# Patient Record
Sex: MALE | Race: WHITE | Employment: OTHER | URBAN - METROPOLITAN AREA
[De-identification: names, ages, dates, MRNs, and addresses within clinical notes are randomized per-mention and may not be internally consistent; named-entity substitution may affect disease eponyms.]

---

## 2017-01-03 ENCOUNTER — OFFICE VISIT (OUTPATIENT)
Dept: NEUROLOGY | Age: 77
End: 2017-01-03

## 2017-01-03 VITALS
BODY MASS INDEX: 32.6 KG/M2 | WEIGHT: 254 LBS | RESPIRATION RATE: 18 BRPM | SYSTOLIC BLOOD PRESSURE: 130 MMHG | DIASTOLIC BLOOD PRESSURE: 86 MMHG | HEIGHT: 74 IN | OXYGEN SATURATION: 96 % | HEART RATE: 76 BPM

## 2017-01-03 DIAGNOSIS — G40.209 LOCALIZED EPILEPSY WITH IMPAIRMENT OF CONSCIOUSNESS (HCC): Primary | ICD-10-CM

## 2017-01-03 RX ORDER — CARBAMAZEPINE 400 MG/1
400 TABLET, EXTENDED RELEASE ORAL 2 TIMES DAILY
Qty: 60 TAB | Refills: 11 | Status: SHIPPED | OUTPATIENT
Start: 2017-01-03 | End: 2017-12-26 | Stop reason: SDUPTHER

## 2017-01-03 NOTE — PROGRESS NOTES
Patient here for follow up on seizures and brain injury. No seizures since last office visit in May in 2016.

## 2017-01-03 NOTE — PROGRESS NOTES
Neurology Progress Note    HISTORY PROVIDED BY: patient and spouse    Chief Complaint:   Chief Complaint   Patient presents with    Seizure    Brain Injury      Subjective:    Ilir Hwang is a 77 y.o. 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 Found to have severe RONAL, on CPAP with improvement in fatigue and seizure control. Stable on monotherapy with Tegretol  mg twice daily. Exam was unchanged and consistent with known history. MRI brain 8/23/12 revealed extensive encephalomalacia on the right, EEG was normal.  Continued Tegretol  mg twice daily, brand-name only. He returns for planned follow-up accompanied by his wife. Since last visit he has undergone cardioversion for afib. He has been started on amiodarone and Pradaxa. No seizures since last visit. No new complaints. Labs  5/16/16-CBC, CMP, carbamazepine all unremarkable.   Past Medical History   Diagnosis Date    A-fib [facility])     Acute bronchitis 8/25/2015    Anxiety     Arrhythmia      atrial fibrillation    Arthritis     BCC (basal cell carcinoma of skin)     BPH (benign prostatic hyperplasia)     Chronic back pain greater than 3 months duration 5/4/2015    Chronic right shoulder pain 1/11/2016    Common wart 5/16/2016    Constipation 12/14/2012    CVA (cerebral infarction) 5/4/2015    Depression     GERD (gastroesophageal reflux disease)     Hearing loss      bilateral hearing aids    Hemiplegia following CVA (cerebrovascular accident) ([address]      left side hemiparesis    History of colostomy     HTN (hypertension)     Hyperlipidemia     Localized epilepsy with impairment of consciousness ([address]     Prostate cancer ([address]      Status post XRT, Lupron    Screening for colon cancer 11/4/2015    Stroke [facility])      traumatic from neck crushing    TBI (traumatic brain injury) ([address] 1994    Unspecified sleep apnea      on CPAP      Past Surgical History   Procedure Laterality Date    Hx colectomy       colostomy    Hx hernia repair      Hx heent       ear surgery eddie.  Hx ankle fracture tx      Hx orthopaedic       left hip pinning    Hx pacemaker  2014      Social History     Social History    Marital status:      Spouse name: N/A    Number of children: N/A    Years of education: N/A     Occupational History    Not on file. Social History Main Topics    Smoking status: Former Smoker     Years: 10.00     Types: Pipe     Quit date: 1/29/1990    Smokeless tobacco: Never Used      Comment: QUIT 1970'S    Alcohol use No    Drug use: No    Sexual activity: Not Currently     Other Topics Concern    Not on file     Social History Narrative    , lives with his wife in [place]     Family History   Problem Relation Age of Onset    Alzheimer Mother      dec [de-identified]    Cancer Father      dec 76 kidney    Heart Disease Brother     No Known Problems Son           Objective:   ROS:  Per HPI-  Otherwise 10 point ROS was negative    Allergies   Allergen Reactions    Ciprofibrate Hives       Meds:  Outpatient Medications Prior to Visit   Medication Sig Dispense Refill    atorvastatin (LIPITOR) 20 mg tablet TAKE ONE TABLET BY MOUTH DAILY 30 Tab 0    PRADAXA 150 mg capsule TAKE ONE CAPSULE BY MOUTH EVERY 12 HOURS 60 Cap 6    lisinopril (PRINIVIL, ZESTRIL) 40 mg tablet TAKE ONE TABLET BY MOUTH DAILY 90 Tab 0    amiodarone (CORDARONE) 200 mg tablet Take 1 Tab by mouth daily. 30 Tab 6    zolpidem (AMBIEN) 10 mg tablet Take 1 Tab by mouth nightly as needed for Sleep (please take in the bed). 30 Tab 3    venlafaxine-SR (EFFEXOR-XR) 75 mg capsule TAKE ONE CAPSULE BY MOUTH DAILY 30 Cap 7    amLODIPine (NORVASC) 10 mg tablet TAKE ONE TABLET BY MOUTH DAILY 90 Tab 6    Incontinence Pad, Liner, Disp pads by Does Not Apply route.       betamethasone dipropionate (DIPROSONE) 0.05 % topical cream Apply  to affected area as needed. 45 g 6    lactulose (CHRONULAC) 10 gram/15 mL solution Take 5 mL by mouth three (3) times daily as needed. 480 mL 3    DOC-Q-LACE 100 mg capsule TAKE ONE CAPSULE BY MOUTH TWICE A DAY 60 Cap 6    meclizine (ANTIVERT) 25 mg tablet TAKE ONE TABLET BY MOUTH EVERY 8 HOURS AS NEEDED 30 Tab 2    FIRST AID CLOTH TAPE 1 X 10 \"-yard tape       diphenhydrAMINE (BENADRYL ALLERGY) 25 mg tablet Take 1 Tab by mouth every six (6) hours as needed for Sleep. 24 Tab 0    metoprolol (LOPRESSOR) 25 mg tablet Take 0.5 tablets by mouth two (2) times a day. 60 tablet 11    lutein 20 mg tab Take 1 Tab by mouth daily. 25 mg QD      senna (SENNA) 8.6 mg tablet Take 1 tablet by mouth daily.  carBAMazepine XR (TEGRETOL XR) 400 mg SR tablet Take 400 mg by mouth two (2) times a day.  finasteride (PROSCAR) 5 mg tablet Take 5 mg by mouth daily.  HYDROcodone-acetaminophen (NORCO) 5-325 mg per tablet Take 1 Tab by mouth two (2) times daily as needed for Pain. Max Daily Amount: 2 Tabs. 60 Tab 0    omeprazole (PRILOSEC) 20 mg capsule TAKE ONE CAPSULE BY MOUTH DAILY 30 Cap 6    polyethylene glycol (MIRALAX) 17 gram/dose powder Take 17 g by mouth two (2) times daily as needed. Takes as needed       No facility-administered medications prior to visit. Imaging:  MRI Results (most recent):    Results from Hospital Encounter encounter on 08/23/12   MRI BRAIN W WO CONT   Narrative **Final Report**      ICD Codes / Adm. Diagnosis: V15.52  345.80 / Personal history of traumatic    Other forms of epilepsy and r  Examination:  MR BRAIN Doyle Duggan  - [id number] - Aug 23 2012 10:47AM  Accession No:  [id number]  Reason:  Traumatic brain injury; Epilepsy; Long term use of medications.       REPORT:  HISTORY:  Epilepsy, traumatic brain injury    COMPARISON:  07/20/2012    TECHNIQUE:  MR imaging of the brain was performed with sagittal T1, axial   T1, T2, FLAIR, GRE, DWI/ADC; coronal T2 and flair; pre and post contrast   multiplanar T1 utilizing 20  mL Magnevist.    FINDINGS:      Again demonstrated is the large area of cystic encephalomalacia within the   right MCA distribution. There is ex vacuo dilatation of the right lateral   ventricle and there is overall enlargement of the ventricles compatible with   volume loss. There is high signal within the periventricular white matter. There is atrophy of the right middle cerebral peduncle, compatible with   wallerian degeneration. There is no evidence of acute infarct. There is   susceptibility artifact in this large area of encephalomalacia likely due to   previous hemorrhage. An acute hemorrhage is not identified. There is no   midline shift or mass lesion. Given motion there are no areas of abnormal   enhancement. The flow-voids at the skull base are maintained. No evidence of   marrow replacement. IMPRESSION:  1. Large area of cystic encephalomalacia within the right MCA distribution   with evidence of prior intracranial hemorrhage. No acute infarct or   hemorrhage is demonstrated  2. Severe periventricular white matter change with enlargement of the   ventricles likely due to volume loss. Shailesh Grijalva           Signing/Reading Doctor: Dyllan Lutz ([id number])    Approved: Dyllan Lutz ([id number])  08/23/2012                                     CT Results (most recent):    Results from [place] encounter on 10/25/16   CT ABD W WO CONT   Narrative EXAM:  CT ABD W WO CONT    INDICATION:   iv only  renal cyst    COMPARISON: 2015. CONTRAST:  100 mL of Isovue-370. TECHNIQUE:    Multislice helical CT was performed from the diaphragm to the iliac crest prior  to and during rapid bolus intravenous contrast administration. Oral contrast     administered. Contiguous 5 mm axial images were reconstructed and lung and soft  tissue windows were generated. Coronal and sagittal reformations were generated.   CT dose reduction was achieved through use of a standardized protocol tailored  for this examination and automatic exposure control for dose modulation. FINDINGS:  There is atelectasis/scarring and a few tiny ossified nodules lung bases. There  is slight pleural thickening which is calcified    The unenhanced images demonstrate 2.7 cm low-density projected off the mid left  kidney posteriorly measures 23 Hounsfield units and does not demonstrate  significant enhancement in this is similar to the prior examination is  consistent with a cyst.    Following intravenous contrast administration, there are liver, spleen, adrenal  glands, pancreas or right kidney. There is a small hiatal hernia. .    The aorta demonstrates atherosclerotic changes . There is no retroperitoneal  adenopathy or mass. There is an ostomy in the left mid abdomen There is no ascites or free  intraperitoneal air. Impression IMPRESSION:   1. No change in the 2.7 cm lesion projected off the left mid kidney posteriorly  most consistent with a cyst.  2. Small hiatal hernia. Reviewed records in [employer] and [employer] tab today    Lab Review   Results for orders placed or performed during the hospital encounter of 10/07/16   EKG, 12 LEAD, INITIAL   Result Value Ref Range    Ventricular Rate 75 BPM    Atrial Rate 77 BPM    QRS Duration 180 ms    Q-T Interval 476 ms    QTC Calculation (Bezet) 531 ms    Calculated R Axis -64 degrees    Calculated T Axis 98 degrees    Diagnosis       Electronic ventricular pacemaker  When compared with ECG of 15-OCT-2014 13:10,  Electronic ventricular pacemaker has replaced Electronic atrial pacemaker  Confirmed by [place] ([id number]) on 10/7/2016 11:44:15 AM          Exam:  Visit Vitals    /86    Pulse 76    Resp 18    Ht 6' 2\" (1.88 m)    Wt 115.2 kg (254 lb)    SpO2 96%    BMI 32.61 kg/m2     General:  Alert, cooperative, no distress. Head:  Normocephalic, without obvious abnormality, atraumatic. Respiratory:  Heart:   Non labored breathing  Regular rate and rhythm, no murmurs   Neck:   2+ carotids, no bruits   Extremities: Warm, no cyanosis or edema. Pulses: 2+ radial pulses. Neurologic:  MS: Alert and oriented x 4, speech intact. Language intact, able to name, repeat, and follow all commands. Attention and fund of knowledge appropriate. Recent and remote memory intact. Cranial Nerves:  II: visual fields Left HH   II: pupils    II: optic disc    III,VII: ptosis none   III,IV,VI: extraocular muscles  EOMI, no nystagmus or diplopia   V: facial light touch sensation     VII: facial muscle function   symmetric   VIII: hearing intact   IX: soft palate elevation     XI: trapezius strength     XI: sternocleidomastoid strength    XII: tongue       Motor: normal bulk and tone, no tremor, strength: 5/5 throughout on right, left hemiparesis with minimal proximal UE movement. Coordination: Not assessed. Gait: WC bound  Reflexes: 2+ on left, 3+ on right           Assessment/Plan   Pt is a 77 y.o. 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 Well controlled on Tegretol XR 400mg bid and with treatment of severe RONAL on CPAP. Exam is unchanged and consistent with known history. MRI brain 8/23/12 revealed extensive encephalomalacia on the right, EEG was normal.  Continue Tegretol  mg twice daily, brand-name only. I asked pt to review meds with cardiologist at upcoming appt, Tegretol may lower the efficacy of Pradaxa. F/u in clinic in one year, instructed to call  in the interim if needed. ICD-10-CM ICD-9-CM    1. Localized epilepsy with impairment of consciousness ([facility] G40.209 345.40        Signed:   Misa Dyer MD  1/3/2017

## 2017-01-03 NOTE — PATIENT INSTRUCTIONS
[facility]   Statement to Patients  April 1, 2014      In an effort to ensure the large volume of patient prescription refills is processed in the most efficient and expeditious manner, we are asking our patients to assist us by calling your Pharmacy for all prescription refills, this will include also your  Mail Order Pharmacy. The pharmacy will contact our office electronically to continue the refill process. Please do not wait until the last minute to call your pharmacy. We need at least 48 hours (2days) to fill prescriptions. We also encourage you to call your pharmacy before going to  your prescription to make sure it is ready. With regard to controlled substance prescription refill requests (narcotic refills) that need to be picked up at our office, we ask your cooperation by providing us with at least 72 hours (3days) notice that you will need a refill. We will not refill narcotic prescription refill requests after 4:00pm on any weekday, Monday through Thursday, or after 2:00pm on Fridays, or on the weekends. We encourage everyone to explore another way of getting your prescription refill request processed using [employer], our patient web portal through our electronic medical record system. [employer] is an efficient and effective way to communicate your medication request directly to the office and  downloadable as an jorge on your smart phone . [employer] also features a review functionality that allows you to view your medication list as well as leave messages for your physician. Are you ready to get connected? If so please review the attatched instructions or speak to any of our staff to get you set up right away! Thank you so much for your cooperation. Should you have any questions please contact our Practice Administrator.     The Physicians and Staff,  [facility]       Thank you for choosing [facility] for your     care. You may receive a survey about your visit. We appreciate you taking time     to complete this survey as we use your feedback to improve our services. We     realize we are not perfect, but we strive to provide excellent care.

## 2017-01-03 NOTE — MR AVS SNAPSHOT
Visit Information Date & Time Provider Department Dept. Phone Encounter #  
 1/3/2017  2:40 PM Alec Garsia MD [facility] at [address] [place] [address] Road [id number] Follow-up Instructions Return in about 1 year (around 1/3/2018). Routing History Your Appointments 2/23/2017 10:00 AM  
6 MONTH with Kuldeep Kirkpatrick MD  
[facility] [address] [place] [address]) Appt Note: . [address]  
[phone] [address] [address]  
  
    
 3/16/2017  8:45 AM  
PACEMAKER with PACEMAKER, [facility] [address]) Appt Note: annual mdt dcpm, prefers AM appts [address]  
[phone] [address]  
  
    
 3/16/2017  9:00 AM  
ANNUAL with Santo Trinidad MD  
[facility] [address] [place] [address]) Appt Note: annual mdt dcpm, prefers AM appts [address]  
[phone] [address] Upcoming Health Maintenance Date Due  
 GLAUCOMA SCREENING Q2Y 5/4/2017 MEDICARE YEARLY EXAM 11/18/2017 DTaP/Tdap/Td series (2 - Td) 8/25/2024 Allergies as of 1/3/2017  Review Complete On: 1/3/2017 By: Alec Garsia MD  
  
 Severity Noted Reaction Type Reactions Ciprofibrate  03/26/2015    Hives Current Immunizations  Reviewed on 11/17/2016 Name Date Influenza High Dose Vaccine PF 10/10/2016 Influenza Vaccine 10/15/2015, 10/10/2014 Influenza Vaccine Split 10/1/2012, 9/14/2011 Pneumococcal Conjugate (PCV-13) 11/4/2015  9:02 AM  
 Pneumococcal Vaccine (Unspecified Type) 2/22/2012  4:39 PM  
 Tdap 8/25/2014 Varicella Virus Vaccine 5/7/2012 Not reviewed this visit Vitals BP Pulse Resp Height(growth percentile) Weight(growth percentile) SpO2  
 130/86 76 18 6' 2\" (1.88 m) 254 lb (115.2 kg) 96% BMI Smoking Status 32.61 kg/m2 Former Smoker Vitals History BMI and BSA Data Body Mass Index Body Surface Area  
 32.61 kg/m 2 2.45 m 2 Preferred Pharmacy Pharmacy Name Phone [facility] [address] , [address] [facility] [phone] Your Updated Medication List  
  
   
This list is accurate as of: 1/3/17  3:19 PM.  Always use your most recent med list.  
  
  
  
  
 amiodarone 200 mg tablet Commonly known as:  CORDARONE Take 1 Tab by mouth daily. amLODIPine 10 mg tablet Commonly known as:  Voncile [place] TAKE ONE TABLET BY MOUTH DAILY  
  
 atorvastatin 20 mg tablet Commonly known as:  LIPITOR  
TAKE ONE TABLET BY MOUTH DAILY  
  
 betamethasone dipropionate 0.05 % topical cream  
Commonly known as:  Cora Redig Apply  to affected area as needed. diphenhydrAMINE 25 mg tablet Commonly known as:  BENADRYL ALLERGY Take 1 Tab by mouth every six (6) hours as needed for Sleep. DOC-Q-LACE 100 mg capsule Generic drug:  docusate sodium TAKE ONE CAPSULE BY MOUTH TWICE A DAY  
  
 finasteride 5 mg tablet Commonly known as:  PROSCAR Take 5 mg by mouth daily. FIRST AID CLOTH TAPE 1 X 10 \"-yard Tape Generic drug:  Adhesive Tape HYDROcodone-acetaminophen 5-325 mg per tablet Commonly known as:  Eric Ill Take 1 Tab by mouth two (2) times daily as needed for Pain. Max Daily Amount: 2 Tabs. Incontinence Pad, Liner, Disp Pads  
by Does Not Apply route. lactulose 10 gram/15 mL solution Commonly known as:  Seleta [place] Take 5 mL by mouth three (3) times daily as needed. lisinopril 40 mg tablet Commonly known as:  PRINIVIL, ZESTRIL  
TAKE ONE TABLET BY MOUTH DAILY  
  
 lutein 20 mg Tab Take 1 Tab by mouth daily. 25 mg QD  
  
 meclizine 25 mg tablet Commonly known as:  ANTIVERT  
TAKE ONE TABLET BY MOUTH EVERY 8 HOURS AS NEEDED  
  
 metoprolol tartrate 25 mg tablet Commonly known as:  LOPRESSOR Take 0.5 tablets by mouth two (2) times a day. MIRALAX 17 gram/dose powder Generic drug:  polyethylene glycol Take 17 g by mouth two (2) times daily as needed. Takes as needed  
  
 omeprazole 20 mg capsule Commonly known as:  PRILOSEC  
TAKE ONE CAPSULE BY MOUTH DAILY PRADAXA 150 mg capsule Generic drug:  dabigatran etexilate TAKE ONE CAPSULE BY MOUTH EVERY 12 HOURS Senna 8.6 mg tablet Generic drug:  senna Take 1 tablet by mouth daily. TEGretol  mg SR tablet Generic drug:  carBAMazepine XR Take 1 Tab by mouth two (2) times a day. venlafaxine-SR 75 mg capsule Commonly known as:  EFFEXOR-XR  
TAKE ONE CAPSULE BY MOUTH DAILY  
  
 zolpidem 10 mg tablet Commonly known as:  AMBIEN Take 1 Tab by mouth nightly as needed for Sleep (please take in the bed). Prescriptions Sent to Pharmacy Refills TEGRETOL  mg SR tablet 11 Sig: Take 1 Tab by mouth two (2) times a day. Class: Normal  
 Pharmacy: [facility] [id number] [address] #: [phone] Route: Oral  
  
Follow-up Instructions Return in about 1 year (around 1/3/2018). Patient Instructions PRESCRIPTION REFILL POLICY [facility] Statement to Patients April 1, 2014 In an effort to ensure the large volume of patient prescription refills is processed in the most efficient and expeditious manner, we are asking our patients to assist us by calling your Pharmacy for all prescription refills, this will include also your  Mail Order Pharmacy. The pharmacy will contact our office electronically to continue the refill process. Please do not wait until the last minute to call your pharmacy.  We need at least 48 hours (2days) to fill prescriptions. We also encourage you to call your pharmacy before going to  your prescription to make sure it is ready. With regard to controlled substance prescription refill requests (narcotic refills) that need to be picked up at our office, we ask your cooperation by providing us with at least 72 hours (3days) notice that you will need a refill. We will not refill narcotic prescription refill requests after 4:00pm on any weekday, Monday through Thursday, or after 2:00pm on Fridays, or on the weekends. We encourage everyone to explore another way of getting your prescription refill request processed using [employer], our patient web portal through our electronic medical record system. [employer] is an efficient and effective way to communicate your medication request directly to the office and  downloadable as an jorge on your smart phone . [employer] also features a review functionality that allows you to view your medication list as well as leave messages for your physician. Are you ready to get connected? If so please review the attatched instructions or speak to any of our staff to get you set up right away! Thank you so much for your cooperation. Should you have any questions please contact our Practice Administrator. The Physicians and Staff,  [facility] Thank you for choosing [facility] and [facility] for your  
 
care. You may receive a survey about your visit. We appreciate you taking time  
 
to complete this survey as we use your feedback to improve our services. We  
 
realize we are not perfect, but we strive to provide excellent care. Introducing [facility] & [facility] SERVICES! Dear Yue Yan: 
Thank you for requesting a [employer] account. Our records indicate that you already have an active [employer] account. You can access your account anytime at [email or web address] Did you know that you can access your hospital and ER discharge instructions at any time in [employer]? You can also review all of your test results from your hospital stay or ER visit. Additional Information If you have questions, please visit the Frequently Asked Questions section of the [employer] website at [email or web address] Remember, [employer] is NOT to be used for urgent needs. For medical emergencies, dial 911. Now available from your iPhone and Android! Please provide this summary of care documentation to your next provider. Your primary care clinician is listed as Anastacio Chavis. If you have any questions after today's visit, please call [phone].

## 2017-01-19 RX ORDER — ATORVASTATIN CALCIUM 20 MG/1
TABLET, FILM COATED ORAL
Qty: 30 TAB | Refills: 0 | Status: SHIPPED | OUTPATIENT
Start: 2017-01-19 | End: 2017-02-15 | Stop reason: SDUPTHER

## 2017-01-26 RX ORDER — LISINOPRIL 40 MG/1
TABLET ORAL
Qty: 90 TAB | Refills: 0 | Status: SHIPPED | OUTPATIENT
Start: 2017-01-26 | End: 2017-04-26 | Stop reason: SDUPTHER

## 2017-01-31 RX ORDER — VENLAFAXINE HYDROCHLORIDE 75 MG/1
CAPSULE, EXTENDED RELEASE ORAL
Qty: 30 CAP | Refills: 6 | Status: SHIPPED | OUTPATIENT
Start: 2017-01-31 | End: 2017-08-26 | Stop reason: SDUPTHER

## 2017-02-20 RX ORDER — ATORVASTATIN CALCIUM 20 MG/1
TABLET, FILM COATED ORAL
Qty: 30 TAB | Refills: 0 | Status: SHIPPED | OUTPATIENT
Start: 2017-02-20 | End: 2017-03-19 | Stop reason: SDUPTHER

## 2017-02-23 ENCOUNTER — OFFICE VISIT (OUTPATIENT)
Dept: CARDIOLOGY CLINIC | Age: 77
End: 2017-02-23

## 2017-02-23 VITALS
RESPIRATION RATE: 18 BRPM | OXYGEN SATURATION: 96 % | WEIGHT: 264 LBS | HEART RATE: 77 BPM | SYSTOLIC BLOOD PRESSURE: 108 MMHG | BODY MASS INDEX: 33.88 KG/M2 | HEIGHT: 74 IN | DIASTOLIC BLOOD PRESSURE: 68 MMHG

## 2017-02-23 DIAGNOSIS — I49.5 SSS (SICK SINUS SYNDROME) (HCC): ICD-10-CM

## 2017-02-23 DIAGNOSIS — E78.00 HYPERCHOLESTEROLEMIA: ICD-10-CM

## 2017-02-23 DIAGNOSIS — I10 ESSENTIAL HYPERTENSION: Primary | ICD-10-CM

## 2017-02-23 DIAGNOSIS — Z92.29 HX: LONG TERM ANTICOAGULANT USE: ICD-10-CM

## 2017-02-23 DIAGNOSIS — I48.92 ATRIAL FLUTTER, UNSPECIFIED TYPE (HCC): ICD-10-CM

## 2017-02-23 RX ORDER — TOBRAMYCIN 3 MG/ML
1 SOLUTION/ DROPS OPHTHALMIC EVERY 4 HOURS
Qty: 1 BOTTLE | Refills: 0 | Status: SHIPPED | OUTPATIENT
Start: 2017-02-23 | End: 2017-03-02

## 2017-02-23 NOTE — PROGRESS NOTES
NAME:  Ling George   :   1940   MRN:   [id number]   PCP:  Anastacio Chavis MD           Subjective: The patient is a 77y.o. year old male  who returns for a routine follow-up. Since the last visit, patient reports no change in exercise tolerance, chest pain, edema, medication intolerance, palpitations, shortness of breath, PND/orthopnea wheezing, sputum, syncope, dizziness or light headedness. Doing well since cardioversion. Past Medical History:   Diagnosis Date    A-fib [facility])     Acute bronchitis 2015    Anxiety     Arrhythmia     atrial fibrillation    Arthritis     BCC (basal cell carcinoma of skin)     BPH (benign prostatic hyperplasia)     Chronic back pain greater than 3 months duration 2015    Chronic right shoulder pain 2016    Common wart 2016    Constipation 2012    CVA (cerebral infarction) 2015    Depression     GERD (gastroesophageal reflux disease)     Hearing loss     bilateral hearing aids    Hemiplegia following CVA (cerebrovascular accident) ([address]     left side hemiparesis    History of colostomy     HTN (hypertension)     Hyperlipidemia     Localized epilepsy with impairment of consciousness ([address]     Prostate cancer ([address]     Status post XRT, Lupron    Screening for colon cancer 2015    Stroke [facility])     traumatic from neck crushing    TBI (traumatic brain injury) ([address]     Unspecified sleep apnea     on CPAP       Social History   Substance Use Topics    Smoking status: Former Smoker     Years: 10.00     Types: Pipe     Quit date: 1990    Smokeless tobacco: Never Used      Comment: QUIT 'S    Alcohol use No      Family History   Problem Relation Age of Onset    Alzheimer Mother      dec [de-identified]    Cancer Father      dec 76 kidney    Heart Disease Brother     No Known Problems Son         Review of Systems  Constitutional: Negative for fever, chills, and diaphoresis.    Respiratory: Negative for cough, hemoptysis, sputum production, shortness of breath and wheezing. Cardiovascular: Negative for chest pain, palpitations, orthopnea, claudication, leg swelling and PND. Gastrointestinal: Negative for heartburn, nausea, vomiting, blood in stool and melena. Genitourinary: Negative for dysuria and flank pain. Musculoskeletal: Negative for joint pain and back pain. Skin: Negative for rash. Neurological: Negative for focal weakness, seizures, loss of consciousness, weakness and headaches. Endo/Heme/Allergies: Does not bruise/bleed easily. Psychiatric/Behavioral: Negative for memory loss. The patient does not have insomnia. Objective:       Vitals:    02/23/17 0949   BP: 108/68   Pulse: 77   Resp: 18   SpO2: 96%   Weight: 264 lb (119.7 kg)   Height: 6' 2\" (1.88 m)    Body mass index is 33.9 kg/(m^2). General PE    Gen: NAD     Mental Status - Alert. General Appearance - Not in acute distress. Neck - no JVD     Chest and Lung Exam     Inspection: Accessory muscles - No use of accessory muscles in breathing. Auscultation:   Breath sounds: - Normal.     Cardiovascular   Inspection: Jugular vein - Bilateral - Inspection Normal.   Palpation/Percussion:   Apical Impulse: - Normal.   Auscultation: Rhythm - Regular. Heart Sounds - S1 WNL and S2 WNL. No S3 or S4. Murmurs & Other Heart Sounds: Auscultation of the heart reveals - No Murmurs. Peripheral Vascular   Upper Extremity: Inspection - Bilateral - No Cyanotic nailbeds or Digital clubbing. Lower Extremity:   Palpation: Edema - Bilateral - No edema. Abdomen: Soft, non-tender, bowel sounds are active. Neuro: A&O times 3, CN and motor grossly WNL      Data Review:     EKG -  sinus  Rhythm   Low voltage in precordial leads.    -Incomplete right bundle branch block. -  Nonspecific T-abnormality.      Allergies reviewed  Allergies   Allergen Reactions    Ciprofibrate Hives       Medications reviewed  Current Outpatient Prescriptions Medication Sig    atorvastatin (LIPITOR) 20 mg tablet TAKE ONE TABLET BY MOUTH DAILY    venlafaxine-SR (EFFEXOR-XR) 75 mg capsule TAKE ONE CAPSULE BY MOUTH DAILY    lisinopril (PRINIVIL, ZESTRIL) 40 mg tablet TAKE ONE TABLET BY MOUTH DAILY    TEGRETOL  mg SR tablet Take 1 Tab by mouth two (2) times a day.  PRADAXA 150 mg capsule TAKE ONE CAPSULE BY MOUTH EVERY 12 HOURS    amiodarone (CORDARONE) 200 mg tablet Take 1 Tab by mouth daily.  zolpidem (AMBIEN) 10 mg tablet Take 1 Tab by mouth nightly as needed for Sleep (please take in the bed).  amLODIPine (NORVASC) 10 mg tablet TAKE ONE TABLET BY MOUTH DAILY    Incontinence Pad, Liner, Disp pads by Does Not Apply route.  betamethasone dipropionate (DIPROSONE) 0.05 % topical cream Apply  to affected area as needed.  lactulose (CHRONULAC) 10 gram/15 mL solution Take 5 mL by mouth three (3) times daily as needed.  DOC-Q-LACE 100 mg capsule TAKE ONE CAPSULE BY MOUTH TWICE A DAY    HYDROcodone-acetaminophen (NORCO) 5-325 mg per tablet Take 1 Tab by mouth two (2) times daily as needed for Pain. Max Daily Amount: 2 Tabs.  meclizine (ANTIVERT) 25 mg tablet TAKE ONE TABLET BY MOUTH EVERY 8 HOURS AS NEEDED    FIRST AID CLOTH TAPE 1 X 10 \"-yard tape     diphenhydrAMINE (BENADRYL ALLERGY) 25 mg tablet Take 1 Tab by mouth every six (6) hours as needed for Sleep.  metoprolol (LOPRESSOR) 25 mg tablet Take 0.5 tablets by mouth two (2) times a day.  lutein 20 mg tab Take 1 Tab by mouth daily. 25 mg QD    senna (SENNA) 8.6 mg tablet Take 1 tablet by mouth daily.  polyethylene glycol (MIRALAX) 17 gram/dose powder Take 17 g by mouth two (2) times daily as needed. Takes as needed    finasteride (PROSCAR) 5 mg tablet Take 5 mg by mouth daily.  omeprazole (PRILOSEC) 20 mg capsule TAKE ONE CAPSULE BY MOUTH DAILY     No current facility-administered medications for this visit. Assessment:       ICD-10-CM ICD-9-CM    1.  Essential hypertension I10 401.9 AMB POC EKG ROUTINE W/ 12 LEADS, INTER & REP        Orders Placed This Encounter    AMB POC EKG ROUTINE W/ 12 LEADS, INTER & REP     Order Specific Question:   Reason for Exam:     Answer:   routine       Patient Active Problem List   Diagnosis Code    HTN (hypertension) I10    Depression F32.9    Hypercholesterolemia E78.00    Acid reflux K21.9    Seizure ([facility] R56.9    Hypothyroidism E03.9    Hyponatremia E87.1    BPH (benign prostatic hyperplasia) N40.0    Rash R21    Cellulitis L03.90    Wax in ear H61.20    Ulcer ([facility] L98.499    Small bowel obstruction (HCC) K56.69    Atrial flutter (HCC) I48.92    Atrial fibrillation or flutter     CHI (closed head injury) S09. 90XA    Basal cell cancer C44.91    TBI (traumatic brain injury) ([facility] S06. 9X9A    Atrial fibrillation (HCC) I48.91    Cataract H26.9    Constipation K59.00    Ankle fracture, left S82.892A    Insomnia G47.00    Tinea corporis B35.4    SSS (sick sinus syndrome) (HCC) I49.5    Syncope R55    Seizure disorder (HCC) G40.909    RONAL on CPAP G47.33    Pacemaker Z95.0    Pre-op evaluation Z01.818    Chronic back pain greater than 3 months duration M54.9, G89.29    CVA (cerebral infarction) I63.9    Acute bronchitis J20.9    Screening for colon cancer Z12.11    Chronic right shoulder pain M25.511, G89.29    Common wart B07.8    Localized epilepsy with impairment of consciousness ([facility] G40.209       Plan:     Patient presents for follow up.    1. A. Fib: On BB and pradaxa. Paced. S/p cardioversion on amiodarone. Follow up with Dr Ashley Dorsey in 1 mo. Check CBC with long term anti-coag  2. Hyperlipidemia: On statin. Last FLP noted. 3. BP well controlled.    4. Bacterial conjunctivitis- given rx for opthalmic drops (wife is fearful of taking him to [facility] where others are ill). Mirela Chery, RAJWINDRE      Pt seen and examined in details. Agree with NP A&P. D/w pt and wife.     Missy Ferraro MD

## 2017-02-23 NOTE — PROGRESS NOTES
Chief Complaint   Patient presents with    Other     6 month-very fatigued-pt denies any cardiac symptoms

## 2017-02-23 NOTE — MR AVS SNAPSHOT
Visit Information Date & Time Provider Department Dept. Phone Encounter #  
 2/23/2017 10:00 AM Cj Mcdonnell, [address] [facility] [phone] [id number] Follow-up Instructions Return in about 6 months (around 8/23/2017). Your Appointments 3/16/2017  8:45 AM  
PACEMAKER with PACEMAKER, [facility]) Appt Note: annual mdt dcpm, prefers AM appts [id number] [facility]  
[phone] [id number] [facility]  
  
    
 3/16/2017  9:00 AM  
ANNUAL with Lauren Andrade MD  
[facility] Appt Note: annual mdt dcpm, prefers AM appts [address] [facility]  
[phone] [id number] [facility] Upcoming Health Maintenance Date Due  
 GLAUCOMA SCREENING Q2Y 5/4/2017 MEDICARE YEARLY EXAM 11/18/2017 DTaP/Tdap/Td series (2 - Td) 8/25/2024 Allergies as of 2/23/2017  Review Complete On: 2/23/2017 By: Cj Mcdonnell MD  
  
 Severity Noted Reaction Type Reactions Ciprofibrate  03/26/2015    Hives Current Immunizations  Reviewed on 11/17/2016 Name Date Influenza High Dose Vaccine PF 10/10/2016 Influenza Vaccine 10/15/2015, 10/10/2014 Influenza Vaccine Split 10/1/2012, 9/14/2011 Pneumococcal Conjugate (PCV-13) 11/4/2015  9:02 AM  
 Pneumococcal Vaccine (Unspecified Type) 2/22/2012  4:39 PM  
 Tdap 8/25/2014 Varicella Virus Vaccine 5/7/2012 Not reviewed this visit You Were Diagnosed With   
  
 Codes Comments Essential hypertension    -  Primary ICD-10-CM: I10 
ICD-9-CM: 401.9 Hypercholesterolemia     ICD-10-CM: E78.00 ICD-9-CM: 272.0 Atrial flutter, unspecified type ([facility]     ICD-10-CM: I48.92 
ICD-9-CM: 427.32   
 SSS (sick sinus syndrome) (HCC)     ICD-10-CM: I49.5 ICD-9-CM: 427.81   
 HX: long term anticoagulant use     ICD-10-CM: Z79.01 
ICD-9-CM: V58.61 Vitals BP  
  
  
  
  
  
 108/68 (BP 1 Location: Right arm, BP Patient Position: Sitting) Vitals History BMI and BSA Data Body Mass Index Body Surface Area 33.9 kg/m 2 2.5 m 2 Preferred Pharmacy Pharmacy Name Phone [facility] [address] [place] [address] [phone] Your Updated Medication List  
  
   
This list is accurate as of: 2/23/17 11:04 AM.  Always use your most recent med list.  
  
  
  
  
 amiodarone 200 mg tablet Commonly known as:  CORDARONE Take 1 Tab by mouth daily. amLODIPine 10 mg tablet Commonly known as:  Horris Bills TAKE ONE TABLET BY MOUTH DAILY  
  
 atorvastatin 20 mg tablet Commonly known as:  LIPITOR  
TAKE ONE TABLET BY MOUTH DAILY  
  
 betamethasone dipropionate 0.05 % topical cream  
Commonly known as:  Soraida [place] Apply  to affected area as needed. diphenhydrAMINE 25 mg tablet Commonly known as:  BENADRYL ALLERGY Take 1 Tab by mouth every six (6) hours as needed for Sleep. DOC-Q-LACE 100 mg capsule Generic drug:  docusate sodium TAKE ONE CAPSULE BY MOUTH TWICE A DAY  
  
 finasteride 5 mg tablet Commonly known as:  PROSCAR Take 5 mg by mouth daily. FIRST AID CLOTH TAPE 1 X 10 \"-yard Tape Generic drug:  Adhesive Tape HYDROcodone-acetaminophen 5-325 mg per tablet Commonly known as:  Lenon Gauze Take 1 Tab by mouth two (2) times daily as needed for Pain. Max Daily Amount: 2 Tabs. Incontinence Pad, Liner, Disp Pads  
by Does Not Apply route. lactulose 10 gram/15 mL solution Commonly known as:  Donnis Merlyn Take 5 mL by mouth three (3) times daily as needed. lisinopril 40 mg tablet Commonly known as:  PRINIVIL, ZESTRIL  
TAKE ONE TABLET BY MOUTH DAILY  
  
 lutein 20 mg Tab Take 1 Tab by mouth daily. 25 mg QD  
  
 meclizine 25 mg tablet Commonly known as:  ANTIVERT  
 TAKE ONE TABLET BY MOUTH EVERY 8 HOURS AS NEEDED  
  
 metoprolol tartrate 25 mg tablet Commonly known as:  LOPRESSOR Take 0.5 tablets by mouth two (2) times a day. MIRALAX 17 gram/dose powder Generic drug:  polyethylene glycol Take 17 g by mouth two (2) times daily as needed. Takes as needed  
  
 omeprazole 20 mg capsule Commonly known as:  PRILOSEC  
TAKE ONE CAPSULE BY MOUTH DAILY PRADAXA 150 mg capsule Generic drug:  dabigatran etexilate TAKE ONE CAPSULE BY MOUTH EVERY 12 HOURS Senna 8.6 mg tablet Generic drug:  senna Take 1 tablet by mouth daily. TEGretol  mg SR tablet Generic drug:  carBAMazepine XR Take 1 Tab by mouth two (2) times a day. tobramycin 0.3 % ophthalmic solution Commonly known as:  Frances Bear Administer 1 Drop to left eye every four (4) hours for 7 days. venlafaxine-SR 75 mg capsule Commonly known as:  EFFEXOR-XR  
TAKE ONE CAPSULE BY MOUTH DAILY  
  
 zolpidem 10 mg tablet Commonly known as:  AMBIEN Take 1 Tab by mouth nightly as needed for Sleep (please take in the bed). Prescriptions Sent to Pharmacy Refills  
 tobramycin (TOBREX) 0.3 % ophthalmic solution 0 Sig: Administer 1 Drop to left eye every four (4) hours for 7 days. Class: Normal  
 Pharmacy: [address] #: [phone] Route: Left Eye We Performed the Following AMB POC EKG ROUTINE W/ 12 LEADS, INTER & REP [38487 CPT(R)] CBC W/O DIFF [38066 CPT(R)] Follow-up Instructions Return in about 6 months (around 8/23/2017). Introducing [facility]! Dear Jeremiah Mazariegos: 
Thank you for requesting a [facility] account. Our records indicate that you already have an active [facility] account. You can access your account anytime at [email or web address] Did you know that you can access your hospital and ER discharge instructions at any time in [employer]? You can also review all of your test results from your hospital stay or ER visit. Additional Information If you have questions, please visit the Frequently Asked Questions section of the [employer] website at [email or web address] Remember, [employer] is NOT to be used for urgent needs. For medical emergencies, dial 911. Now available from your iPhone and Android! Please provide this summary of care documentation to your next provider. Your primary care clinician is listed as Rebeka Doll. If you have any questions after today's visit, please call [phone].

## 2017-02-24 ENCOUNTER — TELEPHONE (OUTPATIENT)
Dept: CARDIOLOGY CLINIC | Age: 77
End: 2017-02-24

## 2017-02-24 LAB
ERYTHROCYTE [DISTWIDTH] IN BLOOD BY AUTOMATED COUNT: 13.1 % (ref 12.3–15.4)
HCT VFR BLD AUTO: 38.7 % (ref 37.5–51)
HGB BLD-MCNC: 13.3 G/DL (ref 12.6–17.7)
MCH RBC QN AUTO: 32 PG (ref 26.6–33)
MCHC RBC AUTO-ENTMCNC: 34.4 G/DL (ref 31.5–35.7)
MCV RBC AUTO: 93 FL (ref 79–97)
PLATELET # BLD AUTO: 330 X10E3/UL (ref 150–379)
RBC # BLD AUTO: 4.16 X10E6/UL (ref 4.14–5.8)
WBC # BLD AUTO: 7.6 X10E3/UL (ref 3.4–10.8)

## 2017-02-24 NOTE — TELEPHONE ENCOUNTER
----- Message from Jearline Romberg, ANP sent at 2/24/2017  9:12 AM EST -----  Please let him know that his labs were perfect! Left message to call me back. Pt's wife returned my call,on hippa form, told her his labs were perfect. She verbalized that she understood everything.

## 2017-02-28 ENCOUNTER — TELEPHONE (OUTPATIENT)
Dept: CARDIOLOGY CLINIC | Age: 77
End: 2017-02-28

## 2017-02-28 NOTE — TELEPHONE ENCOUNTER
Spoke with patients wife,Mandy, on HIPAA. Verified patient with two patient identifiers. Advised labs okay. She verbalized understanding.

## 2017-03-09 RX ORDER — MECLIZINE HYDROCHLORIDE 25 MG/1
TABLET ORAL
Qty: 30 TAB | Refills: 1 | Status: SHIPPED | OUTPATIENT
Start: 2017-03-09 | End: 2018-07-05

## 2017-03-16 ENCOUNTER — CLINICAL SUPPORT (OUTPATIENT)
Dept: CARDIOLOGY CLINIC | Age: 77
End: 2017-03-16

## 2017-03-16 ENCOUNTER — OFFICE VISIT (OUTPATIENT)
Dept: CARDIOLOGY CLINIC | Age: 77
End: 2017-03-16

## 2017-03-16 VITALS
DIASTOLIC BLOOD PRESSURE: 76 MMHG | SYSTOLIC BLOOD PRESSURE: 140 MMHG | BODY MASS INDEX: 33.24 KG/M2 | HEIGHT: 74 IN | HEART RATE: 87 BPM | OXYGEN SATURATION: 96 % | WEIGHT: 259 LBS | RESPIRATION RATE: 16 BRPM

## 2017-03-16 DIAGNOSIS — I48.91 ATRIAL FIBRILLATION, UNSPECIFIED TYPE (HCC): ICD-10-CM

## 2017-03-16 DIAGNOSIS — I48.0 PAROXYSMAL ATRIAL FIBRILLATION (HCC): ICD-10-CM

## 2017-03-16 DIAGNOSIS — I10 ESSENTIAL HYPERTENSION: ICD-10-CM

## 2017-03-16 DIAGNOSIS — I48.3 TYPICAL ATRIAL FLUTTER (HCC): Primary | ICD-10-CM

## 2017-03-16 DIAGNOSIS — R55 SYNCOPE, UNSPECIFIED SYNCOPE TYPE: ICD-10-CM

## 2017-03-16 DIAGNOSIS — Z95.0 PACEMAKER: Primary | ICD-10-CM

## 2017-03-16 NOTE — PROGRESS NOTES
Subjective:      Fina Hill is a 77 y.o. male is here for follow up of AF and device check. He is feeling well, shortness of breath and fatigue improved after cardioversion. He will feel \"heart pounding\" and palpitations if he exerts himself. The patient denies chest pain/ shortness of breath, orthopnea, PND, LE edema, syncope, presyncope or fatigue.        Patient Active Problem List    Diagnosis Date Noted    Localized epilepsy with impairment of consciousness ([address]     Common wart 05/16/2016    Chronic right shoulder pain 01/11/2016    Screening for colon cancer 11/04/2015    Acute bronchitis 08/25/2015    Chronic back pain greater than 3 months duration 05/04/2015    CVA (cerebral infarction) 05/04/2015    Pre-op evaluation 01/29/2015    Pacemaker 10/15/2014    SSS (sick sinus syndrome) ([address] 10/13/2014    Syncope 10/13/2014    Seizure disorder ([address] 10/13/2014    RONAL on CPAP 10/13/2014    Tinea corporis 05/07/2014    Insomnia 01/03/2014    Ankle fracture, left 08/28/2013    Constipation 12/14/2012    Cataract 07/30/2012    Atrial fibrillation ([address] 06/19/2012    TBI (traumatic brain injury) ([address] 06/08/2012    CHI (closed head injury) 05/24/2012    Basal cell cancer 05/24/2012    Atrial flutter ([address] 03/02/2012    Atrial fibrillation or flutter 03/02/2012    Small bowel obstruction ([address] 02/21/2012    Ulcer ([address] 09/14/2011    Wax in ear 06/17/2011    Cellulitis 04/04/2011    Rash 03/28/2011    Hypothyroidism 12/22/2010    Hyponatremia 12/22/2010    BPH (benign prostatic hyperplasia) 12/22/2010    HTN (hypertension) 12/08/2010    Depression 12/08/2010    Hypercholesterolemia 12/08/2010    Acid reflux 12/08/2010    Seizure ([address] 12/08/2010      Fredis Bal MD  Past Medical History:   Diagnosis Date    A-fib [facility])     Acute bronchitis 8/25/2015    Anxiety     Arrhythmia     atrial fibrillation    Arthritis     BCC (basal cell carcinoma of skin)     BPH (benign prostatic hyperplasia)     Chronic back pain greater than 3 months duration 5/4/2015    Chronic right shoulder pain 1/11/2016    Common wart 5/16/2016    Constipation 12/14/2012    CVA (cerebral infarction) 5/4/2015    Depression     GERD (gastroesophageal reflux disease)     Hearing loss     bilateral hearing aids    Hemiplegia following CVA (cerebrovascular accident) ([address]     left side hemiparesis    History of colostomy     HTN (hypertension)     Hyperlipidemia     Localized epilepsy with impairment of consciousness ([address]     Prostate cancer ([address]     Status post XRT, Lupron    Screening for colon cancer 11/4/2015    Stroke [facility])     traumatic from neck crushing    TBI (traumatic brain injury) ([address] 1994    Unspecified sleep apnea     on CPAP      Past Surgical History:   Procedure Laterality Date    HX ANKLE FRACTURE TX      HX COLECTOMY      colostomy    HX HEENT      ear surgery eddie.     HX HERNIA REPAIR      HX ORTHOPAEDIC      left hip pinning    HX PACEMAKER  2014     Allergies   Allergen Reactions    Ciprofibrate Hives      Family History   Problem Relation Age of Onset    Alzheimer Mother      dec [de-identified]    Cancer Father      dec 76 kidney    Heart Disease Brother     No Known Problems Son     negative for cardiac disease  Social History     Social History    Marital status:      Spouse name: N/A    Number of children: N/A    Years of education: N/A     Social History Main Topics    Smoking status: Former Smoker     Years: 10.00     Types: Pipe     Quit date: 1/29/1990    Smokeless tobacco: Never Used      Comment: QUIT 1970'S    Alcohol use No    Drug use: No    Sexual activity: Not Currently     Other Topics Concern    Not on file     Social History Narrative    , lives with his wife in [place]     Current Outpatient Prescriptions   Medication Sig    meclizine (ANTIVERT) 25 mg tablet TAKE ONE TABLET BY MOUTH EVERY 8 HOURS AS NEEDED    atorvastatin (LIPITOR) 20 mg tablet TAKE ONE TABLET BY MOUTH DAILY    venlafaxine-SR (EFFEXOR-XR) 75 mg capsule TAKE ONE CAPSULE BY MOUTH DAILY    lisinopril (PRINIVIL, ZESTRIL) 40 mg tablet TAKE ONE TABLET BY MOUTH DAILY    TEGRETOL  mg SR tablet Take 1 Tab by mouth two (2) times a day.  PRADAXA 150 mg capsule TAKE ONE CAPSULE BY MOUTH EVERY 12 HOURS    amiodarone (CORDARONE) 200 mg tablet Take 1 Tab by mouth daily.  zolpidem (AMBIEN) 10 mg tablet Take 1 Tab by mouth nightly as needed for Sleep (please take in the bed).  amLODIPine (NORVASC) 10 mg tablet TAKE ONE TABLET BY MOUTH DAILY    Incontinence Pad, Liner, Disp pads by Does Not Apply route.  betamethasone dipropionate (DIPROSONE) 0.05 % topical cream Apply  to affected area as needed.  lactulose (CHRONULAC) 10 gram/15 mL solution Take 5 mL by mouth three (3) times daily as needed.  DOC-Q-LACE 100 mg capsule TAKE ONE CAPSULE BY MOUTH TWICE A DAY    HYDROcodone-acetaminophen (NORCO) 5-325 mg per tablet Take 1 Tab by mouth two (2) times daily as needed for Pain. Max Daily Amount: 2 Tabs.  FIRST AID CLOTH TAPE 1 X 10 \"-yard tape     omeprazole (PRILOSEC) 20 mg capsule TAKE ONE CAPSULE BY MOUTH DAILY    diphenhydrAMINE (BENADRYL ALLERGY) 25 mg tablet Take 1 Tab by mouth every six (6) hours as needed for Sleep.  metoprolol (LOPRESSOR) 25 mg tablet Take 0.5 tablets by mouth two (2) times a day.  lutein 20 mg tab Take 1 Tab by mouth daily. 25 mg QD    senna (SENNA) 8.6 mg tablet Take 1 tablet by mouth daily.  polyethylene glycol (MIRALAX) 17 gram/dose powder Take 17 g by mouth two (2) times daily as needed. Takes as needed    finasteride (PROSCAR) 5 mg tablet Take 5 mg by mouth daily. No current facility-administered medications for this visit.        Vitals:    03/16/17 0847   BP: 140/76   Pulse: 87   Resp: 16   SpO2: 96%   Weight: 259 lb (117.5 kg)   Height: 6' 2\" (1.88 m)       I have reviewed the nurses notes, vitals, problem list, allergy list, medical history, family, social history and medications. Review of Symptoms:    General: Pt denies excessive weight gain or loss. Pt is able to conduct ADL's  HEENT: Denies blurred vision, headaches, epistaxis and difficulty swallowing. Respiratory: Denies shortness of breath, BECERRIL, wheezing or stridor. Cardiovascular:+palpitations,  Denies precordial pain, edema or PND  Gastrointestinal: Denies poor appetite, indigestion, abdominal pain or blood in stool  Urinary: Denies dysuria, pyuria  Musculoskeletal: Denies pain or swelling from muscles or joints  Neurologic: Denies tremor, paresthesias, or sensory motor disturbance  Skin: Denies rash, itching or texture change. Psych: Denies depression      Physical Exam:      General: Well developed, in no acute distress. HEENT: Eyes - PERRL, no jvd  Heart:  Normal S1/S2 negative S3 or S4. Regular, no murmur, gallop or rub.   Respiratory: +left lower diminished, Clear bilaterally x 3, no wheezing or rales  Abdomen:   Soft, non-tender, bowel sounds are active.   Extremities:  No edema, normal cap refill, no cyanosis. Musculoskeletal: No clubbing  Neuro: A&Ox3, speech clear, gait stable. Skin: Skin color is normal. No rashes or lesions.  Non diaphoretic  Vascular: 2+ pulses symmetric in all extremities    Cardiographics    Ekg: sinus with PVCs  MDT PM: 99.5% AP, paroxysmal AFL     Results for orders placed or performed during the hospital encounter of 10/07/16   EKG, 12 LEAD, INITIAL   Result Value Ref Range    Ventricular Rate 75 BPM    Atrial Rate 77 BPM    QRS Duration 180 ms    Q-T Interval 476 ms    QTC Calculation (Bezet) 531 ms    Calculated R Axis -64 degrees    Calculated T Axis 98 degrees    Diagnosis       Electronic ventricular pacemaker  When compared with ECG of 15-OCT-2014 13:10,  Electronic ventricular pacemaker has replaced Electronic atrial pacemaker  Confirmed by Young Duarte PHARI ([id number]) on 10/7/2016 11:44:15 AM Results for orders placed or performed in visit on 10/09/14   CARDIAC HOLTER MONITOR, 24 HOURS    Narrative    ECG Monitor/24 hours, Complete    Reason for Holter Monitor   SYNCOPE/ BRADYCARDIA    Heartbeat    Slowest 33  Average 57  Fastest  92          Results:   Underlying Rhythm: Normal sinus rhythm. Frequent pauses noted  with 2 episodes of > 3 second pause. Atrial Arrhythmias: premature atrial contractions; occasional             AV Conduction: normal    Ventricular Arrhythmias: premature ventricular contractions; rare    ST Segment Analysis:normal     Symptom Correlation:  None reported. Comment:   Sinus rhythm with significant pauses. Cj Mcdonnell MD                       Lab Results   Component Value Date/Time    WBC 7.6 02/23/2017 11:33 AM    HGB 13.3 02/23/2017 11:33 AM    HCT 38.7 02/23/2017 11:33 AM    PLATELET 132 36/54/6944 11:33 AM    MCV 93 02/23/2017 11:33 AM      Lab Results   Component Value Date/Time    Sodium 131 09/28/2016 02:25 PM    Potassium 4.6 09/28/2016 02:25 PM    Chloride 89 09/28/2016 02:25 PM    CO2 21 09/28/2016 02:25 PM    Anion gap 8 10/15/2014 02:45 AM    Glucose 117 09/28/2016 02:25 PM    BUN 10 09/28/2016 02:25 PM    Creatinine 0.59 09/28/2016 02:25 PM    BUN/Creatinine ratio 17 09/28/2016 02:25 PM    GFR est  09/28/2016 02:25 PM    GFR est non-AA 99 09/28/2016 02:25 PM    Calcium 9.3 09/28/2016 02:25 PM    Bilirubin, total 0.3 05/16/2016 01:13 PM    AST (SGOT) 23 05/16/2016 01:13 PM    Alk. phosphatase 126 05/16/2016 01:13 PM    Protein, total 6.7 05/16/2016 01:13 PM    Albumin 3.9 05/16/2016 01:13 PM    Globulin 4.2 10/13/2014 03:38 PM    A-G Ratio 1.4 05/16/2016 01:13 PM    ALT (SGPT) 41 05/16/2016 01:13 PM         Assessment:     Assessment:        ICD-10-CM ICD-9-CM    1.  Essential hypertension I10 401.9 AMB POC EKG ROUTINE W/ 12 LEADS, INTER & REP     Orders Placed This Encounter    AMB POC EKG ROUTINE W/ 12 LEADS, INTER & REP     Order Specific Question:   Reason for Exam:     Answer:   routine        Plan:   Mr Jose Hung is here today for follow up of AF and device check. He underwent successful Amiodarone loading and DCCV 3 months ago with symptomatic benefit. He also had Aflutter noted on last device check and thus Amiodarone was continued. Today's device interrogation demonstrates normal functioning with 99% Atrial pacing and continued paroxysmal Atrial flutter. Will obtain echocardiogram. He is a candidate for an atrial flutter ablation. I discussed the risks/benefits/alternatives of the procedure with the patient. Risks include (but are not limited to) bleeding, infection, cva/mi/tamponade/esophageal perforation/pv stenosis/death. The patient understands that there is a 6-5% major complication rate and agrees to proceed. Thank you for this interesting consultation. Continue medical management for HTN. Thank you for allowing me to participate in Karen Enriquezann 's care.     Evon Flores MD, Tayla Pro

## 2017-03-16 NOTE — MR AVS SNAPSHOT
Visit Information Date & Time Provider Department Dept. Phone Encounter #  
 3/16/2017  9:00 AM Rylan Robison, [address] [facility] [phone] [id number] Your Appointments 8/24/2017 10:30 AM  
6 MONTH with Merritt Sahu MD  
[facility] [address]) Appt Note: Per Dr GARIBAY $0CP REM  
 [address] [facility]  
[phone] [address] [facility]  
  
    
 9/14/2017  9:00 AM  
PACEMAKER with PACEMAKER, [facility] [address]) Appt Note: 6mo mdt dcpm  
 [id number] [facility]  
[phone] [id number] [facility] Upcoming Health Maintenance Date Due  
 GLAUCOMA SCREENING Q2Y 5/4/2017 MEDICARE YEARLY EXAM 11/18/2017 DTaP/Tdap/Td series (2 - Td) 8/25/2024 Allergies as of 3/16/2017  Review Complete On: 3/16/2017 By: Channing Pierson LPN Severity Noted Reaction Type Reactions Ciprofibrate  03/26/2015    Hives Current Immunizations  Reviewed on 11/17/2016 Name Date Influenza High Dose Vaccine PF 10/10/2016 Influenza Vaccine 10/15/2015, 10/10/2014 Influenza Vaccine Split 10/1/2012, 9/14/2011 Pneumococcal Conjugate (PCV-13) 11/4/2015  9:02 AM  
 Pneumococcal Vaccine (Unspecified Type) 2/22/2012  4:39 PM  
 Tdap 8/25/2014 Varicella Virus Vaccine 5/7/2012 Not reviewed this visit You Were Diagnosed With   
  
 Codes Comments Typical atrial flutter (HCC)    -  Primary ICD-10-CM: I48.3 ICD-9-CM: 427.32 Essential hypertension     ICD-10-CM: I10 
ICD-9-CM: 401.9 Paroxysmal atrial fibrillation (HCC)     ICD-10-CM: I48.0 ICD-9-CM: 427.31 Vitals BP Pulse Resp Height(growth percentile) Weight(growth percentile) SpO2  
 140/76 (BP 1 Location: Right arm, BP Patient Position: Sitting) 87 16 6' 2\" (1.88 m) 259 lb (117.5 kg) 96% BMI Smoking Status 33.25 kg/m2 Former Smoker Vitals History BMI and BSA Data Body Mass Index Body Surface Area  
 33.25 kg/m 2 2.48 m 2 Preferred Pharmacy Pharmacy Name Phone [facility] [address] , [address] [facility] [phone] Your Updated Medication List  
  
   
This list is accurate as of: 3/16/17  9:48 AM.  Always use your most recent med list.  
  
  
  
  
 amiodarone 200 mg tablet Commonly known as:  CORDARONE Take 1 Tab by mouth daily. amLODIPine 10 mg tablet Commonly known as:  Liyah Gables TAKE ONE TABLET BY MOUTH DAILY  
  
 atorvastatin 20 mg tablet Commonly known as:  LIPITOR  
TAKE ONE TABLET BY MOUTH DAILY  
  
 betamethasone dipropionate 0.05 % topical cream  
Commonly known as:  Merleen Melissa Apply  to affected area as needed. diphenhydrAMINE 25 mg tablet Commonly known as:  BENADRYL ALLERGY Take 1 Tab by mouth every six (6) hours as needed for Sleep. DOC-Q-LACE 100 mg capsule Generic drug:  docusate sodium TAKE ONE CAPSULE BY MOUTH TWICE A DAY  
  
 finasteride 5 mg tablet Commonly known as:  PROSCAR Take 5 mg by mouth daily. FIRST AID CLOTH TAPE 1 X 10 \"-yard Tape Generic drug:  Adhesive Tape HYDROcodone-acetaminophen 5-325 mg per tablet Commonly known as:  Young Vinay Take 1 Tab by mouth two (2) times daily as needed for Pain. Max Daily Amount: 2 Tabs. Incontinence Pad, Liner, Disp Pads  
by Does Not Apply route. lactulose 10 gram/15 mL solution Commonly known as:  Aloma Parent Take 5 mL by mouth three (3) times daily as needed. lisinopril 40 mg tablet Commonly known as:  PRINIVIL, ZESTRIL  
TAKE ONE TABLET BY MOUTH DAILY  
  
 lutein 20 mg Tab Take 1 Tab by mouth daily. 25 mg QD  
  
 meclizine 25 mg tablet Commonly known as:  ANTIVERT  
TAKE ONE TABLET BY MOUTH EVERY 8 HOURS AS NEEDED  
  
 metoprolol tartrate 25 mg tablet Commonly known as:  LOPRESSOR Take 0.5 tablets by mouth two (2) times a day. MIRALAX 17 gram/dose powder Generic drug:  polyethylene glycol Take 17 g by mouth two (2) times daily as needed. Takes as needed  
  
 omeprazole 20 mg capsule Commonly known as:  PRILOSEC  
TAKE ONE CAPSULE BY MOUTH DAILY PRADAXA 150 mg capsule Generic drug:  dabigatran etexilate TAKE ONE CAPSULE BY MOUTH EVERY 12 HOURS Senna 8.6 mg tablet Generic drug:  senna Take 1 tablet by mouth daily. TEGretol  mg SR tablet Generic drug:  carBAMazepine XR Take 1 Tab by mouth two (2) times a day. venlafaxine-SR 75 mg capsule Commonly known as:  EFFEXOR-XR  
TAKE ONE CAPSULE BY MOUTH DAILY  
  
 zolpidem 10 mg tablet Commonly known as:  AMBIEN Take 1 Tab by mouth nightly as needed for Sleep (please take in the bed). We Performed the Following AMB POC EKG ROUTINE W/ 12 LEADS, INTER & REP [24509 CPT(R)] CBC WITH AUTOMATED DIFF [68826 CPT(R)] MAGNESIUM [id number] CPT(R)] METABOLIC PANEL, COMPREHENSIVE [98996 CPT(R)] PROTHROMBIN TIME + INR [75558 CPT(R)] To-Do List   
 Around 03/16/2017 ECHO:  2D ECHO COMPLETE ADULT (TTE) W OR WO CONTR   
  
 03/31/2017 8:00 AM  
  Appointment with CATH EP ROOM [facility] at [address] [place] [address] ([phone]) NPO AFTER MIDNIGHT! ROUTINE CASES:  Please arrive 2 hour prior to your scheduled appointment time. If your scheduled appointment is for 0730, 0800, 0815, please arrive by 0645. NON ROUTINE CASES:  PATIENTS WHO REQUIRE LABS, X-RAY, EKG, or MEDS:  PLEASE ARRIVE 3 HOURS PRIOR TO YOUR SCHEDULED APPOINTMENT. If you require hydration prior to your procedure, PLEASE ARRIVE 4 HOURS PRIOR TO YOUR APPOINTMENT  **** IT IS THE OFFICE SCHEDULARS RESPONSBILITY TO NOTIFY THE CATH LAB SCHEDULAR IF THE PATIENT WILL REQUIRE ANY ADDITIONAL TIME FOR PREP FROM ROUTINE CASE ***** Introducing [facility]! Dear Roopa Manjarrez: Thank you for requesting a [employer] account. Our records indicate that you already have an active [employer] account. You can access your account anytime at [email or web address] Did you know that you can access your hospital and ER discharge instructions at any time in [employer]? You can also review all of your test results from your hospital stay or ER visit. Additional Information If you have questions, please visit the Frequently Asked Questions section of the [employer] website at [email or web address] Remember, [employer] is NOT to be used for urgent needs. For medical emergencies, dial 911. Now available from your iPhone and Android! Please provide this summary of care documentation to your next provider. Your primary care clinician is listed as Soco Soriano. If you have any questions after today's visit, please call [phone].

## 2017-03-20 RX ORDER — ATORVASTATIN CALCIUM 20 MG/1
TABLET, FILM COATED ORAL
Qty: 30 TAB | Refills: 0 | Status: SHIPPED | OUTPATIENT
Start: 2017-03-20 | End: 2017-04-18 | Stop reason: SDUPTHER

## 2017-03-23 LAB
ALBUMIN SERPL-MCNC: 4.1 G/DL (ref 3.5–4.8)
ALBUMIN/GLOB SERPL: 1.4 {RATIO} (ref 1.2–2.2)
ALP SERPL-CCNC: 128 IU/L (ref 39–117)
ALT SERPL-CCNC: 30 IU/L (ref 0–44)
AST SERPL-CCNC: 29 IU/L (ref 0–40)
BASOPHILS # BLD AUTO: 0 X10E3/UL (ref 0–0.2)
BASOPHILS NFR BLD AUTO: 0 %
BILIRUB SERPL-MCNC: <0.2 MG/DL (ref 0–1.2)
BUN SERPL-MCNC: 11 MG/DL (ref 8–27)
BUN/CREAT SERPL: 20 (ref 10–22)
CALCIUM SERPL-MCNC: 9.3 MG/DL (ref 8.6–10.2)
CHLORIDE SERPL-SCNC: 92 MMOL/L (ref 96–106)
CO2 SERPL-SCNC: 22 MMOL/L (ref 18–29)
CREAT SERPL-MCNC: 0.56 MG/DL (ref 0.76–1.27)
EOSINOPHIL # BLD AUTO: 0.2 X10E3/UL (ref 0–0.4)
EOSINOPHIL NFR BLD AUTO: 2 %
ERYTHROCYTE [DISTWIDTH] IN BLOOD BY AUTOMATED COUNT: 13 % (ref 12.3–15.4)
GLOBULIN SER CALC-MCNC: 3 G/DL (ref 1.5–4.5)
GLUCOSE SERPL-MCNC: 104 MG/DL (ref 65–99)
HCT VFR BLD AUTO: 39 % (ref 37.5–51)
HGB BLD-MCNC: 13.4 G/DL (ref 12.6–17.7)
IMM GRANULOCYTES # BLD: 0 X10E3/UL (ref 0–0.1)
IMM GRANULOCYTES NFR BLD: 0 %
INR PPP: 1.2 (ref 0.8–1.2)
LYMPHOCYTES # BLD AUTO: 1.8 X10E3/UL (ref 0.7–3.1)
LYMPHOCYTES NFR BLD AUTO: 24 %
MAGNESIUM SERPL-MCNC: 1.8 MG/DL (ref 1.6–2.3)
MCH RBC QN AUTO: 32.2 PG (ref 26.6–33)
MCHC RBC AUTO-ENTMCNC: 34.4 G/DL (ref 31.5–35.7)
MCV RBC AUTO: 94 FL (ref 79–97)
MONOCYTES # BLD AUTO: 0.5 X10E3/UL (ref 0.1–0.9)
MONOCYTES NFR BLD AUTO: 7 %
NEUTROPHILS # BLD AUTO: 5.1 X10E3/UL (ref 1.4–7)
NEUTROPHILS NFR BLD AUTO: 67 %
PLATELET # BLD AUTO: 311 X10E3/UL (ref 150–379)
POTASSIUM SERPL-SCNC: 4.7 MMOL/L (ref 3.5–5.2)
PROT SERPL-MCNC: 7.1 G/DL (ref 6–8.5)
PROTHROMBIN TIME: 12.2 SEC (ref 9.1–12)
RBC # BLD AUTO: 4.16 X10E6/UL (ref 4.14–5.8)
SODIUM SERPL-SCNC: 133 MMOL/L (ref 134–144)
WBC # BLD AUTO: 7.7 X10E3/UL (ref 3.4–10.8)

## 2017-03-31 ENCOUNTER — HOSPITAL ENCOUNTER (OUTPATIENT)
Dept: NON INVASIVE DIAGNOSTICS | Age: 77
Discharge: HOME | End: 2017-03-31
Attending: INTERNAL MEDICINE | Admitting: INTERNAL MEDICINE
Payer: MEDICARE

## 2017-03-31 VITALS
BODY MASS INDEX: 33.24 KG/M2 | TEMPERATURE: 98.5 F | HEIGHT: 74 IN | DIASTOLIC BLOOD PRESSURE: 82 MMHG | OXYGEN SATURATION: 99 % | RESPIRATION RATE: 22 BRPM | SYSTOLIC BLOOD PRESSURE: 157 MMHG | HEART RATE: 75 BPM | WEIGHT: 259 LBS

## 2017-03-31 PROCEDURE — 77030010880 HC CBL EP SUPRME STJU -C

## 2017-03-31 PROCEDURE — C1730 CATH, EP, 19 OR FEW ELECT: HCPCS

## 2017-03-31 PROCEDURE — 77030018729 HC ELECTRD DEFIB PAD CARD -B

## 2017-03-31 PROCEDURE — 77030029065 HC DRSG HEMO QCLOT ZMED -B

## 2017-03-31 PROCEDURE — C1894 INTRO/SHEATH, NON-LASER: HCPCS

## 2017-03-31 PROCEDURE — 77030015398 HC CBL EP EXT STJU -C

## 2017-03-31 PROCEDURE — 74011250636 HC RX REV CODE- 250/636

## 2017-03-31 PROCEDURE — 77030011640 HC PAD GRND REM COVD -A

## 2017-03-31 PROCEDURE — 74011000250 HC RX REV CODE- 250

## 2017-03-31 PROCEDURE — 77030030806 HC PTCH ENSIT NAVX STJU -G

## 2017-03-31 PROCEDURE — C1733 CATH, EP, OTHR THAN COOL-TIP: HCPCS

## 2017-03-31 PROCEDURE — 93653 COMPRE EP EVAL TX SVT: CPT

## 2017-03-31 RX ORDER — HEPARIN SODIUM 200 [USP'U]/100ML
500 INJECTION, SOLUTION INTRAVENOUS ONCE
Status: COMPLETED | OUTPATIENT
Start: 2017-03-31 | End: 2017-03-31

## 2017-03-31 RX ORDER — MIDAZOLAM HYDROCHLORIDE 1 MG/ML
.5-2 INJECTION, SOLUTION INTRAMUSCULAR; INTRAVENOUS
Status: DISCONTINUED | OUTPATIENT
Start: 2017-03-31 | End: 2017-03-31 | Stop reason: HOSPADM

## 2017-03-31 RX ORDER — MIDAZOLAM HYDROCHLORIDE 1 MG/ML
INJECTION, SOLUTION INTRAMUSCULAR; INTRAVENOUS
Status: COMPLETED
Start: 2017-03-31 | End: 2017-03-31

## 2017-03-31 RX ORDER — HEPARIN SODIUM 200 [USP'U]/100ML
INJECTION, SOLUTION INTRAVENOUS
Status: COMPLETED
Start: 2017-03-31 | End: 2017-03-31

## 2017-03-31 RX ORDER — FENTANYL CITRATE 50 UG/ML
25-50 INJECTION, SOLUTION INTRAMUSCULAR; INTRAVENOUS
Status: DISCONTINUED | OUTPATIENT
Start: 2017-03-31 | End: 2017-03-31 | Stop reason: HOSPADM

## 2017-03-31 RX ORDER — DOBUTAMINE HYDROCHLORIDE 200 MG/100ML
2.5-1 INJECTION INTRAVENOUS
Status: DISCONTINUED | OUTPATIENT
Start: 2017-03-31 | End: 2017-03-31 | Stop reason: HOSPADM

## 2017-03-31 RX ORDER — LIDOCAINE HYDROCHLORIDE 10 MG/ML
INJECTION INFILTRATION; PERINEURAL
Status: COMPLETED
Start: 2017-03-31 | End: 2017-03-31

## 2017-03-31 RX ORDER — FENTANYL CITRATE 50 UG/ML
INJECTION, SOLUTION INTRAMUSCULAR; INTRAVENOUS
Status: COMPLETED
Start: 2017-03-31 | End: 2017-03-31

## 2017-03-31 RX ORDER — LIDOCAINE HYDROCHLORIDE 10 MG/ML
1-20 INJECTION INFILTRATION; PERINEURAL
Status: DISCONTINUED | OUTPATIENT
Start: 2017-03-31 | End: 2017-03-31 | Stop reason: HOSPADM

## 2017-03-31 RX ORDER — DOBUTAMINE HYDROCHLORIDE 200 MG/100ML
INJECTION INTRAVENOUS
Status: COMPLETED
Start: 2017-03-31 | End: 2017-03-31

## 2017-03-31 RX ADMIN — MIDAZOLAM HYDROCHLORIDE 2 MG: 1 INJECTION, SOLUTION INTRAMUSCULAR; INTRAVENOUS at 08:10

## 2017-03-31 RX ADMIN — DOBUTAMINE HYDROCHLORIDE 5 MCG/KG/MIN: 200 INJECTION INTRAVENOUS at 08:38

## 2017-03-31 RX ADMIN — LIDOCAINE HYDROCHLORIDE 10 ML: 10 INJECTION INFILTRATION; PERINEURAL at 08:20

## 2017-03-31 RX ADMIN — FENTANYL CITRATE 50 MCG: 50 INJECTION, SOLUTION INTRAMUSCULAR; INTRAVENOUS at 08:37

## 2017-03-31 RX ADMIN — MIDAZOLAM HYDROCHLORIDE 2 MG: 1 INJECTION INTRAMUSCULAR; INTRAVENOUS at 08:10

## 2017-03-31 RX ADMIN — FENTANYL CITRATE 50 MCG: 50 INJECTION, SOLUTION INTRAMUSCULAR; INTRAVENOUS at 08:15

## 2017-03-31 RX ADMIN — LIDOCAINE HYDROCHLORIDE 10 ML: 10 INJECTION, SOLUTION INFILTRATION; PERINEURAL at 08:20

## 2017-03-31 RX ADMIN — MIDAZOLAM HYDROCHLORIDE 2 MG: 1 INJECTION, SOLUTION INTRAMUSCULAR; INTRAVENOUS at 08:25

## 2017-03-31 RX ADMIN — HEPARIN SODIUM 1000 UNITS: 200 INJECTION, SOLUTION INTRAVENOUS at 08:10

## 2017-03-31 RX ADMIN — MIDAZOLAM HYDROCHLORIDE 1 MG: 1 INJECTION, SOLUTION INTRAMUSCULAR; INTRAVENOUS at 08:37

## 2017-03-31 NOTE — PROGRESS NOTES
Pt consumed 100% of lunch; vss; right groin dressing dry and intact. DC instructions reviewed with pt and family. All verbalize understanding. SL dc'd without difficulty. Pt dc'd to home via wheelchair with family.

## 2017-03-31 NOTE — H&P (VIEW-ONLY)
Subjective:      Medina Carrillo is a 77 y.o. male is here for follow up of AF and device check. He is feeling well, shortness of breath and fatigue improved after cardioversion. He will feel \"heart pounding\" and palpitations if he exerts himself. The patient denies chest pain/ shortness of breath, orthopnea, PND, LE edema, syncope, presyncope or fatigue.        Patient Active Problem List    Diagnosis Date Noted    Localized epilepsy with impairment of consciousness ([address]     Common wart 05/16/2016    Chronic right shoulder pain 01/11/2016    Screening for colon cancer 11/04/2015    Acute bronchitis 08/25/2015    Chronic back pain greater than 3 months duration 05/04/2015    CVA (cerebral infarction) 05/04/2015    Pre-op evaluation 01/29/2015    Pacemaker 10/15/2014    SSS (sick sinus syndrome) ([address] 10/13/2014    Syncope 10/13/2014    Seizure disorder ([address] 10/13/2014    RONAL on CPAP 10/13/2014    Tinea corporis 05/07/2014    Insomnia 01/03/2014    Ankle fracture, left 08/28/2013    Constipation 12/14/2012    Cataract 07/30/2012    Atrial fibrillation ([address] 06/19/2012    TBI (traumatic brain injury) ([address] 06/08/2012    CHI (closed head injury) 05/24/2012    Basal cell cancer 05/24/2012    Atrial flutter ([address] 03/02/2012    Atrial fibrillation or flutter 03/02/2012    Small bowel obstruction ([address] 02/21/2012    Ulcer ([address] 09/14/2011    Wax in ear 06/17/2011    Cellulitis 04/04/2011    Rash 03/28/2011    Hypothyroidism 12/22/2010    Hyponatremia 12/22/2010    BPH (benign prostatic hyperplasia) 12/22/2010    HTN (hypertension) 12/08/2010    Depression 12/08/2010    Hypercholesterolemia 12/08/2010    Acid reflux 12/08/2010    Seizure ([address] 12/08/2010      Spring Betancur MD  Past Medical History:   Diagnosis Date    A-fib [facility])     Acute bronchitis 8/25/2015    Anxiety     Arrhythmia     atrial fibrillation    Arthritis     BCC (basal cell carcinoma of skin)     BPH (benign prostatic hyperplasia)     Chronic back pain greater than 3 months duration 5/4/2015    Chronic right shoulder pain 1/11/2016    Common wart 5/16/2016    Constipation 12/14/2012    CVA (cerebral infarction) 5/4/2015    Depression     GERD (gastroesophageal reflux disease)     Hearing loss     bilateral hearing aids    Hemiplegia following CVA (cerebrovascular accident) ([address]     left side hemiparesis    History of colostomy     HTN (hypertension)     Hyperlipidemia     Localized epilepsy with impairment of consciousness ([address]     Prostate cancer ([address]     Status post XRT, Lupron    Screening for colon cancer 11/4/2015    Stroke [facility])     traumatic from neck crushing    TBI (traumatic brain injury) ([address] 1994    Unspecified sleep apnea     on CPAP      Past Surgical History:   Procedure Laterality Date    HX ANKLE FRACTURE TX      HX COLECTOMY      colostomy    HX HEENT      ear surgery eddie.     HX HERNIA REPAIR      HX ORTHOPAEDIC      left hip pinning    HX PACEMAKER  2014     Allergies   Allergen Reactions    Ciprofibrate Hives      Family History   Problem Relation Age of Onset    Alzheimer Mother      dec [de-identified]    Cancer Father      dec 76 kidney    Heart Disease Brother     No Known Problems Son     negative for cardiac disease  Social History     Social History    Marital status:      Spouse name: N/A    Number of children: N/A    Years of education: N/A     Social History Main Topics    Smoking status: Former Smoker     Years: 10.00     Types: Pipe     Quit date: 1/29/1990    Smokeless tobacco: Never Used      Comment: QUIT 1970'S    Alcohol use No    Drug use: No    Sexual activity: Not Currently     Other Topics Concern    Not on file     Social History Narrative    , lives with his wife in [place]     Current Outpatient Prescriptions   Medication Sig    meclizine (ANTIVERT) 25 mg tablet TAKE ONE TABLET BY MOUTH EVERY 8 HOURS AS NEEDED    atorvastatin (LIPITOR) 20 mg tablet TAKE ONE TABLET BY MOUTH DAILY    venlafaxine-SR (EFFEXOR-XR) 75 mg capsule TAKE ONE CAPSULE BY MOUTH DAILY    lisinopril (PRINIVIL, ZESTRIL) 40 mg tablet TAKE ONE TABLET BY MOUTH DAILY    TEGRETOL  mg SR tablet Take 1 Tab by mouth two (2) times a day.  PRADAXA 150 mg capsule TAKE ONE CAPSULE BY MOUTH EVERY 12 HOURS    amiodarone (CORDARONE) 200 mg tablet Take 1 Tab by mouth daily.  zolpidem (AMBIEN) 10 mg tablet Take 1 Tab by mouth nightly as needed for Sleep (please take in the bed).  amLODIPine (NORVASC) 10 mg tablet TAKE ONE TABLET BY MOUTH DAILY    Incontinence Pad, Liner, Disp pads by Does Not Apply route.  betamethasone dipropionate (DIPROSONE) 0.05 % topical cream Apply  to affected area as needed.  lactulose (CHRONULAC) 10 gram/15 mL solution Take 5 mL by mouth three (3) times daily as needed.  DOC-Q-LACE 100 mg capsule TAKE ONE CAPSULE BY MOUTH TWICE A DAY    HYDROcodone-acetaminophen (NORCO) 5-325 mg per tablet Take 1 Tab by mouth two (2) times daily as needed for Pain. Max Daily Amount: 2 Tabs.  FIRST AID CLOTH TAPE 1 X 10 \"-yard tape     omeprazole (PRILOSEC) 20 mg capsule TAKE ONE CAPSULE BY MOUTH DAILY    diphenhydrAMINE (BENADRYL ALLERGY) 25 mg tablet Take 1 Tab by mouth every six (6) hours as needed for Sleep.  metoprolol (LOPRESSOR) 25 mg tablet Take 0.5 tablets by mouth two (2) times a day.  lutein 20 mg tab Take 1 Tab by mouth daily. 25 mg QD    senna (SENNA) 8.6 mg tablet Take 1 tablet by mouth daily.  polyethylene glycol (MIRALAX) 17 gram/dose powder Take 17 g by mouth two (2) times daily as needed. Takes as needed    finasteride (PROSCAR) 5 mg tablet Take 5 mg by mouth daily. No current facility-administered medications for this visit.        Vitals:    03/16/17 0847   BP: 140/76   Pulse: 87   Resp: 16   SpO2: 96%   Weight: 259 lb (117.5 kg)   Height: 6' 2\" (1.88 m)       I have reviewed the nurses notes, vitals, problem list, allergy list, medical history, family, social history and medications. Review of Symptoms:    General: Pt denies excessive weight gain or loss. Pt is able to conduct ADL's  HEENT: Denies blurred vision, headaches, epistaxis and difficulty swallowing. Respiratory: Denies shortness of breath, BECERRIL, wheezing or stridor. Cardiovascular:+palpitations,  Denies precordial pain, edema or PND  Gastrointestinal: Denies poor appetite, indigestion, abdominal pain or blood in stool  Urinary: Denies dysuria, pyuria  Musculoskeletal: Denies pain or swelling from muscles or joints  Neurologic: Denies tremor, paresthesias, or sensory motor disturbance  Skin: Denies rash, itching or texture change. Psych: Denies depression      Physical Exam:      General: Well developed, in no acute distress. HEENT: Eyes - PERRL, no jvd  Heart:  Normal S1/S2 negative S3 or S4. Regular, no murmur, gallop or rub.   Respiratory: +left lower diminished, Clear bilaterally x 3, no wheezing or rales  Abdomen:   Soft, non-tender, bowel sounds are active.   Extremities:  No edema, normal cap refill, no cyanosis. Musculoskeletal: No clubbing  Neuro: A&Ox3, speech clear, gait stable. Skin: Skin color is normal. No rashes or lesions.  Non diaphoretic  Vascular: 2+ pulses symmetric in all extremities    Cardiographics    Ekg: sinus with PVCs  MDT PM: 99.5% AP, paroxysmal AFL     Results for orders placed or performed during the hospital encounter of 10/07/16   EKG, 12 LEAD, INITIAL   Result Value Ref Range    Ventricular Rate 75 BPM    Atrial Rate 77 BPM    QRS Duration 180 ms    Q-T Interval 476 ms    QTC Calculation (Bezet) 531 ms    Calculated R Axis -64 degrees    Calculated T Axis 98 degrees    Diagnosis       Electronic ventricular pacemaker  When compared with ECG of 15-OCT-2014 13:10,  Electronic ventricular pacemaker has replaced Electronic atrial pacemaker  Confirmed by Tami Berrios, P.VDayday ([id number]) on 10/7/2016 11:44:15 AM Results for orders placed or performed in visit on 10/09/14   CARDIAC HOLTER MONITOR, 24 HOURS    Narrative    ECG Monitor/24 hours, Complete    Reason for Holter Monitor   SYNCOPE/ BRADYCARDIA    Heartbeat    Slowest 33  Average 57  Fastest  92          Results:   Underlying Rhythm: Normal sinus rhythm. Frequent pauses noted  with 2 episodes of > 3 second pause. Atrial Arrhythmias: premature atrial contractions; occasional             AV Conduction: normal    Ventricular Arrhythmias: premature ventricular contractions; rare    ST Segment Analysis:normal     Symptom Correlation:  None reported. Comment:   Sinus rhythm with significant pauses. Reddy Montes De Oca MD                       Lab Results   Component Value Date/Time    WBC 7.6 02/23/2017 11:33 AM    HGB 13.3 02/23/2017 11:33 AM    HCT 38.7 02/23/2017 11:33 AM    PLATELET 785 74/17/7684 11:33 AM    MCV 93 02/23/2017 11:33 AM      Lab Results   Component Value Date/Time    Sodium 131 09/28/2016 02:25 PM    Potassium 4.6 09/28/2016 02:25 PM    Chloride 89 09/28/2016 02:25 PM    CO2 21 09/28/2016 02:25 PM    Anion gap 8 10/15/2014 02:45 AM    Glucose 117 09/28/2016 02:25 PM    BUN 10 09/28/2016 02:25 PM    Creatinine 0.59 09/28/2016 02:25 PM    BUN/Creatinine ratio 17 09/28/2016 02:25 PM    GFR est  09/28/2016 02:25 PM    GFR est non-AA 99 09/28/2016 02:25 PM    Calcium 9.3 09/28/2016 02:25 PM    Bilirubin, total 0.3 05/16/2016 01:13 PM    AST (SGOT) 23 05/16/2016 01:13 PM    Alk. phosphatase 126 05/16/2016 01:13 PM    Protein, total 6.7 05/16/2016 01:13 PM    Albumin 3.9 05/16/2016 01:13 PM    Globulin 4.2 10/13/2014 03:38 PM    A-G Ratio 1.4 05/16/2016 01:13 PM    ALT (SGPT) 41 05/16/2016 01:13 PM         Assessment:     Assessment:        ICD-10-CM ICD-9-CM    1.  Essential hypertension I10 401.9 AMB POC EKG ROUTINE W/ 12 LEADS, INTER & REP     Orders Placed This Encounter    AMB POC EKG ROUTINE W/ 12 LEADS, INTER & REP     Order Specific Question:   Reason for Exam:     Answer:   routine        Plan:   Mr Dre Cannon is here today for follow up of AF and device check. He underwent successful Amiodarone loading and DCCV 3 months ago with symptomatic benefit. He also had Aflutter noted on last device check and thus Amiodarone was continued. Today's device interrogation demonstrates normal functioning with 99% Atrial pacing and continued paroxysmal Atrial flutter. Will obtain echocardiogram. He is a candidate for an atrial flutter ablation. I discussed the risks/benefits/alternatives of the procedure with the patient. Risks include (but are not limited to) bleeding, infection, cva/mi/tamponade/esophageal perforation/pv stenosis/death. The patient understands that there is a 9-1% major complication rate and agrees to proceed. Thank you for this interesting consultation. Continue medical management for HTN. Thank you for allowing me to participate in Sparkill Harrison 's care.     Roya Hudson MD, Eula Coates

## 2017-03-31 NOTE — DISCHARGE INSTRUCTIONS
[address] [address]  [phone]        [facility]3] INSTRUCTIONS    Patient ID:  Niko Peña  [id number]  42 y.o.  1940    Admit Date: 3/31/2017    Discharge Date: 3/31/2017     Admitting Physician: Brandon Freitas MD     Discharge Physician: Brandon Freitas MD    Admission Diagnoses:   flutter    Discharge Diagnoses: Active Problems:    * No active hospital problems. *  atrial flutter  Atrial fibrillation    Discharge Condition: Good    Cardiology Procedures this Admission:  atrial flutter ablation    Disposition: home    Reference discharge instructions provided by nursing for diet and activity. Follow-up with Dr Jeremías Chowdhury in one week. Call [phone] to make an appointment. Signed:  Brandon Freitas MD  3/31/2017  8:16 AM    S/P ABLATION DISCHARGE INSTRUCTIONS    It is normal to feel tired the first couple days. Take it easy and follow the physicians instructions. CHECK THE CATHETER INSERTION SITE DAILY:  You may shower 24 hours after the procedure, remove the bandage during showering. Wash with soap and water and pat dry. Gentle cleaning of the site with soap and water is sufficient, cover with a dry clean dressing or bandage. Do not apply creams or powders to the area. Do not sit in a bathtub or pool of water for 7 days or until wound has completely healed. Temporary bruising and discomfort is normal and may last a few weeks. You may have a  formation of a small lump at the site which may last up to 6 weeks. CALL THE PHYSICIAN:  If the site becomes red, swollen or feels warm to the touch  If there is bleeding or drainage or if there is unusual pain at the groin or down the leg. If there is any bleeding, lie down, apply pressure or have someone apply pressure with a clean cloth until the bleeding stops.   If the bleeding continues, call 911 to be transported to the hospital.  DO NOT DRIVE YOURSELF, [address] [facility] - CALL 911. Activity:      For the first 24-48 hours or as instructed by the physician:  No lifting, pushing or pulling over 5 pounds and no straining the insertion site. Do not life grocery bags or the garbage can, do not run the vacuum  or  for 7 days. Start with short walks as in the hospital and gradually increase as tolerated each day. It is recommended to walk 30 minutes 5-7 days per week. Follow your physicians instructions on activity. Avoid walking outside in extremes of heat or cold. Walk inside when it is cold and windy or hot and humid. Things to keep in mind:  No driving for at least 5 days, or as designated by your physician. Limit the number of times you go up and down the stairs  Take rests and pace yourself with activity. Be careful and do not strain with bowel movements. Medications: Take all medications as prescribed  Call your physician if you have any questions  Keep an updated list of your medications with you at all times and give a list to your physician and pharmacist    Signs and Symptoms:  Be cautious of symptoms of angina or recurrent symptoms such as chest discomfort, unusual shortness of breath or fatigue, palpitations. After Care: Follow up with your physician as instructed. Follow a heart healthy diet with proper portion control, daily stress management, daily exercise, blood pressure and cholesterol control , and smoking cessation.

## 2017-03-31 NOTE — PROGRESS NOTES
Cardiac Cath Lab Recovery Arrival Note:      Sabine Knowles arrived to Cardiac Cath Lab, Recovery Area. Staff introduced to patient. Patient identifiers verified with NAME and DATE OF BIRTH. Procedure verified with patient. Consent forms reviewed and signed by patient or authorized representative and verified. Allergies verified. Patient and family oriented to department. Patient and family informed of procedure and plan of care. Questions answered with review. Patient prepped for procedure, per orders from physician, prior to arrival.    Patient on cardiac monitor, non-invasive blood pressure, SPO2 monitor. On room air. Patient is A&Ox 3. Patient reports no c/o. Patient in stretcher, in low position, with side rails up, call bell within reach, patient instructed to call if assistance as needed. Patient prep in: [address], [place] [address]. Patient family has pager # none  Family in: [facility] waiting area.    Prep by: Danna Cannon RN and Estrada Pierce RN

## 2017-03-31 NOTE — PROGRESS NOTES
TRANSFER - IN REPORT:    Verbal report received from Jory Hendrickson RN(name) on Lue Earing  being received from EP(unit) for routine progression of care      Report consisted of patients Situation, Background, Assessment and   Recommendations(SBAR). Information from the following report(s) Procedure Summary and MAR was reviewed with the receiving nurse. Opportunity for questions and clarification was provided. Assessment completed upon patients arrival to unit and care assumed.

## 2017-03-31 NOTE — INTERVAL H&P NOTE
H&P Update:  Alessio Vega was seen and examined. History and physical has been reviewed. The patient has been examined.  There have been no significant clinical changes since the completion of the originally dated History and Physical.    Signed By: Janki Morrell MD     March 31, 2017 8:08 AM

## 2017-03-31 NOTE — IP AVS SNAPSHOT
[address] [facility] 
[phone] Patient: Letty Sofia MRN: [id number] You are allergic to the following Allergen Reactions Ciprofibrate Hives Recent Documentation Height Weight BMI Smoking Status 1.88 m 117.5 kg 33.25 kg/m2 Former Smoker Emergency Contacts Name Discharge Info Relation Home Work Mobile Boaz Cool CAREGIVER [3] Spouse [3] [phone] About your hospitalization You were admitted on:  March 31, 2017 You last received care in the:  [facility] CARDIAC CATH LAB You were discharged on:  March 31, 2017 Unit phone number:  [phone] Why you were hospitalized Your primary diagnosis was:  Not on File Providers Seen During Your Hospitalizations Provider Role Specialty Primary office phone Naveen Alston MD Attending Provider Cardiology [phone] Your Primary Care Physician (PCP) Primary Care Physician Office Phone Office Fax Cecillia March [phone] Follow-up Information Follow up With Details Comments Contact Info Arti Trinh MD   [address] [place] [address] 7 [phone] Your Appointments Thursday April 06, 2017  9:30 AM EDT  
PACEMAKER with PACEMAKER, [facility] [address]  
[phone] Thursday April 06, 2017  9:30 AM EDT HOSPITAL FOLLOW-UP with Naveen Alston MD  
[facility] [address]  
[phone] Current Discharge Medication List  
  
CONTINUE these medications which have NOT CHANGED Dose & Instructions Dispensing Information Comments Morning Noon Evening Bedtime amiodarone 200 mg tablet Commonly known as:  CORDARONE Your last dose was: Your next dose is:    
   
   
 Dose:  200 mg Take 1 Tab by mouth daily. Quantity:  30 Tab Refills:  6  
     
   
   
   
  
 amLODIPine 10 mg tablet Commonly known as:  Austell Cradle Your last dose was: Your next dose is: TAKE ONE TABLET BY MOUTH DAILY Quantity:  90 Tab Refills:  6  
     
   
   
   
  
 atorvastatin 20 mg tablet Commonly known as:  LIPITOR Your last dose was: Your next dose is: TAKE ONE TABLET BY MOUTH DAILY Quantity:  30 Tab Refills:  0  
     
   
   
   
  
 betamethasone dipropionate 0.05 % topical cream  
Commonly known as:  Brandi Anjum Your last dose was: Your next dose is:    
   
   
 Apply  to affected area as needed. Quantity:  45 g Refills:  6 diphenhydrAMINE 25 mg tablet Commonly known as:  BENADRYL ALLERGY Your last dose was: Your next dose is:    
   
   
 Dose:  25 mg Take 1 Tab by mouth every six (6) hours as needed for Sleep. Quantity:  24 Tab Refills:  0 DOC-Q-LACE 100 mg capsule Generic drug:  docusate sodium Your last dose was: Your next dose is: TAKE ONE CAPSULE BY MOUTH TWICE A DAY Quantity:  60 Cap Refills:  6  
     
   
   
   
  
 finasteride 5 mg tablet Commonly known as:  PROSCAR Your last dose was: Your next dose is:    
   
   
 Dose:  5 mg Take 5 mg by mouth daily. Refills:  0  
     
   
   
   
  
 FIRST AID CLOTH TAPE 1 X 10 \"-yard Tape Generic drug:  Adhesive Tape Your last dose was: Your next dose is:    
   
   
  Refills:  0 Incontinence Pad, Liner, Disp Pads Your last dose was: Your next dose is:    
   
   
 by Does Not Apply route. Refills:  0  
     
   
   
   
  
 lactulose 10 gram/15 mL solution Commonly known as:  Radha Lentz Your last dose was: Your next dose is:    
   
   
 Dose:  1 tsp Take 5 mL by mouth three (3) times daily as needed. Quantity:  480 mL Refills:  3  
     
   
   
   
  
 lisinopril 40 mg tablet Commonly known as:  Bertrand Garcia Your last dose was: Your next dose is: TAKE ONE TABLET BY MOUTH DAILY Quantity:  90 Tab Refills:  0  
     
   
   
   
  
 lutein 20 mg Tab Your last dose was: Your next dose is:    
   
   
 Dose:  1 Tab Take 1 Tab by mouth daily. 25 mg QD Refills:  0  
     
   
   
   
  
 meclizine 25 mg tablet Commonly known as:  ANTIVERT Your last dose was: Your next dose is: TAKE ONE TABLET BY MOUTH EVERY 8 HOURS AS NEEDED Quantity:  30 Tab Refills:  1  
     
   
   
   
  
 metoprolol tartrate 25 mg tablet Commonly known as:  LOPRESSOR Your last dose was: Your next dose is:    
   
   
 Dose:  12.5 mg Take 0.5 tablets by mouth two (2) times a day. Quantity:  60 tablet Refills:  11 MIRALAX 17 gram/dose powder Generic drug:  polyethylene glycol Your last dose was: Your next dose is:    
   
   
 Dose:  17 g Take 17 g by mouth two (2) times daily as needed. Takes as needed Refills:  0  
     
   
   
   
  
 omeprazole 20 mg capsule Commonly known as:  PRILOSEC Your last dose was: Your next dose is: TAKE ONE CAPSULE BY MOUTH DAILY Quantity:  30 Cap Refills:  6 PRADAXA 150 mg capsule Generic drug:  dabigatran etexilate Your last dose was: Your next dose is: TAKE ONE CAPSULE BY MOUTH EVERY 12 HOURS Quantity:  60 Cap Refills:  6 Senna 8.6 mg tablet Generic drug:  senna Your last dose was: Your next dose is:    
   
   
 Dose:  1 Tab Take 1 tablet by mouth daily. Refills:  0  
     
   
   
   
  
 TEGretol  mg SR tablet Generic drug:  carBAMazepine XR Your last dose was: Your next dose is:    
   
   
 Dose:  400 mg Take 1 Tab by mouth two (2) times a day. Quantity:  60 Tab Refills:  11  
     
   
   
   
  
 venlafaxine-SR 75 mg capsule Commonly known as:  EFFEXOR-XR Your last dose was: Your next dose is: TAKE ONE CAPSULE BY MOUTH DAILY Quantity:  30 Cap Refills:  6  
     
   
   
   
  
 zolpidem 10 mg tablet Commonly known as:  AMBIEN Your last dose was: Your next dose is:    
   
   
 Dose:  10 mg Take 1 Tab by mouth nightly as needed for Sleep (please take in the bed). Quantity:  30 Tab Refills:  3 Discharge Instructions [address]  [phone] ABLATION DISCHARGE INSTRUCTIONS Patient ID: 
Cuca Belcher [id number] 
19 y.o. 
1940 Admit Date: 3/31/2017 Discharge Date: 3/31/2017 Admitting Physician: Geraldine Mcconnell MD  
 
Discharge Physician: Geraldine Mcconnell MD 
 
Admission Diagnoses:  
flutter Discharge Diagnoses: Active Problems: * No active hospital problems. * 
atrial flutter Atrial fibrillation Discharge Condition: Good Cardiology Procedures this Admission:  atrial flutter ablation Disposition: home Reference discharge instructions provided by nursing for diet and activity. Follow-up with Dr Mercedes Major in one week. Call [phone] to make an appointment. Signed: 
Geraldine Mcconnell MD 
3/31/2017 
8:16 AM 
 
S/P ABLATION DISCHARGE INSTRUCTIONS It is normal to feel tired the first couple days. Take it easy and follow the physicians instructions. CHECK THE CATHETER INSERTION SITE DAILY: 
You may shower 24 hours after the procedure, remove the bandage during showering. Wash with soap and water and pat dry. Gentle cleaning of the site with soap and water is sufficient, cover with a dry clean dressing or bandage. Do not apply creams or powders to the area. Do not sit in a bathtub or pool of water for 7 days or until wound has completely healed. Temporary bruising and discomfort is normal and may last a few weeks. You may have a  formation of a small lump at the site which may last up to 6 weeks. CALL THE PHYSICIAN: If the site becomes red, swollen or feels warm to the touch If there is bleeding or drainage or if there is unusual pain at the groin or down the leg. If there is any bleeding, lie down, apply pressure or have someone apply pressure with a clean cloth until the bleeding stops. If the bleeding continues, call 911 to be transported to the hospital. 
DO  [facility] 927. Activity: For the first 24-48 hours or as instructed by the physician: No lifting, pushing or pulling over 5 pounds and no straining the insertion site. Do not life grocery bags or the garbage can, do not run the vacuum  or  for 7 days. Start with short walks as in the hospital and gradually increase as tolerated each day. It is recommended to walk 30 minutes 5-7 days per week. Follow your physicians instructions on activity. Avoid walking outside in extremes of heat or cold. Walk inside when it is cold and windy or hot and humid. Things to keep in mind: 
No driving for at least 5 days, or as designated by your physician. Limit the number of times you go up and down the stairs Take rests and pace yourself with activity. Be careful and do not strain with bowel movements. Medications: Take all medications as prescribed Call your physician if you have any questions Keep an updated list of your medications with you at all times and give a list to your physician and pharmacist 
 
Signs and Symptoms: Be cautious of symptoms of angina or recurrent symptoms such as chest discomfort, unusual shortness of breath or fatigue, palpitations. After Care: Follow up with your physician as instructed. Follow a heart healthy diet with proper portion control, daily stress management, daily exercise, blood pressure and cholesterol control , and smoking cessation. Discharge Orders None [facility] [place] [facility]! Dear Zuleika Haley: 
Thank you for requesting a [employer] account. Our records indicate that you already have an active [employer] account. You can access your account anytime at [email or web address] Did you know that you can access your hospital and ER discharge instructions at any time in Migoa? You can also review all of your test results from your hospital stay or ER visit. Additional Information If you have questions, please visit the Frequently Asked Questions section of the Migoa website at [email or web address] Remember, Migoa is NOT to be used for urgent needs. For medical emergencies, dial 911. Now available from your iPhone and Android! General Information Please provide this summary of care documentation to your next provider. Patient Signature:  ____________________________________________________________ Date:  ____________________________________________________________  
  
Parul Burden Provider Signature:  ____________________________________________________________ Date:  ____________________________________________________________

## 2017-03-31 NOTE — PROCEDURES
[address]  [phone]    Indications and Pre-Procedure Diagnosis:  Brock Aiken is a 77 y.o. male with atrial flutter is referred for electr-physiologic evaluation and intervention. Post Procedure Diagnosis    Typical atrial flutter  Atypical atrial flutter    Electrophysiology Study Procedure  Informed consent was obtained. All vascular access sites were prepped and draped in the usual sterile fashion and the Seldinger technique was used to catherize the RFV with multi-polar electrode catheters, which were placed in the appropriate intra-cardiac sites under fluoroscopic guidance (see catheter list). Right and left atrial pacing and recording, His bundle recording, and right ventricular pacing and recording were performed. Continuous pulse oximetry and cuff BP monitoring were performed. During the procedure, the patient received Versed and Fentanyl for sedation per nursing personnel. Atrial pacing induced counter clockwise atrial flutter at a cycle length of 385 msec. Pacing from the posterior septum showed entrainment with concealed fusion and post pacing intervals within 30 msec of the flutter cycle length. Ablation Procedure  Mapping was performed using standard catheter-based techniques and 3-D electro-anatomic mapping. A large curve 10 mm tip Blazer catheter was used to deliver RF lesions in the cavo-tricuspid isthmus with termination of the typical atrial flutter. It converted to an atypical atrial flutter with a cycle length of 331 ms. Pacing from the distal CS showed entrainment with concealed fusion and terminated the atypical left atrial flutter. Additional Focus  The RF catheter was advanced in the distal CS and RF line from done in the floor from the distal CS to the proximal CS. Bi-directional isthmus block was confirmed. Following ablation, rapid atrial pacing, on and off dobutamine @ 2 mcg/min, failed to induce any atrial arrhythmias.  There was no evidence of atrial fibrillation during the procedure. The patient left the laboratory in a stable condition. At the end of the procedure all catheters were removed and vascular hemostasis achieved. Fluoroscopic and total procedure times were 7 and 45 minutes respectively. Estimated blood loss: <10 ml. Sharp counts: correct. Specimen (s) collected: none. The procedure related complication occurred: none. The following problems were encountered: none. Conduction intervals (ms)    A-H H-V P-R QRS R-R V-V  155 60 212 paced 1499 1499    AV anahi conduction    VA Block when pacing at 600 ms    Findings and Summary    This study demonstrates:  1. Inducible counter clockwise isthmus dependent atrial flutter with successful RFA of the CTI and confirmed bi-directional isthmus block  2. Atypical left atrial flutter with successful RFA of the CS floor from the distal to the proximal CS  3. No further inducible atrial arrhythmias on dobutamine with rapid atrial pacing    Recommendation:  1. DDDR 75 bpm  2. Cont AAD    Thank you for allowing me to participate in this patients care.     Genoveva Yan MD, [facility]

## 2017-04-06 ENCOUNTER — CLINICAL SUPPORT (OUTPATIENT)
Dept: CARDIOLOGY CLINIC | Age: 77
End: 2017-04-06

## 2017-04-06 ENCOUNTER — OFFICE VISIT (OUTPATIENT)
Dept: CARDIOLOGY CLINIC | Age: 77
End: 2017-04-06

## 2017-04-06 VITALS
SYSTOLIC BLOOD PRESSURE: 130 MMHG | DIASTOLIC BLOOD PRESSURE: 78 MMHG | HEART RATE: 78 BPM | HEIGHT: 74 IN | OXYGEN SATURATION: 97 % | WEIGHT: 258.5 LBS | BODY MASS INDEX: 33.17 KG/M2 | RESPIRATION RATE: 16 BRPM

## 2017-04-06 DIAGNOSIS — I48.0 PAROXYSMAL ATRIAL FIBRILLATION (HCC): ICD-10-CM

## 2017-04-06 DIAGNOSIS — Z95.0 PACEMAKER: Primary | ICD-10-CM

## 2017-04-06 DIAGNOSIS — I48.4 ATYPICAL ATRIAL FLUTTER (HCC): ICD-10-CM

## 2017-04-06 DIAGNOSIS — G47.33 OSA ON CPAP: ICD-10-CM

## 2017-04-06 DIAGNOSIS — I49.5 SSS (SICK SINUS SYNDROME) (HCC): ICD-10-CM

## 2017-04-06 DIAGNOSIS — Z86.73 HISTORY OF CVA (CEREBROVASCULAR ACCIDENT): ICD-10-CM

## 2017-04-06 DIAGNOSIS — Z95.0 PACEMAKER: ICD-10-CM

## 2017-04-06 DIAGNOSIS — Z99.89 OSA ON CPAP: ICD-10-CM

## 2017-04-06 DIAGNOSIS — I10 ESSENTIAL HYPERTENSION: ICD-10-CM

## 2017-04-06 DIAGNOSIS — E78.00 HYPERCHOLESTEROLEMIA: ICD-10-CM

## 2017-04-06 DIAGNOSIS — I48.4 ATYPICAL ATRIAL FLUTTER (HCC): Primary | ICD-10-CM

## 2017-04-06 NOTE — PROGRESS NOTES
Patient is s/p ablation 03/31/2017. Patient states has chest pain with exertion or when being transferred from sitting to chair.

## 2017-04-06 NOTE — PROGRESS NOTES
Subjective:      Casandra Puente is a 77 y.o. male is here for f/u after successful atrial flutter ablation. He reports he was having some chest discomfort before the procedure when he moved, doing transfer and things along that line. It has been improving since the procedure. The patient denies shortness of breath, orthopnea, PND, LE edema, palpitations, syncope, presyncope or fatigue.        Patient Active Problem List    Diagnosis Date Noted    Localized epilepsy with impairment of consciousness ([address]     Common wart 05/16/2016    Chronic right shoulder pain 01/11/2016    Screening for colon cancer 11/04/2015    Acute bronchitis 08/25/2015    Chronic back pain greater than 3 months duration 05/04/2015    CVA (cerebral infarction) 05/04/2015    Pre-op evaluation 01/29/2015    Pacemaker 10/15/2014    SSS (sick sinus syndrome) ([address] 10/13/2014    Syncope 10/13/2014    Seizure disorder ([address] 10/13/2014    RONAL on CPAP 10/13/2014    Tinea corporis 05/07/2014    Insomnia 01/03/2014    Ankle fracture, left 08/28/2013    Constipation 12/14/2012    Cataract 07/30/2012    Atrial fibrillation ([address] 06/19/2012    TBI (traumatic brain injury) ([address] 06/08/2012    CHI (closed head injury) 05/24/2012    Basal cell cancer 05/24/2012    Atrial flutter ([address] 03/02/2012    Atrial fibrillation or flutter 03/02/2012    Small bowel obstruction ([address] 02/21/2012    Ulcer ([address] 09/14/2011    Wax in ear 06/17/2011    Cellulitis 04/04/2011    Rash 03/28/2011    Hypothyroidism 12/22/2010    Hyponatremia 12/22/2010    BPH (benign prostatic hyperplasia) 12/22/2010    HTN (hypertension) 12/08/2010    Depression 12/08/2010    Hypercholesterolemia 12/08/2010    Acid reflux 12/08/2010    Seizure ([address] 12/08/2010      Albina Peabody, MD  Past Medical History:   Diagnosis Date    A-fib [facility])     Acute bronchitis 8/25/2015    Anxiety     Arrhythmia     atrial fibrillation    Arthritis     BCC (basal cell carcinoma of skin)     BPH (benign prostatic hyperplasia)     Chronic back pain greater than 3 months duration 5/4/2015    Chronic right shoulder pain 1/11/2016    Common wart 5/16/2016    Constipation 12/14/2012    CVA (cerebral infarction) 5/4/2015    Depression     GERD (gastroesophageal reflux disease)     Hearing loss     bilateral hearing aids    Hemiplegia following CVA (cerebrovascular accident) ([address]     left side hemiparesis    History of colostomy     HTN (hypertension)     Hyperlipidemia     Localized epilepsy with impairment of consciousness ([address]     Prostate cancer ([address]     Status post XRT, Lupron    Screening for colon cancer 11/4/2015    Stroke [facility])     traumatic from neck crushing    TBI (traumatic brain injury) ([address] 1994    Unspecified sleep apnea     on CPAP      Past Surgical History:   Procedure Laterality Date    HX ANKLE FRACTURE TX      HX COLECTOMY      colostomy    HX HEENT      ear surgery eddie.     HX HERNIA REPAIR      HX ORTHOPAEDIC      left hip pinning    HX PACEMAKER  2014     Allergies   Allergen Reactions    Ciprofibrate Hives      Family History   Problem Relation Age of Onset    Alzheimer Mother      dec [de-identified]    Cancer Father      dec 76 kidney    Heart Disease Brother     No Known Problems Son     negative for cardiac disease  Social History     Social History    Marital status:      Spouse name: N/A    Number of children: N/A    Years of education: N/A     Social History Main Topics    Smoking status: Former Smoker     Years: 10.00     Types: Pipe     Quit date: 1/29/1990    Smokeless tobacco: Never Used      Comment: QUIT 1970'S    Alcohol use No    Drug use: No    Sexual activity: Not Currently     Other Topics Concern    Not on file     Social History Narrative    , lives with his wife in [place]     Current Outpatient Prescriptions   Medication Sig    atorvastatin (LIPITOR) 20 mg tablet TAKE ONE TABLET BY MOUTH DAILY  meclizine (ANTIVERT) 25 mg tablet TAKE ONE TABLET BY MOUTH EVERY 8 HOURS AS NEEDED    venlafaxine-SR (EFFEXOR-XR) 75 mg capsule TAKE ONE CAPSULE BY MOUTH DAILY    lisinopril (PRINIVIL, ZESTRIL) 40 mg tablet TAKE ONE TABLET BY MOUTH DAILY    TEGRETOL  mg SR tablet Take 1 Tab by mouth two (2) times a day.  PRADAXA 150 mg capsule TAKE ONE CAPSULE BY MOUTH EVERY 12 HOURS    amiodarone (CORDARONE) 200 mg tablet Take 1 Tab by mouth daily.  zolpidem (AMBIEN) 10 mg tablet Take 1 Tab by mouth nightly as needed for Sleep (please take in the bed).  amLODIPine (NORVASC) 10 mg tablet TAKE ONE TABLET BY MOUTH DAILY    Incontinence Pad, Liner, Disp pads by Does Not Apply route.  betamethasone dipropionate (DIPROSONE) 0.05 % topical cream Apply  to affected area as needed.  lactulose (CHRONULAC) 10 gram/15 mL solution Take 5 mL by mouth three (3) times daily as needed.  DOC-Q-LACE 100 mg capsule TAKE ONE CAPSULE BY MOUTH TWICE A DAY    FIRST AID CLOTH TAPE 1 X 10 \"-yard tape     diphenhydrAMINE (BENADRYL ALLERGY) 25 mg tablet Take 1 Tab by mouth every six (6) hours as needed for Sleep.  metoprolol (LOPRESSOR) 25 mg tablet Take 0.5 tablets by mouth two (2) times a day.  lutein 20 mg tab Take 1 Tab by mouth daily. 25 mg QD    senna (SENNA) 8.6 mg tablet Take 1 tablet by mouth daily.  polyethylene glycol (MIRALAX) 17 gram/dose powder Take 17 g by mouth two (2) times daily as needed. Takes as needed    finasteride (PROSCAR) 5 mg tablet Take 5 mg by mouth daily.  omeprazole (PRILOSEC) 20 mg capsule TAKE ONE CAPSULE BY MOUTH DAILY     No current facility-administered medications for this visit. Vitals:    04/06/17 0857   BP: 130/78   Pulse: 78   Resp: 16   SpO2: 97%   Weight: 258 lb 8 oz (117.3 kg)   Height: 6' 2\" (1.88 m)       I have reviewed the nurses notes, vitals, problem list, allergy list, medical history, family, social history and medications.     Review of Symptoms:    General: Pt denies excessive weight gain or loss. Pt is able to conduct ADL's  HEENT: Denies blurred vision, headaches, epistaxis and difficulty swallowing. Respiratory: Denies shortness of breath, BECERRIL, wheezing or stridor. Cardiovascular: + precordial pain, denies palpitations, edema or PND  Gastrointestinal: Denies poor appetite, indigestion, abdominal pain or blood in stool  Urinary: Denies dysuria, pyuria  Musculoskeletal: Denies pain or swelling from muscles or joints  Neurologic: Denies tremor, paresthesias, or sensory motor disturbance  Skin: Denies rash, itching or texture change. Psych: Denies depression      Physical Exam:      General: Well developed, in no acute distress. HEENT: Eyes - PERRL, no jvd  Heart:  Normal S1/S2 negative S3 or S4. Regular, no murmur, gallop or rub.   Respiratory: Clear bilaterally x 4, no wheezing or rales  Abdomen:   Soft, non-tender, bowel sounds are active.   Extremities:  No edema, normal cap refill, no cyanosis. Musculoskeletal: No clubbing  Neuro: A&Ox3, speech clear, w/c.   Skin: Skin color is normal. No rashes or lesions. Non diaphoretic  Vascular: 2+ pulses symmetric in all extremities    Cardiographics    Ekg: SR   Incomplete right bundle branch block. Nonspecific T-abnormality.        Medtronic pacemaker- 99.8% A paced, 1.9% V paced  No events since ablation  6.5 year battery life    Results for orders placed or performed during the hospital encounter of 10/07/16   EKG, 12 LEAD, INITIAL   Result Value Ref Range    Ventricular Rate 75 BPM    Atrial Rate 77 BPM    QRS Duration 180 ms    Q-T Interval 476 ms    QTC Calculation (Bezet) 531 ms    Calculated R Axis -64 degrees    Calculated T Axis 98 degrees    Diagnosis       Electronic ventricular pacemaker  When compared with ECG of 15-OCT-2014 13:10,  Electronic ventricular pacemaker has replaced Electronic atrial pacemaker  Confirmed by Kayley Melchor, P.V. ([id number]) on 10/7/2016 11:44:15 AM     Results for orders placed or performed in visit on 10/09/14   CARDIAC HOLTER MONITOR, 24 HOURS    Narrative    ECG Monitor/24 hours, Complete    Reason for Holter Monitor   SYNCOPE/ BRADYCARDIA    Heartbeat    Slowest 33  Average 57  Fastest  92          Results:   Underlying Rhythm: Normal sinus rhythm. Frequent pauses noted  with 2 episodes of > 3 second pause. Atrial Arrhythmias: premature atrial contractions; occasional             AV Conduction: normal    Ventricular Arrhythmias: premature ventricular contractions; rare    ST Segment Analysis:normal     Symptom Correlation:  None reported. Comment:   Sinus rhythm with significant pauses. Kristi Walters MD                       Lab Results   Component Value Date/Time    WBC 7.7 03/22/2017 10:09 AM    HGB 13.4 03/22/2017 10:09 AM    HCT 39.0 03/22/2017 10:09 AM    PLATELET 315 15/88/8984 10:09 AM    MCV 94 03/22/2017 10:09 AM      Lab Results   Component Value Date/Time    Sodium 133 03/22/2017 10:09 AM    Potassium 4.7 03/22/2017 10:09 AM    Chloride 92 03/22/2017 10:09 AM    CO2 22 03/22/2017 10:09 AM    Anion gap 8 10/15/2014 02:45 AM    Glucose 104 03/22/2017 10:09 AM    BUN 11 03/22/2017 10:09 AM    Creatinine 0.56 03/22/2017 10:09 AM    BUN/Creatinine ratio 20 03/22/2017 10:09 AM    GFR est  03/22/2017 10:09 AM    GFR est non- 03/22/2017 10:09 AM    Calcium 9.3 03/22/2017 10:09 AM    Bilirubin, total <0.2 03/22/2017 10:09 AM    AST (SGOT) 29 03/22/2017 10:09 AM    Alk. phosphatase 128 03/22/2017 10:09 AM    Protein, total 7.1 03/22/2017 10:09 AM    Albumin 4.1 03/22/2017 10:09 AM    Globulin 4.2 10/13/2014 03:38 PM    A-G Ratio 1.4 03/22/2017 10:09 AM    ALT (SGPT) 30 03/22/2017 10:09 AM         Assessment:     Assessment:        ICD-10-CM ICD-9-CM    1. Atypical atrial flutter (HCC) I48.4 427.32    2. Paroxysmal atrial fibrillation (HCC) I48.0 427.31    3. SSS (sick sinus syndrome) ([facility]) I49.5 427.81    4.  Essential hypertension I10 401.9 AMB POC EKG ROUTINE W/ 12 LEADS, INTER & REP   5. Hypercholesterolemia E78.00 272.0    6. Pacemaker Z95.0 V45.01    7. RONAL on CPAP G47.33 327.23     Z99.89 V46.8    8. History of CVA (cerebrovascular accident) Z86.73 V12.54      Orders Placed This Encounter    AMB POC EKG ROUTINE W/ 12 LEADS, INTER & REP     Order Specific Question:   Reason for Exam:     Answer:   routine        Plan:   Mr Deyanira Hoskins is here today for f/u after successful atrial flutter ablation with reports of improving chest pain with movement since his procedure. He has had no events since the ablation. His BP is normotensive. Echo still needs to be completed. Continue medical management for afib/flutter, HTN, hypercholesterolemia, RONAL and f/u in 3 months. Thank you for allowing me to participate in Cuca Belcher 's care.     Geraldine Mcconnell MD, [facility]

## 2017-04-13 DIAGNOSIS — Z86.73 HISTORY OF CVA (CEREBROVASCULAR ACCIDENT): ICD-10-CM

## 2017-04-13 DIAGNOSIS — S06.9X0A: Primary | ICD-10-CM

## 2017-04-18 RX ORDER — ATORVASTATIN CALCIUM 20 MG/1
TABLET, FILM COATED ORAL
Qty: 30 TAB | Refills: 0 | Status: SHIPPED | OUTPATIENT
Start: 2017-04-18 | End: 2017-05-17 | Stop reason: SDUPTHER

## 2017-04-26 RX ORDER — LISINOPRIL 40 MG/1
TABLET ORAL
Qty: 90 TAB | Refills: 0 | Status: SHIPPED | OUTPATIENT
Start: 2017-04-26 | End: 2017-07-22 | Stop reason: SDUPTHER

## 2017-05-09 RX ORDER — METOPROLOL TARTRATE 25 MG/1
TABLET, FILM COATED ORAL
Qty: 60 TAB | Refills: 4 | Status: SHIPPED | OUTPATIENT
Start: 2017-05-09 | End: 2017-08-03 | Stop reason: SDUPTHER

## 2017-05-16 ENCOUNTER — TELEPHONE (OUTPATIENT)
Dept: FAMILY MEDICINE CLINIC | Age: 77
End: 2017-05-16

## 2017-05-16 NOTE — TELEPHONE ENCOUNTER
Returned call to jerry with [employer],  Requesting pts weight and height for his wheelchair battery,  Information given as requested

## 2017-05-16 NOTE — TELEPHONE ENCOUNTER
----- Message from Arthur Ma sent at 5/16/2017  9:42 AM EDT -----  Regarding: Dr. Wade Mems with [employer] is requesting some information on pt regarding an order for Batteries for  his power wheel chair. Best contact is [phone].

## 2017-05-20 RX ORDER — ATORVASTATIN CALCIUM 20 MG/1
TABLET, FILM COATED ORAL
Qty: 30 TAB | Refills: 0 | Status: SHIPPED | OUTPATIENT
Start: 2017-05-20 | End: 2017-06-17 | Stop reason: SDUPTHER

## 2017-06-17 DIAGNOSIS — K59.00 CONSTIPATION, UNSPECIFIED CONSTIPATION TYPE: ICD-10-CM

## 2017-06-17 DIAGNOSIS — I10 ESSENTIAL HYPERTENSION WITH GOAL BLOOD PRESSURE LESS THAN 130/85: ICD-10-CM

## 2017-06-17 DIAGNOSIS — E78.00 HYPERCHOLESTEROLEMIA: ICD-10-CM

## 2017-06-19 RX ORDER — DABIGATRAN ETEXILATE MESYLATE 150 MG/1
CAPSULE ORAL
Qty: 60 CAP | Refills: 5 | Status: SHIPPED | OUTPATIENT
Start: 2017-06-19 | End: 2017-12-14 | Stop reason: SDUPTHER

## 2017-06-20 RX ORDER — ATORVASTATIN CALCIUM 20 MG/1
TABLET, FILM COATED ORAL
Qty: 30 TAB | Refills: 0 | Status: SHIPPED | OUTPATIENT
Start: 2017-06-20 | End: 2017-07-17 | Stop reason: SDUPTHER

## 2017-06-20 RX ORDER — LACTULOSE 10 G/15ML
SOLUTION ORAL; RECTAL
Qty: 473 ML | Refills: 2 | Status: SHIPPED | OUTPATIENT
Start: 2017-06-20 | End: 2018-03-16 | Stop reason: SDUPTHER

## 2017-07-17 RX ORDER — ATORVASTATIN CALCIUM 20 MG/1
TABLET, FILM COATED ORAL
Qty: 30 TAB | Refills: 0 | Status: SHIPPED | OUTPATIENT
Start: 2017-07-17 | End: 2017-08-13 | Stop reason: SDUPTHER

## 2017-07-24 RX ORDER — LISINOPRIL 40 MG/1
TABLET ORAL
Qty: 90 TAB | Refills: 0 | Status: SHIPPED | OUTPATIENT
Start: 2017-07-24 | End: 2017-10-18 | Stop reason: SDUPTHER

## 2017-08-03 ENCOUNTER — CLINICAL SUPPORT (OUTPATIENT)
Dept: CARDIOLOGY CLINIC | Age: 77
End: 2017-08-03

## 2017-08-03 ENCOUNTER — OFFICE VISIT (OUTPATIENT)
Dept: CARDIOLOGY CLINIC | Age: 77
End: 2017-08-03

## 2017-08-03 VITALS
SYSTOLIC BLOOD PRESSURE: 120 MMHG | HEIGHT: 74 IN | WEIGHT: 259 LBS | OXYGEN SATURATION: 98 % | BODY MASS INDEX: 33.24 KG/M2 | RESPIRATION RATE: 16 BRPM | DIASTOLIC BLOOD PRESSURE: 76 MMHG | HEART RATE: 76 BPM

## 2017-08-03 DIAGNOSIS — I49.5 SSS (SICK SINUS SYNDROME) (HCC): ICD-10-CM

## 2017-08-03 DIAGNOSIS — Z95.0 PACEMAKER: ICD-10-CM

## 2017-08-03 DIAGNOSIS — I48.4 ATYPICAL ATRIAL FLUTTER (HCC): ICD-10-CM

## 2017-08-03 DIAGNOSIS — I48.0 PAROXYSMAL ATRIAL FIBRILLATION (HCC): ICD-10-CM

## 2017-08-03 DIAGNOSIS — Z99.89 OSA ON CPAP: ICD-10-CM

## 2017-08-03 DIAGNOSIS — I49.5 SSS (SICK SINUS SYNDROME) (HCC): Primary | ICD-10-CM

## 2017-08-03 DIAGNOSIS — Z95.0 PACEMAKER: Primary | ICD-10-CM

## 2017-08-03 DIAGNOSIS — G47.33 OSA ON CPAP: ICD-10-CM

## 2017-08-03 DIAGNOSIS — E78.00 HYPERCHOLESTEROLEMIA: ICD-10-CM

## 2017-08-03 DIAGNOSIS — I10 ESSENTIAL HYPERTENSION: ICD-10-CM

## 2017-08-03 RX ORDER — ADHESIVE BANDAGE
30 BANDAGE TOPICAL DAILY PRN
COMMUNITY
End: 2018-10-02

## 2017-08-03 NOTE — PROGRESS NOTES
Occasional early heartbeats from the upper and bottom chambers of the heart that are not dangerous correspond to her awareness of her heart. Nothing of concern. If doing well, continue with regular activity and follow-up in 6-12 months. Subjective:      Mark Mack is a 69 y.o. male is here for his pacemaker f/u with hx of SSS and afib/flutter with flutter ablation in March. The patient denies chest pain/ shortness of breath, orthopnea, PND, LE edema, palpitations, syncope, presyncope or fatigue.        Patient Active Problem List    Diagnosis Date Noted    Localized epilepsy with impairment of consciousness ([address]     Common wart 05/16/2016    Chronic right shoulder pain 01/11/2016    Screening for colon cancer 11/04/2015    Acute bronchitis 08/25/2015    Chronic back pain greater than 3 months duration 05/04/2015    CVA (cerebral infarction) 05/04/2015    Pre-op evaluation 01/29/2015    Pacemaker 10/15/2014    SSS (sick sinus syndrome) ([address] 10/13/2014    Syncope 10/13/2014    Seizure disorder ([address] 10/13/2014    RONAL on CPAP 10/13/2014    Tinea corporis 05/07/2014    Insomnia 01/03/2014    Ankle fracture, left 08/28/2013    Constipation 12/14/2012    Cataract 07/30/2012    Atrial fibrillation ([address] 06/19/2012    TBI (traumatic brain injury) ([address] 06/08/2012    CHI (closed head injury) 05/24/2012    Basal cell cancer 05/24/2012    Atrial flutter ([address] 03/02/2012    Atrial fibrillation or flutter 03/02/2012    Small bowel obstruction ([address] 02/21/2012    Ulcer ([address] 09/14/2011    Wax in ear 06/17/2011    Cellulitis 04/04/2011    Rash 03/28/2011    Hypothyroidism 12/22/2010    Hyponatremia 12/22/2010    BPH (benign prostatic hyperplasia) 12/22/2010    HTN (hypertension) 12/08/2010    Depression 12/08/2010    Hypercholesterolemia 12/08/2010    Acid reflux 12/08/2010    Seizure ([address] 12/08/2010      Sunshine Vallejo MD  Past Medical History:   Diagnosis Date    [facility]-fib [facility])     Acute bronchitis 8/25/2015    Anxiety     Arrhythmia     atrial fibrillation    Arthritis     BCC (basal cell carcinoma of skin)     BPH (benign prostatic hyperplasia)     Chronic back pain greater than 3 months duration 5/4/2015    Chronic right shoulder pain 1/11/2016    Common wart 5/16/2016    Constipation 12/14/2012    CVA (cerebral infarction) 5/4/2015    Depression     GERD (gastroesophageal reflux disease)     Hearing loss     bilateral hearing aids    Hemiplegia [facility]cerebrovascular [facility]) [facility]     left side hemiparesis    History of colostomy     HTN (hypertension)     Hyperlipidemia     Localized epilepsy with impairment of consciousness ([facility]     Prostate cancer ([facility]     Status post XRT, Lupron    Screening for colon cancer 11/4/2015    Stroke [facility])     traumatic from neck crushing    TBI (traumatic brain injury) ([facility] 1994    Unspecified sleep apnea     on CPAP      Past Surgical History:   Procedure Laterality Date    HX ANKLE FRACTURE TX      HX COLECTOMY      colostomy    HX HEENT      ear surgery eddie.  HX HERNIA REPAIR      HX ORTHOPAEDIC      left hip pinning    HX PACEMAKER  2014     Allergies   Allergen Reactions    Ciprofibrate Hives      Family History   Problem Relation Age of Onset    Alzheimer Mother      dec [de-identified]    Cancer Father      dec 76 kidney    Heart Disease Brother     No Known Problems Son     negative for cardiac disease  Social History     Social History    Marital status:      Spouse name: N/A    Number of children: N/A    Years of education: N/A     Social History Main Topics    Smoking status: Former Smoker     Years: 10.00     Types: Pipe     Quit date: 1/29/1990    Smokeless tobacco: Never Used      Comment: QUIT 1970'S    Alcohol use No    Drug use: No    Sexual activity: Not Currently     Other Topics Concern    None     Social History Narrative    , lives with his wife in [place]     Current Outpatient Prescriptions   Medication Sig    magnesium hydroxide (SOTO MILK OF MAGNESIA) 400 mg/5 mL suspension Take 30 mL by mouth daily as needed for Constipation.     lisinopril (PRINIVIL, ZESTRIL) 40 mg tablet TAKE ONE TABLET BY MOUTH DAILY    atorvastatin (LIPITOR) 20 mg tablet TAKE ONE TABLET BY MOUTH DAILY    lactulose (CHRONULAC) 10 gram/15 mL solution TAKE 1 TEASPOONFUL (5 ML) BY MOUTH THREE TIMES AS DAY AS NEEDED    PRADAXA 150 mg capsule TAKE ONE CAPSULE BY MOUTH EVERY 12 HOURS    meclizine (ANTIVERT) 25 mg tablet TAKE ONE TABLET BY MOUTH EVERY 8 HOURS AS NEEDED    venlafaxine-SR (EFFEXOR-XR) 75 mg capsule TAKE ONE CAPSULE BY MOUTH DAILY    TEGRETOL  mg SR tablet Take 1 Tab by mouth two (2) times a day.  amiodarone (CORDARONE) 200 mg tablet Take 1 Tab by mouth daily.  zolpidem (AMBIEN) 10 mg tablet Take 1 Tab by mouth nightly as needed for Sleep (please take in the bed).  amLODIPine (NORVASC) 10 mg tablet TAKE ONE TABLET BY MOUTH DAILY    Incontinence Pad, Liner, Disp pads by Does Not Apply route.  betamethasone dipropionate (DIPROSONE) 0.05 % topical cream Apply  to affected area as needed.  DOC-Q-LACE 100 mg capsule TAKE ONE CAPSULE BY MOUTH TWICE A DAY    FIRST AID CLOTH TAPE 1 X 10 \"-yard tape     diphenhydrAMINE (BENADRYL ALLERGY) 25 mg tablet Take 1 Tab by mouth every six (6) hours as needed for Sleep.  metoprolol (LOPRESSOR) 25 mg tablet Take 0.5 tablets by mouth two (2) times a day.  lutein 20 mg tab Take 1 Tab by mouth daily. 25 mg QD    senna (SENNA) 8.6 mg tablet Take 1 tablet by mouth daily.  finasteride (PROSCAR) 5 mg tablet Take 5 mg by mouth daily.  omeprazole (PRILOSEC) 20 mg capsule TAKE ONE CAPSULE BY MOUTH DAILY    polyethylene glycol (MIRALAX) 17 gram/dose powder Take 17 g by mouth two (2) times daily as needed. Takes as needed     No current facility-administered medications for this visit. Vitals:    08/03/17 0943   BP: 120/76   Pulse: 76   Resp: 16   SpO2: 98%   Weight: 259 lb (117.5 kg)   Height: 6' 2\" (1.88 m)       I have reviewed the nurses notes, vitals, problem list, allergy list, medical history, family, social history and medications.     Review of Symptoms:    General: Pt denies excessive weight gain or loss. Pt is able to conduct ADL's  HEENT: Denies blurred vision, headaches, epistaxis and difficulty swallowing. Respiratory: Denies shortness of breath, BECERRIL, wheezing or stridor. Cardiovascular: Denies precordial pain, palpitations, edema or PND  Gastrointestinal: Denies poor appetite, indigestion, abdominal pain or blood in stool  Urinary: Denies dysuria, pyuria  Musculoskeletal: Denies pain or swelling from muscles or joints  Neurologic: Denies new onset of tremor, paresthesias, or sensory motor disturbance  Skin: Denies rash, itching or texture change. Psych: Denies depression      Physical Exam:      General: Well developed, in no acute distress. HEENT: Eyes - PERRL, no jvd  Heart:  Normal S1/S2 negative S3 or S4. Regular, no murmur, gallop or rub.   Respiratory: Clear bilaterally x 4, no wheezing or rales  Abdomen:   Soft, non-tender, bowel sounds are active.   Extremities:  No edema, normal cap refill, no cyanosis. Musculoskeletal: No clubbing  Neuro: A&Ox3, speech clear, in w/c. Skin: Skin color is normal. No rashes or lesions. Non diaphoretic  Vascular: 2+ pulses symmetric in all extremities    Cardiographics    Ekg: SR   Nonspecific T-abnormality.     [employer] pacemaker- 99.9% A paced, 2.9% V paced  No events  7 year battery life    Results for orders placed or performed during the hospital encounter of 10/07/16   EKG, 12 LEAD, INITIAL   Result Value Ref Range    Ventricular Rate 75 BPM    Atrial Rate 77 BPM    QRS Duration 180 ms    Q-T Interval 476 ms    QTC Calculation (Bezet) 531 ms    Calculated R Axis -64 degrees    Calculated T Axis 98 degrees    Diagnosis       Electronic ventricular pacemaker  When compared with ECG of 15-OCT-2014 13:10,  Electronic ventricular pacemaker has replaced Electronic atrial pacemaker  Confirmed by Hi Lazo, P.SATHYA ([id number]) on 10/7/2016 11:44:15 AM     Results for orders placed or performed in visit on 10/09/14 CARDIAC HOLTER MONITOR, 24 HOURS    Narrative    ECG Monitor/24 hours, Complete    Reason for Holter Monitor   SYNCOPE/ BRADYCARDIA    Heartbeat    Slowest 33  Average 57  Fastest  92          Results:   Underlying Rhythm: Normal sinus rhythm. Frequent pauses noted  with 2 episodes of > 3 second pause. Atrial Arrhythmias: premature atrial contractions; occasional             AV Conduction: normal    Ventricular Arrhythmias: premature ventricular contractions; rare    ST Segment Analysis:normal     Symptom Correlation:  None reported. Comment:   Sinus rhythm with significant pauses. Jennifer Ocampo MD                       Lab Results   Component Value Date/Time    WBC 7.7 03/22/2017 10:09 AM    HGB 13.4 03/22/2017 10:09 AM    HCT 39.0 03/22/2017 10:09 AM    PLATELET 984 07/41/7175 10:09 AM    MCV 94 03/22/2017 10:09 AM      Lab Results   Component Value Date/Time    Sodium 133 03/22/2017 10:09 AM    Potassium 4.7 03/22/2017 10:09 AM    Chloride 92 03/22/2017 10:09 AM    CO2 22 03/22/2017 10:09 AM    Anion gap 8 10/15/2014 02:45 AM    Glucose 104 03/22/2017 10:09 AM    BUN 11 03/22/2017 10:09 AM    Creatinine 0.56 03/22/2017 10:09 AM    BUN/Creatinine ratio 20 03/22/2017 10:09 AM    GFR est  03/22/2017 10:09 AM    GFR est non- 03/22/2017 10:09 AM    Calcium 9.3 03/22/2017 10:09 AM    Bilirubin, total <0.2 03/22/2017 10:09 AM    AST (SGOT) 29 03/22/2017 10:09 AM    Alk. phosphatase 128 03/22/2017 10:09 AM    Protein, total 7.1 03/22/2017 10:09 AM    Albumin 4.1 03/22/2017 10:09 AM    Globulin 4.2 10/13/2014 03:38 PM    A-G Ratio 1.4 03/22/2017 10:09 AM    ALT (SGPT) 30 03/22/2017 10:09 AM         Assessment:     Assessment:        ICD-10-CM ICD-9-CM    1. SSS (sick sinus syndrome) ([facility]) I49.5 427.81    2. Paroxysmal atrial fibrillation ([facility]) I48.0 427.31 magnesium hydroxide (SOTO MILK OF MAGNESIA) 400 mg/5 mL suspension      AMB POC EKG ROUTINE W/ 12 LEADS, INTER & REP   3. Atypical atrial flutter (HCC) I48.4 427.32    4. Essential hypertension I10 401.9    5. Hypercholesterolemia E78.00 272.0    6. Pacemaker Z95.0 V45.01    7. RONAL on CPAP G47.33 327.23     Z99.89 V46.8      Orders Placed This Encounter    AMB POC EKG ROUTINE W/ 12 LEADS, INTER & REP     Order Specific Question:   Reason for Exam:     Answer:   routine    magnesium hydroxide (SOTO MILK OF MAGNESIA) 400 mg/5 mL suspension     Sig: Take 30 mL by mouth daily as needed for Constipation. Plan:   Mr Keturah Bush is here today for f/u regarding hx of SSS with a pacemaker and afib/flutter with flutter ablation in March. He denies cardiac complaints and is A paced 99.9%. He is normotensive without events on his device interrogation today. Continue medical management for afib/flutter, HTN, hypercholesterolemia, and RONAL. F/U in 1 year. Thank you for allowing me to participate in [facility] 's care.     Linda Qiu MD, [facility]

## 2017-08-07 RX ORDER — AMLODIPINE BESYLATE 10 MG/1
TABLET ORAL
Qty: 90 TAB | Refills: 5 | Status: SHIPPED | OUTPATIENT
Start: 2017-08-07 | End: 2018-07-10 | Stop reason: DRUGHIGH

## 2017-08-14 RX ORDER — ATORVASTATIN CALCIUM 20 MG/1
TABLET, FILM COATED ORAL
Qty: 30 TAB | Refills: 0 | Status: SHIPPED | OUTPATIENT
Start: 2017-08-14 | End: 2017-09-11 | Stop reason: SDUPTHER

## 2017-08-28 RX ORDER — VENLAFAXINE HYDROCHLORIDE 75 MG/1
CAPSULE, EXTENDED RELEASE ORAL
Qty: 30 CAP | Refills: 5 | Status: SHIPPED | OUTPATIENT
Start: 2017-08-28 | End: 2018-02-22 | Stop reason: SDUPTHER

## 2017-09-11 RX ORDER — ATORVASTATIN CALCIUM 20 MG/1
TABLET, FILM COATED ORAL
Qty: 30 TAB | Refills: 0 | Status: SHIPPED | OUTPATIENT
Start: 2017-09-11 | End: 2017-10-09 | Stop reason: SDUPTHER

## 2017-09-21 ENCOUNTER — OFFICE VISIT (OUTPATIENT)
Dept: CARDIOLOGY CLINIC | Age: 77
End: 2017-09-21

## 2017-09-21 VITALS
WEIGHT: 261 LBS | DIASTOLIC BLOOD PRESSURE: 62 MMHG | SYSTOLIC BLOOD PRESSURE: 110 MMHG | BODY MASS INDEX: 33.5 KG/M2 | OXYGEN SATURATION: 97 % | HEART RATE: 77 BPM | HEIGHT: 74 IN | RESPIRATION RATE: 18 BRPM

## 2017-09-21 DIAGNOSIS — I48.0 PAROXYSMAL ATRIAL FIBRILLATION (HCC): ICD-10-CM

## 2017-09-21 DIAGNOSIS — Z95.0 PACEMAKER: ICD-10-CM

## 2017-09-21 DIAGNOSIS — E78.00 HYPERCHOLESTEROLEMIA: ICD-10-CM

## 2017-09-21 DIAGNOSIS — I10 ESSENTIAL HYPERTENSION: Primary | ICD-10-CM

## 2017-09-21 NOTE — PROGRESS NOTES
9/21/2017 12:59 PM      Subjective:     Nory Pozo is here for f/u visit. He denies chest pain, chest pressure/discomfort, dyspnea, palpitations, irregular heart beats, near-syncope, syncope, fatigue, orthopnea, paroxysmal nocturnal dyspnea, exertional chest pressure/discomfort. Visit Vitals    /62 (BP 1 Location: Right arm, BP Patient Position: Sitting)    Pulse 77    Resp 18    Ht 6' 2\" (1.88 m)    Wt 261 lb (118.4 kg)  Comment: pt reported the wt from home    SpO2 97%    BMI 33.51 kg/m2     Current Outpatient Prescriptions   Medication Sig    atorvastatin (LIPITOR) 20 mg tablet TAKE ONE TABLET BY MOUTH DAILY    venlafaxine-SR (EFFEXOR-XR) 75 mg capsule TAKE ONE CAPSULE BY MOUTH DAILY    amLODIPine (NORVASC) 10 mg tablet TAKE ONE TABLET BY MOUTH DAILY    magnesium hydroxide (SOTO MILK OF MAGNESIA) 400 mg/5 mL suspension Take 30 mL by mouth daily as needed for Constipation.  lisinopril (PRINIVIL, ZESTRIL) 40 mg tablet TAKE ONE TABLET BY MOUTH DAILY    lactulose (CHRONULAC) 10 gram/15 mL solution TAKE 1 TEASPOONFUL (5 ML) BY MOUTH THREE TIMES AS DAY AS NEEDED    PRADAXA 150 mg capsule TAKE ONE CAPSULE BY MOUTH EVERY 12 HOURS    meclizine (ANTIVERT) 25 mg tablet TAKE ONE TABLET BY MOUTH EVERY 8 HOURS AS NEEDED    TEGRETOL  mg SR tablet Take 1 Tab by mouth two (2) times a day.  amiodarone (CORDARONE) 200 mg tablet Take 1 Tab by mouth daily.  zolpidem (AMBIEN) 10 mg tablet Take 1 Tab by mouth nightly as needed for Sleep (please take in the bed).  Incontinence Pad, Liner, Disp pads by Does Not Apply route.  betamethasone dipropionate (DIPROSONE) 0.05 % topical cream Apply  to affected area as needed.  DOC-Q-LACE 100 mg capsule TAKE ONE CAPSULE BY MOUTH TWICE A DAY    FIRST AID CLOTH TAPE 1 X 10 \"-yard tape     diphenhydrAMINE (BENADRYL ALLERGY) 25 mg tablet Take 1 Tab by mouth every six (6) hours as needed for Sleep.     metoprolol (LOPRESSOR) 25 mg tablet Take 0.5 tablets by mouth two (2) times a day.  lutein 20 mg tab Take 1 Tab by mouth daily. 25 mg QD    senna (SENNA) 8.6 mg tablet Take 1 tablet by mouth daily.  finasteride (PROSCAR) 5 mg tablet Take 5 mg by mouth daily.  omeprazole (PRILOSEC) 20 mg capsule TAKE ONE CAPSULE BY MOUTH DAILY    polyethylene glycol (MIRALAX) 17 gram/dose powder Take 17 g by mouth two (2) times daily as needed. Takes as needed     No current facility-administered medications for this visit. Objective:      Visit Vitals    /62 (BP 1 Location: Right arm, BP Patient Position: Sitting)    Pulse 77    Resp 18    Ht 6' 2\" (1.88 m)    Wt 261 lb (118.4 kg)    SpO2 97%    BMI 33.51 kg/m2       Data Review:    EKG: Poor data quality. Atrial paced vs slow a. Fib.      Reviewed and/or ordered active problem list, medication list tests    Past Medical History:   Diagnosis Date    [facility]     Acute bronchitis 8/25/2015    Anxiety     Arrhythmia     atrial fibrillation    Arthritis     BCC (basal cell carcinoma of skin)     BPH (benign prostatic hyperplasia)     Chronic back pain greater than 3 months duration 5/4/2015    Chronic right shoulder pain 1/11/2016    Common wart 5/16/2016    Constipation 12/14/2012    CVA (cerebral infarction) 5/4/2015    Depression     GERD (gastroesophageal reflux disease)     Hearing loss     bilateral hearing aids    Hemiplegia following CVA (cerebrovascular accident) ([address]     left side hemiparesis    History of colostomy     HTN (hypertension)     Hyperlipidemia     Localized epilepsy with impairment of consciousness ([address]     Prostate cancer ([address]     Status post XRT, Lupron    Screening for colon cancer 11/4/2015    Stroke [facility])     traumatic from neck crushing    TBI (traumatic brain injury) ([address] 1994    Unspecified sleep apnea     on CPAP      Past Surgical History:   Procedure Laterality Date    HX ANKLE FRACTURE TX      HX COLECTOMY      colostomy    HX HEENT      ear surgery eddie.  HX HERNIA REPAIR      HX ORTHOPAEDIC      left hip pinning    HX PACEMAKER  2014     Allergies   Allergen Reactions    Ciprofibrate Hives      Family History   Problem Relation Age of Onset    Alzheimer Mother      dec [de-identified]    Cancer Father      dec 76 kidney    Heart Disease Brother     No Known Problems Son       Social History     Social History    Marital status:      Spouse name: N/A    Number of children: N/A    Years of education: N/A     Occupational History    Not on file. Social History Main Topics    Smoking status: Former Smoker     Years: 10.00     Types: Pipe     Quit date: 1/29/1990    Smokeless tobacco: Never Used      Comment: QUIT 1970'S    Alcohol use No    Drug use: No    Sexual activity: Not Currently     Other Topics Concern    Not on file     Social History Narrative    , lives with his wife in [place]         Review of Systems     General: Not Present- Anorexia, Chills, Dietary Changes, Fatigue, Fever, Medication Changes, Night Sweats, Weight Gain > 10lbs. and Weight Loss > 10lbs. .  Skin: Not Present- Bruising and Excessive Sweating. HEENT: Not Present- Headache, Visual Loss and Vertigo. Respiratory: Not Present- Cough, Decreased Exercise Tolerance, Difficulty Breathing, Snoring and Wheezing. Cardiovascular: Not Present- Abnormal Blood Pressure, Chest Pain, Claudications, Difficulty Breathing On Exertion, Edema, Fainting / Blacking Out, Irregular Heart Beat, Night Cramps, Orthopnea, Palpitations, Paroxysmal Nocturnal Dyspnea, Rapid Heart Rate, Shortness of Breath and Swelling of Extremities. Gastrointestinal: Not Present- Black, Tarry Stool, Bloody Stool, Diarrhea, Hematemesis, Rectal Bleeding and Vomiting. Musculoskeletal: Not Present- Muscle Pain and Muscle Weakness. Neurological: Not Present- Dizziness. Psychiatric: Not Present- Depression.   Endocrine: Not Present- Cold Intolerance, Heat Intolerance and Thyroid Problems. Hematology: Not Present- Abnormal Bleeding, Anemia, Blood Clots and Easy Bruising.       Physical Exam   The physical exam findings are as follows:       General   Mental Status - Alert. General Appearance - Not in acute distress. Chest and Lung Exam   Inspection: Accessory muscles - No use of accessory muscles in breathing. Auscultation:   Breath sounds: - Normal.      Cardiovascular   Inspection: Jugular vein - Bilateral - Inspection Normal.  Palpation/Percussion:   Apical Impulse: - Normal.  Auscultation: Rhythm - Regular. Heart Sounds - S1 WNL and S2 WNL. No S3 or S4. Murmurs & Other Heart Sounds: Auscultation of the heart reveals - No Murmurs. Carotid arteries - No Carotid bruit. Peripheral Vascular   Upper Extremity: Inspection - Bilateral - No Cyanotic nailbeds or Digital clubbing. Lower Extremity:   Palpation: Edema - Bilateral - No edema. Assessment:       ICD-10-CM ICD-9-CM    1. Essential hypertension I10 401.9 AMB POC EKG ROUTINE W/ 12 LEADS, INTER & REP   2. Paroxysmal atrial fibrillation (HCC) I48.0 427.31    3. Hypercholesterolemia E78.00 272.0    4. Pacemaker Z95.0 V45.01        Plan:     1. A. Fib: s/p ablation. S/p PPM. On amion, BB and pradaxa. Follow up with EP. Last pacer interrogation noted. 2. Hyperlipidemia: On statin. Last FLP noted.    3. BP well controlled.

## 2017-10-11 RX ORDER — ATORVASTATIN CALCIUM 20 MG/1
TABLET, FILM COATED ORAL
Qty: 30 TAB | Refills: 0 | Status: SHIPPED | OUTPATIENT
Start: 2017-10-11 | End: 2017-11-08 | Stop reason: SDUPTHER

## 2017-10-18 RX ORDER — LISINOPRIL 40 MG/1
TABLET ORAL
Qty: 90 TAB | Refills: 0 | Status: SHIPPED | OUTPATIENT
Start: 2017-10-18 | End: 2018-01-15 | Stop reason: SDUPTHER

## 2017-11-09 DIAGNOSIS — I48.3 TYPICAL ATRIAL FLUTTER (HCC): ICD-10-CM

## 2017-11-09 DIAGNOSIS — I10 ESSENTIAL HYPERTENSION: ICD-10-CM

## 2017-11-09 DIAGNOSIS — I49.5 SSS (SICK SINUS SYNDROME) (HCC): ICD-10-CM

## 2017-11-09 DIAGNOSIS — I48.0 PAROXYSMAL ATRIAL FIBRILLATION (HCC): ICD-10-CM

## 2017-11-10 RX ORDER — AMIODARONE HYDROCHLORIDE 200 MG/1
TABLET ORAL
Qty: 30 TAB | Refills: 5 | Status: SHIPPED | OUTPATIENT
Start: 2017-11-10 | End: 2018-05-08 | Stop reason: SDUPTHER

## 2017-11-15 RX ORDER — ATORVASTATIN CALCIUM 20 MG/1
TABLET, FILM COATED ORAL
Qty: 30 TAB | Refills: 0 | Status: SHIPPED | OUTPATIENT
Start: 2017-11-15 | End: 2017-12-16 | Stop reason: SDUPTHER

## 2017-12-15 RX ORDER — DABIGATRAN ETEXILATE MESYLATE 150 MG/1
CAPSULE ORAL
Qty: 60 CAP | Refills: 4 | Status: SHIPPED | OUTPATIENT
Start: 2017-12-15 | End: 2018-05-16 | Stop reason: SDUPTHER

## 2017-12-18 RX ORDER — ATORVASTATIN CALCIUM 20 MG/1
TABLET, FILM COATED ORAL
Qty: 30 TAB | Refills: 0 | Status: SHIPPED | OUTPATIENT
Start: 2017-12-18 | End: 2018-01-21 | Stop reason: SDUPTHER

## 2017-12-26 RX ORDER — CARBAMAZEPINE 400 MG/1
TABLET, EXTENDED RELEASE ORAL
Qty: 60 TAB | Refills: 10 | Status: SHIPPED | OUTPATIENT
Start: 2017-12-26 | End: 2018-03-26 | Stop reason: DRUGHIGH

## 2018-01-15 RX ORDER — LISINOPRIL 40 MG/1
TABLET ORAL
Qty: 90 TAB | Refills: 0 | Status: SHIPPED | OUTPATIENT
Start: 2018-01-15 | End: 2018-04-12 | Stop reason: SDUPTHER

## 2018-01-23 RX ORDER — ATORVASTATIN CALCIUM 20 MG/1
TABLET, FILM COATED ORAL
Qty: 30 TAB | Refills: 0 | Status: SHIPPED | OUTPATIENT
Start: 2018-01-23 | End: 2018-02-21 | Stop reason: SDUPTHER

## 2018-02-05 ENCOUNTER — TELEPHONE (OUTPATIENT)
Dept: NEUROLOGY | Age: 78
End: 2018-02-05

## 2018-02-05 NOTE — TELEPHONE ENCOUNTER
Spoke with patient's wife. She stated that brand Tegretol  mg is not being covered with their new insurance. She stated patient has to have brand only due to him having seizures on the generic. Advised will forward message to our PA coordinator for assistance with this and will call back with updates as they come. Mrs. Chandler Matthew was given an opportunity to ask questions, repeated information, and verbalized understanding.

## 2018-02-05 NOTE — TELEPHONE ENCOUNTER
----- Message from Tanvir Sanchez sent at 2/5/2018 11:51 AM EST -----  Regarding: /Angélica Richards pt's wife called requesting a call back in regards to pt medication tegretol 400 mg XR the brand name is not cover under pt's new insurance the [id number]. Pt best contact number is [phone].

## 2018-02-06 ENCOUNTER — CLINICAL SUPPORT (OUTPATIENT)
Dept: CARDIOLOGY CLINIC | Age: 78
End: 2018-02-06

## 2018-02-06 DIAGNOSIS — Z95.0 PACEMAKER: Primary | ICD-10-CM

## 2018-02-06 DIAGNOSIS — I49.5 SSS (SICK SINUS SYNDROME) (HCC): ICD-10-CM

## 2018-02-06 DIAGNOSIS — I48.0 PAROXYSMAL ATRIAL FIBRILLATION (HCC): ICD-10-CM

## 2018-02-09 ENCOUNTER — TELEPHONE (OUTPATIENT)
Dept: NEUROLOGY | Age: 78
End: 2018-02-09

## 2018-02-09 NOTE — TELEPHONE ENCOUNTER
Sent PA request for Tegretol brand only to [address] Approved   Today  Case           Id:[id number];Status:Approved; Review Type:Prior Auth; Coverage Start Date:02/09/2018  - 02/09/2019. Patient also has Medicaid w/ Rx benefits. Sent 2nd P.A. To Medicaid replacement ([employer]) via CM w/ copy of office notes (13 pgs). Status : Pending. Sent fax to Suzette Kang to notify of primary has approved it, secondary is pending.

## 2018-02-20 NOTE — TELEPHONE ENCOUNTER
[employer] (medicaid) approved the tegretol on 2/15/18 . No date range or Case/Auth# given on [facility]. Kitty Stover (Key: [id number]) Need help? Call us at [phone]  Archived   on February 15  Outcome     Approved   on February 15  Drug  TEGretol-XR 400MG er tablets      Form  [employer] and [facility] Prior Authorization Form  Prior Authorization Form for [employer] members  [phone]  [phone]    The [address] [place] [address] approved tegretol on 2/15 Case [id number] w/ expiration to 2/9/19. Faxed to Yolanda Elaina.

## 2018-02-21 NOTE — TELEPHONE ENCOUNTER
Spoke with patient's wife and informed her that, per Alix Pompa, we now have 2 authorizations from 2 Rx plans and this information has been faxed to [facility]. Mrs. Bar Garcia was given an opportunity to ask questions, repeated information, and verbalized understanding.

## 2018-02-27 RX ORDER — ATORVASTATIN CALCIUM 20 MG/1
TABLET, FILM COATED ORAL
Qty: 30 TAB | Refills: 0 | Status: SHIPPED | OUTPATIENT
Start: 2018-02-27 | End: 2018-03-27 | Stop reason: SDUPTHER

## 2018-02-27 RX ORDER — VENLAFAXINE HYDROCHLORIDE 75 MG/1
CAPSULE, EXTENDED RELEASE ORAL
Qty: 30 CAP | Refills: 4 | Status: ON HOLD | OUTPATIENT
Start: 2018-02-27 | End: 2018-07-24 | Stop reason: SDUPTHER

## 2018-03-16 ENCOUNTER — OFFICE VISIT (OUTPATIENT)
Dept: FAMILY MEDICINE CLINIC | Age: 78
End: 2018-03-16

## 2018-03-16 VITALS
OXYGEN SATURATION: 98 % | HEART RATE: 78 BPM | RESPIRATION RATE: 18 BRPM | HEIGHT: 74 IN | DIASTOLIC BLOOD PRESSURE: 79 MMHG | TEMPERATURE: 97.1 F | BODY MASS INDEX: 34.27 KG/M2 | WEIGHT: 267 LBS | SYSTOLIC BLOOD PRESSURE: 114 MMHG

## 2018-03-16 DIAGNOSIS — Z95.0 PACEMAKER: ICD-10-CM

## 2018-03-16 DIAGNOSIS — G47.33 OSA ON CPAP: ICD-10-CM

## 2018-03-16 DIAGNOSIS — I10 ESSENTIAL HYPERTENSION WITH GOAL BLOOD PRESSURE LESS THAN 130/85: ICD-10-CM

## 2018-03-16 DIAGNOSIS — G47.00 INSOMNIA, UNSPECIFIED TYPE: ICD-10-CM

## 2018-03-16 DIAGNOSIS — S06.9X0S TRAUMATIC BRAIN INJURY, WITHOUT LOSS OF CONSCIOUSNESS, SEQUELA (HCC): ICD-10-CM

## 2018-03-16 DIAGNOSIS — N40.0 BENIGN PROSTATIC HYPERPLASIA, UNSPECIFIED WHETHER LOWER URINARY TRACT SYMPTOMS PRESENT: ICD-10-CM

## 2018-03-16 DIAGNOSIS — I48.0 PAROXYSMAL ATRIAL FIBRILLATION (HCC): ICD-10-CM

## 2018-03-16 DIAGNOSIS — Z76.89 ENCOUNTER TO ESTABLISH CARE: Primary | ICD-10-CM

## 2018-03-16 DIAGNOSIS — E78.00 HYPERCHOLESTEROLEMIA: ICD-10-CM

## 2018-03-16 DIAGNOSIS — K59.00 CONSTIPATION, UNSPECIFIED CONSTIPATION TYPE: ICD-10-CM

## 2018-03-16 DIAGNOSIS — Z85.46 HISTORY OF PROSTATE CANCER: ICD-10-CM

## 2018-03-16 DIAGNOSIS — Z99.89 OSA ON CPAP: ICD-10-CM

## 2018-03-16 DIAGNOSIS — G40.909 SEIZURE DISORDER (HCC): ICD-10-CM

## 2018-03-16 RX ORDER — LACTULOSE 10 G/15ML
SOLUTION ORAL; RECTAL
Qty: 473 ML | Refills: 2 | Status: SHIPPED | OUTPATIENT
Start: 2018-03-16
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
## 2018-03-16 NOTE — PATIENT INSTRUCTIONS
Constipation: Care Instructions  Your Care Instructions    Constipation means that you have a hard time passing stools (bowel movements). People pass stools from 3 times a day to once every 3 days. What is normal for you may be different. Constipation may occur with pain in the rectum and cramping. The pain may get worse when you try to pass stools. Sometimes there are small amounts of bright red blood on toilet paper or the surface of stools. This is because of enlarged veins near the rectum (hemorrhoids). A few changes in your diet and lifestyle may help you avoid ongoing constipation. Your doctor may also prescribe medicine to help loosen your stool. Some medicines can cause constipation. These include pain medicines and antidepressants. Tell your doctor about all the medicines you take. Your doctor may want to make a medicine change to ease your symptoms. Follow-up care is a key part of your treatment and safety. Be sure to make and go to all appointments, and call your doctor if you are having problems. It's also a good idea to know your test results and keep a list of the medicines you take. How can you care for yourself at home? · Drink plenty of fluids, enough so that your urine is light yellow or clear like water. If you have kidney, heart, or liver disease and have to limit fluids, talk with your doctor before you increase the amount of fluids you drink. · Include high-fiber foods in your diet each day. These include fruits, vegetables, beans, and whole grains. · Get at least 30 minutes of exercise on most days of the week. Walking is a good choice. You also may want to do other activities, such as running, swimming, cycling, or playing tennis or team sports. · Take a fiber supplement, such as Citrucel or Metamucil, every day. Read and follow all instructions on the label. · Schedule time each day for a bowel movement. A daily routine may help.  Take your time having your bowel movement. · Support your feet with a small step stool when you sit on the toilet. This helps flex your hips and places your pelvis in a squatting position. · Your doctor may recommend an over-the-counter laxative to relieve your constipation. Examples are Milk of Magnesia and MiraLax. Read and follow all instructions on the label. Do not use laxatives on a long-term basis. When should you call for help? Call your doctor now or seek immediate medical care if:  ? · You have new or worse belly pain. ? · You have new or worse nausea or vomiting. ? · You have blood in your stools. ? Watch closely for changes in your health, and be sure to contact your doctor if:  ? · Your constipation is getting worse. ? · You do not get better as expected. Where can you learn more? Go to http://michael-aisha.info/. Enter [id number] in the search box to learn more about \"Constipation: Care Instructions. \"  Current as of: March 20, 2017  Content Version: 11.4  © 5283-1898 [employer]. Care instructions adapted under license by [employer] (which disclaims liability or warranty for this information). If you have questions about a medical condition or this instruction, always ask your healthcare professional. [address] any warranty or liability for your use of this information. High Blood Pressure: Care Instructions  Your Care Instructions    If your blood pressure is usually above 140/90, you have high blood pressure, or hypertension. That means the top number is 140 or higher or the bottom number is 90 or higher, or both. Despite what a lot of people think, high blood pressure usually doesn't cause headaches or make you feel dizzy or lightheaded. It usually has no symptoms. But it does increase your risk for heart attack, stroke, and kidney or eye damage. The higher your blood pressure, the more your risk increases.   Your doctor will give you a goal for your blood pressure. Your goal will be based on your health and your age. An example of a goal is to keep your blood pressure below 140/90. Lifestyle changes, such as eating healthy and being active, are always important to help lower blood pressure. You might also take medicine to reach your blood pressure goal.  Follow-up care is a key part of your treatment and safety. Be sure to make and go to all appointments, and call your doctor if you are having problems. It's also a good idea to know your test results and keep a list of the medicines you take. How can you care for yourself at home? Medical treatment  · If you stop taking your medicine, your blood pressure will go back up. You may take one or more types of medicine to lower your blood pressure. Be safe with medicines. Take your medicine exactly as prescribed. Call your doctor if you think you are having a problem with your medicine. · Talk to your doctor before you start taking aspirin every day. Aspirin can help certain people lower their risk of a heart attack or stroke. But taking aspirin isn't right for everyone, because it can cause serious bleeding. · See your doctor regularly. You may need to see the doctor more often at first or until your blood pressure comes down. · If you are taking blood pressure medicine, talk to your doctor before you take decongestants or anti-inflammatory medicine, such as ibuprofen. Some of these medicines can raise blood pressure. · Learn how to check your blood pressure at home. Lifestyle changes  · Stay at a healthy weight. This is especially important if you put on weight around the waist. Losing even 10 pounds can help you lower your blood pressure. · If your doctor recommends it, get more exercise. Walking is a good choice. Bit by bit, increase the amount you walk every day. Try for at least 30 minutes on most days of the week. You also may want to swim, bike, or do other activities. · Avoid or limit alcohol.  Talk to your doctor about whether you can drink any alcohol. · Try to limit how much sodium you eat to less than 2,300 milligrams (mg) a day. Your doctor may ask you to try to eat less than 1,500 mg a day. · Eat plenty of fruits (such as bananas and oranges), vegetables, legumes, whole grains, and low-fat dairy products. · Lower the amount of saturated fat in your diet. Saturated fat is found in animal products such as milk, cheese, and meat. Limiting these foods may help you lose weight and also lower your risk for heart disease. · Do not smoke. Smoking increases your risk for heart attack and stroke. If you need help quitting, talk to your doctor about stop-smoking programs and medicines. These can increase your chances of quitting for good. When should you call for help? Call 911 anytime you think you may need emergency care. This may mean having symptoms that suggest that your blood pressure is causing a serious heart or blood vessel problem. Your blood pressure may be over 180/110. ? For example, call 911 if:  ? · You have symptoms of a heart attack. These may include:  ¨ Chest pain or pressure, or a strange feeling in the chest.  ¨ Sweating. ¨ Shortness of breath. ¨ Nausea or vomiting. ¨ Pain, pressure, or a strange feeling in the back, neck, jaw, or upper belly or in one or both shoulders or arms. ¨ Lightheadedness or sudden weakness. ¨ A fast or irregular heartbeat. ? · You have symptoms of a stroke. These may include:  ¨ Sudden numbness, tingling, weakness, or loss of movement in your face, arm, or leg, especially on only one side of your body. ¨ Sudden vision changes. ¨ Sudden trouble speaking. ¨ Sudden confusion or trouble understanding simple statements. ¨ Sudden problems with walking or balance. ¨ A sudden, severe headache that is different from past headaches. ? · You have severe back or belly pain. ?Do not wait until your blood pressure comes down on its own.  Get help right away.  ?Call your doctor now or seek immediate care if:  ? · Your blood pressure is much higher than normal (such as 180/110 or higher), but you don't have symptoms. ? · You think high blood pressure is causing symptoms, such as:  ¨ Severe headache. ¨ Blurry vision. ? Watch closely for changes in your health, and be sure to contact your doctor if:  ? · Your blood pressure measures 140/90 or higher at least 2 times. That means the top number is 140 or higher or the bottom number is 90 or higher, or both. ? · You think you may be having side effects from your blood pressure medicine. ? · Your blood pressure is usually normal, but it goes above normal at least 2 times. Where can you learn more? Go to http://michael-aisha.info/. Enter N747 in the search box to learn more about \"High Blood Pressure: Care Instructions. \"  Current as of: September 21, 2016  Content Version: 11.4  © 3891-9502 [employer]. Care instructions adapted under license by [facility] (which disclaims liability or warranty for this information). If you have questions about a medical condition or this instruction, always ask your healthcare professional. [place] [address] any warranty or liability for your use of this information.

## 2018-03-16 NOTE — MR AVS SNAPSHOT
[address] [facility], [place] 
 
 
 [address] [facility] [place] [address] [address] 
[phone] Patient: Omar Goodman MRN: [id number] Visit Information Date & Time Provider Department Dept. Phone Encounter #  
 3/16/2018 12:00 PM Augustus Bledsoe [phone] [id number] Follow-up Instructions Return in about 6 months (around 9/16/2018). Your Appointments 3/26/2018 10:20 AM  
Follow Up with Monse Moreira MD  
[facility] at [address]) Appt Note: f/u yearly check up 7/31/2017 CW; f/u yearly check up 1/5/18 Earla Runner; f/u yearly check up 2/27/2018 [address] [facility] [address]  
[address]  [address]  
  
    
 4/10/2018  9:45 AM  
ESTABLISHED PATIENT with Jayda Irene MD  
[facility] [address] [place] [address]) Appt Note: $0cp  ekr; Transferred to [address]  
[phone] [address] [place] [address]  
  
    
 8/7/2018  9:15 AM  
PROCEDURE with PACEMAKER, [facility] [address] [place] [address]) Appt Note: MDT DCPM 6mo check, annual with Dr. Jim You prefers AM appt [address]  
[phone] [address] [place] [address] [address] [id number]  
  
    
 8/7/2018  9:15 AM  
ESTABLISHED PATIENT with Tommy Abdi MD  
[facility] [address] [place] [address]) Appt Note: MDT DCPM 6mo check, annual with Dr. Jim You prefers AM appt [address]  
[phone] [address] Upcoming Health Maintenance Date Due  
 GLAUCOMA SCREENING Q2Y 5/4/2017 Influenza Age 5 to Adult 8/1/2017 MEDICARE YEARLY EXAM 11/18/2017 DTaP/Tdap/Td series (2 - Td) 8/25/2024 Allergies as of 3/16/2018  Review Complete On: 9/21/2017 By: Claudine Cano MD  
  
 Severity Noted Reaction Type Reactions Ciprofibrate  03/26/2015    Hives Current Immunizations  Reviewed on 11/17/2016 Name Date Influenza High Dose Vaccine PF 10/10/2016 Influenza Vaccine 10/15/2015, 10/10/2014 Influenza Vaccine Split 10/1/2012, 9/14/2011 Pneumococcal Conjugate (PCV-13) 11/4/2015  9:02 AM  
 Tdap 8/25/2014 Varicella Virus Vaccine 5/7/2012 ZZZ-RETIRED (DO NOT USE) Pneumococcal Vaccine (Unspecified Type) 2/22/2012  4:39 PM  
  
 Not reviewed this visit You Were Diagnosed With   
  
 Codes Comments Encounter to establish care    -  Primary ICD-10-CM: Z76.89 
ICD-9-CM: V65.8 Constipation, unspecified constipation type     ICD-10-CM: K59.00 ICD-9-CM: 564.00 Essential hypertension with goal blood pressure less than 130/85     ICD-10-CM: I10 
ICD-9-CM: 401.9 Hypercholesterolemia     ICD-10-CM: E78.00 ICD-9-CM: 272.0 History of prostate cancer     ICD-10-CM: Z85.46 
ICD-9-CM: V10.46 Traumatic brain injury, without loss of consciousness, sequela ([facility]     ICD-10-CM: S06.9X0S 
ICD-9-CM: 907.0 Seizure disorder ([facility] 75.)     ICD-10-CM: G40.909 ICD-9-CM: 345.90 Benign prostatic hyperplasia, unspecified whether lower urinary tract symptoms present     ICD-10-CM: N40.0 ICD-9-CM: 600.00 Insomnia, unspecified type     ICD-10-CM: G47.00 ICD-9-CM: 780.52 RONAL on CPAP     ICD-10-CM: G47.33, Z99.89 ICD-9-CM: 327.23, V46.8 Vitals BP Pulse Temp Resp Height(growth percentile) Weight(growth percentile) 114/79 (BP 1 Location: Right arm, BP Patient Position: Sitting) 78 97.1 °F (36.2 °C) (Oral) 18 6' 2\" (1.88 m) 267 lb (121.1 kg) SpO2 BMI Smoking Status 98% 34.28 kg/m2 Former Smoker BMI and BSA Data  Body Mass Index Body Surface Area  
 34.28 kg/m 2 2.51 m 2  
  
  
 Preferred Pharmacy Pharmacy Name Phone [facility] [address] , [address] [facility] [phone] Your Updated Medication List  
  
   
This list is accurate as of 3/16/18  1:14 PM.  Always use your most recent med list.  
  
  
  
  
 amiodarone 200 mg tablet Commonly known as:  CORDARONE  
TAKE ONE TABLET BY MOUTH DAILY  
  
 amLODIPine 10 mg tablet Commonly known as:  Rulon Cindy TAKE ONE TABLET BY MOUTH DAILY  
  
 atorvastatin 20 mg tablet Commonly known as:  LIPITOR  
TAKE ONE TABLET BY MOUTH DAILY  
  
 betamethasone dipropionate 0.05 % topical cream  
Commonly known as:  Midge Cull Apply  to affected area as needed. diphenhydrAMINE 25 mg tablet Commonly known as:  BENADRYL ALLERGY Take 1 Tab by mouth every six (6) hours as needed for Sleep. DOC-Q-LACE 100 mg capsule Generic drug:  docusate sodium TAKE ONE CAPSULE BY MOUTH TWICE A DAY  
  
 finasteride 5 mg tablet Commonly known as:  PROSCAR Take 5 mg by mouth daily. FIRST AID CLOTH TAPE 1 X 10 \"-yard Tape Generic drug:  Adhesive Tape Incontinence Pad, Liner, Disp Pads  
by Does Not Apply route. lactulose 10 gram/15 mL solution Commonly known as:  Jaime Draft TAKE 1 TEASPOONFUL (5 ML) BY MOUTH THREE TIMES AS DAY AS NEEDED  Indications: constipation  
  
 lisinopril 40 mg tablet Commonly known as:  PRINIVIL, ZESTRIL  
TAKE ONE TABLET BY MOUTH DAILY  
  
 lutein 20 mg Tab Take 1 Tab by mouth daily. 25 mg QD  
  
 meclizine 25 mg tablet Commonly known as:  ANTIVERT  
TAKE ONE TABLET BY MOUTH EVERY 8 HOURS AS NEEDED  
  
 metoprolol tartrate 25 mg tablet Commonly known as:  LOPRESSOR Take 0.5 tablets by mouth two (2) times a day. MIRALAX 17 gram/dose powder Generic drug:  polyethylene glycol Take 17 g by mouth two (2) times daily as needed. Takes as needed SOTO MILK OF MAGNESIA 400 mg/5 mL suspension Generic drug:  magnesium hydroxide Take 30 mL by mouth daily as needed for Constipation. PRADAXA 150 mg capsule Generic drug:  dabigatran etexilate TAKE ONE CAPSULE BY MOUTH EVERY 12 HOURS Senna 8.6 mg tablet Generic drug:  senna Take 1 tablet by mouth daily. TEGretol  mg SR tablet Generic drug:  carBAMazepine XR  
TAKE ONE TABLET BY MOUTH TWICE A DAY  
  
 venlafaxine-SR 75 mg capsule Commonly known as:  EFFEXOR-XR  
TAKE ONE CAPSULE BY MOUTH DAILY  
  
 zolpidem 10 mg tablet Commonly known as:  AMBIEN Take 1 Tab by mouth nightly as needed for Sleep (please take in the bed). Prescriptions Sent to Pharmacy Refills  
 lactulose (CHRONULAC) 10 gram/15 mL solution 2 Sig: TAKE 1 TEASPOONFUL (5 ML) BY MOUTH THREE TIMES AS DAY AS NEEDED  Indications: constipation Class: Normal  
 Pharmacy: [facility] [address] [place] [address] #: [phone] We Performed the Following CBC WITH AUTOMATED DIFF [89425 CPT(R)] LIPID PANEL [03574 CPT(R)] METABOLIC PANEL, COMPREHENSIVE [49339 CPT(R)] PSA W/ REFLX FREE PSA [39388 CPT(R)] VITAMIN D, 25 HYDROXY [id number] CPT(R)] Follow-up Instructions Return in about 6 months (around 9/16/2018). Patient Instructions Constipation: Care Instructions Your Care Instructions Constipation means that you have a hard time passing stools (bowel movements). People pass stools from 3 times a day to once every 3 days. What is normal for you may be different. Constipation may occur with pain in the rectum and cramping. The pain may get worse when you try to pass stools. Sometimes there are small amounts of bright red blood on toilet paper or the surface of stools. This is because of enlarged veins near the rectum (hemorrhoids). A few changes in your diet and lifestyle may help you avoid ongoing constipation. Your doctor may also prescribe medicine to help loosen your stool. Some medicines can cause constipation. These include pain medicines and antidepressants. Tell your doctor about all the medicines you take. Your doctor may want to make a medicine change to ease your symptoms. Follow-up care is a key part of your treatment and safety. Be sure to make and go to all appointments, and call your doctor if you are having problems. It's also a good idea to know your test results and keep a list of the medicines you take. How can you care for yourself at home? · Drink plenty of fluids, enough so that your urine is light yellow or clear like water. If you have kidney, heart, or liver disease and have to limit fluids, talk with your doctor before you increase the amount of fluids you drink. · Include high-fiber foods in your diet each day. These include fruits, vegetables, beans, and whole grains. · Get at least 30 minutes of exercise on most days of the week. Walking is a good choice. You also may want to do other activities, such as running, swimming, cycling, or playing tennis or team sports. · Take a fiber supplement, such as Citrucel or Metamucil, every day. Read and follow all instructions on the label. · Schedule time each day for a bowel movement. A daily routine may help. Take your time having your bowel movement. · Support your feet with a small step stool when you sit on the toilet. This helps flex your hips and places your pelvis in a squatting position. · Your doctor may recommend an over-the-counter laxative to relieve your constipation. Examples are Milk of Magnesia and MiraLax. Read and follow all instructions on the label. Do not use laxatives on a long-term basis. When should you call for help? Call your doctor now or seek immediate medical care if: 
? · You have new or worse belly pain. ? · You have new or worse nausea or vomiting. ? · You have blood in your stools. ? Watch closely for changes in your health, and be sure to contact your doctor if: ? · Your constipation is getting worse. ? · You do not get better as expected. Where can you learn more? Go to http://michael-aisha.info/. Enter 21  in the search box to learn more about \"Constipation: Care Instructions. \" Current as of: March 20, 2017 Content Version: 11.4 © 3429-4994 [employer]. Care instructions adapted under license by [employer] (which disclaims liability or warranty for this information). If you have questions about a medical condition or this instruction, always ask your healthcare professional. [place] [address] any warranty or liability for your use of this information. High Blood Pressure: Care Instructions Your Care Instructions If your blood pressure is usually above 140/90, you have high blood pressure, or hypertension. That means the top number is 140 or higher or the bottom number is 90 or higher, or both. Despite what a lot of people think, high blood pressure usually doesn't cause headaches or make you feel dizzy or lightheaded. It usually has no symptoms. But it does increase your risk for heart attack, stroke, and kidney or eye damage. The higher your blood pressure, the more your risk increases. Your doctor will give you a goal for your blood pressure. Your goal will be based on your health and your age. An example of a goal is to keep your blood pressure below 140/90. Lifestyle changes, such as eating healthy and being active, are always important to help lower blood pressure. You might also take medicine to reach your blood pressure goal. 
Follow-up care is a key part of your treatment and safety. Be sure to make and go to all appointments, and call your doctor if you are having problems. It's also a good idea to know your test results and keep a list of the medicines you take. How can you care for yourself at home? Medical treatment · If you stop taking your medicine, your blood pressure will go back up. You may take one or more types of medicine to lower your blood pressure. Be safe with medicines. Take your medicine exactly as prescribed. Call your doctor if you think you are having a problem with your medicine. · Talk to your doctor before you start taking aspirin every day. Aspirin can help certain people lower their risk of a heart attack or stroke. But taking aspirin isn't right for everyone, because it can cause serious bleeding. · See your doctor regularly. You may need to see the doctor more often at first or until your blood pressure comes down. · If you are taking blood pressure medicine, talk to your doctor before you take decongestants or anti-inflammatory medicine, such as ibuprofen. Some of these medicines can raise blood pressure. · Learn how to check your blood pressure at home. Lifestyle changes · Stay at a healthy weight. This is especially important if you put on weight around the waist. Losing even 10 pounds can help you lower your blood pressure. · If your doctor recommends it, get more exercise. Walking is a good choice. Bit by bit, increase the amount you walk every day. Try for at least 30 minutes on most days of the week. You also may want to swim, bike, or do other activities. · Avoid or limit alcohol. Talk to your doctor about whether you can drink any alcohol. · Try to limit how much sodium you eat to less than 2,300 milligrams (mg) a day. Your doctor may ask you to try to eat less than 1,500 mg a day. · Eat plenty of fruits (such as bananas and oranges), vegetables, legumes, whole grains, and low-fat dairy products. · Lower the amount of saturated fat in your diet. Saturated fat is found in animal products such as milk, cheese, and meat. Limiting these foods may help you lose weight and also lower your risk for heart disease. · Do not smoke. Smoking increases your risk for heart attack and stroke. If you need help quitting, talk to your doctor about stop-smoking programs and medicines. These can increase your chances of quitting for good. When should you call for help? Call 911 anytime you think you may need emergency care. This may mean having symptoms that suggest that your blood pressure is causing a serious heart or blood vessel problem. Your blood pressure may be over 180/110. ? For example, call 911 if: 
? · You have symptoms of a heart attack. These may include: ¨ Chest pain or pressure, or a strange feeling in the chest. 
¨ Sweating. ¨ Shortness of breath. ¨ Nausea or vomiting. ¨ Pain, pressure, or a strange feeling in the back, neck, jaw, or upper belly or in one or both shoulders or arms. ¨ Lightheadedness or sudden weakness. ¨ A fast or irregular heartbeat. ? · You have symptoms of a stroke. These may include: 
¨ Sudden numbness, tingling, weakness, or loss of movement in your face, arm, or leg, especially on only one side of your body. ¨ Sudden vision changes. ¨ Sudden trouble speaking. ¨ Sudden confusion or trouble understanding simple statements. ¨ Sudden problems with walking or balance. ¨ A sudden, severe headache that is different from past headaches. ? · You have severe back or belly pain. ?Do not wait until your blood pressure comes down on its own. Get help right away. ?Call your doctor now or seek immediate care if: 
? · Your blood pressure is much higher than normal (such as 180/110 or higher), but you don't have symptoms. ? · You think high blood pressure is causing symptoms, such as: ¨ Severe headache. ¨ Blurry vision. ? Watch closely for changes in your health, and be sure to contact your doctor if: 
? · Your blood pressure measures 140/90 or higher at least 2 times. That means the top number is 140 or higher or the bottom number is 90 or higher, or both. ? · You think you may be having side effects from your blood pressure medicine. ? · Your blood pressure is usually normal, but it goes above normal at least 2 times. Where can you learn more? Go to http://michael-aisha.info/. Enter A747 in the search box to learn more about \"High Blood Pressure: Care Instructions. \" Current as of: September 21, 2016 Content Version: 11.4 © 2118-5688 [employer]. Care instructions adapted under license by [employer] (which disclaims liability or warranty for this information). If you have questions about a medical condition or this instruction, always ask your healthcare professional. [address] any warranty or liability for your use of this information. Introducing [facility]! Dear Shawnee Julien: 
Thank you for requesting a Nosto account. Our records indicate that you already have an active Nosto account. You can access your account anytime at [email or web address] Did you know that you can access your hospital and ER discharge instructions at any time in Nosto? You can also review all of your test results from your hospital stay or ER visit. Additional Information If you have questions, please visit the Frequently Asked Questions section of the Nosto website at [email or web address] Remember, Nosto is NOT to be used for urgent needs. For medical emergencies, dial 911. Now available from your iPhone and Android! Please provide this summary of care documentation to your next provider. Your primary care clinician is listed as Norma Armenta. If you have any questions after today's visit, please call [phone].

## 2018-03-17 LAB
25(OH)D3+25(OH)D2 SERPL-MCNC: 11.5 NG/ML (ref 30–100)
ALBUMIN SERPL-MCNC: 4.1 G/DL (ref 3.5–4.8)
ALBUMIN/GLOB SERPL: 1.2 {RATIO} (ref 1.2–2.2)
ALP SERPL-CCNC: 131 IU/L (ref 39–117)
ALT SERPL-CCNC: 24 IU/L (ref 0–44)
AST SERPL-CCNC: 26 IU/L (ref 0–40)
BASOPHILS # BLD AUTO: 0 X10E3/UL (ref 0–0.2)
BASOPHILS NFR BLD AUTO: 0 %
BILIRUB SERPL-MCNC: <0.2 MG/DL (ref 0–1.2)
BUN SERPL-MCNC: 9 MG/DL (ref 8–27)
BUN/CREAT SERPL: 15 (ref 10–24)
CALCIUM SERPL-MCNC: 9 MG/DL (ref 8.6–10.2)
CHLORIDE SERPL-SCNC: 90 MMOL/L (ref 96–106)
CHOLEST SERPL-MCNC: 135 MG/DL (ref 100–199)
CO2 SERPL-SCNC: 22 MMOL/L (ref 18–29)
CREAT SERPL-MCNC: 0.62 MG/DL (ref 0.76–1.27)
EOSINOPHIL # BLD AUTO: 0.1 X10E3/UL (ref 0–0.4)
EOSINOPHIL NFR BLD AUTO: 1 %
ERYTHROCYTE [DISTWIDTH] IN BLOOD BY AUTOMATED COUNT: 14.5 % (ref 12.3–15.4)
GFR SERPLBLD CREATININE-BSD FMLA CKD-EPI: 111 ML/MIN/1.73
GFR SERPLBLD CREATININE-BSD FMLA CKD-EPI: 96 ML/MIN/1.73
GLOBULIN SER CALC-MCNC: 3.3 G/DL (ref 1.5–4.5)
GLUCOSE SERPL-MCNC: 86 MG/DL (ref 65–99)
HCT VFR BLD AUTO: 34.4 % (ref 37.5–51)
HDLC SERPL-MCNC: 55 MG/DL
HGB BLD-MCNC: 11.3 G/DL (ref 13–17.7)
IMM GRANULOCYTES # BLD: 0 X10E3/UL (ref 0–0.1)
IMM GRANULOCYTES NFR BLD: 0 %
INTERPRETATION, 910389: NORMAL
LDLC SERPL CALC-MCNC: 66 MG/DL (ref 0–99)
LYMPHOCYTES # BLD AUTO: 1.8 X10E3/UL (ref 0.7–3.1)
LYMPHOCYTES NFR BLD AUTO: 25 %
MCH RBC QN AUTO: 29.8 PG (ref 26.6–33)
MCHC RBC AUTO-ENTMCNC: 32.8 G/DL (ref 31.5–35.7)
MCV RBC AUTO: 91 FL (ref 79–97)
MONOCYTES # BLD AUTO: 0.7 X10E3/UL (ref 0.1–0.9)
MONOCYTES NFR BLD AUTO: 10 %
NEUTROPHILS # BLD AUTO: 4.5 X10E3/UL (ref 1.4–7)
NEUTROPHILS NFR BLD AUTO: 64 %
PLATELET # BLD AUTO: 323 X10E3/UL (ref 150–379)
POTASSIUM SERPL-SCNC: 5.1 MMOL/L (ref 3.5–5.2)
PROT SERPL-MCNC: 7.4 G/DL (ref 6–8.5)
PSA SERPL-MCNC: 0.3 NG/ML (ref 0–4)
RBC # BLD AUTO: 3.79 X10E6/UL (ref 4.14–5.8)
REFLEX CRITERIA: NORMAL
SODIUM SERPL-SCNC: 130 MMOL/L (ref 134–144)
TRIGL SERPL-MCNC: 70 MG/DL (ref 0–149)
VLDLC SERPL CALC-MCNC: 14 MG/DL (ref 5–40)
WBC # BLD AUTO: 7.1 X10E3/UL (ref 3.4–10.8)

## 2018-03-20 RX ORDER — METOPROLOL TARTRATE 25 MG/1
TABLET, FILM COATED ORAL
Qty: 30 TAB | Refills: 3 | Status: ON HOLD | OUTPATIENT
Start: 2018-03-20 | End: 2018-07-17 | Stop reason: SDUPTHER

## 2018-03-21 DIAGNOSIS — E55.9 VITAMIN D DEFICIENCY: Primary | ICD-10-CM

## 2018-03-21 RX ORDER — ERGOCALCIFEROL 1.25 MG/1
50000 CAPSULE ORAL
Qty: 4 CAP | Refills: 2 | Status: SHIPPED | OUTPATIENT
Start: 2018-03-21 | End: 2018-07-05

## 2018-03-26 ENCOUNTER — OFFICE VISIT (OUTPATIENT)
Dept: NEUROLOGY | Age: 78
End: 2018-03-26

## 2018-03-26 VITALS
DIASTOLIC BLOOD PRESSURE: 76 MMHG | WEIGHT: 267 LBS | OXYGEN SATURATION: 98 % | HEART RATE: 83 BPM | SYSTOLIC BLOOD PRESSURE: 130 MMHG | RESPIRATION RATE: 18 BRPM | BODY MASS INDEX: 34.27 KG/M2 | HEIGHT: 74 IN

## 2018-03-26 DIAGNOSIS — G40.209 LOCALIZED EPILEPSY WITH IMPAIRMENT OF CONSCIOUSNESS (HCC): Primary | ICD-10-CM

## 2018-03-26 RX ORDER — CARBAMAZEPINE 200 MG/1
TABLET, EXTENDED RELEASE ORAL
Qty: 60 TAB | Refills: 5 | Status: SHIPPED | OUTPATIENT
Start: 2018-03-26 | End: 2018-07-10 | Stop reason: DRUGHIGH

## 2018-03-26 NOTE — MR AVS SNAPSHOT
[address] [place] [address] 
[phone] Patient: Charles Shelton MRN: [id number] OOI:[id number] Visit Information Date & Time Provider Department Dept. Phone Encounter #  
 3/26/2018 10:20 AM Cortes Saleh MD [facility] at [address] [place] [address] Road [id number] Follow-up Instructions Return in about 1 year (around 3/26/2019). Your Appointments 4/10/2018  9:45 AM  
ESTABLISHED PATIENT with Bob Mir MD  
[facility] Appt Note: $0cp  ekr; Transferred to [address]  
[phone] [address] [facility]  
  
    
 8/7/2018  9:15 AM  
PROCEDURE with PACEMAKER, [facility] Appt Note: MDT DCPM 6mo check, annual with Dr. Saira Escalante prefers AM appt [id number] [facility]  
[phone] [address] [facility] [address] [id number]  
  
    
 8/7/2018  9:15 AM  
ESTABLISHED PATIENT with Rosaura Meza MD  
[facility] Appt Note: MDT DCPM 6mo check, annual with Dr. Saira Escalante prefers AM appt [id number] [facility]  
[phone] [address] [facility] Upcoming Health Maintenance Date Due  
 GLAUCOMA SCREENING Q2Y 5/4/2017 Influenza Age 5 to Adult 8/1/2017 DTaP/Tdap/Td series (2 - Td) 8/25/2024 Allergies as of 3/26/2018  Review Complete On: 3/26/2018 By: Cortes Saleh MD  
  
 Severity Noted Reaction Type Reactions Ciprofibrate  03/26/2015    Hives Current Immunizations  Reviewed on 11/17/2016 Name Date Influenza High Dose Vaccine PF 10/10/2016 Influenza Vaccine 10/15/2015, 10/10/2014 Influenza Vaccine Split 10/1/2012, 9/14/2011 Pneumococcal Conjugate (PCV-13) 11/4/2015  9:02 AM  
 Tdap 8/25/2014 Varicella Virus Vaccine 5/7/2012 ZZZ-RETIRED (DO NOT USE) Pneumococcal Vaccine (Unspecified Type) 2/22/2012  4:39 PM  
  
 Not reviewed this visit You Were Diagnosed With   
  
 Codes Comments Localized epilepsy with impairment of consciousness ([facility]    -  Primary ICD-10-CM: Z28.074 ICD-9-CM: 345.40 Vitals BP Pulse Resp Height(growth percentile) Weight(growth percentile) SpO2  
 130/76 83 18 6' 2\" (1.88 m) 267 lb (121.1 kg) 98% BMI Smoking Status 34.28 kg/m2 Former Smoker Vitals History BMI and BSA Data Body Mass Index Body Surface Area  
 34.28 kg/m 2 2.51 m 2 Preferred Pharmacy Pharmacy Name Phone [facility] [address] [place] [address], [address] [place] [address] [phone] Your Updated Medication List  
  
   
This list is accurate as of 3/26/18 10:46 AM.  Always use your most recent med list.  
  
  
  
  
 amiodarone 200 mg tablet Commonly known as:  CORDARONE  
TAKE ONE TABLET BY MOUTH DAILY  
  
 amLODIPine 10 mg tablet Commonly known as:  Vernon Otter TAKE ONE TABLET BY MOUTH DAILY  
  
 atorvastatin 20 mg tablet Commonly known as:  LIPITOR  
TAKE ONE TABLET BY MOUTH DAILY  
  
 betamethasone dipropionate 0.05 % topical cream  
Commonly known as:  Lisa [place] Apply  to affected area as needed. carBAMazepine  mg SR tablet Commonly known as:  TEGretol XR Take 1 tab by mouth in AM and 2 tabs in PM x 2 weeks, then 1 tab twice a day  
  
 diphenhydrAMINE 25 mg tablet Commonly known as:  BENADRYL ALLERGY Take 1 Tab by mouth every six (6) hours as needed for Sleep. DOC-Q-LACE 100 mg capsule Generic drug:  docusate sodium TAKE ONE CAPSULE BY MOUTH TWICE A DAY  
  
 ergocalciferol 50,000 unit capsule Commonly known as:  ERGOCALCIFEROL Take 1 Cap by mouth every seven (7) days. Indications: Vitamin D Deficiency  
  
 finasteride 5 mg tablet Commonly known as:  PROSCAR Take 5 mg by mouth daily. FIRST AID CLOTH TAPE 1 X 10 \"-yard Tape Generic drug:  Adhesive Tape Incontinence Pad, Liner, Disp Pads  
by Does Not Apply route. lactulose 10 gram/15 mL solution Commonly known as:  Ana M Malling TAKE 1 TEASPOONFUL (5 ML) BY MOUTH THREE TIMES AS DAY AS NEEDED  Indications: constipation  
  
 lisinopril 40 mg tablet Commonly known as:  PRINIVIL, ZESTRIL  
TAKE ONE TABLET BY MOUTH DAILY  
  
 lutein 20 mg Tab Take 1 Tab by mouth daily. 25 mg QD  
  
 meclizine 25 mg tablet Commonly known as:  ANTIVERT  
TAKE ONE TABLET BY MOUTH EVERY 8 HOURS AS NEEDED  
  
 metoprolol tartrate 25 mg tablet Commonly known as:  LOPRESSOR  
TAKE ONE-HALF TABLET BY MOUTH TWICE A DAY MIRALAX 17 gram/dose powder Generic drug:  polyethylene glycol Take 17 g by mouth two (2) times daily as needed. Takes as needed SOTO MILK OF MAGNESIA 400 mg/5 mL suspension Generic drug:  magnesium hydroxide Take 30 mL by mouth daily as needed for Constipation. PRADAXA 150 mg capsule Generic drug:  dabigatran etexilate TAKE ONE CAPSULE BY MOUTH EVERY 12 HOURS Senna 8.6 mg tablet Generic drug:  senna Take 1 tablet by mouth daily. venlafaxine-SR 75 mg capsule Commonly known as:  EFFEXOR-XR  
TAKE ONE CAPSULE BY MOUTH DAILY  
  
 zolpidem 10 mg tablet Commonly known as:  AMBIEN Take 1 Tab by mouth nightly as needed for Sleep (please take in the bed). Prescriptions Sent to Pharmacy Refills  
 carBAMazepine XR (TEGRETOL XR) 200 mg SR tablet 5 Sig: Take 1 tab by mouth in AM and 2 tabs in PM x 2 weeks, then 1 tab twice a day Class: Normal  
 Pharmacy: Nahid French [address] [place] [address], [address] [place] [address] Ph #: [phone] Follow-up Instructions Return in about 1 year (around 3/26/2019). Patient Instructions PRESCRIPTION REFILL POLICY [facility] Statement to Patients April 1, 2014 In an effort to ensure the large volume of patient prescription refills is processed in the most efficient and expeditious manner, we are asking our patients to assist us by calling your Pharmacy for all prescription refills, this will include also your  Mail Order Pharmacy. The pharmacy will contact our office electronically to continue the refill process. Please do not wait until the last minute to call your pharmacy. We need at least 48 hours (2days) to fill prescriptions. We also encourage you to call your pharmacy before going to  your prescription to make sure it is ready. With regard to controlled substance prescription refill requests (narcotic refills) that need to be picked up at our office, we ask your cooperation by providing us with at least 72 hours (3days) notice that you will need a refill. We will not refill narcotic prescription refill requests after 4:00pm on any weekday, Monday through Thursday, or after 2:00pm on Fridays, or on the weekends. We encourage everyone to explore another way of getting your prescription refill request processed using [employer], our patient web portal through our electronic medical record system. [employer] is an efficient and effective way to communicate your medication request directly to the office and  downloadable as an jorge on your smart phone . [employer] also features a review functionality that allows you to view your medication list as well as leave messages for your physician. Are you ready to get connected? If so please review the attatched instructions or speak to any of our staff to get you set up right away! Thank you so much for your cooperation. Should you have any questions please contact our Practice Administrator. The Physicians and Staff,  [facility] Please bring all medication bottles, including vitamins, supplements and any over-the-counter medications, to your next office visit. Introducing [facility]! Dear Miguel Hoyt: 
Thank you for requesting a [employer] account. Our records indicate that you already have an active [employer] account. You can access your account anytime at [email or web address] Did you know that you can access your hospital and ER discharge instructions at any time in [employer]? You can also review all of your test results from your hospital stay or ER visit. Additional Information If you have questions, please visit the Frequently Asked Questions section of the [employer] website at [email or web address] Remember, [employer] is NOT to be used for urgent needs. For medical emergencies, dial 911. Now available from your iPhone and Android! Please provide this summary of care documentation to your next provider. Your primary care clinician is listed as Via Blake Nichole. If you have any questions after today's visit, please call [phone].

## 2018-03-26 NOTE — PROGRESS NOTES
Neurology Progress Note    HISTORY PROVIDED BY: patient and spouse    Chief Complaint:   Chief Complaint   Patient presents with    Epilepsy      Subjective:   Pt is a 69 y.o. 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 Well controlled on Tegretol XR 400mg bid and with treatment of severe RONAL on CPAP. Exam was unchanged and consistent with known history. MRI brain 8/23/12 revealed extensive encephalomalacia on the right, EEG was normal.  Continued Tegretol  mg twice daily, brand-name only. I asked pt to review meds with cardiologist at upcoming appt, Tegretol may lower the efficacy of Pradaxa. F/u in clinic in one year. He returns for delayed f/u. He has not had any seizures since last visit. No new medical issues. His wife asks about decreasing Tegretol XR if possible since previous spells that were thought to be sz were actually due to heart arrhythmia.      Past Medical History:   Diagnosis Date    A-fib [facility])     Acute bronchitis 8/25/2015    Anxiety     Arrhythmia     atrial fibrillation    Arthritis     BCC (basal cell carcinoma of skin)     BPH (benign prostatic hyperplasia)     Chronic back pain greater than 3 months duration 5/4/2015    Chronic right shoulder pain 1/11/2016    Common wart 5/16/2016    Constipation 12/14/2012    CVA (cerebral infarction) 5/4/2015    Depression     GERD (gastroesophageal reflux disease)     Hearing loss     bilateral hearing aids    Hemiplegia following CVA (cerebrovascular accident) ([address]     left side hemiparesis    History of colostomy     HTN (hypertension)     Hyperlipidemia     Localized epilepsy with impairment of consciousness ([address]     Prostate cancer ([address]     Status post XRT, Lupron    Screening for colon cancer 11/4/2015    Stroke [facility])     traumatic from neck crushing    TBI (traumatic brain injury) ([address] 1994    Unspecified sleep apnea     on CPAP      Past Surgical History:   Procedure Laterality Date    HX ANKLE FRACTURE TX      HX COLECTOMY      colostomy    HX HEENT      ear surgery eddie.  HX HERNIA REPAIR      HX ORTHOPAEDIC      left hip pinning    HX PACEMAKER  2014      Social History     Social History    Marital status:      Spouse name: N/A    Number of children: N/A    Years of education: N/A     Occupational History    Not on file. Social History Main Topics    Smoking status: Former Smoker     Years: 10.00     Types: Pipe     Quit date: 1/29/1990    Smokeless tobacco: Never Used      Comment: QUIT 1970'S    Alcohol use No    Drug use: No    Sexual activity: Not Currently     Other Topics Concern    Not on file     Social History Narrative    , lives with his wife in [place]     Family History   Problem Relation Age of Onset    Alzheimer Mother      dec [de-identified]    Cancer Father      dec 76 kidney    Heart Disease Brother     No Known Problems Son           Objective:   ROS:  Per HPI-  Otherwise 10 point ROS was negative    Allergies   Allergen Reactions    Ciprofibrate Hives       Meds:  Outpatient Medications Prior to Visit   Medication Sig Dispense Refill    ergocalciferol (ERGOCALCIFEROL) 50,000 unit capsule Take 1 Cap by mouth every seven (7) days.  Indications: Vitamin D Deficiency 4 Cap 2    metoprolol tartrate (LOPRESSOR) 25 mg tablet TAKE ONE-HALF TABLET BY MOUTH TWICE A DAY 30 Tab 3    lactulose (CHRONULAC) 10 gram/15 mL solution TAKE 1 TEASPOONFUL (5 ML) BY MOUTH THREE TIMES AS DAY AS NEEDED  Indications: constipation 473 mL 2    atorvastatin (LIPITOR) 20 mg tablet TAKE ONE TABLET BY MOUTH DAILY 30 Tab 0    venlafaxine-SR (EFFEXOR-XR) 75 mg capsule TAKE ONE CAPSULE BY MOUTH DAILY 30 Cap 4    lisinopril (PRINIVIL, ZESTRIL) 40 mg tablet TAKE ONE TABLET BY MOUTH DAILY 90 Tab 0    TEGRETOL  mg SR tablet TAKE ONE TABLET BY MOUTH TWICE A DAY 60 Tab 10    PRADAXA 150 mg capsule TAKE ONE CAPSULE BY MOUTH EVERY 12 HOURS 60 Cap 4    amiodarone (CORDARONE) 200 mg tablet TAKE ONE TABLET BY MOUTH DAILY 30 Tab 5    amLODIPine (NORVASC) 10 mg tablet TAKE ONE TABLET BY MOUTH DAILY 90 Tab 5    magnesium hydroxide (SOTO MILK OF MAGNESIA) 400 mg/5 mL suspension Take 30 mL by mouth daily as needed for Constipation.  meclizine (ANTIVERT) 25 mg tablet TAKE ONE TABLET BY MOUTH EVERY 8 HOURS AS NEEDED 30 Tab 1    zolpidem (AMBIEN) 10 mg tablet Take 1 Tab by mouth nightly as needed for Sleep (please take in the bed). 30 Tab 3    Incontinence Pad, Liner, Disp pads by Does Not Apply route.  betamethasone dipropionate (DIPROSONE) 0.05 % topical cream Apply  to affected area as needed. 45 g 6    DOC-Q-LACE 100 mg capsule TAKE ONE CAPSULE BY MOUTH TWICE A DAY 60 Cap 6    FIRST AID CLOTH TAPE 1 X 10 \"-yard tape       diphenhydrAMINE (BENADRYL ALLERGY) 25 mg tablet Take 1 Tab by mouth every six (6) hours as needed for Sleep. 24 Tab 0    lutein 20 mg tab Take 1 Tab by mouth daily. 25 mg QD      senna (SENNA) 8.6 mg tablet Take 1 tablet by mouth daily.  finasteride (PROSCAR) 5 mg tablet Take 5 mg by mouth daily.  polyethylene glycol (MIRALAX) 17 gram/dose powder Take 17 g by mouth two (2) times daily as needed. Takes as needed       No facility-administered medications prior to visit. Imaging:  MRI Results (most recent):    Results from Hospital Encounter encounter on 08/23/12   MRI BRAIN W WO CONT   Narrative **Final Report**      ICD Codes / Adm. Diagnosis: V15.52  345.80 / Personal history of traumatic    Other forms of epilepsy and r  Examination:  MR BRAIN Pancho Kiana  - [id number] - Aug 23 2012 10:47AM  Accession No:  [id number]  Reason:  Traumatic brain injury; Epilepsy; Long term use of medications.       REPORT:  HISTORY:  Epilepsy, traumatic brain injury    COMPARISON:  07/20/2012    TECHNIQUE:  MR imaging of the brain was performed with sagittal T1, axial   T1, T2, FLAIR, GRE, DWI/ADC; coronal T2 and flair; pre and post contrast   multiplanar T1 utilizing 20  mL Magnevist.    FINDINGS:      Again demonstrated is the large area of cystic encephalomalacia within the   right MCA distribution. There is ex vacuo dilatation of the right lateral   ventricle and there is overall enlargement of the ventricles compatible with   volume loss. There is high signal within the periventricular white matter. There is atrophy of the right middle cerebral peduncle, compatible with   wallerian degeneration. There is no evidence of acute infarct. There is   susceptibility artifact in this large area of encephalomalacia likely due to   previous hemorrhage. An acute hemorrhage is not identified. There is no   midline shift or mass lesion. Given motion there are no areas of abnormal   enhancement. The flow-voids at the skull base are maintained. No evidence of   marrow replacement. IMPRESSION:  1. Large area of cystic encephalomalacia within the right MCA distribution   with evidence of prior intracranial hemorrhage. No acute infarct or   hemorrhage is demonstrated  2. Severe periventricular white matter change with enlargement of the   ventricles likely due to volume loss. Arvid Valeriano           Signing/Reading Doctor: Alvin Lawler ([id number])    Approved: Alvin Lawler ([id number])  08/23/2012                                     CT Results (most recent):    Results from [place] encounter on 10/25/16   CT ABD W WO CONT   Narrative EXAM:  CT ABD W WO CONT    INDICATION:   iv only  renal cyst    COMPARISON: 2015. CONTRAST:  100 mL of Isovue-370. TECHNIQUE:    Multislice helical CT was performed from the diaphragm to the iliac crest prior  to and during rapid bolus intravenous contrast administration. Oral contrast     administered. Contiguous 5 mm axial images were reconstructed and lung and soft  tissue windows were generated.  Coronal and sagittal reformations were generated. CT dose reduction was achieved through use of a standardized protocol tailored  for this examination and automatic exposure control for dose modulation. FINDINGS:  There is atelectasis/scarring and a few tiny ossified nodules lung bases. There  is slight pleural thickening which is calcified    The unenhanced images demonstrate 2.7 cm low-density projected off the mid left  kidney posteriorly measures 23 Hounsfield units and does not demonstrate  significant enhancement in this is similar to the prior examination is  consistent with a cyst.    Following intravenous contrast administration, there are liver, spleen, adrenal  glands, pancreas or right kidney. There is a small hiatal hernia. .    The aorta demonstrates atherosclerotic changes . There is no retroperitoneal  adenopathy or mass. There is an ostomy in the left mid abdomen There is no ascites or free  intraperitoneal air. Impression IMPRESSION:   1. No change in the 2.7 cm lesion projected off the left mid kidney posteriorly  most consistent with a cyst.  2. Small hiatal hernia. Reviewed records in [facility] and [facility] tab today    Lab Review   Results for orders placed or performed in visit on 03/16/18   CBC WITH AUTOMATED DIFF   Result Value Ref Range    WBC 7.1 3.4 - 10.8 x10E3/uL    RBC 3.79 (L) 4.14 - 5.80 x10E6/uL    HGB 11.3 (L) 13.0 - 17.7 g/dL    HCT 34.4 (L) 37.5 - 51.0 %    MCV 91 79 - 97 fL    MCH 29.8 26.6 - 33.0 pg    MCHC 32.8 31.5 - 35.7 g/dL    RDW 14.5 12.3 - 15.4 %    PLATELET 107 473 - 367 x10E3/uL    NEUTROPHILS 64 Not Estab. %    Lymphocytes 25 Not Estab. %    MONOCYTES 10 Not Estab. %    EOSINOPHILS 1 Not Estab. %    BASOPHILS 0 Not Estab. %    ABS. NEUTROPHILS 4.5 1.4 - 7.0 x10E3/uL    Abs Lymphocytes 1.8 0.7 - 3.1 x10E3/uL    ABS. MONOCYTES 0.7 0.1 - 0.9 x10E3/uL    ABS. EOSINOPHILS 0.1 0.0 - 0.4 x10E3/uL    ABS.  BASOPHILS 0.0 0.0 - 0.2 x10E3/uL    IMMATURE GRANULOCYTES 0 Not Estab. %    ABS. IMM. GRANS. 0.0 0.0 - 0.1 [id number]   METABOLIC PANEL, COMPREHENSIVE   Result Value Ref Range    Glucose 86 65 - 99 mg/dL    BUN 9 8 - 27 mg/dL    Creatinine 0.62 (L) 0.76 - 1.27 mg/dL    GFR est non-AA 96 >59 mL/min/1.73    GFR est  >59 mL/min/1.73    BUN/Creatinine ratio 15 10 - 24    Sodium 130 (L) 134 - 144 mmol/L    Potassium 5.1 3.5 - 5.2 mmol/L    Chloride 90 (L) 96 - 106 mmol/L    CO2 22 18 - 29 mmol/L    Calcium 9.0 8.6 - 10.2 mg/dL    Protein, total 7.4 6.0 - 8.5 g/dL    Albumin 4.1 3.5 - 4.8 g/dL    GLOBULIN, TOTAL 3.3 1.5 - 4.5 g/dL    A-G Ratio 1.2 1.2 - 2.2    Bilirubin, total <0.2 0.0 - 1.2 mg/dL    Alk. phosphatase 131 (H) 39 - 117 IU/L    AST (SGOT) 26 0 - 40 IU/L    ALT (SGPT) 24 0 - 44 IU/L   LIPID PANEL   Result Value Ref Range    Cholesterol, total 135 100 - 199 mg/dL    Triglyceride 70 0 - 149 mg/dL    HDL Cholesterol 55 >39 mg/dL    VLDL, calculated 14 5 - 40 mg/dL    LDL, calculated 66 0 - 99 mg/dL   VITAMIN D, 25 HYDROXY   Result Value Ref Range    VITAMIN D, 25-HYDROXY 11.5 (L) 30.0 - 100.0 ng/mL   PSA W/ REFLX FREE PSA   Result Value Ref Range    Prostate Specific Ag 0.3 0.0 - 4.0 ng/mL    Reflex Criteria Comment    CVD REPORT   Result Value Ref Range    INTERPRETATION Note         Exam:  Visit Vitals    /76    Pulse 83    Resp 18    Ht 6' 2\" (1.88 m)    Wt 121.1 kg (267 lb)    SpO2 98%    BMI 34.28 kg/m2     General:  Alert, cooperative, no distress. Head:  Normocephalic, without obvious abnormality, atraumatic. Respiratory:  Heart:   Non labored breathing  Regular rate and rhythm, no murmurs   Neck:   2+ carotids, no bruits   Extremities: Warm, no cyanosis or edema. Pulses: 2+ radial pulses. Neurologic:  MS: Alert and oriented x 4, speech intact. Language intact. Attention and fund of knowledge appropriate. Recent and remote memory intact.   Cranial Nerves:  II: visual fields Left HH   II: pupils PERRL   II: optic disc    III,VII: ptosis none   III,IV,VI: extraocular muscles  EOMI, no nystagmus or diplopia   V: facial light touch sensation     VII: facial muscle function   symmetric   VIII: hearing intact   IX: soft palate elevation     XI: trapezius strength     XI: sternocleidomastoid strength    XII: tongue       Motor: normal bulk and tone, no tremor, strength: 5/5 throughout on right, left hemiparesis with minimal proximal UE movement. Coordination: Not assessed. Gait: WC bound  Reflexes: 2+ on left, 3+ on right           Assessment/Plan   Pt is a 69 y.o. 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 Well controlled on Tegretol XR 400mg bid and with treatment of severe RONAL on CPAP. Exam is unchanged and consistent with known history. MRI brain 8/23/12 revealed extensive encephalomalacia on the right, EEG was normal.  I agree with a trial of decreased dose of Tegretol XR, pt and his wife understand that any time AEDs medications are adjusted, there is an increased risk for breakthrough seizure. Recommend decreasing to Tegretol XR 200mg in AM and 400mg in PM x 2 weeks, then 200mg bid, brand-name only. F/u in clinic in one month, instructed to call the clinic in the interim if needed. ICD-10-CM ICD-9-CM    1. Localized epilepsy with impairment of consciousness ([facility] G40.209 345.40        Signed:   Laquita Choi MD  3/26/2018

## 2018-03-26 NOTE — PATIENT INSTRUCTIONS
[facility]   Statement to Patients  April 1, 2014      In an effort to ensure the large volume of patient prescription refills is processed in the most efficient and expeditious manner, we are asking our patients to assist us by calling your Pharmacy for all prescription refills, this will include also your  Mail Order Pharmacy. The pharmacy will contact our office electronically to continue the refill process. Please do not wait until the last minute to call your pharmacy. We need at least 48 hours (2days) to fill prescriptions. We also encourage you to call your pharmacy before going to  your prescription to make sure it is ready. With regard to controlled substance prescription refill requests (narcotic refills) that need to be picked up at our office, we ask your cooperation by providing us with at least 72 hours (3days) notice that you will need a refill. We will not refill narcotic prescription refill requests after 4:00pm on any weekday, Monday through Thursday, or after 2:00pm on Fridays, or on the weekends. We encourage everyone to explore another way of getting your prescription refill request processed using [employer], our patient web portal through our electronic medical record system. [employer] is an efficient and effective way to communicate your medication request directly to the office and  downloadable as an jorge on your smart phone . [employer] also features a review functionality that allows you to view your medication list as well as leave messages for your physician. Are you ready to get connected? If so please review the attatched instructions or speak to any of our staff to get you set up right away! Thank you so much for your cooperation. Should you have any questions please contact our Practice Administrator.     The Physicians and Staff,  [facility]           Please bring all medication bottles, including vitamins, supplements and any over-the-counter medications, to your next office visit.

## 2018-03-27 ENCOUNTER — TELEPHONE (OUTPATIENT)
Dept: NEUROLOGY | Age: 78
End: 2018-03-27

## 2018-03-27 RX ORDER — ATORVASTATIN CALCIUM 20 MG/1
TABLET, FILM COATED ORAL
Qty: 30 TAB | Refills: 11 | Status: SHIPPED | OUTPATIENT
Start: 2018-03-27 | End: 2018-05-01 | Stop reason: SDUPTHER

## 2018-03-28 NOTE — TELEPHONE ENCOUNTER
Spoke with patient's wife and informed her of my conversation with Jami Knutson at [facility]. Mrs. Tony Chiu was given an opportunity to ask questions, repeated information, and verbalized understanding.

## 2018-03-28 NOTE — TELEPHONE ENCOUNTER
Spoke with Iain Hebert at [facility] and she stated prescription for Tegretol, brand only, is ready to be picked up. Advised will call patient's wife and let her know.

## 2018-03-28 NOTE — TELEPHONE ENCOUNTER
Maik - Please call pharmacy, I am not certain what I can do other than what I did to see that he gets brand name. I put in Tegretol XR and checked YOVANI (see the Rx).

## 2018-04-10 ENCOUNTER — OFFICE VISIT (OUTPATIENT)
Dept: CARDIOLOGY CLINIC | Age: 78
End: 2018-04-10

## 2018-04-10 VITALS
BODY MASS INDEX: 34.27 KG/M2 | DIASTOLIC BLOOD PRESSURE: 66 MMHG | WEIGHT: 267 LBS | HEART RATE: 71 BPM | HEIGHT: 74 IN | SYSTOLIC BLOOD PRESSURE: 128 MMHG | OXYGEN SATURATION: 97 %

## 2018-04-10 DIAGNOSIS — I10 ESSENTIAL HYPERTENSION: ICD-10-CM

## 2018-04-10 DIAGNOSIS — E78.00 HYPERCHOLESTEROLEMIA: ICD-10-CM

## 2018-04-10 DIAGNOSIS — I48.4 ATYPICAL ATRIAL FLUTTER (HCC): Primary | ICD-10-CM

## 2018-04-10 DIAGNOSIS — I49.5 SSS (SICK SINUS SYNDROME) (HCC): ICD-10-CM

## 2018-04-10 NOTE — PROGRESS NOTES
Chief Complaint   Patient presents with    Irregular Heart Beat     1. Have you been to the ER, urgent care clinic since your last visit? Hospitalized since your last visit? NO    2. Have you seen or consulted any other health care providers outside of the [address] [place] [address] since your last visit? Include any pap smears or colon screening.  YES,  DR. Garrett Wynne  ( ENT )

## 2018-04-10 NOTE — PROGRESS NOTES
4/10/2018 10:07 AM      Subjective:     Tin Correa is here for f/u visit. He denies chest pain, chest pressure/discomfort, dyspnea, palpitations, irregular heart beats, near-syncope, syncope, fatigue, orthopnea, paroxysmal nocturnal dyspnea, exertional chest pressure/discomfort, claudication, lower extremity edema. Visit Vitals    /66 (BP 1 Location: Right arm, BP Patient Position: Sitting)    Pulse 71    Ht 6' 2\" (1.88 m)    Wt 267 lb (121.1 kg)    SpO2 97%    BMI 34.28 kg/m2     Current Outpatient Prescriptions   Medication Sig    atorvastatin (LIPITOR) 20 mg tablet TAKE ONE TABLET BY MOUTH DAILY    carBAMazepine XR (TEGRETOL XR) 200 mg SR tablet Take 1 tab by mouth in AM and 2 tabs in PM x 2 weeks, then 1 tab twice a day    ergocalciferol (ERGOCALCIFEROL) 50,000 unit capsule Take 1 Cap by mouth every seven (7) days. Indications: Vitamin D Deficiency    metoprolol tartrate (LOPRESSOR) 25 mg tablet TAKE ONE-HALF TABLET BY MOUTH TWICE A DAY    lactulose (CHRONULAC) 10 gram/15 mL solution TAKE 1 TEASPOONFUL (5 ML) BY MOUTH THREE TIMES AS DAY AS NEEDED  Indications: constipation    venlafaxine-SR (EFFEXOR-XR) 75 mg capsule TAKE ONE CAPSULE BY MOUTH DAILY    lisinopril (PRINIVIL, ZESTRIL) 40 mg tablet TAKE ONE TABLET BY MOUTH DAILY    PRADAXA 150 mg capsule TAKE ONE CAPSULE BY MOUTH EVERY 12 HOURS    amiodarone (CORDARONE) 200 mg tablet TAKE ONE TABLET BY MOUTH DAILY    amLODIPine (NORVASC) 10 mg tablet TAKE ONE TABLET BY MOUTH DAILY    magnesium hydroxide (SOTO MILK OF MAGNESIA) 400 mg/5 mL suspension Take 30 mL by mouth daily as needed for Constipation.  meclizine (ANTIVERT) 25 mg tablet TAKE ONE TABLET BY MOUTH EVERY 8 HOURS AS NEEDED    zolpidem (AMBIEN) 10 mg tablet Take 1 Tab by mouth nightly as needed for Sleep (please take in the bed).  Incontinence Pad, Liner, Disp pads by Does Not Apply route.     betamethasone dipropionate (DIPROSONE) 0.05 % topical cream Apply  to affected area as needed.  DOC-Q-LACE 100 mg capsule TAKE ONE CAPSULE BY MOUTH TWICE A DAY    diphenhydrAMINE (BENADRYL ALLERGY) 25 mg tablet Take 1 Tab by mouth every six (6) hours as needed for Sleep.  lutein 20 mg tab Take 1 Tab by mouth daily. 25 mg QD    senna (SENNA) 8.6 mg tablet Take 1 tablet by mouth daily.  finasteride (PROSCAR) 5 mg tablet Take 5 mg by mouth daily.  FIRST AID CLOTH TAPE 1 X 10 \"-yard tape     polyethylene glycol (MIRALAX) 17 gram/dose powder Take 17 g by mouth two (2) times daily as needed. Takes as needed     No current facility-administered medications for this visit.           Objective:      Visit Vitals    /66 (BP 1 Location: Right arm, BP Patient Position: Sitting)    Pulse 71    Ht 6' 2\" (1.88 m)    Wt 267 lb (121.1 kg)    SpO2 97%    BMI 34.28 kg/m2       Data Review:    EKG: Normal sinus rhythm, no acute st/t changes    Reviewed and/or ordered active problem list, medication list tests    Past Medical History:   Diagnosis Date    A-fib ([address]     Acute bronchitis 8/25/2015    Anxiety     Arrhythmia     atrial fibrillation    Arthritis     BCC (basal cell carcinoma of skin)     BPH (benign prostatic hyperplasia)     Chronic back pain greater than 3 months duration 5/4/2015    Chronic right shoulder pain 1/11/2016    Common wart 5/16/2016    Constipation 12/14/2012    CVA (cerebral infarction) 5/4/2015    Depression     GERD (gastroesophageal reflux disease)     Hearing loss     bilateral hearing aids    Hemiplegia following CVA (cerebrovascular accident) ([address]     left side hemiparesis    History of colostomy     HTN (hypertension)     Hyperlipidemia     Localized epilepsy with impairment of consciousness ([address]     Prostate cancer ([address]     Status post XRT, Lupron    Screening for colon cancer 11/4/2015    Stroke [facility])     traumatic from neck crushing    TBI (traumatic brain injury) ([address] 1994    Unspecified sleep apnea     on CPAP      Past Surgical History:   Procedure Laterality Date    HX ANKLE FRACTURE TX      HX COLECTOMY      colostomy    HX HEENT      ear surgery eddie.  HX HERNIA REPAIR      HX ORTHOPAEDIC      left hip pinning    HX PACEMAKER  2014     Allergies   Allergen Reactions    Ciprofibrate Hives      Family History   Problem Relation Age of Onset    Alzheimer Mother      dec [de-identified]    Cancer Father      dec 76 kidney    Heart Disease Brother     No Known Problems Son       Social History     Social History    Marital status:      Spouse name: N/A    Number of children: N/A    Years of education: N/A     Occupational History    Not on file. Social History Main Topics    Smoking status: Former Smoker     Years: 10.00     Types: Pipe     Quit date: 1/29/1990    Smokeless tobacco: Never Used      Comment: QUIT 1970'S    Alcohol use No    Drug use: No    Sexual activity: Not Currently     Other Topics Concern    Not on file     Social History Narrative    , lives with his wife in [place]         Review of Systems     General: Not Present- Anorexia, Chills, Dietary Changes, Fatigue, Fever, Medication Changes, Night Sweats, Weight Gain > 10lbs. and Weight Loss > 10lbs. .  Skin: Not Present- Bruising and Excessive Sweating. HEENT: Not Present- Headache, Visual Loss and Vertigo. Respiratory: Not Present- Cough, Decreased Exercise Tolerance, Difficulty Breathing, Snoring and Wheezing. Cardiovascular: Not Present- Abnormal Blood Pressure, Chest Pain, Claudications, Difficulty Breathing On Exertion, Edema, Fainting / Blacking Out, Irregular Heart Beat, Night Cramps, Orthopnea, Palpitations, Paroxysmal Nocturnal Dyspnea, Rapid Heart Rate, Shortness of Breath and Swelling of Extremities. Gastrointestinal: Not Present- Black, Tarry Stool, Bloody Stool, Diarrhea, Hematemesis, Rectal Bleeding and Vomiting.   Musculoskeletal: Not Present- Muscle Pain and Muscle Weakness. Neurological: Not Present- Dizziness. Psychiatric: Not Present- Depression. Endocrine: Not Present- Cold Intolerance, Heat Intolerance and Thyroid Problems. Hematology: Not Present- Abnormal Bleeding, Anemia, Blood Clots and Easy Bruising.       Physical Exam   The physical exam findings are as follows:       General   Mental Status - Alert. General Appearance - Not in acute distress. Chest and Lung Exam   Inspection: Accessory muscles - No use of accessory muscles in breathing. Auscultation:   Breath sounds: - Normal.      Cardiovascular   Inspection: Jugular vein - Bilateral - Inspection Normal.  Palpation/Percussion:   Apical Impulse: - Normal.  Auscultation: Rhythm - Regular. Heart Sounds - S1 WNL and S2 WNL. No S3 or S4. Murmurs & Other Heart Sounds: Auscultation of the heart reveals - No Murmurs. Carotid arteries - No Carotid bruit. Peripheral Vascular   Upper Extremity: Inspection - Bilateral - No Cyanotic nailbeds or Digital clubbing. Lower Extremity:   Palpation: Edema - Bilateral - No edema. Assessment:       ICD-10-CM ICD-9-CM    1. Atypical atrial flutter ([facility]) I48.4 427.32 AMB POC EKG ROUTINE W/ 12 LEADS, INTER & REP   2. Essential hypertension I10 401.9 AMB POC EKG ROUTINE W/ 12 LEADS, INTER & REP   3. SSS (sick sinus syndrome) ([facility]) I49.5 427.81    4. Hypercholesterolemia E78.00 272.0        Plan:     1. A. Fib: s/p ablation. S/p PPM. On amion, BB and pradaxa. Follow up with EP. 2. Hyperlipidemia: On statin. Last FLP noted.    3. BP well controlled.

## 2018-04-11 ENCOUNTER — TELEPHONE (OUTPATIENT)
Dept: NEUROLOGY | Age: 78
End: 2018-04-11

## 2018-04-11 NOTE — TELEPHONE ENCOUNTER
----- Message from Valarie Binghma sent at 4/11/2018  3:14 PM EDT -----  Regarding: Dr. Anna Hebert  Pt's wife returned nurse call. Best contact number [phone].

## 2018-04-11 NOTE — TELEPHONE ENCOUNTER
Spoke with patient's wife. Informed her of my conversation with Jennifer Ferraro from [facility]. Advised to call [facility] for further clarification. Mrs. Marley Pierre was given an opportunity to ask questions, repeated information, and verbalized understanding.

## 2018-04-11 NOTE — TELEPHONE ENCOUNTER
----- Message from Fidelia Edmond sent at 4/11/2018  3:14 PM EDT -----  Regarding: Dr. Jodi Gandhi  Pt's wife returned nurse call. Best contact number [phone].

## 2018-04-11 NOTE — TELEPHONE ENCOUNTER
----- Message from Spencer Hogan sent at 4/11/2018 12:42 PM EDT -----  Regarding: Dr Duran/rx refill  Pt's wife (p) [phone], said she will need a new rx   For  \"Tegretol XR  200 mg\"  submitted  To [address] [place] [address], the one listed in his chart.   Elaine ramos said they can not find it in the system the one that was ordinally submitted  During the 1st week in April

## 2018-04-11 NOTE — TELEPHONE ENCOUNTER
Spoke with Pipe at [facility]. She stated that patient's wife picked up Tegretol  mg, brand only on 3/28/18. Informed her that I have left a message for patient's wife to call back to clarify this with her.

## 2018-05-01 RX ORDER — ATORVASTATIN CALCIUM 20 MG/1
TABLET, FILM COATED ORAL
Qty: 90 TAB | Refills: 5 | Status: SHIPPED | OUTPATIENT
Start: 2018-05-01 | End: 2018-07-10 | Stop reason: DRUGHIGH

## 2018-05-07 ENCOUNTER — OFFICE VISIT (OUTPATIENT)
Dept: FAMILY MEDICINE CLINIC | Age: 78
End: 2018-05-07

## 2018-05-07 ENCOUNTER — HOME HEALTH ADMISSION (OUTPATIENT)
Dept: HOME HEALTH SERVICES | Facility: HOME HEALTH | Age: 78
End: 2018-05-07
Payer: MEDICARE

## 2018-05-07 VITALS
HEART RATE: 77 BPM | DIASTOLIC BLOOD PRESSURE: 81 MMHG | TEMPERATURE: 96.1 F | OXYGEN SATURATION: 98 % | RESPIRATION RATE: 18 BRPM | HEIGHT: 74 IN | BODY MASS INDEX: 33.62 KG/M2 | SYSTOLIC BLOOD PRESSURE: 142 MMHG | WEIGHT: 262 LBS

## 2018-05-07 DIAGNOSIS — S71.101A: Primary | ICD-10-CM

## 2018-05-07 DIAGNOSIS — S31.809A OPEN WOUND OF BUTTOCK WITH COMPLICATION, UNSPECIFIED LATERALITY, INITIAL ENCOUNTER: Primary | ICD-10-CM

## 2018-05-07 NOTE — PROGRESS NOTES
HISTORY OF PRESENT ILLNESS  Anel Lorenz is a 69 y.o. male. HPI  Patient comes in today for wound right posterior thigh. Patient's wife states patient developed a rash 2-3 weeks ago, had small red \"splotches\" all over body. States rash eventually cleared and wife noticed patient has small ulcerated area right upper posterior thigh. States she tried steroid cram which made wound worse. States she did neosporin for few days without any help. Patient states she has been using protective cream with desitin. She was concerned about a pressure sore, but patient is able to reposition self in chair, and for the timeframe he has been WC bound, he has not had any pressure ulcers. Denies fever, chills. Allergies   Allergen Reactions    Ciprofibrate Hives       Past Medical History:   Diagnosis Date    A-fib ([address]     Acute bronchitis 8/25/2015    Anxiety     Arrhythmia     atrial fibrillation    Arthritis     BCC (basal cell carcinoma of skin)     BPH (benign prostatic hyperplasia)     Chronic back pain greater than 3 months duration 5/4/2015    Chronic right shoulder pain 1/11/2016    Common wart 5/16/2016    Constipation 12/14/2012    CVA (cerebral infarction) 5/4/2015    Depression     GERD (gastroesophageal reflux disease)     Hearing loss     bilateral hearing aids    Hemiplegia following CVA (cerebrovascular accident) ([address]     left side hemiparesis    History of colostomy     HTN (hypertension)     Hyperlipidemia     Localized epilepsy with impairment of consciousness ([address]     Prostate cancer ([address]     Status post XRT, Lupron    Screening for colon cancer 11/4/2015    Stroke [facility])     traumatic from neck crushing    TBI (traumatic brain injury) ([address] 1994    Unspecified sleep apnea     on CPAP       Past Surgical History:   Procedure Laterality Date    HX ANKLE FRACTURE TX      HX COLECTOMY      colostomy    HX HEENT      ear surgery eddie.     HX HERNIA REPAIR      HX ORTHOPAEDIC left hip pinning    HX PACEMAKER  2014       Social History     Social History    Marital status:      Spouse name: N/A    Number of children: N/A    Years of education: N/A     Occupational History    Not on file. Social History Main Topics    Smoking status: Former Smoker     Years: 10.00     Types: Pipe     Quit date: 1/29/1990    Smokeless tobacco: Never Used      Comment: QUIT 1970'S    Alcohol use No    Drug use: No    Sexual activity: Not Currently     Other Topics Concern    Not on file     Social History Narrative    , lives with his wife in [place]       Family History   Problem Relation Age of Onset    Alzheimer Mother      dec [de-identified]    Cancer Father      dec 76 kidney    Heart Disease Brother     No Known Problems Son        Current Outpatient Prescriptions   Medication Sig    atorvastatin (LIPITOR) 20 mg tablet TAKE ONE TABLET BY MOUTH DAILY    lisinopril (PRINIVIL, ZESTRIL) 40 mg tablet TAKE ONE TABLET BY MOUTH DAILY    carBAMazepine XR (TEGRETOL XR) 200 mg SR tablet Take 1 tab by mouth in AM and 2 tabs in PM x 2 weeks, then 1 tab twice a day    ergocalciferol (ERGOCALCIFEROL) 50,000 unit capsule Take 1 Cap by mouth every seven (7) days. Indications: Vitamin D Deficiency    metoprolol tartrate (LOPRESSOR) 25 mg tablet TAKE ONE-HALF TABLET BY MOUTH TWICE A DAY    lactulose (CHRONULAC) 10 gram/15 mL solution TAKE 1 TEASPOONFUL (5 ML) BY MOUTH THREE TIMES AS DAY AS NEEDED  Indications: constipation    venlafaxine-SR (EFFEXOR-XR) 75 mg capsule TAKE ONE CAPSULE BY MOUTH DAILY    PRADAXA 150 mg capsule TAKE ONE CAPSULE BY MOUTH EVERY 12 HOURS    amiodarone (CORDARONE) 200 mg tablet TAKE ONE TABLET BY MOUTH DAILY    amLODIPine (NORVASC) 10 mg tablet TAKE ONE TABLET BY MOUTH DAILY    magnesium hydroxide (SOTO MILK OF MAGNESIA) 400 mg/5 mL suspension Take 30 mL by mouth daily as needed for Constipation.     meclizine (ANTIVERT) 25 mg tablet TAKE ONE TABLET BY MOUTH EVERY 8 HOURS AS NEEDED    zolpidem (AMBIEN) 10 mg tablet Take 1 Tab by mouth nightly as needed for Sleep (please take in the bed).  Incontinence Pad, Liner, Disp pads by Does Not Apply route.  betamethasone dipropionate (DIPROSONE) 0.05 % topical cream Apply  to affected area as needed.  DOC-Q-LACE 100 mg capsule TAKE ONE CAPSULE BY MOUTH TWICE A DAY    FIRST AID CLOTH TAPE 1 X 10 \"-yard tape     diphenhydrAMINE (BENADRYL ALLERGY) 25 mg tablet Take 1 Tab by mouth every six (6) hours as needed for Sleep.  lutein 20 mg tab Take 1 Tab by mouth daily. 25 mg QD    senna (SENNA) 8.6 mg tablet Take 1 tablet by mouth daily.  polyethylene glycol (MIRALAX) 17 gram/dose powder Take 17 g by mouth two (2) times daily as needed. Takes as needed    finasteride (PROSCAR) 5 mg tablet Take 5 mg by mouth daily. No current facility-administered medications for this visit. Review of Systems   Constitutional: Negative for chills and fever. Skin:        Sore on right posterior upper thigh, mild erythema, no drainage     Vitals:    05/07/18 1030   BP: 142/81   Pulse: 77   Resp: 18   Temp: 96.1 °F (35.6 °C)   TempSrc: Oral   SpO2: 98%   Weight: 262 lb (118.8 kg)   Height: 6' 2\" (1.88 m)     Physical Exam   Constitutional: He is oriented to person, place, and time. He appears well-developed and well-nourished. Cardiovascular: Normal rate. Pulmonary/Chest: Effort normal.   Neurological: He is alert and oriented to person, place, and time. Skin: Skin is warm and dry.   ~2.5cm circular ulcerated area noted right upper posterior thigh, partial thickness wound. Mild erythema around edges, center of wound appears neutral (no granulation tissue), but patient's wife has been placing desitin on wound. No drainage noted. Superficial wound above that, ~0.5cm erythematous area     ASSESSMENT and PLAN    ICD-10-CM ICD-9-CM    1.  Open wound of thigh, right, initial encounter S71.101A 890.0      Encounter Diagnoses   Name Primary?  Open wound of thigh, right, initial encounter Yes     No orders of the defined types were placed in this encounter. Diagnoses and all orders for this visit:    1. Open wound of thigh, right, initial encounter - does not appear to be pressure sore. Initially started as a papular rash. Area ulcerated. Patient does have left sided hemiparesis and is wheelchair bound, but is able to reposition self in chair. Wife to continue to keep wound clean, used barrier cream, and will ask home health wound nurse to evaluate and treat. Follow-up Disposition: Not on File    I have reviewed the patient's allergies and made any necessary changes. Medical, procedural, social and family histories have been reviewed and updated as medically indicated. I have reconciled and/or revised patient medications in the EMR. I have discussed each diagnosis listed in this note with Anel Lorenz and/or their family. I have discussed treatment options and the risk/benefit analysis of those options, including safe use of medications and possible medication side effects. Through the use of shared decision making we have agreed to the above plan. The patient has received an after-visit summary and questions were answered concerning future plans. Claudia Davis, HAYLIE-C    This note will not be viewable in [employer].

## 2018-05-08 DIAGNOSIS — I48.3 TYPICAL ATRIAL FLUTTER (HCC): ICD-10-CM

## 2018-05-08 DIAGNOSIS — I48.0 PAROXYSMAL ATRIAL FIBRILLATION (HCC): ICD-10-CM

## 2018-05-08 DIAGNOSIS — I10 ESSENTIAL HYPERTENSION: ICD-10-CM

## 2018-05-08 DIAGNOSIS — I49.5 SSS (SICK SINUS SYNDROME) (HCC): ICD-10-CM

## 2018-05-09 RX ORDER — AMIODARONE HYDROCHLORIDE 200 MG/1
TABLET ORAL
Qty: 30 TAB | Refills: 4 | Status: ON HOLD | OUTPATIENT
Start: 2018-05-09 | End: 2018-10-01

## 2018-05-10 ENCOUNTER — HOME CARE VISIT (OUTPATIENT)
Dept: SCHEDULING | Facility: HOME HEALTH | Age: 78
End: 2018-05-10
Payer: MEDICARE

## 2018-05-10 VITALS
DIASTOLIC BLOOD PRESSURE: 70 MMHG | SYSTOLIC BLOOD PRESSURE: 128 MMHG | OXYGEN SATURATION: 98 % | TEMPERATURE: 97.8 F | HEART RATE: 75 BPM | RESPIRATION RATE: 18 BRPM

## 2018-05-10 PROCEDURE — G0299 HHS/HOSPICE OF RN EA 15 MIN: HCPCS

## 2018-05-10 PROCEDURE — 400013 HH SOC

## 2018-05-11 PROCEDURE — A6250 SKIN SEAL PROTECT MOISTURIZR: HCPCS

## 2018-05-11 PROCEDURE — A6260 WOUND CLEANSER ANY TYPE/SIZE: HCPCS

## 2018-05-11 PROCEDURE — A6216 NON-STERILE GAUZE<=16 SQ IN: HCPCS

## 2018-05-11 PROCEDURE — A4216 STERILE WATER/SALINE, 10 ML: HCPCS

## 2018-05-14 ENCOUNTER — HOME CARE VISIT (OUTPATIENT)
Dept: SCHEDULING | Facility: HOME HEALTH | Age: 78
End: 2018-05-14
Payer: MEDICARE

## 2018-05-14 VITALS
SYSTOLIC BLOOD PRESSURE: 123 MMHG | TEMPERATURE: 97.5 F | OXYGEN SATURATION: 98 % | RESPIRATION RATE: 16 BRPM | DIASTOLIC BLOOD PRESSURE: 86 MMHG | HEART RATE: 75 BPM

## 2018-05-14 PROCEDURE — G0152 HHCP-SERV OF OT,EA 15 MIN: HCPCS

## 2018-05-15 ENCOUNTER — HOME CARE VISIT (OUTPATIENT)
Dept: SCHEDULING | Facility: HOME HEALTH | Age: 78
End: 2018-05-15
Payer: MEDICARE

## 2018-05-15 VITALS
WEIGHT: 266 LBS | OXYGEN SATURATION: 98 % | TEMPERATURE: 97.6 F | SYSTOLIC BLOOD PRESSURE: 110 MMHG | BODY MASS INDEX: 34.14 KG/M2 | HEART RATE: 74 BPM | HEIGHT: 74 IN | DIASTOLIC BLOOD PRESSURE: 65 MMHG

## 2018-05-15 PROCEDURE — G0299 HHS/HOSPICE OF RN EA 15 MIN: HCPCS

## 2018-05-17 ENCOUNTER — HOME CARE VISIT (OUTPATIENT)
Dept: SCHEDULING | Facility: HOME HEALTH | Age: 78
End: 2018-05-17
Payer: MEDICARE

## 2018-05-17 VITALS
RESPIRATION RATE: 16 BRPM | HEART RATE: 58 BPM | SYSTOLIC BLOOD PRESSURE: 132 MMHG | DIASTOLIC BLOOD PRESSURE: 63 MMHG | TEMPERATURE: 97.1 F

## 2018-05-17 PROCEDURE — G0299 HHS/HOSPICE OF RN EA 15 MIN: HCPCS

## 2018-05-22 ENCOUNTER — HOME CARE VISIT (OUTPATIENT)
Dept: SCHEDULING | Facility: HOME HEALTH | Age: 78
End: 2018-05-22
Payer: MEDICARE

## 2018-05-22 VITALS
DIASTOLIC BLOOD PRESSURE: 70 MMHG | TEMPERATURE: 98.5 F | OXYGEN SATURATION: 99 % | SYSTOLIC BLOOD PRESSURE: 118 MMHG | HEART RATE: 70 BPM | RESPIRATION RATE: 18 BRPM

## 2018-05-22 DIAGNOSIS — L03.90 WOUND CELLULITIS: Primary | ICD-10-CM

## 2018-05-22 PROCEDURE — G0300 HHS/HOSPICE OF LPN EA 15 MIN: HCPCS

## 2018-05-25 ENCOUNTER — HOME CARE VISIT (OUTPATIENT)
Dept: SCHEDULING | Facility: HOME HEALTH | Age: 78
End: 2018-05-25
Payer: MEDICARE

## 2018-05-25 PROCEDURE — G0300 HHS/HOSPICE OF LPN EA 15 MIN: HCPCS

## 2018-05-29 ENCOUNTER — HOME CARE VISIT (OUTPATIENT)
Dept: SCHEDULING | Facility: HOME HEALTH | Age: 78
End: 2018-05-29
Payer: MEDICARE

## 2018-05-29 VITALS
RESPIRATION RATE: 18 BRPM | HEART RATE: 70 BPM | SYSTOLIC BLOOD PRESSURE: 130 MMHG | OXYGEN SATURATION: 96 % | TEMPERATURE: 98.1 F | DIASTOLIC BLOOD PRESSURE: 60 MMHG

## 2018-05-29 PROCEDURE — G0300 HHS/HOSPICE OF LPN EA 15 MIN: HCPCS

## 2018-05-31 ENCOUNTER — HOME CARE VISIT (OUTPATIENT)
Dept: SCHEDULING | Facility: HOME HEALTH | Age: 78
End: 2018-05-31
Payer: MEDICARE

## 2018-05-31 VITALS
DIASTOLIC BLOOD PRESSURE: 60 MMHG | HEART RATE: 70 BPM | RESPIRATION RATE: 18 BRPM | OXYGEN SATURATION: 97 % | SYSTOLIC BLOOD PRESSURE: 110 MMHG | TEMPERATURE: 98.1 F

## 2018-05-31 PROCEDURE — G0300 HHS/HOSPICE OF LPN EA 15 MIN: HCPCS

## 2018-06-05 ENCOUNTER — HOME CARE VISIT (OUTPATIENT)
Dept: SCHEDULING | Facility: HOME HEALTH | Age: 78
End: 2018-06-05
Payer: MEDICARE

## 2018-06-07 ENCOUNTER — HOME CARE VISIT (OUTPATIENT)
Dept: SCHEDULING | Facility: HOME HEALTH | Age: 78
End: 2018-06-07
Payer: MEDICARE

## 2018-06-07 PROCEDURE — A6250 SKIN SEAL PROTECT MOISTURIZR: HCPCS

## 2018-06-07 PROCEDURE — G0299 HHS/HOSPICE OF RN EA 15 MIN: HCPCS

## 2018-06-10 VITALS
SYSTOLIC BLOOD PRESSURE: 132 MMHG | TEMPERATURE: 98.2 F | OXYGEN SATURATION: 98 % | RESPIRATION RATE: 20 BRPM | HEART RATE: 76 BPM | DIASTOLIC BLOOD PRESSURE: 74 MMHG

## 2018-06-11 VITALS
RESPIRATION RATE: 18 BRPM | SYSTOLIC BLOOD PRESSURE: 122 MMHG | TEMPERATURE: 98.5 F | HEART RATE: 78 BPM | OXYGEN SATURATION: 97 % | DIASTOLIC BLOOD PRESSURE: 70 MMHG

## 2018-07-05 ENCOUNTER — OFFICE VISIT (OUTPATIENT)
Dept: CARDIOLOGY CLINIC | Age: 78
End: 2018-07-05

## 2018-07-05 VITALS
BODY MASS INDEX: 36.36 KG/M2 | HEIGHT: 74 IN | SYSTOLIC BLOOD PRESSURE: 122 MMHG | HEART RATE: 74 BPM | WEIGHT: 283.3 LBS | DIASTOLIC BLOOD PRESSURE: 64 MMHG | RESPIRATION RATE: 16 BRPM | OXYGEN SATURATION: 97 %

## 2018-07-05 DIAGNOSIS — E78.2 MIXED HYPERLIPIDEMIA: ICD-10-CM

## 2018-07-05 DIAGNOSIS — Z99.89 OSA ON CPAP: ICD-10-CM

## 2018-07-05 DIAGNOSIS — Z95.0 PACEMAKER: ICD-10-CM

## 2018-07-05 DIAGNOSIS — I48.4 ATYPICAL ATRIAL FLUTTER (HCC): Primary | ICD-10-CM

## 2018-07-05 DIAGNOSIS — I48.91 ATRIAL FIBRILLATION, UNSPECIFIED TYPE (HCC): ICD-10-CM

## 2018-07-05 DIAGNOSIS — I10 ESSENTIAL HYPERTENSION: ICD-10-CM

## 2018-07-05 DIAGNOSIS — R53.83 FATIGUE, UNSPECIFIED TYPE: ICD-10-CM

## 2018-07-05 DIAGNOSIS — R06.09 DOE (DYSPNEA ON EXERTION): ICD-10-CM

## 2018-07-05 DIAGNOSIS — I49.5 SSS (SICK SINUS SYNDROME) (HCC): ICD-10-CM

## 2018-07-05 DIAGNOSIS — G47.33 OSA ON CPAP: ICD-10-CM

## 2018-07-05 PROBLEM — E66.01 SEVERE OBESITY (BMI 35.0-39.9): Status: ACTIVE | Noted: 2018-07-05

## 2018-07-05 RX ORDER — GLUCOSAMINE SULFATE 1500 MG
1000 POWDER IN PACKET (EA) ORAL
COMMUNITY
End: 2018-10-02

## 2018-07-05 NOTE — PROGRESS NOTES
[address] [address]  [phone]     Subjective:      Sabrina Hathaway is a 69 y.o. male is here for symptom based visit. Reports BECERRIL, fatigue and mild chest discomfort on PM insertion site. The patient denies chest pain, orthopnea, PND,  palpitations, syncope, or presyncope.        Patient Active Problem List    Diagnosis Date Noted    Severe obesity (BMI 35.0-39.9) ([address] 07/05/2018    Localized epilepsy with impairment of consciousness ([address]     Common wart 05/16/2016    Chronic right shoulder pain 01/11/2016    Screening for colon cancer 11/04/2015    Acute bronchitis 08/25/2015    Chronic back pain greater than 3 months duration 05/04/2015    Cerebral infarction ([address] 05/04/2015    Pre-op evaluation 01/29/2015    Pacemaker 10/15/2014    SSS (sick sinus syndrome) ([address] 10/13/2014    Syncope 10/13/2014    Seizure disorder ([address] 10/13/2014    RONAL on CPAP 10/13/2014    Tinea corporis 05/07/2014    Insomnia 01/03/2014    Ankle fracture, left 08/28/2013    Constipation 12/14/2012    Cataract 07/30/2012    Atrial fibrillation ([address] 06/19/2012    TBI (traumatic brain injury) ([address] 06/08/2012    CHI (closed head injury) 05/24/2012    Basal cell cancer 05/24/2012    Atrial flutter ([address] 03/02/2012    Atrial fibrillation or flutter 03/02/2012    Small bowel obstruction ([address] 02/21/2012    Ulcer 09/14/2011    Wax in ear 06/17/2011    Cellulitis 04/04/2011    Rash 03/28/2011    Hypothyroidism 12/22/2010    Hyponatremia 12/22/2010    BPH (benign prostatic hyperplasia) 12/22/2010    HTN (hypertension) 12/08/2010    Depression 12/08/2010    Hypercholesterolemia 12/08/2010    Acid reflux 12/08/2010    Seizure ([address] 12/08/2010      Casie Tse MD  Past Medical History:   Diagnosis Date    A-fib [facility])     Acute bronchitis 8/25/2015    Anxiety     Arrhythmia     atrial fibrillation    Arthritis     BCC (basal cell carcinoma of skin)     BPH (benign prostatic hyperplasia)     Chronic back pain greater than 3 months duration 5/4/2015    Chronic right shoulder pain 1/11/2016    Common wart 5/16/2016    Constipation 12/14/2012    CVA (cerebral infarction) 5/4/2015    Depression     GERD (gastroesophageal reflux disease)     Hearing loss     bilateral hearing aids    Hemiplegia following CVA (cerebrovascular accident) ([address]     left side hemiparesis    History of colostomy     HTN (hypertension)     Hyperlipidemia     Localized epilepsy with impairment of consciousness ([address]     Prostate cancer ([address]     Status post XRT, Lupron    Screening for colon cancer 11/4/2015    Stroke [facility])     traumatic from neck crushing    TBI (traumatic brain injury) ([address] 1994    Unspecified sleep apnea     on CPAP      Past Surgical History:   Procedure Laterality Date    HX ANKLE FRACTURE TX      HX COLECTOMY      colostomy    HX HEENT      ear surgery eddie.  HX HERNIA REPAIR      HX ORTHOPAEDIC      left hip pinning    HX PACEMAKER  2014     Allergies   Allergen Reactions    Ciprofibrate Hives      Family History   Problem Relation Age of Onset    Alzheimer Mother      dec [de-identified]    Cancer Father      dec 76 kidney    Heart Disease Brother     No Known Problems Son       Social History     Social History    Marital status:      Spouse name: N/A    Number of children: N/A    Years of education: N/A     Occupational History    Not on file. Social History Main Topics    Smoking status: Former Smoker     Years: 10.00     Types: Pipe     Quit date: 1/29/1990    Smokeless tobacco: Never Used      Comment: QUIT 1970'S    Alcohol use No    Drug use: No    Sexual activity: Not Currently     Other Topics Concern    Not on file     Social History Narrative    , lives with his wife in [facility]      Current Outpatient Prescriptions   Medication Sig    cholecalciferol (VITAMIN D3) 1,000 unit cap Take  by mouth daily.     meclizine (ANTIVERT) 25 mg tablet Take 25 mg by mouth every eight (8) hours as needed for Nausea. take every 8 hours as needed for nausea    PRADAXA 150 mg capsule TAKE ONE CAPSULE BY MOUTH EVERY 12 HOURS    amiodarone (CORDARONE) 200 mg tablet TAKE ONE TABLET BY MOUTH DAILY    atorvastatin (LIPITOR) 20 mg tablet TAKE ONE TABLET BY MOUTH DAILY    lisinopril (PRINIVIL, ZESTRIL) 40 mg tablet TAKE ONE TABLET BY MOUTH DAILY    carBAMazepine XR (TEGRETOL XR) 200 mg SR tablet Take 1 tab by mouth in AM and 2 tabs in PM x 2 weeks, then 1 tab twice a day (Patient taking differently: Take 1 tab by mouth in AM and 1 tabs in PM)    metoprolol tartrate (LOPRESSOR) 25 mg tablet TAKE ONE-HALF TABLET BY MOUTH TWICE A DAY    lactulose (CHRONULAC) 10 gram/15 mL solution TAKE 1 TEASPOONFUL (5 ML) BY MOUTH THREE TIMES AS DAY AS NEEDED  Indications: constipation    venlafaxine-SR (EFFEXOR-XR) 75 mg capsule TAKE ONE CAPSULE BY MOUTH DAILY    amLODIPine (NORVASC) 10 mg tablet TAKE ONE TABLET BY MOUTH DAILY    magnesium hydroxide ([place] MILK OF MAGNESIA) 400 mg/5 mL suspension Take 30 mL by mouth daily as needed for Constipation.  zolpidem (AMBIEN) 10 mg tablet Take 1 Tab by mouth nightly as needed for Sleep (please take in the bed).  Incontinence Pad, Liner, Disp pads by Does Not Apply route.  betamethasone dipropionate (DIPROSONE) 0.05 % topical cream Apply  to affected area as needed.  DOC-Q-LACE 100 mg capsule TAKE ONE CAPSULE BY MOUTH TWICE A DAY    FIRST AID CLOTH TAPE 1 X 10 \"-yard tape     lutein 20 mg tab Take 1 Tab by mouth daily. 25 mg QD    senna (SENNA) 8.6 mg tablet Take 1 tablet by mouth daily.  finasteride (PROSCAR) 5 mg tablet Take 5 mg by mouth daily.  betamethasone dipropionate (DIPROSONE) 0.05 % topical cream Apply  to affected area. apply to affected area prn as needed for rash    ergocalciferol (ERGOCALCIFEROL) 50,000 unit capsule Take 1 Cap by mouth every seven (7) days.  Indications: Vitamin D Deficiency    meclizine (ANTIVERT) 25 mg tablet TAKE ONE TABLET BY MOUTH EVERY 8 HOURS AS NEEDED    diphenhydrAMINE (BENADRYL ALLERGY) 25 mg tablet Take 1 Tab by mouth every six (6) hours as needed for Sleep.  polyethylene glycol (MIRALAX) 17 gram/dose powder Take 17 g by mouth two (2) times daily as needed. Takes as needed     No current facility-administered medications for this visit. Review of Symptoms:  11 systems reviewed, negative other than as stated in the HPI    Physical ExamPhysical Exam:    Vitals:    07/05/18 1557   BP: 122/64   Pulse: 74   Resp: 16   SpO2: 97%   Weight: 283 lb 4.8 oz (128.5 kg)   Height: 6' 2\" (1.88 m)     Body mass index is 36.37 kg/(m^2). General PE   Gen:  NAD  Mental Status - Alert. General Appearance - Not in acute distress. Chest and Lung Exam   Inspection: Accessory muscles - No use of accessory muscles in breathing. Auscultation:   Breath sounds: - Normal.   Cardiovascular   Inspection: Jugular vein - Bilateral - Inspection Normal.   Palpation/Percussion:   Apical Impulse: - Normal.   Auscultation: Rhythm - Regular. Heart Sounds - S1 WNL and S2 WNL. No S3 or S4. Murmurs & Other Heart Sounds: Auscultation of the heart reveals - No Murmurs. Peripheral Vascular   Upper Extremity: Inspection - Bilateral - No Cyanotic nailbeds or Digital clubbing. + 2 LUCIANA  Lower Extremity:   Palpation: Edema - Bilateral - mild LE edema  Abdomen:   Soft, non-tender, bowel sounds are active.   Neuro: A&O times 3. R side > L side     Labs:   Lab Results   Component Value Date/Time    Cholesterol, total 135 03/16/2018 01:31 PM    Cholesterol, total 145 05/16/2016 01:13 PM    Cholesterol, total 159 11/04/2015 09:09 AM    Cholesterol, total 171 01/26/2015 08:40 AM    Cholesterol, total 170 08/25/2014 08:25 AM    HDL Cholesterol 55 03/16/2018 01:31 PM    HDL Cholesterol 56 05/16/2016 01:13 PM    HDL Cholesterol 61 11/04/2015 09:09 AM    HDL Cholesterol 52 01/26/2015 08:40 AM    HDL Cholesterol 54 08/25/2014 08:25 AM    LDL, calculated 66 03/16/2018 01:31 PM    LDL, calculated 71 05/16/2016 01:13 PM    LDL, calculated 79 11/04/2015 09:09 AM    LDL, calculated 99 01/26/2015 08:40 AM    LDL, calculated 93 08/25/2014 08:25 AM    Triglyceride 70 03/16/2018 01:31 PM    Triglyceride 88 05/16/2016 01:13 PM    Triglyceride 95 11/04/2015 09:09 AM    Triglyceride 99 01/26/2015 08:40 AM    Triglyceride 113 08/25/2014 08:25 AM     Lab Results   Component Value Date/Time    CK 50 10/14/2014 03:35 AM     Lab Results   Component Value Date/Time    Sodium 130 (L) 03/16/2018 01:31 PM    Potassium 5.1 03/16/2018 01:31 PM    Chloride 90 (L) 03/16/2018 01:31 PM    CO2 22 03/16/2018 01:31 PM    Anion gap 8 10/15/2014 02:45 AM    Glucose 86 03/16/2018 01:31 PM    BUN 9 03/16/2018 01:31 PM    Creatinine 0.62 (L) 03/16/2018 01:31 PM    BUN/Creatinine ratio 15 03/16/2018 01:31 PM    GFR est  03/16/2018 01:31 PM    GFR est non-AA 96 03/16/2018 01:31 PM    Calcium 9.0 03/16/2018 01:31 PM    Bilirubin, total <0.2 03/16/2018 01:31 PM    AST (SGOT) 26 03/16/2018 01:31 PM    Alk. phosphatase 131 (H) 03/16/2018 01:31 PM    Protein, total 7.4 03/16/2018 01:31 PM    Albumin 4.1 03/16/2018 01:31 PM    Globulin 4.2 (H) 10/13/2014 03:38 PM    A-G Ratio 1.2 03/16/2018 01:31 PM    ALT (SGPT) 24 03/16/2018 01:31 PM       EKG:  NSR      Assessment:     Assessment:      1. Atypical atrial flutter ([address]    2. Atrial fibrillation, unspecified type ([address]    3. Essential hypertension    4. SSS (sick sinus syndrome) ([address]    5. Pacemaker    6. RONAL on CPAP    7. Mixed hyperlipidemia    8. BECERRIL (dyspnea on exertion)    9.  Fatigue, unspecified type        Orders Placed This Encounter    CBC W/O DIFF    THYROID PANEL W/TSH    METABOLIC PANEL, COMPREHENSIVE    AMB POC EKG ROUTINE W/ 12 LEADS, INTER & REP     Order Specific Question:   Reason for Exam:     Answer:   routine    2D ECHO COMPLETE ADULT (TTE) W OR WO CONTR     Order Specific Question:   Reason for Exam:     Answer:   fatigue     Order Specific Question:   Contrast Enhancement (Bubble Study, Definity, Optison) may be used if criteria listed in established evidence-based protocol has been identified. Answer: Yes    cholecalciferol (VITAMIN D3) 1,000 unit cap     Sig: Take  by mouth daily. Plan:     1. A. Fib, s/p ablation. S/p PPM. Maintaining SR. On Amiodarone, BB and pradaxa. Follow up with EP -- c/o mild pain pacemaker site. 2. Hyperlipidemia: 3/18 LDL at 66. On statin. Lipids and labs followed by PCP. 3. HTN: Controlled with current therapy  4. C/o BECERRIL, fatigue: Denies any bleeding episode. Will check labs (thyroid, cbc, cmp). Will also check echo. PM interrogation same day as scheduled Echo  5. RONAL: On CPAP therapy    6. HX CVA with L sided weakness. Continue current care and f/u in 6 mos unless testing abnormal    Christine Free, NP     Pt seen and examined in details. Agree with NP A&P.       Kuldeep Kirkpatrick MD

## 2018-07-05 NOTE — PROGRESS NOTES
Chief Complaint   Patient presents with    Shortness of Breath     sob, fatigue, pain near pm, swelling lt arm      1. Have you been to the ER, urgent care clinic since your last visit? Hospitalized since your last visit? No    2. Have you seen or consulted any other health care providers outside of the [address] [place] [address] since your last visit? Include any pap smears or colon screening.  Sleep/CPAP Angelina Wyman [facility] 298

## 2018-07-05 NOTE — MR AVS SNAPSHOT
Umberto Vann [address] [facility] 
[phone] Patient: Mariola Kyle MRN: [id number] IER:[birth year] Visit Information Date & Time Provider Department Dept. Phone Encounter #  
 7/5/2018  3:45 PM Fred Garcia, [address] [facility] [phone] Your Appointments 7/12/2018 10:30 AM  
PROCEDURE with PACEMAKER, [facility] [address] [place] [address]) Appt Note: device check 7/5/18 kb  
 [address] [facility]  
[phone] [address] [facility]  
  
    
 7/12/2018 11:00 AM  
ECHO CARDIOGRAMS 2D with [address] [facility] [address] [place] [address]) Appt Note: 2D ECHO COMPLETE ADULT (TTE) W OR WO CONTR [[id number]] (Order [id number]) 7/5/18 kb  
 [address] [facility]  
[phone] [address] [facility]  
  
    
 8/7/2018  9:15 AM  
PROCEDURE with PACEMAKER, [facility] [address] [place] [address]) Appt Note: MDT DCPM 6mo check, annual with Dr. April Brown prefers AM appt [address] [facility]  
[phone]  
  
    
 8/7/2018  9:15 AM  
ESTABLISHED PATIENT with Kamini Nguyen MD  
[facility] [address]) Appt Note: MDT DCPM 6mo check, annual with Dr. April Brown prefers AM appt [address] [place] [facility]  
[phone] [address] [facility] [place]  
  
    
 10/9/2018  9:00 AM  
ESTABLISHED PATIENT with Fred Garcia MD  
[facility] [address] [place] [address]) Appt Note: 6 month per Dr. Carola Mosqueda 4/10/18 Austine Richland [address] [facility]  
[phone] [address] [facility] Upcoming Health Maintenance Date Due  
 GLAUCOMA SCREENING Q2Y 5/4/2017 MEDICARE YEARLY EXAM 3/26/2018 Influenza Age 5 to Adult 8/1/2018 DTaP/Tdap/Td series (2 - Td) 8/25/2024 Allergies as of 7/5/2018  Review Complete On: 7/5/2018 By: Uziel Luong MD  
  
 Severity Noted Reaction Type Reactions Ciprofibrate  03/26/2015    Hives Current Immunizations  Reviewed on 11/17/2016 Name Date Influenza High Dose Vaccine PF 10/10/2016 Influenza Vaccine 10/15/2015, 10/10/2014 Influenza Vaccine Split 10/1/2012, 9/14/2011 Pneumococcal Conjugate (PCV-13) 11/4/2015  9:02 AM  
 Tdap 8/25/2014 Varicella Virus Vaccine 5/7/2012 ZZZ-RETIRED (DO NOT USE) Pneumococcal Vaccine (Unspecified Type) 2/22/2012  4:39 PM  
  
 Not reviewed this visit You Were Diagnosed With   
  
 Codes Comments Atypical atrial flutter (HCC)    -  Primary ICD-10-CM: I48.4 ICD-9-CM: 427.32 Atrial fibrillation, unspecified type ([facility]     ICD-10-CM: I48.91 
ICD-9-CM: 427.31 Essential hypertension     ICD-10-CM: I10 
ICD-9-CM: 401.9 SSS (sick sinus syndrome) (HCC)     ICD-10-CM: I49.5 ICD-9-CM: 427.81 Pacemaker     ICD-10-CM: Z95.0 ICD-9-CM: V45.01   
 RONAL on CPAP     ICD-10-CM: G47.33, Z99.89 ICD-9-CM: 327.23, V46.8 Mixed hyperlipidemia     ICD-10-CM: E78.2 ICD-9-CM: 272.2 BECERRIL (dyspnea on exertion)     ICD-10-CM: R06.09 
ICD-9-CM: 786.09 Fatigue, unspecified type     ICD-10-CM: R53.83 ICD-9-CM: 780.79 Vitals BP Pulse Resp Height(growth percentile) Weight(growth percentile) SpO2  
 122/64 (BP 1 Location: Right arm, BP Patient Position: Sitting) 74 16 6' 2\" (1.88 m) 283 lb 4.8 oz (128.5 kg) 97% BMI Smoking Status 36.37 kg/m2 Former Smoker Vitals History BMI and BSA Data Body Mass Index Body Surface Area  
 36.37 kg/m 2 2.59 m 2 Preferred Pharmacy Pharmacy Name Phone Hal[facility] B[facility] [address], [address] [facility] [phone] Your Updated Medication List  
  
   
 This list is accurate as of 7/5/18  4:56 PM.  Always use your most recent med list.  
  
  
  
  
 amiodarone 200 mg tablet Commonly known as:  CORDARONE  
TAKE ONE TABLET BY MOUTH DAILY  
  
 amLODIPine 10 mg tablet Commonly known as:  Deborah Darting TAKE ONE TABLET BY MOUTH DAILY  
  
 atorvastatin 20 mg tablet Commonly known as:  LIPITOR  
TAKE ONE TABLET BY MOUTH DAILY * betamethasone dipropionate 0.05 % topical cream  
Commonly known as:  Luis Ores Apply  to affected area as needed. * betamethasone dipropionate 0.05 % topical cream  
Commonly known as:  Luis Ores Apply  to affected area. apply to affected area prn as needed for rash  
  
 carBAMazepine  mg SR tablet Commonly known as:  TEGretol XR Take 1 tab by mouth in AM and 2 tabs in PM x 2 weeks, then 1 tab twice a day  
  
 diphenhydrAMINE 25 mg tablet Commonly known as:  BENADRYL ALLERGY Take 1 Tab by mouth every six (6) hours as needed for Sleep. DOC-Q-LACE 100 mg capsule Generic drug:  docusate sodium TAKE ONE CAPSULE BY MOUTH TWICE A DAY  
  
 ergocalciferol 50,000 unit capsule Commonly known as:  ERGOCALCIFEROL Take 1 Cap by mouth every seven (7) days. Indications: Vitamin D Deficiency  
  
 finasteride 5 mg tablet Commonly known as:  PROSCAR Take 5 mg by mouth daily. FIRST AID CLOTH TAPE 1 X 10 \"-yard Tape Generic drug:  Adhesive Tape Incontinence Pad, Liner, Disp Pads  
by Does Not Apply route. lactulose 10 gram/15 mL solution Commonly known as:  Leverne Manners TAKE 1 TEASPOONFUL (5 ML) BY MOUTH THREE TIMES AS DAY AS NEEDED  Indications: constipation  
  
 lisinopril 40 mg tablet Commonly known as:  PRINIVIL, ZESTRIL  
TAKE ONE TABLET BY MOUTH DAILY  
  
 lutein 20 mg Tab Take 1 Tab by mouth daily. 25 mg QD  
  
 * meclizine 25 mg tablet Commonly known as:  ANTIVERT  
TAKE ONE TABLET BY MOUTH EVERY 8 HOURS AS NEEDED  
  
 * meclizine 25 mg tablet Commonly known as:  ANTIVERT Take 25 mg by mouth every eight (8) hours as needed for Nausea. take every 8 hours as needed for nausea  
  
 metoprolol tartrate 25 mg tablet Commonly known as:  LOPRESSOR  
TAKE ONE-HALF TABLET BY MOUTH TWICE A DAY MIRALAX 17 gram/dose powder Generic drug:  polyethylene glycol Take 17 g by mouth two (2) times daily as needed. Takes as needed SOTO MILK OF MAGNESIA 400 mg/5 mL suspension Generic drug:  magnesium hydroxide Take 30 mL by mouth daily as needed for Constipation. PRADAXA 150 mg capsule Generic drug:  dabigatran etexilate TAKE ONE CAPSULE BY MOUTH EVERY 12 HOURS Senna 8.6 mg tablet Generic drug:  senna Take 1 tablet by mouth daily. venlafaxine-SR 75 mg capsule Commonly known as:  EFFEXOR-XR  
TAKE ONE CAPSULE BY MOUTH DAILY  
  
 VITAMIN D3 1,000 unit Cap Generic drug:  cholecalciferol Take  by mouth daily. zolpidem 10 mg tablet Commonly known as:  AMBIEN Take 1 Tab by mouth nightly as needed for Sleep (please take in the bed). * Notice: This list has 4 medication(s) that are the same as other medications prescribed for you. Read the directions carefully, and ask your doctor or other care provider to review them with you. We Performed the Following 2D ECHO COMPLETE ADULT (TTE) W OR WO CONTR [41444 CPT(R)] AMB POC EKG ROUTINE W/ 12 LEADS, INTER & REP [73184 CPT(R)] CBC W/O DIFF [06925 CPT(R)] METABOLIC PANEL, COMPREHENSIVE [63515 CPT(R)] THYROID PANEL W/TSH [46861 CPT(R)] Introducing [facility]! Dear Pako Babin: 
Thank you for requesting a Qwilt account. Our records indicate that you already have an active Qwilt account. You can access your account anytime at [email or web address] Did you know that you can access your hospital and ER discharge instructions at any time in Qwilt?   You can also review all of your test results from your hospital stay or ER visit. Additional Information If you have questions, please visit the Frequently Asked Questions section of the [email or web address] website at [email or web address] Remember, [email or web address] is NOT to be used for urgent needs. For medical emergencies, dial 911. Now available from your iPhone and Android! Please provide this summary of care documentation to your next provider. Your primary care clinician is listed as Kelsey Calix. If you have any questions after today's visit, please call [phone].

## 2018-07-10 ENCOUNTER — HOSPITAL ENCOUNTER (INPATIENT)
Age: 78
LOS: 84 days | Discharge: HOSPICE | DRG: 329 | End: 2018-10-02
Attending: EMERGENCY MEDICINE | Admitting: HOSPITALIST
Payer: MEDICARE

## 2018-07-10 ENCOUNTER — TELEPHONE (OUTPATIENT)
Dept: CARDIOLOGY CLINIC | Age: 78
End: 2018-07-10

## 2018-07-10 ENCOUNTER — APPOINTMENT (OUTPATIENT)
Dept: CT IMAGING | Age: 78
DRG: 329 | End: 2018-07-10
Attending: EMERGENCY MEDICINE
Payer: MEDICARE

## 2018-07-10 DIAGNOSIS — D64.9 ANEMIA, UNSPECIFIED TYPE: Primary | ICD-10-CM

## 2018-07-10 DIAGNOSIS — I49.5 SSS (SICK SINUS SYNDROME) (HCC): ICD-10-CM

## 2018-07-10 DIAGNOSIS — G93.40 ACUTE ENCEPHALOPATHY: ICD-10-CM

## 2018-07-10 DIAGNOSIS — I48.3 TYPICAL ATRIAL FLUTTER (HCC): ICD-10-CM

## 2018-07-10 DIAGNOSIS — R60.1 ANASARCA: ICD-10-CM

## 2018-07-10 DIAGNOSIS — I48.20 CHRONIC ATRIAL FIBRILLATION (HCC): ICD-10-CM

## 2018-07-10 DIAGNOSIS — R53.1 GENERALIZED WEAKNESS: ICD-10-CM

## 2018-07-10 DIAGNOSIS — I48.0 PAROXYSMAL ATRIAL FIBRILLATION (HCC): ICD-10-CM

## 2018-07-10 DIAGNOSIS — I10 ESSENTIAL HYPERTENSION: ICD-10-CM

## 2018-07-10 DIAGNOSIS — G40.909 POST TRAUMATIC EPILEPSY (HCC): ICD-10-CM

## 2018-07-10 DIAGNOSIS — Z71.89 COUNSELING REGARDING GOALS OF CARE: ICD-10-CM

## 2018-07-10 DIAGNOSIS — S06.9XAS POST TRAUMATIC EPILEPSY (HCC): ICD-10-CM

## 2018-07-10 DIAGNOSIS — C18.2 MALIGNANT NEOPLASM OF ASCENDING COLON (HCC): ICD-10-CM

## 2018-07-10 DIAGNOSIS — I95.0 IDIOPATHIC HYPOTENSION: ICD-10-CM

## 2018-07-10 PROBLEM — K92.2 GI BLEED: Status: ACTIVE | Noted: 2018-07-10

## 2018-07-10 PROBLEM — D62 ACUTE BLOOD LOSS ANEMIA: Status: ACTIVE | Noted: 2018-07-10

## 2018-07-10 LAB
ALBUMIN SERPL-MCNC: 3.1 G/DL (ref 3.5–5)
ALBUMIN SERPL-MCNC: 3.7 G/DL (ref 3.5–4.8)
ALBUMIN/GLOB SERPL: 0.8 {RATIO} (ref 1.1–2.2)
ALBUMIN/GLOB SERPL: 1.3 {RATIO} (ref 1.2–2.2)
ALP SERPL-CCNC: 125 IU/L (ref 39–117)
ALP SERPL-CCNC: 130 U/L (ref 45–117)
ALT SERPL-CCNC: 18 IU/L (ref 0–44)
ALT SERPL-CCNC: 26 U/L (ref 12–78)
ANION GAP SERPL CALC-SCNC: 8 MMOL/L (ref 5–15)
APTT PPP: 33.9 SEC (ref 22.1–32)
AST SERPL-CCNC: 21 U/L (ref 15–37)
AST SERPL-CCNC: 22 IU/L (ref 0–40)
BASOPHILS # BLD: 0 K/UL (ref 0–0.1)
BASOPHILS NFR BLD: 0 % (ref 0–1)
BILIRUB SERPL-MCNC: 0.3 MG/DL (ref 0.2–1)
BILIRUB SERPL-MCNC: <0.2 MG/DL (ref 0–1.2)
BNP SERPL-MCNC: 754 PG/ML (ref 0–450)
BUN SERPL-MCNC: 10 MG/DL (ref 6–20)
BUN SERPL-MCNC: 11 MG/DL (ref 8–27)
BUN/CREAT SERPL: 16 (ref 12–20)
BUN/CREAT SERPL: 18 (ref 10–24)
CALCIUM SERPL-MCNC: 8.4 MG/DL (ref 8.5–10.1)
CALCIUM SERPL-MCNC: 8.4 MG/DL (ref 8.6–10.2)
CHLORIDE SERPL-SCNC: 100 MMOL/L (ref 97–108)
CHLORIDE SERPL-SCNC: 98 MMOL/L (ref 96–106)
CO2 SERPL-SCNC: 20 MMOL/L (ref 20–29)
CO2 SERPL-SCNC: 25 MMOL/L (ref 21–32)
CREAT SERPL-MCNC: 0.62 MG/DL (ref 0.76–1.27)
CREAT SERPL-MCNC: 0.64 MG/DL (ref 0.7–1.3)
DIFFERENTIAL METHOD BLD: ABNORMAL
EOSINOPHIL # BLD: 0.1 K/UL (ref 0–0.4)
EOSINOPHIL NFR BLD: 1 % (ref 0–7)
ERYTHROCYTE [DISTWIDTH] IN BLOOD BY AUTOMATED COUNT: 16.5 % (ref 12.3–15.4)
ERYTHROCYTE [DISTWIDTH] IN BLOOD BY AUTOMATED COUNT: 17 % (ref 11.5–14.5)
FERRITIN SERPL-MCNC: 5 NG/ML (ref 26–388)
FT4I SERPL CALC-MCNC: 1.9 (ref 1.2–4.9)
GLOBULIN SER CALC-MCNC: 2.9 G/DL (ref 1.5–4.5)
GLOBULIN SER CALC-MCNC: 3.9 G/DL (ref 2–4)
GLUCOSE SERPL-MCNC: 100 MG/DL (ref 65–100)
GLUCOSE SERPL-MCNC: 107 MG/DL (ref 65–99)
HCT VFR BLD AUTO: 18.9 % (ref 36.6–50.3)
HCT VFR BLD AUTO: 19.6 % (ref 37.5–51)
HEMOCCULT STL QL: POSITIVE
HGB BLD-MCNC: 5.5 G/DL (ref 12.1–17)
HGB BLD-MCNC: 5.5 G/DL (ref 13–17.7)
IMM GRANULOCYTES # BLD: 0.1 K/UL (ref 0–0.04)
IMM GRANULOCYTES NFR BLD AUTO: 1 % (ref 0–0.5)
INR PPP: 1.5 (ref 0.9–1.1)
IRON SATN MFR SERPL: 2 % (ref 20–50)
IRON SERPL-MCNC: 9 UG/DL (ref 35–150)
LYMPHOCYTES # BLD: 1.3 K/UL (ref 0.8–3.5)
LYMPHOCYTES NFR BLD: 16 % (ref 12–49)
MCH RBC QN AUTO: 22.4 PG (ref 26.6–33)
MCH RBC QN AUTO: 22.9 PG (ref 26–34)
MCHC RBC AUTO-ENTMCNC: 28.1 G/DL (ref 31.5–35.7)
MCHC RBC AUTO-ENTMCNC: 29.1 G/DL (ref 30–36.5)
MCV RBC AUTO: 78.8 FL (ref 80–99)
MCV RBC AUTO: 80 FL (ref 79–97)
MONOCYTES # BLD: 1.1 K/UL (ref 0–1)
MONOCYTES NFR BLD: 13 % (ref 5–13)
NEUTS SEG # BLD: 5.7 K/UL (ref 1.8–8)
NEUTS SEG NFR BLD: 69 % (ref 32–75)
NRBC # BLD: 0.04 K/UL (ref 0–0.01)
NRBC BLD-RTO: 0.5 PER 100 WBC
PATH REV BLD -IMP: NORMAL
PLATELET # BLD AUTO: 301 K/UL (ref 150–400)
PLATELET # BLD AUTO: 333 X10E3/UL (ref 150–379)
PMV BLD AUTO: 9.2 FL (ref 8.9–12.9)
POTASSIUM SERPL-SCNC: 4.8 MMOL/L (ref 3.5–5.1)
POTASSIUM SERPL-SCNC: 4.8 MMOL/L (ref 3.5–5.2)
PROT SERPL-MCNC: 6.6 G/DL (ref 6–8.5)
PROT SERPL-MCNC: 7 G/DL (ref 6.4–8.2)
PROTHROMBIN TIME: 14.9 SEC (ref 9–11.1)
RBC # BLD AUTO: 2.4 M/UL (ref 4.1–5.7)
RBC # BLD AUTO: 2.45 X10E6/UL (ref 4.14–5.8)
RBC MORPH BLD: ABNORMAL
SODIUM SERPL-SCNC: 132 MMOL/L (ref 134–144)
SODIUM SERPL-SCNC: 133 MMOL/L (ref 136–145)
T3RU NFR SERPL: 27 % (ref 24–39)
T4 SERPL-MCNC: 7.1 UG/DL (ref 4.5–12)
THERAPEUTIC RANGE,PTTT: ABNORMAL SECS (ref 58–77)
TIBC SERPL-MCNC: 413 UG/DL (ref 250–450)
TROPONIN I SERPL-MCNC: <0.05 NG/ML
TSH SERPL DL<=0.005 MIU/L-ACNC: 5.28 UIU/ML (ref 0.45–4.5)
WBC # BLD AUTO: 8.3 K/UL (ref 4.1–11.1)
WBC # BLD AUTO: 8.3 X10E3/UL (ref 3.4–10.8)

## 2018-07-10 PROCEDURE — 36430 TRANSFUSION BLD/BLD COMPNT: CPT

## 2018-07-10 PROCEDURE — 74011250637 HC RX REV CODE- 250/637: Performed by: HOSPITALIST

## 2018-07-10 PROCEDURE — 82272 OCCULT BLD FECES 1-3 TESTS: CPT | Performed by: EMERGENCY MEDICINE

## 2018-07-10 PROCEDURE — 86923 COMPATIBILITY TEST ELECTRIC: CPT | Performed by: EMERGENCY MEDICINE

## 2018-07-10 PROCEDURE — 84484 ASSAY OF TROPONIN QUANT: CPT | Performed by: EMERGENCY MEDICINE

## 2018-07-10 PROCEDURE — 80053 COMPREHEN METABOLIC PANEL: CPT | Performed by: EMERGENCY MEDICINE

## 2018-07-10 PROCEDURE — 99285 EMERGENCY DEPT VISIT HI MDM: CPT

## 2018-07-10 PROCEDURE — 86900 BLOOD TYPING SEROLOGIC ABO: CPT | Performed by: EMERGENCY MEDICINE

## 2018-07-10 PROCEDURE — 74011000250 HC RX REV CODE- 250: Performed by: HOSPITALIST

## 2018-07-10 PROCEDURE — 36415 COLL VENOUS BLD VENIPUNCTURE: CPT | Performed by: EMERGENCY MEDICINE

## 2018-07-10 PROCEDURE — P9016 RBC LEUKOCYTES REDUCED: HCPCS | Performed by: EMERGENCY MEDICINE

## 2018-07-10 PROCEDURE — C9113 INJ PANTOPRAZOLE SODIUM, VIA: HCPCS | Performed by: HOSPITALIST

## 2018-07-10 PROCEDURE — 82728 ASSAY OF FERRITIN: CPT | Performed by: HOSPITALIST

## 2018-07-10 PROCEDURE — 74011636320 HC RX REV CODE- 636/320: Performed by: EMERGENCY MEDICINE

## 2018-07-10 PROCEDURE — 74011250636 HC RX REV CODE- 250/636: Performed by: HOSPITALIST

## 2018-07-10 PROCEDURE — 74011250636 HC RX REV CODE- 250/636: Performed by: EMERGENCY MEDICINE

## 2018-07-10 PROCEDURE — 83540 ASSAY OF IRON: CPT | Performed by: HOSPITALIST

## 2018-07-10 PROCEDURE — 85025 COMPLETE CBC W/AUTO DIFF WBC: CPT | Performed by: EMERGENCY MEDICINE

## 2018-07-10 PROCEDURE — 83880 ASSAY OF NATRIURETIC PEPTIDE: CPT | Performed by: HOSPITALIST

## 2018-07-10 PROCEDURE — 85610 PROTHROMBIN TIME: CPT | Performed by: EMERGENCY MEDICINE

## 2018-07-10 PROCEDURE — 65270000015 HC RM PRIVATE ONCOLOGY

## 2018-07-10 PROCEDURE — 74177 CT ABD & PELVIS W/CONTRAST: CPT

## 2018-07-10 PROCEDURE — 85730 THROMBOPLASTIN TIME PARTIAL: CPT | Performed by: EMERGENCY MEDICINE

## 2018-07-10 PROCEDURE — 30233N1 TRANSFUSION OF NONAUTOLOGOUS RED BLOOD CELLS INTO PERIPHERAL VEIN, PERCUTANEOUS APPROACH: ICD-10-PCS | Performed by: HOSPITALIST

## 2018-07-10 RX ORDER — VENLAFAXINE HYDROCHLORIDE 37.5 MG/1
75 CAPSULE, EXTENDED RELEASE ORAL
Status: DISCONTINUED | OUTPATIENT
Start: 2018-07-11 | End: 2018-07-21

## 2018-07-10 RX ORDER — CARBAMAZEPINE 200 MG/1
200 TABLET, EXTENDED RELEASE ORAL 2 TIMES DAILY
COMMUNITY
End: 2018-10-02

## 2018-07-10 RX ORDER — SODIUM CHLORIDE 0.9 % (FLUSH) 0.9 %
5-10 SYRINGE (ML) INJECTION AS NEEDED
Status: DISCONTINUED | OUTPATIENT
Start: 2018-07-10 | End: 2018-10-02 | Stop reason: HOSPADM

## 2018-07-10 RX ORDER — AMIODARONE HYDROCHLORIDE 200 MG/1
200 TABLET ORAL DAILY
Status: DISCONTINUED | OUTPATIENT
Start: 2018-07-11 | End: 2018-07-21

## 2018-07-10 RX ORDER — ACETAMINOPHEN 500 MG
1000 TABLET ORAL
COMMUNITY
End: 2018-10-02 | Stop reason: SDUPTHER

## 2018-07-10 RX ORDER — LISINOPRIL 20 MG/1
40 TABLET ORAL DAILY
Status: DISCONTINUED | OUTPATIENT
Start: 2018-07-11 | End: 2018-07-21

## 2018-07-10 RX ORDER — ATORVASTATIN CALCIUM 20 MG/1
20 TABLET, FILM COATED ORAL
Status: DISCONTINUED | OUTPATIENT
Start: 2018-07-10 | End: 2018-07-21

## 2018-07-10 RX ORDER — DOCUSATE SODIUM 100 MG/1
100 CAPSULE, LIQUID FILLED ORAL 2 TIMES DAILY
Status: DISCONTINUED | OUTPATIENT
Start: 2018-07-10 | End: 2018-07-15

## 2018-07-10 RX ORDER — SODIUM CHLORIDE 9 MG/ML
250 INJECTION, SOLUTION INTRAVENOUS AS NEEDED
Status: DISCONTINUED | OUTPATIENT
Start: 2018-07-10 | End: 2018-07-10

## 2018-07-10 RX ORDER — AMLODIPINE BESYLATE 10 MG/1
10 TABLET ORAL
COMMUNITY
End: 2018-10-02

## 2018-07-10 RX ORDER — METOPROLOL TARTRATE 25 MG/1
12.5 TABLET, FILM COATED ORAL 2 TIMES DAILY
Status: DISCONTINUED | OUTPATIENT
Start: 2018-07-10 | End: 2018-07-21

## 2018-07-10 RX ORDER — SENNOSIDES 8.6 MG/1
1 TABLET ORAL DAILY
Status: DISCONTINUED | OUTPATIENT
Start: 2018-07-11 | End: 2018-07-15

## 2018-07-10 RX ORDER — SODIUM CHLORIDE 9 MG/ML
250 INJECTION, SOLUTION INTRAVENOUS AS NEEDED
Status: DISCONTINUED | OUTPATIENT
Start: 2018-07-10 | End: 2018-07-16 | Stop reason: ALTCHOICE

## 2018-07-10 RX ORDER — FINASTERIDE 5 MG/1
5 TABLET, FILM COATED ORAL DAILY
Status: DISCONTINUED | OUTPATIENT
Start: 2018-07-11 | End: 2018-07-21

## 2018-07-10 RX ORDER — SODIUM CHLORIDE 0.9 % (FLUSH) 0.9 %
10 SYRINGE (ML) INJECTION
Status: COMPLETED | OUTPATIENT
Start: 2018-07-10 | End: 2018-07-10

## 2018-07-10 RX ORDER — SODIUM CHLORIDE 0.9 % (FLUSH) 0.9 %
5-10 SYRINGE (ML) INJECTION EVERY 8 HOURS
Status: DISCONTINUED | OUTPATIENT
Start: 2018-07-10 | End: 2018-07-30

## 2018-07-10 RX ORDER — FUROSEMIDE 10 MG/ML
40 INJECTION INTRAMUSCULAR; INTRAVENOUS ONCE
Status: COMPLETED | OUTPATIENT
Start: 2018-07-10 | End: 2018-07-10

## 2018-07-10 RX ORDER — ATORVASTATIN CALCIUM 20 MG/1
20 TABLET, FILM COATED ORAL
COMMUNITY
End: 2018-10-02

## 2018-07-10 RX ORDER — ACETAMINOPHEN 325 MG/1
650 TABLET ORAL
Status: DISCONTINUED | OUTPATIENT
Start: 2018-07-10 | End: 2018-09-07

## 2018-07-10 RX ORDER — SODIUM CHLORIDE 9 MG/ML
50 INJECTION, SOLUTION INTRAVENOUS
Status: COMPLETED | OUTPATIENT
Start: 2018-07-10 | End: 2018-07-16

## 2018-07-10 RX ORDER — ONDANSETRON 2 MG/ML
4 INJECTION INTRAMUSCULAR; INTRAVENOUS
Status: DISCONTINUED | OUTPATIENT
Start: 2018-07-10 | End: 2018-10-02 | Stop reason: HOSPADM

## 2018-07-10 RX ORDER — CARBAMAZEPINE 200 MG/1
200 TABLET, EXTENDED RELEASE ORAL 2 TIMES DAILY
Status: DISCONTINUED | OUTPATIENT
Start: 2018-07-10 | End: 2018-07-21

## 2018-07-10 RX ORDER — MELATONIN
1000
Status: DISCONTINUED | OUTPATIENT
Start: 2018-07-11 | End: 2018-07-21

## 2018-07-10 RX ADMIN — METOPROLOL TARTRATE 12.5 MG: 25 TABLET ORAL at 19:20

## 2018-07-10 RX ADMIN — DOCUSATE SODIUM 100 MG: 100 CAPSULE, LIQUID FILLED ORAL at 19:20

## 2018-07-10 RX ADMIN — SODIUM CHLORIDE 50 ML/HR: 900 INJECTION, SOLUTION INTRAVENOUS at 13:07

## 2018-07-10 RX ADMIN — SODIUM CHLORIDE 10 ML: 9 INJECTION, SOLUTION INTRAMUSCULAR; INTRAVENOUS; SUBCUTANEOUS at 19:21

## 2018-07-10 RX ADMIN — FUROSEMIDE 40 MG: 10 INJECTION, SOLUTION INTRAMUSCULAR; INTRAVENOUS at 19:19

## 2018-07-10 RX ADMIN — ATORVASTATIN CALCIUM 20 MG: 20 TABLET, FILM COATED ORAL at 19:20

## 2018-07-10 RX ADMIN — SODIUM CHLORIDE 40 MG: 9 INJECTION INTRAMUSCULAR; INTRAVENOUS; SUBCUTANEOUS at 21:22

## 2018-07-10 RX ADMIN — Medication 10 ML: at 13:07

## 2018-07-10 RX ADMIN — SODIUM CHLORIDE 10 ML: 9 INJECTION, SOLUTION INTRAMUSCULAR; INTRAVENOUS; SUBCUTANEOUS at 21:23

## 2018-07-10 RX ADMIN — CARBAMAZEPINE 200 MG: 200 TABLET, EXTENDED RELEASE ORAL at 19:20

## 2018-07-10 RX ADMIN — IOPAMIDOL 100 ML: 755 INJECTION, SOLUTION INTRAVENOUS at 13:06
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
## 2018-07-10 NOTE — TELEPHONE ENCOUNTER
Left message on home phone and cell phone to call back this AM regarding abnormal labs that need attention this AM, specifically Hgb of 5.5. Copy to Madelin Kwok and Dr. Mariaelena Marlow.

## 2018-07-10 NOTE — IP AVS SNAPSHOT
[address] [facility] 
[phone] Patient: Arabella Gaines MRN: [id number] AZD:[id number] About your hospitalization You were admitted on:  July 10, 2018 You last received care in the:  [facility] 3 [facility] You were discharged on:  October 2, 2018 Why you were hospitalized Your primary diagnosis was:  Gi Bleed Your diagnoses also included:  Acute Blood Loss Anemia, Anasarca, Acute Encephalopathy, Idiopathic Hypotension, Counseling Regarding Goals Of Care Follow-up Information Follow up With Details Comments Contact Info Dayana Camarillo NP  call as needed with questions 104 [address] [address] 
[phone] [facility] Call any time day or night for help with hospice Your Scheduled Appointments Monday October 22, 2018  9:15 AM EDT  
ESTABLISHED PATIENT with Antonia Anderson MD  
[facility] [address] [facility]) [address] [place] [address]  
[phone] Discharge Orders None A check rema indicates which time of day the medication should be taken. My Medications START taking these medications Instructions Each Dose to Equal  
 Morning Noon Evening Bedtime  
 furosemide 20 mg tablet Commonly known as:  LASIX Your last dose was: Your next dose is: Take 60 mg via PEG as needed for leg edema  
     
   
   
   
  
 levETIRAcetam 100 mg/ml Soln oral solution Commonly known as:  KEPPRA Your last dose was: Your next dose is:    
   
   
 2.5 mL by Per G Tube route two (2) times a day for 30 days. 250 mg  
    
   
   
   
  
 venlafaxine 37.5 mg tablet Commonly known as:  [facility] Replaces:  venlafaxine-SR 75 mg capsule Your last dose was: Your next dose is: 1 Tab by Per G Tube route two (2) times daily (with meals) for 30 days. 37.5 mg  
    
   
   
   
  
  
CHANGE how you take these medications Instructions Each Dose to Equal  
 Morning Noon Evening Bedtime  
 amiodarone 200 mg tablet Commonly known as:  CORDARONE What changed:  See the new instructions. Your last dose was: Your next dose is:    
   
   
 1 Tab by Per G Tube route daily. 200 mg CONTINUE taking these medications Instructions Each Dose to Equal  
 Morning Noon Evening Bedtime  
 acetaminophen 500 mg tablet Commonly known as:  TYLENOL Your last dose was: Your next dose is: Take 1,000 mg by mouth every six (6) hours as needed for Pain. 1000 mg  
    
   
   
   
  
 lactulose 10 gram/15 mL solution Commonly known as:  Kelsey Clayton Your last dose was: Your next dose is: TAKE 1 TEASPOONFUL (5 ML) BY MOUTH THREE TIMES AS DAY AS NEEDED  Indications: constipation  
     
   
   
   
  
 meclizine 25 mg tablet Commonly known as:  ANTIVERT Your last dose was: Your next dose is: Take 25 mg by mouth every eight (8) hours as needed for Nausea. take every 8 hours as needed for nausea 25 mg  
    
   
   
   
  
  
STOP taking these medications   
 amLODIPine 10 mg tablet Commonly known as:  NORVASC  
   
  
 atorvastatin 20 mg tablet Commonly known as:  LIPITOR  
   
  
 betamethasone dipropionate 0.05 % topical cream  
Commonly known as:  DIPROSONE  
   
  
 carBAMazepine  mg SR tablet Commonly known as:  TEGretol XR  
   
  
 DOC-Q-LACE 100 mg capsule Generic drug:  docusate sodium  
   
  
 finasteride 5 mg tablet Commonly known as:  PROSCAR  
   
  
 lisinopril 40 mg tablet Commonly known as:  PRINIVIL, ZESTRIL  
   
  
 lutein 20 mg Tab  
   
  
 metoprolol tartrate 25 mg tablet Commonly known as:  LOPRESSOR  
   
  
 BRITTANY MILK OF MAGNDEAN 400 mg/5 mL suspension Generic drug:  magnesium hydroxide PRADAXA 150 mg capsule Generic drug:  dabigatran etexilate Senna 8.6 mg tablet Generic drug:  senna TEGretol  mg SR tablet Generic drug:  carBAMazepine XR  
   
  
 venlafaxine-SR 75 mg capsule Commonly known as:  EFFEXOR-XR Replaced by:  venlafaxine 37.5 mg tablet VITAMIN D3 1,000 unit Cap Generic drug:  cholecalciferol  
   
  
 zolpidem 10 mg tablet Commonly known as:  AMBIEN Where to Get Your Medications Information on where to get these meds will be given to you by the nurse or doctor. ! Ask your nurse or doctor about these medications  
  amiodarone 200 mg tablet  
 furosemide 20 mg tablet  
 levETIRAcetam 100 mg/ml Soln oral solution  
 venlafaxine 37.5 mg tablet Discharge Instructions Patient Discharge Instructions Mathew So / [id number] : 1940 Admitted 7/10/2018 Discharged: 10/2/2018 DISCHARGE DIAGNOSIS:  
 
Colon cancer Persistent Bacteremia ( infection in blood) Infected pacemaker Take Home Medications General drug facts If you have a very bad allergy, wear an allergy ID at all times. It is important that you take the medication exactly as they are prescribed. Keep your medication in the bottles provided by the pharmacist. 
Keep a list of all your drugs (prescription, natural products, vitamins, OTC) with you. Give this list to your doctor. Do not take other medications without consulting your doctor. Do not share your drugs with others and do not take anyone else's drugs. Keep all drugs out of the reach of children and pets. Most drugs may be thrown away in household trash after mixing with coffee grounds or grant litter and sealing in a plastic bag. Keep a list Call your doctor for help with any side effects.  If in the [place] you may also call the FDA at 3-972-LCJ-9635 Talk with the doctor before starting any new drug, including OTC, natural products, or vitamins. What to do at [facility] 1. Recommended diet:  
--Full liquid diet 
--PO intake only when patient wants, no force feeding 
--Straws ok 2. Recommended activity: Activity as tolerated 3. If you experience any of the following symptoms then please call your primary care physician or return to the emergency room if you cannot get hold of your doctor:call hospice 4. Wound Care: keep dry and clean 5. Continue to wear cpap as previously 6. Bring these papers with you to your follow up appointments. The papers will help your doctors be sure to continue the care plan from the hospital. 
 
 
Follow-up with:  
PCP: Liz Ann NP Follow-up Information Follow up With Details Comments Contact Info Liz Ann NP  call as needed with questions [address] [address] 
[phone] [address] [place] [address] Call any time day or night for help with hospice Please call for your own appointment Information obtained by : 
I understand that if any problems occur once I am at home I am to contact my physician. I understand and acknowledge receipt of the instructions indicated above. Physician's or R.N.'s Signature                                                                  Date/Time Patient or Representative Signature                                                          Date/Time MyChart Announcement  We are excited to announce that we are making your provider's discharge notes available to you in [email or web address]. You will see these notes when they are completed and signed by the physician that discharged you from your recent hospital stay. If you have any questions or concerns about any information you see in [email or web address], please call the Health Information Department where you were seen or reach out to your Primary Care Provider for more information about your plan of care. Introducing [facility] & HEALTH SERVICES! Dear Mikki Turner: 
Thank you for requesting a [email or web address] account. Our records indicate that you already have an active [email or web address] account. You can access your account anytime at [email or web address] Did you know that you can access your hospital and ER discharge instructions at any time in [email or web address]? You can also review all of your test results from your hospital stay or ER visit. Additional Information If you have questions, please visit the Frequently Asked Questions section of the [email or web address] website at [email or web address] Remember, [email or web address] is NOT to be used for urgent needs. For medical emergencies, dial 911. Now available from your iPhone and Android! Introducing Misael Henderson As a [facility] patient, I wanted to make you aware of our electronic visit tool called Misael Henderson. [facility] 24/7 allows you to connect within minutes with a medical provider 24 hours a day, seven days a week via a mobile device or tablet or logging into a secure website from your computer. You can access Misael Henderson from anywhere in the [place]. A virtual visit might be right for you when you have a simple condition and feel like you just dont want to get out of bed, or cant get away from work for an appointment, when your regular [facility] provider is not available (evenings, weekends or holidays), or when youre out of town and need minor care.   Electronic visits cost only $49 and if the [facility] 24/7 provider determines a prescription is needed to treat your condition, one can be electronically transmitted to a nearby pharmacy*. Please take a moment to enroll today if you have not already done so. The enrollment process is free and takes just a few minutes. To enroll, please download the [employer] 24/7 jorge to your tablet or phone, or visit [email or web address] to enroll on your computer. And, as an [address] [place] [address] patient with a Hashable account, the results of your visits will be scanned into your electronic medical record and your primary care provider will be able to view the scanned results. We urge you to continue to see your regular [employer] provider for your ongoing medical care. And while your primary care provider may not be the one available when you seek a [facility] virtual visit, the peace of mind you get from getting a real diagnosis real time can be priceless. For more information on [facility], view our Frequently Asked Questions (FAQs) at [email or web address]. Sincerely, 
 
Laurel Maya MD 
Chief Medical Officer [facility] Izabela Carmichael *:  certain medications cannot be prescribed via [facility] Unresulted Labs-Please follow up with your PCP about these lab tests Order Current Status XR CHEST PORT In process GENTAMICIN, TROUGH Preliminary result Providers Seen During Your Hospitalization Provider Specialty Primary office phone Kelly Trinh MD Emergency Medicine [phone] Avery Knowles MD Internal Medicine [phone] Ovidio Alanis MD Internal Medicine [phone] Victoria Funez MD Hospitalist [phone] Chaparro Forde MD [facility] [phone] Chelle Carr MD Internal Medicine [phone] Amor Mora MD Palliative Medicine [phone] Kelly Zuniga MD Internal Medicine [phone] Renee Mallory MD Colon and Rectal Surgery [phone] Marietta Goode MD Internal Medicine [phone] Will Avila MD Internal Medicine [phone] Theodora Khan MD Internal Medicine [phone] Morales Pagan MD Internal Medicine [phone] Babs Alfaro MD Internal Medicine [phone] Immunizations Administered for This Admission Name Date Influenza Vaccine (Quad) PF 9/18/2018 Your Primary Care Physician (PCP) Primary Care Physician Office Phone Office Fax Jackie Park [phone] You are allergic to the following Allergen Reactions Ciprofibrate Hives Recent Documentation Height Weight BMI Smoking Status 1.88 m 112.9 kg 31.97 kg/m2 Former Smoker Emergency Contacts Name Discharge Info Relation Home Work Mobile Buddy Short CAREGIVER [3] Spouse [3] [phone] Patient Belongings The following personal items are in your possession at time of discharge: 
  Dental Appliances: None  Visual Aid: None   Hearing Aids/Status: Bilateral  Home Medications: None   Jewelry: None  Clothing: None    Other Valuables: Other (comment) (bilateral hearing aids)  Personal Items Sent to Safe: No valuables to send to security Please provide this summary of care documentation to your next provider. Signatures-by signing, you are acknowledging that this After Visit Summary has been reviewed with you and you have received a copy. Patient Signature:  ____________________________________________________________ Date:  ____________________________________________________________  
  
Celester Rota Provider Signature:  ____________________________________________________________ Date:  ____________________________________________________________

## 2018-07-10 NOTE — TELEPHONE ENCOUNTER
I spoke with Ms. Jeremias Edwards, on HIPAA, regarding Mr. Brice's critically low Hgb of 5.5. She will hold Pradaxa, amlodipine and lisinopril for now and bring him to the ER now. I advised caution with position change, slow transitions. Denies any sign of bleeding from colostomy but states he has been pale and weak. I spoke with Dr. Good Obrien in the ER who is expecting the patient.

## 2018-07-10 NOTE — ED NOTES
TRANSFER - OUT REPORT:    Verbal report given to Lidia Curiel RN (name) on Guillaume Aletha  being transferred to Room 1116 Oncology (unit) for routine progression of care       Report consisted of patients Situation, Background, Assessment and   Recommendations(SBAR). Information from the following report(s) SBAR, ED Summary, [facility] and Recent Results was reviewed with the receiving nurse. Lines:   Peripheral IV 07/10/18 Right Antecubital (Active)   Site Assessment Clean, dry, & intact 7/10/2018 11:58 AM   Phlebitis Assessment 0 7/10/2018 11:58 AM   Infiltration Assessment 0 7/10/2018 11:58 AM   Dressing Status Clean, dry, & intact 7/10/2018 11:58 AM   Dressing Type Transparent;Tape 7/10/2018 11:58 AM        Opportunity for questions and clarification was provided.

## 2018-07-10 NOTE — PROGRESS NOTES
Pharmacy Clarification of Prior to Admission Medication Regimen     The patient was interviewed regarding clarification of the prior to admission medication regimen. Wife, Blue Rodriguez, present in room and obtained permission from patient to discuss drug regimen with visitor(s) present. Patient was questioned regarding use of any other inhalers, topical products, over the counter medications, herbal medications, vitamin products or ophthalmic/nasal/otic medication use. Information Obtained From: Patient's wife, RX Query    Pertinent Pharmacy Findings:   betamethasone dipropionate (DIPROSONE) 0.05 % topical cream, lactulose (CHRONULAC) 10 gram/15 mL solution and zolpidem (AMBIEN) 10 mg tablet: Per patient's wife, patint has these PRN agents at home, but has not taken them 'in a while'. PTA medication list was corrected to the following:     Prior to Admission Medications   Prescriptions Last Dose Informant Patient Reported? Taking? DOC-Q-LACE 100 mg capsule 7/9/2018 at Unknown time Significant Other No Yes   Sig: TAKE ONE CAPSULE BY MOUTH TWICE A DAY   PRADAXA 150 mg capsule 7/10/2018 at Unknown time Significant Other No Yes   Sig: TAKE ONE CAPSULE BY MOUTH EVERY 12 HOURS   acetaminophen (TYLENOL) 500 mg tablet 7/8/2018 at Unknown time Significant Other Yes Yes   Sig: Take 1,000 mg by mouth every six (6) hours as needed for Pain. amLODIPine (NORVASC) 10 mg tablet 7/9/2018 at Unknown time Significant Other Yes Yes   Sig: Take 10 mg by mouth daily (with lunch). amiodarone (CORDARONE) 200 mg tablet 7/10/2018 at Unknown time Significant Other No Yes   Sig: TAKE ONE TABLET BY MOUTH DAILY   atorvastatin (LIPITOR) 20 mg tablet 7/9/2018 at Unknown time Significant Other Yes Yes   Sig: Take 20 mg by mouth daily (with dinner). betamethasone dipropionate (DIPROSONE) 0.05 % topical cream Not Taking at Unknown time Significant Other No No   Sig: Apply  to affected area as needed.    carBAMazepine XR (TEGRETOL XR) 200 mg SR tablet 7/10/2018 at Unknown time Significant Other Yes Yes   Sig: Take 200 mg by mouth two (2) times a day. YOVANI, Brand only   cholecalciferol (VITAMIN D3) 1,000 unit cap 7/9/2018 at Unknown time Significant Other Yes Yes   Sig: Take 1,000 Units by mouth daily (with lunch). finasteride (PROSCAR) 5 mg tablet 7/9/2018 at Unknown time Significant Other Yes Yes   Sig: Take 5 mg by mouth daily. lactulose (CHRONULAC) 10 gram/15 mL solution Not Taking at Unknown time Significant Other No No   Sig: TAKE 1 TEASPOONFUL (5 ML) BY MOUTH THREE TIMES AS DAY AS NEEDED  Indications: constipation   lisinopril (PRINIVIL, ZESTRIL) 40 mg tablet 7/10/2018 at Unknown time Significant Other No Yes   Sig: TAKE ONE TABLET BY MOUTH DAILY   lutein 20 mg tab 7/9/2018 at Unknown time Significant Other Yes Yes   Sig: Take 1 Tab by mouth daily. magnesium hydroxide (SOTO MILK OF MAGNESIA) 400 mg/5 mL suspension 7/7/2018 at Unknown time Significant Other Yes Yes   Sig: Take 30 mL by mouth daily as needed for Constipation. meclizine (ANTIVERT) 25 mg tablet 7/10/2018 at Unknown time Significant Other Yes Yes   Sig: Take 25 mg by mouth every eight (8) hours as needed for Nausea. take every 8 hours as needed for nausea   metoprolol tartrate (LOPRESSOR) 25 mg tablet 7/10/2018 at Unknown time Significant Other No Yes   Sig: TAKE ONE-HALF TABLET BY MOUTH TWICE A DAY   senna (SENNA) 8.6 mg tablet 7/9/2018 at Unknown time Significant Other Yes Yes   Sig: Take 1 tablet by mouth daily. venlafaxine-SR (EFFEXOR-XR) 75 mg capsule 7/9/2018 at Unknown time Significant Other No Yes   Sig: TAKE ONE CAPSULE BY MOUTH DAILY   zolpidem (AMBIEN) 10 mg tablet Not Taking at Unknown time Significant Other No No   Sig: Take 1 Tab by mouth nightly as needed for Sleep (please take in the bed).       Facility-Administered Medications: None          Thank you,  Jagdeep Saul, [facility]  Medication History Pharmacy Technician
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
## 2018-07-11 ENCOUNTER — TELEPHONE (OUTPATIENT)
Dept: CARDIOLOGY CLINIC | Age: 78
End: 2018-07-11

## 2018-07-11 ENCOUNTER — ANESTHESIA EVENT (OUTPATIENT)
Dept: ENDOSCOPY | Age: 78
DRG: 329 | End: 2018-07-11
Payer: MEDICARE

## 2018-07-11 ENCOUNTER — ANESTHESIA (OUTPATIENT)
Dept: ENDOSCOPY | Age: 78
DRG: 329 | End: 2018-07-11
Payer: MEDICARE

## 2018-07-11 LAB
ANION GAP SERPL CALC-SCNC: 8 MMOL/L (ref 5–15)
BASOPHILS # BLD: 0.1 K/UL (ref 0–0.1)
BASOPHILS NFR BLD: 1 % (ref 0–1)
BUN SERPL-MCNC: 10 MG/DL (ref 6–20)
BUN/CREAT SERPL: 13 (ref 12–20)
CALCIUM SERPL-MCNC: 8.4 MG/DL (ref 8.5–10.1)
CHLORIDE SERPL-SCNC: 98 MMOL/L (ref 97–108)
CO2 SERPL-SCNC: 27 MMOL/L (ref 21–32)
CREAT SERPL-MCNC: 0.75 MG/DL (ref 0.7–1.3)
DIFFERENTIAL METHOD BLD: ABNORMAL
EOSINOPHIL # BLD: 0.1 K/UL (ref 0–0.4)
EOSINOPHIL NFR BLD: 1 % (ref 0–7)
ERYTHROCYTE [DISTWIDTH] IN BLOOD BY AUTOMATED COUNT: 16.7 % (ref 11.5–14.5)
GLUCOSE SERPL-MCNC: 103 MG/DL (ref 65–100)
HCT VFR BLD AUTO: 23.7 % (ref 36.6–50.3)
HCT VFR BLD AUTO: 23.9 % (ref 36.6–50.3)
HGB BLD-MCNC: 7.3 G/DL (ref 12.1–17)
HGB BLD-MCNC: 7.4 G/DL (ref 12.1–17)
IMM GRANULOCYTES # BLD: 0 K/UL (ref 0–0.04)
IMM GRANULOCYTES NFR BLD AUTO: 0 % (ref 0–0.5)
LYMPHOCYTES # BLD: 1.3 K/UL (ref 0.8–3.5)
LYMPHOCYTES NFR BLD: 14 % (ref 12–49)
MCH RBC QN AUTO: 24.7 PG (ref 26–34)
MCHC RBC AUTO-ENTMCNC: 31 G/DL (ref 30–36.5)
MCV RBC AUTO: 79.7 FL (ref 80–99)
MONOCYTES # BLD: 1.5 K/UL (ref 0–1)
MONOCYTES NFR BLD: 16 % (ref 5–13)
NEUTS SEG # BLD: 6.4 K/UL (ref 1.8–8)
NEUTS SEG NFR BLD: 68 % (ref 32–75)
NRBC # BLD: 0.08 K/UL (ref 0–0.01)
NRBC BLD-RTO: 0.9 PER 100 WBC
PLATELET # BLD AUTO: 310 K/UL (ref 150–400)
PMV BLD AUTO: 9.8 FL (ref 8.9–12.9)
POTASSIUM SERPL-SCNC: 4.1 MMOL/L (ref 3.5–5.1)
RBC # BLD AUTO: 3 M/UL (ref 4.1–5.7)
RBC MORPH BLD: ABNORMAL
SODIUM SERPL-SCNC: 133 MMOL/L (ref 136–145)
WBC # BLD AUTO: 9.4 K/UL (ref 4.1–11.1)

## 2018-07-11 PROCEDURE — 74011000250 HC RX REV CODE- 250: Performed by: HOSPITALIST

## 2018-07-11 PROCEDURE — 80048 BASIC METABOLIC PNL TOTAL CA: CPT | Performed by: HOSPITALIST

## 2018-07-11 PROCEDURE — C9113 INJ PANTOPRAZOLE SODIUM, VIA: HCPCS | Performed by: HOSPITALIST

## 2018-07-11 PROCEDURE — 36415 COLL VENOUS BLD VENIPUNCTURE: CPT | Performed by: HOSPITALIST

## 2018-07-11 PROCEDURE — 5A09357 ASSISTANCE WITH RESPIRATORY VENTILATION, LESS THAN 24 CONSECUTIVE HOURS, CONTINUOUS POSITIVE AIRWAY PRESSURE: ICD-10-PCS | Performed by: HOSPITALIST

## 2018-07-11 PROCEDURE — 85018 HEMOGLOBIN: CPT | Performed by: HOSPITALIST

## 2018-07-11 PROCEDURE — 93971 EXTREMITY STUDY: CPT

## 2018-07-11 PROCEDURE — 76040000019: Performed by: SPECIALIST

## 2018-07-11 PROCEDURE — 74011250636 HC RX REV CODE- 250/636: Performed by: HOSPITALIST

## 2018-07-11 PROCEDURE — 74011250636 HC RX REV CODE- 250/636: Performed by: INTERNAL MEDICINE

## 2018-07-11 PROCEDURE — 65270000015 HC RM PRIVATE ONCOLOGY

## 2018-07-11 PROCEDURE — 0DJ08ZZ INSPECTION OF UPPER INTESTINAL TRACT, VIA NATURAL OR ARTIFICIAL OPENING ENDOSCOPIC: ICD-10-PCS | Performed by: SPECIALIST

## 2018-07-11 PROCEDURE — 74011250636 HC RX REV CODE- 250/636

## 2018-07-11 PROCEDURE — 93306 TTE W/DOPPLER COMPLETE: CPT

## 2018-07-11 PROCEDURE — 74011250637 HC RX REV CODE- 250/637: Performed by: HOSPITALIST

## 2018-07-11 PROCEDURE — 85025 COMPLETE CBC W/AUTO DIFF WBC: CPT | Performed by: HOSPITALIST

## 2018-07-11 PROCEDURE — 76060000031 HC ANESTHESIA FIRST 0.5 HR: Performed by: SPECIALIST

## 2018-07-11 PROCEDURE — 74011000250 HC RX REV CODE- 250: Performed by: SPECIALIST

## 2018-07-11 PROCEDURE — 74011000250 HC RX REV CODE- 250

## 2018-07-11 RX ORDER — SODIUM CHLORIDE 9 MG/ML
75 INJECTION, SOLUTION INTRAVENOUS CONTINUOUS
Status: DISPENSED | OUTPATIENT
Start: 2018-07-11 | End: 2018-07-11

## 2018-07-11 RX ORDER — ATROPINE SULFATE 0.1 MG/ML
0.5 INJECTION INTRAVENOUS
Status: DISCONTINUED | OUTPATIENT
Start: 2018-07-11 | End: 2018-07-11 | Stop reason: HOSPADM

## 2018-07-11 RX ORDER — LIDOCAINE HYDROCHLORIDE 20 MG/ML
INJECTION, SOLUTION EPIDURAL; INFILTRATION; INTRACAUDAL; PERINEURAL AS NEEDED
Status: DISCONTINUED | OUTPATIENT
Start: 2018-07-11 | End: 2018-07-11 | Stop reason: HOSPADM

## 2018-07-11 RX ORDER — FLUMAZENIL 0.1 MG/ML
0.2 INJECTION INTRAVENOUS
Status: DISCONTINUED | OUTPATIENT
Start: 2018-07-11 | End: 2018-07-11 | Stop reason: HOSPADM

## 2018-07-11 RX ORDER — NALOXONE HYDROCHLORIDE 0.4 MG/ML
0.4 INJECTION, SOLUTION INTRAMUSCULAR; INTRAVENOUS; SUBCUTANEOUS
Status: DISCONTINUED | OUTPATIENT
Start: 2018-07-11 | End: 2018-07-11 | Stop reason: HOSPADM

## 2018-07-11 RX ORDER — PROPOFOL 10 MG/ML
INJECTION, EMULSION INTRAVENOUS AS NEEDED
Status: DISCONTINUED | OUTPATIENT
Start: 2018-07-11 | End: 2018-07-11 | Stop reason: HOSPADM

## 2018-07-11 RX ORDER — SODIUM CHLORIDE 0.9 % (FLUSH) 0.9 %
5-10 SYRINGE (ML) INJECTION EVERY 8 HOURS
Status: COMPLETED | OUTPATIENT
Start: 2018-07-11 | End: 2018-07-11

## 2018-07-11 RX ORDER — DEXTROMETHORPHAN/PSEUDOEPHED 2.5-7.5/.8
1.2 DROPS ORAL
Status: DISCONTINUED | OUTPATIENT
Start: 2018-07-11 | End: 2018-07-11 | Stop reason: HOSPADM

## 2018-07-11 RX ORDER — SODIUM CHLORIDE 0.9 % (FLUSH) 0.9 %
5-10 SYRINGE (ML) INJECTION AS NEEDED
Status: ACTIVE | OUTPATIENT
Start: 2018-07-11 | End: 2018-07-11

## 2018-07-11 RX ORDER — MIDAZOLAM HYDROCHLORIDE 1 MG/ML
.25-5 INJECTION, SOLUTION INTRAMUSCULAR; INTRAVENOUS
Status: DISCONTINUED | OUTPATIENT
Start: 2018-07-11 | End: 2018-07-11 | Stop reason: HOSPADM

## 2018-07-11 RX ORDER — EPINEPHRINE 0.1 MG/ML
1 INJECTION INTRACARDIAC; INTRAVENOUS
Status: DISCONTINUED | OUTPATIENT
Start: 2018-07-11 | End: 2018-07-11 | Stop reason: HOSPADM

## 2018-07-11 RX ADMIN — IRON SUCROSE 100 MG: 20 INJECTION, SOLUTION INTRAVENOUS at 15:54

## 2018-07-11 RX ADMIN — VENLAFAXINE HYDROCHLORIDE 75 MG: 37.5 CAPSULE, EXTENDED RELEASE ORAL at 15:54

## 2018-07-11 RX ADMIN — LIDOCAINE HYDROCHLORIDE 60 MG: 20 INJECTION, SOLUTION EPIDURAL; INFILTRATION; INTRACAUDAL; PERINEURAL at 13:06

## 2018-07-11 RX ADMIN — SENNOSIDES 8.6 MG: 8.6 TABLET, FILM COATED ORAL at 10:24

## 2018-07-11 RX ADMIN — LISINOPRIL 40 MG: 20 TABLET ORAL at 10:25

## 2018-07-11 RX ADMIN — VITAMIN D, TAB 1000IU (100/BT) 1000 UNITS: 25 TAB at 15:54

## 2018-07-11 RX ADMIN — SODIUM CHLORIDE 40 MG: 9 INJECTION INTRAMUSCULAR; INTRAVENOUS; SUBCUTANEOUS at 21:23

## 2018-07-11 RX ADMIN — CARBAMAZEPINE 200 MG: 200 TABLET, EXTENDED RELEASE ORAL at 17:40

## 2018-07-11 RX ADMIN — POLYETHYLENE GLYCOL 3350, SODIUM SULFATE ANHYDROUS, SODIUM BICARBONATE, SODIUM CHLORIDE, POTASSIUM CHLORIDE 4 L: 236; 22.74; 6.74; 5.86; 2.97 POWDER, FOR SOLUTION ORAL at 17:40

## 2018-07-11 RX ADMIN — METOPROLOL TARTRATE 12.5 MG: 25 TABLET ORAL at 10:25

## 2018-07-11 RX ADMIN — SODIUM CHLORIDE 10 ML: 9 INJECTION, SOLUTION INTRAMUSCULAR; INTRAVENOUS; SUBCUTANEOUS at 15:55

## 2018-07-11 RX ADMIN — Medication 10 ML: at 15:55

## 2018-07-11 RX ADMIN — SODIUM CHLORIDE 10 ML: 9 INJECTION, SOLUTION INTRAMUSCULAR; INTRAVENOUS; SUBCUTANEOUS at 10:26

## 2018-07-11 RX ADMIN — SODIUM CHLORIDE: 900 INJECTION, SOLUTION INTRAVENOUS at 13:01

## 2018-07-11 RX ADMIN — SODIUM CHLORIDE 10 ML: 9 INJECTION, SOLUTION INTRAMUSCULAR; INTRAVENOUS; SUBCUTANEOUS at 21:25

## 2018-07-11 RX ADMIN — METOPROLOL TARTRATE 12.5 MG: 25 TABLET ORAL at 17:40

## 2018-07-11 RX ADMIN — AMIODARONE HYDROCHLORIDE 200 MG: 200 TABLET ORAL at 10:25

## 2018-07-11 RX ADMIN — DOCUSATE SODIUM 100 MG: 100 CAPSULE, LIQUID FILLED ORAL at 17:40

## 2018-07-11 RX ADMIN — DOCUSATE SODIUM 100 MG: 100 CAPSULE, LIQUID FILLED ORAL at 10:24

## 2018-07-11 RX ADMIN — SODIUM CHLORIDE 40 MG: 9 INJECTION INTRAMUSCULAR; INTRAVENOUS; SUBCUTANEOUS at 10:26

## 2018-07-11 RX ADMIN — PROPOFOL 80 MG: 10 INJECTION, EMULSION INTRAVENOUS at 13:06

## 2018-07-11 RX ADMIN — ATORVASTATIN CALCIUM 20 MG: 20 TABLET, FILM COATED ORAL at 17:40

## 2018-07-11 RX ADMIN — CARBAMAZEPINE 200 MG: 200 TABLET, EXTENDED RELEASE ORAL at 10:25

## 2018-07-11 RX ADMIN — FINASTERIDE 5 MG: 5 TABLET, FILM COATED ORAL at 10:25

## 2018-07-11 NOTE — PROGRESS NOTES
[facility]  Preliminary Report:  Venous Duplex Arm    Left arm venous duplex was performed. All deep and superficial veins appear compressible with normal Doppler characteristics. Final report to follow.

## 2018-07-11 NOTE — PROGRESS NOTES
TRANSFER - IN REPORT:    Verbal report received from [facility], [address] [place] [address] (name) on Betty Rom  being received from Oncology room [phone] ([phone]) for ordered procedure      Report consisted of patients Situation, Background, Assessment and   Recommendations(SBAR). Information from the following report(s) SBAR, Procedure Summary, Intake/Output, MAR and Recent Results Paced on monitor was reviewed with the receiving nurse. Opportunity for questions and clarification was provided. Will give report to primary ENDO nurse upon arrival to unit.

## 2018-07-11 NOTE — PROGRESS NOTES
Bedside shift change report given to Taylor Grimm (oncoming nurse) by Savage Gonzalez (offgoing nurse). Report included the following information SBAR.

## 2018-07-11 NOTE — PROGRESS NOTES
Gastroenterology Daily Progress Note (Dr. Mathieu Kumar) [facility] Admit Date: 7/10/2018 Subjective: In good spirits. Received transfusions. Current Facility-Administered Medications Medication Dose Route Frequency  0.9% sodium chloride infusion 250 mL  250 mL IntraVENous PRN  pantoprazole (PROTONIX) 40 mg in sodium chloride 0.9% 10 mL injection  40 mg IntraVENous Q12H  
 atorvastatin (LIPITOR) tablet 20 mg  20 mg Oral DAILY WITH DINNER  carBAMazepine XR (TEGretol XR) tablet 200 mg  200 mg Oral BID  cholecalciferol (VITAMIN D3) tablet 1,000 Units  1,000 Units Oral DAILY WITH LUNCH  
 amiodarone (CORDARONE) tablet 200 mg  200 mg Oral DAILY  metoprolol tartrate (LOPRESSOR) tablet 12.5 mg  12.5 mg Oral BID  venlafaxine-SR (EFFEXOR-XR) capsule 75 mg  75 mg Oral DAILY WITH BREAKFAST  docusate sodium (COLACE) capsule 100 mg  100 mg Oral BID  senna (SENOKOT) tablet 8.6 mg  1 Tab Oral DAILY  finasteride (PROSCAR) tablet 5 mg  5 mg Oral DAILY  lisinopril (PRINIVIL, ZESTRIL) tablet 40 mg  40 mg Oral DAILY  sodium chloride (NS) flush 5-10 mL  5-10 mL IntraVENous Q8H  
 sodium chloride (NS) flush 5-10 mL  5-10 mL IntraVENous PRN  
 acetaminophen (TYLENOL) tablet 650 mg  650 mg Oral Q6H PRN  
 ondansetron (ZOFRAN) injection 4 mg  4 mg IntraVENous Q6H PRN Objective:  
 
Visit Vitals  /74  Pulse 74  Temp 97.8 °F (36.6 °C)  Resp 18  Ht 6' 2\" (1.88 m)  Wt 128.4 kg (283 lb)  SpO2 98%  BMI 36.34 kg/m2 Blood pressure 144/74, pulse 74, temperature 97.8 °F (36.6 °C), resp. rate 18, height 6' 2\" (1.88 m), weight 128.4 kg (283 lb), SpO2 98 %. 07/09 1901 - 07/11 0700 In: 212.5 Out: 1250 [[id number]] Intake/Output Summary (Last 24 hours) at 07/11/18 0710 Last data filed at 07/11/18 6071 Gross per 24 hour Intake            212.5 ml Output             1250 ml Net          -1037.5 ml Physical Exam:  
 
 
General: awake WM in NAD Chest:  CTA, No rhonchi, rales or rubs. Heart: S1, S2, RRR 
GI: Soft, NT, ND + bowel sounds  LLQ ostomy Labs:  
 
Recent Results (from the past 24 hour(s)) PROTHROMBIN TIME + INR Collection Time: 07/10/18 11:56 AM  
Result Value Ref Range INR 1.5 (H) 0.9 - 1.1 Prothrombin time 14.9 (H) 9.0 - 11.1 sec PTT Collection Time: 07/10/18 11:56 AM  
Result Value Ref Range aPTT 33.9 (H) 22.1 - 32.0 sec  
 aPTT, therapeutic range     58.0 - 77.0 SECS  
CBC WITH AUTOMATED DIFF Collection Time: 07/10/18 11:56 AM  
Result Value Ref Range WBC 8.3 4.1 - 11.1 K/uL  
 RBC 2.40 (L) 4.10 - 5.70 M/uL HGB 5.5 (LL) 12.1 - 17.0 g/dL HCT 18.9 (L) 36.6 - 50.3 % MCV 78.8 (L) 80.0 - 99.0 FL  
 MCH 22.9 (L) 26.0 - 34.0 PG  
 MCHC 29.1 (L) 30.0 - 36.5 g/dL  
 RDW 17.0 (H) 11.5 - 14.5 % PLATELET 887 458 - 915 K/uL MPV 9.2 8.9 - 12.9 FL  
 NRBC 0.5 (H) 0  WBC ABSOLUTE NRBC 0.04 (H) 0.00 - 0.01 K/uL NEUTROPHILS 69 32 - 75 % LYMPHOCYTES 16 12 - 49 % MONOCYTES 13 5 - 13 % EOSINOPHILS 1 0 - 7 % BASOPHILS 0 0 - 1 % IMMATURE GRANULOCYTES 1 (H) 0.0 - 0.5 % ABS. NEUTROPHILS 5.7 1.8 - 8.0 K/UL  
 ABS. LYMPHOCYTES 1.3 0.8 - 3.5 K/UL  
 ABS. MONOCYTES 1.1 (H) 0.0 - 1.0 K/UL  
 ABS. EOSINOPHILS 0.1 0.0 - 0.4 K/UL  
 ABS. BASOPHILS 0.0 0.0 - 0.1 K/UL  
 ABS. IMM. GRANS. 0.1 (H) 0.00 - 0.04 K/UL  
 DF AUTOMATED    
 RBC COMMENTS HYPOCHROMIA 1+ 
    
 RBC COMMENTS ANISOCYTOSIS 1+ 
    
 RBC COMMENTS POLYCHROMASIA 1+ METABOLIC PANEL, COMPREHENSIVE Collection Time: 07/10/18 11:56 AM  
Result Value Ref Range Sodium 133 (L) 136 - 145 mmol/L Potassium 4.8 3.5 - 5.1 mmol/L Chloride 100 97 - 108 mmol/L  
 CO2 25 21 - 32 mmol/L Anion gap 8 5 - 15 mmol/L Glucose 100 65 - 100 mg/dL BUN 10 6 - 20 MG/DL Creatinine 0.64 (L) 0.70 - 1.30 MG/DL  
 BUN/Creatinine ratio 16 12 - 20 GFR est AA >60 >60 ml/min/1.73m2  GFR est non-AA >60 >60 ml/min/1.73m2 Calcium 8.4 (L) 8.5 - 10.1 MG/DL Bilirubin, total 0.3 0.2 - 1.0 MG/DL  
 ALT (SGPT) 26 12 - 78 U/L  
 AST (SGOT) 21 15 - 37 U/L Alk. phosphatase 130 (H) 45 - 117 U/L Protein, total 7.0 6.4 - 8.2 g/dL Albumin 3.1 (L) 3.5 - 5.0 g/dL Globulin 3.9 2.0 - 4.0 g/dL A-G Ratio 0.8 (L) 1.1 - 2.2    
TROPONIN I Collection Time: 07/10/18 11:56 AM  
Result Value Ref Range Troponin-I, Qt. <0.05 <0.05 ng/mL TYPE + CROSSMATCH Collection Time: 07/10/18 11:56 AM  
Result Value Ref Range Crossmatch Expiration 07/13/2018 ABO/Rh(D) A POSITIVE Antibody screen NEG Unit number [id number] Blood component type [facility] Unit division 00 Status of unit ISSUED Crossmatch result Compatible Unit number [id number] Blood component type [facility] Unit division 00 Status of unit ISSUED Crossmatch result Compatible PATHOLOGIST REVIEW Collection Time: 07/10/18 11:56 AM  
Result Value Ref Range Pathologist review (NOTE) NT-PRO BNP Collection Time: 07/10/18 11:56 AM  
Result Value Ref Range NT pro- (H) 0 - 450 PG/ML  
OCCULT BLOOD, STOOL Collection Time: 07/10/18  2:16 PM  
Result Value Ref Range Occult blood, stool POSITIVE (A) NEG    
IRON PROFILE Collection Time: 07/10/18  2:16 PM  
Result Value Ref Range Iron 9 (L) 35 - 150 ug/dL TIBC 413 250 - 450 ug/dL Iron % saturation 2 (L) 20 - 50 % FERRITIN Collection Time: 07/10/18  2:16 PM  
Result Value Ref Range Ferritin 5 (L) 26 - 388 NG/ML  
HGB & HCT Collection Time: 07/11/18  2:58 AM  
Result Value Ref Range HGB 7.3 (L) 12.1 - 17.0 g/dL HCT 23.7 (L) 36.6 - 50.3 % LABRCNT(wbc:2,hgb:2,hct:2,plt:2,) Recent Labs  
   07/10/18 
 1156  07/09/18 
 1153 NA  133*  132* K  4.8  4.8  
CL  100  98 CO2  25  20 BUN  10  11 CREA  0.64*  0.62* GLU  100  107* CA  8.4*  8.4* Recent Labs  
   07/10/18 
 1156 INR  1.5* PTP  14.9* APTT  33.9* Recent Labs  
   07/10/18 
 1156  07/09/18 
 6289 SGOT  21  22 AP  130*  125* TP  7.0  6.6 ALB  3.1*  3.7 GLOB  3.9   --   
Results for Mateo Abreu (MRN [id number]) as of 7/11/2018 07:10 Ref. Range 3/16/2018 13:31 7/9/2018 09:59 7/10/2018 11:56 7/11/2018 02:58 HGB Latest Ref Range: 12.1 - 17.0 g/dL 11.3 (L) 5.5 (<) 5.5 (LL) 7.3 (L) HCT Latest Ref Range: 36.6 - 50.3 % 34.4 (L) 19.6 (L) 18.9 (L) 23.7 (L) Impression: 
  Acute blood loss anemia (7/10/2018) Anasarca (7/10/2018) GI bleed (7/10/2018) H/O Colostomy Plan: 
Proceed with EGD today to r/o UGI bleeding and depending on this result will also need colonoscopy via colostomy thereafter. Vanita Valdez MD 
 
7/11/2018 [address] [place] [address], [address] [address] Loc: [phone]
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
## 2018-07-11 NOTE — ADT AUTH CERT NOTES
Facility Name: [facility]                NPI: [id number]          GI bleed ICD-10-CM: K92.2        [place] [facility] [id number]         Patient Demographics      Patient Name Sex  Address Phone     Chris Rosenbaum Male 1940 [address] [phone] (Home) *Preferred*  [phone] [facility] Account      Name Acct ID Class Status Primary Coverage     Chris Rosenbaum [id number] INPATIENT Open [place] CROSS MEDICARE - [address] [facility] PPO              Guarantor Account (for Hospital Account [de-identified])      Name Relation to Pt Service Area Active? Acct Type     Chris Rosenbaum [facility] Yes Personal/Family     Address Phone           [address] [phone](T)                Coverage Information (for Hospital Account [de-identified])      1. BLUE CROSS MEDICARE/[place] BLUE [place]OSS MEDICARE PPO      F/O Payor/Plan Subscriber  Subscriber Sex Precert #     BLUE [place] MEDICARE/[place] BLUE CROSS MEDICARE PPO 42 M      Subscriber Subscriber #     Chris Rosenbaum [id number]     Grp # Group Name     [facility]      Address Phone     [address] [address]  [facility], [address]      Policy Number Status Effective Date Benefits Phone     [id number] -  -     Auth/Cert     ref# [id number]            2. BLUE CROSS MEDICAID/[place] BLUE CROSS HEALTHKEEPERS PLUS      F/O Payor/Plan Subscriber  Subscriber Sex Precert #     BLUE CROSS MEDICAID/[place] BLUE CROSS HEALTHKEEPERS PLUS 42 M      Subscriber Subscriber #     Chris Rosenbaum [id number]     Grp # Group Name     [id number]     Address Phone     [address] [facility], [address]      Policy Number Status Effective Date Benefits Phone     [id number] -  -     Auth/Cert     ref# [id number]                     Diagnosis         Codes Comments     Anemia, unspecified type  ICD-10-CM: D64.9  ICD-9-CM: 946. 9        Admission Information - Patient Record Only      Arrival Date/Time: 07/10/2018 1039 Admit Date/Time: 07/10/2018 1044 IP Adm. Date/Time: 07/10/2018 1645     Admission Type: Emergency Point of Origin: Non-health Care Facility/self Admit Category:      Means of Arrival: Car Primary Service: Medicine Secondary Service: N/A     Transfer Source:  Service Area: [address] [place] [address] Unit: \[facility]\"" [address] [facility] Provider: Maia Penny MD Attending Provider: Paradise Levine MD Referring Provider:        Admission Information      Attending Provider Admission Dx Admitted On     Braulio Catalan MD  07/10/18     Service Isolation Code Status     MEDICINE  Full Code     Allergies           Ciprofibrate         Admission Information      Unit/Bed: \[facility]\"" 1 MEDICAL ONCOLOGY/01 Service: MEDICINE     Admitting provider: Maia Penny MD Phone: [phone]     Attending provider: Braulio Catalan MD Phone: [phone]     PCP: Saad Nava NP Phone: [phone]     Admission dx: Acute blood loss anemia Patient class:  I     Admission type: ER

## 2018-07-11 NOTE — PROGRESS NOTES
Bedside shift change report given to Taylor Grimm (oncoming nurse) by Laurie Rubi (offgoing nurse). Report included the following information SBAR.

## 2018-07-11 NOTE — ANESTHESIA POSTPROCEDURE EVALUATION
Post-Anesthesia Evaluation and Assessment    Patient: Pari Schmitt MRN: [id number]  SSN: [id number]    YOB: 1940  Age: 69 y.o. Sex: male       Cardiovascular Function/Vital Signs  Visit Vitals    /59    Pulse 75    Temp 36.4 °C (97.6 °F)    Resp 23    Ht 6' 2\" (1.88 m)    Wt 128.4 kg (283 lb)    SpO2 98%    BMI 36.34 kg/m2       Patient is status post total IV anesthesia anesthesia for Procedure(s):  ESOPHAGOGASTRODUODENOSCOPY (EGD). Nausea/Vomiting: None    Postoperative hydration reviewed and adequate. Pain:  Pain Scale 1: Numeric (0 - 10) (07/11/18 1320)  Pain Intensity 1: 0 (07/11/18 1320)   Managed    Neurological Status:   Neuro  Neurologic State: Alert (07/10/18 2001)  Orientation Level: Oriented to person;Oriented to place;Oriented to situation (07/10/18 2001)  Cognition: Follows commands (07/10/18 2001)  Speech: Clear;Delayed responses (07/10/18 2001)  LUE Motor Response: Flaccid (07/10/18 2001)  LLE Motor Response: Flaccid (07/10/18 2001)  RUE Motor Response: Purposeful;Weak (07/10/18 2001)  RLE Motor Response: Purposeful;Weak (07/10/18 2001)   At baseline    Mental Status and Level of Consciousness: Arousable    Pulmonary Status:   O2 Device: Room air (07/11/18 1320)   Adequate oxygenation and airway patent    Complications related to anesthesia: None    Post-anesthesia assessment completed.  No concerns    Signed By: Madi Vargas MD     July 11, 2018

## 2018-07-11 NOTE — PROGRESS NOTES
Attempted to see pt for PT eval. Pt currently off the floor.  Will continue to follow up for PT eval.

## 2018-07-11 NOTE — TELEPHONE ENCOUNTER
----- Message from Luna Harris MD sent at 7/10/2018  9:58 AM EDT -----  Can you check if he went to ER for low Hgb. Pt is at [facility] for Hemoglobin. It is in cc. Thanks.

## 2018-07-11 NOTE — ROUTINE PROCESS
TRANSFER - OUT REPORT:    Verbal report given to Jess Putnam Both  being transferred to [phone] for routine post - op       Report consisted of patients Situation, Background, Assessment and   Recommendations. Information from the following reports Procedure Summary, Intake/Output and MAR was reviewed with the receiving nurse. Lines:   Peripheral IV 07/10/18 Right Antecubital (Active)   Site Assessment Clean, dry, & intact 7/11/2018 12:54 PM   Phlebitis Assessment 0 7/11/2018 12:54 PM   Infiltration Assessment 0 7/11/2018 12:54 PM   Dressing Status Clean, dry, & intact 7/11/2018 12:54 PM   Dressing Type Tape;Transparent 7/11/2018 12:54 PM   Hub Color/Line Status Green;Flushed;Patent 7/11/2018  2:28 AM        Opportunity for questions and clarification was provided.

## 2018-07-11 NOTE — PROGRESS NOTES
Julita Jerry  1940  [id number]    Situation:  Verbal report received from: Emeka Ventura RN  Procedure: Procedure(s):  ESOPHAGOGASTRODUODENOSCOPY (EGD)    Background:    Preoperative diagnosis: GI Bleed  Postoperative diagnosis: hiatal hernia; gold's erosions    :  Dr. Blair Nance  Assistant(s): Endoscopy Technician-1: Morris Serra  Endoscopy RN-1: Artemio Rangel    Specimens: * No specimens in log *  H. Pylori  no    Assessment:  Intra-procedure medications   Anesthesia gave intra-procedure sedation and medications, see anesthesia flow sheet yes    Intravenous fluids: NS@ KVO     Vital signs stable     Abdominal assessment: round and soft     Recommendation:  Discharge patient per MD order.   Return to floor  Family Wife Kate Chou to share finding with family or friend yes

## 2018-07-11 NOTE — PROCEDURES
Esophagogastroduodenoscopy Procedure Note Real Mojica 1940 
[id number] Indication:  Iron deficiency anemia Endoscopist: Augusto Torres MD 
 
Referring Provider:  Maxi Rico NP Sedation:  MAC anesthesia Propofol Procedure Details: After infomed consent was obtained for the procedure, with all risks and benefits of procedure explained the patient was taken to the endoscopy suite and placed in the left lateral decubitus position. Following sequential administration of sedation as per above, the endoscope was inserted into the mouth and advanced under direct vision to second portion of the duodenum. A careful inspection was made as the gastroscope was withdrawn, including a retroflexed view of the proximal stomach; findings and interventions are described below. Findings:  
 
Esophagus: - There was normal mucosa throughout the entire esophagus with Z line normal at 39 cm from incisors. + Medium sized hiatal hernia 6 cm in size with diaphragmatic hiatus at 45 cm from incisors. Stomach: + There was seen a focal area of erythema with an erosion located on this area in proximal stomach seen on retroflexion. Duodenum: - The bulb and post bulbar mucosa is normal in appearance to the second portion. The duodenal folds appeared normal.   
 
Therapies:  As above Specimen: Specimens were collected as described and send to the laboratory. Complications:   None were encountered during the procedure. EBL:  < 10 ml. Recommendations:  
-EGD findings c/w probable UGI slow blood loss from Cedric's erosion 
-PPI 
-will need colonoscopy via colostomy Augusto Torres MD 
7/11/2018  1:11 PM

## 2018-07-11 NOTE — PROCEDURES
[facility]  *** FINAL REPORT ***    Name: Fatoumata Nguyen  MRN: [id number]    Inpatient  : 1940  HIS Order #: [id number]  [address] [facility] Visit #: [id number]  Date: 2018    TYPE OF TEST: Peripheral Venous Testing    REASON FOR TEST  Limb swelling    Left Arm:-  Deep venous thrombosis:           No  Superficial venous thrombosis:    No      INTERPRETATION/FINDINGS  PROCEDURE:  Venous duplex examination using B-mode, color flow and  spectral Doppler of the upper extremity veins. Left arm :  1. Deep vein(s) visualized include the internal jugular, subclavian,  axillary, brachial, radial and ulnar veins. 2. No evidence of deep venous thrombosis detected in the veins  visualized. 3. No evidence of deep vein thrombosis in the contralateral subclavian   vein. 4. Superficial vein(s) visualized include the basilic (upper arm),  cephalic (upper arm) and cephalic (forearm) veins. 5. No evidence of superficial thrombosis detected. ADDITIONAL COMMENTS    I have personally reviewed the data relevant to the interpretation of  this  study. TECHNOLOGIST: Mary Herzog.  RICHARD Ramesh, RDMS  Signed: 2018 10:20 AM    PHYSICIAN: Juan F Sahu MD  Signed: 2018 08:00 AM

## 2018-07-11 NOTE — ANESTHESIA PREPROCEDURE EVALUATION
Anesthetic History   No history of anesthetic complications            Review of Systems / Medical History  Patient summary reviewed, nursing notes reviewed and pertinent labs reviewed    Pulmonary        Sleep apnea: CPAP  Smoker      Comments:  Hx bronchitis   Former Smoker   Neuro/Psych     seizures  CVA  TIA and psychiatric history    Comments: left side hemiparesis  Depression   Hx TBI 1994 Cardiovascular    Hypertension        Dysrhythmias (afib) : atrial fibrillation  Pacemaker and hyperlipidemia    Exercise tolerance: <4 METS  Comments: SSS   GI/Hepatic/Renal     GERD          Comments: GI Bleed Endo/Other      Hypothyroidism  Obesity and arthritis     Other Findings   Comments: BPH   Hearing loss   Chronic back pain   Chronic right shoulder pain   Hx Prostate cancer         Physical Exam    Airway  Mallampati: III  TM Distance: 4 - 6 cm  Neck ROM: normal range of motion   Mouth opening: Normal     Cardiovascular  Regular rate and rhythm,  S1 and S2 normal,  no murmur, click, rub, or gallop             Dental    Dentition: Edentulous, Full upper dentures and Full lower dentures     Pulmonary  Breath sounds clear to auscultation               Abdominal  GI exam deferred       Other Findings            Anesthetic Plan    ASA: 4  Anesthesia type: total IV anesthesia          Induction: Intravenous  Anesthetic plan and risks discussed with: Patient

## 2018-07-12 ENCOUNTER — ANESTHESIA (OUTPATIENT)
Dept: ENDOSCOPY | Age: 78
DRG: 329 | End: 2018-07-12
Payer: MEDICARE

## 2018-07-12 ENCOUNTER — ANESTHESIA EVENT (OUTPATIENT)
Dept: ENDOSCOPY | Age: 78
DRG: 329 | End: 2018-07-12
Payer: MEDICARE

## 2018-07-12 PROCEDURE — 77030003657 HC NDL SCLER BSC -B: Performed by: SPECIALIST

## 2018-07-12 PROCEDURE — 74011250636 HC RX REV CODE- 250/636

## 2018-07-12 PROCEDURE — C9113 INJ PANTOPRAZOLE SODIUM, VIA: HCPCS | Performed by: HOSPITALIST

## 2018-07-12 PROCEDURE — 0DBK8ZX EXCISION OF ASCENDING COLON, VIA NATURAL OR ARTIFICIAL OPENING ENDOSCOPIC, DIAGNOSTIC: ICD-10-PCS | Performed by: SPECIALIST

## 2018-07-12 PROCEDURE — 74011250636 HC RX REV CODE- 250/636: Performed by: INTERNAL MEDICINE

## 2018-07-12 PROCEDURE — 97161 PT EVAL LOW COMPLEX 20 MIN: CPT

## 2018-07-12 PROCEDURE — 97530 THERAPEUTIC ACTIVITIES: CPT

## 2018-07-12 PROCEDURE — 76060000032 HC ANESTHESIA 0.5 TO 1 HR: Performed by: SPECIALIST

## 2018-07-12 PROCEDURE — 74011250637 HC RX REV CODE- 250/637: Performed by: HOSPITALIST

## 2018-07-12 PROCEDURE — 74011250636 HC RX REV CODE- 250/636: Performed by: HOSPITALIST

## 2018-07-12 PROCEDURE — G8978 MOBILITY CURRENT STATUS: HCPCS

## 2018-07-12 PROCEDURE — 74011250637 HC RX REV CODE- 250/637: Performed by: SPECIALIST

## 2018-07-12 PROCEDURE — 74011000250 HC RX REV CODE- 250: Performed by: HOSPITALIST

## 2018-07-12 PROCEDURE — 76040000007: Performed by: SPECIALIST

## 2018-07-12 PROCEDURE — 74011000250 HC RX REV CODE- 250

## 2018-07-12 PROCEDURE — 77030009426 HC FCPS BIOP ENDOSC BSC -B: Performed by: SPECIALIST

## 2018-07-12 PROCEDURE — G8979 MOBILITY GOAL STATUS: HCPCS

## 2018-07-12 PROCEDURE — 65270000015 HC RM PRIVATE ONCOLOGY

## 2018-07-12 RX ORDER — NALOXONE HYDROCHLORIDE 0.4 MG/ML
0.4 INJECTION, SOLUTION INTRAMUSCULAR; INTRAVENOUS; SUBCUTANEOUS
Status: DISCONTINUED | OUTPATIENT
Start: 2018-07-12 | End: 2018-07-12 | Stop reason: HOSPADM

## 2018-07-12 RX ORDER — DEXTROMETHORPHAN/PSEUDOEPHED 2.5-7.5/.8
1.2 DROPS ORAL
Status: DISCONTINUED | OUTPATIENT
Start: 2018-07-12 | End: 2018-07-12 | Stop reason: HOSPADM

## 2018-07-12 RX ORDER — SODIUM CHLORIDE 0.9 % (FLUSH) 0.9 %
5-10 SYRINGE (ML) INJECTION AS NEEDED
Status: ACTIVE | OUTPATIENT
Start: 2018-07-12 | End: 2018-07-12

## 2018-07-12 RX ORDER — ATROPINE SULFATE 0.1 MG/ML
0.5 INJECTION INTRAVENOUS
Status: DISCONTINUED | OUTPATIENT
Start: 2018-07-12 | End: 2018-07-12 | Stop reason: HOSPADM

## 2018-07-12 RX ORDER — EPINEPHRINE 0.1 MG/ML
1 INJECTION INTRACARDIAC; INTRAVENOUS
Status: DISCONTINUED | OUTPATIENT
Start: 2018-07-12 | End: 2018-07-12 | Stop reason: HOSPADM

## 2018-07-12 RX ORDER — LIDOCAINE HYDROCHLORIDE 20 MG/ML
INJECTION, SOLUTION EPIDURAL; INFILTRATION; INTRACAUDAL; PERINEURAL AS NEEDED
Status: DISCONTINUED | OUTPATIENT
Start: 2018-07-12 | End: 2018-07-12 | Stop reason: HOSPADM

## 2018-07-12 RX ORDER — SORBITOL SOLUTION 70 %
60 SOLUTION, ORAL MISCELLANEOUS
Status: COMPLETED | OUTPATIENT
Start: 2018-07-12 | End: 2018-07-12

## 2018-07-12 RX ORDER — FLUMAZENIL 0.1 MG/ML
0.2 INJECTION INTRAVENOUS
Status: DISCONTINUED | OUTPATIENT
Start: 2018-07-12 | End: 2018-07-12 | Stop reason: HOSPADM

## 2018-07-12 RX ORDER — PROPOFOL 10 MG/ML
INJECTION, EMULSION INTRAVENOUS AS NEEDED
Status: DISCONTINUED | OUTPATIENT
Start: 2018-07-12 | End: 2018-07-12 | Stop reason: HOSPADM

## 2018-07-12 RX ORDER — SODIUM CHLORIDE 0.9 % (FLUSH) 0.9 %
5-10 SYRINGE (ML) INJECTION EVERY 8 HOURS
Status: COMPLETED | OUTPATIENT
Start: 2018-07-12 | End: 2018-07-12

## 2018-07-12 RX ADMIN — SODIUM CHLORIDE 40 MG: 9 INJECTION INTRAMUSCULAR; INTRAVENOUS; SUBCUTANEOUS at 08:42

## 2018-07-12 RX ADMIN — DOCUSATE SODIUM 100 MG: 100 CAPSULE, LIQUID FILLED ORAL at 08:42

## 2018-07-12 RX ADMIN — METOPROLOL TARTRATE 12.5 MG: 25 TABLET ORAL at 08:41

## 2018-07-12 RX ADMIN — LISINOPRIL 40 MG: 20 TABLET ORAL at 08:41

## 2018-07-12 RX ADMIN — Medication 10 ML: at 17:44

## 2018-07-12 RX ADMIN — SODIUM CHLORIDE 10 ML: 9 INJECTION, SOLUTION INTRAMUSCULAR; INTRAVENOUS; SUBCUTANEOUS at 08:43

## 2018-07-12 RX ADMIN — SORBITOL 60 ML: 258.2 SOLUTION ORAL at 09:25

## 2018-07-12 RX ADMIN — FINASTERIDE 5 MG: 5 TABLET, FILM COATED ORAL at 08:41

## 2018-07-12 RX ADMIN — IRON SUCROSE 100 MG: 20 INJECTION, SOLUTION INTRAVENOUS at 08:42

## 2018-07-12 RX ADMIN — SENNOSIDES 8.6 MG: 8.6 TABLET, FILM COATED ORAL at 08:41

## 2018-07-12 RX ADMIN — VENLAFAXINE HYDROCHLORIDE 75 MG: 37.5 CAPSULE, EXTENDED RELEASE ORAL at 08:42

## 2018-07-12 RX ADMIN — SODIUM CHLORIDE 10 ML: 9 INJECTION, SOLUTION INTRAMUSCULAR; INTRAVENOUS; SUBCUTANEOUS at 14:51

## 2018-07-12 RX ADMIN — CARBAMAZEPINE 200 MG: 200 TABLET, EXTENDED RELEASE ORAL at 08:42

## 2018-07-12 RX ADMIN — PROPOFOL 400 MG: 10 INJECTION, EMULSION INTRAVENOUS at 16:41

## 2018-07-12 RX ADMIN — VITAMIN D, TAB 1000IU (100/BT) 1000 UNITS: 25 TAB at 17:47

## 2018-07-12 RX ADMIN — LIDOCAINE HYDROCHLORIDE 50 MG: 20 INJECTION, SOLUTION EPIDURAL; INFILTRATION; INTRACAUDAL; PERINEURAL at 16:05

## 2018-07-12 RX ADMIN — AMIODARONE HYDROCHLORIDE 200 MG: 200 TABLET ORAL at 08:42

## 2018-07-12 RX ADMIN — ATORVASTATIN CALCIUM 20 MG: 20 TABLET, FILM COATED ORAL at 17:43

## 2018-07-12 RX ADMIN — DOCUSATE SODIUM 100 MG: 100 CAPSULE, LIQUID FILLED ORAL at 17:43

## 2018-07-12 RX ADMIN — SODIUM CHLORIDE: 900 INJECTION, SOLUTION INTRAVENOUS at 15:59

## 2018-07-12 RX ADMIN — CARBAMAZEPINE 200 MG: 200 TABLET, EXTENDED RELEASE ORAL at 17:43

## 2018-07-12 RX ADMIN — METOPROLOL TARTRATE 12.5 MG: 25 TABLET ORAL at 17:43
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
## 2018-07-12 NOTE — PROGRESS NOTES
Spiritual Care Assessment/Progress Note  [facility]      NAME: Guillaume Pompa      MRN: [id number]  AGE: 69 y.o. SEX: male  Confucianist Affiliation: Tenriism   Language: English     7/12/2018     Total Time (in minutes): 25     Spiritual Assessment begun in [facility] through conversation with:         [x]Patient        [x] Family    [] Friend(s)        Reason for Consult: Crisis, Request by staff     Spiritual beliefs: (Please include comment if needed)     [x] Identifies with a edith tradition:         [] Supported by a edith community:            [] Claims no spiritual orientation:           [] Seeking spiritual identity:                [] Adheres to an individual form of spirituality:           [] Not able to assess:                           Identified resources for coping:      [] Prayer                               [] Music                  [] Guided Imagery     [x] Family/friends                 [] Pet visits     [] Devotional reading                         [] Unknown     [] Other:                                               Interventions offered during this visit: (See comments for more details)    Patient Interventions: Affirmation of emotions/emotional suffering, Affirmation of edith, Initial/Spiritual assessment, patient floor, Prayer (assurance of)     Family/Friend(s):  Affirmation of edith, Affirmation of emotions/emotional suffering, Prayer (assurance of), Normalization of emotional/spiritual concerns     Plan of Care:     [] Support spiritual and/or cultural needs    [] Support AMD and/or advance care planning process      [] Support grieving process   [] Coordinate Rites and/or Rituals    [] Coordination with community clergy   [] No spiritual needs identified at this time   [] Detailed Plan of Care below (See Comments)  [] Make referral to Music Therapy  [] Make referral to Pet Therapy     [] Make referral to Addiction services  [] Make referral to [facility] Passages  [] Make referral to Spiritual Care Partner  [] No future visits requested        [x] Follow up visits as needed     Comments: Responded to page from nursing staff to support patient and his wife who received some bad news. Met with them and offered pastoral support as patient's wife briefly shared about her 's medical tests and the results. She shared about her strong [place] edith and how it has helped them in their lives. They have a son who lives in town and is supportive to them since they moved to [place] from [place] almost eight years ago. Patient appeared to be very humorous and shared several jokes and trivia questions. Offered words of encouragement, scriptural assurances, and assurance of prayer. Both expressed their appreciation for the visit and for all services provided by the hospital staff. Advised them of  availability. Rev.  Summer Paulson,   Paging Service: [phone])

## 2018-07-12 NOTE — PROCEDURES
Colonoscopy Procedure Note Indications: GI blood loss anemia Referring Physician: Phoebe Dumont NP Anesthesia/Sedation: MAC anesthesia Propofol Endoscopist:  Dr. Jean Paul Garcia Procedure in Detail: 
Informed consent was obtained for the procedure, including sedation. Risks of perforation, hemorrhage, adverse drug reaction, and aspiration were discussed. The patient was placed in the left lateral decubitus position. Based on the pre-procedure assessment, including review of the patient's medical history, medications, allergies, and review of systems, he had been deemed to be an appropriate candidate for moderate sedation; he was therefore sedated with the medications listed above. The patient was monitored continuously with ECG tracing, pulse oximetry, blood pressure monitoring, and direct observations. We performed colonoscopy as below: 
 
Findings:  
 
Patient's colonoscopy performed via ostomy stoma in [facility]. Scope inserted and advanced all the way to the cecum. There was a MALIGNANT APPEARING MASS in the ascending colon that was contiguous with a large sessile polyp (likely malignant transformation of this polyp) with ulceration on surface and bleeding with heaped up edges. This was nonobstructing. We performed multiple biopsies then injected 10 ml of carlos manuel ink around mass for eventual excision. No other lesions seen, mucosa was normal otherwise. Thereafter we inserted scope via anus to 20 cm with normal mucosa seen. Therapies:  As above Specimen: Specimens were collected as described above and sent to pathology. Complications: None were encountered during the procedure. EBL: < 10 ml. Recommendations:  
-f/u path 
-refer to colorectal surgery Signed By: Abbey Weber MD 
                      July 12, 2018

## 2018-07-12 NOTE — PROGRESS NOTES
TRANSFER - OUT REPORT:    Verbal report given to Mell (name) on Param Or  being transferred to room 1116(unit) for routine post - op       Report consisted of patients Situation, Background, Assessment and   Recommendations(SBAR). Information from the following report(s) Procedure Summary was reviewed with the receiving nurse. Lines:   Peripheral IV 07/10/18 Right Antecubital (Active)   Site Assessment Clean, dry, & intact 7/12/2018  7:51 AM   Phlebitis Assessment 0 7/12/2018  7:51 AM   Infiltration Assessment 0 7/12/2018  7:51 AM   Dressing Status Clean, dry, & intact 7/12/2018  7:51 AM   Dressing Type Transparent 7/12/2018  7:51 AM   Hub Color/Line Status Pink 7/12/2018  7:51 AM        Opportunity for questions and clarification was provided.

## 2018-07-12 NOTE — PROGRESS NOTES
Bedside and Verbal shift change report given to Philip Gleason (oncoming nurse) by Valorie Dick (offgoing nurse). Report included the following information SBAR, Kardex, Intake/Output, MAR and Recent Results.

## 2018-07-12 NOTE — PROGRESS NOTES
TRANSFER - IN REPORT:    Verbal report received from [address] [facility] RN(name) on Cassius Trevizo  being received from room 1116(unit) for ordered procedure      Report consisted of patients Situation, Background, Assessment and   Recommendations(SBAR). Information from the following report(s) SBAR, Kardex, Intake/Output, MAR, Accordion and Med Rec Status was reviewed with the receiving nurse. Opportunity for questions and clarification was provided. Assessment completed upon patients arrival to unit and care assumed.

## 2018-07-12 NOTE — PROGRESS NOTES
Anesthesia reports 400mg Propofol, 50mg Lidocaine and 200mL NS given during procedure. Received report from anesthesia staff on vital signs and status of patient. Endoscope was pre-cleaned at the bedside immediately following procedure by James Bernardo.

## 2018-07-12 NOTE — PROGRESS NOTES
Gastroenterology Daily Progress Note (Dr. Parth Akhtar) [facility] Admit Date: 7/10/2018 Subjective:   
  
Patient seen with wife at bedside. He drank 3/4 of golytely but stool in ostomy bag liquid brown. Some blood was seen per wife. Current Facility-Administered Medications Medication Dose Route Frequency  sorbitol 70 % solution 60 mL  60 mL Oral NOW  iron sucrose (VENOFER) 100 mg iron/5 mL injection 100 mg  100 mg IntraVENous DAILY  0.9% sodium chloride infusion 250 mL  250 mL IntraVENous PRN  pantoprazole (PROTONIX) 40 mg in sodium chloride 0.9% 10 mL injection  40 mg IntraVENous Q12H  
 atorvastatin (LIPITOR) tablet 20 mg  20 mg Oral DAILY WITH DINNER  carBAMazepine XR (TEGretol XR) tablet 200 mg  200 mg Oral BID  cholecalciferol (VITAMIN D3) tablet 1,000 Units  1,000 Units Oral DAILY WITH LUNCH  
 amiodarone (CORDARONE) tablet 200 mg  200 mg Oral DAILY  metoprolol tartrate (LOPRESSOR) tablet 12.5 mg  12.5 mg Oral BID  venlafaxine-SR (EFFEXOR-XR) capsule 75 mg  75 mg Oral DAILY WITH BREAKFAST  docusate sodium (COLACE) capsule 100 mg  100 mg Oral BID  senna (SENOKOT) tablet 8.6 mg  1 Tab Oral DAILY  finasteride (PROSCAR) tablet 5 mg  5 mg Oral DAILY  lisinopril (PRINIVIL, ZESTRIL) tablet 40 mg  40 mg Oral DAILY  sodium chloride (NS) flush 5-10 mL  5-10 mL IntraVENous Q8H  
 sodium chloride (NS) flush 5-10 mL  5-10 mL IntraVENous PRN  
 acetaminophen (TYLENOL) tablet 650 mg  650 mg Oral Q6H PRN  
 ondansetron (ZOFRAN) injection 4 mg  4 mg IntraVENous Q6H PRN Objective:  
 
Visit Vitals  /76 (BP 1 Location: Right arm, BP Patient Position: At rest)  Pulse 74  Temp 97.7 °F (36.5 °C)  Resp 18  Ht 6' 2\" (1.88 m)  Wt 128.4 kg (283 lb)  SpO2 99%  BMI 36.34 kg/m2 Blood pressure 124/76, pulse 74, temperature 97.7 °F (36.5 °C), resp.  rate 18, height 6' 2\" (1.88 m), weight 128.4 kg (283 lb), SpO2 99 %. 
 
07/12 0701 - 07/12 1900 In: -  
Out: 75  
 
07/10 1901 - 07/12 0700 In: 312.5 [I.V.:100] Out: 3400 [MUYZV:0019] Intake/Output Summary (Last 24 hours) at 07/12/18 3881 Last data filed at 07/12/18 2267 Gross per 24 hour Intake              100 ml Output             2225 ml Net            -2125 ml Physical Exam:  
 
General: awake WM in NAD 
GI: Soft, NT, ND + bowel sounds + LLQ ostomy Labs:  
 
Recent Labs  
   07/11/18 
 1698  07/10/18 
 1156  07/09/18 
 6682 NA  133*  133*  132* K  4.1  4.8  4.8  
CL  98  100  98 CO2  27  25  20 BUN  10  10  11 CREA  0.75  0.64*  0.62* GLU  103*  100  107* CA  8.4*  8.4*  8.4* Recent Labs  
   07/10/18 
 1156 INR  1.5* PTP  14.9* APTT  33.9* Recent Labs  
   07/10/18 
 1156  07/09/18 
 1903 SGOT  21  22 AP  130*  125* TP  7.0  6.6 ALB  3.1*  3.7 GLOB  3.9   -- Impression: 
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 Alexandrea Salvador MD 
 
7/12/2018 [address] [facility], [address] [address] Loc: [phone]
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
## 2018-07-12 NOTE — ANESTHESIA POSTPROCEDURE EVALUATION
Post-Anesthesia Evaluation and Assessment    Patient: Lorena Yan MRN: [id number]  SSN: [id number]    YOB: 1940  Age: 69 y.o. Sex: male       Cardiovascular Function/Vital Signs  Visit Vitals    /60    Pulse 74    Temp 36.5 °C (97.7 °F)    Resp 17    Ht 6' 2\" (1.88 m)    Wt 128.4 kg (283 lb)    SpO2 97%    BMI 36.34 kg/m2       Patient is status post total IV anesthesia anesthesia for Procedure(s):  COLONOSCOPY through stoma  COLON BIOPSY  INJECTION. Nausea/Vomiting: None    Postoperative hydration reviewed and adequate. Pain:  Pain Scale 1: Numeric (0 - 10) (07/12/18 1523)  Pain Intensity 1: 0 (07/12/18 1523)   Managed    Neurological Status:   Neuro  Neurologic State: Alert (07/12/18 0752)  Orientation Level: Oriented X4 (07/12/18 0752)  Cognition: Follows commands (07/12/18 0752)  Speech: Clear (07/12/18 0752)  Assessment L Pupil: Brisk (07/12/18 0752)  Size L Pupil (mm): 3 (07/12/18 0752)  Assessment R Pupil: Brisk (07/12/18 0752)  Size R Pupil (mm): 3 (07/12/18 0752)  LUE Motor Response: Flaccid (07/12/18 0752)  LLE Motor Response: Weak (07/12/18 0752)  RUE Motor Response: Purposeful (07/12/18 0752)  RLE Motor Response: Purposeful;Weak (07/12/18 0752)   At baseline    Mental Status and Level of Consciousness: Arousable    Pulmonary Status:   O2 Device: CO2 nasal cannula (07/12/18 1643)   Adequate oxygenation and airway patent    Complications related to anesthesia: None    Post-anesthesia assessment completed.  No concerns    Signed By: Gabino Acuna MD     July 12, 2018

## 2018-07-12 NOTE — PROGRESS NOTES
Oncology Nursing Communication Tool 6:50 AM 
7/12/2018 Bedside shift change report given to Jatin Goldberg RN (incoming nurse) by Samuel Del Valle (outgoing nurse) on Therisa Lapping. Report included the following information SBAR, Kardex, Intake/Output, MAR and Recent Results. Shift Summary: golytely overnight. Still not running clear. Drank half of bottle. Issues for physician to address: colonscopy Oncology Shift Note Admission Date 7/10/2018 Admission Diagnosis Acute blood loss anemia GI bleed Anasarca GI Bleed Code Status Full Code Consults IP CONSULT TO PRIMARY CARE PROVIDER 
IP CONSULT TO GASTROENTEROLOGY 
IP CONSULT TO CARDIOLOGY Cardiac Monitoring [x] Yes [] No  
  
Purposeful Hourly Rounding [x] Yes   
Landen Score Total Score: 3 Landen score 3 or > [] Bed Alarm [] Avasys [] 1:1 sitter [] Patient refused (Place signed refusal form in chart) Pain Managed [] Yes [] No  
 Key Pain Meds   
    
  
 acetaminophen (TYLENOL) 500 mg tablet  (Taking) Take 1,000 mg by mouth every six (6) hours as needed for Pain. Influenza Vaccine Received Flu Vaccine for Current Season (usually Sept-March): Not Flu Season Oxygen needs? [x] Room air Oxygen @  []1L    []2L    []3L   []4L    []5L   []6L Use home O2? [] Yes [] No 
Perform O2 challenge test using  smartphrase (.oxygenchallenge) Last bowel movement Last Bowel Movement Date: 07/11/18 
bowel movement Urinary Catheter LDAs Peripheral IV 07/10/18 Right Antecubital (Active) Site Assessment Clean, dry, & intact 7/12/2018  3:11 AM  
Phlebitis Assessment 0 7/12/2018  3:11 AM  
Infiltration Assessment 0 7/12/2018  3:11 AM  
Dressing Status Clean, dry, & intact 7/12/2018  3:11 AM  
Dressing Type Tape;Transparent 7/12/2018  3:11 AM  
Hub Color/Line Status Pink;Capped 7/12/2018  3:11 AM  
      
Colostomy 07/10/18 Left Abdomen (Active) Drainage Color Viki Jones 7/12/2018  3:11 AM  
Site Assessment Clean, dry, intact 7/12/2018  3:11 AM  
Treatment Other (Comment) 7/12/2018  3:11 AM  
Output (ml) 300 mL 7/12/2018  3:11 AM  
            
  
Readmission Risk Assessment Tool Score Medium Risk 12 Total Score 3 Has Seen PCP in Last 6 Months (Yes=3, No=0)  
 9 Pt. Coverage (Medicare=5 , Medicaid, or Self-Pay=4) 4 Charlson Comorbidity Score (Age + Comorbid Conditions) Criteria that do not apply:  
 . Living with Significant Other. Assisted Living. LTAC. SNF. or  
Rehab Patient Length of Stay (>5 days = 3) IP Visits Last 12 Months (1-3=4, 4=9, >4=11) Expected Length of Stay 2d 19h Actual Length of Stay 2 [place]

## 2018-07-13 ENCOUNTER — APPOINTMENT (OUTPATIENT)
Dept: CT IMAGING | Age: 78
DRG: 329 | End: 2018-07-13
Attending: SURGERY
Payer: MEDICARE

## 2018-07-13 LAB
ANION GAP SERPL CALC-SCNC: 8 MMOL/L (ref 5–15)
BUN SERPL-MCNC: 8 MG/DL (ref 6–20)
BUN/CREAT SERPL: 13 (ref 12–20)
CALCIUM SERPL-MCNC: 8 MG/DL (ref 8.5–10.1)
CEA SERPL-MCNC: 1.8 NG/ML
CHLORIDE SERPL-SCNC: 99 MMOL/L (ref 97–108)
CO2 SERPL-SCNC: 25 MMOL/L (ref 21–32)
CREAT SERPL-MCNC: 0.61 MG/DL (ref 0.7–1.3)
ERYTHROCYTE [DISTWIDTH] IN BLOOD BY AUTOMATED COUNT: 17.8 % (ref 11.5–14.5)
GLUCOSE SERPL-MCNC: 106 MG/DL (ref 65–100)
HCT VFR BLD AUTO: 22.5 % (ref 36.6–50.3)
HGB BLD-MCNC: 6.8 G/DL (ref 12.1–17)
MCH RBC QN AUTO: 24.4 PG (ref 26–34)
MCHC RBC AUTO-ENTMCNC: 30.2 G/DL (ref 30–36.5)
MCV RBC AUTO: 80.6 FL (ref 80–99)
NRBC # BLD: 0.07 K/UL (ref 0–0.01)
NRBC BLD-RTO: 0.5 PER 100 WBC
PLATELET # BLD AUTO: 264 K/UL (ref 150–400)
PMV BLD AUTO: 9.8 FL (ref 8.9–12.9)
POTASSIUM SERPL-SCNC: 3.7 MMOL/L (ref 3.5–5.1)
RBC # BLD AUTO: 2.79 M/UL (ref 4.1–5.7)
SODIUM SERPL-SCNC: 132 MMOL/L (ref 136–145)
WBC # BLD AUTO: 13.3 K/UL (ref 4.1–11.1)

## 2018-07-13 PROCEDURE — 74011636320 HC RX REV CODE- 636/320: Performed by: INTERNAL MEDICINE

## 2018-07-13 PROCEDURE — 80048 BASIC METABOLIC PNL TOTAL CA: CPT | Performed by: INTERNAL MEDICINE

## 2018-07-13 PROCEDURE — 74011250636 HC RX REV CODE- 250/636: Performed by: HOSPITALIST

## 2018-07-13 PROCEDURE — 88305 TISSUE EXAM BY PATHOLOGIST: CPT | Performed by: SPECIALIST

## 2018-07-13 PROCEDURE — C9113 INJ PANTOPRAZOLE SODIUM, VIA: HCPCS | Performed by: HOSPITALIST

## 2018-07-13 PROCEDURE — 74011250636 HC RX REV CODE- 250/636: Performed by: INTERNAL MEDICINE

## 2018-07-13 PROCEDURE — 82378 CARCINOEMBRYONIC ANTIGEN: CPT | Performed by: SURGERY

## 2018-07-13 PROCEDURE — 36430 TRANSFUSION BLD/BLD COMPNT: CPT

## 2018-07-13 PROCEDURE — 65270000015 HC RM PRIVATE ONCOLOGY

## 2018-07-13 PROCEDURE — 36415 COLL VENOUS BLD VENIPUNCTURE: CPT | Performed by: INTERNAL MEDICINE

## 2018-07-13 PROCEDURE — 74011000250 HC RX REV CODE- 250: Performed by: HOSPITALIST

## 2018-07-13 PROCEDURE — 74011250637 HC RX REV CODE- 250/637: Performed by: HOSPITALIST

## 2018-07-13 PROCEDURE — 85027 COMPLETE CBC AUTOMATED: CPT | Performed by: INTERNAL MEDICINE

## 2018-07-13 PROCEDURE — 71260 CT THORAX DX C+: CPT

## 2018-07-13 PROCEDURE — P9016 RBC LEUKOCYTES REDUCED: HCPCS | Performed by: EMERGENCY MEDICINE

## 2018-07-13 RX ORDER — SODIUM CHLORIDE 9 MG/ML
250 INJECTION, SOLUTION INTRAVENOUS AS NEEDED
Status: DISCONTINUED | OUTPATIENT
Start: 2018-07-13 | End: 2018-07-16 | Stop reason: ALTCHOICE

## 2018-07-13 RX ORDER — SODIUM CHLORIDE 0.9 % (FLUSH) 0.9 %
10 SYRINGE (ML) INJECTION
Status: COMPLETED | OUTPATIENT
Start: 2018-07-13 | End: 2018-07-13

## 2018-07-13 RX ORDER — SODIUM CHLORIDE 9 MG/ML
50 INJECTION, SOLUTION INTRAVENOUS
Status: DISPENSED | OUTPATIENT
Start: 2018-07-13 | End: 2018-07-13

## 2018-07-13 RX ADMIN — IOPAMIDOL 100 ML: 755 INJECTION, SOLUTION INTRAVENOUS at 08:28

## 2018-07-13 RX ADMIN — SENNOSIDES 8.6 MG: 8.6 TABLET, FILM COATED ORAL at 09:22

## 2018-07-13 RX ADMIN — METOPROLOL TARTRATE 12.5 MG: 25 TABLET ORAL at 09:22

## 2018-07-13 RX ADMIN — CARBAMAZEPINE 200 MG: 200 TABLET, EXTENDED RELEASE ORAL at 18:15

## 2018-07-13 RX ADMIN — SODIUM CHLORIDE 10 ML: 9 INJECTION, SOLUTION INTRAMUSCULAR; INTRAVENOUS; SUBCUTANEOUS at 22:31

## 2018-07-13 RX ADMIN — IRON SUCROSE 100 MG: 20 INJECTION, SOLUTION INTRAVENOUS at 09:22

## 2018-07-13 RX ADMIN — SODIUM CHLORIDE 40 MG: 9 INJECTION INTRAMUSCULAR; INTRAVENOUS; SUBCUTANEOUS at 01:13

## 2018-07-13 RX ADMIN — ATORVASTATIN CALCIUM 20 MG: 20 TABLET, FILM COATED ORAL at 16:13

## 2018-07-13 RX ADMIN — VITAMIN D, TAB 1000IU (100/BT) 1000 UNITS: 25 TAB at 13:58

## 2018-07-13 RX ADMIN — SODIUM CHLORIDE 40 MG: 9 INJECTION INTRAMUSCULAR; INTRAVENOUS; SUBCUTANEOUS at 09:22

## 2018-07-13 RX ADMIN — SODIUM CHLORIDE 40 MG: 9 INJECTION INTRAMUSCULAR; INTRAVENOUS; SUBCUTANEOUS at 20:09

## 2018-07-13 RX ADMIN — SODIUM CHLORIDE 10 ML: 9 INJECTION, SOLUTION INTRAMUSCULAR; INTRAVENOUS; SUBCUTANEOUS at 13:58

## 2018-07-13 RX ADMIN — VENLAFAXINE HYDROCHLORIDE 75 MG: 37.5 CAPSULE, EXTENDED RELEASE ORAL at 09:22

## 2018-07-13 RX ADMIN — SODIUM CHLORIDE 10 ML: 9 INJECTION, SOLUTION INTRAMUSCULAR; INTRAVENOUS; SUBCUTANEOUS at 01:14

## 2018-07-13 RX ADMIN — LISINOPRIL 40 MG: 20 TABLET ORAL at 09:22

## 2018-07-13 RX ADMIN — AMIODARONE HYDROCHLORIDE 200 MG: 200 TABLET ORAL at 09:22

## 2018-07-13 RX ADMIN — DOCUSATE SODIUM 100 MG: 100 CAPSULE, LIQUID FILLED ORAL at 18:15

## 2018-07-13 RX ADMIN — FINASTERIDE 5 MG: 5 TABLET, FILM COATED ORAL at 09:22

## 2018-07-13 RX ADMIN — Medication 10 ML: at 08:28

## 2018-07-13 RX ADMIN — METOPROLOL TARTRATE 12.5 MG: 25 TABLET ORAL at 18:15

## 2018-07-13 RX ADMIN — CARBAMAZEPINE 200 MG: 200 TABLET, EXTENDED RELEASE ORAL at 09:22

## 2018-07-13 RX ADMIN — DOCUSATE SODIUM 100 MG: 100 CAPSULE, LIQUID FILLED ORAL at 09:22

## 2018-07-13 NOTE — PROGRESS NOTES
CRS Progress note    CT chest - no evidence of metastatic disease. CEA 1.8. Got a blood transfusion for Hgb 6.8. No complaints otherwise. Discussed with cardiology and he is at a high risk for stopping AC. So we will plan on surgery for Tuesday at 14:30. In the meantime, keep on liquid diet and transfuse for Hgb < 7.0.   Will give nutritional supplements

## 2018-07-13 NOTE — PROGRESS NOTES
Visit attempted per nurse pt getting blood transfusion . Will defer at this time and continue to follow.

## 2018-07-13 NOTE — PROGRESS NOTES
Bedside shift change report given to Fawn Kong RN (oncoming nurse) by Anita Rosenbaum RN (offgoing nurse). Report included the following information SBAR.

## 2018-07-13 NOTE — PROGRESS NOTES
Spoke with Dr. Felipe Velazquez, Colon-Rectal surgeon, reference risk associated to remaining off Pradaxa for pending surgery to remove malignant   appearing ascending colon mass. Patient was admitted 7/10/18 with anemia, HGB 5.5, received 3 units of PRBC  Pradaxa was discontinued on 7/10/18 and has not been restarted due to ongoing bleed/anemia. Colonoscopy showed above mass. Dr. Felipe Velazquez called in reference to \"risk\" of CVA off of pradaxa. He plans to perform surgery next week. PMH: Cardiac hx of paroxysmal afib/flutter, HTN, CVA, dyslipidemia, SSS with pacemaker 2014, EF 60%     CHADS2 vasc score 6, 9.8 % stroke event rate at 1 year follow-up (%)   HAS-BLED score 5, risk of 9.1%.   Patient is at high risk for major bleeding    To discuss with Dr. Keily Martinez

## 2018-07-13 NOTE — PROGRESS NOTES
Gastroenterology Daily Progress Note (Dr. Cristian Keith) [facility] Admit Date: 7/10/2018 Subjective:   
  
Patient seen by himself. Hgb 6.8. No abd pain. Current Facility-Administered Medications Medication Dose Route Frequency  0.9% sodium chloride infusion  50 mL/hr IntraVENous RAD ONCE  
 iopamidol (ISOVUE-370) 76 % injection 100 mL  100 mL IntraVENous RAD ONCE  
 sodium chloride (NS) flush 10 mL  10 mL IntraVENous RAD ONCE  
 iron sucrose (VENOFER) 100 mg iron/5 mL injection 100 mg  100 mg IntraVENous DAILY  0.9% sodium chloride infusion 250 mL  250 mL IntraVENous PRN  pantoprazole (PROTONIX) 40 mg in sodium chloride 0.9% 10 mL injection  40 mg IntraVENous Q12H  
 atorvastatin (LIPITOR) tablet 20 mg  20 mg Oral DAILY WITH DINNER  carBAMazepine XR (TEGretol XR) tablet 200 mg  200 mg Oral BID  cholecalciferol (VITAMIN D3) tablet 1,000 Units  1,000 Units Oral DAILY WITH LUNCH  
 amiodarone (CORDARONE) tablet 200 mg  200 mg Oral DAILY  metoprolol tartrate (LOPRESSOR) tablet 12.5 mg  12.5 mg Oral BID  venlafaxine-SR (EFFEXOR-XR) capsule 75 mg  75 mg Oral DAILY WITH BREAKFAST  docusate sodium (COLACE) capsule 100 mg  100 mg Oral BID  senna (SENOKOT) tablet 8.6 mg  1 Tab Oral DAILY  finasteride (PROSCAR) tablet 5 mg  5 mg Oral DAILY  lisinopril (PRINIVIL, ZESTRIL) tablet 40 mg  40 mg Oral DAILY  sodium chloride (NS) flush 5-10 mL  5-10 mL IntraVENous Q8H  
 sodium chloride (NS) flush 5-10 mL  5-10 mL IntraVENous PRN  
 acetaminophen (TYLENOL) tablet 650 mg  650 mg Oral Q6H PRN  
 ondansetron (ZOFRAN) injection 4 mg  4 mg IntraVENous Q6H PRN Objective:  
 
Visit Vitals  /71 (BP 1 Location: Right arm, BP Patient Position: At rest)  Pulse 79  Temp 98.8 °F (37.1 °C)  Resp 16  
 Ht 6' 2\" (1.88 m)  Wt 128.4 kg (283 lb)  SpO2 100%  BMI 36.34 kg/m2 Blood pressure 124/71, pulse 79, temperature 98.8 °F (37.1 °C), resp. rate 16, height 6' 2\" (1.88 m), weight 128.4 kg (283 lb), SpO2 100 %. 
 
07/11 0701 - 07/12 1900 In: 300 [I.V.:300] Out: 0862 Intake/Output Summary (Last 24 hours) at 07/13/18 0700 Last data filed at 07/12/18 1642 Gross per 24 hour Intake              200 ml Output              505 ml Net             -305 ml Physical Exam:  
 
General: awake WM in NAD 
GI: Soft, NT, ND + bowel sounds + LLQ ostomy Labs:  
 
Recent Labs  
   07/13/18 
 0252  07/11/18 
 0808  07/10/18 
 1156 NA  132*  133*  133* K  3.7  4.1  4.8  
CL  99  98  100 CO2  25  27  25 BUN  8  10  10 CREA  0.61*  0.75  0.64* GLU  106*  103*  100 CA  8.0*  8.4*  8.4* Recent Labs  
   07/10/18 
 1156 INR  1.5* PTP  14.9* APTT  33.9* Recent Labs  
   07/10/18 
 1156 SGOT  21  
AP  130* TP  7.0 ALB  3.1*  
GLOB  3.9 Impression: 
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 
Appreciate Dr. Shweta Troncoso input and await path, CEA and eventual timing of surgical resection. Likely needs more blood txfusion today per hospitalist.  On call GI to see once over weekend. Yeison Jacobsen MD 
 
7/13/2018 [address] [place] [address], [address] [address] Loc: [phone]

## 2018-07-13 NOTE — PROGRESS NOTES
CM received phone call from [facility] transitional care coordinator with [employer] - [facility] - [phone] - requesting update on plan of care. CM left HIPPA compliant message for return phone call. Patient currently receiving blood transfusions and awaiting surgical scheduling.     Aury Felder RN, BSN, ACM   - Medical Oncology  [phone]
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
## 2018-07-14 LAB
ABO + RH BLD: NORMAL
BLD PROD TYP BPU: NORMAL
BLOOD GROUP ANTIBODIES SERPL: NORMAL
BPU ID: NORMAL
CROSSMATCH RESULT,%XM: NORMAL
ERYTHROCYTE [DISTWIDTH] IN BLOOD BY AUTOMATED COUNT: 18.3 % (ref 11.5–14.5)
HCT VFR BLD AUTO: 25.6 % (ref 36.6–50.3)
HGB BLD-MCNC: 7.7 G/DL (ref 12.1–17)
MCH RBC QN AUTO: 24.6 PG (ref 26–34)
MCHC RBC AUTO-ENTMCNC: 30.1 G/DL (ref 30–36.5)
MCV RBC AUTO: 81.8 FL (ref 80–99)
NRBC # BLD: 0.04 K/UL (ref 0–0.01)
NRBC BLD-RTO: 0.5 PER 100 WBC
PLATELET # BLD AUTO: 255 K/UL (ref 150–400)
PMV BLD AUTO: 9.6 FL (ref 8.9–12.9)
RBC # BLD AUTO: 3.13 M/UL (ref 4.1–5.7)
SPECIMEN EXP DATE BLD: NORMAL
STATUS OF UNIT,%ST: NORMAL
UNIT DIVISION, %UDIV: 0
WBC # BLD AUTO: 7.8 K/UL (ref 4.1–11.1)

## 2018-07-14 PROCEDURE — 77030029684 HC NEB SM VOL KT MONA -A

## 2018-07-14 PROCEDURE — 74011250636 HC RX REV CODE- 250/636: Performed by: HOSPITALIST

## 2018-07-14 PROCEDURE — 74011250636 HC RX REV CODE- 250/636: Performed by: INTERNAL MEDICINE

## 2018-07-14 PROCEDURE — 77010033678 HC OXYGEN DAILY

## 2018-07-14 PROCEDURE — 77030009834 HC MSK O2 TRACH VYRM -A

## 2018-07-14 PROCEDURE — 94640 AIRWAY INHALATION TREATMENT: CPT

## 2018-07-14 PROCEDURE — C9113 INJ PANTOPRAZOLE SODIUM, VIA: HCPCS | Performed by: HOSPITALIST

## 2018-07-14 PROCEDURE — 74011250637 HC RX REV CODE- 250/637: Performed by: HOSPITALIST

## 2018-07-14 PROCEDURE — 94664 DEMO&/EVAL PT USE INHALER: CPT

## 2018-07-14 PROCEDURE — 94760 N-INVAS EAR/PLS OXIMETRY 1: CPT

## 2018-07-14 PROCEDURE — 85027 COMPLETE CBC AUTOMATED: CPT | Performed by: INTERNAL MEDICINE

## 2018-07-14 PROCEDURE — 36415 COLL VENOUS BLD VENIPUNCTURE: CPT | Performed by: INTERNAL MEDICINE

## 2018-07-14 PROCEDURE — 74011000250 HC RX REV CODE- 250: Performed by: INTERNAL MEDICINE

## 2018-07-14 PROCEDURE — 65270000015 HC RM PRIVATE ONCOLOGY

## 2018-07-14 PROCEDURE — 74011000250 HC RX REV CODE- 250: Performed by: HOSPITALIST

## 2018-07-14 RX ORDER — IPRATROPIUM BROMIDE AND ALBUTEROL SULFATE 2.5; .5 MG/3ML; MG/3ML
3 SOLUTION RESPIRATORY (INHALATION)
Status: DISCONTINUED | OUTPATIENT
Start: 2018-07-14 | End: 2018-07-28

## 2018-07-14 RX ADMIN — ATORVASTATIN CALCIUM 20 MG: 20 TABLET, FILM COATED ORAL at 18:02

## 2018-07-14 RX ADMIN — FINASTERIDE 5 MG: 5 TABLET, FILM COATED ORAL at 09:28

## 2018-07-14 RX ADMIN — CARBAMAZEPINE 200 MG: 200 TABLET, EXTENDED RELEASE ORAL at 09:28

## 2018-07-14 RX ADMIN — VENLAFAXINE HYDROCHLORIDE 75 MG: 37.5 CAPSULE, EXTENDED RELEASE ORAL at 09:27

## 2018-07-14 RX ADMIN — IPRATROPIUM BROMIDE AND ALBUTEROL SULFATE 3 ML: .5; 3 SOLUTION RESPIRATORY (INHALATION) at 21:46

## 2018-07-14 RX ADMIN — METOPROLOL TARTRATE 12.5 MG: 25 TABLET ORAL at 09:00

## 2018-07-14 RX ADMIN — LISINOPRIL 40 MG: 20 TABLET ORAL at 09:28

## 2018-07-14 RX ADMIN — METOPROLOL TARTRATE 12.5 MG: 25 TABLET ORAL at 18:00

## 2018-07-14 RX ADMIN — AMIODARONE HYDROCHLORIDE 200 MG: 200 TABLET ORAL at 09:27

## 2018-07-14 RX ADMIN — IRON SUCROSE 100 MG: 20 INJECTION, SOLUTION INTRAVENOUS at 09:27

## 2018-07-14 RX ADMIN — SENNOSIDES 8.6 MG: 8.6 TABLET, FILM COATED ORAL at 09:28

## 2018-07-14 RX ADMIN — DOCUSATE SODIUM 100 MG: 100 CAPSULE, LIQUID FILLED ORAL at 18:00

## 2018-07-14 RX ADMIN — SODIUM CHLORIDE 10 ML: 9 INJECTION, SOLUTION INTRAMUSCULAR; INTRAVENOUS; SUBCUTANEOUS at 06:27

## 2018-07-14 RX ADMIN — SODIUM CHLORIDE 10 ML: 9 INJECTION, SOLUTION INTRAMUSCULAR; INTRAVENOUS; SUBCUTANEOUS at 21:24

## 2018-07-14 RX ADMIN — CARBAMAZEPINE 200 MG: 200 TABLET, EXTENDED RELEASE ORAL at 18:00

## 2018-07-14 RX ADMIN — VITAMIN D, TAB 1000IU (100/BT) 1000 UNITS: 25 TAB at 12:43

## 2018-07-14 RX ADMIN — DOCUSATE SODIUM 100 MG: 100 CAPSULE, LIQUID FILLED ORAL at 09:28

## 2018-07-14 RX ADMIN — SODIUM CHLORIDE 10 ML: 9 INJECTION, SOLUTION INTRAMUSCULAR; INTRAVENOUS; SUBCUTANEOUS at 18:06

## 2018-07-14 RX ADMIN — SODIUM CHLORIDE 40 MG: 9 INJECTION INTRAMUSCULAR; INTRAVENOUS; SUBCUTANEOUS at 09:27

## 2018-07-14 RX ADMIN — SODIUM CHLORIDE 40 MG: 9 INJECTION INTRAMUSCULAR; INTRAVENOUS; SUBCUTANEOUS at 21:24

## 2018-07-14 NOTE — PROGRESS NOTES
Oncology Nursing Communication Tool 6:28 AM 
7/14/2018 Bedside shift change report given to Alonzo Rios RN (incoming nurse) by April Carpenter (outgoing nurse) on [facility]. Report included the following information SBAR, Kardex, Intake/Output, MAR and Recent Results. Shift Summary: no change overnight, slept. Labs drawn. Still on clear liquid diet. Using urinal, no BM. Issues for physician to address: none Oncology Shift Note Admission Date 7/10/2018 Admission Diagnosis Acute blood loss anemia GI bleed Anasarca GI Bleed 
gi bleed Code Status Full Code Consults IP CONSULT TO PRIMARY CARE PROVIDER 
IP CONSULT TO GASTROENTEROLOGY 
IP CONSULT TO CARDIOLOGY 
IP CONSULT TO COLORECTAL SURGERY Cardiac Monitoring [x] Yes [] No  
  
Purposeful Hourly Rounding [x] Yes   
Landen Score Total Score: 3 Landen score 3 or > [] Bed Alarm [] Avasys [] 1:1 sitter [] Patient refused (Place signed refusal form in chart) Pain Managed [x] Yes [] No  
 Key Pain Meds   
    
  
 acetaminophen (TYLENOL) 500 mg tablet  (Taking) Take 1,000 mg by mouth every six (6) hours as needed for Pain. Influenza Vaccine Received Flu Vaccine for Current Season (usually Sept-March): Not Flu Season Oxygen needs? [x] Room air Oxygen @  []1L    []2L    []3L   []4L    []5L   []6L Use home O2? [] Yes [] No 
Perform O2 challenge test using  smartphrase (.oxygenchallenge) Last bowel movement Last Bowel Movement Date: 07/13/18 
bowel movement Urinary Catheter LDAs Peripheral IV 07/13/18 Right Wrist (Active) Site Assessment Clean, dry, & intact 7/14/2018  3:47 AM  
Phlebitis Assessment 0 7/14/2018  3:47 AM  
Infiltration Assessment 0 7/14/2018  3:47 AM  
Dressing Status Clean, dry, & intact 7/14/2018  3:47 AM  
Dressing Type Tape;Transparent 7/14/2018  3:47 AM  
Hub Color/Line Status Blue;Capped 7/14/2018  3:47 AM  
      
Colostomy 07/10/18 Left Abdomen (Active) Drainage Color Brown 7/14/2018  3:47 AM  
Site Assessment Clean, dry, intact 7/14/2018  3:47 AM  
Treatment Other (Comment) 7/14/2018  3:47 AM  
Output (ml) 300 mL 7/12/2018  3:11 AM  
            
  
Readmission Risk Assessment Tool Score Medium Risk 25 Total Score 3 Has Seen PCP in Last 6 Months (Yes=3, No=0) 2 . Living with Significant Other. Assisted Living. LTAC. SNF. or  
Rehab  
 9 Pt. Coverage (Medicare=5 , Medicaid, or Self-Pay=4) 4 Charlson Comorbidity Score (Age + Comorbid Conditions) Criteria that do not apply:  
 Patient Length of Stay (>5 days = 3) IP Visits Last 12 Months (1-3=4, 4=9, >4=11) Expected Length of Stay 3d 2h Actual Length of Stay 4 Princess Hernandez

## 2018-07-14 NOTE — PROGRESS NOTES
Gastroenterology Daily Progress Note Juan José Valdez for Dr. Emperatriz Cruz) [facility] Admit Date: 7/10/2018 Subjective: No overnight issues. Hgb is better. No abd pain reported by pt or staff. Ajay Schwarz Current Facility-Administered Medications Medication Dose Route Frequency  0.9% sodium chloride infusion 250 mL  250 mL IntraVENous PRN  
 iron sucrose (VENOFER) 100 mg iron/5 mL injection 100 mg  100 mg IntraVENous DAILY  0.9% sodium chloride infusion 250 mL  250 mL IntraVENous PRN  pantoprazole (PROTONIX) 40 mg in sodium chloride 0.9% 10 mL injection  40 mg IntraVENous Q12H  
 atorvastatin (LIPITOR) tablet 20 mg  20 mg Oral DAILY WITH DINNER  carBAMazepine XR (TEGretol XR) tablet 200 mg  200 mg Oral BID  cholecalciferol (VITAMIN D3) tablet 1,000 Units  1,000 Units Oral DAILY WITH LUNCH  
 amiodarone (CORDARONE) tablet 200 mg  200 mg Oral DAILY  metoprolol tartrate (LOPRESSOR) tablet 12.5 mg  12.5 mg Oral BID  venlafaxine-SR (EFFEXOR-XR) capsule 75 mg  75 mg Oral DAILY WITH BREAKFAST  docusate sodium (COLACE) capsule 100 mg  100 mg Oral BID  senna (SENOKOT) tablet 8.6 mg  1 Tab Oral DAILY  finasteride (PROSCAR) tablet 5 mg  5 mg Oral DAILY  lisinopril (PRINIVIL, ZESTRIL) tablet 40 mg  40 mg Oral DAILY  sodium chloride (NS) flush 5-10 mL  5-10 mL IntraVENous Q8H  
 sodium chloride (NS) flush 5-10 mL  5-10 mL IntraVENous PRN  
 acetaminophen (TYLENOL) tablet 650 mg  650 mg Oral Q6H PRN  
 ondansetron (ZOFRAN) injection 4 mg  4 mg IntraVENous Q6H PRN Objective:  
 
Visit Vitals  /70  Pulse 76  Temp 97.9 °F (36.6 °C)  Resp 18  Ht 6' 2\" (1.88 m)  Wt 128.4 kg (283 lb)  SpO2 100%  BMI 36.34 kg/m2 Blood pressure 122/70, pulse 76, temperature 97.9 °F (36.6 °C), resp. rate 18, height 6' 2\" (1.88 m), weight 128.4 kg (283 lb), SpO2 100 %. 
 
07/12 1901 - 07/14 0700 In: 690 [P.O.:690] Out: 300 [QRGNN:632] Intake/Output Summary (Last 24 hours) at 07/14/18 7129 Last data filed at 07/13/18 1816 Gross per 24 hour Intake              690 ml Output              300 ml Net              390 ml Physical Exam:  
 
General: awake WM in NAD 
GI: Soft, NT, ND + bowel sounds + LLQ ostomy Labs:  
 
Recent Labs  
   07/13/18 
 0252 NA  132* K  3.7 CL  99  
CO2  25 BUN  8  
CREA  0.61* GLU  106* CA  8.0* No results for input(s): INR, PTP, APTT in the last 72 hours. No lab exists for component: INREXT, INREXT No results for input(s): SGOT, GPT, AP, TBIL, TP, ALB, GLOB, GGT, AML, LPSE in the last 72 hours. No lab exists for component: AMYP, HLPSE Impression: 
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 · Surgery is planned tentatively for Tuesday. · Liquids per Dr Sary Garcia for now. · D/w covering staff. Talisha Murguia MD 
 
7/14/2018 [address] [place] [address], [address] [id number] Loc: [phone]

## 2018-07-15 LAB
ANION GAP SERPL CALC-SCNC: 7 MMOL/L (ref 5–15)
BUN SERPL-MCNC: 7 MG/DL (ref 6–20)
BUN/CREAT SERPL: 11 (ref 12–20)
CALCIUM SERPL-MCNC: 8.5 MG/DL (ref 8.5–10.1)
CHLORIDE SERPL-SCNC: 100 MMOL/L (ref 97–108)
CO2 SERPL-SCNC: 27 MMOL/L (ref 21–32)
CREAT SERPL-MCNC: 0.66 MG/DL (ref 0.7–1.3)
ERYTHROCYTE [DISTWIDTH] IN BLOOD BY AUTOMATED COUNT: 19.8 % (ref 11.5–14.5)
GLUCOSE SERPL-MCNC: 107 MG/DL (ref 65–100)
HCT VFR BLD AUTO: 26.9 % (ref 36.6–50.3)
HGB BLD-MCNC: 8.1 G/DL (ref 12.1–17)
MCH RBC QN AUTO: 25.2 PG (ref 26–34)
MCHC RBC AUTO-ENTMCNC: 30.1 G/DL (ref 30–36.5)
MCV RBC AUTO: 83.5 FL (ref 80–99)
NRBC # BLD: 0.03 K/UL (ref 0–0.01)
NRBC BLD-RTO: 0.4 PER 100 WBC
PLATELET # BLD AUTO: 272 K/UL (ref 150–400)
PMV BLD AUTO: 9.6 FL (ref 8.9–12.9)
POTASSIUM SERPL-SCNC: 4.4 MMOL/L (ref 3.5–5.1)
RBC # BLD AUTO: 3.22 M/UL (ref 4.1–5.7)
SODIUM SERPL-SCNC: 134 MMOL/L (ref 136–145)
WBC # BLD AUTO: 7.9 K/UL (ref 4.1–11.1)

## 2018-07-15 PROCEDURE — 74011000250 HC RX REV CODE- 250: Performed by: HOSPITALIST

## 2018-07-15 PROCEDURE — 77010033678 HC OXYGEN DAILY

## 2018-07-15 PROCEDURE — 94760 N-INVAS EAR/PLS OXIMETRY 1: CPT

## 2018-07-15 PROCEDURE — 74011250637 HC RX REV CODE- 250/637: Performed by: HOSPITALIST

## 2018-07-15 PROCEDURE — 74011000250 HC RX REV CODE- 250: Performed by: INTERNAL MEDICINE

## 2018-07-15 PROCEDURE — 36415 COLL VENOUS BLD VENIPUNCTURE: CPT | Performed by: INTERNAL MEDICINE

## 2018-07-15 PROCEDURE — 74011250636 HC RX REV CODE- 250/636: Performed by: INTERNAL MEDICINE

## 2018-07-15 PROCEDURE — 85027 COMPLETE CBC AUTOMATED: CPT | Performed by: INTERNAL MEDICINE

## 2018-07-15 PROCEDURE — 80048 BASIC METABOLIC PNL TOTAL CA: CPT | Performed by: INTERNAL MEDICINE

## 2018-07-15 PROCEDURE — 94640 AIRWAY INHALATION TREATMENT: CPT

## 2018-07-15 PROCEDURE — C9113 INJ PANTOPRAZOLE SODIUM, VIA: HCPCS | Performed by: HOSPITALIST

## 2018-07-15 PROCEDURE — 74011250636 HC RX REV CODE- 250/636: Performed by: HOSPITALIST

## 2018-07-15 PROCEDURE — 65270000015 HC RM PRIVATE ONCOLOGY

## 2018-07-15 RX ADMIN — VITAMIN D, TAB 1000IU (100/BT) 1000 UNITS: 25 TAB at 12:18

## 2018-07-15 RX ADMIN — AMIODARONE HYDROCHLORIDE 200 MG: 200 TABLET ORAL at 09:40

## 2018-07-15 RX ADMIN — CARBAMAZEPINE 200 MG: 200 TABLET, EXTENDED RELEASE ORAL at 09:40

## 2018-07-15 RX ADMIN — IPRATROPIUM BROMIDE AND ALBUTEROL SULFATE 3 ML: .5; 3 SOLUTION RESPIRATORY (INHALATION) at 15:54

## 2018-07-15 RX ADMIN — IPRATROPIUM BROMIDE AND ALBUTEROL SULFATE 3 ML: .5; 3 SOLUTION RESPIRATORY (INHALATION) at 08:20

## 2018-07-15 RX ADMIN — SODIUM CHLORIDE 10 ML: 9 INJECTION, SOLUTION INTRAMUSCULAR; INTRAVENOUS; SUBCUTANEOUS at 21:59

## 2018-07-15 RX ADMIN — SODIUM CHLORIDE 10 ML: 9 INJECTION, SOLUTION INTRAMUSCULAR; INTRAVENOUS; SUBCUTANEOUS at 06:39

## 2018-07-15 RX ADMIN — SODIUM CHLORIDE 10 ML: 9 INJECTION, SOLUTION INTRAMUSCULAR; INTRAVENOUS; SUBCUTANEOUS at 17:32

## 2018-07-15 RX ADMIN — IPRATROPIUM BROMIDE AND ALBUTEROL SULFATE 3 ML: .5; 3 SOLUTION RESPIRATORY (INHALATION) at 11:44

## 2018-07-15 RX ADMIN — IRON SUCROSE 100 MG: 20 INJECTION, SOLUTION INTRAVENOUS at 09:39

## 2018-07-15 RX ADMIN — FINASTERIDE 5 MG: 5 TABLET, FILM COATED ORAL at 09:40

## 2018-07-15 RX ADMIN — SENNOSIDES 8.6 MG: 8.6 TABLET, FILM COATED ORAL at 09:41

## 2018-07-15 RX ADMIN — SODIUM CHLORIDE 40 MG: 9 INJECTION INTRAMUSCULAR; INTRAVENOUS; SUBCUTANEOUS at 09:39

## 2018-07-15 RX ADMIN — METOPROLOL TARTRATE 12.5 MG: 25 TABLET ORAL at 17:31

## 2018-07-15 RX ADMIN — CARBAMAZEPINE 200 MG: 200 TABLET, EXTENDED RELEASE ORAL at 17:32

## 2018-07-15 RX ADMIN — LISINOPRIL 40 MG: 20 TABLET ORAL at 09:40

## 2018-07-15 RX ADMIN — ATORVASTATIN CALCIUM 20 MG: 20 TABLET, FILM COATED ORAL at 17:31

## 2018-07-15 RX ADMIN — VENLAFAXINE HYDROCHLORIDE 75 MG: 37.5 CAPSULE, EXTENDED RELEASE ORAL at 09:40

## 2018-07-15 RX ADMIN — SODIUM CHLORIDE 40 MG: 9 INJECTION INTRAMUSCULAR; INTRAVENOUS; SUBCUTANEOUS at 21:59

## 2018-07-15 RX ADMIN — DOCUSATE SODIUM 100 MG: 100 CAPSULE, LIQUID FILLED ORAL at 09:40

## 2018-07-15 RX ADMIN — METOPROLOL TARTRATE 12.5 MG: 25 TABLET ORAL at 09:41

## 2018-07-15 RX ADMIN — IPRATROPIUM BROMIDE AND ALBUTEROL SULFATE 3 ML: .5; 3 SOLUTION RESPIRATORY (INHALATION) at 21:02
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
## 2018-07-15 NOTE — ROUTINE PROCESS
Oncology Nursing Communication Tool 7:31 AM 
7/15/2018 Bedside shift change report given to Fina Gonzalez RN (incoming nurse) by Kanwal Go RN (outgoing nurse) on [facility]. Report included the following information SBAR, Kardex, Intake/Output, MAR and Recent Results. Shift Summary: new iv overnight as old iv leaking & tender. Pt paced on tele. CPAP approved for use & set up by RT. Pt reports slept well with CPAP. Issues for physician to address: none Oncology Shift Note Admission Date 7/10/2018 Admission Diagnosis Acute blood loss anemia GI bleed Anasarca GI Bleed 
gi bleed Code Status Full Code Consults IP CONSULT TO PRIMARY CARE PROVIDER 
IP CONSULT TO GASTROENTEROLOGY 
IP CONSULT TO CARDIOLOGY 
IP CONSULT TO COLORECTAL SURGERY Cardiac Monitoring [x] Yes [] No  
  
Purposeful Hourly Rounding [x] Yes   
Landen Score Total Score: 4 Landen score 3 or > [x] Bed Alarm [] Avasys [] 1:1 sitter [] Patient refused (Place signed refusal form in chart) Pain Managed [x] Yes [] No  
 Key Pain Meds   
    
  
 acetaminophen (TYLENOL) 500 mg tablet  (Taking) Take 1,000 mg by mouth every six (6) hours as needed for Pain. Influenza Vaccine Received Flu Vaccine for Current Season (usually Sept-March): Not Flu Season Oxygen needs? [] Room air Oxygen @  [x]1L    []2L    []3L   []4L    []5L   []6L Use home O2? [] Yes [x] No 
Perform O2 challenge test using  smartphrase (.oxygenchallenge) Last bowel movement Last Bowel Movement Date: 07/13/18 
bowel movement Urinary Catheter LDAs Peripheral IV 07/14/18 Right; Inner Forearm (Active) Site Assessment Clean, dry, & intact 7/15/2018  2:49 AM  
Phlebitis Assessment 0 7/15/2018  2:49 AM  
Infiltration Assessment 0 7/15/2018  2:49 AM  
Dressing Status Clean, dry, & intact 7/15/2018  2:49 AM  
Dressing Type Tape;Transparent 7/15/2018  2:49 AM  
Hub Color/Line Status Blue;Capped 7/15/2018  2:49 AM  
      
Colostomy 07/10/18 Left Abdomen (Active) Drainage Color Brown 7/15/2018  2:49 AM  
Site Assessment Clean, dry, intact 7/15/2018  2:49 AM  
Treatment Other (Comment) 7/15/2018  2:49 AM  
Output (ml) 125 mL 7/14/2018  9:40 PM  
            
  
Readmission Risk Assessment Tool Score Medium Risk 25 Total Score 3 Has Seen PCP in Last 6 Months (Yes=3, No=0) 2 . Living with Significant Other. Assisted Living. LTAC. SNF. or  
Rehab  
 9 Pt. Coverage (Medicare=5 , Medicaid, or Self-Pay=4) 4 Charlson Comorbidity Score (Age + Comorbid Conditions) Criteria that do not apply:  
 Patient Length of Stay (>5 days = 3) IP Visits Last 12 Months (1-3=4, 4=9, >4=11) Expected Length of Stay 3d 2h Actual Length of Stay 5 Sylwia Lucas, RN
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
## 2018-07-15 NOTE — PROGRESS NOTES
ADULT PROTOCOL: JET AEROSOL ASSESSMENT    Patient  Sherly Marc     69 y.o.   male     7/14/2018  10:14 PM    Breath Sounds Pre Procedure: Right Breath Sounds: Diminished                               Left Breath Sounds: Diminished    Breath Sounds Post Procedure: Right Breath Sounds: Diminished                                 Left Breath Sounds: Diminished    Breathing pattern: Pre procedure Breathing Pattern: Regular          Post procedure Breathing Pattern: Regular    Heart Rate: Pre procedure Pulse: 75           Post procedure Pulse: 75    Resp Rate: Pre procedure Respirations: 16           Post procedure Respirations: 16      Cough: Pre procedure Cough: Non-productive               Post procedure Cough: Non-productive    Suctioned: NO    Sputum: Pre procedure  NA                 Post procedure   NA  Oxygen: O2 Device: CPAP nasal        Changed: NO    SpO2: Pre procedure SpO2: 98 %   without oxygen              Post procedure SpO2: 98 %  without oxygen    Nebulizer Therapy: Current medications Aerosolized Medications: DuoNeb      Changed: NO    Smoking History: former smoker pipe. Problem List:   Patient Active Problem List   Diagnosis Code    HTN (hypertension) I10    Depression F32.9    Hypercholesterolemia E78.00    Acid reflux K21.9    Seizure ([facility] [address] R56.9    Hypothyroidism E03.9    Hyponatremia E87.1    BPH (benign prostatic hyperplasia) N40.0    Rash R21    Cellulitis L03.90    Wax in ear H61.20    Ulcer [id number]    Small bowel obstruction (HCC) K56.609    Atrial flutter (HCC) I48.92    Atrial fibrillation or flutter     CHI (closed head injury) S09. 90XA    Basal cell cancer C44.91    TBI (traumatic brain injury) ([facility] [address] S06. 9X9A    Atrial fibrillation (HCC) I48.91    Cataract H26.9    Constipation K59.00    Ankle fracture, left S82.892A    Insomnia G47.00    Tinea corporis B35.4    SSS (sick sinus syndrome) ([facility]) I49.5    Syncope R55    Seizure disorder (HCC) G40.909    RONAL on CPAP G47.33, Z99.89    Pacemaker Z95.0    Pre-op evaluation Z01.818    Chronic back pain greater than 3 months duration M54.9, G89.29    Cerebral infarction (HCC) I63.9    Acute bronchitis J20.9    Screening for colon cancer Z12.11    Chronic right shoulder pain M25.511, G89.29    Common wart B07.8    Localized epilepsy with impairment of consciousness (HCC) G40.209    Severe obesity (BMI 35.0-39.9) (HCC) E66.01    Acute blood loss anemia D62    Anasarca R60.1    GI bleed K92.2       Respiratory Therapist: Jacquelyn Rosales, RT

## 2018-07-15 NOTE — PROGRESS NOTES
Gastroenterology Daily Progress Note Xin Has for Dr. Tory Miranda) [facility] Admit Date: 7/10/2018 Subjective:   
  
Eating a FLD. Hgb is better. family is at bedside Current Facility-Administered Medications Medication Dose Route Frequency  albuterol-ipratropium (DUO-NEB) 2.5 MG-0.5 MG/3 ML  3 mL Nebulization Q4HWA RT  
 0.9% sodium chloride infusion 250 mL  250 mL IntraVENous PRN  
 0.9% sodium chloride infusion 250 mL  250 mL IntraVENous PRN  pantoprazole (PROTONIX) 40 mg in sodium chloride 0.9% 10 mL injection  40 mg IntraVENous Q12H  
 atorvastatin (LIPITOR) tablet 20 mg  20 mg Oral DAILY WITH DINNER  carBAMazepine XR (TEGretol XR) tablet 200 mg  200 mg Oral BID  cholecalciferol (VITAMIN D3) tablet 1,000 Units  1,000 Units Oral DAILY WITH LUNCH  
 amiodarone (CORDARONE) tablet 200 mg  200 mg Oral DAILY  metoprolol tartrate (LOPRESSOR) tablet 12.5 mg  12.5 mg Oral BID  venlafaxine-SR (EFFEXOR-XR) capsule 75 mg  75 mg Oral DAILY WITH BREAKFAST  docusate sodium (COLACE) capsule 100 mg  100 mg Oral BID  senna (SENOKOT) tablet 8.6 mg  1 Tab Oral DAILY  finasteride (PROSCAR) tablet 5 mg  5 mg Oral DAILY  lisinopril (PRINIVIL, ZESTRIL) tablet 40 mg  40 mg Oral DAILY  sodium chloride (NS) flush 5-10 mL  5-10 mL IntraVENous Q8H  
 sodium chloride (NS) flush 5-10 mL  5-10 mL IntraVENous PRN  
 acetaminophen (TYLENOL) tablet 650 mg  650 mg Oral Q6H PRN  
 ondansetron (ZOFRAN) injection 4 mg  4 mg IntraVENous Q6H PRN Objective:  
 
Visit Vitals  /69 (BP 1 Location: Right arm, BP Patient Position: At rest)  Pulse 75  Temp 98.3 °F (36.8 °C)  Resp 18  Ht 6' 2\" (1.88 m)  Wt 128.4 kg (283 lb)  SpO2 96%  BMI 36.34 kg/m2 Blood pressure 113/69, pulse 75, temperature 98.3 °F (36.8 °C), resp. rate 18, height 6' 2\" (1.88 m), weight 128.4 kg (283 lb), SpO2 96 %. 
 
07/13 1901 - 07/15 0700 In: -  
Out: 475 [AXLJB:537] Intake/Output Summary (Last 24 hours) at 07/15/18 1312 Last data filed at 07/15/18 0254 Gross per 24 hour Intake                0 ml Output              475 ml Net             -475 ml Physical Exam:  
 
General: awake WM in NAD 
GI: Soft, NT, ND + bowel sounds + LLQ ostomy Labs:  
 
Recent Labs  
   07/15/18 
 0325  07/13/18 
 0252 NA  134*  132* K  4.4  3.7 CL  100  99 CO2  27  25 BUN  7  8 CREA  0.66*  0.61* GLU  107*  106* CA  8.5  8.0* No results for input(s): INR, PTP, APTT in the last 72 hours. No lab exists for component: INREXT, INREXT No results for input(s): SGOT, GPT, AP, TBIL, TP, ALB, GLOB, GGT, AML, LPSE in the last 72 hours. No lab exists for component: AMYP, HLPSE Impression: 
GI blood loss anemia Ascending colon mass c/w probable colon carcinoma Cedric's erosion with HH on EGD Plan: · We await path,CEA and eventual surgical resection. · Surgery is planned tentatively for Tuesday. · Diet as per CRS. D/w RN. · We will follow peripherally. Terrell Pemberton MD 
 
7/15/2018 [address] [facility], [address] [address] Loc: [phone]

## 2018-07-16 ENCOUNTER — ANESTHESIA EVENT (OUTPATIENT)
Dept: SURGERY | Age: 78
DRG: 329 | End: 2018-07-16
Payer: MEDICARE

## 2018-07-16 LAB
ANION GAP SERPL CALC-SCNC: 6 MMOL/L (ref 5–15)
BUN SERPL-MCNC: 8 MG/DL (ref 6–20)
BUN/CREAT SERPL: 13 (ref 12–20)
CALCIUM SERPL-MCNC: 8.6 MG/DL (ref 8.5–10.1)
CHLORIDE SERPL-SCNC: 99 MMOL/L (ref 97–108)
CO2 SERPL-SCNC: 27 MMOL/L (ref 21–32)
CREAT SERPL-MCNC: 0.61 MG/DL (ref 0.7–1.3)
ERYTHROCYTE [DISTWIDTH] IN BLOOD BY AUTOMATED COUNT: 21 % (ref 11.5–14.5)
GLUCOSE SERPL-MCNC: 102 MG/DL (ref 65–100)
HCT VFR BLD AUTO: 26.4 % (ref 36.6–50.3)
HGB BLD-MCNC: 8 G/DL (ref 12.1–17)
MCH RBC QN AUTO: 25.5 PG (ref 26–34)
MCHC RBC AUTO-ENTMCNC: 30.3 G/DL (ref 30–36.5)
MCV RBC AUTO: 84.1 FL (ref 80–99)
NRBC # BLD: 0 K/UL (ref 0–0.01)
NRBC BLD-RTO: 0 PER 100 WBC
PLATELET # BLD AUTO: 254 K/UL (ref 150–400)
PMV BLD AUTO: 9.6 FL (ref 8.9–12.9)
POTASSIUM SERPL-SCNC: 4.6 MMOL/L (ref 3.5–5.1)
RBC # BLD AUTO: 3.14 M/UL (ref 4.1–5.7)
SODIUM SERPL-SCNC: 132 MMOL/L (ref 136–145)
WBC # BLD AUTO: 8.5 K/UL (ref 4.1–11.1)

## 2018-07-16 PROCEDURE — 36415 COLL VENOUS BLD VENIPUNCTURE: CPT | Performed by: INTERNAL MEDICINE

## 2018-07-16 PROCEDURE — 74011000250 HC RX REV CODE- 250: Performed by: HOSPITALIST

## 2018-07-16 PROCEDURE — C9113 INJ PANTOPRAZOLE SODIUM, VIA: HCPCS | Performed by: HOSPITALIST

## 2018-07-16 PROCEDURE — 94760 N-INVAS EAR/PLS OXIMETRY 1: CPT

## 2018-07-16 PROCEDURE — 94640 AIRWAY INHALATION TREATMENT: CPT

## 2018-07-16 PROCEDURE — 85027 COMPLETE CBC AUTOMATED: CPT | Performed by: INTERNAL MEDICINE

## 2018-07-16 PROCEDURE — 74011000250 HC RX REV CODE- 250: Performed by: INTERNAL MEDICINE

## 2018-07-16 PROCEDURE — 74011250636 HC RX REV CODE- 250/636: Performed by: HOSPITALIST

## 2018-07-16 PROCEDURE — 74011250637 HC RX REV CODE- 250/637: Performed by: HOSPITALIST

## 2018-07-16 PROCEDURE — 65270000015 HC RM PRIVATE ONCOLOGY

## 2018-07-16 PROCEDURE — 80048 BASIC METABOLIC PNL TOTAL CA: CPT | Performed by: INTERNAL MEDICINE

## 2018-07-16 RX ADMIN — IPRATROPIUM BROMIDE AND ALBUTEROL SULFATE 3 ML: .5; 3 SOLUTION RESPIRATORY (INHALATION) at 20:36

## 2018-07-16 RX ADMIN — AMIODARONE HYDROCHLORIDE 200 MG: 200 TABLET ORAL at 08:28

## 2018-07-16 RX ADMIN — SODIUM CHLORIDE 40 MG: 9 INJECTION INTRAMUSCULAR; INTRAVENOUS; SUBCUTANEOUS at 21:24

## 2018-07-16 RX ADMIN — VENLAFAXINE HYDROCHLORIDE 75 MG: 37.5 CAPSULE, EXTENDED RELEASE ORAL at 08:28

## 2018-07-16 RX ADMIN — METOPROLOL TARTRATE 12.5 MG: 25 TABLET ORAL at 08:28

## 2018-07-16 RX ADMIN — VITAMIN D, TAB 1000IU (100/BT) 1000 UNITS: 25 TAB at 12:31

## 2018-07-16 RX ADMIN — IPRATROPIUM BROMIDE AND ALBUTEROL SULFATE 3 ML: .5; 3 SOLUTION RESPIRATORY (INHALATION) at 12:06

## 2018-07-16 RX ADMIN — SODIUM CHLORIDE 10 ML: 9 INJECTION, SOLUTION INTRAMUSCULAR; INTRAVENOUS; SUBCUTANEOUS at 12:31

## 2018-07-16 RX ADMIN — SODIUM CHLORIDE 40 MG: 9 INJECTION INTRAMUSCULAR; INTRAVENOUS; SUBCUTANEOUS at 08:29

## 2018-07-16 RX ADMIN — SODIUM CHLORIDE 10 ML: 9 INJECTION, SOLUTION INTRAMUSCULAR; INTRAVENOUS; SUBCUTANEOUS at 05:13

## 2018-07-16 RX ADMIN — FINASTERIDE 5 MG: 5 TABLET, FILM COATED ORAL at 08:28

## 2018-07-16 RX ADMIN — CARBAMAZEPINE 200 MG: 200 TABLET, EXTENDED RELEASE ORAL at 17:13

## 2018-07-16 RX ADMIN — METOPROLOL TARTRATE 12.5 MG: 25 TABLET ORAL at 17:12

## 2018-07-16 RX ADMIN — IPRATROPIUM BROMIDE AND ALBUTEROL SULFATE 3 ML: .5; 3 SOLUTION RESPIRATORY (INHALATION) at 16:51

## 2018-07-16 RX ADMIN — LISINOPRIL 40 MG: 20 TABLET ORAL at 08:28

## 2018-07-16 RX ADMIN — SODIUM CHLORIDE 10 ML: 9 INJECTION, SOLUTION INTRAMUSCULAR; INTRAVENOUS; SUBCUTANEOUS at 21:24

## 2018-07-16 RX ADMIN — ATORVASTATIN CALCIUM 20 MG: 20 TABLET, FILM COATED ORAL at 17:12

## 2018-07-16 RX ADMIN — IPRATROPIUM BROMIDE AND ALBUTEROL SULFATE 3 ML: .5; 3 SOLUTION RESPIRATORY (INHALATION) at 08:53

## 2018-07-16 RX ADMIN — CARBAMAZEPINE 200 MG: 200 TABLET, EXTENDED RELEASE ORAL at 08:28

## 2018-07-16 NOTE — ROUTINE PROCESS
Oncology Nursing Communication Tool 4:59 AM 
7/16/2018 Bedside shift change report given to Valentina Galarza RN (incoming nurse) by Alejandra Bates RN (outgoing nurse) on [facility]. Report included the following information SBAR, Kardex, Intake/Output, MAR and Recent Results. Shift Summary: pt w/o c/o overnight. CPAP on but pt did not sleep as well as Saturday night. Pt paced on tele with pvcs. Pt for surgery to tomorrow. Issues for physician to address: none Oncology Shift Note Admission Date 7/10/2018 Admission Diagnosis Acute blood loss anemia GI bleed Anasarca GI Bleed 
gi bleed Code Status Full Code Consults IP CONSULT TO PRIMARY CARE PROVIDER 
IP CONSULT TO GASTROENTEROLOGY 
IP CONSULT TO CARDIOLOGY 
IP CONSULT TO COLORECTAL SURGERY Cardiac Monitoring [x] Yes [] No  
  
Purposeful Hourly Rounding [x] Yes   
Landen Score Total Score: 4 Landen score 3 or > [x] Bed Alarm [] Avasys [] 1:1 sitter [] Patient refused (Place signed refusal form in chart) Pain Managed [x] Yes [] No  
 Key Pain Meds   
    
  
 acetaminophen (TYLENOL) 500 mg tablet  (Taking) Take 1,000 mg by mouth every six (6) hours as needed for Pain. Influenza Vaccine Received Flu Vaccine for Current Season (usually Sept-March): Not Flu Season Oxygen needs? [] Room air Oxygen @  [x]1L    []2L    []3L   []4L    []5L   []6L Use home O2? [] Yes [x] No 
Perform O2 challenge test using  smartphrase (.oxygenchallenge) Last bowel movement Last Bowel Movement Date: 07/15/18 
bowel movement Urinary Catheter LDAs Peripheral IV 07/14/18 Right; Inner Forearm (Active) Site Assessment Clean, dry, & intact 7/16/2018  1:50 AM  
Phlebitis Assessment 0 7/16/2018  1:50 AM  
Infiltration Assessment 0 7/16/2018  1:50 AM  
Dressing Status Clean, dry, & intact 7/16/2018  1:50 AM  
Dressing Type Tape;Transparent 7/16/2018  1:50 AM  
Hub Color/Line Status Blue;Capped 7/16/2018  1:50 AM  
      
Colostomy 07/10/18 Left Abdomen (Active) Drainage Color Brown 7/15/2018  2:49 AM  
Site Assessment Clean, dry, intact 7/15/2018  9:58 PM  
Treatment Bag change 7/15/2018  9:58 PM  
Output (ml) 125 mL 7/14/2018  9:40 PM  
            
  
Readmission Risk Assessment Tool Score High Risk   
      
 21 Total Score 3 Has Seen PCP in Last 6 Months (Yes=3, No=0) 2 . Living with Significant Other. Assisted Living. LTAC. SNF. or  
Rehab  
 3 Patient Length of Stay (>5 days = 3)  
 9 Pt. Coverage (Medicare=5 , Medicaid, or Self-Pay=4) 4 Charlson Comorbidity Score (Age + Comorbid Conditions) Criteria that do not apply:  
 IP Visits Last 12 Months (1-3=4, 4=9, >4=11) Expected Length of Stay 3d 2h Actual Length of Stay 6 Tao Cano RN

## 2018-07-16 NOTE — PROGRESS NOTES
Oncology Nursing Communication Tool 12:57 PM 
7/16/2018 Bedside and Verbal shift change report given to Nikita Sanford RN (incoming nurse) by Yvonne Mariscal RN (outgoing nurse) on [facility]. Report included the following information SBAR, Kardex, Intake/Output, Accordion and Recent Results. Shift Summary: Patient with no complaints of pain or nausea. Good appetite. Voiding without difficulty. Issues for physician to address: Surgery prep Oncology Shift Note Admission Date 7/10/2018 Admission Diagnosis Acute blood loss anemia GI bleed Anasarca GI Bleed 
gi bleed Code Status Full Code Consults IP CONSULT TO PRIMARY CARE PROVIDER 
IP CONSULT TO GASTROENTEROLOGY 
IP CONSULT TO CARDIOLOGY 
IP CONSULT TO COLORECTAL SURGERY Cardiac Monitoring [] Yes [] No  
  
Purposeful Hourly Rounding [] Yes   
Landen Score Total Score: 4 Landen score 3 or > [] Bed Alarm [] Avasys [] 1:1 sitter [] Patient refused (Place signed refusal form in chart) Pain Managed [] Yes [] No  
 Key Pain Meds   
    
  
 acetaminophen (TYLENOL) 500 mg tablet  (Taking) Take 1,000 mg by mouth every six (6) hours as needed for Pain. Influenza Vaccine Received Flu Vaccine for Current Season (usually Sept-March): Not Flu Season Oxygen needs? [] Room air Oxygen @  []1L    []2L    []3L   []4L    []5L   []6L Use home O2? [] Yes [] No 
Perform O2 challenge test using  smartphrase (.oxygenchallenge) Last bowel movement Last Bowel Movement Date: 07/15/18 
bowel movement Urinary Catheter LDAs Peripheral IV 07/14/18 Right; Inner Forearm (Active) Site Assessment Clean, dry, & intact 7/16/2018 11:00 AM  
Phlebitis Assessment 0 7/16/2018 11:00 AM  
Infiltration Assessment 0 7/16/2018 11:00 AM  
Dressing Status Clean, dry, & intact 7/16/2018 11:00 AM  
Dressing Type Tape;Transparent 7/16/2018 11:00 AM  
Hub Color/Line Status Blue;Flushed 7/16/2018 11:00 AM  
      
Colostomy 07/10/18 Left Abdomen (Active) Drainage Color Oralee Puna 7/15/2018  2:49 AM  
Site Assessment Clean, dry, intact; Moist;Red 7/16/2018 11:00 AM  
Treatment Bag change 7/15/2018  9:58 PM  
Output (ml) 0 mL 7/16/2018 11:00 AM  
            
  
Readmission Risk Assessment Tool Score High Risk   
      
 21 Total Score 3 Has Seen PCP in Last 6 Months (Yes=3, No=0) 2 . Living with Significant Other. Assisted Living. LTAC. SNF. or  
Rehab  
 3 Patient Length of Stay (>5 days = 3)  
 9 Pt. Coverage (Medicare=5 , Medicaid, or Self-Pay=4) 4 Charlson Comorbidity Score (Age + Comorbid Conditions) Criteria that do not apply:  
 IP Visits Last 12 Months (1-3=4, 4=9, >4=11) Expected Length of Stay 3d 2h Actual Length of Stay 6 Imer Cobian RN

## 2018-07-16 NOTE — PROGRESS NOTES
Oncology Interdisciplinary rounds were held today to discuss patient plan of care and outcomes. The following members were present: Nursing, Case Management, Pharmacy and Dietary.     Actual Length of Stay: 6    DRG GLOS: 3.1    Expected Length of Stay: 3d 2h                Plan for Day        Mobility        Plan for Stay      Plan for Way   Surgery tomorrow  Up ad ronnie Continue current plan Home 7/18
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
## 2018-07-16 NOTE — ROUTINE PROCESS
Bedside and Verbal shift change report given to Shanel Gillette RN (oncoming nurse) by Madhu James RN (offgoing nurse). Report included the following information SBAR, Kardex, Intake/Output and MAR.
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
## 2018-07-17 ENCOUNTER — APPOINTMENT (OUTPATIENT)
Dept: GENERAL RADIOLOGY | Age: 78
DRG: 329 | End: 2018-07-17
Attending: ANESTHESIOLOGY
Payer: MEDICARE

## 2018-07-17 ENCOUNTER — ANESTHESIA (OUTPATIENT)
Dept: SURGERY | Age: 78
DRG: 329 | End: 2018-07-17
Payer: MEDICARE

## 2018-07-17 LAB
ANION GAP SERPL CALC-SCNC: 9 MMOL/L (ref 5–15)
BASOPHILS # BLD: 0.1 K/UL (ref 0–0.1)
BASOPHILS NFR BLD: 1 % (ref 0–1)
BUN SERPL-MCNC: 7 MG/DL (ref 6–20)
BUN/CREAT SERPL: 11 (ref 12–20)
CALCIUM SERPL-MCNC: 8.6 MG/DL (ref 8.5–10.1)
CHLORIDE SERPL-SCNC: 96 MMOL/L (ref 97–108)
CO2 SERPL-SCNC: 25 MMOL/L (ref 21–32)
CREAT SERPL-MCNC: 0.64 MG/DL (ref 0.7–1.3)
DIFFERENTIAL METHOD BLD: ABNORMAL
EOSINOPHIL # BLD: 0.4 K/UL (ref 0–0.4)
EOSINOPHIL NFR BLD: 4 % (ref 0–7)
ERYTHROCYTE [DISTWIDTH] IN BLOOD BY AUTOMATED COUNT: 22.5 % (ref 11.5–14.5)
ERYTHROCYTE [DISTWIDTH] IN BLOOD BY AUTOMATED COUNT: 22.8 % (ref 11.5–14.5)
GLUCOSE BLD STRIP.AUTO-MCNC: 156 MG/DL (ref 65–100)
GLUCOSE SERPL-MCNC: 103 MG/DL (ref 65–100)
HCT VFR BLD AUTO: 28 % (ref 36.6–50.3)
HCT VFR BLD AUTO: 29.5 % (ref 36.6–50.3)
HGB BLD-MCNC: 8.4 G/DL (ref 12.1–17)
HGB BLD-MCNC: 9.1 G/DL (ref 12.1–17)
IMM GRANULOCYTES # BLD: 0.1 K/UL (ref 0–0.04)
IMM GRANULOCYTES NFR BLD AUTO: 1 % (ref 0–0.5)
LYMPHOCYTES # BLD: 1.3 K/UL (ref 0.8–3.5)
LYMPHOCYTES NFR BLD: 15 % (ref 12–49)
MAGNESIUM SERPL-MCNC: 2.3 MG/DL (ref 1.6–2.4)
MCH RBC QN AUTO: 25.4 PG (ref 26–34)
MCH RBC QN AUTO: 26.2 PG (ref 26–34)
MCHC RBC AUTO-ENTMCNC: 30 G/DL (ref 30–36.5)
MCHC RBC AUTO-ENTMCNC: 30.8 G/DL (ref 30–36.5)
MCV RBC AUTO: 84.6 FL (ref 80–99)
MCV RBC AUTO: 85 FL (ref 80–99)
MONOCYTES # BLD: 1.2 K/UL (ref 0–1)
MONOCYTES NFR BLD: 13 % (ref 5–13)
NEUTS SEG # BLD: 5.8 K/UL (ref 1.8–8)
NEUTS SEG NFR BLD: 66 % (ref 32–75)
NRBC # BLD: 0 K/UL (ref 0–0.01)
NRBC # BLD: 0 K/UL (ref 0–0.01)
NRBC BLD-RTO: 0 PER 100 WBC
NRBC BLD-RTO: 0 PER 100 WBC
PLATELET # BLD AUTO: 280 K/UL (ref 150–400)
PLATELET # BLD AUTO: 299 K/UL (ref 150–400)
PMV BLD AUTO: 8.7 FL (ref 8.9–12.9)
PMV BLD AUTO: 9.4 FL (ref 8.9–12.9)
POTASSIUM SERPL-SCNC: 4.8 MMOL/L (ref 3.5–5.1)
RBC # BLD AUTO: 3.31 M/UL (ref 4.1–5.7)
RBC # BLD AUTO: 3.47 M/UL (ref 4.1–5.7)
RBC MORPH BLD: ABNORMAL
SERVICE CMNT-IMP: ABNORMAL
SODIUM SERPL-SCNC: 130 MMOL/L (ref 136–145)
T4 FREE SERPL-MCNC: 1.1 NG/DL (ref 0.8–1.5)
TSH SERPL DL<=0.05 MIU/L-ACNC: 4.35 UIU/ML (ref 0.36–3.74)
WBC # BLD AUTO: 14.4 K/UL (ref 4.1–11.1)
WBC # BLD AUTO: 8.9 K/UL (ref 4.1–11.1)

## 2018-07-17 PROCEDURE — 88342 IMHCHEM/IMCYTCHM 1ST ANTB: CPT | Performed by: SURGERY

## 2018-07-17 PROCEDURE — 77030020703 HC SEAL CANN DISP INTU -B: Performed by: SURGERY

## 2018-07-17 PROCEDURE — 94760 N-INVAS EAR/PLS OXIMETRY 1: CPT

## 2018-07-17 PROCEDURE — 84443 ASSAY THYROID STIM HORMONE: CPT

## 2018-07-17 PROCEDURE — 74011250636 HC RX REV CODE- 250/636: Performed by: ANESTHESIOLOGY

## 2018-07-17 PROCEDURE — 77030010293 HC STPLR INT TI J&J -B: Performed by: SURGERY

## 2018-07-17 PROCEDURE — 77030031139 HC SUT VCRL2 J&J -A: Performed by: SURGERY

## 2018-07-17 PROCEDURE — 77030008467 HC STPLR SKN COVD -B: Performed by: SURGERY

## 2018-07-17 PROCEDURE — 77030018521 HC STPLR ENDOSCOPIC J&J -C: Performed by: SURGERY

## 2018-07-17 PROCEDURE — 0DBK0ZZ EXCISION OF ASCENDING COLON, OPEN APPROACH: ICD-10-PCS | Performed by: SURGERY

## 2018-07-17 PROCEDURE — 94640 AIRWAY INHALATION TREATMENT: CPT

## 2018-07-17 PROCEDURE — 77030018719 HC DRSG PTCH ANTIMIC J&J -A: Performed by: SURGERY

## 2018-07-17 PROCEDURE — 77030032490 HC SLV COMPR SCD KNE COVD -B: Performed by: SURGERY

## 2018-07-17 PROCEDURE — 77030002966 HC SUT PDS J&J -A: Performed by: SURGERY

## 2018-07-17 PROCEDURE — 77030011278 HC ELECTRD LIG IMPT COVD -F: Performed by: SURGERY

## 2018-07-17 PROCEDURE — 77030012407 HC DRN WND BARD -B: Performed by: SURGERY

## 2018-07-17 PROCEDURE — 76010000177 HC OR TIME 5 TO 5.5 HR INTENSV-TIER 1: Performed by: SURGERY

## 2018-07-17 PROCEDURE — 77030018673: Performed by: SURGERY

## 2018-07-17 PROCEDURE — 80048 BASIC METABOLIC PNL TOTAL CA: CPT

## 2018-07-17 PROCEDURE — 77030003029 HC SUT VCRL J&J -B: Performed by: SURGERY

## 2018-07-17 PROCEDURE — 74011000250 HC RX REV CODE- 250: Performed by: HOSPITALIST

## 2018-07-17 PROCEDURE — 77030020061 HC IV BLD WRMR ADMIN SET 3M -B: Performed by: ANESTHESIOLOGY

## 2018-07-17 PROCEDURE — 77030035045 HC TRCR ENDOSC VRSPRT BLDLSS COVD -B: Performed by: SURGERY

## 2018-07-17 PROCEDURE — 77030037241 HC PRT ACC BLDLSS AIRSEAL CNMD -B: Performed by: SURGERY

## 2018-07-17 PROCEDURE — P9045 ALBUMIN (HUMAN), 5%, 250 ML: HCPCS

## 2018-07-17 PROCEDURE — 77030016151 HC PROTCTR LNS DFOG COVD -B: Performed by: SURGERY

## 2018-07-17 PROCEDURE — 65270000029 HC RM PRIVATE

## 2018-07-17 PROCEDURE — 71045 X-RAY EXAM CHEST 1 VIEW: CPT

## 2018-07-17 PROCEDURE — 76010000882 HC OR TIME 5 TO 5.5HR INTENSV - TIER 2: Performed by: SURGERY

## 2018-07-17 PROCEDURE — 77030008606 HC TRCR ENDOSC KII AMR -B: Performed by: SURGERY

## 2018-07-17 PROCEDURE — 0DQ80ZZ REPAIR SMALL INTESTINE, OPEN APPROACH: ICD-10-PCS | Performed by: SURGERY

## 2018-07-17 PROCEDURE — 77030005515 HC CATH URETH FOL14 BARD -B: Performed by: SURGERY

## 2018-07-17 PROCEDURE — 77030039266 HC ADH SKN EXOFIN S2SG -A: Performed by: SURGERY

## 2018-07-17 PROCEDURE — 82962 GLUCOSE BLOOD TEST: CPT

## 2018-07-17 PROCEDURE — 74011250636 HC RX REV CODE- 250/636: Performed by: SURGERY

## 2018-07-17 PROCEDURE — 76060000041 HC ANESTHESIA 5 TO 5.5 HR: Performed by: SURGERY

## 2018-07-17 PROCEDURE — 0DN80ZZ RELEASE SMALL INTESTINE, OPEN APPROACH: ICD-10-PCS | Performed by: SURGERY

## 2018-07-17 PROCEDURE — 86900 BLOOD TYPING SEROLOGIC ABO: CPT | Performed by: ANESTHESIOLOGY

## 2018-07-17 PROCEDURE — 85027 COMPLETE CBC AUTOMATED: CPT | Performed by: SURGERY

## 2018-07-17 PROCEDURE — 74011000250 HC RX REV CODE- 250: Performed by: SURGERY

## 2018-07-17 PROCEDURE — 77030026438 HC STYL ET INTUB CARD -A: Performed by: ANESTHESIOLOGY

## 2018-07-17 PROCEDURE — 77030013567 HC DRN WND RESERV BARD -A: Performed by: SURGERY

## 2018-07-17 PROCEDURE — 77030009978 HC RELD STPLR TCR J&J -B: Performed by: SURGERY

## 2018-07-17 PROCEDURE — 74011250637 HC RX REV CODE- 250/637: Performed by: HOSPITALIST

## 2018-07-17 PROCEDURE — 86923 COMPATIBILITY TEST ELECTRIC: CPT | Performed by: ANESTHESIOLOGY

## 2018-07-17 PROCEDURE — 74011250637 HC RX REV CODE- 250/637: Performed by: SURGERY

## 2018-07-17 PROCEDURE — 77030008771 HC TU NG SALEM SUMP -A: Performed by: ANESTHESIOLOGY

## 2018-07-17 PROCEDURE — 77030019908 HC STETH ESOPH SIMS -A: Performed by: ANESTHESIOLOGY

## 2018-07-17 PROCEDURE — 77030034699 HC LIGASURE MRYLND LAP SEAL DIV COVD -F: Performed by: SURGERY

## 2018-07-17 PROCEDURE — 77030009527 HC GEL PRT SYS AMR -E: Performed by: SURGERY

## 2018-07-17 PROCEDURE — 77030002933 HC SUT MCRYL J&J -A: Performed by: SURGERY

## 2018-07-17 PROCEDURE — C9113 INJ PANTOPRAZOLE SODIUM, VIA: HCPCS | Performed by: HOSPITALIST

## 2018-07-17 PROCEDURE — 0DNU4ZZ RELEASE OMENTUM, PERCUTANEOUS ENDOSCOPIC APPROACH: ICD-10-PCS | Performed by: SURGERY

## 2018-07-17 PROCEDURE — 74011000250 HC RX REV CODE- 250: Performed by: INTERNAL MEDICINE

## 2018-07-17 PROCEDURE — 77030011640 HC PAD GRND REM COVD -A: Performed by: SURGERY

## 2018-07-17 PROCEDURE — 77030013079 HC BLNKT BAIR HGGR 3M -A: Performed by: ANESTHESIOLOGY

## 2018-07-17 PROCEDURE — 77030008684 HC TU ET CUF COVD -B: Performed by: ANESTHESIOLOGY

## 2018-07-17 PROCEDURE — 77030034667 HC ACC PLATFRM ENDO GELPNT AMR -E: Performed by: SURGERY

## 2018-07-17 PROCEDURE — 84439 ASSAY OF FREE THYROXINE: CPT

## 2018-07-17 PROCEDURE — 02HV33Z INSERTION OF INFUSION DEVICE INTO SUPERIOR VENA CAVA, PERCUTANEOUS APPROACH: ICD-10-PCS | Performed by: ANESTHESIOLOGY

## 2018-07-17 PROCEDURE — 74011250636 HC RX REV CODE- 250/636

## 2018-07-17 PROCEDURE — 77030035279 HC SEAL VSL ENDOWR XI INTU -I2: Performed by: SURGERY

## 2018-07-17 PROCEDURE — 36415 COLL VENOUS BLD VENIPUNCTURE: CPT

## 2018-07-17 PROCEDURE — 77030018846 HC SOL IRR STRL H20 ICUM -A: Performed by: SURGERY

## 2018-07-17 PROCEDURE — 74011250636 HC RX REV CODE- 250/636: Performed by: HOSPITALIST

## 2018-07-17 PROCEDURE — 74011000258 HC RX REV CODE- 258: Performed by: SURGERY

## 2018-07-17 PROCEDURE — 76210000017 HC OR PH I REC 1.5 TO 2 HR: Performed by: SURGERY

## 2018-07-17 PROCEDURE — 77030002996 HC SUT SLK J&J -A: Performed by: SURGERY

## 2018-07-17 PROCEDURE — 83735 ASSAY OF MAGNESIUM: CPT

## 2018-07-17 PROCEDURE — 77030035277 HC OBTRTR BLDELSS DISP INTU -B: Performed by: SURGERY

## 2018-07-17 PROCEDURE — 77030018684: Performed by: SURGERY

## 2018-07-17 PROCEDURE — 88309 TISSUE EXAM BY PATHOLOGIST: CPT | Performed by: SURGERY

## 2018-07-17 PROCEDURE — 0DB80ZZ EXCISION OF SMALL INTESTINE, OPEN APPROACH: ICD-10-PCS | Performed by: SURGERY

## 2018-07-17 PROCEDURE — 74011000250 HC RX REV CODE- 250

## 2018-07-17 PROCEDURE — 74011000250 HC RX REV CODE- 250: Performed by: ANESTHESIOLOGY

## 2018-07-17 PROCEDURE — 88307 TISSUE EXAM BY PATHOLOGIST: CPT | Performed by: SURGERY

## 2018-07-17 PROCEDURE — 85025 COMPLETE CBC W/AUTO DIFF WBC: CPT

## 2018-07-17 PROCEDURE — 77030035029 HC NDL INSUF VERES DISP COVD -B: Performed by: SURGERY

## 2018-07-17 PROCEDURE — 77030008756 HC TU IRR SUC STRY -B: Performed by: SURGERY

## 2018-07-17 RX ORDER — MIDAZOLAM HYDROCHLORIDE 1 MG/ML
1 INJECTION, SOLUTION INTRAMUSCULAR; INTRAVENOUS AS NEEDED
Status: DISCONTINUED | OUTPATIENT
Start: 2018-07-17 | End: 2018-07-17 | Stop reason: HOSPADM

## 2018-07-17 RX ORDER — SODIUM CHLORIDE, SODIUM LACTATE, POTASSIUM CHLORIDE, CALCIUM CHLORIDE 600; 310; 30; 20 MG/100ML; MG/100ML; MG/100ML; MG/100ML
75 INJECTION, SOLUTION INTRAVENOUS CONTINUOUS
Status: DISCONTINUED | OUTPATIENT
Start: 2018-07-17 | End: 2018-07-19

## 2018-07-17 RX ORDER — METOPROLOL TARTRATE 25 MG/1
TABLET, FILM COATED ORAL
Qty: 30 TAB | Refills: 2 | OUTPATIENT
Start: 2018-07-17 | End: 2018-10-02

## 2018-07-17 RX ORDER — GLYCOPYRROLATE 0.2 MG/ML
INJECTION INTRAMUSCULAR; INTRAVENOUS AS NEEDED
Status: DISCONTINUED | OUTPATIENT
Start: 2018-07-17 | End: 2018-07-17 | Stop reason: HOSPADM

## 2018-07-17 RX ORDER — ACETAMINOPHEN 10 MG/ML
INJECTION, SOLUTION INTRAVENOUS AS NEEDED
Status: DISCONTINUED | OUTPATIENT
Start: 2018-07-17 | End: 2018-07-17 | Stop reason: HOSPADM

## 2018-07-17 RX ORDER — OXYCODONE HYDROCHLORIDE 5 MG/1
5 TABLET ORAL
Status: DISCONTINUED | OUTPATIENT
Start: 2018-07-17 | End: 2018-09-03

## 2018-07-17 RX ORDER — ALBUMIN HUMAN 50 G/1000ML
SOLUTION INTRAVENOUS AS NEEDED
Status: DISCONTINUED | OUTPATIENT
Start: 2018-07-17 | End: 2018-07-17 | Stop reason: HOSPADM

## 2018-07-17 RX ORDER — OXYCODONE HYDROCHLORIDE 5 MG/1
10 TABLET ORAL
Status: DISCONTINUED | OUTPATIENT
Start: 2018-07-17 | End: 2018-09-03

## 2018-07-17 RX ORDER — FENTANYL CITRATE 50 UG/ML
25 INJECTION, SOLUTION INTRAMUSCULAR; INTRAVENOUS
Status: DISCONTINUED | OUTPATIENT
Start: 2018-07-17 | End: 2018-07-17 | Stop reason: HOSPADM

## 2018-07-17 RX ORDER — PROCHLORPERAZINE EDISYLATE 5 MG/ML
INJECTION INTRAMUSCULAR; INTRAVENOUS
Status: DISPENSED
Start: 2018-07-17 | End: 2018-07-18

## 2018-07-17 RX ORDER — LIDOCAINE HYDROCHLORIDE 10 MG/ML
0.1 INJECTION, SOLUTION EPIDURAL; INFILTRATION; INTRACAUDAL; PERINEURAL AS NEEDED
Status: DISCONTINUED | OUTPATIENT
Start: 2018-07-17 | End: 2018-07-17 | Stop reason: HOSPADM

## 2018-07-17 RX ORDER — VECURONIUM BROMIDE FOR INJECTION 1 MG/ML
INJECTION, POWDER, LYOPHILIZED, FOR SOLUTION INTRAVENOUS AS NEEDED
Status: DISCONTINUED | OUTPATIENT
Start: 2018-07-17 | End: 2018-07-17 | Stop reason: HOSPADM

## 2018-07-17 RX ORDER — ONDANSETRON 2 MG/ML
INJECTION INTRAMUSCULAR; INTRAVENOUS AS NEEDED
Status: DISCONTINUED | OUTPATIENT
Start: 2018-07-17 | End: 2018-07-17 | Stop reason: HOSPADM

## 2018-07-17 RX ORDER — SODIUM CHLORIDE, SODIUM LACTATE, POTASSIUM CHLORIDE, CALCIUM CHLORIDE 600; 310; 30; 20 MG/100ML; MG/100ML; MG/100ML; MG/100ML
25 INJECTION, SOLUTION INTRAVENOUS CONTINUOUS
Status: DISCONTINUED | OUTPATIENT
Start: 2018-07-17 | End: 2018-07-17 | Stop reason: HOSPADM

## 2018-07-17 RX ORDER — LIDOCAINE HYDROCHLORIDE 20 MG/ML
INJECTION, SOLUTION EPIDURAL; INFILTRATION; INTRACAUDAL; PERINEURAL AS NEEDED
Status: DISCONTINUED | OUTPATIENT
Start: 2018-07-17 | End: 2018-07-17 | Stop reason: HOSPADM

## 2018-07-17 RX ORDER — PROPOFOL 10 MG/ML
INJECTION, EMULSION INTRAVENOUS AS NEEDED
Status: DISCONTINUED | OUTPATIENT
Start: 2018-07-17 | End: 2018-07-17 | Stop reason: HOSPADM

## 2018-07-17 RX ORDER — FENTANYL CITRATE 50 UG/ML
50 INJECTION, SOLUTION INTRAMUSCULAR; INTRAVENOUS AS NEEDED
Status: DISCONTINUED | OUTPATIENT
Start: 2018-07-17 | End: 2018-07-17 | Stop reason: HOSPADM

## 2018-07-17 RX ORDER — MORPHINE SULFATE 10 MG/ML
2 INJECTION, SOLUTION INTRAMUSCULAR; INTRAVENOUS
Status: COMPLETED | OUTPATIENT
Start: 2018-07-17 | End: 2018-07-17

## 2018-07-17 RX ORDER — BUPIVACAINE HYDROCHLORIDE AND EPINEPHRINE 2.5; 5 MG/ML; UG/ML
INJECTION, SOLUTION EPIDURAL; INFILTRATION; INTRACAUDAL; PERINEURAL AS NEEDED
Status: DISCONTINUED | OUTPATIENT
Start: 2018-07-17 | End: 2018-07-17 | Stop reason: HOSPADM

## 2018-07-17 RX ORDER — FENTANYL CITRATE 50 UG/ML
INJECTION, SOLUTION INTRAMUSCULAR; INTRAVENOUS AS NEEDED
Status: DISCONTINUED | OUTPATIENT
Start: 2018-07-17 | End: 2018-07-17 | Stop reason: HOSPADM

## 2018-07-17 RX ORDER — DIPHENHYDRAMINE HYDROCHLORIDE 50 MG/ML
12.5 INJECTION, SOLUTION INTRAMUSCULAR; INTRAVENOUS AS NEEDED
Status: DISCONTINUED | OUTPATIENT
Start: 2018-07-17 | End: 2018-07-17 | Stop reason: HOSPADM

## 2018-07-17 RX ORDER — ROCURONIUM BROMIDE 10 MG/ML
INJECTION, SOLUTION INTRAVENOUS AS NEEDED
Status: DISCONTINUED | OUTPATIENT
Start: 2018-07-17 | End: 2018-07-17 | Stop reason: HOSPADM

## 2018-07-17 RX ORDER — SUCCINYLCHOLINE CHLORIDE 20 MG/ML
INJECTION INTRAMUSCULAR; INTRAVENOUS AS NEEDED
Status: DISCONTINUED | OUTPATIENT
Start: 2018-07-17 | End: 2018-07-17 | Stop reason: HOSPADM

## 2018-07-17 RX ORDER — NEOSTIGMINE METHYLSULFATE 1 MG/ML
INJECTION INTRAVENOUS AS NEEDED
Status: DISCONTINUED | OUTPATIENT
Start: 2018-07-17 | End: 2018-07-17 | Stop reason: HOSPADM

## 2018-07-17 RX ORDER — ONDANSETRON 2 MG/ML
4 INJECTION INTRAMUSCULAR; INTRAVENOUS AS NEEDED
Status: DISCONTINUED | OUTPATIENT
Start: 2018-07-17 | End: 2018-07-17 | Stop reason: HOSPADM

## 2018-07-17 RX ORDER — MORPHINE SULFATE 10 MG/ML
INJECTION, SOLUTION INTRAMUSCULAR; INTRAVENOUS
Status: COMPLETED
Start: 2018-07-17 | End: 2018-07-17

## 2018-07-17 RX ORDER — HYDROMORPHONE HYDROCHLORIDE 2 MG/ML
INJECTION, SOLUTION INTRAMUSCULAR; INTRAVENOUS; SUBCUTANEOUS AS NEEDED
Status: DISCONTINUED | OUTPATIENT
Start: 2018-07-17 | End: 2018-07-17 | Stop reason: HOSPADM

## 2018-07-17 RX ORDER — SODIUM CHLORIDE 9 MG/ML
250 INJECTION, SOLUTION INTRAVENOUS AS NEEDED
Status: DISCONTINUED | OUTPATIENT
Start: 2018-07-17 | End: 2018-07-21 | Stop reason: ALTCHOICE

## 2018-07-17 RX ORDER — HYDROMORPHONE HYDROCHLORIDE 1 MG/ML
1 INJECTION, SOLUTION INTRAMUSCULAR; INTRAVENOUS; SUBCUTANEOUS
Status: DISCONTINUED | OUTPATIENT
Start: 2018-07-17 | End: 2018-07-20

## 2018-07-17 RX ORDER — DEXAMETHASONE SODIUM PHOSPHATE 4 MG/ML
INJECTION, SOLUTION INTRA-ARTICULAR; INTRALESIONAL; INTRAMUSCULAR; INTRAVENOUS; SOFT TISSUE AS NEEDED
Status: DISCONTINUED | OUTPATIENT
Start: 2018-07-17 | End: 2018-07-17 | Stop reason: HOSPADM

## 2018-07-17 RX ADMIN — NEOSTIGMINE METHYLSULFATE 4 MG: 1 INJECTION INTRAVENOUS at 18:30

## 2018-07-17 RX ADMIN — FENTANYL CITRATE 50 MCG: 50 INJECTION, SOLUTION INTRAMUSCULAR; INTRAVENOUS at 18:33

## 2018-07-17 RX ADMIN — FENTANYL CITRATE 25 MCG: 50 INJECTION, SOLUTION INTRAMUSCULAR; INTRAVENOUS at 20:00

## 2018-07-17 RX ADMIN — MORPHINE SULFATE 2 MG: 10 INJECTION, SOLUTION INTRAMUSCULAR; INTRAVENOUS at 19:40

## 2018-07-17 RX ADMIN — ACETAMINOPHEN 1000 MG: 10 INJECTION, SOLUTION INTRAVENOUS at 14:31

## 2018-07-17 RX ADMIN — MORPHINE SULFATE 2 MG: 10 INJECTION, SOLUTION INTRAMUSCULAR; INTRAVENOUS at 19:30

## 2018-07-17 RX ADMIN — SODIUM CHLORIDE, SODIUM LACTATE, POTASSIUM CHLORIDE, AND CALCIUM CHLORIDE 75 ML/HR: 600; 310; 30; 20 INJECTION, SOLUTION INTRAVENOUS at 19:20

## 2018-07-17 RX ADMIN — FENTANYL CITRATE 100 MCG: 50 INJECTION, SOLUTION INTRAMUSCULAR; INTRAVENOUS at 13:53

## 2018-07-17 RX ADMIN — MORPHINE SULFATE 2 MG: 10 INJECTION, SOLUTION INTRAMUSCULAR; INTRAVENOUS at 19:45

## 2018-07-17 RX ADMIN — VECURONIUM BROMIDE FOR INJECTION 2 MG: 1 INJECTION, POWDER, LYOPHILIZED, FOR SOLUTION INTRAVENOUS at 16:41

## 2018-07-17 RX ADMIN — IPRATROPIUM BROMIDE AND ALBUTEROL SULFATE 3 ML: .5; 3 SOLUTION RESPIRATORY (INHALATION) at 23:26

## 2018-07-17 RX ADMIN — ROCURONIUM BROMIDE 10 MG: 10 INJECTION, SOLUTION INTRAVENOUS at 16:14

## 2018-07-17 RX ADMIN — SODIUM CHLORIDE 10 ML: 9 INJECTION, SOLUTION INTRAMUSCULAR; INTRAVENOUS; SUBCUTANEOUS at 21:58

## 2018-07-17 RX ADMIN — MORPHINE SULFATE 2 MG: 10 INJECTION, SOLUTION INTRAMUSCULAR; INTRAVENOUS at 19:35

## 2018-07-17 RX ADMIN — SODIUM CHLORIDE, SODIUM LACTATE, POTASSIUM CHLORIDE, AND CALCIUM CHLORIDE 75 ML/HR: 600; 310; 30; 20 INJECTION, SOLUTION INTRAVENOUS at 13:23

## 2018-07-17 RX ADMIN — ROCURONIUM BROMIDE 20 MG: 10 INJECTION, SOLUTION INTRAVENOUS at 16:00

## 2018-07-17 RX ADMIN — SUCCINYLCHOLINE CHLORIDE 140 MG: 20 INJECTION INTRAMUSCULAR; INTRAVENOUS at 13:53

## 2018-07-17 RX ADMIN — ROCURONIUM BROMIDE 10 MG: 10 INJECTION, SOLUTION INTRAVENOUS at 15:21

## 2018-07-17 RX ADMIN — IPRATROPIUM BROMIDE AND ALBUTEROL SULFATE 3 ML: .5; 3 SOLUTION RESPIRATORY (INHALATION) at 08:51

## 2018-07-17 RX ADMIN — LIDOCAINE HYDROCHLORIDE 80 MG: 20 INJECTION, SOLUTION EPIDURAL; INFILTRATION; INTRACAUDAL; PERINEURAL at 13:53

## 2018-07-17 RX ADMIN — HYDROMORPHONE HYDROCHLORIDE 0.4 MG: 2 INJECTION, SOLUTION INTRAMUSCULAR; INTRAVENOUS; SUBCUTANEOUS at 15:21

## 2018-07-17 RX ADMIN — MORPHINE SULFATE 2 MG: 10 INJECTION INTRAMUSCULAR; INTRAVENOUS; SUBCUTANEOUS at 19:25

## 2018-07-17 RX ADMIN — ROCURONIUM BROMIDE 45 MG: 10 INJECTION, SOLUTION INTRAVENOUS at 14:10

## 2018-07-17 RX ADMIN — ROCURONIUM BROMIDE 5 MG: 10 INJECTION, SOLUTION INTRAVENOUS at 13:53

## 2018-07-17 RX ADMIN — AMIODARONE HYDROCHLORIDE 200 MG: 200 TABLET ORAL at 08:36

## 2018-07-17 RX ADMIN — DEXAMETHASONE SODIUM PHOSPHATE 8 MG: 4 INJECTION, SOLUTION INTRA-ARTICULAR; INTRALESIONAL; INTRAMUSCULAR; INTRAVENOUS; SOFT TISSUE at 14:09

## 2018-07-17 RX ADMIN — VECURONIUM BROMIDE FOR INJECTION 2 MG: 1 INJECTION, POWDER, LYOPHILIZED, FOR SOLUTION INTRAVENOUS at 17:41

## 2018-07-17 RX ADMIN — HYDROMORPHONE HYDROCHLORIDE 0.4 MG: 2 INJECTION, SOLUTION INTRAMUSCULAR; INTRAVENOUS; SUBCUTANEOUS at 14:39

## 2018-07-17 RX ADMIN — ONDANSETRON 4 MG: 2 INJECTION INTRAMUSCULAR; INTRAVENOUS at 18:13

## 2018-07-17 RX ADMIN — SODIUM CHLORIDE, SODIUM LACTATE, POTASSIUM CHLORIDE, AND CALCIUM CHLORIDE: 600; 310; 30; 20 INJECTION, SOLUTION INTRAVENOUS at 16:29

## 2018-07-17 RX ADMIN — METOPROLOL TARTRATE 12.5 MG: 25 TABLET ORAL at 08:37

## 2018-07-17 RX ADMIN — MORPHINE SULFATE 2 MG: 10 INJECTION, SOLUTION INTRAMUSCULAR; INTRAVENOUS at 19:25

## 2018-07-17 RX ADMIN — PROPOFOL 100 MG: 10 INJECTION, EMULSION INTRAVENOUS at 13:53

## 2018-07-17 RX ADMIN — ROCURONIUM BROMIDE 10 MG: 10 INJECTION, SOLUTION INTRAVENOUS at 15:30

## 2018-07-17 RX ADMIN — GLYCOPYRROLATE 0.6 MG: 0.2 INJECTION INTRAMUSCULAR; INTRAVENOUS at 18:30

## 2018-07-17 RX ADMIN — FENTANYL CITRATE 50 MCG: 50 INJECTION, SOLUTION INTRAMUSCULAR; INTRAVENOUS at 13:43

## 2018-07-17 RX ADMIN — FENTANYL CITRATE 25 MCG: 50 INJECTION, SOLUTION INTRAMUSCULAR; INTRAVENOUS at 20:05

## 2018-07-17 RX ADMIN — PROCHLORPERAZINE EDISYLATE 5 MG: 5 INJECTION INTRAMUSCULAR; INTRAVENOUS at 19:55

## 2018-07-17 RX ADMIN — SODIUM CHLORIDE, SODIUM LACTATE, POTASSIUM CHLORIDE, AND CALCIUM CHLORIDE: 600; 310; 30; 20 INJECTION, SOLUTION INTRAVENOUS at 18:22

## 2018-07-17 RX ADMIN — FENTANYL CITRATE 50 MCG: 50 INJECTION, SOLUTION INTRAMUSCULAR; INTRAVENOUS at 18:29

## 2018-07-17 RX ADMIN — ALBUMIN HUMAN 250 ML: 50 SOLUTION INTRAVENOUS at 14:06

## 2018-07-17 RX ADMIN — SODIUM CHLORIDE 10 ML: 9 INJECTION, SOLUTION INTRAMUSCULAR; INTRAVENOUS; SUBCUTANEOUS at 06:20

## 2018-07-17 RX ADMIN — VECURONIUM BROMIDE FOR INJECTION 2 MG: 1 INJECTION, POWDER, LYOPHILIZED, FOR SOLUTION INTRAVENOUS at 17:17

## 2018-07-17 RX ADMIN — OXYCODONE HYDROCHLORIDE 10 MG: 5 TABLET ORAL at 21:50

## 2018-07-17 RX ADMIN — VECURONIUM BROMIDE FOR INJECTION 2 MG: 1 INJECTION, POWDER, LYOPHILIZED, FOR SOLUTION INTRAVENOUS at 16:17

## 2018-07-17 RX ADMIN — CEFOTETAN DISODIUM 2 G: 2 INJECTION, POWDER, FOR SOLUTION INTRAMUSCULAR; INTRAVENOUS at 14:07

## 2018-07-17 RX ADMIN — SODIUM CHLORIDE 40 MG: 9 INJECTION INTRAMUSCULAR; INTRAVENOUS; SUBCUTANEOUS at 21:52
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
## 2018-07-17 NOTE — ROUTINE PROCESS
TRANSFER - IN REPORT:    Verbal report received from BASIL Thacker RN(name) on Zeeshan Goods  being received from OR(unit) for routine post - op      Report consisted of patients Situation, Background, Assessment and   Recommendations(SBAR). Information from the following report(s) OR Summary was reviewed with the receiving nurse. Opportunity for questions and clarification was provided. Assessment completed upon patients arrival to unit and care assumed.

## 2018-07-17 NOTE — ANESTHESIA PROCEDURE NOTES
Central Line Placement    Start time: 7/17/2018 12:48 PM  End time: 7/17/2018 12:58 PM  Performed by: Reed Solis  Authorized by: Reed Solis     Indications: vascular access, central pressure monitoring and need for vasopressors  Preanesthetic Checklist: patient identified, risks and benefits discussed, anesthesia consent, site marked, patient being monitored and timeout performed    Timeout Time: 12:48       Pre-procedure: All elements of maximal sterile barrier technique followed?  Yes    2% Chlorhexidine for cutaneous antisepsis, Hand hygiene performed prior to catheter insertion and Ultrasound guidance    Sterile Ultrasound Technique followed?: Yes            Procedure:   Prep:  Chlorhexidine  Location:  Internal jugular  Orientation:  Right  Patient position:  Trendelenburg  Catheter type:  Quad lumen  Catheter size:  8.5 Fr  Catheter length:  16 cm  Number of attempts:  1  Successful placement: Yes      Assessment:   Post-procedure:  Catheter secured, sterile dressing applied and sterile dressing with CHG applied  Assessment:  Blood return through all ports, free fluid flow and guidewire removal verified  Insertion:  Uncomplicated  Patient tolerance:  Patient tolerated the procedure well with no immediate complications

## 2018-07-17 NOTE — PROGRESS NOTES
Problem: Falls - Risk of  Goal: *Absence of Falls  Document Landen Fall Risk and appropriate interventions in the flowsheet.    Outcome: Progressing Towards Goal  Fall Risk Interventions:  Mobility Interventions: Communicate number of staff needed for ambulation/transfer, Utilize walker, cane, or other assistive device, Patient to call before getting OOB    Mentation Interventions: Adequate sleep, hydration, pain control, Door open when patient unattended, Evaluate medications/consider consulting pharmacy, Reorient patient, Update white board    Medication Interventions: Assess postural VS orthostatic hypotension, Patient to call before getting OOB, Teach patient to arise slowly    Elimination Interventions: Call light in reach, Toileting schedule/hourly rounds, Urinal in reach, Patient to call for help with toileting needs

## 2018-07-17 NOTE — PROGRESS NOTES
Physical Therapy  Patient is not in room - pt in OR at this time . Will DC PT due to change in status (surgery) and request re-order of PT after surgery when appropriate.   Thanks

## 2018-07-17 NOTE — PERIOP NOTES
TRANSFER - IN REPORT:    Verbal report received from [facility] RN(name) on Channing Winkler  being received from Oncology Room 1116(unit) for ordered procedure      Report consisted of patients Situation, Background, Assessment and   Recommendations(SBAR). Information from the following report(s) SBAR, Kardex, Intake/Output, MAR, Recent Results and Med Rec Status was reviewed with the receiving nurse. Opportunity for questions and clarification was provided. Assessment completed upon patients arrival to unit and care assumed.
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
## 2018-07-17 NOTE — PROGRESS NOTES
Problem: Pressure Injury - Risk of  Goal: *Prevention of pressure injury  Document Dagoberto Scale and appropriate interventions in the flowsheet. Outcome: Progressing Towards Goal  Pressure Injury Interventions:  Sensory Interventions: Assess changes in LOC, Float heels, Maintain/enhance activity level, Turn and reposition approx. every two hours (pillows and wedges if needed)    Moisture Interventions: Absorbent underpads, Check for incontinence Q2 hours and as needed, Offer toileting Q_hr    Activity Interventions: Assess need for specialty bed, Increase time out of bed, PT/OT evaluation, Pressure redistribution bed/mattress(bed type)    Mobility Interventions: Assess need for specialty bed, HOB 30 degrees or less, Pressure redistribution bed/mattress (bed type), PT/OT evaluation, Turn and reposition approx.  every two hours(pillow and wedges)    Nutrition Interventions: Document food/fluid/supplement intake, Discuss nutritional consult with provider, Offer support with meals,snacks and hydration (npo for surgery)    Friction and Shear Interventions: Apply protective barrier, creams and emollients, Transferring/repositioning devices
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
## 2018-07-17 NOTE — PROGRESS NOTES
Oncology Nursing Communication Tool 6:24 AM 
7/17/2018 Bedside shift change report given to CRISTIN Dietz (incoming nurse) by Mary Bailon (outgoing nurse) on Sherly Marc. Report included the following information SBAR, Kardex, Intake/Output, MAR and Recent Results. Shift Summary: pt NPO since midnight. Labs drawn. IV patent. CHG bath and linen change at 0400. Surgery today, to go to gen surg post procedure Issues for physician to address: none, surgery today. D/c? Oncology Shift Note Admission Date 7/10/2018 Admission Diagnosis Acute blood loss anemia GI bleed Anasarca GI Bleed 
gi bleed ASCENDING COLON CANCER Code Status Full Code Consults IP CONSULT TO PRIMARY CARE PROVIDER 
IP CONSULT TO GASTROENTEROLOGY 
IP CONSULT TO CARDIOLOGY 
IP CONSULT TO COLORECTAL SURGERY Cardiac Monitoring [x] Yes [] No  
  
Purposeful Hourly Rounding [x] Yes   
Landen Score Total Score: 3 Landen score 3 or > [] Bed Alarm [] Avasys [] 1:1 sitter [] Patient refused (Place signed refusal form in chart) Pain Managed [] Yes [] No  
 Key Pain Meds   
    
  
 acetaminophen (TYLENOL) 500 mg tablet  (Taking) Take 1,000 mg by mouth every six (6) hours as needed for Pain. Influenza Vaccine Received Flu Vaccine for Current Season (usually Sept-March): Not Flu Season Oxygen needs? [] Room air Oxygen @  []1L    [x]2L    []3L   []4L    []5L   []6L Use home O2? [] Yes [] No 
Perform O2 challenge test using  smartphrase (.oxygenchallenge) Last bowel movement Last Bowel Movement Date: 07/15/18 
bowel movement Urinary Catheter LDAs Peripheral IV 07/14/18 Right; Inner Forearm (Active) Site Assessment Clean, dry, & intact 7/16/2018 11:41 PM  
Phlebitis Assessment 0 7/16/2018 11:41 PM  
Infiltration Assessment 0 7/16/2018 11:41 PM  
Dressing Status Clean, dry, & intact 7/16/2018 11:41 PM  
Dressing Type Tape;Transparent 7/16/2018 11:41 PM Hub Color/Line Status Blue;Capped 7/16/2018 11:41 PM  
      
Colostomy 07/10/18 Left Abdomen (Active) Drainage Color Other (Comment) 7/16/2018 11:41 PM  
Site Assessment Clean, dry, intact 7/16/2018 11:41 PM  
Treatment Other (Comment) 7/16/2018 11:41 PM  
Output (ml) 0 mL 7/16/2018 11:00 AM  
            
  
Readmission Risk Assessment Tool Score High Risk   
      
 21 Total Score 3 Has Seen PCP in Last 6 Months (Yes=3, No=0) 2 . Living with Significant Other. Assisted Living. LTAC. SNF. or  
Rehab  
 3 Patient Length of Stay (>5 days = 3)  
 9 Pt. Coverage (Medicare=5 , Medicaid, or Self-Pay=4) 4 Charlson Comorbidity Score (Age + Comorbid Conditions) Criteria that do not apply:  
 IP Visits Last 12 Months (1-3=4, 4=9, >4=11) Expected Length of Stay 3d 19h Actual Length of Stay 7 Cece Giles
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
## 2018-07-17 NOTE — BRIEF OP NOTE
BRIEF OPERATIVE NOTE    Date of Procedure: 7/17/2018   Preoperative Diagnosis: ASCENDING COLON CANCER   Postoperative Diagnosis: ASCENDING COLON CANCER     Procedure(s):  LAPAROSCOPIC CONVERTED TO OPEN RIGHT COLECTOMY, EXTENSIVE LYSIS OF ADHESIONS, REPAIR OF SEROSAL TEARS X2 AND SMALL BOWEL RESECTION X 2  Surgeon(s) and Role:     * Fredis Sanders MD - Primary         Surgical Assistant: Satya Gonzalez    Surgical Staff:  Circ-1: Lucinda Pitt RN  Circ-Relief: Aly Douglas RN  Circ-Intern: David Talbot RN  Scrub Tech-1: Rip Cee  Scrub Tech-Relief: Ewa \[facility] [place] [facility]\""  Surg Asst-1: Melissa Prim; Yolanda McPhipps  Event Time In   Incision Start 1417   Incision Close 1833     Anesthesia: General   Estimated Blood Loss: 250ml  Specimens:   ID Type Source Tests Collected by Time Destination   1 : #1. Small Bowel Preservative Small Bowel  Fredis Sanders MD 7/17/2018 1607 Pathology   2 : #2. Small bowel Preservative Small Bowel  Fredis Sanders MD 7/17/2018 1621 Pathology   3 : Right colon (stitch at distal margin) Preservative Colon, Right  Fredis Sanders MD 7/17/2018 1735 Pathology      Findings: Dense fibrous adhesions to the abdominal wall and deep pelvis. Large carpeting mass in the ascending colon.   Complications: None  Implants: * No implants in log *

## 2018-07-17 NOTE — PROGRESS NOTES
Oncology Interdisciplinary rounds were held today to discuss patient plan of care and outcomes. The following members were present: Nursing, Case Management, Pharmacy and Dietary.     Actual Length of Stay: 7    DRG GLOS: 3.8    Expected Length of Stay: 3d 19h                Plan for Day        Mobility        Plan for Stay      Plan for Way   Surgery today Max assist continue current plan Transfer to surgical

## 2018-07-18 LAB
ANION GAP SERPL CALC-SCNC: 7 MMOL/L (ref 5–15)
BASOPHILS # BLD: 0 K/UL (ref 0–0.1)
BASOPHILS NFR BLD: 0 % (ref 0–1)
BUN SERPL-MCNC: 10 MG/DL (ref 6–20)
BUN/CREAT SERPL: 11 (ref 12–20)
CALCIUM SERPL-MCNC: 8.1 MG/DL (ref 8.5–10.1)
CHLORIDE SERPL-SCNC: 98 MMOL/L (ref 97–108)
CO2 SERPL-SCNC: 26 MMOL/L (ref 21–32)
CREAT SERPL-MCNC: 0.93 MG/DL (ref 0.7–1.3)
DIFFERENTIAL METHOD BLD: ABNORMAL
EOSINOPHIL # BLD: 0 K/UL (ref 0–0.4)
EOSINOPHIL NFR BLD: 0 % (ref 0–7)
ERYTHROCYTE [DISTWIDTH] IN BLOOD BY AUTOMATED COUNT: 23 % (ref 11.5–14.5)
GLUCOSE SERPL-MCNC: 137 MG/DL (ref 65–100)
HCT VFR BLD AUTO: 29.8 % (ref 36.6–50.3)
HGB BLD-MCNC: 8.9 G/DL (ref 12.1–17)
IMM GRANULOCYTES # BLD: 0 K/UL (ref 0–0.04)
IMM GRANULOCYTES NFR BLD AUTO: 0 % (ref 0–0.5)
LYMPHOCYTES # BLD: 0 K/UL (ref 0.8–3.5)
LYMPHOCYTES NFR BLD: 0 % (ref 12–49)
MAGNESIUM SERPL-MCNC: 1.8 MG/DL (ref 1.6–2.4)
MCH RBC QN AUTO: 25.9 PG (ref 26–34)
MCHC RBC AUTO-ENTMCNC: 29.9 G/DL (ref 30–36.5)
MCV RBC AUTO: 86.6 FL (ref 80–99)
MONOCYTES # BLD: 0.9 K/UL (ref 0–1)
MONOCYTES NFR BLD: 4 % (ref 5–13)
NEUTS BAND NFR BLD MANUAL: 16 %
NEUTS SEG # BLD: 22.8 K/UL (ref 1.8–8)
NEUTS SEG NFR BLD: 80 % (ref 32–75)
NRBC # BLD: 0 K/UL (ref 0–0.01)
NRBC BLD-RTO: 0 PER 100 WBC
PLATELET # BLD AUTO: 292 K/UL (ref 150–400)
PMV BLD AUTO: 9.5 FL (ref 8.9–12.9)
POTASSIUM SERPL-SCNC: 5.2 MMOL/L (ref 3.5–5.1)
RBC # BLD AUTO: 3.44 M/UL (ref 4.1–5.7)
RBC MORPH BLD: ABNORMAL
RBC MORPH BLD: ABNORMAL
SODIUM SERPL-SCNC: 131 MMOL/L (ref 136–145)
WBC # BLD AUTO: 23.7 K/UL (ref 4.1–11.1)

## 2018-07-18 PROCEDURE — 74011250636 HC RX REV CODE- 250/636: Performed by: SURGERY

## 2018-07-18 PROCEDURE — C9113 INJ PANTOPRAZOLE SODIUM, VIA: HCPCS | Performed by: HOSPITALIST

## 2018-07-18 PROCEDURE — 74011250637 HC RX REV CODE- 250/637: Performed by: HOSPITALIST

## 2018-07-18 PROCEDURE — 77010033678 HC OXYGEN DAILY

## 2018-07-18 PROCEDURE — 94640 AIRWAY INHALATION TREATMENT: CPT

## 2018-07-18 PROCEDURE — 80048 BASIC METABOLIC PNL TOTAL CA: CPT | Performed by: SURGERY

## 2018-07-18 PROCEDURE — 83735 ASSAY OF MAGNESIUM: CPT | Performed by: SURGERY

## 2018-07-18 PROCEDURE — 85025 COMPLETE CBC W/AUTO DIFF WBC: CPT | Performed by: SURGERY

## 2018-07-18 PROCEDURE — 94760 N-INVAS EAR/PLS OXIMETRY 1: CPT

## 2018-07-18 PROCEDURE — 74011250637 HC RX REV CODE- 250/637: Performed by: SURGERY

## 2018-07-18 PROCEDURE — 65270000029 HC RM PRIVATE

## 2018-07-18 PROCEDURE — 74011250636 HC RX REV CODE- 250/636: Performed by: HOSPITALIST

## 2018-07-18 PROCEDURE — 74011000250 HC RX REV CODE- 250: Performed by: INTERNAL MEDICINE

## 2018-07-18 PROCEDURE — 74011250636 HC RX REV CODE- 250/636: Performed by: EMERGENCY MEDICINE

## 2018-07-18 PROCEDURE — 36415 COLL VENOUS BLD VENIPUNCTURE: CPT | Performed by: SURGERY

## 2018-07-18 PROCEDURE — 74011000250 HC RX REV CODE- 250: Performed by: HOSPITALIST

## 2018-07-18 RX ORDER — GUAIFENESIN 100 MG/5ML
81 LIQUID (ML) ORAL DAILY
Status: DISCONTINUED | OUTPATIENT
Start: 2018-07-18 | End: 2018-07-21

## 2018-07-18 RX ADMIN — AMIODARONE HYDROCHLORIDE 200 MG: 200 TABLET ORAL at 08:39

## 2018-07-18 RX ADMIN — SODIUM CHLORIDE 10 ML: 9 INJECTION, SOLUTION INTRAMUSCULAR; INTRAVENOUS; SUBCUTANEOUS at 06:00

## 2018-07-18 RX ADMIN — OXYCODONE HYDROCHLORIDE 10 MG: 5 TABLET ORAL at 23:50

## 2018-07-18 RX ADMIN — FINASTERIDE 5 MG: 5 TABLET, FILM COATED ORAL at 08:39

## 2018-07-18 RX ADMIN — SODIUM CHLORIDE 10 ML: 9 INJECTION, SOLUTION INTRAMUSCULAR; INTRAVENOUS; SUBCUTANEOUS at 13:49

## 2018-07-18 RX ADMIN — ATORVASTATIN CALCIUM 20 MG: 20 TABLET, FILM COATED ORAL at 17:09

## 2018-07-18 RX ADMIN — HYDROMORPHONE HYDROCHLORIDE 1 MG: 1 INJECTION, SOLUTION INTRAMUSCULAR; INTRAVENOUS; SUBCUTANEOUS at 07:03

## 2018-07-18 RX ADMIN — SODIUM CHLORIDE, SODIUM LACTATE, POTASSIUM CHLORIDE, AND CALCIUM CHLORIDE 75 ML/HR: 600; 310; 30; 20 INJECTION, SOLUTION INTRAVENOUS at 21:42

## 2018-07-18 RX ADMIN — IPRATROPIUM BROMIDE AND ALBUTEROL SULFATE 3 ML: .5; 3 SOLUTION RESPIRATORY (INHALATION) at 08:47

## 2018-07-18 RX ADMIN — METOPROLOL TARTRATE 12.5 MG: 25 TABLET ORAL at 08:40

## 2018-07-18 RX ADMIN — IPRATROPIUM BROMIDE AND ALBUTEROL SULFATE 3 ML: .5; 3 SOLUTION RESPIRATORY (INHALATION) at 11:41

## 2018-07-18 RX ADMIN — CARBAMAZEPINE 200 MG: 200 TABLET, EXTENDED RELEASE ORAL at 08:43

## 2018-07-18 RX ADMIN — OXYCODONE HYDROCHLORIDE 5 MG: 5 TABLET ORAL at 18:07

## 2018-07-18 RX ADMIN — METOPROLOL TARTRATE 12.5 MG: 25 TABLET ORAL at 17:08

## 2018-07-18 RX ADMIN — IPRATROPIUM BROMIDE AND ALBUTEROL SULFATE 3 ML: .5; 3 SOLUTION RESPIRATORY (INHALATION) at 19:00

## 2018-07-18 RX ADMIN — OXYCODONE HYDROCHLORIDE 10 MG: 5 TABLET ORAL at 08:46

## 2018-07-18 RX ADMIN — SODIUM CHLORIDE 10 ML: 9 INJECTION, SOLUTION INTRAMUSCULAR; INTRAVENOUS; SUBCUTANEOUS at 21:42

## 2018-07-18 RX ADMIN — VENLAFAXINE HYDROCHLORIDE 75 MG: 37.5 CAPSULE, EXTENDED RELEASE ORAL at 08:39

## 2018-07-18 RX ADMIN — ASPIRIN 81 MG 81 MG: 81 TABLET ORAL at 08:39

## 2018-07-18 RX ADMIN — VITAMIN D, TAB 1000IU (100/BT) 1000 UNITS: 25 TAB at 11:52

## 2018-07-18 RX ADMIN — SODIUM CHLORIDE 40 MG: 9 INJECTION INTRAMUSCULAR; INTRAVENOUS; SUBCUTANEOUS at 21:41

## 2018-07-18 RX ADMIN — IPRATROPIUM BROMIDE AND ALBUTEROL SULFATE 3 ML: .5; 3 SOLUTION RESPIRATORY (INHALATION) at 15:47

## 2018-07-18 RX ADMIN — CARBAMAZEPINE 200 MG: 200 TABLET, EXTENDED RELEASE ORAL at 17:08

## 2018-07-18 RX ADMIN — OXYCODONE HYDROCHLORIDE 5 MG: 5 TABLET ORAL at 13:19

## 2018-07-18 RX ADMIN — SODIUM CHLORIDE 40 MG: 9 INJECTION INTRAMUSCULAR; INTRAVENOUS; SUBCUTANEOUS at 08:43

## 2018-07-18 RX ADMIN — SODIUM CHLORIDE 250 ML: 900 INJECTION, SOLUTION INTRAVENOUS at 16:09

## 2018-07-18 RX ADMIN — SODIUM CHLORIDE, SODIUM LACTATE, POTASSIUM CHLORIDE, AND CALCIUM CHLORIDE 75 ML/HR: 600; 310; 30; 20 INJECTION, SOLUTION INTRAVENOUS at 08:23

## 2018-07-18 RX ADMIN — LISINOPRIL 40 MG: 20 TABLET ORAL at 08:39

## 2018-07-18 NOTE — PROGRESS NOTES
CM received patient after transfer to general surgery floor. CM reviewed medical record and met pt during IDR's. CM will continue to follow for discharge needs and recommendations for discharge.     MANDO SchreiberN, RN  Care Manager [facility]  [phone]

## 2018-07-18 NOTE — INTERDISCIPLINARY ROUNDS
Interdisciplinary Rounds were completed on this patient. Rounds included nursing, clinical care leader, pharmacy, and case management.      Plan for the day full liquid diet, pain control, IV fluids, monitor I &O, incentive, keep flannery today, await bowel function   Plan for the stay: Continue current TX  Activity turn   Anticipated discharge date: Home 724

## 2018-07-18 NOTE — PROGRESS NOTES
CRS POD#1 s/p right colectomy, small bowel resection x2, extensive YAMEL    Doing well. Nurse states he slept most of the night. Denies any nausea or vomiting. Complains of incisional pain.   Due for dilaudid now    /50 (BP Patient Position: At rest)  Pulse 74  Temp 97.3 °F (36.3 °C)  Resp 18  Ht 6' 2\" (1.88 m)  Wt 128.4 kg (283 lb)  SpO2 98%  BMI 36.34 kg/m2    NAD  Abd soft, nondistended  Dressing mildly saturated with serosang  JPs serosang  Ostomy pink, no stool or gas    Plan  -Continue full liquid diet  -Await return of bowel function  -Keep flannery today

## 2018-07-18 NOTE — OP NOTES
[facility]  OPERATIVE REPORT    Lulu Calvo  MR#: [id number]  : 1940  ACCOUNT #: [de-identified]   DATE OF SERVICE: 2018    PREOPERATIVE DIAGNOSIS:  Ascending colon cancer. POSTOPERATIVE DIAGNOSIS:  Ascending colon cancer. PROCEDURES PERFORMED:  Laparoscopic converted to open right colectomy, extensive lysis of adhesions, repair of serosal tears x2, and small bowel resection x2. SURGEON:  Jl Mendoza MD    ASSISTANT:  Ky Saez    ANESTHESIA:  General    ESTIMATED BLOOD LOSS:  250 mL    SPECIMENS REMOVED:  Small bowel x2, and right colon. COMPLICATIONS:  None    IMPLANTS:  None    FINDINGS:  Dense fibrous adhesions to the abdominal wall and deep pelvis. There were noted to be 2 unavoidable enterotomies during the dissection. This was an expected portion of the case due to the complicated nature of the dense adhesions, as well as 2 serosal tears that were repaired. Once again, these were both expected portions of the procedure, and anticipated due to the dense fibrous adhesions. DESCRIPTION OF PROCEDURE:  Patient was brought to the operating suite. He was placed in the supine position. General endotracheal anesthesia was induced. Venodyne stockings were placed, as well as a Erazo catheter. He was then placed in the lithotomy position. His abdomen was prepped and draped in the usual sterile fashion. A timeout was performed indicating the correct patient and procedure to be performed, as per hospital protocol. The case was started with a 5 cm vertical midline incision from his prior midline laparotomy site. The incision was taken down to the level of the fascia, the fascia was opened, and the abdomen was entered under direct visualization. Immediately upon entering the abdomen, there was noted to be a significant amount of adhesions from the omentum to the anterior abdominal wall.   These were taken down, and I was able to place a GelPort into this incision. Upon insufflating the abdomen I was unable to get adequate visualization. Even with a hand port, there would be no way to complete the case laparoscopically due to the extensive nature of the adhesions throughout the abdomen. After approximately 20 minutes of attempting laparoscopically, I decided to convert to open. The incision was extended from the pubic symphysis all the way to the midpoint between his umbilicus and xiphoid process  The incision was taken down to level of fascia, the fascia was divided. The underlying adhesions to the abdominal wall were taken down. Upon entering the abdomen, there was noted to be a loop of bowel that was plastered to the anterior abdominal wall. This was gently dissected free, but the fibers of the tissue were so strong and fibrous that I was unable to safely dissect this free from the anterior abdominal wall. As such, I elected to perform a small bowel resection of this portion. A 2-0 silk suture was used as a whip stitch to seal off the enterotomy while continuing my dissection. I then continued the dissection down to the pelvis. Once again, there was noted to be significant dense fibrous adhesions precluding me from even getting into the right colon. The cecum was noted to be stuck into the right pelvic brim, and the distal ileum leading into the cecum along the line of Grace Daring was diving into the pelvis and was plastered to the sidewalls. Once this was dissected free, a 2nd enterotomy was noted. Once again, this was an unavoidable and expected part of the procedure due to the extensive adhesions. It was at this point that I decided to perform my small bowel resections. A mesenteric window was created proximally and distally. The small bowel was divided proximally and distally using a BRAYDEN 75 mm blue load stapler. The mesentery was divided with LigaSure. Side-to-side functional end-to-end anastomosis was created using a 75 mm blue load BRAYDEN stapler. The common enterotomy was closed with a TL60 stapler. The transverse staple line was imbricated using 3-0 Vicryl sutures. This process was once again repeated for the 2nd piece of small bowel that had to be removed. This area was significantly smaller. In total approximately 12 inches of small bowel was removed. Once again, proximal and distal margins were identified, mesenteric windows were created. Small bowel was divided using the BRAYDEN 75 mm stapler. The mesentery was divided with a LigaSure. Both specimens were removed and sent to Pathology for further examination. A 75 mm blue load BRAYDEN stapler was used to create a side-to-side functional end-to-end anastomosis. The common enterotomy was once again closed with a TL60 stapler. I then continued my dissection down the pelvis and was able to free up the distal ileum. A small serosal tear was identified. This was easily repaired with 3-0 Vicryl suture. I then continued my dissection along the white line of Toldt along the lateral peritoneal reflection. This was taken down all the way up to the hepatic flexure, medializing the ascending colon. It was noted that the appendix was bulky and tortuous. This was also significantly scarred down into the right abdominal sidewall. Once I was able to free up the ascending colon, it was medialized and I was able to identify the tattoo rema. The mass was felt just distal to the ileocecal valve. A proximal and distal resection margin were identified at least 5 cm distal to the mass itself and the tattoo rema. Proximal resection margin was at the distal ileum. These 2 ends of the bowel were divided with a BRAYDEN 75 mm blue load stapler. The ileocolic pedicle was identified and encircled at the base. This was divided using the LigaSure Impact firing twice proximally and distally, and dividing in between.   The small bowel mesentery and colon mesentery was divided also with the LigaSure Impact, and a specimen was removed and sent to Pathology for further examination. Ileocolic anastomosis was created with a 75 mm blue load BRAYDEN stapler, and the common enterotomy was closed with a TL60 stapler. Once again, the transverse staple line was imbricated with a 3-0 Vicryl suture in a Lembert fashion. I then ran the small bowel entirely, and the colon as well. It was noted that there was another small serosal tear just before his colostomy. This was repaired easily with 3-0 Vicryl suture. The abdomen was thoroughly irrigated with several liters of normal saline. Hemostasis was achieved. I placed 2 MADELEINE drains. The right-sided MADELEINE drain goes around his ileocolic anastomosis and dives into the pelvis. The left MADELEINE drain is along the left abdominal wall near the end-colostomy. It was at this time that we were ready for closure. The MADELEINE drains were secured with 2-0 nylon, the midline incision was closed with #1 looped PDS suture x2. The subcutaneous fat was reapproximated with 2-0 Vicryl suture. The incision was closed with skin staples. A dry dressing was applied. The patient was awakened and taken to recovery room in stable condition.       Deion Neely M.D.       Trevor Meyers / Susan  D: 07/17/2018 18:48     T: 07/17/2018 21:16  JOB #: [id number]

## 2018-07-18 NOTE — PROGRESS NOTES
General Surgery End of Shift Nursing Note    Bedside shift change report given to Romeo Navas (oncoming nurse) by Rena Morgan (offgoing nurse). Report included the following information SBAR, Kardex, OR Summary, Intake/Output, MAR, Recent Results and Cardiac Rhythm paced. Shift worked:   7 am to 7 pm   Summary of shift:   Pt had very little appetite; however, he would drank his ensures. Pt started to become congested and had decreased urine output during shift. MD notified. MD ordered flutter valve four times a day and 250 cc bolus. Total urine output for shift: 250. Output-Right MADELEINE drain: 30 Left MADELEINE drain: 50. No output or gas from ostomy. Pt has generalized edema. Pt also used incentive spirometer multiple times during shift as well. Issues for physician to address:  none     Number times ambulated in hallway past shift: 0    Number of times OOB to chair past shift: 0    Pain Management:  Current medication: roxicodone  Patient states pain is manageable on current pain medication: YES    GI:    Current diet:  DIET ONE TIME MESSAGE  DIET FULL LIQUID  DIET ONE TIME MESSAGE  DIET NUTRITIONAL SUPPLEMENTS No; All Meals; Ensure [place] current diet: YES  Passing flatus: NO  Last Bowel Movement: several days ago    Respiratory:    Incentive Spirometer at bedside: YES  Patient instructed on use: YES    Patient Safety:    Falls Score: 3  Bed Alarm On? No  Sitter?  No    Hess Israel
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
## 2018-07-18 NOTE — ANESTHESIA POSTPROCEDURE EVALUATION
Post-Anesthesia Evaluation and Assessment    Patient: Mariola Kyle MRN: [id number]  SSN: [id number]    YOB: 1940  Age: 69 y.o. Sex: male       Cardiovascular Function/Vital Signs  Visit Vitals    /55    Pulse 75    Temp 36.7 °C (98 °F)    Resp 15    Ht 6' 2\" (1.88 m)    Wt 128.4 kg (283 lb)    SpO2 96%    BMI 36.34 kg/m2       Patient is status post general anesthesia for Procedure(s):  LAPAROSCOPIC  ASSISTED TO OPEN RIGHT COLECTOMY AND SMALL BOWEL RESECTION. Nausea/Vomiting: None    Postoperative hydration reviewed and adequate. Pain:  Pain Scale 1: FLACC (07/17/18 2015)  Pain Intensity 1: 0 (07/17/18 2015)   Managed    Neurological Status:   Neuro (WDL): Exceptions to [facility] (07/17/18 1914)  Neuro  Neurologic State: Eyes open spontaneously;Sleeping (07/17/18 1914)  Orientation Level: Disoriented to place; Disoriented to situation;Oriented to person (07/17/18 1935)  Cognition: Follows commands (07/17/18 1914)  Speech: Incomprehensible words (07/17/18 1914)  Assessment L Pupil: Brisk (07/15/18 0815)  Size L Pupil (mm): 3 (07/15/18 0815)  Assessment R Pupil: Brisk (07/15/18 0815)  Size R Pupil (mm): 3 (07/15/18 0815)  LUE Motor Response: Flaccid (07/17/18 1914)  LLE Motor Response: Flaccid (07/17/18 1914)  RUE Motor Response: Purposeful;Weak (07/17/18 1914)  RLE Motor Response: Purposeful;Weak (07/17/18 1914)   At baseline    Mental Status and Level of Consciousness: Arousable    Pulmonary Status:   O2 Device: Nasal cannula (07/17/18 1945)   Adequate oxygenation and airway patent    Complications related to anesthesia: None    Post-anesthesia assessment completed.  No concerns    Signed By: Fam Block MD     July 17, 2018

## 2018-07-18 NOTE — PROGRESS NOTES
1343 Paged MD to inform of urinary output is 130 from 700 to 1350 pm and pt has a congested cough. Awaiting call back    1530 Paged MD to inform of low urine output. MD stated to give pt a 250 cc Saline bolus. If that doesn't not improve urinary output, then we can consider increasing fluid rate. MD also stated to see if Respiratory would give pt a flutter valve for respiratory status.

## 2018-07-19 LAB
ANION GAP SERPL CALC-SCNC: 10 MMOL/L (ref 5–15)
BASOPHILS # BLD: 0 K/UL (ref 0–0.1)
BASOPHILS NFR BLD: 0 % (ref 0–1)
BUN SERPL-MCNC: 16 MG/DL (ref 6–20)
BUN/CREAT SERPL: 23 (ref 12–20)
CALCIUM SERPL-MCNC: 8.1 MG/DL (ref 8.5–10.1)
CHLORIDE SERPL-SCNC: 100 MMOL/L (ref 97–108)
CO2 SERPL-SCNC: 24 MMOL/L (ref 21–32)
CREAT SERPL-MCNC: 0.7 MG/DL (ref 0.7–1.3)
DIFFERENTIAL METHOD BLD: ABNORMAL
EOSINOPHIL # BLD: 0 K/UL (ref 0–0.4)
EOSINOPHIL NFR BLD: 0 % (ref 0–7)
ERYTHROCYTE [DISTWIDTH] IN BLOOD BY AUTOMATED COUNT: 23.7 % (ref 11.5–14.5)
GLUCOSE SERPL-MCNC: 138 MG/DL (ref 65–100)
HCT VFR BLD AUTO: 27.8 % (ref 36.6–50.3)
HGB BLD-MCNC: 8.1 G/DL (ref 12.1–17)
IMM GRANULOCYTES # BLD: 0 K/UL (ref 0–0.04)
IMM GRANULOCYTES NFR BLD AUTO: 0 % (ref 0–0.5)
LYMPHOCYTES # BLD: 0.2 K/UL (ref 0.8–3.5)
LYMPHOCYTES NFR BLD: 1 % (ref 12–49)
MAGNESIUM SERPL-MCNC: 2 MG/DL (ref 1.6–2.4)
MCH RBC QN AUTO: 25.4 PG (ref 26–34)
MCHC RBC AUTO-ENTMCNC: 29.1 G/DL (ref 30–36.5)
MCV RBC AUTO: 87.1 FL (ref 80–99)
MONOCYTES # BLD: 1.1 K/UL (ref 0–1)
MONOCYTES NFR BLD: 6 % (ref 5–13)
NEUTS BAND NFR BLD MANUAL: 5 %
NEUTS SEG # BLD: 17.2 K/UL (ref 1.8–8)
NEUTS SEG NFR BLD: 88 % (ref 32–75)
NRBC # BLD: 0 K/UL (ref 0–0.01)
NRBC BLD-RTO: 0 PER 100 WBC
PHOSPHATE SERPL-MCNC: 2.4 MG/DL (ref 2.6–4.7)
PLATELET # BLD AUTO: 259 K/UL (ref 150–400)
PMV BLD AUTO: 9.3 FL (ref 8.9–12.9)
POTASSIUM SERPL-SCNC: 4.7 MMOL/L (ref 3.5–5.1)
RBC # BLD AUTO: 3.19 M/UL (ref 4.1–5.7)
RBC MORPH BLD: ABNORMAL
SODIUM SERPL-SCNC: 134 MMOL/L (ref 136–145)
WBC # BLD AUTO: 18.5 K/UL (ref 4.1–11.1)

## 2018-07-19 PROCEDURE — 74011250636 HC RX REV CODE- 250/636: Performed by: EMERGENCY MEDICINE

## 2018-07-19 PROCEDURE — C9113 INJ PANTOPRAZOLE SODIUM, VIA: HCPCS | Performed by: HOSPITALIST

## 2018-07-19 PROCEDURE — 80048 BASIC METABOLIC PNL TOTAL CA: CPT

## 2018-07-19 PROCEDURE — 74011000250 HC RX REV CODE- 250: Performed by: HOSPITALIST

## 2018-07-19 PROCEDURE — 74011250636 HC RX REV CODE- 250/636: Performed by: HOSPITALIST

## 2018-07-19 PROCEDURE — 65270000029 HC RM PRIVATE

## 2018-07-19 PROCEDURE — 74011250637 HC RX REV CODE- 250/637: Performed by: HOSPITALIST

## 2018-07-19 PROCEDURE — 85025 COMPLETE CBC W/AUTO DIFF WBC: CPT

## 2018-07-19 PROCEDURE — 74011000250 HC RX REV CODE- 250: Performed by: INTERNAL MEDICINE

## 2018-07-19 PROCEDURE — 97530 THERAPEUTIC ACTIVITIES: CPT

## 2018-07-19 PROCEDURE — 74011250637 HC RX REV CODE- 250/637: Performed by: SURGERY

## 2018-07-19 PROCEDURE — 94640 AIRWAY INHALATION TREATMENT: CPT

## 2018-07-19 PROCEDURE — 97164 PT RE-EVAL EST PLAN CARE: CPT

## 2018-07-19 PROCEDURE — 77010033678 HC OXYGEN DAILY

## 2018-07-19 PROCEDURE — 36415 COLL VENOUS BLD VENIPUNCTURE: CPT

## 2018-07-19 PROCEDURE — 84100 ASSAY OF PHOSPHORUS: CPT

## 2018-07-19 PROCEDURE — 74011250636 HC RX REV CODE- 250/636: Performed by: SURGERY

## 2018-07-19 PROCEDURE — 83735 ASSAY OF MAGNESIUM: CPT

## 2018-07-19 RX ORDER — SODIUM CHLORIDE 9 MG/ML
75 INJECTION, SOLUTION INTRAVENOUS CONTINUOUS
Status: DISCONTINUED | OUTPATIENT
Start: 2018-07-19 | End: 2018-07-21

## 2018-07-19 RX ADMIN — FINASTERIDE 5 MG: 5 TABLET, FILM COATED ORAL at 08:07

## 2018-07-19 RX ADMIN — LISINOPRIL 40 MG: 20 TABLET ORAL at 08:07

## 2018-07-19 RX ADMIN — CARBAMAZEPINE 200 MG: 200 TABLET, EXTENDED RELEASE ORAL at 08:08

## 2018-07-19 RX ADMIN — OXYCODONE HYDROCHLORIDE 5 MG: 5 TABLET ORAL at 09:12

## 2018-07-19 RX ADMIN — HYDROMORPHONE HYDROCHLORIDE 1 MG: 1 INJECTION, SOLUTION INTRAMUSCULAR; INTRAVENOUS; SUBCUTANEOUS at 06:27

## 2018-07-19 RX ADMIN — AMIODARONE HYDROCHLORIDE 200 MG: 200 TABLET ORAL at 08:07

## 2018-07-19 RX ADMIN — SODIUM CHLORIDE 500 ML: 900 INJECTION, SOLUTION INTRAVENOUS at 08:09

## 2018-07-19 RX ADMIN — METOPROLOL TARTRATE 12.5 MG: 25 TABLET ORAL at 08:08

## 2018-07-19 RX ADMIN — SODIUM CHLORIDE, SODIUM LACTATE, POTASSIUM CHLORIDE, AND CALCIUM CHLORIDE 75 ML/HR: 600; 310; 30; 20 INJECTION, SOLUTION INTRAVENOUS at 09:12

## 2018-07-19 RX ADMIN — CARBAMAZEPINE 200 MG: 200 TABLET, EXTENDED RELEASE ORAL at 17:22

## 2018-07-19 RX ADMIN — VENLAFAXINE HYDROCHLORIDE 75 MG: 37.5 CAPSULE, EXTENDED RELEASE ORAL at 08:07

## 2018-07-19 RX ADMIN — IPRATROPIUM BROMIDE AND ALBUTEROL SULFATE 3 ML: .5; 3 SOLUTION RESPIRATORY (INHALATION) at 20:24

## 2018-07-19 RX ADMIN — SODIUM CHLORIDE 75 ML/HR: 900 INJECTION, SOLUTION INTRAVENOUS at 13:31

## 2018-07-19 RX ADMIN — SODIUM CHLORIDE 10 ML: 9 INJECTION, SOLUTION INTRAMUSCULAR; INTRAVENOUS; SUBCUTANEOUS at 12:53

## 2018-07-19 RX ADMIN — IPRATROPIUM BROMIDE AND ALBUTEROL SULFATE 3 ML: .5; 3 SOLUTION RESPIRATORY (INHALATION) at 15:34

## 2018-07-19 RX ADMIN — VITAMIN D, TAB 1000IU (100/BT) 1000 UNITS: 25 TAB at 12:52

## 2018-07-19 RX ADMIN — IPRATROPIUM BROMIDE AND ALBUTEROL SULFATE 3 ML: .5; 3 SOLUTION RESPIRATORY (INHALATION) at 11:26

## 2018-07-19 RX ADMIN — SODIUM CHLORIDE 40 MG: 9 INJECTION INTRAMUSCULAR; INTRAVENOUS; SUBCUTANEOUS at 20:15

## 2018-07-19 RX ADMIN — METOPROLOL TARTRATE 12.5 MG: 25 TABLET ORAL at 17:22

## 2018-07-19 RX ADMIN — ATORVASTATIN CALCIUM 20 MG: 20 TABLET, FILM COATED ORAL at 17:22

## 2018-07-19 RX ADMIN — SODIUM CHLORIDE 10 ML: 9 INJECTION, SOLUTION INTRAMUSCULAR; INTRAVENOUS; SUBCUTANEOUS at 05:11

## 2018-07-19 RX ADMIN — SODIUM CHLORIDE 10 ML: 9 INJECTION, SOLUTION INTRAMUSCULAR; INTRAVENOUS; SUBCUTANEOUS at 20:15

## 2018-07-19 RX ADMIN — OXYCODONE HYDROCHLORIDE 10 MG: 5 TABLET ORAL at 22:53

## 2018-07-19 RX ADMIN — ASPIRIN 81 MG 81 MG: 81 TABLET ORAL at 08:08

## 2018-07-19 RX ADMIN — SODIUM CHLORIDE 40 MG: 9 INJECTION INTRAMUSCULAR; INTRAVENOUS; SUBCUTANEOUS at 08:07

## 2018-07-19 RX ADMIN — OXYCODONE HYDROCHLORIDE 5 MG: 5 TABLET ORAL at 16:01

## 2018-07-19 RX ADMIN — HYDROMORPHONE HYDROCHLORIDE 1 MG: 1 INJECTION, SOLUTION INTRAMUSCULAR; INTRAVENOUS; SUBCUTANEOUS at 01:29

## 2018-07-19 RX ADMIN — ACETAMINOPHEN 650 MG: 325 TABLET ORAL at 04:00

## 2018-07-19 NOTE — PROGRESS NOTES
CRS POD#2 s/p right colectomy, small bowel resection x2, extensive YAMEL    Getting pain meds every 4-5 hours. Seems to be well controlled. Denies any nausea or vomiting. No ostomy output    /64 (BP 1 Location: Right arm, BP Patient Position: At rest)  Pulse 74  Temp 97.5 °F (36.4 °C)  Resp 18  Ht 6' 2\" (1.88 m)  Wt 128.4 kg (283 lb)  SpO2 92%  BMI 36.34 kg/m2    NAD  Abd soft, nondistended  Dressing mildly saturated with serosang  JPs serosang  Ostomy pink, no stool or gas    Plan  -Continue full liquid diet  -Await return of bowel function  -Keep flannery for slow urine output.   Will give bolus of NS

## 2018-07-19 NOTE — PROGRESS NOTES
Please disregard previous PT note and refer to new re-evaluation note to come.  Will continue to follow pt for PT.
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
## 2018-07-19 NOTE — PROGRESS NOTES
General Surgery End of Shift Nursing Note    Bedside shift change report given to Timi carreon (oncoming nurse) by Guilherme Negrete (offgoing nurse). Report included the following information SBAR, Kardex, OR Summary, Intake/Output, MAR, Recent Results and Cardiac Rhythm paced. Shift worked:   7 am to 7 pm   Summary of shift:   Pt's appetite has improved. Pt ate all of dinner tray. MD ordered suction as needed. RT came to perform suction although pt resisted at times, therapist was able to remove a small amount of mucus. 500 cc bolus given today. Total urine output for shift: 340. Output-Right MADELEINE drain: 25 Left MADELEINE drain: 40. No output or gas from ostomy. Pt worked with PT, sat on the side of the bed, and sat in the recliner for an hour today. Pt has generalized edema. Pt also used incentive spirometer multiple times during shift as well. Issues for physician to address:  none     Number times ambulated in hallway past shift: 0    Number of times OOB to chair past shift: 0    Pain Management:  Current medication: roxicodone  Patient states pain is manageable on current pain medication: YES    GI:    Current diet:  DIET ONE TIME MESSAGE  DIET FULL LIQUID  DIET ONE TIME MESSAGE  DIET NUTRITIONAL SUPPLEMENTS No; All Meals; Ensure Enlive  DIET ONE TIME MESSAGE    Tolerating current diet: YES  Passing flatus: NO  Last Bowel Movement: several days ago    Respiratory:    Incentive Spirometer at bedside: YES  Patient instructed on use: YES    Patient Safety:    Falls Score: 3  Bed Alarm On? No  Sitter?  No    Cassi Gonzalez

## 2018-07-19 NOTE — INTERDISCIPLINARY ROUNDS
Interdisciplinary Rounds were completed on this patient. Rounds included nursing, clinical care leader, pharmacy, and case management.      Plan for the day: awaiting bowel function, ambulation, monitor I &0, incentive spir, pain control   Plan for the stay:continue current TX  Activity: up in chair   Anticipated discharge date: Home TBD

## 2018-07-19 NOTE — PROGRESS NOTES
Nutrition Assessment:    RECOMMENDATIONS:   Continue diet advancement per CRS  Agree with Ensure TID, also obtained food preferences    ASSESSMENT:   Chart reviewed, pt lying in bed awake and alert. He is POD# 2 R colectomy + small bowel resection + extensive YAMEL. Pt's wife reports he has a poor appetite, but is drinking 3 Ensure Enlive daily. He does not like some of the items he has been getting, I obtained food preferences from pt and his wife and encouraged them to call the kitchen if they ever receive something he does not want and they kitchen will send him something else. No c/o nausea at this time, awaiting return of colostomy function. Noted pt sounded very gurgly when he speaks, he denies any difficulty swallowing or coughing/choking on PO intake though. Would closely monitor. Dietitians Intervention(s)/Plan(s): Advance diet per CRS, continue PO supplements, honor food preferences  SUBJECTIVE/OBJECTIVE:   \"I don't like green jello or tomato soup\"   Diet Order: Full liquids, Other (comment) (Ensure TID )  % Eaten:  Patient Vitals for the past 72 hrs:   % Diet Eaten   07/19/18 1400 5 %   07/19/18 0914 0 %   07/18/18 0952 100 %     Pertinent Medications:Vitamin D, proscar, protonix; [email or web address]).     Chemistries:  Lab Results   Component Value Date/Time    Sodium 134 (L) 07/19/2018 05:09 AM    Potassium 4.7 07/19/2018 05:09 AM    Chloride 100 07/19/2018 05:09 AM    CO2 24 07/19/2018 05:09 AM    Anion gap 10 07/19/2018 05:09 AM    Glucose 138 (H) 07/19/2018 05:09 AM    BUN 16 07/19/2018 05:09 AM    Creatinine 0.70 07/19/2018 05:09 AM    BUN/Creatinine ratio 23 (H) 07/19/2018 05:09 AM    GFR est AA >60 07/19/2018 05:09 AM    GFR est non-AA >60 07/19/2018 05:09 AM    Calcium 8.1 (L) 07/19/2018 05:09 AM    Albumin 3.1 (L) 07/10/2018 11:56 AM      Anthropometrics: Height: 6' 2\" (188 cm) Weight: 128.4 kg (283 lb)   []bed scale    []stated   []unknown     IBW (%IBW):   ( ) UBW (%UBW):   (  %)    BMI: Body mass index is 36.34 kg/(m^2). This BMI is indicative of:  []Underweight   []Normal   []Overweight   [x] Obesity   [] Extreme Obesity (BMI>40)  Estimated Nutrition Needs (Based on): 2275 Kcals/day (BMR: 2075 x 1.1) , 130 g (1 g/kg) Protein  Carbohydrate: At Least 130 g/day  Fluids: 2200 mL/day    Last BM: colostomy-10mL OP yesterday   []Active     []Hyperactive  [x]Hypoactive       [] Absent   BS  Skin:    [] Intact   [x] Incision  [] Breakdown   [] DTI   [] Tears/Excoriation/Abrasion  [x]Edema (+2-generalized; +3-LUE; +1-RUE; +2-RLE; +2-3-LLE) [] Other: Wt Readings from Last 30 Encounters:   07/10/18 128.4 kg (283 lb)   07/05/18 128.5 kg (283 lb 4.8 oz)   05/15/18 120.7 kg (266 lb)   05/07/18 118.8 kg (262 lb)   04/10/18 121.1 kg (267 lb)   03/26/18 121.1 kg (267 lb)   03/16/18 121.1 kg (267 lb)   09/21/17 118.4 kg (261 lb)   08/03/17 117.5 kg (259 lb)   04/06/17 117.3 kg (258 lb 8 oz)   03/31/17 117.5 kg (259 lb)   03/16/17 117.5 kg (259 lb)   02/23/17 119.7 kg (264 lb)   01/03/17 115.2 kg (254 lb)   11/17/16 119.7 kg (264 lb)   10/18/16 118.8 kg (262 lb)   10/07/16 118.8 kg (262 lb)   09/22/16 113.9 kg (251 lb 1 oz)   08/18/16 116.4 kg (256 lb 11.2 oz)   05/16/16 114.8 kg (253 lb)   03/10/16 114.8 kg (253 lb 1.6 oz)   02/11/16 122 kg (269 lb)   01/11/16 119.7 kg (264 lb)   11/04/15 122.5 kg (270 lb)   08/25/15 119.7 kg (264 lb)   08/13/15 116.1 kg (256 lb)   05/04/15 119.7 kg (264 lb)   03/27/15 119.7 kg (264 lb)   02/21/15 121.2 kg (267 lb 3.2 oz)   01/29/15 119.9 kg (264 lb 6.4 oz)      NUTRITION DIAGNOSES:   Problem:  Altered GI function      Etiology: related to Ascending colon mass c/w probable colon carcinoma and Cedric's erosion with HH      Signs/Symptoms: as evidenced by Ascending colon mass c/w probable colon carcinoma and Cedric's erosion with HH     Previous dx re: altered GI function resolving, diet advancing and awaiting colostomy function.      NUTRITION INTERVENTIONS:  Meals/Snacks: Other (advance diet as able per CRS )   Supplements: Commercial supplement              GOAL:   Pt will consume >50% of meals/supplements in 2-4 days.      NUTRITION MONITORING AND EVALUATION   Previous Goal: consume >50% of meals and ONS in 2-4 days   Previous Goal Met: No   Previous Recommendations Implemented: Yes   Cultural, Worship, or Ethnic Dietary Needs: None   LEARNING NEEDS (Diet, Food/Nutrient-Drug Interaction):    [x] None Identified   [] Identified and Education Provided/Documented   [] Identified and Pt declined/was not appropriate      [x] Interdisciplinary Care Plan Reviewed/Documented    [x] Participated in Discharge Planning: Per CRS    [] Interdisciplinary Rounds     NUTRITION RISK:    [x] High              [] Moderate           []  Low  []  Minimal/Uncompromised      Una Favre, [address], [address] [place] [address] Dr  Pager [phone]  Weekend Pager [phone]

## 2018-07-19 NOTE — PROGRESS NOTES
07/19/18 1621   Airway Clearance   Suction Nasotracheal   Sputum Method Obtained Nasal tracheal   Sputum Amount Small   Sputum Color/Odor White;Yellow   Sputum Consistency Thick       PT SpO2's 96% HR 85 on 3L NC.

## 2018-07-19 NOTE — PROGRESS NOTES
Problem: Mobility Impaired (Adult and Pediatric)  Goal: *Acute Goals and Plan of Care (Insert Text)  Physical Therapy Goals  Reviewed and revised 7/19/2018  1. Patient will move from supine to sit and sit to supine , scoot up and down and roll side to side in bed with minimal assistance within 7 day(s). 2.  Patient will transfer from bed to chair and chair to bed with moderate assistance using the least restrictive device within 7 day(s). 3.  Patient will perform sit to stand with minimal assistance within 7 day(s). 4.  Patient will perform stand pivot transfer to wheelchair with minimal assistance in 7 days. Initiated 7/12/2018  1. Patient will move from supine to sit and sit to supine , scoot up and down and roll side to side in bed with modified independence within 7 day(s). 2.  Patient will transfer from bed to chair and chair to bed with supervision/set-up using the least restrictive device within 7 day(s). 3.  Patient will perform sit to stand with supervision/set-up within 7 day(s). 4.  Patient will perform stand pivot transfer to wheelchair with modified independence in 7 days. physical Therapy REEVALUATION  Patient: Yeimi Chavis (44 y.o. male)  Date: 7/19/2018  Primary Diagnosis: Acute blood loss anemia  GI bleed  Anasarca  GI Bleed  gi bleed  ASCENDING COLON CANCER   Procedure(s) (LRB):  LAPAROSCOPIC  ASSISTED TO OPEN RIGHT COLECTOMY AND SMALL BOWEL RESECTION (Right) 2 Days Post-Op   Precautions:      Chart, physical therapy assessment, plan of care and goals were reviewed. ASSESSMENT :  Based on the objective data described below, the patient presents with decreased strength and mobility. Discussed with nursing and cleared to mobilize. Pt received supine in bed with wife at bedside. Pt complained of incision site pain. Noted chest congestion and encouraged pt for a deeper cough, but he continued to complain of pain. Donned LLE AFO and shoes, noted extension tone in LLE.  Pt transferred from supine to sitting EOB with max Ax2 and additional time. Noted impaired sitting balance. He needed to use his RUE for support. Pt transferred sit <> stand 2x with mod/maxAx2, using his RUE to push up from the bed. He required increased assistance with fatigue. Pt was able to stand for ~20 seconds before needing to return to sitting, standing time limited by pain. Pt sat EOB for 5 minutes, with occasional LOB but he was able to support himself with his RUE. Pt demonstrated use of incentive spirometer and encouraged pt to continue to use. Pt performed a stand pivot transfer with max Ax2 to the chair, with stephy pad on the chair under the pt. Recommended pt sit up for an hour and and encouraged him to sit up in the chair for all meals. Balance deficits may be due to incision site pain rather than a true decline in function. Discussed the potential of going to SNF after discharge if transfers continue to be challenging, pt and wife seem agreeable. Ended session with pt sitting up in chair with wife present. Patient will benefit from skilled intervention to address the above impairments.   Patients rehabilitation potential is considered to be Fair  Factors which may influence rehabilitation potential include:   []           None noted  [x]           Mental ability/status  []           Medical condition  []           Home/family situation and support systems  []           Safety awareness  [x]           Pain tolerance/management  []           Other:      PLAN :  Recommendations and Planned Interventions:  [x]             Bed Mobility Training             []      Neuromuscular Re-Education  [x]             Transfer Training                   []      Orthotic/Prosthetic Training  []             Gait Training                         []      Modalities  [x]             Therapeutic Exercises           []      Edema Management/Control  [x]             Therapeutic Activities            [x]      Patient and Family Training/Education  []             Other (comment):  Frequency/Duration: Patient will be followed by physical therapy 4 times a week to address goals. Discharge Recommendations: HHPT or Loki Davies if transfers do not improve  Further Equipment Recommendations for Discharge: none, pt has all needed DME     SUBJECTIVE:   Patient stated It hurts.     OBJECTIVE DATA SUMMARY:     Past Medical History:   Diagnosis Date    A-fib ([address]     Acute bronchitis 8/25/2015    Anxiety     Arrhythmia     atrial fibrillation    Arthritis     BCC (basal cell carcinoma of skin)     BPH (benign prostatic hyperplasia)     Chronic back pain greater than 3 months duration 5/4/2015    Chronic right shoulder pain 1/11/2016    Common wart 5/16/2016    Constipation 12/14/2012    CVA (cerebral infarction) 5/4/2015    Depression     GERD (gastroesophageal reflux disease)     Hearing loss     bilateral hearing aids    Hemiplegia following CVA (cerebrovascular accident) ([address]     left side hemiparesis    History of colostomy     HTN (hypertension)     Hyperlipidemia     Localized epilepsy with impairment of consciousness ([address]     Prostate cancer ([address]     Status post XRT, Lupron    Screening for colon cancer 11/4/2015    Stroke [facility])     traumatic from neck crushing    TBI (traumatic brain injury) ([address] 1994    Unspecified sleep apnea     on CPAP     Past Surgical History:   Procedure Laterality Date    COLONOSCOPY N/A 7/12/2018    COLONOSCOPY through stoma [facility] at [facility] ENDOSCOPY    HX ANKLE FRACTURE TX      HX CATARACT REMOVAL      bilat    HX COLECTOMY      colostomy placed and revised    HX HEENT      ear surgery eddie.     HX HERNIA REPAIR      HX ORTHOPAEDIC      left hip pinning    HX PACEMAKER  [facility] course since last seen and reason for reevaluation: surgery 7/17 (small bowel resection and R colectomy)  Critical Behavior:  Neurologic State: Alert  Orientation Level: Appropriate for age  Cognition: Appropriate decision making     Skin:  intact  Strength:    Strength: Grossly decreased, non-functional                      Tone & Sensation:   Tone: Normal (LLE extension tone)              Sensation: Intact               Range Of Motion:  AROM: Generally decreased, functional           PROM: Generally decreased, functional           Coordination:  Coordination: Generally decreased, functional    Functional Mobility:  Bed Mobility:     Supine to Sit: Maximum assistance;Assist x2        Transfers:  Sit to Stand: Maximum assistance;Assist x1; Moderate assistance  Stand to Sit: Maximum assistance;Assist x2  Stand Pivot Transfers: Maximum assistance     Bed to Chair: Maximum assistance;Assist x2           Balance:   Sitting: Without support; Impaired  Sitting - Static: Poor (constant support)  Sitting - Dynamic: Poor (constant support)  Standing: Impaired  Standing - Static: Occassional  Standing - Dynamic : Poor  Ambulation/Gait Training:                                            Functional Measure:  Barthel Index:    Bathin  Bladder: 0  Bowels: 10  Groomin  Dressin  Feedin  Mobility: 0  Stairs: 0  Toilet Use: 0  Transfer (Bed to Chair and Back): 5  Total: 20       Barthel and G-code impairment scale:  Percentage of impairment CH  0% CI  1-19% CJ  20-39% CK  40-59% CL  60-79% CM  80-99% CN  100%   Barthel Score 0-100 100 99-80 79-60 59-40 20-39 1-19   0   Barthel Score 0-20 20 17-19 13-16 9-12 5-8 1-4 0      The Barthel ADL Index: Guidelines  1. The index should be used as a record of what a patient does, not as a record of what a patient could do. 2. The main aim is to establish degree of independence from any help, physical or verbal, however minor and for whatever reason. 3. The need for supervision renders the patient not independent. 4. A patient's performance should be established using the best available evidence.  Asking the patient, friends/relatives and nurses are the usual sources, but direct observation and common sense are also important. However direct testing is not needed. 5. Usually the patient's performance over the preceding 24-48 hours is important, but occasionally longer periods will be relevant. 6. Middle categories imply that the patient supplies over 50 per cent of the effort. 7. Use of aids to be independent is allowed. Helen Cason., Barthel, D.W. (7658). Functional evaluation: the Barthel Index. [facility] (14)2. Kathleen Winston nicanor ELIAS Hurley, Larissa Oropeza., Emeli Hamilton., [place], [facility] (1999). Measuring the change indisability after inpatient rehabilitation; comparison of the responsiveness of the Barthel Index and Functional [place] Measure. Journal of Neurology, Neurosurgery, and Psychiatry, 66(4), 613-171. LIANNA Martinez, GABY Bains, & Gerardo Gamboa MALEX. (2004.) Assessment of post-stroke quality of life in cost-effectiveness studies: The usefulness of the Barthel Index and the EuroQoL-5D. Quality of Life Research, 13, 104-82           G codes: In compliance with CMSs Claims Based Outcome Reporting, the following G-code set was chosen for this patient based on their primary functional limitation being treated: The outcome measure chosen to determine the severity of the functional limitation was the Barthel with a score of 20/100 which was correlated with the impairment scale. ? Mobility - Walking and Moving Around:     - CURRENT STATUS: CL - 60%-79% impaired, limited or restricted    - GOAL STATUS: CK - 40%-59% impaired, limited or restricted    - D/C STATUS:  ---------------To be determined---------------     Pain:  Pain Scale 1: Numeric (0 - 10)  Pain Intensity 1: 4  Pain Location 1: Abdomen        Pain Intervention(s) 1: Medication (see MAR)  Activity Tolerance:   Poor due to pain  Please refer to the flowsheet for vital signs taken during this treatment.   After treatment:   [x]  Patient left in no apparent distress sitting up in chair  []  Patient left in no apparent distress in bed  [x]  Call bell left within reach  [x]  Nursing notified  []  Caregiver present  []  Bed alarm activated    COMMUNICATION/EDUCATION:   The patients plan of care was discussed with: Registered Nurse. [x]  Fall prevention education was provided and the patient/caregiver indicated understanding. [x]  Patient/family have participated as able in goal setting and plan of care. [x]  Patient/family agree to work toward stated goals and plan of care. []  Patient understands intent and goals of therapy, but is neutral about his/her participation. []  Patient is unable to participate in goal setting and plan of care. Thank you for this referral.  Kassie Carter, SPT   Time Calculation: 47 mins    Regarding student involvement in patient care:  A student participated in this treatment session. Per CMS Medicare statements and APTA guidelines I certify that the following was true:  1. I was present and directly observed the entire session. 2. I made all skilled judgments and clinical decisions regarding care. 3. I am the practitioner responsible for assessment, treatment, and documentation.

## 2018-07-19 NOTE — PROGRESS NOTES
Bedside shift change report given to Lawrence Krishnan (oncoming nurse) by Jaylen AMARAL (offgoing nurse). Report included the following information SBAR, Kardex, Procedure Summary, Intake/Output, MAR, Accordion, Recent Results and Med Rec Status.

## 2018-07-19 NOTE — PROGRESS NOTES
Bedside and Verbal shift change report given to Yessica San rn (oncoming nurse) by Gallito Stanley (offgoing nurse). Report included the following information SBAR, Procedure Summary, Intake/Output, MAR, Recent Results and Cardiac Rhythm Paced.

## 2018-07-20 ENCOUNTER — APPOINTMENT (OUTPATIENT)
Dept: GENERAL RADIOLOGY | Age: 78
DRG: 329 | End: 2018-07-20
Attending: SURGERY
Payer: MEDICARE

## 2018-07-20 ENCOUNTER — APPOINTMENT (OUTPATIENT)
Dept: GENERAL RADIOLOGY | Age: 78
DRG: 329 | End: 2018-07-20
Attending: EMERGENCY MEDICINE
Payer: MEDICARE

## 2018-07-20 LAB
ALBUMIN SERPL-MCNC: 2.3 G/DL (ref 3.5–5)
ALBUMIN/GLOB SERPL: 0.6 {RATIO} (ref 1.1–2.2)
ALP SERPL-CCNC: 73 U/L (ref 45–117)
ALT SERPL-CCNC: 23 U/L (ref 12–78)
ANION GAP SERPL CALC-SCNC: 8 MMOL/L (ref 5–15)
AST SERPL-CCNC: 21 U/L (ref 15–37)
BASOPHILS # BLD: 0 K/UL (ref 0–0.1)
BASOPHILS NFR BLD: 0 % (ref 0–1)
BILIRUB DIRECT SERPL-MCNC: 0.2 MG/DL (ref 0–0.2)
BILIRUB SERPL-MCNC: 0.5 MG/DL (ref 0.2–1)
BNP SERPL-MCNC: 937 PG/ML (ref 0–450)
BUN SERPL-MCNC: 19 MG/DL (ref 6–20)
BUN/CREAT SERPL: 31 (ref 12–20)
CALCIUM SERPL-MCNC: 8.4 MG/DL (ref 8.5–10.1)
CHLORIDE SERPL-SCNC: 99 MMOL/L (ref 97–108)
CO2 SERPL-SCNC: 26 MMOL/L (ref 21–32)
CREAT SERPL-MCNC: 0.61 MG/DL (ref 0.7–1.3)
DIFFERENTIAL METHOD BLD: ABNORMAL
EOSINOPHIL # BLD: 0.2 K/UL (ref 0–0.4)
EOSINOPHIL NFR BLD: 1 % (ref 0–7)
ERYTHROCYTE [DISTWIDTH] IN BLOOD BY AUTOMATED COUNT: 23.5 % (ref 11.5–14.5)
GLOBULIN SER CALC-MCNC: 3.8 G/DL (ref 2–4)
GLUCOSE SERPL-MCNC: 139 MG/DL (ref 65–100)
HCT VFR BLD AUTO: 28 % (ref 36.6–50.3)
HGB BLD-MCNC: 8.3 G/DL (ref 12.1–17)
IMM GRANULOCYTES # BLD: 0.2 K/UL (ref 0–0.04)
IMM GRANULOCYTES NFR BLD AUTO: 1 % (ref 0–0.5)
LYMPHOCYTES # BLD: 0.8 K/UL (ref 0.8–3.5)
LYMPHOCYTES NFR BLD: 4 % (ref 12–49)
MAGNESIUM SERPL-MCNC: 2.1 MG/DL (ref 1.6–2.4)
MCH RBC QN AUTO: 25.8 PG (ref 26–34)
MCHC RBC AUTO-ENTMCNC: 29.6 G/DL (ref 30–36.5)
MCV RBC AUTO: 87 FL (ref 80–99)
MONOCYTES # BLD: 1.7 K/UL (ref 0–1)
MONOCYTES NFR BLD: 9 % (ref 5–13)
NEUTS SEG # BLD: 16 K/UL (ref 1.8–8)
NEUTS SEG NFR BLD: 85 % (ref 32–75)
NRBC # BLD: 0 K/UL (ref 0–0.01)
NRBC BLD-RTO: 0 PER 100 WBC
PHOSPHATE SERPL-MCNC: 2.7 MG/DL (ref 2.6–4.7)
PLATELET # BLD AUTO: 291 K/UL (ref 150–400)
PMV BLD AUTO: 9.4 FL (ref 8.9–12.9)
POTASSIUM SERPL-SCNC: 4.7 MMOL/L (ref 3.5–5.1)
PROT SERPL-MCNC: 6.1 G/DL (ref 6.4–8.2)
RBC # BLD AUTO: 3.22 M/UL (ref 4.1–5.7)
RBC MORPH BLD: ABNORMAL
RBC MORPH BLD: ABNORMAL
SODIUM SERPL-SCNC: 133 MMOL/L (ref 136–145)
WBC # BLD AUTO: 18.9 K/UL (ref 4.1–11.1)

## 2018-07-20 PROCEDURE — 80048 BASIC METABOLIC PNL TOTAL CA: CPT

## 2018-07-20 PROCEDURE — 65270000029 HC RM PRIVATE

## 2018-07-20 PROCEDURE — 80076 HEPATIC FUNCTION PANEL: CPT

## 2018-07-20 PROCEDURE — 97530 THERAPEUTIC ACTIVITIES: CPT

## 2018-07-20 PROCEDURE — 74011250637 HC RX REV CODE- 250/637: Performed by: HOSPITALIST

## 2018-07-20 PROCEDURE — 87070 CULTURE OTHR SPECIMN AEROBIC: CPT

## 2018-07-20 PROCEDURE — 74011000250 HC RX REV CODE- 250: Performed by: HOSPITALIST

## 2018-07-20 PROCEDURE — 36415 COLL VENOUS BLD VENIPUNCTURE: CPT

## 2018-07-20 PROCEDURE — 74011250636 HC RX REV CODE- 250/636: Performed by: SURGERY

## 2018-07-20 PROCEDURE — 85025 COMPLETE CBC W/AUTO DIFF WBC: CPT

## 2018-07-20 PROCEDURE — 74011250636 HC RX REV CODE- 250/636: Performed by: HOSPITALIST

## 2018-07-20 PROCEDURE — 83880 ASSAY OF NATRIURETIC PEPTIDE: CPT

## 2018-07-20 PROCEDURE — 87040 BLOOD CULTURE FOR BACTERIA: CPT

## 2018-07-20 PROCEDURE — 94640 AIRWAY INHALATION TREATMENT: CPT

## 2018-07-20 PROCEDURE — 77010033678 HC OXYGEN DAILY

## 2018-07-20 PROCEDURE — 74011000258 HC RX REV CODE- 258: Performed by: SURGERY

## 2018-07-20 PROCEDURE — 36592 COLLECT BLOOD FROM PICC: CPT

## 2018-07-20 PROCEDURE — 74011250637 HC RX REV CODE- 250/637: Performed by: SURGERY

## 2018-07-20 PROCEDURE — 3E0436Z INTRODUCTION OF NUTRITIONAL SUBSTANCE INTO CENTRAL VEIN, PERCUTANEOUS APPROACH: ICD-10-PCS | Performed by: SURGERY

## 2018-07-20 PROCEDURE — 74011000250 HC RX REV CODE- 250: Performed by: INTERNAL MEDICINE

## 2018-07-20 PROCEDURE — 74011250636 HC RX REV CODE- 250/636: Performed by: EMERGENCY MEDICINE

## 2018-07-20 PROCEDURE — 74011000250 HC RX REV CODE- 250: Performed by: SURGERY

## 2018-07-20 PROCEDURE — 83735 ASSAY OF MAGNESIUM: CPT

## 2018-07-20 PROCEDURE — 71045 X-RAY EXAM CHEST 1 VIEW: CPT

## 2018-07-20 PROCEDURE — 84100 ASSAY OF PHOSPHORUS: CPT

## 2018-07-20 PROCEDURE — C9113 INJ PANTOPRAZOLE SODIUM, VIA: HCPCS | Performed by: HOSPITALIST

## 2018-07-20 RX ORDER — HYDROMORPHONE HYDROCHLORIDE 1 MG/ML
0.5 INJECTION, SOLUTION INTRAMUSCULAR; INTRAVENOUS; SUBCUTANEOUS
Status: DISCONTINUED | OUTPATIENT
Start: 2018-07-20 | End: 2018-07-30 | Stop reason: SDUPTHER

## 2018-07-20 RX ORDER — VANCOMYCIN 2 GRAM/500 ML IN 0.9 % SODIUM CHLORIDE INTRAVENOUS
2000 EVERY 12 HOURS
Status: DISCONTINUED | OUTPATIENT
Start: 2018-07-21 | End: 2018-07-22

## 2018-07-20 RX ADMIN — OXYCODONE HYDROCHLORIDE 10 MG: 5 TABLET ORAL at 15:45

## 2018-07-20 RX ADMIN — OXYCODONE HYDROCHLORIDE 10 MG: 5 TABLET ORAL at 06:11

## 2018-07-20 RX ADMIN — OXYCODONE HYDROCHLORIDE 10 MG: 5 TABLET ORAL at 02:32

## 2018-07-20 RX ADMIN — SODIUM CHLORIDE 10 ML: 9 INJECTION, SOLUTION INTRAMUSCULAR; INTRAVENOUS; SUBCUTANEOUS at 21:54

## 2018-07-20 RX ADMIN — ASPIRIN 81 MG 81 MG: 81 TABLET ORAL at 09:46

## 2018-07-20 RX ADMIN — METOPROLOL TARTRATE 12.5 MG: 25 TABLET ORAL at 18:36

## 2018-07-20 RX ADMIN — ATORVASTATIN CALCIUM 20 MG: 20 TABLET, FILM COATED ORAL at 18:36

## 2018-07-20 RX ADMIN — VITAMIN D, TAB 1000IU (100/BT) 1000 UNITS: 25 TAB at 13:28

## 2018-07-20 RX ADMIN — LISINOPRIL 40 MG: 20 TABLET ORAL at 09:46

## 2018-07-20 RX ADMIN — CARBAMAZEPINE 200 MG: 200 TABLET, EXTENDED RELEASE ORAL at 09:46

## 2018-07-20 RX ADMIN — CARBAMAZEPINE 200 MG: 200 TABLET, EXTENDED RELEASE ORAL at 18:36

## 2018-07-20 RX ADMIN — SODIUM CHLORIDE 40 MG: 9 INJECTION INTRAMUSCULAR; INTRAVENOUS; SUBCUTANEOUS at 21:53

## 2018-07-20 RX ADMIN — IPRATROPIUM BROMIDE AND ALBUTEROL SULFATE 3 ML: .5; 3 SOLUTION RESPIRATORY (INHALATION) at 15:47

## 2018-07-20 RX ADMIN — HYDROMORPHONE HYDROCHLORIDE 0.5 MG: 1 INJECTION, SOLUTION INTRAMUSCULAR; INTRAVENOUS; SUBCUTANEOUS at 21:57

## 2018-07-20 RX ADMIN — AMIODARONE HYDROCHLORIDE 200 MG: 200 TABLET ORAL at 09:46

## 2018-07-20 RX ADMIN — SODIUM CHLORIDE 10 ML: 9 INJECTION, SOLUTION INTRAMUSCULAR; INTRAVENOUS; SUBCUTANEOUS at 13:29

## 2018-07-20 RX ADMIN — IPRATROPIUM BROMIDE AND ALBUTEROL SULFATE 3 ML: .5; 3 SOLUTION RESPIRATORY (INHALATION) at 08:15

## 2018-07-20 RX ADMIN — IPRATROPIUM BROMIDE AND ALBUTEROL SULFATE 3 ML: .5; 3 SOLUTION RESPIRATORY (INHALATION) at 11:44

## 2018-07-20 RX ADMIN — ACETAMINOPHEN 650 MG: 325 TABLET ORAL at 02:31

## 2018-07-20 RX ADMIN — HYDROMORPHONE HYDROCHLORIDE 1 MG: 1 INJECTION, SOLUTION INTRAMUSCULAR; INTRAVENOUS; SUBCUTANEOUS at 07:44

## 2018-07-20 RX ADMIN — ASCORBIC ACID, VITAMIN A PALMITATE, CHOLECALCIFEROL, THIAMINE HYDROCHLORIDE, RIBOFLAVIN-5 PHOSPHATE SODIUM, PYRIDOXINE HYDROCHLORIDE, NIACINAMIDE, DEXPANTHENOL, ALPHA-TOCOPHEROL ACETATE, VITAMIN K1, FOLIC ACID, BIOTIN, CYANOCOBALAMIN: 200; 3300; 200; 6; 3.6; 6; 40; 15; 10; 150; 600; 60; 5 INJECTION, SOLUTION INTRAVENOUS at 18:37

## 2018-07-20 RX ADMIN — SODIUM CHLORIDE 40 MG: 9 INJECTION INTRAMUSCULAR; INTRAVENOUS; SUBCUTANEOUS at 09:45

## 2018-07-20 RX ADMIN — FINASTERIDE 5 MG: 5 TABLET, FILM COATED ORAL at 09:46

## 2018-07-20 RX ADMIN — VANCOMYCIN HYDROCHLORIDE 2500 MG: 10 INJECTION, POWDER, LYOPHILIZED, FOR SOLUTION INTRAVENOUS at 16:50

## 2018-07-20 RX ADMIN — ONDANSETRON 4 MG: 2 INJECTION INTRAMUSCULAR; INTRAVENOUS at 00:12

## 2018-07-20 RX ADMIN — SODIUM CHLORIDE 75 ML/HR: 900 INJECTION, SOLUTION INTRAVENOUS at 18:37

## 2018-07-20 RX ADMIN — METOPROLOL TARTRATE 12.5 MG: 25 TABLET ORAL at 09:46

## 2018-07-20 RX ADMIN — SODIUM CHLORIDE 75 ML/HR: 900 INJECTION, SOLUTION INTRAVENOUS at 01:30

## 2018-07-20 RX ADMIN — IPRATROPIUM BROMIDE AND ALBUTEROL SULFATE 3 ML: .5; 3 SOLUTION RESPIRATORY (INHALATION) at 20:44

## 2018-07-20 RX ADMIN — PIPERACILLIN SODIUM,TAZOBACTAM SODIUM 3.38 G: 3; .375 INJECTION, POWDER, FOR SOLUTION INTRAVENOUS at 15:59

## 2018-07-20 RX ADMIN — SODIUM CHLORIDE 10 ML: 9 INJECTION, SOLUTION INTRAMUSCULAR; INTRAVENOUS; SUBCUTANEOUS at 06:11

## 2018-07-20 RX ADMIN — VENLAFAXINE HYDROCHLORIDE 75 MG: 37.5 CAPSULE, EXTENDED RELEASE ORAL at 09:46

## 2018-07-20 NOTE — PROGRESS NOTES
Nutrition:  Chart reviewed. Noted pt to start on TPN this evening (D20, 5% AA @ 75mL/h; provides 1584kcals/90gPro). Awaiting CRS progress note for indication. Pt did eat well for 1 meal yesterday and his wife reports he is drinking Ensure Enlive TID (provides 1050kcals and 60g Protein daily). Although ostomy OP is still minimal.  TPN will meet 70% kcal and protein needs. Agree at this time, between supplement intake and % kcal and protein needs will be met.       Dea, [address]   Pager [phone]

## 2018-07-20 NOTE — PROGRESS NOTES
Pharmacy Automatic Renal Dosing Protocol - Antimicrobials    Indication for Antimicrobials: Pneumonia Hap    Current Regimen of Each Antimicrobial:  Zosyn 3.375 gm iv q8h (Start Date 18; Day # 1)  Vancomycin 2500 mg x 1 then 2000 mg Q12H (Start Date , Day 1)     Goal Level: 15-20 mcg/ml    Date Dose & Interval Measured (mcg/mL) Extrapolated (mcg/mL)                       Significant Cultures:       Radiology / Imaging results: (X-ray, CT scan or MRI):     Paralysis, amputations, malnutrition:     Labs:  Recent Labs      18   0333  18   0509  18   0358   CREA  0.61*  0.70  0.93   BUN  19  16  10   WBC  18.9*  18.5*  23.7*     Temp (24hrs), Av.8 °F (36.6 °C), Min:97.3 °F (36.3 °C), Max:98.2 °F (36.8 °C)    Creatinine Clearance (mL/min) or Dialysis: 102.7    Impression/Plan:   · Vancomycin 2500 mg (20 mg/kg), then 2000 mg Q12H with anticipated trough of 15 mcg/ml. · Piperacillin tazobactam 3.375 gm iv once, then 3.375 gm iv q8h  · Antimicrobial stop date 7 days     Pharmacy will follow daily and adjust medications as appropriate for renal function and/or serum levels. Thank you,  Oris , [place] [facility]    Recommended duration of therapy  [email or web address]/[place]/[facility]/Pharmacy/Clinical%20Companion/[email or web address]. docx    Renal Dosing  [email or web address]/[place]/[facility]/Pharmacy/Clinical%20Companion/Renal%[email or web address]. pdf

## 2018-07-20 NOTE — PROGRESS NOTES
Problem: Mobility Impaired (Adult and Pediatric)  Goal: *Acute Goals and Plan of Care (Insert Text)  Physical Therapy Goals  Reviewed and revised 7/19/2018  1. Patient will move from supine to sit and sit to supine , scoot up and down and roll side to side in bed with minimal assistance within 7 day(s). 2.  Patient will transfer from bed to chair and chair to bed with moderate assistance using the least restrictive device within 7 day(s). 3.  Patient will perform sit to stand with minimal assistance within 7 day(s). 4.  Patient will perform stand pivot transfer to wheelchair with minimal assistance in 7 days. Initiated 7/12/2018  1. Patient will move from supine to sit and sit to supine , scoot up and down and roll side to side in bed with modified independence within 7 day(s). 2.  Patient will transfer from bed to chair and chair to bed with supervision/set-up using the least restrictive device within 7 day(s). 3.  Patient will perform sit to stand with supervision/set-up within 7 day(s). 4.  Patient will perform stand pivot transfer to wheelchair with modified independence in 7 days. physical Therapy TREATMENT  Patient: Maliha Garland (22 y.o. male)  Date: 7/20/2018  Diagnosis: Acute blood loss anemia  GI bleed  Anasarca  GI Bleed  gi bleed  ASCENDING COLON CANCER  GI bleed  Procedure(s) (LRB):  LAPAROSCOPIC  ASSISTED TO OPEN RIGHT COLECTOMY AND SMALL BOWEL RESECTION (Right) 3 Days Post-Op  Precautions:    Chart, physical therapy assessment, plan of care and goals were reviewed. ASSESSMENT:  Discussed with nursing and cleared to mobilize. Pt received supine in bed with wife present in room. Noted increased chest congestion from previous day, and continued to encourage pt to cough, and use a pillow to brace incision site. Discussed with wife who reported that sitting in the chair yesterday went well. LLE AFO and shoes donned, noted extension tone in LLE.  Pt transferred supine to sit with maxAx2 and requiring additional time. Noted impaired sitting balance, posterior lean when sitting EOB due to pain and pt used his RUE as support. Pt transferred sit to stand maxA x2 and performed stand pivot transfer to chair, with max A x2. Rod pad placed on chair under pt. Attempted to stand again but pt could not come to full stand. Pt continued to complain of incision site pain during transfers and when sitting EOB. Encouraged pt to sit up in chair for longer today and to try and sit up in chair 2x/day over the weekend, using the rod lift and discussed with nursing. Ended session with pt sitting up in chair with wife at bedside. Discussed with nursing and pt would potentially benefit from OT consult. Wife continues to want to take pt home, and is not willing for pt to go to SNF due to previous negative experiences. Pt may be appropriate for IP rehab pending progress. Progression toward goals:  []    Improving appropriately and progressing toward goals  [x]    Improving slowly and progressing toward goals  []    Not making progress toward goals and plan of care will be adjusted     PLAN:  Patient continues to benefit from skilled intervention to address the above impairments. Continue treatment per established plan of care. Discharge Recommendations:  HHPT or Rehab if transfers do not improve  Further Equipment Recommendations for Discharge:  none     SUBJECTIVE:   Patient stated Miss. Torture over there.     OBJECTIVE DATA SUMMARY:   Critical Behavior:  Neurologic State: Alert  Orientation Level: Oriented to person, Oriented to place, Oriented to situation  Cognition: Follows commands     Functional Mobility Training:  Bed Mobility:     Supine to Sit: Maximum assistance;Assist x2; Additional time              Transfers:  Sit to Stand: Assist x2;Maximum assistance  Stand to Sit: Assist x2;Maximum assistance  Stand Pivot Transfers: Maximum assistance                          Balance:  Sitting: Impaired  Sitting - Static: Poor (constant support)  Sitting - Dynamic: Poor (constant support)  Standing: Impaired  Standing - Static: Poor  Standing - Dynamic : Poor  Ambulation/Gait Training:     Pain:  Pain Scale 1: Numeric (0 - 10)  Pain Intensity 1: 2  Pain Location 1: Abdomen  Pain Orientation 1: Anterior;Mid  Pain Description 1: Sore  Pain Intervention(s) 1: Medication (see MAR)  Activity Tolerance:   Poor, due to pain  Please refer to the flowsheet for vital signs taken during this treatment. After treatment:   [x]    Patient left in no apparent distress sitting up in chair  []    Patient left in no apparent distress in bed  [x]    Call bell left within reach  [x]    Nursing notified  []    Caregiver present  []    Bed alarm activated    COMMUNICATION/COLLABORATION:   The patients plan of care was discussed with: Registered Nurse    Kaylin Jerry, [facility]   Time Calculation: 36 mins    Regarding student involvement in patient care:  A student participated in this treatment session. Per CMS Medicare statements and APTA guidelines I certify that the following was true:  1. I was present and directly observed the entire session. 2. I made all skilled judgments and clinical decisions regarding care. 3. I am the practitioner responsible for assessment, treatment, and documentation.

## 2018-07-20 NOTE — PROGRESS NOTES
Patient had a generally uneventful night. Remains coarse in lungs and has congestion but unable to clear through coughing. Pain was controlled with PRN oxycodone 10mg. Patient continues to have low urine output. For this shift, patient put out ~300cc of jesus alberto urine. Radha Tovar is putting out more than left. Radha Cockgaby had a total of 170cc out, while left MADELEINE only put out 10cc. Colostomy has yet to have any output. RN placed prevalon boots to offload heels. Taught patient to hold pillow against abdomen when coughing to help reduce pain. Intermittent confusion about time of day but easily reoriented. VSS. Will continue to monitor.
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
## 2018-07-20 NOTE — PROGRESS NOTES
Spoke with Gorge Horne (nurse for Dr. Kim Aaron) who clarified that since he is a pt of NP Susan, he will need to remain on internal medicine service. Dr. Kim Aaron does not round on NP patients.

## 2018-07-21 ENCOUNTER — APPOINTMENT (OUTPATIENT)
Dept: GENERAL RADIOLOGY | Age: 78
DRG: 329 | End: 2018-07-21
Attending: COLON & RECTAL SURGERY
Payer: MEDICARE

## 2018-07-21 LAB
ABO + RH BLD: NORMAL
ALBUMIN SERPL-MCNC: 2.1 G/DL (ref 3.5–5)
ALBUMIN/GLOB SERPL: 0.6 {RATIO} (ref 1.1–2.2)
ALP SERPL-CCNC: 71 U/L (ref 45–117)
ALT SERPL-CCNC: 28 U/L (ref 12–78)
ANION GAP SERPL CALC-SCNC: 7 MMOL/L (ref 5–15)
AST SERPL-CCNC: 24 U/L (ref 15–37)
BASOPHILS # BLD: 0 K/UL (ref 0–0.1)
BASOPHILS NFR BLD: 0 % (ref 0–1)
BILIRUB DIRECT SERPL-MCNC: 0.2 MG/DL (ref 0–0.2)
BILIRUB SERPL-MCNC: 0.4 MG/DL (ref 0.2–1)
BLD PROD TYP BPU: NORMAL
BLD PROD TYP BPU: NORMAL
BLOOD GROUP ANTIBODIES SERPL: NORMAL
BPU ID: NORMAL
BPU ID: NORMAL
BUN SERPL-MCNC: 21 MG/DL (ref 6–20)
BUN/CREAT SERPL: 44 (ref 12–20)
CALCIUM SERPL-MCNC: 8.4 MG/DL (ref 8.5–10.1)
CHLORIDE SERPL-SCNC: 100 MMOL/L (ref 97–108)
CO2 SERPL-SCNC: 25 MMOL/L (ref 21–32)
CREAT SERPL-MCNC: 0.48 MG/DL (ref 0.7–1.3)
CROSSMATCH RESULT,%XM: NORMAL
CROSSMATCH RESULT,%XM: NORMAL
DIFFERENTIAL METHOD BLD: ABNORMAL
EOSINOPHIL # BLD: 0.2 K/UL (ref 0–0.4)
EOSINOPHIL NFR BLD: 1 % (ref 0–7)
ERYTHROCYTE [DISTWIDTH] IN BLOOD BY AUTOMATED COUNT: 22.9 % (ref 11.5–14.5)
GLOBULIN SER CALC-MCNC: 3.8 G/DL (ref 2–4)
GLUCOSE SERPL-MCNC: 153 MG/DL (ref 65–100)
HCT VFR BLD AUTO: 25.6 % (ref 36.6–50.3)
HGB BLD-MCNC: 7.5 G/DL (ref 12.1–17)
IMM GRANULOCYTES # BLD: 0 K/UL (ref 0–0.04)
IMM GRANULOCYTES NFR BLD AUTO: 0 % (ref 0–0.5)
LYMPHOCYTES # BLD: 0.4 K/UL (ref 0.8–3.5)
LYMPHOCYTES NFR BLD: 2 % (ref 12–49)
MAGNESIUM SERPL-MCNC: 2.1 MG/DL (ref 1.6–2.4)
MCH RBC QN AUTO: 25.6 PG (ref 26–34)
MCHC RBC AUTO-ENTMCNC: 29.3 G/DL (ref 30–36.5)
MCV RBC AUTO: 87.4 FL (ref 80–99)
MONOCYTES # BLD: 1.4 K/UL (ref 0–1)
MONOCYTES NFR BLD: 8 % (ref 5–13)
NEUTS BAND NFR BLD MANUAL: 1 %
NEUTS SEG # BLD: 15.7 K/UL (ref 1.8–8)
NEUTS SEG NFR BLD: 88 % (ref 32–75)
NRBC # BLD: 0 K/UL (ref 0–0.01)
NRBC BLD-RTO: 0 PER 100 WBC
PHOSPHATE SERPL-MCNC: 2.4 MG/DL (ref 2.6–4.7)
PLATELET # BLD AUTO: 271 K/UL (ref 150–400)
PMV BLD AUTO: 9.6 FL (ref 8.9–12.9)
POTASSIUM SERPL-SCNC: 4.4 MMOL/L (ref 3.5–5.1)
PROT SERPL-MCNC: 5.9 G/DL (ref 6.4–8.2)
RBC # BLD AUTO: 2.93 M/UL (ref 4.1–5.7)
RBC MORPH BLD: ABNORMAL
SODIUM SERPL-SCNC: 132 MMOL/L (ref 136–145)
SPECIMEN EXP DATE BLD: NORMAL
STATUS OF UNIT,%ST: NORMAL
STATUS OF UNIT,%ST: NORMAL
UNIT DIVISION, %UDIV: 0
UNIT DIVISION, %UDIV: 0
WBC # BLD AUTO: 17.7 K/UL (ref 4.1–11.1)

## 2018-07-21 PROCEDURE — 0DH67UZ INSERTION OF FEEDING DEVICE INTO STOMACH, VIA NATURAL OR ARTIFICIAL OPENING: ICD-10-PCS | Performed by: RADIOLOGY

## 2018-07-21 PROCEDURE — 74011250636 HC RX REV CODE- 250/636: Performed by: SURGERY

## 2018-07-21 PROCEDURE — 65270000029 HC RM PRIVATE

## 2018-07-21 PROCEDURE — 74011000258 HC RX REV CODE- 258: Performed by: SURGERY

## 2018-07-21 PROCEDURE — 77030009378 HC DEV TORQ OLCT F/GWIRE MANIP COOK -A

## 2018-07-21 PROCEDURE — 74011000258 HC RX REV CODE- 258: Performed by: COLON & RECTAL SURGERY

## 2018-07-21 PROCEDURE — 0D9670Z DRAINAGE OF STOMACH WITH DRAINAGE DEVICE, VIA NATURAL OR ARTIFICIAL OPENING: ICD-10-PCS | Performed by: COLON & RECTAL SURGERY

## 2018-07-21 PROCEDURE — 74011000250 HC RX REV CODE- 250: Performed by: INTERNAL MEDICINE

## 2018-07-21 PROCEDURE — C1769 GUIDE WIRE: HCPCS

## 2018-07-21 PROCEDURE — 74011000250 HC RX REV CODE- 250: Performed by: COLON & RECTAL SURGERY

## 2018-07-21 PROCEDURE — 83735 ASSAY OF MAGNESIUM: CPT

## 2018-07-21 PROCEDURE — 84100 ASSAY OF PHOSPHORUS: CPT

## 2018-07-21 PROCEDURE — 74011250636 HC RX REV CODE- 250/636: Performed by: HOSPITALIST

## 2018-07-21 PROCEDURE — 74018 RADEX ABDOMEN 1 VIEW: CPT

## 2018-07-21 PROCEDURE — 80076 HEPATIC FUNCTION PANEL: CPT

## 2018-07-21 PROCEDURE — 71045 X-RAY EXAM CHEST 1 VIEW: CPT

## 2018-07-21 PROCEDURE — C9113 INJ PANTOPRAZOLE SODIUM, VIA: HCPCS | Performed by: HOSPITALIST

## 2018-07-21 PROCEDURE — 74011250636 HC RX REV CODE- 250/636: Performed by: EMERGENCY MEDICINE

## 2018-07-21 PROCEDURE — 80048 BASIC METABOLIC PNL TOTAL CA: CPT

## 2018-07-21 PROCEDURE — 94640 AIRWAY INHALATION TREATMENT: CPT

## 2018-07-21 PROCEDURE — 36415 COLL VENOUS BLD VENIPUNCTURE: CPT

## 2018-07-21 PROCEDURE — 74011000250 HC RX REV CODE- 250: Performed by: EMERGENCY MEDICINE

## 2018-07-21 PROCEDURE — 85025 COMPLETE CBC W/AUTO DIFF WBC: CPT

## 2018-07-21 PROCEDURE — 74011000258 HC RX REV CODE- 258: Performed by: EMERGENCY MEDICINE

## 2018-07-21 PROCEDURE — 43752 NASAL/OROGASTRIC W/TUBE PLMT: CPT

## 2018-07-21 PROCEDURE — 74011000250 HC RX REV CODE- 250: Performed by: HOSPITALIST

## 2018-07-21 RX ORDER — FUROSEMIDE 10 MG/ML
20 INJECTION INTRAMUSCULAR; INTRAVENOUS EVERY 12 HOURS
Status: COMPLETED | OUTPATIENT
Start: 2018-07-21 | End: 2018-07-21

## 2018-07-21 RX ORDER — METOPROLOL TARTRATE 5 MG/5ML
2.5 INJECTION INTRAVENOUS EVERY 6 HOURS
Status: DISCONTINUED | OUTPATIENT
Start: 2018-07-21 | End: 2018-08-03

## 2018-07-21 RX ORDER — LORAZEPAM 2 MG/ML
0.5 INJECTION INTRAMUSCULAR ONCE
Status: COMPLETED | OUTPATIENT
Start: 2018-07-21 | End: 2018-07-21

## 2018-07-21 RX ORDER — FUROSEMIDE 10 MG/ML
20 INJECTION INTRAMUSCULAR; INTRAVENOUS ONCE
Status: COMPLETED | OUTPATIENT
Start: 2018-07-21 | End: 2018-07-21

## 2018-07-21 RX ADMIN — FUROSEMIDE 20 MG: 10 INJECTION, SOLUTION INTRAMUSCULAR; INTRAVENOUS at 15:47

## 2018-07-21 RX ADMIN — SODIUM CHLORIDE 40 ML: 9 INJECTION, SOLUTION INTRAMUSCULAR; INTRAVENOUS; SUBCUTANEOUS at 04:26

## 2018-07-21 RX ADMIN — FUROSEMIDE 20 MG: 10 INJECTION, SOLUTION INTRAMUSCULAR; INTRAVENOUS at 06:11

## 2018-07-21 RX ADMIN — ASCORBIC ACID, VITAMIN A PALMITATE, CHOLECALCIFEROL, THIAMINE HYDROCHLORIDE, RIBOFLAVIN-5 PHOSPHATE SODIUM, PYRIDOXINE HYDROCHLORIDE, NIACINAMIDE, DEXPANTHENOL, ALPHA-TOCOPHEROL ACETATE, VITAMIN K1, FOLIC ACID, BIOTIN, CYANOCOBALAMIN: 200; 3300; 200; 6; 3.6; 6; 40; 15; 10; 150; 600; 60; 5 INJECTION, SOLUTION INTRAVENOUS at 17:20

## 2018-07-21 RX ADMIN — LORAZEPAM 0.5 MG: 2 INJECTION INTRAMUSCULAR; INTRAVENOUS at 09:57

## 2018-07-21 RX ADMIN — VANCOMYCIN HYDROCHLORIDE 2000 MG: 10 INJECTION, POWDER, LYOPHILIZED, FOR SOLUTION INTRAVENOUS at 15:02

## 2018-07-21 RX ADMIN — SODIUM CHLORIDE 40 MG: 9 INJECTION INTRAMUSCULAR; INTRAVENOUS; SUBCUTANEOUS at 15:47

## 2018-07-21 RX ADMIN — METOPROLOL TARTRATE 2.5 MG: 1 INJECTION, SOLUTION INTRAVENOUS at 15:00

## 2018-07-21 RX ADMIN — IPRATROPIUM BROMIDE AND ALBUTEROL SULFATE 3 ML: .5; 3 SOLUTION RESPIRATORY (INHALATION) at 21:21

## 2018-07-21 RX ADMIN — IPRATROPIUM BROMIDE AND ALBUTEROL SULFATE 3 ML: .5; 3 SOLUTION RESPIRATORY (INHALATION) at 08:29

## 2018-07-21 RX ADMIN — SODIUM CHLORIDE 10 ML: 9 INJECTION, SOLUTION INTRAMUSCULAR; INTRAVENOUS; SUBCUTANEOUS at 22:10

## 2018-07-21 RX ADMIN — SODIUM CHLORIDE 40 MG: 9 INJECTION INTRAMUSCULAR; INTRAVENOUS; SUBCUTANEOUS at 22:10

## 2018-07-21 RX ADMIN — PIPERACILLIN SODIUM,TAZOBACTAM SODIUM 4.5 G: 4; .5 INJECTION, POWDER, FOR SOLUTION INTRAVENOUS at 17:09

## 2018-07-21 RX ADMIN — Medication 10 ML: at 22:11

## 2018-07-21 RX ADMIN — PIPERACILLIN SODIUM,TAZOBACTAM SODIUM 3.38 G: 3; .375 INJECTION, POWDER, FOR SOLUTION INTRAVENOUS at 00:38

## 2018-07-21 RX ADMIN — FUROSEMIDE 20 MG: 10 INJECTION, SOLUTION INTRAMUSCULAR; INTRAVENOUS at 22:10

## 2018-07-21 RX ADMIN — HYDROMORPHONE HYDROCHLORIDE 0.5 MG: 1 INJECTION, SOLUTION INTRAMUSCULAR; INTRAVENOUS; SUBCUTANEOUS at 18:40

## 2018-07-21 RX ADMIN — VANCOMYCIN HYDROCHLORIDE 2000 MG: 10 INJECTION, POWDER, LYOPHILIZED, FOR SOLUTION INTRAVENOUS at 02:41

## 2018-07-21 RX ADMIN — HYDROMORPHONE HYDROCHLORIDE 0.5 MG: 1 INJECTION, SOLUTION INTRAMUSCULAR; INTRAVENOUS; SUBCUTANEOUS at 02:41

## 2018-07-21 NOTE — PROGRESS NOTES
Patient distended no gas in ostomy. Coarse lungs. Audible wet lungs. Dr. Brent Zee made aware. Action per dr. Brent Zee is to place ngt. Attempted x3 with no success. Dr. Brent Zee attempted no success. Patient vomited brown liquid when attempted ngt. Patient to have ngt placed in IR.

## 2018-07-21 NOTE — PROGRESS NOTES
Addendum:  Surgery tried to place an NG tube but was unsuccessful with concern for significant ileus and risk of aspiration. Patient chest x-ray reveals persistent right infiltrate and was started on antibiotics yesterday. KUB does not show any definitive obstruction. Interventional radiology placed a Dobbhoff tube and since that time already has had approximately 2 L out of bilious fluid. Patient is now completely n.p.o. So we will need to stop all of his oral medications. I will place him on IV metoprolol with parameters scheduled every 6 for blood pressure for now some additional agents. He is on amiodarone orally 200 mg daily this will be in his system for a while, may need to consider amiodarone drip if his n.p.o. status is prolonged. He is also on carbamazepine which cannot be given IV and will be on hold will need to have close monitoring for any seizure activity. Given his respiratory status, I appreciate pulmonary evaluation. Since she will be n.p.o. we will give him 1 additional dose of Lasix and then stop it to avoid significant dehydration, although I am not convinced that we are dealing with pulmonary edema. He does have a hx of diastolic CHF, but I think this is more related to poor pulmonary toilet with his distended abdomen and his pneumonia. Patient is on TPN currently at 76, I have [address] his additional IV fluids this morning given his respiratory status. Will hold off on further IV fluids for now but if he remains n.p.o. may need additional volume intravenously.

## 2018-07-21 NOTE — PROGRESS NOTES
CRS  Pt with emesis, coarse upper airway sounds. Unable to pass ngt to prevent aspiration (attempt by RN and myself)  Flowsheet, labs reviewed  Heent: bilious/feculent emesis. RN at bedside. Trying to suction  Lungs: audible upper aw sounds  Abd: dt  Colostomy: no gas, no stool  Inc: minimal dried drainage    Plan:  Hi aspiration risk. Suspect likely already aspirated. On zosyn, vanc. May need pulmonary on board. I was unsuccessful in attempting to place an ngt. Continues to have emesis but given his disability, worried that he is unable to protect his aw and prevent ongoing aspiration. D/w IR (thank you). They are willing to try but obviously will still be difficult and may not be possible. If unsuccesful may need GI to see about placing an ngt endoscopically. NPO.  May need transfer to higher acuity

## 2018-07-21 NOTE — PROGRESS NOTES
General Surgery End of Shift Nursing Note    Bedside shift change report given to Quirino Oliveros and Agustina (oncoming nurse) by Francisco Mariscal (offgoing nurse). Report included the following information SBAR, Intake/Output, MAR, Recent Results and Cardiac Rhythm paced. Shift worked:   7a-7p   Summary of shift:    Up to chair two times - required PT and/or stephy lift assistance. Pt c/o feeling full - abdomen distended and no ostomy activity. No vomiting but he is c/o reflux and discomfort. Sputum culture obtained by deep suction as he was unable to produce it spontaneously. This was obtained prior to starting vanc and zosyn.    Issues for physician to address:   As above       Respiratory:    Incentive Spirometer at bedside: NO  Patient instructed on use: [address]
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
## 2018-07-21 NOTE — PROGRESS NOTES
Occupational Therapy  Orders received, chart reviewed, patient off the floor for procedure. Will follow up tomorrow for OT evaluation. Noam Mendez.  MS Alek OTR/L

## 2018-07-21 NOTE — PROGRESS NOTES
Dr Dino Wallace notified of events of today, initial output of NGT and that nursing has had to troubleshoot NGT a few times this afternoon. Per Dr. Dino Wallace he is satisfied with initial output and ok with having to periodically flush or leave off suction intermittently to give time to function.  Nursing will continue to monitor

## 2018-07-21 NOTE — PROGRESS NOTES
Spoke with Ms Heath Madden in to call in IR for NGT placement after multiple attempts made by nursing staff and by Dr. Kasey Elder.

## 2018-07-21 NOTE — PROGRESS NOTES
Pt sounded very junky. Suction twice overnight. Pt had dark sputum. He was encouraged to cough up phlegm. Called and spoke to Doctor Brenda. Ordered 20 of lasix now once.

## 2018-07-21 NOTE — CONSULTS
[facility]  Pulmonary, Critical Care, and Sleep Medicine    Initial Patient Consult    Name: Maliha Garland MRN: [id number]   : 1940 Hospital: [facility]   Date: 2018        IMPRESSION:   · Aspiration Pneumonia  · Colon CA  · S/P Colectomy, YAMEL, Ostomy  · Ileus   · CVA  · Afib  · Aspiration risk      RECOMMENDATIONS:   · Broad spectrum antibiotics  · Aspiration precautions  · NG per IR fluoroscopy guidance   · DVT and GI prophylaxis   · At risk for deterioration      Subjective: This patient has been seen and evaluated at the request of Dr. Alexander Brooks for aspiration pneumonia. Patient is a 69 y.o. male with recent diagnosis of colon CA and coon resection, ostomy, YAMEL. Admitted initially with anemia. H/O CVA with left paresis. Has ileus and abdominal distension. Vomited this AM with witnessed aspiration. CXR shows RLL infiltrate. On broad spectrum antibiotics.            Past Medical History:   Diagnosis Date    A-fib [facility])     Acute bronchitis 2015    Anxiety     Arrhythmia     atrial fibrillation    Arthritis     BCC (basal cell carcinoma of skin)     BPH (benign prostatic hyperplasia)     Chronic back pain greater than 3 months duration 2015    Chronic right shoulder pain 2016    Common wart 2016    Constipation 2012    CVA (cerebral infarction) 2015    Depression     GERD (gastroesophageal reflux disease)     Hearing loss     bilateral hearing aids    Hemiplegia following CVA (cerebrovascular accident) ([address]     left side hemiparesis    History of colostomy     HTN (hypertension)     Hyperlipidemia     Localized epilepsy with impairment of consciousness ([address]     Prostate cancer ([address]     Status post XRT, Lupron    Screening for colon cancer 2015    Stroke [facility])     traumatic from neck crushing    TBI (traumatic brain injury) ([address]     Unspecified sleep apnea     on CPAP      Past Surgical History:   Procedure Laterality Date    COLONOSCOPY N/A 7/12/2018    COLONOSCOPY through stoma performed by Jase Polk MD at [facility] ENDOSCOPY    HX ANKLE FRACTURE TX      HX CATARACT REMOVAL      bilat    HX COLECTOMY      colostomy placed and revised    HX HEENT      ear surgery eddie.  HX HERNIA REPAIR      HX ORTHOPAEDIC      left hip pinning    HX PACEMAKER  2014      Prior to Admission medications    Medication Sig Start Date End Date Taking? Authorizing Provider   amLODIPine (NORVASC) 10 mg tablet Take 10 mg by mouth daily (with lunch). Yes Lester Lu MD   atorvastatin (LIPITOR) 20 mg tablet Take 20 mg by mouth daily (with dinner). Yes Lester Lu MD   carBAMazepine XR (TEGRETOL XR) 200 mg SR tablet Take 200 mg by mouth two (2) times a day. YOVANI, Brand only   Yes Lester Lu MD   acetaminophen (TYLENOL) 500 mg tablet Take 1,000 mg by mouth every six (6) hours as needed for Pain. Yes Lester Lu MD   cholecalciferol (VITAMIN D3) 1,000 unit cap Take 1,000 Units by mouth daily (with lunch). Yes Historical Provider   meclizine (ANTIVERT) 25 mg tablet Take 25 mg by mouth every eight (8) hours as needed for Nausea. take every 8 hours as needed for nausea 5/10/18  Yes Luna Mancini MD   PRADAXA 150 mg capsule TAKE ONE CAPSULE BY MOUTH EVERY 12 HOURS 5/16/18  Yes Monse Pollock NP   amiodarone (CORDARONE) 200 mg tablet TAKE ONE TABLET BY MOUTH DAILY 5/9/18  Yes Gwen Cornell MD   lisinopril (PRINIVIL, ZESTRIL) 40 mg tablet TAKE ONE TABLET BY MOUTH DAILY 4/12/18  Yes Monse Pollock NP   venlafaxine-SR [facility] 75 mg capsule TAKE ONE CAPSULE BY MOUTH DAILY 2/27/18  Yes Hermelinda Wells MD   magnesium hydroxide (SOTO MILK OF MAGNESIA) 400 mg/5 mL suspension Take 30 mL by mouth daily as needed for Constipation. Yes Historical Provider   DOC-Q-LACE 100 mg capsule TAKE ONE CAPSULE BY MOUTH TWICE A DAY 4/16/16  Yes Hermelinda Wells MD   lutein 20 mg tab Take 1 Tab by mouth daily. Yes Historical Provider   senna (SENNA) 8.6 mg tablet Take 1 tablet by mouth daily. Yes Historical Provider   finasteride (PROSCAR) 5 mg tablet Take 5 mg by mouth daily. Yes Historical Provider   metoprolol tartrate (LOPRESSOR) 25 mg tablet TAKE ONE-HALF TABLET BY MOUTH TWICE A DAY 7/17/18   Delilah Belle MD   lactulose (CHRONULAC) 10 gram/15 mL solution TAKE 1 TEASPOONFUL (5 ML) BY MOUTH THREE TIMES AS DAY AS NEEDED  Indications: constipation 3/16/18   Xochilt Rodríguez NP   zolpidem (AMBIEN) 10 mg tablet Take 1 Tab by mouth nightly as needed for Sleep (please take in the bed). 9/26/16   Delilah Belle MD   betamethasone dipropionate (DIPROSONE) 0.05 % topical cream Apply  to affected area as needed.  5/16/16   Delilah Belle MD     Allergies   Allergen Reactions    Ciprofibrate Hives      Social History   Substance Use Topics    Smoking status: Former Smoker     Years: 10.00     Types: Pipe     Quit date: 1/29/1990    Smokeless tobacco: Never Used      Comment: QUIT 1970'S    Alcohol use No      Family History   Problem Relation Age of Onset    Alzheimer Mother      dec [de-identified]    Cancer Father      dec 76 kidney    Heart Disease Brother     No Known Problems Son         Current Facility-Administered Medications   Medication Dose Route Frequency    furosemide (LASIX) injection 20 mg  20 mg IntraVENous Q12H    TPN ADULT - CENTRAL AA 5% D20% W/ CA + ELECTROLYTES   IntraVENous CONTINUOUS    TPN ADULT - CENTRAL AA 5% D20% W/ CA + ELECTROLYTES   IntraVENous CONTINUOUS    piperacillin-tazobactam (ZOSYN) 3.375 g in 0.9% sodium chloride (MBP/ADV) 100 mL  3.375 g IntraVENous Q8H    vancomycin (VANCOCIN) 2000 mg in  ml infusion  2,000 mg IntraVENous Q12H    aspirin chewable tablet 81 mg  81 mg Oral DAILY    albuterol-ipratropium (DUO-NEB) 2.5 MG-0.5 MG/3 ML  3 mL Nebulization Q4HWA RT    pantoprazole (PROTONIX) 40 mg in sodium chloride 0.9% 10 mL injection  40 mg IntraVENous Q12H    atorvastatin (LIPITOR) tablet 20 mg  20 mg Oral DAILY WITH DINNER    carBAMazepine XR (TEGretol XR) tablet 200 mg  200 mg Oral BID    cholecalciferol (VITAMIN D3) tablet 1,000 Units  1,000 Units Oral DAILY WITH LUNCH    amiodarone (CORDARONE) tablet 200 mg  200 mg Oral DAILY    metoprolol tartrate (LOPRESSOR) tablet 12.5 mg  12.5 mg Oral BID    venlafaxine-SR (EFFEXOR-XR) capsule 75 mg  75 mg Oral DAILY WITH BREAKFAST    finasteride (PROSCAR) tablet 5 mg  5 mg Oral DAILY    lisinopril (PRINIVIL, ZESTRIL) tablet 40 mg  40 mg Oral DAILY    sodium chloride (NS) flush 5-10 mL  5-10 mL IntraVENous Q8H       Review of Systems:      Objective:   Vital Signs:    Visit Vitals    /84 (BP 1 Location: Right arm)    Pulse 83    Temp 98.3 °F (36.8 °C)    Resp 20    Ht 6' 2\" (1.88 m)    Wt 132.5 kg (292 lb)    SpO2 95%    BMI 37.49 kg/m2       O2 Device: Room air   O2 Flow Rate (L/min): 3 l/min   Temp (24hrs), Av °F (36.7 °C), Min:97.4 °F (36.3 °C), Max:98.6 °F (37 °C)       Intake/Output:   Last shift:       07 -  190  In: 600 [I.V.:600]  Out: 2635 [Urine:2600; Drains:35]  Last 3 shifts:  190 -  0700  In: 3521.3 [P.O.:300; I.V.:3221.3]  Out: 1425 [Urine:1110; Drains:295]    Intake/Output Summary (Last 24 hours) at 18 1239  Last data filed at 18 1208   Gross per 24 hour   Intake          3101.25 ml   Output             3425 ml   Net          -323.75 ml      Physical Exam:   General:  Alert, cooperative, no distress, appears stated age. Head:  Normocephalic, without obvious abnormality, atraumatic. Eyes:  Conjunctivae/corneas clear. PERRL, EOMs intact. Nose: Nares normal. Septum midline. Mucosa normal. No drainage or sinus tenderness. Throat: Lips, mucosa, and tongue normal. Teeth and gums normal.   Neck: Supple, symmetrical, trachea midline, no adenopathy, thyroid: no enlargment/tenderness/nodules, no carotid bruit and no JVD. Back:   Symmetric, no curvature.  ROM normal. Lungs:   Clear to auscultation bilaterally. Chest wall:  No tenderness or deformity. Heart:  Regular rate and rhythm, S1, S2 normal, no murmur, click, rub or gallop. Abdomen:   Soft, non-tender. Bowel sounds normal. No masses,  No organomegaly. Extremities: Extremities normal, atraumatic, no cyanosis or edema. Pulses: 2+ and symmetric all extremities. Skin: Skin color, texture, turgor normal. No rashes or lesions   Lymph nodes: Cervical, supraclavicular, and axillary nodes normal.   Neurologic: Grossly nonfocal     Data review:     Recent Results (from the past 24 hour(s))   CULTURE, BLOOD, PAIRED    Collection Time: 07/20/18  2:20 PM   Result Value Ref Range    Special Requests: NO SPECIAL REQUESTS      Culture result: NO GROWTH AFTER 17 HOURS     CULTURE, RESPIRATORY/SPUTUM/BRONCH W GRAM STAIN    Collection Time: 07/20/18  3:58 PM   Result Value Ref Range    Special Requests: NO SPECIAL REQUESTS      GRAM STAIN 2+ WBCS SEEN      GRAM STAIN FEW EPITHELIAL CELLS SEEN      GRAM STAIN 1+ GRAM POSITIVE RODS      GRAM STAIN 1+ GRAM POSITIVE COCCI IN CLUSTERS      GRAM STAIN FEW BUDDING YEAST      Culture result: PENDING    CBC WITH AUTOMATED DIFF    Collection Time: 07/21/18  3:00 AM   Result Value Ref Range    WBC 17.7 (H) 4.1 - 11.1 K/uL    RBC 2.93 (L) 4.10 - 5.70 M/uL    HGB 7.5 (L) 12.1 - 17.0 g/dL    HCT 25.6 (L) 36.6 - 50.3 %    MCV 87.4 80.0 - 99.0 FL    MCH 25.6 (L) 26.0 - 34.0 PG    MCHC 29.3 (L) 30.0 - 36.5 g/dL    RDW 22.9 (H) 11.5 - 14.5 %    PLATELET 548 699 - 153 K/uL    MPV 9.6 8.9 - 12.9 FL    NRBC 0.0 0  WBC    ABSOLUTE NRBC 0.00 0.00 - 0.01 K/uL    NEUTROPHILS 88 (H) 32 - 75 %    BAND NEUTROPHILS 1 %    LYMPHOCYTES 2 (L) 12 - 49 %    MONOCYTES 8 5 - 13 %    EOSINOPHILS 1 0 - 7 %    BASOPHILS 0 0 - 1 %    IMMATURE GRANULOCYTES 0 0.0 - 0.5 %    ABS. NEUTROPHILS 15.7 (H) 1.8 - 8.0 K/UL    ABS. LYMPHOCYTES 0.4 (L) 0.8 - 3.5 K/UL    ABS. MONOCYTES 1.4 (H) 0.0 - 1.0 K/UL    ABS. EOSINOPHILS 0.2 0.0 - 0.4 K/UL    ABS. BASOPHILS 0.0 0.0 - 0.1 K/UL    ABS. IMM. GRANS. 0.0 0.00 - 0.04 K/UL    DF AUTOMATED      RBC COMMENTS ANISOCYTOSIS  2+        RBC COMMENTS MICROCYTOSIS  1+        RBC COMMENTS HYPOCHROMIA  1+       METABOLIC PANEL, BASIC    Collection Time: 07/21/18  3:00 AM   Result Value Ref Range    Sodium 132 (L) 136 - 145 mmol/L    Potassium 4.4 3.5 - 5.1 mmol/L    Chloride 100 97 - 108 mmol/L    CO2 25 21 - 32 mmol/L    Anion gap 7 5 - 15 mmol/L    Glucose 153 (H) 65 - 100 mg/dL    BUN 21 (H) 6 - 20 MG/DL    Creatinine 0.48 (L) 0.70 - 1.30 MG/DL    BUN/Creatinine ratio 44 (H) 12 - 20      GFR est AA >60 >60 ml/min/1.73m2    GFR est non-AA >60 >60 ml/min/1.73m2    Calcium 8.4 (L) 8.5 - 10.1 MG/DL   MAGNESIUM    Collection Time: 07/21/18  3:00 AM   Result Value Ref Range    Magnesium 2.1 1.6 - 2.4 mg/dL   PHOSPHORUS    Collection Time: 07/21/18  3:00 AM   Result Value Ref Range    Phosphorus 2.4 (L) 2.6 - 4.7 MG/DL   HEPATIC FUNCTION PANEL    Collection Time: 07/21/18  3:00 AM   Result Value Ref Range    Protein, total 5.9 (L) 6.4 - 8.2 g/dL    Albumin 2.1 (L) 3.5 - 5.0 g/dL    Globulin 3.8 2.0 - 4.0 g/dL    A-G Ratio 0.6 (L) 1.1 - 2.2      Bilirubin, total 0.4 0.2 - 1.0 MG/DL    Bilirubin, direct 0.2 0.0 - 0.2 MG/DL    Alk.  phosphatase 71 45 - 117 U/L    AST (SGOT) 24 15 - 37 U/L    ALT (SGPT) 28 12 - 78 U/L       Imaging:  I have personally reviewed the patients radiographs and have reviewed the reports:  RLL infiltrate      D/W Nursing    Sheryl Vigil MD

## 2018-07-21 NOTE — PROGRESS NOTES
General Surgery End of Shift Nursing Note    Bedside shift change report given to Danette Rucker (oncoming nurse) by Candice Drake (offgoing nurse). Report included the following information SBAR, Kardex, Intake/Output, MAR, Recent Results and Med Rec Status. Shift worked:   7p -7p    Summary of shift:    Pt needed suction twice yesterday. Received orders for lasix. Pt crackly. Issues for physician to address: Pt not tolerating suction. Need to NPO is he aspirating? He is not clearing phelgm on his own. SBO? Number times ambulated in hallway past shift: 0    Number of times OOB to chair past shift: 1    Pain Management:  Current medication: Dilaudid  Patient states pain is manageable on current pain medication: YES    GI:    Current diet:  DIET ONE TIME MESSAGE  DIET FULL LIQUID  DIET ONE TIME MESSAGE  DIET NUTRITIONAL SUPPLEMENTS No; All Meals; Ensure Enlive  DIET ONE TIME MESSAGE  TPN ADULT - CENTRAL AA 5% D20% W/ CA + ELECTROLYTES    Tolerating current diet: NO  Passing flatus: YES  Last Bowel Movement:   Appearance:    Respiratory:    Incentive Spirometer at bedside: YES  Patient instructed on use: YES    Patient Safety:    Falls Score: 3  Bed Alarm On? No  Sitter?  No    Kimberlee Velasco, RN

## 2018-07-21 NOTE — PROGRESS NOTES
Pharmacy Automatic Renal Dosing Protocol - Antimicrobials    Indication for Antimicrobials: HAP    Current Regimen of Each Antimicrobial:  Piperacillin-tazobactam 3.375 gm IV every 8 hours (Start Date 18; Day # 2 of 7)  Vancomycin 200 mg IV every 12 hours (Start Date 18; Day #2 of 7)     Goal Vancomycin Trough: 15-20 mcg/mL    Vancomycin Level Assessment:  Date Dose & Interval Measured (mcg/mL) Extrapolated (mcg/mL)           Significant Cultures:   18 Respiratory culture = Results pending  18 Blood culture = No growth x 17 hours (Results pending)    Labs:  Recent Labs      18   0300  18   0333  18   0509   CREA  0.48*  0.61*  0.70   BUN  21*  19  16   WBC  17.7*  18.9*  18.5*   Temp (24hrs), Av ° F (36.7 ° C), Min:97.4 ° F (36.3 ° C), Max:98.6 ° F (37 ° C)    Creatinine Clearance (mL/min) or Dialysis: Greater than 60 mL/min    Impression/Plan:   · Piperacillin-tazobactam dose adjusted to 4.5 gm IV every 8 hours for HAP. · Vancomycin dosed appropriately based on indication and renal function. Continue current regimen. Will order a vancomycin trough for prior to the dose due at 0200 on 18. · Antimicrobial stop date: 7 days for both piperacillin-tazobactam and vancomycin     Pharmacy will follow daily and adjust medications as appropriate for renal function and/or serum levels.     Thank you,  Jessica Brown, PHARMD

## 2018-07-22 LAB
ANION GAP SERPL CALC-SCNC: 8 MMOL/L (ref 5–15)
BACTERIA SPEC CULT: NORMAL
BASOPHILS # BLD: 0.1 K/UL (ref 0–0.1)
BASOPHILS NFR BLD: 1 % (ref 0–1)
BUN SERPL-MCNC: 17 MG/DL (ref 6–20)
BUN/CREAT SERPL: 30 (ref 12–20)
CALCIUM SERPL-MCNC: 8 MG/DL (ref 8.5–10.1)
CHLORIDE SERPL-SCNC: 98 MMOL/L (ref 97–108)
CO2 SERPL-SCNC: 28 MMOL/L (ref 21–32)
CREAT SERPL-MCNC: 0.57 MG/DL (ref 0.7–1.3)
DATE LAST DOSE: ABNORMAL
DIFFERENTIAL METHOD BLD: ABNORMAL
EOSINOPHIL # BLD: 0.2 K/UL (ref 0–0.4)
EOSINOPHIL NFR BLD: 2 % (ref 0–7)
ERYTHROCYTE [DISTWIDTH] IN BLOOD BY AUTOMATED COUNT: 23.3 % (ref 11.5–14.5)
GLUCOSE SERPL-MCNC: 145 MG/DL (ref 65–100)
GRAM STN SPEC: NORMAL
HCT VFR BLD AUTO: 23.5 % (ref 36.6–50.3)
HGB BLD-MCNC: 7 G/DL (ref 12.1–17)
IMM GRANULOCYTES # BLD: 0 K/UL (ref 0–0.04)
IMM GRANULOCYTES NFR BLD AUTO: 0 % (ref 0–0.5)
LYMPHOCYTES # BLD: 1.3 K/UL (ref 0.8–3.5)
LYMPHOCYTES NFR BLD: 11 % (ref 12–49)
MAGNESIUM SERPL-MCNC: 2.1 MG/DL (ref 1.6–2.4)
MCH RBC QN AUTO: 25.3 PG (ref 26–34)
MCHC RBC AUTO-ENTMCNC: 29.8 G/DL (ref 30–36.5)
MCV RBC AUTO: 84.8 FL (ref 80–99)
MONOCYTES # BLD: 1.7 K/UL (ref 0–1)
MONOCYTES NFR BLD: 15 % (ref 5–13)
NEUTS SEG # BLD: 8.3 K/UL (ref 1.8–8)
NEUTS SEG NFR BLD: 71 % (ref 32–75)
NRBC # BLD: 0.03 K/UL (ref 0–0.01)
NRBC BLD-RTO: 0.3 PER 100 WBC
PHOSPHATE SERPL-MCNC: 3.1 MG/DL (ref 2.6–4.7)
PLATELET # BLD AUTO: 272 K/UL (ref 150–400)
PMV BLD AUTO: 9.8 FL (ref 8.9–12.9)
POTASSIUM SERPL-SCNC: 3.8 MMOL/L (ref 3.5–5.1)
RBC # BLD AUTO: 2.77 M/UL (ref 4.1–5.7)
RBC MORPH BLD: ABNORMAL
RBC MORPH BLD: ABNORMAL
REPORTED DOSE,DOSE: ABNORMAL UNITS
REPORTED DOSE/TIME,TMG: ABNORMAL
SERVICE CMNT-IMP: NORMAL
SODIUM SERPL-SCNC: 134 MMOL/L (ref 136–145)
VANCOMYCIN TROUGH SERPL-MCNC: 11.2 UG/ML (ref 5–10)
WBC # BLD AUTO: 11.6 K/UL (ref 4.1–11.1)

## 2018-07-22 PROCEDURE — P9016 RBC LEUKOCYTES REDUCED: HCPCS | Performed by: COLON & RECTAL SURGERY

## 2018-07-22 PROCEDURE — 97530 THERAPEUTIC ACTIVITIES: CPT

## 2018-07-22 PROCEDURE — 86923 COMPATIBILITY TEST ELECTRIC: CPT | Performed by: COLON & RECTAL SURGERY

## 2018-07-22 PROCEDURE — C9113 INJ PANTOPRAZOLE SODIUM, VIA: HCPCS | Performed by: HOSPITALIST

## 2018-07-22 PROCEDURE — 74011000250 HC RX REV CODE- 250: Performed by: HOSPITALIST

## 2018-07-22 PROCEDURE — 74011250636 HC RX REV CODE- 250/636: Performed by: EMERGENCY MEDICINE

## 2018-07-22 PROCEDURE — 86900 BLOOD TYPING SEROLOGIC ABO: CPT | Performed by: COLON & RECTAL SURGERY

## 2018-07-22 PROCEDURE — 65270000029 HC RM PRIVATE

## 2018-07-22 PROCEDURE — 80048 BASIC METABOLIC PNL TOTAL CA: CPT | Performed by: COLON & RECTAL SURGERY

## 2018-07-22 PROCEDURE — 83735 ASSAY OF MAGNESIUM: CPT | Performed by: COLON & RECTAL SURGERY

## 2018-07-22 PROCEDURE — 74011000250 HC RX REV CODE- 250: Performed by: INTERNAL MEDICINE

## 2018-07-22 PROCEDURE — 74011000250 HC RX REV CODE- 250: Performed by: EMERGENCY MEDICINE

## 2018-07-22 PROCEDURE — 36415 COLL VENOUS BLD VENIPUNCTURE: CPT | Performed by: COLON & RECTAL SURGERY

## 2018-07-22 PROCEDURE — 97166 OT EVAL MOD COMPLEX 45 MIN: CPT

## 2018-07-22 PROCEDURE — 80202 ASSAY OF VANCOMYCIN: CPT

## 2018-07-22 PROCEDURE — 94640 AIRWAY INHALATION TREATMENT: CPT

## 2018-07-22 PROCEDURE — 85025 COMPLETE CBC W/AUTO DIFF WBC: CPT | Performed by: COLON & RECTAL SURGERY

## 2018-07-22 PROCEDURE — 74011250636 HC RX REV CODE- 250/636: Performed by: HOSPITALIST

## 2018-07-22 PROCEDURE — 84100 ASSAY OF PHOSPHORUS: CPT | Performed by: COLON & RECTAL SURGERY

## 2018-07-22 PROCEDURE — 74011000258 HC RX REV CODE- 258: Performed by: COLON & RECTAL SURGERY

## 2018-07-22 PROCEDURE — 74011000250 HC RX REV CODE- 250: Performed by: COLON & RECTAL SURGERY

## 2018-07-22 PROCEDURE — 74011000258 HC RX REV CODE- 258: Performed by: EMERGENCY MEDICINE

## 2018-07-22 PROCEDURE — 74011250636 HC RX REV CODE- 250/636: Performed by: COLON & RECTAL SURGERY

## 2018-07-22 PROCEDURE — 97535 SELF CARE MNGMENT TRAINING: CPT

## 2018-07-22 PROCEDURE — 36430 TRANSFUSION BLD/BLD COMPNT: CPT

## 2018-07-22 RX ORDER — SODIUM CHLORIDE 9 MG/ML
250 INJECTION, SOLUTION INTRAVENOUS AS NEEDED
Status: DISCONTINUED | OUTPATIENT
Start: 2018-07-22 | End: 2018-08-04 | Stop reason: ALTCHOICE

## 2018-07-22 RX ORDER — FLUCONAZOLE 2 MG/ML
400 INJECTION, SOLUTION INTRAVENOUS DAILY
Status: DISCONTINUED | OUTPATIENT
Start: 2018-07-22 | End: 2018-07-27

## 2018-07-22 RX ORDER — VANCOMYCIN HYDROCHLORIDE
1250 EVERY 8 HOURS
Status: DISCONTINUED | OUTPATIENT
Start: 2018-07-22 | End: 2018-07-24

## 2018-07-22 RX ADMIN — VANCOMYCIN HYDROCHLORIDE 1250 MG: 10 INJECTION, POWDER, LYOPHILIZED, FOR SOLUTION INTRAVENOUS at 13:38

## 2018-07-22 RX ADMIN — SODIUM CHLORIDE 10 ML: 9 INJECTION, SOLUTION INTRAMUSCULAR; INTRAVENOUS; SUBCUTANEOUS at 07:47

## 2018-07-22 RX ADMIN — FLUCONAZOLE 400 MG: 400 INJECTION INTRAVENOUS at 11:17

## 2018-07-22 RX ADMIN — SODIUM CHLORIDE 40 MG: 9 INJECTION INTRAMUSCULAR; INTRAVENOUS; SUBCUTANEOUS at 07:46

## 2018-07-22 RX ADMIN — Medication 10 ML: at 21:59

## 2018-07-22 RX ADMIN — PIPERACILLIN SODIUM,TAZOBACTAM SODIUM 4.5 G: 4; .5 INJECTION, POWDER, FOR SOLUTION INTRAVENOUS at 00:35

## 2018-07-22 RX ADMIN — METOPROLOL TARTRATE 2.5 MG: 1 INJECTION, SOLUTION INTRAVENOUS at 06:16

## 2018-07-22 RX ADMIN — VANCOMYCIN HYDROCHLORIDE 1250 MG: 10 INJECTION, POWDER, LYOPHILIZED, FOR SOLUTION INTRAVENOUS at 21:59

## 2018-07-22 RX ADMIN — IPRATROPIUM BROMIDE AND ALBUTEROL SULFATE 3 ML: .5; 3 SOLUTION RESPIRATORY (INHALATION) at 15:25

## 2018-07-22 RX ADMIN — SODIUM CHLORIDE 40 MG: 9 INJECTION INTRAMUSCULAR; INTRAVENOUS; SUBCUTANEOUS at 21:59

## 2018-07-22 RX ADMIN — HYDROMORPHONE HYDROCHLORIDE 0.5 MG: 1 INJECTION, SOLUTION INTRAMUSCULAR; INTRAVENOUS; SUBCUTANEOUS at 07:45

## 2018-07-22 RX ADMIN — IPRATROPIUM BROMIDE AND ALBUTEROL SULFATE 3 ML: .5; 3 SOLUTION RESPIRATORY (INHALATION) at 11:24

## 2018-07-22 RX ADMIN — HYDROMORPHONE HYDROCHLORIDE 0.5 MG: 1 INJECTION, SOLUTION INTRAMUSCULAR; INTRAVENOUS; SUBCUTANEOUS at 16:09

## 2018-07-22 RX ADMIN — METOPROLOL TARTRATE 2.5 MG: 1 INJECTION, SOLUTION INTRAVENOUS at 18:24

## 2018-07-22 RX ADMIN — Medication 10 ML: at 20:37

## 2018-07-22 RX ADMIN — HYDROMORPHONE HYDROCHLORIDE 0.5 MG: 1 INJECTION, SOLUTION INTRAMUSCULAR; INTRAVENOUS; SUBCUTANEOUS at 20:37

## 2018-07-22 RX ADMIN — PIPERACILLIN SODIUM,TAZOBACTAM SODIUM 4.5 G: 4; .5 INJECTION, POWDER, FOR SOLUTION INTRAVENOUS at 16:02

## 2018-07-22 RX ADMIN — ASCORBIC ACID, VITAMIN A PALMITATE, CHOLECALCIFEROL, THIAMINE HYDROCHLORIDE, RIBOFLAVIN-5 PHOSPHATE SODIUM, PYRIDOXINE HYDROCHLORIDE, NIACINAMIDE, DEXPANTHENOL, ALPHA-TOCOPHEROL ACETATE, VITAMIN K1, FOLIC ACID, BIOTIN, CYANOCOBALAMIN: 200; 3300; 200; 6; 3.6; 6; 40; 15; 10; 150; 600; 60; 5 INJECTION, SOLUTION INTRAVENOUS at 18:19

## 2018-07-22 RX ADMIN — METOPROLOL TARTRATE 2.5 MG: 1 INJECTION, SOLUTION INTRAVENOUS at 00:35

## 2018-07-22 RX ADMIN — HYDROMORPHONE HYDROCHLORIDE 0.5 MG: 1 INJECTION, SOLUTION INTRAMUSCULAR; INTRAVENOUS; SUBCUTANEOUS at 11:58

## 2018-07-22 RX ADMIN — METOPROLOL TARTRATE 2.5 MG: 1 INJECTION, SOLUTION INTRAVENOUS at 10:59

## 2018-07-22 RX ADMIN — SODIUM CHLORIDE 10 ML: 9 INJECTION, SOLUTION INTRAMUSCULAR; INTRAVENOUS; SUBCUTANEOUS at 06:17

## 2018-07-22 RX ADMIN — IPRATROPIUM BROMIDE AND ALBUTEROL SULFATE 3 ML: .5; 3 SOLUTION RESPIRATORY (INHALATION) at 20:31

## 2018-07-22 RX ADMIN — VANCOMYCIN HYDROCHLORIDE 2000 MG: 10 INJECTION, POWDER, LYOPHILIZED, FOR SOLUTION INTRAVENOUS at 03:37

## 2018-07-22 RX ADMIN — IPRATROPIUM BROMIDE AND ALBUTEROL SULFATE 3 ML: .5; 3 SOLUTION RESPIRATORY (INHALATION) at 07:28

## 2018-07-22 RX ADMIN — SODIUM CHLORIDE 10 ML: 9 INJECTION, SOLUTION INTRAMUSCULAR; INTRAVENOUS; SUBCUTANEOUS at 21:59

## 2018-07-22 RX ADMIN — PIPERACILLIN SODIUM,TAZOBACTAM SODIUM 4.5 G: 4; .5 INJECTION, POWDER, FOR SOLUTION INTRAVENOUS at 08:04

## 2018-07-22 NOTE — PROGRESS NOTES
1 unit PRBC's started. Vital signs stable (Hypertensive at times). Patient is A&Ox3, resting in bed.

## 2018-07-22 NOTE — PROGRESS NOTES
Blood bag leaking. Called blood bank, will start another transfusion. Patient is A&Ox3, vital signs stable.

## 2018-07-22 NOTE — PROGRESS NOTES
General Surgery End of Shift Nursing Note    Bedside shift change report given to Willian Hernandez (oncoming nurse) by Coleen Castellano (offgoing nurse). Report included the following information SBAR, Kardex, Intake/Output, MAR and Recent Results. Shift worked:   C   Summary of shift:    0   Issues for physician to address:   0     Number times ambulated in hallway past shift: 0    Number of times OOB to chair past shift: 0    Pain Management:  Current medication: Dilaudid  Patient states pain is manageable on current pain medication: YES    GI:    Current diet:  DIET ONE TIME MESSAGE  DIET ONE TIME MESSAGE  DIET ONE TIME MESSAGE  DIET NPO  TPN ADULT - CENTRAL AA 5% D20% W/ CA + ELECTROLYTES    Tolerating current diet: YES  Passing flatus: YES  Last Bowel Movement: several days ago   Appearance: 0    Respiratory:    Incentive Spirometer at bedside: YES  Patient instructed on use: YES    Patient Safety:    Falls Score: 1  Bed Alarm On? No  Sitter?  Yes    Antonio Ribeiro RN

## 2018-07-22 NOTE — PROGRESS NOTES
Transfusion started. Patient only getting 1 unit of blood. 1st bag (leaking). Obtained a new unit of blood (transfusion infusing). Hemoglobin 7.0. Vital signs stable.

## 2018-07-22 NOTE — PROGRESS NOTES
Pharmacy Automatic Renal Dosing Protocol - Antimicrobials    Indication for Antimicrobials: HAP (Aspiration)    Current Regimen of Each Antimicrobial:  Fluconazole 400 mg IV every 24 hours (Started Date 18; Day #1)  Piperacillin-tazobactam 4.5 gm IV every 8 hours (Start Date 18; Day #3 of 7)  Vancomycin 2000 mg IV every 12 hours (Start Date 18; Day #3 of 7)     Goal Vancomycin Trough: 15-20 mcg/mL    Vancomycin Level Assessment:  Date Dose & Interval Measured (mcg/mL) Extrapolated (mcg/mL)   18 at 0316 2000 mg IV every 12 hours 11.2 mcg/mL 11.5 mcg/mL     Significant Cultures:   18 Respiratory culture = Normal luiza (FINAL)  18 Blood culture = No growth x 2 days (Results pending)    Labs:  Recent Labs      18   0316  18   0300  18   0333   CREA  0.57*  0.48*  0.61*   BUN  17  21*  19   WBC  11.6*  17.7*  18.9*   Temp (24hrs), Av.1 ° F (36.7 ° C), Min:97.8 ° F (36.6 ° C), Max:98.3 ° F (36.8 ° C)    Creatinine Clearance (mL/min) or Dialysis: Greater than 60 mL/min    Impression/Plan:   · Fluconazole dosed appropriately based on indication and renal function. Continue current regimen. · Piperacillin-tazobactam dosed appropriately based on indication and renal function. Continue current regimen. · Vancomycin trough is below goal for indication. Vancomycin dose adjusted to 1250 mg IV every 8 hours. Will monitor troughs closely. · Antimicrobial stop date: 7 day stop date entered for both piperacillin-tazobactam and vancomycin. No stop date placed for fluconazole. · Since no Gram-positive or MRSA growth, consider discontinuation of vancomycin if no other source of infection besides lungs. · Since yeast was only on Gram stain, consider discontinuation of fluconazole if no other source of infection identified. Pharmacy will follow daily and adjust medications as appropriate for renal function and/or serum levels.     Thank you,  Deep Solis, MIRLANDED
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
## 2018-07-22 NOTE — PROGRESS NOTES
CRS  Pt without complaints. dobhoff placed by IR as a surrogate NGT  Flowsheet, labs reviewed  Abd: soft, nt  R harvey: serous  L drain: serous  Colostomy: pink, flat    Plan:  dobhoff drained 3500mL yesterday. Appreciate IR's help! Irrigate dobhoff frequently to make sure stays patent. Should function decently as an ngt. Renew tpn. High aspiration risk. Check CXR tomorrow. Sputum gram stain with yeast. Immunocompromised pt, will add diflucan to coverage.  Intense pulm toilet and suctioning

## 2018-07-22 NOTE — PROGRESS NOTES
Problem: Pain - Acute  Goal: *Pain is controlled to three or less  Outcome: Progressing Towards Goal  Assess pain Q4h & prn, medicating with 0.5 mg IV Dilaudid Q4h. Rating pain \"5\" out of \"10\" on scale of \"0-10\".

## 2018-07-22 NOTE — PROGRESS NOTES
Pharmacy Recommendation - Antimicrobial Deescalation    Indication for Antimicrobials: HAP (Aspiration)    Current Regimen of Each Antimicrobial:  Fluconazole 400 mg IV every 24 hours (Started Date 7/22/18; Day #1)  Piperacillin-tazobactam 4.5 gm IV every 8 hours (Start Date 7/20/18; Day #3 of 7)  Vancomycin 2000 mg IV every 12 hours (Start Date 7/20/18; Day #3 of 7)     Significant Cultures:   7/20/18 Respiratory culture = Normal luiza (FINAL)  7/20/18 Blood culture = No growth x 2 days (Results pending)    Impression/Plan:   · Since no Gram-positive or MRSA growth, consider discontinuation of vancomycin if no other source of infection besides lungs. · Since yeast was only on Gram stain, consider discontinuation of fluconazole if no other source of infection identified.      Thanks,  Marie Su, PHARMD

## 2018-07-22 NOTE — PROGRESS NOTES
\"Dr. Allie Edmonds called back--she will update primary team regarding low 7.0 and 23.5 H&H for consideration of blood transfusion as pt presents as asymptomatic.

## 2018-07-22 NOTE — PROGRESS NOTES
Problem: Falls - Risk of  Goal: *Absence of Falls  Document Landen Fall Risk and appropriate interventions in the flowsheet. Outcome: Progressing Towards Goal  Fall Risk Interventions:  Mobility Interventions: Communicate number of staff needed for ambulation/transfer, Mechanical lift    Mentation Interventions: Adequate sleep, hydration, pain control, Evaluate medications/consider consulting pharmacy, More frequent rounding, Reorient patient, Toileting rounds    Medication Interventions: Evaluate medications/consider consulting pharmacy    Elimination Interventions: Toileting schedule/hourly rounds             Problem: Pressure Injury - Risk of  Goal: *Prevention of pressure injury  Document Dagoberto Scale and appropriate interventions in the flowsheet. Outcome: Progressing Towards Goal  Pressure Injury Interventions:  Sensory Interventions: Assess changes in LOC, Minimize linen layers, Monitor skin under medical devices, Turn and reposition approx. every two hours (pillows and wedges if needed)    Moisture Interventions: Absorbent underpads, Internal/External urinary devices, Limit adult briefs, Minimize layers, Offer toileting Q_hr    Activity Interventions: Assess need for specialty bed    Mobility Interventions: Assess need for specialty bed, HOB 30 degrees or less, Turn and reposition approx. every two hours(pillow and wedges)    Nutrition Interventions: Document food/fluid/supplement intake    Friction and Shear Interventions: HOB 30 degrees or less, Lift sheet, Minimize layers               Problem: Anemia Care Plan (Adult and Pediatric)  Goal: *Labs within defined limits  Outcome: Not Progressing Towards Goal  H&H for 07/22/18 @ 0317 was 7.0 and 23.5--gradual decline noted over past several days. Pt has received a total of 3 units PRBC (per my review of chart) during this admission.   Goal: *Tolerates increased activity  Outcome: Progressing Towards Goal  Pt turned approximately every 2 hours overnight; seems to tolerate well. Pt tolerating gross manipulation/passive range of motion of L side. Comments: Per cardiac telemetry, patient has been atrial paced with an underlying atrial fib.      Vancomycin trough sent

## 2018-07-22 NOTE — PROGRESS NOTES
Type & cross sent to blood bank. Will transfuse 1 unit PRBC's when ready. Earlier today patient had PT, OT. Family in visiting patient. Patient was in [place] lot of pain\" earlier. Family asked nurse to [facility] back\" for blood. Family also refused bath for patient earlier. May offer later today to clean patient.
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
## 2018-07-22 NOTE — PROGRESS NOTES
Pharmacy Automatic Renal Dosing Protocol - Antimicrobials    Indication for Antimicrobials: HAP    Current Regimen of Each Antimicrobial:  Piperacillin-tazobactam 3.375 gm IV every 8 hours - started  day 3 of 7ancomycin 200 mg IV every 12 hours - started  day 3 of 7  Fluconazole 400mg IV q24h - started  day 1    Goal Vancomycin Trough: 15-20 mcg/mL    Significant Cultures:   18 Respiratory culture = Results pending  18 Blood culture = ng - pending    Labs:  Recent Labs      18   0316  18   0300  18   0333   CREA  0.57*  0.48*  0.61*   BUN  17  21*  19   WBC  11.6*  17.7*  18.9*   Temp (24hrs), Av.1 °F (36.7 °C), Min:97.8 °F (36.6 °C), Max:98.3 °F (36.8 °C)    Creatinine Clearance (mL/min) or Dialysis:   > 100mL/min    Impression/Plan:   · Afebrile, Leukocytosis improved, SCr stable, Cx pending as noted  ·  Fluconazole 400mg IV q24h added by Colon/Rectal Surgery service for suspected Upper Respiratory Infection with stop date pending  · Antimicrobial stop date: 7 days for both piperacillin-tazobactam and vancomycin  · NOTE:  Vancomycin level result for  pending  ·      Pharmacy will follow daily and adjust medications as appropriate for renal function and/or serum levels.     Thank you,  Irene Kumar, [facility]

## 2018-07-22 NOTE — ROUTINE PROCESS
Dubhoff NGT flushed with air and approximately 30 cc H2O x 2 events overnight. Marginal output noted in canister from dayshift.

## 2018-07-23 ENCOUNTER — APPOINTMENT (OUTPATIENT)
Dept: GENERAL RADIOLOGY | Age: 78
DRG: 329 | End: 2018-07-23
Attending: COLON & RECTAL SURGERY
Payer: MEDICARE

## 2018-07-23 ENCOUNTER — TELEPHONE (OUTPATIENT)
Dept: CARDIOLOGY CLINIC | Age: 78
End: 2018-07-23

## 2018-07-23 ENCOUNTER — DOCUMENTATION ONLY (OUTPATIENT)
Dept: FAMILY MEDICINE CLINIC | Age: 78
End: 2018-07-23

## 2018-07-23 LAB
ABO + RH BLD: NORMAL
ANION GAP SERPL CALC-SCNC: 7 MMOL/L (ref 5–15)
BASOPHILS # BLD: 0 K/UL (ref 0–0.1)
BASOPHILS NFR BLD: 0 % (ref 0–1)
BLD PROD TYP BPU: NORMAL
BLD PROD TYP BPU: NORMAL
BLOOD GROUP ANTIBODIES SERPL: NORMAL
BPU ID: NORMAL
BPU ID: NORMAL
BUN SERPL-MCNC: 15 MG/DL (ref 6–20)
BUN/CREAT SERPL: 28 (ref 12–20)
CALCIUM SERPL-MCNC: 8.3 MG/DL (ref 8.5–10.1)
CHLORIDE SERPL-SCNC: 101 MMOL/L (ref 97–108)
CO2 SERPL-SCNC: 26 MMOL/L (ref 21–32)
CREAT SERPL-MCNC: 0.54 MG/DL (ref 0.7–1.3)
CROSSMATCH RESULT,%XM: NORMAL
CROSSMATCH RESULT,%XM: NORMAL
DIFFERENTIAL METHOD BLD: ABNORMAL
EOSINOPHIL # BLD: 0.4 K/UL (ref 0–0.4)
EOSINOPHIL NFR BLD: 3 % (ref 0–7)
ERYTHROCYTE [DISTWIDTH] IN BLOOD BY AUTOMATED COUNT: 22.1 % (ref 11.5–14.5)
GLUCOSE SERPL-MCNC: 129 MG/DL (ref 65–100)
HCT VFR BLD AUTO: 26.6 % (ref 36.6–50.3)
HGB BLD-MCNC: 8.3 G/DL (ref 12.1–17)
IMM GRANULOCYTES # BLD: 0 K/UL (ref 0–0.04)
IMM GRANULOCYTES NFR BLD AUTO: 0 % (ref 0–0.5)
LYMPHOCYTES # BLD: 1.6 K/UL (ref 0.8–3.5)
LYMPHOCYTES NFR BLD: 12 % (ref 12–49)
MCH RBC QN AUTO: 26.3 PG (ref 26–34)
MCHC RBC AUTO-ENTMCNC: 31.2 G/DL (ref 30–36.5)
MCV RBC AUTO: 84.4 FL (ref 80–99)
METAMYELOCYTES NFR BLD MANUAL: 2 %
MONOCYTES # BLD: 0.8 K/UL (ref 0–1)
MONOCYTES NFR BLD: 6 % (ref 5–13)
MYELOCYTES NFR BLD MANUAL: 2 %
NEUTS BAND NFR BLD MANUAL: 2 %
NEUTS SEG # BLD: 10 K/UL (ref 1.8–8)
NEUTS SEG NFR BLD: 73 % (ref 32–75)
NRBC # BLD: 0.06 K/UL (ref 0–0.01)
NRBC BLD-RTO: 0.4 PER 100 WBC
PLATELET # BLD AUTO: 297 K/UL (ref 150–400)
PMV BLD AUTO: 9.9 FL (ref 8.9–12.9)
POTASSIUM SERPL-SCNC: 4.1 MMOL/L (ref 3.5–5.1)
RBC # BLD AUTO: 3.15 M/UL (ref 4.1–5.7)
RBC MORPH BLD: ABNORMAL
RBC MORPH BLD: ABNORMAL
SODIUM SERPL-SCNC: 134 MMOL/L (ref 136–145)
SPECIMEN EXP DATE BLD: NORMAL
STATUS OF UNIT,%ST: NORMAL
STATUS OF UNIT,%ST: NORMAL
UNIT DIVISION, %UDIV: 0
UNIT DIVISION, %UDIV: 0
WBC # BLD AUTO: 13.3 K/UL (ref 4.1–11.1)

## 2018-07-23 PROCEDURE — 94760 N-INVAS EAR/PLS OXIMETRY 1: CPT

## 2018-07-23 PROCEDURE — 74011000250 HC RX REV CODE- 250: Performed by: EMERGENCY MEDICINE

## 2018-07-23 PROCEDURE — 80048 BASIC METABOLIC PNL TOTAL CA: CPT | Performed by: COLON & RECTAL SURGERY

## 2018-07-23 PROCEDURE — 36415 COLL VENOUS BLD VENIPUNCTURE: CPT | Performed by: COLON & RECTAL SURGERY

## 2018-07-23 PROCEDURE — 74011250636 HC RX REV CODE- 250/636: Performed by: HOSPITALIST

## 2018-07-23 PROCEDURE — 74011000250 HC RX REV CODE- 250: Performed by: INTERNAL MEDICINE

## 2018-07-23 PROCEDURE — 74011000250 HC RX REV CODE- 250: Performed by: HOSPITALIST

## 2018-07-23 PROCEDURE — 97530 THERAPEUTIC ACTIVITIES: CPT

## 2018-07-23 PROCEDURE — 71045 X-RAY EXAM CHEST 1 VIEW: CPT

## 2018-07-23 PROCEDURE — 74011250636 HC RX REV CODE- 250/636

## 2018-07-23 PROCEDURE — 94640 AIRWAY INHALATION TREATMENT: CPT

## 2018-07-23 PROCEDURE — C9113 INJ PANTOPRAZOLE SODIUM, VIA: HCPCS | Performed by: HOSPITALIST

## 2018-07-23 PROCEDURE — 74011000258 HC RX REV CODE- 258: Performed by: EMERGENCY MEDICINE

## 2018-07-23 PROCEDURE — 65270000029 HC RM PRIVATE

## 2018-07-23 PROCEDURE — 74011250636 HC RX REV CODE- 250/636: Performed by: COLON & RECTAL SURGERY

## 2018-07-23 PROCEDURE — 74011000250 HC RX REV CODE- 250: Performed by: SURGERY

## 2018-07-23 PROCEDURE — 74011250636 HC RX REV CODE- 250/636: Performed by: INTERNAL MEDICINE

## 2018-07-23 PROCEDURE — 74011250636 HC RX REV CODE- 250/636: Performed by: EMERGENCY MEDICINE

## 2018-07-23 PROCEDURE — 74011000258 HC RX REV CODE- 258: Performed by: SURGERY

## 2018-07-23 PROCEDURE — 74011250637 HC RX REV CODE- 250/637: Performed by: SURGERY

## 2018-07-23 PROCEDURE — 85025 COMPLETE CBC W/AUTO DIFF WBC: CPT | Performed by: COLON & RECTAL SURGERY

## 2018-07-23 RX ORDER — HEPARIN 100 UNIT/ML
SYRINGE INTRAVENOUS
Status: COMPLETED
Start: 2018-07-23 | End: 2018-07-23

## 2018-07-23 RX ORDER — LEVETIRACETAM 5 MG/ML
500 INJECTION INTRAVASCULAR EVERY 12 HOURS
Status: DISCONTINUED | OUTPATIENT
Start: 2018-07-23 | End: 2018-08-03

## 2018-07-23 RX ADMIN — METOPROLOL TARTRATE 2.5 MG: 1 INJECTION, SOLUTION INTRAVENOUS at 13:18

## 2018-07-23 RX ADMIN — PIPERACILLIN SODIUM,TAZOBACTAM SODIUM 4.5 G: 4; .5 INJECTION, POWDER, FOR SOLUTION INTRAVENOUS at 00:19

## 2018-07-23 RX ADMIN — Medication 10 ML: at 20:01

## 2018-07-23 RX ADMIN — IPRATROPIUM BROMIDE AND ALBUTEROL SULFATE 3 ML: .5; 3 SOLUTION RESPIRATORY (INHALATION) at 15:37

## 2018-07-23 RX ADMIN — HYDROMORPHONE HYDROCHLORIDE 0.5 MG: 1 INJECTION, SOLUTION INTRAMUSCULAR; INTRAVENOUS; SUBCUTANEOUS at 08:15

## 2018-07-23 RX ADMIN — VANCOMYCIN HYDROCHLORIDE 1250 MG: 10 INJECTION, POWDER, LYOPHILIZED, FOR SOLUTION INTRAVENOUS at 14:52

## 2018-07-23 RX ADMIN — LEVETIRACETAM 500 MG: 5 INJECTION INTRAVENOUS at 16:29

## 2018-07-23 RX ADMIN — HYDROMORPHONE HYDROCHLORIDE 0.5 MG: 1 INJECTION, SOLUTION INTRAMUSCULAR; INTRAVENOUS; SUBCUTANEOUS at 00:19

## 2018-07-23 RX ADMIN — SODIUM CHLORIDE 10 ML: 9 INJECTION, SOLUTION INTRAMUSCULAR; INTRAVENOUS; SUBCUTANEOUS at 06:46

## 2018-07-23 RX ADMIN — OXYCODONE HYDROCHLORIDE 10 MG: 5 TABLET ORAL at 01:19

## 2018-07-23 RX ADMIN — IPRATROPIUM BROMIDE AND ALBUTEROL SULFATE 3 ML: .5; 3 SOLUTION RESPIRATORY (INHALATION) at 08:50

## 2018-07-23 RX ADMIN — VANCOMYCIN HYDROCHLORIDE 1250 MG: 10 INJECTION, POWDER, LYOPHILIZED, FOR SOLUTION INTRAVENOUS at 06:31

## 2018-07-23 RX ADMIN — IPRATROPIUM BROMIDE AND ALBUTEROL SULFATE 3 ML: .5; 3 SOLUTION RESPIRATORY (INHALATION) at 21:00

## 2018-07-23 RX ADMIN — VANCOMYCIN HYDROCHLORIDE 1250 MG: 10 INJECTION, POWDER, LYOPHILIZED, FOR SOLUTION INTRAVENOUS at 22:00

## 2018-07-23 RX ADMIN — PIPERACILLIN SODIUM,TAZOBACTAM SODIUM 4.5 G: 4; .5 INJECTION, POWDER, FOR SOLUTION INTRAVENOUS at 10:50

## 2018-07-23 RX ADMIN — SODIUM CHLORIDE 10 ML: 9 INJECTION, SOLUTION INTRAMUSCULAR; INTRAVENOUS; SUBCUTANEOUS at 13:18

## 2018-07-23 RX ADMIN — FLUCONAZOLE 400 MG: 400 INJECTION INTRAVENOUS at 08:15

## 2018-07-23 RX ADMIN — Medication 10 ML: at 16:33

## 2018-07-23 RX ADMIN — PIPERACILLIN SODIUM,TAZOBACTAM SODIUM 4.5 G: 4; .5 INJECTION, POWDER, FOR SOLUTION INTRAVENOUS at 23:18

## 2018-07-23 RX ADMIN — HYDROMORPHONE HYDROCHLORIDE 0.5 MG: 1 INJECTION, SOLUTION INTRAMUSCULAR; INTRAVENOUS; SUBCUTANEOUS at 13:30

## 2018-07-23 RX ADMIN — METOPROLOL TARTRATE 2.5 MG: 1 INJECTION, SOLUTION INTRAVENOUS at 02:05

## 2018-07-23 RX ADMIN — SODIUM CHLORIDE 40 MG: 9 INJECTION INTRAMUSCULAR; INTRAVENOUS; SUBCUTANEOUS at 20:01

## 2018-07-23 RX ADMIN — PIPERACILLIN SODIUM,TAZOBACTAM SODIUM 4.5 G: 4; .5 INJECTION, POWDER, FOR SOLUTION INTRAVENOUS at 17:58

## 2018-07-23 RX ADMIN — METOPROLOL TARTRATE 2.5 MG: 1 INJECTION, SOLUTION INTRAVENOUS at 00:19

## 2018-07-23 RX ADMIN — HYDROMORPHONE HYDROCHLORIDE 0.5 MG: 1 INJECTION, SOLUTION INTRAMUSCULAR; INTRAVENOUS; SUBCUTANEOUS at 19:33

## 2018-07-23 RX ADMIN — ASCORBIC ACID, VITAMIN A PALMITATE, CHOLECALCIFEROL, THIAMINE HYDROCHLORIDE, RIBOFLAVIN-5 PHOSPHATE SODIUM, PYRIDOXINE HYDROCHLORIDE, NIACINAMIDE, DEXPANTHENOL, ALPHA-TOCOPHEROL ACETATE, VITAMIN K1, FOLIC ACID, BIOTIN, CYANOCOBALAMIN: 200; 3300; 200; 6; 3.6; 6; 40; 15; 10; 150; 600; 60; 5 INJECTION, SOLUTION INTRAVENOUS at 19:40

## 2018-07-23 RX ADMIN — Medication 500 UNITS: at 13:18

## 2018-07-23 RX ADMIN — SODIUM CHLORIDE 40 MG: 9 INJECTION INTRAMUSCULAR; INTRAVENOUS; SUBCUTANEOUS at 08:15

## 2018-07-23 RX ADMIN — METOPROLOL TARTRATE 2.5 MG: 1 INJECTION, SOLUTION INTRAVENOUS at 19:33

## 2018-07-23 RX ADMIN — IPRATROPIUM BROMIDE AND ALBUTEROL SULFATE 3 ML: .5; 3 SOLUTION RESPIRATORY (INHALATION) at 11:37

## 2018-07-23 RX ADMIN — METOPROLOL TARTRATE 2.5 MG: 1 INJECTION, SOLUTION INTRAVENOUS at 06:46

## 2018-07-23 RX ADMIN — HYDROMORPHONE HYDROCHLORIDE 0.5 MG: 1 INJECTION, SOLUTION INTRAMUSCULAR; INTRAVENOUS; SUBCUTANEOUS at 04:16

## 2018-07-23 NOTE — PROGRESS NOTES
Occupational Therapy Goals  Initiated 7/22/2018  1. Patient will perform one grooming task seated EOB with minimal assistance within 7 day(s). 2. Patient will perform upper body dressing with moderate assistance within 7 day(s). 3. Patient will perform supine <> sit to participate in ADL tasks decrease caregiver burdenwith maximal assistance within 7 day(s). 4. Patient will perform sit <> stand to prepare for self care transfers with maximal assistance within 7 day(s). 5. Patient will participate in R AROM and L self PROM upper extremity therapeutic exercise/activities with contact guard assist for 8 minutes within 7 day(s). 6. Patient will utilize energy conservation techniques during functional activities with verbal cues within 7 day(s). Occupational Therapy TREATMENT  Patient: Guillaume Pompa (14 y.o. male)  Date: 7/23/2018  Diagnosis: Acute blood loss anemia  GI bleed  Anasarca  GI Bleed  gi bleed  ASCENDING COLON CANCER  GI bleed  Procedure(s) (LRB):  LAPAROSCOPIC  ASSISTED TO OPEN RIGHT COLECTOMY AND SMALL BOWEL RESECTION (Right) 6 Days Post-Op  Precautions:    Chart, occupational therapy assessment, plan of care, and goals were reviewed. ASSESSMENT:  Patient continues to demonstrate decreased activity tolerance, SOB, anxiety and increased tone due to anxiety of L UE and LE. Pt with slightly improved sitting balance today, however requires A of R UE for sitting support. Completed 2 sit to stand with mod-max A x 2 with increased tone L LE into extension with second stand transitioning into SPT to chair with max A x 2. Pt required verbal cues to slow breathing down as pt BECERRIL although SpO2 >90% on RA. Completed sit to stand from chair with mod-max A x 2 for 30 second total A standing bowel hygiene. Given pt's decline in functional independence, would benefit from IP rehab to increase strength, balance and decrease caregiver burden.      Progression toward goals:  []       Improving appropriately and progressing toward goals  [x]       Improving slowly and progressing toward goals  []       Not making progress toward goals and plan of care will be adjusted     PLAN:  Patient continues to benefit from skilled intervention to address the above impairments. Continue treatment per established plan of care. Discharge Recommendations:  Inpatient Rehab  Further Equipment Recommendations for Discharge:  TBD     SUBJECTIVE:   Patient stated I get nervous.     OBJECTIVE DATA SUMMARY:   Cognitive/Behavioral Status:  Neurologic State: Alert (intermittent confusion)  Orientation Level: Oriented X4  Cognition: Follows commands; Appropriate for age attention/concentration             Functional Mobility and Transfers for ADLs:  Bed Mobility:  Supine to Sit: Additional time;Maximum assistance;Assist x2    Transfers:  Sit to Stand: Assist x2; Additional time;Maximum assistance x 2 reps from elevated bed height, 1 from chair for standing hygiene x 30 seconds     Bed to Chair: Assist x2; Additional time;Maximum assistance (SPT to chair on R)    Balance:  Sitting: Impaired; Without support  Sitting - Static: Fair (occasional); Prop sitting  Standing: Impaired; With support  Standing - Static: Constant support;Poor  Standing - Dynamic : Poor    ADL Intervention:                           Lower Body Dressing Assistance: performed supine, wife does this at baseline for patient   Socks: Total assistance (dependent)  Shoes with Cloth Laces: Total assistance (dependent)    Toileting  Bowel Hygiene: Total assistance (dependent) standing from chair x 30 seconds         Neuro Re-Education:    increased tone of L UE and LE during transition and transfer. Attempted passive stretch of L hand. Wife reported increased tone at home with anxiety but does stretch daily.    Pain:  Pain Scale 1: Numeric (0 - 10)  Pain Intensity 1: 7  Pain Location 1: Abdomen        Pain Intervention(s) 1: Medication (see MAR)  Activity Tolerance:   VSS- increased BECERRIL  Please refer to the flowsheet for vital signs taken during this treatment.   After treatment:   [x] Patient left in no apparent distress sitting up in chair  [] Patient left in no apparent distress in bed  [x] Call bell left within reach  [x] Nursing notified  [x] Caregiver present  [] Bed alarm activated    COMMUNICATION/COLLABORATION:   The patients plan of care was discussed with: Physical Therapy Assistant and Registered Nurse    Adelaide Lundberg OT  Time Calculation: 25 mins

## 2018-07-23 NOTE — PROGRESS NOTES
Physician notes state that cardiology is following but they are not consulted and there are no notes from cardiology. Will discuss with attending on rounds.

## 2018-07-23 NOTE — PROGRESS NOTES
Problem: Mobility Impaired (Adult and Pediatric)  Goal: *Acute Goals and Plan of Care (Insert Text)  Physical Therapy Goals  Reviewed and revised 7/19/2018  1. Patient will move from supine to sit and sit to supine , scoot up and down and roll side to side in bed with minimal assistance within 7 day(s). 2.  Patient will transfer from bed to chair and chair to bed with moderate assistance using the least restrictive device within 7 day(s). 3.  Patient will perform sit to stand with minimal assistance within 7 day(s). 4.  Patient will perform stand pivot transfer to wheelchair with minimal assistance in 7 days. Initiated 7/12/2018  1. Patient will move from supine to sit and sit to supine , scoot up and down and roll side to side in bed with modified independence within 7 day(s). 2.  Patient will transfer from bed to chair and chair to bed with supervision/set-up using the least restrictive device within 7 day(s). 3.  Patient will perform sit to stand with supervision/set-up within 7 day(s). 4.  Patient will perform stand pivot transfer to wheelchair with modified independence in 7 days. physical Therapy TREATMENT  Patient: Dario Mijares (76 y.o. male)  Date: 7/23/2018  Diagnosis: Acute blood loss anemia  GI bleed  Anasarca  GI Bleed  gi bleed  ASCENDING COLON CANCER  GI bleed  Procedure(s) (LRB):  LAPAROSCOPIC  ASSISTED TO OPEN RIGHT COLECTOMY AND SMALL BOWEL RESECTION (Right) 6 Days Post-Op  Precautions:    Chart, physical therapy assessment, plan of care and goals were reviewed. ASSESSMENT:  Pt cleared by nurse to mobilize. Pt received in bed supine Pt agreeable to getting to chair. Pt performed supine to sit at max A x2 with additional time. With time pt able to sit EOB supported with RUE. Pt performed x2 sit to stand tranfers at max A x2. Pt able to stand x20 seconds on first stand and x30 seconds on second stand. Pt performed stand pivot transfer for bed to chair at max A x2.  Pt with increased tone in LLE with standing. Pt with increased pain but with improved activity tolerance today. Pts had SOB and anxiety with transfer. Pt left in chair with stephy pad under to get back to bed safely. Pt will benefit from  IP rehab to improve strength and safe transfers. Progression toward goals:  []    Improving appropriately and progressing toward goals  [x]    Improving slowly and progressing toward goals  []    Not making progress toward goals and plan of care will be adjusted     PLAN:  Patient continues to benefit from skilled intervention to address the above impairments. Continue treatment per established plan of care. Discharge Recommendations:  Inpatient Rehab  Further Equipment Recommendations for Discharge:  TBD by rehab      SUBJECTIVE:   Patient stated I get nervous when I start moving here.     OBJECTIVE DATA SUMMARY:   Critical Behavior:  Neurologic State: Alert (intermittent confusion)  Orientation Level: Oriented X4  Cognition: Follows commands, Appropriate for age attention/concentration     Functional Mobility Training:  Bed Mobility:     Supine to Sit: Additional time;Maximum assistance;Assist x2              Transfers:  Sit to Stand: Assist x2; Additional time;Maximum assistance           Bed to Chair: Assist x2; Additional time;Maximum assistance (SPT to chair on R)                    Balance:  Sitting: Impaired; Without support  Sitting - Static: Fair (occasional); Prop sitting  Standing: Impaired; With support  Standing - Static: Constant support;Poor  Standing - Dynamic : Poor  Pain:  Pain Scale 1: Numeric (0 - 10)  Pain Intensity 1: 7  Pain Location 1: Abdomen        Pain Intervention(s) 1: Medication (see MAR)  Activity Tolerance:   Pt with improved stands and transfer. Pt with increased SOB and anxiety  with mobility.    After treatment:   [x]    Patient left in no apparent distress sitting up in chair  []    Patient left in no apparent distress in bed  [x]    Call bell left within reach  [x]    Nursing notified  [x]    Caregiver present  []    Bed alarm activated    COMMUNICATION/COLLABORATION:   The patients plan of care was discussed with: Registered Nurse    Jaz Madrigal   Time Calculation: 26 mins

## 2018-07-23 NOTE — PROGRESS NOTES
General Surgery End of Shift Nursing Note      Shift worked: 7p-7a   Summary of shift:   Pt received 1unit PRBC with no adverse reaction. H/H this am improved. Pt c/o abd pain overnight, not well managed with dilaudid ivp. Lungs sound wet, rhonchorous. Pt has very weak cough, cough and deep breath encouraged, I/S encouraged. NPO, NGT with scant bilious output. See flowsheet for MADELEINE output. Erazo draining adeq amt clear, yellow urine. Issues for physician to address:  Respiratory, pain. Pain Management:  Current medication: Dilaudid 0.5mg ivp Q4hrs PRN. Patient states pain is manageable on current pain medication: NO    GI:  Current diet:  NPO    Tolerating current diet: YES  Passing flatus: NO  Last Bowel Movement: several days ago  No gas or output from colostomy.     Respiratory:  Incentive Spirometer at bedside: YES  Patient instructed on use: YES        Ani Bentley RN

## 2018-07-23 NOTE — PROGRESS NOTES
Nutrition Assessment:    RECOMMENDATIONS:   TPN recommendations:   Increase to D20, 5% AA @ 100mL/h (provides 2112kcals/120gPro)     ASSESSMENT:   Chart reviewed. Pt has a postop ileus. Developed vomiting and an NGT couldn't be placed, thus a Dobbhoff was placed by IR. He is NPO. Dobbhoff OP has slowed down. TPN meets 70% kcal and protein needs. See TPN recommendations above. Labs reviewed, WNL. No colostomy OP noted. TPN recommendations will meet 93% kcal and protein needs. Dietitians Intervention(s)/Plan(s): TPN recommendations  SUBJECTIVE/OBJECTIVE:     Diet Order: NPO, Other (comment) (TPN: D20, 5% AA @ 75mL/h (provides 1584kcals/90gPro) )  % Eaten:  Patient Vitals for the past 72 hrs:   % Diet Eaten   07/23/18 0925 0 %   07/20/18 1859 0 %       at   flush with       via Other (comment) (Dobbhoff to suction)   Residuals: 425 mL    Pertinent Medications:dilfucan, protonix, zosyn, vancomycin. Chemistries:  Lab Results   Component Value Date/Time    Sodium 134 (L) 07/23/2018 12:39 AM    Potassium 4.1 07/23/2018 12:39 AM    Chloride 101 07/23/2018 12:39 AM    CO2 26 07/23/2018 12:39 AM    Anion gap 7 07/23/2018 12:39 AM    Glucose 129 (H) 07/23/2018 12:39 AM    BUN 15 07/23/2018 12:39 AM    Creatinine 0.54 (L) 07/23/2018 12:39 AM    BUN/Creatinine ratio 28 (H) 07/23/2018 12:39 AM    GFR est AA >60 07/23/2018 12:39 AM    GFR est non-AA >60 07/23/2018 12:39 AM    Calcium 8.3 (L) 07/23/2018 12:39 AM    Albumin 2.1 (L) 07/21/2018 03:00 AM      Anthropometrics: Height: 6' 2\" (188 cm) Weight: 129.5 kg (285 lb 7.9 oz)   []bed scale    []stated   []unknown     IBW (%IBW):   ( ) UBW (%UBW):   (  %)    BMI: Body mass index is 36.66 kg/(m^2). This BMI is indicative of:  []Underweight   []Normal   []Overweight   [x] Obesity   [] Extreme Obesity (BMI>40)  Estimated Nutrition Needs (Based on): 2275 Kcals/day (BMR: 2075 x 1.1) , 130 g (1 g/kg) Protein  Carbohydrate:  At Least 130 g/day  Fluids: 2200 mL/day    Last BM: colostomy-no OP   []Active     []Hyperactive  []Hypoactive       [] Absent   BS  Skin:    [] Intact   [x] Incision  [] Breakdown   [] DTI   [] Tears/Excoriation/Abrasion  [x]Edema(+3 anasarca; +1-BLE, nonpitting-BUE) [] Other: Wt Readings from Last 30 Encounters:   07/23/18 129.5 kg (285 lb 7.9 oz)   07/05/18 128.5 kg (283 lb 4.8 oz)   05/15/18 120.7 kg (266 lb)   05/07/18 118.8 kg (262 lb)   04/10/18 121.1 kg (267 lb)   03/26/18 121.1 kg (267 lb)   03/16/18 121.1 kg (267 lb)   09/21/17 118.4 kg (261 lb)   08/03/17 117.5 kg (259 lb)   04/06/17 117.3 kg (258 lb 8 oz)   03/31/17 117.5 kg (259 lb)   03/16/17 117.5 kg (259 lb)   02/23/17 119.7 kg (264 lb)   01/03/17 115.2 kg (254 lb)   11/17/16 119.7 kg (264 lb)   10/18/16 118.8 kg (262 lb)   10/07/16 118.8 kg (262 lb)   09/22/16 113.9 kg (251 lb 1 oz)   08/18/16 116.4 kg (256 lb 11.2 oz)   05/16/16 114.8 kg (253 lb)   03/10/16 114.8 kg (253 lb 1.6 oz)   02/11/16 122 kg (269 lb)   01/11/16 119.7 kg (264 lb)   11/04/15 122.5 kg (270 lb)   08/25/15 119.7 kg (264 lb)   08/13/15 116.1 kg (256 lb)   05/04/15 119.7 kg (264 lb)   03/27/15 119.7 kg (264 lb)   02/21/15 121.2 kg (267 lb 3.2 oz)   01/29/15 119.9 kg (264 lb 6.4 oz)      NUTRITION DIAGNOSES:   Problem:  Altered GI function      Etiology: related to Ascending colon mass c/w probable colon carcinoma and Cedric's erosion with HH      Signs/Symptoms: as evidenced by Ascending colon mass c/w probable colon carcinoma and Cedric's erosion with HH     Previous dx re: altered GI function continues, pt NPO and on TPN. NUTRITION INTERVENTIONS:  Meals/Snacks: Other (advance diet as able per CRS ) Enteral/Parenteral Nutrition: Modify rate, concentration, composition, and schedule               GOAL:   Pt will meet >90% kcal and protein needs via TPN in 2-4 days.      NUTRITION MONITORING AND EVALUATION   Previous Goal: Pt will consume >50% of meals/supplements in 2-4 days   Previous Goal Met: N/A Previous Recommendations Implemented: Yes   Cultural, Yarsanism, or Ethnic Dietary Needs: None   LEARNING NEEDS (Diet, Food/Nutrient-Drug Interaction):    [x] None Identified   [] Identified and Education Provided/Documented   [] Identified and Pt declined/was not appropriate      [x] Interdisciplinary Care Plan Reviewed/Documented    [x] Participated in Discharge Planning: Unable to determine    [] Interdisciplinary Rounds     NUTRITION RISK:    [x] High              [] Moderate           []  Low  []  Minimal/Uncompromised      Shu Arroyo, [address], [address] [place] [address] Dr  Pager [phone]  Weekend Pager [phone]

## 2018-07-23 NOTE — PROGRESS NOTES
ADULT PROTOCOL: JET AEROSOL  REASSESSMENT    Patient  Maliha Garland     69 y.o.   male     7/22/2018  8:50 PM    Breath Sounds Pre Procedure: Right Breath Sounds: Crackles                               Left Breath Sounds: Crackles    Breath Sounds Post Procedure: Right Breath Sounds: Diminished                                 Left Breath Sounds: Diminished    Breathing pattern: Pre procedure Breathing Pattern: Regular          Post procedure Breathing Pattern: Regular    Heart Rate: Pre procedure Pulse: 75           Post procedure Pulse: 78    Resp Rate: Pre procedure Respirations: 18           Post procedure Respirations: 20      Incentive Spirometry:  Actual Volume (ml): 550 ml          Cough: Pre procedure Cough: Non-productive               Post procedure Cough: Non-productive    Suctioned: NO    Sputum: Pre procedure  NA                 Post procedure  NA    Oxygen: O2 Device: Room air       Changed: NO    SpO2: Pre procedure SpO2: 95 %   with oxygen              Post procedure SpO2: 96 %  without oxygen    Nebulizer Therapy: Current medications Aerosolized Medications: DuoNeb      Changed: NO    Smoking History: former smoker      Problem List:   Patient Active Problem List   Diagnosis Code    HTN (hypertension) I10    Depression F32.9    Hypercholesterolemia E78.00    Acid reflux K21.9    Seizure ([facility] [address] R56.9    Hypothyroidism E03.9    Hyponatremia E87.1    BPH (benign prostatic hyperplasia) N40.0    Rash R21    Cellulitis L03.90    Wax in ear H61.20    Ulcer [id number]    Small bowel obstruction (HCC) K56.609    Atrial flutter (HCC) I48.92    Atrial fibrillation or flutter     CHI (closed head injury) S09. 90XA    Basal cell cancer C44.91    TBI (traumatic brain injury) ([facility] [address] S06. 9X9A    Atrial fibrillation (HCC) I48.91    Cataract H26.9    Constipation K59.00    Ankle fracture, left S82.892A    Insomnia G47.00    Tinea corporis B35.4    SSS (sick sinus syndrome) ([facility] [address] I49.5    Syncope R55    Seizure disorder (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

## 2018-07-23 NOTE — PROGRESS NOTES
CRS POD# 6 s/p R colectomy, SBR, extensive YAMEL    Pain well controlled. Dobhoff placed over the weekend for postoperative ileus. Initially had a lot of output but this has slowed down despite flushing of dobhoff. No ostomy output. /75 (BP 1 Location: Right arm, BP Patient Position: Sitting)  Pulse 76  Temp 98.4 °F (36.9 °C)  Resp 20  Ht 6' 2\" (1.88 m)  Wt 129.5 kg (285 lb 7.9 oz)  SpO2 93%  BMI 36.66 kg/m2    NAD, AAO  Abd soft, approp TTP  Incision c/d/i with mild serosang drainage from midline incision  JPs serosang  Ostomy pink, flat. No gas or stool in bag    WBC 13 (from 11 yesterday)  Hgb improved after 1 unit of blood    Plan  -Continue TPN  -May consider CT scan later this week if no return of bowel function. Wife states that he had a prolonged ileus in a prior surgery requiring 2 weeks of TPN.

## 2018-07-23 NOTE — PROGRESS NOTES
Pharmacy Recommendation:      · Since no Gram-positive or MRSA growth, consider discontinuation of vancomycin if no other source of infection besides lungs. · Since yeast was only on Gram stain, consider discontinuation of fluconazole if no other source of infection identified.      Michel Arango, [facility]

## 2018-07-23 NOTE — PROGRESS NOTES
[facility]  Pulmonary, Critical Care, and Sleep Medicine      Name: Zeeshan Calzada MRN: [id number]   : 1940 Hospital: [address] [facility]   Date: 2018        IMPRESSION:   · Aspiration Pneumonia  · Colon CA  · S/P Colectomy, YAMEL, Ostomy  · Ileus   · CVA  · Afib  · Aspiration risk      RECOMMENDATIONS:   · Wean O2  · Pulmonary toilet  · Broad spectrum antibiotics, narrow coverage soon, afebrile, non toxic  · Aspiration precautions  · NGT in place  · DVT and GI prophylaxis   · OOB as tolerated  · Will follow prn      Subjective:     No acute events overnight  No acute complaints  No distress      Current Facility-Administered Medications   Medication Dose Route Frequency    fluconazole (DIFLUCAN) 400mg/200 mL IVPB (premix)  400 mg IntraVENous DAILY    TPN ADULT - CENTRAL AA 5% D20% W/ CA + ELECTROLYTES   IntraVENous CONTINUOUS    vancomycin (VANCOCIN) 1250 mg in  ml infusion  1,250 mg IntraVENous Q8H    metoprolol (LOPRESSOR) injection 2.5 mg  2.5 mg IntraVENous Q6H    piperacillin-tazobactam (ZOSYN) 4.5 g in 0.9% sodium chloride (MBP/ADV) 100 mL  4.5 g IntraVENous Q8H    albuterol-ipratropium (DUO-NEB) 2.5 MG-0.5 MG/3 ML  3 mL Nebulization Q4HWA RT    pantoprazole (PROTONIX) 40 mg in sodium chloride 0.9% 10 mL injection  40 mg IntraVENous Q12H    sodium chloride (NS) flush 5-10 mL  5-10 mL IntraVENous Q8H       Review of Systems:  No sob, no fever, no cp  Generalized weakness    Objective:   Vital Signs:    Visit Vitals    /65 (BP 1 Location: Right arm, BP Patient Position: At rest)    Pulse 75    Temp 98.4 °F (36.9 °C)    Resp 20    Ht 6' 2\" (1.88 m)    Wt 129.5 kg (285 lb 7.9 oz)    SpO2 95%    BMI 36.66 kg/m2       O2 Device: Nasal cannula   O2 Flow Rate (L/min): 3 l/min   Temp (24hrs), Av.4 °F (36.9 °C), Min:97.6 °F (36.4 °C), Max:99.4 °F (37.4 °C)       Intake/Output:   Last shift:      701 - 1900  In: 200 [I.V.:200]  Out: 520 [Urine:500; Drains:20]  Last 3 shifts: 07/21 1901 - 07/23 0700  In: 3973.3 [I.V.:3580]  Out: 4935 [Urine:4725; Drains:130]    Intake/Output Summary (Last 24 hours) at 07/23/18 0941  Last data filed at 07/23/18 0925   Gross per 24 hour   Intake           2560.8 ml   Output             3145 ml   Net           -584.2 ml      Physical Exam:   General:  Alert, cooperative, no distress, appears stated age. Head:  Normocephalic, without obvious abnormality, atraumatic. Eyes:  Conjunctivae/corneas clear. PERRL, EOMs intact. Nose: Nares normal. Septum midline. Mucosa normal. No drainage or sinus tenderness. Throat: Lips, mucosa, and tongue normal. Teeth and gums normal.   Neck: Supple, symmetrical, trachea midline, no adenopathy, thyroid: no enlargment/tenderness/nodules, no carotid bruit and no JVD. Back:   Symmetric, no curvature. ROM normal.   Lungs:   Clear to auscultation bilaterally. Chest wall:  No tenderness or deformity. Heart:  Regular rate and rhythm, S1, S2 normal, no murmur, click, rub or gallop. Abdomen:   Soft, non-tender. Bowel sounds normal. No masses,  No organomegaly. Extremities: Extremities normal, atraumatic, no cyanosis or edema. Pulses: 2+ and symmetric all extremities.    Skin: Skin color, texture, turgor normal. No rashes or lesions   Lymph nodes: Cervical, supraclavicular, and axillary nodes normal.   Neurologic: Grossly nonfocal     Data review:     Recent Results (from the past 24 hour(s))   TYPE + CROSSMATCH    Collection Time: 07/22/18  2:53 PM   Result Value Ref Range    Crossmatch Expiration 07/25/2018     ABO/Rh(D) A POSITIVE     Antibody screen NEG     Unit number [id number]     Blood component type [facility]     Unit division 00     Status of unit DISCARDED,INCINERATED     Crossmatch result Compatible     Unit number [id number]     Blood component type [facility]     Unit division 00     Status of unit TRANSFUSED     Crossmatch result Compatible    METABOLIC PANEL, BASIC    Collection Time: 07/23/18 12:39 AM   Result Value Ref Range    Sodium 134 (L) 136 - 145 mmol/L    Potassium 4.1 3.5 - 5.1 mmol/L    Chloride 101 97 - 108 mmol/L    CO2 26 21 - 32 mmol/L    Anion gap 7 5 - 15 mmol/L    Glucose 129 (H) 65 - 100 mg/dL    BUN 15 6 - 20 MG/DL    Creatinine 0.54 (L) 0.70 - 1.30 MG/DL    BUN/Creatinine ratio 28 (H) 12 - 20      GFR est AA >60 >60 ml/min/1.73m2    GFR est non-AA >60 >60 ml/min/1.73m2    Calcium 8.3 (L) 8.5 - 10.1 MG/DL   CBC WITH AUTOMATED DIFF    Collection Time: 07/23/18 12:39 AM   Result Value Ref Range    WBC 13.3 (H) 4.1 - 11.1 K/uL    RBC 3.15 (L) 4.10 - 5.70 M/uL    HGB 8.3 (L) 12.1 - 17.0 g/dL    HCT 26.6 (L) 36.6 - 50.3 %    MCV 84.4 80.0 - 99.0 FL    MCH 26.3 26.0 - 34.0 PG    MCHC 31.2 30.0 - 36.5 g/dL    RDW 22.1 (H) 11.5 - 14.5 %    PLATELET 483 829 - 377 K/uL    MPV 9.9 8.9 - 12.9 FL    NRBC 0.4 (H) 0  WBC    ABSOLUTE NRBC 0.06 (H) 0.00 - 0.01 K/uL    NEUTROPHILS 73 32 - 75 %    BAND NEUTROPHILS 2 %    LYMPHOCYTES 12 12 - 49 %    MONOCYTES 6 5 - 13 %    EOSINOPHILS 3 0 - 7 %    BASOPHILS 0 0 - 1 %    METAMYELOCYTES 2 %    MYELOCYTES 2 %    IMMATURE GRANULOCYTES 0 0.0 - 0.5 %    ABS. NEUTROPHILS 10.0 (H) 1.8 - 8.0 K/UL    ABS. LYMPHOCYTES 1.6 0.8 - 3.5 K/UL    ABS. MONOCYTES 0.8 0.0 - 1.0 K/UL    ABS. EOSINOPHILS 0.4 0.0 - 0.4 K/UL    ABS. BASOPHILS 0.0 0.0 - 0.1 K/UL    ABS. IMM.  GRANS. 0.0 0.00 - 0.04 K/UL    DF MANUAL      RBC COMMENTS ANISOCYTOSIS  2+        RBC COMMENTS HYPOCHROMIA  1+           Imaging:  I have personally reviewed the patients radiographs and have reviewed the reports:        D/W Chon Villagomez MD

## 2018-07-23 NOTE — INTERDISCIPLINARY ROUNDS
Interdisciplinary Rounds were completed on this patient. Rounds included nursing, clinical care leader, pharmacy, and case management.      Plan for the day:awaiting bowel function, NGT, turn q2hr   Plan for the stay: continue current TX   Activity TURN Q2HR  Anticipated discharge date: TBD
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
## 2018-07-24 ENCOUNTER — APPOINTMENT (OUTPATIENT)
Dept: CT IMAGING | Age: 78
DRG: 329 | End: 2018-07-24
Attending: SURGERY
Payer: MEDICARE

## 2018-07-24 LAB
ANION GAP SERPL CALC-SCNC: 7 MMOL/L (ref 5–15)
BASOPHILS # BLD: 0 K/UL (ref 0–0.1)
BASOPHILS NFR BLD: 0 % (ref 0–1)
BUN SERPL-MCNC: 13 MG/DL (ref 6–20)
BUN/CREAT SERPL: 20 (ref 12–20)
CALCIUM SERPL-MCNC: 8.7 MG/DL (ref 8.5–10.1)
CHLORIDE SERPL-SCNC: 101 MMOL/L (ref 97–108)
CO2 SERPL-SCNC: 25 MMOL/L (ref 21–32)
CREAT SERPL-MCNC: 0.65 MG/DL (ref 0.7–1.3)
DATE LAST DOSE: ABNORMAL
DIFFERENTIAL METHOD BLD: ABNORMAL
EOSINOPHIL # BLD: 0.6 K/UL (ref 0–0.4)
EOSINOPHIL NFR BLD: 4 % (ref 0–7)
ERYTHROCYTE [DISTWIDTH] IN BLOOD BY AUTOMATED COUNT: 23.5 % (ref 11.5–14.5)
GLUCOSE SERPL-MCNC: 144 MG/DL (ref 65–100)
HCT VFR BLD AUTO: 27.5 % (ref 36.6–50.3)
HGB BLD-MCNC: 8.3 G/DL (ref 12.1–17)
IMM GRANULOCYTES # BLD: 0 K/UL (ref 0–0.04)
IMM GRANULOCYTES NFR BLD AUTO: 0 % (ref 0–0.5)
LYMPHOCYTES # BLD: 1 K/UL (ref 0.8–3.5)
LYMPHOCYTES NFR BLD: 7 % (ref 12–49)
MCH RBC QN AUTO: 25.9 PG (ref 26–34)
MCHC RBC AUTO-ENTMCNC: 30.2 G/DL (ref 30–36.5)
MCV RBC AUTO: 85.7 FL (ref 80–99)
METAMYELOCYTES NFR BLD MANUAL: 2 %
MONOCYTES # BLD: 1.1 K/UL (ref 0–1)
MONOCYTES NFR BLD: 8 % (ref 5–13)
NEUTS BAND NFR BLD MANUAL: 2 %
NEUTS SEG # BLD: 11.1 K/UL (ref 1.8–8)
NEUTS SEG NFR BLD: 77 % (ref 32–75)
NRBC # BLD: 0.15 K/UL (ref 0–0.01)
NRBC BLD-RTO: 1.1 PER 100 WBC
PLATELET # BLD AUTO: 374 K/UL (ref 150–400)
PLATELET COMMENTS,PCOM: ABNORMAL
PMV BLD AUTO: 10 FL (ref 8.9–12.9)
POTASSIUM SERPL-SCNC: 4.1 MMOL/L (ref 3.5–5.1)
RBC # BLD AUTO: 3.21 M/UL (ref 4.1–5.7)
RBC MORPH BLD: ABNORMAL
REPORTED DOSE,DOSE: ABNORMAL UNITS
REPORTED DOSE/TIME,TMG: ABNORMAL
SODIUM SERPL-SCNC: 133 MMOL/L (ref 136–145)
VANCOMYCIN TROUGH SERPL-MCNC: 10.8 UG/ML (ref 5–10)
WBC # BLD AUTO: 14 K/UL (ref 4.1–11.1)

## 2018-07-24 PROCEDURE — 74011250636 HC RX REV CODE- 250/636: Performed by: COLON & RECTAL SURGERY

## 2018-07-24 PROCEDURE — 74011250636 HC RX REV CODE- 250/636: Performed by: HOSPITALIST

## 2018-07-24 PROCEDURE — 74011000258 HC RX REV CODE- 258: Performed by: SURGERY

## 2018-07-24 PROCEDURE — 74011250636 HC RX REV CODE- 250/636: Performed by: INTERNAL MEDICINE

## 2018-07-24 PROCEDURE — C9113 INJ PANTOPRAZOLE SODIUM, VIA: HCPCS | Performed by: HOSPITALIST

## 2018-07-24 PROCEDURE — 74011000250 HC RX REV CODE- 250: Performed by: SURGERY

## 2018-07-24 PROCEDURE — 80048 BASIC METABOLIC PNL TOTAL CA: CPT | Performed by: SURGERY

## 2018-07-24 PROCEDURE — 74011000250 HC RX REV CODE- 250: Performed by: EMERGENCY MEDICINE

## 2018-07-24 PROCEDURE — 65270000029 HC RM PRIVATE

## 2018-07-24 PROCEDURE — 85025 COMPLETE CBC W/AUTO DIFF WBC: CPT | Performed by: SURGERY

## 2018-07-24 PROCEDURE — 74011636320 HC RX REV CODE- 636/320: Performed by: INTERNAL MEDICINE

## 2018-07-24 PROCEDURE — 97535 SELF CARE MNGMENT TRAINING: CPT

## 2018-07-24 PROCEDURE — 74011000250 HC RX REV CODE- 250: Performed by: HOSPITALIST

## 2018-07-24 PROCEDURE — 74011000258 HC RX REV CODE- 258: Performed by: EMERGENCY MEDICINE

## 2018-07-24 PROCEDURE — 74011250636 HC RX REV CODE- 250/636: Performed by: EMERGENCY MEDICINE

## 2018-07-24 PROCEDURE — 74011000250 HC RX REV CODE- 250: Performed by: INTERNAL MEDICINE

## 2018-07-24 PROCEDURE — 80202 ASSAY OF VANCOMYCIN: CPT | Performed by: INTERNAL MEDICINE

## 2018-07-24 PROCEDURE — 97530 THERAPEUTIC ACTIVITIES: CPT

## 2018-07-24 PROCEDURE — 74177 CT ABD & PELVIS W/CONTRAST: CPT

## 2018-07-24 PROCEDURE — 94640 AIRWAY INHALATION TREATMENT: CPT

## 2018-07-24 PROCEDURE — 36415 COLL VENOUS BLD VENIPUNCTURE: CPT | Performed by: SURGERY

## 2018-07-24 RX ORDER — HYDRALAZINE HYDROCHLORIDE 20 MG/ML
10 INJECTION INTRAMUSCULAR; INTRAVENOUS
Status: DISCONTINUED | OUTPATIENT
Start: 2018-07-24 | End: 2018-10-02 | Stop reason: HOSPADM

## 2018-07-24 RX ORDER — SODIUM CHLORIDE 0.9 % (FLUSH) 0.9 %
10 SYRINGE (ML) INJECTION
Status: COMPLETED | OUTPATIENT
Start: 2018-07-24 | End: 2018-07-24

## 2018-07-24 RX ORDER — SODIUM CHLORIDE 9 MG/ML
50 INJECTION, SOLUTION INTRAVENOUS
Status: COMPLETED | OUTPATIENT
Start: 2018-07-24 | End: 2018-08-07

## 2018-07-24 RX ADMIN — METOPROLOL TARTRATE 2.5 MG: 1 INJECTION, SOLUTION INTRAVENOUS at 12:21

## 2018-07-24 RX ADMIN — SODIUM CHLORIDE 10 ML: 9 INJECTION, SOLUTION INTRAMUSCULAR; INTRAVENOUS; SUBCUTANEOUS at 21:40

## 2018-07-24 RX ADMIN — SODIUM CHLORIDE 10 ML: 9 INJECTION, SOLUTION INTRAMUSCULAR; INTRAVENOUS; SUBCUTANEOUS at 05:02

## 2018-07-24 RX ADMIN — Medication 10 ML: at 16:19

## 2018-07-24 RX ADMIN — LEVETIRACETAM 500 MG: 5 INJECTION INTRAVENOUS at 02:18

## 2018-07-24 RX ADMIN — HYDROMORPHONE HYDROCHLORIDE 0.5 MG: 1 INJECTION, SOLUTION INTRAMUSCULAR; INTRAVENOUS; SUBCUTANEOUS at 13:22

## 2018-07-24 RX ADMIN — DIATRIZOATE MEGLUMINE AND DIATRIZOATE SODIUM 30 ML: 660; 100 LIQUID ORAL; RECTAL at 08:43

## 2018-07-24 RX ADMIN — VANCOMYCIN HYDROCHLORIDE 1250 MG: 10 INJECTION, POWDER, LYOPHILIZED, FOR SOLUTION INTRAVENOUS at 05:02

## 2018-07-24 RX ADMIN — HYDROMORPHONE HYDROCHLORIDE 0.5 MG: 1 INJECTION, SOLUTION INTRAMUSCULAR; INTRAVENOUS; SUBCUTANEOUS at 08:12

## 2018-07-24 RX ADMIN — METOPROLOL TARTRATE 2.5 MG: 1 INJECTION, SOLUTION INTRAVENOUS at 18:00

## 2018-07-24 RX ADMIN — IOPAMIDOL 100 ML: 755 INJECTION, SOLUTION INTRAVENOUS at 16:18

## 2018-07-24 RX ADMIN — SODIUM CHLORIDE 40 MG: 9 INJECTION INTRAMUSCULAR; INTRAVENOUS; SUBCUTANEOUS at 21:40

## 2018-07-24 RX ADMIN — SODIUM CHLORIDE 10 ML: 9 INJECTION, SOLUTION INTRAMUSCULAR; INTRAVENOUS; SUBCUTANEOUS at 08:24

## 2018-07-24 RX ADMIN — SODIUM CHLORIDE 40 MG: 9 INJECTION INTRAMUSCULAR; INTRAVENOUS; SUBCUTANEOUS at 08:13

## 2018-07-24 RX ADMIN — PIPERACILLIN SODIUM,TAZOBACTAM SODIUM 4.5 G: 4; .5 INJECTION, POWDER, FOR SOLUTION INTRAVENOUS at 14:25

## 2018-07-24 RX ADMIN — METOPROLOL TARTRATE 2.5 MG: 1 INJECTION, SOLUTION INTRAVENOUS at 05:02

## 2018-07-24 RX ADMIN — IPRATROPIUM BROMIDE AND ALBUTEROL SULFATE 3 ML: .5; 3 SOLUTION RESPIRATORY (INHALATION) at 11:29

## 2018-07-24 RX ADMIN — PIPERACILLIN SODIUM,TAZOBACTAM SODIUM 4.5 G: 4; .5 INJECTION, POWDER, FOR SOLUTION INTRAVENOUS at 08:24

## 2018-07-24 RX ADMIN — LEVETIRACETAM 500 MG: 5 INJECTION INTRAVENOUS at 18:39

## 2018-07-24 RX ADMIN — HYDROMORPHONE HYDROCHLORIDE 0.5 MG: 1 INJECTION, SOLUTION INTRAMUSCULAR; INTRAVENOUS; SUBCUTANEOUS at 02:20

## 2018-07-24 RX ADMIN — ASCORBIC ACID, VITAMIN A PALMITATE, CHOLECALCIFEROL, THIAMINE HYDROCHLORIDE, RIBOFLAVIN-5 PHOSPHATE SODIUM, PYRIDOXINE HYDROCHLORIDE, NIACINAMIDE, DEXPANTHENOL, ALPHA-TOCOPHEROL ACETATE, VITAMIN K1, FOLIC ACID, BIOTIN, CYANOCOBALAMIN: 200; 3300; 200; 6; 3.6; 6; 40; 15; 10; 150; 600; 60; 5 INJECTION, SOLUTION INTRAVENOUS at 18:32

## 2018-07-24 RX ADMIN — IPRATROPIUM BROMIDE AND ALBUTEROL SULFATE 3 ML: .5; 3 SOLUTION RESPIRATORY (INHALATION) at 08:25

## 2018-07-24 RX ADMIN — HYDROMORPHONE HYDROCHLORIDE 0.5 MG: 1 INJECTION, SOLUTION INTRAMUSCULAR; INTRAVENOUS; SUBCUTANEOUS at 18:37

## 2018-07-24 RX ADMIN — FLUCONAZOLE 400 MG: 400 INJECTION INTRAVENOUS at 08:20

## 2018-07-24 RX ADMIN — SODIUM CHLORIDE 50 ML/HR: 900 INJECTION, SOLUTION INTRAVENOUS at 16:19

## 2018-07-24 NOTE — PROGRESS NOTES
Received a call from telesitter room that patient appears to be pulling his NGT. Immediately went to see patient and he was indeed attempting to pull NGT. Consulted MD and ordered sitter/staffat bedside. Nursing supervisor Queenie Pham notified.

## 2018-07-24 NOTE — PROGRESS NOTES
Called CT (spoke with Hermila Alonso),  gastrograffin given at 1255. Will come for patient in \"4 hours\". Wife at bedside. Patient is A&Ox3, resting in bed. Maddie Sy clamped (after giving meds).

## 2018-07-24 NOTE — PROGRESS NOTES
Problem: Self Care Deficits Care Plan (Adult)  Goal: *Acute Goals and Plan of Care (Insert Text)  Occupational Therapy Goals  Initiated 7/22/2018  1. Patient will perform one grooming task seated EOB with minimal assistance within 7 day(s). 2.  Patient will perform upper body dressing with moderate assistance  within 7 day(s). 3.  Patient will perform supine <> sit to participate in ADL tasks decrease caregiver burdenwith maximal assistance  within 7 day(s). 4.  Patient will perform sit <> stand to prepare for self care transfers with maximal assistance within 7 day(s). 5.  Patient will participate in R AROM and L self PROM upper extremity therapeutic exercise/activities with contact guard assist for 8 minutes within 7 day(s). 6.  Patient will utilize energy conservation techniques during functional activities with verbal cues within 7 day(s). Occupational Therapy TREATMENT  Patient: Dario Mijares (15 y.o. male)  Date: 7/24/2018  Diagnosis: Acute blood loss anemia  GI bleed  Anasarca  GI Bleed  gi bleed  ASCENDING COLON CANCER  GI bleed  Procedure(s) (LRB):  LAPAROSCOPIC  ASSISTED TO OPEN RIGHT COLECTOMY AND SMALL BOWEL RESECTION (Right) 7 Days Post-Op  Precautions:    Chart, occupational therapy assessment, plan of care, and goals were reviewed. ASSESSMENT:  Patient received supine in bed with wife, sitter, and RN present. Agreeable to OOB activity, patient planning for CAT scan at 1300, spoke with mobility team and agreeable to arrive at 1245 to stephy lift patient back to bed. Patient coming to EOB with Max A x2, impaired sitting balance, slowly improving after few minutes. Patient reporting discomfort at ostomy site, LLE falling onto site multiple times, patient able to assist with holding using RLE. Patient completing SPT to chair, significant difficulty today d/t inability to take step with RLE and significantly increased LLE tone.  Patient completing simple grooming tasks in chair, requiring assist and cuing for dobhoff management as well as avoiding scrubbing central line dressing. Patient will benefit from d/c to rehab when medically stable. Progression toward goals:  []       Improving appropriately and progressing toward goals  [x]       Improving slowly and progressing toward goals  []       Not making progress toward goals and plan of care will be adjusted     PLAN:  Patient continues to benefit from skilled intervention to address the above impairments. Continue treatment per established plan of care. Discharge Recommendations:  Rehab  Further Equipment Recommendations for Discharge:  TBD     SUBJECTIVE:   Patient stated This just keeps hurting me.  Indicating colostomy when sitting EOB, no visible cause. OBJECTIVE DATA SUMMARY:   Cognitive/Behavioral Status:  Neurologic State: Alert  Orientation Level: Oriented to person;Oriented to place  Cognition: Follows commands; Appropriate for age attention/concentration;Poor safety awareness     Perseveration: No perseveration noted  Safety/Judgement: Fall prevention    Functional Mobility and Transfers for ADLs:  Bed Mobility:  Supine to Sit: Maximum assistance;Assist x2    Transfers:  Sit to Stand: Maximum assistance;Assist x2     Bed to Chair: Total assistance;Assist x2 (Unable to take step with RLE today; sig LLE tone)    Balance:  Sitting: Impaired  Sitting - Static: Fair (occasional)  Sitting - Dynamic: Poor (constant support)  Standing: Impaired; With support  Standing - Static: Constant support; Fair  Standing - Dynamic : Poor    ADL Intervention:  Grooming  Washing Face: Minimum assistance  Brushing/Combing Hair: Moderate assistance  Cues: Physical assistance;Verbal cues provided (To avoid pulling on dobhoff)    Cognitive Retraining  Safety/Judgement: Fall prevention    Therapeutic Exercises:   - Patient tolerating sitting EOB for few minutes, requiring Min A x1 to maintain balance and prop support of RLE.  Few instances of falling backwards requiring Mod A to catch self. Pain:  Pain Scale 1: Numeric (0 - 10)  Pain Intensity 1: 5  Pain Location 1: Abdomen  Pain Orientation 1: Anterior; Lower;Mid  Pain Description 1: Aching  Pain Intervention(s) 1: Distraction; Emotional support; Family Support  Activity Tolerance:   Fair. VSS, O2 sats >92% on RA, /74, HR 90s. Please refer to the flowsheet for vital signs taken during this treatment.   After treatment:   [x] Patient left in no apparent distress sitting up in chair  [] Patient left in no apparent distress in bed  [x] Call bell left within reach  [x] Nursing notified  [x] Caregiver present  [] Bed alarm activated    COMMUNICATION/COLLABORATION:   The patients plan of care was discussed with: Physical Therapy Assistant and Registered Nurse    Ping Muller OT  Time Calculation: 26 mins

## 2018-07-24 NOTE — PROGRESS NOTES
CRS POD# 7 s/p R colectomy, SBR, extensive YAMEL    Pain well controlled. No ostomy output. /80 (BP 1 Location: Right arm, BP Patient Position: At rest)  Pulse 76  Temp 99.2 °F (37.3 °C)  Resp (!) 32  Ht 6' 2\" (1.88 m)  Wt 129.5 kg (285 lb 7.9 oz)  SpO2 94%  BMI 36.66 kg/m2    NAD, AAO  Abd soft, approp TTP  Incision c/d/i with mild serosang drainage from midline incision  JPs serosang  Ostomy pink, flat. No gas or stool in bag    WBC 14 (from 13 yesterday)  Hgb stable    Plan  -Continue TPN  -Prolonged postop ileus.   Will get CT today

## 2018-07-24 NOTE — PROGRESS NOTES
Problem: Mobility Impaired (Adult and Pediatric)  Goal: *Acute Goals and Plan of Care (Insert Text)  Physical Therapy Goals  Reviewed and revised 7/19/2018  1. Patient will move from supine to sit and sit to supine , scoot up and down and roll side to side in bed with minimal assistance within 7 day(s). 2.  Patient will transfer from bed to chair and chair to bed with moderate assistance using the least restrictive device within 7 day(s). 3.  Patient will perform sit to stand with minimal assistance within 7 day(s). 4.  Patient will perform stand pivot transfer to wheelchair with minimal assistance in 7 days. Initiated 7/12/2018  1. Patient will move from supine to sit and sit to supine , scoot up and down and roll side to side in bed with modified independence within 7 day(s). 2.  Patient will transfer from bed to chair and chair to bed with supervision/set-up using the least restrictive device within 7 day(s). 3.  Patient will perform sit to stand with supervision/set-up within 7 day(s). 4.  Patient will perform stand pivot transfer to wheelchair with modified independence in 7 days. physical Therapy TREATMENT  Patient: Dario Mijares (89 y.o. male)  Date: 7/24/2018  Diagnosis: Acute blood loss anemia  GI bleed  Anasarca  GI Bleed  gi bleed  ASCENDING COLON CANCER  GI bleed  Procedure(s) (LRB):  LAPAROSCOPIC  ASSISTED TO OPEN RIGHT COLECTOMY AND SMALL BOWEL RESECTION (Right) 7 Days Post-Op  Precautions:    Chart, physical therapy assessment, plan of care and goals were reviewed. ASSESSMENT:  Pt cleared by nurse to mobilize. Pt received in bed supine. Pt agreeably to getting up to chair for short time before CT. Pt with increased anxiety in preparation to mobilize. Pt requiring cueing to slow down breathing and take deep breathes. Pt performed supine to sit at max A x2. Pt again with increased anxiety when sitting EOB requiring to sit and breathe for a few mins.  Pt performed stand pivot transfer from bed to recliner at total A x2. Pt unable to step with RLE today making his descent into recliner uncontrolled. Pt with increased tone of LLE with transfer. Pts anxiety contributing to difficulty with trasnfer today. Pt left up in chair with stephy pad under pt to get back into bed. Pt will benefit from IP rehab to improve strength and transfer to return to prior level of function with wife able to transfer pt alone. Progression toward goals:  []    Improving appropriately and progressing toward goals  [x]    Improving slowly and progressing toward goals  []    Not making progress toward goals and plan of care will be adjusted     PLAN:  Patient continues to benefit from skilled intervention to address the above impairments. Continue treatment per established plan of care. Discharge Recommendations:  Inpatient Rehab  Further Equipment Recommendations for Discharge:  TBD by rehab     SUBJECTIVE:   Patient stated I get nervous being here.     OBJECTIVE DATA SUMMARY:   Critical Behavior:  Neurologic State: Alert  Orientation Level: Oriented to person, Oriented to place  Cognition: Follows commands, Appropriate for age attention/concentration, Poor safety awareness  Safety/Judgement: Fall prevention  Functional Mobility Training:  Bed Mobility:     Supine to Sit: Maximum assistance;Assist x2     Scooting: Maximum assistance        Transfers:  Sit to Stand: Maximum assistance;Assist x2  Stand to Sit: Maximum assistance;Assist x2        Bed to Chair: Total assistance;Assist x2                    Balance:  Sitting: Impaired  Sitting - Static: Fair (occasional)  Sitting - Dynamic: Poor (constant support)  Standing: With support; Impaired  Standing - Static: Fair;Constant support  Standing - Dynamic : Poor  Pain:  Pain Scale 1: Numeric (0 - 10)  Pain Intensity 1: 6  Pain Location 1: Abdomen  Pain Orientation 1: Anterior; Lower;Mid  Pain Description 1: Aching  Pain Intervention(s) 1: Medication (see MAR)  Activity Tolerance:   Pts anxiety limiting transfer today. Pt unable to step with RLE.    After treatment:   [x]    Patient left in no apparent distress sitting up in chair  []    Patient left in no apparent distress in bed  [x]    Call bell left within reach  [x]    Nursing notified  [x]    Caregiver present  []    Bed alarm activated    COMMUNICATION/COLLABORATION:   The patients plan of care was discussed with: Registered Nurse    Trent Craig   Time Calculation: 22 mins

## 2018-07-24 NOTE — PROGRESS NOTES
Occupational Therapy Note [id number]    Chart reviewed. Attempted to meet with patient at bedside for mobility and ADLs. RN present reporting just administering pain medications and requesting dobhoff not be moved until 10am. Will attempt to f/u later as able and appropriate. Thank you.     Sharmin Eng, OT

## 2018-07-24 NOTE — PROGRESS NOTES
General Surgery End of Shift Nursing Note    Bedside shift change report given to Jocelyn Vázquez (oncoming nurse) by Emily (offgoing nurse). Report included the following information SBAR, Kardex, Intake/Output, MAR and Recent Results. Shift worked:   7a-7p   Summary of shift:    Pt up to chair two times for a total of 3-4 hours. Appears to have less pain. 200cc NGT output for shift. Still no ostomy activity.     Issues for physician to address:   none     [facility]

## 2018-07-24 NOTE — PROGRESS NOTES
Problem: Pain - Acute  Goal: *Pain is controlled to three or less  Outcome: Progressing Towards Goal  Assess pain Q4h & prn.  Medicating with 0.5 mg IV Dilaudid Q4h. Rating pain \"6-7\" out of \"10\" on scale of \"0-10\".

## 2018-07-24 NOTE — PROGRESS NOTES
Chart reviewed. Attempted to meet with patient at bedside for mobility. RN present reporting just administering pain medications and requesting dobhoff not be moved until 10am. Will continue to follow.

## 2018-07-24 NOTE — PROGRESS NOTES
Patient MEWS score \"3\" tachypneic & hypertensive. Refusing Incentive spirometer. Elevated HOB. RT giving breathing treatments. Patient is A&Ox3, family in room.

## 2018-07-24 NOTE — PROGRESS NOTES
Bedside and Verbal shift change report given to Ceci Roche RN (oncoming nurse) . Report included the following information SBAR, Kardex, Intake/Output, MAR and Recent Results.

## 2018-07-24 NOTE — PROGRESS NOTES
MEWS score \"4\" patient is in pain (had pain meds). Breathing 24-32. Getting breathing txmts. NP & MD saw patient earlier. Patient is A&Ox3, resting in bed.

## 2018-07-24 NOTE — PROGRESS NOTES
Surgery NP SOAP Note    Subjective:  Asked by nursing to assess incision and drainage. Patient reports pain when the area is palpated. Objective:  Stapled surgical midline incision. Moderate to large amount of serosang drainage. Some redness at the edges of the incision which appear to be related to exposure of the skin to drainage. Patient noted to be guarding due to pain. Assessment:  Expected post-op drainage, especially in the setting of edema. No s/s of infection or dehiscence    Plan:  Dr. Winter Snell aware per his note this AM. Updated again and he is planning to review CT scan and make further care decisions with this info.      [facility], NP
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
## 2018-07-24 NOTE — INTERDISCIPLINARY ROUNDS
Interdisciplinary Rounds were completed on this patient. Rounds included nursing, clinical care leader, pharmacy, and case management.      Plam for the day: sitter at bedside, pain, TPN, IV fluids, breathing [address] Bojorquez for the stay:continue current TX  Activity: PT/OT,   Anticipated discharge date:

## 2018-07-25 LAB
ANION GAP SERPL CALC-SCNC: 7 MMOL/L (ref 5–15)
BACTERIA SPEC CULT: NORMAL
BASOPHILS # BLD: 0 K/UL (ref 0–0.1)
BASOPHILS NFR BLD: 0 % (ref 0–1)
BUN SERPL-MCNC: 11 MG/DL (ref 6–20)
BUN/CREAT SERPL: 16 (ref 12–20)
CALCIUM SERPL-MCNC: 8.6 MG/DL (ref 8.5–10.1)
CHLORIDE SERPL-SCNC: 103 MMOL/L (ref 97–108)
CO2 SERPL-SCNC: 26 MMOL/L (ref 21–32)
CREAT SERPL-MCNC: 0.7 MG/DL (ref 0.7–1.3)
DIFFERENTIAL METHOD BLD: ABNORMAL
EOSINOPHIL # BLD: 0.3 K/UL (ref 0–0.4)
EOSINOPHIL NFR BLD: 2 % (ref 0–7)
ERYTHROCYTE [DISTWIDTH] IN BLOOD BY AUTOMATED COUNT: 23.8 % (ref 11.5–14.5)
GLUCOSE SERPL-MCNC: 118 MG/DL (ref 65–100)
HCT VFR BLD AUTO: 27.6 % (ref 36.6–50.3)
HGB BLD-MCNC: 8.6 G/DL (ref 12.1–17)
IMM GRANULOCYTES # BLD: 0 K/UL (ref 0–0.04)
IMM GRANULOCYTES NFR BLD AUTO: 0 % (ref 0–0.5)
LYMPHOCYTES # BLD: 1.4 K/UL (ref 0.8–3.5)
LYMPHOCYTES NFR BLD: 11 % (ref 12–49)
MCH RBC QN AUTO: 26.5 PG (ref 26–34)
MCHC RBC AUTO-ENTMCNC: 31.2 G/DL (ref 30–36.5)
MCV RBC AUTO: 85.2 FL (ref 80–99)
MONOCYTES # BLD: 1.7 K/UL (ref 0–1)
MONOCYTES NFR BLD: 13 % (ref 5–13)
NEUTS SEG # BLD: 9.4 K/UL (ref 1.8–8)
NEUTS SEG NFR BLD: 74 % (ref 32–75)
NRBC # BLD: 0.12 K/UL (ref 0–0.01)
NRBC BLD-RTO: 0.9 PER 100 WBC
PLATELET # BLD AUTO: 397 K/UL (ref 150–400)
PLATELET COMMENTS,PCOM: ABNORMAL
PMV BLD AUTO: 10 FL (ref 8.9–12.9)
POTASSIUM SERPL-SCNC: 4.3 MMOL/L (ref 3.5–5.1)
RBC # BLD AUTO: 3.24 M/UL (ref 4.1–5.7)
RBC MORPH BLD: ABNORMAL
RBC MORPH BLD: ABNORMAL
SERVICE CMNT-IMP: NORMAL
SODIUM SERPL-SCNC: 136 MMOL/L (ref 136–145)
WBC # BLD AUTO: 12.8 K/UL (ref 4.1–11.1)

## 2018-07-25 PROCEDURE — 85025 COMPLETE CBC W/AUTO DIFF WBC: CPT | Performed by: SURGERY

## 2018-07-25 PROCEDURE — 74011000250 HC RX REV CODE- 250: Performed by: HOSPITALIST

## 2018-07-25 PROCEDURE — 36415 COLL VENOUS BLD VENIPUNCTURE: CPT | Performed by: SURGERY

## 2018-07-25 PROCEDURE — 97530 THERAPEUTIC ACTIVITIES: CPT

## 2018-07-25 PROCEDURE — C9113 INJ PANTOPRAZOLE SODIUM, VIA: HCPCS | Performed by: HOSPITALIST

## 2018-07-25 PROCEDURE — 80048 BASIC METABOLIC PNL TOTAL CA: CPT | Performed by: SURGERY

## 2018-07-25 PROCEDURE — 74011250636 HC RX REV CODE- 250/636: Performed by: EMERGENCY MEDICINE

## 2018-07-25 PROCEDURE — 94640 AIRWAY INHALATION TREATMENT: CPT

## 2018-07-25 PROCEDURE — 94760 N-INVAS EAR/PLS OXIMETRY 1: CPT

## 2018-07-25 PROCEDURE — 77010033678 HC OXYGEN DAILY

## 2018-07-25 PROCEDURE — 74011000250 HC RX REV CODE- 250: Performed by: SURGERY

## 2018-07-25 PROCEDURE — 74011000258 HC RX REV CODE- 258: Performed by: EMERGENCY MEDICINE

## 2018-07-25 PROCEDURE — 74011250636 HC RX REV CODE- 250/636: Performed by: HOSPITALIST

## 2018-07-25 PROCEDURE — 74011000250 HC RX REV CODE- 250: Performed by: INTERNAL MEDICINE

## 2018-07-25 PROCEDURE — 74011250636 HC RX REV CODE- 250/636: Performed by: COLON & RECTAL SURGERY

## 2018-07-25 PROCEDURE — 97535 SELF CARE MNGMENT TRAINING: CPT

## 2018-07-25 PROCEDURE — 74011000258 HC RX REV CODE- 258: Performed by: SURGERY

## 2018-07-25 PROCEDURE — 74011250636 HC RX REV CODE- 250/636: Performed by: INTERNAL MEDICINE

## 2018-07-25 PROCEDURE — 65270000029 HC RM PRIVATE

## 2018-07-25 PROCEDURE — 74011000250 HC RX REV CODE- 250: Performed by: EMERGENCY MEDICINE

## 2018-07-25 PROCEDURE — 74011000258 HC RX REV CODE- 258: Performed by: INTERNAL MEDICINE

## 2018-07-25 RX ORDER — VENLAFAXINE HYDROCHLORIDE 75 MG/1
CAPSULE, EXTENDED RELEASE ORAL
Qty: 30 CAP | Refills: 3 | OUTPATIENT
Start: 2018-07-25 | End: 2018-10-02

## 2018-07-25 RX ADMIN — PIPERACILLIN SODIUM,TAZOBACTAM SODIUM 4.5 G: 4; .5 INJECTION, POWDER, FOR SOLUTION INTRAVENOUS at 09:17

## 2018-07-25 RX ADMIN — SODIUM CHLORIDE 40 MG: 9 INJECTION INTRAMUSCULAR; INTRAVENOUS; SUBCUTANEOUS at 22:00

## 2018-07-25 RX ADMIN — FLUCONAZOLE 400 MG: 400 INJECTION INTRAVENOUS at 09:32

## 2018-07-25 RX ADMIN — PIPERACILLIN SODIUM,TAZOBACTAM SODIUM 4.5 G: 4; .5 INJECTION, POWDER, FOR SOLUTION INTRAVENOUS at 17:25

## 2018-07-25 RX ADMIN — SODIUM CHLORIDE 10 ML: 9 INJECTION, SOLUTION INTRAMUSCULAR; INTRAVENOUS; SUBCUTANEOUS at 06:07

## 2018-07-25 RX ADMIN — IPRATROPIUM BROMIDE AND ALBUTEROL SULFATE 3 ML: .5; 3 SOLUTION RESPIRATORY (INHALATION) at 11:21

## 2018-07-25 RX ADMIN — AMIODARONE HYDROCHLORIDE 0.5 MG/MIN: 50 INJECTION, SOLUTION INTRAVENOUS at 22:00

## 2018-07-25 RX ADMIN — IPRATROPIUM BROMIDE AND ALBUTEROL SULFATE 3 ML: .5; 3 SOLUTION RESPIRATORY (INHALATION) at 14:20

## 2018-07-25 RX ADMIN — AMIODARONE HYDROCHLORIDE 150 MG: 50 INJECTION, SOLUTION INTRAVENOUS at 13:22

## 2018-07-25 RX ADMIN — LEVETIRACETAM 500 MG: 5 INJECTION INTRAVENOUS at 03:48

## 2018-07-25 RX ADMIN — METOPROLOL TARTRATE 2.5 MG: 1 INJECTION, SOLUTION INTRAVENOUS at 06:08

## 2018-07-25 RX ADMIN — PIPERACILLIN SODIUM,TAZOBACTAM SODIUM 4.5 G: 4; .5 INJECTION, POWDER, FOR SOLUTION INTRAVENOUS at 01:08

## 2018-07-25 RX ADMIN — METOPROLOL TARTRATE 2.5 MG: 1 INJECTION, SOLUTION INTRAVENOUS at 12:25

## 2018-07-25 RX ADMIN — IPRATROPIUM BROMIDE AND ALBUTEROL SULFATE 3 ML: .5; 3 SOLUTION RESPIRATORY (INHALATION) at 08:17

## 2018-07-25 RX ADMIN — IPRATROPIUM BROMIDE AND ALBUTEROL SULFATE 3 ML: .5; 3 SOLUTION RESPIRATORY (INHALATION) at 22:00

## 2018-07-25 RX ADMIN — AMIODARONE HYDROCHLORIDE 1 MG/MIN: 50 INJECTION, SOLUTION INTRAVENOUS at 13:39

## 2018-07-25 RX ADMIN — LEVETIRACETAM 500 MG: 5 INJECTION INTRAVENOUS at 17:26

## 2018-07-25 RX ADMIN — HYDROMORPHONE HYDROCHLORIDE 0.5 MG: 1 INJECTION, SOLUTION INTRAMUSCULAR; INTRAVENOUS; SUBCUTANEOUS at 01:26

## 2018-07-25 RX ADMIN — SODIUM CHLORIDE 40 MG: 9 INJECTION INTRAMUSCULAR; INTRAVENOUS; SUBCUTANEOUS at 09:17

## 2018-07-25 RX ADMIN — HYDROMORPHONE HYDROCHLORIDE 0.5 MG: 1 INJECTION, SOLUTION INTRAMUSCULAR; INTRAVENOUS; SUBCUTANEOUS at 22:01

## 2018-07-25 RX ADMIN — METOPROLOL TARTRATE 2.5 MG: 1 INJECTION, SOLUTION INTRAVENOUS at 01:09

## 2018-07-25 RX ADMIN — ASCORBIC ACID, VITAMIN A PALMITATE, CHOLECALCIFEROL, THIAMINE HYDROCHLORIDE, RIBOFLAVIN-5 PHOSPHATE SODIUM, PYRIDOXINE HYDROCHLORIDE, NIACINAMIDE, DEXPANTHENOL, ALPHA-TOCOPHEROL ACETATE, VITAMIN K1, FOLIC ACID, BIOTIN, CYANOCOBALAMIN: 200; 3300; 200; 6; 3.6; 6; 40; 15; 10; 150; 600; 60; 5 INJECTION, SOLUTION INTRAVENOUS at 17:28

## 2018-07-25 RX ADMIN — HYDROMORPHONE HYDROCHLORIDE 0.5 MG: 1 INJECTION, SOLUTION INTRAMUSCULAR; INTRAVENOUS; SUBCUTANEOUS at 16:37

## 2018-07-25 NOTE — PROGRESS NOTES
CRS POD# 8 s/p R colectomy, SBR, extensive YAMEL    Pain well controlled. No ostomy output. CT yesterday showed no abscess/fluid collection. No leak or free air. /63 (BP 1 Location: Right arm, BP Patient Position: Supine)  Pulse 77  Temp 100.1 °F (37.8 °C)  Resp 24  Ht 6' 2\" (1.88 m)  Wt 129.5 kg (285 lb 7.9 oz)  SpO2 95%  BMI 36.66 kg/m2    NAD, AAO  Abd soft, approp TTP  Incision c/d/i with mild serosang drainage from midline incision  JPs serosang  Ostomy pink, flat. No gas or stool in bag    WBC 12.8 (from 14 yesterday)  Hgb stable    Plan  -Continue TPN  -Prolonged postop ileus.   Await return of bowel function

## 2018-07-25 NOTE — PROGRESS NOTES
RN spoke with Dr. Rosibel Tomlinson at this time and discussed patient's fluctuating mental status (bouts of confusion, some agitation), questionable hypoxia with current O2 sat of 86% on RA. Per physician, patient had been requiring O2 around the clock via NC and it is questionable that this was taken off at some point and failed to be replaced. Patient is now back on O2, marked pulmonary congestion continues and patient is not able to participate fully with aggressive pulmonary toileting. I.S. At bedside but patient is unable to pull any volume above 1,000 mL. Scheduled nebulizer treatments given as ordered per RT. Patient up, out of bed x2 max assist with PT/OT and RN and PCT assistance. Patient took two steps to bedside chair with gait belt and multi-person assist, sat up x 2 hours and then stephy lifted back to bed. Order placed for AM CXR to f/u on ongoing pulmonary congestion and intermittent tachypnea with RR upper 30s. Wife and sitter remain present at bedside as patient continues to pull at [facility] which had to be placed under Fluroscopy. RN will continue to monitor.
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
## 2018-07-25 NOTE — PROGRESS NOTES
Problem: Self Care Deficits Care Plan (Adult)  Goal: *Acute Goals and Plan of Care (Insert Text)  Occupational Therapy Goals  Initiated 7/22/2018  1. Patient will perform one grooming task seated EOB with minimal assistance within 7 day(s). 2.  Patient will perform upper body dressing with moderate assistance  within 7 day(s). 3.  Patient will perform supine <> sit to participate in ADL tasks decrease caregiver burdenwith maximal assistance  within 7 day(s). 4.  Patient will perform sit <> stand to prepare for self care transfers with maximal assistance within 7 day(s). 5.  Patient will participate in R AROM and L self PROM upper extremity therapeutic exercise/activities with contact guard assist for 8 minutes within 7 day(s). 6.  Patient will utilize energy conservation techniques during functional activities with verbal cues within 7 day(s). Occupational Therapy TREATMENT  Patient: Mathew So (42 y.o. male)  Date: 7/25/2018  Diagnosis: Acute blood loss anemia  GI bleed  Anasarca  GI Bleed  gi bleed  ASCENDING COLON CANCER  GI bleed  Procedure(s) (LRB):  LAPAROSCOPIC  ASSISTED TO OPEN RIGHT COLECTOMY AND SMALL BOWEL RESECTION (Right) 8 Days Post-Op  Precautions:  Fall  Chart, occupational therapy assessment, plan of care, and goals were reviewed. ASSESSMENT:  Nursing cleared for therapy and presented during session for assist.  Patient reports nervous regarding transfer, provided reassurance, encouragement, and distraction. Wife present and supportive. Nursing temporarily stopping dobhoff suction for safety with transfer. +central line, + B MADELEINE drains, + flannery. Supine > sit with max A x2, increased ability to scoot forward with CGA with additional time and good static sitting balance. Completed sit> stand with mod Ax 2 and transfer with Max Ax 2 with additional time to facilitate weight shifting to encourage stepping.    While in chair, washed his face with supervision to ensure Dobhoff remaining in place. Left up in chair with Jenyn Ledger pad under patient. Left with nurse, PCT/sitter, and wife present. Recommend with nursing patient to complete as able in order to maintain strength, endurance and independence: seated UB bathing/dressing, grooming with supervision/setup, OOB to chair 1-2x/day with [employer]. Progression toward goals:  []       Improving appropriately and progressing toward goals  [x]       Improving slowly and progressing toward goals  []       Not making progress toward goals and plan of care will be adjusted     PLAN:  Patient continues to benefit from skilled intervention to address the above impairments. Continue treatment per established plan of care. Discharge Recommendations:  Inpt Rehab  Further Equipment Recommendations for Discharge:  TBD     SUBJECTIVE:   Patient stated I am nervous.     OBJECTIVE DATA SUMMARY:   Cognitive/Behavioral Status:  Alert- oriented to self, place and person          Functional Mobility and Transfers for ADLs:  Bed Mobility:  Supine to Sit: Maximum assistance;Assist x2  Scooting: Moderate assistance;Assist x2    Transfers:  Sit to Stand: Moderate assistance;Assist x2     Bed to Chair: Maximum assistance;Assist x2    Balance:  Sitting: Intact  Sitting - Static: Fair (occasional)  Sitting - Dynamic: Fair (occasional)  Standing: Impaired; With support  Standing - Static: Fair;Constant support  Standing - Dynamic : Poor    ADL Intervention:    Education on body mechanics, hand placement and sequencing. Ed on PLB to slow breathing and anxiety. Reassurance for ability to complete transfer. Supine > sit with max A x2, scooting forward with CGA. Completed sit> stand with mod Ax 2 with ed on hand placement. Pivot transfer with Max Ax 2 with additional time to facilitate weight shifting to encourage stepping. Able advance R foot forward x 2 prior to sitting in chair.   While in chair, washed his face with supervision to ensure Dobhoff remaining in place. Left up in chair. Pain:  Denies pain with activity        Activity Tolerance:   Fair- RA 91-92% pre activity and post activity.      PAfter treatment:   [x] Patient left in no apparent distress sitting up in chair  [] Patient left in no apparent distress in bed  [x] Call bell left within reach  [x] Nursing notified  [x] Caregiver present  [] Bed alarm activated    COMMUNICATION/COLLABORATION:   The patients plan of care was discussed with: Physical Therapist, Registered Nurse and Patient    Zen Jenkins OT  Time Calculation: 31 mins

## 2018-07-25 NOTE — PROGRESS NOTES
General Surgery End of Shift Nursing Note    Bedside shift change report given to Laura Perez (oncoming nurse) by Nely Diaz (offgoing nurse). Report included the following information SBAR, Kardex, Intake/Output, MAR and Recent Results. Shift worked:   C   Summary of shift:    0   Issues for physician to address:   0     Number times ambulated in hallway past shift: 0    Number of times OOB to chair past shift: 1    Pain Management:  Current medication: Dilaudid  Patient states pain is manageable on current pain medication: YES    GI:    Current diet:  DIET NPO  TPN ADULT - CENTRAL AA 5% D20% W/ CA + ELECTROLYTES    Tolerating current diet: YES  Passing flatus: YES  Last Bowel Movement: several days ago   Appearance: 0    Respiratory:    Incentive Spirometer at bedside: YES  Patient instructed on use: YES    Patient Safety:    Falls Score: 1  Bed Alarm On? Yes  Sitter?  Yes    Halle North RN       p

## 2018-07-25 NOTE — PROGRESS NOTES
Problem: Mobility Impaired (Adult and Pediatric)  Goal: *Acute Goals and Plan of Care (Insert Text)  Physical Therapy Goals  Reviewed and revised 7/19/2018  1. Patient will move from supine to sit and sit to supine , scoot up and down and roll side to side in bed with minimal assistance within 7 day(s). 2.  Patient will transfer from bed to chair and chair to bed with moderate assistance using the least restrictive device within 7 day(s). 3.  Patient will perform sit to stand with minimal assistance within 7 day(s). 4.  Patient will perform stand pivot transfer to wheelchair with minimal assistance in 7 days. Initiated 7/12/2018  1. Patient will move from supine to sit and sit to supine , scoot up and down and roll side to side in bed with modified independence within 7 day(s). 2.  Patient will transfer from bed to chair and chair to bed with supervision/set-up using the least restrictive device within 7 day(s). 3.  Patient will perform sit to stand with supervision/set-up within 7 day(s). 4.  Patient will perform stand pivot transfer to wheelchair with modified independence in 7 days. physical Therapy TREATMENT  Patient: Austin Talley (3 y.o. male)  Date: 7/25/2018  Diagnosis: Acute blood loss anemia  GI bleed  Anasarca  GI Bleed  gi bleed  ASCENDING COLON CANCER  GI bleed  Procedure(s) (LRB):  LAPAROSCOPIC  ASSISTED TO OPEN RIGHT COLECTOMY AND SMALL BOWEL RESECTION (Right) 8 Days Post-Op  Precautions:    Chart, physical therapy assessment, plan of care and goals were reviewed. ASSESSMENT:  Pt cleared by nurse to mobilize. Pt received in bed supine. Pt agreeable to getting up to chair. Reassured pt of ability to transfer and to calm down before any mobility. Pt taking deep breathes to calm down. Nurse in room and disconnected pt from suction and IV to help simplify transfer. Pt performed supine to sit at max A x2. Pt with improved sitting balance today sitting EOB.  Pt performed sit to stand transfer at mod A x2. Pt performed stand pivot transfer from bed to chair at max A x2. Pt able to take two steps with RLE to get to chair. Pt with left up in chair with wife and nurse in room with stephy pad under pt. Pt will benefit from IP rehab to improve strength and safe functional mobility to return home with wife. Progression toward goals:  []    Improving appropriately and progressing toward goals  [x]    Improving slowly and progressing toward goals  []    Not making progress toward goals and plan of care will be adjusted     PLAN:  Patient continues to benefit from skilled intervention to address the above impairments. Continue treatment per established plan of care. Discharge Recommendations:  Inpatient Rehab  Further Equipment Recommendations for Discharge:  TBD by rehab      SUBJECTIVE:   Patient stated Jus Gabriel are we doing.     OBJECTIVE DATA SUMMARY:   Critical Behavior:  Neurologic State: Alert  Orientation Level: Oriented to person, Oriented to place, Disoriented to situation, Disoriented to time  Cognition: Follows commands  Safety/Judgement: Fall prevention  Functional Mobility Training:  Bed Mobility:     Supine to Sit: Maximum assistance;Assist x2     Scooting: Moderate assistance;Assist x2        Transfers:  Sit to Stand: Moderate assistance;Assist x2  Stand to Sit: Moderate assistance;Assist x2        Bed to Chair: Maximum assistance;Assist x2                    Balance:  Sitting: Intact  Sitting - Static: Fair (occasional)  Sitting - Dynamic: Fair (occasional)  Standing: Impaired; With support  Standing - Static: Fair;Constant support  Standing - Dynamic : Poor  Pain:      Pt with no complaints of pain today               Activity Tolerance:   Pt with improved sit to stand tranfer today.    After treatment:   [x]    Patient left in no apparent distress sitting up in chair  []    Patient left in no apparent distress in bed  [x]    Call bell left within reach  [x]    Nursing notified  [x] Caregiver present  []    Bed alarm activated    COMMUNICATION/COLLABORATION:   The patients plan of care was discussed with: Occupational Therapist and Registered Nurse    Paolo Penn   Time Calculation: 31 mins

## 2018-07-25 NOTE — INTERDISCIPLINARY ROUNDS
Interdisciplinary Rounds were completed on this patient. Rounds included nursing, clinical care leader, pharmacy, and case management. Plan for the day:sitter, PT, up in chair, ABX, pull flannery? ?  Plan for the stay: continue current TX  Activity:.  Turn Q2HR, up in chair/PT  Anticipated discharge date: TBD

## 2018-07-26 ENCOUNTER — APPOINTMENT (OUTPATIENT)
Dept: GENERAL RADIOLOGY | Age: 78
DRG: 329 | End: 2018-07-26
Attending: INTERNAL MEDICINE
Payer: MEDICARE

## 2018-07-26 LAB
ANION GAP SERPL CALC-SCNC: 6 MMOL/L (ref 5–15)
BASOPHILS # BLD: 0.1 K/UL (ref 0–0.1)
BASOPHILS NFR BLD: 1 % (ref 0–1)
BUN SERPL-MCNC: 16 MG/DL (ref 6–20)
BUN/CREAT SERPL: 23 (ref 12–20)
CALCIUM SERPL-MCNC: 8.3 MG/DL (ref 8.5–10.1)
CHLORIDE SERPL-SCNC: 102 MMOL/L (ref 97–108)
CO2 SERPL-SCNC: 26 MMOL/L (ref 21–32)
CREAT SERPL-MCNC: 0.69 MG/DL (ref 0.7–1.3)
DIFFERENTIAL METHOD BLD: ABNORMAL
EOSINOPHIL # BLD: 0.2 K/UL (ref 0–0.4)
EOSINOPHIL NFR BLD: 2 % (ref 0–7)
ERYTHROCYTE [DISTWIDTH] IN BLOOD BY AUTOMATED COUNT: 24.2 % (ref 11.5–14.5)
GLUCOSE SERPL-MCNC: 135 MG/DL (ref 65–100)
HCT VFR BLD AUTO: 27.5 % (ref 36.6–50.3)
HGB BLD-MCNC: 8.3 G/DL (ref 12.1–17)
IMM GRANULOCYTES # BLD: 0.2 K/UL (ref 0–0.04)
IMM GRANULOCYTES NFR BLD AUTO: 2 % (ref 0–0.5)
LYMPHOCYTES # BLD: 1.3 K/UL (ref 0.8–3.5)
LYMPHOCYTES NFR BLD: 12 % (ref 12–49)
MCH RBC QN AUTO: 26.3 PG (ref 26–34)
MCHC RBC AUTO-ENTMCNC: 30.2 G/DL (ref 30–36.5)
MCV RBC AUTO: 87.3 FL (ref 80–99)
MONOCYTES # BLD: 1.7 K/UL (ref 0–1)
MONOCYTES NFR BLD: 16 % (ref 5–13)
NEUTS SEG # BLD: 7.4 K/UL (ref 1.8–8)
NEUTS SEG NFR BLD: 67 % (ref 32–75)
NRBC # BLD: 0.04 K/UL (ref 0–0.01)
NRBC BLD-RTO: 0.4 PER 100 WBC
PLATELET # BLD AUTO: 404 K/UL (ref 150–400)
PMV BLD AUTO: 9.9 FL (ref 8.9–12.9)
POTASSIUM SERPL-SCNC: 4.3 MMOL/L (ref 3.5–5.1)
RBC # BLD AUTO: 3.15 M/UL (ref 4.1–5.7)
RBC MORPH BLD: ABNORMAL
SODIUM SERPL-SCNC: 134 MMOL/L (ref 136–145)
WBC # BLD AUTO: 10.9 K/UL (ref 4.1–11.1)

## 2018-07-26 PROCEDURE — 77030018719 HC DRSG PTCH ANTIMIC J&J -A

## 2018-07-26 PROCEDURE — 74011250636 HC RX REV CODE- 250/636: Performed by: EMERGENCY MEDICINE

## 2018-07-26 PROCEDURE — 74011000250 HC RX REV CODE- 250: Performed by: INTERNAL MEDICINE

## 2018-07-26 PROCEDURE — 77030010541

## 2018-07-26 PROCEDURE — 77030011641 HC PASTE OST ADH BMS -A

## 2018-07-26 PROCEDURE — 74011250636 HC RX REV CODE- 250/636: Performed by: HOSPITALIST

## 2018-07-26 PROCEDURE — 94640 AIRWAY INHALATION TREATMENT: CPT

## 2018-07-26 PROCEDURE — 97530 THERAPEUTIC ACTIVITIES: CPT

## 2018-07-26 PROCEDURE — 74011000250 HC RX REV CODE- 250: Performed by: EMERGENCY MEDICINE

## 2018-07-26 PROCEDURE — 71045 X-RAY EXAM CHEST 1 VIEW: CPT

## 2018-07-26 PROCEDURE — 77030010520

## 2018-07-26 PROCEDURE — C9113 INJ PANTOPRAZOLE SODIUM, VIA: HCPCS | Performed by: HOSPITALIST

## 2018-07-26 PROCEDURE — 74011000250 HC RX REV CODE- 250: Performed by: HOSPITALIST

## 2018-07-26 PROCEDURE — 36415 COLL VENOUS BLD VENIPUNCTURE: CPT | Performed by: SURGERY

## 2018-07-26 PROCEDURE — 80048 BASIC METABOLIC PNL TOTAL CA: CPT | Performed by: SURGERY

## 2018-07-26 PROCEDURE — 74011250636 HC RX REV CODE- 250/636: Performed by: COLON & RECTAL SURGERY

## 2018-07-26 PROCEDURE — 74011000250 HC RX REV CODE- 250: Performed by: SURGERY

## 2018-07-26 PROCEDURE — 85025 COMPLETE CBC W/AUTO DIFF WBC: CPT | Performed by: SURGERY

## 2018-07-26 PROCEDURE — 65270000029 HC RM PRIVATE

## 2018-07-26 PROCEDURE — 74011000258 HC RX REV CODE- 258: Performed by: EMERGENCY MEDICINE

## 2018-07-26 PROCEDURE — 74011000258 HC RX REV CODE- 258: Performed by: SURGERY

## 2018-07-26 PROCEDURE — 74011250636 HC RX REV CODE- 250/636: Performed by: INTERNAL MEDICINE

## 2018-07-26 PROCEDURE — 77010033678 HC OXYGEN DAILY

## 2018-07-26 RX ADMIN — SODIUM CHLORIDE 10 ML: 9 INJECTION, SOLUTION INTRAMUSCULAR; INTRAVENOUS; SUBCUTANEOUS at 06:17

## 2018-07-26 RX ADMIN — PIPERACILLIN SODIUM,TAZOBACTAM SODIUM 4.5 G: 4; .5 INJECTION, POWDER, FOR SOLUTION INTRAVENOUS at 18:46

## 2018-07-26 RX ADMIN — SODIUM CHLORIDE 10 ML: 9 INJECTION, SOLUTION INTRAMUSCULAR; INTRAVENOUS; SUBCUTANEOUS at 22:51

## 2018-07-26 RX ADMIN — IPRATROPIUM BROMIDE AND ALBUTEROL SULFATE 3 ML: .5; 3 SOLUTION RESPIRATORY (INHALATION) at 07:19

## 2018-07-26 RX ADMIN — HYDROMORPHONE HYDROCHLORIDE 0.5 MG: 1 INJECTION, SOLUTION INTRAMUSCULAR; INTRAVENOUS; SUBCUTANEOUS at 02:28

## 2018-07-26 RX ADMIN — HYDROMORPHONE HYDROCHLORIDE 0.5 MG: 1 INJECTION, SOLUTION INTRAMUSCULAR; INTRAVENOUS; SUBCUTANEOUS at 13:24

## 2018-07-26 RX ADMIN — ASCORBIC ACID, VITAMIN A PALMITATE, CHOLECALCIFEROL, THIAMINE HYDROCHLORIDE, RIBOFLAVIN-5 PHOSPHATE SODIUM, PYRIDOXINE HYDROCHLORIDE, NIACINAMIDE, DEXPANTHENOL, ALPHA-TOCOPHEROL ACETATE, VITAMIN K1, FOLIC ACID, BIOTIN, CYANOCOBALAMIN: 200; 3300; 200; 6; 3.6; 6; 40; 15; 10; 150; 600; 60; 5 INJECTION, SOLUTION INTRAVENOUS at 18:47

## 2018-07-26 RX ADMIN — HYDROMORPHONE HYDROCHLORIDE 0.5 MG: 1 INJECTION, SOLUTION INTRAMUSCULAR; INTRAVENOUS; SUBCUTANEOUS at 19:04

## 2018-07-26 RX ADMIN — IPRATROPIUM BROMIDE AND ALBUTEROL SULFATE 3 ML: .5; 3 SOLUTION RESPIRATORY (INHALATION) at 15:48

## 2018-07-26 RX ADMIN — IPRATROPIUM BROMIDE AND ALBUTEROL SULFATE 3 ML: .5; 3 SOLUTION RESPIRATORY (INHALATION) at 11:32

## 2018-07-26 RX ADMIN — LEVETIRACETAM 500 MG: 5 INJECTION INTRAVENOUS at 18:46

## 2018-07-26 RX ADMIN — HYDROMORPHONE HYDROCHLORIDE 0.5 MG: 1 INJECTION, SOLUTION INTRAMUSCULAR; INTRAVENOUS; SUBCUTANEOUS at 08:09

## 2018-07-26 RX ADMIN — PIPERACILLIN SODIUM,TAZOBACTAM SODIUM 4.5 G: 4; .5 INJECTION, POWDER, FOR SOLUTION INTRAVENOUS at 03:09

## 2018-07-26 RX ADMIN — LEVETIRACETAM 500 MG: 5 INJECTION INTRAVENOUS at 05:05

## 2018-07-26 RX ADMIN — SODIUM CHLORIDE 40 MG: 9 INJECTION INTRAMUSCULAR; INTRAVENOUS; SUBCUTANEOUS at 08:17

## 2018-07-26 RX ADMIN — METOPROLOL TARTRATE 2.5 MG: 1 INJECTION, SOLUTION INTRAVENOUS at 11:11

## 2018-07-26 RX ADMIN — AMIODARONE HYDROCHLORIDE 0.5 MG/MIN: 50 INJECTION, SOLUTION INTRAVENOUS at 11:24

## 2018-07-26 RX ADMIN — SODIUM CHLORIDE 10 ML: 9 INJECTION, SOLUTION INTRAMUSCULAR; INTRAVENOUS; SUBCUTANEOUS at 02:28

## 2018-07-26 RX ADMIN — SODIUM CHLORIDE 40 MG: 9 INJECTION INTRAMUSCULAR; INTRAVENOUS; SUBCUTANEOUS at 22:48

## 2018-07-26 RX ADMIN — AMIODARONE HYDROCHLORIDE 0.5 MG/MIN: 50 INJECTION, SOLUTION INTRAVENOUS at 22:48

## 2018-07-26 RX ADMIN — FLUCONAZOLE 400 MG: 400 INJECTION INTRAVENOUS at 08:25

## 2018-07-26 RX ADMIN — METOPROLOL TARTRATE 2.5 MG: 1 INJECTION, SOLUTION INTRAVENOUS at 19:01

## 2018-07-26 RX ADMIN — SODIUM CHLORIDE 10 ML: 9 INJECTION, SOLUTION INTRAMUSCULAR; INTRAVENOUS; SUBCUTANEOUS at 08:11

## 2018-07-26 RX ADMIN — METOPROLOL TARTRATE 2.5 MG: 1 INJECTION, SOLUTION INTRAVENOUS at 06:15

## 2018-07-26 RX ADMIN — HYDROMORPHONE HYDROCHLORIDE 0.5 MG: 1 INJECTION, SOLUTION INTRAMUSCULAR; INTRAVENOUS; SUBCUTANEOUS at 23:39

## 2018-07-26 RX ADMIN — PIPERACILLIN SODIUM,TAZOBACTAM SODIUM 4.5 G: 4; .5 INJECTION, POWDER, FOR SOLUTION INTRAVENOUS at 08:24

## 2018-07-26 RX ADMIN — IPRATROPIUM BROMIDE AND ALBUTEROL SULFATE 3 ML: .5; 3 SOLUTION RESPIRATORY (INHALATION) at 22:01

## 2018-07-26 RX ADMIN — METOPROLOL TARTRATE 2.5 MG: 1 INJECTION, SOLUTION INTRAVENOUS at 02:27

## 2018-07-26 NOTE — INTERDISCIPLINARY ROUNDS
Interdisciplinary Rounds were completed on this patient. Rounds included nursing, clinical care leader, pharmacy, and case management.      Plan for the day:monitor I&o, awaiting bowel function, breathing TX, ABX, TPN  Plan for the stay:continue current TX  Activity: up in chair, turn   Anticipated discharge date: Home with  or [facility]

## 2018-07-26 NOTE — PROGRESS NOTES
Notified by Telemetry monitor that patient had 12 beats of V-Tach from 0402::07. This was verified on the monitor to be true. The patient had no signs of symptoms of this rhythm. The patient is currently on an amiodarone drip at 20 mL/hr and has a pacemaker. Charge Nurse notified.

## 2018-07-26 NOTE — PROGRESS NOTES
Midline abdominal incision (staples) red around edges, oozing (sero-sang drainage). Dressing changed. Ostomy bag changed, MADELEINE drsg x2 changed. Patient is A&Ox3, up in chair, wife & sitter at bedside.
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
## 2018-07-26 NOTE — PROGRESS NOTES
CRS POD# 9 s/p R colectomy, SBR, extensive YAMEL    Pain well controlled. No ostomy output. /85  Pulse 75  Temp 97.6 °F (36.4 °C)  Resp 28  Ht 6' 2\" (1.88 m)  Wt 128.2 kg (282 lb 9.6 oz)  SpO2 98%  BMI 36.28 kg/m2    NAD, AAO  Abd soft, approp TTP  Incision c/d/i with mild serosang drainage from midline incision  JPs serosang  Ostomy pink, flat. No gas or stool in bag    WBC normal  Hgb stable    Plan  -Continue TPN. Okay to have a few ice chips every hour  -Prolonged postop ileus.   Await return of bowel function

## 2018-07-26 NOTE — PROGRESS NOTES
CM spoke with pt and wife regarding recommendations for inpt rehab. Wife not comfortable with SNF rehab as they have had 2 bad experiences in past, she and pt would be open to inpt rehab at [address] [place] [address]. Referral sent. CM will await response and keep family updated on status.     Ugo Rojas, MANDON, RN  Care Manager [facility]  [phone]

## 2018-07-26 NOTE — PROGRESS NOTES
Problem: Self Care Deficits Care Plan (Adult)  Goal: *Acute Goals and Plan of Care (Insert Text)  Occupational Therapy Goals  Initiated 7/22/2018  1. Patient will perform one grooming task seated EOB with minimal assistance within 7 day(s). 2.  Patient will perform upper body dressing with moderate assistance  within 7 day(s). 3.  Patient will perform supine <> sit to participate in ADL tasks decrease caregiver burdenwith maximal assistance  within 7 day(s). 4.  Patient will perform sit <> stand to prepare for self care transfers with maximal assistance within 7 day(s). 5.  Patient will participate in R AROM and L self PROM upper extremity therapeutic exercise/activities with contact guard assist for 8 minutes within 7 day(s). 6.  Patient will utilize energy conservation techniques during functional activities with verbal cues within 7 day(s). Occupational Therapy TREATMENT  Patient: Jeevan Major (36 y.o. male)  Date: 7/26/2018  Diagnosis: Acute blood loss anemia  GI bleed  Anasarca  GI Bleed  gi bleed  ASCENDING COLON CANCER  GI bleed  Procedure(s) (LRB):  LAPAROSCOPIC  ASSISTED TO OPEN RIGHT COLECTOMY AND SMALL BOWEL RESECTION (Right) 9 Days Post-Op  Precautions:    Chart, occupational therapy assessment, plan of care, and goals were reviewed. ASSESSMENT:  Patient with slow progression, remains limited by anxiety, chest congestion, weakness and need for increased A for transfers and ADLs compared to baseline. Pt required max A x 2 for supine to sit, fair balance with use of R UE for support. Pt required cues to slow breathing, as pt is predominately a mouth breather. Attempted two trials of sit to stand from elevated bed height with mod-max A x 2. Chair setup to assist pt with transferring to his R (stronger) side. After several minutes resting in chair, assisted with doffing gown which was stained with abdominal incision drainage.  RN informed of draining with washcloth placed due to leaking bandage. At this time, pt is needing assist of two people for bed mobility and safe SPT. His standing endurance is limited which impacts his safety and ability to assist with clothing management during ADL tasks. May benefit from IP rehab to increase his strength, safety and decrease caregiver burden. Progression toward goals:  []       Improving appropriately and progressing toward goals  [x]       Improving slowly and progressing toward goals  []       Not making progress toward goals and plan of care will be adjusted     PLAN:  Patient continues to benefit from skilled intervention to address the above impairments. Continue treatment per established plan of care. Discharge Recommendations:  IP rehab  Further Equipment Recommendations for Discharge:  TBD     SUBJECTIVE:   Patient stated is it leaking?     OBJECTIVE DATA SUMMARY:   Cognitive/Behavioral Status:  Neurologic State: Alert;Confused  Orientation Level: Disoriented to situation;Disoriented to time  Cognition: Decreased attention/concentration;Decreased command following; Impaired decision making             Functional Mobility and Transfers for ADLs:  Bed Mobility:  Supine to Sit: Maximum assistance;Assist x2  Sit to Supine: Maximum assistance;Assist x2  Scooting: Moderate assistance;Assist x2    Transfers:  Sit to Stand: Moderate assistance;Assist x2; Additional time;Maximum assistance for SPT to pt's R (stronger, non jyoti side). Cues for hand placement for sit to stand. Required two attempts          Balance:  Sitting: Intact  Sitting - Static: Fair (occasional)  Sitting - Dynamic: Fair (occasional)  Standing: Impaired; With support  Standing - Static: Fair;Constant support  Standing - Dynamic : Poor    ADL Intervention:                      Upper Body [address]: Maximum assistance (to doff)- assist to doff off L jyoti UE                      Therapeutic Exercises:   Verbal cues to slow breathing down, pt is a mouth breather. Pain:  Pain Scale 1: Numeric (0 - 10)  Pain Intensity 1: 6  Pain Location 1: Abdomen  Pain Orientation 1: Anterior; Lower;Mid  Pain Description 1: Aching  Pain Intervention(s) 1:  (hurts when up & changing drsg)  Activity Tolerance:   VSS 2L 96% throughout session  Please refer to the flowsheet for vital signs taken during this treatment.   After treatment:   [x] Patient left in no apparent distress sitting up in chair  [] Patient left in no apparent distress in bed  [x] Call bell left within reach  [x] Nursing notified  [x] Caregiver present  [] Bed alarm activated    COMMUNICATION/COLLABORATION:   The patients plan of care was discussed with: Physical Therapist, Registered Nurse, Rehabilitation Attendant and Certified Nursing Assistant/Patient Care Technician,     Flaquita Cunningham OT  Time Calculation: 30 mins

## 2018-07-27 LAB
ANION GAP SERPL CALC-SCNC: 5 MMOL/L (ref 5–15)
BASOPHILS # BLD: 0.1 K/UL (ref 0–0.1)
BASOPHILS NFR BLD: 1 % (ref 0–1)
BUN SERPL-MCNC: 16 MG/DL (ref 6–20)
BUN/CREAT SERPL: 25 (ref 12–20)
CALCIUM SERPL-MCNC: 8.2 MG/DL (ref 8.5–10.1)
CHLORIDE SERPL-SCNC: 104 MMOL/L (ref 97–108)
CO2 SERPL-SCNC: 27 MMOL/L (ref 21–32)
CREAT SERPL-MCNC: 0.63 MG/DL (ref 0.7–1.3)
DIFFERENTIAL METHOD BLD: ABNORMAL
EOSINOPHIL # BLD: 0.4 K/UL (ref 0–0.4)
EOSINOPHIL NFR BLD: 3 % (ref 0–7)
ERYTHROCYTE [DISTWIDTH] IN BLOOD BY AUTOMATED COUNT: 24.5 % (ref 11.5–14.5)
GLUCOSE SERPL-MCNC: 137 MG/DL (ref 65–100)
HCT VFR BLD AUTO: 25.7 % (ref 36.6–50.3)
HGB BLD-MCNC: 7.8 G/DL (ref 12.1–17)
IMM GRANULOCYTES # BLD: 0.2 K/UL (ref 0–0.04)
IMM GRANULOCYTES NFR BLD AUTO: 2 % (ref 0–0.5)
LYMPHOCYTES # BLD: 1.2 K/UL (ref 0.8–3.5)
LYMPHOCYTES NFR BLD: 10 % (ref 12–49)
MCH RBC QN AUTO: 26.6 PG (ref 26–34)
MCHC RBC AUTO-ENTMCNC: 30.4 G/DL (ref 30–36.5)
MCV RBC AUTO: 87.7 FL (ref 80–99)
MONOCYTES # BLD: 1.8 K/UL (ref 0–1)
MONOCYTES NFR BLD: 15 % (ref 5–13)
NEUTS SEG # BLD: 8 K/UL (ref 1.8–8)
NEUTS SEG NFR BLD: 69 % (ref 32–75)
NRBC # BLD: 0.03 K/UL (ref 0–0.01)
NRBC BLD-RTO: 0.3 PER 100 WBC
PLATELET # BLD AUTO: 424 K/UL (ref 150–400)
POTASSIUM SERPL-SCNC: 4.2 MMOL/L (ref 3.5–5.1)
RBC # BLD AUTO: 2.93 M/UL (ref 4.1–5.7)
RBC MORPH BLD: ABNORMAL
SODIUM SERPL-SCNC: 136 MMOL/L (ref 136–145)
WBC # BLD AUTO: 11.7 K/UL (ref 4.1–11.1)

## 2018-07-27 PROCEDURE — 74011250636 HC RX REV CODE- 250/636: Performed by: INTERNAL MEDICINE

## 2018-07-27 PROCEDURE — 74011000258 HC RX REV CODE- 258: Performed by: SURGERY

## 2018-07-27 PROCEDURE — 77010033678 HC OXYGEN DAILY

## 2018-07-27 PROCEDURE — 74011000250 HC RX REV CODE- 250: Performed by: SURGERY

## 2018-07-27 PROCEDURE — 65270000029 HC RM PRIVATE

## 2018-07-27 PROCEDURE — 80048 BASIC METABOLIC PNL TOTAL CA: CPT | Performed by: SURGERY

## 2018-07-27 PROCEDURE — 74011250637 HC RX REV CODE- 250/637: Performed by: SURGERY

## 2018-07-27 PROCEDURE — 74011000250 HC RX REV CODE- 250: Performed by: EMERGENCY MEDICINE

## 2018-07-27 PROCEDURE — 74011250636 HC RX REV CODE- 250/636: Performed by: EMERGENCY MEDICINE

## 2018-07-27 PROCEDURE — 97530 THERAPEUTIC ACTIVITIES: CPT

## 2018-07-27 PROCEDURE — 74011250636 HC RX REV CODE- 250/636: Performed by: COLON & RECTAL SURGERY

## 2018-07-27 PROCEDURE — 85025 COMPLETE CBC W/AUTO DIFF WBC: CPT | Performed by: SURGERY

## 2018-07-27 PROCEDURE — 74011000250 HC RX REV CODE- 250: Performed by: INTERNAL MEDICINE

## 2018-07-27 PROCEDURE — 94640 AIRWAY INHALATION TREATMENT: CPT

## 2018-07-27 PROCEDURE — 74011000250 HC RX REV CODE- 250: Performed by: HOSPITALIST

## 2018-07-27 PROCEDURE — 97535 SELF CARE MNGMENT TRAINING: CPT

## 2018-07-27 PROCEDURE — 94761 N-INVAS EAR/PLS OXIMETRY MLT: CPT

## 2018-07-27 PROCEDURE — C9113 INJ PANTOPRAZOLE SODIUM, VIA: HCPCS | Performed by: HOSPITALIST

## 2018-07-27 PROCEDURE — 74011000258 HC RX REV CODE- 258: Performed by: EMERGENCY MEDICINE

## 2018-07-27 PROCEDURE — 97110 THERAPEUTIC EXERCISES: CPT

## 2018-07-27 PROCEDURE — 36415 COLL VENOUS BLD VENIPUNCTURE: CPT | Performed by: SURGERY

## 2018-07-27 PROCEDURE — 74011250636 HC RX REV CODE- 250/636: Performed by: HOSPITALIST

## 2018-07-27 RX ORDER — POLYETHYLENE GLYCOL 3350 17 G/17G
17 POWDER, FOR SOLUTION ORAL DAILY
Status: DISCONTINUED | OUTPATIENT
Start: 2018-07-27 | End: 2018-08-30

## 2018-07-27 RX ADMIN — LEVETIRACETAM 500 MG: 5 INJECTION INTRAVENOUS at 17:35

## 2018-07-27 RX ADMIN — IPRATROPIUM BROMIDE AND ALBUTEROL SULFATE 3 ML: .5; 3 SOLUTION RESPIRATORY (INHALATION) at 16:13

## 2018-07-27 RX ADMIN — IPRATROPIUM BROMIDE AND ALBUTEROL SULFATE 3 ML: .5; 3 SOLUTION RESPIRATORY (INHALATION) at 08:21

## 2018-07-27 RX ADMIN — HYDROMORPHONE HYDROCHLORIDE 0.5 MG: 1 INJECTION, SOLUTION INTRAMUSCULAR; INTRAVENOUS; SUBCUTANEOUS at 14:44

## 2018-07-27 RX ADMIN — FLUCONAZOLE 400 MG: 400 INJECTION INTRAVENOUS at 09:30

## 2018-07-27 RX ADMIN — SODIUM CHLORIDE 40 MG: 9 INJECTION INTRAMUSCULAR; INTRAVENOUS; SUBCUTANEOUS at 21:47

## 2018-07-27 RX ADMIN — SODIUM CHLORIDE 10 ML: 9 INJECTION, SOLUTION INTRAMUSCULAR; INTRAVENOUS; SUBCUTANEOUS at 07:16

## 2018-07-27 RX ADMIN — HYDROMORPHONE HYDROCHLORIDE 0.5 MG: 1 INJECTION, SOLUTION INTRAMUSCULAR; INTRAVENOUS; SUBCUTANEOUS at 04:01

## 2018-07-27 RX ADMIN — PIPERACILLIN SODIUM,TAZOBACTAM SODIUM 4.5 G: 4; .5 INJECTION, POWDER, FOR SOLUTION INTRAVENOUS at 17:35

## 2018-07-27 RX ADMIN — SODIUM CHLORIDE 40 MG: 9 INJECTION INTRAMUSCULAR; INTRAVENOUS; SUBCUTANEOUS at 09:20

## 2018-07-27 RX ADMIN — POLYETHYLENE GLYCOL 3350 17 G: 17 POWDER, FOR SOLUTION ORAL at 09:20

## 2018-07-27 RX ADMIN — METOPROLOL TARTRATE 2.5 MG: 1 INJECTION, SOLUTION INTRAVENOUS at 01:19

## 2018-07-27 RX ADMIN — LEVETIRACETAM 500 MG: 5 INJECTION INTRAVENOUS at 04:07

## 2018-07-27 RX ADMIN — METOPROLOL TARTRATE 2.5 MG: 1 INJECTION, SOLUTION INTRAVENOUS at 07:14

## 2018-07-27 RX ADMIN — IPRATROPIUM BROMIDE AND ALBUTEROL SULFATE 3 ML: .5; 3 SOLUTION RESPIRATORY (INHALATION) at 13:01

## 2018-07-27 RX ADMIN — IPRATROPIUM BROMIDE AND ALBUTEROL SULFATE 3 ML: .5; 3 SOLUTION RESPIRATORY (INHALATION) at 21:37

## 2018-07-27 RX ADMIN — PIPERACILLIN SODIUM,TAZOBACTAM SODIUM 4.5 G: 4; .5 INJECTION, POWDER, FOR SOLUTION INTRAVENOUS at 01:19

## 2018-07-27 RX ADMIN — SODIUM CHLORIDE 10 ML: 9 INJECTION, SOLUTION INTRAMUSCULAR; INTRAVENOUS; SUBCUTANEOUS at 14:45

## 2018-07-27 RX ADMIN — PIPERACILLIN SODIUM,TAZOBACTAM SODIUM 4.5 G: 4; .5 INJECTION, POWDER, FOR SOLUTION INTRAVENOUS at 09:15

## 2018-07-27 RX ADMIN — METOPROLOL TARTRATE 2.5 MG: 1 INJECTION, SOLUTION INTRAVENOUS at 17:36

## 2018-07-27 RX ADMIN — METOPROLOL TARTRATE 2.5 MG: 1 INJECTION, SOLUTION INTRAVENOUS at 11:55

## 2018-07-27 RX ADMIN — HYDROMORPHONE HYDROCHLORIDE 0.5 MG: 1 INJECTION, SOLUTION INTRAMUSCULAR; INTRAVENOUS; SUBCUTANEOUS at 21:46

## 2018-07-27 RX ADMIN — ASCORBIC ACID, VITAMIN A PALMITATE, CHOLECALCIFEROL, THIAMINE HYDROCHLORIDE, RIBOFLAVIN-5 PHOSPHATE SODIUM, PYRIDOXINE HYDROCHLORIDE, NIACINAMIDE, DEXPANTHENOL, ALPHA-TOCOPHEROL ACETATE, VITAMIN K1, FOLIC ACID, BIOTIN, CYANOCOBALAMIN: 200; 3300; 200; 6; 3.6; 6; 40; 15; 10; 150; 600; 60; 5 INJECTION, SOLUTION INTRAVENOUS at 17:54

## 2018-07-27 NOTE — PROGRESS NOTES
CRS POD# 10 s/p R colectomy, SBR, extensive YAMEL Pain well controlled. No ostomy output. /74 (BP 1 Location: Right arm, BP Patient Position: At rest)  Pulse 75  Temp 97.6 °F (36.4 °C)  Resp 20  Ht 6' 2\" (1.88 m)  Wt 128.2 kg (282 lb 9.6 oz)  SpO2 97%  BMI 36.28 kg/m2 NAD, AAO Abd soft, approp TTP Incision c/d/i with mild serosang drainage from midline incision JPs serosang Ostomy pink, flat. No gas or stool in bag Plan 
-Continue TPN. Okay to have a few ice chips every hour 
-Prolonged postop ileus. Will try miralax today. Await return of bowel function

## 2018-07-27 NOTE — PROGRESS NOTES
General Surgery End of Shift Nursing Note    Bedside shift change report given to Pauline Oglesby (oncoming nurse) by Luna Emmanuel (offgoing nurse). Report included the following information SBAR, Kardex, Intake/Output, MAR and Recent Results. Shift worked:   C   Summary of shift:    0   Issues for physician to address:   0     Number times ambulated in hallway past shift: 0    Number of times OOB to chair past shift: 1    Pain Management:  Current medication: Dilaudid  Patient states pain is manageable on current pain medication: YES    GI:    Current diet:  DIET NPO  TPN ADULT - CENTRAL AA 5% D20% W/ CA + ELECTROLYTES    Tolerating current diet: YES  Passing flatus: YES  Last Bowel Movement: several days ago   Appearance: 0    Respiratory:    Incentive Spirometer at bedside: YES  Patient instructed on use: YES    Patient Safety:    Falls Score: 1  Bed Alarm On? Yes  Sitter?  Yes    Shawanda Hernandez RN

## 2018-07-27 NOTE — PROGRESS NOTES
Problem: Mobility Impaired (Adult and Pediatric) Goal: *Acute Goals and Plan of Care (Insert Text) Physical Therapy Goals Reviewed and revised 7/26/2018 1. Patient will move from supine to sit and sit to supine , scoot up and down and roll side to side in bed with minimal assistance within 7 day(s). 2.  Patient will transfer from bed to chair and chair to bed with moderate assistance using the least restrictive device within 7 day(s). 3.  Patient will perform sit to stand with moderate assistance within 7 day(s). 4.  Patient will perform stand pivot transfer to wheelchair with moderate assistance in 7 days. Reviewed and revised 7/19/2018 1. Patient will move from supine to sit and sit to supine , scoot up and down and roll side to side in bed with minimal assistance within 7 day(s). 2.  Patient will transfer from bed to chair and chair to bed with moderate assistance using the least restrictive device within 7 day(s). 3.  Patient will perform sit to stand with minimal assistance within 7 day(s). 4.  Patient will perform stand pivot transfer to wheelchair with minimal assistance in 7 days. Initiated 7/12/2018 1. Patient will move from supine to sit and sit to supine , scoot up and down and roll side to side in bed with modified independence within 7 day(s). 2.  Patient will transfer from bed to chair and chair to bed with supervision/set-up using the least restrictive device within 7 day(s). 3.  Patient will perform sit to stand with supervision/set-up within 7 day(s). 4.  Patient will perform stand pivot transfer to wheelchair with modified independence in 7 days. physical Therapy TREATMENT Patient: Real Mojica (69 y.o. male) Date: 7/27/2018 Diagnosis: Acute blood loss anemia GI bleed Anasarca GI Bleed 
gi bleed ASCENDING COLON CANCER  GI bleed Procedure(s) (LRB): 
LAPAROSCOPIC  ASSISTED TO OPEN RIGHT COLECTOMY AND SMALL BOWEL RESECTION (Right) 10 Days Post-Op Precautions:   
Chart, physical therapy assessment, plan of care and goals were reviewed. ASSESSMENT: 
Discussed with nursing and cleared to mobilize. Pt received sitting in chair with wife and sitter present for session. He transferred sit <> stand 3x, with min/modAx2. Pt required cueing for scooting forward in the chair and using RUE to push up from chair. Initiated transfer with rocking, \"nose over toes\". Pt stood for 15-20 seconds for each stand, requiring CGA HHAx1. In standing pt needed cueing to \"tuck your butt in\", to look up and stand tall. Pt reported pain with standing and fatigue. After each stand encouraged pt to cough and slow breathing. Noted pt had increased work of breathing and labored breathing. VSS, O2 sats remained >97% on 2.5 LO2 via NC. Noted dressing saturated and nursing made aware. Discussed discharge plans with pt and wife, seem agreeable to IP rehab at [address] [place] [address]. Wife agreed that if pt's mobility and strength does not improve that she could not perform the transfers at home alone. Ended session with pt sitting up in chair, with wife and sitter present. Progression toward goals: 
[]    Improving appropriately and progressing toward goals [x]    Improving slowly and progressing toward goals 
[]    Not making progress toward goals and plan of care will be adjusted PLAN: 
Patient continues to benefit from skilled intervention to address the above impairments. Continue treatment per established plan of care. Discharge Recommendations:  Inpatient Rehab Further Equipment Recommendations for Discharge:  none SUBJECTIVE:  
Patient stated I have the tickliest tummy.  OBJECTIVE DATA SUMMARY:  
Critical Behavior: 
Neurologic State: Alert, Confused (confused at times) Orientation Level: Disoriented to time, Disoriented to situation Cognition: Decreased attention/concentration Safety/Judgement: Fall prevention Functional Mobility Training: 
Bed Mobility: session began and ended in chair Scooting: Stand-by assistance Transfers: 
Sit to Stand: Moderate assistance;Minimum assistance;Assist x2 Stand to Sit: Moderate assistance;Minimum assistance;Assist x2 Balance: 
Sitting: Intact Sitting - Static: Fair (occasional) Sitting - Dynamic: Fair (occasional) Standing: Impaired; With support Standing - Static: Fair;Constant support Standing - Dynamic : Poor Ambulation/Gait Training: 
  
  
  
  
 Pain: 
Pain Scale 1: Numeric (0 - 10) Pain Intensity 1: 4 Pain Location 1: Abdomen Pain Orientation 1: Anterior; Lower Pain Description 1: Aching Pain Intervention(s) 1: Medication (see MAR) Activity Tolerance:  
Fair, pt complained of pain Please refer to the flowsheet for vital signs taken during this treatment. After treatment:  
[x]    Patient left in no apparent distress sitting up in chair 
[]    Patient left in no apparent distress in bed 
[x]    Call bell left within reach [x]    Nursing notified 
[]    Caregiver present 
[]    Bed alarm activated COMMUNICATION/COLLABORATION:  
The patients plan of care was disussed with: Occupational Therapist and Registered Nurse Kaylin Coronel, [facility] Time Calculation: 39 mins Regarding student involvement in patient care: A student participated in this treatment session. Per CMS Medicare statements and APTA guidelines I certify that the following was true: 1. I was present and directly observed the entire session. 2. I made all skilled judgments and clinical decisions regarding care. 3. I am the practitioner responsible for assessment, treatment, and documentation.

## 2018-07-27 NOTE — PROGRESS NOTES
Problem: Self Care Deficits Care Plan (Adult) Goal: *Acute Goals and Plan of Care (Insert Text) Occupational Therapy Goals Initiated 7/22/2018 1. Patient will perform one grooming task seated EOB with minimal assistance within 7 day(s). 2.  Patient will perform upper body dressing with moderate assistance  within 7 day(s). 3.  Patient will perform supine <> sit to participate in ADL tasks decrease caregiver burdenwith maximal assistance  within 7 day(s). 4.  Patient will perform sit <> stand to prepare for self care transfers with maximal assistance within 7 day(s). 5.  Patient will participate in R AROM and L self PROM upper extremity therapeutic exercise/activities with contact guard assist for 8 minutes within 7 day(s). 6.  Patient will utilize energy conservation techniques during functional activities with verbal cues within 7 day(s). Occupational Therapy TREATMENT Patient: Rico Jin (39 y.o. male) Date: 7/27/2018 Diagnosis: Acute blood loss anemia GI bleed Anasarca GI Bleed 
gi bleed ASCENDING COLON CANCER  GI bleed Procedure(s) (LRB): 
LAPAROSCOPIC  ASSISTED TO OPEN RIGHT COLECTOMY AND SMALL BOWEL RESECTION (Right) 10 Days Post-Op Precautions:   
Chart, occupational therapy assessment, plan of care, and goals were reviewed. ASSESSMENT: 
Cleared by RN to see pt for therapy session. Pt received seated in chair, agreeable to participate, wife present. Pt with unproductive cough and occasional dyspnea although VSS (O2 sats 95-98% at rest and with activity), encouraged pt to perform PLB and use incentive spirometer. In sitting pt washed hands with mod A ( to wash L). Performed self ROM exercises of L shoulder with min A and verbal/tactile cueing for technique, wife reported that pt performed LUE exercises at home. Pt practiced standing from chair x3 trials, working on anterior weight shift (\"nose over toes\") and building momentum for transfer.  Pt initially required max-mod A x2, but progressed to min A x2 with cueing for technique. Hand held assist for standing balance and tactile cues given for pt to engage glutes for upright posture, pt standing for approx 20 seconds before returning to sitting. Assistance for controlled descent to chair and to bring hands to armrests. With activity pt able to have more productive cough. Pt in chair at end of session, call bell in reach, wife present. He will benefit from continued OT to address the above goals. Recommend rehab at discharge as pt is below his functional baseline and requires assist x2 for functional transfers. Progression toward goals: 
[]       Improving appropriately and progressing toward goals [x]       Improving slowly and progressing toward goals 
[]       Not making progress toward goals and plan of care will be adjusted PLAN: 
Patient continues to benefit from skilled intervention to address the above impairments. Continue treatment per established plan of care. Discharge Recommendations:  Inpatient Rehab Further Equipment Recommendations for Discharge:  TBD at rehab SUBJECTIVE:  
Patient stated I do this at home (arm exercise).  OBJECTIVE DATA SUMMARY:  
Cognitive/Behavioral Status: 
Neurologic State: Alert Orientation Level: Oriented to person;Disoriented to situation;Disoriented to time Cognition: Follows commands;Decreased attention/concentration Perception: Appears intact Perseveration: No perseveration noted Safety/Judgement: Fall prevention Functional Mobility and Transfers for ADLs: 
Bed Mobility: 
Scooting: Stand-by assistance Transfers: 
Sit to Stand: Moderate assistance;Minimum assistance;Assist x2 Balance: 
Sitting: Intact Sitting - Static: Fair (occasional) Sitting - Dynamic: Fair (occasional) Standing: Impaired; With support Standing - Static: Fair;Constant support Standing - Dynamic : Poor ADL Intervention: 
   encouraged pt to perform PLB and use incentive spirometer.  In sitting pt washed hands with mod A ( to wash L). Pt practiced standing from chair x3 trials, working on anterior weight shift (\"nose over toes\") and building momentum for transfer. Pt initially required max-mod A x2, but progressed to min A x2 with cueing for technique. Hand held assist for standing balance and tactile cues given for pt to engage glutes for upright posture, pt standing for approx 20 seconds before returning to sitting. Assistance for controlled descent to chair and to bring hands to armrests. Grooming Washing Hands: Moderate assistance Lower Body Dressing Assistance Socks: Total assistance (dependent) Shoes with Cloth Laces: Total assistance (dependent) Cognitive Retraining Safety/Judgement: Fall prevention Therapeutic Exercises:  
Performed self ROM exercises of L shoulder (10 reps of L shoulder flexion with R hand assisting) with min A and verbal/tactile cueing for technique, wife reported that pt performed LUE exercises at home. Cues for slow, controlled movement as pt tended to perform movements very quickly and jerking arm. Pain: 
Pain Scale 1: Numeric (0 - 10) Pain Intensity 1: 4 Pain Location 1: Abdomen Pain Orientation 1: Anterior; Lower Pain Description 1: Aching Pain Intervention(s) 1: Medication (see MAR) Activity Tolerance:  
Good Please refer to the flowsheet for vital signs taken during this treatment. After treatment:  
[x] Patient left in no apparent distress sitting up in chair 
[] Patient left in no apparent distress in bed 
[x] Call bell left within reach [x] Nursing notified 
[x] Caregiver present [x] Chair alarm activated COMMUNICATION/COLLABORATION:  
The patients plan of care was discussed with: Physical Therapist and Registered Nurse Karolyn Gaspar OT Time Calculation: 38 mins

## 2018-07-27 NOTE — PROGRESS NOTES
Follow up visit in 2112 with KYLE pt. Met with pt and spouse who engaged with  and shared pt's health history adding that she has been able to cope through the strength provided by her edith. Shared instances in which she believed God has reassured her in her edith. Affirmed edith while providing active listening. Pt appeared to have a cheerful disposition as did spouse. Prayed with and for pt and spouse who had identified prayer as a source of strength. Advised of availability. Will follow up as able/needed. KIRILL Babb. Orlando Chambers

## 2018-07-27 NOTE — PROGRESS NOTES
Nutrition Assessment: 
 
RECOMMENDATIONS:  
TPN recommendations:  
 Increase to D20, 5% AA @ 100mL/h (provides 2112kcals/120gPro) Recommend checking Mag and Phos ASSESSMENT:  
Chart reviewed. Pt continues with a postop ileus and remains NPO. Minimal dobbhoff OP. TPN continues at 75mL/h, which meets 70% kcal and protein needs. Noted he is down 10lb over the past week, could be partially due to hypocaloric TPN regimen. Still awaiting return of gut function, no colostomy OP and abdomen remains distended. K WNL, Mag and Phos have not been checked. No accu-checks ordered, however BG on chemistry has been WNL. Dietitians Intervention(s)/Plan(s): Increase TPN and checked electrolytes SUBJECTIVE/OBJECTIVE:  
Pt disoriented Diet Order: NPO, Other (comment) (TPN: D20, 5% AA @ 75mL/h (provides 1584kcals/90gPro) ) 
% Eaten:  No data found. to suction at   flush with       via NG Tube   Residuals: 100 mL Pertinent Medications:diflucan, protonix, zosyn, miralax. Chemistries: 
Lab Results Component Value Date/Time Sodium 136 07/27/2018 04:47 AM  
 Potassium 4.2 07/27/2018 04:47 AM  
 Chloride 104 07/27/2018 04:47 AM  
 CO2 27 07/27/2018 04:47 AM  
 Anion gap 5 07/27/2018 04:47 AM  
 Glucose 137 (H) 07/27/2018 04:47 AM  
 BUN 16 07/27/2018 04:47 AM  
 Creatinine 0.63 (L) 07/27/2018 04:47 AM  
 BUN/Creatinine ratio 25 (H) 07/27/2018 04:47 AM  
 GFR est AA >60 07/27/2018 04:47 AM  
 GFR est non-AA >60 07/27/2018 04:47 AM  
 Calcium 8.2 (L) 07/27/2018 04:47 AM  
 Albumin 2.1 (L) 07/21/2018 03:00 AM  
  
Anthropometrics: Height: 6' 2\" (188 cm) Weight: 128.2 kg (282 lb 9.6 oz)   [x]bed scale (7/26)   []stated   []unknown IBW (%IBW):   ( ) UBW (%UBW):   (  %) BMI: Body mass index is 36.28 kg/(m^2). This BMI is indicative of: 
[]Underweight   []Normal   []Overweight   [x] Obesity   [] Extreme Obesity (BMI>40) Estimated Nutrition Needs (Based on): 0396 Kcals/day (BMR: 2075 x 1.1) , 130 g (1 g/kg) Protein Carbohydrate: At Least 130 g/day  Fluids: 2200 mL/day Last BM: colostomy-no OP   []Active     []Hyperactive  [x]Hypoactive       [] Absent   BS Skin:    [] Intact   [x] Incision  [] Breakdown   [] DTI   [] Tears/Excoriation/Abrasion  [x]Edema(+1-generalized; +1 pitting-BUE; +2 pitting-BLE)  [] Other: Wt Readings from Last 30 Encounters:  
07/26/18 128.2 kg (282 lb 9.6 oz) 07/05/18 128.5 kg (283 lb 4.8 oz) 05/15/18 120.7 kg (266 lb) 05/07/18 118.8 kg (262 lb) 04/10/18 121.1 kg (267 lb)  
03/26/18 121.1 kg (267 lb)  
03/16/18 121.1 kg (267 lb)  
09/21/17 118.4 kg (261 lb) 08/03/17 117.5 kg (259 lb) 04/06/17 117.3 kg (258 lb 8 oz) 03/31/17 117.5 kg (259 lb)  
03/16/17 117.5 kg (259 lb)  
02/23/17 119.7 kg (264 lb) 01/03/17 115.2 kg (254 lb)  
11/17/16 119.7 kg (264 lb) 10/18/16 118.8 kg (262 lb) 10/07/16 118.8 kg (262 lb)  
09/22/16 113.9 kg (251 lb 1 oz) 08/18/16 116.4 kg (256 lb 11.2 oz) 05/16/16 114.8 kg (253 lb)  
03/10/16 114.8 kg (253 lb 1.6 oz) 02/11/16 122 kg (269 lb)  
01/11/16 119.7 kg (264 lb) 11/04/15 122.5 kg (270 lb) 08/25/15 119.7 kg (264 lb) 08/13/15 116.1 kg (256 lb) 05/04/15 119.7 kg (264 lb)  
03/27/15 119.7 kg (264 lb)  
02/21/15 121.2 kg (267 lb 3.2 oz) 01/29/15 119.9 kg (264 lb 6.4 oz) NUTRITION DIAGNOSES:  
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 NUTRITION INTERVENTIONS: 
Meals/Snacks: Other (advance diet as able per CRS ) Enteral/Parenteral Nutrition: Modify rate, concentration, composition, and schedule GOAL:  
Pt will meet >90% kcal and protein needs via TPN in 2-4 days. NUTRITION MONITORING AND EVALUATION Previous Goal: Pt will meet >90% kcal and protein needs via TPN in 2-4 days Previous Goal Met: No  
Previous Recommendations Implemented: No  
Cultural, Church, or Ethnic Dietary Needs: None LEARNING NEEDS (Diet, Food/Nutrient-Drug Interaction):  
 [x] None Identified 
 [] Identified and Education Provided/Documented 
 [] Identified and Pt declined/was not appropriate [x] Interdisciplinary Care Plan Reviewed/Documented  
 [x] Participated in Discharge Planning: Unable to determine 
 [] Interdisciplinary Rounds NUTRITION RISK:  
 [x] High              [] Moderate           []  Low  []  Minimal/Uncompromised Maude Villela RD, [facility] Pager [phone] Weekend Pager [phone]

## 2018-07-28 LAB
ANION GAP SERPL CALC-SCNC: 7 MMOL/L (ref 5–15)
BASOPHILS # BLD: 0 K/UL (ref 0–0.1)
BASOPHILS NFR BLD: 0 % (ref 0–1)
BUN SERPL-MCNC: 15 MG/DL (ref 6–20)
BUN/CREAT SERPL: 21 (ref 12–20)
CALCIUM SERPL-MCNC: 8.2 MG/DL (ref 8.5–10.1)
CHLORIDE SERPL-SCNC: 102 MMOL/L (ref 97–108)
CO2 SERPL-SCNC: 26 MMOL/L (ref 21–32)
CREAT SERPL-MCNC: 0.7 MG/DL (ref 0.7–1.3)
DIFFERENTIAL METHOD BLD: ABNORMAL
EOSINOPHIL # BLD: 0.2 K/UL (ref 0–0.4)
EOSINOPHIL NFR BLD: 2 % (ref 0–7)
ERYTHROCYTE [DISTWIDTH] IN BLOOD BY AUTOMATED COUNT: 24.2 % (ref 11.5–14.5)
GLUCOSE BLD STRIP.AUTO-MCNC: 129 MG/DL (ref 65–100)
GLUCOSE BLD STRIP.AUTO-MCNC: 135 MG/DL (ref 65–100)
GLUCOSE BLD STRIP.AUTO-MCNC: 144 MG/DL (ref 65–100)
GLUCOSE BLD STRIP.AUTO-MCNC: 147 MG/DL (ref 65–100)
GLUCOSE SERPL-MCNC: 128 MG/DL (ref 65–100)
HCT VFR BLD AUTO: 26.3 % (ref 36.6–50.3)
HGB BLD-MCNC: 7.6 G/DL (ref 12.1–17)
IMM GRANULOCYTES # BLD: 0 K/UL (ref 0–0.04)
IMM GRANULOCYTES NFR BLD AUTO: 0 % (ref 0–0.5)
LYMPHOCYTES # BLD: 1.5 K/UL (ref 0.8–3.5)
LYMPHOCYTES NFR BLD: 13 % (ref 12–49)
MAGNESIUM SERPL-MCNC: 2.2 MG/DL (ref 1.6–2.4)
MCH RBC QN AUTO: 25.4 PG (ref 26–34)
MCHC RBC AUTO-ENTMCNC: 28.9 G/DL (ref 30–36.5)
MCV RBC AUTO: 88 FL (ref 80–99)
MONOCYTES # BLD: 1.3 K/UL (ref 0–1)
MONOCYTES NFR BLD: 11 % (ref 5–13)
NEUTS SEG # BLD: 8.9 K/UL (ref 1.8–8)
NEUTS SEG NFR BLD: 74 % (ref 32–75)
NRBC # BLD: 0.02 K/UL (ref 0–0.01)
NRBC BLD-RTO: 0.2 PER 100 WBC
PLATELET # BLD AUTO: 459 K/UL (ref 150–400)
PMV BLD AUTO: 9.9 FL (ref 8.9–12.9)
POTASSIUM SERPL-SCNC: 4.2 MMOL/L (ref 3.5–5.1)
RBC # BLD AUTO: 2.99 M/UL (ref 4.1–5.7)
RBC MORPH BLD: ABNORMAL
RBC MORPH BLD: ABNORMAL
SERVICE CMNT-IMP: ABNORMAL
SODIUM SERPL-SCNC: 135 MMOL/L (ref 136–145)
WBC # BLD AUTO: 11.9 K/UL (ref 4.1–11.1)

## 2018-07-28 PROCEDURE — 74011250637 HC RX REV CODE- 250/637: Performed by: SURGERY

## 2018-07-28 PROCEDURE — 80048 BASIC METABOLIC PNL TOTAL CA: CPT | Performed by: SURGERY

## 2018-07-28 PROCEDURE — 74011250636 HC RX REV CODE- 250/636: Performed by: INTERNAL MEDICINE

## 2018-07-28 PROCEDURE — 65270000029 HC RM PRIVATE

## 2018-07-28 PROCEDURE — 94760 N-INVAS EAR/PLS OXIMETRY 1: CPT

## 2018-07-28 PROCEDURE — 74011000258 HC RX REV CODE- 258: Performed by: SURGERY

## 2018-07-28 PROCEDURE — C9113 INJ PANTOPRAZOLE SODIUM, VIA: HCPCS | Performed by: HOSPITALIST

## 2018-07-28 PROCEDURE — 74011000250 HC RX REV CODE- 250: Performed by: EMERGENCY MEDICINE

## 2018-07-28 PROCEDURE — 74011000250 HC RX REV CODE- 250: Performed by: INTERNAL MEDICINE

## 2018-07-28 PROCEDURE — 85025 COMPLETE CBC W/AUTO DIFF WBC: CPT | Performed by: SURGERY

## 2018-07-28 PROCEDURE — 74011250636 HC RX REV CODE- 250/636: Performed by: EMERGENCY MEDICINE

## 2018-07-28 PROCEDURE — 36591 DRAW BLOOD OFF VENOUS DEVICE: CPT

## 2018-07-28 PROCEDURE — 74011000250 HC RX REV CODE- 250: Performed by: SURGERY

## 2018-07-28 PROCEDURE — 74011250636 HC RX REV CODE- 250/636: Performed by: HOSPITALIST

## 2018-07-28 PROCEDURE — 74011250637 HC RX REV CODE- 250/637: Performed by: INTERNAL MEDICINE

## 2018-07-28 PROCEDURE — 82962 GLUCOSE BLOOD TEST: CPT

## 2018-07-28 PROCEDURE — 74011000250 HC RX REV CODE- 250: Performed by: HOSPITALIST

## 2018-07-28 PROCEDURE — 36415 COLL VENOUS BLD VENIPUNCTURE: CPT | Performed by: SURGERY

## 2018-07-28 PROCEDURE — 83735 ASSAY OF MAGNESIUM: CPT | Performed by: SURGERY

## 2018-07-28 PROCEDURE — 94640 AIRWAY INHALATION TREATMENT: CPT

## 2018-07-28 PROCEDURE — 77010033678 HC OXYGEN DAILY

## 2018-07-28 RX ORDER — LANOLIN ALCOHOL/MO/W.PET/CERES
3 CREAM (GRAM) TOPICAL
Status: DISCONTINUED | OUTPATIENT
Start: 2018-07-28 | End: 2018-08-30

## 2018-07-28 RX ORDER — IPRATROPIUM BROMIDE AND ALBUTEROL SULFATE 2.5; .5 MG/3ML; MG/3ML
3 SOLUTION RESPIRATORY (INHALATION)
Status: DISCONTINUED | OUTPATIENT
Start: 2018-07-28 | End: 2018-08-04

## 2018-07-28 RX ADMIN — IPRATROPIUM BROMIDE AND ALBUTEROL SULFATE 3 ML: .5; 3 SOLUTION RESPIRATORY (INHALATION) at 21:12

## 2018-07-28 RX ADMIN — IPRATROPIUM BROMIDE AND ALBUTEROL SULFATE 3 ML: .5; 3 SOLUTION RESPIRATORY (INHALATION) at 14:23

## 2018-07-28 RX ADMIN — AMIODARONE HYDROCHLORIDE 0.5 MG/MIN: 50 INJECTION, SOLUTION INTRAVENOUS at 05:02

## 2018-07-28 RX ADMIN — POLYETHYLENE GLYCOL 3350 17 G: 17 POWDER, FOR SOLUTION ORAL at 11:29

## 2018-07-28 RX ADMIN — HYDROMORPHONE HYDROCHLORIDE 0.5 MG: 1 INJECTION, SOLUTION INTRAMUSCULAR; INTRAVENOUS; SUBCUTANEOUS at 06:35

## 2018-07-28 RX ADMIN — IPRATROPIUM BROMIDE AND ALBUTEROL SULFATE 3 ML: .5; 3 SOLUTION RESPIRATORY (INHALATION) at 08:00

## 2018-07-28 RX ADMIN — MELATONIN TAB 3 MG 3 MG: 3 TAB at 21:23

## 2018-07-28 RX ADMIN — SODIUM CHLORIDE 10 ML: 9 INJECTION, SOLUTION INTRAMUSCULAR; INTRAVENOUS; SUBCUTANEOUS at 00:10

## 2018-07-28 RX ADMIN — SODIUM CHLORIDE 10 ML: 9 INJECTION, SOLUTION INTRAMUSCULAR; INTRAVENOUS; SUBCUTANEOUS at 21:23

## 2018-07-28 RX ADMIN — LEVETIRACETAM 500 MG: 5 INJECTION INTRAVENOUS at 02:25

## 2018-07-28 RX ADMIN — Medication 30 ML: at 02:26

## 2018-07-28 RX ADMIN — METOPROLOL TARTRATE 2.5 MG: 1 INJECTION, SOLUTION INTRAVENOUS at 13:01

## 2018-07-28 RX ADMIN — METOPROLOL TARTRATE 2.5 MG: 1 INJECTION, SOLUTION INTRAVENOUS at 00:10

## 2018-07-28 RX ADMIN — HYDROMORPHONE HYDROCHLORIDE 0.5 MG: 1 INJECTION, SOLUTION INTRAMUSCULAR; INTRAVENOUS; SUBCUTANEOUS at 02:49

## 2018-07-28 RX ADMIN — METOPROLOL TARTRATE 2.5 MG: 1 INJECTION, SOLUTION INTRAVENOUS at 17:19

## 2018-07-28 RX ADMIN — HYDROMORPHONE HYDROCHLORIDE 0.5 MG: 1 INJECTION, SOLUTION INTRAMUSCULAR; INTRAVENOUS; SUBCUTANEOUS at 17:19

## 2018-07-28 RX ADMIN — SODIUM CHLORIDE 40 MG: 9 INJECTION INTRAMUSCULAR; INTRAVENOUS; SUBCUTANEOUS at 11:29

## 2018-07-28 RX ADMIN — HYDRALAZINE HYDROCHLORIDE 10 MG: 20 INJECTION INTRAMUSCULAR; INTRAVENOUS at 13:20

## 2018-07-28 RX ADMIN — LEVETIRACETAM 500 MG: 5 INJECTION INTRAVENOUS at 15:45

## 2018-07-28 RX ADMIN — HYDROMORPHONE HYDROCHLORIDE 0.5 MG: 1 INJECTION, SOLUTION INTRAMUSCULAR; INTRAVENOUS; SUBCUTANEOUS at 21:23

## 2018-07-28 RX ADMIN — AMIODARONE HYDROCHLORIDE 0.5 MG/MIN: 50 INJECTION, SOLUTION INTRAVENOUS at 17:52

## 2018-07-28 RX ADMIN — SODIUM CHLORIDE 10 ML: 9 INJECTION, SOLUTION INTRAMUSCULAR; INTRAVENOUS; SUBCUTANEOUS at 05:02

## 2018-07-28 RX ADMIN — ASCORBIC ACID, VITAMIN A PALMITATE, CHOLECALCIFEROL, THIAMINE HYDROCHLORIDE, RIBOFLAVIN-5 PHOSPHATE SODIUM, PYRIDOXINE HYDROCHLORIDE, NIACINAMIDE, DEXPANTHENOL, ALPHA-TOCOPHEROL ACETATE, VITAMIN K1, FOLIC ACID, BIOTIN, CYANOCOBALAMIN: 200; 3300; 200; 6; 3.6; 6; 40; 15; 10; 150; 600; 60; 5 INJECTION, SOLUTION INTRAVENOUS at 18:20

## 2018-07-28 RX ADMIN — SODIUM CHLORIDE 40 MG: 9 INJECTION INTRAMUSCULAR; INTRAVENOUS; SUBCUTANEOUS at 21:23

## 2018-07-28 RX ADMIN — SODIUM CHLORIDE 10 ML: 9 INJECTION, SOLUTION INTRAMUSCULAR; INTRAVENOUS; SUBCUTANEOUS at 13:21

## 2018-07-28 RX ADMIN — METOPROLOL TARTRATE 2.5 MG: 1 INJECTION, SOLUTION INTRAVENOUS at 05:06

## 2018-07-28 RX ADMIN — HYDROMORPHONE HYDROCHLORIDE 0.5 MG: 1 INJECTION, SOLUTION INTRAMUSCULAR; INTRAVENOUS; SUBCUTANEOUS at 13:21

## 2018-07-28 NOTE — PROGRESS NOTES
CRS POD# 11 s/p R colectomy, SBR, extensive YAMEL Pain well controlled. No ostomy output. BP (!) 192/95  Pulse 75  Temp 97.5 °F (36.4 °C)  Resp 20  Ht 6' 2\" (1.88 m)  Wt 128.2 kg (282 lb 9.6 oz)  SpO2 98%  BMI 36.28 kg/m2 NAD, AAO Abd soft, approp TTP Incision c/d/i with mild serosang drainage from midline incision JPs serosang Ostomy pink, flat. No gas or stool in bag Plan 
-Continue TPN. Okay to have a few ice chips every hour 
-Prolonged postop ileus. Will try miralax today. Await return of bowel function
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
## 2018-07-28 NOTE — PROGRESS NOTES
Dr. Maxx Montiel notified of 20 beat run of Vta,current medications reviewed,  Magnesium level added to am labs, will continue to monitor.

## 2018-07-29 LAB
ANION GAP SERPL CALC-SCNC: 8 MMOL/L (ref 5–15)
BASOPHILS # BLD: 0.1 K/UL (ref 0–0.1)
BASOPHILS NFR BLD: 1 % (ref 0–1)
BUN SERPL-MCNC: 15 MG/DL (ref 6–20)
BUN/CREAT SERPL: 25 (ref 12–20)
CALCIUM SERPL-MCNC: 8.4 MG/DL (ref 8.5–10.1)
CHLORIDE SERPL-SCNC: 101 MMOL/L (ref 97–108)
CO2 SERPL-SCNC: 23 MMOL/L (ref 21–32)
CREAT SERPL-MCNC: 0.6 MG/DL (ref 0.7–1.3)
DIFFERENTIAL METHOD BLD: ABNORMAL
EOSINOPHIL # BLD: 0.3 K/UL (ref 0–0.4)
EOSINOPHIL NFR BLD: 2 % (ref 0–7)
ERYTHROCYTE [DISTWIDTH] IN BLOOD BY AUTOMATED COUNT: 24.5 % (ref 11.5–14.5)
GLUCOSE BLD STRIP.AUTO-MCNC: 160 MG/DL (ref 65–100)
GLUCOSE SERPL-MCNC: 128 MG/DL (ref 65–100)
HCT VFR BLD AUTO: 27.2 % (ref 36.6–50.3)
HGB BLD-MCNC: 8.4 G/DL (ref 12.1–17)
IMM GRANULOCYTES # BLD: 0.1 K/UL (ref 0–0.04)
IMM GRANULOCYTES NFR BLD AUTO: 1 % (ref 0–0.5)
LYMPHOCYTES # BLD: 1.3 K/UL (ref 0.8–3.5)
LYMPHOCYTES NFR BLD: 10 % (ref 12–49)
MCH RBC QN AUTO: 26.8 PG (ref 26–34)
MCHC RBC AUTO-ENTMCNC: 30.9 G/DL (ref 30–36.5)
MCV RBC AUTO: 86.6 FL (ref 80–99)
MONOCYTES # BLD: 1.6 K/UL (ref 0–1)
MONOCYTES NFR BLD: 13 % (ref 5–13)
NEUTS SEG # BLD: 9.2 K/UL (ref 1.8–8)
NEUTS SEG NFR BLD: 73 % (ref 32–75)
NRBC # BLD: 0 K/UL (ref 0–0.01)
NRBC BLD-RTO: 0 PER 100 WBC
PLATELET # BLD AUTO: 483 K/UL (ref 150–400)
PMV BLD AUTO: 9.9 FL (ref 8.9–12.9)
POTASSIUM SERPL-SCNC: 4.5 MMOL/L (ref 3.5–5.1)
RBC # BLD AUTO: 3.14 M/UL (ref 4.1–5.7)
RBC MORPH BLD: ABNORMAL
SERVICE CMNT-IMP: ABNORMAL
SODIUM SERPL-SCNC: 132 MMOL/L (ref 136–145)
WBC # BLD AUTO: 12.6 K/UL (ref 4.1–11.1)

## 2018-07-29 PROCEDURE — 94760 N-INVAS EAR/PLS OXIMETRY 1: CPT

## 2018-07-29 PROCEDURE — 85025 COMPLETE CBC W/AUTO DIFF WBC: CPT | Performed by: SURGERY

## 2018-07-29 PROCEDURE — 74011000250 HC RX REV CODE- 250: Performed by: HOSPITALIST

## 2018-07-29 PROCEDURE — 74011250636 HC RX REV CODE- 250/636: Performed by: INTERNAL MEDICINE

## 2018-07-29 PROCEDURE — 74011000250 HC RX REV CODE- 250: Performed by: EMERGENCY MEDICINE

## 2018-07-29 PROCEDURE — 77030010541

## 2018-07-29 PROCEDURE — 74011000250 HC RX REV CODE- 250: Performed by: INTERNAL MEDICINE

## 2018-07-29 PROCEDURE — 74011250636 HC RX REV CODE- 250/636: Performed by: HOSPITALIST

## 2018-07-29 PROCEDURE — 74011250637 HC RX REV CODE- 250/637: Performed by: SURGERY

## 2018-07-29 PROCEDURE — 74011250637 HC RX REV CODE- 250/637: Performed by: INTERNAL MEDICINE

## 2018-07-29 PROCEDURE — 94640 AIRWAY INHALATION TREATMENT: CPT

## 2018-07-29 PROCEDURE — C9113 INJ PANTOPRAZOLE SODIUM, VIA: HCPCS | Performed by: HOSPITALIST

## 2018-07-29 PROCEDURE — 82962 GLUCOSE BLOOD TEST: CPT

## 2018-07-29 PROCEDURE — 77030018836 HC SOL IRR NACL ICUM -A

## 2018-07-29 PROCEDURE — 74011000250 HC RX REV CODE- 250: Performed by: SURGERY

## 2018-07-29 PROCEDURE — 74011250636 HC RX REV CODE- 250/636: Performed by: EMERGENCY MEDICINE

## 2018-07-29 PROCEDURE — 74011000258 HC RX REV CODE- 258: Performed by: SURGERY

## 2018-07-29 PROCEDURE — 36415 COLL VENOUS BLD VENIPUNCTURE: CPT | Performed by: SURGERY

## 2018-07-29 PROCEDURE — 80048 BASIC METABOLIC PNL TOTAL CA: CPT | Performed by: SURGERY

## 2018-07-29 PROCEDURE — 65270000029 HC RM PRIVATE

## 2018-07-29 RX ORDER — SODIUM CHLORIDE 0.9 % (FLUSH) 0.9 %
10-40 SYRINGE (ML) INJECTION EVERY 8 HOURS
Status: DISCONTINUED | OUTPATIENT
Start: 2018-07-29 | End: 2018-08-31 | Stop reason: SDUPTHER

## 2018-07-29 RX ORDER — SENNOSIDES 8.6 MG/1
2 TABLET ORAL
Status: COMPLETED | OUTPATIENT
Start: 2018-07-29 | End: 2018-07-29

## 2018-07-29 RX ORDER — MAGNESIUM CITRATE
296 SOLUTION, ORAL ORAL
Status: COMPLETED | OUTPATIENT
Start: 2018-07-29 | End: 2018-07-29

## 2018-07-29 RX ORDER — SODIUM CHLORIDE 0.9 % (FLUSH) 0.9 %
10 SYRINGE (ML) INJECTION AS NEEDED
Status: DISCONTINUED | OUTPATIENT
Start: 2018-07-29 | End: 2018-10-02 | Stop reason: HOSPADM

## 2018-07-29 RX ORDER — SODIUM CHLORIDE 0.9 % (FLUSH) 0.9 %
10 SYRINGE (ML) INJECTION EVERY 24 HOURS
Status: DISCONTINUED | OUTPATIENT
Start: 2018-07-29 | End: 2018-08-31 | Stop reason: SDUPTHER

## 2018-07-29 RX ORDER — SODIUM CHLORIDE 0.9 % (FLUSH) 0.9 %
10-30 SYRINGE (ML) INJECTION AS NEEDED
Status: DISCONTINUED | OUTPATIENT
Start: 2018-07-29 | End: 2018-10-02 | Stop reason: HOSPADM

## 2018-07-29 RX ORDER — HEPARIN 100 UNIT/ML
300-500 SYRINGE INTRAVENOUS AS NEEDED
Status: DISCONTINUED | OUTPATIENT
Start: 2018-07-29 | End: 2018-08-20

## 2018-07-29 RX ADMIN — AMIODARONE HYDROCHLORIDE 0.5 MG/MIN: 50 INJECTION, SOLUTION INTRAVENOUS at 09:58

## 2018-07-29 RX ADMIN — IPRATROPIUM BROMIDE AND ALBUTEROL SULFATE 3 ML: .5; 3 SOLUTION RESPIRATORY (INHALATION) at 20:06

## 2018-07-29 RX ADMIN — METOPROLOL TARTRATE 2.5 MG: 1 INJECTION, SOLUTION INTRAVENOUS at 06:01

## 2018-07-29 RX ADMIN — POLYETHYLENE GLYCOL 3350 17 G: 17 POWDER, FOR SOLUTION ORAL at 09:55

## 2018-07-29 RX ADMIN — ASCORBIC ACID, VITAMIN A PALMITATE, CHOLECALCIFEROL, THIAMINE HYDROCHLORIDE, RIBOFLAVIN-5 PHOSPHATE SODIUM, PYRIDOXINE HYDROCHLORIDE, NIACINAMIDE, DEXPANTHENOL, ALPHA-TOCOPHEROL ACETATE, VITAMIN K1, FOLIC ACID, BIOTIN, CYANOCOBALAMIN: 200; 3300; 200; 6; 3.6; 6; 40; 15; 10; 150; 600; 60; 5 INJECTION, SOLUTION INTRAVENOUS at 17:38

## 2018-07-29 RX ADMIN — SODIUM CHLORIDE 10 ML: 9 INJECTION, SOLUTION INTRAMUSCULAR; INTRAVENOUS; SUBCUTANEOUS at 17:16

## 2018-07-29 RX ADMIN — METOPROLOL TARTRATE 2.5 MG: 1 INJECTION, SOLUTION INTRAVENOUS at 11:54

## 2018-07-29 RX ADMIN — SODIUM CHLORIDE 20 ML: 9 INJECTION, SOLUTION INTRAMUSCULAR; INTRAVENOUS; SUBCUTANEOUS at 17:44

## 2018-07-29 RX ADMIN — HYDROMORPHONE HYDROCHLORIDE 0.5 MG: 1 INJECTION, SOLUTION INTRAMUSCULAR; INTRAVENOUS; SUBCUTANEOUS at 20:58

## 2018-07-29 RX ADMIN — HYDROMORPHONE HYDROCHLORIDE 0.5 MG: 1 INJECTION, SOLUTION INTRAMUSCULAR; INTRAVENOUS; SUBCUTANEOUS at 04:30

## 2018-07-29 RX ADMIN — SODIUM CHLORIDE 40 MG: 9 INJECTION INTRAMUSCULAR; INTRAVENOUS; SUBCUTANEOUS at 21:01

## 2018-07-29 RX ADMIN — AMIODARONE HYDROCHLORIDE 0.5 MG/MIN: 50 INJECTION, SOLUTION INTRAVENOUS at 23:38

## 2018-07-29 RX ADMIN — IPRATROPIUM BROMIDE AND ALBUTEROL SULFATE 3 ML: .5; 3 SOLUTION RESPIRATORY (INHALATION) at 13:53

## 2018-07-29 RX ADMIN — SODIUM CHLORIDE 40 MG: 9 INJECTION INTRAMUSCULAR; INTRAVENOUS; SUBCUTANEOUS at 09:55

## 2018-07-29 RX ADMIN — IPRATROPIUM BROMIDE AND ALBUTEROL SULFATE 3 ML: .5; 3 SOLUTION RESPIRATORY (INHALATION) at 07:35

## 2018-07-29 RX ADMIN — SODIUM CHLORIDE 10 ML: 9 INJECTION, SOLUTION INTRAMUSCULAR; INTRAVENOUS; SUBCUTANEOUS at 04:39

## 2018-07-29 RX ADMIN — METOPROLOL TARTRATE 2.5 MG: 1 INJECTION, SOLUTION INTRAVENOUS at 00:13

## 2018-07-29 RX ADMIN — MAGESIUM CITRATE 296 ML: 1.75 LIQUID ORAL at 09:54

## 2018-07-29 RX ADMIN — METOPROLOL TARTRATE 2.5 MG: 1 INJECTION, SOLUTION INTRAVENOUS at 17:15

## 2018-07-29 RX ADMIN — MELATONIN TAB 3 MG 3 MG: 3 TAB at 21:01

## 2018-07-29 RX ADMIN — ONDANSETRON 4 MG: 2 INJECTION INTRAMUSCULAR; INTRAVENOUS at 12:00

## 2018-07-29 RX ADMIN — SENNOSIDES 8.6 MG: 8.6 TABLET, FILM COATED ORAL at 21:01

## 2018-07-29 RX ADMIN — LEVETIRACETAM 500 MG: 5 INJECTION INTRAVENOUS at 17:45

## 2018-07-29 RX ADMIN — SODIUM CHLORIDE 10 ML: 9 INJECTION, SOLUTION INTRAMUSCULAR; INTRAVENOUS; SUBCUTANEOUS at 21:00

## 2018-07-29 RX ADMIN — SODIUM CHLORIDE 40 ML: 9 INJECTION, SOLUTION INTRAMUSCULAR; INTRAVENOUS; SUBCUTANEOUS at 21:00

## 2018-07-29 RX ADMIN — HYDROMORPHONE HYDROCHLORIDE 0.5 MG: 1 INJECTION, SOLUTION INTRAMUSCULAR; INTRAVENOUS; SUBCUTANEOUS at 17:15

## 2018-07-29 RX ADMIN — SODIUM CHLORIDE 10 ML: 9 INJECTION, SOLUTION INTRAMUSCULAR; INTRAVENOUS; SUBCUTANEOUS at 06:01

## 2018-07-29 RX ADMIN — HYDROMORPHONE HYDROCHLORIDE 0.5 MG: 1 INJECTION, SOLUTION INTRAMUSCULAR; INTRAVENOUS; SUBCUTANEOUS at 11:55

## 2018-07-29 RX ADMIN — LEVETIRACETAM 500 MG: 5 INJECTION INTRAVENOUS at 04:18

## 2018-07-29 NOTE — PROGRESS NOTES
General Surgery End of Shift Nursing Note Bedside shift change report given to Bonnie AMARAL (oncoming nurse) by Omar Humphries RN (offgoing nurse). Report included the following information SBAR, Kardex, OR Summary, Intake/Output, MAR, Recent Results and Cardiac Rhythm Paced . Shift worked:   7p-7a Summary of shift:    Dressing changed, serosanguinous drainage Issues for physician to address:     
 
Number times ambulated in hallway past shift: 0 Number of times OOB to chair past shift: 0 Pain Management: 
Current medication: Dilaudid 0.5mg IVP Patient states pain is manageable on current pain medication: YES 
 
GI: 
 
Current diet:  DIET NPO 
TPN ADULT - CENTRAL AA 5% D20% W/ CA + ELECTROLYTES Tolerating current diet: YES Passing flatus: NO 
Last Bowel Movement: several days ago Appearance:  
 
Respiratory: 
 
Incentive Spirometer at bedside: YES Patient instructed on use: YES Patient Safety: 
 
Falls Score: 3 Bed Alarm On? Yes Sitter?  Yes 
 
Germán Rose RN
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
## 2018-07-29 NOTE — PROGRESS NOTES
CRS POD# 12 s/p R colectomy, SBR, extensive YAMEL Pain well controlled. No ostomy output. /69 (BP 1 Location: Right arm)  Pulse 74  Temp 98.1 °F (36.7 °C)  Resp 20  Ht 6' 2\" (1.88 m)  Wt 128.2 kg (282 lb 9.6 oz)  SpO2 98%  BMI 36.28 kg/m2 NAD, AAO Abd soft, approp TTP Incision c/d/i with mild serosang drainage from midline incision JPs serosang Ostomy pink, flat. No gas or stool in bag Plan 
-Continue TPN. Okay to have a few ice chips every hour 
-Prolonged postop ileus. Will try mg citrate today. Await return of bowel function

## 2018-07-30 ENCOUNTER — APPOINTMENT (OUTPATIENT)
Dept: CT IMAGING | Age: 78
DRG: 329 | End: 2018-07-30
Attending: SURGERY
Payer: MEDICARE

## 2018-07-30 LAB
ANION GAP SERPL CALC-SCNC: 6 MMOL/L (ref 5–15)
BASOPHILS # BLD: 0 K/UL (ref 0–0.1)
BASOPHILS NFR BLD: 0 % (ref 0–1)
BUN SERPL-MCNC: 16 MG/DL (ref 6–20)
BUN/CREAT SERPL: 23 (ref 12–20)
CALCIUM SERPL-MCNC: 8.2 MG/DL (ref 8.5–10.1)
CHLORIDE SERPL-SCNC: 100 MMOL/L (ref 97–108)
CO2 SERPL-SCNC: 26 MMOL/L (ref 21–32)
CREAT SERPL-MCNC: 0.7 MG/DL (ref 0.7–1.3)
DIFFERENTIAL METHOD BLD: ABNORMAL
EOSINOPHIL # BLD: 0.2 K/UL (ref 0–0.4)
EOSINOPHIL NFR BLD: 2 % (ref 0–7)
ERYTHROCYTE [DISTWIDTH] IN BLOOD BY AUTOMATED COUNT: 24.3 % (ref 11.5–14.5)
GLUCOSE BLD STRIP.AUTO-MCNC: 143 MG/DL (ref 65–100)
GLUCOSE BLD STRIP.AUTO-MCNC: 150 MG/DL (ref 65–100)
GLUCOSE BLD STRIP.AUTO-MCNC: 157 MG/DL (ref 65–100)
GLUCOSE SERPL-MCNC: 147 MG/DL (ref 65–100)
HCT VFR BLD AUTO: 27.6 % (ref 36.6–50.3)
HGB BLD-MCNC: 8.2 G/DL (ref 12.1–17)
IMM GRANULOCYTES # BLD: 0.1 K/UL (ref 0–0.04)
IMM GRANULOCYTES NFR BLD AUTO: 1 % (ref 0–0.5)
LYMPHOCYTES # BLD: 1.3 K/UL (ref 0.8–3.5)
LYMPHOCYTES NFR BLD: 11 % (ref 12–49)
MCH RBC QN AUTO: 25.7 PG (ref 26–34)
MCHC RBC AUTO-ENTMCNC: 29.7 G/DL (ref 30–36.5)
MCV RBC AUTO: 86.5 FL (ref 80–99)
MONOCYTES # BLD: 1.5 K/UL (ref 0–1)
MONOCYTES NFR BLD: 13 % (ref 5–13)
NEUTS SEG # BLD: 8.4 K/UL (ref 1.8–8)
NEUTS SEG NFR BLD: 73 % (ref 32–75)
NRBC # BLD: 0 K/UL (ref 0–0.01)
NRBC BLD-RTO: 0 PER 100 WBC
PLATELET # BLD AUTO: 424 K/UL (ref 150–400)
PMV BLD AUTO: 10 FL (ref 8.9–12.9)
POTASSIUM SERPL-SCNC: 4.4 MMOL/L (ref 3.5–5.1)
RBC # BLD AUTO: 3.19 M/UL (ref 4.1–5.7)
RBC MORPH BLD: ABNORMAL
RBC MORPH BLD: ABNORMAL
SERVICE CMNT-IMP: ABNORMAL
SODIUM SERPL-SCNC: 132 MMOL/L (ref 136–145)
WBC # BLD AUTO: 11.5 K/UL (ref 4.1–11.1)

## 2018-07-30 PROCEDURE — 74177 CT ABD & PELVIS W/CONTRAST: CPT

## 2018-07-30 PROCEDURE — 74011250637 HC RX REV CODE- 250/637: Performed by: INTERNAL MEDICINE

## 2018-07-30 PROCEDURE — C9113 INJ PANTOPRAZOLE SODIUM, VIA: HCPCS | Performed by: HOSPITALIST

## 2018-07-30 PROCEDURE — 74011636320 HC RX REV CODE- 636/320: Performed by: INTERNAL MEDICINE

## 2018-07-30 PROCEDURE — 85025 COMPLETE CBC W/AUTO DIFF WBC: CPT | Performed by: SURGERY

## 2018-07-30 PROCEDURE — 36415 COLL VENOUS BLD VENIPUNCTURE: CPT | Performed by: SURGERY

## 2018-07-30 PROCEDURE — 74011250636 HC RX REV CODE- 250/636: Performed by: EMERGENCY MEDICINE

## 2018-07-30 PROCEDURE — 36591 DRAW BLOOD OFF VENOUS DEVICE: CPT

## 2018-07-30 PROCEDURE — 94760 N-INVAS EAR/PLS OXIMETRY 1: CPT

## 2018-07-30 PROCEDURE — 74011250636 HC RX REV CODE- 250/636: Performed by: HOSPITALIST

## 2018-07-30 PROCEDURE — 74011250636 HC RX REV CODE- 250/636: Performed by: INTERNAL MEDICINE

## 2018-07-30 PROCEDURE — 74011000250 HC RX REV CODE- 250: Performed by: EMERGENCY MEDICINE

## 2018-07-30 PROCEDURE — 80048 BASIC METABOLIC PNL TOTAL CA: CPT | Performed by: SURGERY

## 2018-07-30 PROCEDURE — 94640 AIRWAY INHALATION TREATMENT: CPT

## 2018-07-30 PROCEDURE — 74011000250 HC RX REV CODE- 250: Performed by: INTERNAL MEDICINE

## 2018-07-30 PROCEDURE — 82962 GLUCOSE BLOOD TEST: CPT

## 2018-07-30 PROCEDURE — 74011000258 HC RX REV CODE- 258: Performed by: SURGERY

## 2018-07-30 PROCEDURE — 74011000250 HC RX REV CODE- 250: Performed by: SURGERY

## 2018-07-30 PROCEDURE — 65270000029 HC RM PRIVATE

## 2018-07-30 PROCEDURE — 74011250637 HC RX REV CODE- 250/637: Performed by: SURGERY

## 2018-07-30 PROCEDURE — 74011250636 HC RX REV CODE- 250/636: Performed by: SURGERY

## 2018-07-30 PROCEDURE — 74011000255 HC RX REV CODE- 255: Performed by: INTERNAL MEDICINE

## 2018-07-30 PROCEDURE — 74011000250 HC RX REV CODE- 250: Performed by: HOSPITALIST

## 2018-07-30 RX ORDER — SODIUM CHLORIDE 0.9 % (FLUSH) 0.9 %
10 SYRINGE (ML) INJECTION
Status: COMPLETED | OUTPATIENT
Start: 2018-07-30 | End: 2018-07-30

## 2018-07-30 RX ORDER — ADHESIVE BANDAGE
30 BANDAGE TOPICAL DAILY
Status: DISCONTINUED | OUTPATIENT
Start: 2018-07-30 | End: 2018-08-11

## 2018-07-30 RX ORDER — BARIUM SULFATE 20 MG/ML
900 SUSPENSION ORAL
Status: COMPLETED | OUTPATIENT
Start: 2018-07-30 | End: 2018-07-30

## 2018-07-30 RX ORDER — HYDROMORPHONE HYDROCHLORIDE 2 MG/ML
0.5 INJECTION, SOLUTION INTRAMUSCULAR; INTRAVENOUS; SUBCUTANEOUS
Status: DISCONTINUED | OUTPATIENT
Start: 2018-07-30 | End: 2018-08-14 | Stop reason: SDUPTHER

## 2018-07-30 RX ORDER — SODIUM CHLORIDE 9 MG/ML
50 INJECTION, SOLUTION INTRAVENOUS
Status: COMPLETED | OUTPATIENT
Start: 2018-07-30 | End: 2018-08-07

## 2018-07-30 RX ADMIN — SODIUM CHLORIDE 40 MG: 9 INJECTION INTRAMUSCULAR; INTRAVENOUS; SUBCUTANEOUS at 21:27

## 2018-07-30 RX ADMIN — SODIUM CHLORIDE 10 ML: 9 INJECTION, SOLUTION INTRAMUSCULAR; INTRAVENOUS; SUBCUTANEOUS at 14:00

## 2018-07-30 RX ADMIN — LEVETIRACETAM 500 MG: 5 INJECTION INTRAVENOUS at 05:07

## 2018-07-30 RX ADMIN — SODIUM CHLORIDE 30 ML: 9 INJECTION, SOLUTION INTRAMUSCULAR; INTRAVENOUS; SUBCUTANEOUS at 05:10

## 2018-07-30 RX ADMIN — IPRATROPIUM BROMIDE AND ALBUTEROL SULFATE 3 ML: .5; 3 SOLUTION RESPIRATORY (INHALATION) at 08:31

## 2018-07-30 RX ADMIN — LEVETIRACETAM 500 MG: 5 INJECTION INTRAVENOUS at 18:42

## 2018-07-30 RX ADMIN — METOPROLOL TARTRATE 2.5 MG: 1 INJECTION, SOLUTION INTRAVENOUS at 14:06

## 2018-07-30 RX ADMIN — ASCORBIC ACID, VITAMIN A PALMITATE, CHOLECALCIFEROL, THIAMINE HYDROCHLORIDE, RIBOFLAVIN-5 PHOSPHATE SODIUM, PYRIDOXINE HYDROCHLORIDE, NIACINAMIDE, DEXPANTHENOL, ALPHA-TOCOPHEROL ACETATE, VITAMIN K1, FOLIC ACID, BIOTIN, CYANOCOBALAMIN: 200; 3300; 200; 6; 3.6; 6; 40; 15; 10; 150; 600; 60; 5 INJECTION, SOLUTION INTRAVENOUS at 18:52

## 2018-07-30 RX ADMIN — SODIUM CHLORIDE 40 MG: 9 INJECTION INTRAMUSCULAR; INTRAVENOUS; SUBCUTANEOUS at 08:47

## 2018-07-30 RX ADMIN — Medication 10 ML: at 11:54

## 2018-07-30 RX ADMIN — OXYCODONE HYDROCHLORIDE 5 MG: 5 TABLET ORAL at 08:48

## 2018-07-30 RX ADMIN — METOPROLOL TARTRATE 2.5 MG: 1 INJECTION, SOLUTION INTRAVENOUS at 00:06

## 2018-07-30 RX ADMIN — Medication 10 ML: at 01:50

## 2018-07-30 RX ADMIN — IOPAMIDOL 100 ML: 755 INJECTION, SOLUTION INTRAVENOUS at 11:54

## 2018-07-30 RX ADMIN — AMIODARONE HYDROCHLORIDE 0.5 MG/MIN: 50 INJECTION, SOLUTION INTRAVENOUS at 11:23

## 2018-07-30 RX ADMIN — BARIUM SULFATE 900 ML: 20 SUSPENSION ORAL at 08:47

## 2018-07-30 RX ADMIN — METOPROLOL TARTRATE 2.5 MG: 1 INJECTION, SOLUTION INTRAVENOUS at 18:45

## 2018-07-30 RX ADMIN — SODIUM CHLORIDE 10 ML: 9 INJECTION, SOLUTION INTRAMUSCULAR; INTRAVENOUS; SUBCUTANEOUS at 21:28

## 2018-07-30 RX ADMIN — HYDROMORPHONE HYDROCHLORIDE 0.5 MG: 2 INJECTION, SOLUTION INTRAMUSCULAR; INTRAVENOUS; SUBCUTANEOUS at 23:40

## 2018-07-30 RX ADMIN — IPRATROPIUM BROMIDE AND ALBUTEROL SULFATE 3 ML: .5; 3 SOLUTION RESPIRATORY (INHALATION) at 22:32

## 2018-07-30 RX ADMIN — OXYCODONE HYDROCHLORIDE 10 MG: 5 TABLET ORAL at 16:34

## 2018-07-30 RX ADMIN — POLYETHYLENE GLYCOL 3350 17 G: 17 POWDER, FOR SOLUTION ORAL at 08:48

## 2018-07-30 RX ADMIN — METOPROLOL TARTRATE 2.5 MG: 1 INJECTION, SOLUTION INTRAVENOUS at 05:07

## 2018-07-30 RX ADMIN — Medication 30 ML: at 02:50

## 2018-07-30 RX ADMIN — AMIODARONE HYDROCHLORIDE 0.5 MG/MIN: 50 INJECTION, SOLUTION INTRAVENOUS at 21:28

## 2018-07-30 RX ADMIN — SODIUM CHLORIDE 50 ML/HR: 900 INJECTION, SOLUTION INTRAVENOUS at 11:54

## 2018-07-30 RX ADMIN — MAGNESIUM HYDROXIDE 30 ML: 400 SUSPENSION ORAL at 18:44

## 2018-07-30 RX ADMIN — METOPROLOL TARTRATE 2.5 MG: 1 INJECTION, SOLUTION INTRAVENOUS at 23:40

## 2018-07-30 RX ADMIN — IPRATROPIUM BROMIDE AND ALBUTEROL SULFATE 3 ML: .5; 3 SOLUTION RESPIRATORY (INHALATION) at 14:10

## 2018-07-30 RX ADMIN — HYDROMORPHONE HYDROCHLORIDE 0.5 MG: 1 INJECTION, SOLUTION INTRAMUSCULAR; INTRAVENOUS; SUBCUTANEOUS at 01:45

## 2018-07-30 RX ADMIN — MELATONIN TAB 3 MG 3 MG: 3 TAB at 21:27

## 2018-07-30 RX ADMIN — LACTULOSE 10 G: 20 SOLUTION ORAL at 21:27

## 2018-07-30 NOTE — PROGRESS NOTES
CRS NP Progress Note 
 
s/p R colectomy, SBR, extensive YAMEL on 7/10/18 Pain well controlled. No ostomy output presently. Wife at bedside and reports a \"tiny\" amount of output yesterday following Mag Citrate. Patient denies pain at rest, no nausea. Discomfort when abdomen palpated. /59 (BP 1 Location: Right arm, BP Patient Position: At rest)  Pulse 73  Temp 97.4 °F (36.3 °C)  Resp 24  Ht 6' 2\" (1.88 m)  Wt 125 kg (275 lb 9.2 oz)  SpO2 95%  BMI 35.38 kg/m2 NAD, AAO Abd soft. TPN ongoing. Dobb Sy in place. Incision with mod serosang drainage from midline incision JPs serosang Ostomy pink, moist. No gas or stool in bag Plan 
-Continue TPN. - CT Abd/Pelvis today. Contrast is in. Nursing aware. Wife educated that there needs to be at least an hour before they can complete study and that longer is better.  
-Prolonged postop ileus.  
 
Ceci Cooley, NP

## 2018-07-30 NOTE — PROGRESS NOTES
CRS POD# 13 s/p R colectomy, SBR, extensive YAMEL Pain well controlled. No ostomy output. /65 (BP 1 Location: Right arm, BP Patient Position: At rest)  Pulse 74  Temp 97.4 °F (36.3 °C)  Resp 20  Ht 6' 2\" (1.88 m)  Wt 125 kg (275 lb 9.2 oz)  SpO2 100%  BMI 35.38 kg/m2 NAD, AAO Abd soft, approp TTP Incision c/d/i with mild serosang drainage from midline incision JPs serosang Ostomy pink, flat. No gas or stool in bag Plan 
-Continue TPN. Okay to have a few ice chips every hour 
-Prolonged postop ileus. Resume milk of mag and lactulose

## 2018-07-30 NOTE — PROGRESS NOTES
Occupational Therapy Chart reviewed. Attempted to see pt for OT weekly reassessment. Pt is currently about to leave floor for CT scan. Will continue to follow.  Neftali Romero, OT

## 2018-07-30 NOTE — PROGRESS NOTES
General Surgery End of Shift Nursing Note Bedside shift change report given to Lisa Boyer (oncoming nurse) by Martha Trinidad (offgoing nurse). Report included the following information SBAR, Kardex, OR Summary, Intake/Output, MAR, Recent Results and Cardiac Rhythm Paced. Shift worked:   7p-7a Summary of shift:   pt pulled dobhoff out approx 4 inches, it was pushed back to position, gastric output has increased since. Issues for physician to address:   none Number times ambulated in hallway past shift: 0 Number of times OOB to chair past shift: 0 Pain Management: 
Current medication: Dilaudid 0.5mg IVP Patient states pain is manageable on current pain medication: YES 
 
GI: 
 
Current diet:  DIET NPO 
TPN ADULT - CENTRAL AA 5% D20% W/ CA + ELECTROLYTES 
TPN ADULT - CENTRAL AA 5% D20% W/ CA + ELECTROLYTES Tolerating current diet: No diet Passing flatus: NO 
Last Bowel Movement: yesterday Appearance: smear Respiratory: 
 
Incentive Spirometer at bedside: NO 
Patient instructed on use: NO 
 
Patient Safety: 
 
Falls Score: 4 Bed Alarm On? No 
Sitter?  Yes 
 
Dhruv Quintero RN

## 2018-07-30 NOTE — PROGRESS NOTES
Problem: Falls - Risk of 
Goal: *Absence of Falls Document Keesha Quiroz Fall Risk and appropriate interventions in the flowsheet. Outcome: Progressing Towards Goal 
Fall Risk Interventions: 
Mobility Interventions: Patient to call before getting OOB Mentation Interventions: Adequate sleep, hydration, pain control, Door open when patient unattended, Evaluate medications/consider consulting pharmacy, Eyeglasses and hearing aids, Increase mobility, More frequent rounding, Reorient patient, Room close to nurse's station, Self-releasing belt, Toileting rounds, Update white board Medication Interventions: Evaluate medications/consider consulting pharmacy, Patient to call before getting OOB, Teach patient to arise slowly Elimination Interventions: Call light in reach, Patient to call for help with toileting needs
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
## 2018-07-30 NOTE — PROGRESS NOTES
Attempted to see pt for PT tx. Currently going off the floor to CT. Will defer and continue to follow.

## 2018-07-31 LAB
GLUCOSE BLD STRIP.AUTO-MCNC: 136 MG/DL (ref 65–100)
GLUCOSE BLD STRIP.AUTO-MCNC: 150 MG/DL (ref 65–100)
GLUCOSE BLD STRIP.AUTO-MCNC: 172 MG/DL (ref 65–100)
SERVICE CMNT-IMP: ABNORMAL

## 2018-07-31 PROCEDURE — 74011000250 HC RX REV CODE- 250: Performed by: HOSPITALIST

## 2018-07-31 PROCEDURE — 97530 THERAPEUTIC ACTIVITIES: CPT

## 2018-07-31 PROCEDURE — 74011000250 HC RX REV CODE- 250: Performed by: EMERGENCY MEDICINE

## 2018-07-31 PROCEDURE — 74011250636 HC RX REV CODE- 250/636: Performed by: HOSPITALIST

## 2018-07-31 PROCEDURE — 74011000250 HC RX REV CODE- 250: Performed by: INTERNAL MEDICINE

## 2018-07-31 PROCEDURE — 82962 GLUCOSE BLOOD TEST: CPT

## 2018-07-31 PROCEDURE — 74011000258 HC RX REV CODE- 258: Performed by: SURGERY

## 2018-07-31 PROCEDURE — 74011250637 HC RX REV CODE- 250/637: Performed by: SURGERY

## 2018-07-31 PROCEDURE — 74011250636 HC RX REV CODE- 250/636: Performed by: SURGERY

## 2018-07-31 PROCEDURE — C9113 INJ PANTOPRAZOLE SODIUM, VIA: HCPCS | Performed by: HOSPITALIST

## 2018-07-31 PROCEDURE — 65270000029 HC RM PRIVATE

## 2018-07-31 PROCEDURE — 94640 AIRWAY INHALATION TREATMENT: CPT

## 2018-07-31 PROCEDURE — 74011250636 HC RX REV CODE- 250/636: Performed by: INTERNAL MEDICINE

## 2018-07-31 PROCEDURE — 74011000250 HC RX REV CODE- 250: Performed by: SURGERY

## 2018-07-31 PROCEDURE — 74011250637 HC RX REV CODE- 250/637: Performed by: INTERNAL MEDICINE

## 2018-07-31 PROCEDURE — 94760 N-INVAS EAR/PLS OXIMETRY 1: CPT

## 2018-07-31 RX ORDER — VENLAFAXINE HYDROCHLORIDE 37.5 MG/1
75 CAPSULE, EXTENDED RELEASE ORAL DAILY
Status: DISCONTINUED | OUTPATIENT
Start: 2018-07-31 | End: 2018-08-13

## 2018-07-31 RX ORDER — BISACODYL 5 MG
10 TABLET, DELAYED RELEASE (ENTERIC COATED) ORAL
Status: COMPLETED | OUTPATIENT
Start: 2018-07-31 | End: 2018-07-31

## 2018-07-31 RX ADMIN — SODIUM CHLORIDE 10 ML: 9 INJECTION, SOLUTION INTRAMUSCULAR; INTRAVENOUS; SUBCUTANEOUS at 21:38

## 2018-07-31 RX ADMIN — LACTULOSE 10 G: 20 SOLUTION ORAL at 09:33

## 2018-07-31 RX ADMIN — LACTULOSE 10 G: 20 SOLUTION ORAL at 19:00

## 2018-07-31 RX ADMIN — SODIUM CHLORIDE 10 ML: 9 INJECTION, SOLUTION INTRAMUSCULAR; INTRAVENOUS; SUBCUTANEOUS at 06:08

## 2018-07-31 RX ADMIN — AMIODARONE HYDROCHLORIDE 0.5 MG/MIN: 50 INJECTION, SOLUTION INTRAVENOUS at 09:50

## 2018-07-31 RX ADMIN — METOPROLOL TARTRATE 2.5 MG: 1 INJECTION, SOLUTION INTRAVENOUS at 23:46

## 2018-07-31 RX ADMIN — METOPROLOL TARTRATE 2.5 MG: 1 INJECTION, SOLUTION INTRAVENOUS at 19:03

## 2018-07-31 RX ADMIN — POLYETHYLENE GLYCOL 3350 17 G: 17 POWDER, FOR SOLUTION ORAL at 09:34

## 2018-07-31 RX ADMIN — IPRATROPIUM BROMIDE AND ALBUTEROL SULFATE 3 ML: .5; 3 SOLUTION RESPIRATORY (INHALATION) at 08:11

## 2018-07-31 RX ADMIN — SODIUM CHLORIDE 10 ML: 9 INJECTION, SOLUTION INTRAMUSCULAR; INTRAVENOUS; SUBCUTANEOUS at 21:39

## 2018-07-31 RX ADMIN — IPRATROPIUM BROMIDE AND ALBUTEROL SULFATE 3 ML: .5; 3 SOLUTION RESPIRATORY (INHALATION) at 13:13

## 2018-07-31 RX ADMIN — SODIUM CHLORIDE 10 ML: 9 INJECTION, SOLUTION INTRAMUSCULAR; INTRAVENOUS; SUBCUTANEOUS at 14:03

## 2018-07-31 RX ADMIN — LEVETIRACETAM 500 MG: 5 INJECTION INTRAVENOUS at 06:05

## 2018-07-31 RX ADMIN — MELATONIN TAB 3 MG 3 MG: 3 TAB at 21:38

## 2018-07-31 RX ADMIN — OXYCODONE HYDROCHLORIDE 5 MG: 5 TABLET ORAL at 09:34

## 2018-07-31 RX ADMIN — SODIUM CHLORIDE 40 MG: 9 INJECTION INTRAMUSCULAR; INTRAVENOUS; SUBCUTANEOUS at 09:34

## 2018-07-31 RX ADMIN — MAGNESIUM HYDROXIDE 30 ML: 400 SUSPENSION ORAL at 09:33

## 2018-07-31 RX ADMIN — VENLAFAXINE HYDROCHLORIDE 75 MG: 37.5 CAPSULE, EXTENDED RELEASE ORAL at 14:03

## 2018-07-31 RX ADMIN — AMIODARONE HYDROCHLORIDE 0.5 MG/MIN: 50 INJECTION, SOLUTION INTRAVENOUS at 20:54

## 2018-07-31 RX ADMIN — METOPROLOL TARTRATE 2.5 MG: 1 INJECTION, SOLUTION INTRAVENOUS at 06:05

## 2018-07-31 RX ADMIN — HYDROMORPHONE HYDROCHLORIDE 0.5 MG: 2 INJECTION, SOLUTION INTRAMUSCULAR; INTRAVENOUS; SUBCUTANEOUS at 21:39

## 2018-07-31 RX ADMIN — METOPROLOL TARTRATE 2.5 MG: 1 INJECTION, SOLUTION INTRAVENOUS at 14:03

## 2018-07-31 RX ADMIN — HYDROMORPHONE HYDROCHLORIDE 0.5 MG: 2 INJECTION, SOLUTION INTRAMUSCULAR; INTRAVENOUS; SUBCUTANEOUS at 03:49

## 2018-07-31 RX ADMIN — SODIUM PHOSPHATE, DIBASIC AND SODIUM PHOSPHATE, MONOBASIC 1 ENEMA: 7; 19 ENEMA RECTAL at 19:25

## 2018-07-31 RX ADMIN — SODIUM CHLORIDE 40 MG: 9 INJECTION INTRAMUSCULAR; INTRAVENOUS; SUBCUTANEOUS at 21:38

## 2018-07-31 RX ADMIN — LEVETIRACETAM 500 MG: 5 INJECTION INTRAVENOUS at 19:00

## 2018-07-31 RX ADMIN — ASCORBIC ACID, VITAMIN A PALMITATE, CHOLECALCIFEROL, THIAMINE HYDROCHLORIDE, RIBOFLAVIN-5 PHOSPHATE SODIUM, PYRIDOXINE HYDROCHLORIDE, NIACINAMIDE, DEXPANTHENOL, ALPHA-TOCOPHEROL ACETATE, VITAMIN K1, FOLIC ACID, BIOTIN, CYANOCOBALAMIN: 200; 3300; 200; 6; 3.6; 6; 40; 15; 10; 150; 600; 60; 5 INJECTION, SOLUTION INTRAVENOUS at 19:23

## 2018-07-31 RX ADMIN — BISACODYL 10 MG: 5 TABLET, COATED ORAL at 19:00

## 2018-07-31 RX ADMIN — IPRATROPIUM BROMIDE AND ALBUTEROL SULFATE 3 ML: .5; 3 SOLUTION RESPIRATORY (INHALATION) at 21:05

## 2018-07-31 NOTE — PROGRESS NOTES
Nutrition Assessment: 
 
RECOMMENDATIONS:  
TPN recommendations: 
 Increase to D20, 5% AA @ 100mL/h (provides 2112kcals/120gPro) Recommend checking Mag and Phos ASSESSMENT:  
Chart reviewed. Pt continues with postop ileus and remains NPO. Abdominal CT yesterday shows persistent dilated loops of small bowel. NGT to suction with significant OP (>2L yesterday). Still no colostomy OP. TPN remains hypocaloric, only meeting 70% est needs. K+ WNL as of yesterday. Wt loss continues. Dietitians Intervention(s)/Plan(s): Increase TPN rate SUBJECTIVE/OBJECTIVE:  
 
Diet Order: NPO, Other (comment) (TPN: D20, 5% AA @ 75mL/h (provides 1584kcals/90gPro) ) 
% Eaten:  Patient Vitals for the past 72 hrs: 
 % Diet Eaten  
07/29/18 1227 0 %  
07/28/18 1505 0 %  
 
to suction at   flush with       via NG Tube   Residuals: 2150 mL Pertinent Medications:MOM, protonix, miralax. Chemistries: 
Lab Results Component Value Date/Time Sodium 132 (L) 07/30/2018 02:51 AM  
 Potassium 4.4 07/30/2018 02:51 AM  
 Chloride 100 07/30/2018 02:51 AM  
 CO2 26 07/30/2018 02:51 AM  
 Anion gap 6 07/30/2018 02:51 AM  
 Glucose 147 (H) 07/30/2018 02:51 AM  
 BUN 16 07/30/2018 02:51 AM  
 Creatinine 0.70 07/30/2018 02:51 AM  
 BUN/Creatinine ratio 23 (H) 07/30/2018 02:51 AM  
 GFR est AA >60 07/30/2018 02:51 AM  
 GFR est non-AA >60 07/30/2018 02:51 AM  
 Calcium 8.2 (L) 07/30/2018 02:51 AM  
 Albumin 2.1 (L) 07/21/2018 03:00 AM  
  
Anthropometrics: Height: 6' 2\" (188 cm) Weight: 125 kg (275 lb 9.2 oz)   [x]bed scale (7/30)   []stated   []unknown IBW (%IBW):   ( ) UBW (%UBW):   (  %) BMI: Body mass index is 35.38 kg/(m^2). This BMI is indicative of: 
[]Underweight   []Normal   []Overweight   [x] Obesity   [] Extreme Obesity (BMI>40) Estimated Nutrition Needs (Based on): 4233 Kcals/day (BMR: 2075 x 1.1) , 130 g (1 g/kg) Protein Carbohydrate: At Least 130 g/day  Fluids: 2200 mL/day Last BM: colostomy-no OP []Active     []Hyperactive  [x]Hypoactive       [] Absent   BS Skin:    [] Intact   [x] Incision  [] Breakdown   [] DTI   [] Tears/Excoriation/Abrasion  [x]Edema(+1-2-LUE and RLE; +1-LLE and RUE)  [] Other: Wt Readings from Last 30 Encounters:  
07/30/18 125 kg (275 lb 9.2 oz) 07/05/18 128.5 kg (283 lb 4.8 oz) 05/15/18 120.7 kg (266 lb) 05/07/18 118.8 kg (262 lb) 04/10/18 121.1 kg (267 lb)  
03/26/18 121.1 kg (267 lb)  
03/16/18 121.1 kg (267 lb)  
09/21/17 118.4 kg (261 lb) 08/03/17 117.5 kg (259 lb) 04/06/17 117.3 kg (258 lb 8 oz) 03/31/17 117.5 kg (259 lb)  
03/16/17 117.5 kg (259 lb)  
02/23/17 119.7 kg (264 lb) 01/03/17 115.2 kg (254 lb)  
11/17/16 119.7 kg (264 lb) 10/18/16 118.8 kg (262 lb) 10/07/16 118.8 kg (262 lb)  
09/22/16 113.9 kg (251 lb 1 oz) 08/18/16 116.4 kg (256 lb 11.2 oz) 05/16/16 114.8 kg (253 lb)  
03/10/16 114.8 kg (253 lb 1.6 oz) 02/11/16 122 kg (269 lb)  
01/11/16 119.7 kg (264 lb) 11/04/15 122.5 kg (270 lb) 08/25/15 119.7 kg (264 lb) 08/13/15 116.1 kg (256 lb) 05/04/15 119.7 kg (264 lb)  
03/27/15 119.7 kg (264 lb)  
02/21/15 121.2 kg (267 lb 3.2 oz) 01/29/15 119.9 kg (264 lb 6.4 oz) NUTRITION DIAGNOSES:  
Problem:  Altered GI function Etiology: related to Ascending colon mass c/w probable colon carcinoma and Cedric's erosion with [facility] Signs/Symptoms: as evidenced by Ascending colon mass c/w probable colon carcinoma and Cedric's erosion with [facility] Previous dx re: altered GI function continues. NUTRITION INTERVENTIONS: 
Meals/Snacks: Other (advance diet as able per CRS ) Enteral/Parenteral Nutrition: Modify rate, concentration, composition, and schedule Supplements: Commercial supplement GOAL:  
Pt will meet >90% kcal and protein needs via TPN in 2-4 days. NUTRITION MONITORING AND EVALUATION Previous Goal: Pt will meet >90% kcal and protein needs via TPN in 2-4 days Previous Goal Met: No  
Previous Recommendations Implemented: No  
Cultural, Methodist, or Ethnic Dietary Needs: None LEARNING NEEDS (Diet, Food/Nutrient-Drug Interaction):  
 [x] None Identified 
 [] Identified and Education Provided/Documented 
 [] Identified and Pt declined/was not appropriate [x] Interdisciplinary Care Plan Reviewed/Documented  
 [x] Participated in Discharge Planning: Unable to determine  
 [] Interdisciplinary Rounds NUTRITION RISK:  
 [x] High              [] Moderate           []  Low  []  Minimal/Uncompromised Davy Aaron RD, [facility] Pager [phone] Weekend Pager [phone]

## 2018-07-31 NOTE — INTERDISCIPLINARY ROUNDS
Interdisciplinary Rounds were completed on this patient. Rounds included nursing, clinical care leader, pharmacy, and case management. Plan for the day: awaiting bowel function, TPN, monitor labs Plan for the stay: continue current [address] Activity: up in chair Anticipated discharge date: [facility]??

## 2018-07-31 NOTE — PROGRESS NOTES
Problem: Self Care Deficits Care Plan (Adult) Goal: *Acute Goals and Plan of Care (Insert Text) Occupational Therapy Goals Weekly reassessment 7/31/18 1. Patient will perform one grooming task seated EOB with minimal assistance within 7 day(s). 2.  Patient will perform upper body dressing with moderate assistance  within 7 day(s). 3.  Patient will perform supine <> sit to participate in ADL tasks decrease caregiver burden with moderate assistance  within 7 day(s). 4.  Patient will perform sit <> stand to prepare for self care transfers with moderate assistance within 7 day(s). 5.  Patient will participate in R AROM and L self PROM upper extremity therapeutic exercise/activities with contact guard assist for 8 minutes within 7 day(s). 6.  Patient will utilize energy conservation techniques during functional activities with verbal cues within 7 day(s). Initiated 7/22/2018 1. Patient will perform one grooming task seated EOB with minimal assistance within 7 day(s). 2.  Patient will perform upper body dressing with moderate assistance  within 7 day(s). 3.  Patient will perform supine <> sit to participate in ADL tasks decrease caregiver burdenwith maximal assistance  within 7 day(s). 4.  Patient will perform sit <> stand to prepare for self care transfers with maximal assistance within 7 day(s). 5.  Patient will participate in R AROM and L self PROM upper extremity therapeutic exercise/activities with contact guard assist for 8 minutes within 7 day(s). 6.  Patient will utilize energy conservation techniques during functional activities with verbal cues within 7 day(s). Occupational Therapy TREATMENT: WEEKLY REASSESSMENT Patient: Zeeshan Calzada (67 y.o. male) Date: 7/31/2018 Diagnosis: Acute blood loss anemia GI bleed Anasarca GI Bleed 
gi bleed ASCENDING COLON CANCER  GI bleed Procedure(s) (LRB): 
LAPAROSCOPIC  ASSISTED TO OPEN RIGHT COLECTOMY AND SMALL BOWEL RESECTION (Right) 14 Days Post-Op Precautions:   
Chart, occupational therapy assessment, plan of care, and goals were reviewed. ASSESSMENT: 
Patient with slow progress and greatly limited today by severe anxiety and fear of falling. Pt received in chair after being stephy lifted by PMT. Pt able to scoot forward in chair with cues. Attempted 8 trials of sit to stand, with standing tolerance less than 2 seconds. Pt only able to achieve clearance of buttocks 2/8 attempts with great difficulty transitioning R hand to therapist's hand. Wife reported pt takes anti anxiety medication at home and is unsure if pt was receiving this. Wife reports pt is more \"on edge\" and his anxiety has significantly worsened since initial evaluation. Educated pt on importance of providing L UE PROM using R UE, pt demonstrated this with min verbal cues for technique and tempo. Pt is needing increased A for all transfers and ADLs. Hopefully pt will be able to receive anti anxiety medication which will decrease pt's fear of falling. Suspect pt is physically capable of assisting more with sit to stands. He is needing mod to total A for bathing, dressing and toileting. His poor standing tolerance is greatly inhibiting his ability for his wife to assist him with his ADLs. He will need IP rehab to increase his strength, balance, standing tolerance and decrease caregiver burden. Progression toward goals: 
[]            Improving appropriately and progressing toward goals []            Improving slowly and progressing toward goals [x]            Not making progress toward goals and plan of care will be adjusted PLAN: 
Goals have been updated based on progression since last assessment. Patient continues to benefit from skilled intervention to address the above impairments. Continue to follow patient 4 times a week to address goals.  
Planned Interventions: 
[x]                    Self Care Training                  [x]             Therapeutic Activities [x]                    Functional Mobility Training    []             Cognitive Retraining 
[x]                    Therapeutic Exercises           [x]             Endurance Activities [x]                    Balance Training                   []             Neuromuscular Re-Education []                    Visual/Perceptual Training     [x]        Home Safety Training 
[x]                    Patient Education                 [x]             Family Training/Education []                    Other (comment): 
Discharge Recommendations: Inpatient Rehab Further Equipment Recommendations for Discharge: TBD SUBJECTIVE:  
Patient stated I am gonna fall!  OBJECTIVE DATA SUMMARY:  
Cognitive/Behavioral Status: 
  
  
  
  
  
  
 
Functional Mobility and Transfers for ADLs: 
Bed Mobility: 
Scooting: Stand-by assistance (to scoot to edge of chair) Transfers: 
Sit to Stand: Maximum assistance; Total assistance;Assist x2; Additional time (x 8 trials, unable to achieve upright, stood less than 2 sec) Balance: 
Sitting: Intact Standing: Impaired; With support Standing - Static: Constant support;Poor ADL Intervention: 
  
 
  
 
Pt received in chair. Pt able to scoot forward in chair with cues and encouragement. Attempted 8 trials of sit to stand, with standing tolerance less than 2 seconds. Pt only able to achieve clearance of buttocks 2/8 attempts with great difficulty transitioning R hand to therapist's hand. Max verbal cues for \"nose over toes\" and anterior weight shifting. Pt resistive of assistance and pushing into extension. Neuro Re-Education: 
 educated pt and wife on importance of PROM of L UE using R UE. Pt demonstrated Therapeutic Exercises:  
Cues to work on clearing buttocks using R UE on chair arm to increase WB through B LEs and R UE in prep for sit to stands. Pain: 
Pain Scale 1: Numeric (0 - 10) Pain Intensity 1: 5 Pain Location 1: Abdomen Pain Intervention(s) 1: Medication (see MAR) Activity Tolerance: VSS- tachypnea with anxiety, resolved with cues for slow breathing Please refer to the flowsheet for vital signs taken during this treatment. After treatment:  
[x] Patient left in no apparent distress sitting up in chair 
[] Patient left in no apparent distress in bed 
[x] Call bell left within reach [x] Nursing notified 
[] Caregiver present 
[] Bed alarm activated COMMUNICATION/COLLABORATION:  
The patients plan of care was discussed with: Physical Therapist and Registered Nurse Flaquita Cunningham OT Time Calculation: 38 mins

## 2018-07-31 NOTE — PROGRESS NOTES
CRS POD# 14 s/p R colectomy, SBR, extensive YAMEL Pain well controlled. No ostomy output. /61  Pulse 75  Temp 97.9 °F (36.6 °C)  Resp 20  Ht 6' 2\" (1.88 m)  Wt 125 kg (275 lb 9.2 oz)  SpO2 98%  BMI 35.38 kg/m2 NAD, AAO Abd soft, approp TTP Incision c/d/i with mild serosang drainage from midline incision JPs serosang Ostomy pink, flat. No gas or stool in bag Plan 
-Continue TPN. Okay to have a few ice chips every hour 
-Prolonged postop ileus. Await return of bowel function

## 2018-07-31 NOTE — PROGRESS NOTES
General Surgery End of Shift Nursing Note Bedside shift change report given to Niya Toledo (oncoming nurse) by Vikash Herring (offgoing nurse). Report included the following information SBAR, Kardex, Intake/Output, MAR, Recent Results and Cardiac Rhythm paced. Shift worked:   7 am to 7 pm  
Summary of shift:    Pain meds given as requested. CT completed. No new complaints or issues. Still has coarse/rhonci sounds in upper airway. Issues for physician to address:   none Number times ambulated in hallway past shift: 0 Number of times OOB to chair past shift: 0 Pain Management: 
Current medication: roxicodone Patient states pain is manageable on current pain medication: YES 
 
GI: 
 
Current diet:  DIET NPO 
TPN ADULT - CENTRAL AA 5% D20% W/ CA + ELECTROLYTES Tolerating current diet: YES Passing flatus: NO 
Last Bowel Movement: several days ago Respiratory: 
 
Incentive Spirometer at bedside: YES Patient instructed on use: YES Patient Safety: 
 
Falls Score: 3 Bed Alarm On? No 
Sitter? No 
 
Mihaela Schafer

## 2018-07-31 NOTE — PROGRESS NOTES
CM met with pt and wife during IDR's. Pt up in chair. PT/OT notes reviewed. [facility] is following. CM will continue to follow hospitalization and needs for discharge. MANDO CarranzaN, RN Care Manager [address] 
[phone]

## 2018-07-31 NOTE — PROGRESS NOTES
Case discussed with Dr. Christos Lala. Appreciate transfer of service from Hospitalist to [facility].. Hospitalist will sign off.

## 2018-07-31 NOTE — PROGRESS NOTES
ADULT PROTOCOL: JET AEROSOL  REASSESSMENT Patient  Eva Singh     69 y.o.   male     7/31/2018  8:39 AM 
 
Breath Sounds Pre Procedure: Right Breath Sounds: Coarse Left Breath Sounds: Coarse Breath Sounds Post Procedure: Right Breath Sounds: Coarse Left Breath Sounds: Coarse Breathing pattern: Pre procedure Breathing Pattern: Regular Post procedure Breathing Pattern: Regular Heart Rate: Pre procedure Pulse: 75 Post procedure Pulse: 77 Resp Rate: Pre procedure Respirations: 18 Post procedure Respirations: 20 Incentive Spirometry:  Actual Volume (ml): 750 ml 
     
 
Cough: Pre procedure Cough: Congested Post procedure Cough: Congested Oxygen: O2 Device: Room air    Changed:no SpO2: Pre procedure SpO2: 97 % Post procedure SpO2: 96 % Nebulizer Therapy: Current medications Aerosolized Medications: DuoNeb Changed:no Problem List:  
Patient Active Problem List  
Diagnosis Code  
 HTN (hypertension) I10  
 Depression F32.9  Hypercholesterolemia E78.00  Acid reflux K21.9  Seizure ([facility] R56.9  Hypothyroidism E03.9  Hyponatremia E87.1  BPH (benign prostatic hyperplasia) N40.0  Rash R21  
 Cellulitis L03.90  Wax in ear H61.20  Ulcer [id number]  Small bowel obstruction ([facility] N96.331  Atrial flutter ([facility]) I48.92  
 Atrial fibrillation or flutter  CHI (closed head injury) S09. 90XA  Basal cell cancer C44.91  
 TBI (traumatic brain injury) ([facility] S06. 9X9A  Atrial fibrillation (HCC) I48.91  
 Cataract H26.9  Constipation K59.00  Ankle fracture, left L73.391J  Insomnia G47.00  Tinea corporis B35.4  SSS (sick sinus syndrome) ([facility]) I49.5  Syncope R55  [facility] G40.909  RONAL on CPAP G47.33, Z99.89  
 Pacemaker Z95.0  Pre-op evaluation Z01.818  Chronic back pain greater than 3 months duration M54.9, G89.29  
 Cerebral infarction ([facility] I63.9  Acute bronchitis J20.9  Screening for colon cancer Z12.11  
 Chronic right shoulder pain M25.511, G89.29  
 Common wart B07.8  Localized [facility] with [facility] of consciousness [facility] G40.209  Severe obesity (BMI 35.0-39.9) ([facility]) E66.01  
 Acute blood loss anemia D62  Anasarca R60.1  GI bleed K92.2 Respiratory Therapist: Reed Dawson, RT

## 2018-07-31 NOTE — PROGRESS NOTES
Problem: Mobility Impaired (Adult and Pediatric) Goal: *Acute Goals and Plan of Care (Insert Text) Physical Therapy Goals Reviewed and revised 7/26/2018 1. Patient will move from supine to sit and sit to supine , scoot up and down and roll side to side in bed with minimal assistance within 7 day(s). 2.  Patient will transfer from bed to chair and chair to bed with moderate assistance using the least restrictive device within 7 day(s). 3.  Patient will perform sit to stand with moderate assistance within 7 day(s). 4.  Patient will perform stand pivot transfer to wheelchair with moderate assistance in 7 days. Reviewed and revised 7/19/2018 1. Patient will move from supine to sit and sit to supine , scoot up and down and roll side to side in bed with minimal assistance within 7 day(s). 2.  Patient will transfer from bed to chair and chair to bed with moderate assistance using the least restrictive device within 7 day(s). 3.  Patient will perform sit to stand with minimal assistance within 7 day(s). 4.  Patient will perform stand pivot transfer to wheelchair with minimal assistance in 7 days. Initiated 7/12/2018 1. Patient will move from supine to sit and sit to supine , scoot up and down and roll side to side in bed with modified independence within 7 day(s). 2.  Patient will transfer from bed to chair and chair to bed with supervision/set-up using the least restrictive device within 7 day(s). 3.  Patient will perform sit to stand with supervision/set-up within 7 day(s). 4.  Patient will perform stand pivot transfer to wheelchair with modified independence in 7 days. physical Therapy TREATMENT Patient: Bev Duffy (89 y.o. male) Date: 7/31/2018 Diagnosis: Acute blood loss anemia GI bleed Anasarca GI Bleed 
gi bleed ASCENDING COLON CANCER  GI bleed Procedure(s) (LRB): 
LAPAROSCOPIC  ASSISTED TO OPEN RIGHT COLECTOMY AND SMALL BOWEL RESECTION (Right) 14 Days Post-Op Precautions:   
Chart, physical therapy assessment, plan of care and goals were reviewed. ASSESSMENT: 
Pt was received up in the chair after being hoyered. He is getting more fearful and has not been getting his anxiety medication. Attempted to perform sit<>stand 8x with max A x2. Unable to come to a full stand today and resistive to movement due to fear of falling. Cued pt to perform exercises on the RUE and RLE. Discussed with pharmacy about possibility of anxiety meds. Continue to recommend IP due to his significant decline from PLOF. Progression toward goals: 
[]    Improving appropriately and progressing toward goals [x]    Improving slowly and progressing toward goals 
[]    Not making progress toward goals and plan of care will be adjusted PLAN: 
Patient continues to benefit from skilled intervention to address the above impairments. Continue treatment per established plan of care. Discharge Recommendations:  Inpatient Rehab Further Equipment Recommendations for Discharge:  TBD SUBJECTIVE:  
Patient stated Don't let me fall! Tarri Miners OBJECTIVE DATA SUMMARY:  
Critical Behavior: 
Neurologic State: Alert Orientation Level: Oriented X4 Cognition: Appropriate decision making Safety/Judgement: Fall prevention Functional Mobility Training: 
Bed Mobility: 
  
  
  
Scooting: Stand-by assistance (to scoot to edge of chair) Transfers: 
Sit to Stand: Maximum assistance;Assist x2 Stand to Sit: Moderate assistance;Assist x2 Balance: 
Sitting: Impaired Sitting - Static: Fair (occasional) Sitting - Dynamic: Fair (occasional) Standing: Impaired; With support Standing - Static: Constant support;Poor Therapeutic Exercises:  
Marches on the R Pain: 
Pain Scale 1: Numeric (0 - 10) Pain Intensity 1: 5 Pain Location 1: Abdomen Pain Intervention(s) 1: Medication (see MAR) Activity Tolerance:  
fearful Please refer to the flowsheet for vital signs taken during this treatment. After treatment:  
[x]    Patient left in no apparent distress sitting up in chair 
[]    Patient left in no apparent distress in bed 
[x]    Call bell left within reach [x]    Nursing notified 
[]    Caregiver present 
[]    Bed alarm activated COMMUNICATION/COLLABORATION:  
The patients plan of care was discussed with: Occupational Therapist, Registered Nurse and pharmacy Keara Rodríguez, PT, DPT Time Calculation: 36 mins

## 2018-08-01 LAB
ANION GAP SERPL CALC-SCNC: 5 MMOL/L (ref 5–15)
BASOPHILS # BLD: 0.1 K/UL (ref 0–0.1)
BASOPHILS NFR BLD: 1 % (ref 0–1)
BUN SERPL-MCNC: 19 MG/DL (ref 6–20)
BUN/CREAT SERPL: 33 (ref 12–20)
CALCIUM SERPL-MCNC: 8.3 MG/DL (ref 8.5–10.1)
CHLORIDE SERPL-SCNC: 102 MMOL/L (ref 97–108)
CO2 SERPL-SCNC: 23 MMOL/L (ref 21–32)
CREAT SERPL-MCNC: 0.57 MG/DL (ref 0.7–1.3)
DIFFERENTIAL METHOD BLD: ABNORMAL
EOSINOPHIL # BLD: 0.2 K/UL (ref 0–0.4)
EOSINOPHIL NFR BLD: 2 % (ref 0–7)
ERYTHROCYTE [DISTWIDTH] IN BLOOD BY AUTOMATED COUNT: 25 % (ref 11.5–14.5)
GLUCOSE BLD STRIP.AUTO-MCNC: 157 MG/DL (ref 65–100)
GLUCOSE BLD STRIP.AUTO-MCNC: 163 MG/DL (ref 65–100)
GLUCOSE BLD STRIP.AUTO-MCNC: 186 MG/DL (ref 65–100)
GLUCOSE SERPL-MCNC: 127 MG/DL (ref 65–100)
HCT VFR BLD AUTO: 29.7 % (ref 36.6–50.3)
HGB BLD-MCNC: 9 G/DL (ref 12.1–17)
IMM GRANULOCYTES # BLD: 0.2 K/UL (ref 0–0.04)
IMM GRANULOCYTES NFR BLD AUTO: 2 % (ref 0–0.5)
LYMPHOCYTES # BLD: 1.2 K/UL (ref 0.8–3.5)
LYMPHOCYTES NFR BLD: 10 % (ref 12–49)
MCH RBC QN AUTO: 26.5 PG (ref 26–34)
MCHC RBC AUTO-ENTMCNC: 30.3 G/DL (ref 30–36.5)
MCV RBC AUTO: 87.4 FL (ref 80–99)
MONOCYTES # BLD: 1.8 K/UL (ref 0–1)
MONOCYTES NFR BLD: 15 % (ref 5–13)
NEUTS SEG # BLD: 8.2 K/UL (ref 1.8–8)
NEUTS SEG NFR BLD: 70 % (ref 32–75)
NRBC # BLD: 0 K/UL (ref 0–0.01)
NRBC BLD-RTO: 0 PER 100 WBC
PLATELET # BLD AUTO: 473 K/UL (ref 150–400)
PMV BLD AUTO: 10.2 FL (ref 8.9–12.9)
POTASSIUM SERPL-SCNC: 4.9 MMOL/L (ref 3.5–5.1)
RBC # BLD AUTO: 3.4 M/UL (ref 4.1–5.7)
RBC MORPH BLD: ABNORMAL
SERVICE CMNT-IMP: ABNORMAL
SODIUM SERPL-SCNC: 130 MMOL/L (ref 136–145)
WBC # BLD AUTO: 11.7 K/UL (ref 4.1–11.1)

## 2018-08-01 PROCEDURE — 97530 THERAPEUTIC ACTIVITIES: CPT

## 2018-08-01 PROCEDURE — 74011000258 HC RX REV CODE- 258: Performed by: SURGERY

## 2018-08-01 PROCEDURE — 94640 AIRWAY INHALATION TREATMENT: CPT

## 2018-08-01 PROCEDURE — 36415 COLL VENOUS BLD VENIPUNCTURE: CPT | Performed by: SURGERY

## 2018-08-01 PROCEDURE — 51798 US URINE CAPACITY MEASURE: CPT

## 2018-08-01 PROCEDURE — C9113 INJ PANTOPRAZOLE SODIUM, VIA: HCPCS | Performed by: HOSPITALIST

## 2018-08-01 PROCEDURE — 94760 N-INVAS EAR/PLS OXIMETRY 1: CPT

## 2018-08-01 PROCEDURE — 74011250636 HC RX REV CODE- 250/636: Performed by: COLON & RECTAL SURGERY

## 2018-08-01 PROCEDURE — 74011250636 HC RX REV CODE- 250/636: Performed by: INTERNAL MEDICINE

## 2018-08-01 PROCEDURE — 77030018719 HC DRSG PTCH ANTIMIC J&J -A

## 2018-08-01 PROCEDURE — 74011000250 HC RX REV CODE- 250: Performed by: SURGERY

## 2018-08-01 PROCEDURE — 85025 COMPLETE CBC W/AUTO DIFF WBC: CPT | Performed by: SURGERY

## 2018-08-01 PROCEDURE — 74011000250 HC RX REV CODE- 250: Performed by: INTERNAL MEDICINE

## 2018-08-01 PROCEDURE — 74011250636 HC RX REV CODE- 250/636: Performed by: SURGERY

## 2018-08-01 PROCEDURE — 74011250637 HC RX REV CODE- 250/637: Performed by: SURGERY

## 2018-08-01 PROCEDURE — 77030037878 HC DRSG MEPILEX >48IN BORD MOLN -B

## 2018-08-01 PROCEDURE — 74011250636 HC RX REV CODE- 250/636: Performed by: HOSPITALIST

## 2018-08-01 PROCEDURE — 80048 BASIC METABOLIC PNL TOTAL CA: CPT | Performed by: SURGERY

## 2018-08-01 PROCEDURE — 74011000250 HC RX REV CODE- 250: Performed by: EMERGENCY MEDICINE

## 2018-08-01 PROCEDURE — 74011250637 HC RX REV CODE- 250/637: Performed by: INTERNAL MEDICINE

## 2018-08-01 PROCEDURE — 82962 GLUCOSE BLOOD TEST: CPT

## 2018-08-01 PROCEDURE — 65270000029 HC RM PRIVATE

## 2018-08-01 PROCEDURE — 74011000250 HC RX REV CODE- 250: Performed by: HOSPITALIST

## 2018-08-01 PROCEDURE — 77030018846 HC SOL IRR STRL H20 ICUM -A

## 2018-08-01 RX ORDER — DABIGATRAN ETEXILATE 150 MG/1
150 CAPSULE ORAL 2 TIMES DAILY
Status: DISCONTINUED | OUTPATIENT
Start: 2018-08-01 | End: 2018-08-12

## 2018-08-01 RX ORDER — BISACODYL 5 MG
10 TABLET, DELAYED RELEASE (ENTERIC COATED) ORAL
Status: COMPLETED | OUTPATIENT
Start: 2018-08-01 | End: 2018-08-01

## 2018-08-01 RX ORDER — SENNOSIDES 8.6 MG/1
2 TABLET ORAL ONCE
Status: COMPLETED | OUTPATIENT
Start: 2018-08-01 | End: 2018-08-01

## 2018-08-01 RX ADMIN — SODIUM CHLORIDE 10 ML: 9 INJECTION, SOLUTION INTRAMUSCULAR; INTRAVENOUS; SUBCUTANEOUS at 03:57

## 2018-08-01 RX ADMIN — SODIUM CHLORIDE 10 ML: 9 INJECTION, SOLUTION INTRAMUSCULAR; INTRAVENOUS; SUBCUTANEOUS at 06:40

## 2018-08-01 RX ADMIN — LACTULOSE 10 G: 20 SOLUTION ORAL at 11:18

## 2018-08-01 RX ADMIN — MAGNESIUM HYDROXIDE 30 ML: 400 SUSPENSION ORAL at 11:18

## 2018-08-01 RX ADMIN — ASCORBIC ACID, VITAMIN A PALMITATE, CHOLECALCIFEROL, THIAMINE HYDROCHLORIDE, RIBOFLAVIN-5 PHOSPHATE SODIUM, PYRIDOXINE HYDROCHLORIDE, NIACINAMIDE, DEXPANTHENOL, ALPHA-TOCOPHEROL ACETATE, VITAMIN K1, FOLIC ACID, BIOTIN, CYANOCOBALAMIN: 200; 3300; 200; 6; 3.6; 6; 40; 15; 10; 150; 600; 60; 5 INJECTION, SOLUTION INTRAVENOUS at 17:10

## 2018-08-01 RX ADMIN — MELATONIN TAB 3 MG 3 MG: 3 TAB at 21:04

## 2018-08-01 RX ADMIN — IPRATROPIUM BROMIDE AND ALBUTEROL SULFATE 3 ML: .5; 3 SOLUTION RESPIRATORY (INHALATION) at 08:43

## 2018-08-01 RX ADMIN — AMIODARONE HYDROCHLORIDE 0.5 MG/MIN: 50 INJECTION, SOLUTION INTRAVENOUS at 23:57

## 2018-08-01 RX ADMIN — SODIUM CHLORIDE 250 ML: 900 INJECTION, SOLUTION INTRAVENOUS at 14:38

## 2018-08-01 RX ADMIN — HYDROMORPHONE HYDROCHLORIDE 0.5 MG: 2 INJECTION, SOLUTION INTRAMUSCULAR; INTRAVENOUS; SUBCUTANEOUS at 23:25

## 2018-08-01 RX ADMIN — OXYCODONE HYDROCHLORIDE 10 MG: 5 TABLET ORAL at 03:56

## 2018-08-01 RX ADMIN — SENNOSIDES 17.2 MG: 8.6 TABLET, FILM COATED ORAL at 11:34

## 2018-08-01 RX ADMIN — SODIUM CHLORIDE 40 MG: 9 INJECTION INTRAMUSCULAR; INTRAVENOUS; SUBCUTANEOUS at 21:04

## 2018-08-01 RX ADMIN — BISACODYL 10 MG: 5 TABLET, COATED ORAL at 11:17

## 2018-08-01 RX ADMIN — LEVETIRACETAM 500 MG: 5 INJECTION INTRAVENOUS at 17:19

## 2018-08-01 RX ADMIN — SODIUM CHLORIDE 10 ML: 9 INJECTION, SOLUTION INTRAMUSCULAR; INTRAVENOUS; SUBCUTANEOUS at 21:04

## 2018-08-01 RX ADMIN — ONDANSETRON 4 MG: 2 INJECTION INTRAMUSCULAR; INTRAVENOUS at 16:49

## 2018-08-01 RX ADMIN — HYDROMORPHONE HYDROCHLORIDE 0.5 MG: 2 INJECTION, SOLUTION INTRAMUSCULAR; INTRAVENOUS; SUBCUTANEOUS at 15:13

## 2018-08-01 RX ADMIN — METOPROLOL TARTRATE 2.5 MG: 1 INJECTION, SOLUTION INTRAVENOUS at 17:16

## 2018-08-01 RX ADMIN — VENLAFAXINE HYDROCHLORIDE 75 MG: 37.5 CAPSULE, EXTENDED RELEASE ORAL at 11:18

## 2018-08-01 RX ADMIN — IPRATROPIUM BROMIDE AND ALBUTEROL SULFATE 3 ML: .5; 3 SOLUTION RESPIRATORY (INHALATION) at 14:21

## 2018-08-01 RX ADMIN — LEVETIRACETAM 500 MG: 5 INJECTION INTRAVENOUS at 06:40

## 2018-08-01 RX ADMIN — HYDROMORPHONE HYDROCHLORIDE 0.5 MG: 2 INJECTION, SOLUTION INTRAMUSCULAR; INTRAVENOUS; SUBCUTANEOUS at 11:01

## 2018-08-01 RX ADMIN — SODIUM CHLORIDE 10 ML: 9 INJECTION, SOLUTION INTRAMUSCULAR; INTRAVENOUS; SUBCUTANEOUS at 11:05

## 2018-08-01 RX ADMIN — METOPROLOL TARTRATE 2.5 MG: 1 INJECTION, SOLUTION INTRAVENOUS at 23:28

## 2018-08-01 RX ADMIN — METOPROLOL TARTRATE 2.5 MG: 1 INJECTION, SOLUTION INTRAVENOUS at 06:41

## 2018-08-01 RX ADMIN — METOPROLOL TARTRATE 2.5 MG: 1 INJECTION, SOLUTION INTRAVENOUS at 11:34

## 2018-08-01 RX ADMIN — SODIUM CHLORIDE 40 MG: 9 INJECTION INTRAMUSCULAR; INTRAVENOUS; SUBCUTANEOUS at 11:18

## 2018-08-01 RX ADMIN — IPRATROPIUM BROMIDE AND ALBUTEROL SULFATE 3 ML: .5; 3 SOLUTION RESPIRATORY (INHALATION) at 19:16

## 2018-08-01 RX ADMIN — POLYETHYLENE GLYCOL 3350 17 G: 17 POWDER, FOR SOLUTION ORAL at 11:18

## 2018-08-01 RX ADMIN — SODIUM CHLORIDE 10 ML: 9 INJECTION, SOLUTION INTRAMUSCULAR; INTRAVENOUS; SUBCUTANEOUS at 21:05

## 2018-08-01 RX ADMIN — AMIODARONE HYDROCHLORIDE 0.5 MG/MIN: 50 INJECTION, SOLUTION INTRAVENOUS at 11:07

## 2018-08-01 NOTE — PROGRESS NOTES
Paged colorectal specialist (re: abdomen distended, patient has had some N/V). Not much output from Maddie Dan. Spoke with Laly. Dr. Fede Miguel on call. Dr. Fede Miguel called back (ordered Phenergan). Unable to put order in computer? Spoke with Pharmacy (compazine is in place of Phenergan). No other orders received.

## 2018-08-01 NOTE — PROGRESS NOTES
Gave enema via ostomy. Patient's abdomen is more distended (nausea). Dobb peg not draining much from tube. Flushed tube. Wife at bedside.

## 2018-08-01 NOTE — PROGRESS NOTES
General Surgery End of Shift Nursing Note Bedside shift change report given to [facility] (oncoming nurse) by Norbert Apley (offgoing nurse). Report included the following information SBAR, Kardex, Intake/Output, MAR, Recent Results and Cardiac Rhythm paced. Shift worked:   7 am to 7 pm  
Summary of shift:   Pt worked with PT. Sat in chair for about 4 hrs. Pain meds given as requested. No new complaints or issues. Still has coarse/rhonci sounds in upper airway. Still no output in ostomy. MADELEINE drain output left 9 cc; right 5 cc. Changed dressing to midline incision once during shift because the lower part of the dressing was saturated. Issues for physician to address:   none Number times ambulated in hallway past shift: 0 Number of times OOB to chair past shift: 1 Pain Management: 
Current medication: roxicodone Patient states pain is manageable on current pain medication: YES 
 
GI: 
 
Current diet:  DIET NPO 
TPN ADULT - CENTRAL AA 5% D20% W/ CA + ELECTROLYTES Tolerating current diet: YES Passing flatus: NO 
Last Bowel Movement: several days ago Respiratory: 
 
Incentive Spirometer at bedside: YES Patient instructed on use: YES Patient Safety: 
 
Falls Score: 3 Bed Alarm On? No 
Sitter? No 
 
Edwardo Cool

## 2018-08-01 NOTE — PROGRESS NOTES
General Surgery End of Shift Nursing Note Bedside shift change report given to Sis Sotelo (oncoming nurse) by Abiola Warner (offgoing nurse). Report included the following information SBAR, Kardex, Intake/Output, MAR and Recent Results. Shift worked:   C Summary of shift:    0 Issues for physician to address:   0 Number times ambulated in hallway past shift: 0 Number of times OOB to chair past shift: 1 Pain Management: 
Current medication: Dilaudid Patient states pain is manageable on current pain medication: YES 
 
GI: 
 
Current diet:  DIET NPO 
TPN ADULT - CENTRAL AA 5% D20% W/ CA + ELECTROLYTES Tolerating current diet: YES Passing flatus: YES Last Bowel Movement: today Appearance: Ostomy Respiratory: 
 
Incentive Spirometer at bedside: YES Patient instructed on use: YES Patient Safety: 
 
Falls Score: 1 Bed Alarm On? Yes Sitter? Yes Colbert Crigler, RN

## 2018-08-01 NOTE — PROGRESS NOTES
Pt was received up in the recliner, wife concerned that pt had just thrown up. Pt was repositioned and cued to scoot back in chair with min A. She needed max A x 2 to ensure his bottom was fully back in the seat. He as able to performed weight shifting forward with RUE 5x with only cues. He was very drowsy and wife reported that he had not had a good day. Went through a few exercises on the R side, only a few times. MD entered the room to talk with family. Once MD left the room talked with wife about having pt return to bed with mobility team. He was able to stay up in the chair 4 hours yesterday, but does not have the tolerance today. Nursing aware to call mobility team. Continue to recommend IP at discharge. Time: 15 minutes.  
Kasia Man, PT, DPT

## 2018-08-01 NOTE — PROGRESS NOTES
Discontinued flannery catheter. Instructed on voiding within 6-8 hours. Given patient urinal at bedside.

## 2018-08-01 NOTE — PROGRESS NOTES
CRS POD# 15 s/p R colectomy, SBR, extensive YAMEL Pain well controlled. No ostomy output. /70  Pulse 75  Temp 98.4 °F (36.9 °C)  Resp 20  Ht 6' 2\" (1.88 m)  Wt 125 kg (275 lb 9.2 oz)  SpO2 95%  BMI 35.38 kg/m2 NAD, AAO Abd soft, approp TTP Incision c/d/i with mild serosang drainage from midline incision JPs serosang Ostomy pink, flat. No gas or stool in bag Plan 
-Continue TPN. Okay to have a few ice chips every hour 
-Prolonged postop ileus. Await return of bowel function

## 2018-08-01 NOTE — PROGRESS NOTES
General Surgery End of Shift Nursing Note Bedside shift change report given to Syeda Hughes RN (oncoming nurse) by Huber Laureano RN (offgoing nurse). Report included the following information SBAR, Kardex, Intake/Output, MAR, Recent Results and Cardiac Rhythm paced. Shift worked:   5601-1201 Summary of shift:  Adjusted NGT , stable, not draining much, total 50 ml this shift, MADELEINE intact and draining, scant amount of output in colostomy, medicated x 1 for pain. Issues for physician to address:   none Number times ambulated in hallway past shift: 0 Number of times OOB to chair past shift: 1 Pain Management: 
Current medication: roxicodone Patient states pain is manageable on current pain medication: YES 
 
GI: 
 
Current diet:  DIET NPO 
TPN ADULT - CENTRAL AA 5% D20% W/ CA + ELECTROLYTES Tolerating current diet: YES Passing flatus: NO 
Last Bowel Movement: several days ago Respiratory: 
 
Incentive Spirometer at bedside: YES Patient instructed on use: YES Patient Safety: 
 
Falls Score: 3 Bed Alarm On? No 
Sitter? No 
 
Ayanna Nobles

## 2018-08-02 ENCOUNTER — APPOINTMENT (OUTPATIENT)
Dept: CT IMAGING | Age: 78
DRG: 329 | End: 2018-08-02
Attending: SURGERY
Payer: MEDICARE

## 2018-08-02 LAB
ANION GAP SERPL CALC-SCNC: 6 MMOL/L (ref 5–15)
BASOPHILS # BLD: 0 K/UL (ref 0–0.1)
BASOPHILS NFR BLD: 0 % (ref 0–1)
BUN SERPL-MCNC: 18 MG/DL (ref 6–20)
BUN/CREAT SERPL: 25 (ref 12–20)
CALCIUM SERPL-MCNC: 8.5 MG/DL (ref 8.5–10.1)
CHLORIDE SERPL-SCNC: 98 MMOL/L (ref 97–108)
CO2 SERPL-SCNC: 25 MMOL/L (ref 21–32)
CREAT SERPL-MCNC: 0.73 MG/DL (ref 0.7–1.3)
DIFFERENTIAL METHOD BLD: ABNORMAL
EOSINOPHIL # BLD: 0 K/UL (ref 0–0.4)
EOSINOPHIL NFR BLD: 0 % (ref 0–7)
ERYTHROCYTE [DISTWIDTH] IN BLOOD BY AUTOMATED COUNT: 25.5 % (ref 11.5–14.5)
GLUCOSE BLD STRIP.AUTO-MCNC: 132 MG/DL (ref 65–100)
GLUCOSE BLD STRIP.AUTO-MCNC: 141 MG/DL (ref 65–100)
GLUCOSE SERPL-MCNC: 133 MG/DL (ref 65–100)
HCT VFR BLD AUTO: 32.5 % (ref 36.6–50.3)
HGB BLD-MCNC: 9.7 G/DL (ref 12.1–17)
IMM GRANULOCYTES # BLD: 0.2 K/UL (ref 0–0.04)
IMM GRANULOCYTES NFR BLD AUTO: 1 % (ref 0–0.5)
LYMPHOCYTES # BLD: 0.9 K/UL (ref 0.8–3.5)
LYMPHOCYTES NFR BLD: 4 % (ref 12–49)
MCH RBC QN AUTO: 26.1 PG (ref 26–34)
MCHC RBC AUTO-ENTMCNC: 29.8 G/DL (ref 30–36.5)
MCV RBC AUTO: 87.4 FL (ref 80–99)
MONOCYTES # BLD: 2 K/UL (ref 0–1)
MONOCYTES NFR BLD: 9 % (ref 5–13)
NEUTS SEG # BLD: 18.7 K/UL (ref 1.8–8)
NEUTS SEG NFR BLD: 86 % (ref 32–75)
NRBC # BLD: 0 K/UL (ref 0–0.01)
NRBC BLD-RTO: 0 PER 100 WBC
PLATELET # BLD AUTO: 438 K/UL (ref 150–400)
PMV BLD AUTO: 9.1 FL (ref 8.9–12.9)
POTASSIUM SERPL-SCNC: 5.4 MMOL/L (ref 3.5–5.1)
RBC # BLD AUTO: 3.72 M/UL (ref 4.1–5.7)
RBC MORPH BLD: ABNORMAL
SERVICE CMNT-IMP: ABNORMAL
SERVICE CMNT-IMP: ABNORMAL
SODIUM SERPL-SCNC: 129 MMOL/L (ref 136–145)
WBC # BLD AUTO: 21.8 K/UL (ref 4.1–11.1)

## 2018-08-02 PROCEDURE — 74011250636 HC RX REV CODE- 250/636: Performed by: HOSPITALIST

## 2018-08-02 PROCEDURE — 74011000250 HC RX REV CODE- 250: Performed by: INTERNAL MEDICINE

## 2018-08-02 PROCEDURE — C9113 INJ PANTOPRAZOLE SODIUM, VIA: HCPCS | Performed by: HOSPITALIST

## 2018-08-02 PROCEDURE — 80048 BASIC METABOLIC PNL TOTAL CA: CPT | Performed by: SURGERY

## 2018-08-02 PROCEDURE — 36415 COLL VENOUS BLD VENIPUNCTURE: CPT | Performed by: SURGERY

## 2018-08-02 PROCEDURE — 74011000250 HC RX REV CODE- 250: Performed by: SURGERY

## 2018-08-02 PROCEDURE — 65270000029 HC RM PRIVATE

## 2018-08-02 PROCEDURE — 82962 GLUCOSE BLOOD TEST: CPT

## 2018-08-02 PROCEDURE — 74011636320 HC RX REV CODE- 636/320: Performed by: SURGERY

## 2018-08-02 PROCEDURE — 74011000258 HC RX REV CODE- 258: Performed by: SURGERY

## 2018-08-02 PROCEDURE — 74011000250 HC RX REV CODE- 250: Performed by: HOSPITALIST

## 2018-08-02 PROCEDURE — 74011250637 HC RX REV CODE- 250/637: Performed by: SURGERY

## 2018-08-02 PROCEDURE — 74011250637 HC RX REV CODE- 250/637: Performed by: INTERNAL MEDICINE

## 2018-08-02 PROCEDURE — 74011250636 HC RX REV CODE- 250/636: Performed by: INTERNAL MEDICINE

## 2018-08-02 PROCEDURE — 85025 COMPLETE CBC W/AUTO DIFF WBC: CPT | Performed by: SURGERY

## 2018-08-02 PROCEDURE — 94760 N-INVAS EAR/PLS OXIMETRY 1: CPT

## 2018-08-02 PROCEDURE — 71260 CT THORAX DX C+: CPT

## 2018-08-02 PROCEDURE — 74177 CT ABD & PELVIS W/CONTRAST: CPT

## 2018-08-02 PROCEDURE — 74011000255 HC RX REV CODE- 255: Performed by: SURGERY

## 2018-08-02 PROCEDURE — 94640 AIRWAY INHALATION TREATMENT: CPT

## 2018-08-02 PROCEDURE — 74011000250 HC RX REV CODE- 250: Performed by: EMERGENCY MEDICINE

## 2018-08-02 PROCEDURE — 74011250636 HC RX REV CODE- 250/636: Performed by: SURGERY

## 2018-08-02 RX ORDER — BARIUM SULFATE 20 MG/ML
900 SUSPENSION ORAL
Status: COMPLETED | OUTPATIENT
Start: 2018-08-02 | End: 2018-08-02

## 2018-08-02 RX ORDER — SODIUM CHLORIDE 9 MG/ML
50 INJECTION, SOLUTION INTRAVENOUS
Status: COMPLETED | OUTPATIENT
Start: 2018-08-02 | End: 2018-08-07

## 2018-08-02 RX ORDER — SODIUM CHLORIDE 0.9 % (FLUSH) 0.9 %
10 SYRINGE (ML) INJECTION
Status: COMPLETED | OUTPATIENT
Start: 2018-08-02 | End: 2018-08-02

## 2018-08-02 RX ADMIN — ONDANSETRON 4 MG: 2 INJECTION INTRAMUSCULAR; INTRAVENOUS at 12:16

## 2018-08-02 RX ADMIN — SODIUM CHLORIDE 10 ML: 9 INJECTION, SOLUTION INTRAMUSCULAR; INTRAVENOUS; SUBCUTANEOUS at 18:03

## 2018-08-02 RX ADMIN — LEVETIRACETAM 500 MG: 5 INJECTION INTRAVENOUS at 18:13

## 2018-08-02 RX ADMIN — IPRATROPIUM BROMIDE AND ALBUTEROL SULFATE 3 ML: .5; 3 SOLUTION RESPIRATORY (INHALATION) at 08:29

## 2018-08-02 RX ADMIN — SODIUM CHLORIDE 40 MG: 9 INJECTION INTRAMUSCULAR; INTRAVENOUS; SUBCUTANEOUS at 20:03

## 2018-08-02 RX ADMIN — LEVETIRACETAM 500 MG: 5 INJECTION INTRAVENOUS at 05:08

## 2018-08-02 RX ADMIN — METOPROLOL TARTRATE 2.5 MG: 1 INJECTION, SOLUTION INTRAVENOUS at 05:04

## 2018-08-02 RX ADMIN — DABIGATRAN ETEXILATE MESYLATE 150 MG: 150 CAPSULE ORAL at 20:01

## 2018-08-02 RX ADMIN — IPRATROPIUM BROMIDE AND ALBUTEROL SULFATE 3 ML: .5; 3 SOLUTION RESPIRATORY (INHALATION) at 22:06

## 2018-08-02 RX ADMIN — MELATONIN TAB 3 MG 3 MG: 3 TAB at 22:56

## 2018-08-02 RX ADMIN — ASCORBIC ACID, VITAMIN A PALMITATE, CHOLECALCIFEROL, THIAMINE HYDROCHLORIDE, RIBOFLAVIN-5 PHOSPHATE SODIUM, PYRIDOXINE HYDROCHLORIDE, NIACINAMIDE, DEXPANTHENOL, ALPHA-TOCOPHEROL ACETATE, VITAMIN K1, FOLIC ACID, BIOTIN, CYANOCOBALAMIN: 200; 3300; 200; 6; 3.6; 6; 40; 15; 10; 150; 600; 60; 5 INJECTION, SOLUTION INTRAVENOUS at 17:56

## 2018-08-02 RX ADMIN — METOPROLOL TARTRATE 2.5 MG: 1 INJECTION, SOLUTION INTRAVENOUS at 23:18

## 2018-08-02 RX ADMIN — AMIODARONE HYDROCHLORIDE 0.5 MG/MIN: 50 INJECTION, SOLUTION INTRAVENOUS at 18:44

## 2018-08-02 RX ADMIN — BARIUM SULFATE 900 ML: 20 SUSPENSION ORAL at 09:00

## 2018-08-02 RX ADMIN — SODIUM CHLORIDE 10 ML: 9 INJECTION, SOLUTION INTRAMUSCULAR; INTRAVENOUS; SUBCUTANEOUS at 21:00

## 2018-08-02 RX ADMIN — Medication 10 ML: at 14:19

## 2018-08-02 RX ADMIN — METOPROLOL TARTRATE 2.5 MG: 1 INJECTION, SOLUTION INTRAVENOUS at 12:10

## 2018-08-02 RX ADMIN — SODIUM CHLORIDE 50 ML/HR: 900 INJECTION, SOLUTION INTRAVENOUS at 14:19

## 2018-08-02 RX ADMIN — SODIUM CHLORIDE 10 ML: 9 INJECTION, SOLUTION INTRAMUSCULAR; INTRAVENOUS; SUBCUTANEOUS at 22:00

## 2018-08-02 RX ADMIN — IOPAMIDOL 100 ML: 755 INJECTION, SOLUTION INTRAVENOUS at 14:19

## 2018-08-02 RX ADMIN — METOPROLOL TARTRATE 2.5 MG: 1 INJECTION, SOLUTION INTRAVENOUS at 18:11

## 2018-08-02 RX ADMIN — SODIUM CHLORIDE 10 ML: 9 INJECTION, SOLUTION INTRAMUSCULAR; INTRAVENOUS; SUBCUTANEOUS at 05:07

## 2018-08-02 RX ADMIN — SODIUM CHLORIDE 40 MG: 9 INJECTION INTRAMUSCULAR; INTRAVENOUS; SUBCUTANEOUS at 12:09

## 2018-08-02 NOTE — PROGRESS NOTES
Attempted to see pt for OT tx. Currently off the floor. Will continue to follow. Richard Gaston MS OTR/L

## 2018-08-02 NOTE — PROGRESS NOTES
Bedside and Verbal shift change report given to Fabiano [place] Juani (oncoming nurse) . Report included the following information SBAR, Kardex, OR Summary, Intake/Output, MAR and Recent Results.

## 2018-08-02 NOTE — PROGRESS NOTES
CRS POD# 16 s/p R colectomy, SBR, extensive YAMEL Pain well controlled. Scant ostomy output. /67 (BP 1 Location: Right arm, BP Patient Position: At rest)  Pulse 74  Temp 97.8 °F (36.6 °C)  Resp 24  Ht 6' 2\" (1.88 m)  Wt 123.4 kg (272 lb)  SpO2 95%  BMI 34.92 kg/m2 NAD, AAO Abd soft, approp TTP Incision c/d/i with mild serosang drainage from midline incision JPs serosang Ostomy pink, flat. No gas or stool in bag WBC 21 Plan 
-Continue TPN. Okay to have a few ice chips every hour 
-CT chest / abd / pelvis for leukocytosis, abdominal pain, cough, shortness of breath 
-Prolonged postop ileus. Await return of bowel function

## 2018-08-02 NOTE — INTERDISCIPLINARY ROUNDS
Interdisciplinary Rounds were completed on this patient. Rounds included nursing, clinical care leader, pharmacy, and case management. Plan for the day CT today, TPN, await return of bowel function Plan for the stay:continue current [address] [id number] Activity: up in chair, turn Anticipated discharge date: [facility]? ?

## 2018-08-03 LAB
ANION GAP SERPL CALC-SCNC: 5 MMOL/L (ref 5–15)
BASOPHILS # BLD: 0 K/UL (ref 0–0.1)
BASOPHILS NFR BLD: 0 % (ref 0–1)
BUN SERPL-MCNC: 17 MG/DL (ref 6–20)
BUN/CREAT SERPL: 29 (ref 12–20)
CALCIUM SERPL-MCNC: 7.7 MG/DL (ref 8.5–10.1)
CHLORIDE SERPL-SCNC: 99 MMOL/L (ref 97–108)
CO2 SERPL-SCNC: 23 MMOL/L (ref 21–32)
CREAT SERPL-MCNC: 0.59 MG/DL (ref 0.7–1.3)
DIFFERENTIAL METHOD BLD: ABNORMAL
EOSINOPHIL # BLD: 0.1 K/UL (ref 0–0.4)
EOSINOPHIL NFR BLD: 1 % (ref 0–7)
ERYTHROCYTE [DISTWIDTH] IN BLOOD BY AUTOMATED COUNT: 24.7 % (ref 11.5–14.5)
GLUCOSE SERPL-MCNC: 185 MG/DL (ref 65–100)
HCT VFR BLD AUTO: 27 % (ref 36.6–50.3)
HGB BLD-MCNC: 8.2 G/DL (ref 12.1–17)
IMM GRANULOCYTES # BLD: 0.1 K/UL (ref 0–0.04)
IMM GRANULOCYTES NFR BLD AUTO: 1 % (ref 0–0.5)
LYMPHOCYTES # BLD: 1.3 K/UL (ref 0.8–3.5)
LYMPHOCYTES NFR BLD: 9 % (ref 12–49)
MCH RBC QN AUTO: 26.1 PG (ref 26–34)
MCHC RBC AUTO-ENTMCNC: 30.4 G/DL (ref 30–36.5)
MCV RBC AUTO: 86 FL (ref 80–99)
MONOCYTES # BLD: 1.8 K/UL (ref 0–1)
MONOCYTES NFR BLD: 13 % (ref 5–13)
NEUTS SEG # BLD: 10.9 K/UL (ref 1.8–8)
NEUTS SEG NFR BLD: 76 % (ref 32–75)
NRBC # BLD: 0 K/UL (ref 0–0.01)
NRBC BLD-RTO: 0 PER 100 WBC
PLATELET # BLD AUTO: 344 K/UL (ref 150–400)
PMV BLD AUTO: 9.3 FL (ref 8.9–12.9)
POTASSIUM SERPL-SCNC: 4.9 MMOL/L (ref 3.5–5.1)
RBC # BLD AUTO: 3.14 M/UL (ref 4.1–5.7)
RBC MORPH BLD: ABNORMAL
SODIUM SERPL-SCNC: 127 MMOL/L (ref 136–145)
WBC # BLD AUTO: 14.2 K/UL (ref 4.1–11.1)

## 2018-08-03 PROCEDURE — 80048 BASIC METABOLIC PNL TOTAL CA: CPT | Performed by: SURGERY

## 2018-08-03 PROCEDURE — 36415 COLL VENOUS BLD VENIPUNCTURE: CPT | Performed by: SURGERY

## 2018-08-03 PROCEDURE — 94760 N-INVAS EAR/PLS OXIMETRY 1: CPT

## 2018-08-03 PROCEDURE — 92610 EVALUATE SWALLOWING FUNCTION: CPT

## 2018-08-03 PROCEDURE — 74011250637 HC RX REV CODE- 250/637: Performed by: INTERNAL MEDICINE

## 2018-08-03 PROCEDURE — 74011000258 HC RX REV CODE- 258: Performed by: SURGERY

## 2018-08-03 PROCEDURE — 74011250636 HC RX REV CODE- 250/636: Performed by: HOSPITALIST

## 2018-08-03 PROCEDURE — C9113 INJ PANTOPRAZOLE SODIUM, VIA: HCPCS | Performed by: HOSPITALIST

## 2018-08-03 PROCEDURE — 85025 COMPLETE CBC W/AUTO DIFF WBC: CPT | Performed by: SURGERY

## 2018-08-03 PROCEDURE — 74011000250 HC RX REV CODE- 250: Performed by: SURGERY

## 2018-08-03 PROCEDURE — 97530 THERAPEUTIC ACTIVITIES: CPT

## 2018-08-03 PROCEDURE — 74011000250 HC RX REV CODE- 250: Performed by: INTERNAL MEDICINE

## 2018-08-03 PROCEDURE — 74011250637 HC RX REV CODE- 250/637: Performed by: SURGERY

## 2018-08-03 PROCEDURE — 94640 AIRWAY INHALATION TREATMENT: CPT

## 2018-08-03 PROCEDURE — 74011000250 HC RX REV CODE- 250: Performed by: EMERGENCY MEDICINE

## 2018-08-03 PROCEDURE — 65270000029 HC RM PRIVATE

## 2018-08-03 PROCEDURE — 74011250636 HC RX REV CODE- 250/636: Performed by: SURGERY

## 2018-08-03 PROCEDURE — 77010033678 HC OXYGEN DAILY

## 2018-08-03 PROCEDURE — 74011000250 HC RX REV CODE- 250: Performed by: HOSPITALIST

## 2018-08-03 PROCEDURE — 74011250636 HC RX REV CODE- 250/636: Performed by: INTERNAL MEDICINE

## 2018-08-03 RX ORDER — AMIODARONE HYDROCHLORIDE 200 MG/1
200 TABLET ORAL DAILY
Status: DISCONTINUED | OUTPATIENT
Start: 2018-08-03 | End: 2018-08-31

## 2018-08-03 RX ORDER — CARBAMAZEPINE 200 MG/1
200 TABLET, EXTENDED RELEASE ORAL 2 TIMES DAILY
Status: DISCONTINUED | OUTPATIENT
Start: 2018-08-03 | End: 2018-08-11 | Stop reason: CLARIF

## 2018-08-03 RX ORDER — METOPROLOL TARTRATE 25 MG/1
12.5 TABLET, FILM COATED ORAL 2 TIMES DAILY
Status: DISCONTINUED | OUTPATIENT
Start: 2018-08-03 | End: 2018-08-12

## 2018-08-03 RX ORDER — DABIGATRAN ETEXILATE 150 MG/1
150 CAPSULE ORAL 2 TIMES DAILY
Status: DISCONTINUED | OUTPATIENT
Start: 2018-08-03 | End: 2018-08-03

## 2018-08-03 RX ADMIN — IPRATROPIUM BROMIDE AND ALBUTEROL SULFATE 3 ML: .5; 3 SOLUTION RESPIRATORY (INHALATION) at 13:23

## 2018-08-03 RX ADMIN — SODIUM CHLORIDE 10 ML: 9 INJECTION, SOLUTION INTRAMUSCULAR; INTRAVENOUS; SUBCUTANEOUS at 17:58

## 2018-08-03 RX ADMIN — IPRATROPIUM BROMIDE AND ALBUTEROL SULFATE 3 ML: .5; 3 SOLUTION RESPIRATORY (INHALATION) at 08:31

## 2018-08-03 RX ADMIN — ASCORBIC ACID, VITAMIN A PALMITATE, CHOLECALCIFEROL, THIAMINE HYDROCHLORIDE, RIBOFLAVIN-5 PHOSPHATE SODIUM, PYRIDOXINE HYDROCHLORIDE, NIACINAMIDE, DEXPANTHENOL, ALPHA-TOCOPHEROL ACETATE, VITAMIN K1, FOLIC ACID, BIOTIN, CYANOCOBALAMIN: 200; 3300; 200; 6; 3.6; 6; 40; 15; 10; 150; 600; 60; 5 INJECTION, SOLUTION INTRAVENOUS at 19:22

## 2018-08-03 RX ADMIN — METOPROLOL TARTRATE 12.5 MG: 25 TABLET, FILM COATED ORAL at 10:48

## 2018-08-03 RX ADMIN — METOPROLOL TARTRATE 2.5 MG: 1 INJECTION, SOLUTION INTRAVENOUS at 05:39

## 2018-08-03 RX ADMIN — MELATONIN TAB 3 MG 3 MG: 3 TAB at 21:42

## 2018-08-03 RX ADMIN — DABIGATRAN ETEXILATE MESYLATE 150 MG: 150 CAPSULE ORAL at 18:36

## 2018-08-03 RX ADMIN — SODIUM CHLORIDE 10 ML: 9 INJECTION, SOLUTION INTRAMUSCULAR; INTRAVENOUS; SUBCUTANEOUS at 05:40

## 2018-08-03 RX ADMIN — DABIGATRAN ETEXILATE MESYLATE 150 MG: 150 CAPSULE ORAL at 10:49

## 2018-08-03 RX ADMIN — SODIUM CHLORIDE 40 MG: 9 INJECTION INTRAMUSCULAR; INTRAVENOUS; SUBCUTANEOUS at 21:43

## 2018-08-03 RX ADMIN — POLYETHYLENE GLYCOL 3350 17 G: 17 POWDER, FOR SOLUTION ORAL at 11:00

## 2018-08-03 RX ADMIN — SODIUM CHLORIDE: 234 INJECTION, SOLUTION, CONCENTRATE INTRAVENOUS; SUBCUTANEOUS at 14:00

## 2018-08-03 RX ADMIN — VENLAFAXINE HYDROCHLORIDE 75 MG: 37.5 CAPSULE, EXTENDED RELEASE ORAL at 10:50

## 2018-08-03 RX ADMIN — LEVETIRACETAM 500 MG: 5 INJECTION INTRAVENOUS at 05:37

## 2018-08-03 RX ADMIN — SODIUM CHLORIDE 10 ML: 9 INJECTION, SOLUTION INTRAMUSCULAR; INTRAVENOUS; SUBCUTANEOUS at 21:44

## 2018-08-03 RX ADMIN — CARBAMAZEPINE 200 MG: 200 TABLET, EXTENDED RELEASE ORAL at 10:50

## 2018-08-03 RX ADMIN — SODIUM CHLORIDE 40 MG: 9 INJECTION INTRAMUSCULAR; INTRAVENOUS; SUBCUTANEOUS at 10:59

## 2018-08-03 RX ADMIN — LACTULOSE 10 G: 20 SOLUTION ORAL at 10:51

## 2018-08-03 RX ADMIN — AMIODARONE HYDROCHLORIDE 200 MG: 200 TABLET ORAL at 10:51

## 2018-08-03 RX ADMIN — CARBAMAZEPINE 200 MG: 200 TABLET, EXTENDED RELEASE ORAL at 17:31

## 2018-08-03 RX ADMIN — MAGNESIUM HYDROXIDE 30 ML: 400 SUSPENSION ORAL at 10:59

## 2018-08-03 NOTE — PROGRESS NOTES
Nutrition Assessment: 
 
RECOMMENDATIONS:  
Continue TPN as ordered through the weekend TF via Dobbhoff per CRS Advance diet as medically able per CRS/SLP  
 
ASSESSMENT:  
Chart reviewed. Imaging yesterday showed mild small bowel distension but contrast did pass through the gut. Colostomy with 600mL OP yesterday. Pt on TPN at recommended goal rate which meets 93% kcal and 98% protein needs. SLP evaluated pt today and recommends no more than sips, and will re-evaluate Monday. Noted trickle feeds just ordered via Dobbhoff. Unsure why Glucerna 1.2 as pt does not have a DM hx. Insurance typically does not cover a specialty formula (like Glucerna) without proof of failure of a standard formula, however I doubt this will be a long term need/pt will be discharged on TF so okay for now. Will monitor plan for TF per CRS. TPN + trickle feeds will meet 100% kcal and protein needs. Dietitians Intervention(s)/Plan(s): Continue TPN as ordered, trickle feeds per surgeon, advance diet as able SUBJECTIVE/OBJECTIVE:  
 
Diet Order: Other (comment) (TF via Dobbhoff: Gluc 1.2 @ 10mL/h (provides 288kcals/14gPro/193mL)  + TPN: D20, 5% AA @ 100mL/h (provides 2112kcals/120gPro) ) 
% Eaten:  Patient Vitals for the past 72 hrs: 
 % Diet Eaten 08/02/18 1447 0 % 08/02/18 0854 0 %  
 
was to suction but now to start trickle feeds  at   flush with       via Other (comment) (Dobbhoff ) Pertinent Medications:MOM, protonix, miralax. Chemistries: 
Lab Results Component Value Date/Time  Sodium 127 (L) 08/03/2018 03:16 AM  
 Potassium 4.9 08/03/2018 03:16 AM  
 Chloride 99 08/03/2018 03:16 AM  
 CO2 23 08/03/2018 03:16 AM  
 Anion gap 5 08/03/2018 03:16 AM  
 Glucose 185 (H) 08/03/2018 03:16 AM  
 BUN 17 08/03/2018 03:16 AM  
 Creatinine 0.59 (L) 08/03/2018 03:16 AM  
 BUN/Creatinine ratio 29 (H) 08/03/2018 03:16 AM  
 GFR est AA >60 08/03/2018 03:16 AM  
 GFR est non-AA >60 08/03/2018 03:16 AM  
 Calcium 7.7 (L) 08/03/2018 03:16 AM  
 Albumin 2.1 (L) 07/21/2018 03:00 AM  
  
Anthropometrics: Height: 6' 2\" (188 cm) Weight: 123.4 kg (272 lb)   [x]bed scale (8/2)   []stated   []unknown IBW (%IBW):   ( ) UBW (%UBW):   (  %) BMI: Body mass index is 34.92 kg/(m^2). This BMI is indicative of: 
[]Underweight   []Normal   []Overweight   [x] Obesity   [] Extreme Obesity (BMI>40) Estimated Nutrition Needs (Based on): 2423 Kcals/day (BMR: 2075 x 1.1) , 130 g (1 g/kg) Protein Carbohydrate: At Least 130 g/day  Fluids: 2200 mL/day Last BM: colostomy-600mL OP yesterday    []Active     []Hyperactive  [x]Hypoactive       [] Absent   BS Skin:    [] Intact   [x] Incision  [] Breakdown   [] DTI   [] Tears/Excoriation/Abrasion  [x]Edema(+ 1 nonpitting-generalized, BUE; +1 trace-BLE)  [] Other: Wt Readings from Last 30 Encounters:  
08/02/18 123.4 kg (272 lb) 07/05/18 128.5 kg (283 lb 4.8 oz) 05/15/18 120.7 kg (266 lb) 05/07/18 118.8 kg (262 lb) 04/10/18 121.1 kg (267 lb)  
03/26/18 121.1 kg (267 lb)  
03/16/18 121.1 kg (267 lb)  
09/21/17 118.4 kg (261 lb) 08/03/17 117.5 kg (259 lb) 04/06/17 117.3 kg (258 lb 8 oz) 03/31/17 117.5 kg (259 lb)  
03/16/17 117.5 kg (259 lb)  
02/23/17 119.7 kg (264 lb) 01/03/17 115.2 kg (254 lb)  
11/17/16 119.7 kg (264 lb) 10/18/16 118.8 kg (262 lb) 10/07/16 118.8 kg (262 lb)  
09/22/16 113.9 kg (251 lb 1 oz) 08/18/16 116.4 kg (256 lb 11.2 oz) 05/16/16 114.8 kg (253 lb)  
03/10/16 114.8 kg (253 lb 1.6 oz) 02/11/16 122 kg (269 lb)  
01/11/16 119.7 kg (264 lb) 11/04/15 122.5 kg (270 lb) 08/25/15 119.7 kg (264 lb) 08/13/15 116.1 kg (256 lb) 05/04/15 119.7 kg (264 lb)  
03/27/15 119.7 kg (264 lb)  
02/21/15 121.2 kg (267 lb 3.2 oz) 01/29/15 119.9 kg (264 lb 6.4 oz) NUTRITION DIAGNOSES:  
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 NUTRITION INTERVENTIONS: 
Meals/Snacks: Other (advance diet as medically able per CRS and SLP ) Enteral/Parenteral Nutrition: Other (Continue trickle feeds as tolerated and TPN at goal rate as ordered) GOAL:  
Pt will meet >90% kcal and protein needs via nutrition support in 2-4 days. NUTRITION MONITORING AND EVALUATION Previous Goal: Pt will meet >90% kcal and protein needs via TPN in 2-4 days Previous Goal Met: Yes Previous Recommendations Implemented: Yes Cultural, Hoahaoism, or Ethnic Dietary Needs: None LEARNING NEEDS (Diet, Food/Nutrient-Drug Interaction):  
 [x] None Identified 
 [] Identified and Education Provided/Documented 
 [] Identified and Pt declined/was not appropriate [x] Interdisciplinary Care Plan Reviewed/Documented  
 [x] Participated in Discharge Planning: Unable to determine  
 [] Interdisciplinary Rounds NUTRITION RISK:  
 [x] High              [] Moderate           []  Low  []  Minimal/Uncompromised Tho Lopez RD, [facility] Pager [phone] Weekend Pager [phone]

## 2018-08-03 NOTE — PROGRESS NOTES
Problem: Dysphagia (Adult) Goal: *Acute Goals and Plan of Care (Insert Text) Speech pathology goals Initiated 8/3/2018 1. Patient will tolerate sips and chips with no overt s/s aspiration within 7 days 2. Patient will participate in re-evaluation of swallow function within 7 days Speech LAnguage Pathology bedside swallow evaluation Patient: Param Monet (29 y.o. male) Date: 8/3/2018 Primary Diagnosis: Acute blood loss anemia GI bleed Anasarca GI Bleed 
gi bleed ASCENDING COLON CANCER Procedure(s) (LRB): 
LAPAROSCOPIC  ASSISTED TO OPEN RIGHT COLECTOMY AND SMALL BOWEL RESECTION (Right) 17 Days Post-Op Precautions: swallow ASSESSMENT : 
Patient is POD# 16 s/p R colectomy and small bowel resection. Patient with ileus and emesis resulting in suspected aspiration PNA on 7/21. Since then, patient has been NPO with TPN and NG to suction. Per RN, patient with 400 output from NG yesterday. Patient still with NG to suction during evaluation. MD advanced diet to clear liquids this morning. Wife present at bedside reported patient had TBI 24 years ago and is impulsive. Wife denies history of dysphagia. Based on the objective data described below, the patient presents with moderate oropharyngeal dysphagia. Oral dysphagia is characterized by slow oral prep, oral holding, and delayed posterior propulsion. Pharyngeal dysphagia is characterized by delayed swallow initiation, decreased hyolaryngeal elevation/excursion, and 2-3 swallows per bolus which could indicate pharyngeal residue. Patient with strong cough after first ice chip, however no additional s/s aspiration observed. After every bolus, patient with significantly increased respiratory rate. Patient required constant cueing from wife to maintain head position at midline and to maintain alertness. Patient is at high risk for aspiration secondary to general debility, poor endurance, alertness, respiratory status, and complex medical course. Recommend NPO except sips and chips with strict aspiration precautions, slow rate, frequent breaks to catch his breath. Discussed with wife at bedside who demonstrated understanding via teachback method. Also discussed with RN and Dr. Tayler Lazaro. SLP to re-evaluate swallow function on Monday. Patient will benefit from skilled intervention to address the above impairments. Patients rehabilitation potential is considered to be Fair Factors which may influence rehabilitation potential include:  
[]            None noted [x]            Mental ability/status [x]            Medical condition []            Home/family situation and support systems [x]            Safety awareness 
[]            Pain tolerance/management []            Other: PLAN : 
Recommendations and Planned Interventions: 
--NPO except sips and chips when patient awake, alert, and actively accepting. Meds one at a time with thin liquid 
--Strict aspiration precautions, including small, single bites and sips, slow rate, frequent rest breaks given high respiratory rate and poor endurance 
--SLP to follow up Monday for re-evaluation of swallow function Frequency/Duration: Patient will be followed by speech-language pathology 4 times a week to address goals. Discharge Recommendations: To Be Determined SUBJECTIVE:  
Patient stated [employer].  Patient alert, however fell asleep without constant stimulation. Patient oriented to person and place; disoriented to time and situation. Patient with hoarse vocal quality that wife reported is not baseline. OBJECTIVE:  
 
Past Medical History:  
Diagnosis Date  A-fib ([facility] [address]  Acute bronchitis 8/25/2015  Anxiety  Arrhythmia   
 atrial fibrillation  Arthritis  BCC (basal cell carcinoma of skin)  BPH (benign prostatic hyperplasia)  Chronic back pain greater than 3 months duration 5/4/2015  Chronic right shoulder pain 1/11/2016  Common wart 5/16/2016  Constipation 12/14/2012  CVA (cerebral infarction) 5/4/2015  Depression  GERD (gastroesophageal reflux disease)  Hearing loss   
 bilateral hearing aids  Hemiplegia following CVA (cerebrovascular accident) ([facility] [address]   
 left side hemiparesis  History of colostomy  HTN (hypertension)  Hyperlipidemia  Localized epilepsy with impairment of consciousness ([facility] [address]  Prostate cancer ([facility] [address] Status post XRT, Lupron  Screening for colon cancer 11/4/2015  Stroke [facility])   
 traumatic from neck crushing  TBI (traumatic brain injury) ([facility] [address] 1994  Unspecified sleep apnea   
 on CPAP Past Surgical History:  
Procedure Laterality Date  COLONOSCOPY N/A 7/12/2018 COLONOSCOPY through stoma performed by Suhail Gordon MD at [facility] ENDOSCOPY  
 HX ANKLE FRACTURE TX    
 HX CATARACT REMOVAL    
 bilat  HX COLECTOMY colostomy placed and revised  HX HEENT    
 ear surgery eddie.  HX HERNIA REPAIR    
 HX ORTHOPAEDIC    
 left hip pinning  HX PACEMAKER  2014 Prior Level of Function/Home Situation:  
Home Situation Home Environment: Private residence One/Two Story Residence: One story Living Alone: No 
Support Systems: Spouse/Significant Other/Partner Patient Expects to be Discharged to[de-identified] Private residence Current DME Used/Available at Home: Wheelchair (AFO) Tub or Shower Type:  (unable to access, sponge bath at sink) Diet prior to admission: regular/thin Current Diet:  Clear liquid Cognitive and Communication Status: 
Neurologic State: Alert, Confused, Drowsy Orientation Level: Oriented to person, Oriented to place, Disoriented to time, Disoriented to situation Cognition: Decreased attention/concentration, Decreased command following, Impulsive (impulsive per wife s/p TBI 24 years ago) Perception:  (head leaned to left with sitting, cues to maintain midline) Perseveration: No perseveration noted Safety/Judgement: Decreased awareness of environment, Decreased awareness of need for assistance, Decreased awareness of need for safety, Decreased insight into deficits Oral Assessment: 
Oral Assessment Labial: No impairment Dentition: Edentulous; Other (comment) (dentures in room but not worn) Oral Hygiene: moist oral mucosa Lingual: No impairment Velum: No impairment Mandible: No impairment P.O. Trials: 
Patient Position: upright in bed Vocal quality prior to P.O.: Hoarse; Other (comment) (not baseline per wife) Consistency Presented: Ice chips; Thin liquid;Puree How Presented: Self-fed/presented;Cup/sip;SLP-fed/presented;Spoon;Straw Bolus Acceptance: No impairment Bolus Formation/Control: Impaired Type of Impairment: Delayed; Other (comment) (oral holding) Propulsion: Delayed (# of seconds) Oral Residue: None Initiation of Swallow: Delayed (# of seconds) Laryngeal Elevation: Decreased Aspiration Signs/Symptoms: Strong cough; Increase in RR (initial ice chip) Pharyngeal Phase Characteristics: Multiple swallows; Suspected pharyngeal residue;Easily fatigued ; Poor endurance Effective Modifications: None Cues for Modifications: None Comments: RR increased after every swallow, difficulty maintaining alertness when not constantly stimulated (NG to suction, per RN 400mL output yesterday) Oral Phase Severity: Moderate Pharyngeal Phase Severity : Moderate NOMS:  
The NOMS functional outcome measure was used to quantify this patient's level of swallowing impairment. Based on the NOMS, the patient was determined to be at level 2 for swallow function G Codes: In compliance with CMSs Claims Based Outcome Reporting, the following G-code set was chosen for this patient based the use of the NOMS functional outcome to quantify this patient's level of swallowing impairment. Using the NOMS, the patient was determined to be at level 2 for swallow function which correlates with the CM= 80-99% level of severity.  
 
Based on the objective assessment provided within this note, the current, goal, and discharge g-codes are as follows: 
 
Swallow  Swallowing: 
 Swallow Current Status CM= 80-99%  Swallow Goal Status CL= 60-79% NOMS Swallowing Levels: 
Level 1 (CN): NPO Level 2 (CM): NPO but takes consistency in therapy Level 3 (CL): Takes less than 50% of nutrition p.o. and continues with nonoral feedings; and/or safe with mod cues; and/or max diet restriction Level 4 (CK): Safe swallow but needs mod cues; and/or mod diet restriction; and/or still requires some nonoral feeding/supplements Level 5 (CJ): Safe swallow with min diet restriction; and/or needs min cues Level 6 (CI): Independent with p.o.; rare cues; usually self cues; may need to avoid some foods or needs extra time Level 7 (CH): Independent for all p.o. [place]. (2003). National Outcomes Measurement System (NOMS): Adult Speech-Language Pathology User's Guide. Pain: 
Pain Scale 1: Numeric (0 - 10) After treatment:  
[]            Patient left in no apparent distress sitting up in chair 
[x]            Patient left in no apparent distress in bed 
[x]            Call bell left within reach [x]            Nursing notified 
[x]            Caregiver present 
[]            Bed alarm activated COMMUNICATION/EDUCATION:  
The patients plan of care including recommendations, planned interventions, and recommended diet changes were discussed with: Registered Nurse. [x]            Patient/family have participated as able in goal setting and plan of care. [x]            Patient/family agree to work toward stated goals and plan of care. []            Patient understands intent and goals of therapy, but is neutral about his/her participation. []            Patient is unable to participate in goal setting and plan of care. Thank you for this referral. 
Dione Moore, SLP Time Calculation: 30 mins

## 2018-08-03 NOTE — PROGRESS NOTES
ADULT PROTOCOL: JET AEROSOL  REASSESSMENT Patient  Austin Talley     69 y.o.   male     8/3/2018  12:09 PM 
 
Breath Sounds Pre Procedure: Right Breath Sounds: Diminished, Coarse Left Breath Sounds: Diminished, Coarse Breath Sounds Post Procedure: Right Breath Sounds: Diminished, Coarse Left Breath Sounds: Diminished, Coarse Breathing pattern: Pre procedure Breathing Pattern: Regular Post procedure Breathing Pattern: Regular Heart Rate: Pre procedure Pulse: 77 Post procedure Pulse: 75 Resp Rate: Pre procedure Respirations: 20 
         Post procedure Respirations: 20 
 
     
 
Cough: Pre procedure Cough: Non-productive Post procedure Cough: Non-productive Oxygen: O2 Device: Room air   room air Changed: NO SpO2: Pre procedure SpO2: 94 %   without oxygen Post procedure SpO2: 94 %  without oxygen Nebulizer Therapy: Current medications Aerosolized Medications: DuoNeb Changed: NO Smoking History: former smoker Problem List:  
Patient Active Problem List  
Diagnosis Code  
 HTN (hypertension) I10  
 Depression F32.9  Hypercholesterolemia E78.00  Acid reflux K21.9  Seizure ([facility] [address] R56.9  Hypothyroidism E03.9  Hyponatremia E87.1  BPH (benign prostatic hyperplasia) N40.0  Rash R21  
 Cellulitis L03.90  Wax in ear H61.20  Ulcer [id number]  Small bowel obstruction ([facility] [address] K29.013  Atrial flutter (HCC) I48.92  
 Atrial fibrillation or flutter  CHI (closed head injury) S09. 90XA  Basal cell cancer C44.91  
 TBI (traumatic brain injury) ([facility] [address] S06. 9X9A  Atrial fibrillation (HCC) I48.91  
 Cataract H26.9  Constipation K59.00  Ankle fracture, left L65.969G  Insomnia G47.00  Tinea corporis B35.4  SSS (sick sinus syndrome) ([facility]) I49.5  Syncope R55  Seizure disorder ([facility] [address] G40.909  RONAL on CPAP G47.33, Z99.89  
 Pacemaker Z95.0  Pre-op evaluation Z01.818  Chronic back pain greater than 3 months duration M54.9, G89.29  
 Cerebral infarction (HCC) I63.9  Acute bronchitis J20.9  Screening for colon cancer Z12.11  
 Chronic right shoulder pain M25.511, G89.29  
 Common wart B07.8  Localized epilepsy with impairment of consciousness ([facility] [address] G40.209  Severe obesity (BMI 35.0-39.9) (HCC) E66.01  
 Acute blood loss anemia D62  Anasarca R60.1  GI bleed K92.2 Respiratory Therapist: Donna Michael, RT

## 2018-08-03 NOTE — PROGRESS NOTES
CM met with pt and wife during IDR's. Pt up in chair. Progress slow. PT/OT still working with him. CM will continue to follow for discharge placement. Cristela Machado, BSN, RN Care Manager [phone] 
[phone]

## 2018-08-03 NOTE — PROGRESS NOTES
CRS POD# 17 s/p R colectomy, SBR, extensive YAMEL Pain well controlled. Ostomy output at 600. /52 (BP Patient Position: At rest)  Pulse 75  Temp 98.5 °F (36.9 °C)  Resp 16  Ht 6' 2\" (1.88 m)  Wt 123.4 kg (272 lb)  SpO2 97%  BMI 34.92 kg/m2 NAD, AAO Abd soft, approp TTP Incision c/d/i with mild serosang drainage from midline incision JPs serosang Ostomy pink, flat. Stool and some gas in bag WBC 14 Plan 
-Continue TPN daily over the weekend. -Advance to clear liquids today. Okay to advance diet as tolerated over the weekend. 
-Speech swallow consulted 
-Possible discharge to rehab on Monday

## 2018-08-03 NOTE — PROGRESS NOTES
Problem: Mobility Impaired (Adult and Pediatric) Goal: *Acute Goals and Plan of Care (Insert Text) Physical Therapy Goals Reviewed 8/3/18- goals remain appropriate Reviewed and revised 7/26/2018 1. Patient will move from supine to sit and sit to supine , scoot up and down and roll side to side in bed with minimal assistance within 7 day(s). 2.  Patient will transfer from bed to chair and chair to bed with moderate assistance using the least restrictive device within 7 day(s). 3.  Patient will perform sit to stand with moderate assistance within 7 day(s). 4.  Patient will perform stand pivot transfer to wheelchair with moderate assistance in 7 days. Reviewed and revised 7/19/2018 1. Patient will move from supine to sit and sit to supine , scoot up and down and roll side to side in bed with minimal assistance within 7 day(s). 2.  Patient will transfer from bed to chair and chair to bed with moderate assistance using the least restrictive device within 7 day(s). 3.  Patient will perform sit to stand with minimal assistance within 7 day(s). 4.  Patient will perform stand pivot transfer to wheelchair with minimal assistance in 7 days. Initiated 7/12/2018 1. Patient will move from supine to sit and sit to supine , scoot up and down and roll side to side in bed with modified independence within 7 day(s). 2.  Patient will transfer from bed to chair and chair to bed with supervision/set-up using the least restrictive device within 7 day(s). 3.  Patient will perform sit to stand with supervision/set-up within 7 day(s). 4.  Patient will perform stand pivot transfer to wheelchair with modified independence in 7 days. physical Therapy TREATMENT: WEEKLY REASSESSMENT Patient: Param Monet (81 y.o. male) Date: 8/3/2018 Diagnosis: Acute blood loss anemia GI bleed Anasarca GI Bleed 
gi bleed ASCENDING COLON CANCER  GI bleed Procedure(s) (LRB): 
LAPAROSCOPIC  ASSISTED TO OPEN RIGHT COLECTOMY AND SMALL BOWEL RESECTION (Right) 17 Days Post-Op Precautions:   
Chart, physical therapy assessment, plan of care and goals were reviewed. ASSESSMENT: 
Pt was received in supine and cleared by nursing to mobilize. He required max A to come to the EOB, but was able to assist with RLE and pulling with RUE. Noted fair sitting balance but able to maintain with RUE support on the rail. Needs cues to return to midline with fatigue and posterior lean. He was able to tolerate sitting EOB for 10 minutes. Attempted to stand 3x with mod A x 2 initially to come to full stand. When he became fatigued he required max A x 2 to come to stand 2x. Not safe to perform SPT to the chair. He was returned to supine and nursing called lift team to uses stephy to get him up to chair. Much improved since last session. Ostomy bag producing during session. Note some drainage on his own from dressing, nursing aware. Continue to encourage OOB to chair 3x daily and recommending IP. Wife very supportive and encouraging. Very pleased with session. She is aware she would not be able to manage at home with his current mobility status, but hd extremely poor experiences at [facility]. They were easily able to manage his transfers prior to hospital admission. Patient's progression toward goals since last assessment: making slow progress toward therapy PLAN: 
Goals have been updated based on progression since last assessment. Patient continues to benefit from skilled intervention to address the above impairments. Continue to follow the patient 4 times a week to address goals. Planned Interventions: 
[x]              Bed Mobility Training             []       Neuromuscular Re-Education [x]              Transfer Training                   []       Orthotic/Prosthetic Training 
[]              Gait Training                         []       Modalities [x]              Therapeutic Exercises           []       Edema Management/Control [x]              Therapeutic Activities            [x]       Patient and Family Training/Education []              Other (comment): 
Discharge Recommendations: Inpatient Rehab Further Equipment Recommendations for Discharge: none SUBJECTIVE:  
Patient stated I don't want to fall.  OBJECTIVE DATA SUMMARY:  
Critical Behavior: 
Neurologic State: Alert, Confused, Drowsy Orientation Level: Oriented to person, Oriented to place, Disoriented to time, Disoriented to situation Cognition: Decreased attention/concentration, Decreased command following, Impulsive (impulsive per wife s/p TBI 24 years ago) Safety/Judgement: Decreased awareness of environment, Decreased awareness of need for assistance, Decreased awareness of need for safety, Decreased insight into deficits Strength:  
  
 GW 
  
  
  
  
  
 
Functional Mobility Training: 
Bed Mobility: 
  
Supine to Sit: Maximum assistance;Assist x2 Sit to Supine: Maximum assistance;Assist x2 Scooting: Contact guard assistance Transfers: 
Sit to Stand: Moderate assistance;Assist x2 Stand to Sit: Moderate assistance;Assist x2 Balance: 
Sitting: Impaired Sitting - Static: Fair (occasional) Sitting - Dynamic: Fair (occasional) Standing: Impaired Standing - Static: Poor Standing - Dynamic : Poor Pain: 
Pain Scale 1: Numeric (0 - 10) Activity Tolerance:  
fatigued Please refer to the flowsheet for vital signs taken during this treatment. After treatment:  
[]  Patient left in no apparent distress sitting up in chair 
[x]  Patient left in no apparent distress in bed 
[x]  Call bell left within reach [x]  Nursing notified 
[]  Caregiver present 
[]  Bed alarm activated COMMUNICATION/COLLABORATION:  
The patients plan of care was discussed with: Registered Nurse Floridalma Fontenot, PT, DPT Time Calculation: 31 mins

## 2018-08-03 NOTE — PROGRESS NOTES
Problem: Falls - Risk of 
Goal: *Absence of Falls Document Casper Andrew Fall Risk and appropriate interventions in the flowsheet. Outcome: Progressing Towards Goal 
Fall Risk Interventions: 
Mobility Interventions: Bed/chair exit alarm, OT consult for ADLs, Patient to call before getting OOB, PT Consult for mobility concerns, PT Consult for assist device competence Mentation Interventions: Adequate sleep, hydration, pain control, Bed/chair exit alarm, Door open when patient unattended, Family/sitter at bedside Medication Interventions: Bed/chair exit alarm, Patient to call before getting OOB Elimination Interventions: Bed/chair exit alarm, Call light in reach, Patient to call for help with toileting needs Problem: Pressure Injury - Risk of 
Goal: *Prevention of pressure injury Document Dagoberto Scale and appropriate interventions in the flowsheet. Pressure Injury Interventions: 
Sensory Interventions: Assess changes in LOC, Check visual cues for pain, Discuss PT/OT consult with provider Moisture Interventions: Check for incontinence Q2 hours and as needed, Maintain skin hydration (lotion/cream) Activity Interventions: Increase time out of bed, PT/OT evaluation Mobility Interventions: Float heels, HOB 30 degrees or less, PT/OT evaluation, Suspension boots Nutrition Interventions: Document food/fluid/supplement intake Friction and Shear Interventions: HOB 30 degrees or less, Lift sheet, Lift team/patient mobility team 
 
  
 
 
 
 
Problem: Upper and Lower GI Bleed:  Discharge Outcomes Goal: *Hemodynamically stable Outcome: Progressing Towards Goal

## 2018-08-04 LAB
ANION GAP SERPL CALC-SCNC: 6 MMOL/L (ref 5–15)
BASOPHILS # BLD: 0 K/UL (ref 0–0.1)
BASOPHILS NFR BLD: 0 % (ref 0–1)
BUN SERPL-MCNC: 19 MG/DL (ref 6–20)
BUN/CREAT SERPL: 32 (ref 12–20)
CALCIUM SERPL-MCNC: 8.1 MG/DL (ref 8.5–10.1)
CHLORIDE SERPL-SCNC: 99 MMOL/L (ref 97–108)
CO2 SERPL-SCNC: 24 MMOL/L (ref 21–32)
CREAT SERPL-MCNC: 0.6 MG/DL (ref 0.7–1.3)
DIFFERENTIAL METHOD BLD: ABNORMAL
EOSINOPHIL # BLD: 0.2 K/UL (ref 0–0.4)
EOSINOPHIL NFR BLD: 2 % (ref 0–7)
ERYTHROCYTE [DISTWIDTH] IN BLOOD BY AUTOMATED COUNT: 24.7 % (ref 11.5–14.5)
GLUCOSE BLD STRIP.AUTO-MCNC: 140 MG/DL (ref 65–100)
GLUCOSE SERPL-MCNC: 143 MG/DL (ref 65–100)
HCT VFR BLD AUTO: 28.4 % (ref 36.6–50.3)
HGB BLD-MCNC: 8.4 G/DL (ref 12.1–17)
IMM GRANULOCYTES # BLD: 0.2 K/UL (ref 0–0.04)
IMM GRANULOCYTES NFR BLD AUTO: 2 % (ref 0–0.5)
LYMPHOCYTES # BLD: 1.1 K/UL (ref 0.8–3.5)
LYMPHOCYTES NFR BLD: 10 % (ref 12–49)
MCH RBC QN AUTO: 25.8 PG (ref 26–34)
MCHC RBC AUTO-ENTMCNC: 29.6 G/DL (ref 30–36.5)
MCV RBC AUTO: 87.4 FL (ref 80–99)
MONOCYTES # BLD: 1.6 K/UL (ref 0–1)
MONOCYTES NFR BLD: 15 % (ref 5–13)
NEUTS SEG # BLD: 7.4 K/UL (ref 1.8–8)
NEUTS SEG NFR BLD: 71 % (ref 32–75)
NRBC # BLD: 0 K/UL (ref 0–0.01)
NRBC BLD-RTO: 0 PER 100 WBC
PLATELET # BLD AUTO: 338 K/UL (ref 150–400)
PMV BLD AUTO: 9.4 FL (ref 8.9–12.9)
POTASSIUM SERPL-SCNC: 4.8 MMOL/L (ref 3.5–5.1)
RBC # BLD AUTO: 3.25 M/UL (ref 4.1–5.7)
SERVICE CMNT-IMP: ABNORMAL
SODIUM SERPL-SCNC: 129 MMOL/L (ref 136–145)
WBC # BLD AUTO: 10.5 K/UL (ref 4.1–11.1)

## 2018-08-04 PROCEDURE — 36415 COLL VENOUS BLD VENIPUNCTURE: CPT | Performed by: SURGERY

## 2018-08-04 PROCEDURE — 85025 COMPLETE CBC W/AUTO DIFF WBC: CPT | Performed by: SURGERY

## 2018-08-04 PROCEDURE — 74011250637 HC RX REV CODE- 250/637: Performed by: INTERNAL MEDICINE

## 2018-08-04 PROCEDURE — C9113 INJ PANTOPRAZOLE SODIUM, VIA: HCPCS | Performed by: HOSPITALIST

## 2018-08-04 PROCEDURE — 74011000250 HC RX REV CODE- 250: Performed by: HOSPITALIST

## 2018-08-04 PROCEDURE — 94760 N-INVAS EAR/PLS OXIMETRY 1: CPT

## 2018-08-04 PROCEDURE — 74011250637 HC RX REV CODE- 250/637: Performed by: SURGERY

## 2018-08-04 PROCEDURE — 74011250636 HC RX REV CODE- 250/636: Performed by: HOSPITALIST

## 2018-08-04 PROCEDURE — 80048 BASIC METABOLIC PNL TOTAL CA: CPT | Performed by: SURGERY

## 2018-08-04 PROCEDURE — 65270000029 HC RM PRIVATE

## 2018-08-04 PROCEDURE — 74011000250 HC RX REV CODE- 250: Performed by: COLON & RECTAL SURGERY

## 2018-08-04 PROCEDURE — 74011000258 HC RX REV CODE- 258: Performed by: SURGERY

## 2018-08-04 PROCEDURE — 74011250636 HC RX REV CODE- 250/636: Performed by: SURGERY

## 2018-08-04 PROCEDURE — 82962 GLUCOSE BLOOD TEST: CPT

## 2018-08-04 PROCEDURE — 74011250636 HC RX REV CODE- 250/636: Performed by: COLON & RECTAL SURGERY

## 2018-08-04 PROCEDURE — 74011000258 HC RX REV CODE- 258: Performed by: COLON & RECTAL SURGERY

## 2018-08-04 RX ORDER — IPRATROPIUM BROMIDE AND ALBUTEROL SULFATE 2.5; .5 MG/3ML; MG/3ML
3 SOLUTION RESPIRATORY (INHALATION)
Status: DISCONTINUED | OUTPATIENT
Start: 2018-08-04 | End: 2018-10-02 | Stop reason: HOSPADM

## 2018-08-04 RX ADMIN — ASCORBIC ACID, VITAMIN A PALMITATE, CHOLECALCIFEROL, THIAMINE HYDROCHLORIDE, RIBOFLAVIN-5 PHOSPHATE SODIUM, PYRIDOXINE HYDROCHLORIDE, NIACINAMIDE, DEXPANTHENOL, ALPHA-TOCOPHEROL ACETATE, VITAMIN K1, FOLIC ACID, BIOTIN, CYANOCOBALAMIN: 200; 3300; 200; 6; 3.6; 6; 40; 15; 10; 150; 600; 60; 5 INJECTION, SOLUTION INTRAVENOUS at 18:27

## 2018-08-04 RX ADMIN — SODIUM CHLORIDE 10 ML: 9 INJECTION, SOLUTION INTRAMUSCULAR; INTRAVENOUS; SUBCUTANEOUS at 21:50

## 2018-08-04 RX ADMIN — SODIUM CHLORIDE 20 ML: 9 INJECTION, SOLUTION INTRAMUSCULAR; INTRAVENOUS; SUBCUTANEOUS at 13:52

## 2018-08-04 RX ADMIN — CARBAMAZEPINE 200 MG: 200 TABLET, EXTENDED RELEASE ORAL at 18:29

## 2018-08-04 RX ADMIN — POLYETHYLENE GLYCOL 3350 17 G: 17 POWDER, FOR SOLUTION ORAL at 09:17

## 2018-08-04 RX ADMIN — SODIUM CHLORIDE 10 ML: 9 INJECTION, SOLUTION INTRAMUSCULAR; INTRAVENOUS; SUBCUTANEOUS at 05:33

## 2018-08-04 RX ADMIN — SODIUM CHLORIDE 40 MG: 9 INJECTION INTRAMUSCULAR; INTRAVENOUS; SUBCUTANEOUS at 09:17

## 2018-08-04 RX ADMIN — METOPROLOL TARTRATE 12.5 MG: 25 TABLET, FILM COATED ORAL at 18:29

## 2018-08-04 RX ADMIN — AMIODARONE HYDROCHLORIDE 200 MG: 200 TABLET ORAL at 09:16

## 2018-08-04 RX ADMIN — DABIGATRAN ETEXILATE MESYLATE 150 MG: 150 CAPSULE ORAL at 18:37

## 2018-08-04 RX ADMIN — MELATONIN TAB 3 MG 3 MG: 3 TAB at 21:50

## 2018-08-04 RX ADMIN — SODIUM CHLORIDE: 234 INJECTION, SOLUTION, CONCENTRATE INTRAVENOUS; SUBCUTANEOUS at 13:52

## 2018-08-04 RX ADMIN — DABIGATRAN ETEXILATE MESYLATE 150 MG: 150 CAPSULE ORAL at 09:17

## 2018-08-04 RX ADMIN — MAGNESIUM HYDROXIDE 30 ML: 400 SUSPENSION ORAL at 09:17

## 2018-08-04 RX ADMIN — VENLAFAXINE HYDROCHLORIDE 75 MG: 37.5 CAPSULE, EXTENDED RELEASE ORAL at 09:16

## 2018-08-04 RX ADMIN — SODIUM CHLORIDE 40 MG: 9 INJECTION INTRAMUSCULAR; INTRAVENOUS; SUBCUTANEOUS at 21:40

## 2018-08-04 RX ADMIN — CARBAMAZEPINE 200 MG: 200 TABLET, EXTENDED RELEASE ORAL at 09:16

## 2018-08-04 RX ADMIN — LACTULOSE 10 G: 20 SOLUTION ORAL at 09:17

## 2018-08-04 RX ADMIN — OXYCODONE HYDROCHLORIDE 5 MG: 5 TABLET ORAL at 00:37

## 2018-08-04 RX ADMIN — METOPROLOL TARTRATE 12.5 MG: 25 TABLET, FILM COATED ORAL at 09:16

## 2018-08-04 NOTE — PROGRESS NOTES
CRS POD# 18 s/p R colectomy, SBR, extensive YAMEL Pain well controlled. Ostomy output at 600. /56 (BP 1 Location: Right arm, BP Patient Position: At rest)  Pulse 75  Temp 97.8 °F (36.6 °C)  Resp 20  Ht 6' 2\" (1.88 m)  Wt 123.4 kg (272 lb)  SpO2 97%  BMI 34.92 kg/m2 NAD, AAO Abd soft, approp TTP Incisions c/d/i - ostomy with liquid stool JPs serosang Ostomy pink, flat. Stool and some gas in bag WBC 14 Plan 
-Continue TPN daily over the weekend.  
- ice chips and sips per speech therapy 
- adv tfs

## 2018-08-04 NOTE — PROGRESS NOTES
General Surgery End of Shift Nursing Note Bedside shift change report given to Derek (oncoming nurse) by Alena Hutton (offgoing nurse). Report included the following information SBAR, Kardex, Intake/Output, MAR and Recent Results. Shift worked:   7a-7p Summary of shift:    Pt up to chair with stephy x1, tolerating increased TF rate (20cc/hr) as well as sips of clear liquids. 350 cc stoma output this shift, so evening lactulose held. No pain medication today. Issues for physician to address:   none Christin Gimenez

## 2018-08-04 NOTE — PROGRESS NOTES
Problem: Pressure Injury - Risk of 
Goal: *Prevention of pressure injury Document Dagoberto Scale and appropriate interventions in the flowsheet. Outcome: Progressing Towards Goal 
Pressure Injury Interventions: 
Sensory Interventions: Float heels, Minimize linen layers, Monitor skin under medical devices Moisture Interventions: Absorbent underpads Activity Interventions: PT/OT evaluation Mobility Interventions: Turn and reposition approx. every two hours(pillow and wedges) Nutrition Interventions: Document food/fluid/supplement intake Friction and Shear Interventions: Lift team/patient mobility team, Lift sheet, HOB 30 degrees or less

## 2018-08-04 NOTE — ROUTINE PROCESS
Bedside shift change report given to Romy Barraza (oncoming nurse) by Mariola Mack (offgoing nurse). Report included the following information SBAR, Kardex, Procedure Summary, Intake/Output, MAR and Recent Results.

## 2018-08-05 LAB
ANION GAP SERPL CALC-SCNC: 7 MMOL/L (ref 5–15)
BASOPHILS # BLD: 0.1 K/UL (ref 0–0.1)
BASOPHILS NFR BLD: 1 % (ref 0–1)
BUN SERPL-MCNC: 19 MG/DL (ref 6–20)
BUN/CREAT SERPL: 30 (ref 12–20)
CALCIUM SERPL-MCNC: 8.1 MG/DL (ref 8.5–10.1)
CHLORIDE SERPL-SCNC: 97 MMOL/L (ref 97–108)
CO2 SERPL-SCNC: 24 MMOL/L (ref 21–32)
CREAT SERPL-MCNC: 0.64 MG/DL (ref 0.7–1.3)
DIFFERENTIAL METHOD BLD: ABNORMAL
EOSINOPHIL # BLD: 0.2 K/UL (ref 0–0.4)
EOSINOPHIL NFR BLD: 2 % (ref 0–7)
ERYTHROCYTE [DISTWIDTH] IN BLOOD BY AUTOMATED COUNT: 24.1 % (ref 11.5–14.5)
GLUCOSE SERPL-MCNC: 129 MG/DL (ref 65–100)
HCT VFR BLD AUTO: 28.9 % (ref 36.6–50.3)
HGB BLD-MCNC: 8.9 G/DL (ref 12.1–17)
IMM GRANULOCYTES # BLD: 0.2 K/UL (ref 0–0.04)
IMM GRANULOCYTES NFR BLD AUTO: 2 % (ref 0–0.5)
LYMPHOCYTES # BLD: 0.5 K/UL (ref 0.8–3.5)
LYMPHOCYTES NFR BLD: 5 % (ref 12–49)
MCH RBC QN AUTO: 26 PG (ref 26–34)
MCHC RBC AUTO-ENTMCNC: 30.8 G/DL (ref 30–36.5)
MCV RBC AUTO: 84.5 FL (ref 80–99)
MONOCYTES # BLD: 1 K/UL (ref 0–1)
MONOCYTES NFR BLD: 9 % (ref 5–13)
NEUTS SEG # BLD: 8.7 K/UL (ref 1.8–8)
NEUTS SEG NFR BLD: 81 % (ref 32–75)
NRBC # BLD: 0 K/UL (ref 0–0.01)
NRBC BLD-RTO: 0 PER 100 WBC
PLATELET # BLD AUTO: 276 K/UL (ref 150–400)
PMV BLD AUTO: 9.2 FL (ref 8.9–12.9)
POTASSIUM SERPL-SCNC: 5 MMOL/L (ref 3.5–5.1)
RBC # BLD AUTO: 3.42 M/UL (ref 4.1–5.7)
RBC MORPH BLD: ABNORMAL
RBC MORPH BLD: ABNORMAL
SODIUM SERPL-SCNC: 128 MMOL/L (ref 136–145)
WBC # BLD AUTO: 10.7 K/UL (ref 4.1–11.1)

## 2018-08-05 PROCEDURE — 74011250636 HC RX REV CODE- 250/636: Performed by: HOSPITALIST

## 2018-08-05 PROCEDURE — 74011000258 HC RX REV CODE- 258: Performed by: SURGERY

## 2018-08-05 PROCEDURE — 74011250637 HC RX REV CODE- 250/637: Performed by: SURGERY

## 2018-08-05 PROCEDURE — 74011250636 HC RX REV CODE- 250/636: Performed by: COLON & RECTAL SURGERY

## 2018-08-05 PROCEDURE — 94760 N-INVAS EAR/PLS OXIMETRY 1: CPT

## 2018-08-05 PROCEDURE — 80048 BASIC METABOLIC PNL TOTAL CA: CPT | Performed by: SURGERY

## 2018-08-05 PROCEDURE — 65270000029 HC RM PRIVATE

## 2018-08-05 PROCEDURE — 74011250637 HC RX REV CODE- 250/637: Performed by: INTERNAL MEDICINE

## 2018-08-05 PROCEDURE — 74011250636 HC RX REV CODE- 250/636: Performed by: SURGERY

## 2018-08-05 PROCEDURE — 74011250637 HC RX REV CODE- 250/637: Performed by: HOSPITALIST

## 2018-08-05 PROCEDURE — 74011000258 HC RX REV CODE- 258: Performed by: COLON & RECTAL SURGERY

## 2018-08-05 PROCEDURE — C9113 INJ PANTOPRAZOLE SODIUM, VIA: HCPCS | Performed by: HOSPITALIST

## 2018-08-05 PROCEDURE — 85025 COMPLETE CBC W/AUTO DIFF WBC: CPT | Performed by: SURGERY

## 2018-08-05 PROCEDURE — 36415 COLL VENOUS BLD VENIPUNCTURE: CPT | Performed by: SURGERY

## 2018-08-05 PROCEDURE — 74011000250 HC RX REV CODE- 250: Performed by: HOSPITALIST

## 2018-08-05 PROCEDURE — 74011000250 HC RX REV CODE- 250: Performed by: COLON & RECTAL SURGERY

## 2018-08-05 RX ADMIN — ONDANSETRON 4 MG: 2 INJECTION INTRAMUSCULAR; INTRAVENOUS at 13:45

## 2018-08-05 RX ADMIN — OXYCODONE HYDROCHLORIDE 5 MG: 5 TABLET ORAL at 01:12

## 2018-08-05 RX ADMIN — METOPROLOL TARTRATE 12.5 MG: 25 TABLET, FILM COATED ORAL at 09:55

## 2018-08-05 RX ADMIN — SODIUM CHLORIDE 40 MG: 9 INJECTION INTRAMUSCULAR; INTRAVENOUS; SUBCUTANEOUS at 09:55

## 2018-08-05 RX ADMIN — SODIUM CHLORIDE 10 ML: 9 INJECTION, SOLUTION INTRAMUSCULAR; INTRAVENOUS; SUBCUTANEOUS at 22:08

## 2018-08-05 RX ADMIN — SODIUM CHLORIDE: 234 INJECTION, SOLUTION, CONCENTRATE INTRAVENOUS; SUBCUTANEOUS at 15:18

## 2018-08-05 RX ADMIN — SODIUM CHLORIDE 40 MG: 9 INJECTION INTRAMUSCULAR; INTRAVENOUS; SUBCUTANEOUS at 22:08

## 2018-08-05 RX ADMIN — MAGNESIUM HYDROXIDE 30 ML: 400 SUSPENSION ORAL at 09:00

## 2018-08-05 RX ADMIN — ASCORBIC ACID, VITAMIN A PALMITATE, CHOLECALCIFEROL, THIAMINE HYDROCHLORIDE, RIBOFLAVIN-5 PHOSPHATE SODIUM, PYRIDOXINE HYDROCHLORIDE, NIACINAMIDE, DEXPANTHENOL, ALPHA-TOCOPHEROL ACETATE, VITAMIN K1, FOLIC ACID, BIOTIN, CYANOCOBALAMIN: 200; 3300; 200; 6; 3.6; 6; 40; 15; 10; 150; 600; 60; 5 INJECTION, SOLUTION INTRAVENOUS at 17:47

## 2018-08-05 RX ADMIN — VENLAFAXINE HYDROCHLORIDE 75 MG: 37.5 CAPSULE, EXTENDED RELEASE ORAL at 09:55

## 2018-08-05 RX ADMIN — DABIGATRAN ETEXILATE MESYLATE 150 MG: 150 CAPSULE ORAL at 10:02

## 2018-08-05 RX ADMIN — POLYETHYLENE GLYCOL 3350 17 G: 17 POWDER, FOR SOLUTION ORAL at 10:01

## 2018-08-05 RX ADMIN — SODIUM CHLORIDE 10 ML: 9 INJECTION, SOLUTION INTRAMUSCULAR; INTRAVENOUS; SUBCUTANEOUS at 13:45

## 2018-08-05 RX ADMIN — MELATONIN TAB 3 MG 3 MG: 3 TAB at 22:09

## 2018-08-05 RX ADMIN — SODIUM CHLORIDE 40 ML: 9 INJECTION, SOLUTION INTRAMUSCULAR; INTRAVENOUS; SUBCUTANEOUS at 22:09

## 2018-08-05 RX ADMIN — ACETAMINOPHEN 650 MG: 325 TABLET ORAL at 10:01

## 2018-08-05 RX ADMIN — CARBAMAZEPINE 200 MG: 200 TABLET, EXTENDED RELEASE ORAL at 09:55

## 2018-08-05 RX ADMIN — SODIUM CHLORIDE, PRESERVATIVE FREE 300 UNITS: 5 INJECTION INTRAVENOUS at 04:47

## 2018-08-05 RX ADMIN — AMIODARONE HYDROCHLORIDE 200 MG: 200 TABLET ORAL at 09:55

## 2018-08-05 NOTE — PROGRESS NOTES
Alerted by pt wife that he was feeling nauseated, found pt up in chair. wretching and gagging. TF stopped and dobhoff hooked to suction. 100cc out. /66, HR 75, 98% on room air. 100cc stool also emptied from ostomy. 1340 Have page out to Dr. Kaity Douglas    1350 Per Dr. Kaity Douglas, stop TF and leave to low wall suction overnight. Zofran administered. Jefferystjuno Douglas notified of temp 99.3-99.6; no need for concern unless greater than 100.9.

## 2018-08-05 NOTE — PROGRESS NOTES
CRS POD# 19 s/p R colectomy, SBR, extensive YAMEL    No issues overnight - ostomy functioning, TFs increasing. /45 (BP 1 Location: Right arm, BP Patient Position: At rest)  Pulse 74  Temp 98.1 °F (36.7 °C)  Resp 20  Ht 6' 2\" (1.88 m)  Wt 119.6 kg (263 lb 10.7 oz)  SpO2 100%  BMI 33.85 kg/m2    NAD, AAO  Abd soft, approp TTP  Incisions c/d/i - ostomy with liquid stool  JPs serosang  Ostomy pink, flat. Stool and some gas in bag    WBC 14    Plan  -Continue TPN daily over the weekend.   - ice chips and sips per speech therapy  - adv tfs  - may need some staples removed from wound tomorrow

## 2018-08-05 NOTE — PROGRESS NOTES
General Surgery End of Shift Nursing Note    Bedside shift change report given to Bijal Martinez (oncoming nurse) by KALEB SANTOS [facility] (offgoing nurse). Report included the following information SBAR, Kardex, Intake/Output, MAR and Recent Results. Shift worked:   7a-7p   Summary of shift:    Pt up to chair with stephy x1,TF stopped and dobhoff returned to wall suction after nausea/vomiting. Still had 350cc output via ostomy today. Erythema and serous drainage from midline incision, pt with increased intermittent confusion today. Wife very concerned about frequently and urgently voiding small amounts of urine.      Issues for physician to address:   As above       Cassius Mckeon

## 2018-08-06 ENCOUNTER — APPOINTMENT (OUTPATIENT)
Dept: GENERAL RADIOLOGY | Age: 78
DRG: 329 | End: 2018-08-06
Attending: SURGERY
Payer: MEDICARE

## 2018-08-06 LAB
ANION GAP SERPL CALC-SCNC: 8 MMOL/L (ref 5–15)
APPEARANCE UR: CLEAR
BACTERIA URNS QL MICRO: NEGATIVE /HPF
BASOPHILS # BLD: 0 K/UL (ref 0–0.1)
BASOPHILS NFR BLD: 0 % (ref 0–1)
BILIRUB UR QL CFM: POSITIVE
BUN SERPL-MCNC: 20 MG/DL (ref 6–20)
BUN/CREAT SERPL: 32 (ref 12–20)
CALCIUM SERPL-MCNC: 7.9 MG/DL (ref 8.5–10.1)
CHLORIDE SERPL-SCNC: 96 MMOL/L (ref 97–108)
CO2 SERPL-SCNC: 23 MMOL/L (ref 21–32)
COLOR UR: NORMAL
CREAT SERPL-MCNC: 0.63 MG/DL (ref 0.7–1.3)
DIFFERENTIAL METHOD BLD: ABNORMAL
EOSINOPHIL # BLD: 0 K/UL (ref 0–0.4)
EOSINOPHIL NFR BLD: 0 % (ref 0–7)
EPITH CASTS URNS QL MICRO: NORMAL /LPF
ERYTHROCYTE [DISTWIDTH] IN BLOOD BY AUTOMATED COUNT: 24.2 % (ref 11.5–14.5)
GLUCOSE SERPL-MCNC: 135 MG/DL (ref 65–100)
GLUCOSE UR STRIP.AUTO-MCNC: NEGATIVE MG/DL
HCT VFR BLD AUTO: 27.6 % (ref 36.6–50.3)
HGB BLD-MCNC: 8.3 G/DL (ref 12.1–17)
HGB UR QL STRIP: NEGATIVE
HYALINE CASTS URNS QL MICRO: NORMAL /LPF (ref 0–5)
IMM GRANULOCYTES # BLD: 0.2 K/UL (ref 0–0.04)
IMM GRANULOCYTES NFR BLD AUTO: 1 % (ref 0–0.5)
KETONES UR QL STRIP.AUTO: NEGATIVE MG/DL
LEUKOCYTE ESTERASE UR QL STRIP.AUTO: NEGATIVE
LYMPHOCYTES # BLD: 0.6 K/UL (ref 0.8–3.5)
LYMPHOCYTES NFR BLD: 4 % (ref 12–49)
MCH RBC QN AUTO: 25.9 PG (ref 26–34)
MCHC RBC AUTO-ENTMCNC: 30.1 G/DL (ref 30–36.5)
MCV RBC AUTO: 86 FL (ref 80–99)
MONOCYTES # BLD: 1.3 K/UL (ref 0–1)
MONOCYTES NFR BLD: 8 % (ref 5–13)
NEUTS SEG # BLD: 13.9 K/UL (ref 1.8–8)
NEUTS SEG NFR BLD: 87 % (ref 32–75)
NITRITE UR QL STRIP.AUTO: NEGATIVE
NRBC # BLD: 0 K/UL (ref 0–0.01)
NRBC BLD-RTO: 0 PER 100 WBC
PH UR STRIP: 5.5 [PH] (ref 5–8)
PLATELET # BLD AUTO: 244 K/UL (ref 150–400)
PMV BLD AUTO: 9.8 FL (ref 8.9–12.9)
POTASSIUM SERPL-SCNC: 4.8 MMOL/L (ref 3.5–5.1)
PROT UR STRIP-MCNC: NEGATIVE MG/DL
RBC # BLD AUTO: 3.21 M/UL (ref 4.1–5.7)
RBC #/AREA URNS HPF: NORMAL /HPF (ref 0–5)
RBC MORPH BLD: ABNORMAL
SODIUM SERPL-SCNC: 127 MMOL/L (ref 136–145)
SP GR UR REFRACTOMETRY: 1.02 (ref 1–1.03)
UA: UC IF INDICATED,UAUC: NORMAL
UROBILINOGEN UR QL STRIP.AUTO: 1 EU/DL (ref 0.2–1)
WBC # BLD AUTO: 16 K/UL (ref 4.1–11.1)
WBC URNS QL MICRO: NORMAL /HPF (ref 0–4)

## 2018-08-06 PROCEDURE — 74011250636 HC RX REV CODE- 250/636: Performed by: SURGERY

## 2018-08-06 PROCEDURE — 74011250637 HC RX REV CODE- 250/637: Performed by: SURGERY

## 2018-08-06 PROCEDURE — 97530 THERAPEUTIC ACTIVITIES: CPT

## 2018-08-06 PROCEDURE — 74011000258 HC RX REV CODE- 258: Performed by: SURGERY

## 2018-08-06 PROCEDURE — 65270000029 HC RM PRIVATE

## 2018-08-06 PROCEDURE — 74011250637 HC RX REV CODE- 250/637: Performed by: INTERNAL MEDICINE

## 2018-08-06 PROCEDURE — 36591 DRAW BLOOD OFF VENOUS DEVICE: CPT

## 2018-08-06 PROCEDURE — 77030018798 HC PMP KT ENTRL FED COVD -A

## 2018-08-06 PROCEDURE — 97112 NEUROMUSCULAR REEDUCATION: CPT | Performed by: OCCUPATIONAL THERAPIST

## 2018-08-06 PROCEDURE — 74018 RADEX ABDOMEN 1 VIEW: CPT

## 2018-08-06 PROCEDURE — 74011250636 HC RX REV CODE- 250/636: Performed by: HOSPITALIST

## 2018-08-06 PROCEDURE — 81001 URINALYSIS AUTO W/SCOPE: CPT | Performed by: SURGERY

## 2018-08-06 PROCEDURE — 74011250636 HC RX REV CODE- 250/636: Performed by: COLON & RECTAL SURGERY

## 2018-08-06 PROCEDURE — 80048 BASIC METABOLIC PNL TOTAL CA: CPT | Performed by: SURGERY

## 2018-08-06 PROCEDURE — 36415 COLL VENOUS BLD VENIPUNCTURE: CPT | Performed by: SURGERY

## 2018-08-06 PROCEDURE — 85025 COMPLETE CBC W/AUTO DIFF WBC: CPT | Performed by: SURGERY

## 2018-08-06 PROCEDURE — 94760 N-INVAS EAR/PLS OXIMETRY 1: CPT

## 2018-08-06 PROCEDURE — 74011000250 HC RX REV CODE- 250: Performed by: SURGERY

## 2018-08-06 PROCEDURE — C9113 INJ PANTOPRAZOLE SODIUM, VIA: HCPCS | Performed by: HOSPITALIST

## 2018-08-06 PROCEDURE — 74011000258 HC RX REV CODE- 258: Performed by: COLON & RECTAL SURGERY

## 2018-08-06 PROCEDURE — 97530 THERAPEUTIC ACTIVITIES: CPT | Performed by: OCCUPATIONAL THERAPIST

## 2018-08-06 PROCEDURE — 97112 NEUROMUSCULAR REEDUCATION: CPT

## 2018-08-06 PROCEDURE — 74011000250 HC RX REV CODE- 250: Performed by: HOSPITALIST

## 2018-08-06 RX ADMIN — DABIGATRAN ETEXILATE MESYLATE 150 MG: 150 CAPSULE ORAL at 11:06

## 2018-08-06 RX ADMIN — SODIUM CHLORIDE 20 ML: 9 INJECTION, SOLUTION INTRAMUSCULAR; INTRAVENOUS; SUBCUTANEOUS at 14:10

## 2018-08-06 RX ADMIN — METOPROLOL TARTRATE 12.5 MG: 25 TABLET, FILM COATED ORAL at 11:07

## 2018-08-06 RX ADMIN — VENLAFAXINE HYDROCHLORIDE 75 MG: 37.5 CAPSULE, EXTENDED RELEASE ORAL at 11:06

## 2018-08-06 RX ADMIN — SODIUM CHLORIDE: 234 INJECTION, SOLUTION, CONCENTRATE INTRAVENOUS; SUBCUTANEOUS at 14:10

## 2018-08-06 RX ADMIN — ASCORBIC ACID, VITAMIN A PALMITATE, CHOLECALCIFEROL, THIAMINE HYDROCHLORIDE, RIBOFLAVIN-5 PHOSPHATE SODIUM, PYRIDOXINE HYDROCHLORIDE, NIACINAMIDE, DEXPANTHENOL, ALPHA-TOCOPHEROL ACETATE, VITAMIN K1, FOLIC ACID, BIOTIN, CYANOCOBALAMIN: 200; 3300; 200; 6; 3.6; 6; 40; 15; 10; 150; 600; 60; 5 INJECTION, SOLUTION INTRAVENOUS at 18:08

## 2018-08-06 RX ADMIN — LACTULOSE 10 G: 20 SOLUTION ORAL at 18:18

## 2018-08-06 RX ADMIN — METOPROLOL TARTRATE 12.5 MG: 25 TABLET, FILM COATED ORAL at 18:18

## 2018-08-06 RX ADMIN — CARBAMAZEPINE 200 MG: 200 TABLET, EXTENDED RELEASE ORAL at 18:18

## 2018-08-06 RX ADMIN — MELATONIN TAB 3 MG 3 MG: 3 TAB at 21:30

## 2018-08-06 RX ADMIN — SODIUM CHLORIDE 10 ML: 9 INJECTION, SOLUTION INTRAMUSCULAR; INTRAVENOUS; SUBCUTANEOUS at 21:00

## 2018-08-06 RX ADMIN — Medication 30 ML: at 03:00

## 2018-08-06 RX ADMIN — AMIODARONE HYDROCHLORIDE 200 MG: 200 TABLET ORAL at 11:07

## 2018-08-06 RX ADMIN — SODIUM CHLORIDE 40 ML: 9 INJECTION, SOLUTION INTRAMUSCULAR; INTRAVENOUS; SUBCUTANEOUS at 06:00

## 2018-08-06 RX ADMIN — SODIUM CHLORIDE 10 ML: 9 INJECTION, SOLUTION INTRAMUSCULAR; INTRAVENOUS; SUBCUTANEOUS at 21:34

## 2018-08-06 RX ADMIN — SODIUM CHLORIDE 40 MG: 9 INJECTION INTRAMUSCULAR; INTRAVENOUS; SUBCUTANEOUS at 11:07

## 2018-08-06 RX ADMIN — SODIUM CHLORIDE 40 MG: 9 INJECTION INTRAMUSCULAR; INTRAVENOUS; SUBCUTANEOUS at 21:30

## 2018-08-06 RX ADMIN — CARBAMAZEPINE 200 MG: 200 TABLET, EXTENDED RELEASE ORAL at 11:07

## 2018-08-06 RX ADMIN — Medication 10 ML: at 11:13

## 2018-08-06 RX ADMIN — DABIGATRAN ETEXILATE MESYLATE 150 MG: 150 CAPSULE ORAL at 18:18

## 2018-08-06 NOTE — INTERDISCIPLINARY ROUNDS
Interdisciplinary Rounds were completed on this patient. Rounds included nursing, clinical care leader, pharmacy, and case management.      Plan for the day:TPN, up to chair, awaiting xray results   Plan for the stay:continue current TX  Activity: up in chair   Anticipated discharge date: Home TBD

## 2018-08-06 NOTE — PROGRESS NOTES
Problem: Mobility Impaired (Adult and Pediatric)  Goal: *Acute Goals and Plan of Care (Insert Text)  Physical Therapy Goals  Reviewed 8/3/18- goals remain appropriate    Reviewed and revised 7/26/2018  1. Patient will move from supine to sit and sit to supine , scoot up and down and roll side to side in bed with minimal assistance within 7 day(s). 2.  Patient will transfer from bed to chair and chair to bed with moderate assistance using the least restrictive device within 7 day(s). 3.  Patient will perform sit to stand with moderate assistance within 7 day(s). 4.  Patient will perform stand pivot transfer to wheelchair with moderate assistance in 7 days. Reviewed and revised 7/19/2018  1. Patient will move from supine to sit and sit to supine , scoot up and down and roll side to side in bed with minimal assistance within 7 day(s). 2.  Patient will transfer from bed to chair and chair to bed with moderate assistance using the least restrictive device within 7 day(s). 3.  Patient will perform sit to stand with minimal assistance within 7 day(s). 4.  Patient will perform stand pivot transfer to wheelchair with minimal assistance in 7 days. Initiated 7/12/2018  1. Patient will move from supine to sit and sit to supine , scoot up and down and roll side to side in bed with modified independence within 7 day(s). 2.  Patient will transfer from bed to chair and chair to bed with supervision/set-up using the least restrictive device within 7 day(s). 3.  Patient will perform sit to stand with supervision/set-up within 7 day(s). 4.  Patient will perform stand pivot transfer to wheelchair with modified independence in 7 days.        physical Therapy TREATMENT  Patient: Geoff Amanda (29 y.o. male)  Date: 8/6/2018  Diagnosis: Acute blood loss anemia  GI bleed  Anasarca  GI Bleed  gi bleed  ASCENDING COLON CANCER  GI bleed  Procedure(s) (LRB):  LAPAROSCOPIC  ASSISTED TO OPEN RIGHT COLECTOMY AND SMALL BOWEL RESECTION (Right) 20 Days Post-Op  Precautions:    Chart, physical therapy assessment, plan of care and goals were reviewed. ASSESSMENT:  Pt had a difficult weekend with nausea and vomiting. He was received in supine and cleared by nursing to mobilize. Pt in agreement to work with therapy, he continues to be nervous and anxious. He came to the EOB with max A x 2. Noted poor balance today. Worked on coming to midline and shifting weight forward. He tolerated sitting EOB for ~20minutes but became very fatigued quickly. Attempted to stand with max A x 2 and unable to come to full stand. He was returned to supine with max/total A x 2. Continue to recommend IP rehab at discharge. Progression toward goals:  []    Improving appropriately and progressing toward goals  [x]    Improving slowly and progressing toward goals  []    Not making progress toward goals and plan of care will be adjusted     PLAN:  Patient continues to benefit from skilled intervention to address the above impairments. Continue treatment per established plan of care. Discharge Recommendations:  Inpatient Rehab  Further Equipment Recommendations for Discharge:  TBD     SUBJECTIVE:   Patient stated Rocio Kapoor.     OBJECTIVE DATA SUMMARY:   Critical Behavior:  Neurologic State: Alert  Orientation Level: Oriented X4  Cognition: Follows commands, Recognition of people/places, Appropriate for age attention/concentration  Safety/Judgement: Decreased awareness of environment, Decreased awareness of need for assistance, Decreased awareness of need for safety, Decreased insight into deficits  Functional Mobility Training:  Bed Mobility:     Supine to Sit: Maximum assistance;Assist x1  Sit to Supine: Maximum assistance;Assist x2           Transfers:  Sit to Stand: Maximum assistance;Assist x2 (unable to come to full stand)  Stand to Sit: Maximum assistance;Assist x2                             Balance:  Sitting: Impaired  Sitting - Static: Poor (constant support)  Sitting - Dynamic: Poor (constant support)  Standing: Impaired    Neuro Re-Education:  Worked on sitting balance and midline awareness     Activity Tolerance:   Fatigued quickly  Please refer to the flowsheet for vital signs taken during this treatment.   After treatment:   []    Patient left in no apparent distress sitting up in chair  [x]    Patient left in no apparent distress in bed  [x]    Call bell left within reach  [x]    Nursing notified  []    Caregiver present  []    Bed alarm activated    COMMUNICATION/COLLABORATION:   The patients plan of care was discussed with: Occupational Therapist and Registered Nurse    Pamela Pichardo, PT, DPT   Time Calculation: 39 mins

## 2018-08-06 NOTE — PROGRESS NOTES
General Surgery End of Shift Nursing Note    Bedside shift change report given to Adrián (oncoming nurse) by Arash Armstrong (offgoing nurse). Report included the following information SBAR, Kardex, Intake/Output, MAR and Recent Results. Shift worked:   7a-7p   Summary of shift:    Appears improved since yesterday (brighter affect) - Dobhoff advanced by Dr. Skye Mak after xray and TF resumed at 10cc/hr. MADELEINE drains removed.         Issues for physician to address:   none       Aram Rodriguez

## 2018-08-06 NOTE — PROGRESS NOTES
Occupational Therapy Goals  Weekly reassessment 7/31/18  1. Patient will perform one grooming task seated EOB with minimal assistance within 7 day(s). 2.  Patient will perform upper body dressing with moderate assistance  within 7 day(s). 3.  Patient will perform supine <> sit to participate in ADL tasks decrease caregiver burden with moderate assistance  within 7 day(s). 4.  Patient will perform sit <> stand to prepare for self care transfers with moderate assistance within 7 day(s). 5.  Patient will participate in R AROM and L self PROM upper extremity therapeutic exercise/activities with contact guard assist for 8 minutes within 7 day(s). 6.  Patient will utilize energy conservation techniques during functional activities with verbal cues within 7 day(s). Initiated 7/22/2018  1. Patient will perform one grooming task seated EOB with minimal assistance within 7 day(s). 2.  Patient will perform upper body dressing with moderate assistance  within 7 day(s). 3.  Patient will perform supine <> sit to participate in ADL tasks decrease caregiver burdenwith maximal assistance  within 7 day(s). 4.  Patient will perform sit <> stand to prepare for self care transfers with maximal assistance within 7 day(s). 5.  Patient will participate in R AROM and L self PROM upper extremity therapeutic exercise/activities with contact guard assist for 8 minutes within 7 day(s). 6.  Patient will utilize energy conservation techniques during functional activities with verbal cues within 7 day(s).          Occupational Therapy TREATMENT  Patient: Maty Robles (7 y.o. male)  Date: 8/6/2018  Diagnosis: Acute blood loss anemia  GI bleed  Anasarca  GI Bleed  gi bleed  ASCENDING COLON CANCER  GI bleed  Procedure(s) (LRB):  LAPAROSCOPIC  ASSISTED TO OPEN RIGHT COLECTOMY AND SMALL BOWEL RESECTION (Right) 20 Days Post-Op  Precautions:    Chart, occupational therapy assessment, plan of care, and goals were reviewed. ASSESSMENT:  Pt continues to be anxious during tx session, but his wife assists him with calming and is encouraging. Pt reportedly had a difficult weekend (n/v)  and is demonstrating increased weakness, requiring maximal assistance X2 for bed mobility and for sit to stand. He sat EOB today for approx 20 minutes with nearly constant cues and varying levels of assistance as he performed weight shifts/reaching off his base of support. Partial standing was achieved with maximal assistance X2. He could only tolerate one trial of standing due to fatigue. Recommend inpatient rehab for patient due to his complex medical management needs and his need for intensive rehab at discharge. Progression toward goals:  []       Improving appropriately and progressing toward goals  []       Improving slowly and progressing toward goals  [x]       making progress slow progress toward goals      PLAN:  Patient continues to benefit from skilled intervention to address the above impairments. Continue treatment per established plan of care. Discharge Recommendations:  Rehab--inpatient rehab  Further Equipment Recommendations for Discharge:  tbd     SUBJECTIVE:   Patient verbalized being fearful of falling throughout the tx session. OBJECTIVE DATA SUMMARY:   Cognitive/Behavioral Status:  Neurologic State: Alert  Orientation Level: Oriented X4  Cognition: Follows commands;Recognition of people/places; Appropriate for age attention/concentration             Functional Mobility and Transfers for ADLs:  Bed Mobility:  Supine to Sit: Maximum assistance;Assist x1  Sit to Supine: Maximum assistance;Assist x2    Transfers:  Sit to Stand: Maximum assistance;Assist x2 (unable to come to full stand)          Balance:  Sitting: Impaired  Sitting - Static: Poor (constant support)  Sitting - Dynamic: Poor (constant support)  Standing: Impaired    ADL Intervention:   total assistance needed for donning shoes, socks, afo  Pt performed grooming with set up seated EOB requiring assistance for maintaining trunk control. Neuro Re-Education:   sitting EOB with cues at trunk-shoulders and hips as well as RLE to increase stability and facilitate postural control for activity--reaching off base of support.           Pain:      did not complain of pain              Activity Tolerance:   Decreased tolerance and quickly fatigued seated EO    After treatment:   [] Patient left in no apparent distress sitting up in chair  [x] Patient left in no apparent distress in bed  [x] Call bell left within reach  [x] Nursing notified  [x] Caregiver present  [] Bed alarm activated    COMMUNICATION/COLLABORATION:   The patients plan of care was discussed with: Physical Therapist and Registered Nurse    Anup Castillo OTR/L  Time Calculation: 38 mins

## 2018-08-06 NOTE — PROGRESS NOTES
CRS POD# 20 s/p R colectomy, SBR, extensive YAMEL    Pain well controlled. Ostomy output at 600. /50 (BP 1 Location: Right arm, BP Patient Position: At rest)  Pulse 72  Temp 98.2 °F (36.8 °C)  Resp 20  Ht 6' 2\" (1.88 m)  Wt 117.7 kg (259 lb 7.7 oz)  SpO2 100%  BMI 33.32 kg/m2    NAD, AAO  Abd soft, approp TTP  Incision c/d/i with mild serosang drainage from midline incision  JPs serosang  Ostomy pink, flat.   Stool and some gas in bag    WBC 16    Plan  -Continue TPN  -Dobhoff to suction  -Check KUB today  -U/A for urinary frequency/urgency

## 2018-08-06 NOTE — PROGRESS NOTES
Speech path  Pt is still NPO. Dobhoff is to suction. He is getting TPN. We will evaluate once cleared for po.   Dalton Gregg, SLP

## 2018-08-07 LAB
ANION GAP SERPL CALC-SCNC: 7 MMOL/L (ref 5–15)
BASOPHILS # BLD: 0 K/UL (ref 0–0.1)
BASOPHILS NFR BLD: 0 % (ref 0–1)
BUN SERPL-MCNC: 23 MG/DL (ref 6–20)
BUN/CREAT SERPL: 33 (ref 12–20)
CALCIUM SERPL-MCNC: 8.1 MG/DL (ref 8.5–10.1)
CHLORIDE SERPL-SCNC: 97 MMOL/L (ref 97–108)
CO2 SERPL-SCNC: 23 MMOL/L (ref 21–32)
CREAT SERPL-MCNC: 0.69 MG/DL (ref 0.7–1.3)
DIFFERENTIAL METHOD BLD: ABNORMAL
EOSINOPHIL # BLD: 0 K/UL (ref 0–0.4)
EOSINOPHIL NFR BLD: 0 % (ref 0–7)
ERYTHROCYTE [DISTWIDTH] IN BLOOD BY AUTOMATED COUNT: 24.3 % (ref 11.5–14.5)
GLUCOSE SERPL-MCNC: 153 MG/DL (ref 65–100)
HCT VFR BLD AUTO: 27.3 % (ref 36.6–50.3)
HGB BLD-MCNC: 8.3 G/DL (ref 12.1–17)
IMM GRANULOCYTES # BLD: 0.2 K/UL (ref 0–0.04)
IMM GRANULOCYTES NFR BLD AUTO: 1 % (ref 0–0.5)
LYMPHOCYTES # BLD: 0.5 K/UL (ref 0.8–3.5)
LYMPHOCYTES NFR BLD: 3 % (ref 12–49)
MCH RBC QN AUTO: 25.7 PG (ref 26–34)
MCHC RBC AUTO-ENTMCNC: 30.4 G/DL (ref 30–36.5)
MCV RBC AUTO: 84.5 FL (ref 80–99)
MONOCYTES # BLD: 1.2 K/UL (ref 0–1)
MONOCYTES NFR BLD: 8 % (ref 5–13)
NEUTS SEG # BLD: 13.5 K/UL (ref 1.8–8)
NEUTS SEG NFR BLD: 88 % (ref 32–75)
NRBC # BLD: 0 K/UL (ref 0–0.01)
NRBC BLD-RTO: 0 PER 100 WBC
PLATELET # BLD AUTO: 202 K/UL (ref 150–400)
PMV BLD AUTO: 9.3 FL (ref 8.9–12.9)
POTASSIUM SERPL-SCNC: 4.6 MMOL/L (ref 3.5–5.1)
RBC # BLD AUTO: 3.23 M/UL (ref 4.1–5.7)
RBC MORPH BLD: ABNORMAL
SODIUM SERPL-SCNC: 127 MMOL/L (ref 136–145)
WBC # BLD AUTO: 15.4 K/UL (ref 4.1–11.1)

## 2018-08-07 PROCEDURE — 74011000250 HC RX REV CODE- 250: Performed by: HOSPITALIST

## 2018-08-07 PROCEDURE — 74011250636 HC RX REV CODE- 250/636: Performed by: SURGERY

## 2018-08-07 PROCEDURE — 74011250637 HC RX REV CODE- 250/637: Performed by: SURGERY

## 2018-08-07 PROCEDURE — C9113 INJ PANTOPRAZOLE SODIUM, VIA: HCPCS | Performed by: HOSPITALIST

## 2018-08-07 PROCEDURE — 74011000250 HC RX REV CODE- 250: Performed by: SURGERY

## 2018-08-07 PROCEDURE — 74011250637 HC RX REV CODE- 250/637: Performed by: INTERNAL MEDICINE

## 2018-08-07 PROCEDURE — 74011250636 HC RX REV CODE- 250/636: Performed by: HOSPITALIST

## 2018-08-07 PROCEDURE — 85025 COMPLETE CBC W/AUTO DIFF WBC: CPT | Performed by: SURGERY

## 2018-08-07 PROCEDURE — 65270000029 HC RM PRIVATE

## 2018-08-07 PROCEDURE — 36415 COLL VENOUS BLD VENIPUNCTURE: CPT | Performed by: SURGERY

## 2018-08-07 PROCEDURE — 74011000258 HC RX REV CODE- 258: Performed by: SURGERY

## 2018-08-07 PROCEDURE — 74011250637 HC RX REV CODE- 250/637: Performed by: HOSPITALIST

## 2018-08-07 PROCEDURE — 80048 BASIC METABOLIC PNL TOTAL CA: CPT | Performed by: SURGERY

## 2018-08-07 PROCEDURE — 97530 THERAPEUTIC ACTIVITIES: CPT

## 2018-08-07 RX ADMIN — SODIUM CHLORIDE: 234 INJECTION, SOLUTION, CONCENTRATE INTRAVENOUS; SUBCUTANEOUS at 15:51

## 2018-08-07 RX ADMIN — CARBAMAZEPINE 200 MG: 200 TABLET, EXTENDED RELEASE ORAL at 08:50

## 2018-08-07 RX ADMIN — DABIGATRAN ETEXILATE MESYLATE 150 MG: 150 CAPSULE ORAL at 18:45

## 2018-08-07 RX ADMIN — SODIUM CHLORIDE 40 MG: 9 INJECTION INTRAMUSCULAR; INTRAVENOUS; SUBCUTANEOUS at 21:46

## 2018-08-07 RX ADMIN — AMIODARONE HYDROCHLORIDE 200 MG: 200 TABLET ORAL at 08:50

## 2018-08-07 RX ADMIN — SODIUM CHLORIDE 40 MG: 9 INJECTION INTRAMUSCULAR; INTRAVENOUS; SUBCUTANEOUS at 08:52

## 2018-08-07 RX ADMIN — METOPROLOL TARTRATE 12.5 MG: 25 TABLET, FILM COATED ORAL at 08:50

## 2018-08-07 RX ADMIN — SODIUM CHLORIDE 10 ML: 9 INJECTION, SOLUTION INTRAMUSCULAR; INTRAVENOUS; SUBCUTANEOUS at 15:50

## 2018-08-07 RX ADMIN — ACETAMINOPHEN 650 MG: 325 TABLET ORAL at 00:52

## 2018-08-07 RX ADMIN — CARBAMAZEPINE 200 MG: 200 TABLET, EXTENDED RELEASE ORAL at 17:47

## 2018-08-07 RX ADMIN — Medication 10 ML: at 03:00

## 2018-08-07 RX ADMIN — SODIUM CHLORIDE 10 ML: 9 INJECTION, SOLUTION INTRAMUSCULAR; INTRAVENOUS; SUBCUTANEOUS at 05:07

## 2018-08-07 RX ADMIN — DABIGATRAN ETEXILATE MESYLATE 150 MG: 150 CAPSULE ORAL at 08:55

## 2018-08-07 RX ADMIN — VENLAFAXINE HYDROCHLORIDE 75 MG: 37.5 CAPSULE, EXTENDED RELEASE ORAL at 08:52

## 2018-08-07 RX ADMIN — HYDROMORPHONE HYDROCHLORIDE 0.5 MG: 2 INJECTION, SOLUTION INTRAMUSCULAR; INTRAVENOUS; SUBCUTANEOUS at 23:41

## 2018-08-07 RX ADMIN — METOPROLOL TARTRATE 12.5 MG: 25 TABLET, FILM COATED ORAL at 17:47

## 2018-08-07 RX ADMIN — MELATONIN TAB 3 MG 3 MG: 3 TAB at 21:48

## 2018-08-07 RX ADMIN — MAGNESIUM HYDROXIDE 30 ML: 400 SUSPENSION ORAL at 08:52

## 2018-08-07 RX ADMIN — ASCORBIC ACID, VITAMIN A PALMITATE, CHOLECALCIFEROL, THIAMINE HYDROCHLORIDE, RIBOFLAVIN-5 PHOSPHATE SODIUM, PYRIDOXINE HYDROCHLORIDE, NIACINAMIDE, DEXPANTHENOL, ALPHA-TOCOPHEROL ACETATE, VITAMIN K1, FOLIC ACID, BIOTIN, CYANOCOBALAMIN: 200; 3300; 200; 6; 3.6; 6; 40; 15; 10; 150; 600; 60; 5 INJECTION, SOLUTION INTRAVENOUS at 18:04

## 2018-08-07 RX ADMIN — LACTULOSE 10 G: 20 SOLUTION ORAL at 08:52

## 2018-08-07 RX ADMIN — SODIUM CHLORIDE, PRESERVATIVE FREE 300 UNITS: 5 INJECTION INTRAVENOUS at 03:13

## 2018-08-07 RX ADMIN — LACTULOSE 10 G: 20 SOLUTION ORAL at 17:48

## 2018-08-07 RX ADMIN — POLYETHYLENE GLYCOL 3350 17 G: 17 POWDER, FOR SOLUTION ORAL at 08:52

## 2018-08-07 RX ADMIN — SODIUM CHLORIDE 10 ML: 9 INJECTION, SOLUTION INTRAMUSCULAR; INTRAVENOUS; SUBCUTANEOUS at 21:44

## 2018-08-07 RX ADMIN — SODIUM CHLORIDE 10 ML: 9 INJECTION, SOLUTION INTRAMUSCULAR; INTRAVENOUS; SUBCUTANEOUS at 21:45

## 2018-08-07 RX ADMIN — ACETAMINOPHEN 650 MG: 325 TABLET ORAL at 08:50

## 2018-08-07 NOTE — PROGRESS NOTES
CRS POD# 21 s/p R colectomy, SBR, extensive YAMEL    Pain well controlled. Ostomy output at 300. /59 (BP 1 Location: Right arm)  Pulse 75  Temp 98.2 °F (36.8 °C)  Resp 18  Ht 6' 2\" (1.88 m)  Wt 116.5 kg (256 lb 13.4 oz)  SpO2 97%  BMI 32.98 kg/m2    NAD, AAO  Abd soft, approp TTP  Incision c/d/i with mild serosang drainage from midline incision  JPs serosang  Ostomy pink, flat.   Stool and some gas in bag    WBC 15    Plan  -Continue TPN  -Increase TF to 20/hr  -If fevers return or WBC persists, then he will likely need a repeat CT scan (chest/abd/pelvis)

## 2018-08-07 NOTE — PROGRESS NOTES
Nutrition Assessment:    RECOMMENDATIONS:   TF recommendations:   As medically able per CRS, recommend TF goal rate of TwoCal HN @ 45mL/h + Prosource BID + 150mL H2O flush q 3h (provides 2280kcals/120gPro/1956mL)     TPN recommendations, wean as TF rate is titrated up and pt demonstrates tolerance     ASSESSMENT:   Chart reviewed, pt working with therapy at time of attempted visit. Abdominal xray shows mild ileus. Trickle feeds resumed (they were on hold 2' wretching this weekend). TF + TPN meets 135% kcal and 123% protein needs. Colostomy with 300mL OP. SLP is following. See TF goal rate recommendations above which will meet 100% kcal and protein needs. Unsure what long term nutrition plan is for this pt, will he need a permanent feeding tube? Will monitor plan of care closely. Dietitians Intervention(s)/Plan(s): TF and TPN recommendations, monitor plan of care   SUBJECTIVE/OBJECTIVE:   Pt working with therapy   Diet Order: NPO, Other (comment) (TF via Dobbhoff: TwoCal @ 20mL/h (provides 960kcals/40gPro) + TPN: D20, 5% AA @ 100mL/h (provides 2112kcals/120gPro) )  % Eaten:  Patient Vitals for the past 72 hrs:   % Diet Eaten   08/07/18 0800 0 %     TwoCal HN  at 20 mL/hr flush with       via Other (comment) (Dobbhoff )      Pertinent Medications:lactulose, MOM, protonix, miralax.     Chemistries:  Lab Results   Component Value Date/Time    Sodium 127 (L) 08/07/2018 03:00 AM    Potassium 4.6 08/07/2018 03:00 AM    Chloride 97 08/07/2018 03:00 AM    CO2 23 08/07/2018 03:00 AM    Anion gap 7 08/07/2018 03:00 AM    Glucose 153 (H) 08/07/2018 03:00 AM    BUN 23 (H) 08/07/2018 03:00 AM    Creatinine 0.69 (L) 08/07/2018 03:00 AM    BUN/Creatinine ratio 33 (H) 08/07/2018 03:00 AM    GFR est AA >60 08/07/2018 03:00 AM    GFR est non-AA >60 08/07/2018 03:00 AM    Calcium 8.1 (L) 08/07/2018 03:00 AM    Albumin 2.1 (L) 07/21/2018 03:00 AM      Anthropometrics: Height: 6' 2\" (188 cm) Weight: 116.5 kg (256 lb 13.4 oz) []bed scale    []stated   []unknown     IBW (%IBW):   ( ) UBW (%UBW):   (  %)    BMI: Body mass index is 32.98 kg/(m^2). This BMI is indicative of:  []Underweight   []Normal   []Overweight   [x] Obesity   [] Extreme Obesity (BMI>40)  Estimated Nutrition Needs (Based on): 2275 Kcals/day (BMR: 2075 x 1.1) , 130 g (1 g/kg) Protein  Carbohydrate: At Least 130 g/day  Fluids: 2200 mL/day    Last BM: colostomy-300mL   [x]Active     []Hyperactive  []Hypoactive       [] Absent   BS  Skin:    [] Intact   [x] Incision  [] Breakdown   [] DTI   [] Tears/Excoriation/Abrasion  [x]Edema(+2-generalized, BLE; +1-RUE; +1 nonpitting-LUE)  [] Other: Wt Readings from Last 30 Encounters:   08/06/18 116.5 kg (256 lb 13.4 oz)   07/05/18 128.5 kg (283 lb 4.8 oz)   05/15/18 120.7 kg (266 lb)   05/07/18 118.8 kg (262 lb)   04/10/18 121.1 kg (267 lb)   03/26/18 121.1 kg (267 lb)   03/16/18 121.1 kg (267 lb)   09/21/17 118.4 kg (261 lb)   08/03/17 117.5 kg (259 lb)   04/06/17 117.3 kg (258 lb 8 oz)   03/31/17 117.5 kg (259 lb)   03/16/17 117.5 kg (259 lb)   02/23/17 119.7 kg (264 lb)   01/03/17 115.2 kg (254 lb)   11/17/16 119.7 kg (264 lb)   10/18/16 118.8 kg (262 lb)   10/07/16 118.8 kg (262 lb)   09/22/16 113.9 kg (251 lb 1 oz)   08/18/16 116.4 kg (256 lb 11.2 oz)   05/16/16 114.8 kg (253 lb)   03/10/16 114.8 kg (253 lb 1.6 oz)   02/11/16 122 kg (269 lb)   01/11/16 119.7 kg (264 lb)   11/04/15 122.5 kg (270 lb)   08/25/15 119.7 kg (264 lb)   08/13/15 116.1 kg (256 lb)   05/04/15 119.7 kg (264 lb)   03/27/15 119.7 kg (264 lb)   02/21/15 121.2 kg (267 lb 3.2 oz)   01/29/15 119.9 kg (264 lb 6.4 oz)      NUTRITION DIAGNOSES:   Problem:  Altered GI function      Etiology: related to Ascending colon mass c/w probable colon carcinoma and Cedric's erosion with HH      Signs/Symptoms: as evidenced by Ascending colon mass c/w probable colon carcinoma and Cedric's erosion with HH      Previous dx re: altered GI function continues to resolve. NUTRITION INTERVENTIONS:  Meals/Snacks: Other (advance diet as medically able per CRS and SLP ) Enteral/Parenteral Nutrition: Modify rate, concentration, composition, and schedule, Discontinue parenteral nutrition               GOAL:   Pt will tolerate TF @ recommended goal rate and start to wean off of TPN in 2-3 days.      NUTRITION MONITORING AND EVALUATION   Previous Goal: Pt will meet >90% kcal and protein needs via nutrition support in 2-4 days   Previous Goal Met: Yes   Previous Recommendations Implemented: Yes   Cultural, Synagogue, or Ethnic Dietary Needs: None   LEARNING NEEDS (Diet, Food/Nutrient-Drug Interaction):    [x] None Identified   [] Identified and Education Provided/Documented   [] Identified and Pt declined/was not appropriate      [x] Interdisciplinary Care Plan Reviewed/Documented    [x] Participated in Discharge Planning: Unable to determine    [] Interdisciplinary Rounds     NUTRITION RISK:    [x] High              [] Moderate           []  Low  []  Minimal/Uncompromised      Maude Villela RD, [address] [place]   Pager [phone]  Weekend Pager [phone]

## 2018-08-07 NOTE — PROGRESS NOTES
General Surgery End of Shift Nursing Note    Bedside shift change report given to [id number]arnie (oncoming nurse) by Arjun Govea (offgoing nurse). Report included the following information SBAR, Kardex, OR Summary, Procedure Summary, Intake/Output, MAR, Accordion, Recent Results and Med Rec Status. Shift worked:   7a-7p   Summary of shift:    TF increased to 20cc/hr, pt hermann. Temp=101.1 this am, Tylenol given, IS use, pts temp WDL remainder of shift, Dr Tadeo Left aware. Issues for physician to address:   none     Number times ambulated in hallway past shift: 0    Number of times OOB to chair past shift: 0    Pain Management:  Current medication: tylenol  Patient states pain is manageable on current pain medication: YES    GI:    Current diet:  DIET NPO With Sips of Clear Fluids  TPN ADULT - CENTRAL AA 5% D20% W/ CA + ELECTROLYTES  TPN ADULT - CENTRAL AA 5% D20% W/ CA + ELECTROLYTES  DIET TUBE FEEDING  DIET TUBE FEEDING  TPN ADULT - CENTRAL AA 5% D20% W/ CA + ELECTROLYTES  TPN ADULT - CENTRAL AA 5% D20% W/ CA + ELECTROLYTES    Tolerating current diet: YES  Passing flatus: YES  Last Bowel Movement: today   Appearance: brown, loose in colostomy    Respiratory:    Incentive Spirometer at bedside: YES  Patient instructed on use: YES    Patient Safety:    Falls Score: 2  Bed Alarm On? No  Sitter?  Yes    Yari De Los Santos RN

## 2018-08-07 NOTE — PROGRESS NOTES
Patient in bed HOB at 40 degrees, Patient has Ettie Guiles to right neck with TPN feeding , a Colostomy with stool output and gas, a Dobhoff in place passed through nostril clamped at 71 cm. With TWO TALYA pump feeding at 10 ml / hr. Patient is alert and oriented, his speech is often difficult to understand. He has a continuous SITTER  Reportedly a XANDER LIFT is used to move him. Prevalon boots are in use. Patient is w/o complaint.  He uses a urinal with assist.

## 2018-08-07 NOTE — PROGRESS NOTES
Bedside shift change report given to Lata Smith (oncoming nurse) by Marilou Ann RN (offgoing nurse). Report included the following information SBAR, Kardex, Procedure Summary, Intake/Output, MAR, Accordion, Recent Results and Med Rec Status.

## 2018-08-07 NOTE — INTERDISCIPLINARY ROUNDS
Interdisciplinary Rounds were completed on this patient. Rounds included nursing, clinical care leader, pharmacy, and case management.      Plan for the day TPN, tube vishnu, turn, up in chair   Plan for the stay continue current TX   Activity:up in chair   Anticipated discharge date: Home 8/8

## 2018-08-07 NOTE — PROGRESS NOTES
Problem: Mobility Impaired (Adult and Pediatric)  Goal: *Acute Goals and Plan of Care (Insert Text)  Physical Therapy Goals  Reviewed 8/3/18- goals remain appropriate    Reviewed and revised 7/26/2018  1. Patient will move from supine to sit and sit to supine , scoot up and down and roll side to side in bed with minimal assistance within 7 day(s). 2.  Patient will transfer from bed to chair and chair to bed with moderate assistance using the least restrictive device within 7 day(s). 3.  Patient will perform sit to stand with moderate assistance within 7 day(s). 4.  Patient will perform stand pivot transfer to wheelchair with moderate assistance in 7 days. Reviewed and revised 7/19/2018  1. Patient will move from supine to sit and sit to supine , scoot up and down and roll side to side in bed with minimal assistance within 7 day(s). 2.  Patient will transfer from bed to chair and chair to bed with moderate assistance using the least restrictive device within 7 day(s). 3.  Patient will perform sit to stand with minimal assistance within 7 day(s). 4.  Patient will perform stand pivot transfer to wheelchair with minimal assistance in 7 days. Initiated 7/12/2018  1. Patient will move from supine to sit and sit to supine , scoot up and down and roll side to side in bed with modified independence within 7 day(s). 2.  Patient will transfer from bed to chair and chair to bed with supervision/set-up using the least restrictive device within 7 day(s). 3.  Patient will perform sit to stand with supervision/set-up within 7 day(s). 4.  Patient will perform stand pivot transfer to wheelchair with modified independence in 7 days.        physical Therapy TREATMENT  Patient: Bev Duffy (14 y.o. male)  Date: 8/7/2018  Diagnosis: Acute blood loss anemia  GI bleed  Anasarca  GI Bleed  gi bleed  ASCENDING COLON CANCER  GI bleed  Procedure(s) (LRB):  LAPAROSCOPIC  ASSISTED TO OPEN RIGHT COLECTOMY AND SMALL BOWEL RESECTION (Right) 21 Days Post-Op  Precautions:    Chart, physical therapy assessment, plan of care and goals were reviewed. ASSESSMENT:  Pt was received in supine and cleared by nursing to mobilize. He continues to have low grade fevers, but willing to participate with therapy. Bed mobility improved today to mod A x 2. Initially good sitting balance and then becomes fair with fatigue. Tolerated sitting EOB for 10 minutes. Attempted to stand 2x with max A, able to come to full upright position on 2nd attempt with manual cues at the distal quad on glute on the R. He was returned to supine at the end of the session. Continue to strongly recommend IP. Progression toward goals:  []    Improving appropriately and progressing toward goals  [x]    Improving slowly and progressing toward goals  []    Not making progress toward goals and plan of care will be adjusted     PLAN:  Patient continues to benefit from skilled intervention to address the above impairments. Continue treatment per established plan of care. Discharge Recommendations:  Inpatient Rehab  Further Equipment Recommendations for Discharge:  TBD     SUBJECTIVE:   Patient stated I can try.     OBJECTIVE DATA SUMMARY:   Critical Behavior:  Neurologic State:  (left sided weakness/flacidity of left arm)  Orientation Level: Oriented X4  Cognition: Follows commands  Safety/Judgement: Decreased awareness of environment, Decreased awareness of need for assistance, Decreased awareness of need for safety, Decreased insight into deficits  Functional Mobility Training:  Bed Mobility:     Supine to Sit: Moderate assistance;Assist x2  Sit to Supine: Maximum assistance;Assist x2  Scooting: Contact guard assistance        Transfers:  Sit to Stand: Maximum assistance;Assist x2  Stand to Sit: Moderate assistance;Assist x2                             Balance:  Sitting: Impaired  Sitting - Static: Fair (occasional)  Sitting - Dynamic: Poor (constant support)  Standing: Impaired  Standing - Static: Poor  Standing - Dynamic : Poor  Pain:  Pain Scale 1: Numeric (0 - 10)  Pain Intensity 1: 6  Pain Location 1: Abdomen  Pain Orientation 1: Anterior  Pain Description 1: Aching  Pain Intervention(s) 1: Medication (see MAR)  Activity Tolerance:   WFL  Please refer to the flowsheet for vital signs taken during this treatment.   After treatment:   []    Patient left in no apparent distress sitting up in chair  [x]    Patient left in no apparent distress in bed  [x]    Call bell left within reach  [x]    Nursing notified  []    Caregiver present  []    Bed alarm activated    COMMUNICATION/COLLABORATION:   The patients plan of care was discussed with: Registered Nurse    Mylinda Cooks, PT, DPT   Time Calculation: 36 mins

## 2018-08-08 LAB
ANION GAP SERPL CALC-SCNC: 7 MMOL/L (ref 5–15)
BASOPHILS # BLD: 0 K/UL (ref 0–0.1)
BASOPHILS NFR BLD: 0 % (ref 0–1)
BUN SERPL-MCNC: 25 MG/DL (ref 6–20)
BUN/CREAT SERPL: 46 (ref 12–20)
CALCIUM SERPL-MCNC: 8 MG/DL (ref 8.5–10.1)
CHLORIDE SERPL-SCNC: 99 MMOL/L (ref 97–108)
CO2 SERPL-SCNC: 23 MMOL/L (ref 21–32)
CREAT SERPL-MCNC: 0.54 MG/DL (ref 0.7–1.3)
DIFFERENTIAL METHOD BLD: ABNORMAL
EOSINOPHIL # BLD: 0.1 K/UL (ref 0–0.4)
EOSINOPHIL NFR BLD: 1 % (ref 0–7)
ERYTHROCYTE [DISTWIDTH] IN BLOOD BY AUTOMATED COUNT: 24.4 % (ref 11.5–14.5)
GLUCOSE SERPL-MCNC: 182 MG/DL (ref 65–100)
HCT VFR BLD AUTO: 26.2 % (ref 36.6–50.3)
HGB BLD-MCNC: 7.9 G/DL (ref 12.1–17)
IMM GRANULOCYTES # BLD: 0.1 K/UL (ref 0–0.04)
IMM GRANULOCYTES NFR BLD AUTO: 1 % (ref 0–0.5)
LYMPHOCYTES # BLD: 0.7 K/UL (ref 0.8–3.5)
LYMPHOCYTES NFR BLD: 7 % (ref 12–49)
MCH RBC QN AUTO: 25.6 PG (ref 26–34)
MCHC RBC AUTO-ENTMCNC: 30.2 G/DL (ref 30–36.5)
MCV RBC AUTO: 84.8 FL (ref 80–99)
MONOCYTES # BLD: 1.1 K/UL (ref 0–1)
MONOCYTES NFR BLD: 12 % (ref 5–13)
NEUTS SEG # BLD: 7.7 K/UL (ref 1.8–8)
NEUTS SEG NFR BLD: 79 % (ref 32–75)
NRBC # BLD: 0 K/UL (ref 0–0.01)
NRBC BLD-RTO: 0 PER 100 WBC
PLATELET # BLD AUTO: 169 K/UL (ref 150–400)
PMV BLD AUTO: 9.7 FL (ref 8.9–12.9)
POTASSIUM SERPL-SCNC: 4.8 MMOL/L (ref 3.5–5.1)
RBC # BLD AUTO: 3.09 M/UL (ref 4.1–5.7)
SODIUM SERPL-SCNC: 129 MMOL/L (ref 136–145)
WBC # BLD AUTO: 9.7 K/UL (ref 4.1–11.1)

## 2018-08-08 PROCEDURE — 77030020847 HC STATLOK BARD -A

## 2018-08-08 PROCEDURE — 94760 N-INVAS EAR/PLS OXIMETRY 1: CPT

## 2018-08-08 PROCEDURE — 74011250636 HC RX REV CODE- 250/636: Performed by: SURGERY

## 2018-08-08 PROCEDURE — 77030018719 HC DRSG PTCH ANTIMIC J&J -A

## 2018-08-08 PROCEDURE — 74011250637 HC RX REV CODE- 250/637: Performed by: INTERNAL MEDICINE

## 2018-08-08 PROCEDURE — 74011000258 HC RX REV CODE- 258: Performed by: SURGERY

## 2018-08-08 PROCEDURE — 36415 COLL VENOUS BLD VENIPUNCTURE: CPT | Performed by: SURGERY

## 2018-08-08 PROCEDURE — 97112 NEUROMUSCULAR REEDUCATION: CPT

## 2018-08-08 PROCEDURE — 74011250637 HC RX REV CODE- 250/637: Performed by: SURGERY

## 2018-08-08 PROCEDURE — 65270000029 HC RM PRIVATE

## 2018-08-08 PROCEDURE — 97530 THERAPEUTIC ACTIVITIES: CPT | Performed by: OCCUPATIONAL THERAPIST

## 2018-08-08 PROCEDURE — 80048 BASIC METABOLIC PNL TOTAL CA: CPT | Performed by: SURGERY

## 2018-08-08 PROCEDURE — C9113 INJ PANTOPRAZOLE SODIUM, VIA: HCPCS | Performed by: HOSPITALIST

## 2018-08-08 PROCEDURE — 74011250636 HC RX REV CODE- 250/636: Performed by: HOSPITALIST

## 2018-08-08 PROCEDURE — 85025 COMPLETE CBC W/AUTO DIFF WBC: CPT | Performed by: SURGERY

## 2018-08-08 PROCEDURE — 97530 THERAPEUTIC ACTIVITIES: CPT

## 2018-08-08 PROCEDURE — 74011000250 HC RX REV CODE- 250: Performed by: HOSPITALIST

## 2018-08-08 PROCEDURE — 74011000250 HC RX REV CODE- 250: Performed by: SURGERY

## 2018-08-08 RX ADMIN — POLYETHYLENE GLYCOL 3350 17 G: 17 POWDER, FOR SOLUTION ORAL at 11:15

## 2018-08-08 RX ADMIN — LACTULOSE 10 G: 20 SOLUTION ORAL at 11:21

## 2018-08-08 RX ADMIN — AMIODARONE HYDROCHLORIDE 200 MG: 200 TABLET ORAL at 11:21

## 2018-08-08 RX ADMIN — LACTULOSE 10 G: 20 SOLUTION ORAL at 19:13

## 2018-08-08 RX ADMIN — SODIUM CHLORIDE 10 ML: 9 INJECTION, SOLUTION INTRAMUSCULAR; INTRAVENOUS; SUBCUTANEOUS at 05:06

## 2018-08-08 RX ADMIN — SODIUM CHLORIDE 40 MG: 9 INJECTION INTRAMUSCULAR; INTRAVENOUS; SUBCUTANEOUS at 22:16

## 2018-08-08 RX ADMIN — MELATONIN TAB 3 MG 3 MG: 3 TAB at 22:16

## 2018-08-08 RX ADMIN — CARBAMAZEPINE 200 MG: 200 TABLET, EXTENDED RELEASE ORAL at 19:13

## 2018-08-08 RX ADMIN — DABIGATRAN ETEXILATE MESYLATE 150 MG: 150 CAPSULE ORAL at 11:28

## 2018-08-08 RX ADMIN — CARBAMAZEPINE 200 MG: 200 TABLET, EXTENDED RELEASE ORAL at 11:21

## 2018-08-08 RX ADMIN — METOPROLOL TARTRATE 12.5 MG: 25 TABLET, FILM COATED ORAL at 19:12

## 2018-08-08 RX ADMIN — VENLAFAXINE HYDROCHLORIDE 75 MG: 37.5 CAPSULE, EXTENDED RELEASE ORAL at 11:21

## 2018-08-08 RX ADMIN — HYDROMORPHONE HYDROCHLORIDE 0.5 MG: 2 INJECTION, SOLUTION INTRAMUSCULAR; INTRAVENOUS; SUBCUTANEOUS at 06:41

## 2018-08-08 RX ADMIN — METOPROLOL TARTRATE 12.5 MG: 25 TABLET, FILM COATED ORAL at 11:27

## 2018-08-08 RX ADMIN — SODIUM CHLORIDE 40 MG: 9 INJECTION INTRAMUSCULAR; INTRAVENOUS; SUBCUTANEOUS at 11:16

## 2018-08-08 RX ADMIN — SODIUM CHLORIDE: 234 INJECTION, SOLUTION, CONCENTRATE INTRAVENOUS; SUBCUTANEOUS at 14:23

## 2018-08-08 RX ADMIN — SODIUM CHLORIDE 10 ML: 9 INJECTION, SOLUTION INTRAMUSCULAR; INTRAVENOUS; SUBCUTANEOUS at 14:23

## 2018-08-08 RX ADMIN — SODIUM CHLORIDE 10 ML: 9 INJECTION, SOLUTION INTRAMUSCULAR; INTRAVENOUS; SUBCUTANEOUS at 22:17

## 2018-08-08 RX ADMIN — ASCORBIC ACID, VITAMIN A PALMITATE, CHOLECALCIFEROL, THIAMINE HYDROCHLORIDE, RIBOFLAVIN-5 PHOSPHATE SODIUM, PYRIDOXINE HYDROCHLORIDE, NIACINAMIDE, DEXPANTHENOL, ALPHA-TOCOPHEROL ACETATE, VITAMIN K1, FOLIC ACID, BIOTIN, CYANOCOBALAMIN: 200; 3300; 200; 6; 3.6; 6; 40; 15; 10; 150; 600; 60; 5 INJECTION, SOLUTION INTRAVENOUS at 18:55

## 2018-08-08 RX ADMIN — MAGNESIUM HYDROXIDE 30 ML: 400 SUSPENSION ORAL at 11:21

## 2018-08-08 RX ADMIN — DABIGATRAN ETEXILATE MESYLATE 150 MG: 150 CAPSULE ORAL at 22:16

## 2018-08-08 NOTE — PROGRESS NOTES
Occupational Therapy Goals  Weekly reassessment 8/8/2018  All goals were reviewed and remain appropriate to continue for 7 days. Weekly reassessment 7/31/18  1. Patient will perform one grooming task seated EOB with minimal assistance within 7 day(s). 2.  Patient will perform upper body dressing with moderate assistance  within 7 day(s). 3.  Patient will perform supine <> sit to participate in ADL tasks decrease caregiver burden with moderate assistance  within 7 day(s). 4.  Patient will perform sit <> stand to prepare for self care transfers with moderate assistance within 7 day(s). 5.  Patient will participate in R AROM and L self PROM upper extremity therapeutic exercise/activities with contact guard assist for 8 minutes within 7 day(s). 6.  Patient will utilize energy conservation techniques during functional activities with verbal cues within 7 day(s). Initiated 7/22/2018  1. Patient will perform one grooming task seated EOB with minimal assistance within 7 day(s). 2.  Patient will perform upper body dressing with moderate assistance  within 7 day(s). 3.  Patient will perform supine <> sit to participate in ADL tasks decrease caregiver burdenwith maximal assistance  within 7 day(s). 4.  Patient will perform sit <> stand to prepare for self care transfers with maximal assistance within 7 day(s). 5.  Patient will participate in R AROM and L self PROM upper extremity therapeutic exercise/activities with contact guard assist for 8 minutes within 7 day(s). 6.  Patient will utilize energy conservation techniques during functional activities with verbal cues within 7 day(s).                      Occupational Therapy TREATMENT: WEEKLY REASSESSMENT  Patient: Kristie Yen (23 y.o. male)  Date: 8/8/2018  Diagnosis: Acute blood loss anemia  GI bleed  Anasarca  GI Bleed  gi bleed  ASCENDING COLON CANCER  GI bleed  Procedure(s) (LRB):  LAPAROSCOPIC  ASSISTED TO OPEN RIGHT COLECTOMY AND SMALL BOWEL RESECTION (Right) 22 Days Post-Op  Precautions:    Chart, occupational therapy assessment, plan of care, and goals were reviewed. ASSESSMENT:  Pt was agreeable to treatment, his supportive and encouraging wife was present. Pt had a wash cloth on head upon arrival, he'd been feverish, however, not currently, despite feeling somewhat clammy moist once seated EOB. He is making slow steady progress towards his goals. Despite his medical needs, continues to actively participate in therapy;  he required less assistance this session for bed mobility (mod A). He continues to need encouragement to reach forward from sitting EOB as he is fearful of falling. (Noted improvement this session and verbalizing less re: fearfulness)  Encouraged postural control and alignment in sitting during reaching activity off his base of support to increase trunk control and tolerance for seated activities. Pt tolerated sitting approx. 20 minutes this date, but declined standing attempts due to fatigue. Isabel Mcs Pt fatigues quickly, but is making progress and will continue to work towards established goals. Recommend that pt be discharged to inpatient rehab program where he can be medically managed and therapeutically challenged to regain functional abilities and return to PLOF and ultimately discharge home with his wife. Progression toward goals:  []            Improving appropriately and progressing toward goals  [x]            Improving slowly and progressing toward goals  []            Not making progress toward goals and plan of care will be adjusted     PLAN:  Goals have been updated based on progression since last assessment. Patient continues to benefit from skilled intervention to address the above impairments. Continue to follow patient 4 times a week to address goals.   Planned Interventions:  [x]                    Self Care Training                  [x]             Therapeutic Activities  [x]                    Functional Mobility Training    []             Cognitive Retraining  [x]                    Therapeutic Exercises           [x]             Endurance Activities  [x]                    Balance Training                   [x]             Neuromuscular Re-Education  []                    Visual/Perceptual Training     [x]        Home Safety Training  [x]                    Patient Education                 [x]             Family Training/Education  []                    Other (comment):  Discharge Recommendations: Inpatient Rehab  Further Equipment Recommendations for Discharge: tbd     SUBJECTIVE:   Patient stated I'll try.     OBJECTIVE DATA SUMMARY:   Cognitive/Behavioral Status:  Neurologic State: Alert  Orientation Level: Oriented to person;Oriented to place  Cognition: Follows commands  Perception: Cues to maintain midline in sitting (worked on spinal ext, sitting balance and control)          Functional Mobility and Transfers for ADLs:  Bed Mobility:  Supine to Sit: Moderate assistance  Sit to Supine: Moderate assistance  Scooting: Moderate assistance    Transfers:              Balance:  Sitting: Impaired  Sitting - Static: Fair (occasional)  Sitting - Dynamic: Poor/fair (constant support)    ADL Intervention:  Feeding  Feeding Assistance: Total assistance (dependent) (feeding tube)    Grooming  Washing Face: Supervision/set-up    Upper Body Bathing  Bathing Assistance: Maximum assistance    Lower Body Bathing  Bathing Assistance: Total assistance(dependent)         Lower Body Dressing Assistance  Socks: Total assistance (dependent)  Shoes with Cloth Laces: Total assistance (dependent)    Toileting  Toileting Assistance: Maximum assistance  Bladder Hygiene:  (max A for urinal use-wife would like pt to work on using)  Bowel Hygiene:  Total assistance (dependent)  Clothing Management: Maximum assistance         Neuro Re-Education:   Pt seated on EOB performing therapeutic activities to increase postural control and balance in preparation for adls and functional standing/transfers. Pt working on anterior/posterior weight shifts, spinal extension, and trunk rotation.   Used RUE to perform trunk rotation to left, ending in spinal extension and rotation to R        Therapeutic Exercises:   As above  Pain:  Pain Scale 1: Numeric (0 - 10)  Pain Intensity 1: 0              Activity Tolerance:   improving  After treatment:   [] Patient left in no apparent distress sitting up in chair  [x] Patient left in no apparent distress in bed  [x] Call bell left within reach  [x] Nursing notified  [x] Caregiver present  [x] Bed alarm activated    COMMUNICATION/COLLABORATION:   The patients plan of care was discussed with: Physical Therapist, Registered Nurse and Certified Nursing Assistant/Patient [id number], OTR/L  Time Calculation: 38 mins

## 2018-08-08 NOTE — INTERDISCIPLINARY ROUNDS
nterdisciplinary Rounds were completed on this patient. Rounds included nursing, clinical care leader, pharmacy, and case management.      Plan for the day:TPN, tube feeding/ PT, telemetry monitoring   Plan for the stay: continue current TX  Activity:up in chair/PT  Anticipated discharge date: Home 8/10

## 2018-08-08 NOTE — PROGRESS NOTES
Bedside shift change report given to Lata Solano (oncoming nurse) by Chava Vickers RN (offgoing nurse). Report included the following information SBAR, Kardex, Procedure Summary, Intake/Output, MAR, Accordion, Recent Results and Med Rec Status.  Feeds, output

## 2018-08-08 NOTE — PROGRESS NOTES
Speech path  We are following from a distance.  When he is able to have po we will Renetta Webb, SLP

## 2018-08-08 NOTE — PROGRESS NOTES
Problem: Mobility Impaired (Adult and Pediatric)  Goal: *Acute Goals and Plan of Care (Insert Text)  Physical Therapy Goals  Reviewed 8/3/18- goals remain appropriate    Reviewed and revised 7/26/2018  1. Patient will move from supine to sit and sit to supine , scoot up and down and roll side to side in bed with minimal assistance within 7 day(s). 2.  Patient will transfer from bed to chair and chair to bed with moderate assistance using the least restrictive device within 7 day(s). 3.  Patient will perform sit to stand with moderate assistance within 7 day(s). 4.  Patient will perform stand pivot transfer to wheelchair with moderate assistance in 7 days. Reviewed and revised 7/19/2018  1. Patient will move from supine to sit and sit to supine , scoot up and down and roll side to side in bed with minimal assistance within 7 day(s). 2.  Patient will transfer from bed to chair and chair to bed with moderate assistance using the least restrictive device within 7 day(s). 3.  Patient will perform sit to stand with minimal assistance within 7 day(s). 4.  Patient will perform stand pivot transfer to wheelchair with minimal assistance in 7 days. Initiated 7/12/2018  1. Patient will move from supine to sit and sit to supine , scoot up and down and roll side to side in bed with modified independence within 7 day(s). 2.  Patient will transfer from bed to chair and chair to bed with supervision/set-up using the least restrictive device within 7 day(s). 3.  Patient will perform sit to stand with supervision/set-up within 7 day(s). 4.  Patient will perform stand pivot transfer to wheelchair with modified independence in 7 days.        physical Therapy TREATMENT  Patient: Ally Bound (61 y.o. male)  Date: 8/8/2018  Diagnosis: Acute blood loss anemia  GI bleed  Anasarca  GI Bleed  gi bleed  ASCENDING COLON CANCER  GI bleed  Procedure(s) (LRB):  LAPAROSCOPIC  ASSISTED TO OPEN RIGHT COLECTOMY AND SMALL BOWEL RESECTION (Right) 22 Days Post-Op  Precautions:    Chart, physical therapy assessment, plan of care and goals were reviewed. ASSESSMENT:  Pt was received in supine and cleared by nursing to mobilize. Despite medical sets backs pt continues to participate with therapy and making slow gains. Bed mobility was performed with mod/max A x 1 only assisting from sidelying to sitting. Noted overall fair sitting balance once he is not fearful of leaning forward over JOHNY. Tolerate sitting EOB for 30 minutes with support of RUE at times. Worked on trunk rotation and midline awareness as well as reaching out of JOHNY. He does need to continue to work on tolerance and overall strength. He fatigued and was returned to supine which he needed mod A to assist with LLE. He is able to bridge with his RLE and assist with RLE and RUE to scoot up in bed. Continue to strongly recommend IP rehab at discharge to return to PLOF of being supervision/mod I for bed mobility and transfers to wheelchair. Progression toward goals:  []    Improving appropriately and progressing toward goals  [x]    Improving slowly and progressing toward goals  []    Not making progress toward goals and plan of care will be adjusted     PLAN:  Patient continues to benefit from skilled intervention to address the above impairments. Continue treatment per established plan of care.   Discharge Recommendations:  Inpatient Rehab  Further Equipment Recommendations for Discharge:  TBD     SUBJECTIVE:   Patient stated yes maam.    OBJECTIVE DATA SUMMARY:   Critical Behavior:  Neurologic State: Alert (anxious)  Orientation Level: Oriented X4  Cognition: Follows commands, Recognition of people/places  Safety/Judgement: Decreased awareness of environment, Decreased awareness of need for assistance, Decreased awareness of need for safety, Decreased insight into deficits  Functional Mobility Training:  Bed Mobility:     Supine to Sit: Moderate assistance  Sit to Supine: Moderate assistance  Scooting: Moderate assistance              Balance:  Sitting: Impaired  Sitting - Static: Fair (occasional)  Sitting - Dynamic: Poor (constant support)                 Neuro Re-Education:  Worked on trunk rotation while having pt reach to the L to  objects and rotate to the R to place them in bin. Also worked on tapping therapists hand below and high while rotating trunk to work on core strength and balance. Therapeutic Exercises:   Marches with RLE at EOB, mild posterior LOB  Pain:  Pain Scale 1: Numeric (0 - 10)  Pain Intensity 1: 0              Activity Tolerance:   fair  Please refer to the flowsheet for vital signs taken during this treatment.   After treatment:   []    Patient left in no apparent distress sitting up in chair  [x]    Patient left in no apparent distress in bed  [x]    Call bell left within reach  [x]    Nursing notified  []    Caregiver present  []    Bed alarm activated    COMMUNICATION/COLLABORATION:   The patients plan of care was discussed with: Occupational Therapist and Registered Nurse    Sebastian Mulligan, PT, DPT   Time Calculation: 43 mins

## 2018-08-08 NOTE — PROGRESS NOTES
CRS POD# 22 s/p R colectomy, SBR, extensive YAMEL    Pain well controlled. Ostomy output at 330 yesterday. /54 (BP 1 Location: Right arm, BP Patient Position: At rest)  Pulse 76  Temp 98.1 °F (36.7 °C)  Resp 20  Ht 6' 2\" (1.88 m)  Wt 116.3 kg (256 lb 6.3 oz)  SpO2 96%  BMI 32.92 kg/m2    NAD, AAO  Abd soft, approp TTP  Incision c/d/i with mild serosang drainage from midline incision  Ostomy pink, flat. Stool and some gas in bag    WBC 9    Plan  -Continue TPN  -Increase TF to 40/hr. Dietary consult  -If fevers return or WBC persists, then he will likely need a repeat CT scan (chest/abd/pelvis)  -[facility] if everything goes okay and diet advanced. He will need speech/swallow therapy and tube feeds. Hopefully off TPN by that time.

## 2018-08-09 ENCOUNTER — APPOINTMENT (OUTPATIENT)
Dept: CT IMAGING | Age: 78
DRG: 329 | End: 2018-08-09
Attending: SURGERY
Payer: MEDICARE

## 2018-08-09 ENCOUNTER — APPOINTMENT (OUTPATIENT)
Dept: GENERAL RADIOLOGY | Age: 78
DRG: 329 | End: 2018-08-09
Attending: SURGERY
Payer: MEDICARE

## 2018-08-09 LAB
ANION GAP SERPL CALC-SCNC: 6 MMOL/L (ref 5–15)
BASOPHILS # BLD: 0 K/UL (ref 0–0.1)
BASOPHILS NFR BLD: 0 % (ref 0–1)
BUN SERPL-MCNC: 25 MG/DL (ref 6–20)
BUN/CREAT SERPL: 38 (ref 12–20)
CALCIUM SERPL-MCNC: 7.7 MG/DL (ref 8.5–10.1)
CHLORIDE SERPL-SCNC: 101 MMOL/L (ref 97–108)
CO2 SERPL-SCNC: 24 MMOL/L (ref 21–32)
CREAT SERPL-MCNC: 0.66 MG/DL (ref 0.7–1.3)
DIFFERENTIAL METHOD BLD: ABNORMAL
EOSINOPHIL # BLD: 0 K/UL (ref 0–0.4)
EOSINOPHIL NFR BLD: 0 % (ref 0–7)
ERYTHROCYTE [DISTWIDTH] IN BLOOD BY AUTOMATED COUNT: 24.6 % (ref 11.5–14.5)
GLUCOSE SERPL-MCNC: 269 MG/DL (ref 65–100)
HCT VFR BLD AUTO: 26.6 % (ref 36.6–50.3)
HGB BLD-MCNC: 7.9 G/DL (ref 12.1–17)
IMM GRANULOCYTES # BLD: 0.1 K/UL (ref 0–0.04)
IMM GRANULOCYTES NFR BLD AUTO: 1 % (ref 0–0.5)
LYMPHOCYTES # BLD: 0.5 K/UL (ref 0.8–3.5)
LYMPHOCYTES NFR BLD: 4 % (ref 12–49)
MCH RBC QN AUTO: 25.6 PG (ref 26–34)
MCHC RBC AUTO-ENTMCNC: 29.7 G/DL (ref 30–36.5)
MCV RBC AUTO: 86.4 FL (ref 80–99)
MONOCYTES # BLD: 0.8 K/UL (ref 0–1)
MONOCYTES NFR BLD: 7 % (ref 5–13)
NEUTS SEG # BLD: 10.6 K/UL (ref 1.8–8)
NEUTS SEG NFR BLD: 88 % (ref 32–75)
NRBC # BLD: 0 K/UL (ref 0–0.01)
NRBC BLD-RTO: 0 PER 100 WBC
PLATELET # BLD AUTO: 162 K/UL (ref 150–400)
PMV BLD AUTO: 10.5 FL (ref 8.9–12.9)
POTASSIUM SERPL-SCNC: 5 MMOL/L (ref 3.5–5.1)
RBC # BLD AUTO: 3.08 M/UL (ref 4.1–5.7)
RBC MORPH BLD: ABNORMAL
SODIUM SERPL-SCNC: 131 MMOL/L (ref 136–145)
WBC # BLD AUTO: 12 K/UL (ref 4.1–11.1)

## 2018-08-09 PROCEDURE — 92526 ORAL FUNCTION THERAPY: CPT

## 2018-08-09 PROCEDURE — 36415 COLL VENOUS BLD VENIPUNCTURE: CPT | Performed by: SURGERY

## 2018-08-09 PROCEDURE — C9113 INJ PANTOPRAZOLE SODIUM, VIA: HCPCS | Performed by: HOSPITALIST

## 2018-08-09 PROCEDURE — 74011000255 HC RX REV CODE- 255: Performed by: SURGERY

## 2018-08-09 PROCEDURE — 74011250636 HC RX REV CODE- 250/636: Performed by: SURGERY

## 2018-08-09 PROCEDURE — 74011000250 HC RX REV CODE- 250: Performed by: SURGERY

## 2018-08-09 PROCEDURE — 74011250637 HC RX REV CODE- 250/637: Performed by: HOSPITALIST

## 2018-08-09 PROCEDURE — 74018 RADEX ABDOMEN 1 VIEW: CPT

## 2018-08-09 PROCEDURE — 97530 THERAPEUTIC ACTIVITIES: CPT

## 2018-08-09 PROCEDURE — 74011250637 HC RX REV CODE- 250/637: Performed by: INTERNAL MEDICINE

## 2018-08-09 PROCEDURE — 85025 COMPLETE CBC W/AUTO DIFF WBC: CPT | Performed by: SURGERY

## 2018-08-09 PROCEDURE — 65270000029 HC RM PRIVATE

## 2018-08-09 PROCEDURE — 74011250637 HC RX REV CODE- 250/637: Performed by: SURGERY

## 2018-08-09 PROCEDURE — 74011000258 HC RX REV CODE- 258: Performed by: SURGERY

## 2018-08-09 PROCEDURE — 74011250636 HC RX REV CODE- 250/636: Performed by: HOSPITALIST

## 2018-08-09 PROCEDURE — 80048 BASIC METABOLIC PNL TOTAL CA: CPT | Performed by: SURGERY

## 2018-08-09 PROCEDURE — 74011000250 HC RX REV CODE- 250: Performed by: HOSPITALIST

## 2018-08-09 PROCEDURE — 74177 CT ABD & PELVIS W/CONTRAST: CPT

## 2018-08-09 PROCEDURE — 74011636320 HC RX REV CODE- 636/320: Performed by: SURGERY

## 2018-08-09 PROCEDURE — 71260 CT THORAX DX C+: CPT

## 2018-08-09 PROCEDURE — 74011636320 HC RX REV CODE- 636/320: Performed by: RADIOLOGY

## 2018-08-09 RX ORDER — SODIUM CHLORIDE 9 MG/ML
50 INJECTION, SOLUTION INTRAVENOUS
Status: COMPLETED | OUTPATIENT
Start: 2018-08-09 | End: 2018-08-11

## 2018-08-09 RX ORDER — BARIUM SULFATE 20 MG/ML
900 SUSPENSION ORAL
Status: DISCONTINUED | OUTPATIENT
Start: 2018-08-09 | End: 2018-08-09

## 2018-08-09 RX ORDER — SODIUM CHLORIDE 0.9 % (FLUSH) 0.9 %
10 SYRINGE (ML) INJECTION
Status: COMPLETED | OUTPATIENT
Start: 2018-08-09 | End: 2018-08-09

## 2018-08-09 RX ADMIN — SODIUM CHLORIDE 10 ML: 9 INJECTION, SOLUTION INTRAMUSCULAR; INTRAVENOUS; SUBCUTANEOUS at 05:09

## 2018-08-09 RX ADMIN — AMIODARONE HYDROCHLORIDE 200 MG: 200 TABLET ORAL at 08:52

## 2018-08-09 RX ADMIN — ONDANSETRON 4 MG: 2 INJECTION INTRAMUSCULAR; INTRAVENOUS at 09:08

## 2018-08-09 RX ADMIN — METOPROLOL TARTRATE 12.5 MG: 25 TABLET, FILM COATED ORAL at 19:43

## 2018-08-09 RX ADMIN — DIATRIZOATE MEGLUMINE AND DIATRIZOATE SODIUM 30 ML: 600; 100 SOLUTION ORAL; RECTAL at 15:00

## 2018-08-09 RX ADMIN — ASCORBIC ACID, VITAMIN A PALMITATE, CHOLECALCIFEROL, THIAMINE HYDROCHLORIDE, RIBOFLAVIN-5 PHOSPHATE SODIUM, PYRIDOXINE HYDROCHLORIDE, NIACINAMIDE, DEXPANTHENOL, ALPHA-TOCOPHEROL ACETATE, VITAMIN K1, FOLIC ACID, BIOTIN, CYANOCOBALAMIN: 200; 3300; 200; 6; 3.6; 6; 40; 15; 10; 150; 600; 60; 5 INJECTION, SOLUTION INTRAVENOUS at 19:46

## 2018-08-09 RX ADMIN — VENLAFAXINE HYDROCHLORIDE 75 MG: 37.5 CAPSULE, EXTENDED RELEASE ORAL at 08:52

## 2018-08-09 RX ADMIN — IOPAMIDOL 100 ML: 755 INJECTION, SOLUTION INTRAVENOUS at 19:09

## 2018-08-09 RX ADMIN — CARBAMAZEPINE 200 MG: 200 TABLET, EXTENDED RELEASE ORAL at 08:52

## 2018-08-09 RX ADMIN — SODIUM CHLORIDE 10 ML: 9 INJECTION, SOLUTION INTRAMUSCULAR; INTRAVENOUS; SUBCUTANEOUS at 21:54

## 2018-08-09 RX ADMIN — SODIUM CHLORIDE 40 MG: 9 INJECTION INTRAMUSCULAR; INTRAVENOUS; SUBCUTANEOUS at 21:54

## 2018-08-09 RX ADMIN — METOPROLOL TARTRATE 12.5 MG: 25 TABLET, FILM COATED ORAL at 08:52

## 2018-08-09 RX ADMIN — DABIGATRAN ETEXILATE MESYLATE 150 MG: 150 CAPSULE ORAL at 19:43

## 2018-08-09 RX ADMIN — SODIUM CHLORIDE 10 ML: 9 INJECTION, SOLUTION INTRAMUSCULAR; INTRAVENOUS; SUBCUTANEOUS at 21:55

## 2018-08-09 RX ADMIN — SODIUM CHLORIDE 50 ML/HR: 900 INJECTION, SOLUTION INTRAVENOUS at 19:09

## 2018-08-09 RX ADMIN — DABIGATRAN ETEXILATE MESYLATE 150 MG: 150 CAPSULE ORAL at 09:08

## 2018-08-09 RX ADMIN — SODIUM CHLORIDE 40 MG: 9 INJECTION INTRAMUSCULAR; INTRAVENOUS; SUBCUTANEOUS at 08:54

## 2018-08-09 RX ADMIN — Medication 10 ML: at 19:09

## 2018-08-09 RX ADMIN — SODIUM CHLORIDE 10 ML: 9 INJECTION, SOLUTION INTRAMUSCULAR; INTRAVENOUS; SUBCUTANEOUS at 14:57

## 2018-08-09 RX ADMIN — LACTULOSE 10 G: 20 SOLUTION ORAL at 19:43

## 2018-08-09 RX ADMIN — CARBAMAZEPINE 200 MG: 200 TABLET, EXTENDED RELEASE ORAL at 19:43

## 2018-08-09 RX ADMIN — POLYETHYLENE GLYCOL 3350 17 G: 17 POWDER, FOR SOLUTION ORAL at 08:54

## 2018-08-09 RX ADMIN — SODIUM CHLORIDE: 234 INJECTION, SOLUTION, CONCENTRATE INTRAVENOUS; SUBCUTANEOUS at 14:57

## 2018-08-09 RX ADMIN — LACTULOSE 10 G: 20 SOLUTION ORAL at 08:52

## 2018-08-09 RX ADMIN — ACETAMINOPHEN 650 MG: 325 TABLET ORAL at 03:03

## 2018-08-09 RX ADMIN — MELATONIN TAB 3 MG 3 MG: 3 TAB at 21:55

## 2018-08-09 NOTE — PROGRESS NOTES
CM acknowledges consult for rehab for Friday. CM phoned Eden Flores, liaison with [facility] and left voicemail message to call CM back to discuss case. CM also messaged facility thru [employer] the same message. CM will await to here from facility if pt is accepted and prepare next steps. Update @ 1:55pm - CM received phone call and message thru [employer] from [facility]. Recommendation for SNF for Rehab for 1-2 weeks to condition pt and they will reevaluate if pt can tolerate their program and transition him to them to continue Rehab. Dr. Reyes Godl aware. CM spoke with wife. Not interested in [facility] at this time if [facility] cannot take. Distance is a concern. CM discussed SNF with pt wife, very concerned. CM reviewed some facilities close to [facility], encouraged pt wife to go see and ask questions of facilities as recommended bridge to [facility] or to meet needs as bridge to home. Wife is open to go look. But still unsettled in decision.     Anuel Vieyra, MANDON, RN  Care Manager [facility]  [phone]

## 2018-08-09 NOTE — PROGRESS NOTES
Bedside shift change report given to Lisa Boyer and Linda RN (oncoming nurse) by Radha Romero RN (offgoing nurse). Report included the following information SBAR, Kardex, Procedure Summary, Intake/Output, MAR, Accordion, Recent Results and Med Rec Status.

## 2018-08-09 NOTE — INTERDISCIPLINARY ROUNDS
Interdisciplinary Rounds were completed on this patient. Rounds included nursing, clinical care leader, pharmacy, and case management. Plan for the day: TPN, monitor I &0.  Tube feeding, CT of abdomen   Plan for the stay: continue current [address]  Activity up in chair   Anticipated discharge date:

## 2018-08-09 NOTE — PROGRESS NOTES
Problem: Dysphagia (Adult)  Goal: *Acute Goals and Plan of Care (Insert Text)  Speech pathology goals  Initiated 8/3/2018  1. Patient will tolerate sips and chips with no overt s/s aspiration within 7 days  2. Patient will participate in re-evaluation of swallow function within 7 days   Speech language pathology dysphagia treatment  Patient: Julianna Dodd (2 y.o. male)  Date: 8/9/2018  Diagnosis: Acute blood loss anemia  GI bleed  Anasarca  GI Bleed  gi bleed  ASCENDING COLON CANCER  GI bleed  Procedure(s) (LRB):  LAPAROSCOPIC  ASSISTED TO OPEN RIGHT COLECTOMY AND SMALL BOWEL RESECTION (Right) 23 Days Post-Op  Precautions: swallow      ASSESSMENT:  SLP re-consulted as patient ready for PO intake. RN cleared SLP for re-evaluation of swallow. Patient with significant congestion noted this date, and note CT abdomen/chest pending. Patient with suspected mild-moderate oropharyngeal dysphagia. Oral dysphagia characterized by slow oral prep, suspected premature spillage, and delayed posterior propulsion. Pharyngeal dysphagia characterized by delayed swallow initiation and decreased hyolaryngeal elevation/excursion. Patient with intermittent coughing that appeared related to congestion, however unable to rule out aspiration. Will await results of CT abdomen/chest, however patient may benefit from MBS to fully assess pharyngeal phase of swallow prior to diet initiation. Progression toward goals:  []         Improving appropriately and progressing toward goals  [x]         Improving slowly and progressing toward goals  []         Not making progress toward goals and plan of care will be adjusted     PLAN:  Recommendations and Planned Interventions:  --NPO except meds  --SLP to follow for re-evaluation of swallow function and MBS as needed  Patient continues to benefit from skilled intervention to address the above impairments. Continue treatment per established plan of care. Discharge Recommendations:   To Be Determined SUBJECTIVE:   Patient stated I was operated on.  Patient oriented x4. OBJECTIVE:   Cognitive and Communication Status:  Neurologic State: Alert  Orientation Level: Oriented X4  Cognition: Decreased command following, Decreased attention/concentration, Impulsive  Perception: Appears intact  Perseveration: No perseveration noted  Safety/Judgement: Awareness of environment  Dysphagia Treatment:  Oral Assessment:  Oral Assessment  Labial: No impairment  Dentition: Edentulous  Oral Hygiene: moist oral mucosa  Lingual: No impairment  Velum: No impairment  Mandible: No impairment  P.O. Trials:  Patient Position: upright in bed  Vocal quality prior to P.O.: No impairment  Consistency Presented: Ice chips; Thin liquid;Puree  How Presented: Self-fed/presented;Cup/sip;SLP-fed/presented;Straw;Spoon     Bolus Acceptance: No impairment  Bolus Formation/Control: Impaired  Type of Impairment: Delayed;Premature spillage; Other (comment) (oral holding)  Propulsion: Delayed (# of seconds)  Oral Residue: None  Initiation of Swallow: Delayed (# of seconds)  Laryngeal Elevation: Decreased  Aspiration Signs/Symptoms: Weak cough (with and without PO intake, audible congestion)  Pharyngeal Phase Characteristics: Double swallow  Effective Modifications: None  Cues for Modifications: None       Oral Phase Severity: Mild-moderate  Pharyngeal Phase Severity : Mild-moderate               Pain:  Pain Scale 1: Numeric (0 - 10)  Pain Intensity 1: 0     After treatment:   []              Patient left in no apparent distress sitting up in chair  [x]              Patient left in no apparent distress in bed  [x]              Call bell left within reach  [x]              Nursing notified  [x]              Caregiver present  []              Bed alarm activated    COMMUNICATION/EDUCATION:   The patients plan of care including recommendations, planned interventions, and recommended diet changes were discussed with: Registered NurseDayday GILLESPIE Korina Dmoingo SLP  Time Calculation: 15 mins

## 2018-08-09 NOTE — PROGRESS NOTES
Nutrition Assessment:    RECOMMENDATIONS:   TF recommendations:    Recommend Goal Rate of TwoCal HN @ 45mL/h + Prosource BID + 150mL H2O flush q 3h (provides 2280kcals/120gPro/1956mL) This will meet 100% kcal and protein needs should pt be unable to advance to PO diet     TPN recommendations:     Wean/discontinue    Advance diet as/if medically able per SLP, will plan to add PO supplements if he ends up getting cleared     ASSESSMENT:   Consult received, chart reviewed. TF to increase to rate of 40mL/h today, it is currently running at 30mL/h and tolerated well. SLP reconsulted for swallow eval.  TPN remains at goal rate, but given TF will be at 88% recommended goal rate, this can probably be discontinued. Colostomy with some OP, 200mL. Noted plans for possible discharge as early as tomorrow. Dobbhoff is a temporary feeding tube, if he continued to fail his swallow eval he may need more permanent access. TF at recommended goal rate above will meet 100% kcal and protein needs. Dietitians Intervention(s)/Plan(s): TF recommendations, Recommend DC TPN, monitor results of swallow eval   SUBJECTIVE/OBJECTIVE:     Diet Order: NPO, Other (comment) (TF via Dobbhoff: TwoCal @ 40mL/h (provides 1920kcals/80gPro/672mL) )  % Eaten:  Patient Vitals for the past 72 hrs:   % Diet Eaten   08/09/18 0900 0 %   08/08/18 2226 0 %   08/08/18 0841 0 %   08/07/18 1925 0 %   08/07/18 0800 0 %     TwoCal HN at 30 mL/hr flush with       via Other (comment) (Dobbhoff: TwoCal HN @ 40mL/h (provides 1920kcals/80gPro/672mL) )    Pertinent Medications:lactulose, MOM, protonix, miralax.     Chemistries:  Lab Results   Component Value Date/Time    Sodium 131 (L) 08/09/2018 02:47 AM    Potassium 5.0 08/09/2018 02:47 AM    Chloride 101 08/09/2018 02:47 AM    CO2 24 08/09/2018 02:47 AM    Anion gap 6 08/09/2018 02:47 AM    Glucose 269 (H) 08/09/2018 02:47 AM    BUN 25 (H) 08/09/2018 02:47 AM    Creatinine 0.66 (L) 08/09/2018 02:47 AM BUN/Creatinine ratio 38 (H) 08/09/2018 02:47 AM    GFR est AA >60 08/09/2018 02:47 AM    GFR est non-AA >60 08/09/2018 02:47 AM    Calcium 7.7 (L) 08/09/2018 02:47 AM    Albumin 2.1 (L) 07/21/2018 03:00 AM      Anthropometrics: Height: 6' 2\" (188 cm) Weight: 117.7 kg (259 lb 7.7 oz)   [x]bed scale (8/8)   []stated   []unknown     IBW (%IBW):   ( ) UBW (%UBW):   (  %)    BMI: Body mass index is 33.32 kg/(m^2). This BMI is indicative of:  []Underweight   []Normal   []Overweight   [x] Obesity   [] Extreme Obesity (BMI>40)  Estimated Nutrition Needs (Based on): 2275 Kcals/day (BMR: 2075 x 1.1) , 130 g (1 g/kg) Protein  Carbohydrate: At Least 130 g/day  Fluids: 2100 mL/day    Last BM: colostomy-200mL   [x]Active     []Hyperactive  []Hypoactive       [] Absent   BS  Skin:    [] Intact   [x] Incision  [] Breakdown   [] DTI   [] Tears/Excoriation/Abrasion  [x]Edema(+2-generalized, LUE; +1-BLE, RUE)  [] Other:    Wt Readings from Last 30 Encounters:   08/08/18 117.7 kg (259 lb 7.7 oz)   07/05/18 128.5 kg (283 lb 4.8 oz)   05/15/18 120.7 kg (266 lb)   05/07/18 118.8 kg (262 lb)   04/10/18 121.1 kg (267 lb)   03/26/18 121.1 kg (267 lb)   03/16/18 121.1 kg (267 lb)   09/21/17 118.4 kg (261 lb)   08/03/17 117.5 kg (259 lb)   04/06/17 117.3 kg (258 lb 8 oz)   03/31/17 117.5 kg (259 lb)   03/16/17 117.5 kg (259 lb)   02/23/17 119.7 kg (264 lb)   01/03/17 115.2 kg (254 lb)   11/17/16 119.7 kg (264 lb)   10/18/16 118.8 kg (262 lb)   10/07/16 118.8 kg (262 lb)   09/22/16 113.9 kg (251 lb 1 oz)   08/18/16 116.4 kg (256 lb 11.2 oz)   05/16/16 114.8 kg (253 lb)   03/10/16 114.8 kg (253 lb 1.6 oz)   02/11/16 122 kg (269 lb)   01/11/16 119.7 kg (264 lb)   11/04/15 122.5 kg (270 lb)   08/25/15 119.7 kg (264 lb)   08/13/15 116.1 kg (256 lb)   05/04/15 119.7 kg (264 lb)   03/27/15 119.7 kg (264 lb)   02/21/15 121.2 kg (267 lb 3.2 oz)   01/29/15 119.9 kg (264 lb 6.4 oz)      NUTRITION DIAGNOSES:   Problem:  Altered GI function Etiology: related to Ascending colon mass c/w probable colon carcinoma and Cedric's erosion with HH      Signs/Symptoms: as evidenced by Ascending colon mass c/w probable colon carcinoma and Cedric's erosion with HH      Previous dx re: altered GI function resolved, plans to DC without TPN, colostomy with some OP. NUTRITION INTERVENTIONS:  Meals/Snacks: Other (advance diet as/if medically able per SLP ) Enteral/Parenteral Nutrition: Modify rate, concentration, composition, and schedule, Discontinue parenteral nutrition Supplements: Commercial supplement              GOAL:   Pt will meet >90% kcal and protein needs via TF and PO intake and wean off of TPN in 2-4 days.      NUTRITION MONITORING AND EVALUATION   Previous Goal: Pt will tolerate TF @ recommended goal rate and start to wean off of TPN in 2-3 days   Previous Goal Met: Progressing   Previous Recommendations Implemented: Yes   Cultural, Oriental orthodox, or Ethnic Dietary Needs: None   LEARNING NEEDS (Diet, Food/Nutrient-Drug Interaction):    [x] None Identified   [] Identified and Education Provided/Documented   [] Identified and Pt declined/was not appropriate      [x] Interdisciplinary Care Plan Reviewed/Documented    [x] Participated in Discharge Planning: See TF recommendations above, PO diet will depend on SLP eval    [] Interdisciplinary Rounds     NUTRITION RISK:    [x] High              [] Moderate           []  Low  []  Minimal/Uncompromised      Clearence Every, RD, [address]   Pager [phone]  Weekend Pager [phone]

## 2018-08-09 NOTE — PROGRESS NOTES
Problem: Mobility Impaired (Adult and Pediatric)  Goal: *Acute Goals and Plan of Care (Insert Text)  Physical Therapy Goals  Reviewed 8/3/18- goals remain appropriate    Reviewed and revised 7/26/2018  1. Patient will move from supine to sit and sit to supine , scoot up and down and roll side to side in bed with minimal assistance within 7 day(s). 2.  Patient will transfer from bed to chair and chair to bed with moderate assistance using the least restrictive device within 7 day(s). 3.  Patient will perform sit to stand with moderate assistance within 7 day(s). 4.  Patient will perform stand pivot transfer to wheelchair with moderate assistance in 7 days. Reviewed and revised 7/19/2018  1. Patient will move from supine to sit and sit to supine , scoot up and down and roll side to side in bed with minimal assistance within 7 day(s). 2.  Patient will transfer from bed to chair and chair to bed with moderate assistance using the least restrictive device within 7 day(s). 3.  Patient will perform sit to stand with minimal assistance within 7 day(s). 4.  Patient will perform stand pivot transfer to wheelchair with minimal assistance in 7 days. Initiated 7/12/2018  1. Patient will move from supine to sit and sit to supine , scoot up and down and roll side to side in bed with modified independence within 7 day(s). 2.  Patient will transfer from bed to chair and chair to bed with supervision/set-up using the least restrictive device within 7 day(s). 3.  Patient will perform sit to stand with supervision/set-up within 7 day(s). 4.  Patient will perform stand pivot transfer to wheelchair with modified independence in 7 days.        physical Therapy TREATMENT  Patient: Indy Bermudez (50 y.o. male)  Date: 8/9/2018  Diagnosis: Acute blood loss anemia  GI bleed  Anasarca  GI Bleed  gi bleed  ASCENDING COLON CANCER  GI bleed  Procedure(s) (LRB):  LAPAROSCOPIC  ASSISTED TO OPEN RIGHT COLECTOMY AND SMALL BOWEL RESECTION (Right) 23 Days Post-Op  Precautions:    Chart, physical therapy assessment, plan of care and goals were reviewed. ASSESSMENT:  Pt cleared by nurse to mobilize. Pt received in bed supine. Pt agreeable to therapy. Pt performed supine to sit at mod A x2. Pt able to use RUE to help roll. Pt able to sit EOB unsupported today x10 mins while donning AFO Pt performed sit to stand transfer at max A x2 with raised bed. Pt unable to clear buttocks on first attempt. Pt able to stand and use back of recliner to help maintain upright posture. Pt able to stand for x1.5 mins. Pt able to improve posture with tactile and verbal cueing. Pt reported having pain in lower back with increased standing. Pt with improved mobility tolerance today. Pt would highly benefit from IP rehab to improve strength and safe functional mobility. Progression toward goals:  [x]    Improving appropriately and progressing toward goals  []    Improving slowly and progressing toward goals  []    Not making progress toward goals and plan of care will be adjusted     PLAN:  Patient continues to benefit from skilled intervention to address the above impairments. Continue treatment per established plan of care. Discharge Recommendations:  Inpatient Rehab  Further Equipment Recommendations for Discharge:  TBD by rehab     SUBJECTIVE:   Patient stated My back is a little sore.     OBJECTIVE DATA SUMMARY:   Critical Behavior:  Neurologic State: Alert  Orientation Level: Oriented X4  Cognition: Decreased command following, Decreased attention/concentration, Impulsive  Safety/Judgement: Awareness of environment  Functional Mobility Training:  Bed Mobility:     Supine to Sit: Moderate assistance;Assist x2  Sit to Supine: Maximum assistance;Assist x2  Scooting:  Moderate assistance        Transfers:  Sit to Stand: Maximum assistance;Assist x2 (with bed rasied )  Stand to Sit: Maximum assistance;Assist x2 Balance:  Sitting: Intact  Sitting - Static: Good (unsupported)  Sitting - Dynamic: Fair (occasional)  Standing: Impaired  Standing - Static: Poor (to fair)  Standing - Dynamic : Poor  Pain:  Pain Scale 1: Numeric (0 - 10)  Pain Intensity 1: 0              Activity Tolerance:   Pt with improved mobility tolerance today.      After treatment:   []    Patient left in no apparent distress sitting up in chair  [x]    Patient left in no apparent distress in bed  [x]    Call bell left within reach  [x]    Nursing notified  [x]    Caregiver present  []    Bed alarm activated    COMMUNICATION/COLLABORATION:   The patients plan of care was discussed with: Registered Nurse    Trent Craig   Time Calculation: 30 mins

## 2018-08-09 NOTE — PROGRESS NOTES
General Surgery End of Shift Nursing Note    Bedside shift change report given to Michael (oncoming nurse) by Malu Bowser (offgoing nurse). Report included the following information SBAR, Kardex, Intake/Output, MAR and Recent Results. Shift worked:   7 am to 7 pm   Summary of shift:  CT of abd/pelv, and of chest ordered today. Attempted to give barium through dobhoff but couldn't flushed dobhoff. Informed MD of issue. MD stated to put in orders to get new dobhoff placed. Spoke with Interventional Radiology representative and put in orders per MD. Laisha Willis stated that they would squeeze pt in. Pt went down to get dobhoff fixed, 1401 report stated that dobhoff was patent. Speech therapy came to work with pt and suggested that pt stay on ice and sips of clears since pt sounds really wet; pt is still able to swallow ok. PT came to work with pt and was able to sit pt up on side of bed; per wife pt did stand up with PT. Pt transported to CT about ~1700 to get tests completed. Gas from ostomy noted; minimal output, active bowel sounds. Voiding appropriately. Issues for physician to address:   none     Number times ambulated in hallway past shift: 0    Number of times OOB to chair past shift: 0; but pt sat on side of bed with PT    Pain Management:  Current medication: roxicodone  Patient states pain is manageable on current pain medication: YES    GI:    Current diet:  DIET NPO With Sips of Clear Fluids  DIET TUBE FEEDING  DIET TUBE FEEDING  DIET TUBE FEEDING  DIET TUBE FEEDING  TPN ADULT - CENTRAL AA 5% D20% W/ CA + ELECTROLYTES  TPN ADULT - CENTRAL AA 5% D20% W/ CA + ELECTROLYTES    Tolerating current diet: YES  Passing flatus: YES  Last Bowel Movement: today   Appearance: loose     Respiratory:    Incentive Spirometer at bedside: YES  Patient instructed on use: YES    Patient Safety:    Falls Score: 3  Bed Alarm On? No  Sitter?  No    Lenny Harris

## 2018-08-10 ENCOUNTER — APPOINTMENT (OUTPATIENT)
Dept: GENERAL RADIOLOGY | Age: 78
DRG: 329 | End: 2018-08-10
Attending: SURGERY
Payer: MEDICARE

## 2018-08-10 LAB
ANION GAP SERPL CALC-SCNC: 7 MMOL/L (ref 5–15)
BASOPHILS # BLD: 0 K/UL (ref 0–0.1)
BASOPHILS NFR BLD: 0 % (ref 0–1)
BUN SERPL-MCNC: 26 MG/DL (ref 6–20)
BUN/CREAT SERPL: 38 (ref 12–20)
CALCIUM SERPL-MCNC: 7.9 MG/DL (ref 8.5–10.1)
CHLORIDE SERPL-SCNC: 101 MMOL/L (ref 97–108)
CO2 SERPL-SCNC: 23 MMOL/L (ref 21–32)
CREAT SERPL-MCNC: 0.68 MG/DL (ref 0.7–1.3)
DIFFERENTIAL METHOD BLD: ABNORMAL
EOSINOPHIL # BLD: 0.1 K/UL (ref 0–0.4)
EOSINOPHIL NFR BLD: 1 % (ref 0–7)
ERYTHROCYTE [DISTWIDTH] IN BLOOD BY AUTOMATED COUNT: 25 % (ref 11.5–14.5)
GLUCOSE BLD STRIP.AUTO-MCNC: 288 MG/DL (ref 65–100)
GLUCOSE SERPL-MCNC: 227 MG/DL (ref 65–100)
HCT VFR BLD AUTO: 26 % (ref 36.6–50.3)
HGB BLD-MCNC: 7.8 G/DL (ref 12.1–17)
IMM GRANULOCYTES # BLD: 0 K/UL (ref 0–0.04)
IMM GRANULOCYTES NFR BLD AUTO: 0 % (ref 0–0.5)
LYMPHOCYTES # BLD: 0.3 K/UL (ref 0.8–3.5)
LYMPHOCYTES NFR BLD: 2 % (ref 12–49)
MCH RBC QN AUTO: 25.6 PG (ref 26–34)
MCHC RBC AUTO-ENTMCNC: 30 G/DL (ref 30–36.5)
MCV RBC AUTO: 85.2 FL (ref 80–99)
MONOCYTES # BLD: 0.5 K/UL (ref 0–1)
MONOCYTES NFR BLD: 4 % (ref 5–13)
NEUTS BAND NFR BLD MANUAL: 4 %
NEUTS SEG # BLD: 11.7 K/UL (ref 1.8–8)
NEUTS SEG NFR BLD: 89 % (ref 32–75)
NRBC # BLD: 0.02 K/UL (ref 0–0.01)
NRBC BLD-RTO: 0.2 PER 100 WBC
PLATELET # BLD AUTO: 156 K/UL (ref 150–400)
PMV BLD AUTO: 10.8 FL (ref 8.9–12.9)
POTASSIUM SERPL-SCNC: 5.2 MMOL/L (ref 3.5–5.1)
RBC # BLD AUTO: 3.05 M/UL (ref 4.1–5.7)
RBC MORPH BLD: ABNORMAL
SERVICE CMNT-IMP: ABNORMAL
SODIUM SERPL-SCNC: 131 MMOL/L (ref 136–145)
WBC # BLD AUTO: 12.6 K/UL (ref 4.1–11.1)

## 2018-08-10 PROCEDURE — 92611 MOTION FLUOROSCOPY/SWALLOW: CPT

## 2018-08-10 PROCEDURE — 77030018798 HC PMP KT ENTRL FED COVD -A

## 2018-08-10 PROCEDURE — 85025 COMPLETE CBC W/AUTO DIFF WBC: CPT | Performed by: SURGERY

## 2018-08-10 PROCEDURE — 74011000258 HC RX REV CODE- 258: Performed by: SURGERY

## 2018-08-10 PROCEDURE — 77030029684 HC NEB SM VOL KT MONA -A

## 2018-08-10 PROCEDURE — 74230 X-RAY XM SWLNG FUNCJ C+: CPT

## 2018-08-10 PROCEDURE — 74011000250 HC RX REV CODE- 250: Performed by: HOSPITALIST

## 2018-08-10 PROCEDURE — 74011000250 HC RX REV CODE- 250: Performed by: INTERNAL MEDICINE

## 2018-08-10 PROCEDURE — 74011250636 HC RX REV CODE- 250/636: Performed by: HOSPITALIST

## 2018-08-10 PROCEDURE — 82962 GLUCOSE BLOOD TEST: CPT

## 2018-08-10 PROCEDURE — 74011250636 HC RX REV CODE- 250/636: Performed by: SURGERY

## 2018-08-10 PROCEDURE — 94640 AIRWAY INHALATION TREATMENT: CPT

## 2018-08-10 PROCEDURE — 74011250637 HC RX REV CODE- 250/637: Performed by: SURGERY

## 2018-08-10 PROCEDURE — 80048 BASIC METABOLIC PNL TOTAL CA: CPT | Performed by: SURGERY

## 2018-08-10 PROCEDURE — 94760 N-INVAS EAR/PLS OXIMETRY 1: CPT

## 2018-08-10 PROCEDURE — 36415 COLL VENOUS BLD VENIPUNCTURE: CPT | Performed by: SURGERY

## 2018-08-10 PROCEDURE — 92526 ORAL FUNCTION THERAPY: CPT

## 2018-08-10 PROCEDURE — 84145 PROCALCITONIN (PCT): CPT | Performed by: INTERNAL MEDICINE

## 2018-08-10 PROCEDURE — 77030010541

## 2018-08-10 PROCEDURE — 65270000029 HC RM PRIVATE

## 2018-08-10 PROCEDURE — 74011000250 HC RX REV CODE- 250: Performed by: SURGERY

## 2018-08-10 PROCEDURE — C9113 INJ PANTOPRAZOLE SODIUM, VIA: HCPCS | Performed by: HOSPITALIST

## 2018-08-10 RX ORDER — IPRATROPIUM BROMIDE AND ALBUTEROL SULFATE 2.5; .5 MG/3ML; MG/3ML
3 SOLUTION RESPIRATORY (INHALATION)
Status: DISCONTINUED | OUTPATIENT
Start: 2018-08-10 | End: 2018-08-27

## 2018-08-10 RX ADMIN — DABIGATRAN ETEXILATE MESYLATE 150 MG: 150 CAPSULE ORAL at 09:40

## 2018-08-10 RX ADMIN — MAGNESIUM HYDROXIDE 30 ML: 400 SUSPENSION ORAL at 09:33

## 2018-08-10 RX ADMIN — CARBAMAZEPINE 200 MG: 200 TABLET, EXTENDED RELEASE ORAL at 18:15

## 2018-08-10 RX ADMIN — IPRATROPIUM BROMIDE AND ALBUTEROL SULFATE 3 ML: .5; 3 SOLUTION RESPIRATORY (INHALATION) at 10:14

## 2018-08-10 RX ADMIN — SODIUM CHLORIDE 10 ML: 9 INJECTION, SOLUTION INTRAMUSCULAR; INTRAVENOUS; SUBCUTANEOUS at 07:57

## 2018-08-10 RX ADMIN — LACTULOSE 10 G: 20 SOLUTION ORAL at 09:33

## 2018-08-10 RX ADMIN — METOPROLOL TARTRATE 12.5 MG: 25 TABLET, FILM COATED ORAL at 18:14

## 2018-08-10 RX ADMIN — AMIODARONE HYDROCHLORIDE 200 MG: 200 TABLET ORAL at 09:31

## 2018-08-10 RX ADMIN — SODIUM CHLORIDE 10 ML: 9 INJECTION, SOLUTION INTRAMUSCULAR; INTRAVENOUS; SUBCUTANEOUS at 22:30

## 2018-08-10 RX ADMIN — DABIGATRAN ETEXILATE MESYLATE 150 MG: 150 CAPSULE ORAL at 18:15

## 2018-08-10 RX ADMIN — POLYETHYLENE GLYCOL 3350 17 G: 17 POWDER, FOR SOLUTION ORAL at 09:34

## 2018-08-10 RX ADMIN — SODIUM CHLORIDE 10 ML: 9 INJECTION, SOLUTION INTRAMUSCULAR; INTRAVENOUS; SUBCUTANEOUS at 21:30

## 2018-08-10 RX ADMIN — IPRATROPIUM BROMIDE AND ALBUTEROL SULFATE 3 ML: .5; 3 SOLUTION RESPIRATORY (INHALATION) at 15:08

## 2018-08-10 RX ADMIN — SODIUM CHLORIDE 40 MG: 9 INJECTION INTRAMUSCULAR; INTRAVENOUS; SUBCUTANEOUS at 09:32

## 2018-08-10 RX ADMIN — IPRATROPIUM BROMIDE AND ALBUTEROL SULFATE 3 ML: .5; 3 SOLUTION RESPIRATORY (INHALATION) at 19:33

## 2018-08-10 RX ADMIN — SODIUM CHLORIDE 10 ML: 9 INJECTION, SOLUTION INTRAMUSCULAR; INTRAVENOUS; SUBCUTANEOUS at 13:04

## 2018-08-10 RX ADMIN — SODIUM CHLORIDE, PRESERVATIVE FREE 300 UNITS: 5 INJECTION INTRAVENOUS at 10:12

## 2018-08-10 RX ADMIN — VENLAFAXINE HYDROCHLORIDE 75 MG: 37.5 CAPSULE, EXTENDED RELEASE ORAL at 09:30

## 2018-08-10 RX ADMIN — LACTULOSE 10 G: 20 SOLUTION ORAL at 18:54

## 2018-08-10 RX ADMIN — CARBAMAZEPINE 200 MG: 200 TABLET, EXTENDED RELEASE ORAL at 09:30

## 2018-08-10 RX ADMIN — SODIUM CHLORIDE: 234 INJECTION, SOLUTION, CONCENTRATE INTRAVENOUS; SUBCUTANEOUS at 13:57

## 2018-08-10 RX ADMIN — METOPROLOL TARTRATE 12.5 MG: 25 TABLET, FILM COATED ORAL at 09:31

## 2018-08-10 NOTE — PROGRESS NOTES
CM spoke with Eden Flores, liaison at [facility], will reevaluate pt on Monday. CM met with wife, Dr. Reyes Gold and bedside nurse, case discussed. Wife going to visit [address] [place] [address] on Sunday. Updated on [facility] status. Will reevaluate patient on Monday.     Anuel Vieyra, MANDON, RN  Care Manager [facility]  [phone]

## 2018-08-10 NOTE — PROGRESS NOTES
Nutrition:  Spoke with SLP who reported pt was to be started on a Pureed diet with nectar thickened liquids today. Requested supplements be added. Given K+ 5.2, will start Mighty Shake BID (which is already nectar thick) and Ensure Pudding as these are lower in K+ than Magic Cup.       Dea, [address]   Pager [phone]

## 2018-08-10 NOTE — PROGRESS NOTES
Follow up visit in 2112 with [facility] pt. Engaged with pt briefly, pt joking and appeared to be in positive spirits despite seeming weak and not doing too well physically. Waited for pt's wife Cami Collins to arrive. Cami Collins appeared to be in positive spirits when she entered but admitted to feeling down in her edith at some points. Acknowledged and empathized with feelings of discouragement affirming Mandy's edith despite setbacks. Hoping pt can go to [facility] by Monday. Looking forward to getting back to things couple enjoys. Provided word of encouragement for pt and spouse which pt acknowledged thanking  for checking in. Aware of 's availability. Will follow up as able/needed. KIRILL Gomez. Orlando Chambers

## 2018-08-10 NOTE — PROGRESS NOTES
Problem: Dysphagia (Adult)  Goal: *Acute Goals and Plan of Care (Insert Text)  Speech path goals  1. Pt will tolerate purees and nectar thick liquids with no overt s/s of aspiration. Speech pathology goals  Initiated 8/3/2018  1. Patient will tolerate sips and chips with no overt s/s aspiration within 7 days. Goal met 8/10.  2. Patient will participate in re-evaluation of swallow function within 7 days. Goal met 8/10. 3. Pt will participate with MBS today 8/10. Goal met 8/10. Speech Pathology Modified barium swallow Study/ swallow therapy  Patient: Julianna Dodd (13 y.o. male)  Date: 8/10/2018  Primary Diagnosis: Acute blood loss anemia  GI bleed  Anasarca  GI Bleed  gi bleed  ASCENDING COLON CANCER   Procedure(s) (LRB):  LAPAROSCOPIC  ASSISTED TO OPEN RIGHT COLECTOMY AND SMALL BOWEL RESECTION (Right) 24 Days Post-Op   Precautions:        ASSESSMENT :met with pt and his wife after MBS. Thickener was explained to them and rationale for the diet recommended. Wife was educated on use of thickener. She understood 4 oz of liquid and one packet of thickener  Based on the objective data described below, the patient presents with mod oral and mild pharyngeal dysphagia. There was a discoordinated oral phase with slow, incomplete mastication of solids and slow  posterior propulsion of purees. Delayed posterior propulsion of liquids as well. Pt had small chunks of cracker he had to spit out after the swallow that sips of liquids did not help him to clear. He aspirated thins once after the initial swallow. He had oral residue that he penetrated and aspirated on. Pharyngeal phase was grossly intact. Swallow response was grossly intact. There was min vallecular residue with purees. No laryngeal penetration or aspiration of nectar thick liquids or purees. Due to not masticating the cracker well cannot recommend that texture. He did not have his dentures in but that was not deemed to be the issue.    Pt was very anxious during the MBS with some tachypnea. Patient will benefit from skilled intervention to address the above impairments. Patients rehabilitation potential is considered to be Good  Factors which may influence rehabilitation potential include:   []              None noted  [x]              Mental ability/status  [x]              Medical condition  [x]              Home/family situation and support systems  [x]              Safety awareness  []              Pain tolerance/management  []              Other:      PLAN :  Recommendations and Planned Interventions:  Purees and nectar thick liquids  Small sips and bites   May use straw  meds whole   Frequency/Duration: Patient will be followed by speech-language pathology 4 times a week to address goals. Discharge Recommendations: To Be Determined     SUBJECTIVE:   Patient stated I'm getting scared. \"    OBJECTIVE:     Past Medical History:   Diagnosis Date    A-fib ([address]     Acute bronchitis 8/25/2015    Anxiety     Arrhythmia     atrial fibrillation    Arthritis     BCC (basal cell carcinoma of skin)     BPH (benign prostatic hyperplasia)     Chronic back pain greater than 3 months duration 5/4/2015    Chronic right shoulder pain 1/11/2016    Common wart 5/16/2016    Constipation 12/14/2012    CVA (cerebral infarction) 5/4/2015    Depression     GERD (gastroesophageal reflux disease)     Hearing loss     bilateral hearing aids    Hemiplegia following CVA (cerebrovascular accident) ([address]     left side hemiparesis    History of colostomy     HTN (hypertension)     Hyperlipidemia     Localized epilepsy with impairment of consciousness ([address]     Prostate cancer ([address]     Status post XRT, Lupron    Screening for colon cancer 11/4/2015    Stroke [facility])     traumatic from neck crushing    TBI (traumatic brain injury) ([address] 1994    Unspecified sleep apnea     on CPAP     Past Surgical History:   Procedure Laterality Date    COLONOSCOPY N/A 7/12/2018 COLONOSCOPY through stoma [facility] ENDOSCOPY    HX ANKLE FRACTURE TX      HX CATARACT REMOVAL      bilat    HX COLECTOMY      colostomy placed and revised    HX HEENT      ear surgery eddie.  HX HERNIA REPAIR      HX ORTHOPAEDIC      left hip pinning    HX PACEMAKER  2014     Prior Level of Function/Home Situation:   Home Situation  Home Environment: Private residence  One/Two Story Residence: One story  Living Alone: No  Support Systems: Spouse/Significant Other/Partner  Patient Expects to be Discharged to[de-identified] Private residence  Current DME Used/Available at Home: Wheelchair (AFO)  Tub or Shower Type:  (unable to access, sponge bath at sink)  Diet prior to admission:   Current Diet: sips and chips, Dobhoff and TPN   Radiologist: Dr. Mo Abo: Fluoro;Lateral  Patient Position: upright in bed    Trial 1:   Consistency Presented: Thin liquid; Nectar thick liquid;Puree; Solid   How Presented: Self-fed/presented;Straw       Bolus Acceptance: No impairment   Bolus Formation/Control: Impaired: Delayed   Propulsion: Discoordination   Oral Residue: Lingual   Initiation of Swallow: No impairment   Timing: No impairment       Aspiration/Timing: Trace; After;From residual   Pharyngeal Clearance: Vallecular residue; Less than 10%                       Decreased Tongue Base Retraction?: No  Laryngeal Elevation: WFL (within functional limits)  Aspiration/Penetration Score: 7 (Aspiration-Contrast passes below the cords/, but is not ejected despite attempt)     Pharyngeal-Esophageal Segment: No impairment       Oral Phase Severity: Moderate  Pharyngeal Phase Severity: Mild  NOMS:   The NOMS functional outcome measure was used to quantify this patient's level of swallowing impairment. Based on the NOMS, the patient was determined to be at level 4 for swallow function     G Codes:   In compliance with CMSs Claims Based Outcome Reporting, the following G-code set was chosen for this patient based the use of the NOMS functional outcome to quantify this patient's level of swallowing impairment. Using the NOMS, the patient was determined to be at level 4 for swallow function which correlates with the CK= 40-59% level of severity. Based on the objective assessment provided within this note, the current, goal, and discharge g-codes are as follows:    Swallow  Swallowing:   Swallow Current Status CK= 40-59%   Swallow Goal Status CK= 40-59%      NOMS Swallowing Levels:  Level 1 (CN): NPO  Level 2 (CM): NPO but takes consistency in therapy  Level 3 (CL): Takes less than 50% of nutrition p.o. and continues with nonoral feedings; and/or safe with mod cues; and/or max diet restriction  Level 4 (CK): Safe swallow but needs mod cues; and/or mod diet restriction; and/or still requires some nonoral feeding/supplements  Level 5 (CJ): Safe swallow with min diet restriction; and/or needs min cues  Level 6 (CI): Independent with p.o.; rare cues; usually self cues; may need to avoid some foods or needs extra time  Level 7 ([address] [place] [address]): Independent for all p.o.  [place]. (2003). National Outcomes Measurement System (NOMS): Adult Speech-Language Pathology User's Guide. COMMUNICATION/EDUCATION:   Pt and wife were educated about the MBS. The patients plan of care including findings from [facility], recommendations, planned interventions, and recommended diet changes were discussed with: Registered Nurse. [x]  Posted safety precautions in patient's room. []  Patient/family have participated as able in goal setting and plan of care. []  Patient/family agree to work toward stated goals and plan of care. []  Patient understands intent and goals of therapy, but is neutral about his/her participation. []  Patient is unable to participate in goal setting and plan of care.     Thank you for this referral.  Windy Dancer, SLP  Time Calculation: 15 mins

## 2018-08-10 NOTE — PROGRESS NOTES
General Surgery End of Shift Nursing Note    Bedside shift change report given to Eli AMARAL (oncoming nurse) by Corazon Harrison (offgoing nurse). Report included the following information SBAR, Kardex, Accordion, Med Rec Status and Alarm Parameters . Shift worked:   7 am - 7 pm   Summary of shift:    Pt went to IR for barium swallow. Diet changed from NPO to pureed. TPN discontinued. Pt still receiving tube feeding through Dobbhoff. Ostomy bag change as well as dressing change performed. Issues for physician to address:   none     Number times ambulated in hallway past shift: 0    Number of times OOB to chair past shift: 0    Pain Management:  Current medication: See MAR  Patient states pain is manageable on current pain medication: YES    GI:    Current diet:  DIET TUBE FEEDING  DIET DYSPHAGIA PUREED (NDD1)  1 NECTAR  DIET NUTRITIONAL SUPPLEMENTS No; Breakfast, Lunch; Ensure Pudding  DIET NUTRITIONAL SUPPLEMENTS No; Lunch, Dinner; Agusto Herrera current diet: YES  Passing flatus: YES  Last Bowel Movement: today   Appearance: loose    Respiratory:    Incentive Spirometer at bedside: YES  Patient instructed on use: YES    Patient Safety:    Falls Score: 3  Bed Alarm On? Yes  Sitter?  Yes    Ray Face

## 2018-08-10 NOTE — PROGRESS NOTES
ADULT PROTOCOL: JET AEROSOL  REASSESSMENT    Patient  Dat Hines     69 y.o.   male     8/10/2018  10:27 AM    Breath Sounds Pre Procedure: Right Breath Sounds: Diminished                               Left Breath Sounds: Diminished    Breath Sounds Post Procedure: Right Breath Sounds: Diminished                                 Left Breath Sounds: Diminished    Breathing pattern: Pre procedure Breathing Pattern: Tachypneic          Post procedure Breathing Pattern: Tachypneic    Heart Rate: Pre procedure Pulse: 78           Post procedure Pulse: 75    Resp Rate: Pre procedure Respirations: 28           Post procedure Respirations: 28            Cough: Pre procedure Cough: Congested, Non-productive               Post procedure Cough: Congested, Non-productive    Suctioned: NO/ Using Flutter valve      Oxygen: O2 Device: Room air   room air     Changed: NO    SpO2: Pre procedure SpO2: 96 %   without oxygen              Post procedure SpO2: 97 %  without oxygen    Nebulizer Therapy: Current medications Aerosolized Medications: DuoNeb      Changed: NO    Smoking History: former smoker    Problem List:   Patient Active Problem List   Diagnosis Code    HTN (hypertension) I10    Depression F32.9    Hypercholesterolemia E78.00    Acid reflux K21.9    Seizure ([facility] [address] R56.9    Hypothyroidism E03.9    Hyponatremia E87.1    BPH (benign prostatic hyperplasia) N40.0    Rash R21    Cellulitis L03.90    Wax in ear H61.20    Ulcer [id number]    Small bowel obstruction (HCC) K56.609    Atrial flutter (HCC) I48.92    Atrial fibrillation or flutter     CHI (closed head injury) S09. 90XA    Basal cell cancer C44.91    TBI (traumatic brain injury) ([facility] [address] S06. 9X9A    Atrial fibrillation (HCC) I48.91    Cataract H26.9    Constipation K59.00    Ankle fracture, left S82.892A    Insomnia G47.00    Tinea corporis B35.4    SSS (sick sinus syndrome) ([facility]) I49.5    Syncope R55    Seizure disorder (HCC) G40.909    RONAL on CPAP G47.33, Z99.89    Pacemaker Z95.0    Pre-op evaluation Z01.818    Chronic back pain greater than 3 months duration M54.9, G89.29    Cerebral infarction (HCC) I63.9    Acute bronchitis J20.9    Screening for colon cancer Z12.11    Chronic right shoulder pain M25.511, G89.29    Common wart B07.8    Localized epilepsy with impairment of consciousness (HCC) G40.209    Severe obesity (BMI 35.0-39.9) (HCC) E66.01    Acute blood loss anemia D62    Anasarca R60.1    GI bleed K92.2       Respiratory Therapist: Cami Trinh RT

## 2018-08-10 NOTE — PROGRESS NOTES
Problem: Dysphagia (Adult)  Goal: *Acute Goals and Plan of Care (Insert Text)  Speech pathology goals  Initiated 8/3/2018  1. Patient will tolerate sips and chips with no overt s/s aspiration within 7 days  2. Patient will participate in re-evaluation of swallow function within 7 days   3. Pt will participate with MBS today 8/10. Speech language pathology dysphagia treatment  Patient: Luann Quintero (65 y.o. male)  Date: 8/10/2018  Diagnosis: Acute blood loss anemia  GI bleed  Anasarca  GI Bleed  gi bleed  ASCENDING COLON CANCER  GI bleed  Procedure(s) (LRB):  LAPAROSCOPIC  ASSISTED TO OPEN RIGHT COLECTOMY AND SMALL BOWEL RESECTION (Right) 24 Days Post-Op  Precautions:       ASSESSMENT:  Pt seen today at bedside and his wife was present and loquacious. He has a Dobhoff running at 45 an hour. and TPN at present. He accepted ice chips and water with no coughing but had a slow oral phase and slow posterior propulsion, mild swallow delay suspected and good hyolaryngeal excursion. Pt is tachypneic with rate in the 30s. Asked Dr. Josy Mendiola if an MBS is desired today and he wants to proceed. Pt is congested and cannot clear the secretions. He reports they go up and back down. Wife asking if the pt will get stronger after he starts po vs TF and TPN. Progression toward goals:  []         Improving appropriately and progressing toward goals  [x]         Improving slowly and progressing toward goals  []         Not making progress toward goals and plan of care will be adjusted     PLAN:  Recommendations and Planned Interventions:  MBS today  Patient continues to benefit from skilled intervention to address the above impairments. Continue treatment per established plan of care. Discharge Recommendations:   To Be Determined     SUBJECTIVE:   Patient's wife asks him frequently if he is anxious    OBJECTIVE:   Cognitive and Communication Status:  Neurologic State: Alert  Orientation Level: Disoriented to person, Disoriented to place, Disoriented to situation  Cognition: Decreased command following, Decreased attention/concentration  Perception: Appears intact  Perseveration: No perseveration noted  Safety/Judgement: Awareness of environment  Dysphagia Treatment:  Oral Assessment:     P.O. Trials:  Patient Position: upright n bed  Vocal quality prior to P.O.: No impairment  Consistency Presented: Thin liquid; Ice chips  How Presented: Self-fed/presented;Spoon;Straw     Bolus Acceptance: No impairment  Bolus Formation/Control: Impaired  Type of Impairment: Delayed  Propulsion: Delayed (# of seconds)  Oral Residue: None  Initiation of Swallow: Delayed (# of seconds)  Laryngeal Elevation: Decreased  Aspiration Signs/Symptoms: None                Oral Phase Severity: Mild  Pharyngeal Phase Severity : Mild            Pain:  Pain Scale 1: Numeric (0 - 10)  Pain Intensity 1: 0     After treatment:   []              Patient left in no apparent distress sitting up in chair  [x]              Patient left in no apparent distress in bed  [x]              Call bell left within reach  [x]              Nursing notified  []              Caregiver present  []              Bed alarm activated    COMMUNICATION/EDUCATION:     The patients plan of care including recommendations, planned interventions, and recommended diet changes were discussed with: Registered Nurse. []              Posted safety precautions in patient's room.     Regina Nguyễn, SLP  Time Calculation: 15 mins

## 2018-08-10 NOTE — PROGRESS NOTES
[facility]  Pulmonary, Critical Care, and Sleep Medicine    Name: Param Or MRN: [id number]   : 1940 Hospital: [facility]   Date: 8/10/2018        IMPRESSION:   · Asked to see patient for pleural effusion - there is no clinically significant pleural effusion, he has small residual postop right sided effusion, 6.5 mm at its thickest - this is not the source of any of his symptoms  · Leukocytosis beginning just after discontinuing an 18 day course of Zosyn  · S/P colectomy/YAMEL/enterotomies for colon cancer  · Ileus with aspiration pneumonia earlier in his hospitalization  · Remote history of tracheostomy  · Traumatic brain injury from accident nearly 25 years ago  · Obesity       PLAN:   · On room air  · Pulmonary toilet  · Check procalcitonin  · Can not completely exclude aspiration pneumonia as he is getting TFs with ongoing mild ileus, however, would suggest looking for alternate source of infection as he has minimal imaging abnormalities on his chest CT. Again, his negligible pleural effusion is not the cause of any signs, symptoms, or pathology in this patient. · Upright positioning/out of bed would be best as his abdominal obesity and distention is restricting his full lung expansion  · If procalcitonin elevated or if he spikes fever, would suggest adding antibiotics   · Further recs after the above     Subjective/Interval History:   I have reviewed the flowsheet and previous days notes. Asked to evaluate patient to see if his pleural effusion is the cause of his rising WBC. Seen earlier this hospitalization by my partners for aspiration pneumonia. He has been hospitalized for 30 days, having had a colonic resection complicated by ileus. He was on Zosyn for 18 days and this was stopped 3 days ago and his WBC began to rise. He has a congested cough which is chronic. Can not produce sputum.   He is limited in his understanding of how to do incentive spirometry due to prior traumatic brain injury. Review of Systems   Constitutional: Positive for fatigue. Eyes: Negative. Respiratory: Positive for cough. Cardiovascular: Negative. Gastrointestinal: Negative. Objective:   Vital Signs:    Visit Vitals    /62 (BP 1 Location: Right arm, BP Patient Position: At rest)    Pulse 75    Temp 98.6 °F (37 °C)    Resp 24    Ht 6' 2\" (1.88 m)    Wt 117.7 kg (259 lb 7.7 oz)    SpO2 96%    BMI 33.32 kg/m2       O2 Device: Room air   O2 Flow Rate (L/min): 2 l/min   Temp (24hrs), Av.9 °F (36.6 °C), Min:97.4 °F (36.3 °C), Max:98.6 °F (37 °C)       Intake/Output:   Last shift:      08/10 0701 - 08/10 1900  In: 1203.3 [I.V.:1173.3]  Out: 175 [Urine:100]  Last 3 shifts: 1901 - 08/10 07  In: 53143 [I.V.:65552]  Out: 920 [Urine:645]    Intake/Output Summary (Last 24 hours) at 08/10/18 1139  Last data filed at 08/10/18 1015   Gross per 24 hour   Intake          1353.33 ml   Output              620 ml   Net           733.33 ml      Physical Exam   Constitutional: He is cooperative. No distress. HENT:   Head: Normocephalic and atraumatic. Mouth/Throat: No oropharyngeal exudate. Eyes: No scleral icterus. Neck:   Old tracheostomy site   Cardiovascular: Normal rate and regular rhythm. Pulmonary/Chest: No respiratory distress. He has no wheezes. He has rales in the left lower field. Abdominal: He exhibits distension. There is no tenderness. Musculoskeletal: He exhibits edema. Neurological: He is alert. Skin: Skin is warm and dry.      Data:   Labs:  Recent Labs      08/10/18   0343  18   0247  18   0238   WBC  12.6*  12.0*  9.7   HGB  7.8*  7.9*  7.9*   HCT  26.0*  26.6*  26.2*   PLT  156  162  169     Recent Labs      08/10/18   0343  18   0247  18   0238   NA  131*  131*  129*   K  5.2*  5.0  4.8   CL  101  101  99   CO2  23  24  23   GLU  227*  269*  182*   BUN  26*  25*  25*   CREA  0.68*  0.66*  0.54*   CA 7. 9*  7.7*  8.0*     No results for input(s): PH, PCO2, PO2, HCO3, FIO2 in the last 72 hours.     Imaging:  I have personally reviewed the patients radiographs:  left basilar atelectasis, 6-7 mm pleural effusion, not clinically significant        Gentry López MD

## 2018-08-11 ENCOUNTER — APPOINTMENT (OUTPATIENT)
Dept: CT IMAGING | Age: 78
DRG: 329 | End: 2018-08-11
Attending: SURGERY
Payer: MEDICARE

## 2018-08-11 ENCOUNTER — APPOINTMENT (OUTPATIENT)
Dept: GENERAL RADIOLOGY | Age: 78
DRG: 329 | End: 2018-08-11
Attending: SURGERY
Payer: MEDICARE

## 2018-08-11 ENCOUNTER — APPOINTMENT (OUTPATIENT)
Dept: GENERAL RADIOLOGY | Age: 78
DRG: 329 | End: 2018-08-11
Attending: INTERNAL MEDICINE
Payer: MEDICARE

## 2018-08-11 ENCOUNTER — APPOINTMENT (OUTPATIENT)
Dept: GENERAL RADIOLOGY | Age: 78
DRG: 329 | End: 2018-08-11
Attending: GENERAL ACUTE CARE HOSPITAL
Payer: MEDICARE

## 2018-08-11 LAB
AMORPH CRY URNS QL MICRO: ABNORMAL
ANION GAP SERPL CALC-SCNC: 11 MMOL/L (ref 5–15)
APPEARANCE UR: ABNORMAL
ARTERIAL PATENCY WRIST A: ABNORMAL
ARTERIAL PATENCY WRIST A: YES
ARTERIAL PATENCY WRIST A: YES
BACTERIA URNS QL MICRO: ABNORMAL /HPF
BASE DEFICIT BLDA-SCNC: 11.1 MMOL/L
BASE DEFICIT BLDA-SCNC: 3.9 MMOL/L
BASE DEFICIT BLDA-SCNC: 8.1 MMOL/L
BASOPHILS # BLD: 0 K/UL (ref 0–0.1)
BASOPHILS NFR BLD: 0 % (ref 0–1)
BDY SITE: ABNORMAL
BILIRUB UR QL CFM: NEGATIVE
BREATHS.SPONTANEOUS ON VENT: 48
BREATHS.SPONTANEOUS ON VENT: 52
BUN SERPL-MCNC: 30 MG/DL (ref 6–20)
BUN/CREAT SERPL: 29 (ref 12–20)
CALCIUM SERPL-MCNC: 8 MG/DL (ref 8.5–10.1)
CHLORIDE SERPL-SCNC: 104 MMOL/L (ref 97–108)
CK MB CFR SERPL CALC: 3.4 % (ref 0–2.5)
CK MB CFR SERPL CALC: NORMAL % (ref 0–2.5)
CK MB SERPL-MCNC: 2.5 NG/ML (ref 5–25)
CK MB SERPL-MCNC: <1 NG/ML (ref 5–25)
CK SERPL-CCNC: 51 U/L (ref 39–308)
CK SERPL-CCNC: 73 U/L (ref 39–308)
CO2 SERPL-SCNC: 19 MMOL/L (ref 21–32)
COLOR UR: ABNORMAL
CREAT SERPL-MCNC: 1.03 MG/DL (ref 0.7–1.3)
CREAT UR-MCNC: 172 MG/DL
DIFFERENTIAL METHOD BLD: ABNORMAL
EOSINOPHIL # BLD: 0 K/UL (ref 0–0.4)
EOSINOPHIL #/AREA URNS HPF: NEGATIVE /[HPF]
EOSINOPHIL NFR BLD: 0 % (ref 0–7)
EPAP/CPAP/PEEP, PAPEEP: 6
EPAP/CPAP/PEEP, PAPEEP: 6
EPITH CASTS URNS QL MICRO: ABNORMAL /LPF
ERYTHROCYTE [DISTWIDTH] IN BLOOD BY AUTOMATED COUNT: 25.8 % (ref 11.5–14.5)
FIO2 ON VENT: 100 %
FIO2 ON VENT: 80 %
GAS FLOW.O2 O2 DELIVERY SYS: 2.5 L/MIN
GAS FLOW.O2 SETTING OXYMISER: 16 L/MIN
GAS FLOW.O2 SETTING OXYMISER: 16 L/MIN
GLUCOSE BLD STRIP.AUTO-MCNC: 162 MG/DL (ref 65–100)
GLUCOSE SERPL-MCNC: 149 MG/DL (ref 65–100)
GLUCOSE UR STRIP.AUTO-MCNC: NEGATIVE MG/DL
GRAN CASTS URNS QL MICRO: ABNORMAL /LPF
HCO3 BLDA-SCNC: 13 MMOL/L (ref 22–26)
HCO3 BLDA-SCNC: 15 MMOL/L (ref 22–26)
HCO3 BLDA-SCNC: 16 MMOL/L (ref 22–26)
HCT VFR BLD AUTO: 29.8 % (ref 36.6–50.3)
HGB BLD-MCNC: 9.1 G/DL (ref 12.1–17)
HGB UR QL STRIP: NEGATIVE
IMM GRANULOCYTES # BLD: 0 K/UL (ref 0–0.04)
IMM GRANULOCYTES NFR BLD AUTO: 0 % (ref 0–0.5)
IPAP/PIP, IPAPIP: 20
IPAP/PIP, IPAPIP: 28
KETONES UR QL STRIP.AUTO: ABNORMAL MG/DL
LACTATE SERPL-SCNC: 6.9 MMOL/L (ref 0.4–2)
LACTATE SERPL-SCNC: 8.3 MMOL/L (ref 0.4–2)
LEUKOCYTE ESTERASE UR QL STRIP.AUTO: ABNORMAL
LYMPHOCYTES # BLD: 0.2 K/UL (ref 0.8–3.5)
LYMPHOCYTES NFR BLD: 1 % (ref 12–49)
MAGNESIUM SERPL-MCNC: 2.1 MG/DL (ref 1.6–2.4)
MCH RBC QN AUTO: 25.8 PG (ref 26–34)
MCHC RBC AUTO-ENTMCNC: 30.5 G/DL (ref 30–36.5)
MCV RBC AUTO: 84.4 FL (ref 80–99)
METAMYELOCYTES NFR BLD MANUAL: 2 %
MONOCYTES # BLD: 0.7 K/UL (ref 0–1)
MONOCYTES NFR BLD: 3 % (ref 5–13)
MUCOUS THREADS URNS QL MICRO: ABNORMAL /LPF
NEUTS BAND NFR BLD MANUAL: 3 %
NEUTS SEG # BLD: 21 K/UL (ref 1.8–8)
NEUTS SEG NFR BLD: 91 % (ref 32–75)
NITRITE UR QL STRIP.AUTO: POSITIVE
NRBC # BLD: 0.02 K/UL (ref 0–0.01)
NRBC BLD-RTO: 0.1 PER 100 WBC
OTHER,OTHU: ABNORMAL
PCO2 BLDA: 19 MMHG (ref 35–45)
PCO2 BLDA: 24 MMHG (ref 35–45)
PCO2 BLDA: 25 MMHG (ref 35–45)
PH BLDA: 7.35 [PH] (ref 7.35–7.45)
PH BLDA: 7.39 [PH] (ref 7.35–7.45)
PH BLDA: 7.54 [PH] (ref 7.35–7.45)
PH UR STRIP: 5 [PH] (ref 5–8)
PLATELET # BLD AUTO: 172 K/UL (ref 150–400)
PMV BLD AUTO: 11.1 FL (ref 8.9–12.9)
PO2 BLDA: 100 MMHG (ref 80–100)
PO2 BLDA: 187 MMHG (ref 80–100)
PO2 BLDA: 263 MMHG (ref 80–100)
POTASSIUM SERPL-SCNC: 5.4 MMOL/L (ref 3.5–5.1)
PRESSURE SUPPORT SETTING VENT: 22 CM[H2O]
PROT UR STRIP-MCNC: 30 MG/DL
RBC # BLD AUTO: 3.53 M/UL (ref 4.1–5.7)
RBC #/AREA URNS HPF: ABNORMAL /HPF (ref 0–5)
RBC MORPH BLD: ABNORMAL
SAO2 % BLD: 100 % (ref 92–97)
SAO2 % BLD: 98 % (ref 92–97)
SAO2 % BLD: 99 % (ref 92–97)
SAO2% DEVICE SAO2% SENSOR NAME: ABNORMAL
SERVICE CMNT-IMP: ABNORMAL
SODIUM SERPL-SCNC: 134 MMOL/L (ref 136–145)
SODIUM UR-SCNC: 24 MMOL/L
SP GR UR REFRACTOMETRY: >1.03 (ref 1–1.03)
SPECIMEN SITE: ABNORMAL
TROPONIN I SERPL-MCNC: 0.08 NG/ML
TROPONIN I SERPL-MCNC: 0.25 NG/ML
UROBILINOGEN UR QL STRIP.AUTO: 1 EU/DL (ref 0.2–1)
VENTILATION MODE VENT: ABNORMAL
VENTILATION MODE VENT: ABNORMAL
WBC # BLD AUTO: 22.3 K/UL (ref 4.1–11.1)
WBC URNS QL MICRO: ABNORMAL /HPF (ref 0–4)

## 2018-08-11 PROCEDURE — 81001 URINALYSIS AUTO W/SCOPE: CPT | Performed by: INTERNAL MEDICINE

## 2018-08-11 PROCEDURE — 74011250636 HC RX REV CODE- 250/636: Performed by: INTERNAL MEDICINE

## 2018-08-11 PROCEDURE — 74011000250 HC RX REV CODE- 250: Performed by: INTERNAL MEDICINE

## 2018-08-11 PROCEDURE — 36600 WITHDRAWAL OF ARTERIAL BLOOD: CPT

## 2018-08-11 PROCEDURE — 74011000258 HC RX REV CODE- 258: Performed by: GENERAL ACUTE CARE HOSPITAL

## 2018-08-11 PROCEDURE — 71045 X-RAY EXAM CHEST 1 VIEW: CPT

## 2018-08-11 PROCEDURE — 74011250637 HC RX REV CODE- 250/637: Performed by: INTERNAL MEDICINE

## 2018-08-11 PROCEDURE — 77030021678 HC GLIDESCP STAT DISP VERT -B

## 2018-08-11 PROCEDURE — 74018 RADEX ABDOMEN 1 VIEW: CPT

## 2018-08-11 PROCEDURE — 87205 SMEAR GRAM STAIN: CPT | Performed by: INTERNAL MEDICINE

## 2018-08-11 PROCEDURE — 74176 CT ABD & PELVIS W/O CONTRAST: CPT

## 2018-08-11 PROCEDURE — 83735 ASSAY OF MAGNESIUM: CPT | Performed by: SURGERY

## 2018-08-11 PROCEDURE — 87147 CULTURE TYPE IMMUNOLOGIC: CPT | Performed by: INTERNAL MEDICINE

## 2018-08-11 PROCEDURE — 84484 ASSAY OF TROPONIN QUANT: CPT | Performed by: INTERNAL MEDICINE

## 2018-08-11 PROCEDURE — 51798 US URINE CAPACITY MEASURE: CPT

## 2018-08-11 PROCEDURE — 74011250636 HC RX REV CODE- 250/636: Performed by: SURGERY

## 2018-08-11 PROCEDURE — 82570 ASSAY OF URINE CREATININE: CPT | Performed by: INTERNAL MEDICINE

## 2018-08-11 PROCEDURE — 36600 WITHDRAWAL OF ARTERIAL BLOOD: CPT | Performed by: INTERNAL MEDICINE

## 2018-08-11 PROCEDURE — 87077 CULTURE AEROBIC IDENTIFY: CPT | Performed by: INTERNAL MEDICINE

## 2018-08-11 PROCEDURE — 0BH17EZ INSERTION OF ENDOTRACHEAL AIRWAY INTO TRACHEA, VIA NATURAL OR ARTIFICIAL OPENING: ICD-10-PCS | Performed by: ANESTHESIOLOGY

## 2018-08-11 PROCEDURE — 84300 ASSAY OF URINE SODIUM: CPT | Performed by: INTERNAL MEDICINE

## 2018-08-11 PROCEDURE — C9113 INJ PANTOPRAZOLE SODIUM, VIA: HCPCS | Performed by: HOSPITALIST

## 2018-08-11 PROCEDURE — 74011250636 HC RX REV CODE- 250/636

## 2018-08-11 PROCEDURE — 77030008771 HC TU NG SALEM SUMP -A

## 2018-08-11 PROCEDURE — 80076 HEPATIC FUNCTION PANEL: CPT | Performed by: INTERNAL MEDICINE

## 2018-08-11 PROCEDURE — 87186 SC STD MICRODIL/AGAR DIL: CPT | Performed by: GENERAL ACUTE CARE HOSPITAL

## 2018-08-11 PROCEDURE — 83605 ASSAY OF LACTIC ACID: CPT | Performed by: INTERNAL MEDICINE

## 2018-08-11 PROCEDURE — 80048 BASIC METABOLIC PNL TOTAL CA: CPT | Performed by: SURGERY

## 2018-08-11 PROCEDURE — 87070 CULTURE OTHR SPECIMN AEROBIC: CPT | Performed by: INTERNAL MEDICINE

## 2018-08-11 PROCEDURE — 36600 WITHDRAWAL OF ARTERIAL BLOOD: CPT | Performed by: GENERAL ACUTE CARE HOSPITAL

## 2018-08-11 PROCEDURE — 74011250636 HC RX REV CODE- 250/636: Performed by: GENERAL ACUTE CARE HOSPITAL

## 2018-08-11 PROCEDURE — 36415 COLL VENOUS BLD VENIPUNCTURE: CPT | Performed by: INTERNAL MEDICINE

## 2018-08-11 PROCEDURE — 74011000258 HC RX REV CODE- 258: Performed by: INTERNAL MEDICINE

## 2018-08-11 PROCEDURE — 65610000006 HC RM INTENSIVE CARE

## 2018-08-11 PROCEDURE — 74011250637 HC RX REV CODE- 250/637: Performed by: SURGERY

## 2018-08-11 PROCEDURE — 82803 BLOOD GASES ANY COMBINATION: CPT | Performed by: GENERAL ACUTE CARE HOSPITAL

## 2018-08-11 PROCEDURE — 94002 VENT MGMT INPAT INIT DAY: CPT

## 2018-08-11 PROCEDURE — 87040 BLOOD CULTURE FOR BACTERIA: CPT | Performed by: GENERAL ACUTE CARE HOSPITAL

## 2018-08-11 PROCEDURE — 77030013797 HC KT TRNSDUC PRSSR EDWD -A

## 2018-08-11 PROCEDURE — 93306 TTE W/DOPPLER COMPLETE: CPT

## 2018-08-11 PROCEDURE — 80048 BASIC METABOLIC PNL TOTAL CA: CPT | Performed by: INTERNAL MEDICINE

## 2018-08-11 PROCEDURE — 74011000250 HC RX REV CODE- 250: Performed by: HOSPITALIST

## 2018-08-11 PROCEDURE — 83605 ASSAY OF LACTIC ACID: CPT | Performed by: SURGERY

## 2018-08-11 PROCEDURE — 82962 GLUCOSE BLOOD TEST: CPT

## 2018-08-11 PROCEDURE — 74011250637 HC RX REV CODE- 250/637: Performed by: HOSPITALIST

## 2018-08-11 PROCEDURE — 87077 CULTURE AEROBIC IDENTIFY: CPT | Performed by: GENERAL ACUTE CARE HOSPITAL

## 2018-08-11 PROCEDURE — 82553 CREATINE MB FRACTION: CPT | Performed by: INTERNAL MEDICINE

## 2018-08-11 PROCEDURE — 74011250636 HC RX REV CODE- 250/636: Performed by: HOSPITALIST

## 2018-08-11 PROCEDURE — 85025 COMPLETE CBC W/AUTO DIFF WBC: CPT | Performed by: SURGERY

## 2018-08-11 PROCEDURE — 87186 SC STD MICRODIL/AGAR DIL: CPT | Performed by: INTERNAL MEDICINE

## 2018-08-11 PROCEDURE — 74011000250 HC RX REV CODE- 250

## 2018-08-11 PROCEDURE — 94640 AIRWAY INHALATION TREATMENT: CPT

## 2018-08-11 PROCEDURE — 74019 RADEX ABDOMEN 2 VIEWS: CPT

## 2018-08-11 PROCEDURE — 5A1955Z RESPIRATORY VENTILATION, GREATER THAN 96 CONSECUTIVE HOURS: ICD-10-PCS | Performed by: ANESTHESIOLOGY

## 2018-08-11 PROCEDURE — 77030009834 HC MSK O2 TRACH VYRM -A

## 2018-08-11 RX ORDER — CHLORHEXIDINE GLUCONATE 1.2 MG/ML
15 RINSE ORAL EVERY 12 HOURS
Status: DISCONTINUED | OUTPATIENT
Start: 2018-08-11 | End: 2018-08-30

## 2018-08-11 RX ORDER — NOREPINEPHRINE BITARTRATE/D5W 8 MG/250ML
2-30 PLASTIC BAG, INJECTION (ML) INTRAVENOUS
Status: DISCONTINUED | OUTPATIENT
Start: 2018-08-11 | End: 2018-08-14

## 2018-08-11 RX ORDER — VANCOMYCIN 1.75 GRAM/500 ML IN 0.9 % SODIUM CHLORIDE INTRAVENOUS
1750
Status: DISCONTINUED | OUTPATIENT
Start: 2018-08-12 | End: 2018-08-12

## 2018-08-11 RX ORDER — PROPOFOL 10 MG/ML
0-50 VIAL (ML) INTRAVENOUS
Status: DISCONTINUED | OUTPATIENT
Start: 2018-08-11 | End: 2018-08-27

## 2018-08-11 RX ORDER — PROPOFOL 10 MG/ML
INJECTION, EMULSION INTRAVENOUS
Status: COMPLETED
Start: 2018-08-11 | End: 2018-08-11

## 2018-08-11 RX ORDER — SUCCINYLCHOLINE CHLORIDE 20 MG/ML
INJECTION INTRAMUSCULAR; INTRAVENOUS
Status: DISPENSED
Start: 2018-08-11 | End: 2018-08-11

## 2018-08-11 RX ORDER — MUPIROCIN 20 MG/G
OINTMENT TOPICAL DAILY
Status: DISCONTINUED | OUTPATIENT
Start: 2018-08-11 | End: 2018-08-13

## 2018-08-11 RX ORDER — PHENYLEPHRINE HCL IN 0.9% NACL 30MG/250ML
10-300 PLASTIC BAG, INJECTION (ML) INTRAVENOUS
Status: DISPENSED | OUTPATIENT
Start: 2018-08-11 | End: 2018-08-11

## 2018-08-11 RX ORDER — SODIUM BICARBONATE 1 MEQ/ML
SYRINGE (ML) INTRAVENOUS
Status: COMPLETED
Start: 2018-08-11 | End: 2018-08-11

## 2018-08-11 RX ORDER — PHENYLEPHRINE HCL IN 0.9% NACL 100MG/250
20-300 PLASTIC BAG, INJECTION (ML) INTRAVENOUS
Status: DISCONTINUED | OUTPATIENT
Start: 2018-08-11 | End: 2018-08-19

## 2018-08-11 RX ORDER — SODIUM BICARBONATE 1 MEQ/ML
100 SYRINGE (ML) INTRAVENOUS ONCE
Status: COMPLETED | OUTPATIENT
Start: 2018-08-11 | End: 2018-08-11

## 2018-08-11 RX ORDER — CARBAMAZEPINE 200 MG/10ML
100 SUSPENSION ORAL 4 TIMES DAILY
Status: DISCONTINUED | OUTPATIENT
Start: 2018-08-11 | End: 2018-08-31

## 2018-08-11 RX ORDER — HEPARIN 100 UNIT/ML
300 SYRINGE INTRAVENOUS AS NEEDED
Status: DISCONTINUED | OUTPATIENT
Start: 2018-08-11 | End: 2018-08-20

## 2018-08-11 RX ADMIN — SODIUM CHLORIDE, PRESERVATIVE FREE 300 UNITS: 5 INJECTION INTRAVENOUS at 16:28

## 2018-08-11 RX ADMIN — SODIUM CHLORIDE 10 ML: 9 INJECTION, SOLUTION INTRAMUSCULAR; INTRAVENOUS; SUBCUTANEOUS at 13:27

## 2018-08-11 RX ADMIN — Medication 200 MCG/MIN: at 14:47

## 2018-08-11 RX ADMIN — PHENYLEPHRINE HYDROCHLORIDE 270 MCG/MIN: 10 INJECTION INTRAVENOUS at 22:41

## 2018-08-11 RX ADMIN — MUPIROCIN: 20 OINTMENT TOPICAL at 08:44

## 2018-08-11 RX ADMIN — Medication 30 MCG/MIN: at 10:50

## 2018-08-11 RX ADMIN — IPRATROPIUM BROMIDE AND ALBUTEROL SULFATE 3 ML: .5; 3 SOLUTION RESPIRATORY (INHALATION) at 19:47

## 2018-08-11 RX ADMIN — WATER: 1000 INJECTION, SOLUTION INTRAVENOUS at 20:31

## 2018-08-11 RX ADMIN — PROPOFOL 50 MCG/KG/MIN: 10 INJECTION, EMULSION INTRAVENOUS at 23:35

## 2018-08-11 RX ADMIN — CARBAMAZEPINE 100 MG: 100 SUSPENSION ORAL at 13:19

## 2018-08-11 RX ADMIN — SODIUM BICARBONATE 100 MEQ: 84 INJECTION INTRAVENOUS at 12:32

## 2018-08-11 RX ADMIN — CEFEPIME HYDROCHLORIDE 2 G: 2 INJECTION, POWDER, FOR SOLUTION INTRAVENOUS at 12:34

## 2018-08-11 RX ADMIN — CEFEPIME HYDROCHLORIDE 2 G: 2 INJECTION, POWDER, FOR SOLUTION INTRAVENOUS at 20:52

## 2018-08-11 RX ADMIN — SODIUM CHLORIDE 40 MG: 9 INJECTION INTRAMUSCULAR; INTRAVENOUS; SUBCUTANEOUS at 00:02

## 2018-08-11 RX ADMIN — Medication 100 MEQ: at 12:32

## 2018-08-11 RX ADMIN — SODIUM CHLORIDE 40 MG: 9 INJECTION INTRAMUSCULAR; INTRAVENOUS; SUBCUTANEOUS at 09:10

## 2018-08-11 RX ADMIN — CARBAMAZEPINE 100 MG: 100 SUSPENSION ORAL at 22:45

## 2018-08-11 RX ADMIN — CARBAMAZEPINE 100 MG: 100 SUSPENSION ORAL at 17:34

## 2018-08-11 RX ADMIN — PROPOFOL 50 MCG/KG/MIN: 10 INJECTION, EMULSION INTRAVENOUS at 12:39

## 2018-08-11 RX ADMIN — SODIUM CHLORIDE 10 ML: 9 INJECTION, SOLUTION INTRAMUSCULAR; INTRAVENOUS; SUBCUTANEOUS at 05:38

## 2018-08-11 RX ADMIN — PROPOFOL 50 MCG/KG/MIN: 10 INJECTION, EMULSION INTRAVENOUS at 18:07

## 2018-08-11 RX ADMIN — IPRATROPIUM BROMIDE AND ALBUTEROL SULFATE 3 ML: .5; 3 SOLUTION RESPIRATORY (INHALATION) at 07:26

## 2018-08-11 RX ADMIN — WATER: 1000 INJECTION, SOLUTION INTRAVENOUS at 12:31

## 2018-08-11 RX ADMIN — PHENYLEPHRINE HYDROCHLORIDE 280 MCG/MIN: 10 INJECTION INTRAVENOUS at 21:04

## 2018-08-11 RX ADMIN — PROPOFOL 45 MCG/KG/MIN: 10 INJECTION, EMULSION INTRAVENOUS at 20:51

## 2018-08-11 RX ADMIN — VANCOMYCIN HYDROCHLORIDE 3000 MG: 10 INJECTION, POWDER, LYOPHILIZED, FOR SOLUTION INTRAVENOUS at 08:44

## 2018-08-11 RX ADMIN — IPRATROPIUM BROMIDE AND ALBUTEROL SULFATE 3 ML: .5; 3 SOLUTION RESPIRATORY (INHALATION) at 16:07

## 2018-08-11 RX ADMIN — MELATONIN TAB 3 MG 3 MG: 3 TAB at 22:13

## 2018-08-11 RX ADMIN — SODIUM CHLORIDE, PRESERVATIVE FREE 300 UNITS: 5 INJECTION INTRAVENOUS at 17:23

## 2018-08-11 RX ADMIN — MELATONIN TAB 3 MG 3 MG: 3 TAB at 00:07

## 2018-08-11 RX ADMIN — PROPOFOL 35 MCG/KG/MIN: 10 INJECTION, EMULSION INTRAVENOUS at 10:51

## 2018-08-11 RX ADMIN — SODIUM CHLORIDE 500 ML: 900 INJECTION, SOLUTION INTRAVENOUS at 10:55

## 2018-08-11 RX ADMIN — PIPERACILLIN SODIUM,TAZOBACTAM SODIUM 3.38 G: 3; .375 INJECTION, POWDER, FOR SOLUTION INTRAVENOUS at 08:44

## 2018-08-11 RX ADMIN — SODIUM CHLORIDE 10 ML: 9 INJECTION, SOLUTION INTRAMUSCULAR; INTRAVENOUS; SUBCUTANEOUS at 22:15

## 2018-08-11 RX ADMIN — IPRATROPIUM BROMIDE AND ALBUTEROL SULFATE 3 ML: .5; 3 SOLUTION RESPIRATORY (INHALATION) at 11:55

## 2018-08-11 RX ADMIN — Medication 4 MCG/MIN: at 18:01

## 2018-08-11 RX ADMIN — AMIODARONE HYDROCHLORIDE 200 MG: 200 TABLET ORAL at 13:19

## 2018-08-11 RX ADMIN — SODIUM CHLORIDE 10 ML: 9 INJECTION, SOLUTION INTRAMUSCULAR; INTRAVENOUS; SUBCUTANEOUS at 20:55

## 2018-08-11 RX ADMIN — PHENYLEPHRINE HYDROCHLORIDE 250 MCG/MIN: 10 INJECTION INTRAVENOUS at 17:31

## 2018-08-11 RX ADMIN — ACETAMINOPHEN 650 MG: 325 TABLET ORAL at 13:19

## 2018-08-11 RX ADMIN — SODIUM CHLORIDE 40 MG: 9 INJECTION INTRAMUSCULAR; INTRAVENOUS; SUBCUTANEOUS at 20:55

## 2018-08-11 RX ADMIN — PROPOFOL 100 MG: 10 INJECTION, EMULSION INTRAVENOUS at 10:42

## 2018-08-11 RX ADMIN — CHLORHEXIDINE GLUCONATE 15 ML: 1.2 RINSE ORAL at 22:15

## 2018-08-11 RX ADMIN — SODIUM CHLORIDE 1000 ML: 900 INJECTION, SOLUTION INTRAVENOUS at 15:56

## 2018-08-11 RX ADMIN — PROPOFOL 50 MCG/KG/MIN: 10 INJECTION, EMULSION INTRAVENOUS at 15:35

## 2018-08-11 RX ADMIN — SODIUM CHLORIDE 1000 ML: 900 INJECTION, SOLUTION INTRAVENOUS at 21:16

## 2018-08-11 NOTE — PROGRESS NOTES
While ICU noticed pt's wife Helen Wood seemed upset. Informed by Vicki Nissen that pt had been transferred to CCU, led Helen Wood to waiting area and provided pastoral support as she processed fears through tears following pt's decline over night that led to transfer. Prayed with Helen Wood and affirmed her edith. Helen Wood expressed feeling more at peace. Provided a prayer blanket which Helen Wood was grateful for. Physician arrived and provided update.  went to speak with RN and informed Helen Wood afterwards that RN would be coming to get her shortly. Mandy's son on his way. Extended appreciation for pastoral support. Advised of availability through tomorrow. Will follow up as able/needed. BELINDA Wright

## 2018-08-11 NOTE — PROGRESS NOTES
Pharmacy Automatic Renal Dosing Protocol - Antimicrobials    Indication for Antimicrobials: Sepsis      Current Regimen of Each Antimicrobial:  Vancomycin 3000 mg IV load followed by 1750 mg IV every 16 hours (Start Date ; Day # 1)  Zosyn 3.375 g IV every 8 hours (Start Date ; Day # 1)    Previous Antimicrobial Therapy:  Vancomycin  Fluconazole  Zosyn    Goal Level: VANCOMYCIN TROUGH GOAL RANGE  Vancomycin Trough: 15 - 20 mcg/mL    Date Dose & Interval Measured (mcg/mL) Extrapolated (mcg/mL)                       Significant Cultures:    Blood: pending   Blood (line): pending    Radiology / Imaging results: (X-ray, CT scan or MRI):    CXR: There is slight bibasilar atelectasis left greater than right   KUB: There is slight gaseous distention transverse colon without definite evidence of obstruction. Paralysis, amputations, malnutrition: None documented     Labs:  Recent Labs      18   0517  08/10/18   0343  18   0247   CREA  1.03  0.68*  0.66*   BUN  30*  26*  25*   WBC  22.3*  12.6*  12.0*     Temp (24hrs), Av.4 °F (38 °C), Min:98.3 °F (36.8 °C), Max:102.9 °F (39.4 °C)    Creatinine Clearance (mL/min) or Dialysis: 70 mL/min     Impression/Plan:   · Will change vancomycin to 1750 mg IV every 16 hours for an estimated trough of 15.7 mcg/mL. · Will change Zosyn to 4.5 g IV every 8 hours for indication and renal function per renal dosing protocol. · Antimicrobial stop date pending     Pharmacy will follow daily and adjust medications as appropriate for renal function and/or serum levels. Thank you,  MIRLANDE PabloD    Recommended duration of therapy  [email or web address]/[facility]/Pharmacy/Clinical%20Companion/[email or web address]. [email or web address]    Renal Dosing  [email or web address]/[place]/[facility]/Pharmacy/Clinical%20Companion/Renal%[email or web address]. pdf

## 2018-08-11 NOTE — PROGRESS NOTES
Rapid Response Note:  Rapid called at approx 540am - responded shortly afterwards. Pt with lengthy admission history as today marks his 30th day. He was initially admitted due to [facility] and eventually had to undergo a R colectomy. He went for a Barium Swallow yesterday and was started on TF. His WBC has been trending higher slowly and Pulmonary was called to evaluate if his pleural effusion was the cause. RN at bedside states that she called a Rapid due to MUSE score of 5 - pt tachypneic into the 40s-50s, febrile, and diaphoretic. When evaluated at bedside, pt's AO to his baseline. He states that he is \"not feeling good\" but also that he's \"doing better than before. \" He endorses SOB, but denies any chest pain, or tightness. PE:  Febrile, tachypneic, on 5L NC saturating well, BP wnl  Looks to be in distress, shallow, rapid respirations  Coarse BS throughout  TTP throughout abdomen, worse in lower quadrants    A/P:  Sepsis, likely secondary to aspiration PNA  -Continue O2 supplementation  -Transfer to ICU  -Monitor resp status closely as pt may tire - low threshold for intubation  -Hold tube feeds  -ABG ordered, results noted and congruent with elevated RR  -Stat CXR ordered, Abdo DX also ordered  -Blood Cx  -Start Vanco and Payam 69 with pt's wife Miss Radhames Bray who states that Juanita Cabrera has been aspirating with food and pills. \" Felicia Ramoss candidly about pt's condition and she is understandably worried and will come to the hospital shortly. Total Critical Care Time Spent 40 mins.

## 2018-08-11 NOTE — PROGRESS NOTES
CRS Progress Note    Events of this morning noted. He underwent a modified barium swallow yesterday and cleared for puree diet. It sounds like he might have aspirated overnight because his respiratory rate abruptly increased around 5:30 this morning and he has a fever with leukocytosis. He is now in worsening respiratory distress and being intubated emergently. Plan  Agree with broad spectrum abx (zosyn/vanc) for aspiration pneumonia  Continue supportive care per ICU.   He is being followed by pulm already  NG tube or OG tube to suction
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
## 2018-08-11 NOTE — PROGRESS NOTES
Intubation Note    Called to bedside secondary to  impending respiratory failure. Patient pre-oxygenated with 100% oxygen. Smooth RSI with propofol 100 mg  IV.    glidescope x 1    7.5 ETT taped and secured at 22 cm at the teeth.    + Bilateral BS, + Chest rise, + ETCO2    CXR pending.     Care turned over to covering Attending MD.
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
## 2018-08-11 NOTE — PROGRESS NOTES
[facility]  Pulmonary, Critical Care, and Sleep Medicine    Name: Maliha Garland MRN: [id number]   : 1940 Hospital: [facility]   Date: 2018        IMPRESSION:   · Acute respiratory failure  · Severe metabolic acidosis  · shock  · Leukocytosis s/p 18 day course of Zosyn  · S/P colectomy/YAMEL/enterotomies for colon cancer  · Ileus with aspiration pneumonia earlier in his hospitalization  · Remote history of tracheostomy  · Traumatic brain injury from accident nearly 25 years ago  · Obesity       PLAN:   · Intubate  · Full ventilator support  · check lactate  · Pressors and fluids as necessary if he develops septic shock  · HCO3 infusion  · KUB   · Pulmonary toilet  · Empiric abx  · Echo  · Cardiac enzymes  · Sputum g/s and culture  · Oxygenation CXR acidosis suggest non pulmonary cause of decompensation     Discussed with surgery CT report ABD 2 days ago reviewed. He has low suspicion of GI cause of current decompensation   CT chest and CXR reviewed and do not suggest cause of current decompensation   ·        Subjective/Interval History:   8.10 I have reviewed the flowsheet and previous days notes. Asked to evaluate patient to see if his pleural effusion is the cause of his rising WBC. Seen earlier this hospitalization by my partners for aspiration pneumonia. He has been hospitalized for 30 days, having had a colonic resection complicated by ileus. He was on Zosyn for 18 days and this was stopped 3 days ago and his WBC began to rise. He has a congested cough which is chronic. Can not produce sputum. He is limited in his understanding of how to do incentive spirometry due to prior traumatic brain injury. Review of Systems   Constitutional: Positive for fatigue. Eyes: Negative. Respiratory: Positive for cough. Cardiovascular: Negative. Gastrointestinal: Negative.         8.11 called urgently for acute respiratory failure  RR 50's  jeny breathing  On NRBM  Objective:   Vital Signs:    Visit Vitals    BP (!) 88/38    Pulse (!) 126    Temp (!) 100.8 °F (38.2 °C)    Resp (!) 42    Ht 6' 2\" (1.88 m)    Wt 117.7 kg (259 lb 7.7 oz)    SpO2 98%    BMI 33.32 kg/m2       O2 Device: Ventimask, Nasal cannula   O2 Flow Rate (L/min): 3 l/min   Temp (24hrs), Av.4 °F (38 °C), Min:98.3 °F (36.8 °C), Max:102.9 °F (39.4 °C)       Intake/Output:   Last shift:         Last 3 shifts:  1901 -  0700  In: 1368.3 [I.V.:1173.3]  Out: 1900 [Urine:575]    Intake/Output Summary (Last 24 hours) at 18 1117  Last data filed at 18 0442   Gross per 24 hour   Intake               15 ml   Output             1475 ml   Net            -1460 ml      Physical Exam   Constitutional: He is cooperative. He appears distressed. HENT:   Head: Normocephalic and atraumatic. Mouth/Throat: No oropharyngeal exudate. Eyes: No scleral icterus. Neck:   Old tracheostomy site   Cardiovascular: Normal rate and regular rhythm. Pulmonary/Chest: No respiratory distress. He has no wheezes. He has rhonchi. He has no rales. Abdominal: He exhibits distension. There is no tenderness. Musculoskeletal: He exhibits edema. Neurological: He is alert. Skin: Skin is warm and dry.      Data:   Labs:  Recent Labs      18   0517  08/10/18   0343  18   0247   WBC  22.3*  12.6*  12.0*   HGB  9.1*  7.8*  7.9*   HCT  29.8*  26.0*  26.6*   PLT  172  156  162     Recent Labs      18   0517  08/10/18   0343  18   0247   NA  134*  131*  131*   K  5.4*  5.2*  5.0   CL  104  101  101   CO2  19*  23  24   GLU  149*  227*  269*   BUN  30*  26*  25*   CREA  1.03  0.68*  0.66*   CA  8.0*  7.9*  7.7*   MG  2.1   --    --      Recent Labs      18   1045  18   0555   PH  7.35  7.54*   PCO2  24*  19*   PO2  263*  100   HCO3  13*  16*   FIO2  100   --        Imaging:  I have personally reviewed the patients radiographs:          Chaya Cerda MD Cc time 60 min

## 2018-08-11 NOTE — PROGRESS NOTES
Rapid Response team called due to patient having a respiratory rate of 50 bpm and a fever of 102.9. These findings elevated his MEWS score from a 2 to a 5. The patient stated that he couldn't get enough air, Oxygen Nasal Cannula was place at 2 Liters. The PCA sitter stated that the respirations changed around 0500. RN began assessing the situation approximately 0530. Rapid Response team arrived including CCU nurse, Nursing Supervisor, Respiratory Therapy and Physician. Blood Cultures Drawn from CVC IJ and right AC arterial stick. Chest X-Ray ordered. Patient transferred to ICU.

## 2018-08-12 ENCOUNTER — APPOINTMENT (OUTPATIENT)
Dept: GENERAL RADIOLOGY | Age: 78
DRG: 329 | End: 2018-08-12
Attending: INTERNAL MEDICINE
Payer: MEDICARE

## 2018-08-12 LAB
ALBUMIN SERPL-MCNC: 1.5 G/DL (ref 3.5–5)
ALBUMIN/GLOB SERPL: 0.4 {RATIO} (ref 1.1–2.2)
ALP SERPL-CCNC: 105 U/L (ref 45–117)
ALT SERPL-CCNC: 57 U/L (ref 12–78)
ANION GAP SERPL CALC-SCNC: 10 MMOL/L (ref 5–15)
ANION GAP SERPL CALC-SCNC: 17 MMOL/L (ref 5–15)
ARTERIAL PATENCY WRIST A: YES
AST SERPL-CCNC: 49 U/L (ref 15–37)
BASE DEFICIT BLDA-SCNC: 4.3 MMOL/L
BASOPHILS # BLD: 0 K/UL (ref 0–0.1)
BASOPHILS NFR BLD: 0 % (ref 0–1)
BDY SITE: ABNORMAL
BILIRUB SERPL-MCNC: 1.2 MG/DL (ref 0.2–1)
BUN SERPL-MCNC: 39 MG/DL (ref 6–20)
BUN SERPL-MCNC: 43 MG/DL (ref 6–20)
BUN/CREAT SERPL: 26 (ref 12–20)
BUN/CREAT SERPL: 29 (ref 12–20)
CALCIUM SERPL-MCNC: 6 MG/DL (ref 8.5–10.1)
CALCIUM SERPL-MCNC: 6.2 MG/DL (ref 8.5–10.1)
CHLORIDE SERPL-SCNC: 105 MMOL/L (ref 97–108)
CHLORIDE SERPL-SCNC: 108 MMOL/L (ref 97–108)
CK MB CFR SERPL CALC: 2.8 % (ref 0–2.5)
CK MB SERPL-MCNC: 2.6 NG/ML (ref 5–25)
CK SERPL-CCNC: 94 U/L (ref 39–308)
CO2 SERPL-SCNC: 15 MMOL/L (ref 21–32)
CO2 SERPL-SCNC: 23 MMOL/L (ref 21–32)
CREAT SERPL-MCNC: 1.35 MG/DL (ref 0.7–1.3)
CREAT SERPL-MCNC: 1.68 MG/DL (ref 0.7–1.3)
DIFFERENTIAL METHOD BLD: ABNORMAL
EOSINOPHIL # BLD: 0 K/UL (ref 0–0.4)
EOSINOPHIL NFR BLD: 0 % (ref 0–7)
EPAP/CPAP/PEEP, PAPEEP: 6
ERYTHROCYTE [DISTWIDTH] IN BLOOD BY AUTOMATED COUNT: 25.7 % (ref 11.5–14.5)
FIO2 ON VENT: 50 %
GAS FLOW.O2 SETTING OXYMISER: 16 L/MIN
GLOBULIN SER CALC-MCNC: 4 G/DL (ref 2–4)
GLUCOSE BLD STRIP.AUTO-MCNC: 192 MG/DL (ref 65–100)
GLUCOSE SERPL-MCNC: 158 MG/DL (ref 65–100)
GLUCOSE SERPL-MCNC: 163 MG/DL (ref 65–100)
HCO3 BLDA-SCNC: 18 MMOL/L (ref 22–26)
HCT VFR BLD AUTO: 24.4 % (ref 36.6–50.3)
HCT VFR BLD AUTO: 25.1 % (ref 36.6–50.3)
HGB BLD-MCNC: 7.3 G/DL (ref 12.1–17)
HGB BLD-MCNC: 7.7 G/DL (ref 12.1–17)
IMM GRANULOCYTES # BLD: 0 K/UL (ref 0–0.04)
IMM GRANULOCYTES NFR BLD AUTO: 0 % (ref 0–0.5)
LACTATE SERPL-SCNC: 4.9 MMOL/L (ref 0.4–2)
LACTATE SERPL-SCNC: 7.9 MMOL/L (ref 0.4–2)
LYMPHOCYTES # BLD: 1 K/UL (ref 0.8–3.5)
LYMPHOCYTES NFR BLD: 5 % (ref 12–49)
MCH RBC QN AUTO: 26.7 PG (ref 26–34)
MCHC RBC AUTO-ENTMCNC: 30.7 G/DL (ref 30–36.5)
MCV RBC AUTO: 87.2 FL (ref 80–99)
MONOCYTES # BLD: 0.8 K/UL (ref 0–1)
MONOCYTES NFR BLD: 4 % (ref 5–13)
NEUTS BAND NFR BLD MANUAL: 2 %
NEUTS SEG # BLD: 18.2 K/UL (ref 1.8–8)
NEUTS SEG NFR BLD: 89 % (ref 32–75)
NRBC # BLD: 0.03 K/UL (ref 0–0.01)
NRBC BLD-RTO: 0.1 PER 100 WBC
PCO2 BLDA: 28 MMHG (ref 35–45)
PH BLDA: 7.44 [PH] (ref 7.35–7.45)
PLATELET # BLD AUTO: 141 K/UL (ref 150–400)
PMV BLD AUTO: 11.1 FL (ref 8.9–12.9)
PO2 BLDA: 97 MMHG (ref 80–100)
POTASSIUM SERPL-SCNC: 4.6 MMOL/L (ref 3.5–5.1)
POTASSIUM SERPL-SCNC: 4.7 MMOL/L (ref 3.5–5.1)
PROT SERPL-MCNC: 5.5 G/DL (ref 6.4–8.2)
RBC # BLD AUTO: 2.88 M/UL (ref 4.1–5.7)
RBC MORPH BLD: ABNORMAL
SAO2 % BLD: 98 % (ref 92–97)
SAO2% DEVICE SAO2% SENSOR NAME: ABNORMAL
SERVICE CMNT-IMP: ABNORMAL
SODIUM SERPL-SCNC: 138 MMOL/L (ref 136–145)
SODIUM SERPL-SCNC: 140 MMOL/L (ref 136–145)
SPECIMEN SITE: ABNORMAL
TROPONIN I SERPL-MCNC: 0.05 NG/ML
VENTILATION MODE VENT: ABNORMAL
VT SETTING VENT: 550 ML
WBC # BLD AUTO: 20 K/UL (ref 4.1–11.1)

## 2018-08-12 PROCEDURE — 74011250636 HC RX REV CODE- 250/636: Performed by: INTERNAL MEDICINE

## 2018-08-12 PROCEDURE — 82550 ASSAY OF CK (CPK): CPT | Performed by: INTERNAL MEDICINE

## 2018-08-12 PROCEDURE — 82803 BLOOD GASES ANY COMBINATION: CPT | Performed by: INTERNAL MEDICINE

## 2018-08-12 PROCEDURE — C9113 INJ PANTOPRAZOLE SODIUM, VIA: HCPCS | Performed by: HOSPITALIST

## 2018-08-12 PROCEDURE — 74011000250 HC RX REV CODE- 250: Performed by: INTERNAL MEDICINE

## 2018-08-12 PROCEDURE — 74011250637 HC RX REV CODE- 250/637: Performed by: SURGERY

## 2018-08-12 PROCEDURE — 65610000006 HC RM INTENSIVE CARE

## 2018-08-12 PROCEDURE — 74011000250 HC RX REV CODE- 250: Performed by: HOSPITALIST

## 2018-08-12 PROCEDURE — C1751 CATH, INF, PER/CENT/MIDLINE: HCPCS

## 2018-08-12 PROCEDURE — 74011250636 HC RX REV CODE- 250/636: Performed by: SURGERY

## 2018-08-12 PROCEDURE — 94640 AIRWAY INHALATION TREATMENT: CPT

## 2018-08-12 PROCEDURE — 85025 COMPLETE CBC W/AUTO DIFF WBC: CPT | Performed by: INTERNAL MEDICINE

## 2018-08-12 PROCEDURE — 74011250636 HC RX REV CODE- 250/636: Performed by: HOSPITALIST

## 2018-08-12 PROCEDURE — 71045 X-RAY EXAM CHEST 1 VIEW: CPT

## 2018-08-12 PROCEDURE — 74011000258 HC RX REV CODE- 258: Performed by: INTERNAL MEDICINE

## 2018-08-12 PROCEDURE — 02HV33Z INSERTION OF INFUSION DEVICE INTO SUPERIOR VENA CAVA, PERCUTANEOUS APPROACH: ICD-10-PCS | Performed by: INTERNAL MEDICINE

## 2018-08-12 PROCEDURE — 84484 ASSAY OF TROPONIN QUANT: CPT | Performed by: INTERNAL MEDICINE

## 2018-08-12 PROCEDURE — 36600 WITHDRAWAL OF ARTERIAL BLOOD: CPT | Performed by: INTERNAL MEDICINE

## 2018-08-12 PROCEDURE — 83605 ASSAY OF LACTIC ACID: CPT | Performed by: SURGERY

## 2018-08-12 PROCEDURE — 74011250637 HC RX REV CODE- 250/637: Performed by: INTERNAL MEDICINE

## 2018-08-12 PROCEDURE — 94003 VENT MGMT INPAT SUBQ DAY: CPT

## 2018-08-12 PROCEDURE — 80053 COMPREHEN METABOLIC PANEL: CPT | Performed by: INTERNAL MEDICINE

## 2018-08-12 PROCEDURE — 82962 GLUCOSE BLOOD TEST: CPT

## 2018-08-12 PROCEDURE — 85018 HEMOGLOBIN: CPT | Performed by: INTERNAL MEDICINE

## 2018-08-12 PROCEDURE — 77030018836 HC SOL IRR NACL ICUM -A

## 2018-08-12 PROCEDURE — 36415 COLL VENOUS BLD VENIPUNCTURE: CPT | Performed by: INTERNAL MEDICINE

## 2018-08-12 PROCEDURE — 77030022017 HC DRSG HEMO QCLOT ZMED -A

## 2018-08-12 RX ORDER — VANCOMYCIN 1.75 GRAM/500 ML IN 0.9 % SODIUM CHLORIDE INTRAVENOUS
1750
Status: DISCONTINUED | OUTPATIENT
Start: 2018-08-12 | End: 2018-08-13

## 2018-08-12 RX ORDER — ENOXAPARIN SODIUM 100 MG/ML
120 INJECTION SUBCUTANEOUS EVERY 12 HOURS
Status: DISCONTINUED | OUTPATIENT
Start: 2018-08-12 | End: 2018-08-14

## 2018-08-12 RX ORDER — SODIUM CHLORIDE 9 MG/ML
25 INJECTION, SOLUTION INTRAVENOUS CONTINUOUS
Status: DISCONTINUED | OUTPATIENT
Start: 2018-08-12 | End: 2018-08-19

## 2018-08-12 RX ADMIN — SODIUM CHLORIDE 10 ML: 9 INJECTION, SOLUTION INTRAMUSCULAR; INTRAVENOUS; SUBCUTANEOUS at 08:23

## 2018-08-12 RX ADMIN — PROPOFOL 45 MCG/KG/MIN: 10 INJECTION, EMULSION INTRAVENOUS at 02:58

## 2018-08-12 RX ADMIN — IPRATROPIUM BROMIDE AND ALBUTEROL SULFATE 3 ML: .5; 3 SOLUTION RESPIRATORY (INHALATION) at 19:02

## 2018-08-12 RX ADMIN — PROPOFOL 10 MCG/KG/MIN: 10 INJECTION, EMULSION INTRAVENOUS at 05:30

## 2018-08-12 RX ADMIN — CARBAMAZEPINE 100 MG: 100 SUSPENSION ORAL at 10:22

## 2018-08-12 RX ADMIN — POLYETHYLENE GLYCOL 3350 17 G: 17 POWDER, FOR SOLUTION ORAL at 09:09

## 2018-08-12 RX ADMIN — PROPOFOL 30 MCG/KG/MIN: 10 INJECTION, EMULSION INTRAVENOUS at 16:59

## 2018-08-12 RX ADMIN — AMIODARONE HYDROCHLORIDE 200 MG: 200 TABLET ORAL at 08:50

## 2018-08-12 RX ADMIN — CEFEPIME HYDROCHLORIDE 2 G: 2 INJECTION, POWDER, FOR SOLUTION INTRAVENOUS at 16:59

## 2018-08-12 RX ADMIN — VANCOMYCIN HYDROCHLORIDE 1750 MG: 10 INJECTION, POWDER, LYOPHILIZED, FOR SOLUTION INTRAVENOUS at 01:38

## 2018-08-12 RX ADMIN — IPRATROPIUM BROMIDE AND ALBUTEROL SULFATE 3 ML: .5; 3 SOLUTION RESPIRATORY (INHALATION) at 15:02

## 2018-08-12 RX ADMIN — CHLORHEXIDINE GLUCONATE 15 ML: 1.2 RINSE ORAL at 09:08

## 2018-08-12 RX ADMIN — LACTULOSE 10 G: 20 SOLUTION ORAL at 18:24

## 2018-08-12 RX ADMIN — WATER: 1000 INJECTION, SOLUTION INTRAVENOUS at 06:17

## 2018-08-12 RX ADMIN — CARBAMAZEPINE 100 MG: 100 SUSPENSION ORAL at 18:24

## 2018-08-12 RX ADMIN — SODIUM CHLORIDE 10 ML: 9 INJECTION, SOLUTION INTRAMUSCULAR; INTRAVENOUS; SUBCUTANEOUS at 20:35

## 2018-08-12 RX ADMIN — MUPIROCIN: 20 OINTMENT TOPICAL at 09:08

## 2018-08-12 RX ADMIN — PROPOFOL 20 MCG/KG/MIN: 10 INJECTION, EMULSION INTRAVENOUS at 06:15

## 2018-08-12 RX ADMIN — PHENYLEPHRINE HYDROCHLORIDE 270 MCG/MIN: 10 INJECTION INTRAVENOUS at 11:52

## 2018-08-12 RX ADMIN — CEFEPIME HYDROCHLORIDE 2 G: 2 INJECTION, POWDER, FOR SOLUTION INTRAVENOUS at 04:24

## 2018-08-12 RX ADMIN — PHENYLEPHRINE HYDROCHLORIDE 270 MCG/MIN: 10 INJECTION INTRAVENOUS at 04:56

## 2018-08-12 RX ADMIN — Medication 3 MCG/MIN: at 03:00

## 2018-08-12 RX ADMIN — PROPOFOL 30 MCG/KG/MIN: 10 INJECTION, EMULSION INTRAVENOUS at 21:00

## 2018-08-12 RX ADMIN — SODIUM CHLORIDE 10 ML: 9 INJECTION, SOLUTION INTRAMUSCULAR; INTRAVENOUS; SUBCUTANEOUS at 13:36

## 2018-08-12 RX ADMIN — PROPOFOL 30 MCG/KG/MIN: 10 INJECTION, EMULSION INTRAVENOUS at 04:00

## 2018-08-12 RX ADMIN — IPRATROPIUM BROMIDE AND ALBUTEROL SULFATE 3 ML: .5; 3 SOLUTION RESPIRATORY (INHALATION) at 11:44

## 2018-08-12 RX ADMIN — CARBAMAZEPINE 100 MG: 100 SUSPENSION ORAL at 13:25

## 2018-08-12 RX ADMIN — IPRATROPIUM BROMIDE AND ALBUTEROL SULFATE 3 ML: .5; 3 SOLUTION RESPIRATORY (INHALATION) at 08:26

## 2018-08-12 RX ADMIN — SODIUM CHLORIDE 125 ML/HR: 900 INJECTION, SOLUTION INTRAVENOUS at 09:10

## 2018-08-12 RX ADMIN — CHLORHEXIDINE GLUCONATE 15 ML: 1.2 RINSE ORAL at 20:34

## 2018-08-12 RX ADMIN — PROPOFOL 50 MCG/KG/MIN: 10 INJECTION, EMULSION INTRAVENOUS at 12:23

## 2018-08-12 RX ADMIN — SODIUM CHLORIDE 40 MG: 9 INJECTION INTRAMUSCULAR; INTRAVENOUS; SUBCUTANEOUS at 20:34

## 2018-08-12 RX ADMIN — VANCOMYCIN HYDROCHLORIDE 1750 MG: 10 INJECTION, POWDER, LYOPHILIZED, FOR SOLUTION INTRAVENOUS at 20:34

## 2018-08-12 RX ADMIN — PROPOFOL 20 MCG/KG/MIN: 10 INJECTION, EMULSION INTRAVENOUS at 05:00

## 2018-08-12 RX ADMIN — SODIUM CHLORIDE 40 MG: 9 INJECTION INTRAMUSCULAR; INTRAVENOUS; SUBCUTANEOUS at 08:59

## 2018-08-12 RX ADMIN — LACTULOSE 10 G: 20 SOLUTION ORAL at 09:08

## 2018-08-12 RX ADMIN — ENOXAPARIN SODIUM 120 MG: 100 INJECTION SUBCUTANEOUS at 13:26

## 2018-08-12 NOTE — PROGRESS NOTES
08/12/18 0611   ABCDEF Bundle   SBT Safety Screen Passed Yes   SBT Trial Passed No   SBT Trial Reason for Failure Respiratory rate > 35;Low tidal volume;RSBI>105

## 2018-08-12 NOTE — PROGRESS NOTES
3888 -  [facility] called regarding lactic acid level 4.9. CRISTIN Terry was made aware of critical result.

## 2018-08-12 NOTE — PROGRESS NOTES
Pharmacy Automatic Renal Dosing Protocol - Antimicrobials    Indication for Antimicrobials: Sepsis      Current Regimen of Each Antimicrobial:  Vancomycin 3000 mg IV load followed by 1750 mg IV every 16 hours (Start Date ; Day # 2)  Cefepime 2 g IV every 8 hours (Start Date ; Day # 2)    Previous Antimicrobial Therapy:  Vancomycin  Fluconazole  Zosyn    Goal Level: VANCOMYCIN TROUGH GOAL RANGE  Vancomycin Trough: 15 - 20 mcg/mL    Date Dose & Interval Measured (mcg/mL) Extrapolated (mcg/mL)                       Significant Cultures:    Blood: GPC in clusters in both bottles- pending   Blood (line): GPC in clusters in both bottles- pending   Respiratory: 2+ GPC, occasional budding yeast- pending    Radiology / Imaging results: (X-ray, CT scan or MRI):    CXR: There is slight bibasilar atelectasis left greater than right   KUB: There is slight gaseous distention transverse colon without definite evidence of obstruction.  CT ABD: Postsurgical changes as above with no acute intra-abdominal findings. Bilateral atelectasis left airspace opacification with radiodense material suggesting contrast aspiration   XR ABD: Mild small bowel dilation. No pneumoperitoneum. Small left pleural effusion    Paralysis, amputations, malnutrition: None documented     Labs:  Recent Labs      18   0427  18   2202  18   1729  18   0517  08/10/18   0343   CREA  1.35*  1.68*  2.12*  1.03  0.68*   BUN  39*  43*  42*  30*  26*   WBC  20.0*   --    --   22.3*  12.6*     Temp (24hrs), Av.2 °F (37.9 °C), Min:98.8 °F (37.1 °C), Max:102.1 °F (38.9 °C)    Creatinine Clearance (mL/min) or Dialysis: 53 mL/min     Impression/Plan:   · Renal function worsened overnight but seems to be trending down now.   · Will change vancomycin to 1750 mg IV every 18 hours for an estimated trough of 18.5 mcg/mL given the worsened renal function (want to prevent the level from being supra-therapeutic) · Will change cefepime to 2 g IV every 12 hours for CrCl 30-60 mL/min per renal dosing protocol. · Antimicrobial stop date pending     Pharmacy will follow daily and adjust medications as appropriate for renal function and/or serum levels. Thank you,  Song Spencer PHARMD    Recommended duration of therapy  [email or web address]/Pharmacy/Clinical%20Companion/[email or web address]. docx    Renal Dosing  [email or web address]/[facility]/Pharmacy/Clinical%20Companion/Renal%[id number]. pdf

## 2018-08-12 NOTE — PROCEDURES
Central line placement  Septic shock -presumed line infection   Informed consent from wife  Prepped and draped in sterile fashion for LIJ vein TLC  Vein isolated by ultrasound  Vein entered with easy passage of guide wire  Dilated and catheter placed  Good blood return from all ports with easy flush  Sutured in place  No immediate complications  Tolerated well

## 2018-08-12 NOTE — PROGRESS NOTES
1940-Report received. Patient and chart visited. 1574- Slight improvement through night. Levo off. See vent changes. Afebrile. Bicarb improved. 9950- Report given. Patient and chart visited.

## 2018-08-12 NOTE — PROGRESS NOTES
CRS Progress Note    CT scan of abdomen negative for intra-abdominal issues. Off levophed. Still on some jose de jesus. Blood cultures showed GPC in clusters (4/4 bottles). On vanc. Creat improved somewhat and now making urine. /55  Pulse 84  Temp 98.9 °F (37.2 °C)  Resp 22  Ht 6' 2\" (1.88 m)  Wt 119.8 kg (264 lb 1.8 oz)  SpO2 100%  BMI 33.91 kg/m2    Intubated/sedated  Abd soft  Ostomy with stool in bag (200ml)    WBC 20  Hgb 7.7  Cr 1.3 (from 2.1)      Plan  Continue supportive care in ICU. Wean jose de jesus as able  Hold off on tube feeds for now  Continue antibiotics for gram+ sepsis.   Likely source is from lungs

## 2018-08-12 NOTE — PROGRESS NOTES
0715  Bedside and Verbal shift change report given to Vel Mckeon, CRISTIN and Polo Wood RN  (oncoming nurse) by Adrián Franklin RN (offgoing nurse). Report included the following information SBAR, Kardex, Procedure Summary, Intake/Output, MAR, Recent Results and Cardiac Rhythm Paced rhythm. 3477  Dr. Essence Espinal at bedside, advised to continue with lactulose and miralax which is to prevent consitpation. Dr. Essence Espinal wanted to continue with pradaxa. Polo Wood RN advised that this medication could not be crushed, but he advised to give it anyway. Polo Wood RN notified pharmacy and they advised that it could not be crushed and they would contact Dr. Essence Espinal for further instruction. Dr. Essence Espinal also advised to continue to hold tube feedings at this time. 1005  Dr. Víctor Michel at bedside, advised that he will discontinue the metoprolol. He also advised that he wanted the Pradaxa held and to give lovenox. He advised to contact the pharmacy to see when last dose of Pradaxa was given and when lovenox can be given. He also advised that he will read yesterdays Echo later today. 1050  Dr. Arabella Vázquez at bedside. Notified him of the blood culture results. He advised to remove the central line placed on 7/17/18 and he will place a new central line. 1100  Dr. Adan Kidd at bedside, she advised that there are no changes needed at this time. 26  Dr. Arabella Vázquez at bedside. Placed central line in left IJ. Verbal order given for chest x-ray to confirm. 1600  Patient very diaphoretic. Blood glucose checked and found to be within normal limits. Patient given a chlorhexadine bath, linens changed and patient repositioned. Right Central Line removed without complications.

## 2018-08-12 NOTE — PROGRESS NOTES
NAME: Elsa Davis        :  1940        MRN:  [id number]        Assessment :    Plan:  --VAL  Mild hyperkalemia - resolved  Non AG metabolic acidosis    Anemia     ARF (intubated today)  Hypotension  Sepsis- aspiration pna/GPC on BC's     S/p colectomy/YAMEL/ enterotomies for colon cancer --Creatinine peaked at 2.1 yesterday and is better (1.3) with pressors and volume resuscitation; improved UOP; goal CVP ~ 10    Erazo    Corrected calcium is 8.2    Third spacing fluids b/c very low albumin    Improved K    stop bicarb gtt, pH 7.44 (respiratory alkalosis); continue with NS       Subjective:     Chief Complaint:  Awake on ventilator. I spoke to his wife about the above. Review of Systems:    Symptom Y/N Comments  Symptom Y/N Comments   Fever/Chills    Chest Pain     Poor Appetite    Edema     Cough    Abdominal Pain     Sputum    Joint Pain     SOB/BECERRIL    Pruritis/Rash     Nausea/vomit    Tolerating PT/OT     Diarrhea    Tolerating Diet     Constipation    Other       Could not obtain due to: See above     Objective:     VITALS:   Last 24hrs VS reviewed since prior progress note. Most recent are:  Visit Vitals    /69    Pulse 80    Temp 98.8 °F (37.1 °C)    Resp (!) 39    Ht 6' 2\" (1.88 m)    Wt 119.8 kg (264 lb 1.8 oz)    SpO2 100%    BMI 33.91 kg/m2       Intake/Output Summary (Last 24 hours) at 18 0721  Last data filed at 18 0600   Gross per 24 hour   Intake           7331.3 ml   Output             1507 ml   Net           5824.3 ml      Telemetry Reviewed:     PHYSICAL EXAM:  General: Obese, on ventilator  Resp:  Rhonchi. No access. muscle use  CV:  Regular  rhythm,  No murmur (), No Rubs, No Gallops.  ++ edema  GI:  Soft, Non distended, Non tender.  +Bowel sounds, no HSM      Lab Data Reviewed: (see below)    Medications Reviewed: (see below)    PMH/SH reviewed - no change compared to H&P  ________________________________________________________________________  Care Plan discussed with:  Patient     Family  y    RN y    Care Manager                    Consultant:          Comments   >50% of visit spent in counseling and coordination of care       ________________________________________________________________________  Ramiro Kyle MD     Procedures: see electronic medical records for all procedures/Xrays and details which  were not copied into this note but were reviewed prior to creation of Plan. LABS:  Recent Labs      08/12/18 0427 08/11/18   0517   WBC  20.0*  22.3*   HGB  7.7*  9.1*   HCT  25.1*  29.8*   PLT  141*  172     Recent Labs      08/12/18 0427 08/11/18 2202 08/11/18   1729  08/11/18   0517   NA  138  140  136  134*   K  4.6  4.7  5.6*  5.4*   CL  105  108  104  104   CO2  23  15*  18*  19*   BUN  39*  43*  42*  30*   CREA  1.35*  1.68*  2.12*  1.03   GLU  163*  158*  111*  149*   CA  6.2*  6.0*  6.7*  8.0*   MG   --    --    --   2.1     Recent Labs      08/12/18 0427 08/11/18   1729   SGOT  49*  42*   AP  105  108   TP  5.5*  5.5*   ALB  1.5*  1.6*   GLOB  4.0  3.9     No results for input(s): INR, PTP, APTT in the last 72 hours. No lab exists for component: INREXT   No results for input(s): FE, TIBC, PSAT, FERR in the last 72 hours.    Lab Results   Component Value Date/Time    Folate >19.9 07/31/2013 08:37 AM      Recent Labs      08/12/18   0154  08/11/18   1938   PH  7.44  7.39   PCO2  28*  25*   PO2  97  187*     Recent Labs      08/12/18   0427  08/11/18   2202  08/11/18   1216   CPK  94  73  51   CKMB  2.6  2.5  <1.0     No components found for: [place]  Lab Results   Component Value Date/Time    Color DARK YELLOW 08/11/2018 05:12 PM    Appearance TURBID (A) 08/11/2018 05:12 PM    Specific gravity >1.030 (H) 08/11/2018 05:12 PM    Specific gravity 1.023 08/06/2018 11:21 AM    pH (UA) 5.0 08/11/2018 05:12 PM    Protein 30 (A) 08/11/2018 05:12 PM Glucose NEGATIVE  08/11/2018 05:12 PM    Ketone TRACE (A) 08/11/2018 05:12 PM    Bilirubin NEGATIVE  10/13/2014 03:38 PM    Urobilinogen 1.0 08/11/2018 05:12 PM    Nitrites POSITIVE (A) 08/11/2018 05:12 PM    Leukocyte Esterase MODERATE (A) 08/11/2018 05:12 PM    Epithelial cells FEW 08/11/2018 05:12 PM    Bacteria 2+ (A) 08/11/2018 05:12 PM    WBC 10-20 08/11/2018 05:12 PM    RBC 0-5 08/11/2018 05:12 PM       MEDICATIONS:  Current Facility-Administered Medications   Medication Dose Route Frequency    mupirocin (BACTROBAN) 2 % ointment   Both Nostrils DAILY    vancomycin (VANCOCIN) 1750 mg in  ml infusion  1,750 mg IntraVENous Q16H    propofol (DIPRIVAN) infusion  0-50 mcg/kg/min IntraVENous TITRATE    carBAMazepine (TEGretol) 100 mg/5 mL (5 mL) suspension 100 mg  100 mg PEG Tube QID    sodium bicarbonate (8.4%) 150 mEq in sterile water 1,000 mL infusion   IntraVENous CONTINUOUS    cefepime (MAXIPIME) 2 g in 0.9% sodium chloride (MBP/ADV) 100 mL  2 g IntraVENous Q8H    NOREPINephrine (LEVOPHED) 8 mg in 5% dextrose 250mL infusion  2-30 mcg/min IntraVENous TITRATE    PHENYLephrine (LILLY-SYNEPHRINE) 100 mg in 0.9% sodium chloride 250 mL infusion   mcg/min IntraVENous TITRATE    heparin (porcine) pf 300 Units  300 Units InterCATHeter PRN    chlorhexidine (PERIDEX) 0.12 % mouthwash 15 mL  15 mL Oral Q12H    albuterol-ipratropium (DUO-NEB) 2.5 MG-0.5 MG/3 ML  3 mL Nebulization QID RT    albuterol-ipratropium (DUO-NEB) 2.5 MG-0.5 MG/3 ML  3 mL Nebulization Q6H PRN    amiodarone (CORDARONE) tablet 200 mg  200 mg Oral DAILY    metoprolol tartrate (LOPRESSOR) tablet 12.5 mg  12.5 mg Oral BID    dabigatran etexilate (PRADAXA) capsule 150 mg  150 mg Oral BID    prochlorperazine (COMPAZINE) with saline injection 5 mg  5 mg IntraVENous Q6H PRN    venlafaxine-SR (EFFEXOR-XR) capsule 75 mg  75 mg Oral DAILY    lactulose (CHRONULAC) solution 10 g  10 g Oral BID    HYDROmorphone (PF) (DILAUDID) injection 0.5 mg  0.5 mg IntraVENous Q4H PRN    sodium chloride (NS) flush 10-30 mL  10-30 mL InterCATHeter PRN    sodium chloride (NS) flush 10 mL  10 mL InterCATHeter PRN    sodium chloride (NS) flush 10-40 mL  10-40 mL InterCATHeter Q8H    sodium chloride (NS) flush 10 mL  10 mL InterCATHeter Q24H    heparin (porcine) pf 300-500 Units  300-500 Units InterCATHeter PRN    melatonin tablet 3 mg  3 mg Oral QHS    polyethylene glycol (MIRALAX) packet 17 g  17 g Oral DAILY    hydrALAZINE (APRESOLINE) 20 mg/mL injection 10 mg  10 mg IntraVENous Q6H PRN    oxyCODONE IR (ROXICODONE) tablet 5 mg  5 mg Oral Q4H PRN    oxyCODONE IR (ROXICODONE) tablet 10 mg  10 mg Oral Q4H PRN    pantoprazole (PROTONIX) 40 mg in sodium chloride 0.9% 10 mL injection  40 mg IntraVENous Q12H    sodium chloride (NS) flush 5-10 mL  5-10 mL IntraVENous PRN    acetaminophen (TYLENOL) tablet 650 mg  650 mg Oral Q6H PRN    ondansetron (ZOFRAN) injection 4 mg  4 mg IntraVENous Q6H PRN

## 2018-08-12 NOTE — PROGRESS NOTES
[facility]  Pulmonary, Critical Care, and Sleep Medicine    Name: Zeeshan Calzada MRN: [id number]   : 1940 Hospital: [facility]   Date: 2018        IMPRESSION:   · Critically ill with :  · Acute respiratory failure  · Vasopressor dependence 270 phenylephrine  · Severe metabolic acidosis  · Shock-septic with bacteremia 4/4 GPC clusters on culture blood  · Leukocytosis s/p 18 day course of Zosyn  · Anemia ? 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 Change to norepi  · HCO3 infusion  · KUB   · Pulmonary toilet  · Empiric abx-continue vanc for potential staph  · Echo report pending  · Cardiac enzymes-neg  · Sputum g/s and culture  · Oxygenation CXR acidosis suggest non pulmonary cause of decompensation -possibly line sepsis    Discussed with surgery CT report ABD 2 days ago reviewed. He has low suspicion of GI cause of current decompensation   CT chest and CXR reviewed and do not suggest cause of current decompensation   ·        Subjective/Interval History:   8.10 I have reviewed the flowsheet and previous days notes. Asked to evaluate patient to see if his pleural effusion is the cause of his rising WBC. Seen earlier this hospitalization by my partners for aspiration pneumonia. He has been hospitalized for 30 days, having had a colonic resection complicated by ileus. He was on Zosyn for 18 days and this was stopped 3 days ago and his WBC began to rise. He has a congested cough which is chronic. Can not produce sputum.   He is limited in his understanding of how to do incentive spirometry due to prior traumatic brain injury. Review of Systems   Constitutional: Positive for fatigue. Eyes: Negative. Respiratory: Positive for cough. Cardiovascular: Negative. Gastrointestinal: Negative. 8.11 called urgently for acute respiratory failure  RR 50's  rhonchorus breathing  On NRBM    8.12   sedated on ventilator  270 [address]ose [address] jesus  GPC on multiple BC    Remains on Vanc     Objective:   Vital Signs:    Visit Vitals    BP (P) 112/48 (BP 1 Location: Right arm, BP Patient Position: At rest)    Pulse 82    Temp (P) 99.9 °F (37.7 °C)    Resp 26    Ht 6' 2\" (1.88 m)    Wt 119.8 kg (264 lb 1.8 oz)    SpO2 100%    BMI 33.91 kg/m2       O2 Device: (P) Endotracheal tube   O2 Flow Rate (L/min): 3 l/min   Temp (24hrs), Av.9 °F (37.7 °C), Min:98.8 °F (37.1 °C), Max:102.1 °F (38.9 °C)       Intake/Output:   Last shift:       0701 -  1900  In: 1427.5 [I.V.:1287.5]  Out: 870 [Urine:670]  Last 3 shifts: 08/10 1901 -  0700  In: 7331.3 [I.V.:7261.3]  Out: 9370 [Urine:1517]    Intake/Output Summary (Last 24 hours) at 18 1244  Last data filed at 18 1100   Gross per 24 hour   Intake          8702.01 ml   Output             2612 ml   Net          6090.01 ml      Physical Exam   Constitutional: He is sedated. HENT:   Head: Normocephalic and atraumatic. Mouth/Throat: No oropharyngeal exudate. Eyes: Conjunctivae are normal. Pupils are equal, round, and reactive to light. No scleral icterus. Neck:   Old tracheostomy site   Cardiovascular: Normal rate and regular rhythm. Pulmonary/Chest: No respiratory distress. He has no wheezes. He has rhonchi. He has no rales. Abdominal: He exhibits no distension. There is no tenderness. Musculoskeletal: He exhibits edema. Skin: Skin is warm and dry.      Data:   Labs:  Recent Labs      18   0427  18   0517  08/10/18   0343   WBC  20.0*  22.3*  12.6*   HGB  7.7*  9.1*  7.8*   HCT  25.1*  29.8*  26.0*   PLT  141* 172  156     Recent Labs      08/12/18   0427  08/11/18   2202  08/11/18   1729  08/11/18   0517   NA  138  140  136  134*   K  4.6  4.7  5.6*  5.4*   CL  105  108  104  104   CO2  23  15*  18*  19*   GLU  163*  158*  111*  149*   BUN  39*  43*  42*  30*   CREA  1.35*  1.68*  2.12*  1.03   CA  6.2*  6.0*  6.7*  8.0*   MG   --    --    --   2.1   ALB  1.5*   --   1.6*   --    TBILI  1.2*   --   1.7*   --    SGOT  49*   --   42*   --    ALT  57   --   61   --      Recent Labs      08/12/18   0154  08/11/18   1938  08/11/18   1045   PH  7.44  7.39  7.35   PCO2  28*  25*  24*   PO2  97  187*  263*   HCO3  18*  15*  13*   FIO2  50  80  100       Imaging:  I have personally reviewed the patients radiographs:          Ankita Moreland MD     CC time 50 min EOP

## 2018-08-12 NOTE — CONSULTS
Subjective:      Date of  Admission: 7/10/2018 10:44 AM     Admission type:Emergency    Amparo Elizabeth is a 69 y.o. male admitted for Acute blood loss anemia;GI bleed; Anasarca;GI Bleed;gi bleed*. Diagnosed with colon mass and underwent colectomy last month. Yesterday developed resp distress and hypotension. Intubated emergently and tx to ICU. Now pressors dependant and on vent. Blood cultures pending.      Patient Active Problem List    Diagnosis Date Noted    Acute blood loss anemia 07/10/2018    Anasarca 07/10/2018    GI bleed 07/10/2018    Severe obesity (BMI 35.0-39.9) ([address] 07/05/2018    Localized epilepsy with impairment of consciousness ([address]     Common wart 05/16/2016    Chronic right shoulder pain 01/11/2016    Screening for colon cancer 11/04/2015    Acute bronchitis 08/25/2015    Chronic back pain greater than 3 months duration 05/04/2015    Cerebral infarction ([address] 05/04/2015    Pre-op evaluation 01/29/2015    Pacemaker 10/15/2014    SSS (sick sinus syndrome) ([address] 10/13/2014    Syncope 10/13/2014    Seizure disorder ([address] 10/13/2014    RONAL on CPAP 10/13/2014    Tinea corporis 05/07/2014    Insomnia 01/03/2014    Ankle fracture, left 08/28/2013    Constipation 12/14/2012    Cataract 07/30/2012    Atrial fibrillation ([address] 06/19/2012    TBI (traumatic brain injury) ([address] 06/08/2012    CHI (closed head injury) 05/24/2012    Basal cell cancer 05/24/2012    Atrial flutter ([address] 03/02/2012    Atrial fibrillation or flutter 03/02/2012    Small bowel obstruction ([address] 02/21/2012    Ulcer 09/14/2011    Wax in ear 06/17/2011    Cellulitis 04/04/2011    Rash 03/28/2011    Hypothyroidism 12/22/2010    Hyponatremia 12/22/2010    BPH (benign prostatic hyperplasia) 12/22/2010    HTN (hypertension) 12/08/2010    Depression 12/08/2010    Hypercholesterolemia 12/08/2010    Acid reflux 12/08/2010    Seizure ([facility] 12/08/2010      Cee Quiroz, NP  Past Medical History:   Diagnosis Date    A-fib [facility])     Acute bronchitis 8/25/2015    Anxiety     Arrhythmia     atrial fibrillation    Arthritis     BCC (basal cell carcinoma of skin)     BPH (benign prostatic hyperplasia)     Chronic back pain greater than 3 months duration 5/4/2015    Chronic right shoulder pain 1/11/2016    Common wart 5/16/2016    Constipation 12/14/2012    CVA (cerebral infarction) 5/4/2015    Depression     GERD (gastroesophageal reflux disease)     Hearing loss     bilateral hearing aids    Hemiplegia following CVA (cerebrovascular accident) ([address]     left side hemiparesis    History of colostomy     HTN (hypertension)     Hyperlipidemia     Localized epilepsy with impairment of consciousness ([address]     Prostate cancer ([address]     Status post XRT, Lupron    Screening for colon cancer 11/4/2015    Stroke [facility])     traumatic from neck crushing    TBI (traumatic [facility]) [facility] [facility] 1994    Unspecified sleep apnea     on CPAP      Past Surgical History:   Procedure Laterality Date    COLONOSCOPY N/A 7/12/2018    COLONOSCOPY through stoma performed by Nguyễn Ocampo MD at [facility] ENDOSCOPY    HX ANKLE FRACTURE TX      HX CATARACT REMOVAL      bilat    HX COLECTOMY      colostomy placed and revised    HX HEENT      ear surgery eddie.     HX HERNIA REPAIR      HX ORTHOPAEDIC      left hip pinning    HX PACEMAKER  2014     Allergies   Allergen Reactions    Ciprofibrate Hives      Family History   Problem Relation Age of Onset    Alzheimer Mother      dec [de-identified]    Cancer Father      dec 76 kidney    Heart Disease Brother     No Known Problems Son       Current Facility-Administered Medications   Medication Dose Route Frequency    0.9% sodium chloride infusion  125 mL/hr IntraVENous CONTINUOUS    vancomycin (VANCOCIN) 1750 mg in  ml infusion  1,750 mg IntraVENous Q18H    cefepime (MAXIPIME) 2 g in 0.9% sodium chloride (MBP/ADV) 100 mL  2 g IntraVENous Q12H    mupirocin (BACTROBAN) 2 % ointment   Both Nostrils DAILY    propofol (DIPRIVAN) infusion  0-50 mcg/kg/min IntraVENous TITRATE    carBAMazepine (TEGretol) 100 mg/5 mL (5 mL) suspension 100 mg  100 mg PEG Tube QID    NOREPINephrine (LEVOPHED) 8 mg in 5% dextrose 250mL infusion  2-30 mcg/min IntraVENous TITRATE    PHENYLephrine (LILLY-SYNEPHRINE) 100 mg in 0.9% sodium chloride 250 mL infusion   mcg/min IntraVENous TITRATE    heparin (porcine) pf 300 Units  300 Units InterCATHeter PRN    chlorhexidine (PERIDEX) 0.12 % mouthwash 15 mL  15 mL Oral Q12H    albuterol-ipratropium (DUO-NEB) 2.5 MG-0.5 MG/3 ML  3 mL Nebulization QID RT    albuterol-ipratropium (DUO-NEB) 2.5 MG-0.5 MG/3 ML  3 mL Nebulization Q6H PRN    amiodarone (CORDARONE) tablet 200 mg  200 mg Oral DAILY    metoprolol tartrate (LOPRESSOR) tablet 12.5 mg  12.5 mg Oral BID    dabigatran etexilate (PRADAXA) capsule 150 mg  150 mg Oral BID    prochlorperazine (COMPAZINE) with saline injection 5 mg  5 mg IntraVENous Q6H PRN    venlafaxine-SR (EFFEXOR-XR) capsule 75 mg  75 mg Oral DAILY    lactulose (CHRONULAC) solution 10 g  10 g Oral BID    HYDROmorphone (PF) (DILAUDID) injection 0.5 mg  0.5 mg IntraVENous Q4H PRN    sodium chloride (NS) flush 10-30 mL  10-30 mL InterCATHeter PRN    sodium chloride (NS) flush 10 mL  10 mL InterCATHeter PRN    sodium chloride (NS) flush 10-40 mL  10-40 mL InterCATHeter Q8H    sodium chloride (NS) flush 10 mL  10 mL InterCATHeter Q24H    heparin (porcine) pf 300-500 Units  300-500 Units InterCATHeter PRN    melatonin tablet 3 mg  3 mg Oral QHS    polyethylene glycol (MIRALAX) packet 17 g  17 g Oral DAILY    hydrALAZINE (APRESOLINE) 20 mg/mL injection 10 mg  10 mg IntraVENous Q6H PRN    oxyCODONE IR (ROXICODONE) tablet 5 mg  5 mg Oral Q4H PRN    oxyCODONE IR (ROXICODONE) tablet 10 mg  10 mg Oral Q4H PRN    pantoprazole (PROTONIX) 40 mg in sodium chloride 0.9% 10 mL injection  40 mg IntraVENous Q12H    sodium chloride (NS) flush 5-10 mL  5-10 mL IntraVENous PRN    acetaminophen (TYLENOL) tablet 650 mg  650 mg Oral Q6H PRN    ondansetron (ZOFRAN) injection 4 mg  4 mg IntraVENous Q6H PRN         Review of Symptoms:  Unable to perform due to his condition. Nothing pertinent -ve or +ve based upon review of records. Subjective:      Visit Vitals    BP 96/51    Pulse 80    Temp 98.9 °F (37.2 °C)    Resp 20    Ht 6' 2\" (1.88 m)    Wt 264 lb 1.8 oz (119.8 kg)    SpO2 100%    BMI 33.91 kg/m2       Physical Exam  Abdomen: soft, non-tender.  Bowel sounds normal.   Extremities: no cyanosis, 1-2+ edema  Heart: regular rate and rhythm, S1, S2 normal, no murmur, click, rub or gallop  Lungs: clear to auscultation bilaterally  Neck: supple, no carotid bruit and no JVD  Neurologic: sedated      Cardiographics    Telemetry: AFIB, V paced      Labs:   Recent Results (from the past 24 hour(s))   BLOOD GAS, ARTERIAL    Collection Time: 08/11/18 10:45 AM   Result Value Ref Range    pH 7.35 7.35 - 7.45      PCO2 24 (L) 35.0 - 45.0 mmHg    PO2 263 (H) 80 - 100 mmHg    O2  (H) 92 - 97 %    BICARBONATE 13 (L) 22 - 26 mmol/L    BASE DEFICIT 11.1 mmol/L    O2 METHOD VENTILATOR      FIO2 100 %    MODE PRESSURE CONTROL      SET RATE 16      SPONTANEOUS RATE 52.0      PRESSURE SUPPORT 22      IPAP/PIP 28      EPAP/CPAP/PEEP 6.0      Sample source ARTERIAL      SITE LEFT RADIAL      BONNIE'S TEST YES     LACTIC ACID    Collection Time: 08/11/18 12:16 PM   Result Value Ref Range    Lactic acid 6.9 (HH) 0.4 - 2.0 MMOL/L   CULTURE, RESPIRATORY/SPUTUM/BRONCH W GRAM STAIN    Collection Time: 08/11/18 12:16 PM   Result Value Ref Range    Special Requests: NO SPECIAL REQUESTS      GRAM STAIN 1+ WBCS SEEN      GRAM STAIN 2+ GRAM POSITIVE COCCI      GRAM STAIN OCCASIONAL BUDDING YEAST      Culture result: PENDING    CK W/ CKMB & INDEX    Collection Time: 08/11/18 12:16 PM   Result Value Ref Range    CK 51 39 - 308 U/L    CK - MB <1.0 <3.6 NG/ML    CK-MB Index Cannot be calculated 0 - 2.5     TROPONIN I    Collection Time: 08/11/18 12:16 PM   Result Value Ref Range    Troponin-I, Qt. 0.25 (H) <0.05 ng/mL   SODIUM, UR, RANDOM    Collection Time: 08/11/18  5:12 PM   Result Value Ref Range    Sodium urine, random 24 MMOL/L   EOSINOPHILS, URINE    Collection Time: 08/11/18  5:12 PM   Result Value Ref Range    Eosinophils,urine NEGATIVE      URINALYSIS W/MICROSCOPIC    Collection Time: 08/11/18  5:12 PM   Result Value Ref Range    Color DARK YELLOW      Appearance TURBID (A) CLEAR      Specific gravity >1.030 (H) 1.003 - 1.030    pH (UA) 5.0 5.0 - 8.0      Protein 30 (A) NEG mg/dL    Glucose NEGATIVE  NEG mg/dL    Ketone TRACE (A) NEG mg/dL    Blood NEGATIVE  NEG      Urobilinogen 1.0 0.2 - 1.0 EU/dL    Nitrites POSITIVE (A) NEG      Leukocyte Esterase MODERATE (A) NEG      WBC 10-20 0 - 4 /hpf    RBC 0-5 0 - 5 /hpf    Epithelial cells FEW FEW /lpf    Bacteria 2+ (A) NEG /hpf    Mucus TRACE (A) NEG /lpf    Amorphous Crystals 1+ (A) NEG    Granular cast 2-5 (A) NEG /lpf    Other: Renal Epithelial cells Present     CREATININE, UR, RANDOM    Collection Time: 08/11/18  5:12 PM   Result Value Ref Range    Creatinine, urine 172.00 mg/dL   LACTIC ACID    Collection Time: 08/11/18  5:12 PM   Result Value Ref Range    Lactic acid 8.3 (HH) 0.4 - 2.0 MMOL/L   BILIRUBIN, CONFIRM    Collection Time: 08/11/18  5:12 PM   Result Value Ref Range    Bilirubin UA, confirm NEGATIVE  NEG     METABOLIC PANEL, BASIC    Collection Time: 08/11/18  5:29 PM   Result Value Ref Range    Sodium 136 136 - 145 mmol/L    Potassium 5.6 (H) 3.5 - 5.1 mmol/L    Chloride 104 97 - 108 mmol/L    CO2 18 (L) 21 - 32 mmol/L    Anion gap 14 5 - 15 mmol/L    Glucose 111 (H) 65 - 100 mg/dL    BUN 42 (H) 6 - 20 MG/DL    Creatinine 2.12 (H) 0.70 - 1.30 MG/DL    BUN/Creatinine ratio 20 12 - 20      GFR est AA 37 (L) >60 ml/min/1.73m2    GFR est non-AA 30 (L) >60 ml/min/1.73m2    Calcium 6.7 (L) 8.5 - 10.1 MG/DL   HEPATIC FUNCTION PANEL    Collection Time: 08/11/18  5:29 PM   Result Value Ref Range    Protein, total 5.5 (L) 6.4 - 8.2 g/dL    Albumin 1.6 (L) 3.5 - 5.0 g/dL    Globulin 3.9 2.0 - 4.0 g/dL    A-G Ratio 0.4 (L) 1.1 - 2.2      Bilirubin, total 1.7 (H) 0.2 - 1.0 MG/DL    Bilirubin, direct 1.3 (H) 0.0 - 0.2 MG/DL    Alk.  phosphatase 108 45 - 117 U/L    AST (SGOT) 42 (H) 15 - 37 U/L    ALT (SGPT) 61 12 - 78 U/L   BLOOD GAS, ARTERIAL    Collection Time: 08/11/18  7:38 PM   Result Value Ref Range    pH 7.39 7.35 - 7.45      PCO2 25 (L) 35.0 - 45.0 mmHg    PO2 187 (H) 80 - 100 mmHg    O2 SAT 99 (H) 92 - 97 %    BICARBONATE 15 (L) 22 - 26 mmol/L    BASE DEFICIT 8.1 mmol/L    O2 METHOD VENTILATOR      FIO2 80 %    MODE PRESSURE CONTROL      SET RATE 16      IPAP/PIP 20.0      EPAP/CPAP/PEEP 6.0      Sample source ARTERIAL      SITE RIGHT RADIAL      BONNIE'S TEST YES     CK W/ CKMB & INDEX    Collection Time: 08/11/18 10:02 PM   Result Value Ref Range    CK 73 39 - 308 U/L    CK - MB 2.5 <3.6 NG/ML    CK-MB Index 3.4 (H) 0 - 2.5     TROPONIN I    Collection Time: 08/11/18 10:02 PM   Result Value Ref Range    Troponin-I, Qt. 0.08 (H) <0.33 ng/mL   METABOLIC PANEL, BASIC    Collection Time: 08/11/18 10:02 PM   Result Value Ref Range    Sodium 140 136 - 145 mmol/L    Potassium 4.7 3.5 - 5.1 mmol/L    Chloride 108 97 - 108 mmol/L    CO2 15 (LL) 21 - 32 mmol/L    Anion gap 17 (H) 5 - 15 mmol/L    Glucose 158 (H) 65 - 100 mg/dL    BUN 43 (H) 6 - 20 MG/DL    Creatinine 1.68 (H) 0.70 - 1.30 MG/DL    BUN/Creatinine ratio 26 (H) 12 - 20      GFR est AA 48 (L) >60 ml/min/1.73m2    GFR est non-AA 40 (L) >60 ml/min/1.73m2    Calcium 6.0 (LL) 8.5 - 10.1 MG/DL   LACTIC ACID    Collection Time: 08/12/18 12:04 AM   Result Value Ref Range    Lactic acid 7.9 (HH) 0.4 - 2.0 MMOL/L   BLOOD GAS, ARTERIAL    Collection Time: 08/12/18  1:54 AM   Result Value Ref Range    pH 7.44 7.35 - 7.45      PCO2 28 (L) 35.0 - 45.0 mmHg    PO2 97 80 - 100 mmHg    O2 SAT 98 (H) 92 - 97 %    BICARBONATE 18 (L) 22 - 26 mmol/L    BASE DEFICIT 4.3 mmol/L    O2 METHOD VENTILATOR      FIO2 50 %    MODE A/C      Tidal volume 550      SET RATE 16      EPAP/CPAP/PEEP 6.0      Sample source ARTERIAL      SITE RIGHT RADIAL      BONNIE'S TEST YES     CK W/ CKMB & INDEX    Collection Time: 08/12/18  4:27 AM   Result Value Ref Range    CK 94 39 - 308 U/L    CK - MB 2.6 <3.6 NG/ML    CK-MB Index 2.8 (H) 0 - 2.5     TROPONIN I    Collection Time: 08/12/18  4:27 AM   Result Value Ref Range    Troponin-I, Qt. 0.05 (H) <0.05 ng/mL   CBC WITH AUTOMATED DIFF    Collection Time: 08/12/18  4:27 AM   Result Value Ref Range    WBC 20.0 (H) 4.1 - 11.1 K/uL    RBC 2.88 (L) 4.10 - 5.70 M/uL    HGB 7.7 (L) 12.1 - 17.0 g/dL    HCT 25.1 (L) 36.6 - 50.3 %    MCV 87.2 80.0 - 99.0 FL    MCH 26.7 26.0 - 34.0 PG    MCHC 30.7 30.0 - 36.5 g/dL    RDW 25.7 (H) 11.5 - 14.5 %    PLATELET 250 (L) 717 - 400 K/uL    MPV 11.1 8.9 - 12.9 FL    NRBC 0.1 (H) 0  WBC    ABSOLUTE NRBC 0.03 (H) 0.00 - 0.01 K/uL    NEUTROPHILS 89 (H) 32 - 75 %    BAND NEUTROPHILS 2 %    LYMPHOCYTES 5 (L) 12 - 49 %    MONOCYTES 4 (L) 5 - 13 %    EOSINOPHILS 0 0 - 7 %    BASOPHILS 0 0 - 1 %    IMMATURE GRANULOCYTES 0 0.0 - 0.5 %    ABS. NEUTROPHILS 18.2 (H) 1.8 - 8.0 K/UL    ABS. LYMPHOCYTES 1.0 0.8 - 3.5 K/UL    ABS. MONOCYTES 0.8 0.0 - 1.0 K/UL    ABS. EOSINOPHILS 0.0 0.0 - 0.4 K/UL    ABS. BASOPHILS 0.0 0.0 - 0.1 K/UL    ABS. IMM.  GRANS. 0.0 0.00 - 0.04 K/UL    DF MANUAL      RBC COMMENTS ANISOCYTOSIS  2+       METABOLIC PANEL, COMPREHENSIVE    Collection Time: 08/12/18  4:27 AM   Result Value Ref Range    Sodium 138 136 - 145 mmol/L    Potassium 4.6 3.5 - 5.1 mmol/L    Chloride 105 97 - 108 mmol/L    CO2 23 21 - 32 mmol/L    Anion gap 10 5 - 15 mmol/L    Glucose 163 (H) 65 - 100 mg/dL    BUN 39 (H) 6 - 20 MG/DL    Creatinine 1.35 (H) 0.70 - 1.30 MG/DL    BUN/Creatinine ratio 29 (H) 12 - 20      GFR est AA >60 >60 ml/min/1.73m2    GFR est non-AA 51 (L) >60 ml/min/1.73m2    Calcium 6.2 (LL) 8.5 - 10.1 MG/DL    Bilirubin, total 1.2 (H) 0.2 - 1.0 MG/DL    ALT (SGPT) 57 12 - 78 U/L    AST (SGOT) 49 (H) 15 - 37 U/L    Alk. phosphatase 105 45 - 117 U/L    Protein, total 5.5 (L) 6.4 - 8.2 g/dL    Albumin 1.5 (L) 3.5 - 5.0 g/dL    Globulin 4.0 2.0 - 4.0 g/dL    A-G Ratio 0.4 (L) 1.1 - 2.2     LACTIC ACID    Collection Time: 08/12/18  4:27 AM   Result Value Ref Range    Lactic acid 4.9 (HH) 0.4 - 2.0 MMOL/L        Assessment:     Assessment:       Principal Problem:    GI bleed (7/10/2018)    Active Problems:    Acute blood loss anemia (7/10/2018)      Anasarca (7/10/2018)         Plan:     1. Acute resp failure, septic shock: on abx. Final culture report pending. Continue abx. Will f/u Echo report. 2. A. Fib, s/p ablation. S/p PPM. On Amiodarone. Hold BB due to hypotension and need for pressors. Nursing staff unable to give pradaxa per NG tube. Ok to switch to lovenox/heparin for [facility] if k with surgery. Staff to check with pharmacy. With GPC in blood and PPM, will await final culture report. 3. S/p colon resection. 4.  HX CVA with L sided weakness.

## 2018-08-13 ENCOUNTER — APPOINTMENT (OUTPATIENT)
Dept: GENERAL RADIOLOGY | Age: 78
DRG: 329 | End: 2018-08-13
Attending: INTERNAL MEDICINE
Payer: MEDICARE

## 2018-08-13 LAB
ALBUMIN SERPL-MCNC: 1.6 G/DL (ref 3.5–5)
ALBUMIN/GLOB SERPL: 0.4 {RATIO} (ref 1.1–2.2)
ALP SERPL-CCNC: 108 U/L (ref 45–117)
ALT SERPL-CCNC: 61 U/L (ref 12–78)
ANION GAP SERPL CALC-SCNC: 14 MMOL/L (ref 5–15)
ANION GAP SERPL CALC-SCNC: 8 MMOL/L (ref 5–15)
AST SERPL-CCNC: 42 U/L (ref 15–37)
BACTERIA SPEC CULT: ABNORMAL
BASOPHILS # BLD: 0 K/UL (ref 0–0.1)
BASOPHILS NFR BLD: 0 % (ref 0–1)
BILIRUB DIRECT SERPL-MCNC: 1.3 MG/DL (ref 0–0.2)
BILIRUB SERPL-MCNC: 1.7 MG/DL (ref 0.2–1)
BUN SERPL-MCNC: 24 MG/DL (ref 6–20)
BUN SERPL-MCNC: 42 MG/DL (ref 6–20)
BUN/CREAT SERPL: 20 (ref 12–20)
BUN/CREAT SERPL: 34 (ref 12–20)
CALCIUM SERPL-MCNC: 6.6 MG/DL (ref 8.5–10.1)
CALCIUM SERPL-MCNC: 6.7 MG/DL (ref 8.5–10.1)
CHLORIDE SERPL-SCNC: 104 MMOL/L (ref 97–108)
CHLORIDE SERPL-SCNC: 111 MMOL/L (ref 97–108)
CO2 SERPL-SCNC: 18 MMOL/L (ref 21–32)
CO2 SERPL-SCNC: 24 MMOL/L (ref 21–32)
CREAT SERPL-MCNC: 0.71 MG/DL (ref 0.7–1.3)
CREAT SERPL-MCNC: 2.12 MG/DL (ref 0.7–1.3)
DIFFERENTIAL METHOD BLD: ABNORMAL
EOSINOPHIL # BLD: 0 K/UL (ref 0–0.4)
EOSINOPHIL NFR BLD: 0 % (ref 0–7)
ERYTHROCYTE [DISTWIDTH] IN BLOOD BY AUTOMATED COUNT: 25.9 % (ref 11.5–14.5)
GLOBULIN SER CALC-MCNC: 3.9 G/DL (ref 2–4)
GLUCOSE SERPL-MCNC: 111 MG/DL (ref 65–100)
GLUCOSE SERPL-MCNC: 147 MG/DL (ref 65–100)
GRAM STN SPEC: ABNORMAL
HCT VFR BLD AUTO: 24.2 % (ref 36.6–50.3)
HGB BLD-MCNC: 7.1 G/DL (ref 12.1–17)
IMM GRANULOCYTES # BLD: 0 K/UL (ref 0–0.04)
IMM GRANULOCYTES NFR BLD AUTO: 0 % (ref 0–0.5)
LYMPHOCYTES # BLD: 1.4 K/UL (ref 0.8–3.5)
LYMPHOCYTES NFR BLD: 8 % (ref 12–49)
MAGNESIUM SERPL-MCNC: 2.5 MG/DL (ref 1.6–2.4)
MCH RBC QN AUTO: 25.2 PG (ref 26–34)
MCHC RBC AUTO-ENTMCNC: 29.3 G/DL (ref 30–36.5)
MCV RBC AUTO: 85.8 FL (ref 80–99)
METAMYELOCYTES NFR BLD MANUAL: 1 %
MONOCYTES # BLD: 0 K/UL (ref 0–1)
MONOCYTES NFR BLD: 0 % (ref 5–13)
NEUTS BAND NFR BLD MANUAL: 4 %
NEUTS SEG # BLD: 16.5 K/UL (ref 1.8–8)
NEUTS SEG NFR BLD: 87 % (ref 32–75)
NRBC # BLD: 0.09 K/UL (ref 0–0.01)
NRBC BLD-RTO: 0.5 PER 100 WBC
PHOSPHATE SERPL-MCNC: 2 MG/DL (ref 2.6–4.7)
PLATELET # BLD AUTO: 206 K/UL (ref 150–400)
PMV BLD AUTO: 12.4 FL (ref 8.9–12.9)
POTASSIUM SERPL-SCNC: 4.2 MMOL/L (ref 3.5–5.1)
POTASSIUM SERPL-SCNC: 5.6 MMOL/L (ref 3.5–5.1)
PROCALCITONIN SERPL-MCNC: 0.47 NG/ML (ref 0–0.08)
PROT SERPL-MCNC: 5.5 G/DL (ref 6.4–8.2)
RBC # BLD AUTO: 2.82 M/UL (ref 4.1–5.7)
RBC MORPH BLD: ABNORMAL
SERVICE CMNT-IMP: ABNORMAL
SODIUM SERPL-SCNC: 136 MMOL/L (ref 136–145)
SODIUM SERPL-SCNC: 143 MMOL/L (ref 136–145)
WBC # BLD AUTO: 18.1 K/UL (ref 4.1–11.1)

## 2018-08-13 PROCEDURE — 74011000258 HC RX REV CODE- 258: Performed by: SURGERY

## 2018-08-13 PROCEDURE — 74011000250 HC RX REV CODE- 250: Performed by: SURGERY

## 2018-08-13 PROCEDURE — 74011250636 HC RX REV CODE- 250/636: Performed by: INTERNAL MEDICINE

## 2018-08-13 PROCEDURE — 74011000258 HC RX REV CODE- 258: Performed by: INTERNAL MEDICINE

## 2018-08-13 PROCEDURE — 74011250637 HC RX REV CODE- 250/637: Performed by: SURGERY

## 2018-08-13 PROCEDURE — 74011250637 HC RX REV CODE- 250/637: Performed by: INTERNAL MEDICINE

## 2018-08-13 PROCEDURE — 94640 AIRWAY INHALATION TREATMENT: CPT

## 2018-08-13 PROCEDURE — 74011250636 HC RX REV CODE- 250/636: Performed by: HOSPITALIST

## 2018-08-13 PROCEDURE — 36415 COLL VENOUS BLD VENIPUNCTURE: CPT | Performed by: SURGERY

## 2018-08-13 PROCEDURE — 77030032490 HC SLV COMPR SCD KNE COVD -B

## 2018-08-13 PROCEDURE — 94003 VENT MGMT INPAT SUBQ DAY: CPT

## 2018-08-13 PROCEDURE — 83735 ASSAY OF MAGNESIUM: CPT | Performed by: INTERNAL MEDICINE

## 2018-08-13 PROCEDURE — 85025 COMPLETE CBC W/AUTO DIFF WBC: CPT | Performed by: SURGERY

## 2018-08-13 PROCEDURE — 65610000006 HC RM INTENSIVE CARE

## 2018-08-13 PROCEDURE — 77030018798 HC PMP KT ENTRL FED COVD -A

## 2018-08-13 PROCEDURE — C9113 INJ PANTOPRAZOLE SODIUM, VIA: HCPCS | Performed by: HOSPITALIST

## 2018-08-13 PROCEDURE — 80048 BASIC METABOLIC PNL TOTAL CA: CPT | Performed by: INTERNAL MEDICINE

## 2018-08-13 PROCEDURE — 71045 X-RAY EXAM CHEST 1 VIEW: CPT

## 2018-08-13 PROCEDURE — 74011000250 HC RX REV CODE- 250: Performed by: INTERNAL MEDICINE

## 2018-08-13 PROCEDURE — 84100 ASSAY OF PHOSPHORUS: CPT | Performed by: INTERNAL MEDICINE

## 2018-08-13 PROCEDURE — 74011000250 HC RX REV CODE- 250: Performed by: HOSPITALIST

## 2018-08-13 RX ORDER — CEFTRIAXONE 1 G/1
2 INJECTION, POWDER, FOR SOLUTION INTRAMUSCULAR; INTRAVENOUS EVERY 12 HOURS
Status: DISCONTINUED | OUTPATIENT
Start: 2018-08-13 | End: 2018-08-13 | Stop reason: SDUPTHER

## 2018-08-13 RX ORDER — VANCOMYCIN 2 GRAM/500 ML IN 0.9 % SODIUM CHLORIDE INTRAVENOUS
2000 EVERY 12 HOURS
Status: DISCONTINUED | OUTPATIENT
Start: 2018-08-13 | End: 2018-08-19

## 2018-08-13 RX ORDER — VENLAFAXINE 25 MG/1
25 TABLET ORAL 3 TIMES DAILY
Status: DISCONTINUED | OUTPATIENT
Start: 2018-08-13 | End: 2018-08-31

## 2018-08-13 RX ORDER — MUPIROCIN 20 MG/G
OINTMENT TOPICAL 2 TIMES DAILY
Status: COMPLETED | OUTPATIENT
Start: 2018-08-13 | End: 2018-08-18

## 2018-08-13 RX ADMIN — PHENYLEPHRINE HYDROCHLORIDE 150 MCG/MIN: 10 INJECTION INTRAVENOUS at 05:37

## 2018-08-13 RX ADMIN — CEFEPIME HYDROCHLORIDE 2 G: 2 INJECTION, POWDER, FOR SOLUTION INTRAVENOUS at 02:00

## 2018-08-13 RX ADMIN — VENLAFAXINE 25 MG: 25 TABLET ORAL at 21:46

## 2018-08-13 RX ADMIN — PHENYLEPHRINE HYDROCHLORIDE 100 MCG/MIN: 10 INJECTION INTRAVENOUS at 22:22

## 2018-08-13 RX ADMIN — POLYETHYLENE GLYCOL 3350 17 G: 17 POWDER, FOR SOLUTION ORAL at 08:40

## 2018-08-13 RX ADMIN — CARBAMAZEPINE 100 MG: 100 SUSPENSION ORAL at 15:11

## 2018-08-13 RX ADMIN — VANCOMYCIN HYDROCHLORIDE 2000 MG: 10 INJECTION, POWDER, LYOPHILIZED, FOR SOLUTION INTRAVENOUS at 15:55

## 2018-08-13 RX ADMIN — CEFTRIAXONE SODIUM 2 G: 2 INJECTION, POWDER, FOR SOLUTION INTRAMUSCULAR; INTRAVENOUS at 12:39

## 2018-08-13 RX ADMIN — LACTULOSE 10 G: 20 SOLUTION ORAL at 08:40

## 2018-08-13 RX ADMIN — ASCORBIC ACID, VITAMIN A PALMITATE, CHOLECALCIFEROL, THIAMINE HYDROCHLORIDE, RIBOFLAVIN-5 PHOSPHATE SODIUM, PYRIDOXINE HYDROCHLORIDE, NIACINAMIDE, DEXPANTHENOL, ALPHA-TOCOPHEROL ACETATE, VITAMIN K1, FOLIC ACID, BIOTIN, CYANOCOBALAMIN: 200; 3300; 200; 6; 3.6; 6; 40; 15; 10; 150; 600; 60; 5 INJECTION, SOLUTION INTRAVENOUS at 20:15

## 2018-08-13 RX ADMIN — MELATONIN TAB 3 MG 3 MG: 3 TAB at 00:01

## 2018-08-13 RX ADMIN — SODIUM CHLORIDE 125 ML/HR: 900 INJECTION, SOLUTION INTRAVENOUS at 23:30

## 2018-08-13 RX ADMIN — PROPOFOL 25 MCG/KG/MIN: 10 INJECTION, EMULSION INTRAVENOUS at 01:04

## 2018-08-13 RX ADMIN — MUPIROCIN: 20 OINTMENT TOPICAL at 18:59

## 2018-08-13 RX ADMIN — PROPOFOL 10 MCG/KG/MIN: 10 INJECTION, EMULSION INTRAVENOUS at 06:20

## 2018-08-13 RX ADMIN — IPRATROPIUM BROMIDE AND ALBUTEROL SULFATE 3 ML: .5; 3 SOLUTION RESPIRATORY (INHALATION) at 19:48

## 2018-08-13 RX ADMIN — SODIUM CHLORIDE 10 ML: 9 INJECTION, SOLUTION INTRAMUSCULAR; INTRAVENOUS; SUBCUTANEOUS at 21:47

## 2018-08-13 RX ADMIN — SODIUM CHLORIDE 10 ML: 9 INJECTION, SOLUTION INTRAMUSCULAR; INTRAVENOUS; SUBCUTANEOUS at 08:41

## 2018-08-13 RX ADMIN — IPRATROPIUM BROMIDE AND ALBUTEROL SULFATE 3 ML: .5; 3 SOLUTION RESPIRATORY (INHALATION) at 17:25

## 2018-08-13 RX ADMIN — CHLORHEXIDINE GLUCONATE 15 ML: 1.2 RINSE ORAL at 21:46

## 2018-08-13 RX ADMIN — ENOXAPARIN SODIUM 120 MG: 100 INJECTION SUBCUTANEOUS at 10:31

## 2018-08-13 RX ADMIN — PHENYLEPHRINE HYDROCHLORIDE 125 MCG/MIN: 10 INJECTION INTRAVENOUS at 10:30

## 2018-08-13 RX ADMIN — VENLAFAXINE 25 MG: 25 TABLET ORAL at 15:10

## 2018-08-13 RX ADMIN — LACTULOSE 10 G: 20 SOLUTION ORAL at 18:53

## 2018-08-13 RX ADMIN — IPRATROPIUM BROMIDE AND ALBUTEROL SULFATE 3 ML: .5; 3 SOLUTION RESPIRATORY (INHALATION) at 11:20

## 2018-08-13 RX ADMIN — CARBAMAZEPINE 100 MG: 100 SUSPENSION ORAL at 00:02

## 2018-08-13 RX ADMIN — CARBAMAZEPINE 100 MG: 100 SUSPENSION ORAL at 08:40

## 2018-08-13 RX ADMIN — PHENYLEPHRINE HYDROCHLORIDE 250 MCG/MIN: 10 INJECTION INTRAVENOUS at 00:06

## 2018-08-13 RX ADMIN — PROPOFOL 10 MCG/KG/MIN: 10 INJECTION, EMULSION INTRAVENOUS at 22:17

## 2018-08-13 RX ADMIN — SODIUM CHLORIDE 125 ML/HR: 900 INJECTION, SOLUTION INTRAVENOUS at 12:29

## 2018-08-13 RX ADMIN — ENOXAPARIN SODIUM 120 MG: 100 INJECTION SUBCUTANEOUS at 22:17

## 2018-08-13 RX ADMIN — MUPIROCIN: 20 OINTMENT TOPICAL at 08:40

## 2018-08-13 RX ADMIN — SODIUM CHLORIDE 40 MG: 9 INJECTION INTRAMUSCULAR; INTRAVENOUS; SUBCUTANEOUS at 08:40

## 2018-08-13 RX ADMIN — SODIUM CHLORIDE 10 ML: 9 INJECTION, SOLUTION INTRAMUSCULAR; INTRAVENOUS; SUBCUTANEOUS at 15:11

## 2018-08-13 RX ADMIN — MELATONIN TAB 3 MG 3 MG: 3 TAB at 21:46

## 2018-08-13 RX ADMIN — ENOXAPARIN SODIUM 120 MG: 100 INJECTION SUBCUTANEOUS at 00:05

## 2018-08-13 RX ADMIN — CARBAMAZEPINE 100 MG: 100 SUSPENSION ORAL at 18:54

## 2018-08-13 RX ADMIN — PHENYLEPHRINE HYDROCHLORIDE 130 MCG/MIN: 10 INJECTION INTRAVENOUS at 07:17

## 2018-08-13 RX ADMIN — PROPOFOL 10 MCG/KG/MIN: 10 INJECTION, EMULSION INTRAVENOUS at 03:57

## 2018-08-13 RX ADMIN — PHENYLEPHRINE HYDROCHLORIDE 130 MCG/MIN: 10 INJECTION INTRAVENOUS at 08:57

## 2018-08-13 RX ADMIN — PROPOFOL 10 MCG/KG/MIN: 10 INJECTION, EMULSION INTRAVENOUS at 10:26

## 2018-08-13 RX ADMIN — AMIODARONE HYDROCHLORIDE 200 MG: 200 TABLET ORAL at 08:40

## 2018-08-13 RX ADMIN — IPRATROPIUM BROMIDE AND ALBUTEROL SULFATE 3 ML: .5; 3 SOLUTION RESPIRATORY (INHALATION) at 08:23

## 2018-08-13 RX ADMIN — SODIUM CHLORIDE 10 ML: 9 INJECTION, SOLUTION INTRAMUSCULAR; INTRAVENOUS; SUBCUTANEOUS at 00:04

## 2018-08-13 RX ADMIN — SODIUM CHLORIDE 125 ML/HR: 900 INJECTION, SOLUTION INTRAVENOUS at 01:00

## 2018-08-13 RX ADMIN — CARBAMAZEPINE 100 MG: 100 SUSPENSION ORAL at 21:48

## 2018-08-13 RX ADMIN — CHLORHEXIDINE GLUCONATE 15 ML: 1.2 RINSE ORAL at 08:41

## 2018-08-13 NOTE — PROGRESS NOTES
Pharmacy Automatic Renal Dosing Protocol - Antimicrobials    Indication for Antimicrobials: Sepsis; GPC bacteremia; URI    Current Regimen of Each Antimicrobial:  Ceftriaxone 2 gm IV every 24 hours (Started 18; Day #1 of 5)  Vancomycin 1750 mg IV every 18 hours (Started 18; Day #3)    Previous Antimicrobial Therapy:  Cefepime 2 g IV every 8 hours (Started 18; Stopped 18)  Vancomycin  Fluconazole  Zosyn    Goal Vancomycin Trough: 15-20 mcg/mL    Vancomycin Level Assessment:  Date Dose & Interval Measured (mcg/mL) Extrapolated (mcg/mL)                       Significant Cultures:   18 Respiratory culture = Heavy MSSA; Light klebsiella (FINAL)  18 Blood culture =Staph in 2/2 (Results pending)  18 Blood culture = Staph in 2/2 (Results pending)  18 Respiratory culture = No growth (FINAL)  18 Blood culture = No growth (FINAL)    Radiology / Imaging results: (X-ray, CT scan or MRI):   None pertinent    Labs:  Recent Labs      18   0355  18   0427  18   2202   18   0517   CREA  0.71  1.35*  1.68*   < >  1.03   BUN  24*  39*  43*   < >  30*   WBC  18.1*  20.0*   --    --   22.3*    < > = values in this interval not displayed. Temp (24hrs), Av.7 °  F (37.1 °  C), Min:98 °  F (36.7 °  C), Max:98.9 °  F (37.2 °  C)    Creatinine Clearance (mL/min) or Dialysis: Greater than 60 mL/min     Impression/Plan:   · Based on culture results, cefepime changed to ceftriaxone on 18 CCU rounds. Ceftriaxone dosed appropriately for indication. · Due to renal function improvement over the past 24 hours, vancomycin dose adjusted to 2000 mg IV every 12 hours. Will order a vancomycin trough on 18. · Antimicrobial stop date: 5 day duration (7 days of total appropriate therapy) entered for ceftriaxone; Vancomycin stop date pending culture results. Pharmacy will follow daily and adjust medications as appropriate for renal function and/or serum levels.     Thank you,  Jeremiah Sloan, PHARMD

## 2018-08-13 NOTE — PROGRESS NOTES
Speech Pathology:  Chart reviewed and discussed with RN. Patient intubated and not appropriate for PO trials/dysphagia treatment. SLP to continue to follow as medically appropriate.     Ania Banerjee, SLP

## 2018-08-13 NOTE — PROGRESS NOTES
Nutrition Assessment:    INTERVENTIONS/RECOMMENDATIONS:   Resume TF once off pressors  Consider adding water flush to TF order once able to resume to prevent clogging. At least 75 ml q 4    ASSESSMENT:   Chart reviewed and pt discussed on CCU rounds. TF currently on hold per Dr. Mary Sage. Pt was on PO diet however had to be intubated on 8/11. Currently receiving pressors (Erasmo @ 18.8 ml/hr). Of noted current TF order does not have water flushes, may increase risk for clogging. Diet Order: NPO, Other (comment) (TF via [place]: TwoCal @ 40mL/h (provides 1920kcals/80gPro/672mL) )  % Eaten:  No data found. Pertinent Medications: [x]Reviewed: NS @ 125, lactulose, Erasmo, miralax, propofol @ 7.1 (187 kcal),   Pertinent Labs: [x]Reviewed: Mg 2.5, Phos 2.0  Food Allergies: [x]NKFA  []Other   Last BM:  8/12  Edema:      [x]RUE 3+  [x]LUE 2+  [x]RLE 2+  [x]LLE 2+     Pressure Ulcer:      [] Stage I   [] Stage II   [] Stage III   [] Stage IV      Anthropometrics: Height: 6' 2\" (188 cm) Weight: 123.6 kg (272 lb 7.8 oz)    IBW (%IBW):   ( ) UBW (%UBW):   (  %)    BMI: Body mass index is 34.99 kg/(m^2). This BMI is indicative of:  []Underweight   []Normal   []Overweight   [x] Obesity   [] Extreme Obesity (BMI>40)  Last Weight Metrics:  Weight Loss Metrics 8/13/2018 7/10/2018 7/5/2018 5/15/2018 5/7/2018 4/10/2018 3/26/2018   Today's Wt 272 lb 7.8 oz - 283 lb 4.8 oz 266 lb 262 lb 267 lb 267 lb   BMI - 34.99 kg/m2 36.37 kg/m2 34.15 kg/m2 33.64 kg/m2 34.28 kg/m2 34.28 kg/m2       Estimated Nutrition Needs (Based on): 2275 Kcals/day (BMR: 2075 x 1.1) , 130 g (1 g/kg) Protein  Carbohydrate:  At Least 130 g/day  Fluids: 2275 mL/day or per primary team    NUTRITION DIAGNOSES:   Problem:  Altered GI function      Etiology: related to Ascending colon mass c/w probable colon carcinoma and Cedric's erosion with       Signs/Symptoms: as evidenced by Ascending colon mass c/w probable colon carcinoma and Cedric's erosion with [facility] Previous Nutrition Dx:  [] Resolved  [] Unresolved           [x] Progressing    NUTRITION INTERVENTIONS:  Meals/Snacks:  Other (advance diet as/if medically able per SLP ) Enteral/Parenteral Nutrition: Modify rate, concentration, composition, and schedule, Discontinue parenteral nutrition Supplements: Commercial supplement              GOAL:   resume TF in 2-4 days    NUTRITION MONITORING AND EVALUATION      Food/Nutrient Intake Outcomes: Enteral/parenteral nutrition intake  Physical Signs/Symptoms Outcomes: GI, Glucose profile, GI profile, Electrolyte and renal profile, Weight/weight change    Previous Goal Met:   [] Met              [x] Progressing Towards Goal              [] Not Progressing Towards Goal   Previous Recommendations:   [x] Implemented          [] Not Implemented          [] Not Applicable    LEARNING NEEDS (Diet, Food/Nutrient-Drug Interaction):    [x] None Identified   [] Identified and Education Provided/Documented   [] Identified and Pt declined/was not appropriate     Cultural, Voodoo, OR Ethnic Dietary Needs:    [x] None Identified   [] Identified and Addressed     [x] Interdisciplinary Care Plan Reviewed/Documented    [x] Discharge Planning: TBD   [] Participated in Interdisciplinary Rounds    NUTRITION RISK:    [x] High              [] Moderate           []  Low  []  Minimal/Uncompromised      Ajay Gonzalez RDN  Pager [phone]  Weekend Pager [phone]

## 2018-08-13 NOTE — PROGRESS NOTES
0700- Verbal shift change report given to Karime Douglas RN by Komal Valero RN. Report included the following information SBAR, patient history, vasopressors, family involvement, neurologic assessment, nutritional status, lines, tubes and drains. 0830- Pt repositioned and suctioned. 1030- Phenylephrine titrated down to 125 mcg/min. Patient's BP tolerated well. 1- Patient's wife, Doroteo Owen expressed concerns regarding patient's dependency on ventilator. 1830- Performed wound care at the bedside. Pt tolerated well. 1845- Set up tube feedsper verbal order drom Dr. Paramjit Drake at 10ml/hr start rate.

## 2018-08-13 NOTE — PROGRESS NOTES
Chart reviewed. Pt currently in CCU, intubated and on pressor support.  Will defer and continue to follow

## 2018-08-13 NOTE — PROGRESS NOTES
CRS Progress note    Slowly improving. Creatinine improved. Making urine. Less jose de jesus.   Failed SBT    /69  Pulse 77  Temp 98.3 °F (36.8 °C)  Resp 21  Ht 6' 2\" (1.88 m)  Wt 123.6 kg (272 lb 7.8 oz)  SpO2 100%  BMI 34.99 kg/m2    Intubated/ sedated  Abd soft, ostomy functioning    Plan  -Start trickle feeds today  -TPN

## 2018-08-13 NOTE — PROGRESS NOTES
8/13/2018 9:30 AM    Admit Date: 7/10/2018    Admit Diagnosis: Acute blood loss anemia;GI bleed; Anasarca;GI Bleed;gi bleed*    Subjective:     Elsa Davis remains intubated and pressor dependant.      Visit Vitals    /54 (BP 1 Location: Right arm)    Pulse 75    Temp 98.6 °F (37 °C)    Resp 14    Ht 6' 2\" (1.88 m)    Wt 272 lb 7.8 oz (123.6 kg)    SpO2 100%    BMI 34.99 kg/m2     Current Facility-Administered Medications   Medication Dose Route Frequency    0.9% sodium chloride infusion  125 mL/hr IntraVENous CONTINUOUS    vancomycin (VANCOCIN) 1750 mg in  ml infusion  1,750 mg IntraVENous Q18H    cefepime (MAXIPIME) 2 g in 0.9% sodium chloride (MBP/ADV) 100 mL  2 g IntraVENous Q12H    enoxaparin (LOVENOX) injection 120 mg  120 mg SubCUTAneous Q12H    mupirocin (BACTROBAN) 2 % ointment   Both Nostrils DAILY    propofol (DIPRIVAN) infusion  0-50 mcg/kg/min IntraVENous TITRATE    carBAMazepine (TEGretol) 200 mg/10 mL suspension 100 mg  100 mg PEG Tube QID    NOREPINephrine (LEVOPHED) 8 mg in 5% dextrose 250mL infusion  2-30 mcg/min IntraVENous TITRATE    PHENYLephrine (LILLY-SYNEPHRINE) 100 mg in 0.9% sodium chloride 250 mL infusion   mcg/min IntraVENous TITRATE    heparin (porcine) pf 300 Units  300 Units InterCATHeter PRN    chlorhexidine (PERIDEX) 0.12 % mouthwash 15 mL  15 mL Oral Q12H    albuterol-ipratropium (DUO-NEB) 2.5 MG-0.5 MG/3 ML  3 mL Nebulization QID RT    albuterol-ipratropium (DUO-NEB) 2.5 MG-0.5 MG/3 ML  3 mL Nebulization Q6H PRN    amiodarone (CORDARONE) tablet 200 mg  200 mg Oral DAILY    prochlorperazine (COMPAZINE) with saline injection 5 mg  5 mg IntraVENous Q6H PRN    venlafaxine-SR (EFFEXOR-XR) capsule 75 mg  75 mg Oral DAILY    lactulose (CHRONULAC) solution 10 g  10 g Oral BID    HYDROmorphone (PF) (DILAUDID) injection 0.5 mg  0.5 mg IntraVENous Q4H PRN    sodium chloride (NS) flush 10-30 mL  10-30 mL InterCATHeter PRN    sodium chloride (NS) flush 10 mL  10 mL InterCATHeter PRN    sodium chloride (NS) flush 10-40 mL  10-40 mL InterCATHeter Q8H    sodium chloride (NS) flush 10 mL  10 mL InterCATHeter Q24H    heparin (porcine) pf 300-500 Units  300-500 Units InterCATHeter PRN    melatonin tablet 3 mg  3 mg Oral QHS    polyethylene glycol (MIRALAX) packet 17 g  17 g Oral DAILY    hydrALAZINE (APRESOLINE) 20 mg/mL injection 10 mg  10 mg IntraVENous Q6H PRN    oxyCODONE IR (ROXICODONE) tablet 5 mg  5 mg Oral Q4H PRN    oxyCODONE IR (ROXICODONE) tablet 10 mg  10 mg Oral Q4H PRN    pantoprazole (PROTONIX) 40 mg in sodium chloride 0.9% 10 mL injection  40 mg IntraVENous Q12H    sodium chloride (NS) flush 5-10 mL  5-10 mL IntraVENous PRN    acetaminophen (TYLENOL) tablet 650 mg  650 mg Oral Q6H PRN    ondansetron (ZOFRAN) injection 4 mg  4 mg IntraVENous Q6H PRN         Objective:      Visit Vitals    /54 (BP 1 Location: Right arm)    Pulse 75    Temp 98.6 °F (37 °C)    Resp 14    Ht 6' 2\" (1.88 m)    Wt 272 lb 7.8 oz (123.6 kg)    SpO2 100%    BMI 34.99 kg/m2       Physical Exam:  Abdomen: soft, non-tender.  Bowel sounds normal.   Extremities: no cyanosis, 1+ edema  Heart: regular rate and rhythm, S1, S2 normal, no murmur, click, rub or gallop  Lungs: clear to auscultation bilaterally  Neurologic: sedated    Data Review:   Labs:    Recent Results (from the past 24 hour(s))   GLUCOSE, POC    Collection Time: 08/12/18  3:02 PM   Result Value Ref Range    Glucose (POC) 192 (H) 65 - 100 mg/dL    Performed by Henrique Ray    HGB & HCT    Collection Time: 08/12/18  5:37 PM   Result Value Ref Range    HGB 7.3 (L) 12.1 - 17.0 g/dL    HCT 24.4 (L) 36.6 - 50.3 %   CBC WITH AUTOMATED DIFF    Collection Time: 08/13/18  3:55 AM   Result Value Ref Range    WBC 18.1 (H) 4.1 - 11.1 K/uL    RBC 2.82 (L) 4.10 - 5.70 M/uL    HGB 7.1 (L) 12.1 - 17.0 g/dL    HCT 24.2 (L) 36.6 - 50.3 %    MCV 85.8 80.0 - 99.0 FL    MCH 25.2 (L) 26.0 - 34.0 PG MCHC 29.3 (L) 30.0 - 36.5 g/dL    RDW 25.9 (H) 11.5 - 14.5 %    PLATELET 334 661 - 505 K/uL    MPV 12.4 8.9 - 12.9 FL    NRBC 0.5 (H) 0  WBC    ABSOLUTE NRBC 0.09 (H) 0.00 - 0.01 K/uL    NEUTROPHILS 87 (H) 32 - 75 %    BAND NEUTROPHILS 4 %    LYMPHOCYTES 8 (L) 12 - 49 %    MONOCYTES 0 (L) 5 - 13 %    EOSINOPHILS 0 0 - 7 %    BASOPHILS 0 0 - 1 %    METAMYELOCYTES 1 %    IMMATURE GRANULOCYTES 0 0.0 - 0.5 %    ABS. NEUTROPHILS 16.5 (H) 1.8 - 8.0 K/UL    ABS. LYMPHOCYTES 1.4 0.8 - 3.5 K/UL    ABS. MONOCYTES 0.0 0.0 - 1.0 K/UL    ABS. EOSINOPHILS 0.0 0.0 - 0.4 K/UL    ABS. BASOPHILS 0.0 0.0 - 0.1 K/UL    ABS. IMM. GRANS. 0.0 0.00 - 0.04 K/UL    DF MANUAL      RBC COMMENTS ANISOCYTOSIS  2+        RBC COMMENTS POLYCHROMASIA  1+        RBC COMMENTS HYPOCHROMIA  1+       PHOSPHORUS    Collection Time: 08/13/18  3:55 AM   Result Value Ref Range    Phosphorus 2.0 (L) 2.6 - 4.7 MG/DL   METABOLIC PANEL, BASIC    Collection Time: 08/13/18  3:55 AM   Result Value Ref Range    Sodium 143 136 - 145 mmol/L    Potassium 4.2 3.5 - 5.1 mmol/L    Chloride 111 (H) 97 - 108 mmol/L    CO2 24 21 - 32 mmol/L    Anion gap 8 5 - 15 mmol/L    Glucose 147 (H) 65 - 100 mg/dL    BUN 24 (H) 6 - 20 MG/DL    Creatinine 0.71 0.70 - 1.30 MG/DL    BUN/Creatinine ratio 34 (H) 12 - 20      GFR est AA >60 >60 ml/min/1.73m2    GFR est non-AA >60 >60 ml/min/1.73m2    Calcium 6.6 (L) 8.5 - 10.1 MG/DL   MAGNESIUM    Collection Time: 08/13/18  3:55 AM   Result Value Ref Range    Magnesium 2.5 (H) 1.6 - 2.4 mg/dL       Telemetry: V paced      Assessment:     Principal Problem:    GI bleed (7/10/2018)    Active Problems:    Acute blood loss anemia (7/10/2018)      Anasarca (7/10/2018)        Plan:     1. Acute resp failure, septic shock: on abx. Final culture report pending. Continue abx. Echo noted. Await final blood culture report to make sure no MRSA. 2. A. Fib, s/p ablation. S/p PPM. On Amiodarone. Hold BB due to hypotension and need for pressors. On lovenox for AC. 3. S/p colon resection.    4. HX CVA with L sided weakness.

## 2018-08-13 NOTE — PROGRESS NOTES
08/13/18 0558   ABCDEF Bundle   SBT Safety Screen Passed Yes   SBT Trial Passed No   SBT Trial Reason for Failure Respiratory rate > 35;RSBI>105

## 2018-08-13 NOTE — PROGRESS NOTES
Renal-prema has resolved. D/w wife at bedside earlier today on rounds.  Will see again upon request.  Nicol Tucker MD

## 2018-08-13 NOTE — PROGRESS NOTES
Chart reviewed. Pt currently in CCU, intubated and on pressor support. Will defer and continue to follow.

## 2018-08-13 NOTE — PROGRESS NOTES
[facility]  Pulmonary, Critical Care, and Sleep Medicine    Name: Kristie Yen MRN: [id number]   : 1940 Hospital: [facility]   Date: 2018        IMPRESSION:   · Acute respiratory failure-on moderate vent support, failed SBT this am.   · Vasopressor dependence, on moderate dose of phenylephrine  · Shock-septic with bacteremia 4/4 GPC clusters on culture blood  · Possible Pneumonia, or bronchitis with Klebsiella in sputum and Staph Aureus. Also with suspected Staph epi in blood stream.   · Leukocytosis s/p 18 day course of Zosyn  · Anemia ? 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 Needs ongoing support with vent support and pressors. 35 min CC, EOP. PLAN:   · Full ventilator support, Failed SBT. · Follow up ID sensitivity -On Ceftriaxone and Vanc, will follow for final ID and Sens,.   · Pressors as needed to keep MAP over 65.   · KUB   · Pulmonary toilet  · Empiric abx-continue vanc for potential staph  · Oxygenation CXR acidosis suggest non pulmonary cause of decompensation -possibly line sepsis    Discussed with surgery CT report ABD 2 days ago reviewed. He has low suspicion of GI cause of current decompensation   CT chest and CXR reviewed and do not suggest cause of current decompensation       Subjective/Interval History:   8.10 I have reviewed the flowsheet and previous days notes. Asked to evaluate patient to see if his pleural effusion is the cause of his rising WBC. Seen earlier this hospitalization by my partners for aspiration pneumonia. He has been hospitalized for 30 days, having had a colonic resection complicated by ileus.   He was on Zosyn for 18 days and this was stopped 3 days ago and his WBC began to rise. He has a congested cough which is chronic. Can not produce sputum. He is limited in his understanding of how to do incentive spirometry due to prior traumatic brain injury. Review of Systems   Constitutional: Positive for fatigue. Eyes: Negative. Respiratory: Positive for cough. Cardiovascular: Negative. Gastrointestinal: Negative. 8.11 called urgently for acute respiratory failure  RR 50's  rhonchorus breathing  On NRBM    8.12   sedated on ventilator  [address]  GPC on multiple BC    Remains on Vanc     18: pt had failed SAT, SBT. On moderate pressors. Wounds and dressings intact. NO acute issues per nursing. ROS and HPI are not obtainable. Objective:   Vital Signs:    Visit Vitals    /52    Pulse 76    Temp 98.8 °F (37.1 °C)    Resp 22    Ht 6' 2\" (1.88 m)    Wt 123.6 kg (272 lb 7.8 oz)    SpO2 100%    BMI 34.99 kg/m2       O2 Device: Ventilator   O2 Flow Rate (L/min): 3 l/min   Temp (24hrs), Av.9 °F (37.2 °C), Min:98 °F (36.7 °C), Max:99.9 °F (37.7 °C)       Intake/Output:   Last shift:         Last 3 shifts:  1901 -  0700  In: 7609.3 [I.V.:7349.3]  Out: 1827 [Urine:2665]    Intake/Output Summary (Last 24 hours) at 18 0730  Last data filed at 18 0400   Gross per 24 hour   Intake          4508.14 ml   Output             2050 ml   Net          2458. 14 ml      Physical Exam   Constitutional: He is sedated. HENT:   Head: Normocephalic and atraumatic. Mouth/Throat: No oropharyngeal exudate. Eyes: Conjunctivae are normal. Pupils are equal, round, and reactive to light. No scleral icterus. Neck:   Old tracheostomy site   Cardiovascular: Normal rate and regular rhythm. Pulmonary/Chest: No respiratory distress. He has no wheezes. He has rhonchi. He has no rales. Abdominal: He exhibits no distension. There is no tenderness. Musculoskeletal: He exhibits edema.    Skin: Skin is warm and dry.      Data:   Labs:  Recent Labs      08/13/18   0355  08/12/18   1737  08/12/18   0427  08/11/18   0517   WBC  18.1*   --   20.0*  22.3*   HGB  7.1*  7.3*  7.7*  9.1*   HCT  24.2*  24.4*  25.1*  29.8*   PLT  206   --   141*  172     Recent Labs      08/13/18   0355  08/12/18   0427  08/11/18   2202  08/11/18   1729  08/11/18   0517   NA  143  138  140  136  134*   K  4.2  4.6  4.7  5.6*  5.4*   CL  111*  105  108  104  104   CO2  24  23  15*  18*  19*   GLU  147*  163*  158*  111*  149*   BUN  24*  39*  43*  42*  30*   CREA  0.71  1.35*  1.68*  2.12*  1.03   CA  6.6*  6.2*  6.0*  6.7*  8.0*   MG  2.5*   --    --    --   2.1   PHOS  2.0*   --    --    --    --    ALB   --   1.5*   --   1.6*   --    TBILI   --   1.2*   --   1.7*   --    SGOT   --   49*   --   42*   --    ALT   --   57   --   61   --      Recent Labs      08/12/18   0154  08/11/18   1938  08/11/18   1045   PH  7.44  7.39  7.35   PCO2  28*  25*  24*   PO2  97  187*  263*   HCO3  18*  15*  13*   FIO2  50  80  100       Imaging:  I have personally reviewed the patients radiographs:  8-13-18: CXR:          Gabi Álvarez MD

## 2018-08-13 NOTE — PROGRESS NOTES
Care Management:    Patient is post surgery colectomy for colon cancer with ostomy. He is currently vented and on pressors in the ICU. Chart has been reviewed and we will cont to follow for discharge needs as appropriate. [facility] was following but have signed off for now with patient transferring to ICU.      Detra Rubinstein crm ac [id number]

## 2018-08-13 NOTE — PROGRESS NOTES
Critical care interdisciplinary rounds held on 08/13/2018. Following members present, Pharmacy, Diabetes Treatment, Case Management, Respiratory Therapy, Clinical Care Lead and Nutrition. Led by Thanh Johnson with Milady August RN and Dr. Eloisa Garner and Dr. Mikey Rubi. Plan of care discussed. See clinical pathway for plan of care and interventions and desired outcomes.

## 2018-08-14 ENCOUNTER — APPOINTMENT (OUTPATIENT)
Dept: GENERAL RADIOLOGY | Age: 78
DRG: 329 | End: 2018-08-14
Attending: INTERNAL MEDICINE
Payer: MEDICARE

## 2018-08-14 ENCOUNTER — APPOINTMENT (OUTPATIENT)
Dept: ULTRASOUND IMAGING | Age: 78
DRG: 329 | End: 2018-08-14
Attending: INTERNAL MEDICINE
Payer: MEDICARE

## 2018-08-14 LAB
ALBUMIN SERPL-MCNC: 1.4 G/DL (ref 3.5–5)
ALBUMIN/GLOB SERPL: 0.4 {RATIO} (ref 1.1–2.2)
ALP SERPL-CCNC: 95 U/L (ref 45–117)
ALT SERPL-CCNC: 40 U/L (ref 12–78)
ANION GAP SERPL CALC-SCNC: 7 MMOL/L (ref 5–15)
ARTERIAL PATENCY WRIST A: ABNORMAL
AST SERPL-CCNC: 24 U/L (ref 15–37)
BASE DEFICIT BLDA-SCNC: 3.9 MMOL/L
BASOPHILS # BLD: 0 K/UL (ref 0–0.1)
BASOPHILS NFR BLD: 0 % (ref 0–1)
BDY SITE: ABNORMAL
BILIRUB SERPL-MCNC: 0.7 MG/DL (ref 0.2–1)
BREATHS.SPONTANEOUS ON VENT: 35
BUN SERPL-MCNC: 19 MG/DL (ref 6–20)
BUN/CREAT SERPL: 28 (ref 12–20)
CALCIUM SERPL-MCNC: 6.4 MG/DL (ref 8.5–10.1)
CHLORIDE SERPL-SCNC: 117 MMOL/L (ref 97–108)
CO2 SERPL-SCNC: 22 MMOL/L (ref 21–32)
CREAT SERPL-MCNC: 0.68 MG/DL (ref 0.7–1.3)
DIFFERENTIAL METHOD BLD: ABNORMAL
EOSINOPHIL # BLD: 0 K/UL (ref 0–0.4)
EOSINOPHIL NFR BLD: 0 % (ref 0–7)
EPAP/CPAP/PEEP, PAPEEP: 6
ERYTHROCYTE [DISTWIDTH] IN BLOOD BY AUTOMATED COUNT: 25.6 % (ref 11.5–14.5)
FIO2 ON VENT: 40 %
GLOBULIN SER CALC-MCNC: 3.9 G/DL (ref 2–4)
GLUCOSE BLD STRIP.AUTO-MCNC: 174 MG/DL (ref 65–100)
GLUCOSE BLD STRIP.AUTO-MCNC: 193 MG/DL (ref 65–100)
GLUCOSE BLD STRIP.AUTO-MCNC: 236 MG/DL (ref 65–100)
GLUCOSE BLD STRIP.AUTO-MCNC: 260 MG/DL (ref 65–100)
GLUCOSE SERPL-MCNC: 207 MG/DL (ref 65–100)
HCO3 BLDA-SCNC: 18 MMOL/L (ref 22–26)
HCT VFR BLD AUTO: 22.3 % (ref 36.6–50.3)
HGB BLD-MCNC: 6.4 G/DL (ref 12.1–17)
IMM GRANULOCYTES # BLD: 0 K/UL (ref 0–0.04)
IMM GRANULOCYTES NFR BLD AUTO: 0 % (ref 0–0.5)
LYMPHOCYTES # BLD: 0.8 K/UL (ref 0.8–3.5)
LYMPHOCYTES NFR BLD: 7 % (ref 12–49)
MAGNESIUM SERPL-MCNC: 2.6 MG/DL (ref 1.6–2.4)
MCH RBC QN AUTO: 25.5 PG (ref 26–34)
MCHC RBC AUTO-ENTMCNC: 28.7 G/DL (ref 30–36.5)
MCV RBC AUTO: 88.8 FL (ref 80–99)
MONOCYTES # BLD: 0.5 K/UL (ref 0–1)
MONOCYTES NFR BLD: 4 % (ref 5–13)
NEUTS SEG # BLD: 10.8 K/UL (ref 1.8–8)
NEUTS SEG NFR BLD: 89 % (ref 32–75)
NRBC # BLD: 0.09 K/UL (ref 0–0.01)
NRBC BLD-RTO: 0.7 PER 100 WBC
PCO2 BLDA: 26 MMHG (ref 35–45)
PH BLDA: 7.46 [PH] (ref 7.35–7.45)
PHOSPHATE SERPL-MCNC: 1.4 MG/DL (ref 2.6–4.7)
PLATELET # BLD AUTO: 204 K/UL (ref 150–400)
PMV BLD AUTO: 11.8 FL (ref 8.9–12.9)
PO2 BLDA: 123 MMHG (ref 80–100)
POTASSIUM SERPL-SCNC: 3.7 MMOL/L (ref 3.5–5.1)
PRESSURE SUPPORT SETTING VENT: 6 CM[H2O]
PROT SERPL-MCNC: 5.3 G/DL (ref 6.4–8.2)
RBC # BLD AUTO: 2.51 M/UL (ref 4.1–5.7)
RBC MORPH BLD: ABNORMAL
RBC MORPH BLD: ABNORMAL
SAO2 % BLD: 99 % (ref 92–97)
SAO2% DEVICE SAO2% SENSOR NAME: ABNORMAL
SERVICE CMNT-IMP: ABNORMAL
SODIUM SERPL-SCNC: 146 MMOL/L (ref 136–145)
SPECIMEN SITE: ABNORMAL
VENTILATION MODE VENT: ABNORMAL
WBC # BLD AUTO: 12.1 K/UL (ref 4.1–11.1)

## 2018-08-14 PROCEDURE — 74011250636 HC RX REV CODE- 250/636: Performed by: INTERNAL MEDICINE

## 2018-08-14 PROCEDURE — 74011000250 HC RX REV CODE- 250: Performed by: INTERNAL MEDICINE

## 2018-08-14 PROCEDURE — 65610000006 HC RM INTENSIVE CARE

## 2018-08-14 PROCEDURE — 87040 BLOOD CULTURE FOR BACTERIA: CPT | Performed by: INTERNAL MEDICINE

## 2018-08-14 PROCEDURE — 74011000258 HC RX REV CODE- 258: Performed by: SURGERY

## 2018-08-14 PROCEDURE — 94003 VENT MGMT INPAT SUBQ DAY: CPT

## 2018-08-14 PROCEDURE — 74011250637 HC RX REV CODE- 250/637: Performed by: INTERNAL MEDICINE

## 2018-08-14 PROCEDURE — 86900 BLOOD TYPING SEROLOGIC ABO: CPT | Performed by: INTERNAL MEDICINE

## 2018-08-14 PROCEDURE — 36600 WITHDRAWAL OF ARTERIAL BLOOD: CPT

## 2018-08-14 PROCEDURE — 86870 RBC ANTIBODY IDENTIFICATION: CPT | Performed by: INTERNAL MEDICINE

## 2018-08-14 PROCEDURE — 82803 BLOOD GASES ANY COMBINATION: CPT | Performed by: INTERNAL MEDICINE

## 2018-08-14 PROCEDURE — 36600 WITHDRAWAL OF ARTERIAL BLOOD: CPT | Performed by: INTERNAL MEDICINE

## 2018-08-14 PROCEDURE — 77030018836 HC SOL IRR NACL ICUM -A

## 2018-08-14 PROCEDURE — 93970 EXTREMITY STUDY: CPT

## 2018-08-14 PROCEDURE — P9016 RBC LEUKOCYTES REDUCED: HCPCS | Performed by: INTERNAL MEDICINE

## 2018-08-14 PROCEDURE — C9113 INJ PANTOPRAZOLE SODIUM, VIA: HCPCS | Performed by: INTERNAL MEDICINE

## 2018-08-14 PROCEDURE — 74011000250 HC RX REV CODE- 250: Performed by: SURGERY

## 2018-08-14 PROCEDURE — 94760 N-INVAS EAR/PLS OXIMETRY 1: CPT

## 2018-08-14 PROCEDURE — 86860 RBC ANTIBODY ELUTION: CPT | Performed by: INTERNAL MEDICINE

## 2018-08-14 PROCEDURE — 85025 COMPLETE CBC W/AUTO DIFF WBC: CPT | Performed by: INTERNAL MEDICINE

## 2018-08-14 PROCEDURE — 86880 COOMBS TEST DIRECT: CPT | Performed by: INTERNAL MEDICINE

## 2018-08-14 PROCEDURE — 74011250636 HC RX REV CODE- 250/636: Performed by: SURGERY

## 2018-08-14 PROCEDURE — 82962 GLUCOSE BLOOD TEST: CPT

## 2018-08-14 PROCEDURE — 36430 TRANSFUSION BLD/BLD COMPNT: CPT

## 2018-08-14 PROCEDURE — 74011000258 HC RX REV CODE- 258: Performed by: INTERNAL MEDICINE

## 2018-08-14 PROCEDURE — 74011636637 HC RX REV CODE- 636/637: Performed by: INTERNAL MEDICINE

## 2018-08-14 PROCEDURE — 94640 AIRWAY INHALATION TREATMENT: CPT

## 2018-08-14 PROCEDURE — 71045 X-RAY EXAM CHEST 1 VIEW: CPT

## 2018-08-14 PROCEDURE — 83735 ASSAY OF MAGNESIUM: CPT | Performed by: INTERNAL MEDICINE

## 2018-08-14 PROCEDURE — 74011250637 HC RX REV CODE- 250/637: Performed by: SURGERY

## 2018-08-14 PROCEDURE — 86920 COMPATIBILITY TEST SPIN: CPT | Performed by: INTERNAL MEDICINE

## 2018-08-14 PROCEDURE — 86905 BLOOD TYPING RBC ANTIGENS: CPT | Performed by: INTERNAL MEDICINE

## 2018-08-14 PROCEDURE — 86921 COMPATIBILITY TEST INCUBATE: CPT | Performed by: INTERNAL MEDICINE

## 2018-08-14 PROCEDURE — 36415 COLL VENOUS BLD VENIPUNCTURE: CPT | Performed by: INTERNAL MEDICINE

## 2018-08-14 PROCEDURE — 80053 COMPREHEN METABOLIC PANEL: CPT | Performed by: INTERNAL MEDICINE

## 2018-08-14 PROCEDURE — 86902 BLOOD TYPE ANTIGEN DONOR EA: CPT | Performed by: INTERNAL MEDICINE

## 2018-08-14 PROCEDURE — 84100 ASSAY OF PHOSPHORUS: CPT | Performed by: INTERNAL MEDICINE

## 2018-08-14 PROCEDURE — 86922 COMPATIBILITY TEST ANTIGLOB: CPT | Performed by: INTERNAL MEDICINE

## 2018-08-14 RX ORDER — INSULIN LISPRO 100 [IU]/ML
INJECTION, SOLUTION INTRAVENOUS; SUBCUTANEOUS EVERY 6 HOURS
Status: DISCONTINUED | OUTPATIENT
Start: 2018-08-14 | End: 2018-10-02 | Stop reason: HOSPADM

## 2018-08-14 RX ORDER — SODIUM CHLORIDE 9 MG/ML
250 INJECTION, SOLUTION INTRAVENOUS AS NEEDED
Status: DISCONTINUED | OUTPATIENT
Start: 2018-08-14 | End: 2018-08-19 | Stop reason: ALTCHOICE

## 2018-08-14 RX ORDER — DEXTROSE 50 % IN WATER (D50W) INTRAVENOUS SYRINGE
12.5-25 AS NEEDED
Status: DISCONTINUED | OUTPATIENT
Start: 2018-08-14 | End: 2018-10-02 | Stop reason: HOSPADM

## 2018-08-14 RX ORDER — HYDROMORPHONE HYDROCHLORIDE 1 MG/ML
0.5 INJECTION, SOLUTION INTRAMUSCULAR; INTRAVENOUS; SUBCUTANEOUS
Status: DISCONTINUED | OUTPATIENT
Start: 2018-08-14 | End: 2018-08-22

## 2018-08-14 RX ORDER — ENOXAPARIN SODIUM 100 MG/ML
40 INJECTION SUBCUTANEOUS EVERY 24 HOURS
Status: DISCONTINUED | OUTPATIENT
Start: 2018-08-14 | End: 2018-08-15

## 2018-08-14 RX ORDER — MAGNESIUM SULFATE 100 %
4 CRYSTALS MISCELLANEOUS AS NEEDED
Status: DISCONTINUED | OUTPATIENT
Start: 2018-08-14 | End: 2018-09-07

## 2018-08-14 RX ORDER — BUMETANIDE 0.25 MG/ML
1 INJECTION INTRAMUSCULAR; INTRAVENOUS ONCE
Status: COMPLETED | OUTPATIENT
Start: 2018-08-14 | End: 2018-08-14

## 2018-08-14 RX ADMIN — MELATONIN TAB 3 MG 3 MG: 3 TAB at 21:06

## 2018-08-14 RX ADMIN — CHLORHEXIDINE GLUCONATE 15 ML: 1.2 RINSE ORAL at 08:44

## 2018-08-14 RX ADMIN — LACTULOSE 10 G: 20 SOLUTION ORAL at 08:34

## 2018-08-14 RX ADMIN — SODIUM CHLORIDE 40 MG: 9 INJECTION INTRAMUSCULAR; INTRAVENOUS; SUBCUTANEOUS at 08:34

## 2018-08-14 RX ADMIN — MUPIROCIN: 20 OINTMENT TOPICAL at 08:45

## 2018-08-14 RX ADMIN — ENOXAPARIN SODIUM 40 MG: 100 INJECTION SUBCUTANEOUS at 23:09

## 2018-08-14 RX ADMIN — PROPOFOL 50 MCG/KG/MIN: 10 INJECTION, EMULSION INTRAVENOUS at 20:29

## 2018-08-14 RX ADMIN — CARBAMAZEPINE 100 MG: 100 SUSPENSION ORAL at 14:13

## 2018-08-14 RX ADMIN — VENLAFAXINE 25 MG: 25 TABLET ORAL at 21:06

## 2018-08-14 RX ADMIN — VANCOMYCIN HYDROCHLORIDE 2000 MG: 10 INJECTION, POWDER, LYOPHILIZED, FOR SOLUTION INTRAVENOUS at 03:24

## 2018-08-14 RX ADMIN — PHENYLEPHRINE HYDROCHLORIDE 60 MCG/MIN: 10 INJECTION INTRAVENOUS at 05:59

## 2018-08-14 RX ADMIN — PHENYLEPHRINE HYDROCHLORIDE 70 MCG/MIN: 10 INJECTION INTRAVENOUS at 06:55

## 2018-08-14 RX ADMIN — CALCIUM GLUCONATE: 94 INJECTION, SOLUTION INTRAVENOUS at 18:21

## 2018-08-14 RX ADMIN — SODIUM CHLORIDE 10 ML: 9 INJECTION, SOLUTION INTRAMUSCULAR; INTRAVENOUS; SUBCUTANEOUS at 21:10

## 2018-08-14 RX ADMIN — INSULIN LISPRO 7 UNITS: 100 INJECTION, SOLUTION INTRAVENOUS; SUBCUTANEOUS at 12:20

## 2018-08-14 RX ADMIN — PROPOFOL 50 MCG/KG/MIN: 10 INJECTION, EMULSION INTRAVENOUS at 18:01

## 2018-08-14 RX ADMIN — INSULIN LISPRO 3 UNITS: 100 INJECTION, SOLUTION INTRAVENOUS; SUBCUTANEOUS at 18:16

## 2018-08-14 RX ADMIN — LACTULOSE 10 G: 20 SOLUTION ORAL at 19:01

## 2018-08-14 RX ADMIN — VENLAFAXINE 25 MG: 25 TABLET ORAL at 19:01

## 2018-08-14 RX ADMIN — POTASSIUM PHOSPHATE, MONOBASIC AND POTASSIUM PHOSPHATE, DIBASIC: 224; 236 INJECTION, SOLUTION INTRAVENOUS at 09:09

## 2018-08-14 RX ADMIN — AMIODARONE HYDROCHLORIDE 200 MG: 200 TABLET ORAL at 08:33

## 2018-08-14 RX ADMIN — PROPOFOL 50 MCG/KG/MIN: 10 INJECTION, EMULSION INTRAVENOUS at 23:08

## 2018-08-14 RX ADMIN — MUPIROCIN: 20 OINTMENT TOPICAL at 18:05

## 2018-08-14 RX ADMIN — SODIUM CHLORIDE 10 ML: 9 INJECTION, SOLUTION INTRAMUSCULAR; INTRAVENOUS; SUBCUTANEOUS at 16:10

## 2018-08-14 RX ADMIN — SODIUM CHLORIDE 25 ML/HR: 900 INJECTION, SOLUTION INTRAVENOUS at 20:17

## 2018-08-14 RX ADMIN — SODIUM CHLORIDE 10 ML: 9 INJECTION, SOLUTION INTRAMUSCULAR; INTRAVENOUS; SUBCUTANEOUS at 05:58

## 2018-08-14 RX ADMIN — BUMETANIDE 1 MG: 0.25 INJECTION INTRAMUSCULAR; INTRAVENOUS at 14:13

## 2018-08-14 RX ADMIN — POLYETHYLENE GLYCOL 3350 17 G: 17 POWDER, FOR SOLUTION ORAL at 08:34

## 2018-08-14 RX ADMIN — IPRATROPIUM BROMIDE AND ALBUTEROL SULFATE 3 ML: .5; 3 SOLUTION RESPIRATORY (INHALATION) at 07:36

## 2018-08-14 RX ADMIN — SODIUM CHLORIDE 10 ML: 9 INJECTION, SOLUTION INTRAMUSCULAR; INTRAVENOUS; SUBCUTANEOUS at 21:07

## 2018-08-14 RX ADMIN — PHENYLEPHRINE HYDROCHLORIDE 40 MCG/MIN: 10 INJECTION INTRAVENOUS at 03:24

## 2018-08-14 RX ADMIN — IPRATROPIUM BROMIDE AND ALBUTEROL SULFATE 3 ML: .5; 3 SOLUTION RESPIRATORY (INHALATION) at 19:51

## 2018-08-14 RX ADMIN — CARBAMAZEPINE 100 MG: 100 SUSPENSION ORAL at 21:10

## 2018-08-14 RX ADMIN — VANCOMYCIN HYDROCHLORIDE 2000 MG: 10 INJECTION, POWDER, LYOPHILIZED, FOR SOLUTION INTRAVENOUS at 16:09

## 2018-08-14 RX ADMIN — CARBAMAZEPINE 100 MG: 100 SUSPENSION ORAL at 08:42

## 2018-08-14 RX ADMIN — IPRATROPIUM BROMIDE AND ALBUTEROL SULFATE 3 ML: .5; 3 SOLUTION RESPIRATORY (INHALATION) at 15:20

## 2018-08-14 RX ADMIN — CEFTRIAXONE SODIUM 2 G: 2 INJECTION, POWDER, FOR SOLUTION INTRAMUSCULAR; INTRAVENOUS at 12:20

## 2018-08-14 RX ADMIN — HYDROMORPHONE HYDROCHLORIDE 0.5 MG: 1 INJECTION, SOLUTION INTRAMUSCULAR; INTRAVENOUS; SUBCUTANEOUS at 15:11

## 2018-08-14 RX ADMIN — CHLORHEXIDINE GLUCONATE 15 ML: 1.2 RINSE ORAL at 21:08

## 2018-08-14 RX ADMIN — IPRATROPIUM BROMIDE AND ALBUTEROL SULFATE 3 ML: .5; 3 SOLUTION RESPIRATORY (INHALATION) at 11:30

## 2018-08-14 RX ADMIN — CARBAMAZEPINE 100 MG: 100 SUSPENSION ORAL at 19:02

## 2018-08-14 RX ADMIN — VENLAFAXINE 25 MG: 25 TABLET ORAL at 08:34

## 2018-08-14 NOTE — PROGRESS NOTES
[facility]  Pulmonary, Critical Care, and Sleep Medicine    Name: Mathew So MRN: [id number]   : 1940 Hospital: [facility]   Date: 2018          Update:  Pt was placed on CPAP. His RR was marginal in mid 30 range. 35.  He seem a bit fatigued. He has improved compared to yesterday. Will place back on ACV and give diuretic. Replete his electrolytes and then attempt to extubate tomorrow if pt is appropriate. ABG 7.45//123/18. IMPRESSION:   · Acute respiratory failure-on moderate vent support, After has gotten blood will give another trial of SAT, SBT. · Vasopressor dependence, on moderate dose of phenylephrine still present at 60mcg. Again will re assess after blood transfusion. · Shock-septic with bacteremia 4/4 GPC clusters on culture blood, Will repeat blood CX today. ON Vanc and Ctx. · Possible Pneumonia, or bronchitis with Klebsiella in sputum and Staph Aureus. Also with suspected Staph epi in blood stream. Continue Abx. · Leukocytosis s/p 18 day course of Zosyn, Will need to follow repeat CX. · Anemia, no overt bleeding, will decrease the enoxaparin to DVT dosing, Will give 1 unit of PRBC today. Ongoing assess for source of blood loss. 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 · Obesity   · Critically ill, high risk of multiple organ failure. Needs ongoing support with vent support and pressors. 35 min CC, EOP. PLAN:   · Full ventilator support, Adjust as tolerated. · After blood transfusion can performed SAT, SBT. · Follow up ID sensitivity -On Ceftriaxone and Vanc, will follow for final ID and Sens, repeating blood Cx today. · Pressors as needed to keep MAP over 65. Hope to wean as gets blood transfusion. · KUB will follow. · Oxygenation CXR acidosis suggest non pulmonary cause of decompensation -possibly line sepsis  · Following labs  · Renewed TPN and adjusted as needed. · Sedation Will wean as tolerated  · Monitor for bleeding, decrease the dose of enoxaparin to DVT prophylaxis dosing. Discussed with surgery CT report ABD 2 days ago reviewed. He has low suspicion of GI cause of current decompensation   CT chest and CXR reviewed and do not suggest cause of current decompensation       Subjective/Interval History:   8.10 I have reviewed the flowsheet and previous days notes. Asked to evaluate patient to see if his pleural effusion is the cause of his rising WBC. Seen earlier this hospitalization by my partners for aspiration pneumonia. He has been hospitalized for 30 days, having had a colonic resection complicated by ileus. He was on Zosyn for 18 days and this was stopped 3 days ago and his WBC began to rise. He has a congested cough which is chronic. Can not produce sputum. He is limited in his understanding of how to do incentive spirometry due to prior traumatic brain injury. Review of Systems   Unable to perform ROS: Intubated   Constitutional: Positive for fatigue. Eyes: Negative. Respiratory: Positive for cough. Cardiovascular: Negative. Gastrointestinal: Negative. 8.11 called urgently for acute respiratory failure  RR 50's  rhonchorus breathing  On NRBM    8.12   sedated on ventilator  [address]  GPC on multiple BC 4/4   Remains on Vanc     8-14-18: Last 24 hrs. Remains on moderate dose vaopressors. Noted to have decreased Hgb less than 7. Not really following commands with current meds infusing.  No acute issues noted per nursing last pm.     Objective:   Vital Signs:    Visit Vitals    /51    Pulse 75    Temp 98.9 °F (37.2 °C)    Resp 27    Ht 6' 2\" (1.88 m)    Wt 127.6 kg (281 lb 4.9 oz)    SpO2 100%    BMI 36.12 kg/m2       O2 Device: Endotracheal tube, Ventilator   O2 Flow Rate (L/min): 3 l/min   Temp (24hrs), Av.5 °F (36.9 °C), Min:98.1 °F (36.7 °C), Max:98.9 °F (37.2 °C)       Intake/Output:   Last shift:      701 - 1900  In: 290   Out: 175 [Urine:175]  Last 3 shifts: 1901 -  07  In: 7381.7 [I.V.:7026.7]  Out: 9023 [Urine:2885]    Intake/Output Summary (Last 24 hours) at 18  Last data filed at 18 09   Gross per 24 hour   Intake          4358.41 ml   Output             2145 ml   Net          2213.41 ml      Physical Exam   Constitutional: Vital signs are normal. He appears well-nourished. He is sedated and intubated. HENT:   Head: Normocephalic and atraumatic. Mouth/Throat: No oropharyngeal exudate. Eyes: Conjunctivae are normal. Pupils are equal, round, and reactive to light. No scleral icterus. Neck:   Old tracheostomy site   Cardiovascular: Normal rate and regular rhythm. Pulmonary/Chest: He is intubated. No respiratory distress. He has no wheezes. He has rhonchi. He has no rales. Abdominal: He exhibits no distension. There is no tenderness. Musculoskeletal: He exhibits edema. Neurological:   Sedated, not really able to focus for long. Skin: Skin is warm and dry.      Data:   Labs:  Recent Labs      18   1737  187   WBC  12.1*  18.1*   --   20.0*   HGB  6.4*  7.1*  7.3*  7.7*   HCT  22.3*  24.2*  24.4*  25.1*   PLT  204  206   --   141*     Recent Labs      18   0437  18   0355  18   0427   18   1729   NA  146*  143  138   < >  136   K  3.7  4.2  4.6   < >  5.6*   CL  117*  111*  105   < >  104   CO2  22  24  23   < >  18*   GLU  207*  147*  163*   < >  111*   BUN  19  24*  39*   < >  42*   CREA  0.68*  0.71  1.35*   < >  2.12*   CA  6.4*  6.6*  6.2*   < >  6.7*   MG  2.6*  2.5*   --    --    --    PHOS  1.4*  2.0*   --    --    --    ALB  1.4*   --   1.5*   --   1.6*   TBILI  0.7   --   1.2*   --   1.7*   SGOT  24 --   49*   --   42*   ALT  40   --   57   --   61    < > = values in this interval not displayed. Recent Labs      08/12/18   0154  08/11/18   1938  08/11/18   1045   PH  7.44  7.39  7.35   PCO2  28*  25*  24*   PO2  97  187*  263*   HCO3  18*  15*  13*   FIO2  50  80  100       Imaging:  I have personally reviewed the patients radiographs:  8-13-18: CXR:         8-14-18: EXAM: CXR Portable.     FINDINGS: Portable chest shows support lines/devices appear unchanged since  yesterday. There is no apparent pneumothorax. Lungs show pulmonary edema  pattern with possible small pleural effusions. Heart size is top normal. There  is no midline shift.     IMPRESSION: No significant change.          Maria G Nuñez MD

## 2018-08-14 NOTE — PROGRESS NOTES
CRS Progress Note    Ostomy functioning. Tolerating trickle feeds. Abdomen soft. Has not done a wake/wean trial this morning. Erasmo at 60    /61  Pulse 75  Temp 98.9 °F (37.2 °C)  Resp 27  Ht 6' 2\" (1.88 m)  Wt 127.6 kg (281 lb 4.9 oz)  SpO2 100%  BMI 36.12 kg/m2    Intubated/sedated  Abd soft, ostomy with gas and stool in bag (585 output)  Excellent UOP    Plan  -Wake/wean trial  -Plan for transfusion today per ICU. Hopefully this will allow him to wean off erasmo.   -Advance TF to 20/hr  -Continue TPN

## 2018-08-14 NOTE — PROGRESS NOTES
0725 - Bedside and Verbal shift change report given to Roberto Marmolejo RN (oncoming nurse) by Kalia Gregg RN (offgoing nurse). Report included the following information SBAR, Kardex, Procedure Summary, Intake/Output, MAR, Recent Results and Cardiac Rhythm Paced. TPN @ 75ml/hr, NS @ 125ml/hr, Erasmo @ 70mcg/min, Propofol @ 10mcg/kg/min, TF @ 10ml/hr. VSS. 1930 - Bedside and Verbal shift change report given to Dez Guallpa RN (oncoming nurse) by Roberto Marmolejo RN (offgoing nurse). Report included the following information SBAR, Kardex, Procedure Summary, Intake/Output, MAR, Recent Results and Cardiac Rhythm Paced.

## 2018-08-14 NOTE — PROGRESS NOTES
Speech Pathology:  Patient remains intubated/sedated and therefore not appropriate for dysphagia treatment. SLP will sign off at this time. Please re-consult as medically appropriate for PO trials. MBS completed last weak led to pureed diet/nectar thickened liquids. Thank you.     Saintclair Senate, SLP

## 2018-08-14 NOTE — ADT AUTH CERT NOTES
Utilization Review           Bowel Surgery: Colectomy, Partial, with or without Ostomy - Care Day 35 (8/13/2018) by Charbel Mcginnis RN        Review Status Review Entered       Completed 8/13/2018       Details              Care Day: 35 Care Date: 8/13/2018 Level of Care: ICU       Guideline Day 2        Clinical Status       (X) * Procedure completed       ( ) * Hemodynamic stability       8/13/2018 5:17 PM EDT by Aleisha Matt         on erasmo gtt                     Activity       ( ) * Progressive ambulation              Routes       (X) IV fluids, medications       8/13/2018 5:17 PM EDT by Aleisha Matt         NS infusion 125mL/hr, rocephin 2g IV Q24h,  Erasmo gtt titrate, propofol gtt titrate              ( ) Advance diet as tolerated       8/13/2018 5:17 PM EDT by Aleisha Matt         tube feed                     Interventions       (X) Hgb/Hct       8/13/2018 5:17 PM EDT by Aleisha Matt         hgb 7.1, hct 24.2                     Medications       ( ) Discontinue perioperative antibiotics. [B]       8/13/2018 5:17 PM EDT by Aleisha Matt         vancomycin 2000 mg IV Q12h, maxipime 2g IV Q12H                     8/13/2018 5:17 PM EDT by Aleisha Matt       Subject: Additional Clinical Information       8/13/18Vitals: 98.3, 79, 17, 105/55, 99% (intubated 40% Fio2)         Meds: NS infusion 125mL/hr, duoneb 4x daily, amiodarone 200mg PO daily, tegretol 100mg peg tube 4x daily, rocephin 2g IV Q24h, lovenox 120mg SC Q12h, Erasmo gtt titrate, propofol gtt titrate, vancomycin 2000 mg IV Q12h, Effexor 25mg PO TID, maxipime 2g IV Q12h,  labs: WBC 18.1, hgb 7.1, hct 24.2, chloride 111, glucose 147, BUN 24, Phos 2.0, Mag 2.5         CXR: Removal of right IJ triple-lumen catheter. Slight increase in right-sided effusion.  plan: IV fluids, IV abx, pressor support, IV sedation, ventilator support, AM labs, CXR in am, tube feeding, SCDs, flannery care, I&O, cardiac monitor, continuous pulse ox, ICU care                                        * Milestone              Additional Notes       8/13/18       Pulmonary:       \" Full ventilator support, Failed SBT.       \" Follow up ID sensitivity -On Ceftriaxone and Vanc, will follow for final ID and Sens,.        \" Pressors as needed to keep MAP over 65.        \" KUB        \" Pulmonary toilet       \" Empiric abx-continue vanc for potential staph       \" Oxygenation CXR acidosis suggest non pulmonary cause of decompensation -possibly line sepsis           Bowel Surgery: Colectomy, Partial, with or without Ostomy - Care Day 34 (8/12/2018) by Ally Jackson RN        Review Status Review Entered       Completed 8/13/2018       Details              Care Day: 34 Care Date: 8/12/2018 Level of Care: ICU       Guideline Day 2        Level Of Care       ( ) Floor       8/13/2018 4:48 PM EDT by Dian Varma         loc icu                     Clinical Status       (X) * Procedure completed       8/13/2018 4:48 PM EDT by Dian Varma         Ostomy w/stool in bag (200ml)   Continue supportive care in ICU.  Wean jose de jesus as able Hold off on tube feeds for now  antiboticsfor gram+ sepsis. Kary Primus source is from lungs cxr Mild pulmonary edema w/ small  eddie pleural effusion/bibasilar atelectasis              ( ) * Hemodynamic stability       8/13/2018 4:48 PM EDT by Dian Varma         98. 8  hr 81  rr 15  115/56  100% vent /ett  iv levo phed drip titrated, iv jose de jesus drip                     Activity       ( ) * Progressive ambulation       8/13/2018 4:48 PM EDT by Dian Varma         restraints non-violent,                     Routes       (X) IV fluids, medications       8/13/2018 4:48 PM EDT by Dian Varma         iv ns 125ml/h, duoneb 4x/day, po amiodarone qd via peg tube, tegretol 4x/day via peg, sq lovenox q12h, lactulose via peg 2x/day, iv propofol drip, iv maxipime q12h, iv protonix q12h, iv vancomyin q18h,              ( ) Advance diet as tolerated       8/13/2018 4:48 PM EDT by Ira Vigil         tube feeding                     Interventions       (X) Hgb/Hct       8/13/2018 4:48 PM EDT by Ira Vigil         wbc 20  h/h 7.7/25.1, plt 141, repeat h/h 7.3/24,                     8/13/2018 4:48 PM EDT by Ira Vigil       Subject: Additional Clinical Information                review concurrent inpatient 8/12/18                glucose 192                bun 39 cr 1.35 gfr 51, ca 6.2, total bili 1.2, alb 1.5, ast 49, lactic acid 7.9 repeat 4.9, ck md index 2.8, troponin 0.05, abg vent fio2 50 ph 7.44, pco2 28, po2 97 bicarb 18 98%                CT scan of abdomen negative for intra-abdominal issues.   Off levophed.   Still on some jose de jesus.   Blood cultures showed GPC in clusters (4/4 bottles).   On vanc.   Creat improved somewhat and now making urine.                                nephrology MD                                                --Creatinine peaked at 2.1 yesterday and is better (1.3) with pressors and volume resuscitation; improved UOP; goal CVP ~ 10                                  Erazo                                  Corrected calcium is 8.2                                  Third spacing fluids b/c very low albumin                                  Improved K                                  stop bicarb gtt, pH 7.44 (respiratory alkalosis); continue with NS                Echo report pending                Oxygenation CXR acidosis suggest non pulmonary cause of decompensation -possibly line sepsis                                  has been hospitalized for 30 days, having had a colonic resection complicated by ileus. He was on Zosyn for 18 days and this was stopped 3 days ago and his WBC began to rise. He has a congested cough which is chronic. Can not produce sputum.  He is limited in his understanding of how to do incentive spirometry due to prior traumatic brain injury.                                .11 called urgently for acute respiratory failureRR 50's              rhonchorus breathing                On NRBM                                  8.12                sedated on ventilator                270 jose de jesus                GPC on multiple BC 4/4                Remains on Vanc                                              * Milestone                  Bowel Surgery: Colectomy, Partial, with or without Ostomy - Care Day 33 (8/11/2018) by Aubrie Cadet        Review Status Review Entered       Completed 8/13/2018       Details              Care Day: 33 Care Date: 8/11/2018 Level of Care: ICU       Guideline Day 2        Level Of Care       (X) Floor              Clinical Status       (X) * Procedure completed       ( ) * Hemodynamic stability       8/13/2018 2:47 PM EDT by Norval Liana         99.1-152/-% vent                     Activity       ( ) * Progressive ambulation              Routes       (X) IV fluids, medications       8/13/2018 2:47 PM EDT by Norval Lapping         ivf 125cc/hr              ( ) Advance diet as tolerated       8/13/2018 2:47 PM EDT by Norval Lapping         tube feedings                     Interventions       (X) Hgb/Hct              Medications       ( ) Discontinue perioperative antibiotics. [B]       8/13/2018 2:47 PM EDT by Norval Lapwesley         vanco iv x1 cefepime iv x2 zosyn iv x1                                          * Milestone              Additional Notes       Sepsis, likely secondary to aspiration PNA       Continue O2 supplementation       Transfer to ICU       Monitor resp status closely as pt may tire - low threshold for intubation       Hold tube feeds       ABG ordered, results noted and congruent with elevated RR       Stat CXR ordered, Abdo DX also ordered       Blood Cx       Start Vanco and Zosyn       Spoke with pt's wife Miss Parker Juan who states that Christiano Ulrich has been aspirating with food and pills. \" Harry Minium candidly about pt's condition and she is understandably worried and will come to the hospital shortly.       He underwent a modified barium swallow yesterday and cleared for puree diet.  It sounds like he might have aspirated overnight because his respiratory rate abruptly increased around 5:30 this morning and he has a fever with leukocytosis.               He is now in worsening respiratory distress and being intubated emergently.                       Plan       Agree with broad spectrum abx (zosyn/vanc) for aspiration pneumonia       Continue supportive care per ICU. Clary Turner is being followed by pulm already       NG tube or OG tube to suction       glu 162 pH 7.54/PCO2 19/bicarb 16 lactic acid 6.9 troponin 0.25 UA rukhsana 2+ lactic acid 8.3       pot 5.6 CO2 18 glu 111 bun 42 creat 2.12 Ca 6.7 bili tot 1.7 troponin 0.08       culture results - POSSIBLE STAPHYLOCOCCUS SPECIES, COAGULASE NEGATIVE GROWING IN BOTH BOTTLES DRAWN (IJ BLUE SITE)       PRELIMINARY REPORT OF GRAM POSITIVE COCCI IN CLUSTERS GROWING IN BOTH BOTTLES DRAWN CALLED TO AND READ BACK BY MS SILVINO RN ON 8/11/18       CHECKING FOR POSSIBLE 2ND TYPE STAPHYLOCOCCUS SPECIES, COAGULASE NEGATIVE GROWING IN 1 OF THE ABOVE BOTTLES DRAWN       PCO2 24/PO2 263/O2 sats 100% vent/bicarb 13       blood/resp cultures pending       CXR - There is slight bibasilar atelectasis left greater than right       XR abd - Tip of the Dobbhoff tube is in the region of the duodenal bulb.       There is slight gaseous distention transverse colon without definite evidence of       obstruction.       CT abd - Postsurgical changes as above with no acute intra-abdominal       findings. Bilateral atelectasis left airspace opacification with radiodense       material suggesting contrast aspiration. Additional incidental findings as       above.       XR abd flat - Mild small bowel dilation. No pneumoperitoneum.  Small left pleural       effusion.       acapella       consult nephrology       restraints       consult cardiology       consult intensivist       consult mobility services       sitter       duo neb x4       amiodarone po x1       levophed drip       jose de jesus synephrine drip       propofol drip       sod bicarb iv x1       ivf bolus x3       protonix iv x3           Bowel Surgery: Colectomy, Partial, with or without Ostomy - Care Day 28 (8/10/2018) by Cyn Wilkins Helio        Review Status Review Entered       Completed 8/13/2018       Details              Care Day: 32 Care Date: 8/10/2018 Level of Care: ICU       Guideline Day 2        Level Of Care       (X) Floor              Clinical Status       (X) * Procedure completed       8/13/2018 12:47 PM EDT by Arlyss Fraction         S/P colectomy/YAMEL/enterotomies for colon cancer              (X) * Hemodynamic stability       8/13/2018 12:47 PM EDT by Arlyss Fraction         98.6-151/62-75-24-98% RA                     Activity       ( ) * Progressive ambulation              Routes       (X) IV fluids, medications              Interventions       (X) Hgb/Hct       8/13/2018 12:47 PM EDT by Arlyss Fraction         hgb 7.8 hct 26                     Medications       (X) Discontinue perioperative antibiotics.  [B]                                   * Milestone              Additional Notes       Asked to see patient for pleural effusion - there is no clinically significant pleural effusion, he has small residual postop right sided effusion, 6.5 mm at its thickest - this is not the source of any of his symptoms       Leukocytosis beginning just after discontinuing an 18 day course of Zosyn       Ileus with aspiration pneumonia earlier in his hospitalization       Remote history of tracheostomy       Traumatic brain injury from accident nearly 25 years ago       Obesity       On room air       Pulmonary toilet       Check procalcitonin       Can not completely exclude aspiration pneumonia as he is getting TFs with ongoing mild ileus, however, would suggest looking for alternate source of infection as he has minimal imaging abnormalities on his chest CT.  Again, his negligible pleural effusion is not the cause of any signs, symptoms, or pathology in this patient.       Upright positioning/out of bed would be best as his abdominal obesity and distention is restricting his full lung expansion       If procalcitonin elevated or if he spikes fever, would suggest adding antibiotics        Further recs after the above       glu 288 Na 131 pot 5.2 bun 26 creat 0.68 wbc 12.6 rbc 3.05        swallow study - No significant abnormalities suspected       acapella       tube feeding       consult pulmonology       consult mobility services       sitter       duo neb x3       amiodarone po x1       pradaxa po x2       metoprolol po x2       TPN

## 2018-08-14 NOTE — INTERDISCIPLINARY ROUNDS
Interdisciplinary team rounds were held 8/14/18 with the following team members:Care Management, Nursing, Nutrition, Pharmacy, Physical Therapy, Physician, Respiratory Therapy and Clinical Coordinator. Plan of care discussed. Goal: See MD orders and progress notes for further  interventions and desired outcomes.

## 2018-08-14 NOTE — PROGRESS NOTES
08/14/18 1223 08/14/18 1243 08/14/18 1309   Weaning Parameters   Resp Rate Observed 35 37 33   Ve 15.1 14.6 14.9    [phone]   RSBI 82 99 71       Per Dr. Jamari Fleming, placed patient back on A/C. Will try SBT again tomorrow.

## 2018-08-14 NOTE — PROGRESS NOTES
8/14/2018 12:13 PM    Admit Date: 7/10/2018    Admit Diagnosis: Acute blood loss anemia;GI bleed; Anasarca;GI Bleed;gi bleed*    Subjective:   Remains vent and pressor dependant. No cardiac events.      Visit Vitals    /53    Pulse 79    Temp 98.9 °F (37.2 °C)    Resp 15    Ht 6' 2\" (1.88 m)    Wt 281 lb 4.9 oz (127.6 kg)    SpO2 100%    BMI 36.12 kg/m2     Current Facility-Administered Medications   Medication Dose Route Frequency    potassium phosphate 30 mmol in dextrose 5% 250 mL infusion   IntraVENous ONCE    0.9% sodium chloride infusion 250 mL  250 mL IntraVENous PRN    insulin lispro (HUMALOG) injection   SubCUTAneous Q6H    glucose chewable tablet 16 g  4 Tab Oral PRN    dextrose (D50W) injection syrg 12.5-25 g  12.5-25 g IntraVENous PRN    glucagon (GLUCAGEN) injection 1 mg  1 mg IntraMUSCular PRN    enoxaparin (LOVENOX) injection 40 mg  40 mg SubCUTAneous Q24H    TPN ADULT-CENTRAL AA 5% D20%-MVI W/ VIT K-RCH   IntraVENous CONTINUOUS    [START ON 8/15/2018] VANCOMYCIN TROUGH  1 Each Other ONCE    venlafaxine (EFFEXOR) tablet 25 mg  25 mg Oral TID    pantoprazole (PROTONIX) 40 mg in sodium chloride 0.9% 10 mL injection  40 mg IntraVENous DAILY    cefTRIAXone (ROCEPHIN) 2 g in 0.9% sodium chloride (MBP/ADV) 50 mL  2 g IntraVENous Q24H    mupirocin (BACTROBAN) 2 % ointment   Both Nostrils BID    vancomycin (VANCOCIN) 2000 mg in  ml infusion  2,000 mg IntraVENous Q12H    TPN ADULT - CENTRAL AA 5% D20% W/ CA + ELECTROLYTES   IntraVENous CONTINUOUS    0.9% sodium chloride infusion  25 mL/hr IntraVENous CONTINUOUS    propofol (DIPRIVAN) infusion  0-50 mcg/kg/min IntraVENous TITRATE    carBAMazepine (TEGretol) 200 mg/10 mL suspension 100 mg  100 mg PEG Tube QID    PHENYLephrine (LILLY-SYNEPHRINE) 100 mg in 0.9% sodium chloride 250 mL infusion   mcg/min IntraVENous TITRATE    heparin (porcine) pf 300 Units  300 Units InterCATHeter PRN    chlorhexidine (PERIDEX) 0.12 % mouthwash 15 mL  15 mL Oral Q12H    albuterol-ipratropium (DUO-NEB) 2.5 MG-0.5 MG/3 ML  3 mL Nebulization QID RT    albuterol-ipratropium (DUO-NEB) 2.5 MG-0.5 MG/3 ML  3 mL Nebulization Q6H PRN    amiodarone (CORDARONE) tablet 200 mg  200 mg Oral DAILY    prochlorperazine (COMPAZINE) with saline injection 5 mg  5 mg IntraVENous Q6H PRN    lactulose (CHRONULAC) solution 10 g  10 g Oral BID    HYDROmorphone (PF) (DILAUDID) injection 0.5 mg  0.5 mg IntraVENous Q4H PRN    sodium chloride (NS) flush 10-30 mL  10-30 mL InterCATHeter PRN    sodium chloride (NS) flush 10 mL  10 mL InterCATHeter PRN    sodium chloride (NS) flush 10-40 mL  10-40 mL InterCATHeter Q8H    sodium chloride (NS) flush 10 mL  10 mL InterCATHeter Q24H    heparin (porcine) pf 300-500 Units  300-500 Units InterCATHeter PRN    melatonin tablet 3 mg  3 mg Oral QHS    polyethylene glycol (MIRALAX) packet 17 g  17 g Oral DAILY    hydrALAZINE (APRESOLINE) 20 mg/mL injection 10 mg  10 mg IntraVENous Q6H PRN    oxyCODONE IR (ROXICODONE) tablet 5 mg  5 mg Oral Q4H PRN    oxyCODONE IR (ROXICODONE) tablet 10 mg  10 mg Oral Q4H PRN    sodium chloride (NS) flush 5-10 mL  5-10 mL IntraVENous PRN    acetaminophen (TYLENOL) tablet 650 mg  650 mg Oral Q6H PRN    ondansetron (ZOFRAN) injection 4 mg  4 mg IntraVENous Q6H PRN         Objective:      Visit Vitals    /53    Pulse 79    Temp 98.9 °F (37.2 °C)    Resp 15    Ht 6' 2\" (1.88 m)    Wt 281 lb 4.9 oz (127.6 kg)    SpO2 100%    BMI 36.12 kg/m2       Physical Exam:  Abdomen: soft, non-tender.     Extremities: no cyanosis, 1+edema  Heart: regular rate and rhythm, S1, S2 normal, no murmur, click, rub or gallop  Lungs: clear to auscultation bilaterally  Neurologic: sedated    Data Review:   Labs:    Recent Results (from the past 24 hour(s))   GLUCOSE, POC    Collection Time: 08/14/18 12:31 AM   Result Value Ref Range    Glucose (POC) 236 (H) 65 - 100 mg/dL    Performed by Katt Calix. Jeferson    CBC WITH AUTOMATED DIFF    Collection Time: 08/14/18  4:37 AM   Result Value Ref Range    WBC 12.1 (H) 4.1 - 11.1 K/uL    RBC 2.51 (L) 4.10 - 5.70 M/uL    HGB 6.4 (L) 12.1 - 17.0 g/dL    HCT 22.3 (L) 36.6 - 50.3 %    MCV 88.8 80.0 - 99.0 FL    MCH 25.5 (L) 26.0 - 34.0 PG    MCHC 28.7 (L) 30.0 - 36.5 g/dL    RDW 25.6 (H) 11.5 - 14.5 %    PLATELET 241 129 - 109 K/uL    MPV 11.8 8.9 - 12.9 FL    NRBC 0.7 (H) 0  WBC    ABSOLUTE NRBC 0.09 (H) 0.00 - 0.01 K/uL    NEUTROPHILS 89 (H) 32 - 75 %    LYMPHOCYTES 7 (L) 12 - 49 %    MONOCYTES 4 (L) 5 - 13 %    EOSINOPHILS 0 0 - 7 %    BASOPHILS 0 0 - 1 %    IMMATURE GRANULOCYTES 0 0.0 - 0.5 %    ABS. NEUTROPHILS 10.8 (H) 1.8 - 8.0 K/UL    ABS. LYMPHOCYTES 0.8 0.8 - 3.5 K/UL    ABS. MONOCYTES 0.5 0.0 - 1.0 K/UL    ABS. EOSINOPHILS 0.0 0.0 - 0.4 K/UL    ABS. BASOPHILS 0.0 0.0 - 0.1 K/UL    ABS. IMM. GRANS. 0.0 0.00 - 0.04 K/UL    DF MANUAL      RBC COMMENTS ANISOCYTOSIS  2+        RBC COMMENTS HYPOCHROMIA  PRESENT       METABOLIC PANEL, COMPREHENSIVE    Collection Time: 08/14/18  4:37 AM   Result Value Ref Range    Sodium 146 (H) 136 - 145 mmol/L    Potassium 3.7 3.5 - 5.1 mmol/L    Chloride 117 (H) 97 - 108 mmol/L    CO2 22 21 - 32 mmol/L    Anion gap 7 5 - 15 mmol/L    Glucose 207 (H) 65 - 100 mg/dL    BUN 19 6 - 20 MG/DL    Creatinine 0.68 (L) 0.70 - 1.30 MG/DL    BUN/Creatinine ratio 28 (H) 12 - 20      GFR est AA >60 >60 ml/min/1.73m2    GFR est non-AA >60 >60 ml/min/1.73m2    Calcium 6.4 (LL) 8.5 - 10.1 MG/DL    Bilirubin, total 0.7 0.2 - 1.0 MG/DL    ALT (SGPT) 40 12 - 78 U/L    AST (SGOT) 24 15 - 37 U/L    Alk.  phosphatase 95 45 - 117 U/L    Protein, total 5.3 (L) 6.4 - 8.2 g/dL    Albumin 1.4 (L) 3.5 - 5.0 g/dL    Globulin 3.9 2.0 - 4.0 g/dL    A-G Ratio 0.4 (L) 1.1 - 2.2     MAGNESIUM    Collection Time: 08/14/18  4:37 AM   Result Value Ref Range    Magnesium 2.6 (H) 1.6 - 2.4 mg/dL   PHOSPHORUS    Collection Time: 08/14/18  4:37 AM Result Value Ref Range    Phosphorus 1.4 (L) 2.6 - 4.7 MG/DL   TYPE + CROSSMATCH    Collection Time: 08/14/18  9:34 AM   Result Value Ref Range    Crossmatch Expiration 08/17/2018     ABO/Rh(D) PENDING     Antibody screen PENDING          Assessment:     Principal Problem:    GI bleed (7/10/2018)    Active Problems:    Acute blood loss anemia (7/10/2018)      Anasarca (7/10/2018)        Plan:     Continue current meds. Wean off pressors.

## 2018-08-14 NOTE — PROGRESS NOTES
Patient remains intubated and sedated. Erasmo at 61. Patient is not appropriate for therapy at this time  Will defer today and follow back tomorrow if patient medically appropriate to resume therapy.   Thank you

## 2018-08-14 NOTE — PROGRESS NOTES
Care Management:    Patient remains critical and on full vent support in the ICU. I have spoke with care manager with [facility] [phone] with updates.      Jessica Kapoor [facility] acm [phone]

## 2018-08-14 NOTE — PROGRESS NOTES
Follow up visit in [phone] with wife Sharon Castellon and son Valdez Arroyo. Pt intubated and unable to respond. Sharon Castellon introduced  [facility] processing and received update on pt's health along with associated feelings. Sharon Cande maintaining hope and edith but concerned at the same. Provided supportive listening, Marjamarigena Cande expressed appreciation for support  has provided. Sharon Castellon shared stories that affirmed belief in God's presence. Appeared to be in jovial spirits throughout visit. Visit concluded due to incoming page. Sharon Castellon aware of how to contact chaplains if desiring support. Will follow up as needed. KIRILL Garcias. Orlando Chambers

## 2018-08-14 NOTE — PROGRESS NOTES
Bedside shift change report given to [facility] [employer] (oncoming nurse) by Mohammed Cogan RN (offgoing nurse). Report included the following information SBAR, Kardex, ED Summary, OR Summary, Procedure Summary, Intake/Output, MAR, Med Rec Status, Cardiac Rhythm PACED  and Alarm Parameters . 2050: Commenced on transfusion of 1.0 unit  PRBC   0120- Blood transfusion ended uneventfully  0630: Failed ABCDE bundle due to vassopressor use and FIO2 50%  . Bedside and Verbal shift change report given to Juan Diego Gonzalez (oncoming nurse) by Ledy Salazar (offgoing nurse). Report included the following information SBAR, Kardex, ED Summary, Procedure Summary, Accordion, Recent Results, Med Rec Status, Cardiac Rhythm Paced/ Sinus arrythmia and Alarm Parameters .

## 2018-08-14 NOTE — PROGRESS NOTES
Nutrition:  Chart reviewed, case discussed during CCU rounds. Noted pt started on TPN (D20, 5% AA @ 75mL/h; provides 1584kcals/90gPro). Pt also on trophic feeds via Dobbhoff: Glucerna 1.2 @ 20mL/h (provides 576kcals/29gPro). PSU today is 2410. He is also on propofol @ 7.1mL/h, which provides 187 kcals daily. TF + propofol + TPN meets 97% kcal and 92% Protein needs. This is appropriate today, will assess readiness for TF advancement tomorrow and make recommendations prn.      Dea, [address] [place]   Pager [phone]

## 2018-08-14 NOTE — PROGRESS NOTES
Physical Therapy  Patient remains intubated and sedated. Erasmo at 61. Patient is not appropriate for therapy at this time  Will defer today and follow back tomorrow if patient medically appropriate to resume therapy.   Thank you,  Rip Gaspar, PT

## 2018-08-15 ENCOUNTER — APPOINTMENT (OUTPATIENT)
Dept: GENERAL RADIOLOGY | Age: 78
DRG: 329 | End: 2018-08-15
Attending: INTERNAL MEDICINE
Payer: MEDICARE

## 2018-08-15 ENCOUNTER — APPOINTMENT (OUTPATIENT)
Dept: CT IMAGING | Age: 78
DRG: 329 | End: 2018-08-15
Attending: SURGERY
Payer: MEDICARE

## 2018-08-15 LAB
ABO + RH BLD: NORMAL
ALBUMIN SERPL-MCNC: 1.6 G/DL (ref 3.5–5)
ALBUMIN/GLOB SERPL: 0.4 {RATIO} (ref 1.1–2.2)
ALP SERPL-CCNC: 94 U/L (ref 45–117)
ALT SERPL-CCNC: 39 U/L (ref 12–78)
ANION GAP SERPL CALC-SCNC: 8 MMOL/L (ref 5–15)
ANTI-COMPLEMENT (C3B,C3D): NORMAL
ANTIGENS PRESENT BLD: NORMAL
ANTIGENS PRESENT RBC DONR: NORMAL
APTT PPP: 24.1 SEC (ref 22.1–32)
APTT PPP: 35.6 SEC (ref 22.1–32)
ARTERIAL PATENCY WRIST A: YES
AST SERPL-CCNC: 31 U/L (ref 15–37)
BACTERIA SPEC CULT: ABNORMAL
BASE DEFICIT BLDA-SCNC: 5.2 MMOL/L
BASOPHILS # BLD: 0 K/UL (ref 0–0.1)
BASOPHILS # BLD: 0.2 K/UL (ref 0–0.1)
BASOPHILS NFR BLD: 0 % (ref 0–1)
BASOPHILS NFR BLD: 1 % (ref 0–1)
BDY SITE: ABNORMAL
BILIRUB SERPL-MCNC: 0.9 MG/DL (ref 0.2–1)
BLD GP AB SCN SERPL QL ELUTION: NORMAL
BLD PROD TYP BPU: NORMAL
BLOOD GROUP ANTIBODIES SERPL: NORMAL
BLOOD GROUP ANTIBODIES SERPL: NORMAL
BPU ID: NORMAL
BUN SERPL-MCNC: 20 MG/DL (ref 6–20)
BUN/CREAT SERPL: 27 (ref 12–20)
CALCIUM SERPL-MCNC: 6.8 MG/DL (ref 8.5–10.1)
CHLORIDE SERPL-SCNC: 117 MMOL/L (ref 97–108)
CO2 SERPL-SCNC: 20 MMOL/L (ref 21–32)
CREAT SERPL-MCNC: 0.74 MG/DL (ref 0.7–1.3)
CROSSMATCH RESULT,%XM: NORMAL
DAT IGG-SP REAG RBC QL: NORMAL
DAT POLY-SP REAG RBC QL: NORMAL
DATE LAST DOSE: ABNORMAL
DIFFERENTIAL METHOD BLD: ABNORMAL
DIFFERENTIAL METHOD BLD: ABNORMAL
EOSINOPHIL # BLD: 0 K/UL (ref 0–0.4)
EOSINOPHIL # BLD: 0.2 K/UL (ref 0–0.4)
EOSINOPHIL NFR BLD: 0 % (ref 0–7)
EOSINOPHIL NFR BLD: 1 % (ref 0–7)
EPAP/CPAP/PEEP, PAPEEP: 8
ERYTHROCYTE [DISTWIDTH] IN BLOOD BY AUTOMATED COUNT: 24.4 % (ref 11.5–14.5)
ERYTHROCYTE [DISTWIDTH] IN BLOOD BY AUTOMATED COUNT: 24.8 % (ref 11.5–14.5)
FIO2 ON VENT: 70 %
GAS FLOW.O2 SETTING OXYMISER: 20 L/MIN
GLOBULIN SER CALC-MCNC: 4 G/DL (ref 2–4)
GLUCOSE BLD STRIP.AUTO-MCNC: 165 MG/DL (ref 65–100)
GLUCOSE BLD STRIP.AUTO-MCNC: 176 MG/DL (ref 65–100)
GLUCOSE BLD STRIP.AUTO-MCNC: 180 MG/DL (ref 65–100)
GLUCOSE BLD STRIP.AUTO-MCNC: 199 MG/DL (ref 65–100)
GLUCOSE SERPL-MCNC: 164 MG/DL (ref 65–100)
HCO3 BLDA-SCNC: 18 MMOL/L (ref 22–26)
HCT VFR BLD AUTO: 25.4 % (ref 36.6–50.3)
HCT VFR BLD AUTO: 26.2 % (ref 36.6–50.3)
HGB BLD-MCNC: 7.4 G/DL (ref 12.1–17)
HGB BLD-MCNC: 7.7 G/DL (ref 12.1–17)
IMM GRANULOCYTES # BLD: 0 K/UL (ref 0–0.04)
IMM GRANULOCYTES # BLD: 0 K/UL (ref 0–0.04)
IMM GRANULOCYTES NFR BLD AUTO: 0 % (ref 0–0.5)
IMM GRANULOCYTES NFR BLD AUTO: 0 % (ref 0–0.5)
IPAP/PIP, IPAPIP: 16
LYMPHOCYTES # BLD: 0.2 K/UL (ref 0.8–3.5)
LYMPHOCYTES # BLD: 0.9 K/UL (ref 0.8–3.5)
LYMPHOCYTES NFR BLD: 1 % (ref 12–49)
LYMPHOCYTES NFR BLD: 5 % (ref 12–49)
MAGNESIUM SERPL-MCNC: 2.4 MG/DL (ref 1.6–2.4)
MCH RBC QN AUTO: 25.7 PG (ref 26–34)
MCH RBC QN AUTO: 26.3 PG (ref 26–34)
MCHC RBC AUTO-ENTMCNC: 29.1 G/DL (ref 30–36.5)
MCHC RBC AUTO-ENTMCNC: 29.4 G/DL (ref 30–36.5)
MCV RBC AUTO: 88.2 FL (ref 80–99)
MCV RBC AUTO: 89.4 FL (ref 80–99)
METAMYELOCYTES NFR BLD MANUAL: 1 %
MONOCYTES # BLD: 0.4 K/UL (ref 0–1)
MONOCYTES # BLD: 0.4 K/UL (ref 0–1)
MONOCYTES NFR BLD: 2 % (ref 5–13)
MONOCYTES NFR BLD: 2 % (ref 5–13)
NEUTS BAND NFR BLD MANUAL: 2 %
NEUTS BAND NFR BLD MANUAL: 3 %
NEUTS SEG # BLD: 16.6 K/UL (ref 1.8–8)
NEUTS SEG # BLD: 19.2 K/UL (ref 1.8–8)
NEUTS SEG NFR BLD: 89 % (ref 32–75)
NEUTS SEG NFR BLD: 93 % (ref 32–75)
NRBC # BLD: 0.15 K/UL (ref 0–0.01)
NRBC # BLD: 0.16 K/UL (ref 0–0.01)
NRBC BLD-RTO: 0.8 PER 100 WBC
NRBC BLD-RTO: 0.8 PER 100 WBC
PCO2 BLDA: 29 MMHG (ref 35–45)
PH BLDA: 7.41 [PH] (ref 7.35–7.45)
PHOSPHATE SERPL-MCNC: 2.3 MG/DL (ref 2.6–4.7)
PLATELET # BLD AUTO: 193 K/UL (ref 150–400)
PLATELET # BLD AUTO: 212 K/UL (ref 150–400)
PMV BLD AUTO: 11.5 FL (ref 8.9–12.9)
PMV BLD AUTO: 11.5 FL (ref 8.9–12.9)
PO2 BLDA: 133 MMHG (ref 80–100)
POTASSIUM SERPL-SCNC: 4.1 MMOL/L (ref 3.5–5.1)
PROT SERPL-MCNC: 5.6 G/DL (ref 6.4–8.2)
RBC # BLD AUTO: 2.88 M/UL (ref 4.1–5.7)
RBC # BLD AUTO: 2.93 M/UL (ref 4.1–5.7)
RBC MORPH BLD: ABNORMAL
REPORTED DOSE,DOSE: ABNORMAL UNITS
REPORTED DOSE/TIME,TMG: ABNORMAL
SAO2 % BLD: 99 % (ref 92–97)
SAO2% DEVICE SAO2% SENSOR NAME: ABNORMAL
SERVICE CMNT-IMP: ABNORMAL
SODIUM SERPL-SCNC: 145 MMOL/L (ref 136–145)
SPECIMEN EXP DATE BLD: NORMAL
SPECIMEN SITE: ABNORMAL
STATUS OF UNIT,%ST: NORMAL
THERAPEUTIC RANGE,PTTT: ABNORMAL SECS (ref 58–77)
THERAPEUTIC RANGE,PTTT: NORMAL SECS (ref 58–77)
UNIT DIVISION, %UDIV: 0
VANCOMYCIN TROUGH SERPL-MCNC: 18.9 UG/ML (ref 5–10)
VENTILATION MODE VENT: ABNORMAL
WBC # BLD AUTO: 18.2 K/UL (ref 4.1–11.1)
WBC # BLD AUTO: 20 K/UL (ref 4.1–11.1)

## 2018-08-15 PROCEDURE — 74011636320 HC RX REV CODE- 636/320: Performed by: SURGERY

## 2018-08-15 PROCEDURE — 74177 CT ABD & PELVIS W/CONTRAST: CPT

## 2018-08-15 PROCEDURE — 94640 AIRWAY INHALATION TREATMENT: CPT

## 2018-08-15 PROCEDURE — 74011636637 HC RX REV CODE- 636/637: Performed by: INTERNAL MEDICINE

## 2018-08-15 PROCEDURE — 74011250636 HC RX REV CODE- 250/636: Performed by: INTERNAL MEDICINE

## 2018-08-15 PROCEDURE — 36415 COLL VENOUS BLD VENIPUNCTURE: CPT | Performed by: INTERNAL MEDICINE

## 2018-08-15 PROCEDURE — 74011000250 HC RX REV CODE- 250: Performed by: INTERNAL MEDICINE

## 2018-08-15 PROCEDURE — 74011250637 HC RX REV CODE- 250/637: Performed by: INTERNAL MEDICINE

## 2018-08-15 PROCEDURE — 71045 X-RAY EXAM CHEST 1 VIEW: CPT

## 2018-08-15 PROCEDURE — 80202 ASSAY OF VANCOMYCIN: CPT | Performed by: INTERNAL MEDICINE

## 2018-08-15 PROCEDURE — 94003 VENT MGMT INPAT SUBQ DAY: CPT

## 2018-08-15 PROCEDURE — 83735 ASSAY OF MAGNESIUM: CPT | Performed by: INTERNAL MEDICINE

## 2018-08-15 PROCEDURE — 74011000255 HC RX REV CODE- 255: Performed by: SURGERY

## 2018-08-15 PROCEDURE — 77030018798 HC PMP KT ENTRL FED COVD -A

## 2018-08-15 PROCEDURE — 74011000250 HC RX REV CODE- 250: Performed by: SURGERY

## 2018-08-15 PROCEDURE — 36600 WITHDRAWAL OF ARTERIAL BLOOD: CPT | Performed by: INTERNAL MEDICINE

## 2018-08-15 PROCEDURE — 80053 COMPREHEN METABOLIC PANEL: CPT | Performed by: INTERNAL MEDICINE

## 2018-08-15 PROCEDURE — 65610000006 HC RM INTENSIVE CARE

## 2018-08-15 PROCEDURE — 82803 BLOOD GASES ANY COMBINATION: CPT | Performed by: INTERNAL MEDICINE

## 2018-08-15 PROCEDURE — 71260 CT THORAX DX C+: CPT

## 2018-08-15 PROCEDURE — 74011000258 HC RX REV CODE- 258: Performed by: INTERNAL MEDICINE

## 2018-08-15 PROCEDURE — 74011250636 HC RX REV CODE- 250/636: Performed by: SURGERY

## 2018-08-15 PROCEDURE — C9113 INJ PANTOPRAZOLE SODIUM, VIA: HCPCS | Performed by: INTERNAL MEDICINE

## 2018-08-15 PROCEDURE — 74011250637 HC RX REV CODE- 250/637: Performed by: SURGERY

## 2018-08-15 PROCEDURE — 82962 GLUCOSE BLOOD TEST: CPT

## 2018-08-15 PROCEDURE — 85730 THROMBOPLASTIN TIME PARTIAL: CPT | Performed by: INTERNAL MEDICINE

## 2018-08-15 PROCEDURE — 85025 COMPLETE CBC W/AUTO DIFF WBC: CPT | Performed by: INTERNAL MEDICINE

## 2018-08-15 PROCEDURE — 84100 ASSAY OF PHOSPHORUS: CPT | Performed by: INTERNAL MEDICINE

## 2018-08-15 RX ORDER — SODIUM CHLORIDE 9 MG/ML
50 INJECTION, SOLUTION INTRAVENOUS
Status: COMPLETED | OUTPATIENT
Start: 2018-08-15 | End: 2018-08-16

## 2018-08-15 RX ORDER — HEPARIN SODIUM 5000 [USP'U]/ML
10000 INJECTION, SOLUTION INTRAVENOUS; SUBCUTANEOUS
Status: DISCONTINUED | OUTPATIENT
Start: 2018-08-15 | End: 2018-08-16

## 2018-08-15 RX ORDER — BARIUM SULFATE 20 MG/ML
900 SUSPENSION ORAL
Status: COMPLETED | OUTPATIENT
Start: 2018-08-15 | End: 2018-08-15

## 2018-08-15 RX ORDER — HEPARIN SODIUM 10000 [USP'U]/100ML
8-36 INJECTION, SOLUTION INTRAVENOUS
Status: DISCONTINUED | OUTPATIENT
Start: 2018-08-15 | End: 2018-08-20

## 2018-08-15 RX ORDER — HEPARIN SODIUM 5000 [USP'U]/ML
5000 INJECTION, SOLUTION INTRAVENOUS; SUBCUTANEOUS
Status: DISCONTINUED | OUTPATIENT
Start: 2018-08-15 | End: 2018-08-20 | Stop reason: ALTCHOICE

## 2018-08-15 RX ORDER — SODIUM CHLORIDE 0.9 % (FLUSH) 0.9 %
10 SYRINGE (ML) INJECTION
Status: COMPLETED | OUTPATIENT
Start: 2018-08-15 | End: 2018-08-15

## 2018-08-15 RX ORDER — FENTANYL CITRATE 50 UG/ML
50 INJECTION, SOLUTION INTRAMUSCULAR; INTRAVENOUS
Status: DISCONTINUED | OUTPATIENT
Start: 2018-08-15 | End: 2018-08-17

## 2018-08-15 RX ADMIN — PROPOFOL 30 MCG/KG/MIN: 10 INJECTION, EMULSION INTRAVENOUS at 17:22

## 2018-08-15 RX ADMIN — LACTULOSE 10 G: 20 SOLUTION ORAL at 17:03

## 2018-08-15 RX ADMIN — INSULIN LISPRO 3 UNITS: 100 INJECTION, SOLUTION INTRAVENOUS; SUBCUTANEOUS at 18:26

## 2018-08-15 RX ADMIN — PROPOFOL 40 MCG/KG/MIN: 10 INJECTION, EMULSION INTRAVENOUS at 09:16

## 2018-08-15 RX ADMIN — IOPAMIDOL 100 ML: 755 INJECTION, SOLUTION INTRAVENOUS at 10:26

## 2018-08-15 RX ADMIN — VENLAFAXINE 25 MG: 25 TABLET ORAL at 11:03

## 2018-08-15 RX ADMIN — LACTULOSE 10 G: 20 SOLUTION ORAL at 11:02

## 2018-08-15 RX ADMIN — INSULIN LISPRO 3 UNITS: 100 INJECTION, SOLUTION INTRAVENOUS; SUBCUTANEOUS at 23:12

## 2018-08-15 RX ADMIN — BARIUM SULFATE 900 ML: 21 SUSPENSION ORAL at 08:30

## 2018-08-15 RX ADMIN — POLYETHYLENE GLYCOL 3350 17 G: 17 POWDER, FOR SOLUTION ORAL at 11:03

## 2018-08-15 RX ADMIN — IPRATROPIUM BROMIDE AND ALBUTEROL SULFATE 3 ML: .5; 3 SOLUTION RESPIRATORY (INHALATION) at 12:00

## 2018-08-15 RX ADMIN — CARBAMAZEPINE 100 MG: 100 SUSPENSION ORAL at 21:47

## 2018-08-15 RX ADMIN — POTASSIUM PHOSPHATE, MONOBASIC AND POTASSIUM PHOSPHATE, DIBASIC: 224; 236 INJECTION, SOLUTION INTRAVENOUS at 11:01

## 2018-08-15 RX ADMIN — VENLAFAXINE 25 MG: 25 TABLET ORAL at 17:00

## 2018-08-15 RX ADMIN — SODIUM CHLORIDE 30 ML: 9 INJECTION, SOLUTION INTRAMUSCULAR; INTRAVENOUS; SUBCUTANEOUS at 06:00

## 2018-08-15 RX ADMIN — IPRATROPIUM BROMIDE AND ALBUTEROL SULFATE 3 ML: .5; 3 SOLUTION RESPIRATORY (INHALATION) at 19:34

## 2018-08-15 RX ADMIN — SODIUM CHLORIDE 40 MG: 9 INJECTION INTRAMUSCULAR; INTRAVENOUS; SUBCUTANEOUS at 11:03

## 2018-08-15 RX ADMIN — SODIUM CHLORIDE 10 ML: 9 INJECTION, SOLUTION INTRAMUSCULAR; INTRAVENOUS; SUBCUTANEOUS at 21:43

## 2018-08-15 RX ADMIN — IPRATROPIUM BROMIDE AND ALBUTEROL SULFATE 3 ML: .5; 3 SOLUTION RESPIRATORY (INHALATION) at 15:00

## 2018-08-15 RX ADMIN — PROPOFOL 50 MCG/KG/MIN: 10 INJECTION, EMULSION INTRAVENOUS at 06:25

## 2018-08-15 RX ADMIN — VENLAFAXINE 25 MG: 25 TABLET ORAL at 21:43

## 2018-08-15 RX ADMIN — PHENYLEPHRINE HYDROCHLORIDE 65 MCG/MIN: 10 INJECTION INTRAVENOUS at 05:44

## 2018-08-15 RX ADMIN — SODIUM CHLORIDE 50 ML/HR: 900 INJECTION, SOLUTION INTRAVENOUS at 10:27

## 2018-08-15 RX ADMIN — Medication 10 ML: at 10:27

## 2018-08-15 RX ADMIN — PROPOFOL 50 MCG/KG/MIN: 10 INJECTION, EMULSION INTRAVENOUS at 23:46

## 2018-08-15 RX ADMIN — AMIODARONE HYDROCHLORIDE 200 MG: 200 TABLET ORAL at 11:03

## 2018-08-15 RX ADMIN — INSULIN LISPRO 3 UNITS: 100 INJECTION, SOLUTION INTRAVENOUS; SUBCUTANEOUS at 00:07

## 2018-08-15 RX ADMIN — IPRATROPIUM BROMIDE AND ALBUTEROL SULFATE 3 ML: .5; 3 SOLUTION RESPIRATORY (INHALATION) at 07:13

## 2018-08-15 RX ADMIN — MUPIROCIN: 20 OINTMENT TOPICAL at 09:17

## 2018-08-15 RX ADMIN — CEFTRIAXONE SODIUM 2 G: 2 INJECTION, POWDER, FOR SOLUTION INTRAMUSCULAR; INTRAVENOUS at 11:16

## 2018-08-15 RX ADMIN — MELATONIN TAB 3 MG 3 MG: 3 TAB at 21:43

## 2018-08-15 RX ADMIN — HEPARIN SODIUM 8 UNITS/KG/HR: 10000 INJECTION, SOLUTION INTRAVENOUS at 16:50

## 2018-08-15 RX ADMIN — CHLORHEXIDINE GLUCONATE 15 ML: 1.2 RINSE ORAL at 21:50

## 2018-08-15 RX ADMIN — VANCOMYCIN HYDROCHLORIDE 2000 MG: 10 INJECTION, POWDER, LYOPHILIZED, FOR SOLUTION INTRAVENOUS at 02:00

## 2018-08-15 RX ADMIN — VANCOMYCIN HYDROCHLORIDE 2000 MG: 10 INJECTION, POWDER, LYOPHILIZED, FOR SOLUTION INTRAVENOUS at 14:40

## 2018-08-15 RX ADMIN — PROPOFOL 40 MCG/KG/MIN: 10 INJECTION, EMULSION INTRAVENOUS at 21:00

## 2018-08-15 RX ADMIN — MUPIROCIN: 20 OINTMENT TOPICAL at 17:03

## 2018-08-15 RX ADMIN — PROPOFOL 20 MCG/KG/MIN: 10 INJECTION, EMULSION INTRAVENOUS at 13:22

## 2018-08-15 RX ADMIN — INSULIN LISPRO 3 UNITS: 100 INJECTION, SOLUTION INTRAVENOUS; SUBCUTANEOUS at 12:22

## 2018-08-15 RX ADMIN — CARBAMAZEPINE 100 MG: 100 SUSPENSION ORAL at 17:03

## 2018-08-15 RX ADMIN — CALCIUM GLUCONATE: 94 INJECTION, SOLUTION INTRAVENOUS at 17:13

## 2018-08-15 RX ADMIN — INSULIN LISPRO 3 UNITS: 100 INJECTION, SOLUTION INTRAVENOUS; SUBCUTANEOUS at 06:25

## 2018-08-15 RX ADMIN — HYDROMORPHONE HYDROCHLORIDE 0.5 MG: 1 INJECTION, SOLUTION INTRAMUSCULAR; INTRAVENOUS; SUBCUTANEOUS at 14:35

## 2018-08-15 RX ADMIN — CARBAMAZEPINE 100 MG: 100 SUSPENSION ORAL at 11:03

## 2018-08-15 RX ADMIN — CARBAMAZEPINE 100 MG: 100 SUSPENSION ORAL at 13:26

## 2018-08-15 RX ADMIN — CHLORHEXIDINE GLUCONATE 15 ML: 1.2 RINSE ORAL at 09:17

## 2018-08-15 RX ADMIN — SODIUM CHLORIDE 10 ML: 9 INJECTION, SOLUTION INTRAMUSCULAR; INTRAVENOUS; SUBCUTANEOUS at 13:26

## 2018-08-15 NOTE — PROGRESS NOTES
Physical Therapy  Patient discussed in IDR. Patient with declining medical status- remains on vent now on pressure control and requiring pressors. Will sign off at this time. Please reconsult  PT once patient medically appropriate to resume therapy services.   Thank you,  Braulio Chapa, PT

## 2018-08-15 NOTE — PROGRESS NOTES
Patient discussed in [facility]. Patient with declining medical status- remains on vent now on pressure control and requiring pressors. Will sign off at this time. Please reconsult  OT once patient medically appropriate to resume therapy services.   Thank you

## 2018-08-15 NOTE — CONSULTS
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 Hoda Payton in the radiology dept and it looks like this thrombus likely arose from his prior line and the tiny air bubble that was noted is due to this as well. The clot is long and does pose a risk of PE but as Dr. Josemanuel Bravo confirmed with Dr. Fariha Swanson earlier today, IV heparin makes the most sense given his other comorbidities. Hopefully, if we prevent it from elongating/enlarging, his endogenous protein C and S, AT 3 and tPA can dissolve the existing clot. The heparin drip has been already ordered. Stage IIIB colon cancer - he had a pT4a pN1b colon cancer with 2 of 22 LNs involved. Will address therapy for this in the future when appropriate. Discussion: Dat Hines is a  69y.o. year old seen in consultation at the request of Dr. Eveline Valenzuela for R IJ and SVC thrombus. He was admitted on 7/10/18 with acute blood loss anemia with a hgb of 5.5 and a microcytosis. He underwent evaluation by Dr. Shaista De Leon with EGD 7/11/18 and colonoscopy  7/12/18 and was found to have a malignant appearing mass in the ascending colon that was contiguous with a large sessile polyp with ulceration on the surface and bleeding with heaped up edges. He ultimately underwent a laparoscopic converted to open right colectomy, extensive lysis of adhesions, repair of serosal tears x 2, and small bowel resection x 2. The pathology report describes a pT4a pN1b Mx colon carcinoma. He has had a stormy course with aspiration, acute kidney injury with oligoanuria, septic shock with bacteremia, hypotension requiring vasopressors, acute respiratory failure, pneumonia, and ileus. He is currently sedated on the vent. The history is obtaine from his wife at the bedside, his ICU nurse, Dr. Josemanuel Bravo and Dr Eveline Valenzuela.    The current consult was requested after a CT scan done earlier today revealed the following:   CT of the chest demonstrates lower lobe consolidation which has progressed since the prior study with small effusions. There is thrombus and a tiny bubble of gas in the right internal jugular vein. The thrombus extends into the SVC to the level of the right atrial SVC  junction.   CT of the abdomen and pelvis demonstrates postsurgical changes. No abscess is identified. Labs:    Recent Results (from the past 24 hour(s))   CULTURE, BLOOD, PAIRED    Collection Time: 08/14/18  5:17 PM   Result Value Ref Range    Special Requests: NO SPECIAL REQUESTS      Culture result:        ONE OF FOUR BOTTLES HAS BEEN FLAGGED POSITIVE BY INSTRUMENT. BOTTLE HAS BEEN SENT TO [facility] TO ASSESS FOR POSSIBLE GROWTH. Culture result: REMAINING BOTTLE(S) HAS/HAVE NO GROWTH SO FAR     GLUCOSE, POC    Collection Time: 08/14/18  6:05 PM   Result Value Ref Range    Glucose (POC) 174 (H) 65 - 100 mg/dL    Performed by [employer]*    GLUCOSE, POC    Collection Time: 08/14/18 11:43 PM   Result Value Ref Range    Glucose (POC) 193 (H) 65 - 100 mg/dL    Performed by [facility]thia    METABOLIC PANEL, COMPREHENSIVE    Collection Time: 08/15/18  4:28 AM   Result Value Ref Range    Sodium 145 136 - 145 mmol/L    Potassium 4.1 3.5 - 5.1 mmol/L    Chloride 117 (H) 97 - 108 mmol/L    CO2 20 (L) 21 - 32 mmol/L    Anion gap 8 5 - 15 mmol/L    Glucose 164 (H) 65 - 100 mg/dL    BUN 20 6 - 20 MG/DL    Creatinine 0.74 0.70 - 1.30 MG/DL    BUN/Creatinine ratio 27 (H) 12 - 20      GFR est AA >60 >60 ml/min/1.73m2    GFR est non-AA >60 >60 ml/min/1.73m2    Calcium 6.8 (L) 8.5 - 10.1 MG/DL    Bilirubin, total 0.9 0.2 - 1.0 MG/DL    ALT (SGPT) 39 12 - 78 U/L    AST (SGOT) 31 15 - 37 U/L    Alk.  phosphatase 94 45 - 117 U/L    Protein, total 5.6 (L) 6.4 - 8.2 g/dL    Albumin 1.6 (L) 3.5 - 5.0 g/dL    Globulin 4.0 2.0 - 4.0 g/dL    A-G Ratio 0.4 (L) 1.1 - 2.2     MAGNESIUM    Collection Time: 08/15/18  4:28 AM   Result Value Ref Range    Magnesium 2.4 1.6 - 2.4 mg/dL   PHOSPHORUS    Collection Time: 08/15/18  4:28 AM   Result Value Ref Range    Phosphorus 2.3 (L) 2.6 - 4.7 MG/DL   Katalina Dessert    Collection Time: 08/15/18  4:28 AM   Result Value Ref Range    Vancomycin,trough 18.9 (H) 5.0 - 10.0 ug/mL    Reported dose date: NOT PROVIDED      Reported dose time: NOT PROVIDED      Reported dose: NOT PROVIDED UNITS   CBC WITH AUTOMATED DIFF    Collection Time: 08/15/18  5:39 AM   Result Value Ref Range    WBC 20.0 (H) 4.1 - 11.1 K/uL    RBC 2.93 (L) 4.10 - 5.70 M/uL    HGB 7.7 (L) 12.1 - 17.0 g/dL    HCT 26.2 (L) 36.6 - 50.3 %    MCV 89.4 80.0 - 99.0 FL    MCH 26.3 26.0 - 34.0 PG    MCHC 29.4 (L) 30.0 - 36.5 g/dL    RDW 24.4 (H) 11.5 - 14.5 %    PLATELET 551 341 - 626 K/uL    MPV 11.5 8.9 - 12.9 FL    NRBC 0.8 (H) 0  WBC    ABSOLUTE NRBC 0.16 (H) 0.00 - 0.01 K/uL    NEUTROPHILS 93 (H) 32 - 75 %    BAND NEUTROPHILS 3 %    LYMPHOCYTES 1 (L) 12 - 49 %    MONOCYTES 2 (L) 5 - 13 %    EOSINOPHILS 0 0 - 7 %    BASOPHILS 1 0 - 1 %    IMMATURE GRANULOCYTES 0 0.0 - 0.5 %    ABS. NEUTROPHILS 19.2 (H) 1.8 - 8.0 K/UL    ABS. LYMPHOCYTES 0.2 (L) 0.8 - 3.5 K/UL    ABS. MONOCYTES 0.4 0.0 - 1.0 K/UL    ABS. EOSINOPHILS 0.0 0.0 - 0.4 K/UL    ABS. BASOPHILS 0.2 (H) 0.0 - 0.1 K/UL    ABS. IMM.  GRANS. 0.0 0.00 - 0.04 K/UL    DF MANUAL      RBC COMMENTS ANISOCYTOSIS  3+        RBC COMMENTS SCHISTOCYTES  1+       GLUCOSE, POC    Collection Time: 08/15/18  6:18 AM   Result Value Ref Range    Glucose (POC) 180 (H) 65 - 100 mg/dL    Performed by Adam Molina, POC    Collection Time: 08/15/18 12:06 PM   Result Value Ref Range    Glucose (POC) 199 (H) 65 - 100 mg/dL    Performed by Genevieve Eddy    PTT    Collection Time: 08/15/18  3:34 PM   Result Value Ref Range    aPTT 24.1 22.1 - 32.0 sec    aPTT, therapeutic range     58.0 - 77.0 SECS   CBC WITH AUTOMATED DIFF Collection Time: 08/15/18  3:34 PM   Result Value Ref Range    WBC 18.2 (H) 4.1 - 11.1 K/uL    RBC 2.88 (L) 4.10 - 5.70 M/uL    HGB 7.4 (L) 12.1 - 17.0 g/dL    HCT 25.4 (L) 36.6 - 50.3 %    MCV 88.2 80.0 - 99.0 FL    MCH 25.7 (L) 26.0 - 34.0 PG    MCHC 29.1 (L) 30.0 - 36.5 g/dL    RDW 24.8 (H) 11.5 - 14.5 %    PLATELET 986 903 - 937 K/uL    MPV 11.5 8.9 - 12.9 FL    NRBC 0.8 (H) 0  WBC    ABSOLUTE NRBC 0.15 (H) 0.00 - 0.01 K/uL    NEUTROPHILS PENDING %    LYMPHOCYTES PENDING %    MONOCYTES PENDING %    EOSINOPHILS PENDING %    BASOPHILS PENDING %    IMMATURE GRANULOCYTES PENDING %    ABS. NEUTROPHILS PENDING K/UL    ABS. LYMPHOCYTES PENDING K/UL    ABS. MONOCYTES PENDING K/UL    ABS. EOSINOPHILS PENDING K/UL    ABS. BASOPHILS PENDING K/UL    ABS. IMM. GRANS.  PENDING K/UL    DF PENDING        History:  Past Medical History:   Diagnosis Date    A-fib ([address]     Acute bronchitis 8/25/2015    Anxiety     Arrhythmia     atrial fibrillation    Arthritis     BCC (basal cell carcinoma of skin)     BPH (benign prostatic hyperplasia)     Chronic back pain greater than 3 months duration 5/4/2015    Chronic right shoulder pain 1/11/2016    Common wart 5/16/2016    Constipation 12/14/2012    CVA (cerebral infarction) 5/4/2015    Depression     GERD (gastroesophageal reflux disease)     Hearing loss     bilateral hearing aids    Hemiplegia following CVA (cerebrovascular accident) ([address]     left side hemiparesis    History of colostomy     HTN (hypertension)     Hyperlipidemia     Localized epilepsy with impairment of consciousness ([address]     Prostate cancer ([address]     Status post XRT, Lupron    Screening for colon cancer 11/4/2015    Stroke [facility])     traumatic from neck crushing    TBI (traumatic brain injury) ([address] 1994    Unspecified sleep apnea     on CPAP      Past Surgical History:   Procedure Laterality Date    COLONOSCOPY N/A 7/12/2018    COLONOSCOPY through stoma performed by Gordon Ganser MD Kar[facility] at [facility] ENDOSCOPY    HX ANKLE FRACTURE TX      HX CATARACT REMOVAL      bilat    HX COLECTOMY      colostomy placed and revised    HX HEENT      ear surgery eddie.  HX HERNIA REPAIR      HX ORTHOPAEDIC      left hip pinning    HX PACEMAKER  2014      Prior to Admission medications    Medication Sig Start Date End Date Taking? Authorizing Provider   amLODIPine (NORVASC) 10 mg tablet Take 10 mg by mouth daily (with lunch). Yes Lester Lu MD   atorvastatin (LIPITOR) 20 mg tablet Take 20 mg by mouth daily (with dinner). Yes Lester Lu MD   carBAMazepine XR (TEGRETOL XR) 200 mg SR tablet Take 200 mg by mouth two (2) times a day. YOVANI, Brand only   Yes Lester Lu MD   acetaminophen (TYLENOL) 500 mg tablet Take 1,000 mg by mouth every six (6) hours as needed for Pain. Yes Lester Lu MD   cholecalciferol (VITAMIN D3) 1,000 unit cap Take 1,000 Units by mouth daily (with lunch). Yes Historical Provider   meclizine (ANTIVERT) 25 mg tablet Take 25 mg by mouth every eight (8) hours as needed for Nausea. take every 8 hours as needed for nausea 5/10/18  Yes Jamari Andrade MD   PRADAXA 150 mg capsule TAKE ONE CAPSULE BY MOUTH EVERY 12 HOURS 5/16/18  Yes Renee Montero NP   amiodarone (CORDARONE) 200 mg tablet TAKE ONE TABLET BY MOUTH DAILY 5/9/18  Yes Antonia Anderson MD   lisinopril (PRINIVIL, ZESTRIL) 40 mg tablet TAKE ONE TABLET BY MOUTH DAILY 4/12/18  Yes Renee Montero NP   magnesium hydroxide (SOTO MILK OF MAGNESIA) 400 mg/5 mL suspension Take 30 mL by mouth daily as needed for Constipation. Yes Historical Provider   DOC-Q-LACE 100 mg capsule TAKE ONE CAPSULE BY MOUTH TWICE A DAY 4/16/16  Yes Brandon Adams MD   lutein 20 mg tab Take 1 Tab by mouth daily. Yes Historical Provider   senna (SENNA) 8.6 mg tablet Take 1 tablet by mouth daily. Yes Historical Provider   finasteride (PROSCAR) 5 mg tablet Take 5 mg by mouth daily.    Yes Historical Provider   venlafaxine-SR [facility].. 75 mg capsule TAKE ONE CAPSULE BY MOUTH DAILY 7/25/18   Chela Reece MD   metoprolol tartrate (LOPRESSOR) 25 mg tablet TAKE ONE-HALF TABLET BY MOUTH TWICE A DAY 7/17/18   Chela Reece MD   lactulose (CHRONULAC) 10 gram/15 mL solution TAKE 1 TEASPOONFUL (5 ML) BY MOUTH THREE TIMES AS DAY AS NEEDED  Indications: constipation 3/16/18   Drake Michael NP   zolpidem (AMBIEN) 10 mg tablet Take 1 Tab by mouth nightly as needed for Sleep (please take in the bed). 9/26/16   Chela Reece MD   betamethasone dipropionate (DIPROSONE) 0.05 % topical cream Apply  to affected area as needed.  5/16/16   Chela Reece MD     Allergies   Allergen Reactions    Ciprofibrate Hives      Social History   Substance Use Topics    Smoking status: Former Smoker     Years: 10.00     Types: Pipe     Quit date: 1/29/1990    Smokeless tobacco: Never Used      Comment: QUIT 1970'S    Alcohol use No      Family History   Problem Relation Age of Onset    Alzheimer Mother      dec [de-identified]    Cancer Father      dec 76 kidney    Heart Disease Brother     No Known Problems Son         Current Medications:  Current Facility-Administered Medications   Medication Dose Route Frequency    TPN ADULT-CENTRAL AA 5% D20%-MVI W/ VIT K-RCH   IntraVENous CONTINUOUS    heparin 25,000 units in D5W 250 ml infusion  18-36 Units/kg/hr IntraVENous TITRATE    0.9% sodium chloride infusion 250 mL  250 mL IntraVENous PRN    insulin lispro (HUMALOG) injection   SubCUTAneous Q6H    glucose chewable tablet 16 g  4 Tab Oral PRN    dextrose (D50W) injection syrg 12.5-25 g  12.5-25 g IntraVENous PRN    glucagon (GLUCAGEN) injection 1 mg  1 mg IntraMUSCular PRN    TPN ADULT-CENTRAL AA 5% D20%-MVI W/ VIT K-RCH   IntraVENous CONTINUOUS    HYDROmorphone (PF) (DILAUDID) injection 0.5 mg  0.5 mg IntraVENous Q4H PRN    venlafaxine (EFFEXOR) tablet 25 mg  25 mg Oral TID    pantoprazole (PROTONIX) 40 mg in sodium chloride 0.9% 10 mL injection  40 mg IntraVENous DAILY    cefTRIAXone (ROCEPHIN) 2 g in 0.9% sodium chloride (MBP/ADV) 50 mL  2 g IntraVENous Q24H    mupirocin (BACTROBAN) 2 % ointment   Both Nostrils BID    vancomycin (VANCOCIN) 2000 mg in  ml infusion  2,000 mg IntraVENous Q12H    0.9% sodium chloride infusion  25 mL/hr IntraVENous CONTINUOUS    propofol (DIPRIVAN) infusion  0-50 mcg/kg/min IntraVENous TITRATE    carBAMazepine (TEGretol) 200 mg/10 mL suspension 100 mg  100 mg PEG Tube QID    PHENYLephrine (LILLY-SYNEPHRINE) 100 mg in 0.9% sodium chloride 250 mL infusion   mcg/min IntraVENous TITRATE    heparin (porcine) pf 300 Units  300 Units InterCATHeter PRN    chlorhexidine (PERIDEX) 0.12 % mouthwash 15 mL  15 mL Oral Q12H    albuterol-ipratropium (DUO-NEB) 2.5 MG-0.5 MG/3 ML  3 mL Nebulization QID RT    albuterol-ipratropium (DUO-NEB) 2.5 MG-0.5 MG/3 ML  3 mL Nebulization Q6H PRN    amiodarone (CORDARONE) tablet 200 mg  200 mg Oral DAILY    prochlorperazine (COMPAZINE) with saline injection 5 mg  5 mg IntraVENous Q6H PRN    lactulose (CHRONULAC) solution 10 g  10 g Oral BID    sodium chloride (NS) flush 10-30 mL  10-30 mL InterCATHeter PRN    sodium chloride (NS) flush 10 mL  10 mL InterCATHeter PRN    sodium chloride (NS) flush 10-40 mL  10-40 mL InterCATHeter Q8H    sodium chloride (NS) flush 10 mL  10 mL InterCATHeter Q24H    heparin (porcine) pf 300-500 Units  300-500 Units InterCATHeter PRN    melatonin tablet 3 mg  3 mg Oral QHS    polyethylene glycol (MIRALAX) packet 17 g  17 g Oral DAILY    hydrALAZINE (APRESOLINE) 20 mg/mL injection 10 mg  10 mg IntraVENous Q6H PRN    oxyCODONE IR (ROXICODONE) tablet 5 mg  5 mg Oral Q4H PRN    oxyCODONE IR (ROXICODONE) tablet 10 mg  10 mg Oral Q4H PRN    sodium chloride (NS) flush 5-10 mL  5-10 mL IntraVENous PRN    acetaminophen (TYLENOL) tablet 650 mg  650 mg Oral Q6H PRN    ondansetron (ZOFRAN) injection 4 mg  4 mg IntraVENous Q6H PRN       ROS not obtainable because of his current medical condition (sedated on vent)      Physical Exam:  Constitutional Sedated on vent. Appears close to chronological age. Well nourished. Well developed. Head Normocephalic; no scars   Eyes Eyes closed. ENMT Intubated. Neck Supple without masses or thyromegaly. L central line in . Hematologic/Lymphatic Scattered ecchymoses. No visible adenopathy   Respiratory Lungs  with rhonchi but no wheezing appreciated anterolaterally. Cardiovascular Regular rate and rhythm of heart without murmurs, gallops or rubs. Chest / Line Site Chest is symmetric with no chest wall deformities. Abdomen Non-tender, non-distended, no masses, ascites or hepatosplenomegaly. Good bowel sounds. Ostomy in place. Musculoskeletal Mild peripheral edema. Extremities No visible deformities, no cyanosis, clubbing. Mild edema. Skin No rashes, scars, or lesions suggestive of malignancy. No petechiae, purpura, or ecchymoses. No excoriations.     Neuro Not tested   Psychiatric obtunded

## 2018-08-15 NOTE — PROGRESS NOTES
0700-Bedside and Verbal shift change report given to Mary Villa, RN (oncoming nurse) by Luke Coley RN (offgoing nurse). Report included the following information SBAR, Kardex, ED Summary, Procedure Summary, Intake/Output, MAR, Recent Results and Cardiac Rhythm Paced. 0800-Dr. Quigley at bedside. Aware that patient is tachypnic despite more Propofol and Dilaudid. CT of Chest/Abdomen/Pelvis with IV and PO contrast.    0830-Dr. Quiglye made aware that Dobhoff is no longer working (it's clogged). Will attempt to declog it, but for now he verbalized to use OGT to give PO contrast.    1100-Patient able to received all 900mL PO contrast through OGT. Down stairs for CT.    1230-Propofol has been gradually decreased throughout the am due to the patient being unresponsive, but breathing slower. Propofol currently down to 20mcgs. 1300-Called to check on the results of the CT (which doesn't appear to be read yet). Per CT tech, because the order was placed for \"routine\", it will be read after the stats. Called Dr. Skye Mak who verbalized that he would like it read stat. Spoke to the radiologist in the reading room who said he would read it as soon as he could. 1430-Dr. Skye Mak made aware of CT results. Verbalized to give results to Dr. Yocasta Aguilar stat and consult Hemonc. Dr. Lavon Lopez consulted. 1435-Dr. Yocasta Aguilar made aware of CT results and is aware of thrombus. Verbalized that he would put in an order for a Heparin drip. RR in the mid 40's despite Propofol being increased. 0.5mg Dilaudid given. 1630-Called pharmacy and requested that they verify Heparin drip so it can be started. 1650-Heparin drip started. Aptt and CBC sent before drip started. Next Aptt 2300.    1815-Erasmo down to 10mcgs.

## 2018-08-15 NOTE — PROGRESS NOTES
[facility]  Pulmonary, Critical Care, and Sleep Medicine    Name: Julianna Dodd MRN: [id number]   : 1940 Hospital: [address] [facility]   Date: 8/15/2018            IMPRESSION:   · ON Ct of chest: noted to have right IJ thrombosis and tiny air bubble. Reviewed with Dr. Colin Yeung via phone to discuss findings. He agree with treatment with Heparin drip without bolus. Placed on heparin drip. · Acute respiratory failure-on moderate vent support, he needed higher levels of vent support. Pt was placed on PCV and increased fio2. He did not tolerate SAT, SBT. · Vasopressor dependence, on moderate dose of phenylephrine still present at 60mcg. Again will re assess after blood transfusion. He is still on pressors despite blood transfusion. · Shock-septic with bacteremia, also have evident of right basilar pneumonia. 4/ GPC clusters on culture blood, Will repeat blood CX today. ON Vanc and Ctx. · Possible Pneumonia, or bronchitis with Klebsiella in sputum and Staph Aureus. Also with suspected Staph epi in blood stream. Continue Abx. Reviewed the CT imaging of chest. Final report is pending. · Leukocytosis has increased over last 24 hrs. · Anemia, no overt bleeding, will decrease the enoxaparin to DVT dosing, Will give 1 unit of PRBC today. Ongoing assess for source of blood loss. 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 · Obesity   · Critically ill, high risk of multiple organ failure. Needs ongoing support with vent support and pressors. 35 min CC, EOP. · Discussed with Dr. Munira Woods, nurse, RT  and Pharmacy. PLAN:   · Full ventilator support, Adjust as tolerated. If not able to wean over weekend may need trach next week.    · Follow up ID sensitivity - for bacteria grown so far. · Pressors as needed to keep MAP over 65. · Appears to have pneumonia based on Ct of chest.   · Following labs  · Renewed TPN and adjusted as needed. · Sedation Will wean as tolerated  · Monitor for bleeding, decrease the dose of enoxaparin to DVT prophylaxis dosing. Discussed with surgery CT report ABD 2 days ago reviewed. He has low suspicion of GI cause of current decompensation   CT chest and CXR reviewed and do not suggest cause of current decompensation       Subjective/Interval History:   8.10 I have reviewed the flowsheet and previous days notes. Asked to evaluate patient to see if his pleural effusion is the cause of his rising WBC. Seen earlier this hospitalization by my partners for aspiration pneumonia. He has been hospitalized for 30 days, having had a colonic resection complicated by ileus. He was on Zosyn for 18 days and this was stopped 3 days ago and his WBC began to rise. He has a congested cough which is chronic. Can not produce sputum. He is limited in his understanding of how to do incentive spirometry due to prior traumatic brain injury. Review of Systems   Unable to perform ROS: Intubated   Constitutional: Positive for fatigue. Eyes: Negative. Respiratory: Positive for cough. Cardiovascular: Negative. Gastrointestinal: Negative. 8.11 called urgently for acute respiratory failure  RR 50's  rhonchorus breathing  On NRBM    8.12   sedated on ventilator  [address]  GPC on multiple BC 4/4   Remains on Vanc     8-14-18: Last 24 hrs. Remains on moderate dose vaopressors. Noted to have decreased Hgb less than 7. Not really following commands with current meds infusing. No acute issues noted per nursing last pm.     8-15-18: Pt had increased vent needs yesterday. Has not really been able to be easily weaned from vent. Has increased WBC. Had a ct scan of chest and abdomen. Final reports are pending.        Objective:   Vital Signs:    Visit Vitals    /57    Pulse 75    Temp 98.1 °F (36.7 °C)    Resp (!) 40    Ht 6' 2\" (1.88 m)    Wt 127.6 kg (281 lb 4.9 oz)    SpO2 100%    BMI 36.12 kg/m2       O2 Device: Endotracheal tube, Ventilator   O2 Flow Rate (L/min): 3 l/min   Temp (24hrs), Av.5 °F (37.5 °C), Min:98.1 °F (36.7 °C), Max:100.5 °F (38.1 °C)       Intake/Output:   Last shift:         Last 3 shifts:  1901 - 08/15 0700  In: 4193 [I.V.:4622.5]  Out: 6242 [Urine:2270]    Intake/Output Summary (Last 24 hours) at 08/15/18 0917  Last data filed at 08/15/18 0700   Gross per 24 hour   Intake          3365.34 ml   Output             1665 ml   Net          1700.34 ml      Physical Exam   Constitutional: Vital signs are normal. He appears well-nourished. He is sedated and intubated. HENT:   Head: Normocephalic and atraumatic. Mouth/Throat: No oropharyngeal exudate. Eyes: Conjunctivae are normal. Pupils are equal, round, and reactive to light. No scleral icterus. Neck:   Old tracheostomy site   Cardiovascular: Normal rate and regular rhythm. Pulmonary/Chest: He is intubated. No respiratory distress. He has no wheezes. He has rhonchi. He has no rales. Abdominal: He exhibits no distension. There is no tenderness. Musculoskeletal: He exhibits edema. Neurological:   Sedated, not really able to focus for long. Skin: Skin is warm and dry.      Data:   Labs:  Recent Labs      08/15/18   0539  18   0437  18   0355   WBC  20.0*  12.1*  18.1*   HGB  7.7*  6.4*  7.1*   HCT  26.2*  22.3*  24.2*   PLT  212  204  206     Recent Labs      08/15/18   0428  18   0437  18   0355   NA  145  146*  143   K  4.1  3.7  4.2   CL  117*  117*  111*   CO2  20*  22  24   GLU  164*  207*  147*   BUN  20  19  24*   CREA  0.74  0.68*  0.71   CA  6.8*  6.4*  6.6*   MG  2.4  2.6*  2.5*   PHOS  2.3*  1.4*  2.0*   ALB  1.6*  1.4*   --    TBILI  0.9  0.7   --    SGOT  31  24   --    ALT  39  40   --      Recent Labs      18 5   PH  7.46*   PCO2  26*   PO2  123*   HCO3  18*   FIO2  40       Imaging:  I have personally reviewed the patients radiographs:  8-13-18: CXR:         8-14-18: EXAM: CXR Portable.     FINDINGS: Portable chest shows support lines/devices appear unchanged since  yesterday. There is no apparent pneumothorax. Lungs show pulmonary edema  pattern with possible small pleural effusions. Heart size is top normal. There  is no midline shift.     IMPRESSION: No significant change. 8-15-18: CXR:   EXAM: CXR Portable.     FINDINGS: Portable chest shows support lines/devices appear unchanged since  yesterday. There is no apparent pneumothorax. Lungs show unchanged pulmonary  edema. Heart size is top normal. There is difficulty excluding pleural  effusions.     IMPRESSION: No significant change. 8-15-18:   CT of chest: has bilateral effusions and eight basilar infiltrate. ET tube in place. Final interpretation is pending.               Carola Vaughan MD

## 2018-08-15 NOTE — PROGRESS NOTES
Bedside and Verbal shift change report given to Mikel Silva. (oncoming nurse) by Shilpi Mansfield (offgoing nurse). Report included the following information SBAR, Kardex, Intake/Output, MAR, Recent Results and Cardiac Rhythm Paced. Bynum Lesch -- Dr. Michel Lugo paged for critical lab result -- Gram + Cocci in clusters LUE. No orders received. 2000 -- Assessment completed. See chart for details. Patient remains intubated and sedated. Withdraws from all extremities except LUE. Tachypnic RR ~50/min. Breath sounds diminished with crackles in bases. Paced rhythm on monitor. Palpable pulses in BUE and dopplerable in BLE. ABD with midline incision; dressing C/D/I. Colostomy draining adequately. Erazo catheter in place draining adequate amounts of jesus alberto urine. Titrating Erasmo for SBP > 90. Will continue to monitor. 2305 -- Dr. Michel Lugo paged for increasing tachypnea RR 45/min despite increasing propofol to 50 mcg/kg/min. Dr. Michel Lugo updated on patient condition and order received for STAT ABG.     2325 -- Dr. Michel Lugo paged for ABG results: pH 7.412, pCO2 28.7, pO2 133.1, HCO3 17.9    2332 -- Dr. Michel Lugo updated of ABG results. Order received for Fentanyl 50 mcg Q2H PRN.     0000 -- Reassessment completed. RR 40/min now. No other changes. 0014 -- 50mcg Fentanyl IVP given per PRN order to help decrease RR.     0016 -- aPTT 35.6 sec. 10,000 unit bolus and heparin gtt increased to 12 Units/kg/hr per heparin gtt protocol. 0400 -- Reassessment completed. No changes. RR 35    0745 -- Bedside and Verbal shift change report given to Flakita Nash RN (oncoming nurse) by Renaldo Pritchard RN (offgoing nurse). Report included the following information SBAR, Kardex, Intake/Output, MAR, Recent Results and Cardiac Rhythm Paced.

## 2018-08-15 NOTE — PROGRESS NOTES
1930 -- Bedside and Verbal shift change report given to [address] [place] [address]. (oncoming nurse) by Claudio Boo (offgoing nurse). Report included the following information SBAR, Kardex, Intake/Output, MAR, Recent Results and Cardiac Rhythm Paced. 2000 -- Assessment completed. See chart for details. Patient remains intubated and sedated. Withdraws from all extremities except LUE. Titrating Erasmo for SBP > 90. Will continue to monitor. 2250 -- PRBC's 1 Unit started    0000 -- Reassessment completed. No changes. 0120 -- PRBC's completed. 0400 -- Reassessment completed. No changes.

## 2018-08-15 NOTE — PROGRESS NOTES
Nutrition Assessment:    RECOMMENDATIONS:   Continue TPN as ordered  Resume TF as medically able per CRS     ASSESSMENT:   Chart reviewed, case discussed during CCU rounds. Pt remains intubated and sedated on propofol. He received 423mL yesterday which provides 465 kcals daily. TF is on hold 2' dobbhoff clogged. Pt went for abdominal CT today. Colostomy with 250mL OP yesterday. TPN + propofol meets 78% kcal and 70% protein needs. If TF is resumed at trophic rate of 20mL/h, 100% kcal and 92% protein needs will be met. Phos 2.3, being repleted. Awaiting plan per CRS. Erasmo up to 65 this morning and increasing. Dietitians Intervention(s)/Plan(s): Continue TPN, resume TF as/if able, monitor plan of care  SUBJECTIVE/OBJECTIVE:   Pt intubated and sedated   Diet Order: NPO, Other (comment) (TPN: D20, 5% AA @ 75mL/h (provides 1584kcals/90gPro), TF via Dobbhoff: Glucerna 1.2 @ 20mL/h (provides 576kcals/29gPro) )  % Eaten:  No data found. TF on hold 2' Dobbhoff clogged?    via OG Tube (Dobbhoff )       Pertinent Medications:rocephin, humalog, lactulose, protonix, miralax, KPhos, vancomycin; [email or web address]); Drips: propofol, erasmo. Chemistries:  Lab Results   Component Value Date/Time    Sodium 145 08/15/2018 04:28 AM    Potassium 4.1 08/15/2018 04:28 AM    Chloride 117 (H) 08/15/2018 04:28 AM    CO2 20 (L) 08/15/2018 04:28 AM    Anion gap 8 08/15/2018 04:28 AM    Glucose 164 (H) 08/15/2018 04:28 AM    BUN 20 08/15/2018 04:28 AM    Creatinine 0.74 08/15/2018 04:28 AM    BUN/Creatinine ratio 27 (H) 08/15/2018 04:28 AM    GFR est AA >60 08/15/2018 04:28 AM    GFR est non-AA >60 08/15/2018 04:28 AM    Calcium 6.8 (L) 08/15/2018 04:28 AM    Albumin 1.6 (L) 08/15/2018 04:28 AM      Anthropometrics: Height: 6' 2\" (188 cm) Weight: 127.6 kg (281 lb 4.9 oz)   [x]bed scale (8/14)   []stated   []unknown     IBW (%IBW):   ( ) UBW (%UBW):   (  %)    BMI: Body mass index is 36.12 kg/(m^2).     This BMI is indicative of:  []Underweight   []Normal   []Overweight   [x] Obesity   [] Extreme Obesity (BMI>40)  Estimated Nutrition Needs (Based on): 4745 Kcals/day (PSU (MSJ 2071)) , 130 g (1 g/kg) Protein  Carbohydrate: At Least 130 g/day  Fluids: per MD mL/day    Last BM: colostomy-250mL    []Active     []Hyperactive  [x]Hypoactive       [] Absent   BS  Skin:    [] Intact   [x] Incision  [] Breakdown   [] DTI   [] Tears/Excoriation/Abrasion  [x]Edema(anasarca; +4 pitting-BUE; +3 pitting-BLE)  [] Other: Wt Readings from Last 30 Encounters:   08/15/18 127.6 kg (281 lb 4.9 oz)   07/05/18 128.5 kg (283 lb 4.8 oz)   05/15/18 120.7 kg (266 lb)   05/07/18 118.8 kg (262 lb)   04/10/18 121.1 kg (267 lb)   03/26/18 121.1 kg (267 lb)   03/16/18 121.1 kg (267 lb)   09/21/17 118.4 kg (261 lb)   08/03/17 117.5 kg (259 lb)   04/06/17 117.3 kg (258 lb 8 oz)   03/31/17 117.5 kg (259 lb)   03/16/17 117.5 kg (259 lb)   02/23/17 119.7 kg (264 lb)   01/03/17 115.2 kg (254 lb)   11/17/16 119.7 kg (264 lb)   10/18/16 118.8 kg (262 lb)   10/07/16 118.8 kg (262 lb)   09/22/16 113.9 kg (251 lb 1 oz)   08/18/16 116.4 kg (256 lb 11.2 oz)   05/16/16 114.8 kg (253 lb)   03/10/16 114.8 kg (253 lb 1.6 oz)   02/11/16 122 kg (269 lb)   01/11/16 119.7 kg (264 lb)   11/04/15 122.5 kg (270 lb)   08/25/15 119.7 kg (264 lb)   08/13/15 116.1 kg (256 lb)   05/04/15 119.7 kg (264 lb)   03/27/15 119.7 kg (264 lb)   02/21/15 121.2 kg (267 lb 3.2 oz)   01/29/15 119.9 kg (264 lb 6.4 oz)      NUTRITION DIAGNOSES:   Problem:  Altered GI function      Etiology: related to Ascending colon mass c/w probable colon carcinoma and Cedric's erosion with HH      Signs/Symptoms: as evidenced by Ascending colon mass c/w probable colon carcinoma and Cedric's erosion with HH      Previous dx re: altered GI function continues.        NUTRITION INTERVENTIONS:   Enteral/Parenteral Nutrition: Other (Continue TPN as ordered, resume TF as able per CRS )               GOAL:   Pt will meet >75% kcal and protein needs via nutrition support in 2-3 days.      NUTRITION MONITORING AND EVALUATION   Previous Goal: resume TF in 2-4 days   Previous Goal Met: Yes   Previous Recommendations Implemented: Yes   Cultural, Gnosticist, or Ethnic Dietary Needs: None   LEARNING NEEDS (Diet, Food/Nutrient-Drug Interaction):    [x] None Identified   [] Identified and Education Provided/Documented   [] Identified and Pt declined/was not appropriate      [x] Interdisciplinary Care Plan Reviewed/Documented    [x] Participated in Discharge Planning: Unable to determine    [x] Interdisciplinary Rounds     NUTRITION RISK:    [x] High              [] Moderate           []  Low  []  Minimal/Uncompromised      Saundra Amezquita, [address]  Pager [phone]  Weekend Pager [phone]

## 2018-08-15 NOTE — PROGRESS NOTES
Pharmacy Automatic Renal Dosing Protocol - Antimicrobials    Indication for Antimicrobials: Sepsis; GPC bacteremia; URI    Current Regimen of Each Antimicrobial:  Ceftriaxone 2 gm IV every 24 hours (Started 18; Day #3 of 5)  Vancomycin 2000 mg IV every 12 hours (Started 18; Day #5)    Previous Antimicrobial Therapy:  Cefepime 2 g IV every 8 hours (Started 18; Stopped 18)  Vancomycin  Fluconazole  Zosyn    Goal Vancomycin Trough: 15-20 mcg/mL    Vancomycin Level Assessment:  Date Dose & Interval Measured (mcg/mL) Extrapolated (mcg/mL)   8/15/18 at 0428 Vancomycin 2000 mg IV every 12 hours 18.9 19.33     Significant Cultures:   18 Blood culture = No growth x 12 hours (Results pending)  18 Respiratory culture = Heavy MSSA; Light klebsiella (FINAL)  18 Blood culture = Coag neg staph in 2/2 (Results pending)  18 Blood culture = Coag neg staph in 2/2 (Results pending)  18 Respiratory culture = No growth (FINAL)  18 Blood culture = No growth (FINAL)    Radiology / Imaging results: (X-ray, CT scan or MRI):   None pertinent    Labs:  Recent Labs      18   0437  18   0355  18   0427   CREA  0.68*  0.71  1.35*   BUN  19  24*  39*   WBC  12.1*  18.1*  20.0*   Temp (24hrs), Av.5 °F (36.9 °C), Min:98.1 °F (36.7 °C), Max:98.9 °F (37.2 °C)    Creatinine Clearance (mL/min) or Dialysis: Greater than 60 mL/min     Impression/Plan:   · Ceftriaxone dosed appropriately for indication. · Vancomycin trough is at goal. Will continue current vancomycin regimen. · Antimicrobial stop date: 5 day duration (7 days of total appropriate therapy) entered for ceftriaxone; Vancomycin stop date pending culture results. Pharmacy will follow daily and adjust medications as appropriate for renal function and/or serum levels.     Thank you,  Audelia Valero, PHARMD

## 2018-08-15 NOTE — PROGRESS NOTES
8/15/2018 8:15 AM    Admit Date: 7/10/2018    Admit Diagnosis: Acute blood loss anemia;GI bleed; Anasarca;GI Bleed;gi bleed*    Subjective:     Remains vent and pressor dependant. No significant change.       Visit Vitals    /57    Pulse 75    Temp 98.1 °F (36.7 °C)    Resp (!) 40    Ht 6' 2\" (1.88 m)    Wt 281 lb 4.9 oz (127.6 kg)    SpO2 100%    BMI 36.12 kg/m2     Current Facility-Administered Medications   Medication Dose Route Frequency    0.9% sodium chloride infusion  50 mL/hr IntraVENous RAD ONCE    iopamidol (ISOVUE-370) 76 % injection 100 mL  100 mL IntraVENous RAD ONCE    sodium chloride (NS) flush 10 mL  10 mL IntraVENous RAD ONCE    0.9% sodium chloride infusion 250 mL  250 mL IntraVENous PRN    insulin lispro (HUMALOG) injection   SubCUTAneous Q6H    glucose chewable tablet 16 g  4 Tab Oral PRN    dextrose (D50W) injection syrg 12.5-25 g  12.5-25 g IntraVENous PRN    glucagon (GLUCAGEN) injection 1 mg  1 mg IntraMUSCular PRN    enoxaparin (LOVENOX) injection 40 mg  40 mg SubCUTAneous Q24H    TPN ADULT-CENTRAL AA 5% D20%-MVI W/ VIT K-RCH   IntraVENous CONTINUOUS    HYDROmorphone (PF) (DILAUDID) injection 0.5 mg  0.5 mg IntraVENous Q4H PRN    venlafaxine (EFFEXOR) tablet 25 mg  25 mg Oral TID    pantoprazole (PROTONIX) 40 mg in sodium chloride 0.9% 10 mL injection  40 mg IntraVENous DAILY    cefTRIAXone (ROCEPHIN) 2 g in 0.9% sodium chloride (MBP/ADV) 50 mL  2 g IntraVENous Q24H    mupirocin (BACTROBAN) 2 % ointment   Both Nostrils BID    vancomycin (VANCOCIN) 2000 mg in  ml infusion  2,000 mg IntraVENous Q12H    0.9% sodium chloride infusion  25 mL/hr IntraVENous CONTINUOUS    propofol (DIPRIVAN) infusion  0-50 mcg/kg/min IntraVENous TITRATE    carBAMazepine (TEGretol) 200 mg/10 mL suspension 100 mg  100 mg PEG Tube QID    PHENYLephrine (LILLY-SYNEPHRINE) 100 mg in 0.9% sodium chloride 250 mL infusion   mcg/min IntraVENous TITRATE    heparin (porcine) pf 300 Units  300 Units InterCATHeter PRN    chlorhexidine (PERIDEX) 0.12 % mouthwash 15 mL  15 mL Oral Q12H    albuterol-ipratropium (DUO-NEB) 2.5 MG-0.5 MG/3 ML  3 mL Nebulization QID RT    albuterol-ipratropium (DUO-NEB) 2.5 MG-0.5 MG/3 ML  3 mL Nebulization Q6H PRN    amiodarone (CORDARONE) tablet 200 mg  200 mg Oral DAILY    prochlorperazine (COMPAZINE) with saline injection 5 mg  5 mg IntraVENous Q6H PRN    lactulose (CHRONULAC) solution 10 g  10 g Oral BID    sodium chloride (NS) flush 10-30 mL  10-30 mL InterCATHeter PRN    sodium chloride (NS) flush 10 mL  10 mL InterCATHeter PRN    sodium chloride (NS) flush 10-40 mL  10-40 mL InterCATHeter Q8H    sodium chloride (NS) flush 10 mL  10 mL InterCATHeter Q24H    heparin (porcine) pf 300-500 Units  300-500 Units InterCATHeter PRN    melatonin tablet 3 mg  3 mg Oral QHS    polyethylene glycol (MIRALAX) packet 17 g  17 g Oral DAILY    hydrALAZINE (APRESOLINE) 20 mg/mL injection 10 mg  10 mg IntraVENous Q6H PRN    oxyCODONE IR (ROXICODONE) tablet 5 mg  5 mg Oral Q4H PRN    oxyCODONE IR (ROXICODONE) tablet 10 mg  10 mg Oral Q4H PRN    sodium chloride (NS) flush 5-10 mL  5-10 mL IntraVENous PRN    acetaminophen (TYLENOL) tablet 650 mg  650 mg Oral Q6H PRN    ondansetron (ZOFRAN) injection 4 mg  4 mg IntraVENous Q6H PRN         Objective:      Visit Vitals    /57    Pulse 75    Temp 98.1 °F (36.7 °C)    Resp (!) 40    Ht 6' 2\" (1.88 m)    Wt 281 lb 4.9 oz (127.6 kg)    SpO2 100%    BMI 36.12 kg/m2       Physical Exam:  Abdomen: soft, non-tender.    Extremities: no cyanosis or edema  Heart: regular rate and rhythm, S1, S2 normal, no murmur, click, rub or gallop  Lungs: clear to auscultation bilaterally  Neurologic: sedated    Data Review:   Labs:    Recent Results (from the past 24 hour(s))   TYPE + CROSSMATCH    Collection Time: 08/14/18  9:34 AM   Result Value Ref Range    Crossmatch Expiration 08/17/2018     ABO/Rh(D) A POSITIVE     Antibody screen POS     LATRELL Poly POS     LATRELL IgG POS     LATRELL C3b/C3d POS     Antibody ID ANTI-S  ANTI-Jk(A)  NONSPECIFIC ANTIBODY       ANTIGEN TYPING S NEGATIVE,     ANTIBODY ELUTED ANTI-Jk(A)     Unit number [id number]     Blood component type [facility]     Unit division 00     Status of unit TRANSFUSED     ANTIGEN/ANTIBODY INFO Jk(A) NEGATIVE,  S NEGATIVE,       Crossmatch result Compatible    GLUCOSE, POC    Collection Time: 08/14/18 12:05 PM   Result Value Ref Range    Glucose (POC) 260 (H) 65 - 100 mg/dL    Performed by Query Hunter*    BLOOD GAS, ARTERIAL    Collection Time: 08/14/18  1:09 PM   Result Value Ref Range    pH 7.46 (H) 7.35 - 7.45      PCO2 26 (L) 35.0 - 45.0 mmHg    PO2 123 (H) 80 - 100 mmHg    O2 SAT 99 (H) 92 - 97 %    BICARBONATE 18 (L) 22 - 26 mmol/L    BASE DEFICIT 3.9 mmol/L    O2 METHOD VENTILATOR      FIO2 40 %    MODE BIPAP      SPONTANEOUS RATE 35.0      PRESSURE SUPPORT 6.0      EPAP/CPAP/PEEP 6.0      Sample source ARTERIAL      SITE LEFT BRACHIAL      BONNIE'S TEST N/A     CULTURE, BLOOD, PAIRED    Collection Time: 08/14/18  5:17 PM   Result Value Ref Range    Special Requests: NO SPECIAL REQUESTS      Culture result: NO GROWTH AFTER 12 HOURS     GLUCOSE, POC    Collection Time: 08/14/18  6:05 PM   Result Value Ref Range    Glucose (POC) 174 (H) 65 - 100 mg/dL    Performed by Query Hunter*    GLUCOSE, POC    Collection Time: 08/14/18 11:43 PM   Result Value Ref Range    Glucose (POC) 193 (H) 65 - 100 mg/dL    Performed by [employer]    METABOLIC PANEL, COMPREHENSIVE    Collection Time: 08/15/18  4:28 AM   Result Value Ref Range    Sodium 145 136 - 145 mmol/L    Potassium 4.1 3.5 - 5.1 mmol/L    Chloride 117 (H) 97 - 108 mmol/L    CO2 20 (L) 21 - 32 mmol/L    Anion gap 8 5 - 15 mmol/L    Glucose 164 (H) 65 - 100 mg/dL    BUN 20 6 - 20 MG/DL    Creatinine 0.74 0.70 - 1.30 MG/DL    BUN/Creatinine ratio 27 (H) 12 - 20      GFR est AA >60 >60 ml/min/1.73m2    GFR est non-AA >60 >60 ml/min/1.73m2    Calcium 6.8 (L) 8.5 - 10.1 MG/DL    Bilirubin, total 0.9 0.2 - 1.0 MG/DL    ALT (SGPT) 39 12 - 78 U/L    AST (SGOT) 31 15 - 37 U/L    Alk. phosphatase 94 45 - 117 U/L    Protein, total 5.6 (L) 6.4 - 8.2 g/dL    Albumin 1.6 (L) 3.5 - 5.0 g/dL    Globulin 4.0 2.0 - 4.0 g/dL    A-G Ratio 0.4 (L) 1.1 - 2.2     MAGNESIUM    Collection Time: 08/15/18  4:28 AM   Result Value Ref Range    Magnesium 2.4 1.6 - 2.4 mg/dL   PHOSPHORUS    Collection Time: 08/15/18  4:28 AM   Result Value Ref Range    Phosphorus 2.3 (L) 2.6 - 4.7 MG/DL   VANCOMYCIN, TROUGH    Collection Time: 08/15/18  4:28 AM   Result Value Ref Range    Vancomycin,trough 18.9 (H) 5.0 - 10.0 ug/mL    Reported dose date: NOT PROVIDED      Reported dose time: NOT PROVIDED      Reported dose: NOT PROVIDED UNITS   CBC WITH AUTOMATED DIFF    Collection Time: 08/15/18  5:39 AM   Result Value Ref Range    WBC 20.0 (H) 4.1 - 11.1 K/uL    RBC 2.93 (L) 4.10 - 5.70 M/uL    HGB 7.7 (L) 12.1 - 17.0 g/dL    HCT 26.2 (L) 36.6 - 50.3 %    MCV 89.4 80.0 - 99.0 FL    MCH 26.3 26.0 - 34.0 PG    MCHC 29.4 (L) 30.0 - 36.5 g/dL    RDW 24.4 (H) 11.5 - 14.5 %    PLATELET 738 316 - 244 K/uL    MPV 11.5 8.9 - 12.9 FL    NRBC 0.8 (H) 0  WBC    ABSOLUTE NRBC 0.16 (H) 0.00 - 0.01 K/uL    NEUTROPHILS 93 (H) 32 - 75 %    BAND NEUTROPHILS 3 %    LYMPHOCYTES 1 (L) 12 - 49 %    MONOCYTES 2 (L) 5 - 13 %    EOSINOPHILS 0 0 - 7 %    BASOPHILS 1 0 - 1 %    IMMATURE GRANULOCYTES 0 0.0 - 0.5 %    ABS. NEUTROPHILS 19.2 (H) 1.8 - 8.0 K/UL    ABS. LYMPHOCYTES 0.2 (L) 0.8 - 3.5 K/UL    ABS. MONOCYTES 0.4 0.0 - 1.0 K/UL    ABS. EOSINOPHILS 0.0 0.0 - 0.4 K/UL    ABS. BASOPHILS 0.2 (H) 0.0 - 0.1 K/UL    ABS. IMM.  GRANS. 0.0 0.00 - 0.04 K/UL    DF MANUAL      RBC COMMENTS ANISOCYTOSIS  3+        RBC COMMENTS SCHISTOCYTES  1+       GLUCOSE, POC    Collection Time: 08/15/18  6:18 AM   Result Value Ref Range    Glucose (POC) 180 (H) 65 - 100 mg/dL    Performed by Jens Burton Milton        Telemetry: zhen Beltrán, intermittent paced      Assessment:     Principal Problem:    GI bleed (7/10/2018)    Active Problems:    Acute blood loss anemia (7/10/2018)      Anasarca (7/10/2018)        Plan:     Remains critically ill. D/w nursing staff regarding change in dose of lovenox. Will f/u.  Not clear why dose was reduced, unless concern of bleed and decreasing H&H.

## 2018-08-15 NOTE — PROGRESS NOTES
CRS Progress Note    More tachypneic today and requiring more sedation to remain synchronous on the vent. WBC up.  CT showed large clot in IJ and bilateral lung consolidation. No acute CT findings in the abdomen. /51 (BP 1 Location: Right arm, BP Patient Position: At rest)  Pulse 81  Temp 98.9 °F (37.2 °C)  Resp (!) 33  Ht 6' 2\" (1.88 m)  Wt 127.6 kg (281 lb 4.9 oz)  SpO2 98%  BMI 36.12 kg/m2    Intubated/sedated  Abd soft, ostomy with gas and stool in bag (250 output yesterday)  Excellent UOP    Plan  -Heme/onc called for large clot. Hep gtt to be started  -Continue abx for pneumonia and gram+ bacteremia.     -May ultimately need tracheostomy if unable to wean off vent  -Continue TF to 20/hr  -Continue TPN

## 2018-08-16 ENCOUNTER — APPOINTMENT (OUTPATIENT)
Dept: GENERAL RADIOLOGY | Age: 78
DRG: 329 | End: 2018-08-16
Attending: INTERNAL MEDICINE
Payer: MEDICARE

## 2018-08-16 LAB
ALBUMIN SERPL-MCNC: 1.4 G/DL (ref 3.5–5)
ALBUMIN/GLOB SERPL: 0.4 {RATIO} (ref 1.1–2.2)
ALP SERPL-CCNC: 76 U/L (ref 45–117)
ALT SERPL-CCNC: 31 U/L (ref 12–78)
ANION GAP SERPL CALC-SCNC: 8 MMOL/L (ref 5–15)
APTT PPP: 40.1 SEC (ref 22.1–32)
APTT PPP: 53 SEC (ref 22.1–32)
AST SERPL-CCNC: 25 U/L (ref 15–37)
BASOPHILS # BLD: 0 K/UL (ref 0–0.1)
BASOPHILS NFR BLD: 0 % (ref 0–1)
BILIRUB SERPL-MCNC: 0.7 MG/DL (ref 0.2–1)
BUN SERPL-MCNC: 24 MG/DL (ref 6–20)
BUN/CREAT SERPL: 30 (ref 12–20)
CALCIUM SERPL-MCNC: 6.8 MG/DL (ref 8.5–10.1)
CHLORIDE SERPL-SCNC: 118 MMOL/L (ref 97–108)
CO2 SERPL-SCNC: 20 MMOL/L (ref 21–32)
CREAT SERPL-MCNC: 0.79 MG/DL (ref 0.7–1.3)
DIFFERENTIAL METHOD BLD: ABNORMAL
EOSINOPHIL # BLD: 0.2 K/UL (ref 0–0.4)
EOSINOPHIL NFR BLD: 1 % (ref 0–7)
ERYTHROCYTE [DISTWIDTH] IN BLOOD BY AUTOMATED COUNT: 25.3 % (ref 11.5–14.5)
GLOBULIN SER CALC-MCNC: 3.8 G/DL (ref 2–4)
GLUCOSE BLD STRIP.AUTO-MCNC: 204 MG/DL (ref 65–100)
GLUCOSE BLD STRIP.AUTO-MCNC: 217 MG/DL (ref 65–100)
GLUCOSE BLD STRIP.AUTO-MCNC: 220 MG/DL (ref 65–100)
GLUCOSE BLD STRIP.AUTO-MCNC: 233 MG/DL (ref 65–100)
GLUCOSE SERPL-MCNC: 176 MG/DL (ref 65–100)
HCT VFR BLD AUTO: 23.9 % (ref 36.6–50.3)
HGB BLD-MCNC: 7 G/DL (ref 12.1–17)
IMM GRANULOCYTES # BLD: 0 K/UL (ref 0–0.04)
IMM GRANULOCYTES NFR BLD AUTO: 0 % (ref 0–0.5)
LYMPHOCYTES # BLD: 1.1 K/UL (ref 0.8–3.5)
LYMPHOCYTES NFR BLD: 7 % (ref 12–49)
MAGNESIUM SERPL-MCNC: 2.3 MG/DL (ref 1.6–2.4)
MCH RBC QN AUTO: 26.5 PG (ref 26–34)
MCHC RBC AUTO-ENTMCNC: 29.3 G/DL (ref 30–36.5)
MCV RBC AUTO: 90.5 FL (ref 80–99)
METAMYELOCYTES NFR BLD MANUAL: 2 %
MONOCYTES # BLD: 0.6 K/UL (ref 0–1)
MONOCYTES NFR BLD: 4 % (ref 5–13)
NEUTS BAND NFR BLD MANUAL: 2 %
NEUTS SEG # BLD: 13.9 K/UL (ref 1.8–8)
NEUTS SEG NFR BLD: 84 % (ref 32–75)
NRBC # BLD: 0.11 K/UL (ref 0–0.01)
NRBC BLD-RTO: 0.7 PER 100 WBC
PHOSPHATE SERPL-MCNC: 2.6 MG/DL (ref 2.6–4.7)
PLATELET # BLD AUTO: 205 K/UL (ref 150–400)
PMV BLD AUTO: 12.4 FL (ref 8.9–12.9)
POTASSIUM SERPL-SCNC: 4.2 MMOL/L (ref 3.5–5.1)
PROT SERPL-MCNC: 5.2 G/DL (ref 6.4–8.2)
RBC # BLD AUTO: 2.64 M/UL (ref 4.1–5.7)
RBC MORPH BLD: ABNORMAL
RBC MORPH BLD: ABNORMAL
SERVICE CMNT-IMP: ABNORMAL
SODIUM SERPL-SCNC: 146 MMOL/L (ref 136–145)
THERAPEUTIC RANGE,PTTT: ABNORMAL SECS (ref 58–77)
THERAPEUTIC RANGE,PTTT: ABNORMAL SECS (ref 58–77)
WBC # BLD AUTO: 16.2 K/UL (ref 4.1–11.1)

## 2018-08-16 PROCEDURE — 74011250637 HC RX REV CODE- 250/637: Performed by: INTERNAL MEDICINE

## 2018-08-16 PROCEDURE — 74011000250 HC RX REV CODE- 250: Performed by: SURGERY

## 2018-08-16 PROCEDURE — 85025 COMPLETE CBC W/AUTO DIFF WBC: CPT | Performed by: INTERNAL MEDICINE

## 2018-08-16 PROCEDURE — 83735 ASSAY OF MAGNESIUM: CPT | Performed by: INTERNAL MEDICINE

## 2018-08-16 PROCEDURE — 36415 COLL VENOUS BLD VENIPUNCTURE: CPT | Performed by: INTERNAL MEDICINE

## 2018-08-16 PROCEDURE — 74011250637 HC RX REV CODE- 250/637: Performed by: HOSPITALIST

## 2018-08-16 PROCEDURE — 74011250636 HC RX REV CODE- 250/636: Performed by: INTERNAL MEDICINE

## 2018-08-16 PROCEDURE — 65610000006 HC RM INTENSIVE CARE

## 2018-08-16 PROCEDURE — 85730 THROMBOPLASTIN TIME PARTIAL: CPT | Performed by: INTERNAL MEDICINE

## 2018-08-16 PROCEDURE — 87040 BLOOD CULTURE FOR BACTERIA: CPT | Performed by: INTERNAL MEDICINE

## 2018-08-16 PROCEDURE — C9113 INJ PANTOPRAZOLE SODIUM, VIA: HCPCS | Performed by: INTERNAL MEDICINE

## 2018-08-16 PROCEDURE — 74011250637 HC RX REV CODE- 250/637: Performed by: SURGERY

## 2018-08-16 PROCEDURE — 74011000258 HC RX REV CODE- 258: Performed by: INTERNAL MEDICINE

## 2018-08-16 PROCEDURE — 71045 X-RAY EXAM CHEST 1 VIEW: CPT

## 2018-08-16 PROCEDURE — 84100 ASSAY OF PHOSPHORUS: CPT | Performed by: INTERNAL MEDICINE

## 2018-08-16 PROCEDURE — 80053 COMPREHEN METABOLIC PANEL: CPT | Performed by: INTERNAL MEDICINE

## 2018-08-16 PROCEDURE — 74011636637 HC RX REV CODE- 636/637: Performed by: INTERNAL MEDICINE

## 2018-08-16 PROCEDURE — 94640 AIRWAY INHALATION TREATMENT: CPT

## 2018-08-16 PROCEDURE — 94003 VENT MGMT INPAT SUBQ DAY: CPT

## 2018-08-16 PROCEDURE — 82962 GLUCOSE BLOOD TEST: CPT

## 2018-08-16 PROCEDURE — 74011000250 HC RX REV CODE- 250: Performed by: INTERNAL MEDICINE

## 2018-08-16 RX ADMIN — HEPARIN SODIUM 13 UNITS/KG/HR: 10000 INJECTION, SOLUTION INTRAVENOUS at 09:52

## 2018-08-16 RX ADMIN — VENLAFAXINE 25 MG: 25 TABLET ORAL at 09:40

## 2018-08-16 RX ADMIN — IPRATROPIUM BROMIDE AND ALBUTEROL SULFATE 3 ML: .5; 3 SOLUTION RESPIRATORY (INHALATION) at 07:31

## 2018-08-16 RX ADMIN — CHLORHEXIDINE GLUCONATE 15 ML: 1.2 RINSE ORAL at 20:09

## 2018-08-16 RX ADMIN — FENTANYL CITRATE 50 MCG: 50 INJECTION, SOLUTION INTRAMUSCULAR; INTRAVENOUS at 14:24

## 2018-08-16 RX ADMIN — SODIUM CHLORIDE 10 ML: 9 INJECTION, SOLUTION INTRAMUSCULAR; INTRAVENOUS; SUBCUTANEOUS at 20:09

## 2018-08-16 RX ADMIN — CARBAMAZEPINE 100 MG: 100 SUSPENSION ORAL at 09:39

## 2018-08-16 RX ADMIN — ACETAMINOPHEN 650 MG: 325 TABLET ORAL at 00:21

## 2018-08-16 RX ADMIN — LACTULOSE 10 G: 20 SOLUTION ORAL at 09:39

## 2018-08-16 RX ADMIN — PROPOFOL 50 MCG/KG/MIN: 10 INJECTION, EMULSION INTRAVENOUS at 12:00

## 2018-08-16 RX ADMIN — HEPARIN SODIUM 10000 UNITS: 5000 INJECTION INTRAVENOUS; SUBCUTANEOUS at 00:15

## 2018-08-16 RX ADMIN — VANCOMYCIN HYDROCHLORIDE 2000 MG: 10 INJECTION, POWDER, LYOPHILIZED, FOR SOLUTION INTRAVENOUS at 15:03

## 2018-08-16 RX ADMIN — VENLAFAXINE 25 MG: 25 TABLET ORAL at 21:30

## 2018-08-16 RX ADMIN — INSULIN LISPRO 4 UNITS: 100 INJECTION, SOLUTION INTRAVENOUS; SUBCUTANEOUS at 23:36

## 2018-08-16 RX ADMIN — CHLORHEXIDINE GLUCONATE 15 ML: 1.2 RINSE ORAL at 08:12

## 2018-08-16 RX ADMIN — MUPIROCIN: 20 OINTMENT TOPICAL at 17:33

## 2018-08-16 RX ADMIN — PROPOFOL 50 MCG/KG/MIN: 10 INJECTION, EMULSION INTRAVENOUS at 05:02

## 2018-08-16 RX ADMIN — FENTANYL CITRATE 50 MCG: 50 INJECTION, SOLUTION INTRAMUSCULAR; INTRAVENOUS at 08:45

## 2018-08-16 RX ADMIN — CALCIUM GLUCONATE: 94 INJECTION, SOLUTION INTRAVENOUS at 18:18

## 2018-08-16 RX ADMIN — CEFTRIAXONE SODIUM 2 G: 2 INJECTION, POWDER, FOR SOLUTION INTRAMUSCULAR; INTRAVENOUS at 12:53

## 2018-08-16 RX ADMIN — IPRATROPIUM BROMIDE AND ALBUTEROL SULFATE 3 ML: .5; 3 SOLUTION RESPIRATORY (INHALATION) at 11:16

## 2018-08-16 RX ADMIN — CARBAMAZEPINE 100 MG: 100 SUSPENSION ORAL at 21:30

## 2018-08-16 RX ADMIN — VANCOMYCIN HYDROCHLORIDE 2000 MG: 10 INJECTION, POWDER, LYOPHILIZED, FOR SOLUTION INTRAVENOUS at 02:17

## 2018-08-16 RX ADMIN — MUPIROCIN: 20 OINTMENT TOPICAL at 08:14

## 2018-08-16 RX ADMIN — FENTANYL CITRATE 50 MCG: 50 INJECTION, SOLUTION INTRAMUSCULAR; INTRAVENOUS at 00:14

## 2018-08-16 RX ADMIN — PROPOFOL 50 MCG/KG/MIN: 10 INJECTION, EMULSION INTRAVENOUS at 10:11

## 2018-08-16 RX ADMIN — CARBAMAZEPINE 100 MG: 100 SUSPENSION ORAL at 13:04

## 2018-08-16 RX ADMIN — SODIUM CHLORIDE 40 ML: 9 INJECTION, SOLUTION INTRAMUSCULAR; INTRAVENOUS; SUBCUTANEOUS at 21:34

## 2018-08-16 RX ADMIN — SODIUM CHLORIDE 10 ML: 9 INJECTION, SOLUTION INTRAMUSCULAR; INTRAVENOUS; SUBCUTANEOUS at 05:02

## 2018-08-16 RX ADMIN — CARBAMAZEPINE 100 MG: 100 SUSPENSION ORAL at 17:31

## 2018-08-16 RX ADMIN — PROPOFOL 50 MCG/KG/MIN: 10 INJECTION, EMULSION INTRAVENOUS at 18:10

## 2018-08-16 RX ADMIN — PROPOFOL 50 MCG/KG/MIN: 10 INJECTION, EMULSION INTRAVENOUS at 22:53

## 2018-08-16 RX ADMIN — SODIUM CHLORIDE 40 MG: 9 INJECTION INTRAMUSCULAR; INTRAVENOUS; SUBCUTANEOUS at 09:55

## 2018-08-16 RX ADMIN — INSULIN LISPRO 4 UNITS: 100 INJECTION, SOLUTION INTRAVENOUS; SUBCUTANEOUS at 05:45

## 2018-08-16 RX ADMIN — INSULIN LISPRO 4 UNITS: 100 INJECTION, SOLUTION INTRAVENOUS; SUBCUTANEOUS at 13:02

## 2018-08-16 RX ADMIN — INSULIN LISPRO 4 UNITS: 100 INJECTION, SOLUTION INTRAVENOUS; SUBCUTANEOUS at 17:46

## 2018-08-16 RX ADMIN — PROPOFOL 50 MCG/KG/MIN: 10 INJECTION, EMULSION INTRAVENOUS at 15:03

## 2018-08-16 RX ADMIN — MELATONIN TAB 3 MG 3 MG: 3 TAB at 21:30

## 2018-08-16 RX ADMIN — IPRATROPIUM BROMIDE AND ALBUTEROL SULFATE 3 ML: .5; 3 SOLUTION RESPIRATORY (INHALATION) at 15:02

## 2018-08-16 RX ADMIN — AMIODARONE HYDROCHLORIDE 200 MG: 200 TABLET ORAL at 09:37

## 2018-08-16 RX ADMIN — POLYETHYLENE GLYCOL 3350 17 G: 17 POWDER, FOR SOLUTION ORAL at 09:40

## 2018-08-16 RX ADMIN — SODIUM CHLORIDE 10 ML: 9 INJECTION, SOLUTION INTRAMUSCULAR; INTRAVENOUS; SUBCUTANEOUS at 15:03

## 2018-08-16 RX ADMIN — PROPOFOL 50 MCG/KG/MIN: 10 INJECTION, EMULSION INTRAVENOUS at 02:11

## 2018-08-16 RX ADMIN — LACTULOSE 10 G: 20 SOLUTION ORAL at 17:24

## 2018-08-16 RX ADMIN — PROPOFOL 50 MCG/KG/MIN: 10 INJECTION, EMULSION INTRAVENOUS at 07:27

## 2018-08-16 RX ADMIN — PROPOFOL 50 MCG/KG/MIN: 10 INJECTION, EMULSION INTRAVENOUS at 21:30

## 2018-08-16 RX ADMIN — IPRATROPIUM BROMIDE AND ALBUTEROL SULFATE 3 ML: .5; 3 SOLUTION RESPIRATORY (INHALATION) at 19:15

## 2018-08-16 RX ADMIN — VENLAFAXINE 25 MG: 25 TABLET ORAL at 17:23

## 2018-08-16 NOTE — PROGRESS NOTES
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 . Reviewed with Dr. Mauro Roca via phone to discuss findings. He agree with treatment with Heparin drip without bolus. Placed on heparin drip. · Acute respiratory failure-on moderate vent support, he needed higher levels of vent support. Pt was placed on PCV and increased fio2. He has been failing SBTs. Mostly likely will need trach next week. Discussed with pts wife. He had a prior trach 24 years ago. · Shock: severe hypoalbuminemia, Vasopressor dependence-still on jose de jesus. , on moderate dose of phenylephrine still present at 60mcg. Again will re assess after blood transfusion. He is still on pressors despite blood transfusion. · Bacteremia-, last xc was coag neg staph, Cx have shown: , also have evident of right basilar pneumonia. 4/4 GPC clusters on culture blood, Will repeat blood CX today. ON Vanc and Ctx. · Pneumonia, Klebsiella in sputum and Staph Aureus. · Leukocytosis decreasing. · Anemia, no overt bleeding, will decrease the enoxaparin to DVT dosing, Will give 1 unit of PRBC today. Ongoing assess for source of blood loss. Not much reserve, Will keep on heparin drip for now. · S/P colectomy/YAMEL/enterotomies for colon cancer; Stage 3b colon ca. · Ileus/ with recurrent aspiration pneumonia prior to admission to ICU. 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 · Debility  · DM, Will adjust the Insulin in TPN. · Obesity   · Critically ill, high risk of multiple organ failure. Needs ongoing support with vent support and pressors. 35 min CC, EOP. · Discussed with Dr. Perla Good, nurse, RT  and Pharmacy.        PLAN: · Full ventilator support, Adjust as tolerated. If not able to wean over weekend may need trach next week. · Very concerning about current infections, will need ongoing surviellance. · Heparin drip for now  · May need additonal blood transfusion  · Renewed the TPN and adjusted the insulin for hyperglycemia. · Pressors as needed to keep MAP over 65. · Appears to have pneumonia based on Ct of chest.   · Following lab for K, MG, Ca. Cr.   · Sedation Will wean as tolerated  · Monitor for bleeding, decrease the dose of enoxaparin to DVT prophylaxis dosing. Subjective/Interval History:   8.10 I have reviewed the flowsheet and previous days notes. Asked to evaluate patient to see if his pleural effusion is the cause of his rising WBC. Seen earlier this hospitalization by my partners for aspiration pneumonia. He has been hospitalized for 30 days, having had a colonic resection complicated by ileus. He was on Zosyn for 18 days and this was stopped 3 days ago and his WBC began to rise. He has a congested cough which is chronic. Can not produce sputum. He is limited in his understanding of how to do incentive spirometry due to prior traumatic brain injury. Review of Systems   Unable to perform ROS: Intubated   Constitutional: Positive for fatigue. Eyes: Negative. Respiratory: Positive for cough. Cardiovascular: Negative. Gastrointestinal: Negative. 8.11 called urgently for acute respiratory failure  RR 50's  rhonchorus breathing  On NRBM    8.12   sedated on ventilator  270 jose de jesus  GPC on multiple BC 4/4   Remains on Vanc     8-14-18: Last 24 hrs. Remains on moderate dose vaopressors. Noted to have decreased Hgb less than 7. Not really following commands with current meds infusing. No acute issues noted per nursing last pm.     8-15-18: Pt had increased vent needs yesterday. Has not really been able to be easily weaned from vent. Has increased WBC.   Had a ct scan of chest and abdomen. Final reports are pending. 18: Events and findings from CT noted. Discussed with Dr. Kerri Kuo. Due to pts instability will continue on heparin drip. Discussed with pts wife, nurse this am. Still on jose de jesus drip. When tried to place on SBT his RR was in the 45s. Still on sedation. Objective:   Vital Signs:    Visit Vitals    /62 (BP 1 Location: Right arm, BP Patient Position: At rest)    Pulse 75    Temp 99.1 °F (37.3 °C)    Resp (!) 43    Ht 6' 2\" (1.88 m)    Wt 133.2 kg (293 lb 10.4 oz)    SpO2 95%    BMI 37.7 kg/m2       O2 Device: Endotracheal tube, Ventilator   O2 Flow Rate (L/min): 3 l/min   Temp (24hrs), Av.7 °F (37.6 °C), Min:98.9 °F (37.2 °C), Max:100.5 °F (38.1 °C)       Intake/Output:   Last shift:       07 -  1900  In: 222.9 [I.V.:152.9]  Out: 500 [Urine:500]  Last 3 shifts:  190 -  0700  In: 9387.6 [I.V.:7305.1]  Out: 9856 [Urine:4385]    Intake/Output Summary (Last 24 hours) at 18 1307  Last data filed at 18 1200   Gross per 24 hour   Intake          4157.95 ml   Output             2820 ml   Net          1337.95 ml      Physical Exam   Constitutional: Vital signs are normal. He appears well-nourished. He is sedated and intubated. HENT:   Head: Normocephalic and atraumatic. Mouth/Throat: No oropharyngeal exudate. Eyes: Conjunctivae are normal. Pupils are equal, round, and reactive to light. No scleral icterus. Neck:   Old tracheostomy site   Cardiovascular: Normal rate and regular rhythm. Pulmonary/Chest: He is intubated. No respiratory distress. He has no wheezes. He has rhonchi. He has no rales. Abdominal: He exhibits no distension. There is tenderness. Musculoskeletal: He exhibits edema. Neurological:   Sedated, not really able to focus for long. Skin: Skin is warm and dry.      Data:   Labs:  Recent Labs      18   0405  08/15/18   1534  08/15/18   0539   WBC  16.2*  18.2*  20.0*   HGB  7.0*  7.4*  7.7* HCT  23.9*  25.4*  26.2*   PLT  205  193  212     Recent Labs      08/16/18   0405  08/15/18   0428  08/14/18   0437   NA  146*  145  146*   K  4.2  4.1  3.7   CL  118*  117*  117*   CO2  20*  20*  22   GLU  176*  164*  207*   BUN  24*  20  19   CREA  0.79  0.74  0.68*   CA  6.8*  6.8*  6.4*   MG  2.3  2.4  2.6*   PHOS  2.6  2.3*  1.4*   ALB  1.4*  1.6*  1.4*   TBILI  0.7  0.9  0.7   SGOT  25  31  24   ALT  31  39  40     Recent Labs      08/15/18   2315  08/14/18   1309   PH  7.41  7.46*   PCO2  29*  26*   PO2  133*  123*   HCO3  18*  18*   FIO2  70  40       Imaging:  I have personally reviewed the patients radiographs:  8-13-18: CXR:         8-14-18: EXAM: CXR Portable.     FINDINGS: Portable chest shows support lines/devices appear unchanged since  yesterday. There is no apparent pneumothorax. Lungs show pulmonary edema  pattern with possible small pleural effusions. Heart size is top normal. There  is no midline shift.     IMPRESSION: No significant change. 8-15-18: CXR:   EXAM: CXR Portable.     FINDINGS: Portable chest shows support lines/devices appear unchanged since  yesterday. There is no apparent pneumothorax. Lungs show unchanged pulmonary  edema. Heart size is top normal. There is difficulty excluding pleural  effusions.     IMPRESSION: No significant change.              Leo Brown MD

## 2018-08-16 NOTE — PROGRESS NOTES
Attended Interdisciplinary rounds in Critical Care Unit, where patient care was discussed. Visit by: Phillip Sullivan. Kelley Carney.  Forest Al MA, [address]

## 2018-08-16 NOTE — PROGRESS NOTES
Patient with Respiratory rate in the 40's. O2 sats holding in the 90's, patient breathing using abdominal muscles. Fentanyl 50mcg IV given at this time. 1000 Patient still with respiratory rate in the 40's. [id number] Patient discussed in rounds and Dr. Sylvie Bui informed of patient respiratory rate still being up in the 40's even after the fentanyl dose. Patient remains on propofol at 50mcg/kg/min. MD aware of patents RASS -4. No change in orders at this time. 1300 Patient respiratory rate in the 30's. Patients wife at bedside. 1424 Patient respiratory rate again in the 40's. Fentanyl 50 mcg IV given at this time. 1500 Respiratory rate 35.   1552 PTT result 40.1. Order on [facility] for heparin adjusting states for a 10,000 unit bolus of heparin and to increase the drip by 4 units/kg/hr. Spoke to [place] from pharmacy and Dr. Sylvie Bui. Order per Dr. Sylvie Bui to not give the bolus and to increase the drip by the 4 units/kg/hr. Heparin drip increased from 13units/kg/hr to 17units/kg/hr. 1900 No changes. Report to Kate Brumfield RN.

## 2018-08-16 NOTE — ADT AUTH CERT NOTES
Utilization Review           Bowel Surgery: Colectomy, Partial, with or without Ostomy - Care Day 45 (8/16/2018) by Randal Cadet        Review Status Review Entered       Completed 8/16/2018       Details              Care Day: 38 Care Date: 8/16/2018 Level of Care: ICU       Guideline Day 2        Clinical Status       (X) * Procedure completed       ( ) * Hemodynamic stability       8/16/2018 1:26 PM EDT by Elsa Castro         99.1-118/62-75-43-95% ET tube/vent                     Activity       ( ) * Progressive ambulation              Routes       (X) IV fluids, medications       8/16/2018 1:26 PM EDT by Elsa Castro         ivf 25cc/hr              ( ) Advance diet as tolerated       8/16/2018 1:26 PM EDT by Elsa Castro         TPN                     Interventions       (X) Hgb/Hct       8/16/2018 1:26 PM EDT by Elsa Castro         hgb 7 hct 23.9                                          * Milestone              Additional Notes       Principal Problem:         GI bleed (7/10/2018)               Active Problems:         Acute blood loss anemia (7/10/2018)                 Anasarca (7/10/2018)       On IV heparin for IJ/SVC thrombus.        Continue current meds, vent and hemodynamic support.       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 Reviewed with wife today.                Stage IIIB colon cancer - s/p resection               Acute resp failure - on vent. Sputum culture with staph aureus and Klebsiella. CT scan with apparent pneumonia               Septic shock with bacteremia - staph coag neg in [facility] 8/14,  Staph epi 8/11 BC. On vancomycin               Normochromic normocytic anemia - B12 and folate have not been checked since 2013. Ferritin was 5 on 7/10/18 suggesting fairly significant iron deficiency. He got 500 mg of IV iron in July but actually has a much higher calculated deficit.  Will repeat the ferritin along with the usual screening studies.       wbc 16.2 rbc 2.64 Na 146 glu 220 bun 24 Ca 6.8 aPTT 53        blood culture pending       CXR - Continued moderate to severe pulmonary edema with bibasilar airspace disease and       bilateral pleural effusions.       acapella       tube feeding       consult mobility services       sitter       duo neb x2       amiodarone po x1       rocephin iv x1       fentanyl iv x2       heparin iv x1       heparin drip       insulin sc x2       protonix iv x1       jose de jesus synephrine drip       propofol drip       vanco iv x1           Bowel Surgery: Colectomy, Partial, with or without Ostomy - Care Day 37 (8/15/2018) by Mireya Cadet        Review Status Review Entered       Completed 8/15/2018       Details              Care Day: 37 Care Date: 8/15/2018 Level of Care: ICU       Guideline Day 2        Clinical Status       (X) * Procedure completed       8/15/2018 3:05 PM EDT by Phu Ponce         S/P colectomy/YAMEL/enterotomies for colon cancer              ( ) * Hemodynamic stability       8/15/2018 3:05 PM EDT by Phu Ponce         98.6-118/61-73-34-99%mET tube/vent                     Activity       ( ) * Progressive ambulation              Routes       (X) IV fluids, medications       8/15/2018 3:05 PM EDT by Phu Ponce         ivf 25cc/hr                     Interventions       (X) Hgb/Hct       8/15/2018 3:05 PM EDT by Phu Ponce         hgb 7.7 hct 26.2                     Medications       (X) Epidural, PCA, or other analgesia       8/15/2018 3:05 PM EDT by Phu Ponce         dilaudid iv x1                                          * Milestone              Additional Notes       98.6-118/61-73-34-99%mET tube/vent       Principal Problem:         GI bleed (7/10/2018)               Active Problems:         Acute blood loss anemia (7/10/2018)                 Anasarca (7/10/2018)       Remains critically ill. D/w nursing staff regarding change in dose of lovenox. Will f/u.  Not clear why dose was reduced, unless concern of bleed and decreasing H&H.       Acute respiratory failure-on moderate vent support, he needed higher levels of vent support. Pt was placed on PCV and increased fio2. He did not tolerate SAT, SBT.        Vasopressor dependence, on moderate dose of phenylephrine still present at 60mcg. Again will re assess after blood transfusion. He is still on pressors despite blood transfusion.        Shock-septic with bacteremia, also have evident of right basilar pneumonia.  4/4 GPC clusters on culture blood, Will repeat blood CX today. ON Vanc and Ctx.       Possible Pneumonia, or bronchitis with Klebsiella in sputum and Staph Aureus. Also with suspected Staph epi in blood stream. Continue Abx.  Reviewed the CT imaging of chest. Final report is pending.        Leukocytosis has increased over last 24 hrs.        Anemia, no overt bleeding, will decrease the enoxaparin to DVT dosing, Will give 1 unit of PRBC today. Ongoing assess for source of blood loss.        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        Obesity        Critically ill, high risk of multiple organ failure. Needs ongoing support with vent support and pressors. 35 min CC, EOP.       Full ventilator support, Adjust as tolerated. If not able to wean over weekend may need trach next week.        Follow up ID sensitivity - for bacteria grown so far.        Pressors as needed to keep MAP over 65.         Appears to have pneumonia based on Ct of chest.        Following labs       Renewed TPN and adjusted as needed.       Sedation Will wean as tolerated       Monitor for bleeding, decrease the dose of enoxaparin to DVT prophylaxis dosing.                Discussed with surgery CT report ABD 2 days ago reviewed.  He has low suspicion of GI cause of current decompensation       CT chest and CXR reviewed and do not suggest cause of current decompensation        glu 199 Ca 6.8 phos 2.3 vanco trough 18.9 wbc 20 rbc 2.93        CXR no significant change       CT abd/chest - CT of the chest demonstrates lower lobe consolidation which has progressed       since the prior study with small effusions.               There is thrombus and a tiny bubble of gas in the right internal jugular vein.       The thrombus extends into the SVC to the level of the right atrial SVC       junction. .               CT of the abdomen and pelvis demonstrates postsurgical changes.  No abscess is       identified.       acapella       consult hematology       tube feeding       transfuse 1U PRBCs       consult mobility services       sitter       duo neb x2       amiodarone po x1       rocephin iv x1       insulin sc x3       protonix iv x1       jose de jesus synephrine drip       pot phos iv x1       propofol drip       vanco iv x2           Bowel Surgery: Colectomy, Partial, with or without Ostomy - Care Day 36 (8/14/2018) by Bethany Cadet        Review Status Review Entered       Completed 8/14/2018       Details              Care Day: 36 Care Date: 8/14/2018 Level of Care: ICU       Guideline Day 2        Clinical Status       (X) * Procedure completed       8/14/2018 1:48 PM EDT by Kamlesh Brennan         S/P colectomy/YAMEL/enterotomies for colon cancer              ( ) * Hemodynamic stability       8/14/2018 1:48 PM EDT by Kamlesh Brennan         98.1-104/54-56-% ET tube/vent                     Activity       ( ) * Progressive ambulation              Routes       (X) IV fluids, medications       8/14/2018 1:48 PM EDT by Kamlesh Brennan         ivf 125cc/hr                     Interventions       (X) Hgb/Hct       8/14/2018 1:48 PM EDT by Kamlesh Brennan         hgb 6.4 hct 22.3                                          * Milestone              Additional Notes       Intubated/sedated       Abd soft, ostomy with gas and stool in bag (585 output)       Excellent UOP               Plan       -Wake/wean trial       -Plan for transfusion today per ICU.  Hopefully this will allow him to wean off jose de jesus.       -Advance TF to 20/hr       -Continue TPN       Full ventilator support, Adjust as tolerated.       After blood transfusion can performed SAT, SBT.        Follow up ID sensitivity -On Ceftriaxone and Vanc, will follow for final ID and Sens, repeating blood Cx today.        Pressors as needed to keep MAP over 65. Hope to wean as gets blood transfusion.        KUB will follow.        Oxygenation CXR acidosis suggest non pulmonary cause of decompensation -possibly line sepsis       Following labs       Renewed TPN and adjusted as needed.       Sedation Will wean as tolerated       Monitor for bleeding, decrease the dose of enoxaparin to DVT prophylaxis dosing.                Discussed with surgery CT report ABD 2 days ago reviewed.  He has low suspicion of GI cause of current decompensation        CT chest and CXR reviewed and do not suggest cause of current decompensation       wbc 12.1 rbc 2.51 glu 236 creat 0.68 Ca 6.4 mag 2.6 phos 1.4        pH 7.46 /PCO2 26/ PO2 123 /O2 sats 99% vent /bicarb 18       blood culture pending       CXR no significant change       acapella       transfuse 1U PRBCs       tube feeding       consult mobility services       sitter       continuous bladder checks       duo neb x2       amiodarone po x1       rocephin iv x1       insulin sc x1       protonix iv x1       jose de jesus synephrine drip       propofol drip       vanco iv x1       pot phos iv x1

## 2018-08-16 NOTE — PROGRESS NOTES
CRS Progress Note    No active issues at this time. Tolerated tube feeds at 20. Ostomy functioning    /66  Pulse 75  Temp 99.1 °F (37.3 °C)  Resp (!) 31  Ht 6' 2\" (1.88 m)  Wt 133.2 kg (293 lb 10.4 oz)  SpO2 99%  BMI 37.7 kg/m2    Intubated/sedated  Abd soft, ostomy with gas and stool in bag (250 output yesterday)  Excellent UOP    Plan  -Continue abx for pneumonia and gram+ bacteremia.   Repeat blood cx today  -May ultimately need tracheostomy if unable to wean off vent  -Advance TF to 30/hr  -Continue TPN

## 2018-08-16 NOTE — PROGRESS NOTES
Hematology Oncology Progress Note       Follow up for: IJ/SVC thrombus    Chart notes reviewed since last visit. Case discussed with following: wife at bedside.     Patient complains of the following: not awake, on ventilator    Additional concerns noted by the staff: none    Patient Vitals for the past 24 hrs:   BP Temp Pulse Resp SpO2 Weight   08/16/18 1118 - - - - 98 % -   08/16/18 1116 - - 77 (!) 45 98 % -   08/16/18 1027 - - 75 (!) 37 99 % -   08/16/18 0937 122/63 - 75 - - -   08/16/18 0900 146/73 - 83 26 99 % -   08/16/18 0810 - - - - - 133.2 kg (293 lb 10.4 oz)   08/16/18 0800 101/50 99.7 °F (37.6 °C) 75 29 100 % -   08/16/18 0731 - - - - 100 % -   08/16/18 0700 97/52 - 75 (!) 31 100 % -   08/16/18 0600 95/52 - 75 29 99 % -   08/16/18 0500 104/53 - 75 (!) 34 100 % -   08/16/18 0400 101/53 99.6 °F (37.6 °C) 75 (!) 33 100 % -   08/16/18 0325 - - 75 (!) 32 100 % -   08/16/18 0300 102/50 - 75 (!) 36 100 % -   08/16/18 0200 100/50 - 77 (!) 32 100 % -   08/16/18 0100 100/53 - 77 (!) 36 99 % -   08/16/18 0030 109/46 (!) 100.5 °F (38.1 °C) 87 (!) 32 99 % -   08/15/18 2338 - - 88 (!) 45 98 % -   08/15/18 2300 123/45 - 79 29 99 % -   08/15/18 2205 - - - - 94 % -   08/15/18 2200 126/45 - 88 (!) 42 (!) 88 % -   08/15/18 2130 129/49 - 89 (!) 43 98 % -   08/15/18 2000 130/57 100.1 °F (37.8 °C) 91 (!) 45 98 % -   08/15/18 1934 - - - - 98 % -   08/15/18 1901 - - 75 (!) 42 99 % -   08/15/18 1830 111/52 - 80 (!) 41 99 % -   08/15/18 1800 115/59 - 76 (!) 40 100 % -   08/15/18 1730 121/57 - 78 (!) 40 100 % -   08/15/18 1700 103/53 - 76 (!) 36 98 % -   08/15/18 1630 100/50 - 74 (!) 34 98 % -   08/15/18 1600 110/51 98.9 °F (37.2 °C) 81 (!) 33 98 % -   08/15/18 1530 103/53 - 74 (!) 36 97 % -   08/15/18 1501 - - - - 96 % -   08/15/18 1500 107/53 - 77 (!) 35 96 % -   08/15/18 1430 116/59 - 76 (!) 38 98 % -   08/15/18 1400 121/59 - 72 (!) 40 100 % -   08/15/18 1330 114/61 - 75 (!) 37 99 % -   08/15/18 1315 123/60 - 71 (!) 35 99 % - 08/15/18 1300 119/54 - 75 (!) 36 99 % -   08/15/18 1245 118/61 - 73 (!) 34 99 % -   08/15/18 1230 111/56 - 75 21 99 % -   08/15/18 1215 110/55 - 75 22 99 % -   08/15/18 1200 106/57 98.6 °F (37 °C) 75 23 98 % -   08/15/18 1145 104/55 - 75 26 99 % -       ROS not obtainable - pt obtunded and on vent. Physical Examination:  Constitutional obtunded. Appears close to chronological age. Well nourished. Well developed. Head Normocephalic; no scars   Eyes Eyes closed   ENMT Intubated. Neck Supple without masses or thyromegaly. No jugular venous distension. Hematologic/Lymphatic No petechiae or purpura. No tender or palpable lymph nodes noted   Respiratory Scattered rhonchi   Cardiovascular Regular rate and rhythm of heart   Chest / Line Site Chest is symmetric with no chest wall deformities. Abdomen Non-tender, non-distended, no masses, ascites or hepatosplenomegaly. Good bowel sounds. Musculoskeletal Puffy with edema   Extremities  edematous. Skin No rashes, scars, or lesions suggestive of malignancy. No petechiae, purpura, or ecchymoses. No excoriations. Neurologic Not tested    Psychiatric Not able to assess.        Labs:  Recent Results (from the past 24 hour(s))   GLUCOSE, POC    Collection Time: 08/15/18 12:06 PM   Result Value Ref Range    Glucose (POC) 199 (H) 65 - 100 mg/dL    Performed by Sol Allegra    PTT    Collection Time: 08/15/18  3:34 PM   Result Value Ref Range    aPTT 24.1 22.1 - 32.0 sec    aPTT, therapeutic range     58.0 - 77.0 SECS   CBC WITH AUTOMATED DIFF    Collection Time: 08/15/18  3:34 PM   Result Value Ref Range    WBC 18.2 (H) 4.1 - 11.1 K/uL    RBC 2.88 (L) 4.10 - 5.70 M/uL    HGB 7.4 (L) 12.1 - 17.0 g/dL    HCT 25.4 (L) 36.6 - 50.3 %    MCV 88.2 80.0 - 99.0 FL    MCH 25.7 (L) 26.0 - 34.0 PG    MCHC 29.1 (L) 30.0 - 36.5 g/dL    RDW 24.8 (H) 11.5 - 14.5 %    PLATELET 584 067 - 028 K/uL    MPV 11.5 8.9 - 12.9 FL    NRBC 0.8 (H) 0  WBC    ABSOLUTE NRBC 0.15 (H) 0.00 - 0.01 K/uL    NEUTROPHILS 89 (H) 32 - 75 %    BAND NEUTROPHILS 2 %    LYMPHOCYTES 5 (L) 12 - 49 %    MONOCYTES 2 (L) 5 - 13 %    EOSINOPHILS 1 0 - 7 %    BASOPHILS 0 0 - 1 %    METAMYELOCYTES 1 %    IMMATURE GRANULOCYTES 0 0.0 - 0.5 %    ABS. NEUTROPHILS 16.6 (H) 1.8 - 8.0 K/UL    ABS. LYMPHOCYTES 0.9 0.8 - 3.5 K/UL    ABS. MONOCYTES 0.4 0.0 - 1.0 K/UL    ABS. EOSINOPHILS 0.2 0.0 - 0.4 K/UL    ABS. BASOPHILS 0.0 0.0 - 0.1 K/UL    ABS. IMM.  GRANS. 0.0 0.00 - 0.04 K/UL    DF AUTOMATED      RBC COMMENTS ANISOCYTOSIS  2+        RBC COMMENTS POLYCHROMASIA  PRESENT        RBC COMMENTS HYPOCHROMIA  1+       GLUCOSE, POC    Collection Time: 08/15/18  6:16 PM   Result Value Ref Range    Glucose (POC) 176 (H) 65 - 100 mg/dL    Performed by Carolyn Fagan    PTT    Collection Time: 08/15/18 11:03 PM   Result Value Ref Range    aPTT 35.6 (H) 22.1 - 32.0 sec    aPTT, therapeutic range     58.0 - 77.0 SECS   GLUCOSE, POC    Collection Time: 08/15/18 11:08 PM   Result Value Ref Range    Glucose (POC) 165 (H) 65 - 100 mg/dL    Performed by Maik Brizuela    BLOOD GAS, ARTERIAL    Collection Time: 08/15/18 11:15 PM   Result Value Ref Range    pH 7.41 7.35 - 7.45      PCO2 29 (L) 35.0 - 45.0 mmHg    PO2 133 (H) 80 - 100 mmHg    O2 SAT 99 (H) 92 - 97 %    BICARBONATE 18 (L) 22 - 26 mmol/L    BASE DEFICIT 5.2 mmol/L    O2 METHOD VENTILATOR      FIO2 70 %    MODE PRESSURE CONTROL      SET RATE 20      IPAP/PIP 16.0      EPAP/CPAP/PEEP 8.0      Sample source ARTERIAL      SITE RIGHT RADIAL      BONNIE'S TEST YES     CBC WITH AUTOMATED DIFF    Collection Time: 08/16/18  4:05 AM   Result Value Ref Range    WBC 16.2 (H) 4.1 - 11.1 K/uL    RBC 2.64 (L) 4.10 - 5.70 M/uL    HGB 7.0 (L) 12.1 - 17.0 g/dL    HCT 23.9 (L) 36.6 - 50.3 %    MCV 90.5 80.0 - 99.0 FL    MCH 26.5 26.0 - 34.0 PG    MCHC 29.3 (L) 30.0 - 36.5 g/dL    RDW 25.3 (H) 11.5 - 14.5 %    PLATELET 772 984 - 717 K/uL    MPV 12.4 8.9 - 12.9 FL    NRBC 0.7 (H) 0  WBC    ABSOLUTE NRBC 0.11 (H) 0.00 - 0.01 K/uL    NEUTROPHILS 84 (H) 32 - 75 %    BAND NEUTROPHILS 2 %    LYMPHOCYTES 7 (L) 12 - 49 %    MONOCYTES 4 (L) 5 - 13 %    EOSINOPHILS 1 0 - 7 %    BASOPHILS 0 0 - 1 %    METAMYELOCYTES 2 %    IMMATURE GRANULOCYTES 0 0.0 - 0.5 %    ABS. NEUTROPHILS 13.9 (H) 1.8 - 8.0 K/UL    ABS. LYMPHOCYTES 1.1 0.8 - 3.5 K/UL    ABS. MONOCYTES 0.6 0.0 - 1.0 K/UL    ABS. EOSINOPHILS 0.2 0.0 - 0.4 K/UL    ABS. BASOPHILS 0.0 0.0 - 0.1 K/UL    ABS. IMM. GRANS. 0.0 0.00 - 0.04 K/UL    DF AUTOMATED      RBC COMMENTS ANISOCYTOSIS  3+        RBC COMMENTS HYPOCHROMIA  1+       METABOLIC PANEL, COMPREHENSIVE    Collection Time: 08/16/18  4:05 AM   Result Value Ref Range    Sodium 146 (H) 136 - 145 mmol/L    Potassium 4.2 3.5 - 5.1 mmol/L    Chloride 118 (H) 97 - 108 mmol/L    CO2 20 (L) 21 - 32 mmol/L    Anion gap 8 5 - 15 mmol/L    Glucose 176 (H) 65 - 100 mg/dL    BUN 24 (H) 6 - 20 MG/DL    Creatinine 0.79 0.70 - 1.30 MG/DL    BUN/Creatinine ratio 30 (H) 12 - 20      GFR est AA >60 >60 ml/min/1.73m2    GFR est non-AA >60 >60 ml/min/1.73m2    Calcium 6.8 (L) 8.5 - 10.1 MG/DL    Bilirubin, total 0.7 0.2 - 1.0 MG/DL    ALT (SGPT) 31 12 - 78 U/L    AST (SGOT) 25 15 - 37 U/L    Alk.  phosphatase 76 45 - 117 U/L    Protein, total 5.2 (L) 6.4 - 8.2 g/dL    Albumin 1.4 (L) 3.5 - 5.0 g/dL    Globulin 3.8 2.0 - 4.0 g/dL    A-G Ratio 0.4 (L) 1.1 - 2.2     MAGNESIUM    Collection Time: 08/16/18  4:05 AM   Result Value Ref Range    Magnesium 2.3 1.6 - 2.4 mg/dL   PHOSPHORUS    Collection Time: 08/16/18  4:05 AM   Result Value Ref Range    Phosphorus 2.6 2.6 - 4.7 MG/DL   GLUCOSE, POC    Collection Time: 08/16/18  5:41 AM   Result Value Ref Range    Glucose (POC) 204 (H) 65 - 100 mg/dL    Performed by Jung Anderson    PTT    Collection Time: 08/16/18  6:01 AM   Result Value Ref Range    aPTT 53.0 (H) 22.1 - 32.0 sec    aPTT, therapeutic range     58.0 - 77.0 SECS     Assessment and Plan:   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 Reviewed with wife today. Stage IIIB colon cancer - s/p resection    Acute resp failure - on vent. Sputum culture with staph aureus and Klebsiella. CT scan with apparent pneumonia    Septic shock with bacteremia - staph coag neg in [facility] 8/14,  Staph epi 8/11 [facility]. On vancomycin    Normochromic normocytic anemia - B12 and folate have not been checked since 2013. Ferritin was 5 on 7/10/18 suggesting fairly significant iron deficiency. He got 500 mg of IV iron in July but actually has a much higher calculated deficit. Will repeat the ferritin along with the usual screening studies.

## 2018-08-16 NOTE — DIABETES MGMT
DTC Progress Note    Recommendations/ Comments: Pt discussed with rounding team and Dr. Cele Michaud and Dr. Suellen Ge. Plan to add 5 units/L regular insulin to TPN. Chart reviewed on Sherly Marc during Multidisciplinary Rounds. A1c:   No results found for: HBA1C, HGBE8, WGD1AMBR        Recent Glucose Results:   Lab Results   Component Value Date/Time     (H) 08/16/2018 04:05 AM    GLUCPOC 204 (H) 08/16/2018 05:41 AM    GLUCPOC 165 (H) 08/15/2018 11:08 PM    GLUCPOC 176 (H) 08/15/2018 06:16 PM        Lab Results   Component Value Date/Time    Creatinine 0.79 08/16/2018 04:05 AM     Estimated Creatinine Clearance: 113.6 mL/min (based on Cr of 0.79). Active Orders   There are no active orders of the following type(s): Diet. PO intake: No data found. Will continue to follow as needed. Thank you.   VALDO Allison, RN, [address]

## 2018-08-16 NOTE — PROGRESS NOTES
8/16/2018 8:12 AM    Admit Date: 7/10/2018    Admit Diagnosis: Acute blood loss anemia;GI bleed; Anasarca;GI Bleed;gi bleed*    Subjective:     Shin Valenzuela remains vent and pressor dependant. CT findings noted.      Visit Vitals    BP 97/52    Pulse 75    Temp 99.6 °F (37.6 °C)    Resp (!) 31    Ht 6' 2\" (1.88 m)    Wt 293 lb 10.4 oz (133.2 kg)    SpO2 100%    BMI 37.7 kg/m2     Current Facility-Administered Medications   Medication Dose Route Frequency    TPN ADULT-CENTRAL AA 5% D20%-MVI W/ VIT K-RCH   IntraVENous CONTINUOUS    heparin 25,000 units in D5W 250 ml infusion  8-36 Units/kg/hr IntraVENous TITRATE    heparin (porcine) injection 10,000 Units  10,000 Units IntraVENous Q6H PRN    heparin (porcine) injection 5,000 Units  5,000 Units IntraVENous Q6H PRN    fentaNYL citrate (PF) injection 50 mcg  50 mcg IntraVENous Q2H PRN    0.9% sodium chloride infusion 250 mL  250 mL IntraVENous PRN    insulin lispro (HUMALOG) injection   SubCUTAneous Q6H    glucose chewable tablet 16 g  4 Tab Oral PRN    dextrose (D50W) injection syrg 12.5-25 g  12.5-25 g IntraVENous PRN    glucagon (GLUCAGEN) injection 1 mg  1 mg IntraMUSCular PRN    HYDROmorphone (PF) (DILAUDID) injection 0.5 mg  0.5 mg IntraVENous Q4H PRN    venlafaxine (EFFEXOR) tablet 25 mg  25 mg Oral TID    pantoprazole (PROTONIX) 40 mg in sodium chloride 0.9% 10 mL injection  40 mg IntraVENous DAILY    cefTRIAXone (ROCEPHIN) 2 g in 0.9% sodium chloride (MBP/ADV) 50 mL  2 g IntraVENous Q24H    mupirocin (BACTROBAN) 2 % ointment   Both Nostrils BID    vancomycin (VANCOCIN) 2000 mg in  ml infusion  2,000 mg IntraVENous Q12H    0.9% sodium chloride infusion  25 mL/hr IntraVENous CONTINUOUS    propofol (DIPRIVAN) infusion  0-50 mcg/kg/min IntraVENous TITRATE    carBAMazepine (TEGretol) 200 mg/10 mL suspension 100 mg  100 mg PEG Tube QID    PHENYLephrine (LILLY-SYNEPHRINE) 100 mg in 0.9% sodium chloride 250 mL infusion  mcg/min IntraVENous TITRATE    heparin (porcine) pf 300 Units  300 Units InterCATHeter PRN    chlorhexidine (PERIDEX) 0.12 % mouthwash 15 mL  15 mL Oral Q12H    albuterol-ipratropium (DUO-NEB) 2.5 MG-0.5 MG/3 ML  3 mL Nebulization QID RT    albuterol-ipratropium (DUO-NEB) 2.5 MG-0.5 MG/3 ML  3 mL Nebulization Q6H PRN    amiodarone (CORDARONE) tablet 200 mg  200 mg Oral DAILY    prochlorperazine (COMPAZINE) with saline injection 5 mg  5 mg IntraVENous Q6H PRN    lactulose (CHRONULAC) solution 10 g  10 g Oral BID    sodium chloride (NS) flush 10-30 mL  10-30 mL InterCATHeter PRN    sodium chloride (NS) flush 10 mL  10 mL InterCATHeter PRN    sodium chloride (NS) flush 10-40 mL  10-40 mL InterCATHeter Q8H    sodium chloride (NS) flush 10 mL  10 mL InterCATHeter Q24H    heparin (porcine) pf 300-500 Units  300-500 Units InterCATHeter PRN    melatonin tablet 3 mg  3 mg Oral QHS    polyethylene glycol (MIRALAX) packet 17 g  17 g Oral DAILY    hydrALAZINE (APRESOLINE) 20 mg/mL injection 10 mg  10 mg IntraVENous Q6H PRN    oxyCODONE IR (ROXICODONE) tablet 5 mg  5 mg Oral Q4H PRN    oxyCODONE IR (ROXICODONE) tablet 10 mg  10 mg Oral Q4H PRN    sodium chloride (NS) flush 5-10 mL  5-10 mL IntraVENous PRN    acetaminophen (TYLENOL) tablet 650 mg  650 mg Oral Q6H PRN    ondansetron (ZOFRAN) injection 4 mg  4 mg IntraVENous Q6H PRN         Objective:      Visit Vitals    BP 97/52    Pulse 75    Temp 99.6 °F (37.6 °C)    Resp (!) 31    Ht 6' 2\" (1.88 m)    Wt 293 lb 10.4 oz (133.2 kg)    SpO2 100%    BMI 37.7 kg/m2       Physical Exam:  Abdomen: soft, non-tender.  Bowel sounds normal.   Extremities: no cyanosis, 1+ edema  Heart: regular rate and rhythm, S1, S2 normal, no murmur, click, rub or gallop  Lungs: clear to auscultation bilaterally  Neurologic: sedated    Data Review:   Labs:    Recent Results (from the past 24 hour(s))   GLUCOSE, POC    Collection Time: 08/15/18 12:06 PM   Result Value Ref Range    Glucose (POC) 199 (H) 65 - 100 mg/dL    Performed by Bebeto Kirby    PTT    Collection Time: 08/15/18  3:34 PM   Result Value Ref Range    aPTT 24.1 22.1 - 32.0 sec    aPTT, therapeutic range     58.0 - 77.0 SECS   CBC WITH AUTOMATED DIFF    Collection Time: 08/15/18  3:34 PM   Result Value Ref Range    WBC 18.2 (H) 4.1 - 11.1 K/uL    RBC 2.88 (L) 4.10 - 5.70 M/uL    HGB 7.4 (L) 12.1 - 17.0 g/dL    HCT 25.4 (L) 36.6 - 50.3 %    MCV 88.2 80.0 - 99.0 FL    MCH 25.7 (L) 26.0 - 34.0 PG    MCHC 29.1 (L) 30.0 - 36.5 g/dL    RDW 24.8 (H) 11.5 - 14.5 %    PLATELET 970 166 - 816 K/uL    MPV 11.5 8.9 - 12.9 FL    NRBC 0.8 (H) 0  WBC    ABSOLUTE NRBC 0.15 (H) 0.00 - 0.01 K/uL    NEUTROPHILS 89 (H) 32 - 75 %    BAND NEUTROPHILS 2 %    LYMPHOCYTES 5 (L) 12 - 49 %    MONOCYTES 2 (L) 5 - 13 %    EOSINOPHILS 1 0 - 7 %    BASOPHILS 0 0 - 1 %    METAMYELOCYTES 1 %    IMMATURE GRANULOCYTES 0 0.0 - 0.5 %    ABS. NEUTROPHILS 16.6 (H) 1.8 - 8.0 K/UL    ABS. LYMPHOCYTES 0.9 0.8 - 3.5 K/UL    ABS. MONOCYTES 0.4 0.0 - 1.0 K/UL    ABS. EOSINOPHILS 0.2 0.0 - 0.4 K/UL    ABS. BASOPHILS 0.0 0.0 - 0.1 K/UL    ABS. IMM.  GRANS. 0.0 0.00 - 0.04 K/UL    DF AUTOMATED      RBC COMMENTS ANISOCYTOSIS  2+        RBC COMMENTS POLYCHROMASIA  PRESENT        RBC COMMENTS HYPOCHROMIA  1+       GLUCOSE, POC    Collection Time: 08/15/18  6:16 PM   Result Value Ref Range    Glucose (POC) 176 (H) 65 - 100 mg/dL    Performed by Bebeto Kirby    PTT    Collection Time: 08/15/18 11:03 PM   Result Value Ref Range    aPTT 35.6 (H) 22.1 - 32.0 sec    aPTT, therapeutic range     58.0 - 77.0 SECS   GLUCOSE, POC    Collection Time: 08/15/18 11:08 PM   Result Value Ref Range    Glucose (POC) 165 (H) 65 - 100 mg/dL    Performed by Marlo Reynoso    BLOOD GAS, ARTERIAL    Collection Time: 08/15/18 11:15 PM   Result Value Ref Range    pH 7.41 7.35 - 7.45      PCO2 29 (L) 35.0 - 45.0 mmHg    PO2 133 (H) 80 - 100 mmHg    O2 SAT 99 (H) 92 - 97 %    BICARBONATE 18 (L) 22 - 26 mmol/L    BASE DEFICIT 5.2 mmol/L    O2 METHOD VENTILATOR      FIO2 70 %    MODE PRESSURE CONTROL      SET RATE 20      IPAP/PIP 16.0      EPAP/CPAP/PEEP 8.0      Sample source ARTERIAL      SITE RIGHT RADIAL      BONNIE'S TEST YES     CBC WITH AUTOMATED DIFF    Collection Time: 08/16/18  4:05 AM   Result Value Ref Range    WBC 16.2 (H) 4.1 - 11.1 K/uL    RBC 2.64 (L) 4.10 - 5.70 M/uL    HGB 7.0 (L) 12.1 - 17.0 g/dL    HCT 23.9 (L) 36.6 - 50.3 %    MCV 90.5 80.0 - 99.0 FL    MCH 26.5 26.0 - 34.0 PG    MCHC 29.3 (L) 30.0 - 36.5 g/dL    RDW 25.3 (H) 11.5 - 14.5 %    PLATELET 918 619 - 252 K/uL    MPV 12.4 8.9 - 12.9 FL    NRBC 0.7 (H) 0  WBC    ABSOLUTE NRBC 0.11 (H) 0.00 - 0.01 K/uL    NEUTROPHILS 84 (H) 32 - 75 %    BAND NEUTROPHILS 2 %    LYMPHOCYTES 7 (L) 12 - 49 %    MONOCYTES 4 (L) 5 - 13 %    EOSINOPHILS 1 0 - 7 %    BASOPHILS 0 0 - 1 %    METAMYELOCYTES 2 %    IMMATURE GRANULOCYTES 0 0.0 - 0.5 %    ABS. NEUTROPHILS 13.9 (H) 1.8 - 8.0 K/UL    ABS. LYMPHOCYTES 1.1 0.8 - 3.5 K/UL    ABS. MONOCYTES 0.6 0.0 - 1.0 K/UL    ABS. EOSINOPHILS 0.2 0.0 - 0.4 K/UL    ABS. BASOPHILS 0.0 0.0 - 0.1 K/UL    ABS. IMM. GRANS. 0.0 0.00 - 0.04 K/UL    DF AUTOMATED      RBC COMMENTS ANISOCYTOSIS  3+        RBC COMMENTS HYPOCHROMIA  1+       METABOLIC PANEL, COMPREHENSIVE    Collection Time: 08/16/18  4:05 AM   Result Value Ref Range    Sodium 146 (H) 136 - 145 mmol/L    Potassium 4.2 3.5 - 5.1 mmol/L    Chloride 118 (H) 97 - 108 mmol/L    CO2 20 (L) 21 - 32 mmol/L    Anion gap 8 5 - 15 mmol/L    Glucose 176 (H) 65 - 100 mg/dL    BUN 24 (H) 6 - 20 MG/DL    Creatinine 0.79 0.70 - 1.30 MG/DL    BUN/Creatinine ratio 30 (H) 12 - 20      GFR est AA >60 >60 ml/min/1.73m2    GFR est non-AA >60 >60 ml/min/1.73m2    Calcium 6.8 (L) 8.5 - 10.1 MG/DL    Bilirubin, total 0.7 0.2 - 1.0 MG/DL    ALT (SGPT) 31 12 - 78 U/L    AST (SGOT) 25 15 - 37 U/L    Alk.  phosphatase 76 45 - 117 U/L    Protein, total 5.2 (L) 6.4 - 8.2 g/dL Albumin 1.4 (L) 3.5 - 5.0 g/dL    Globulin 3.8 2.0 - 4.0 g/dL    A-G Ratio 0.4 (L) 1.1 - 2.2     MAGNESIUM    Collection Time: 08/16/18  4:05 AM   Result Value Ref Range    Magnesium 2.3 1.6 - 2.4 mg/dL   PHOSPHORUS    Collection Time: 08/16/18  4:05 AM   Result Value Ref Range    Phosphorus 2.6 2.6 - 4.7 MG/DL   GLUCOSE, POC    Collection Time: 08/16/18  5:41 AM   Result Value Ref Range    Glucose (POC) 204 (H) 65 - 100 mg/dL    Performed by Estevan Nickerson    PTT    Collection Time: 08/16/18  6:01 AM   Result Value Ref Range    aPTT 53.0 (H) 22.1 - 32.0 sec    aPTT, therapeutic range     58.0 - 77.0 SECS       Telemetry: paced        Assessment:     Principal Problem:    GI bleed (7/10/2018)    Active Problems:    Acute blood loss anemia (7/10/2018)      Anasarca (7/10/2018)        Plan:     On IV heparin for IJ/SVC thrombus. Continue current meds, vent and hemodynamic support.

## 2018-08-16 NOTE — PROGRESS NOTES
1920 -- Bedside and Verbal shift change report given to Magy Bains. (oncoming nurse) by Lolita Warren (offgoing nurse). Report included the following information SBAR, Kardex, Intake/Output, MAR, Recent Results and Cardiac Rhythm Paced. 2000 -- Assessment completed. See chart for details. Patient remains intubated and sedated. Withdraws from all extremities except LUE. RR ~30/min. Breath sounds diminished with crackles in bases. Paced rhythm on monitor. Palpable pulses in BUE and dopplerable in BLE. ABD with midline incision; dressing C/D/I. Colostomy draining adequately. Erazo catheter in place draining adequate amounts of jesus alberto urine. Erasmo off currently. Will continue to closely monitor and continue with plan of care. 2300 -- aPTT sent    2323 -- aPTT 51.3 sec. Heparin gtt increased to 18 units/kg/hr    0000 -- Reassessment completed. No change to previous assessment. 0400 -- Reassessment completed. No changes noted.

## 2018-08-16 NOTE — PROGRESS NOTES
08/16/18 0548   ABCDEF Bundle   SBT Safety Screen Passed No   SBT Screen Reason for Failure FiO2 > 50%;PEEP > 7.5

## 2018-08-17 ENCOUNTER — APPOINTMENT (OUTPATIENT)
Dept: GENERAL RADIOLOGY | Age: 78
DRG: 329 | End: 2018-08-17
Attending: INTERNAL MEDICINE
Payer: MEDICARE

## 2018-08-17 LAB
ALBUMIN SERPL-MCNC: 1.4 G/DL (ref 3.5–5)
ALBUMIN/GLOB SERPL: 0.3 {RATIO} (ref 1.1–2.2)
ALP SERPL-CCNC: 83 U/L (ref 45–117)
ALT SERPL-CCNC: 29 U/L (ref 12–78)
ANION GAP SERPL CALC-SCNC: 6 MMOL/L (ref 5–15)
APTT PPP: 51.3 SEC (ref 22.1–32)
APTT PPP: 57.2 SEC (ref 22.1–32)
APTT PPP: 66.1 SEC (ref 22.1–32)
APTT PPP: 73.5 SEC (ref 22.1–32)
AST SERPL-CCNC: 25 U/L (ref 15–37)
BASOPHILS # BLD: 0 K/UL (ref 0–0.1)
BASOPHILS NFR BLD: 0 % (ref 0–1)
BILIRUB SERPL-MCNC: 0.6 MG/DL (ref 0.2–1)
BUN SERPL-MCNC: 21 MG/DL (ref 6–20)
BUN/CREAT SERPL: 31 (ref 12–20)
CALCIUM SERPL-MCNC: 7.1 MG/DL (ref 8.5–10.1)
CHLORIDE SERPL-SCNC: 116 MMOL/L (ref 97–108)
CO2 SERPL-SCNC: 21 MMOL/L (ref 21–32)
CREAT SERPL-MCNC: 0.68 MG/DL (ref 0.7–1.3)
DIFFERENTIAL METHOD BLD: ABNORMAL
EOSINOPHIL # BLD: 0.3 K/UL (ref 0–0.4)
EOSINOPHIL NFR BLD: 2 % (ref 0–7)
ERYTHROCYTE [DISTWIDTH] IN BLOOD BY AUTOMATED COUNT: 26.2 % (ref 11.5–14.5)
FERRITIN SERPL-MCNC: 121 NG/ML (ref 26–388)
FOLATE SERPL-MCNC: 9.8 NG/ML (ref 5–21)
GLOBULIN SER CALC-MCNC: 4.2 G/DL (ref 2–4)
GLUCOSE BLD STRIP.AUTO-MCNC: 160 MG/DL (ref 65–100)
GLUCOSE BLD STRIP.AUTO-MCNC: 205 MG/DL (ref 65–100)
GLUCOSE BLD STRIP.AUTO-MCNC: 220 MG/DL (ref 65–100)
GLUCOSE BLD STRIP.AUTO-MCNC: 225 MG/DL (ref 65–100)
GLUCOSE SERPL-MCNC: 184 MG/DL (ref 65–100)
HAPTOGLOB SERPL-MCNC: 97 MG/DL (ref 30–200)
HCT VFR BLD AUTO: 24.3 % (ref 36.6–50.3)
HGB BLD-MCNC: 7.1 G/DL (ref 12.1–17)
IMM GRANULOCYTES # BLD: 0 K/UL (ref 0–0.04)
IMM GRANULOCYTES NFR BLD AUTO: 0 % (ref 0–0.5)
LYMPHOCYTES # BLD: 0.8 K/UL (ref 0.8–3.5)
LYMPHOCYTES NFR BLD: 6 % (ref 12–49)
MAGNESIUM SERPL-MCNC: 2.2 MG/DL (ref 1.6–2.4)
MCH RBC QN AUTO: 26.4 PG (ref 26–34)
MCHC RBC AUTO-ENTMCNC: 29.2 G/DL (ref 30–36.5)
MCV RBC AUTO: 90.3 FL (ref 80–99)
MONOCYTES # BLD: 0.6 K/UL (ref 0–1)
MONOCYTES NFR BLD: 4 % (ref 5–13)
NEUTS BAND NFR BLD MANUAL: 2 %
NEUTS SEG # BLD: 12.3 K/UL (ref 1.8–8)
NEUTS SEG NFR BLD: 86 % (ref 32–75)
NRBC # BLD: 0.13 K/UL (ref 0–0.01)
NRBC BLD-RTO: 0.9 PER 100 WBC
PHOSPHATE SERPL-MCNC: 2.5 MG/DL (ref 2.6–4.7)
PLATELET # BLD AUTO: 209 K/UL (ref 150–400)
PMV BLD AUTO: 11.7 FL (ref 8.9–12.9)
POTASSIUM SERPL-SCNC: 4.3 MMOL/L (ref 3.5–5.1)
PROT SERPL-MCNC: 5.6 G/DL (ref 6.4–8.2)
RBC # BLD AUTO: 2.69 M/UL (ref 4.1–5.7)
RBC MORPH BLD: ABNORMAL
RBC MORPH BLD: ABNORMAL
SERVICE CMNT-IMP: ABNORMAL
SODIUM SERPL-SCNC: 143 MMOL/L (ref 136–145)
THERAPEUTIC RANGE,PTTT: ABNORMAL SECS (ref 58–77)
VIT B12 SERPL-MCNC: 929 PG/ML (ref 193–986)
WBC # BLD AUTO: 14 K/UL (ref 4.1–11.1)

## 2018-08-17 PROCEDURE — 74011000250 HC RX REV CODE- 250: Performed by: INTERNAL MEDICINE

## 2018-08-17 PROCEDURE — 74011250636 HC RX REV CODE- 250/636: Performed by: INTERNAL MEDICINE

## 2018-08-17 PROCEDURE — 83010 ASSAY OF HAPTOGLOBIN QUANT: CPT | Performed by: INTERNAL MEDICINE

## 2018-08-17 PROCEDURE — 82728 ASSAY OF FERRITIN: CPT | Performed by: INTERNAL MEDICINE

## 2018-08-17 PROCEDURE — 85730 THROMBOPLASTIN TIME PARTIAL: CPT | Performed by: INTERNAL MEDICINE

## 2018-08-17 PROCEDURE — 36415 COLL VENOUS BLD VENIPUNCTURE: CPT | Performed by: SURGERY

## 2018-08-17 PROCEDURE — 74011250637 HC RX REV CODE- 250/637: Performed by: INTERNAL MEDICINE

## 2018-08-17 PROCEDURE — 94640 AIRWAY INHALATION TREATMENT: CPT

## 2018-08-17 PROCEDURE — 85025 COMPLETE CBC W/AUTO DIFF WBC: CPT | Performed by: SURGERY

## 2018-08-17 PROCEDURE — 74011000250 HC RX REV CODE- 250: Performed by: SURGERY

## 2018-08-17 PROCEDURE — C9113 INJ PANTOPRAZOLE SODIUM, VIA: HCPCS | Performed by: INTERNAL MEDICINE

## 2018-08-17 PROCEDURE — 84630 ASSAY OF ZINC: CPT | Performed by: INTERNAL MEDICINE

## 2018-08-17 PROCEDURE — 84100 ASSAY OF PHOSPHORUS: CPT | Performed by: INTERNAL MEDICINE

## 2018-08-17 PROCEDURE — 82607 VITAMIN B-12: CPT | Performed by: INTERNAL MEDICINE

## 2018-08-17 PROCEDURE — 71045 X-RAY EXAM CHEST 1 VIEW: CPT

## 2018-08-17 PROCEDURE — 74011250637 HC RX REV CODE- 250/637: Performed by: SURGERY

## 2018-08-17 PROCEDURE — 65610000006 HC RM INTENSIVE CARE

## 2018-08-17 PROCEDURE — 82746 ASSAY OF FOLIC ACID SERUM: CPT | Performed by: INTERNAL MEDICINE

## 2018-08-17 PROCEDURE — 83735 ASSAY OF MAGNESIUM: CPT | Performed by: INTERNAL MEDICINE

## 2018-08-17 PROCEDURE — 74011000258 HC RX REV CODE- 258: Performed by: INTERNAL MEDICINE

## 2018-08-17 PROCEDURE — 80053 COMPREHEN METABOLIC PANEL: CPT | Performed by: INTERNAL MEDICINE

## 2018-08-17 PROCEDURE — 74011636637 HC RX REV CODE- 636/637: Performed by: INTERNAL MEDICINE

## 2018-08-17 PROCEDURE — 82962 GLUCOSE BLOOD TEST: CPT

## 2018-08-17 PROCEDURE — 82525 ASSAY OF COPPER: CPT | Performed by: INTERNAL MEDICINE

## 2018-08-17 PROCEDURE — 94003 VENT MGMT INPAT SUBQ DAY: CPT

## 2018-08-17 RX ORDER — FENTANYL CITRATE 50 UG/ML
100 INJECTION, SOLUTION INTRAMUSCULAR; INTRAVENOUS
Status: DISCONTINUED | OUTPATIENT
Start: 2018-08-17 | End: 2018-08-25

## 2018-08-17 RX ADMIN — VENLAFAXINE 25 MG: 25 TABLET ORAL at 21:56

## 2018-08-17 RX ADMIN — VANCOMYCIN HYDROCHLORIDE 2000 MG: 10 INJECTION, POWDER, LYOPHILIZED, FOR SOLUTION INTRAVENOUS at 14:41

## 2018-08-17 RX ADMIN — IPRATROPIUM BROMIDE AND ALBUTEROL SULFATE 3 ML: .5; 3 SOLUTION RESPIRATORY (INHALATION) at 15:35

## 2018-08-17 RX ADMIN — SODIUM CHLORIDE 10 ML: 9 INJECTION, SOLUTION INTRAMUSCULAR; INTRAVENOUS; SUBCUTANEOUS at 20:41

## 2018-08-17 RX ADMIN — MUPIROCIN: 20 OINTMENT TOPICAL at 18:20

## 2018-08-17 RX ADMIN — SODIUM CHLORIDE 25 ML/HR: 900 INJECTION, SOLUTION INTRAVENOUS at 12:03

## 2018-08-17 RX ADMIN — SODIUM CHLORIDE 30 ML: 9 INJECTION, SOLUTION INTRAMUSCULAR; INTRAVENOUS; SUBCUTANEOUS at 14:51

## 2018-08-17 RX ADMIN — PHENYLEPHRINE HYDROCHLORIDE 20 MCG/MIN: 10 INJECTION INTRAVENOUS at 12:00

## 2018-08-17 RX ADMIN — CARBAMAZEPINE 100 MG: 100 SUSPENSION ORAL at 10:24

## 2018-08-17 RX ADMIN — SODIUM CHLORIDE 10 ML: 9 INJECTION, SOLUTION INTRAMUSCULAR; INTRAVENOUS; SUBCUTANEOUS at 05:57

## 2018-08-17 RX ADMIN — CHLORHEXIDINE GLUCONATE 15 ML: 1.2 RINSE ORAL at 20:40

## 2018-08-17 RX ADMIN — AMIODARONE HYDROCHLORIDE 200 MG: 200 TABLET ORAL at 10:25

## 2018-08-17 RX ADMIN — LACTULOSE 10 G: 20 SOLUTION ORAL at 18:01

## 2018-08-17 RX ADMIN — IPRATROPIUM BROMIDE AND ALBUTEROL SULFATE 3 ML: .5; 3 SOLUTION RESPIRATORY (INHALATION) at 19:42

## 2018-08-17 RX ADMIN — VENLAFAXINE 25 MG: 25 TABLET ORAL at 18:02

## 2018-08-17 RX ADMIN — POLYETHYLENE GLYCOL 3350 17 G: 17 POWDER, FOR SOLUTION ORAL at 10:24

## 2018-08-17 RX ADMIN — CARBAMAZEPINE 100 MG: 100 SUSPENSION ORAL at 21:57

## 2018-08-17 RX ADMIN — ASCORBIC ACID, VITAMIN A PALMITATE, CHOLECALCIFEROL, THIAMINE HYDROCHLORIDE, RIBOFLAVIN-5 PHOSPHATE SODIUM, PYRIDOXINE HYDROCHLORIDE, NIACINAMIDE, DEXPANTHENOL, ALPHA-TOCOPHEROL ACETATE, VITAMIN K1, FOLIC ACID, BIOTIN, CYANOCOBALAMIN: 200; 3300; 200; 6; 3.6; 6; 40; 15; 10; 150; 600; 60; 5 INJECTION, SOLUTION INTRAVENOUS at 18:02

## 2018-08-17 RX ADMIN — PROPOFOL 50 MCG/KG/MIN: 10 INJECTION, EMULSION INTRAVENOUS at 01:38

## 2018-08-17 RX ADMIN — INSULIN LISPRO 4 UNITS: 100 INJECTION, SOLUTION INTRAVENOUS; SUBCUTANEOUS at 12:49

## 2018-08-17 RX ADMIN — IPRATROPIUM BROMIDE AND ALBUTEROL SULFATE 3 ML: .5; 3 SOLUTION RESPIRATORY (INHALATION) at 08:04

## 2018-08-17 RX ADMIN — LACTULOSE 10 G: 20 SOLUTION ORAL at 10:25

## 2018-08-17 RX ADMIN — MELATONIN TAB 3 MG 3 MG: 3 TAB at 21:56

## 2018-08-17 RX ADMIN — CARBAMAZEPINE 100 MG: 100 SUSPENSION ORAL at 18:02

## 2018-08-17 RX ADMIN — PROPOFOL 50 MCG/KG/MIN: 10 INJECTION, EMULSION INTRAVENOUS at 20:34

## 2018-08-17 RX ADMIN — PROPOFOL 50 MCG/KG/MIN: 10 INJECTION, EMULSION INTRAVENOUS at 09:50

## 2018-08-17 RX ADMIN — PROPOFOL 50 MCG/KG/MIN: 10 INJECTION, EMULSION INTRAVENOUS at 04:35

## 2018-08-17 RX ADMIN — CARBAMAZEPINE 100 MG: 100 SUSPENSION ORAL at 14:32

## 2018-08-17 RX ADMIN — MUPIROCIN: 20 OINTMENT TOPICAL at 10:31

## 2018-08-17 RX ADMIN — HEPARIN SODIUM 18 UNITS/KG/HR: 10000 INJECTION, SOLUTION INTRAVENOUS at 01:19

## 2018-08-17 RX ADMIN — PROPOFOL 50 MCG/KG/MIN: 10 INJECTION, EMULSION INTRAVENOUS at 12:08

## 2018-08-17 RX ADMIN — PHENYLEPHRINE HYDROCHLORIDE 20 MCG/MIN: 10 INJECTION INTRAVENOUS at 20:20

## 2018-08-17 RX ADMIN — VENLAFAXINE 25 MG: 25 TABLET ORAL at 10:25

## 2018-08-17 RX ADMIN — IPRATROPIUM BROMIDE AND ALBUTEROL SULFATE 3 ML: .5; 3 SOLUTION RESPIRATORY (INHALATION) at 11:43

## 2018-08-17 RX ADMIN — SODIUM CHLORIDE 40 MG: 9 INJECTION INTRAMUSCULAR; INTRAVENOUS; SUBCUTANEOUS at 10:15

## 2018-08-17 RX ADMIN — CHLORHEXIDINE GLUCONATE 15 ML: 1.2 RINSE ORAL at 10:31

## 2018-08-17 RX ADMIN — FENTANYL CITRATE 100 MCG: 50 INJECTION, SOLUTION INTRAMUSCULAR; INTRAVENOUS at 11:17

## 2018-08-17 RX ADMIN — FENTANYL CITRATE 100 MCG: 50 INJECTION, SOLUTION INTRAMUSCULAR; INTRAVENOUS at 20:39

## 2018-08-17 RX ADMIN — CEFTRIAXONE SODIUM 2 G: 2 INJECTION, POWDER, FOR SOLUTION INTRAMUSCULAR; INTRAVENOUS at 10:14

## 2018-08-17 RX ADMIN — HEPARIN SODIUM 19 UNITS/KG/HR: 10000 INJECTION, SOLUTION INTRAVENOUS at 23:34

## 2018-08-17 RX ADMIN — PROPOFOL 50 MCG/KG/MIN: 10 INJECTION, EMULSION INTRAVENOUS at 18:01

## 2018-08-17 RX ADMIN — INSULIN LISPRO 3 UNITS: 100 INJECTION, SOLUTION INTRAVENOUS; SUBCUTANEOUS at 18:19

## 2018-08-17 RX ADMIN — HEPARIN SODIUM 19 UNITS/KG/HR: 10000 INJECTION, SOLUTION INTRAVENOUS at 12:04

## 2018-08-17 RX ADMIN — SODIUM CHLORIDE 10 ML: 9 INJECTION, SOLUTION INTRAMUSCULAR; INTRAVENOUS; SUBCUTANEOUS at 21:57

## 2018-08-17 RX ADMIN — INSULIN LISPRO 4 UNITS: 100 INJECTION, SOLUTION INTRAVENOUS; SUBCUTANEOUS at 05:55

## 2018-08-17 RX ADMIN — PROPOFOL 50 MCG/KG/MIN: 10 INJECTION, EMULSION INTRAVENOUS at 14:52

## 2018-08-17 RX ADMIN — PROPOFOL 50 MCG/KG/MIN: 10 INJECTION, EMULSION INTRAVENOUS at 07:12

## 2018-08-17 RX ADMIN — VANCOMYCIN HYDROCHLORIDE 2000 MG: 10 INJECTION, POWDER, LYOPHILIZED, FOR SOLUTION INTRAVENOUS at 02:20

## 2018-08-17 NOTE — PROGRESS NOTES
Critical care interdisciplinary rounds held on 08/17/2018. Following members present, Pharmacy, Diabetes Treatment, Case Management, Respiratory Therapy, Physical Therapy Clinical Lead and Nutrition. Led by JESSICA Lockett RN and Dr. Sangeetha Valderrama and Dr. Sharad Gallagher. Plan of care discussed. See clinical pathway for plan of care and interventions and desired outcomes.

## 2018-08-17 NOTE — PROGRESS NOTES
Hematology Oncology Progress Note       Follow up for: IJ/SVC thrombus    Chart notes reviewed since last visit. Case discussed with following: no family at bedside at present. Patient complains of the following: not awake, on ventilator    Additional concerns noted by the staff: none    Patient Vitals for the past 24 hrs:   BP Temp Pulse Resp SpO2   08/17/18 0900 112/54 - 80 (!) 46 100 %   08/17/18 0830 121/46 - - - -   08/17/18 0805 - - 76 (!) 45 100 %   08/17/18 0800 153/67 98.8 °F (37.1 °C) 73 (!) 41 100 %   08/17/18 0730 149/62 - 76 (!) 39 100 %   08/17/18 0700 146/66 - 77 (!) 41 100 %   08/17/18 0600 145/70 - 75 22 100 %   08/17/18 0500 146/66 - 75 (!) 35 100 %   08/17/18 0400 142/68 99.4 °F (37.4 °C) 75 (!) 40 100 %   08/17/18 0341 - - 75 (!) 38 100 %   08/17/18 0300 134/65 - 75 (!) 40 100 %   08/17/18 0200 129/65 - 75 (!) 34 99 %   08/17/18 0100 146/58 - 76 (!) 40 100 %   08/16/18 2337 - - 75 (!) 38 100 %   08/16/18 2330 154/68 99.1 °F (37.3 °C) 75 (!) 37 100 %   08/16/18 2300 151/72 - 72 (!) 35 100 %   08/16/18 2200 147/75 - 75 (!) 35 100 %   08/16/18 2100 150/77 - 75 (!) 32 100 %   08/16/18 2000 146/80 98.3 °F (36.8 °C) 78 (!) 32 100 %   08/16/18 1915 - - 75 30 99 %   08/16/18 1900 136/68 - 75 29 99 %   08/16/18 1800 145/69 - 75 (!) 37 100 %   08/16/18 1700 149/70 - 75 (!) 36 100 %   08/16/18 1600 129/64 98.8 °F (37.1 °C) 75 (!) 36 99 %   08/16/18 1522 - - 75 (!) 31 99 %   08/16/18 1503 - - - - 99 %   08/16/18 1500 139/66 - 75 (!) 35 98 %   08/16/18 1424 - - - (!) 40 -   08/16/18 1400 121/55 - 74 (!) 44 94 %   08/16/18 1300 116/64 - 75 (!) 38 99 %   08/16/18 1200 118/62 99.1 °F (37.3 °C) 75 (!) 43 95 %   08/16/18 1118 - - - - 98 %   08/16/18 1116 - - 77 (!) 45 98 %   08/16/18 1100 127/72 - 75 (!) 40 99 %       ROS not obtainable - pt obtunded and on vent. Physical Examination:  Constitutional obtunded. Appears close to chronological age. Well nourished. Well developed.    Head Normocephalic; no scars   Eyes Eyes closed   ENMT Intubated. Neck Supple without masses or thyromegaly. No jugular venous distension. Hematologic/Lymphatic No petechiae or purpura. No tender or palpable lymph nodes noted   Respiratory Scattered rhonchi   Cardiovascular Regular rate and rhythm of heart   Chest / Line Site Chest is symmetric with no chest wall deformities. Abdomen Non-tender, non-distended, no masses, ascites or hepatosplenomegaly. Good bowel sounds. Musculoskeletal Puffy with edema   Extremities  edematous. Skin No rashes, scars, or lesions suggestive of malignancy. No petechiae, purpura, or ecchymoses. No excoriations. Neurologic Not tested    Psychiatric Not able to assess.        Labs:  Recent Results (from the past 24 hour(s))   CULTURE, BLOOD, PAIRED    Collection Time: 08/16/18 11:12 AM   Result Value Ref Range    Special Requests: NO SPECIAL REQUESTS      Culture result: NO GROWTH AFTER 19 HOURS     GLUCOSE, POC    Collection Time: 08/16/18 12:59 PM   Result Value Ref Range    Glucose (POC) 220 (H) 65 - 100 mg/dL    Performed by Marlon Martinez    PTT    Collection Time: 08/16/18  2:31 PM   Result Value Ref Range    aPTT 40.1 (H) 22.1 - 32.0 sec    aPTT, therapeutic range     58.0 - 77.0 SECS   GLUCOSE, POC    Collection Time: 08/16/18  5:38 PM   Result Value Ref Range    Glucose (POC) 233 (H) 65 - 100 mg/dL    Performed by Barney Richter    PTT    Collection Time: 08/16/18 11:23 PM   Result Value Ref Range    aPTT 51.3 (H) 22.1 - 32.0 sec    aPTT, therapeutic range     58.0 - 77.0 SECS   GLUCOSE, POC    Collection Time: 08/16/18 11:32 PM   Result Value Ref Range    Glucose (POC) 217 (H) 65 - 100 mg/dL    Performed by Evie Elliott    CBC WITH AUTOMATED DIFF    Collection Time: 08/17/18  4:00 AM   Result Value Ref Range    WBC 14.0 (H) 4.1 - 11.1 K/uL    RBC 2.69 (L) 4.10 - 5.70 M/uL    HGB 7.1 (L) 12.1 - 17.0 g/dL    HCT 24.3 (L) 36.6 - 50.3 %    MCV 90.3 80.0 - 99.0 FL    MCH 26.4 26.0 - 34.0 PG MCHC 29.2 (L) 30.0 - 36.5 g/dL    RDW 26.2 (H) 11.5 - 14.5 %    PLATELET 161 820 - 235 K/uL    MPV 11.7 8.9 - 12.9 FL    NRBC 0.9 (H) 0  WBC    ABSOLUTE NRBC 0.13 (H) 0.00 - 0.01 K/uL    NEUTROPHILS 86 (H) 32 - 75 %    BAND NEUTROPHILS 2 %    LYMPHOCYTES 6 (L) 12 - 49 %    MONOCYTES 4 (L) 5 - 13 %    EOSINOPHILS 2 0 - 7 %    BASOPHILS 0 0 - 1 %    IMMATURE GRANULOCYTES 0 0.0 - 0.5 %    ABS. NEUTROPHILS 12.3 (H) 1.8 - 8.0 K/UL    ABS. LYMPHOCYTES 0.8 0.8 - 3.5 K/UL    ABS. MONOCYTES 0.6 0.0 - 1.0 K/UL    ABS. EOSINOPHILS 0.3 0.0 - 0.4 K/UL    ABS. BASOPHILS 0.0 0.0 - 0.1 K/UL    ABS. IMM. GRANS. 0.0 0.00 - 0.04 K/UL    DF MANUAL      RBC COMMENTS POLYCHROMASIA  1+        RBC COMMENTS ANISOCYTOSIS  3+       VITAMIN B12    Collection Time: 08/17/18  4:04 AM   Result Value Ref Range    Vitamin B12 929 193 - 986 pg/mL   FOLATE    Collection Time: 08/17/18  4:04 AM   Result Value Ref Range    Folate 9.8 5.0 - 21.0 ng/mL   FERRITIN    Collection Time: 08/17/18  4:04 AM   Result Value Ref Range    Ferritin 121 26 - 388 NG/ML   HAPTOGLOBIN    Collection Time: 08/17/18  4:04 AM   Result Value Ref Range    Haptoglobin 97 30 - 378 mg/dL   METABOLIC PANEL, COMPREHENSIVE    Collection Time: 08/17/18  4:04 AM   Result Value Ref Range    Sodium 143 136 - 145 mmol/L    Potassium 4.3 3.5 - 5.1 mmol/L    Chloride 116 (H) 97 - 108 mmol/L    CO2 21 21 - 32 mmol/L    Anion gap 6 5 - 15 mmol/L    Glucose 184 (H) 65 - 100 mg/dL    BUN 21 (H) 6 - 20 MG/DL    Creatinine 0.68 (L) 0.70 - 1.30 MG/DL    BUN/Creatinine ratio 31 (H) 12 - 20      GFR est AA >60 >60 ml/min/1.73m2    GFR est non-AA >60 >60 ml/min/1.73m2    Calcium 7.1 (L) 8.5 - 10.1 MG/DL    Bilirubin, total 0.6 0.2 - 1.0 MG/DL    ALT (SGPT) 29 12 - 78 U/L    AST (SGOT) 25 15 - 37 U/L    Alk.  phosphatase 83 45 - 117 U/L    Protein, total 5.6 (L) 6.4 - 8.2 g/dL    Albumin 1.4 (L) 3.5 - 5.0 g/dL    Globulin 4.2 (H) 2.0 - 4.0 g/dL    A-G Ratio 0.3 (L) 1.1 - 2.2     MAGNESIUM Collection Time: 08/17/18  4:04 AM   Result Value Ref Range    Magnesium 2.2 1.6 - 2.4 mg/dL   PHOSPHORUS    Collection Time: 08/17/18  4:04 AM   Result Value Ref Range    Phosphorus 2.5 (L) 2.6 - 4.7 MG/DL   GLUCOSE, POC    Collection Time: 08/17/18  5:53 AM   Result Value Ref Range    Glucose (POC) 220 (H) 65 - 100 mg/dL    Performed by Suraj Diaz    PTT    Collection Time: 08/17/18  7:30 AM   Result Value Ref Range    aPTT 57.2 (H) 22.1 - 32.0 sec    aPTT, therapeutic range     58.0 - 77.0 SECS     Assessment and Plan:   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 Reviewed with wife earlier this week (not here at present)    Stage IIIB colon cancer - s/p resection    Acute resp failure - on vent. Sputum culture with staph aureus and Klebsiella. CT scan with apparent pneumonia    Septic shock with bacteremia - staph coag neg in [facility] 8/14,  Staph epi 8/11 BC. On vancomycin    Normochromic normocytic anemia - B12  929. folate 9.8. Ferritin was 5 on 7/10/18 suggesting fairly significant iron deficiency. He got 500 mg of IV iron in July but actually has a much higher calculated deficit. Ferritin 121 so does not need further IV iron at present.

## 2018-08-17 NOTE — PROGRESS NOTES
[facility]  Pulmonary, Critical Care, and Sleep Medicine    Name: Maty Robles MRN: [id number]   : 1940 Hospital: [facility]   Date: 2018            IMPRESSION:   · Right IJ thrombosis and tiny air bubble, 8/15/18: started on heparin drip. Continue treatment with Heparin drip without bolus. Placed on heparin drip. · Acute respiratory failure-on moderate vent support, he needed higher levels of vent support. Pt was placed on PCV and increased fio2. He has been failing SBTs. Mostly likely will need trach next week. Discussed with pts wife. He had a prior trach 24 years ago. Still needs moderate to high level of vent support. · Shock: severe hypoalbuminemia, Vasopressor dependence-still on jose de jesus. Moderate dose of phenylephrine still present at 60mcg. He is still on pressors despite blood transfusion. · Bacteremia-, last xc was coag neg staph, Cx have shown: , also have evident of right basilar pneumonia.  GPC clusters on culture blood, Will repeat blood CX today. ON Vanc and Ctx; 18 blood cx are negative. · Pneumonia, Klebsiella in sputum and Staph Aureus. · Leukocytosis decreasing. · Anemia, no overt bleeding, will decrease the enoxaparin to DVT dosing, Will give 1 unit of PRBC today. Ongoing assess for source of blood loss. Not much reserve, Will keep on heparin drip for now. · S/P colectomy/YAMEL/enterotomies for colon cancer; Stage 3b colon ca. · Ileus/ with recurrent aspiration pneumonia prior to admission to ICU. 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 · Debility  · DM, Will adjust the Insulin in TPN. · Obesity   · Critically ill, high risk of multiple organ failure. Needs ongoing support with vent support and pressors. 35 min CC, EOP. · Discussed with nurse, RT  and Pharmacy.        PLAN:   · Full ventilator support, Adjust as tolerated. If not able to wean over weekend may need trach next week. · Will increased the iv pain meds. · Very concerning about current infections, will need ongoing surviellance. So far the repeat blood cx are negative. · Heparin drip for now in case need to stop for acute bleeding. · May need additonal blood transfusion, Follow Hgbs. · Renewed the TPN and adjusted the insulin for hyperglycemia. · Pressors as needed to keep MAP over 65. · Appears to have pneumonia based on Ct of chest.   · Following lab for K, MG, Ca. Cr.   · Sedation Will wean as tolerated         Subjective/Interval History:   8.10 I have reviewed the flowsheet and previous days notes. Asked to evaluate patient to see if his pleural effusion is the cause of his rising WBC. Seen earlier this hospitalization by my partners for aspiration pneumonia. He has been hospitalized for 30 days, having had a colonic resection complicated by ileus. He was on Zosyn for 18 days and this was stopped 3 days ago and his WBC began to rise. He has a congested cough which is chronic. Can not produce sputum. He is limited in his understanding of how to do incentive spirometry due to prior traumatic brain injury. Review of Systems   Unable to perform ROS: Intubated   Constitutional: Positive for fatigue. Eyes: Negative. Respiratory: Positive for cough. Cardiovascular: Negative. Gastrointestinal: Negative. 8.11 called urgently for acute respiratory failure  RR 50's  rhonchorus breathing  On NRBM    8.12   sedated on ventilator  270 jose de jesus  GPC on multiple BC 4/4   Remains on Vanc     8-14-18: Last 24 hrs. Remains on moderate dose vaopressors. Noted to have decreased Hgb less than 7. Not really following commands with current meds infusing. No acute issues noted per nursing last pm.     8-15-18: Pt had increased vent needs yesterday. Has not really been able to be easily weaned from vent. Has increased WBC.   Had a ct scan of chest and abdomen. Final reports are pending. 18: Events and findings from CT noted. Discussed with Dr. Juanita Lazcano. Due to pts instability will continue on heparin drip. Discussed with pts wife, nurse this am. Still on jose de jesus drip. When tried to place on SBT his RR was in the 45s. Still on sedation. 18: No new issues. Still has high vent support and has increased RR into the 30-40s. Not having much secretions. No overt evidence of bleeding. Objective:   Vital Signs:    Visit Vitals    /54    Pulse 80    Temp 98.8 °F (37.1 °C)    Resp (!) 46    Ht 6' 2\" (1.88 m)    Wt 133.2 kg (293 lb 10.4 oz)    SpO2 100%    BMI 37.7 kg/m2       O2 Device: Endotracheal tube   O2 Flow Rate (L/min): 37 l/min   Temp (24hrs), Av.9 °F (37.2 °C), Min:98.3 °F (36.8 °C), Max:99.4 °F (37.4 °C)       Intake/Output:   Last shift:       07 -  1900  In: -   Out: 125 [Urine:125]  Last 3 shifts: 08/15 190 -  0700  In: 7948.4 [I.V.:7148.4]  Out: 2677 [Urine:3954]    Intake/Output Summary (Last 24 hours) at 18 1056  Last data filed at 18 0800   Gross per 24 hour   Intake          4280.97 ml   Output             3009 ml   Net          1271.97 ml      Physical Exam   Constitutional: Vital signs are normal. He appears well-nourished. He is sedated and intubated. HENT:   Head: Normocephalic and atraumatic. Mouth/Throat: No oropharyngeal exudate. Eyes: Conjunctivae are normal. Pupils are equal, round, and reactive to light. No scleral icterus. Neck:   Old tracheostomy site   Cardiovascular: Normal rate and regular rhythm. Pulmonary/Chest: He is intubated. No respiratory distress. He has no wheezes. He has rhonchi. He has no rales. Abdominal: He exhibits no distension. There is tenderness. Musculoskeletal: He exhibits edema. Neurological:   Sedated, not really able to focus for long. Skin: Skin is warm and dry.      Data:   Labs:  Recent Labs      18   0400 08/16/18   0405  08/15/18   1534   WBC  14.0*  16.2*  18.2*   HGB  7.1*  7.0*  7.4*   HCT  24.3*  23.9*  25.4*   PLT  209  205  193     Recent Labs      08/17/18   0404  08/16/18   0405  08/15/18   0428   NA  143  146*  145   K  4.3  4.2  4.1   CL  116*  118*  117*   CO2  21  20*  20*   GLU  184*  176*  164*   BUN  21*  24*  20   CREA  0.68*  0.79  0.74   CA  7.1*  6.8*  6.8*   MG  2.2  2.3  2.4   PHOS  2.5*  2.6  2.3*   ALB  1.4*  1.4*  1.6*   TBILI  0.6  0.7  0.9   SGOT  25  25  31   ALT  29  31  39     Recent Labs      08/15/18   2315  08/14/18   1309   PH  7.41  7.46*   PCO2  29*  26*   PO2  133*  123*   HCO3  18*  18*   FIO2  70  40       Imaging:  I have personally reviewed the patients radiographs:  8-13-18: CXR:         8-14-18: EXAM: CXR Portable.     FINDINGS: Portable chest shows support lines/devices appear unchanged since  yesterday. There is no apparent pneumothorax. Lungs show pulmonary edema  pattern with possible small pleural effusions. Heart size is top normal. There  is no midline shift.     IMPRESSION: No significant change. 8-15-18: CXR:   EXAM: CXR Portable.     FINDINGS: Portable chest shows support lines/devices appear unchanged since  yesterday. There is no apparent pneumothorax. Lungs show unchanged pulmonary  edema. Heart size is top normal. There is difficulty excluding pleural  effusions.     IMPRESSION: No significant change.              Romeo Ramon MD

## 2018-08-17 NOTE — PROGRESS NOTES
8/17/2018 9:35 AM    Admit Date: 7/10/2018    Admit Diagnosis: Acute blood loss anemia;GI bleed; Anasarca;GI Bleed;gi bleed*    Subjective:     Remains vent dependant. Now off pressors. No cardiac events.      Visit Vitals    /54    Pulse 80    Temp 98.8 °F (37.1 °C)    Resp (!) 46    Ht 6' 2\" (1.88 m)    Wt 293 lb 10.4 oz (133.2 kg)    SpO2 100%    BMI 37.7 kg/m2     Current Facility-Administered Medications   Medication Dose Route Frequency    TPN ADULT-CENTRAL AA 5% D20%-MVI W/ VIT K-RCH   IntraVENous CONTINUOUS    heparin 25,000 units in D5W 250 ml infusion  8-36 Units/kg/hr IntraVENous TITRATE    heparin (porcine) injection 5,000 Units  5,000 Units IntraVENous Q6H PRN    fentaNYL citrate (PF) injection 50 mcg  50 mcg IntraVENous Q2H PRN    0.9% sodium chloride infusion 250 mL  250 mL IntraVENous PRN    insulin lispro (HUMALOG) injection   SubCUTAneous Q6H    glucose chewable tablet 16 g  4 Tab Oral PRN    dextrose (D50W) injection syrg 12.5-25 g  12.5-25 g IntraVENous PRN    glucagon (GLUCAGEN) injection 1 mg  1 mg IntraMUSCular PRN    HYDROmorphone (PF) (DILAUDID) injection 0.5 mg  0.5 mg IntraVENous Q4H PRN    venlafaxine (EFFEXOR) tablet 25 mg  25 mg Oral TID    pantoprazole (PROTONIX) 40 mg in sodium chloride 0.9% 10 mL injection  40 mg IntraVENous DAILY    cefTRIAXone (ROCEPHIN) 2 g in 0.9% sodium chloride (MBP/ADV) 50 mL  2 g IntraVENous Q24H    mupirocin (BACTROBAN) 2 % ointment   Both Nostrils BID    vancomycin (VANCOCIN) 2000 mg in  ml infusion  2,000 mg IntraVENous Q12H    0.9% sodium chloride infusion  25 mL/hr IntraVENous CONTINUOUS    propofol (DIPRIVAN) infusion  0-50 mcg/kg/min IntraVENous TITRATE    carBAMazepine (TEGretol) 200 mg/10 mL suspension 100 mg  100 mg PEG Tube QID    PHENYLephrine (LILLY-SYNEPHRINE) 100 mg in 0.9% sodium chloride 250 mL infusion   mcg/min IntraVENous TITRATE    heparin (porcine) pf 300 Units  300 Units InterCATHeter PRN    chlorhexidine (PERIDEX) 0.12 % mouthwash 15 mL  15 mL Oral Q12H    albuterol-ipratropium (DUO-NEB) 2.5 MG-0.5 MG/3 ML  3 mL Nebulization QID RT    albuterol-ipratropium (DUO-NEB) 2.5 MG-0.5 MG/3 ML  3 mL Nebulization Q6H PRN    amiodarone (CORDARONE) tablet 200 mg  200 mg Oral DAILY    prochlorperazine (COMPAZINE) with saline injection 5 mg  5 mg IntraVENous Q6H PRN    lactulose (CHRONULAC) solution 10 g  10 g Oral BID    sodium chloride (NS) flush 10-30 mL  10-30 mL InterCATHeter PRN    sodium chloride (NS) flush 10 mL  10 mL InterCATHeter PRN    sodium chloride (NS) flush 10-40 mL  10-40 mL InterCATHeter Q8H    sodium chloride (NS) flush 10 mL  10 mL InterCATHeter Q24H    heparin (porcine) pf 300-500 Units  300-500 Units InterCATHeter PRN    melatonin tablet 3 mg  3 mg Oral QHS    polyethylene glycol (MIRALAX) packet 17 g  17 g Oral DAILY    hydrALAZINE (APRESOLINE) 20 mg/mL injection 10 mg  10 mg IntraVENous Q6H PRN    oxyCODONE IR (ROXICODONE) tablet 5 mg  5 mg Oral Q4H PRN    oxyCODONE IR (ROXICODONE) tablet 10 mg  10 mg Oral Q4H PRN    sodium chloride (NS) flush 5-10 mL  5-10 mL IntraVENous PRN    acetaminophen (TYLENOL) tablet 650 mg  650 mg Oral Q6H PRN    ondansetron (ZOFRAN) injection 4 mg  4 mg IntraVENous Q6H PRN         Objective:      Visit Vitals    /54    Pulse 80    Temp 98.8 °F (37.1 °C)    Resp (!) 46    Ht 6' 2\" (1.88 m)    Wt 293 lb 10.4 oz (133.2 kg)    SpO2 100%    BMI 37.7 kg/m2       Physical Exam:  Abdomen: soft, non-tender.  Bowel sounds normal.   Extremities: no cyanosis or edema  Heart: regular rate and rhythm, S1, S2 normal, no murmur, click, rub or gallop  Lungs: clear to auscultation bilaterally  Neurologic: sedated    Data Review:   Labs:    Recent Results (from the past 24 hour(s))   CULTURE, BLOOD, PAIRED    Collection Time: 08/16/18 11:12 AM   Result Value Ref Range    Special Requests: NO SPECIAL REQUESTS      Culture result: NO GROWTH AFTER 19 HOURS     GLUCOSE, POC    Collection Time: 08/16/18 12:59 PM   Result Value Ref Range    Glucose (POC) 220 (H) 65 - 100 mg/dL    Performed by Denisha Luna    PTT    Collection Time: 08/16/18  2:31 PM   Result Value Ref Range    aPTT 40.1 (H) 22.1 - 32.0 sec    aPTT, therapeutic range     58.0 - 77.0 SECS   GLUCOSE, POC    Collection Time: 08/16/18  5:38 PM   Result Value Ref Range    Glucose (POC) 233 (H) 65 - 100 mg/dL    Performed by Cheri Encarnacion    PTT    Collection Time: 08/16/18 11:23 PM   Result Value Ref Range    aPTT 51.3 (H) 22.1 - 32.0 sec    aPTT, therapeutic range     58.0 - 77.0 SECS   GLUCOSE, POC    Collection Time: 08/16/18 11:32 PM   Result Value Ref Range    Glucose (POC) 217 (H) 65 - 100 mg/dL    Performed by Keiko Christy    CBC WITH AUTOMATED DIFF    Collection Time: 08/17/18  4:00 AM   Result Value Ref Range    WBC 14.0 (H) 4.1 - 11.1 K/uL    RBC 2.69 (L) 4.10 - 5.70 M/uL    HGB 7.1 (L) 12.1 - 17.0 g/dL    HCT 24.3 (L) 36.6 - 50.3 %    MCV 90.3 80.0 - 99.0 FL    MCH 26.4 26.0 - 34.0 PG    MCHC 29.2 (L) 30.0 - 36.5 g/dL    RDW 26.2 (H) 11.5 - 14.5 %    PLATELET 529 056 - 882 K/uL    MPV 11.7 8.9 - 12.9 FL    NRBC 0.9 (H) 0  WBC    ABSOLUTE NRBC 0.13 (H) 0.00 - 0.01 K/uL    NEUTROPHILS 86 (H) 32 - 75 %    BAND NEUTROPHILS 2 %    LYMPHOCYTES 6 (L) 12 - 49 %    MONOCYTES 4 (L) 5 - 13 %    EOSINOPHILS 2 0 - 7 %    BASOPHILS 0 0 - 1 %    IMMATURE GRANULOCYTES 0 0.0 - 0.5 %    ABS. NEUTROPHILS 12.3 (H) 1.8 - 8.0 K/UL    ABS. LYMPHOCYTES 0.8 0.8 - 3.5 K/UL    ABS. MONOCYTES 0.6 0.0 - 1.0 K/UL    ABS. EOSINOPHILS 0.3 0.0 - 0.4 K/UL    ABS. BASOPHILS 0.0 0.0 - 0.1 K/UL    ABS. IMM.  GRANS. 0.0 0.00 - 0.04 K/UL    DF MANUAL      RBC COMMENTS POLYCHROMASIA  1+        RBC COMMENTS ANISOCYTOSIS  3+       VITAMIN B12    Collection Time: 08/17/18  4:04 AM   Result Value Ref Range    Vitamin B12 929 193 - 986 pg/mL   FOLATE    Collection Time: 08/17/18  4:04 AM   Result Value Ref Range    Folate 9.8 5.0 - 21.0 ng/mL   FERRITIN    Collection Time: 08/17/18  4:04 AM   Result Value Ref Range    Ferritin 121 26 - 388 NG/ML   HAPTOGLOBIN    Collection Time: 08/17/18  4:04 AM   Result Value Ref Range    Haptoglobin 97 30 - 071 mg/dL   METABOLIC PANEL, COMPREHENSIVE    Collection Time: 08/17/18  4:04 AM   Result Value Ref Range    Sodium 143 136 - 145 mmol/L    Potassium 4.3 3.5 - 5.1 mmol/L    Chloride 116 (H) 97 - 108 mmol/L    CO2 21 21 - 32 mmol/L    Anion gap 6 5 - 15 mmol/L    Glucose 184 (H) 65 - 100 mg/dL    BUN 21 (H) 6 - 20 MG/DL    Creatinine 0.68 (L) 0.70 - 1.30 MG/DL    BUN/Creatinine ratio 31 (H) 12 - 20      GFR est AA >60 >60 ml/min/1.73m2    GFR est non-AA >60 >60 ml/min/1.73m2    Calcium 7.1 (L) 8.5 - 10.1 MG/DL    Bilirubin, total 0.6 0.2 - 1.0 MG/DL    ALT (SGPT) 29 12 - 78 U/L    AST (SGOT) 25 15 - 37 U/L    Alk. phosphatase 83 45 - 117 U/L    Protein, total 5.6 (L) 6.4 - 8.2 g/dL    Albumin 1.4 (L) 3.5 - 5.0 g/dL    Globulin 4.2 (H) 2.0 - 4.0 g/dL    A-G Ratio 0.3 (L) 1.1 - 2.2     MAGNESIUM    Collection Time: 08/17/18  4:04 AM   Result Value Ref Range    Magnesium 2.2 1.6 - 2.4 mg/dL   PHOSPHORUS    Collection Time: 08/17/18  4:04 AM   Result Value Ref Range    Phosphorus 2.5 (L) 2.6 - 4.7 MG/DL   GLUCOSE, POC    Collection Time: 08/17/18  5:53 AM   Result Value Ref Range    Glucose (POC) 220 (H) 65 - 100 mg/dL    Performed by Maxim Vivas    PTT    Collection Time: 08/17/18  7:30 AM   Result Value Ref Range    aPTT 57.2 (H) 22.1 - 32.0 sec    aPTT, therapeutic range     58.0 - 77.0 SECS       Telemetry: paced. Assessment:     Principal Problem:    GI bleed (7/10/2018)    Active Problems:    Acute blood loss anemia (7/10/2018)      Anasarca (7/10/2018)        Plan:     Stable from cardiac standpoint. Remains off pressors. On heparin for AC. Will follow as needed over weekend and check back Monday.

## 2018-08-17 NOTE — PROGRESS NOTES
CRS Progress Note    No active issues at this time. Tolerated tube feeds at 30. Ostomy functioning    /66  Pulse 77  Temp 99.4 °F (37.4 °C)  Resp (!) 41  Ht 6' 2\" (1.88 m)  Wt 133.2 kg (293 lb 10.4 oz)  SpO2 100%  BMI 37.7 kg/m2    Intubated/sedated  Abd soft, ostomy with gas and stool in bag (250 output yesterday)  Excellent UOP    Plan  -Continue abx for pneumonia and gram+ bacteremia.   Repeat blood cx today  -Tracheostomy possibly early next week  -Keep TF at 30/hr over the weekend  -Continue TPN  -Hep gtt for R IJ thrombus

## 2018-08-17 NOTE — PROGRESS NOTES
Bedside shift change report given to Mayelin Morales (oncoming nurse) by Ericka Evans, CRISTIN (offgoing nurse). Report included the following information SBAR. 1430: Gastric residual were 400. New orders received from Dr. María Le to hold feeding for 2 hours and recheck residuals. If under 200 to restart feedings. 1700: Gastric residual were 260. Continued to hold tube feedings. 1900: Bedside shift change report given to Bhargav Mcneill RN and Tiffany Garcia RN (oncoming nurse) by Tiffany Krishnamurthy RN (offgoing nurse). Report included the following information SBAR.

## 2018-08-17 NOTE — PROGRESS NOTES
08/17/18 0600   ABCDEF Bundle   SBT Safety Screen Passed No   SBT Screen Reason for Failure FiO2 > 50%;PEEP > 7.5

## 2018-08-18 ENCOUNTER — APPOINTMENT (OUTPATIENT)
Dept: GENERAL RADIOLOGY | Age: 78
DRG: 329 | End: 2018-08-18
Attending: INTERNAL MEDICINE
Payer: MEDICARE

## 2018-08-18 LAB
ALBUMIN SERPL-MCNC: 1.3 G/DL (ref 3.5–5)
ALBUMIN/GLOB SERPL: 0.3 {RATIO} (ref 1.1–2.2)
ALP SERPL-CCNC: 78 U/L (ref 45–117)
ALT SERPL-CCNC: 29 U/L (ref 12–78)
ANION GAP SERPL CALC-SCNC: 5 MMOL/L (ref 5–15)
APTT PPP: 75.8 SEC (ref 22.1–32)
AST SERPL-CCNC: 27 U/L (ref 15–37)
BASOPHILS # BLD: 0 K/UL (ref 0–0.1)
BASOPHILS NFR BLD: 0 % (ref 0–1)
BILIRUB SERPL-MCNC: 0.5 MG/DL (ref 0.2–1)
BUN SERPL-MCNC: 21 MG/DL (ref 6–20)
BUN/CREAT SERPL: 33 (ref 12–20)
CALCIUM SERPL-MCNC: 7.1 MG/DL (ref 8.5–10.1)
CHLORIDE SERPL-SCNC: 115 MMOL/L (ref 97–108)
CO2 SERPL-SCNC: 22 MMOL/L (ref 21–32)
COPPER SERPL-MCNC: 180 UG/DL (ref 72–166)
CREAT SERPL-MCNC: 0.64 MG/DL (ref 0.7–1.3)
DIFFERENTIAL METHOD BLD: ABNORMAL
EOSINOPHIL # BLD: 0 K/UL (ref 0–0.4)
EOSINOPHIL NFR BLD: 0 % (ref 0–7)
ERYTHROCYTE [DISTWIDTH] IN BLOOD BY AUTOMATED COUNT: 26.4 % (ref 11.5–14.5)
GLOBULIN SER CALC-MCNC: 4.1 G/DL (ref 2–4)
GLUCOSE BLD STRIP.AUTO-MCNC: 161 MG/DL (ref 65–100)
GLUCOSE BLD STRIP.AUTO-MCNC: 170 MG/DL (ref 65–100)
GLUCOSE BLD STRIP.AUTO-MCNC: 175 MG/DL (ref 65–100)
GLUCOSE BLD STRIP.AUTO-MCNC: 198 MG/DL (ref 65–100)
GLUCOSE SERPL-MCNC: 159 MG/DL (ref 65–100)
HCT VFR BLD AUTO: 23.4 % (ref 36.6–50.3)
HGB BLD-MCNC: 6.7 G/DL (ref 12.1–17)
IMM GRANULOCYTES # BLD: 0 K/UL (ref 0–0.04)
IMM GRANULOCYTES NFR BLD AUTO: 0 % (ref 0–0.5)
LYMPHOCYTES # BLD: 0.8 K/UL (ref 0.8–3.5)
LYMPHOCYTES NFR BLD: 7 % (ref 12–49)
MAGNESIUM SERPL-MCNC: 2.3 MG/DL (ref 1.6–2.4)
MCH RBC QN AUTO: 26 PG (ref 26–34)
MCHC RBC AUTO-ENTMCNC: 28.6 G/DL (ref 30–36.5)
MCV RBC AUTO: 90.7 FL (ref 80–99)
METAMYELOCYTES NFR BLD MANUAL: 3 %
MONOCYTES # BLD: 0.7 K/UL (ref 0–1)
MONOCYTES NFR BLD: 6 % (ref 5–13)
MYELOCYTES NFR BLD MANUAL: 1 %
NEUTS BAND NFR BLD MANUAL: 1 %
NEUTS SEG # BLD: 9.3 K/UL (ref 1.8–8)
NEUTS SEG NFR BLD: 82 % (ref 32–75)
NRBC # BLD: 0.19 K/UL (ref 0–0.01)
NRBC BLD-RTO: 1.7 PER 100 WBC
PHOSPHATE SERPL-MCNC: 3 MG/DL (ref 2.6–4.7)
PLATELET # BLD AUTO: 242 K/UL (ref 150–400)
PMV BLD AUTO: 12.5 FL (ref 8.9–12.9)
POTASSIUM SERPL-SCNC: 4.5 MMOL/L (ref 3.5–5.1)
PROT SERPL-MCNC: 5.4 G/DL (ref 6.4–8.2)
RBC # BLD AUTO: 2.58 M/UL (ref 4.1–5.7)
RBC MORPH BLD: ABNORMAL
SERVICE CMNT-IMP: ABNORMAL
SODIUM SERPL-SCNC: 142 MMOL/L (ref 136–145)
THERAPEUTIC RANGE,PTTT: ABNORMAL SECS (ref 58–77)
WBC # BLD AUTO: 11.2 K/UL (ref 4.1–11.1)
ZINC SERPL-MCNC: 44 UG/DL (ref 56–134)

## 2018-08-18 PROCEDURE — 82962 GLUCOSE BLOOD TEST: CPT

## 2018-08-18 PROCEDURE — 86902 BLOOD TYPE ANTIGEN DONOR EA: CPT | Performed by: INTERNAL MEDICINE

## 2018-08-18 PROCEDURE — 71045 X-RAY EXAM CHEST 1 VIEW: CPT

## 2018-08-18 PROCEDURE — 94003 VENT MGMT INPAT SUBQ DAY: CPT

## 2018-08-18 PROCEDURE — 85730 THROMBOPLASTIN TIME PARTIAL: CPT | Performed by: INTERNAL MEDICINE

## 2018-08-18 PROCEDURE — 74011000250 HC RX REV CODE- 250: Performed by: SURGERY

## 2018-08-18 PROCEDURE — 65610000006 HC RM INTENSIVE CARE

## 2018-08-18 PROCEDURE — P9016 RBC LEUKOCYTES REDUCED: HCPCS | Performed by: INTERNAL MEDICINE

## 2018-08-18 PROCEDURE — 36415 COLL VENOUS BLD VENIPUNCTURE: CPT | Performed by: INTERNAL MEDICINE

## 2018-08-18 PROCEDURE — 85025 COMPLETE CBC W/AUTO DIFF WBC: CPT | Performed by: INTERNAL MEDICINE

## 2018-08-18 PROCEDURE — 84100 ASSAY OF PHOSPHORUS: CPT | Performed by: INTERNAL MEDICINE

## 2018-08-18 PROCEDURE — 74011000250 HC RX REV CODE- 250: Performed by: INTERNAL MEDICINE

## 2018-08-18 PROCEDURE — C9113 INJ PANTOPRAZOLE SODIUM, VIA: HCPCS | Performed by: INTERNAL MEDICINE

## 2018-08-18 PROCEDURE — 74011250637 HC RX REV CODE- 250/637: Performed by: INTERNAL MEDICINE

## 2018-08-18 PROCEDURE — 74011250636 HC RX REV CODE- 250/636: Performed by: INTERNAL MEDICINE

## 2018-08-18 PROCEDURE — 74018 RADEX ABDOMEN 1 VIEW: CPT

## 2018-08-18 PROCEDURE — 74011636637 HC RX REV CODE- 636/637: Performed by: INTERNAL MEDICINE

## 2018-08-18 PROCEDURE — 36430 TRANSFUSION BLD/BLD COMPNT: CPT

## 2018-08-18 PROCEDURE — 86921 COMPATIBILITY TEST INCUBATE: CPT | Performed by: INTERNAL MEDICINE

## 2018-08-18 PROCEDURE — 74011000258 HC RX REV CODE- 258: Performed by: INTERNAL MEDICINE

## 2018-08-18 PROCEDURE — 94640 AIRWAY INHALATION TREATMENT: CPT

## 2018-08-18 PROCEDURE — 86920 COMPATIBILITY TEST SPIN: CPT | Performed by: INTERNAL MEDICINE

## 2018-08-18 PROCEDURE — 86900 BLOOD TYPING SEROLOGIC ABO: CPT | Performed by: INTERNAL MEDICINE

## 2018-08-18 PROCEDURE — 80053 COMPREHEN METABOLIC PANEL: CPT | Performed by: INTERNAL MEDICINE

## 2018-08-18 PROCEDURE — 83735 ASSAY OF MAGNESIUM: CPT | Performed by: INTERNAL MEDICINE

## 2018-08-18 PROCEDURE — 74011250637 HC RX REV CODE- 250/637: Performed by: SURGERY

## 2018-08-18 PROCEDURE — 86870 RBC ANTIBODY IDENTIFICATION: CPT | Performed by: INTERNAL MEDICINE

## 2018-08-18 PROCEDURE — 86922 COMPATIBILITY TEST ANTIGLOB: CPT | Performed by: INTERNAL MEDICINE

## 2018-08-18 RX ORDER — SODIUM CHLORIDE 9 MG/ML
250 INJECTION, SOLUTION INTRAVENOUS AS NEEDED
Status: DISCONTINUED | OUTPATIENT
Start: 2018-08-18 | End: 2018-08-19 | Stop reason: ALTCHOICE

## 2018-08-18 RX ADMIN — VENLAFAXINE 25 MG: 25 TABLET ORAL at 08:01

## 2018-08-18 RX ADMIN — PROPOFOL 50 MCG/KG/MIN: 10 INJECTION, EMULSION INTRAVENOUS at 08:01

## 2018-08-18 RX ADMIN — CHLORHEXIDINE GLUCONATE 15 ML: 1.2 RINSE ORAL at 08:02

## 2018-08-18 RX ADMIN — IPRATROPIUM BROMIDE AND ALBUTEROL SULFATE 3 ML: .5; 3 SOLUTION RESPIRATORY (INHALATION) at 11:39

## 2018-08-18 RX ADMIN — MELATONIN TAB 3 MG 3 MG: 3 TAB at 21:51

## 2018-08-18 RX ADMIN — FENTANYL CITRATE 100 MCG: 50 INJECTION, SOLUTION INTRAMUSCULAR; INTRAVENOUS at 19:44

## 2018-08-18 RX ADMIN — FENTANYL CITRATE 100 MCG: 50 INJECTION, SOLUTION INTRAMUSCULAR; INTRAVENOUS at 07:47

## 2018-08-18 RX ADMIN — HEPARIN SODIUM 24.2 UNITS/KG/HR: 10000 INJECTION, SOLUTION INTRAVENOUS at 10:40

## 2018-08-18 RX ADMIN — IPRATROPIUM BROMIDE AND ALBUTEROL SULFATE 3 ML: .5; 3 SOLUTION RESPIRATORY (INHALATION) at 15:34

## 2018-08-18 RX ADMIN — CARBAMAZEPINE 100 MG: 100 SUSPENSION ORAL at 13:21

## 2018-08-18 RX ADMIN — PROPOFOL 50 MCG/KG/MIN: 10 INJECTION, EMULSION INTRAVENOUS at 19:34

## 2018-08-18 RX ADMIN — OXYCODONE HYDROCHLORIDE 5 MG: 5 TABLET ORAL at 21:52

## 2018-08-18 RX ADMIN — AMIODARONE HYDROCHLORIDE 200 MG: 200 TABLET ORAL at 08:01

## 2018-08-18 RX ADMIN — VANCOMYCIN HYDROCHLORIDE 2000 MG: 10 INJECTION, POWDER, LYOPHILIZED, FOR SOLUTION INTRAVENOUS at 13:26

## 2018-08-18 RX ADMIN — LACTULOSE 10 G: 20 SOLUTION ORAL at 18:05

## 2018-08-18 RX ADMIN — CARBAMAZEPINE 100 MG: 100 SUSPENSION ORAL at 10:41

## 2018-08-18 RX ADMIN — IPRATROPIUM BROMIDE AND ALBUTEROL SULFATE 3 ML: .5; 3 SOLUTION RESPIRATORY (INHALATION) at 19:27

## 2018-08-18 RX ADMIN — PROPOFOL 50 MCG/KG/MIN: 10 INJECTION, EMULSION INTRAVENOUS at 16:33

## 2018-08-18 RX ADMIN — PROPOFOL 50 MCG/KG/MIN: 10 INJECTION, EMULSION INTRAVENOUS at 04:34

## 2018-08-18 RX ADMIN — SODIUM CHLORIDE 10 ML: 9 INJECTION, SOLUTION INTRAMUSCULAR; INTRAVENOUS; SUBCUTANEOUS at 21:52

## 2018-08-18 RX ADMIN — INSULIN LISPRO 3 UNITS: 100 INJECTION, SOLUTION INTRAVENOUS; SUBCUTANEOUS at 18:00

## 2018-08-18 RX ADMIN — SODIUM CHLORIDE 10 ML: 9 INJECTION, SOLUTION INTRAMUSCULAR; INTRAVENOUS; SUBCUTANEOUS at 06:27

## 2018-08-18 RX ADMIN — ASCORBIC ACID: 500 INJECTION, SOLUTION INTRAMUSCULAR; INTRAVENOUS; SUBCUTANEOUS at 16:35

## 2018-08-18 RX ADMIN — VANCOMYCIN HYDROCHLORIDE 2000 MG: 10 INJECTION, POWDER, LYOPHILIZED, FOR SOLUTION INTRAVENOUS at 03:51

## 2018-08-18 RX ADMIN — IPRATROPIUM BROMIDE AND ALBUTEROL SULFATE 3 ML: .5; 3 SOLUTION RESPIRATORY (INHALATION) at 08:08

## 2018-08-18 RX ADMIN — INSULIN LISPRO 3 UNITS: 100 INJECTION, SOLUTION INTRAVENOUS; SUBCUTANEOUS at 00:50

## 2018-08-18 RX ADMIN — PROPOFOL 50 MCG/KG/MIN: 10 INJECTION, EMULSION INTRAVENOUS at 02:01

## 2018-08-18 RX ADMIN — CARBAMAZEPINE 100 MG: 100 SUSPENSION ORAL at 21:55

## 2018-08-18 RX ADMIN — MUPIROCIN: 20 OINTMENT TOPICAL at 08:02

## 2018-08-18 RX ADMIN — LACTULOSE 10 G: 20 SOLUTION ORAL at 08:01

## 2018-08-18 RX ADMIN — POLYETHYLENE GLYCOL 3350 17 G: 17 POWDER, FOR SOLUTION ORAL at 08:01

## 2018-08-18 RX ADMIN — VENLAFAXINE 25 MG: 25 TABLET ORAL at 21:51

## 2018-08-18 RX ADMIN — VENLAFAXINE 25 MG: 25 TABLET ORAL at 15:37

## 2018-08-18 RX ADMIN — FENTANYL CITRATE 100 MCG: 50 INJECTION, SOLUTION INTRAMUSCULAR; INTRAVENOUS at 15:37

## 2018-08-18 RX ADMIN — PROPOFOL 50 MCG/KG/MIN: 10 INJECTION, EMULSION INTRAVENOUS at 00:01

## 2018-08-18 RX ADMIN — SODIUM CHLORIDE 10 ML: 9 INJECTION, SOLUTION INTRAMUSCULAR; INTRAVENOUS; SUBCUTANEOUS at 13:26

## 2018-08-18 RX ADMIN — PROPOFOL 50 MCG/KG/MIN: 10 INJECTION, EMULSION INTRAVENOUS at 11:17

## 2018-08-18 RX ADMIN — SODIUM CHLORIDE 40 MG: 9 INJECTION INTRAMUSCULAR; INTRAVENOUS; SUBCUTANEOUS at 08:01

## 2018-08-18 RX ADMIN — CARBAMAZEPINE 100 MG: 100 SUSPENSION ORAL at 18:00

## 2018-08-18 RX ADMIN — INSULIN LISPRO 3 UNITS: 100 INJECTION, SOLUTION INTRAVENOUS; SUBCUTANEOUS at 11:27

## 2018-08-18 RX ADMIN — INSULIN LISPRO 3 UNITS: 100 INJECTION, SOLUTION INTRAVENOUS; SUBCUTANEOUS at 06:35

## 2018-08-18 RX ADMIN — CHLORHEXIDINE GLUCONATE 15 ML: 1.2 RINSE ORAL at 20:36

## 2018-08-18 RX ADMIN — SODIUM CHLORIDE 10 ML: 9 INJECTION, SOLUTION INTRAMUSCULAR; INTRAVENOUS; SUBCUTANEOUS at 20:36

## 2018-08-18 NOTE — PROGRESS NOTES
Intubated/sedated   Visit Vitals    /62    Pulse 75    Temp 98.6 °F (37 °C)    Resp (!) 37    Ht 6' 2\" (1.88 m)    Wt 127 kg (279 lb 15.8 oz)    SpO2 100%    BMI 35.95 kg/m2       Date 08/17/18 0700 - 08/18/18 0659 08/18/18 0700 - 08/19/18 0659   Shift 9847-5940 6187-6969 24 Hour Total 3359-9379 0067-0732 24 Hour Total   I  N  T  A  K  E   I.V.  (mL/kg/hr) 2705  (1.8) 2360.7  (1.5) 5065.7  (1.7) 1006. 6  1006. 6      Diprivan Volume 423.6 388.3 811.9 228.3  228.3      Heparin Volume 286.5 266.2 552. 7 178. 3  178. 3      Phenylephrine Volume 20 8.7 28.6         Volume (0.9% sodium chloride infusion) 300 300 600 150  150      Volume (0.9% sodium chloride infusion 250 mL)    0  0      Volume (vancomycin (VANCOCIN) 2000 mg in  ml infusion) 4248 223 1196 0  0      Volume (TPN ADULT-CENTRAL AA 5% D20%-MVI W/ VIT K-RCH) 675  675         Volume (TPN ADULT - CENTRAL AA 5% D15% W/ CA + ELECTROLYTES)  897.5 897.5 450  450    NG/ 270 830 330  330      Water Flush Volume (mL) (Orogastric Tube 08/11/18)  80 80 170  170      Medication Volume (Orogastric Tube 08/11/18) 100 30 130 60  60      Intake (ml) (Orogastric Tube 08/11/18) 460 160 620 100  100    Shift Total  (mL/kg) 3265  (25.7) 2630.7  (20.7) 5895.7  (46.4) 1336.6  (10.5)  1336.6  (10.5)   O  U  T  P  U  T   Urine  (mL/kg/hr) 1010  (0.7) 770  (0.5) 1780  (0.6) 360  360      Urine Output (mL) (Urinary Catheter 08/11/18 [facility]) 4410 265 5340 360  360    Stool    400  400      Output (ml) (Colostomy 07/10/18 Left Abdomen)    400  400    Shift Total  (mL/kg) 1010  (8) 770  (6.1) 1780  (14) 760  (6)  760  (6)   NET 2255 1860.7 4115.7 576.6  576.6   Weight (kg) 127 127 127 127 127 127     abd soft   Stoma pink    A/p continue present management  Tpn/tube feeds

## 2018-08-18 NOTE — PROGRESS NOTES
Bedside and Verbal shift change report given to [employer] (oncoming nurse) by Alyssia Molina  (offgoing nurse). Report included the following information SBAR, Kardex, ED Summary, Recent Results, Med Rec Status, Cardiac Rhythm Paced and Alarm Parameters . 2030: Phenylenephrine drip stopped  2200: Tube feeding resumed @ 20 ml /hr   0600: Passed SAT, failed SBT  0700 Bedside and Verbal shift change report given to [facility] (oncoming nurse) by [employer] (offgoing nurse). Report included the following information SBAR, Kardex, ED Summary, OR Summary, Procedure Summary, Intake/Output, Recent Results, Cardiac Rhythm Paced and Alarm Parameters .
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
## 2018-08-18 NOTE — PROGRESS NOTES
08/18/18 0553   ABCDEF Bundle   SAT Trial Reason for Failure Respiratory rate > 35   SBT Safety Screen Passed No

## 2018-08-19 ENCOUNTER — APPOINTMENT (OUTPATIENT)
Dept: GENERAL RADIOLOGY | Age: 78
DRG: 329 | End: 2018-08-19
Attending: INTERNAL MEDICINE
Payer: MEDICARE

## 2018-08-19 LAB
ANION GAP SERPL CALC-SCNC: 10 MMOL/L (ref 5–15)
ANION GAP SERPL CALC-SCNC: 11 MMOL/L (ref 5–15)
APTT PPP: 55.4 SEC (ref 22.1–32)
APTT PPP: 56.8 SEC (ref 22.1–32)
APTT PPP: 60.1 SEC (ref 22.1–32)
APTT PPP: 61 SEC (ref 22.1–32)
BASOPHILS # BLD: 0 K/UL (ref 0–0.1)
BASOPHILS NFR BLD: 0 % (ref 0–1)
BUN SERPL-MCNC: 24 MG/DL (ref 6–20)
BUN SERPL-MCNC: 28 MG/DL (ref 6–20)
BUN/CREAT SERPL: 32 (ref 12–20)
BUN/CREAT SERPL: 33 (ref 12–20)
CALCIUM SERPL-MCNC: 7 MG/DL (ref 8.5–10.1)
CALCIUM SERPL-MCNC: 7.1 MG/DL (ref 8.5–10.1)
CHLORIDE SERPL-SCNC: 111 MMOL/L (ref 97–108)
CHLORIDE SERPL-SCNC: 112 MMOL/L (ref 97–108)
CO2 SERPL-SCNC: 18 MMOL/L (ref 21–32)
CO2 SERPL-SCNC: 19 MMOL/L (ref 21–32)
CREAT SERPL-MCNC: 0.75 MG/DL (ref 0.7–1.3)
CREAT SERPL-MCNC: 0.86 MG/DL (ref 0.7–1.3)
DATE LAST DOSE: ABNORMAL
DIFFERENTIAL METHOD BLD: ABNORMAL
EOSINOPHIL # BLD: 0.5 K/UL (ref 0–0.4)
EOSINOPHIL NFR BLD: 3 % (ref 0–7)
ERYTHROCYTE [DISTWIDTH] IN BLOOD BY AUTOMATED COUNT: 24.6 % (ref 11.5–14.5)
GLUCOSE BLD STRIP.AUTO-MCNC: 171 MG/DL (ref 65–100)
GLUCOSE BLD STRIP.AUTO-MCNC: 173 MG/DL (ref 65–100)
GLUCOSE BLD STRIP.AUTO-MCNC: 187 MG/DL (ref 65–100)
GLUCOSE BLD STRIP.AUTO-MCNC: 213 MG/DL (ref 65–100)
GLUCOSE SERPL-MCNC: 163 MG/DL (ref 65–100)
GLUCOSE SERPL-MCNC: 173 MG/DL (ref 65–100)
HCT VFR BLD AUTO: 26.7 % (ref 36.6–50.3)
HCT VFR BLD AUTO: 27.4 % (ref 36.6–50.3)
HCT VFR BLD AUTO: 29.9 % (ref 36.6–50.3)
HGB BLD-MCNC: 8.2 G/DL (ref 12.1–17)
HGB BLD-MCNC: 8.2 G/DL (ref 12.1–17)
HGB BLD-MCNC: 9.1 G/DL (ref 12.1–17)
IMM GRANULOCYTES # BLD: 0 K/UL (ref 0–0.04)
IMM GRANULOCYTES NFR BLD AUTO: 0 % (ref 0–0.5)
LYMPHOCYTES # BLD: 0.9 K/UL (ref 0.8–3.5)
LYMPHOCYTES NFR BLD: 6 % (ref 12–49)
MCH RBC QN AUTO: 27.1 PG (ref 26–34)
MCHC RBC AUTO-ENTMCNC: 30.4 G/DL (ref 30–36.5)
MCV RBC AUTO: 89 FL (ref 80–99)
METAMYELOCYTES NFR BLD MANUAL: 4 %
MONOCYTES # BLD: 0.6 K/UL (ref 0–1)
MONOCYTES NFR BLD: 4 % (ref 5–13)
NEUTS BAND NFR BLD MANUAL: 3 %
NEUTS SEG # BLD: 12.8 K/UL (ref 1.8–8)
NEUTS SEG NFR BLD: 80 % (ref 32–75)
NRBC # BLD: 0.44 K/UL (ref 0–0.01)
NRBC BLD-RTO: 2.9 PER 100 WBC
PLATELET # BLD AUTO: 254 K/UL (ref 150–400)
PLATELET COMMENTS,PCOM: ABNORMAL
PMV BLD AUTO: 11.6 FL (ref 8.9–12.9)
POTASSIUM SERPL-SCNC: 4.2 MMOL/L (ref 3.5–5.1)
POTASSIUM SERPL-SCNC: 4.4 MMOL/L (ref 3.5–5.1)
RBC # BLD AUTO: 3.36 M/UL (ref 4.1–5.7)
RBC MORPH BLD: ABNORMAL
REPORTED DOSE,DOSE: ABNORMAL UNITS
REPORTED DOSE/TIME,TMG: 200
SERVICE CMNT-IMP: ABNORMAL
SODIUM SERPL-SCNC: 140 MMOL/L (ref 136–145)
SODIUM SERPL-SCNC: 141 MMOL/L (ref 136–145)
THERAPEUTIC RANGE,PTTT: ABNORMAL SECS (ref 58–77)
VANCOMYCIN TROUGH SERPL-MCNC: 23.7 UG/ML (ref 5–10)
WBC # BLD AUTO: 15.4 K/UL (ref 4.1–11.1)

## 2018-08-19 PROCEDURE — C9113 INJ PANTOPRAZOLE SODIUM, VIA: HCPCS | Performed by: INTERNAL MEDICINE

## 2018-08-19 PROCEDURE — 80048 BASIC METABOLIC PNL TOTAL CA: CPT | Performed by: INTERNAL MEDICINE

## 2018-08-19 PROCEDURE — 74011250636 HC RX REV CODE- 250/636: Performed by: INTERNAL MEDICINE

## 2018-08-19 PROCEDURE — 74011250637 HC RX REV CODE- 250/637: Performed by: INTERNAL MEDICINE

## 2018-08-19 PROCEDURE — 74011636637 HC RX REV CODE- 636/637: Performed by: INTERNAL MEDICINE

## 2018-08-19 PROCEDURE — 36415 COLL VENOUS BLD VENIPUNCTURE: CPT | Performed by: INTERNAL MEDICINE

## 2018-08-19 PROCEDURE — 94003 VENT MGMT INPAT SUBQ DAY: CPT

## 2018-08-19 PROCEDURE — 74011250637 HC RX REV CODE- 250/637: Performed by: SURGERY

## 2018-08-19 PROCEDURE — 80156 ASSAY CARBAMAZEPINE TOTAL: CPT | Performed by: INTERNAL MEDICINE

## 2018-08-19 PROCEDURE — 80048 BASIC METABOLIC PNL TOTAL CA: CPT | Performed by: SURGERY

## 2018-08-19 PROCEDURE — 74011000258 HC RX REV CODE- 258: Performed by: INTERNAL MEDICINE

## 2018-08-19 PROCEDURE — 84478 ASSAY OF TRIGLYCERIDES: CPT | Performed by: INTERNAL MEDICINE

## 2018-08-19 PROCEDURE — 65610000006 HC RM INTENSIVE CARE

## 2018-08-19 PROCEDURE — 74011250636 HC RX REV CODE- 250/636: Performed by: SURGERY

## 2018-08-19 PROCEDURE — 85018 HEMOGLOBIN: CPT | Performed by: SURGERY

## 2018-08-19 PROCEDURE — 85730 THROMBOPLASTIN TIME PARTIAL: CPT | Performed by: INTERNAL MEDICINE

## 2018-08-19 PROCEDURE — 80202 ASSAY OF VANCOMYCIN: CPT | Performed by: SURGERY

## 2018-08-19 PROCEDURE — 74011000250 HC RX REV CODE- 250: Performed by: SURGERY

## 2018-08-19 PROCEDURE — 94640 AIRWAY INHALATION TREATMENT: CPT

## 2018-08-19 PROCEDURE — 71045 X-RAY EXAM CHEST 1 VIEW: CPT

## 2018-08-19 PROCEDURE — 74011000250 HC RX REV CODE- 250: Performed by: INTERNAL MEDICINE

## 2018-08-19 PROCEDURE — 77030018798 HC PMP KT ENTRL FED COVD -A

## 2018-08-19 PROCEDURE — 82962 GLUCOSE BLOOD TEST: CPT

## 2018-08-19 PROCEDURE — 74011250636 HC RX REV CODE- 250/636

## 2018-08-19 PROCEDURE — 74011250637 HC RX REV CODE- 250/637: Performed by: HOSPITALIST

## 2018-08-19 PROCEDURE — 85025 COMPLETE CBC W/AUTO DIFF WBC: CPT | Performed by: SURGERY

## 2018-08-19 RX ORDER — VANCOMYCIN/0.9 % SOD CHLORIDE 1.5G/250ML
1500 PLASTIC BAG, INJECTION (ML) INTRAVENOUS EVERY 12 HOURS
Status: DISCONTINUED | OUTPATIENT
Start: 2018-08-20 | End: 2018-08-19

## 2018-08-19 RX ORDER — LORAZEPAM 2 MG/ML
2 INJECTION INTRAMUSCULAR
Status: DISCONTINUED | OUTPATIENT
Start: 2018-08-19 | End: 2018-09-03

## 2018-08-19 RX ORDER — VANCOMYCIN/0.9 % SOD CHLORIDE 1.5G/250ML
1500 PLASTIC BAG, INJECTION (ML) INTRAVENOUS EVERY 12 HOURS
Status: CANCELLED | OUTPATIENT
Start: 2018-08-19

## 2018-08-19 RX ORDER — FUROSEMIDE 10 MG/ML
INJECTION INTRAMUSCULAR; INTRAVENOUS
Status: COMPLETED
Start: 2018-08-19 | End: 2018-08-19

## 2018-08-19 RX ORDER — FUROSEMIDE 10 MG/ML
40 INJECTION INTRAMUSCULAR; INTRAVENOUS ONCE
Status: COMPLETED | OUTPATIENT
Start: 2018-08-19 | End: 2018-08-19

## 2018-08-19 RX ORDER — FUROSEMIDE 10 MG/ML
40 INJECTION INTRAMUSCULAR; INTRAVENOUS 2 TIMES DAILY
Status: DISCONTINUED | OUTPATIENT
Start: 2018-08-19 | End: 2018-08-20

## 2018-08-19 RX ORDER — FUROSEMIDE 10 MG/ML
INJECTION INTRAMUSCULAR; INTRAVENOUS
Status: DISPENSED
Start: 2018-08-19 | End: 2018-08-19

## 2018-08-19 RX ADMIN — VANCOMYCIN HYDROCHLORIDE 2000 MG: 10 INJECTION, POWDER, LYOPHILIZED, FOR SOLUTION INTRAVENOUS at 01:47

## 2018-08-19 RX ADMIN — IPRATROPIUM BROMIDE AND ALBUTEROL SULFATE 3 ML: .5; 3 SOLUTION RESPIRATORY (INHALATION) at 08:31

## 2018-08-19 RX ADMIN — VANCOMYCIN HYDROCHLORIDE 2000 MG: 10 INJECTION, POWDER, LYOPHILIZED, FOR SOLUTION INTRAVENOUS at 13:08

## 2018-08-19 RX ADMIN — INSULIN LISPRO 3 UNITS: 100 INJECTION, SOLUTION INTRAVENOUS; SUBCUTANEOUS at 05:58

## 2018-08-19 RX ADMIN — VENLAFAXINE 25 MG: 25 TABLET ORAL at 15:56

## 2018-08-19 RX ADMIN — IPRATROPIUM BROMIDE AND ALBUTEROL SULFATE 3 ML: .5; 3 SOLUTION RESPIRATORY (INHALATION) at 11:58

## 2018-08-19 RX ADMIN — HYDRALAZINE HYDROCHLORIDE 10 MG: 20 INJECTION INTRAMUSCULAR; INTRAVENOUS at 02:38

## 2018-08-19 RX ADMIN — FUROSEMIDE 40 MG: 10 INJECTION, SOLUTION INTRAMUSCULAR; INTRAVENOUS at 14:07

## 2018-08-19 RX ADMIN — PROPOFOL 30 MCG/KG/MIN: 10 INJECTION, EMULSION INTRAVENOUS at 13:35

## 2018-08-19 RX ADMIN — PROPOFOL 50 MCG/KG/MIN: 10 INJECTION, EMULSION INTRAVENOUS at 01:02

## 2018-08-19 RX ADMIN — POLYETHYLENE GLYCOL 3350 17 G: 17 POWDER, FOR SOLUTION ORAL at 08:28

## 2018-08-19 RX ADMIN — INSULIN LISPRO 3 UNITS: 100 INJECTION, SOLUTION INTRAVENOUS; SUBCUTANEOUS at 17:27

## 2018-08-19 RX ADMIN — FUROSEMIDE: 10 INJECTION, SOLUTION INTRAMUSCULAR; INTRAVENOUS at 15:00

## 2018-08-19 RX ADMIN — INSULIN LISPRO 3 UNITS: 100 INJECTION, SOLUTION INTRAVENOUS; SUBCUTANEOUS at 23:59

## 2018-08-19 RX ADMIN — ACETAMINOPHEN 650 MG: 160 SUSPENSION ORAL at 15:56

## 2018-08-19 RX ADMIN — FENTANYL CITRATE 100 MCG: 50 INJECTION, SOLUTION INTRAMUSCULAR; INTRAVENOUS at 00:37

## 2018-08-19 RX ADMIN — LACTULOSE 10 G: 20 SOLUTION ORAL at 18:23

## 2018-08-19 RX ADMIN — PROPOFOL 50 MCG/KG/MIN: 10 INJECTION, EMULSION INTRAVENOUS at 04:15

## 2018-08-19 RX ADMIN — FUROSEMIDE 40 MG: 10 INJECTION, SOLUTION INTRAMUSCULAR; INTRAVENOUS at 18:24

## 2018-08-19 RX ADMIN — OXYCODONE HYDROCHLORIDE 10 MG: 5 TABLET ORAL at 01:47

## 2018-08-19 RX ADMIN — FENTANYL CITRATE 100 MCG: 50 INJECTION, SOLUTION INTRAMUSCULAR; INTRAVENOUS at 07:22

## 2018-08-19 RX ADMIN — ASCORBIC ACID: 500 INJECTION, SOLUTION INTRAMUSCULAR; INTRAVENOUS; SUBCUTANEOUS at 18:08

## 2018-08-19 RX ADMIN — VENLAFAXINE 25 MG: 25 TABLET ORAL at 21:13

## 2018-08-19 RX ADMIN — CHLORHEXIDINE GLUCONATE 15 ML: 1.2 RINSE ORAL at 08:26

## 2018-08-19 RX ADMIN — FENTANYL CITRATE 100 MCG: 50 INJECTION, SOLUTION INTRAMUSCULAR; INTRAVENOUS at 04:15

## 2018-08-19 RX ADMIN — CARBAMAZEPINE 100 MG: 100 SUSPENSION ORAL at 08:27

## 2018-08-19 RX ADMIN — FUROSEMIDE 40 MG: 10 INJECTION, SOLUTION INTRAMUSCULAR; INTRAVENOUS at 03:32

## 2018-08-19 RX ADMIN — SODIUM CHLORIDE 10 ML: 9 INJECTION, SOLUTION INTRAMUSCULAR; INTRAVENOUS; SUBCUTANEOUS at 05:21

## 2018-08-19 RX ADMIN — CARBAMAZEPINE 100 MG: 100 SUSPENSION ORAL at 18:23

## 2018-08-19 RX ADMIN — PROPOFOL 30 MCG/KG/MIN: 10 INJECTION, EMULSION INTRAVENOUS at 10:05

## 2018-08-19 RX ADMIN — FUROSEMIDE 40 MG: 10 INJECTION, SOLUTION INTRAMUSCULAR; INTRAVENOUS at 10:02

## 2018-08-19 RX ADMIN — INSULIN LISPRO 4 UNITS: 100 INJECTION, SOLUTION INTRAVENOUS; SUBCUTANEOUS at 11:58

## 2018-08-19 RX ADMIN — PROPOFOL 25 MCG/KG/MIN: 10 INJECTION, EMULSION INTRAVENOUS at 18:35

## 2018-08-19 RX ADMIN — FENTANYL CITRATE 100 MCG: 50 INJECTION, SOLUTION INTRAMUSCULAR; INTRAVENOUS at 02:27

## 2018-08-19 RX ADMIN — MELATONIN TAB 3 MG 3 MG: 3 TAB at 21:13

## 2018-08-19 RX ADMIN — CHLORHEXIDINE GLUCONATE 15 ML: 1.2 RINSE ORAL at 21:14

## 2018-08-19 RX ADMIN — SODIUM CHLORIDE 10 ML: 9 INJECTION, SOLUTION INTRAMUSCULAR; INTRAVENOUS; SUBCUTANEOUS at 21:13

## 2018-08-19 RX ADMIN — PROPOFOL 30 MCG/KG/MIN: 10 INJECTION, EMULSION INTRAVENOUS at 18:23

## 2018-08-19 RX ADMIN — AMIODARONE HYDROCHLORIDE 200 MG: 200 TABLET ORAL at 08:25

## 2018-08-19 RX ADMIN — HEPARIN SODIUM 21 UNITS/KG/HR: 10000 INJECTION, SOLUTION INTRAVENOUS at 20:14

## 2018-08-19 RX ADMIN — HYDROMORPHONE HYDROCHLORIDE 0.5 MG: 1 INJECTION, SOLUTION INTRAMUSCULAR; INTRAVENOUS; SUBCUTANEOUS at 14:06

## 2018-08-19 RX ADMIN — PROPOFOL 25 MCG/KG/MIN: 10 INJECTION, EMULSION INTRAVENOUS at 20:30

## 2018-08-19 RX ADMIN — PROPOFOL 50 MCG/KG/MIN: 10 INJECTION, EMULSION INTRAVENOUS at 16:15

## 2018-08-19 RX ADMIN — ACETAMINOPHEN 650 MG: 325 TABLET ORAL at 04:51

## 2018-08-19 RX ADMIN — HEPARIN SODIUM 21 UNITS/KG/HR: 10000 INJECTION, SOLUTION INTRAVENOUS at 17:21

## 2018-08-19 RX ADMIN — HEPARIN SODIUM 20 UNITS/KG/HR: 10000 INJECTION, SOLUTION INTRAVENOUS at 09:15

## 2018-08-19 RX ADMIN — FENTANYL CITRATE 100 MCG: 50 INJECTION, SOLUTION INTRAMUSCULAR; INTRAVENOUS at 13:35

## 2018-08-19 RX ADMIN — VENLAFAXINE 25 MG: 25 TABLET ORAL at 08:25

## 2018-08-19 RX ADMIN — IPRATROPIUM BROMIDE AND ALBUTEROL SULFATE 3 ML: .5; 3 SOLUTION RESPIRATORY (INHALATION) at 19:58

## 2018-08-19 RX ADMIN — LACTULOSE 10 G: 20 SOLUTION ORAL at 08:27

## 2018-08-19 RX ADMIN — SODIUM CHLORIDE 40 MG: 9 INJECTION INTRAMUSCULAR; INTRAVENOUS; SUBCUTANEOUS at 08:25

## 2018-08-19 RX ADMIN — PROPOFOL 40 MCG/KG/MIN: 10 INJECTION, EMULSION INTRAVENOUS at 17:57

## 2018-08-19 RX ADMIN — IPRATROPIUM BROMIDE AND ALBUTEROL SULFATE 3 ML: .5; 3 SOLUTION RESPIRATORY (INHALATION) at 15:38

## 2018-08-19 RX ADMIN — CARBAMAZEPINE 100 MG: 100 SUSPENSION ORAL at 21:21

## 2018-08-19 RX ADMIN — CARBAMAZEPINE 100 MG: 100 SUSPENSION ORAL at 12:51

## 2018-08-19 RX ADMIN — SODIUM CHLORIDE 10 ML: 9 INJECTION, SOLUTION INTRAMUSCULAR; INTRAVENOUS; SUBCUTANEOUS at 13:36

## 2018-08-19 NOTE — ROUTINE PROCESS
H&H drawn one hour after PRBC transfusion complete, as ordered  Hgb 8.2  Per order parameters to transfuse a 2nd unit if Hgb < 7.0, no more PRBCs will be ordered at this time
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
## 2018-08-19 NOTE — PROGRESS NOTES
Diuresed overnight  Tube feed stopped overnight  Visit Vitals    /53    Pulse 75    Temp 99.5 °F (37.5 °C)    Resp 23    Ht 6' 2\" (1.88 m)    Wt 141.4 kg (311 lb 11.7 oz)    SpO2 100%    BMI 40.02 kg/m2       Date 08/18/18 0700 - 08/19/18 0659 08/19/18 0700 - 08/20/18 0659   Shift 6855-7391 7495-9918 24 Hour Total 3131-9471 7229-6583 24 Hour Total   I  N  T  A  K  E   I.V.  (mL/kg/hr) 2503.8  (1.5)  2503.8  (0.7) 2564.8  2564.8      Diprivan Volume 440.1  440.1 494.2  494.2      Heparin Volume 363.7  363.7 414.3  414.3      Volume (0.9% sodium chloride infusion) 300  300         Volume (0.9% sodium chloride infusion 250 mL) 0  0         Volume (vancomycin (VANCOCIN) 2000 mg in  ml infusion) 500  500 500  500      Volume (TPN ADULT - CENTRAL AA 5% D15% W/ CA + ELECTROLYTES) 900  900         Volume (TPN ADULT - CENTRAL AA 5% D15% W/ CA + ELECTROLYTES)    1156.3  1156.3    Blood    952.3  952. 3      Volume (TRANSFUSE PACKED RBC'S)    352.5  352.5      Volume (TRANSFUSE PACKED RBC'S)    0  0      Volume (TRANSFUSE PACKED RBC'S)    316  316      Volume (TRANSFUSE PACKED RBC'S)    283.8  283.8    NG/ 375 860 60  60      Water Flush Volume (mL) (Orogastric Tube 08/11/18) 245 75 320         Medication Volume (Orogastric Tube 08/11/18) 60 120 180         Intake (ml) (Orogastric Tube 08/11/18) 180 180 360 60  60    Shift Total  (mL/kg) 2988.8  (21.1) 375  (2.7) 3363.8  (23.8) 3577.1  (25.3)  3577.1  (25.3)   O  U  T  P  U  T   Urine  (mL/kg/hr) 955  (0.6) 1850  (1.1) 2805  (0.8) 650  650      Urine Output (mL) (Urinary Catheter 08/11/18 [facility]) 955 1850 2805 650  650    Stool 400  400         Output (ml) (Colostomy 07/10/18 Left Abdomen) 400  400       Shift Total  (mL/kg) 1355  (9.6) 1850  (13.1) 3205  (22.7) 650  (4.6)  650  (4.6)   NET 1633.8 -1475 158.8 2927.1  2927.1   Weight (kg) 141.4 141.4 141.4 141.4 141.4 141.4     abd soft    A/P restart tube feeds as appropriate  Continue tpn  Continue present management

## 2018-08-19 NOTE — PROGRESS NOTES
Responded to staff request to provide supportive pastoral presence to patient's wife who is having a difficult time this afternoon. Mrs. Jacquelyn Bellamy was tearful as she shared some of her concerns about her . Her edith is integral to her coping. She shared about some of the challenges they have faced in their life together as well as how the love for one another has seen them through. She added that her  is a good man, perhaps not as demonstrative regarding his edith as she is. Offered assurance of prayer and assurance of availability of ongoing pastoral support.   SHAWNA North, [facility], [address] [place] [address] [employer] Paging Service  [phone]

## 2018-08-19 NOTE — PROGRESS NOTES
Pharmacy Automatic Renal Dosing Protocol - Antimicrobials    Indication for Antimicrobials: Sepsis; GPC bacteremia; Upper Respiratory Infection    Current Regimen of Each Antimicrobial:  Vancomycin 2000 mg IV every 12 hours (Started 18; Day #7)    Previous Antimicrobial Therapy:  Ceftriaxone 2 gm IV every 24 hours (Started 18; Day #5 of 5)  Cefepime 2 g IV every 8 hours (Started 18; Stopped 18)  Vancomycin  Fluconazole  Zosyn    Goal Vancomycin Trough: 15-20 mcg/mL    Vancomycin Level Assessment:  Date Dose & Interval Measured (mcg/mL) Extrapolated (mcg/mL)   18 @ 23.7 Vancomycin 2000mg IV q12h 23.7 23.7   8/15/18 at 0428 Vancomycin 2000 mg IV every 12 hours 18.9 19.33     Significant Cultures:    PBCx - ng - pending  18 Blood culture = Coag neg staph in 1/2 (Results pending)  18 Respiratory culture = Heavy MSSA; Light klebsiella (FINAL)  18 Blood culture = Staph Epi - pending  18 Blood culture = Staph species - pending  18 Respiratory culture = No growth (FINAL)  18 Blood culture = No growth (FINAL)    Radiology / Imaging results: (X-ray, CT scan or MRI):   None pertinent    Labs:  Recent Labs      18   0707  18   0339  18   0446  18   0404  18   0400   CREA  0.75   --   0.64*  0.68*   --    BUN  24*   --   21*  21*   --    WBC   --   15.4*  11.2*   --   14.0*   Temp (24hrs), Av.9 °F (37.2 °C), Min:97.8 °F (36.6 °C), Max:100.8 °F (38.2 °C)    Creatinine Clearance (mL/min) or Dialysis:   Estimated Creatinine Clearance: 122.5 mL/min (based on Cr of 0.75).   Estimated Creatinine Clearance (using IBW):95.9 mL/min    Impression/Plan:   · Afebrile, renal stable, WBC improving  · Staph Epi sensitive to Vancomycin, still awaiting Cx results  · Vancomycin trough just over goal range thus adjusted Vancomycin to 1500mg (10.8mg/kg) IV q12h with next dose  6AM.  The previous Vancomycin dose was slowed down to infuse over 3-4 hours due to intolerance of 250mL/hr infusion rates, thus the remainder of the dose (approx 1/3) was not infused  · Antimicrobial stop date: pending culture results. Pharmacy will follow daily and adjust medications as appropriate for renal function and/or serum levels.     Thank you,  Leona Barger, [facility]

## 2018-08-19 NOTE — PROGRESS NOTES
Mr Thomas Chen condition worsening as a result of increased fluid volume following 1 unit PRBCs transfusion, 500ml vancomycin and other continuous fluids currently infusing  Breath sounds increasingly diminished and labored, worsening tachypnea to 40 resp/min, and now hypertensive and tachycardic, 202/70 and 120bpm  Pulmonary service on-call paged at [phone]    No call back received.   Paged again at [address]    Page returned at [phone]  Telephone orders received from Dr Vanita Astudillo  See orders for details

## 2018-08-20 ENCOUNTER — APPOINTMENT (OUTPATIENT)
Dept: GENERAL RADIOLOGY | Age: 78
DRG: 329 | End: 2018-08-20
Attending: INTERNAL MEDICINE
Payer: MEDICARE

## 2018-08-20 LAB
ANION GAP SERPL CALC-SCNC: 7 MMOL/L (ref 5–15)
APTT PPP: 111.6 SEC (ref 22.1–32)
APTT PPP: 54.1 SEC (ref 22.1–32)
APTT PPP: 55 SEC (ref 22.1–32)
BACTERIA SPEC CULT: ABNORMAL
BASOPHILS # BLD: 0 K/UL (ref 0–0.1)
BASOPHILS NFR BLD: 0 % (ref 0–1)
BUN SERPL-MCNC: 29 MG/DL (ref 6–20)
BUN/CREAT SERPL: 36 (ref 12–20)
CALCIUM SERPL-MCNC: 7.7 MG/DL (ref 8.5–10.1)
CHLORIDE SERPL-SCNC: 109 MMOL/L (ref 97–108)
CO2 SERPL-SCNC: 21 MMOL/L (ref 21–32)
CREAT SERPL-MCNC: 0.81 MG/DL (ref 0.7–1.3)
DIFFERENTIAL METHOD BLD: ABNORMAL
EOSINOPHIL # BLD: 0 K/UL (ref 0–0.4)
EOSINOPHIL NFR BLD: 0 % (ref 0–7)
ERYTHROCYTE [DISTWIDTH] IN BLOOD BY AUTOMATED COUNT: 25 % (ref 11.5–14.5)
ERYTHROCYTE [DISTWIDTH] IN BLOOD BY AUTOMATED COUNT: 25.2 % (ref 11.5–14.5)
GLUCOSE BLD STRIP.AUTO-MCNC: 175 MG/DL (ref 65–100)
GLUCOSE BLD STRIP.AUTO-MCNC: 190 MG/DL (ref 65–100)
GLUCOSE BLD STRIP.AUTO-MCNC: 198 MG/DL (ref 65–100)
GLUCOSE BLD STRIP.AUTO-MCNC: 217 MG/DL (ref 65–100)
GLUCOSE SERPL-MCNC: 178 MG/DL (ref 65–100)
HCT VFR BLD AUTO: 25.4 % (ref 36.6–50.3)
HCT VFR BLD AUTO: 26.3 % (ref 36.6–50.3)
HGB BLD-MCNC: 7.6 G/DL (ref 12.1–17)
HGB BLD-MCNC: 7.9 G/DL (ref 12.1–17)
IMM GRANULOCYTES # BLD: 0 K/UL (ref 0–0.04)
IMM GRANULOCYTES NFR BLD AUTO: 0 % (ref 0–0.5)
LYMPHOCYTES # BLD: 1.6 K/UL (ref 0.8–3.5)
LYMPHOCYTES NFR BLD: 9 % (ref 12–49)
MCH RBC QN AUTO: 27 PG (ref 26–34)
MCH RBC QN AUTO: 27.1 PG (ref 26–34)
MCHC RBC AUTO-ENTMCNC: 29.9 G/DL (ref 30–36.5)
MCHC RBC AUTO-ENTMCNC: 30 G/DL (ref 30–36.5)
MCV RBC AUTO: 90.1 FL (ref 80–99)
MCV RBC AUTO: 90.1 FL (ref 80–99)
MONOCYTES # BLD: 0.4 K/UL (ref 0–1)
MONOCYTES NFR BLD: 2 % (ref 5–13)
NEUTS BAND NFR BLD MANUAL: 5 %
NEUTS SEG # BLD: 16.1 K/UL (ref 1.8–8)
NEUTS SEG NFR BLD: 84 % (ref 32–75)
NRBC # BLD: 0.11 K/UL (ref 0–0.01)
NRBC # BLD: 0.12 K/UL (ref 0–0.01)
NRBC BLD-RTO: 0.6 PER 100 WBC
NRBC BLD-RTO: 0.6 PER 100 WBC
PLATELET # BLD AUTO: 203 K/UL (ref 150–400)
PLATELET # BLD AUTO: 206 K/UL (ref 150–400)
PMV BLD AUTO: 11 FL (ref 8.9–12.9)
PMV BLD AUTO: 11.3 FL (ref 8.9–12.9)
POTASSIUM SERPL-SCNC: 4.4 MMOL/L (ref 3.5–5.1)
RBC # BLD AUTO: 2.82 M/UL (ref 4.1–5.7)
RBC # BLD AUTO: 2.92 M/UL (ref 4.1–5.7)
RBC MORPH BLD: ABNORMAL
RBC MORPH BLD: ABNORMAL
SERVICE CMNT-IMP: ABNORMAL
SODIUM SERPL-SCNC: 137 MMOL/L (ref 136–145)
THERAPEUTIC RANGE,PTTT: ABNORMAL SECS (ref 58–77)
TRIGL SERPL-MCNC: 132 MG/DL (ref ?–150)
WBC # BLD AUTO: 18.1 K/UL (ref 4.1–11.1)
WBC # BLD AUTO: 20.1 K/UL (ref 4.1–11.1)

## 2018-08-20 PROCEDURE — C9113 INJ PANTOPRAZOLE SODIUM, VIA: HCPCS | Performed by: INTERNAL MEDICINE

## 2018-08-20 PROCEDURE — 71045 X-RAY EXAM CHEST 1 VIEW: CPT

## 2018-08-20 PROCEDURE — 74011250637 HC RX REV CODE- 250/637: Performed by: INTERNAL MEDICINE

## 2018-08-20 PROCEDURE — 74011250636 HC RX REV CODE- 250/636: Performed by: INTERNAL MEDICINE

## 2018-08-20 PROCEDURE — 74011250636 HC RX REV CODE- 250/636: Performed by: SURGERY

## 2018-08-20 PROCEDURE — 36415 COLL VENOUS BLD VENIPUNCTURE: CPT | Performed by: SURGERY

## 2018-08-20 PROCEDURE — 85730 THROMBOPLASTIN TIME PARTIAL: CPT | Performed by: INTERNAL MEDICINE

## 2018-08-20 PROCEDURE — 36591 DRAW BLOOD OFF VENOUS DEVICE: CPT

## 2018-08-20 PROCEDURE — 84478 ASSAY OF TRIGLYCERIDES: CPT | Performed by: SURGERY

## 2018-08-20 PROCEDURE — 82962 GLUCOSE BLOOD TEST: CPT

## 2018-08-20 PROCEDURE — 94640 AIRWAY INHALATION TREATMENT: CPT

## 2018-08-20 PROCEDURE — 77030008027

## 2018-08-20 PROCEDURE — 94003 VENT MGMT INPAT SUBQ DAY: CPT

## 2018-08-20 PROCEDURE — 80048 BASIC METABOLIC PNL TOTAL CA: CPT | Performed by: SURGERY

## 2018-08-20 PROCEDURE — 85027 COMPLETE CBC AUTOMATED: CPT | Performed by: SURGERY

## 2018-08-20 PROCEDURE — 85025 COMPLETE CBC W/AUTO DIFF WBC: CPT | Performed by: SURGERY

## 2018-08-20 PROCEDURE — 74011000250 HC RX REV CODE- 250: Performed by: SURGERY

## 2018-08-20 PROCEDURE — 74011000250 HC RX REV CODE- 250: Performed by: INTERNAL MEDICINE

## 2018-08-20 PROCEDURE — 74011636637 HC RX REV CODE- 636/637: Performed by: INTERNAL MEDICINE

## 2018-08-20 PROCEDURE — 74011250637 HC RX REV CODE- 250/637: Performed by: SURGERY

## 2018-08-20 PROCEDURE — 65610000006 HC RM INTENSIVE CARE

## 2018-08-20 RX ORDER — FUROSEMIDE 10 MG/ML
60 INJECTION INTRAMUSCULAR; INTRAVENOUS EVERY 8 HOURS
Status: DISCONTINUED | OUTPATIENT
Start: 2018-08-20 | End: 2018-08-22

## 2018-08-20 RX ORDER — ENOXAPARIN SODIUM 100 MG/ML
120 INJECTION SUBCUTANEOUS EVERY 12 HOURS
Status: DISCONTINUED | OUTPATIENT
Start: 2018-08-20 | End: 2018-08-20

## 2018-08-20 RX ORDER — ENOXAPARIN SODIUM 100 MG/ML
120 INJECTION SUBCUTANEOUS EVERY 12 HOURS
Status: DISCONTINUED | OUTPATIENT
Start: 2018-08-20 | End: 2018-08-21

## 2018-08-20 RX ADMIN — INSULIN LISPRO 3 UNITS: 100 INJECTION, SOLUTION INTRAVENOUS; SUBCUTANEOUS at 05:35

## 2018-08-20 RX ADMIN — SODIUM CHLORIDE 10 ML: 9 INJECTION, SOLUTION INTRAMUSCULAR; INTRAVENOUS; SUBCUTANEOUS at 21:30

## 2018-08-20 RX ADMIN — CARBAMAZEPINE 100 MG: 100 SUSPENSION ORAL at 08:15

## 2018-08-20 RX ADMIN — CARBAMAZEPINE 100 MG: 100 SUSPENSION ORAL at 17:13

## 2018-08-20 RX ADMIN — HYDROMORPHONE HYDROCHLORIDE 0.5 MG: 1 INJECTION, SOLUTION INTRAMUSCULAR; INTRAVENOUS; SUBCUTANEOUS at 21:23

## 2018-08-20 RX ADMIN — LACTULOSE 10 G: 20 SOLUTION ORAL at 08:15

## 2018-08-20 RX ADMIN — SODIUM CHLORIDE 40 MG: 9 INJECTION INTRAMUSCULAR; INTRAVENOUS; SUBCUTANEOUS at 08:14

## 2018-08-20 RX ADMIN — SODIUM CHLORIDE 10 ML: 9 INJECTION, SOLUTION INTRAMUSCULAR; INTRAVENOUS; SUBCUTANEOUS at 05:24

## 2018-08-20 RX ADMIN — IPRATROPIUM BROMIDE AND ALBUTEROL SULFATE 3 ML: .5; 3 SOLUTION RESPIRATORY (INHALATION) at 15:34

## 2018-08-20 RX ADMIN — PROPOFOL 25 MCG/KG/MIN: 10 INJECTION, EMULSION INTRAVENOUS at 00:51

## 2018-08-20 RX ADMIN — LACTULOSE 10 G: 20 SOLUTION ORAL at 17:03

## 2018-08-20 RX ADMIN — CARBAMAZEPINE 100 MG: 100 SUSPENSION ORAL at 21:37

## 2018-08-20 RX ADMIN — CARBAMAZEPINE 100 MG: 100 SUSPENSION ORAL at 13:02

## 2018-08-20 RX ADMIN — Medication 10 ML: at 21:30

## 2018-08-20 RX ADMIN — VANCOMYCIN HYDROCHLORIDE: 10 INJECTION, POWDER, LYOPHILIZED, FOR SOLUTION INTRAVENOUS at 18:00

## 2018-08-20 RX ADMIN — ENOXAPARIN SODIUM 120 MG: 60 INJECTION SUBCUTANEOUS at 12:38

## 2018-08-20 RX ADMIN — HYDROMORPHONE HYDROCHLORIDE 0.5 MG: 1 INJECTION, SOLUTION INTRAMUSCULAR; INTRAVENOUS; SUBCUTANEOUS at 07:33

## 2018-08-20 RX ADMIN — FUROSEMIDE 60 MG: 10 INJECTION, SOLUTION INTRAMUSCULAR; INTRAVENOUS at 13:56

## 2018-08-20 RX ADMIN — VENLAFAXINE 25 MG: 25 TABLET ORAL at 21:23

## 2018-08-20 RX ADMIN — IPRATROPIUM BROMIDE AND ALBUTEROL SULFATE 3 ML: .5; 3 SOLUTION RESPIRATORY (INHALATION) at 08:12

## 2018-08-20 RX ADMIN — VENLAFAXINE 25 MG: 25 TABLET ORAL at 17:03

## 2018-08-20 RX ADMIN — MELATONIN TAB 3 MG 3 MG: 3 TAB at 21:23

## 2018-08-20 RX ADMIN — FENTANYL CITRATE 100 MCG: 50 INJECTION, SOLUTION INTRAMUSCULAR; INTRAVENOUS at 05:55

## 2018-08-20 RX ADMIN — VENLAFAXINE 25 MG: 25 TABLET ORAL at 08:15

## 2018-08-20 RX ADMIN — PROPOFOL 30 MCG/KG/MIN: 10 INJECTION, EMULSION INTRAVENOUS at 21:24

## 2018-08-20 RX ADMIN — IPRATROPIUM BROMIDE AND ALBUTEROL SULFATE 3 ML: .5; 3 SOLUTION RESPIRATORY (INHALATION) at 19:07

## 2018-08-20 RX ADMIN — AMIODARONE HYDROCHLORIDE 200 MG: 200 TABLET ORAL at 08:15

## 2018-08-20 RX ADMIN — CHLORHEXIDINE GLUCONATE 15 ML: 1.2 RINSE ORAL at 21:24

## 2018-08-20 RX ADMIN — IPRATROPIUM BROMIDE AND ALBUTEROL SULFATE 3 ML: .5; 3 SOLUTION RESPIRATORY (INHALATION) at 11:27

## 2018-08-20 RX ADMIN — INSULIN LISPRO 3 UNITS: 100 INJECTION, SOLUTION INTRAVENOUS; SUBCUTANEOUS at 13:02

## 2018-08-20 RX ADMIN — SODIUM CHLORIDE 10 ML: 9 INJECTION, SOLUTION INTRAMUSCULAR; INTRAVENOUS; SUBCUTANEOUS at 13:57

## 2018-08-20 RX ADMIN — POLYETHYLENE GLYCOL 3350 17 G: 17 POWDER, FOR SOLUTION ORAL at 08:15

## 2018-08-20 RX ADMIN — VANCOMYCIN HYDROCHLORIDE: 10 INJECTION, POWDER, LYOPHILIZED, FOR SOLUTION INTRAVENOUS at 05:27

## 2018-08-20 RX ADMIN — PROPOFOL 25 MCG/KG/MIN: 10 INJECTION, EMULSION INTRAVENOUS at 17:03

## 2018-08-20 RX ADMIN — PROPOFOL 20 MCG/KG/MIN: 10 INJECTION, EMULSION INTRAVENOUS at 18:00

## 2018-08-20 RX ADMIN — CHLORHEXIDINE GLUCONATE 15 ML: 1.2 RINSE ORAL at 08:16

## 2018-08-20 RX ADMIN — LORAZEPAM 2 MG: 2 INJECTION INTRAMUSCULAR; INTRAVENOUS at 06:08

## 2018-08-20 RX ADMIN — PROPOFOL 30 MCG/KG/MIN: 10 INJECTION, EMULSION INTRAVENOUS at 12:00

## 2018-08-20 RX ADMIN — FUROSEMIDE 40 MG: 10 INJECTION, SOLUTION INTRAMUSCULAR; INTRAVENOUS at 08:14

## 2018-08-20 RX ADMIN — FUROSEMIDE 60 MG: 10 INJECTION, SOLUTION INTRAMUSCULAR; INTRAVENOUS at 21:23

## 2018-08-20 RX ADMIN — INSULIN LISPRO 3 UNITS: 100 INJECTION, SOLUTION INTRAVENOUS; SUBCUTANEOUS at 17:55

## 2018-08-20 NOTE — PROGRESS NOTES
Nutrition Assessment:    INTERVENTIONS/RECOMMENDATIONS:   Transition to TF formula to TwoCal HN and advance to new goa  · TwoCal @ 50 ml/hr + prosourece TID + 75 ml water flush q 4 hrs  · Provides: 2320 kcal, 136 g pro and 1220 ml free water. · Does not include kcal from propofol (~370 kcal)    ASSESSMENT:   Chart reviewed and pt discussed on CCU rounds. Per Dr. Luke Devine note, okay to transition to solely TF and d/c TPN. TF goal adjusted to meet 100% of kcal and protein needs via TF. Formula switch to a more concentrated formula to reduce the total volume pt is receiving. Diet Order: NPO, Other (comment) (TF via OGT: Gluc 1.2 @ 30mL/h + 50mL flush q 4h (provides 864kcals/43gPro/790mL) + TPN: D15, 5% AA @ 75mL/h (provides 1278kcals/90gPro) )  % Eaten:  No data found. Pertinent Medications: [x]Reviewed: humalog, lactulose, PPI, miralax, prppofol @ 14.1 ml/hr (370 kcal)   Pertinent Labs: [x]Reviewed:   Food Allergies: [x]NKFA  []Other   Last BM:  8/20  Edema:      [x]RUE 4+  [x]LUE 4+  [x]RLE 4+  [x]LLE 4+     Pressure Ulcer:      [] Stage I   [] Stage II   [] Stage III   [] Stage IV      Anthropometrics: Height: 6' 2\" (188 cm) Weight: 137.8 kg (303 lb 12.7 oz)    IBW (%IBW):   ( ) UBW (%UBW):   (  %)    BMI: Body mass index is 39 kg/(m^2). This BMI is indicative of:  []Underweight   []Normal   []Overweight   [x] Obesity   [] Extreme Obesity (BMI>40)  Last Weight Metrics:  Weight Loss Metrics 8/20/2018 7/10/2018 7/5/2018 5/15/2018 5/7/2018 4/10/2018 3/26/2018   Today's Wt 303 lb 12.7 oz - 283 lb 4.8 oz 266 lb 262 lb 267 lb 267 lb   BMI - 39 kg/m2 36.37 kg/m2 34.15 kg/m2 33.64 kg/m2 34.28 kg/m2 34.28 kg/m2       Estimated Nutrition Needs (Based on): 2519 Kcals/day (PSU (MSJ 0647)) , 130 g (1 g/kg) Protein  Carbohydrate:  At Least 130 g/day  Fluids: 2500 mL/day or per primary team    NUTRITION DIAGNOSES:   Problem:  Altered GI function      Etiology: related to Ascending colon mass c/w probable colon carcinoma and Cedric's erosion with HH      Signs/Symptoms: as evidenced by Ascending colon mass c/w probable colon carcinoma and Cedric's erosion with HH     Previous Nutrition Dx:  [] Resolved  [] Unresolved           [x] Progressing    NUTRITION INTERVENTIONS:  Meals/Snacks:  Other (advance diet as/if medically able per SLP ) Enteral/Parenteral Nutrition: Other (Continue TF and TPN as ordered) Supplements: Commercial supplement              GOAL:   Tolerated new TF goal rate in 1-3 days    NUTRITION MONITORING AND EVALUATION      Food/Nutrient Intake Outcomes: Enteral/parenteral nutrition intake  Physical Signs/Symptoms Outcomes: GI, Glucose profile, GI profile, Electrolyte and renal profile, Weight/weight change    Previous Goal Met:   [x] Met              [] Progressing Towards Goal              [] Not Progressing Towards Goal   Previous Recommendations:   [x] Implemented          [] Not Implemented          [] Not Applicable    LEARNING NEEDS (Diet, Food/Nutrient-Drug Interaction):    [x] None Identified   [] Identified and Education Provided/Documented   [] Identified and Pt declined/was not appropriate     Cultural, Sabianism, OR Ethnic Dietary Needs:    [x] None Identified   [] Identified and Addressed     [x] Interdisciplinary Care Plan Reviewed/Documented    [x] Discharge Planning: TBD   [] Participated in Interdisciplinary Rounds    NUTRITION RISK:    [x] High              [] Moderate           []  Low  []  Minimal/Uncompromised      Ajay Gonzalez RDN  Pager [phone]  Weekend Pager [phone]

## 2018-08-20 NOTE — PROGRESS NOTES
CRS Progress note    No overnight events. Off pressors. TF resumed at 30/hr. Questionable seizure activity for about 30 seconds yesterday per RN. /56  Pulse 75  Temp 97.7 °F (36.5 °C)  Resp (!) 33  Ht 6' 2\" (1.88 m)  Wt 137.8 kg (303 lb 12.7 oz)  SpO2 100%  BMI 39 kg/m2    Intubated/sedated  Abd soft, ostomy with stool in bag    Labs essentially stable. Plan  -Umair Jim this week per ICU team as he continues to fail SBT  -Continue tube feeds and advance per RD recommendations.   Okay to switch to solely tube feeds and d/c TPN once at goal.  -D/c staples

## 2018-08-20 NOTE — PROGRESS NOTES
Supportive follow up visit on CCU with patient's wife for ongoing pastoral support. Mrs. Nelson Raman reports that she slept better last night than she has in a while. She came in this morning to news that has renewed her hope. She expressed profound appreciation for the staff that have been working with her . She shared that last night she was contemplating the healing power of God and seems to have peace in that. She expressed feeling that whatever may happen, it will be okay. Offered affirmation of her experience and assured her of ongoing prayer and support.   Stephanie Hernandez, SHAWNA, [employer], [address] [place] [address] [employer] Paging Service  [phone]

## 2018-08-20 NOTE — PROGRESS NOTES
Hematology Oncology Progress Note       Follow up for: IJ/SVC thrombus    Chart notes reviewed since last visit. Case discussed with following: no family at bedside at present. Patient complains of the following: not awake, sedated on ventilator    Additional concerns noted by the staff: none    Patient Vitals for the past 24 hrs:   BP Temp Pulse Resp SpO2 Weight   08/20/18 1128 - - 75 23 100 % -   08/20/18 0813 - - 75 21 99 % -   08/20/18 0800 108/50 - 75 20 100 % -   08/20/18 0700 115/56 - 75 (!) 33 100 % -   08/20/18 0600 130/62 - 78 16 100 % -   08/20/18 0500 117/51 - 75 (!) 31 100 % -   08/20/18 0451 - - - - - 137.8 kg (303 lb 12.7 oz)   08/20/18 0400 108/55 97.7 °F (36.5 °C) 75 26 100 % -   08/20/18 0347 - - 75 26 100 % -   08/20/18 0300 106/57 - 75 25 100 % -   08/20/18 0200 97/48 - 75 23 98 % -   08/20/18 0100 97/45 - 75 23 100 % -   08/20/18 0000 113/49 98 °F (36.7 °C) 75 23 100 % -   08/19/18 2329 - - 75 22 98 % -   08/19/18 2300 102/54 - 75 23 98 % -   08/19/18 2200 108/52 - 74 23 99 % -   08/19/18 2100 117/55 - 75 23 100 % -   08/19/18 2000 107/48 98.2 °F (36.8 °C) 78 21 98 % -   08/19/18 1958 - - 75 21 97 % -   08/19/18 1900 116/53 - 79 23 98 % -   08/19/18 1800 104/48 - 75 28 99 % -   08/19/18 1700 101/47 - 75 27 100 % -   08/19/18 1600 116/49 99.8 °F (37.7 °C) 79 23 100 % 139.1 kg (306 lb 10.6 oz)   08/19/18 1538 - - 78 22 100 % -   08/19/18 1500 124/54 - 75 19 100 % -   08/19/18 1400 159/67 - - 13 100 % -   08/19/18 1300 117/58 - 75 25 100 % -       ROS not obtainable - pt obtunded and on vent. Physical Examination:  Constitutional obtunded. Appears close to chronological age. Overweight male intubated. Head Normocephalic; no scars   Eyes Eyes closed   ENMT Intubated. Neck Supple without masses or thyromegaly. No jugular venous distension. Hematologic/Lymphatic No petechiae or purpura.   No tender or palpable lymph nodes noted   Respiratory Scattered rhonchi   Cardiovascular Regular rate and rhythm of heart   Chest / Line Site Chest is symmetric with no chest wall deformities. Abdomen Non-tender, non-distended, no masses, ascites or hepatosplenomegaly. Good bowel sounds. Large dressing over abdomen with ostomy. Musculoskeletal Anasarca present   Extremities  edematous. Skin No rashes, scars, or lesions suggestive of malignancy. No petechiae, purpura, or ecchymoses. No excoriations.     Neurologic UTO   Psychiatric UTO       Labs:  Recent Results (from the past 24 hour(s))   VANCOMYCIN, TROUGH    Collection Time: 08/19/18  1:01 PM   Result Value Ref Range    Vancomycin,trough 23.7 (HH) 5.0 - 10.0 ug/mL    Reported dose date: 20180819      Reported dose time: 200      Reported dose: 1627TZ UNITS   METABOLIC PANEL, BASIC    Collection Time: 08/19/18  3:49 PM   Result Value Ref Range    Sodium 140 136 - 145 mmol/L    Potassium 4.2 3.5 - 5.1 mmol/L    Chloride 111 (H) 97 - 108 mmol/L    CO2 19 (L) 21 - 32 mmol/L    Anion gap 10 5 - 15 mmol/L    Glucose 163 (H) 65 - 100 mg/dL    BUN 28 (H) 6 - 20 MG/DL    Creatinine 0.86 0.70 - 1.30 MG/DL    BUN/Creatinine ratio 33 (H) 12 - 20      GFR est AA >60 >60 ml/min/1.73m2    GFR est non-AA >60 >60 ml/min/1.73m2    Calcium 7.0 (L) 8.5 - 10.1 MG/DL   PTT    Collection Time: 08/19/18  3:49 PM   Result Value Ref Range    aPTT 56.8 (H) 22.1 - 32.0 sec    aPTT, therapeutic range     58.0 - 77.0 SECS   GLUCOSE, POC    Collection Time: 08/19/18  5:25 PM   Result Value Ref Range    Glucose (POC) 171 (H) 65 - 100 mg/dL    Performed by Tania Waddell    PTT    Collection Time: 08/19/18 11:53 PM   Result Value Ref Range    aPTT 111.6 (HH) 22.1 - 32.0 sec    aPTT, therapeutic range     58.0 - 77.0 SECS   GLUCOSE, POC    Collection Time: 08/19/18 11:56 PM   Result Value Ref Range    Glucose (POC) 190 (H) 65 - 100 mg/dL    Performed by Gena Wall    CBC W/O DIFF    Collection Time: 08/20/18 12:49 AM   Result Value Ref Range    WBC 20.1 (H) 4.1 - 11.1 K/uL    RBC 2.82 (L) 4.10 - 5.70 M/uL    HGB 7.6 (L) 12.1 - 17.0 g/dL    HCT 25.4 (L) 36.6 - 50.3 %    MCV 90.1 80.0 - 99.0 FL    MCH 27.0 26.0 - 34.0 PG    MCHC 29.9 (L) 30.0 - 36.5 g/dL    RDW 25.2 (H) 11.5 - 14.5 %    PLATELET 054 681 - 719 K/uL    MPV 11.3 8.9 - 12.9 FL    NRBC 0.6 (H) 0  WBC    ABSOLUTE NRBC 0.12 (H) 0.00 - 0.01 K/uL   PTT    Collection Time: 08/20/18  2:53 AM   Result Value Ref Range    aPTT 55.0 (H) 22.1 - 32.0 sec    aPTT, therapeutic range     58.0 - 77.0 SECS   CBC WITH AUTOMATED DIFF    Collection Time: 08/20/18  4:45 AM   Result Value Ref Range    WBC 18.1 (H) 4.1 - 11.1 K/uL    RBC 2.92 (L) 4.10 - 5.70 M/uL    HGB 7.9 (L) 12.1 - 17.0 g/dL    HCT 26.3 (L) 36.6 - 50.3 %    MCV 90.1 80.0 - 99.0 FL    MCH 27.1 26.0 - 34.0 PG    MCHC 30.0 30.0 - 36.5 g/dL    RDW 25.0 (H) 11.5 - 14.5 %    PLATELET 719 840 - 864 K/uL    MPV 11.0 8.9 - 12.9 FL    NRBC 0.6 (H) 0  WBC    ABSOLUTE NRBC 0.11 (H) 0.00 - 0.01 K/uL    NEUTROPHILS 84 (H) 32 - 75 %    BAND NEUTROPHILS 5 %    LYMPHOCYTES 9 (L) 12 - 49 %    MONOCYTES 2 (L) 5 - 13 %    EOSINOPHILS 0 0 - 7 %    BASOPHILS 0 0 - 1 %    IMMATURE GRANULOCYTES 0 0.0 - 0.5 %    ABS. NEUTROPHILS 16.1 (H) 1.8 - 8.0 K/UL    ABS. LYMPHOCYTES 1.6 0.8 - 3.5 K/UL    ABS. MONOCYTES 0.4 0.0 - 1.0 K/UL    ABS. EOSINOPHILS 0.0 0.0 - 0.4 K/UL    ABS. BASOPHILS 0.0 0.0 - 0.1 K/UL    ABS. IMM.  GRANS. 0.0 0.00 - 0.04 K/UL    DF MANUAL      RBC COMMENTS ANISOCYTOSIS  2+        RBC COMMENTS POLYCHROMASIA  2+       METABOLIC PANEL, BASIC    Collection Time: 08/20/18  4:45 AM   Result Value Ref Range    Sodium 137 136 - 145 mmol/L    Potassium 4.4 3.5 - 5.1 mmol/L    Chloride 109 (H) 97 - 108 mmol/L    CO2 21 21 - 32 mmol/L    Anion gap 7 5 - 15 mmol/L    Glucose 178 (H) 65 - 100 mg/dL    BUN 29 (H) 6 - 20 MG/DL    Creatinine 0.81 0.70 - 1.30 MG/DL    BUN/Creatinine ratio 36 (H) 12 - 20      GFR est AA >60 >60 ml/min/1.73m2    GFR est non-AA >60 >60 ml/min/1.73m2    Calcium 7.7 (L) 8.5 - 10.1 MG/DL   TRIGLYCERIDE    Collection Time: 08/20/18  4:45 AM   Result Value Ref Range    Triglyceride 132 <150 MG/DL   GLUCOSE, POC    Collection Time: 08/20/18  5:32 AM   Result Value Ref Range    Glucose (POC) 198 (H) 65 - 100 mg/dL    Performed by Robert Kramer    PTT    Collection Time: 08/20/18  9:34 AM   Result Value Ref Range    aPTT 54.1 (H) 22.1 - 32.0 sec    aPTT, therapeutic range     58.0 - 77.0 SECS     Assessment and Plan:   R IJ/SVC thrombus - on heparin drip with parameters written for adjustment per PTT. Stage IIIB colon cancer - s/p resection. Acute resp failure/Pna - on vent. Sputum culture with staph aureus and Klebsiella. Septic shock with bacteremia - staph coag neg in [facility] 8/14,  Staph epi 8/11 [facility]. On vancomycin    Normochromic normocytic anemia - B12  929. folate 9.8. Ferritin was 5 on 7/10/18 suggesting fairly significant iron deficiency. He got 500 mg of IV iron in July but actually has a much higher calculated deficit. Ferritin 121 so does not need further IV iron at present.

## 2018-08-20 NOTE — PROGRESS NOTES
Attended Interdisciplinary rounds in Critical Care Unit, where patient care was discussed. Visit by: Anabelle Mooney.  Sergei Wu MA, [address]

## 2018-08-20 NOTE — PROGRESS NOTES
1930  Received bedside/verbal report and assumed care of patient from Merlynn Fabry, RN. Drips verified: Heparin at 21 units/kg/hr, Propofol at 25 mcg/kg/min, and TPN at 75 mL/hr. 2000  Shift assessment completed. See doc flowsheet for details. Patient opens eyes to voice and stimulation, but does not follow commands. +cough/gag.    2215  The last two times patient received 500 mL of vanc, his pulmonary edema worsened significantly and he was in respiratory distress. Spoke with Fatou Alston to inquire about running the vanc slower or in less volume. PharmD to change vanc to 250 mL instead. 2345  PTT sent to lab. 1508  Lab called critical PTT value of 111.6.      0050  Spoke with Fatou Alston regarding PTT result of 111. 6. Holding infusion for 2 hrs, will check PTT every 2 hours until < 86, then restarting Heparin gtt at 18 units/kg/hr. 0110  Spoke with  regarding PTT result, drop in Hgb to 7.2, and slightly decreasing BP. No new orders received. 0255  PTT sent to lab.    0320  PTT 55, restarted Heparin at 18 units/kg/hr. Next PTT ordered for 0920.    0400  Patient is tolerating tube feeding well, minimal residual.    0524  Propofol stopped for SAT.    0548  Patient is tachypneic, appears anxious, and work of breathing has increased. Restarted Propofol at half dose of 12.5 mcg/kg/min per protocol. RT notified of SAT trial failure. 6267  Patient remains labored and tachypneic, administered 100 mg PRN Fentanyl. 8239  Patient's respirations are now in the 50's and oxygen is dropping, Propofol increased to 20 mcg/kg/min.    0608  Noted mild whole body tremor. Patient's eyes are deviated to the right. Ativan administered for what appears to be seizure activity. 0715  Bedside and Verbal shift change report given to Merlynn Fabry (oncoming nurse) by Dave Grimes (offgoing nurse).  Report included the following information SBAR, Kardex, ED Summary, Intake/Output, MAR, Recent Results and Cardiac Rhythm Paced. Drips verified: Heparin at 18 units/kg/hr, Propofol at 20 mcg/kg/min, and TPN at 75 mL/hr.

## 2018-08-20 NOTE — PROGRESS NOTES
Critical care interdisciplinary rounds held on [id number]. Following members present, Pharmacy, Diabetes Treatment, Case Management, Physical Therapy, Pastoral Care  and Nutrition. Plan of care discussed. See clinical pathway fo plan of care and interventions and desired outcomes.

## 2018-08-20 NOTE — PROGRESS NOTES
[address] [place] [address]  [phone]      Cardiology Progress Note      8/20/2018 1030AM    Admit Date: 7/10/2018    Admit Diagnosis:   Acute blood loss anemia  GI bleed  Anasarca  GI Bleed  gi bleed  ASCENDING COLON CANCER     Subjective:     Ramone Lugo is a 69 y.o. male with PMH CVA, HTN, HLD, afib, prostace CA who was admitted for blood loss anemia. Overnight events:  -questionable seizure episode yesterday per notes  -BP steady; on vent  -WBC elevated, H/H decreased; CXR edema  -weight down 3#  -Mr. Luis Benavidez is intubated and sedated. Family present at bedside.      Visit Vitals    /50    Pulse 75    Temp 97.7 °F (36.5 °C)    Resp 21    Ht 6' 2\" (1.88 m)    Wt 137.8 kg (303 lb 12.7 oz)    SpO2 99%    BMI 39 kg/m2       Current Facility-Administered Medications   Medication Dose Route Frequency    furosemide (LASIX) injection 60 mg  60 mg IntraVENous Q8H    enoxaparin (LOVENOX) injection 120 mg  120 mg SubCUTAneous Q12H    LORazepam (ATIVAN) injection 2 mg  2 mg IntraVENous Q2H PRN    TPN ADULT - CENTRAL AA 5% D15% W/ CA + ELECTROLYTES   IntraVENous CONTINUOUS    acetaminophen (TYLENOL) solution 650 mg  650 mg Oral Q4H PRN    vancomycin 1,500 mg in 0.9% sodium chloride 250 mL IVPB   IntraVENous Q12H    fentaNYL citrate (PF) injection 100 mcg  100 mcg IntraVENous Q2H PRN    heparin (porcine) injection 5,000 Units  5,000 Units IntraVENous Q6H PRN    insulin lispro (HUMALOG) injection   SubCUTAneous Q6H    glucose chewable tablet 16 g  4 Tab Oral PRN    dextrose (D50W) injection syrg 12.5-25 g  12.5-25 g IntraVENous PRN    glucagon (GLUCAGEN) injection 1 mg  1 mg IntraMUSCular PRN    HYDROmorphone (PF) (DILAUDID) injection 0.5 mg  0.5 mg IntraVENous Q4H PRN    venlafaxine (EFFEXOR) tablet 25 mg  25 mg Oral TID    pantoprazole (PROTONIX) 40 mg in sodium chloride 0.9% 10 mL injection  40 mg IntraVENous DAILY    propofol (DIPRIVAN) infusion  0-50 mcg/kg/min IntraVENous TITRATE    carBAMazepine (TEGretol) 200 mg/10 mL suspension 100 mg  100 mg PEG Tube QID    chlorhexidine (PERIDEX) 0.12 % mouthwash 15 mL  15 mL Oral Q12H    albuterol-ipratropium (DUO-NEB) 2.5 MG-0.5 MG/3 ML  3 mL Nebulization QID RT    albuterol-ipratropium (DUO-NEB) 2.5 MG-0.5 MG/3 ML  3 mL Nebulization Q6H PRN    amiodarone (CORDARONE) tablet 200 mg  200 mg Oral DAILY    prochlorperazine (COMPAZINE) with saline injection 5 mg  5 mg IntraVENous Q6H PRN    lactulose (CHRONULAC) solution 10 g  10 g Oral BID    sodium chloride (NS) flush 10-30 mL  10-30 mL InterCATHeter PRN    sodium chloride (NS) flush 10 mL  10 mL InterCATHeter PRN    sodium chloride (NS) flush 10-40 mL  10-40 mL InterCATHeter Q8H    sodium chloride (NS) flush 10 mL  10 mL InterCATHeter Q24H    melatonin tablet 3 mg  3 mg Oral QHS    polyethylene glycol (MIRALAX) packet 17 g  17 g Oral DAILY    hydrALAZINE (APRESOLINE) 20 mg/mL injection 10 mg  10 mg IntraVENous Q6H PRN    oxyCODONE IR (ROXICODONE) tablet 5 mg  5 mg Oral Q4H PRN    oxyCODONE IR (ROXICODONE) tablet 10 mg  10 mg Oral Q4H PRN    sodium chloride (NS) flush 5-10 mL  5-10 mL IntraVENous PRN    acetaminophen (TYLENOL) tablet 650 mg  650 mg Oral Q6H PRN    ondansetron (ZOFRAN) injection 4 mg  4 mg IntraVENous Q6H PRN       Objective:      Physical Exam:  General Appearance:  ill-appearing, obese,  male intubated and sedated  Chest:   Clear; intubated.   Did not listen posterior  Cardiovascular:  Regular rate and rhythm, no murmur.   Abdomen:     Extremities: weak distal pulses;  2+ anasarca  Skin:  Pale, Warm and dry.     Data Review:   Recent Labs      08/20/18   0445  08/20/18   0049  08/19/18   1041  08/19/18   0339   WBC  18.1*  20.1*   --   15.4*   HGB  7.9*  7.6*  8.2*  9.1*   HCT  26.3*  25.4*  26.7*  29.9*   PLT  203  206   --   254     Recent Labs      08/20/18   0445  08/19/18   1549  08/19/18   0707  08/18/18   0446   NA  137 140  141  142   K  4.4  4.2  4.4  4.5   CL  109*  111*  112*  115*   CO2  21  19*  18*  22   GLU  178*  163*  173*  159*   BUN  29*  28*  24*  21*   CREA  0.81  0.86  0.75  0.64*   CA  7.7*  7.0*  7.1*  7.1*   MG   --    --    --   2.3   PHOS   --    --    --   3.0   ALB   --    --    --   1.3*   TBILI   --    --    --   0.5   SGOT   --    --    --   27   ALT   --    --    --   29       No results for input(s): TROIQ, CPK, CKMB in the last 72 hours. Intake/Output Summary (Last 24 hours) at 08/20/18 1111  Last data filed at 08/20/18 0700   Gross per 24 hour   Intake          5047.08 ml   Output             4760 ml   Net           287.08 ml        Telemetry: v paced; regular rhythm   ECHO: EF 60-65%; NWMA; RV mildly dilated; mild pulm HTN  Cxray: pulmonary edema and effusions     Assessment:     Principal Problem:    GI bleed (7/10/2018)    Active Problems:    Acute blood loss anemia (7/10/2018)      Anasarca (7/10/2018)        Plan:     A-fib s/p ablation: v-paced; regular rhythm and rate controlled. On pradaxa at home. · Continue PO amio  · Heparin drip changed to lovenox today. · Anemia fairly steady. Anasarca: albumin 1.3 at last check. Weight down 3# today. · Being managed by other providers:  Tube feeds increasing. Titration of lasix. D/c of TPN when TF at goal.          No changes to cardiology recommendations. Will see as needed. Please call with questions. Dalton Avina NP  DNP, RN, AGACNP-BC      Pt seen and examined in details. Agree with NP A&P. Resume DOAC once clear to take orally. Will f/u as out pt.      Isabell Ferris MD
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
## 2018-08-21 ENCOUNTER — APPOINTMENT (OUTPATIENT)
Dept: GENERAL RADIOLOGY | Age: 78
DRG: 329 | End: 2018-08-21
Attending: INTERNAL MEDICINE
Payer: MEDICARE

## 2018-08-21 LAB
ALBUMIN SERPL-MCNC: 1.4 G/DL (ref 3.5–5)
ALBUMIN/GLOB SERPL: 0.3 {RATIO} (ref 1.1–2.2)
ALP SERPL-CCNC: 93 U/L (ref 45–117)
ALT SERPL-CCNC: 27 U/L (ref 12–78)
ANION GAP SERPL CALC-SCNC: 10 MMOL/L (ref 5–15)
ARTERIAL PATENCY WRIST A: YES
AST SERPL-CCNC: 30 U/L (ref 15–37)
BACTERIA SPEC CULT: NORMAL
BASE DEFICIT BLDA-SCNC: 2.3 MMOL/L
BASOPHILS # BLD: 0 K/UL (ref 0–0.1)
BASOPHILS NFR BLD: 0 % (ref 0–1)
BDY SITE: ABNORMAL
BILIRUB SERPL-MCNC: 0.5 MG/DL (ref 0.2–1)
BUN SERPL-MCNC: 30 MG/DL (ref 6–20)
BUN/CREAT SERPL: 33 (ref 12–20)
CALCIUM SERPL-MCNC: 7.3 MG/DL (ref 8.5–10.1)
CARBAMAZEPINE FREE SERPL-MCNC: 3.2 UG/ML (ref 0.6–4.2)
CARBAMAZEPINE SERPL-MCNC: 8.3 UG/ML (ref 4–12)
CHLORIDE SERPL-SCNC: 109 MMOL/L (ref 97–108)
CO2 SERPL-SCNC: 22 MMOL/L (ref 21–32)
CREAT SERPL-MCNC: 0.91 MG/DL (ref 0.7–1.3)
DIFFERENTIAL METHOD BLD: ABNORMAL
EOSINOPHIL # BLD: 0.2 K/UL (ref 0–0.4)
EOSINOPHIL NFR BLD: 1 % (ref 0–7)
EPAP/CPAP/PEEP, PAPEEP: 6
ERYTHROCYTE [DISTWIDTH] IN BLOOD BY AUTOMATED COUNT: 25.2 % (ref 11.5–14.5)
FIO2 ON VENT: 40 %
GAS FLOW.O2 SETTING OXYMISER: 20 L/MIN
GLOBULIN SER CALC-MCNC: 4.4 G/DL (ref 2–4)
GLUCOSE BLD STRIP.AUTO-MCNC: 148 MG/DL (ref 65–100)
GLUCOSE BLD STRIP.AUTO-MCNC: 149 MG/DL (ref 65–100)
GLUCOSE BLD STRIP.AUTO-MCNC: 150 MG/DL (ref 65–100)
GLUCOSE BLD STRIP.AUTO-MCNC: 168 MG/DL (ref 65–100)
GLUCOSE BLD STRIP.AUTO-MCNC: 175 MG/DL (ref 65–100)
GLUCOSE BLD STRIP.AUTO-MCNC: 193 MG/DL (ref 65–100)
GLUCOSE SERPL-MCNC: 136 MG/DL (ref 65–100)
HCO3 BLDA-SCNC: 22 MMOL/L (ref 22–26)
HCT VFR BLD AUTO: 24.2 % (ref 36.6–50.3)
HGB BLD-MCNC: 7.1 G/DL (ref 12.1–17)
IMM GRANULOCYTES # BLD: 0 K/UL (ref 0–0.04)
IMM GRANULOCYTES NFR BLD AUTO: 0 % (ref 0–0.5)
IPAP/PIP, IPAPIP: 16
LYMPHOCYTES # BLD: 0.9 K/UL (ref 0.8–3.5)
LYMPHOCYTES NFR BLD: 5 % (ref 12–49)
MAGNESIUM SERPL-MCNC: 2.1 MG/DL (ref 1.6–2.4)
MCH RBC QN AUTO: 26.4 PG (ref 26–34)
MCHC RBC AUTO-ENTMCNC: 29.3 G/DL (ref 30–36.5)
MCV RBC AUTO: 90 FL (ref 80–99)
MONOCYTES # BLD: 0.9 K/UL (ref 0–1)
MONOCYTES NFR BLD: 5 % (ref 5–13)
NEUTS SEG # BLD: 15 K/UL (ref 1.8–8)
NEUTS SEG NFR BLD: 89 % (ref 32–75)
NRBC # BLD: 0.06 K/UL (ref 0–0.01)
NRBC BLD-RTO: 0.4 PER 100 WBC
PCO2 BLDA: 34 MMHG (ref 35–45)
PH BLDA: 7.41 [PH] (ref 7.35–7.45)
PHOSPHATE SERPL-MCNC: 4.1 MG/DL (ref 2.6–4.7)
PLATELET # BLD AUTO: 201 K/UL (ref 150–400)
PMV BLD AUTO: 11.8 FL (ref 8.9–12.9)
PO2 BLDA: 105 MMHG (ref 80–100)
POTASSIUM SERPL-SCNC: 4.1 MMOL/L (ref 3.5–5.1)
PROT SERPL-MCNC: 5.8 G/DL (ref 6.4–8.2)
RBC # BLD AUTO: 2.69 M/UL (ref 4.1–5.7)
RBC MORPH BLD: ABNORMAL
RBC MORPH BLD: ABNORMAL
SAO2 % BLD: 98 % (ref 92–97)
SAO2% DEVICE SAO2% SENSOR NAME: ABNORMAL
SERVICE CMNT-IMP: ABNORMAL
SERVICE CMNT-IMP: NORMAL
SODIUM SERPL-SCNC: 141 MMOL/L (ref 136–145)
SPECIMEN SITE: ABNORMAL
VENTILATION MODE VENT: ABNORMAL
WBC # BLD AUTO: 17 K/UL (ref 4.1–11.1)

## 2018-08-21 PROCEDURE — 83735 ASSAY OF MAGNESIUM: CPT | Performed by: INTERNAL MEDICINE

## 2018-08-21 PROCEDURE — 36415 COLL VENOUS BLD VENIPUNCTURE: CPT | Performed by: INTERNAL MEDICINE

## 2018-08-21 PROCEDURE — 87186 SC STD MICRODIL/AGAR DIL: CPT | Performed by: INTERNAL MEDICINE

## 2018-08-21 PROCEDURE — 74011000250 HC RX REV CODE- 250: Performed by: INTERNAL MEDICINE

## 2018-08-21 PROCEDURE — 94003 VENT MGMT INPAT SUBQ DAY: CPT

## 2018-08-21 PROCEDURE — 84100 ASSAY OF PHOSPHORUS: CPT | Performed by: INTERNAL MEDICINE

## 2018-08-21 PROCEDURE — 74011250636 HC RX REV CODE- 250/636: Performed by: INTERNAL MEDICINE

## 2018-08-21 PROCEDURE — C9113 INJ PANTOPRAZOLE SODIUM, VIA: HCPCS | Performed by: INTERNAL MEDICINE

## 2018-08-21 PROCEDURE — 74011250637 HC RX REV CODE- 250/637: Performed by: INTERNAL MEDICINE

## 2018-08-21 PROCEDURE — 87077 CULTURE AEROBIC IDENTIFY: CPT | Performed by: INTERNAL MEDICINE

## 2018-08-21 PROCEDURE — 94640 AIRWAY INHALATION TREATMENT: CPT

## 2018-08-21 PROCEDURE — 74011636637 HC RX REV CODE- 636/637: Performed by: INTERNAL MEDICINE

## 2018-08-21 PROCEDURE — 85025 COMPLETE CBC W/AUTO DIFF WBC: CPT | Performed by: INTERNAL MEDICINE

## 2018-08-21 PROCEDURE — 71045 X-RAY EXAM CHEST 1 VIEW: CPT

## 2018-08-21 PROCEDURE — 82962 GLUCOSE BLOOD TEST: CPT

## 2018-08-21 PROCEDURE — 87040 BLOOD CULTURE FOR BACTERIA: CPT | Performed by: INTERNAL MEDICINE

## 2018-08-21 PROCEDURE — 80053 COMPREHEN METABOLIC PANEL: CPT | Performed by: INTERNAL MEDICINE

## 2018-08-21 PROCEDURE — 36600 WITHDRAWAL OF ARTERIAL BLOOD: CPT | Performed by: INTERNAL MEDICINE

## 2018-08-21 PROCEDURE — 82803 BLOOD GASES ANY COMBINATION: CPT | Performed by: INTERNAL MEDICINE

## 2018-08-21 PROCEDURE — 74011000250 HC RX REV CODE- 250: Performed by: SURGERY

## 2018-08-21 PROCEDURE — 77030018836 HC SOL IRR NACL ICUM -A

## 2018-08-21 PROCEDURE — 65610000006 HC RM INTENSIVE CARE

## 2018-08-21 PROCEDURE — 74011250637 HC RX REV CODE- 250/637: Performed by: SURGERY

## 2018-08-21 RX ORDER — ENOXAPARIN SODIUM 100 MG/ML
120 INJECTION SUBCUTANEOUS EVERY 12 HOURS
Status: DISCONTINUED | OUTPATIENT
Start: 2018-08-22 | End: 2018-08-28

## 2018-08-21 RX ADMIN — CHLORHEXIDINE GLUCONATE 15 ML: 1.2 RINSE ORAL at 23:22

## 2018-08-21 RX ADMIN — IPRATROPIUM BROMIDE AND ALBUTEROL SULFATE 3 ML: .5; 3 SOLUTION RESPIRATORY (INHALATION) at 15:08

## 2018-08-21 RX ADMIN — PHENYLEPHRINE HYDROCHLORIDE 10 MCG/MIN: 10 INJECTION INTRAVENOUS at 23:21

## 2018-08-21 RX ADMIN — INSULIN LISPRO 3 UNITS: 100 INJECTION, SOLUTION INTRAVENOUS; SUBCUTANEOUS at 01:05

## 2018-08-21 RX ADMIN — FUROSEMIDE 60 MG: 10 INJECTION, SOLUTION INTRAMUSCULAR; INTRAVENOUS at 05:55

## 2018-08-21 RX ADMIN — AMIODARONE HYDROCHLORIDE 200 MG: 200 TABLET ORAL at 09:37

## 2018-08-21 RX ADMIN — VENLAFAXINE 25 MG: 25 TABLET ORAL at 09:38

## 2018-08-21 RX ADMIN — VANCOMYCIN HYDROCHLORIDE: 10 INJECTION, POWDER, LYOPHILIZED, FOR SOLUTION INTRAVENOUS at 18:11

## 2018-08-21 RX ADMIN — OXYCODONE HYDROCHLORIDE 5 MG: 5 TABLET ORAL at 01:06

## 2018-08-21 RX ADMIN — Medication 10 ML: at 02:34

## 2018-08-21 RX ADMIN — PROPOFOL 30 MCG/KG/MIN: 10 INJECTION, EMULSION INTRAVENOUS at 11:13

## 2018-08-21 RX ADMIN — LACTULOSE 10 G: 20 SOLUTION ORAL at 18:11

## 2018-08-21 RX ADMIN — PROPOFOL 10 MCG/KG/MIN: 10 INJECTION, EMULSION INTRAVENOUS at 23:36

## 2018-08-21 RX ADMIN — ACETAMINOPHEN 650 MG: 160 SUSPENSION ORAL at 12:15

## 2018-08-21 RX ADMIN — ACETAMINOPHEN 650 MG: 160 SUSPENSION ORAL at 23:47

## 2018-08-21 RX ADMIN — PROPOFOL 30 MCG/KG/MIN: 10 INJECTION, EMULSION INTRAVENOUS at 15:33

## 2018-08-21 RX ADMIN — MELATONIN TAB 3 MG 3 MG: 3 TAB at 23:21

## 2018-08-21 RX ADMIN — INSULIN LISPRO 3 UNITS: 100 INJECTION, SOLUTION INTRAVENOUS; SUBCUTANEOUS at 18:11

## 2018-08-21 RX ADMIN — CARBAMAZEPINE 100 MG: 100 SUSPENSION ORAL at 11:17

## 2018-08-21 RX ADMIN — Medication 10 ML: at 02:35

## 2018-08-21 RX ADMIN — VENLAFAXINE 25 MG: 25 TABLET ORAL at 23:21

## 2018-08-21 RX ADMIN — LACTULOSE 10 G: 20 SOLUTION ORAL at 09:37

## 2018-08-21 RX ADMIN — ENOXAPARIN SODIUM 120 MG: 60 INJECTION SUBCUTANEOUS at 12:15

## 2018-08-21 RX ADMIN — SODIUM CHLORIDE 10 ML: 9 INJECTION, SOLUTION INTRAMUSCULAR; INTRAVENOUS; SUBCUTANEOUS at 05:43

## 2018-08-21 RX ADMIN — CARBAMAZEPINE 100 MG: 100 SUSPENSION ORAL at 23:22

## 2018-08-21 RX ADMIN — SODIUM CHLORIDE 10 ML: 9 INJECTION, SOLUTION INTRAMUSCULAR; INTRAVENOUS; SUBCUTANEOUS at 15:11

## 2018-08-21 RX ADMIN — FENTANYL CITRATE 100 MCG: 50 INJECTION, SOLUTION INTRAMUSCULAR; INTRAVENOUS at 23:36

## 2018-08-21 RX ADMIN — FUROSEMIDE 60 MG: 10 INJECTION, SOLUTION INTRAMUSCULAR; INTRAVENOUS at 23:41

## 2018-08-21 RX ADMIN — SODIUM CHLORIDE 10 ML: 9 INJECTION, SOLUTION INTRAMUSCULAR; INTRAVENOUS; SUBCUTANEOUS at 23:22

## 2018-08-21 RX ADMIN — CHLORHEXIDINE GLUCONATE 15 ML: 1.2 RINSE ORAL at 11:11

## 2018-08-21 RX ADMIN — INSULIN LISPRO 3 UNITS: 100 INJECTION, SOLUTION INTRAVENOUS; SUBCUTANEOUS at 12:14

## 2018-08-21 RX ADMIN — CARBAMAZEPINE 100 MG: 100 SUSPENSION ORAL at 18:15

## 2018-08-21 RX ADMIN — VANCOMYCIN HYDROCHLORIDE: 10 INJECTION, POWDER, LYOPHILIZED, FOR SOLUTION INTRAVENOUS at 05:55

## 2018-08-21 RX ADMIN — CARBAMAZEPINE 100 MG: 100 SUSPENSION ORAL at 15:01

## 2018-08-21 RX ADMIN — FUROSEMIDE 60 MG: 10 INJECTION, SOLUTION INTRAMUSCULAR; INTRAVENOUS at 15:03

## 2018-08-21 RX ADMIN — IPRATROPIUM BROMIDE AND ALBUTEROL SULFATE 3 ML: .5; 3 SOLUTION RESPIRATORY (INHALATION) at 11:33

## 2018-08-21 RX ADMIN — INSULIN LISPRO 3 UNITS: 100 INJECTION, SOLUTION INTRAVENOUS; SUBCUTANEOUS at 05:54

## 2018-08-21 RX ADMIN — ENOXAPARIN SODIUM 120 MG: 60 INJECTION SUBCUTANEOUS at 01:06

## 2018-08-21 RX ADMIN — SODIUM CHLORIDE 40 MG: 9 INJECTION INTRAMUSCULAR; INTRAVENOUS; SUBCUTANEOUS at 09:37

## 2018-08-21 RX ADMIN — SODIUM CHLORIDE 10 ML: 9 INJECTION, SOLUTION INTRAMUSCULAR; INTRAVENOUS; SUBCUTANEOUS at 23:21

## 2018-08-21 RX ADMIN — IPRATROPIUM BROMIDE AND ALBUTEROL SULFATE 3 ML: .5; 3 SOLUTION RESPIRATORY (INHALATION) at 19:25

## 2018-08-21 RX ADMIN — PROPOFOL 30 MCG/KG/MIN: 10 INJECTION, EMULSION INTRAVENOUS at 02:21

## 2018-08-21 RX ADMIN — VENLAFAXINE 25 MG: 25 TABLET ORAL at 15:04

## 2018-08-21 RX ADMIN — INSULIN LISPRO 3 UNITS: 100 INJECTION, SOLUTION INTRAVENOUS; SUBCUTANEOUS at 23:32

## 2018-08-21 RX ADMIN — PROPOFOL 30 MCG/KG/MIN: 10 INJECTION, EMULSION INTRAVENOUS at 07:28

## 2018-08-21 RX ADMIN — IPRATROPIUM BROMIDE AND ALBUTEROL SULFATE 3 ML: .5; 3 SOLUTION RESPIRATORY (INHALATION) at 07:45

## 2018-08-21 RX ADMIN — POLYETHYLENE GLYCOL 3350 17 G: 17 POWDER, FOR SOLUTION ORAL at 09:38
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
## 2018-08-21 NOTE — ROUTINE PROCESS
Bedside and Verbal shift change report received from  (offgoing nurse). Report included the following information SBAR, Kardex, ED Summary, OR Summary, Procedure Summary, Intake/Output, MAR, Accordion, Recent Results, Med Rec Status, Cardiac Rhythm paced rhythm and Alarm Parameters No facial grimaces. Currently tolerating the mechanical ventilator-  AC rate 16, Fi02 40% vT 550, PEEP 6cm   1000: His wife is at the bedside. Suctioned for a moderate amount of thick pale white sputum,and repositioned for comfort. 1130:Noted with an elevated fever(103.0).  notified, he deferred the treatment plans to the [address] [place] [address].    1200: notified of the fever, we received orders to obtain PBC's and administer Tylenol. Lungs with scattered rhonchi, otherwise, his assessment is unchanged. 1300:His temperature is declining, continue the current plan of care. 1400:Noted without facial grimaces, his temperature is 99.0. Suctioned for a scant amount of thick white sputum, and repositioned for comfort. 1600: Full bath completed with midline abdominal wound care; he tolerated it well.

## 2018-08-21 NOTE — PROGRESS NOTES
Provided supportive presence to patient's wife Blue Rodriguez as she shared update about the challenges facing her  today. She expressed holding on to her edith and the hope she derives from knowing God in present and at work in her 's healing. She shared more about their life together. Offered assurance of prayer. Will continue to follow for support as Blue Rodriguez is here alone.     SHAWNA Nye, [facility], [address] [place] [address] [facility] Paging Service  [phone]

## 2018-08-21 NOTE — PROGRESS NOTES
0730  Bedside and Verbal shift change report given to Real Josselyn, RN and Jorge Orellana RN (oncoming nurse) by Cha Herzog RN (offgoing nurse). Report included the following information SBAR, Kardex, Procedure Summary, Intake/Output, MAR, Recent Results and Cardiac Rhythm Sinus Tachycardia. Bedside and Verbal shift change report given to Cha Herzog RN (oncoming nurse) by Dutch Arcos, CRISTIN and Jorge Orellana RN (offgoing nurse). Report included the following information SBAR, Kardex, Intake/Output, MAR, Recent Results and Cardiac Rhythm Sinus Tachycardia.

## 2018-08-21 NOTE — PROGRESS NOTES
Hematology Oncology Progress Note       Follow up for: IJ/SVC thrombus    Chart notes reviewed since last visit. Case discussed with following: no family at bedside at present. Patient complains of the following: not awake, sedated on ventilator    Additional concerns noted by the staff: none    Patient Vitals for the past 24 hrs:   BP Temp Pulse Resp SpO2 Weight   08/21/18 1100 106/48 - 90 28 98 % -   08/21/18 1000 118/53 - 100 29 99 % -   08/21/18 0937 129/67 - 92 - - -   08/21/18 0900 128/63 - 94 13 99 % -   08/21/18 0800 - - 87 (!) 31 100 % -   08/21/18 0745 - - - - 100 % 134.8 kg (297 lb 2.9 oz)   08/21/18 0738 - - 75 24 100 % -   08/21/18 0730 134/68 99 °F (37.2 °C) 76 22 100 % -   08/21/18 0700 130/66 - 75 23 100 % -   08/21/18 0600 162/56 - 77 (!) 34 100 % -   08/21/18 0500 101/52 - 75 27 98 % -   08/21/18 0400 (!) 80/66 99 °F (37.2 °C) 80 (!) 32 100 % -   08/21/18 0329 - - 75 27 99 % -   08/21/18 0300 97/46 - 77 23 98 % -   08/21/18 0200 95/46 - 75 23 98 % -   08/21/18 0100 105/45 - 81 22 97 % -   08/21/18 0000 105/43 99 °F (37.2 °C) 82 24 97 % -   08/20/18 2353 - - 83 24 98 % -   08/20/18 2300 104/44 - 78 22 98 % -   08/20/18 2200 104/48 - 84 21 99 % -   08/20/18 2130 140/52 - 91 (!) 33 100 % -   08/20/18 2116 158/52 - 91 (!) 37 100 % -   08/20/18 2000 114/53 99 °F (37.2 °C) 79 24 100 % -   08/20/18 1907 - - - - 100 % -   08/20/18 1900 120/55 - 73 23 100 % -   08/20/18 1856 - - 75 27 100 % -   08/20/18 1800 96/47 - 76 21 98 % -   08/20/18 1706 107/61 - 75 21 100 % -   08/20/18 1700 91/45 - 75 27 98 % -   08/20/18 1600 101/47 98.9 °F (37.2 °C) 76 26 100 % -   08/20/18 1541 - - 76 28 98 % -   08/20/18 1538 - - - - 98 % -   08/20/18 1500 101/49 99 °F (37.2 °C) 77 25 98 % -   08/20/18 1400 124/48 - 82 25 98 % -   08/20/18 1300 112/52 - 85 23 97 % -   08/20/18 1200 102/46 99.8 °F (37.7 °C) 82 24 97 % -   08/20/18 1128 - - 75 23 100 % -       ROS not obtainable - pt obtunded and on vent.      Physical Examination:  Constitutional obtunded. Appears close to chronological age. Overweight male intubated. Head Normocephalic; no scars   Eyes Eyes closed   ENMT Intubated. Neck Supple without masses or thyromegaly. No jugular venous distension. Hematologic/Lymphatic No petechiae or purpura. No tender or palpable lymph nodes noted   Respiratory Scattered rhonchi   Cardiovascular Regular rate and rhythm of heart   Chest / Line Site Chest is symmetric with no chest wall deformities. Abdomen Non-tender, non-distended, no masses, ascites or hepatosplenomegaly. Good bowel sounds. Large dressing over abdomen with ostomy. Musculoskeletal Anasarca present   Extremities  edematous. Skin No rashes, scars, or lesions suggestive of malignancy. No petechiae, purpura, or ecchymoses. No excoriations.     Neurologic UTO   Psychiatric UTO       Labs:  Recent Results (from the past 24 hour(s))   GLUCOSE, POC    Collection Time: 08/20/18 12:57 PM   Result Value Ref Range    Glucose (POC) 217 (H) 65 - 100 mg/dL    Performed by Shweta Luque    GLUCOSE, POC    Collection Time: 08/20/18  5:51 PM   Result Value Ref Range    Glucose (POC) 175 (H) 65 - 100 mg/dL    Performed by Fara Liu    GLUCOSE, POC    Collection Time: 08/21/18 12:58 AM   Result Value Ref Range    Glucose (POC) 168 (H) 65 - 100 mg/dL    Performed by Constance Narayanan    GLUCOSE, POC    Collection Time: 08/21/18  2:32 AM   Result Value Ref Range    Glucose (POC) 149 (H) 65 - 100 mg/dL    Performed by Constance Narayanan    CBC WITH AUTOMATED DIFF    Collection Time: 08/21/18  2:33 AM   Result Value Ref Range    WBC 17.0 (H) 4.1 - 11.1 K/uL    RBC 2.69 (L) 4.10 - 5.70 M/uL    HGB 7.1 (L) 12.1 - 17.0 g/dL    HCT 24.2 (L) 36.6 - 50.3 %    MCV 90.0 80.0 - 99.0 FL    MCH 26.4 26.0 - 34.0 PG    MCHC 29.3 (L) 30.0 - 36.5 g/dL    RDW 25.2 (H) 11.5 - 14.5 %    PLATELET 770 106 - 092 K/uL    MPV 11.8 8.9 - 12.9 FL    NRBC 0.4 (H) 0  WBC    ABSOLUTE NRBC 0.06 (H) 0.00 - 0.01 K/uL    NEUTROPHILS 89 (H) 32 - 75 %    LYMPHOCYTES 5 (L) 12 - 49 %    MONOCYTES 5 5 - 13 %    EOSINOPHILS 1 0 - 7 %    BASOPHILS 0 0 - 1 %    IMMATURE GRANULOCYTES 0 0.0 - 0.5 %    ABS. NEUTROPHILS 15.0 (H) 1.8 - 8.0 K/UL    ABS. LYMPHOCYTES 0.9 0.8 - 3.5 K/UL    ABS. MONOCYTES 0.9 0.0 - 1.0 K/UL    ABS. EOSINOPHILS 0.2 0.0 - 0.4 K/UL    ABS. BASOPHILS 0.0 0.0 - 0.1 K/UL    ABS. IMM. GRANS. 0.0 0.00 - 0.04 K/UL    DF AUTOMATED      RBC COMMENTS ANISOCYTOSIS  3+        RBC COMMENTS HYPOCHROMIA  1+       METABOLIC PANEL, COMPREHENSIVE    Collection Time: 08/21/18  2:33 AM   Result Value Ref Range    Sodium 141 136 - 145 mmol/L    Potassium 4.1 3.5 - 5.1 mmol/L    Chloride 109 (H) 97 - 108 mmol/L    CO2 22 21 - 32 mmol/L    Anion gap 10 5 - 15 mmol/L    Glucose 136 (H) 65 - 100 mg/dL    BUN 30 (H) 6 - 20 MG/DL    Creatinine 0.91 0.70 - 1.30 MG/DL    BUN/Creatinine ratio 33 (H) 12 - 20      GFR est AA >60 >60 ml/min/1.73m2    GFR est non-AA >60 >60 ml/min/1.73m2    Calcium 7.3 (L) 8.5 - 10.1 MG/DL    Bilirubin, total 0.5 0.2 - 1.0 MG/DL    ALT (SGPT) 27 12 - 78 U/L    AST (SGOT) 30 15 - 37 U/L    Alk.  phosphatase 93 45 - 117 U/L    Protein, total 5.8 (L) 6.4 - 8.2 g/dL    Albumin 1.4 (L) 3.5 - 5.0 g/dL    Globulin 4.4 (H) 2.0 - 4.0 g/dL    A-G Ratio 0.3 (L) 1.1 - 2.2     MAGNESIUM    Collection Time: 08/21/18  2:33 AM   Result Value Ref Range    Magnesium 2.1 1.6 - 2.4 mg/dL   PHOSPHORUS    Collection Time: 08/21/18  2:33 AM   Result Value Ref Range    Phosphorus 4.1 2.6 - 4.7 MG/DL   BLOOD GAS, ARTERIAL    Collection Time: 08/21/18  5:22 AM   Result Value Ref Range    pH 7.41 7.35 - 7.45      PCO2 34 (L) 35.0 - 45.0 mmHg    PO2 105 (H) 80 - 100 mmHg    O2 SAT 98 (H) 92 - 97 %    BICARBONATE 22 22 - 26 mmol/L    BASE DEFICIT 2.3 mmol/L    O2 METHOD VENTILATOR      FIO2 40 %    MODE PRESSURE CONTROL      SET RATE 20      IPAP/PIP 16.0      EPAP/CPAP/PEEP 6.0      Sample source ARTERIAL      SITE RIGHT RADIAL BONNIE'S TEST YES     GLUCOSE, POC    Collection Time: 08/21/18  5:47 AM   Result Value Ref Range    Glucose (POC) 150 (H) 65 - 100 mg/dL    Performed by Helen Stern and Plan:   R IJ/SVC thrombus - on heparin drip with parameters written for adjustment per PTT. Stage IIIB colon cancer - s/p resection, if patient survives  Hospital stay and gets rehabbed can discuss adjuvant therapy later down the road. Far from this point however. Acute resp failure/Pna - on vent. Sputum culture with staph aureus and Klebsiella. Septic shock with bacteremia - staph coag neg in [facility] 8/14,  Staph epi 8/11 BC. On vancomycin    Normochromic normocytic anemia - B12  929. folate 9.8. Ferritin was 5 on 7/10/18 suggesting fairly significant iron deficiency. He got 500 mg of IV iron in July but actually has a much higher calculated deficit. Ferritin 121 so does not need further IV iron at present. Anemia down trended to 7.1, would transfuse for hbg <7. Agree could be dilutional? No report of bleeding per nursing.  Monitor

## 2018-08-21 NOTE — PROGRESS NOTES
Bedside and Verbal shift change report given to Veronica Johnston (oncoming nurse) by Lonny Langston (offgoing nurse). Report included the following information SBAR, Kardex, ED Summary, OR Summary, Procedure Summary, Intake/Output, MAR, Accordion, Recent Results, Med Rec Status, Cardiac Rhythm NSR, Alarm Parameters , Pre Procedure Checklist, Procedure Verification and Quality Measures           Failed SBT - RR 38-42, passed SAT    Bedside and Verbal shift change report given to Leana (oncoming nurse) by Veronica Johnston (offgoing nurse). Report included the following information SBAR, Kardex, ED Summary, OR Summary, Procedure Summary, Intake/Output, MAR, Accordion, Recent Results, Med Rec Status, Cardiac Rhythm NSR, Alarm Parameters , Pre Procedure Checklist, Procedure Verification and Quality Measures.

## 2018-08-21 NOTE — PROGRESS NOTES
CRS Progress note    Febrile today. Remains intubated/sedated. Repeat blood cultures drawn. Tolerating TF at 50ml/hr      BP 91/47  Pulse 76  Temp 99.6 °F (37.6 °C)  Resp 18  Ht 6' 2\" (1.88 m)  Wt 134.8 kg (297 lb 2.9 oz)  SpO2 99%  BMI 38.16 kg/m2    Intubated/sedated  Abd soft, ostomy with stool in bag      Plan  -Trach this week per ICU team.  May consider chest tube tomorrow to drain pleural effusion.   Hopefully that will make it easier to wean off vent  -Continue tube feeds at goal  -Stop TPN

## 2018-08-21 NOTE — PROGRESS NOTES
08/21/18 0615   ABCDEF Bundle   SBT Safety Screen Passed Yes   SBT Trial Passed No   SBT Trial Reason for Failure Respiratory rate > 35;RSBI>105

## 2018-08-22 ENCOUNTER — APPOINTMENT (OUTPATIENT)
Dept: GENERAL RADIOLOGY | Age: 78
DRG: 329 | End: 2018-08-22
Attending: INTERNAL MEDICINE
Payer: MEDICARE

## 2018-08-22 LAB
ABO + RH BLD: NORMAL
ALBUMIN SERPL-MCNC: 1.5 G/DL (ref 3.5–5)
ALBUMIN/GLOB SERPL: 0.3 {RATIO} (ref 1.1–2.2)
ALP SERPL-CCNC: 108 U/L (ref 45–117)
ALT SERPL-CCNC: 29 U/L (ref 12–78)
ANION GAP SERPL CALC-SCNC: 9 MMOL/L (ref 5–15)
ANTIGENS PRESENT RBC DONR: NORMAL
ANTIGENS PRESENT RBC DONR: NORMAL
ARTERIAL PATENCY WRIST A: YES
AST SERPL-CCNC: 25 U/L (ref 15–37)
BASE DEFICIT BLDA-SCNC: 0.5 MMOL/L
BASOPHILS # BLD: 0 K/UL (ref 0–0.1)
BASOPHILS NFR BLD: 0 % (ref 0–1)
BDY SITE: ABNORMAL
BILIRUB SERPL-MCNC: 0.5 MG/DL (ref 0.2–1)
BLD PROD TYP BPU: NORMAL
BLD PROD TYP BPU: NORMAL
BLOOD BANK CMNT PATIENT-IMP: NORMAL
BLOOD GROUP ANTIBODIES SERPL: NORMAL
BLOOD GROUP ANTIBODIES SERPL: NORMAL
BPU ID: NORMAL
BPU ID: NORMAL
BUN SERPL-MCNC: 27 MG/DL (ref 6–20)
BUN/CREAT SERPL: 32 (ref 12–20)
CALCIUM SERPL-MCNC: 7.7 MG/DL (ref 8.5–10.1)
CHLORIDE SERPL-SCNC: 107 MMOL/L (ref 97–108)
CO2 SERPL-SCNC: 25 MMOL/L (ref 21–32)
CREAT SERPL-MCNC: 0.84 MG/DL (ref 0.7–1.3)
CROSSMATCH RESULT,%XM: NORMAL
CROSSMATCH RESULT,%XM: NORMAL
DIFFERENTIAL METHOD BLD: ABNORMAL
EOSINOPHIL # BLD: 0.1 K/UL (ref 0–0.4)
EOSINOPHIL NFR BLD: 1 % (ref 0–7)
EPAP/CPAP/PEEP, PAPEEP: 6
ERYTHROCYTE [DISTWIDTH] IN BLOOD BY AUTOMATED COUNT: 24.9 % (ref 11.5–14.5)
FIO2 ON VENT: 40 %
GAS FLOW.O2 SETTING OXYMISER: 16 L/MIN
GLOBULIN SER CALC-MCNC: 4.7 G/DL (ref 2–4)
GLUCOSE BLD STRIP.AUTO-MCNC: 132 MG/DL (ref 65–100)
GLUCOSE BLD STRIP.AUTO-MCNC: 151 MG/DL (ref 65–100)
GLUCOSE BLD STRIP.AUTO-MCNC: 166 MG/DL (ref 65–100)
GLUCOSE BLD STRIP.AUTO-MCNC: 177 MG/DL (ref 65–100)
GLUCOSE SERPL-MCNC: 140 MG/DL (ref 65–100)
HCO3 BLDA-SCNC: 23 MMOL/L (ref 22–26)
HCT VFR BLD AUTO: 25.3 % (ref 36.6–50.3)
HGB BLD-MCNC: 7.5 G/DL (ref 12.1–17)
IMM GRANULOCYTES # BLD: 0.2 K/UL (ref 0–0.04)
IMM GRANULOCYTES NFR BLD AUTO: 1 % (ref 0–0.5)
LYMPHOCYTES # BLD: 1 K/UL (ref 0.8–3.5)
LYMPHOCYTES NFR BLD: 6 % (ref 12–49)
MAGNESIUM SERPL-MCNC: 2.1 MG/DL (ref 1.6–2.4)
MCH RBC QN AUTO: 26.9 PG (ref 26–34)
MCHC RBC AUTO-ENTMCNC: 29.6 G/DL (ref 30–36.5)
MCV RBC AUTO: 90.7 FL (ref 80–99)
MONOCYTES # BLD: 1.3 K/UL (ref 0–1)
MONOCYTES NFR BLD: 8 % (ref 5–13)
NEUTS SEG # BLD: 13.7 K/UL (ref 1.8–8)
NEUTS SEG NFR BLD: 84 % (ref 32–75)
NRBC # BLD: 0.04 K/UL (ref 0–0.01)
NRBC BLD-RTO: 0.2 PER 100 WBC
PCO2 BLDA: 35 MMHG (ref 35–45)
PH BLDA: 7.44 [PH] (ref 7.35–7.45)
PHOSPHATE SERPL-MCNC: 4.1 MG/DL (ref 2.6–4.7)
PLATELET # BLD AUTO: 217 K/UL (ref 150–400)
PMV BLD AUTO: 11.8 FL (ref 8.9–12.9)
PO2 BLDA: 139 MMHG (ref 80–100)
POTASSIUM SERPL-SCNC: 3.8 MMOL/L (ref 3.5–5.1)
PROT SERPL-MCNC: 6.2 G/DL (ref 6.4–8.2)
RBC # BLD AUTO: 2.79 M/UL (ref 4.1–5.7)
SAO2 % BLD: 99 % (ref 92–97)
SAO2% DEVICE SAO2% SENSOR NAME: ABNORMAL
SERVICE CMNT-IMP: ABNORMAL
SODIUM SERPL-SCNC: 141 MMOL/L (ref 136–145)
SPECIMEN EXP DATE BLD: NORMAL
SPECIMEN SITE: ABNORMAL
STATUS OF UNIT,%ST: NORMAL
STATUS OF UNIT,%ST: NORMAL
UNIT DIVISION, %UDIV: 0
UNIT DIVISION, %UDIV: 0
VENTILATION MODE VENT: ABNORMAL
VT SETTING VENT: 550 ML
WBC # BLD AUTO: 16.2 K/UL (ref 4.1–11.1)

## 2018-08-22 PROCEDURE — 74011250636 HC RX REV CODE- 250/636: Performed by: INTERNAL MEDICINE

## 2018-08-22 PROCEDURE — 74011250637 HC RX REV CODE- 250/637: Performed by: INTERNAL MEDICINE

## 2018-08-22 PROCEDURE — 85025 COMPLETE CBC W/AUTO DIFF WBC: CPT | Performed by: INTERNAL MEDICINE

## 2018-08-22 PROCEDURE — 36415 COLL VENOUS BLD VENIPUNCTURE: CPT | Performed by: INTERNAL MEDICINE

## 2018-08-22 PROCEDURE — 94640 AIRWAY INHALATION TREATMENT: CPT

## 2018-08-22 PROCEDURE — 74011000250 HC RX REV CODE- 250: Performed by: SURGERY

## 2018-08-22 PROCEDURE — 65610000006 HC RM INTENSIVE CARE

## 2018-08-22 PROCEDURE — 80053 COMPREHEN METABOLIC PANEL: CPT | Performed by: INTERNAL MEDICINE

## 2018-08-22 PROCEDURE — 82962 GLUCOSE BLOOD TEST: CPT

## 2018-08-22 PROCEDURE — 94003 VENT MGMT INPAT SUBQ DAY: CPT

## 2018-08-22 PROCEDURE — 77030010541

## 2018-08-22 PROCEDURE — 83735 ASSAY OF MAGNESIUM: CPT | Performed by: INTERNAL MEDICINE

## 2018-08-22 PROCEDURE — 74011250637 HC RX REV CODE- 250/637: Performed by: SURGERY

## 2018-08-22 PROCEDURE — 74011000250 HC RX REV CODE- 250: Performed by: INTERNAL MEDICINE

## 2018-08-22 PROCEDURE — C9113 INJ PANTOPRAZOLE SODIUM, VIA: HCPCS | Performed by: INTERNAL MEDICINE

## 2018-08-22 PROCEDURE — 77030018798 HC PMP KT ENTRL FED COVD -A

## 2018-08-22 PROCEDURE — 74011636637 HC RX REV CODE- 636/637: Performed by: INTERNAL MEDICINE

## 2018-08-22 PROCEDURE — 82803 BLOOD GASES ANY COMBINATION: CPT | Performed by: INTERNAL MEDICINE

## 2018-08-22 PROCEDURE — 84100 ASSAY OF PHOSPHORUS: CPT | Performed by: INTERNAL MEDICINE

## 2018-08-22 PROCEDURE — 36600 WITHDRAWAL OF ARTERIAL BLOOD: CPT | Performed by: INTERNAL MEDICINE

## 2018-08-22 PROCEDURE — 71045 X-RAY EXAM CHEST 1 VIEW: CPT

## 2018-08-22 RX ORDER — PHENYLEPHRINE HCL IN 0.9% NACL 30MG/250ML
10-300 PLASTIC BAG, INJECTION (ML) INTRAVENOUS
Status: DISCONTINUED | OUTPATIENT
Start: 2018-08-22 | End: 2018-08-27

## 2018-08-22 RX ORDER — FUROSEMIDE 10 MG/ML
60 INJECTION INTRAMUSCULAR; INTRAVENOUS EVERY 12 HOURS
Status: DISCONTINUED | OUTPATIENT
Start: 2018-08-22 | End: 2018-08-31

## 2018-08-22 RX ADMIN — LACTULOSE 10 G: 20 SOLUTION ORAL at 18:35

## 2018-08-22 RX ADMIN — INSULIN LISPRO 3 UNITS: 100 INJECTION, SOLUTION INTRAVENOUS; SUBCUTANEOUS at 23:43

## 2018-08-22 RX ADMIN — ENOXAPARIN SODIUM 120 MG: 100 INJECTION SUBCUTANEOUS at 10:54

## 2018-08-22 RX ADMIN — LACTULOSE 10 G: 20 SOLUTION ORAL at 08:30

## 2018-08-22 RX ADMIN — INSULIN LISPRO 3 UNITS: 100 INJECTION, SOLUTION INTRAVENOUS; SUBCUTANEOUS at 18:34

## 2018-08-22 RX ADMIN — VANCOMYCIN HYDROCHLORIDE: 10 INJECTION, POWDER, LYOPHILIZED, FOR SOLUTION INTRAVENOUS at 06:49

## 2018-08-22 RX ADMIN — Medication 30 MCG/MIN: at 11:30

## 2018-08-22 RX ADMIN — CHLORHEXIDINE GLUCONATE 15 ML: 1.2 RINSE ORAL at 08:32

## 2018-08-22 RX ADMIN — VANCOMYCIN HYDROCHLORIDE: 10 INJECTION, POWDER, LYOPHILIZED, FOR SOLUTION INTRAVENOUS at 18:40

## 2018-08-22 RX ADMIN — CARBAMAZEPINE 100 MG: 100 SUSPENSION ORAL at 21:04

## 2018-08-22 RX ADMIN — IPRATROPIUM BROMIDE AND ALBUTEROL SULFATE 3 ML: .5; 3 SOLUTION RESPIRATORY (INHALATION) at 19:35

## 2018-08-22 RX ADMIN — SODIUM CHLORIDE 10 ML: 9 INJECTION, SOLUTION INTRAMUSCULAR; INTRAVENOUS; SUBCUTANEOUS at 21:06

## 2018-08-22 RX ADMIN — CHLORHEXIDINE GLUCONATE 15 ML: 1.2 RINSE ORAL at 20:54

## 2018-08-22 RX ADMIN — VENLAFAXINE 25 MG: 25 TABLET ORAL at 21:04

## 2018-08-22 RX ADMIN — INSULIN LISPRO 3 UNITS: 100 INJECTION, SOLUTION INTRAVENOUS; SUBCUTANEOUS at 14:17

## 2018-08-22 RX ADMIN — IPRATROPIUM BROMIDE AND ALBUTEROL SULFATE 3 ML: .5; 3 SOLUTION RESPIRATORY (INHALATION) at 11:10

## 2018-08-22 RX ADMIN — SODIUM CHLORIDE 10 ML: 9 INJECTION, SOLUTION INTRAMUSCULAR; INTRAVENOUS; SUBCUTANEOUS at 20:55

## 2018-08-22 RX ADMIN — CARBAMAZEPINE 100 MG: 100 SUSPENSION ORAL at 08:31

## 2018-08-22 RX ADMIN — VENLAFAXINE 25 MG: 25 TABLET ORAL at 08:30

## 2018-08-22 RX ADMIN — IPRATROPIUM BROMIDE AND ALBUTEROL SULFATE 3 ML: .5; 3 SOLUTION RESPIRATORY (INHALATION) at 15:00

## 2018-08-22 RX ADMIN — SODIUM CHLORIDE 10 ML: 9 INJECTION, SOLUTION INTRAMUSCULAR; INTRAVENOUS; SUBCUTANEOUS at 06:50

## 2018-08-22 RX ADMIN — SODIUM CHLORIDE 40 MG: 9 INJECTION INTRAMUSCULAR; INTRAVENOUS; SUBCUTANEOUS at 08:30

## 2018-08-22 RX ADMIN — POLYETHYLENE GLYCOL 3350 17 G: 17 POWDER, FOR SOLUTION ORAL at 08:30

## 2018-08-22 RX ADMIN — CARBAMAZEPINE 100 MG: 100 SUSPENSION ORAL at 14:20

## 2018-08-22 RX ADMIN — FUROSEMIDE 60 MG: 10 INJECTION, SOLUTION INTRAMUSCULAR; INTRAVENOUS at 20:54

## 2018-08-22 RX ADMIN — IPRATROPIUM BROMIDE AND ALBUTEROL SULFATE 3 ML: .5; 3 SOLUTION RESPIRATORY (INHALATION) at 07:09

## 2018-08-22 RX ADMIN — SODIUM CHLORIDE 10 ML: 9 INJECTION, SOLUTION INTRAMUSCULAR; INTRAVENOUS; SUBCUTANEOUS at 14:19

## 2018-08-22 RX ADMIN — AMIODARONE HYDROCHLORIDE 200 MG: 200 TABLET ORAL at 08:30

## 2018-08-22 RX ADMIN — ENOXAPARIN SODIUM 120 MG: 100 INJECTION SUBCUTANEOUS at 21:05

## 2018-08-22 RX ADMIN — FUROSEMIDE 60 MG: 10 INJECTION, SOLUTION INTRAMUSCULAR; INTRAVENOUS at 06:49

## 2018-08-22 RX ADMIN — VENLAFAXINE 25 MG: 25 TABLET ORAL at 16:00

## 2018-08-22 RX ADMIN — MELATONIN TAB 3 MG 3 MG: 3 TAB at 21:04

## 2018-08-22 NOTE — PROGRESS NOTES
Problem: Falls - Risk of  Goal: *Absence of Falls  Document Landen Fall Risk and appropriate interventions in the flowsheet. Fall Risk Interventions:  Mobility Interventions: Assess mobility with egress test    Mentation Interventions: Bed/chair exit alarm    Medication Interventions: Bed/chair exit alarm    Elimination Interventions: Bed/chair exit alarm    History of Falls Interventions: Bed/chair exit alarm        Problem: Pressure Injury - Risk of  Goal: *Prevention of pressure injury  Document Dagoberto Scale and appropriate interventions in the flowsheet.    Pressure Injury Interventions:  Sensory Interventions: Assess changes in LOC    Moisture Interventions: Absorbent underpads    Activity Interventions: Assess need for specialty bed    Mobility Interventions: Float heels    Nutrition Interventions: Document food/fluid/supplement intake    Friction and Shear Interventions: Apply protective barrier, creams and emollients
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
## 2018-08-22 NOTE — PROGRESS NOTES
0730  Bedside and Verbal shift change report given to Veena Sanders RN and Maty Flores RN (oncoming nurse) by Archie Blair RN (offgoing nurse). Report included the following information SBAR, Kardex, Intake/Output, MAR, Recent Results and Cardiac Rhythm Sinus Tachycardia. 0800  Shift assessment performed, changes noted in flowsheets. 3918  Dr. Mary Ramirez at bedside, advised him patient on SBT for 2 hours, he placed patient back on [facility] at this time. 56  Dr. Mary Ramirez at bedside, advised to continue vancomycin at this time. Updating patients wife at this time of progress and care plan. 1000 [facility] bag changed. 1915  . Bedside and Verbal shift change report given to Karissa Go RN (oncoming nurse) by Veena Sanders RN and Maty Flores RN (offgoing nurse). Report included the following information SBAR, Kardex, ED Summary, OR Summary, Intake/Output, MAR, Recent Results, Cardiac Rhythm Paced Rhythm and Alarm Parameters .
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
## 2018-08-22 NOTE — PROGRESS NOTES
0730  Bedside and Verbal shift change report given to Liz Taylor RN and Lloyd Coronado RN  (oncoming nurse) by Laura Perez RN (offgoing nurse). Report included the following information SBAR, Kardex, Procedure Summary, Intake/Output, MAR, Recent Results, Cardiac Rhythm Sinus Tachycardia and Alarm Parameters . Problem: Falls - Risk of  Goal: *Absence of Falls  Document Landen Fall Risk and appropriate interventions in the flowsheet. Outcome: Progressing Towards Goal  Fall Risk Interventions:  Mobility Interventions: Assess mobility with egress test    Mentation Interventions: Bed/chair exit alarm    Medication Interventions: Bed/chair exit alarm    Elimination Interventions: Bed/chair exit alarm    History of Falls Interventions: Bed/chair exit alarm        Problem: Pressure Injury - Risk of  Goal: *Prevention of pressure injury  Document Dagoberto Scale and appropriate interventions in the flowsheet. Outcome: Progressing Towards Goal  Pressure Injury Interventions:  Sensory Interventions: Assess changes in LOC    Moisture Interventions: Absorbent underpads    Activity Interventions: Assess need for specialty bed    Mobility Interventions: Float heels, Turn and reposition approx. every two hours(pillow and wedges)    Nutrition Interventions: Document food/fluid/supplement intake    Friction and Shear Interventions: Apply protective barrier, creams and emollients               Problem: Anemia Care Plan (Adult and Pediatric)  Goal: *Tolerates increased activity  Outcome: Not Progressing Towards Goal  Patient to unstable for activity at this time.       Problem: Delirium Treatment  Goal: *Will remain free of delirium, CAM Score negative  Outcome: Not Progressing Towards Goal  Score remains positive

## 2018-08-22 NOTE — PROGRESS NOTES
Bedside interdisciplinary rounds were held today to discuss patient plan of care and outcomes. The following members were present: Physician, Nurse, Clinical Care Leader, Pharmacy,Respiratory, Dietician, Physical Therapy, and Case Management. Plan: Please see new orders and notes for plan of care.
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
## 2018-08-22 NOTE — PROGRESS NOTES
08/22/18 0543 08/22/18 0558   ABCDEF Bundle   SBT Safety Screen Passed Yes --    SBT Trial Passed --  Yes   Weaning Parameters   Spontaneous Breathing Trial Complete --  Yes   Resp Rate Observed 24 22   Ve 11.5 9.4    456   RSBI 56 52   Patient remains on CPAP+6, PS 6, 40%

## 2018-08-22 NOTE — PROGRESS NOTES
CRS Progress note    No fevers today. Remains intubated/sedated. Continues to tolerate tube feeds. Ostomy functioning      /57 (BP 1 Location: Right arm, BP Patient Position: At rest)  Pulse 83  Temp 98.8 °F (37.1 °C)  Resp 21  Ht 6' 2\" (1.88 m)  Wt 132.3 kg (291 lb 10.7 oz)  SpO2 99%  BMI 37.45 kg/m2    Intubated/sedated  Abd soft, ostomy with stool in bag      Plan  -Possible extubation vs trach.   Passed SBT this morning  -Continue tube feeds at goal

## 2018-08-22 NOTE — PROGRESS NOTES
Bedside and Verbal shift change report given to Vincent Peterson (oncoming nurse) by [place] (offgoing nurse). Report included the following information SBAR, Kardex, ED Summary, OR Summary, Procedure Summary, Intake/Output, MAR, Accordion, Recent Results, Med Rec Status, Cardiac Rhythm paced , Alarm Parameters , Pre Procedure Checklist, Procedure Verification and Quality Measures. Hypotensive HS; spoke with Dr. Marco Antonio Alvarado POC: start jose de jesus for map goal of greater than 65mmHg Paused propofol at 0515 for SAt/SBT - stayed off; RSBI 50-65; RR 25-30    Bedside and Verbal shift change report given to Gema Ramos (oncoming nurse) by Vincent Peterson (offgoing nurse). Report included the following information SBAR, Kardex, ED Summary, OR Summary, Procedure Summary, Intake/Output, MAR, Accordion, Recent Results, Med Rec Status, Cardiac Rhythm Paced, Alarm Parameters , Pre Procedure Checklist, Procedure Verification and Quality Measures.

## 2018-08-22 NOTE — PROGRESS NOTES
Nutrition Assessment:    INTERVENTIONS/RECOMMENDATIONS:   Continue current TF order    ASSESSMENT:   Chart reviewed and pt discussed on CCU rounds. Pt is tolerating goal TF rate well. Current TF order without additional kcal from propofol meets 93% and 100% of estimated kcal and protein needs. Diet Order:  (TwoCal @ 50 ml/hr + prosourece TID + 75 ml water flush q 4 hrs) Provides: 2320 kcal, 136 g pro and 1220 ml free water  % Eaten:  No data found. Pertinent Medications: [x]Reviewed: humalog, lactulose, PPI, miralax,   Pertinent Labs: [x]Reviewed:   Food Allergies: [x]NKFA  []Other   Last BM:  + ostomy output  Edema:      [x]RUE 4+  [x]LUE 4+  [x]RLE 4+  [x]LLE 4+     Pressure Ulcer:      [] Stage I   [] Stage II   [] Stage III   [] Stage IV      Anthropometrics: Height: 6' 2\" (188 cm) Weight: 132.3 kg (291 lb 10.7 oz)    IBW (%IBW):   ( ) UBW (%UBW):   (  %)    BMI: Body mass index is 37.45 kg/(m^2). This BMI is indicative of:  []Underweight   []Normal   []Overweight   [x] Obesity   [] Extreme Obesity (BMI>40)  Last Weight Metrics:  Weight Loss Metrics 8/22/2018 7/10/2018 7/5/2018 5/15/2018 5/7/2018 4/10/2018 3/26/2018   Today's Wt 291 lb 10.7 oz - 283 lb 4.8 oz 266 lb 262 lb 267 lb 267 lb   BMI - 37.45 kg/m2 36.37 kg/m2 34.15 kg/m2 33.64 kg/m2 34.28 kg/m2 34.28 kg/m2       Estimated Nutrition Needs (Based on): 4985 Kcals/day (PSU (MSJ 3059)) , 130 g (1 g/kg) Protein  Carbohydrate: At Least 130 g/day  Fluids: 2519 mL/day or per primary team    NUTRITION DIAGNOSES:   Problem:  Altered GI function      Etiology: related to Ascending colon mass c/w probable colon carcinoma and Cedric's erosion with HH      Signs/Symptoms: as evidenced by Ascending colon mass c/w probable colon carcinoma and Cedric's erosion with HH     Previous Nutrition Dx:  [] Resolved  [] Unresolved           [x] Progressing    NUTRITION INTERVENTIONS:  Meals/Snacks:  Other (advance diet as/if medically able per SLP ) Enteral/Parenteral Nutrition: Other (Continue TF and TPN as ordered) Supplements: Commercial supplement              GOAL:   tolerate goal rate in 2-4 days    NUTRITION MONITORING AND EVALUATION      Food/Nutrient Intake Outcomes: Enteral/parenteral nutrition intake  Physical Signs/Symptoms Outcomes: GI, Glucose profile, GI profile, Electrolyte and renal profile, Weight/weight change    Previous Goal Met:   [x] Met              [] Progressing Towards Goal              [] Not Progressing Towards Goal   Previous Recommendations:   [x] Implemented          [] Not Implemented          [] Not Applicable    LEARNING NEEDS (Diet, Food/Nutrient-Drug Interaction):    [x] None Identified   [] Identified and Education Provided/Documented   [] Identified and Pt declined/was not appropriate     Cultural, Temple, OR Ethnic Dietary Needs:    [x] None Identified   [] Identified and Addressed     [x] Interdisciplinary Care Plan Reviewed/Documented    [x] Discharge Planning: TBD   [] Participated in Interdisciplinary Rounds    NUTRITION RISK:    [] High              [x] Moderate           []  Low  []  Minimal/Uncompromised      Rafia Louise RDN  Pager [phone]  Weekend Pager [phone]

## 2018-08-22 NOTE — PROGRESS NOTES
Hematology Oncology Progress Note       Follow up for: IJ/SVC thrombus    Chart notes reviewed since last visit. Case discussed with following: no family at bedside at present.      Patient complains of the following: Remains on ventilator    Additional concerns noted by the staff: none    Patient Vitals for the past 24 hrs:   BP Temp Pulse Resp SpO2 Weight   08/22/18 1110 - - - - 100 % -   08/22/18 1100 125/64 - 75 19 100 % 132.3 kg (291 lb 10.7 oz)   08/22/18 1000 126/65 - 75 17 100 % -   08/22/18 0900 124/62 - 77 19 100 % -   08/22/18 0800 107/55 - 76 28 99 % -   08/22/18 0724 119/54 98.1 °F (36.7 °C) 77 28 99 % -   08/22/18 0710 - - - - 100 % -   08/22/18 0705 - - 76 29 100 % -   08/22/18 0700 120/54 - 75 28 100 % -   08/22/18 0600 127/65 - 75 21 100 % -   08/22/18 0500 114/59 - 75 20 100 % -   08/22/18 0400 114/60 99.1 °F (37.3 °C) 75 16 100 % -   08/22/18 0339 - - 75 18 100 % -   08/22/18 0300 123/62 - 76 21 100 % -   08/22/18 0200 99/53 - 75 19 99 % -   08/22/18 0100 95/47 - 76 19 98 % -   08/22/18 0001 - - 75 16 98 % -   08/22/18 0000 (!) 67/54 99.1 °F (37.3 °C) 75 16 98 % -   08/21/18 2300 93/46 - 78 21 100 % -   08/21/18 2200 (!) 85/42 - 76 22 98 % -   08/21/18 2137 (!) 84/37 - 75 21 96 % -   08/21/18 2130 (!) 79/37 - 76 21 98 % -   08/21/18 2100 94/48 - 96 24 100 % -   08/21/18 2030 91/40 - 76 21 100 % -   08/21/18 2000 94/49 98.5 °F (36.9 °C) 78 20 99 % -   08/21/18 1930 109/54 - 76 21 100 % -   08/21/18 1925 - - - 21 100 % -   08/21/18 1900 103/56 98.4 °F (36.9 °C) 76 21 100 % -   08/21/18 1804 91/47 - 76 18 99 % -   08/21/18 1800 (!) 86/45 - 77 20 98 % -   08/21/18 1700 91/46 - 82 25 98 % -   08/21/18 1600 98/56 99.6 °F (37.6 °C) 75 24 98 % -   08/21/18 1508 - - - - 97 % -   08/21/18 1503 104/49 - 79 - - -   08/21/18 1500 104/49 - 91 26 98 % -   08/21/18 1400 90/47 99 °F (37.2 °C) 87 24 98 % -   08/21/18 1330 93/40 - 86 27 97 % -   08/21/18 1300 95/41 (!) 100.8 °F (38.2 °C) 93 28 96 % -   08/21/18 1230 (!) 99/37 - 92 28 95 % -   08/21/18 1200 100/41 (!) 101.2 °F (38.4 °C) (!) 101 29 95 % -   08/21/18 1138 100/44 - 95 (!) 31 94 % -       ROS not obtainable - pt obtunded and on vent. Physical Examination:  Constitutional obtunded. Appears close to chronological age. Overweight male intubated. Head Normocephalic; no scars   Eyes Eyes closed   ENMT Intubated. Neck Supple without masses or thyromegaly. No jugular venous distension. Hematologic/Lymphatic No petechiae or purpura. No tender or palpable lymph nodes noted   Respiratory Scattered rhonchi   Cardiovascular Regular rate and rhythm of heart   Chest / Line Site Chest is symmetric with no chest wall deformities. Abdomen Non-tender, non-distended, no masses, ascites or hepatosplenomegaly. Good bowel sounds. Large dressing over abdomen with ostomy. Musculoskeletal Anasarca present   Extremities  edematous. Skin No rashes, scars, or lesions suggestive of malignancy. No petechiae, purpura, or ecchymoses. No excoriations.     Neurologic UTO   Psychiatric UTO       Labs:  Recent Results (from the past 24 hour(s))   GLUCOSE, POC    Collection Time: 08/21/18 11:56 AM   Result Value Ref Range    Glucose (POC) 193 (H) 65 - 100 mg/dL    Performed by [address] [place] [address], BLOOD, PAIRED    Collection Time: 08/21/18 12:22 PM   Result Value Ref Range    Special Requests: NO SPECIAL REQUESTS      Culture result: NO GROWTH AFTER 18 HOURS     GLUCOSE, POC    Collection Time: 08/21/18  6:04 PM   Result Value Ref Range    Glucose (POC) 175 (H) 65 - 100 mg/dL    Performed by Jenniffer Carr    GLUCOSE, POC    Collection Time: 08/21/18 11:24 PM   Result Value Ref Range    Glucose (POC) 148 (H) 65 - 100 mg/dL    Performed by Lindsay Martinez    CBC WITH AUTOMATED DIFF    Collection Time: 08/22/18  4:09 AM   Result Value Ref Range    WBC 16.2 (H) 4.1 - 11.1 K/uL    RBC 2.79 (L) 4.10 - 5.70 M/uL    HGB 7.5 (L) 12.1 - 17.0 g/dL    HCT 25.3 (L) 36.6 - 50.3 % MCV 90.7 80.0 - 99.0 FL    MCH 26.9 26.0 - 34.0 PG    MCHC 29.6 (L) 30.0 - 36.5 g/dL    RDW 24.9 (H) 11.5 - 14.5 %    PLATELET 104 417 - 165 K/uL    MPV 11.8 8.9 - 12.9 FL    NRBC 0.2 (H) 0  WBC    ABSOLUTE NRBC 0.04 (H) 0.00 - 0.01 K/uL    NEUTROPHILS 84 (H) 32 - 75 %    LYMPHOCYTES 6 (L) 12 - 49 %    MONOCYTES 8 5 - 13 %    EOSINOPHILS 1 0 - 7 %    BASOPHILS 0 0 - 1 %    IMMATURE GRANULOCYTES 1 (H) 0.0 - 0.5 %    ABS. NEUTROPHILS 13.7 (H) 1.8 - 8.0 K/UL    ABS. LYMPHOCYTES 1.0 0.8 - 3.5 K/UL    ABS. MONOCYTES 1.3 (H) 0.0 - 1.0 K/UL    ABS. EOSINOPHILS 0.1 0.0 - 0.4 K/UL    ABS. BASOPHILS 0.0 0.0 - 0.1 K/UL    ABS. IMM. GRANS. 0.2 (H) 0.00 - 0.04 K/UL    DF AUTOMATED     METABOLIC PANEL, COMPREHENSIVE    Collection Time: 08/22/18  4:09 AM   Result Value Ref Range    Sodium 141 136 - 145 mmol/L    Potassium 3.8 3.5 - 5.1 mmol/L    Chloride 107 97 - 108 mmol/L    CO2 25 21 - 32 mmol/L    Anion gap 9 5 - 15 mmol/L    Glucose 140 (H) 65 - 100 mg/dL    BUN 27 (H) 6 - 20 MG/DL    Creatinine 0.84 0.70 - 1.30 MG/DL    BUN/Creatinine ratio 32 (H) 12 - 20      GFR est AA >60 >60 ml/min/1.73m2    GFR est non-AA >60 >60 ml/min/1.73m2    Calcium 7.7 (L) 8.5 - 10.1 MG/DL    Bilirubin, total 0.5 0.2 - 1.0 MG/DL    ALT (SGPT) 29 12 - 78 U/L    AST (SGOT) 25 15 - 37 U/L    Alk.  phosphatase 108 45 - 117 U/L    Protein, total 6.2 (L) 6.4 - 8.2 g/dL    Albumin 1.5 (L) 3.5 - 5.0 g/dL    Globulin 4.7 (H) 2.0 - 4.0 g/dL    A-G Ratio 0.3 (L) 1.1 - 2.2     MAGNESIUM    Collection Time: 08/22/18  4:09 AM   Result Value Ref Range    Magnesium 2.1 1.6 - 2.4 mg/dL   PHOSPHORUS    Collection Time: 08/22/18  4:09 AM   Result Value Ref Range    Phosphorus 4.1 2.6 - 4.7 MG/DL   BLOOD GAS, ARTERIAL    Collection Time: 08/22/18  5:00 AM   Result Value Ref Range    pH 7.44 7.35 - 7.45      PCO2 35 35.0 - 45.0 mmHg    PO2 139 (H) 80 - 100 mmHg    O2 SAT 99 (H) 92 - 97 %    BICARBONATE 23 22 - 26 mmol/L    BASE DEFICIT 0.5 mmol/L    O2 METHOD VENTILATOR      FIO2 40 %    MODE A/C      Tidal volume 550      SET RATE 16      EPAP/CPAP/PEEP 6.0      Sample source ARTERIAL      SITE RIGHT RADIAL      BONNIE'S TEST YES     GLUCOSE, POC    Collection Time: 08/22/18  6:45 AM   Result Value Ref Range    Glucose (POC) 132 (H) 65 - 100 mg/dL    Performed by Anton Martin and Plan:   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 Far from this point however. Acute resp failure/Pna - on vent. Sputum culture with staph aureus and Klebsiella. Septic shock with bacteremia - staph coag neg in [facility] 8/14,  Staph epi 8/11 BC. On vancomycin    Normochromic normocytic anemia - B12  929. folate 9.8. Ferritin was 5 on 7/10/18 suggesting fairly significant iron deficiency. He got 500 mg of IV iron in July but actually has a much higher calculated deficit. Ferritin 121 so does not need further IV iron at present. Hbg 7.5 today, would transfuse for hbg <7.

## 2018-08-23 ENCOUNTER — APPOINTMENT (OUTPATIENT)
Dept: GENERAL RADIOLOGY | Age: 78
DRG: 329 | End: 2018-08-23
Attending: INTERNAL MEDICINE
Payer: MEDICARE

## 2018-08-23 ENCOUNTER — APPOINTMENT (OUTPATIENT)
Dept: CT IMAGING | Age: 78
DRG: 329 | End: 2018-08-23
Attending: INTERNAL MEDICINE
Payer: MEDICARE

## 2018-08-23 LAB
ANION GAP SERPL CALC-SCNC: 7 MMOL/L (ref 5–15)
BASOPHILS # BLD: 0 K/UL (ref 0–0.1)
BASOPHILS NFR BLD: 0 % (ref 0–1)
BUN SERPL-MCNC: 25 MG/DL (ref 6–20)
BUN/CREAT SERPL: 35 (ref 12–20)
CALCIUM SERPL-MCNC: 7.6 MG/DL (ref 8.5–10.1)
CHLORIDE SERPL-SCNC: 107 MMOL/L (ref 97–108)
CK SERPL-CCNC: 33 U/L (ref 39–308)
CO2 SERPL-SCNC: 26 MMOL/L (ref 21–32)
CREAT SERPL-MCNC: 0.72 MG/DL (ref 0.7–1.3)
DATE LAST DOSE: ABNORMAL
DIFFERENTIAL METHOD BLD: ABNORMAL
EOSINOPHIL # BLD: 0.1 K/UL (ref 0–0.4)
EOSINOPHIL NFR BLD: 1 % (ref 0–7)
ERYTHROCYTE [DISTWIDTH] IN BLOOD BY AUTOMATED COUNT: 24.3 % (ref 11.5–14.5)
GLUCOSE BLD STRIP.AUTO-MCNC: 153 MG/DL (ref 65–100)
GLUCOSE BLD STRIP.AUTO-MCNC: 165 MG/DL (ref 65–100)
GLUCOSE BLD STRIP.AUTO-MCNC: 189 MG/DL (ref 65–100)
GLUCOSE SERPL-MCNC: 163 MG/DL (ref 65–100)
HCT VFR BLD AUTO: 25.2 % (ref 36.6–50.3)
HGB BLD-MCNC: 7.6 G/DL (ref 12.1–17)
IMM GRANULOCYTES # BLD: 0.2 K/UL (ref 0–0.04)
IMM GRANULOCYTES NFR BLD AUTO: 2 % (ref 0–0.5)
LYMPHOCYTES # BLD: 1.1 K/UL (ref 0.8–3.5)
LYMPHOCYTES NFR BLD: 11 % (ref 12–49)
MAGNESIUM SERPL-MCNC: 2.1 MG/DL (ref 1.6–2.4)
MCH RBC QN AUTO: 26.7 PG (ref 26–34)
MCHC RBC AUTO-ENTMCNC: 30.2 G/DL (ref 30–36.5)
MCV RBC AUTO: 88.4 FL (ref 80–99)
MONOCYTES # BLD: 1 K/UL (ref 0–1)
MONOCYTES NFR BLD: 10 % (ref 5–13)
NEUTS SEG # BLD: 7.9 K/UL (ref 1.8–8)
NEUTS SEG NFR BLD: 76 % (ref 32–75)
NRBC # BLD: 0.03 K/UL (ref 0–0.01)
NRBC BLD-RTO: 0.3 PER 100 WBC
PHOSPHATE SERPL-MCNC: 3 MG/DL (ref 2.6–4.7)
PLATELET # BLD AUTO: 221 K/UL (ref 150–400)
PMV BLD AUTO: 11.4 FL (ref 8.9–12.9)
POTASSIUM SERPL-SCNC: 3.7 MMOL/L (ref 3.5–5.1)
RBC # BLD AUTO: 2.85 M/UL (ref 4.1–5.7)
RBC MORPH BLD: ABNORMAL
RBC MORPH BLD: ABNORMAL
REPORTED DOSE,DOSE: ABNORMAL UNITS
REPORTED DOSE/TIME,TMG: ABNORMAL
SERVICE CMNT-IMP: ABNORMAL
SODIUM SERPL-SCNC: 140 MMOL/L (ref 136–145)
VANCOMYCIN TROUGH SERPL-MCNC: >50 UG/ML (ref 5–10)
WBC # BLD AUTO: 10.3 K/UL (ref 4.1–11.1)

## 2018-08-23 PROCEDURE — 74011000258 HC RX REV CODE- 258: Performed by: INTERNAL MEDICINE

## 2018-08-23 PROCEDURE — 65610000006 HC RM INTENSIVE CARE

## 2018-08-23 PROCEDURE — 80048 BASIC METABOLIC PNL TOTAL CA: CPT | Performed by: INTERNAL MEDICINE

## 2018-08-23 PROCEDURE — 84145 PROCALCITONIN (PCT): CPT | Performed by: INTERNAL MEDICINE

## 2018-08-23 PROCEDURE — 71045 X-RAY EXAM CHEST 1 VIEW: CPT

## 2018-08-23 PROCEDURE — 94640 AIRWAY INHALATION TREATMENT: CPT

## 2018-08-23 PROCEDURE — 80202 ASSAY OF VANCOMYCIN: CPT | Performed by: INTERNAL MEDICINE

## 2018-08-23 PROCEDURE — 70450 CT HEAD/BRAIN W/O DYE: CPT

## 2018-08-23 PROCEDURE — 74011250636 HC RX REV CODE- 250/636: Performed by: INTERNAL MEDICINE

## 2018-08-23 PROCEDURE — 83735 ASSAY OF MAGNESIUM: CPT | Performed by: INTERNAL MEDICINE

## 2018-08-23 PROCEDURE — 74011000250 HC RX REV CODE- 250: Performed by: INTERNAL MEDICINE

## 2018-08-23 PROCEDURE — 36415 COLL VENOUS BLD VENIPUNCTURE: CPT | Performed by: SURGERY

## 2018-08-23 PROCEDURE — 94003 VENT MGMT INPAT SUBQ DAY: CPT

## 2018-08-23 PROCEDURE — B24BZZ4 ULTRASONOGRAPHY OF HEART WITH AORTA, TRANSESOPHAGEAL: ICD-10-PCS | Performed by: INTERNAL MEDICINE

## 2018-08-23 PROCEDURE — 74011000250 HC RX REV CODE- 250: Performed by: SURGERY

## 2018-08-23 PROCEDURE — 74011250637 HC RX REV CODE- 250/637: Performed by: INTERNAL MEDICINE

## 2018-08-23 PROCEDURE — C9113 INJ PANTOPRAZOLE SODIUM, VIA: HCPCS | Performed by: INTERNAL MEDICINE

## 2018-08-23 PROCEDURE — 74011250637 HC RX REV CODE- 250/637: Performed by: SURGERY

## 2018-08-23 PROCEDURE — 85025 COMPLETE CBC W/AUTO DIFF WBC: CPT | Performed by: SURGERY

## 2018-08-23 PROCEDURE — 74011636637 HC RX REV CODE- 636/637: Performed by: INTERNAL MEDICINE

## 2018-08-23 PROCEDURE — 93312 ECHO TRANSESOPHAGEAL: CPT

## 2018-08-23 PROCEDURE — 77030013797 HC KT TRNSDUC PRSSR EDWD -A

## 2018-08-23 PROCEDURE — 82550 ASSAY OF CK (CPK): CPT | Performed by: INTERNAL MEDICINE

## 2018-08-23 PROCEDURE — 82962 GLUCOSE BLOOD TEST: CPT

## 2018-08-23 PROCEDURE — 84100 ASSAY OF PHOSPHORUS: CPT | Performed by: INTERNAL MEDICINE

## 2018-08-23 PROCEDURE — 77030018798 HC PMP KT ENTRL FED COVD -A

## 2018-08-23 RX ADMIN — IPRATROPIUM BROMIDE AND ALBUTEROL SULFATE 3 ML: .5; 3 SOLUTION RESPIRATORY (INHALATION) at 07:09

## 2018-08-23 RX ADMIN — CARBAMAZEPINE 100 MG: 100 SUSPENSION ORAL at 14:52

## 2018-08-23 RX ADMIN — SODIUM CHLORIDE 10 ML: 9 INJECTION, SOLUTION INTRAMUSCULAR; INTRAVENOUS; SUBCUTANEOUS at 14:53

## 2018-08-23 RX ADMIN — LACTULOSE 10 G: 20 SOLUTION ORAL at 18:23

## 2018-08-23 RX ADMIN — INSULIN LISPRO 3 UNITS: 100 INJECTION, SOLUTION INTRAVENOUS; SUBCUTANEOUS at 23:58

## 2018-08-23 RX ADMIN — ENOXAPARIN SODIUM 120 MG: 100 INJECTION SUBCUTANEOUS at 22:01

## 2018-08-23 RX ADMIN — POLYETHYLENE GLYCOL 3350 17 G: 17 POWDER, FOR SOLUTION ORAL at 11:45

## 2018-08-23 RX ADMIN — SODIUM CHLORIDE 10 ML: 9 INJECTION, SOLUTION INTRAMUSCULAR; INTRAVENOUS; SUBCUTANEOUS at 21:58

## 2018-08-23 RX ADMIN — Medication 15 MCG/MIN: at 20:00

## 2018-08-23 RX ADMIN — ENOXAPARIN SODIUM 120 MG: 100 INJECTION SUBCUTANEOUS at 11:44

## 2018-08-23 RX ADMIN — SODIUM CHLORIDE 40 MG: 9 INJECTION INTRAMUSCULAR; INTRAVENOUS; SUBCUTANEOUS at 09:33

## 2018-08-23 RX ADMIN — CHLORHEXIDINE GLUCONATE 15 ML: 1.2 RINSE ORAL at 09:33

## 2018-08-23 RX ADMIN — MELATONIN TAB 3 MG 3 MG: 3 TAB at 22:37

## 2018-08-23 RX ADMIN — IPRATROPIUM BROMIDE AND ALBUTEROL SULFATE 3 ML: .5; 3 SOLUTION RESPIRATORY (INHALATION) at 20:25

## 2018-08-23 RX ADMIN — VENLAFAXINE 25 MG: 25 TABLET ORAL at 09:33

## 2018-08-23 RX ADMIN — LACTULOSE 10 G: 20 SOLUTION ORAL at 11:44

## 2018-08-23 RX ADMIN — BENZOCAINE, BUTAMBEN, AND TETRACAINE HYDROCHLORIDE 1 SPRAY: .028; .004; .004 AEROSOL, SPRAY TOPICAL at 10:07

## 2018-08-23 RX ADMIN — FUROSEMIDE 60 MG: 10 INJECTION, SOLUTION INTRAMUSCULAR; INTRAVENOUS at 20:27

## 2018-08-23 RX ADMIN — CARBAMAZEPINE 100 MG: 100 SUSPENSION ORAL at 18:23

## 2018-08-23 RX ADMIN — CHLORHEXIDINE GLUCONATE 15 ML: 1.2 RINSE ORAL at 20:09

## 2018-08-23 RX ADMIN — DAPTOMYCIN 1000 MG: 500 INJECTION, POWDER, LYOPHILIZED, FOR SOLUTION INTRAVENOUS at 11:44

## 2018-08-23 RX ADMIN — VENLAFAXINE 25 MG: 25 TABLET ORAL at 18:22

## 2018-08-23 RX ADMIN — CARBAMAZEPINE 100 MG: 100 SUSPENSION ORAL at 09:41

## 2018-08-23 RX ADMIN — FUROSEMIDE 60 MG: 10 INJECTION, SOLUTION INTRAMUSCULAR; INTRAVENOUS at 09:33

## 2018-08-23 RX ADMIN — VENLAFAXINE 25 MG: 25 TABLET ORAL at 22:37

## 2018-08-23 RX ADMIN — INSULIN LISPRO 3 UNITS: 100 INJECTION, SOLUTION INTRAVENOUS; SUBCUTANEOUS at 18:19

## 2018-08-23 RX ADMIN — AMIODARONE HYDROCHLORIDE 200 MG: 200 TABLET ORAL at 09:33

## 2018-08-23 RX ADMIN — VANCOMYCIN HYDROCHLORIDE: 10 INJECTION, POWDER, LYOPHILIZED, FOR SOLUTION INTRAVENOUS at 05:47

## 2018-08-23 RX ADMIN — IPRATROPIUM BROMIDE AND ALBUTEROL SULFATE 3 ML: .5; 3 SOLUTION RESPIRATORY (INHALATION) at 11:17

## 2018-08-23 RX ADMIN — ACETAMINOPHEN 650 MG: 160 SUSPENSION ORAL at 04:25

## 2018-08-23 RX ADMIN — IPRATROPIUM BROMIDE AND ALBUTEROL SULFATE 3 ML: .5; 3 SOLUTION RESPIRATORY (INHALATION) at 15:02

## 2018-08-23 RX ADMIN — SODIUM CHLORIDE 10 ML: 9 INJECTION, SOLUTION INTRAMUSCULAR; INTRAVENOUS; SUBCUTANEOUS at 05:37

## 2018-08-23 RX ADMIN — INSULIN LISPRO 3 UNITS: 100 INJECTION, SOLUTION INTRAVENOUS; SUBCUTANEOUS at 12:43

## 2018-08-23 RX ADMIN — CARBAMAZEPINE 100 MG: 100 SUSPENSION ORAL at 22:34

## 2018-08-23 RX ADMIN — INSULIN LISPRO 3 UNITS: 100 INJECTION, SOLUTION INTRAVENOUS; SUBCUTANEOUS at 05:47

## 2018-08-23 NOTE — PROGRESS NOTES
8/23/2018 10:27 AM    Admit Date: 7/10/2018    Admit Diagnosis: Acute blood loss anemia;GI bleed; Anasarca;GI Bleed;gi bleed*    Subjective:     Indy Both remains vent and pressor dependant. Was called to see him again to assess for NATASHA due to persistent +ve blood cultures, pacemaker and previously reported SVC thrombus.      Visit Vitals    /61    Pulse 78    Temp 99.1 °F (37.3 °C)    Resp 14    Ht 6' 2\" (1.88 m)    Wt 291 lb 10.7 oz (132.3 kg)    SpO2 100%    BMI 37.45 kg/m2     Current Facility-Administered Medications   Medication Dose Route Frequency    DAPTOmycin (CUBICIN) 1,000 mg in 0.9% sodium chloride 50 mL IVPB RF formulation  1,000 mg IntraVENous Q24H    PHENYLephrine (PF)(LILLY-SYNEPHRINE) 30 mg in 0.9% sodium chloride 250 mL infusion   mcg/min IntraVENous TITRATE    furosemide (LASIX) injection 60 mg  60 mg IntraVENous Q12H    enoxaparin (LOVENOX) injection 120 mg  120 mg SubCUTAneous Q12H    LORazepam (ATIVAN) injection 2 mg  2 mg IntraVENous Q2H PRN    acetaminophen (TYLENOL) solution 650 mg  650 mg Oral Q4H PRN    fentaNYL citrate (PF) injection 100 mcg  100 mcg IntraVENous Q2H PRN    insulin lispro (HUMALOG) injection   SubCUTAneous Q6H    glucose chewable tablet 16 g  4 Tab Oral PRN    dextrose (D50W) injection syrg 12.5-25 g  12.5-25 g IntraVENous PRN    glucagon (GLUCAGEN) injection 1 mg  1 mg IntraMUSCular PRN    venlafaxine (EFFEXOR) tablet 25 mg  25 mg Oral TID    pantoprazole (PROTONIX) 40 mg in sodium chloride 0.9% 10 mL injection  40 mg IntraVENous DAILY    propofol (DIPRIVAN) infusion  0-50 mcg/kg/min IntraVENous TITRATE    carBAMazepine (TEGretol) 200 mg/10 mL suspension 100 mg  100 mg PEG Tube QID    chlorhexidine (PERIDEX) 0.12 % mouthwash 15 mL  15 mL Oral Q12H    albuterol-ipratropium (DUO-NEB) 2.5 MG-0.5 MG/3 ML  3 mL Nebulization QID RT    albuterol-ipratropium (DUO-NEB) 2.5 MG-0.5 MG/3 ML  3 mL Nebulization Q6H PRN    amiodarone (CORDARONE) tablet 200 mg  200 mg Oral DAILY    prochlorperazine (COMPAZINE) with saline injection 5 mg  5 mg IntraVENous Q6H PRN    lactulose (CHRONULAC) solution 10 g  10 g Oral BID    sodium chloride (NS) flush 10-30 mL  10-30 mL InterCATHeter PRN    sodium chloride (NS) flush 10 mL  10 mL InterCATHeter PRN    sodium chloride (NS) flush 10-40 mL  10-40 mL InterCATHeter Q8H    sodium chloride (NS) flush 10 mL  10 mL InterCATHeter Q24H    melatonin tablet 3 mg  3 mg Oral QHS    polyethylene glycol (MIRALAX) packet 17 g  17 g Oral DAILY    hydrALAZINE (APRESOLINE) 20 mg/mL injection 10 mg  10 mg IntraVENous Q6H PRN    oxyCODONE IR (ROXICODONE) tablet 5 mg  5 mg Oral Q4H PRN    oxyCODONE IR (ROXICODONE) tablet 10 mg  10 mg Oral Q4H PRN    sodium chloride (NS) flush 5-10 mL  5-10 mL IntraVENous PRN    acetaminophen (TYLENOL) tablet 650 mg  650 mg Oral Q6H PRN    ondansetron (ZOFRAN) injection 4 mg  4 mg IntraVENous Q6H PRN         Objective:      Visit Vitals    /61    Pulse 78    Temp 99.1 °F (37.3 °C)    Resp 14    Ht 6' 2\" (1.88 m)    Wt 291 lb 10.7 oz (132.3 kg)    SpO2 100%    BMI 37.45 kg/m2       Physical Exam:  Abdomen: soft, non-tender.  Bowel sounds normal.   Extremities: no cyanosis, 1+ edema  Heart: regular rate and rhythm, S1, S2 normal, no murmur, click, rub or gallop  Lungs: clear to auscultation bilaterally  Neurologic: drowsy    Data Review:   Labs:    Recent Results (from the past 24 hour(s))   GLUCOSE, POC    Collection Time: 08/22/18  2:12 PM   Result Value Ref Range    Glucose (POC) 166 (H) 65 - 100 mg/dL    Performed by [id number], POC    Collection Time: 08/22/18  6:30 PM   Result Value Ref Range    Glucose (POC) 151 (H) 65 - 100 mg/dL    Performed by [address], POC    Collection Time: 08/22/18 11:38 PM   Result Value Ref Range    Glucose (POC) 177 (H) 65 - 100 mg/dL    Performed by Laisha Duarte    CBC WITH AUTOMATED DIFF Collection Time: 08/23/18  3:42 AM   Result Value Ref Range    WBC 10.3 4.1 - 11.1 K/uL    RBC 2.85 (L) 4.10 - 5.70 M/uL    HGB 7.6 (L) 12.1 - 17.0 g/dL    HCT 25.2 (L) 36.6 - 50.3 %    MCV 88.4 80.0 - 99.0 FL    MCH 26.7 26.0 - 34.0 PG    MCHC 30.2 30.0 - 36.5 g/dL    RDW 24.3 (H) 11.5 - 14.5 %    PLATELET 515 097 - 213 K/uL    MPV 11.4 8.9 - 12.9 FL    NRBC 0.3 (H) 0  WBC    ABSOLUTE NRBC 0.03 (H) 0.00 - 0.01 K/uL    NEUTROPHILS 76 (H) 32 - 75 %    LYMPHOCYTES 11 (L) 12 - 49 %    MONOCYTES 10 5 - 13 %    EOSINOPHILS 1 0 - 7 %    BASOPHILS 0 0 - 1 %    IMMATURE GRANULOCYTES 2 (H) 0.0 - 0.5 %    ABS. NEUTROPHILS 7.9 1.8 - 8.0 K/UL    ABS. LYMPHOCYTES 1.1 0.8 - 3.5 K/UL    ABS. MONOCYTES 1.0 0.0 - 1.0 K/UL    ABS. EOSINOPHILS 0.1 0.0 - 0.4 K/UL    ABS. BASOPHILS 0.0 0.0 - 0.1 K/UL    ABS. IMM.  GRANS. 0.2 (H) 0.00 - 0.04 K/UL    DF AUTOMATED      RBC COMMENTS ANISOCYTOSIS  3+        RBC COMMENTS HYPOCHROMIA  1+       VANCOMYCIN, TROUGH    Collection Time: 08/23/18  5:29 AM   Result Value Ref Range    Vancomycin,trough >50.0 (HH) 5.0 - 10.0 ug/mL    Reported dose date: NOT PROVIDED      Reported dose time: NOT PROVIDED      Reported dose: NOT PROVIDED UNITS   GLUCOSE, POC    Collection Time: 08/23/18  5:42 AM   Result Value Ref Range    Glucose (POC) 165 (H) 65 - 100 mg/dL    Performed by Mequon Ok    METABOLIC PANEL, BASIC    Collection Time: 08/23/18  7:17 AM   Result Value Ref Range    Sodium 140 136 - 145 mmol/L    Potassium 3.7 3.5 - 5.1 mmol/L    Chloride 107 97 - 108 mmol/L    CO2 26 21 - 32 mmol/L    Anion gap 7 5 - 15 mmol/L    Glucose 163 (H) 65 - 100 mg/dL    BUN 25 (H) 6 - 20 MG/DL    Creatinine 0.72 0.70 - 1.30 MG/DL    BUN/Creatinine ratio 35 (H) 12 - 20      GFR est AA >60 >60 ml/min/1.73m2    GFR est non-AA >60 >60 ml/min/1.73m2    Calcium 7.6 (L) 8.5 - 10.1 MG/DL   MAGNESIUM    Collection Time: 08/23/18  7:17 AM   Result Value Ref Range    Magnesium 2.1 1.6 - 2.4 mg/dL   PHOSPHORUS Collection Time: 08/23/18  7:17 AM   Result Value Ref Range    Phosphorus 3.0 2.6 - 4.7 MG/DL       Telemetry: paced      Assessment:     Principal Problem:    GI bleed (7/10/2018)    Active Problems:    Acute blood loss anemia (7/10/2018)      Anasarca (7/10/2018)        Plan:     On NATASHA echo density noted attached to pacer leads in SVC and right atrial area. Will d/w EP regarding removing pacer. Continue current meds. A. Fib: s/p ablation and PPM. On amio and AC.

## 2018-08-23 NOTE — PROGRESS NOTES
CRS Progress note    Has been off sedation and still minimally responsive. Tolerating SBT well. Continues to tolerate tube feeds. Ostomy functioning      /54 (BP 1 Location: Left arm, BP Patient Position: At rest)  Pulse 84  Temp 99.5 °F (37.5 °C)  Resp 29  Ht 6' 2\" (1.88 m)  Wt 132.3 kg (291 lb 10.7 oz)  SpO2 99%  BMI 37.45 kg/m2    Intubated/sedated  Abd soft, ostomy with stool in bag      Plan  -Possible extubation vs trach. Passing SBT  -Continue tube feeds at goal.  He will likely need PEG at some point.   Will consult GI for this

## 2018-08-23 NOTE — PROGRESS NOTES
Critical care interdisciplinary rounds held on [id number]. Following members present, Pharmacy, Diabetes Treatment, Case Management, Occupational Therapy, Physical Therapy, Pastoral Care  and Nutrition. Remains on vent. Head CT completed this morning. NATASHA planned for today. Plan of care discussed. See clinical pathway fo plan of care and interventions and desired outcomes.

## 2018-08-23 NOTE — PROGRESS NOTES
Shift Summary    Report received at bedside from offgoing nurse. Pt not sedated; responds to pain, opens eyes spontaneously. Pt intubated, sats maintained >96%. Pt on jose de jesus with paced rhythm, frequent PVCs. Pt febrile x1, fever reduced after 650 mg tylenol. Pt on tube feeds at goal, ostomy with stool. Pt with flannery, adequate output. Pt vanc trough sent before morning dose. Report given to oncoming nurse at bedside.

## 2018-08-23 NOTE — PROGRESS NOTES
Hematology Oncology Progress Note       Follow up for: IJ/SVC thrombus    Chart notes reviewed since last visit. Case discussed with following: no family at bedside at present.      Patient complains of the following: Remains on ventilator today    Additional concerns noted by the staff: none    Patient Vitals for the past 24 hrs:   BP Temp Pulse Resp SpO2 Weight   08/23/18 1136 - - - - - 134.4 kg (296 lb 4.8 oz)   08/23/18 1117 - - - - 100 % -   08/23/18 1020 135/61 - 78 14 100 % -   08/23/18 1015 118/51 - 76 15 100 % -   08/23/18 1010 111/56 - 76 19 100 % -   08/23/18 1005 100/47 - 76 16 99 % -   08/23/18 1000 96/47 - 76 16 99 % -   08/23/18 0955 101/50 - 77 16 100 % -   08/23/18 0950 107/53 - 76 16 100 % -   08/23/18 0947 108/52 - 83 16 100 % -   08/23/18 0909 111/45 - 82 18 99 % -   08/23/18 0800 130/46 99.1 °F (37.3 °C) 87 (!) 31 99 % -   08/23/18 0709 - - - - 99 % -   08/23/18 0703 - - 84 29 100 % -   08/23/18 0700 111/54 - 80 30 99 % -   08/23/18 0600 118/54 99.5 °F (37.5 °C) 82 20 99 % -   08/23/18 0500 98/51 - 80 16 100 % -   08/23/18 0400 118/54 100.2 °F (37.9 °C) 81 20 99 % -   08/23/18 0314 - - 80 16 99 % -   08/23/18 0300 90/42 - 78 16 99 % -   08/23/18 0200 123/57 - 86 23 100 % -   08/23/18 0100 102/58 - 83 19 99 % -   08/23/18 0000 - - 86 22 99 % -   08/22/18 2335 - - 88 18 99 % -   08/22/18 2300 119/57 - 84 25 99 % -   08/22/18 2200 112/63 - 82 21 99 % -   08/22/18 2100 110/59 - 77 24 100 % -   08/22/18 2054 106/52 - 79 - - -   08/22/18 2000 107/56 99.5 °F (37.5 °C) 75 16 100 % -   08/22/18 1935 - - - 17 99 % -   08/22/18 1900 99/63 - 79 20 100 % -   08/22/18 1800 95/52 - 77 16 99 % -   08/22/18 1700 111/59 - 81 21 100 % -   08/22/18 1600 102/57 98.8 °F (37.1 °C) 83 21 99 % -   08/22/18 1550 - - 84 22 99 % -   08/22/18 1500 94/52 98.8 °F (37.1 °C) 78 19 99 % -   08/22/18 1425 121/61 - 75 - - -   08/22/18 1400 121/61 - 81 23 99 % -   08/22/18 1300 121/67 - 78 20 99 % -       ROS not obtainable - pt obtunded and on vent. Physical Examination:  Constitutional obtunded. Appears close to chronological age. Overweight male intubated. Head Normocephalic; no scars   Eyes Eyes closed   ENMT Intubated. Neck Supple without masses or thyromegaly. No jugular venous distension. Hematologic/Lymphatic No petechiae or purpura. No tender or palpable lymph nodes noted   Respiratory Scattered rhonchi   Cardiovascular Regular rate and rhythm of heart   Chest / Line Site Chest is symmetric with no chest wall deformities. Abdomen Non-tender, non-distended, no masses, ascites or hepatosplenomegaly. Good bowel sounds. Large dressing over abdomen with ostomy. Musculoskeletal Anasarca present   Extremities  edematous. Skin No rashes, scars, or lesions suggestive of malignancy. No petechiae, purpura, or ecchymoses. No excoriations.     Neurologic UTO   Psychiatric UTO       Labs:  Recent Results (from the past 24 hour(s))   GLUCOSE, POC    Collection Time: 08/22/18  2:12 PM   Result Value Ref Range    Glucose (POC) 166 (H) 65 - 100 mg/dL    Performed by [id number], POC    Collection Time: 08/22/18  6:30 PM   Result Value Ref Range    Glucose (POC) 151 (H) 65 - 100 mg/dL    Performed by [address], POC    Collection Time: 08/22/18 11:38 PM   Result Value Ref Range    Glucose (POC) 177 (H) 65 - 100 mg/dL    Performed by Zach Pagan    CBC WITH AUTOMATED DIFF    Collection Time: 08/23/18  3:42 AM   Result Value Ref Range    WBC 10.3 4.1 - 11.1 K/uL    RBC 2.85 (L) 4.10 - 5.70 M/uL    HGB 7.6 (L) 12.1 - 17.0 g/dL    HCT 25.2 (L) 36.6 - 50.3 %    MCV 88.4 80.0 - 99.0 FL    MCH 26.7 26.0 - 34.0 PG    MCHC 30.2 30.0 - 36.5 g/dL    RDW 24.3 (H) 11.5 - 14.5 %    PLATELET 010 909 - 546 K/uL    MPV 11.4 8.9 - 12.9 FL    NRBC 0.3 (H) 0  WBC    ABSOLUTE NRBC 0.03 (H) 0.00 - 0.01 K/uL    NEUTROPHILS 76 (H) 32 - 75 %    LYMPHOCYTES 11 (L) 12 - 49 %    MONOCYTES 10 5 - 13 %    EOSINOPHILS 1 0 - 7 % BASOPHILS 0 0 - 1 %    IMMATURE GRANULOCYTES 2 (H) 0.0 - 0.5 %    ABS. NEUTROPHILS 7.9 1.8 - 8.0 K/UL    ABS. LYMPHOCYTES 1.1 0.8 - 3.5 K/UL    ABS. MONOCYTES 1.0 0.0 - 1.0 K/UL    ABS. EOSINOPHILS 0.1 0.0 - 0.4 K/UL    ABS. BASOPHILS 0.0 0.0 - 0.1 K/UL    ABS. IMM. GRANS. 0.2 (H) 0.00 - 0.04 K/UL    DF AUTOMATED      RBC COMMENTS ANISOCYTOSIS  3+        RBC COMMENTS HYPOCHROMIA  1+       VANCOMYCIN, TROUGH    Collection Time: 08/23/18  5:29 AM   Result Value Ref Range    Vancomycin,trough >50.0 (HH) 5.0 - 10.0 ug/mL    Reported dose date: NOT PROVIDED      Reported dose time: NOT PROVIDED      Reported dose: NOT PROVIDED UNITS   GLUCOSE, POC    Collection Time: 08/23/18  5:42 AM   Result Value Ref Range    Glucose (POC) 165 (H) 65 - 100 mg/dL    Performed by [facility]    METABOLIC PANEL, BASIC    Collection Time: 08/23/18  7:17 AM   Result Value Ref Range    Sodium 140 136 - 145 mmol/L    Potassium 3.7 3.5 - 5.1 mmol/L    Chloride 107 97 - 108 mmol/L    CO2 26 21 - 32 mmol/L    Anion gap 7 5 - 15 mmol/L    Glucose 163 (H) 65 - 100 mg/dL    BUN 25 (H) 6 - 20 MG/DL    Creatinine 0.72 0.70 - 1.30 MG/DL    BUN/Creatinine ratio 35 (H) 12 - 20      GFR est AA >60 >60 ml/min/1.73m2    GFR est non-AA >60 >60 ml/min/1.73m2    Calcium 7.6 (L) 8.5 - 10.1 MG/DL   MAGNESIUM    Collection Time: 08/23/18  7:17 AM   Result Value Ref Range    Magnesium 2.1 1.6 - 2.4 mg/dL   PHOSPHORUS    Collection Time: 08/23/18  7:17 AM   Result Value Ref Range    Phosphorus 3.0 2.6 - 4.7 MG/DL   GLUCOSE, POC    Collection Time: 08/23/18 11:35 AM   Result Value Ref Range    Glucose (POC) 153 (H) 65 - 100 mg/dL    Performed by Gilberto Contreras      Assessment and Plan:   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 Transfuse hbg <7    Stage IIIB colon cancer - s/p resection, if patient survives hospital stay and gets rehabbed can discuss adjuvant therapy later down the road. Far from this point however.     Acute resp failure/Pna - on vent. Sputum culture with staph aureus and Klebsiella. Septic shock with persistent bacteremia - staph coag neg in [facility] 8/14,  Staph epi 8/11 BC. On vancomycin. Planning NATASHA to look for endocarditis    Normochromic normocytic anemia - B12  929. folate 9.8. Ferritin was 5 on 7/10/18 suggesting fairly significant iron deficiency. He got 500 mg of IV iron in July but actually has a much higher calculated deficit. Ferritin 121 so does not need further IV iron at present. would transfuse for hbg <7.

## 2018-08-23 NOTE — PROGRESS NOTES
Attended Interdisciplinary rounds in Critical Care Unit, where patient care was discussed. Visit by: Alex Hamm. Alejandra Trotter.  Johnson Grossman MA, [address]

## 2018-08-23 NOTE — PROGRESS NOTES
08/23/18 0627 08/23/18 0642   ABCDEF Bundle   SBT Safety Screen Passed Yes --    SBT Trial Passed --  Yes   Weaning Parameters   Spontaneous Breathing Trial Complete --  Yes   Resp Rate Observed 20 29   Ve 5.7 11.2    407   RSBI 70 74   Patient remains on CPAP+6, PS 6, 40%

## 2018-08-23 NOTE — CONSULTS
Gastroenterology Consultation Note  Dr. Jessica Nunes    NAME: Radha Arboledack : 1940 MRN: [id number]   PCP: Luann Durham NP  Date/Time:  2018 8:15 AM  Subjective:   REASON FOR CONSULT:      Julian Berg is a 69 y.o.  male who I was asked to see for possible PEG placement. Patient known from recently diagnosed colorectal CA dx on colonoscopy via ostomy stoma on 18 in the ascending colon. Subsequently underwent open resection of Colon CA on 18 with Dr. Reena Grijalva with  LN + on path and post op course complicated by an aspiration pneumonia and remains intubated off sedation with poor neurologic function. Also he has repeatedly positive blood cultures for Staph Epi and is on IV Vancomycin. He is receiving tube feedings via OG tube currently maintained at 50 ml/hr and no residuals per nursing. We are asked to consider possible PEG tube placement.       Past Medical History:   Diagnosis Date    A-fib [facility])     Acute bronchitis 2015    Anxiety     Arrhythmia     atrial fibrillation    Arthritis     BCC (basal cell carcinoma of skin)     BPH (benign prostatic hyperplasia)     Chronic back pain greater than 3 months duration 2015    Chronic right shoulder pain 2016    Common wart 2016    Constipation 2012    CVA (cerebral infarction) 2015    Depression     GERD (gastroesophageal reflux disease)     Hearing loss     bilateral hearing aids    Hemiplegia following CVA (cerebrovascular accident) ([address]     left side hemiparesis    History of colostomy     HTN (hypertension)     Hyperlipidemia     Localized epilepsy with impairment of consciousness ([address]     Prostate cancer ([address]     Status post XRT, Lupron    Screening for colon cancer 2015    Stroke [facility])     traumatic from neck crushing    TBI (traumatic brain injury) ([address]     Unspecified sleep apnea     on CPAP      Past Surgical History:   Procedure Laterality Date  COLONOSCOPY N/A 7/12/2018    COLONOSCOPY through stoma performed by London Madrid MD at [facility] ENDOSCOPY    HX ANKLE FRACTURE TX      HX CATARACT REMOVAL      bilat    HX COLECTOMY      colostomy placed and revised    HX HEENT      ear surgery eddie.  HX HERNIA REPAIR      HX ORTHOPAEDIC      left hip pinning    HX PACEMAKER  2014     Social History   Substance Use Topics    Smoking status: Former Smoker     Years: 10.00     Types: Pipe     Quit date: 1/29/1990    Smokeless tobacco: Never Used      Comment: QUIT 1970'S    Alcohol use No      Family History   Problem Relation Age of Onset    Alzheimer Mother      dec [de-identified]    Cancer Father      dec 76 kidney    Heart Disease Brother     No Known Problems Son       Allergies   Allergen Reactions    Ciprofibrate Hives      Home Medications:  Prior to Admission Medications   Prescriptions Last Dose Informant Patient Reported? Taking? DOC-Q-LACE 100 mg capsule 7/9/2018 at Unknown time Significant Other No Yes   Sig: TAKE ONE CAPSULE BY MOUTH TWICE A DAY   PRADAXA 150 mg capsule 7/10/2018 at Unknown time Significant Other No Yes   Sig: TAKE ONE CAPSULE BY MOUTH EVERY 12 HOURS   acetaminophen (TYLENOL) 500 mg tablet 7/8/2018 at Unknown time Significant Other Yes Yes   Sig: Take 1,000 mg by mouth every six (6) hours as needed for Pain. amLODIPine (NORVASC) 10 mg tablet 7/9/2018 at Unknown time Significant Other Yes Yes   Sig: Take 10 mg by mouth daily (with lunch). amiodarone (CORDARONE) 200 mg tablet 7/10/2018 at Unknown time Significant Other No Yes   Sig: TAKE ONE TABLET BY MOUTH DAILY   atorvastatin (LIPITOR) 20 mg tablet 7/9/2018 at Unknown time Significant Other Yes Yes   Sig: Take 20 mg by mouth daily (with dinner). betamethasone dipropionate (DIPROSONE) 0.05 % topical cream Not Taking at Unknown time Significant Other No No   Sig: Apply  to affected area as needed.    carBAMazepine XR (TEGRETOL XR) 200 mg SR tablet 7/10/2018 at Unknown time Significant Other Yes Yes   Sig: Take 200 mg by mouth two (2) times a day. YOVANI, Brand only   cholecalciferol (VITAMIN D3) 1,000 unit cap 7/9/2018 at Unknown time Significant Other Yes Yes   Sig: Take 1,000 Units by mouth daily (with lunch). finasteride (PROSCAR) 5 mg tablet 7/9/2018 at Unknown time Significant Other Yes Yes   Sig: Take 5 mg by mouth daily. lactulose (CHRONULAC) 10 gram/15 mL solution Not Taking at Unknown time Significant Other No No   Sig: TAKE 1 TEASPOONFUL (5 ML) BY MOUTH THREE TIMES AS DAY AS NEEDED  Indications: constipation   lisinopril (PRINIVIL, ZESTRIL) 40 mg tablet 7/10/2018 at Unknown time Significant Other No Yes   Sig: TAKE ONE TABLET BY MOUTH DAILY   lutein 20 mg tab 7/9/2018 at Unknown time Significant Other Yes Yes   Sig: Take 1 Tab by mouth daily. magnesium hydroxide (SOTO MILK OF MAGNESIA) 400 mg/5 mL suspension 7/7/2018 at Unknown time Significant Other Yes Yes   Sig: Take 30 mL by mouth daily as needed for Constipation. meclizine (ANTIVERT) 25 mg tablet 7/10/2018 at Unknown time Significant Other Yes Yes   Sig: Take 25 mg by mouth every eight (8) hours as needed for Nausea. take every 8 hours as needed for nausea   metoprolol tartrate (LOPRESSOR) 25 mg tablet   No No   Sig: TAKE ONE-HALF TABLET BY MOUTH TWICE A DAY   senna (SENNA) 8.6 mg tablet 7/9/2018 at Unknown time Significant Other Yes Yes   Sig: Take 1 tablet by mouth daily. venlafaxine-SR (EFFEXOR-XR) 75 mg capsule   No No   Sig: TAKE ONE CAPSULE BY MOUTH DAILY   zolpidem (AMBIEN) 10 mg tablet Not Taking at Unknown time Significant Other No No   Sig: Take 1 Tab by mouth nightly as needed for Sleep (please take in the bed).       Facility-Administered Medications: None     Hospital medications:  Current Facility-Administered Medications   Medication Dose Route Frequency    PHENYLephrine (PF)(LILLY-SYNEPHRINE) 30 mg in 0.9% sodium chloride 250 mL infusion   mcg/min IntraVENous TITRATE    furosemide (LASIX) injection 60 mg  60 mg IntraVENous Q12H    enoxaparin (LOVENOX) injection 120 mg  120 mg SubCUTAneous Q12H    LORazepam (ATIVAN) injection 2 mg  2 mg IntraVENous Q2H PRN    acetaminophen (TYLENOL) solution 650 mg  650 mg Oral Q4H PRN    vancomycin 1,500 mg in 0.9% sodium chloride 250 mL IVPB   IntraVENous Q12H    fentaNYL citrate (PF) injection 100 mcg  100 mcg IntraVENous Q2H PRN    insulin lispro (HUMALOG) injection   SubCUTAneous Q6H    glucose chewable tablet 16 g  4 Tab Oral PRN    dextrose (D50W) injection syrg 12.5-25 g  12.5-25 g IntraVENous PRN    glucagon (GLUCAGEN) injection 1 mg  1 mg IntraMUSCular PRN    venlafaxine (EFFEXOR) tablet 25 mg  25 mg Oral TID    pantoprazole (PROTONIX) 40 mg in sodium chloride 0.9% 10 mL injection  40 mg IntraVENous DAILY    propofol (DIPRIVAN) infusion  0-50 mcg/kg/min IntraVENous TITRATE    carBAMazepine (TEGretol) 200 mg/10 mL suspension 100 mg  100 mg PEG Tube QID    chlorhexidine (PERIDEX) 0.12 % mouthwash 15 mL  15 mL Oral Q12H    albuterol-ipratropium (DUO-NEB) 2.5 MG-0.5 MG/3 ML  3 mL Nebulization QID RT    albuterol-ipratropium (DUO-NEB) 2.5 MG-0.5 MG/3 ML  3 mL Nebulization Q6H PRN    amiodarone (CORDARONE) tablet 200 mg  200 mg Oral DAILY    prochlorperazine (COMPAZINE) with saline injection 5 mg  5 mg IntraVENous Q6H PRN    lactulose (CHRONULAC) solution 10 g  10 g Oral BID    sodium chloride (NS) flush 10-30 mL  10-30 mL InterCATHeter PRN    sodium chloride (NS) flush 10 mL  10 mL InterCATHeter PRN    sodium chloride (NS) flush 10-40 mL  10-40 mL InterCATHeter Q8H    sodium chloride (NS) flush 10 mL  10 mL InterCATHeter Q24H    melatonin tablet 3 mg  3 mg Oral QHS    polyethylene glycol (MIRALAX) packet 17 g  17 g Oral DAILY    hydrALAZINE (APRESOLINE) 20 mg/mL injection 10 mg  10 mg IntraVENous Q6H PRN    oxyCODONE IR (ROXICODONE) tablet 5 mg  5 mg Oral Q4H PRN    oxyCODONE IR (ROXICODONE) tablet 10 mg  10 mg Oral Q4H PRN    sodium chloride (NS) flush 5-10 mL  5-10 mL IntraVENous PRN    acetaminophen (TYLENOL) tablet 650 mg  650 mg Oral Q6H PRN    ondansetron (ZOFRAN) injection 4 mg  4 mg IntraVENous Q6H PRN     REVIEW OF SYSTEMS:     [x]     Unable to obtain  ROS due to  [x]    mental status change  []    sedated   []    intubated  Review of Systems -   Objective:   VITALS:    Visit Vitals    /54 (BP 1 Location: Left arm, BP Patient Position: At rest)    Pulse 84    Temp 99.5 °F (37.5 °C)    Resp 29    Ht 6' 2\" (1.88 m)    Wt 132.3 kg (291 lb 10.7 oz)    SpO2 99%    BMI 37.45 kg/m2     Temp (24hrs), Av.3 °F (37.4 °C), Min:98.8 °F (37.1 °C), Max:100.2 °F (37.9 °C)    PHYSICAL EXAM:   General:    Elderly WM lying in bed with ETT in place   Head:   Normocephalic, without obvious abnormality, atraumatic. Eyes:   Conjunctivae clear, anicteric sclerae. Nose:  Nares normal. No drainage or sinus tenderness. Throat:    Lips, mucosa, and tongue normal.   Neck:  Supple, symmetrical,  no adenopathy, thyroid: non tender  Lungs:   CTA bilaterally. No wheezing/rhonchi/rales. Heart:   Regular rate and rhythm,  no murmur, rub or gallop. Abdomen:   Soft, obese, healed midline incision, ostomy with liquid brown stool present  Rectal:  deferred  Extremities: No cyanosis. No edema. No clubbing  Skin:     Texture, turgor normal. No rashes/lesions/jaundice  Lymph: Cervical, supraclavicular normal.  Psych:  Not anxious or agitated. Neurologic: Intubated but not sedated, not responding to verbal commands    LAB DATA REVIEWED:    Lab Results   Component Value Date/Time    WBC 10.3 2018 03:42 AM    WBC 6.6 2012 10:28 AM    HGB 7.6 (L) 2018 03:42 AM    HCT 25.2 (L) 2018 03:42 AM    PLATELET 610 04/10/6488 03:42 AM    MCV 88.4 2018 03:42 AM     Lab Results   Component Value Date/Time    ALT (SGPT) 29 2018 04:09 AM    AST (SGOT) 25 2018 04:09 AM    Alk.  phosphatase 108 2018 04:09 AM Bilirubin, direct 1.3 (H) 08/11/2018 05:29 PM    Bilirubin, total 0.5 08/22/2018 04:09 AM     Lab Results   Component Value Date/Time    Sodium 140 08/23/2018 07:17 AM    Potassium 3.7 08/23/2018 07:17 AM    Chloride 107 08/23/2018 07:17 AM    CO2 26 08/23/2018 07:17 AM    Anion gap 7 08/23/2018 07:17 AM    Glucose 163 (H) 08/23/2018 07:17 AM    BUN 25 (H) 08/23/2018 07:17 AM    Creatinine 0.72 08/23/2018 07:17 AM    BUN/Creatinine ratio 35 (H) 08/23/2018 07:17 AM    GFR est AA >60 08/23/2018 07:17 AM    GFR est non-AA >60 08/23/2018 07:17 AM    Calcium 7.6 (L) 08/23/2018 07:17 AM     Lab Results   Component Value Date/Time    Lipase 129 02/21/2012 08:52 AM     Lab Results   Component Value Date/Time    INR 1.5 (H) 07/10/2018 11:56 AM    INR 1.2 03/22/2017 10:09 AM    INR 1.2 09/28/2016 02:25 PM    Prothrombin time 14.9 (H) 07/10/2018 11:56 AM    Prothrombin time 12.2 (H) 03/22/2017 10:09 AM    Prothrombin time 12.7 (H) 09/28/2016 02:25 PM       Impression:  Acute Respiratory Failure secondary to Aspiration pneumonia  Recent Ascending colon CA s/p R hemicolectomy 7/17/18 in setting of prior end colostomy  Feeding difficulties  Bacteremia with Staph Epi     Plan:  Patient with Staph epi bacteremia for which he is on Vancomycin and d/w Dr. Meche Roblero and will await cardiology consult to consider performing NATASHA to r/o Endocarditis prior to placing PEG tube due to risk of infection of abdominal wall being increased. For now would continue OG feeds and aspiration precautions and will consider PEG perhaps next week depending on results of NATASHA and clinical course. D/W ICU staff and Dr. Meche Roblero.        GI: Narcisa Foster MD

## 2018-08-23 NOTE — PROGRESS NOTES
0705-Bedside and Verbal shift change report given to Juan Diego Gonzalez RN (oncoming nurse) by Shirley Chu RN (offgoing nurse). Report included the following information SBAR, Kardex, ED Summary, Procedure Summary, Intake/Output, MAR, Recent Results and Cardiac Rhythm Paced with PVCs.    0721-Lab called and notified nursing that PBC is growing 1/4 Gram + Cocci in Clusters. Dr. Fran Perez aware. Cardiology reconsulted for potential NATASHA. Tube feeds placed on hold. 0745-Dr. Yoon at bedside consulting on patient to place a peg. Verbalized that he would have to be more stable and infection would have to clear up 1st.  He will be available to place peg when patient is better. 0850-Patient downstairs for head CT.    0950-Pharmacy and Dr. Meche Roblero aware that Vanc Trough is >50. Patient switched from Vanc to Daptomycin. 1030-Patient underwent a NATASHA at bedside. Patient wife updated on results by Dr. Kayli Taveras. Dr. Morgan Every to consult on patient about pacemaker extraction/replacement. Tube feeds restarted. 1445-Pacemaker currently being interrogated per Dr. Morgan Every.    1830-Tube feed tubing changed. 1835-Abdominal Wound care complete.

## 2018-08-24 ENCOUNTER — APPOINTMENT (OUTPATIENT)
Dept: GENERAL RADIOLOGY | Age: 78
DRG: 329 | End: 2018-08-24
Attending: INTERNAL MEDICINE
Payer: MEDICARE

## 2018-08-24 PROBLEM — Z71.89 COUNSELING REGARDING GOALS OF CARE: Status: ACTIVE | Noted: 2018-08-24

## 2018-08-24 PROBLEM — I95.0 IDIOPATHIC HYPOTENSION: Status: ACTIVE | Noted: 2018-08-24

## 2018-08-24 PROBLEM — G93.40 ACUTE ENCEPHALOPATHY: Status: ACTIVE | Noted: 2018-08-24

## 2018-08-24 LAB
ALBUMIN SERPL-MCNC: 1.6 G/DL (ref 3.5–5)
ALBUMIN/GLOB SERPL: 0.3 {RATIO} (ref 1.1–2.2)
ALP SERPL-CCNC: 99 U/L (ref 45–117)
ALT SERPL-CCNC: 30 U/L (ref 12–78)
ANION GAP SERPL CALC-SCNC: 8 MMOL/L (ref 5–15)
ARTERIAL PATENCY WRIST A: YES
AST SERPL-CCNC: 27 U/L (ref 15–37)
BASE EXCESS BLDA CALC-SCNC: 4.4 MMOL/L
BASOPHILS # BLD: 0 K/UL (ref 0–0.1)
BASOPHILS NFR BLD: 0 % (ref 0–1)
BDY SITE: ABNORMAL
BILIRUB SERPL-MCNC: 0.5 MG/DL (ref 0.2–1)
BUN SERPL-MCNC: 25 MG/DL (ref 6–20)
BUN/CREAT SERPL: 38 (ref 12–20)
CALCIUM SERPL-MCNC: 7.7 MG/DL (ref 8.5–10.1)
CHLORIDE SERPL-SCNC: 108 MMOL/L (ref 97–108)
CO2 SERPL-SCNC: 27 MMOL/L (ref 21–32)
CREAT SERPL-MCNC: 0.65 MG/DL (ref 0.7–1.3)
DIFFERENTIAL METHOD BLD: ABNORMAL
EOSINOPHIL # BLD: 0.1 K/UL (ref 0–0.4)
EOSINOPHIL NFR BLD: 1 % (ref 0–7)
EPAP/CPAP/PEEP, PAPEEP: 6
ERYTHROCYTE [DISTWIDTH] IN BLOOD BY AUTOMATED COUNT: 23.9 % (ref 11.5–14.5)
FIO2 ON VENT: 40 %
GAS FLOW.O2 SETTING OXYMISER: 16 L/MIN
GLOBULIN SER CALC-MCNC: 4.9 G/DL (ref 2–4)
GLUCOSE BLD STRIP.AUTO-MCNC: 124 MG/DL (ref 65–100)
GLUCOSE BLD STRIP.AUTO-MCNC: 148 MG/DL (ref 65–100)
GLUCOSE BLD STRIP.AUTO-MCNC: 149 MG/DL (ref 65–100)
GLUCOSE BLD STRIP.AUTO-MCNC: 166 MG/DL (ref 65–100)
GLUCOSE BLD STRIP.AUTO-MCNC: 168 MG/DL (ref 65–100)
GLUCOSE SERPL-MCNC: 111 MG/DL (ref 65–100)
HCO3 BLDA-SCNC: 28 MMOL/L (ref 22–26)
HCT VFR BLD AUTO: 26.3 % (ref 36.6–50.3)
HGB BLD-MCNC: 7.7 G/DL (ref 12.1–17)
IMM GRANULOCYTES # BLD: 0.1 K/UL (ref 0–0.04)
IMM GRANULOCYTES NFR BLD AUTO: 1 % (ref 0–0.5)
LYMPHOCYTES # BLD: 1.4 K/UL (ref 0.8–3.5)
LYMPHOCYTES NFR BLD: 14 % (ref 12–49)
MAGNESIUM SERPL-MCNC: 2 MG/DL (ref 1.6–2.4)
MCH RBC QN AUTO: 26.6 PG (ref 26–34)
MCHC RBC AUTO-ENTMCNC: 29.3 G/DL (ref 30–36.5)
MCV RBC AUTO: 91 FL (ref 80–99)
MONOCYTES # BLD: 0.9 K/UL (ref 0–1)
MONOCYTES NFR BLD: 9 % (ref 5–13)
NEUTS SEG # BLD: 7.3 K/UL (ref 1.8–8)
NEUTS SEG NFR BLD: 74 % (ref 32–75)
NRBC # BLD: 0.02 K/UL (ref 0–0.01)
NRBC BLD-RTO: 0.2 PER 100 WBC
PCO2 BLDA: 37 MMHG (ref 35–45)
PH BLDA: 7.49 [PH] (ref 7.35–7.45)
PHOSPHATE SERPL-MCNC: 3 MG/DL (ref 2.6–4.7)
PLATELET # BLD AUTO: 222 K/UL (ref 150–400)
PMV BLD AUTO: 11.1 FL (ref 8.9–12.9)
PO2 BLDA: 103 MMHG (ref 80–100)
POTASSIUM SERPL-SCNC: 3.4 MMOL/L (ref 3.5–5.1)
PROT SERPL-MCNC: 6.5 G/DL (ref 6.4–8.2)
RBC # BLD AUTO: 2.89 M/UL (ref 4.1–5.7)
SAO2 % BLD: 98 % (ref 92–97)
SAO2% DEVICE SAO2% SENSOR NAME: ABNORMAL
SERVICE CMNT-IMP: ABNORMAL
SODIUM SERPL-SCNC: 143 MMOL/L (ref 136–145)
SPECIMEN SITE: ABNORMAL
VENTILATION MODE VENT: ABNORMAL
VT SETTING VENT: 550 ML
WBC # BLD AUTO: 9.9 K/UL (ref 4.1–11.1)

## 2018-08-24 PROCEDURE — 74011250636 HC RX REV CODE- 250/636: Performed by: INTERNAL MEDICINE

## 2018-08-24 PROCEDURE — C9113 INJ PANTOPRAZOLE SODIUM, VIA: HCPCS | Performed by: INTERNAL MEDICINE

## 2018-08-24 PROCEDURE — 84100 ASSAY OF PHOSPHORUS: CPT | Performed by: INTERNAL MEDICINE

## 2018-08-24 PROCEDURE — 36415 COLL VENOUS BLD VENIPUNCTURE: CPT | Performed by: INTERNAL MEDICINE

## 2018-08-24 PROCEDURE — 74011000258 HC RX REV CODE- 258: Performed by: INTERNAL MEDICINE

## 2018-08-24 PROCEDURE — 74011250637 HC RX REV CODE- 250/637: Performed by: INTERNAL MEDICINE

## 2018-08-24 PROCEDURE — 94640 AIRWAY INHALATION TREATMENT: CPT

## 2018-08-24 PROCEDURE — 74011636637 HC RX REV CODE- 636/637: Performed by: INTERNAL MEDICINE

## 2018-08-24 PROCEDURE — 74011000250 HC RX REV CODE- 250: Performed by: SURGERY

## 2018-08-24 PROCEDURE — 83735 ASSAY OF MAGNESIUM: CPT | Performed by: INTERNAL MEDICINE

## 2018-08-24 PROCEDURE — 94003 VENT MGMT INPAT SUBQ DAY: CPT

## 2018-08-24 PROCEDURE — 36600 WITHDRAWAL OF ARTERIAL BLOOD: CPT | Performed by: INTERNAL MEDICINE

## 2018-08-24 PROCEDURE — 74011000250 HC RX REV CODE- 250: Performed by: INTERNAL MEDICINE

## 2018-08-24 PROCEDURE — 82803 BLOOD GASES ANY COMBINATION: CPT | Performed by: INTERNAL MEDICINE

## 2018-08-24 PROCEDURE — 65610000006 HC RM INTENSIVE CARE

## 2018-08-24 PROCEDURE — 77030018798 HC PMP KT ENTRL FED COVD -A

## 2018-08-24 PROCEDURE — 85025 COMPLETE CBC W/AUTO DIFF WBC: CPT | Performed by: INTERNAL MEDICINE

## 2018-08-24 PROCEDURE — 80053 COMPREHEN METABOLIC PANEL: CPT | Performed by: INTERNAL MEDICINE

## 2018-08-24 PROCEDURE — 71045 X-RAY EXAM CHEST 1 VIEW: CPT

## 2018-08-24 PROCEDURE — 82962 GLUCOSE BLOOD TEST: CPT

## 2018-08-24 PROCEDURE — 74011250637 HC RX REV CODE- 250/637: Performed by: SURGERY

## 2018-08-24 RX ORDER — GENTAMICIN SULFATE 100 MG/50ML
100 INJECTION, SOLUTION INTRAVENOUS EVERY 8 HOURS
Status: DISCONTINUED | OUTPATIENT
Start: 2018-08-24 | End: 2018-08-24

## 2018-08-24 RX ADMIN — CHLORHEXIDINE GLUCONATE 15 ML: 1.2 RINSE ORAL at 09:14

## 2018-08-24 RX ADMIN — VENLAFAXINE 25 MG: 25 TABLET ORAL at 16:45

## 2018-08-24 RX ADMIN — CARBAMAZEPINE 100 MG: 100 SUSPENSION ORAL at 13:06

## 2018-08-24 RX ADMIN — INSULIN LISPRO 3 UNITS: 100 INJECTION, SOLUTION INTRAVENOUS; SUBCUTANEOUS at 12:00

## 2018-08-24 RX ADMIN — VENLAFAXINE 25 MG: 25 TABLET ORAL at 22:01

## 2018-08-24 RX ADMIN — VENLAFAXINE 25 MG: 25 TABLET ORAL at 10:08

## 2018-08-24 RX ADMIN — FUROSEMIDE 60 MG: 10 INJECTION, SOLUTION INTRAMUSCULAR; INTRAVENOUS at 20:36

## 2018-08-24 RX ADMIN — LACTULOSE 10 G: 20 SOLUTION ORAL at 10:08

## 2018-08-24 RX ADMIN — CHLORHEXIDINE GLUCONATE 15 ML: 1.2 RINSE ORAL at 20:35

## 2018-08-24 RX ADMIN — IPRATROPIUM BROMIDE AND ALBUTEROL SULFATE 3 ML: .5; 3 SOLUTION RESPIRATORY (INHALATION) at 07:34

## 2018-08-24 RX ADMIN — IPRATROPIUM BROMIDE AND ALBUTEROL SULFATE 3 ML: .5; 3 SOLUTION RESPIRATORY (INHALATION) at 15:15

## 2018-08-24 RX ADMIN — SODIUM CHLORIDE 10 ML: 9 INJECTION, SOLUTION INTRAMUSCULAR; INTRAVENOUS; SUBCUTANEOUS at 20:38

## 2018-08-24 RX ADMIN — DAPTOMYCIN 1000 MG: 500 INJECTION, POWDER, LYOPHILIZED, FOR SOLUTION INTRAVENOUS at 10:10

## 2018-08-24 RX ADMIN — SODIUM CHLORIDE 10 ML: 9 INJECTION, SOLUTION INTRAMUSCULAR; INTRAVENOUS; SUBCUTANEOUS at 22:01

## 2018-08-24 RX ADMIN — ENOXAPARIN SODIUM 120 MG: 100 INJECTION SUBCUTANEOUS at 22:00

## 2018-08-24 RX ADMIN — INSULIN LISPRO 3 UNITS: 100 INJECTION, SOLUTION INTRAVENOUS; SUBCUTANEOUS at 18:11

## 2018-08-24 RX ADMIN — CARBAMAZEPINE 100 MG: 100 SUSPENSION ORAL at 10:09

## 2018-08-24 RX ADMIN — FENTANYL CITRATE 100 MCG: 50 INJECTION, SOLUTION INTRAMUSCULAR; INTRAVENOUS at 23:27

## 2018-08-24 RX ADMIN — GENTAMICIN SULFATE 100 MG: 100 INJECTION, SOLUTION INTRAVENOUS at 18:52

## 2018-08-24 RX ADMIN — IPRATROPIUM BROMIDE AND ALBUTEROL SULFATE 3 ML: .5; 3 SOLUTION RESPIRATORY (INHALATION) at 20:01

## 2018-08-24 RX ADMIN — IPRATROPIUM BROMIDE AND ALBUTEROL SULFATE 3 ML: .5; 3 SOLUTION RESPIRATORY (INHALATION) at 11:22

## 2018-08-24 RX ADMIN — LACTULOSE 10 G: 20 SOLUTION ORAL at 18:11

## 2018-08-24 RX ADMIN — SODIUM CHLORIDE 10 ML: 9 INJECTION, SOLUTION INTRAMUSCULAR; INTRAVENOUS; SUBCUTANEOUS at 13:07

## 2018-08-24 RX ADMIN — NAFCILLIN SODIUM 2 G: 2 INJECTION, POWDER, LYOPHILIZED, FOR SOLUTION INTRAMUSCULAR; INTRAVENOUS at 19:07

## 2018-08-24 RX ADMIN — POLYETHYLENE GLYCOL 3350 17 G: 17 POWDER, FOR SOLUTION ORAL at 10:08

## 2018-08-24 RX ADMIN — FUROSEMIDE 60 MG: 10 INJECTION, SOLUTION INTRAMUSCULAR; INTRAVENOUS at 09:13

## 2018-08-24 RX ADMIN — CARBAMAZEPINE 100 MG: 100 SUSPENSION ORAL at 22:00

## 2018-08-24 RX ADMIN — CARBAMAZEPINE 100 MG: 100 SUSPENSION ORAL at 17:50

## 2018-08-24 RX ADMIN — MELATONIN TAB 3 MG 3 MG: 3 TAB at 22:01

## 2018-08-24 RX ADMIN — NAFCILLIN SODIUM 2 G: 2 INJECTION, POWDER, LYOPHILIZED, FOR SOLUTION INTRAMUSCULAR; INTRAVENOUS at 22:00

## 2018-08-24 RX ADMIN — ENOXAPARIN SODIUM 120 MG: 100 INJECTION SUBCUTANEOUS at 10:08

## 2018-08-24 RX ADMIN — SODIUM CHLORIDE 10 ML: 9 INJECTION, SOLUTION INTRAMUSCULAR; INTRAVENOUS; SUBCUTANEOUS at 05:18

## 2018-08-24 RX ADMIN — SODIUM CHLORIDE 40 MG: 9 INJECTION INTRAMUSCULAR; INTRAVENOUS; SUBCUTANEOUS at 09:13

## 2018-08-24 RX ADMIN — INSULIN LISPRO 3 UNITS: 100 INJECTION, SOLUTION INTRAVENOUS; SUBCUTANEOUS at 23:26

## 2018-08-24 RX ADMIN — AMIODARONE HYDROCHLORIDE 200 MG: 200 TABLET ORAL at 10:08

## 2018-08-24 NOTE — PROGRESS NOTES
Ran into pt's wife Leo Mejias along with son and Leo Mejias provided an update following conversations with Palliative team as well as several physicians. Mandy feeling overwhelmed and fearful about decision process. Provided empathic listening along with words of affirmation and reassurance for Leo Mejias who expressed appreciation as did son. Asked which  will be on this weekend, informed that  Molly Meckel would be available. Will ask  Molly Meckel to check in with Leo Mejias as able. KIRILL Moreno. Orlando Chambers

## 2018-08-24 NOTE — PROGRESS NOTES
2000- Patient intubated, paced cardiac rhythm, Erasmo infusing. 0000- Tube feed off  Patient's eyes open spontaneously, Patient maintains eye contact, Right side spontaneously moves, Patient did not nod his head. 1,731 mL of urine out overnight. SAT passed, patient not receiving sedatives. 100 mL out of ostomy. CVP range 15- 23.

## 2018-08-24 NOTE — ROUTINE PROCESS
Bedside and Verbal shift change report received from LAURA Hernandez RN (offgoing nurse). Report included the following information SBAR, Kardex, ED Summary, OR Summary, Procedure Summary, Intake/Output, MAR, Accordion, Recent Results, Med Rec Status and Cardiac Rhythm paced rhythm. NeoSynephrine in use. He is currently with a spontaneous mode on the mechanical ventilator. NPO. Flannery catheter patent with hazy yellow urine. 0930: at the bedside speaking with his wife. I received orders to restart his feedings because he will not be having surgery today. 1000:Mrs. Brice,(Taya) is at the bedside crying, stated, \"I thought it might make him better, if it is taken out\". Omar paged. Tube feedings restarted  1200:He is awake, tracking me in the room, and attempting to hold his head as I'm moving around the bed from side to side. 1400:Suctioned for a moderate amount of thick blood tinge tan sputum, and respositoned from side to side. His wife remains at the bedside with their son. 1600:Assessment is unchanged, flannery care completed. 1745: in at the bedside, pending new antibiotic orders. 1815: Full bath completed, suctioned and repositioned for comfort. 1930:Bedside and Verbal shift change report given to BELINDA Sanches RN (oncoming nurse) by myself (offgoing nurse).  Report included the following information SBAR, Kardex, ED Summary, Procedure Summary, Intake/Output, MAR, Accordion, Recent Results, Med Rec Status, Cardiac Rhythm paced rhythm, Alarm Parameters  and Pre Procedure Checklist.

## 2018-08-24 NOTE — PROGRESS NOTES
Critical care interdisciplinary rounds held on [id number]. Following members present, Pharmacy, Diabetes Treatment, Case Management, Occupational Therapy, Physical Therapy, Pastoral Care  and Nutrition. Palliative and Infectious Disease consults ordered this morning. Plan of care discussed. See clinical pathway fo plan of care and interventions and desired outcomes.

## 2018-08-24 NOTE — PROGRESS NOTES
Responded to request from [place] for pastoral support on behalf of Paola Marc pt's wife who was in tears after speaking with physician. Met with Paola Marc in hallway and led in processing. Provided supportive listening as Paola Marc shared fears of having to make some rather significant decisions on behalf of pt that she had hoped she wouldn't have to make during this hospitalization. Paola Marc was in hallway as she didn't want pt to see her upset. Acknowledged Paola Marc and provided words of reassurance and comfort drawing from her strong edith.  affirmed Paola Marc in her edith and in her love for pt and how it is natural for pt to feel distraught as she has shared a wonderful relationship with pt. Nathaly Alcantara NP arrived to speak with Paola Mejíashruthi,  stepped away to attend to page. Will continue to monitor situation and respond as able/needed. BELINDA Cook

## 2018-08-24 NOTE — PROGRESS NOTES
08/24/18 0618 08/24/18 0636   ABCDEF Bundle   SBT Safety Screen Passed Yes --    Weaning Parameters   Spontaneous Breathing Trial Complete --  Yes   Resp Rate Observed 25 23   Ve 7.7 7.7    352   RSBI 77 64   Patient remains on CPAP+6, PS 6, 40%

## 2018-08-24 NOTE — CONSULTS
OPt seen & examined   Chart & labs reviiewed. D/w pts family at length , Intensivist .   Full consult to follow  Reason for Consult - Bacteremia    Impression  Persistent bacteremia - Staph epi/ oxacillin sensitive   NATASHA shows pacing wire was present in R/ [facility]. Definite, large mass associated with the pacing wire. Pt s/p Vancomycin , now on Daptomycin IV  Plan  Will change to Nafcillin IV since MSSE   Repeat BC after 24 hours  Needs removal of pacer & wires for clearing of bacteremia.   If pt is high risk for arrhythmia may replace pacemaker as soon as bacteremia clears( 48 to 72 hours)     Saige Parra MD [place]
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
## 2018-08-24 NOTE — PROGRESS NOTES
CRS Progress note    Vegetations seen on NATASHA. Pacing wire to be removed. He continues to have positive blood cultures. PEG on hold    /58 (BP 1 Location: Left arm, BP Patient Position: Lying left side)  Pulse 75  Temp 98.6 °F (37 °C)  Resp 24  Ht 6' 2\" (1.88 m)  Wt 134.4 kg (296 lb 4.8 oz)  SpO2 100%  BMI 38.04 kg/m2    Intubated, responds to command  Abd soft, nontender  Ostomy functioning    Plan  TF on hold for pacing wire removal.  May need replacement despite positive blood cultures? Likely trach. Defer to ICU team  No active colorectal issues. Okay to restart tube feeds when able.   Will see prn over the weekend

## 2018-08-24 NOTE — PROGRESS NOTES
Care Management:    Patient remains critical and unstable in the ICU. Wife is at bedside and very emotional today . Support has been offered by staff including Palliative and Pastoral .     Chart reviewed and we will cont to follow for discharge needs as appropriate.     Pedro Razo [facility] 0896

## 2018-08-24 NOTE — PROGRESS NOTES
Pharmacy Automatic Renal Dosing Protocol - Antimicrobials    Indication for Antimicrobials: Sepsis; GPC bacteremia; Vegetation on pacer leads noted on 18    Current Regimen of Each Antimicrobial:  Nafcillin 2 grams Q4H (, Day 1)  Gentamicin 100 mg Q8H (, Day 1)      Previous Antimicrobial Therapy:  Daptomycin 1000 mg IV every 24 hours (Started 18; Day #2)  Vancomycin 1500 mg IV every 12 hours (Started 18; Stopped 18)  Ceftriaxone 2 gm IV every 24 hours (Started 18; Day #5 of 5)  Cefepime 2 g IV every 8 hours (Started 18; Stopped 18)  Vancomycin  Fluconazole  Zosyn    Significant Cultures:   18 Blood culture = coag neg staph  (Results pending)  18 Blood culture = No growth (FINAL)  18 Blood culture = Coag neg staph in / (FINAL)  18 Respiratory culture = Heavy MSSA; Light klebsiella (FINAL)  18 Blood culture = Coag neg staph in 2/2 (FINAL)  18 Blood culture = Coag neg staph in 2/2 (FINAL)  18 Respiratory culture = No growth (FINAL)  18 Blood culture = No growth (FINAL)    Labs:  Recent Labs      18   0515  18   0717  18   0342  18   0409   CREA  0.65*  0.72   --   0.84   BUN  25*  25*   --   27*   WBC  9.9   --   10.3  16.2*   Temp (24hrs), Av.6 °F (37 °C), Min:98 °F (36.7 °C), Max:99.2 °F (37.3 °C)    Creatinine Clearance (mL/min) or Dialysis: Greater than 60 mL/min    Impression/Plan:   · Nafcillin 2 grams Q4H  · Gentamicin 100 mg Q8H with predicted peak 4 mcg/ml and trough of 0.5 mcg/ml. · Daptomycin discontinued  · Vancomycin discontinued and daptomycin started du e to persistently positive cultures while on vancomycin therapy. · Daptomycin dosed appropriately based on indication and renal function. Continue current regimen. · ID consult today, cardiology noted that removal of vegetation would be too risky  · Antimicrobial stop date:  To be determined, likely long term     Pharmacy will follow daily and adjust medications as appropriate for renal function and/or serum levels.     Thank you,  Luna Saucedo, [facility]
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
## 2018-08-24 NOTE — PROGRESS NOTES
Gastroenterology Daily Progress Note (Dr. Jeevan Daniels)   [address] [place] Date: 7/10/2018       Subjective:       Patient is intubated.   Not responding to commands    Current Facility-Administered Medications   Medication Dose Route Frequency    DAPTOmycin (CUBICIN) 1,000 mg in 0.9% sodium chloride 50 mL IVPB RF formulation  1,000 mg IntraVENous Q24H    PHENYLephrine (PF)(LILLY-SYNEPHRINE) 30 mg in 0.9% sodium chloride 250 mL infusion   mcg/min IntraVENous TITRATE    furosemide (LASIX) injection 60 mg  60 mg IntraVENous Q12H    enoxaparin (LOVENOX) injection 120 mg  120 mg SubCUTAneous Q12H    LORazepam (ATIVAN) injection 2 mg  2 mg IntraVENous Q2H PRN    acetaminophen (TYLENOL) solution 650 mg  650 mg Oral Q4H PRN    fentaNYL citrate (PF) injection 100 mcg  100 mcg IntraVENous Q2H PRN    insulin lispro (HUMALOG) injection   SubCUTAneous Q6H    glucose chewable tablet 16 g  4 Tab Oral PRN    dextrose (D50W) injection syrg 12.5-25 g  12.5-25 g IntraVENous PRN    glucagon (GLUCAGEN) injection 1 mg  1 mg IntraMUSCular PRN    venlafaxine (EFFEXOR) tablet 25 mg  25 mg Oral TID    pantoprazole (PROTONIX) 40 mg in sodium chloride 0.9% 10 mL injection  40 mg IntraVENous DAILY    propofol (DIPRIVAN) infusion  0-50 mcg/kg/min IntraVENous TITRATE    carBAMazepine (TEGretol) 200 mg/10 mL suspension 100 mg  100 mg PEG Tube QID    chlorhexidine (PERIDEX) 0.12 % mouthwash 15 mL  15 mL Oral Q12H    albuterol-ipratropium (DUO-NEB) 2.5 MG-0.5 MG/3 ML  3 mL Nebulization QID RT    albuterol-ipratropium (DUO-NEB) 2.5 MG-0.5 MG/3 ML  3 mL Nebulization Q6H PRN    amiodarone (CORDARONE) tablet 200 mg  200 mg Oral DAILY    prochlorperazine (COMPAZINE) with saline injection 5 mg  5 mg IntraVENous Q6H PRN    lactulose (CHRONULAC) solution 10 g  10 g Oral BID    sodium chloride (NS) flush 10-30 mL  10-30 mL InterCATHeter PRN    sodium chloride (NS) flush 10 mL  10 mL InterCATHeter PRN  sodium chloride (NS) flush 10-40 mL  10-40 mL InterCATHeter Q8H    sodium chloride (NS) flush 10 mL  10 mL InterCATHeter Q24H    melatonin tablet 3 mg  3 mg Oral QHS    polyethylene glycol (MIRALAX) packet 17 g  17 g Oral DAILY    hydrALAZINE (APRESOLINE) 20 mg/mL injection 10 mg  10 mg IntraVENous Q6H PRN    oxyCODONE IR (ROXICODONE) tablet 5 mg  5 mg Oral Q4H PRN    oxyCODONE IR (ROXICODONE) tablet 10 mg  10 mg Oral Q4H PRN    sodium chloride (NS) flush 5-10 mL  5-10 mL IntraVENous PRN    acetaminophen (TYLENOL) tablet 650 mg  650 mg Oral Q6H PRN    ondansetron (ZOFRAN) injection 4 mg  4 mg IntraVENous Q6H PRN        Objective:     Visit Vitals    BP 95/54    Pulse 75    Temp 98.7 °F (37.1 °C)    Resp 16    Ht 6' 2\" (1.88 m)    Wt 134.4 kg (296 lb 4.8 oz)    SpO2 100%    BMI 38.04 kg/m2   Blood pressure 95/54, pulse 75, temperature 98.7 °F (37.1 °C), resp. rate 16, height 6' 2\" (1.88 m), weight 134.4 kg (296 lb 4.8 oz), SpO2 100 %. 08/22 1901 - 08/24 0700  In: 1967.2 [I.V.:337.2]  Out: 5656 [XTIJW:4124]      Intake/Output Summary (Last 24 hours) at 08/24/18 0710  Last data filed at 08/24/18 0600   Gross per 24 hour   Intake          1139.71 ml   Output             4506 ml   Net         -3366.29 ml     Physical Exam:     General: intubated WM comfortable in NAD  Chest:  CTA, No rhonchi, rales or rubs.   Heart: S1, S2, RRR  GI: Soft, NT, ND + bowel sounds + Ostomy    Labs:       Recent Results (from the past 24 hour(s))   METABOLIC PANEL, BASIC    Collection Time: 08/23/18  7:17 AM   Result Value Ref Range    Sodium 140 136 - 145 mmol/L    Potassium 3.7 3.5 - 5.1 mmol/L    Chloride 107 97 - 108 mmol/L    CO2 26 21 - 32 mmol/L    Anion gap 7 5 - 15 mmol/L    Glucose 163 (H) 65 - 100 mg/dL    BUN 25 (H) 6 - 20 MG/DL    Creatinine 0.72 0.70 - 1.30 MG/DL    BUN/Creatinine ratio 35 (H) 12 - 20      GFR est AA >60 >60 ml/min/1.73m2    GFR est non-AA >60 >60 ml/min/1.73m2    Calcium 7.6 (L) 8.5 - 10.1 MG/DL   MAGNESIUM    Collection Time: 08/23/18  7:17 AM   Result Value Ref Range    Magnesium 2.1 1.6 - 2.4 mg/dL   PHOSPHORUS    Collection Time: 08/23/18  7:17 AM   Result Value Ref Range    Phosphorus 3.0 2.6 - 4.7 MG/DL   GLUCOSE, POC    Collection Time: 08/23/18 11:35 AM   Result Value Ref Range    Glucose (POC) 153 (H) 65 - 100 mg/dL    Performed by Reddy Drew    CK    Collection Time: 08/23/18 11:49 AM   Result Value Ref Range    CK 33 (L) 39 - 308 U/L   GLUCOSE, POC    Collection Time: 08/23/18  5:22 PM   Result Value Ref Range    Glucose (POC) 189 (H) 65 - 100 mg/dL    Performed by Maritza Xiong, POC    Collection Time: 08/23/18 11:54 PM   Result Value Ref Range    Glucose (POC) 166 (H) 65 - 100 mg/dL    Performed by Aroldo Barker    CBC WITH AUTOMATED DIFF    Collection Time: 08/24/18  5:15 AM   Result Value Ref Range    WBC 9.9 4.1 - 11.1 K/uL    RBC 2.89 (L) 4.10 - 5.70 M/uL    HGB 7.7 (L) 12.1 - 17.0 g/dL    HCT 26.3 (L) 36.6 - 50.3 %    MCV 91.0 80.0 - 99.0 FL    MCH 26.6 26.0 - 34.0 PG    MCHC 29.3 (L) 30.0 - 36.5 g/dL    RDW 23.9 (H) 11.5 - 14.5 %    PLATELET 653 801 - 167 K/uL    MPV 11.1 8.9 - 12.9 FL    NRBC 0.2 (H) 0  WBC    ABSOLUTE NRBC 0.02 (H) 0.00 - 0.01 K/uL    NEUTROPHILS 74 32 - 75 %    LYMPHOCYTES 14 12 - 49 %    MONOCYTES 9 5 - 13 %    EOSINOPHILS 1 0 - 7 %    BASOPHILS 0 0 - 1 %    IMMATURE GRANULOCYTES 1 (H) 0.0 - 0.5 %    ABS. NEUTROPHILS 7.3 1.8 - 8.0 K/UL    ABS. LYMPHOCYTES 1.4 0.8 - 3.5 K/UL    ABS. MONOCYTES 0.9 0.0 - 1.0 K/UL    ABS. EOSINOPHILS 0.1 0.0 - 0.4 K/UL    ABS. BASOPHILS 0.0 0.0 - 0.1 K/UL    ABS. IMM.  GRANS. 0.1 (H) 0.00 - 0.04 K/UL    DF AUTOMATED     METABOLIC PANEL, COMPREHENSIVE    Collection Time: 08/24/18  5:15 AM   Result Value Ref Range    Sodium 143 136 - 145 mmol/L    Potassium 3.4 (L) 3.5 - 5.1 mmol/L    Chloride 108 97 - 108 mmol/L    CO2 27 21 - 32 mmol/L    Anion gap 8 5 - 15 mmol/L    Glucose 111 (H) 65 - 100 mg/dL    BUN 25 (H) 6 - 20 MG/DL    Creatinine 0.65 (L) 0.70 - 1.30 MG/DL    BUN/Creatinine ratio 38 (H) 12 - 20      GFR est AA >60 >60 ml/min/1.73m2    GFR est non-AA >60 >60 ml/min/1.73m2    Calcium 7.7 (L) 8.5 - 10.1 MG/DL    Bilirubin, total 0.5 0.2 - 1.0 MG/DL    ALT (SGPT) 30 12 - 78 U/L    AST (SGOT) 27 15 - 37 U/L    Alk.  phosphatase 99 45 - 117 U/L    Protein, total 6.5 6.4 - 8.2 g/dL    Albumin 1.6 (L) 3.5 - 5.0 g/dL    Globulin 4.9 (H) 2.0 - 4.0 g/dL    A-G Ratio 0.3 (L) 1.1 - 2.2     MAGNESIUM    Collection Time: 08/24/18  5:15 AM   Result Value Ref Range    Magnesium 2.0 1.6 - 2.4 mg/dL   PHOSPHORUS    Collection Time: 08/24/18  5:15 AM   Result Value Ref Range    Phosphorus 3.0 2.6 - 4.7 MG/DL   GLUCOSE, POC    Collection Time: 08/24/18  5:17 AM   Result Value Ref Range    Glucose (POC) 124 (H) 65 - 100 mg/dL    Performed by Francisca Cheng    BLOOD GAS, ARTERIAL    Collection Time: 08/24/18  5:30 AM   Result Value Ref Range    pH 7.49 (H) 7.35 - 7.45      PCO2 37 35.0 - 45.0 mmHg    PO2 103 (H) 80 - 100 mmHg    O2 SAT 98 (H) 92 - 97 %    BICARBONATE 28 (H) 22 - 26 mmol/L    BASE EXCESS 4.4 mmol/L    O2 METHOD VENTILATOR      FIO2 40 %    MODE A/C      Tidal volume 550      SET RATE 16      EPAP/CPAP/PEEP 6.0      Sample source ARTERIAL      SITE RIGHT RADIAL      BONNIE'S TEST YES     LABRCNT(wbc:2,hgb:2,hct:2,plt:2,)  Recent Labs      08/24/18   0515  08/23/18   0717  08/22/18   0409   NA  143  140  141   K  3.4*  3.7  3.8   CL  108  107  107   CO2  27  26  25   BUN  25*  25*  27*   CREA  0.65*  0.72  0.84   GLU  111*  163*  140*   CA  7.7*  7.6*  7.7*   MG  2.0  2.1  2.1   PHOS  3.0  3.0  4.1     Recent Labs      08/24/18   0515  08/22/18   0409   SGOT  27  25   AP  99  108   TP  6.5  6.2*   ALB  1.6*  1.5*   GLOB  4.9*  4.7*       Impression:    Endocarditis  Staph Epi bactermia  S/p R hemicolectomy for colon ca  Aspiration pneumonia with respiratory failure on vent   Feeding difficulties          Plan:  Patient with abnormal NATASHA showing vegetation on pacing wire in RA and  SVC thrombus with bacteremia. This is a contraindication to placing PEG tube due to risk of infection and would continue orogastric feeds and agree with palliative care consultation. Will f/u next week, call with questions.        Narcisa Foster MD    8/24/2018  3500  [address] [place] [address]  [id number]  [address] [place] [address] South: [phone]

## 2018-08-24 NOTE — CONSULTS
[address] [place] [address]  [phone]        Date of  Admission: 7/10/2018 10:44 AM     Admission type:Emergency    Consult for: device extraction  Consult by: Dr Melodie Fontanez, MACIEL     Subjective:     Luke Cárdenas is a 69 y.o. male admitted for Acute blood loss anemia  GI bleed  Anasarca  GI Bleed  gi bleed  ASCENDING COLON CANCER . Mr Clarisa Negro remains on vent and pressor dependant. Has +ve blood cultures, pacemaker with vegetation on pacing wire in RA and  SVC thrombus. Wife at bedside. We are asked to see him for assessment of lead/device extraction.      Patient Active Problem List    Diagnosis Date Noted    Acute blood loss anemia 07/10/2018    Anasarca 07/10/2018    GI bleed 07/10/2018    Severe obesity (BMI 35.0-39.9) ([address] 07/05/2018    Localized epilepsy with impairment of consciousness ([address]     Common wart 05/16/2016    Chronic right shoulder pain 01/11/2016    Screening for colon cancer 11/04/2015    Acute bronchitis 08/25/2015    Chronic back pain greater than 3 months duration 05/04/2015    Cerebral infarction ([address] 05/04/2015    Pre-op evaluation 01/29/2015    Pacemaker 10/15/2014    SSS (sick sinus syndrome) ([address] 10/13/2014    Syncope 10/13/2014    Seizure disorder ([address] 10/13/2014    RONAL on CPAP 10/13/2014    Tinea corporis 05/07/2014    Insomnia 01/03/2014    Ankle fracture, left 08/28/2013    Constipation 12/14/2012    Cataract 07/30/2012    Atrial fibrillation ([address] 06/19/2012    TBI (traumatic brain injury) ([address] 06/08/2012    CHI (closed head injury) 05/24/2012    Basal cell cancer 05/24/2012    Atrial flutter ([address] 03/02/2012    Atrial fibrillation or flutter 03/02/2012    Small bowel obstruction ([address] 02/21/2012    Ulcer 09/14/2011    Wax in ear 06/17/2011    Cellulitis 04/04/2011    Rash 03/28/2011    Hypothyroidism 12/22/2010    Hyponatremia 12/22/2010    BPH (benign prostatic hyperplasia) 12/22/2010    HTN (hypertension) 12/08/2010    Depression 12/08/2010    Hypercholesterolemia 12/08/2010    Acid reflux 12/08/2010    Seizure ([facility] 12/08/2010      Aliyah Coffey NP  Past Medical History:   Diagnosis Date    A-fib [facility])     Acute bronchitis 8/25/2015    Anxiety     Arrhythmia     atrial fibrillation    Arthritis     BCC (basal cell carcinoma of skin)     BPH (benign prostatic hyperplasia)     Chronic back pain greater than 3 months duration 5/4/2015    Chronic right shoulder pain 1/11/2016    Common wart 5/16/2016    Constipation 12/14/2012    CVA (cerebral infarction) 5/4/2015    Depression     GERD (gastroesophageal reflux disease)     Hearing loss     bilateral hearing aids    Hemiplegia following CVA (cerebrovascular accident) ([facility]     left side hemiparesis    History of colostomy     HTN (hypertension)     Hyperlipidemia     Localized epilepsy with impairment of consciousness ([facility]     Prostate cancer ([facility]     Status post XRT, Lupron    Screening for colon cancer 11/4/2015    Stroke [facility])     traumatic from neck crushing    TBI (traumatic brain injury) ([facility] 1994    Unspecified sleep apnea     on CPAP      Social History     Social History    Marital status:      Spouse name: N/A    Number of children: N/A    Years of education: N/A     Social History Main Topics    Smoking status: Former Smoker     Years: 10.00     Types: Pipe     Quit date: 1/29/1990    Smokeless tobacco: Never Used      Comment: QUIT 1970'S    Alcohol use No    Drug use: No    Sexual activity: Not Currently     Other Topics Concern    None     Social History Narrative    , lives with his wife in [facility] [place] [address] Reactions    Ciprofibrate Hives      Family History   Problem Relation Age of Onset    Alzheimer Mother      dec [de-identified]    Cancer Father      dec 76 kidney    Heart Disease Brother     No Known Problems Son       Current Facility-Administered Medications Medication Dose Route Frequency    DAPTOmycin (CUBICIN) 1,000 mg in 0.9% sodium chloride 50 mL IVPB RF formulation  1,000 mg IntraVENous Q24H    PHENYLephrine (PF)(LILLY-SYNEPHRINE) 30 mg in 0.9% sodium chloride 250 mL infusion   mcg/min IntraVENous TITRATE    furosemide (LASIX) injection 60 mg  60 mg IntraVENous Q12H    enoxaparin (LOVENOX) injection 120 mg  120 mg SubCUTAneous Q12H    LORazepam (ATIVAN) injection 2 mg  2 mg IntraVENous Q2H PRN    acetaminophen (TYLENOL) solution 650 mg  650 mg Oral Q4H PRN    fentaNYL citrate (PF) injection 100 mcg  100 mcg IntraVENous Q2H PRN    insulin lispro (HUMALOG) injection   SubCUTAneous Q6H    glucose chewable tablet 16 g  4 Tab Oral PRN    dextrose (D50W) injection syrg 12.5-25 g  12.5-25 g IntraVENous PRN    glucagon (GLUCAGEN) injection 1 mg  1 mg IntraMUSCular PRN    venlafaxine (EFFEXOR) tablet 25 mg  25 mg Oral TID    pantoprazole (PROTONIX) 40 mg in sodium chloride 0.9% 10 mL injection  40 mg IntraVENous DAILY    propofol (DIPRIVAN) infusion  0-50 mcg/kg/min IntraVENous TITRATE    carBAMazepine (TEGretol) 200 mg/10 mL suspension 100 mg  100 mg PEG Tube QID    chlorhexidine (PERIDEX) 0.12 % mouthwash 15 mL  15 mL Oral Q12H    albuterol-ipratropium (DUO-NEB) 2.5 MG-0.5 MG/3 ML  3 mL Nebulization QID RT    albuterol-ipratropium (DUO-NEB) 2.5 MG-0.5 MG/3 ML  3 mL Nebulization Q6H PRN    amiodarone (CORDARONE) tablet 200 mg  200 mg Oral DAILY    prochlorperazine (COMPAZINE) with saline injection 5 mg  5 mg IntraVENous Q6H PRN    lactulose (CHRONULAC) solution 10 g  10 g Oral BID    sodium chloride (NS) flush 10-30 mL  10-30 mL InterCATHeter PRN    sodium chloride (NS) flush 10 mL  10 mL InterCATHeter PRN    sodium chloride (NS) flush 10-40 mL  10-40 mL InterCATHeter Q8H    sodium chloride (NS) flush 10 mL  10 mL InterCATHeter Q24H    melatonin tablet 3 mg  3 mg Oral QHS    polyethylene glycol (MIRALAX) packet 17 g  17 g Oral DAILY    hydrALAZINE (APRESOLINE) 20 mg/mL injection 10 mg  10 mg IntraVENous Q6H PRN    oxyCODONE IR (ROXICODONE) tablet 5 mg  5 mg Oral Q4H PRN    oxyCODONE IR (ROXICODONE) tablet 10 mg  10 mg Oral Q4H PRN    sodium chloride (NS) flush 5-10 mL  5-10 mL IntraVENous PRN    acetaminophen (TYLENOL) tablet 650 mg  650 mg Oral Q6H PRN    ondansetron (ZOFRAN) injection 4 mg  4 mg IntraVENous Q6H PRN         Review of Symptoms:  Unable to perform due to intubation  Constitutional: negative  Eyes: negative  Ears, nose, mouth, throat, and face: negative  Respiratory: negative  Cardiovascular: negative  Gastrointestinal: negative  Genitourinary:negative  Musculoskeletal:negative  Neurological: negative  Endocrine: negative     Subjective:      Visit Vitals    /61    Pulse 75    Temp 98.6 °F (37 °C)    Resp 16    Ht 6' 2\" (1.88 m)    Wt 296 lb 4.8 oz (134.4 kg)    SpO2 100%    BMI 38.04 kg/m2       Physical:  General: intubated  Heart: Regular, S1 WNL and S2 WNL.  No S3 or S4   Lungs: clear   Abdomen: Soft, +BS, NTND   Extremities: LE eddie +DP/PT, ++ edema   Neurologic: Grossly normal    Data Review:   Recent Labs      08/24/18   0515  08/23/18   0342  08/22/18   0409   WBC  9.9  10.3  16.2*   HGB  7.7*  7.6*  7.5*   HCT  26.3*  25.2*  25.3*   PLT  222  221  217     Recent Labs      08/24/18   0515  08/23/18   0717  08/22/18   0409   NA  143  140  141   K  3.4*  3.7  3.8   CL  108  107  107   CO2  27  26  25   GLU  111*  163*  140*   BUN  25*  25*  27*   CREA  0.65*  0.72  0.84   CA  7.7*  7.6*  7.7*   MG  2.0  2.1  2.1   PHOS  3.0  3.0  4.1   ALB  1.6*   --   1.5*   TBILI  0.5   --   0.5   SGOT  27   --   25   ALT  30   --   29       Recent Labs      08/23/18   1149   CPK  33*         Intake/Output Summary (Last 24 hours) at 08/24/18 1003  Last data filed at 08/24/18 0700   Gross per 24 hour   Intake           999.71 ml   Output             3756 ml   Net         -2756.29 ml        Cardiographics    Telemetry: V paced.     Echocardiogram:   8/23/18  Left ventricle: Systolic function was normal. Ejection fraction was  estimated in the range of 55 % to 60 %. There were no regional wall motion  abnormalities. Left atrium: The atrium was mildly dilated. Right atrium: There was a definite, large mass associated with the pacing  wire. It may represent a vegetation. Mitral valve: There was mild to moderate regurgitation. No obvious mass,  vegetation or thrombus noted. Aortic valve: No obvious mass, vegetation or thrombus noted. Tricuspid valve: There was mild regurgitation. There was mild pulmonary  hypertension. Aorta, systemic arteries: There was mild atheroma. Assessment:           Principal Problem:    GI bleed (7/10/2018)    Active Problems:    Acute blood loss anemia (7/10/2018)      Anasarca (7/10/2018)         Plan:     Jeevan Major is a 69year old male with +ve blood cultures, vegetations seen on NATASHA in RA PM lead and large thrombus into the SVC to the level of the right atrial SVC junction. Extraction would be risky with potentially dislodging thrombus. He continues to have positive blood cultures. We have consulted ID and palliative. Discussed the risks of extraction (including death) with his wife. No plans to extract today. Mirela Thomas ANP    Patient seen and examined by me with nurse practitioner. I personally performed all components of the history, physical, and medical decision making and agree with the assessment and plan with minor modifications as noted. Mr Brittany Link has Stage 3B colon cancer sp resection with extensive thrombus noted in the R IJ and SVC. With positive blood cultures on antibiotics. Device implanted in 2014 - while extraction is possible, it is extremely high risk with definite migration of IJ/SVC thrombus to the lungs and risk of perforation/risk. I would like palliative care involvement so that the family can talk about what the patient would have liked. Talia Rojas MD, [place]

## 2018-08-24 NOTE — PROGRESS NOTES
Hematology Oncology Progress Note       Follow up for: IJ/SVC thrombus    Chart notes reviewed since last visit.     Case discussed with following: no family at bedside at present, discussed with bedside nurse    Patient complains of the following: Remains on ventilator today, looking around room, not responding to commands, off sedation    Additional concerns noted by the staff: none    Patient Vitals for the past 24 hrs:   BP Temp Pulse Resp SpO2   08/24/18 1300 125/54 - 75 16 100 %   08/24/18 1230 95/47 - 74 16 100 %   08/24/18 1200 122/57 98 °F (36.7 °C) 75 17 100 %   08/24/18 1122 - - 75 17 100 %   08/24/18 1008 125/64 - 75 - -   08/24/18 1000 125/64 - 75 16 100 %   08/24/18 0930 129/63 - 77 16 -   08/24/18 0900 123/61 - 75 16 100 %   08/24/18 0830 97/55 - 75 16 99 %   08/24/18 0800 113/61 - 76 16 100 %   08/24/18 0734 - - 75 24 100 %   08/24/18 0730 111/58 98.6 °F (37 °C) 75 21 100 %   08/24/18 0700 107/57 - 75 24 99 %   08/24/18 0600 95/54 - 75 16 100 %   08/24/18 0500 127/73 - 70 15 100 %   08/24/18 0400 112/66 98.7 °F (37.1 °C) 75 16 99 %   08/24/18 0339 - - 77 (!) 76 99 %   08/24/18 0300 102/54 - 76 16 100 %   08/24/18 0200 135/70 - 83 20 100 %   08/24/18 0100 131/72 - 84 19 98 %   08/24/18 0015 124/57 - 83 18 99 %   08/24/18 0012 - - 80 19 99 %   08/24/18 0000 99/47 98.8 °F (37.1 °C) 77 12 100 %   08/23/18 2315 124/58 - 82 18 100 %   08/23/18 2300 119/57 - 84 22 100 %   08/23/18 2230 104/61 - 86 29 100 %   08/23/18 2200 117/55 - 75 20 100 %   08/23/18 2145 (!) 85/30 - 75 16 100 %   08/23/18 2100 128/59 - 86 27 100 %   08/23/18 2025 - - - 17 100 %   08/23/18 2015 117/59 - 79 19 100 %   08/23/18 2000 113/53 98.4 °F (36.9 °C) 79 19 99 %   08/23/18 1900 100/49 - 75 16 100 %   08/23/18 1800 118/52 - 76 15 100 %   08/23/18 1700 117/58 - 82 19 100 %   08/23/18 1600 114/57 - 78 23 100 %   08/23/18 1502 - - - - 100 %   08/23/18 1500 113/55 - 75 19 100 %   08/23/18 1400 111/51 - 75 17 100 %       ROS not obtainable - pt obtunded and on vent. Physical Examination:  Constitutional On vent, eyes open, no following commands. Appears close to chronological age. Overweight male intubated. Head Normocephalic; no scars   Eyes Eyes closed   ENMT Intubated. Neck Supple without masses or thyromegaly. No jugular venous distension. Hematologic/Lymphatic No petechiae or purpura. No tender or palpable lymph nodes noted   Respiratory Scattered rhonchi   Cardiovascular Regular rate and rhythm of heart   Chest / Line Site Chest is symmetric with no chest wall deformities. Abdomen Non-tender, non-distended, no masses, ascites or hepatosplenomegaly. Good bowel sounds. Large dressing over abdomen with ostomy. Musculoskeletal Anasarca present   Extremities  edematous. Skin No rashes, scars, or lesions suggestive of malignancy. No petechiae, purpura, or ecchymoses. No excoriations. Neurologic UTO   Psychiatric UTO       Labs:  Recent Results (from the past 24 hour(s))   GLUCOSE, POC    Collection Time: 08/23/18  5:22 PM   Result Value Ref Range    Glucose (POC) 189 (H) 65 - 100 mg/dL    Performed by Bulb    GLUCOSE, POC    Collection Time: 08/23/18 11:54 PM   Result Value Ref Range    Glucose (POC) 166 (H) 65 - 100 mg/dL    Performed by E[place]ick Mover    CBC WITH AUTOMATED DIFF    Collection Time: 08/24/18  5:15 AM   Result Value Ref Range    WBC 9.9 4.1 - 11.1 K/uL    RBC 2.89 (L) 4.10 - 5.70 M/uL    HGB 7.7 (L) 12.1 - 17.0 g/dL    HCT 26.3 (L) 36.6 - 50.3 %    MCV 91.0 80.0 - 99.0 FL    MCH 26.6 26.0 - 34.0 PG    MCHC 29.3 (L) 30.0 - 36.5 g/dL    RDW 23.9 (H) 11.5 - 14.5 %    PLATELET 560 227 - 227 K/uL    MPV 11.1 8.9 - 12.9 FL    NRBC 0.2 (H) 0  WBC    ABSOLUTE NRBC 0.02 (H) 0.00 - 0.01 K/uL    NEUTROPHILS 74 32 - 75 %    LYMPHOCYTES 14 12 - 49 %    MONOCYTES 9 5 - 13 %    EOSINOPHILS 1 0 - 7 %    BASOPHILS 0 0 - 1 %    IMMATURE GRANULOCYTES 1 (H) 0.0 - 0.5 %    ABS. NEUTROPHILS 7.3 1.8 - 8.0 K/UL    ABS. LYMPHOCYTES 1.4 0.8 - 3.5 K/UL    ABS. MONOCYTES 0.9 0.0 - 1.0 K/UL    ABS. EOSINOPHILS 0.1 0.0 - 0.4 K/UL    ABS. BASOPHILS 0.0 0.0 - 0.1 K/UL    ABS. IMM. GRANS. 0.1 (H) 0.00 - 0.04 K/UL    DF AUTOMATED     METABOLIC PANEL, COMPREHENSIVE    Collection Time: 08/24/18  5:15 AM   Result Value Ref Range    Sodium 143 136 - 145 mmol/L    Potassium 3.4 (L) 3.5 - 5.1 mmol/L    Chloride 108 97 - 108 mmol/L    CO2 27 21 - 32 mmol/L    Anion gap 8 5 - 15 mmol/L    Glucose 111 (H) 65 - 100 mg/dL    BUN 25 (H) 6 - 20 MG/DL    Creatinine 0.65 (L) 0.70 - 1.30 MG/DL    BUN/Creatinine ratio 38 (H) 12 - 20      GFR est AA >60 >60 ml/min/1.73m2    GFR est non-AA >60 >60 ml/min/1.73m2    Calcium 7.7 (L) 8.5 - 10.1 MG/DL    Bilirubin, total 0.5 0.2 - 1.0 MG/DL    ALT (SGPT) 30 12 - 78 U/L    AST (SGOT) 27 15 - 37 U/L    Alk.  phosphatase 99 45 - 117 U/L    Protein, total 6.5 6.4 - 8.2 g/dL    Albumin 1.6 (L) 3.5 - 5.0 g/dL    Globulin 4.9 (H) 2.0 - 4.0 g/dL    A-G Ratio 0.3 (L) 1.1 - 2.2     MAGNESIUM    Collection Time: 08/24/18  5:15 AM   Result Value Ref Range    Magnesium 2.0 1.6 - 2.4 mg/dL   PHOSPHORUS    Collection Time: 08/24/18  5:15 AM   Result Value Ref Range    Phosphorus 3.0 2.6 - 4.7 MG/DL   GLUCOSE, POC    Collection Time: 08/24/18  5:17 AM   Result Value Ref Range    Glucose (POC) 124 (H) 65 - 100 mg/dL    Performed by Yessica Aparicio, ARTERIAL    Collection Time: 08/24/18  5:30 AM   Result Value Ref Range    pH 7.49 (H) 7.35 - 7.45      PCO2 37 35.0 - 45.0 mmHg    PO2 103 (H) 80 - 100 mmHg    O2 SAT 98 (H) 92 - 97 %    BICARBONATE 28 (H) 22 - 26 mmol/L    BASE EXCESS 4.4 mmol/L    O2 METHOD VENTILATOR      FIO2 40 %    MODE A/C      Tidal volume 550      SET RATE 16      EPAP/CPAP/PEEP 6.0      Sample source ARTERIAL      SITE RIGHT RADIAL      BONNIE'S TEST YES     GLUCOSE, POC    Collection Time: 08/24/18 11:58 AM   Result Value Ref Range    Glucose (POC) 149 (H) 65 - 100 mg/dL    Performed by Fabiana Mcelroy [place]      Assessment and Plan:   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 Cardiology feels extraction of pacer wire very high risk, plan not finalized. Stage IIIB colon cancer - s/p resection. If pt recovers will need to see him to discuss possible adjuvant chemotherapy. Acute resp failure/Pna - on vent. Sputum culture with staph aureus and Klebsiella. Septic shock with persistent bacteremia - staph coag neg in [facility] 8/14,  Staph epi 8/11 [facility]. On vancomycin. Remains on pressors    Normochromic normocytic anemia - B12  929. folate 9.8. Ferritin was 5 on 7/10/18 suggesting fairly significant iron deficiency. He got 500 mg of IV iron in July but actually has a much higher calculated deficit. Ferritin 121 so does not need further IV iron at present. would transfuse for hbg <7. Will follow peripherally at this point.

## 2018-08-24 NOTE — PROGRESS NOTES
Nutrition Assessment:    INTERVENTIONS/RECOMMENDATIONS:   Continue current TF order  Limit time of pump as much as possible. If pt continues to receive <80% of ordered TF, will increase rate. ASSESSMENT:   Chart reviewed and pt discussed on CCU rounds. Pt continues to tolerate TF well. Per pump history pt has received ~65% of ordered TF in the past 72 hrs. Noted that TF were held from 0000 this morning and were to be restarted after CCU rounds today (>10 hrs). Diet Order:  (TwoCal @ 50 ml/hr + prosourece TID + 75 ml water flush q 4 hrs)  % Eaten:  Patient Vitals for the past 72 hrs:   % Diet Eaten   08/23/18 2000 0 %     Pertinent Medications: [x]Reviewed: lasix, insulin, lactulose, PPI, jose de jesus @ 10 mcg/min, miralax,   Pertinent Labs: [x]Reviewed: K+ 3.4,   Food Allergies: [x]NKFA  []Other   Last BM:  8/24  Edema:  anasarca    [x]RUE 3+  [x]LUE 3+  [x]RLE 3+  [x]LLE 3+      Pressure Ulcer:      [] Stage I   [] Stage II   [] Stage III   [] Stage IV      Anthropometrics: Height: 6' 2\" (188 cm) Weight: 134.4 kg (296 lb 4.8 oz)    IBW (%IBW):   ( ) UBW (%UBW):   (  %)    BMI: Body mass index is 38.04 kg/(m^2). This BMI is indicative of:  []Underweight   []Normal   []Overweight   [x] Obesity   [] Extreme Obesity (BMI>40)  Last Weight Metrics:  Weight Loss Metrics 8/23/2018 7/10/2018 7/5/2018 5/15/2018 5/7/2018 4/10/2018 3/26/2018   Today's Wt 296 lb 4.8 oz - 283 lb 4.8 oz 266 lb 262 lb 267 lb 267 lb   BMI - 38.04 kg/m2 36.37 kg/m2 34.15 kg/m2 33.64 kg/m2 34.28 kg/m2 34.28 kg/m2       Estimated Nutrition Needs (Based on): 7763 Kcals/day (PSU (MSJ 0963)) , 130 g (1 g/kg) Protein  Carbohydrate:  At Least 130 g/day  Fluids: 2519 mL/day or per primary team    NUTRITION DIAGNOSES:   Problem:  Altered GI function      Etiology: related to Ascending colon mass c/w probable colon carcinoma and Cedric's erosion with HH      Signs/Symptoms: as evidenced by Ascending colon mass c/w probable colon carcinoma and Cedric's erosion with HH     Previous Nutrition Dx:  [] Resolved  [] Unresolved           [x] Progressing    NUTRITION INTERVENTIONS:  Meals/Snacks:  Other (advance diet as/if medically able per SLP ) Enteral/Parenteral Nutrition: Other (Continue TF and TPN as ordered) Supplements: Commercial supplement              GOAL:   meet >80% of ordered TF in 2-4 days    NUTRITION MONITORING AND EVALUATION      Food/Nutrient Intake Outcomes: Enteral/parenteral nutrition intake  Physical Signs/Symptoms Outcomes: GI, Glucose profile, GI profile, Electrolyte and renal profile, Weight/weight change    Previous Goal Met:   [x] Met              [] Progressing Towards Goal              [] Not Progressing Towards Goal   Previous Recommendations:   [] Implemented          [] Not Implemented          [x] Not Applicable    LEARNING NEEDS (Diet, Food/Nutrient-Drug Interaction):    [x] None Identified   [] Identified and Education Provided/Documented   [] Identified and Pt declined/was not appropriate     Cultural, Synagogue, OR Ethnic Dietary Needs:    [x] None Identified   [] Identified and Addressed     [x] Interdisciplinary Care Plan Reviewed/Documented    [x] Discharge Planning: TBD   [] Participated in Interdisciplinary Rounds    NUTRITION RISK:    [x] High        to      [x] Moderate           []  Low  []  Minimal/Uncompromised      Yang Lundberg RDN  Pager [phone]  Weekend Pager [phone]

## 2018-08-25 ENCOUNTER — APPOINTMENT (OUTPATIENT)
Dept: CT IMAGING | Age: 78
DRG: 329 | End: 2018-08-25
Attending: INTERNAL MEDICINE
Payer: MEDICARE

## 2018-08-25 LAB
ANION GAP SERPL CALC-SCNC: 6 MMOL/L (ref 5–15)
BASOPHILS # BLD: 0 K/UL (ref 0–0.1)
BASOPHILS NFR BLD: 0 % (ref 0–1)
BUN SERPL-MCNC: 27 MG/DL (ref 6–20)
BUN/CREAT SERPL: 32 (ref 12–20)
CALCIUM SERPL-MCNC: 7.6 MG/DL (ref 8.5–10.1)
CHLORIDE SERPL-SCNC: 108 MMOL/L (ref 97–108)
CO2 SERPL-SCNC: 31 MMOL/L (ref 21–32)
CREAT SERPL-MCNC: 0.84 MG/DL (ref 0.7–1.3)
DIFFERENTIAL METHOD BLD: ABNORMAL
EOSINOPHIL # BLD: 0 K/UL (ref 0–0.4)
EOSINOPHIL NFR BLD: 0 % (ref 0–7)
ERYTHROCYTE [DISTWIDTH] IN BLOOD BY AUTOMATED COUNT: 23.8 % (ref 11.5–14.5)
GLUCOSE BLD STRIP.AUTO-MCNC: 169 MG/DL (ref 65–100)
GLUCOSE BLD STRIP.AUTO-MCNC: 172 MG/DL (ref 65–100)
GLUCOSE BLD STRIP.AUTO-MCNC: 181 MG/DL (ref 65–100)
GLUCOSE SERPL-MCNC: 166 MG/DL (ref 65–100)
HCT VFR BLD AUTO: 27.4 % (ref 36.6–50.3)
HGB BLD-MCNC: 8 G/DL (ref 12.1–17)
IMM GRANULOCYTES # BLD: 0.1 K/UL (ref 0–0.04)
IMM GRANULOCYTES NFR BLD AUTO: 1 % (ref 0–0.5)
LYMPHOCYTES # BLD: 0.9 K/UL (ref 0.8–3.5)
LYMPHOCYTES NFR BLD: 6 % (ref 12–49)
MCH RBC QN AUTO: 26.7 PG (ref 26–34)
MCHC RBC AUTO-ENTMCNC: 29.2 G/DL (ref 30–36.5)
MCV RBC AUTO: 91.3 FL (ref 80–99)
MONOCYTES # BLD: 1.3 K/UL (ref 0–1)
MONOCYTES NFR BLD: 10 % (ref 5–13)
NEUTS SEG # BLD: 11 K/UL (ref 1.8–8)
NEUTS SEG NFR BLD: 83 % (ref 32–75)
NRBC # BLD: 0 K/UL (ref 0–0.01)
NRBC BLD-RTO: 0 PER 100 WBC
PLATELET # BLD AUTO: 233 K/UL (ref 150–400)
PMV BLD AUTO: 11.1 FL (ref 8.9–12.9)
POTASSIUM SERPL-SCNC: 3.3 MMOL/L (ref 3.5–5.1)
RBC # BLD AUTO: 3 M/UL (ref 4.1–5.7)
SERVICE CMNT-IMP: ABNORMAL
SODIUM SERPL-SCNC: 145 MMOL/L (ref 136–145)
WBC # BLD AUTO: 13.3 K/UL (ref 4.1–11.1)

## 2018-08-25 PROCEDURE — 80048 BASIC METABOLIC PNL TOTAL CA: CPT | Performed by: INTERNAL MEDICINE

## 2018-08-25 PROCEDURE — 74011250637 HC RX REV CODE- 250/637: Performed by: INTERNAL MEDICINE

## 2018-08-25 PROCEDURE — 74011000250 HC RX REV CODE- 250: Performed by: INTERNAL MEDICINE

## 2018-08-25 PROCEDURE — 74011250636 HC RX REV CODE- 250/636: Performed by: INTERNAL MEDICINE

## 2018-08-25 PROCEDURE — 74011636637 HC RX REV CODE- 636/637: Performed by: INTERNAL MEDICINE

## 2018-08-25 PROCEDURE — 87186 SC STD MICRODIL/AGAR DIL: CPT | Performed by: INTERNAL MEDICINE

## 2018-08-25 PROCEDURE — C9113 INJ PANTOPRAZOLE SODIUM, VIA: HCPCS | Performed by: INTERNAL MEDICINE

## 2018-08-25 PROCEDURE — 94640 AIRWAY INHALATION TREATMENT: CPT

## 2018-08-25 PROCEDURE — 65610000006 HC RM INTENSIVE CARE

## 2018-08-25 PROCEDURE — 87070 CULTURE OTHR SPECIMN AEROBIC: CPT | Performed by: INTERNAL MEDICINE

## 2018-08-25 PROCEDURE — 77030018798 HC PMP KT ENTRL FED COVD -A

## 2018-08-25 PROCEDURE — 71275 CT ANGIOGRAPHY CHEST: CPT

## 2018-08-25 PROCEDURE — 74011000258 HC RX REV CODE- 258: Performed by: INTERNAL MEDICINE

## 2018-08-25 PROCEDURE — 74011250637 HC RX REV CODE- 250/637: Performed by: SURGERY

## 2018-08-25 PROCEDURE — 74011000250 HC RX REV CODE- 250: Performed by: SURGERY

## 2018-08-25 PROCEDURE — 74011250636 HC RX REV CODE- 250/636: Performed by: SURGERY

## 2018-08-25 PROCEDURE — 74011636320 HC RX REV CODE- 636/320: Performed by: SURGERY

## 2018-08-25 PROCEDURE — 82962 GLUCOSE BLOOD TEST: CPT

## 2018-08-25 PROCEDURE — 36415 COLL VENOUS BLD VENIPUNCTURE: CPT | Performed by: SURGERY

## 2018-08-25 PROCEDURE — 94003 VENT MGMT INPAT SUBQ DAY: CPT

## 2018-08-25 PROCEDURE — 87077 CULTURE AEROBIC IDENTIFY: CPT | Performed by: INTERNAL MEDICINE

## 2018-08-25 PROCEDURE — 85025 COMPLETE CBC W/AUTO DIFF WBC: CPT | Performed by: SURGERY

## 2018-08-25 RX ORDER — FENTANYL CITRATE 50 UG/ML
50-100 INJECTION, SOLUTION INTRAMUSCULAR; INTRAVENOUS
Status: DISCONTINUED | OUTPATIENT
Start: 2018-08-25 | End: 2018-09-03

## 2018-08-25 RX ORDER — SODIUM CHLORIDE 0.9 % (FLUSH) 0.9 %
10 SYRINGE (ML) INJECTION
Status: COMPLETED | OUTPATIENT
Start: 2018-08-25 | End: 2018-08-25

## 2018-08-25 RX ORDER — SODIUM CHLORIDE 9 MG/ML
50 INJECTION, SOLUTION INTRAVENOUS
Status: COMPLETED | OUTPATIENT
Start: 2018-08-25 | End: 2018-08-26

## 2018-08-25 RX ADMIN — NAFCILLIN SODIUM 2 G: 2 INJECTION, POWDER, LYOPHILIZED, FOR SOLUTION INTRAMUSCULAR; INTRAVENOUS at 15:37

## 2018-08-25 RX ADMIN — IPRATROPIUM BROMIDE AND ALBUTEROL SULFATE 3 ML: .5; 3 SOLUTION RESPIRATORY (INHALATION) at 16:06

## 2018-08-25 RX ADMIN — IOPAMIDOL 100 ML: 755 INJECTION, SOLUTION INTRAVENOUS at 13:54

## 2018-08-25 RX ADMIN — SODIUM CHLORIDE 10 ML: 9 INJECTION, SOLUTION INTRAMUSCULAR; INTRAVENOUS; SUBCUTANEOUS at 15:54

## 2018-08-25 RX ADMIN — VENLAFAXINE 25 MG: 25 TABLET ORAL at 22:20

## 2018-08-25 RX ADMIN — SODIUM CHLORIDE 40 MG: 9 INJECTION INTRAMUSCULAR; INTRAVENOUS; SUBCUTANEOUS at 10:22

## 2018-08-25 RX ADMIN — FENTANYL CITRATE 100 MCG: 50 INJECTION, SOLUTION INTRAMUSCULAR; INTRAVENOUS at 08:30

## 2018-08-25 RX ADMIN — MELATONIN TAB 3 MG 3 MG: 3 TAB at 22:20

## 2018-08-25 RX ADMIN — ENOXAPARIN SODIUM 120 MG: 100 INJECTION SUBCUTANEOUS at 22:00

## 2018-08-25 RX ADMIN — NAFCILLIN SODIUM 2 G: 2 INJECTION, POWDER, LYOPHILIZED, FOR SOLUTION INTRAMUSCULAR; INTRAVENOUS at 05:12

## 2018-08-25 RX ADMIN — CHLORHEXIDINE GLUCONATE 15 ML: 1.2 RINSE ORAL at 10:17

## 2018-08-25 RX ADMIN — CARBAMAZEPINE 100 MG: 100 SUSPENSION ORAL at 10:11

## 2018-08-25 RX ADMIN — SODIUM CHLORIDE 10 ML: 9 INJECTION, SOLUTION INTRAMUSCULAR; INTRAVENOUS; SUBCUTANEOUS at 22:17

## 2018-08-25 RX ADMIN — LACTULOSE 10 G: 20 SOLUTION ORAL at 10:09

## 2018-08-25 RX ADMIN — NAFCILLIN SODIUM 2 G: 2 INJECTION, POWDER, LYOPHILIZED, FOR SOLUTION INTRAMUSCULAR; INTRAVENOUS at 10:26

## 2018-08-25 RX ADMIN — VENLAFAXINE 25 MG: 25 TABLET ORAL at 15:40

## 2018-08-25 RX ADMIN — ACETAMINOPHEN 650 MG: 160 SUSPENSION ORAL at 17:12

## 2018-08-25 RX ADMIN — SODIUM CHLORIDE 10 ML: 9 INJECTION, SOLUTION INTRAMUSCULAR; INTRAVENOUS; SUBCUTANEOUS at 22:15

## 2018-08-25 RX ADMIN — ENOXAPARIN SODIUM 120 MG: 100 INJECTION SUBCUTANEOUS at 10:52

## 2018-08-25 RX ADMIN — FENTANYL CITRATE 100 MCG: 50 INJECTION, SOLUTION INTRAMUSCULAR; INTRAVENOUS at 03:36

## 2018-08-25 RX ADMIN — Medication 10 ML: at 15:55

## 2018-08-25 RX ADMIN — CARBAMAZEPINE 100 MG: 100 SUSPENSION ORAL at 22:00

## 2018-08-25 RX ADMIN — SODIUM CHLORIDE 50 ML/HR: 900 INJECTION, SOLUTION INTRAVENOUS at 13:54

## 2018-08-25 RX ADMIN — CARBAMAZEPINE 100 MG: 100 SUSPENSION ORAL at 15:35

## 2018-08-25 RX ADMIN — FUROSEMIDE 60 MG: 10 INJECTION, SOLUTION INTRAMUSCULAR; INTRAVENOUS at 10:19

## 2018-08-25 RX ADMIN — INSULIN LISPRO 3 UNITS: 100 INJECTION, SOLUTION INTRAVENOUS; SUBCUTANEOUS at 18:39

## 2018-08-25 RX ADMIN — POLYETHYLENE GLYCOL 3350 17 G: 17 POWDER, FOR SOLUTION ORAL at 10:09

## 2018-08-25 RX ADMIN — FENTANYL CITRATE 50 MCG: 50 INJECTION, SOLUTION INTRAMUSCULAR; INTRAVENOUS at 14:04

## 2018-08-25 RX ADMIN — INSULIN LISPRO 3 UNITS: 100 INJECTION, SOLUTION INTRAVENOUS; SUBCUTANEOUS at 11:21

## 2018-08-25 RX ADMIN — CARBAMAZEPINE 100 MG: 100 SUSPENSION ORAL at 18:32

## 2018-08-25 RX ADMIN — VENLAFAXINE 25 MG: 25 TABLET ORAL at 10:09

## 2018-08-25 RX ADMIN — NAFCILLIN SODIUM 2 G: 2 INJECTION, POWDER, LYOPHILIZED, FOR SOLUTION INTRAMUSCULAR; INTRAVENOUS at 01:23

## 2018-08-25 RX ADMIN — CHLORHEXIDINE GLUCONATE 15 ML: 1.2 RINSE ORAL at 22:16

## 2018-08-25 RX ADMIN — FUROSEMIDE 60 MG: 10 INJECTION, SOLUTION INTRAMUSCULAR; INTRAVENOUS at 22:15

## 2018-08-25 RX ADMIN — NAFCILLIN SODIUM 2 G: 2 INJECTION, POWDER, LYOPHILIZED, FOR SOLUTION INTRAMUSCULAR; INTRAVENOUS at 22:17

## 2018-08-25 RX ADMIN — IPRATROPIUM BROMIDE AND ALBUTEROL SULFATE 3 ML: .5; 3 SOLUTION RESPIRATORY (INHALATION) at 07:48

## 2018-08-25 RX ADMIN — LACTULOSE 10 G: 20 SOLUTION ORAL at 18:33

## 2018-08-25 RX ADMIN — NAFCILLIN SODIUM 2 G: 2 INJECTION, POWDER, LYOPHILIZED, FOR SOLUTION INTRAMUSCULAR; INTRAVENOUS at 19:53

## 2018-08-25 RX ADMIN — INSULIN LISPRO 3 UNITS: 100 INJECTION, SOLUTION INTRAVENOUS; SUBCUTANEOUS at 05:18

## 2018-08-25 RX ADMIN — IPRATROPIUM BROMIDE AND ALBUTEROL SULFATE 3 ML: .5; 3 SOLUTION RESPIRATORY (INHALATION) at 11:39

## 2018-08-25 RX ADMIN — IPRATROPIUM BROMIDE AND ALBUTEROL SULFATE 3 ML: .5; 3 SOLUTION RESPIRATORY (INHALATION) at 19:08

## 2018-08-25 RX ADMIN — SODIUM CHLORIDE 10 ML: 9 INJECTION, SOLUTION INTRAMUSCULAR; INTRAVENOUS; SUBCUTANEOUS at 05:13

## 2018-08-25 RX ADMIN — AMIODARONE HYDROCHLORIDE 200 MG: 200 TABLET ORAL at 10:33

## 2018-08-25 NOTE — PROGRESS NOTES
CRS Progress note    No major events overnight. Seems to be more alert this morning. Nurse reports that he tolerated CPAP for about 3 hours before becoming tachypneic.      /76  Pulse (!) 109  Temp 99.6 °F (37.6 °C)  Resp (!) 34  Ht 6' 2\" (1.88 m)  Wt 125 kg (275 lb 9.2 oz)  SpO2 96%  BMI 35.38 kg/m2    Intubated, responds to command  Abd soft, nontender  Ostomy functioning    Plan  TF restarted  Trach vs extubation. Defer to ICU team  Antibiotics unlikely to get rid of vegetations. Pace wire extraction is very high risk but it may be the only option at getting rid of this infection.   I will defer to ID and Cardiology

## 2018-08-25 NOTE — CONSULTS
Infectious Disease Consult  Florentino Lott MD FAC    Date of Consultation:  Aug 24, 2018    Referring Physician: Dr Mela Guevara:     Patient is a 69 y.o. male who is being seen for bacteremia. IMPRESSION:   · Persistent bacteremia with coagulase negative Staph since 8/11, treated with Vancomycin since 8/11 , changed to Daptomycin IV on 8/23  · NATASHA shows definite large mass associated with pacing wire in RA which may represent vegetation ( 8/23)   · Thrombus  & tiny gas bubble of as in RIJ extending into SVC to level of RA (8/15)  s/p heparin IV now on lovenox s/q  · Acute respiratory failure on Ventilator / h/o tracheostomy 24 years ago    · Pulmonary edema & Bilateral pleuraleffusions  · Pneumonia , last sputum culture 8/11 + for Heavy staph aureus , light K.pneumoniae s/p treaed  · Ca colon Stage 3b s/p colectomy / YAMEL / enterotomies  · S/p ileus , aspiration pneumonia prior to ICU admission   · H/o traumatic brain injury 25 years ago         PLAN:      · Change to Nafcillin IV , repeat BC after  24 hours. · Repeat BC until negative BC are obtained. · Repeat sputum cultures  · Thrombus in R/IJ , now thrombus vs vegetation  in RA ? Etiology ? continue anticoagulation, hematology on case. · CT chest with IV contrast to re evaluate IJ & SVC thrombus , also pacemaker pocket. 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 As per Dr Binh Bell it is extremely high risk with definite migration of IJ/ SVC thrombus to lungs & risk of perforation. · Continue with IV Nafcillin , continue with anticoagulation  · Dr Olivier Ramirez covering for weekend . I am available by phone / [facility] text     Patient is seen on request by Dr Binh Bell for bacteremia.  The patient has been bacteremic since 8/11 with Coagulase negative staph       Ally Bound is a  69y.o. year old male  admitted on 7/10/18 with acute blood loss anemia with a hgb of 5.5 and a microcytosis. He underwent evaluation by Dr. Igor Almendarez with EGD 7/11/18 and colonoscopy  7/12/18 and was found to have a malignant appearing mass in the ascending colon that was contiguous with a large sessile polyp with ulceration on the surface and bleeding with heaped up edges. He ultimately underwent a laparoscopic converted to open right colectomy, extensive lysis of adhesions, repair of serosal tears x 2, and small bowel resection x 2. The pathology indicates  colon carcinoma stage 3b. He has had post op ileus  Aspiration events , AKIN with oligoanuria,  shock hypotension requiring vasopressors, acute respiratory failure. Pt is currently on ventilator. He developed  Pneumonia with sputum cultures + for heavy staph aureus & light Klebsiella pneumoniae. Pt was treated with cefepime & thereafter Zosyn IV . Current CXR shows bilateral pleural effusions & pulmonary edema pattern  BC have been as follows:   7/20- BC - no growth  8/11-  BC - staph coagulase negative -2/2                      stah capitis sub spp- 1/2 8/11- BC -line - staph epidermidis 2/2                    Staph capitis 1/2  8/14- BC - staph - coagulase negative - 1/4 8/16- BC  - no growth  8/21- BC - staph coagulase negative 1/4  Pt has been spiking fevers >101on  8/7 & again  since 8/11, 8/12. Therafter temps have been low grade Tmax 100.2 with one exception tmax 100.8 on 8/19. Last temp 100.3 on 8/23   Pt has been on Vancomycin Iv since 8/11 . He was switched to Daptomycin on 8/23( See antibiotic history below)  Pt had Ct scan of chest on 8/15  that showed  thrombus in the SVC along the left IJcatheter/pacer leads extending to the level of the right atrial SVC junction. Pt has been on IV heparin , now changed to Lovenox s/q   CT chest 8/15  There is thrombus and a tiny bubble of gas in the right internal jugular vein.   The thrombus extends into the SVC to the level of the right atrial SVC  junction. Isabell [place] was done on 8/23. It showed the following:  RIGHT ATRIUM: Size was normal. A pacing wire was present. There was a  definite, large mass associated with the pacing wire. It may represent a  vegetation  Pt seen today . Remains on ventilator , awake , minimally responsive . Wife & son at bedside. Patient Active Problem List   Diagnosis Code    HTN (hypertension) I10    Depression F32.9    Hypercholesterolemia E78.00    Acid reflux K21.9    Seizure ([facility] R56.9    Hypothyroidism E03.9    Hyponatremia E87.1    BPH (benign prostatic hyperplasia) N40.0    Rash R21    Cellulitis L03.90    Wax in ear H61.20    Ulcer [id number]    Small bowel obstruction (HCC) K56.609    Atrial flutter (HCC) I48.92    Atrial fibrillation or flutter     CHI (closed head injury) S09. 90XA    Basal cell cancer C44.91    TBI (traumatic brain injury) ([facility] [address] S06. 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    Severe obesity (BMI 35.0-39.9) (HCC) E66.01    Acute blood loss anemia D62    Anasarca R60.1    GI bleed K92.2    Acute encephalopathy G93.40    Idiopathic hypotension I95.0    Counseling regarding goals of care Z71.89     Past Medical History:   Diagnosis Date    A-fib [facility])     Acute bronchitis 8/25/2015    Anxiety     Arrhythmia     atrial fibrillation    Arthritis     BCC (basal cell carcinoma of skin)     BPH (benign prostatic hyperplasia)     Chronic back pain greater than 3 months duration 5/4/2015    Chronic right shoulder pain 1/11/2016    Common wart 5/16/2016    Constipation 12/14/2012    CVA (cerebral infarction) 5/4/2015    Depression     GERD (gastroesophageal reflux disease)     Hearing loss     bilateral hearing aids    Hemiplegia following CVA (cerebrovascular accident) ([address]     left side hemiparesis    History of colostomy     HTN (hypertension)     Hyperlipidemia     Localized epilepsy with impairment of consciousness ([address]     Prostate cancer ([address]     Status post XRT, Lupron    Screening for colon cancer 11/4/2015    Stroke [facility])     traumatic from neck crushing    TBI (traumatic brain injury) ([address] 1994    Unspecified sleep apnea     on CPAP      Family History   Problem Relation Age of Onset    Alzheimer Mother      dec [de-identified]   [place].S[place] Cancer Father      dec 76 kidney    Heart Disease Brother     No Known Problems Son       Social History   Substance Use Topics    Smoking status: Former Smoker     Years: 10.00     Types: Pipe     Quit date: 1/29/1990    Smokeless tobacco: Never Used      Comment: QUIT 1970'S    Alcohol use No     Past Surgical History:   Procedure Laterality Date    COLONOSCOPY N/A 7/12/2018    COLONOSCOPY through stoma performed [facility] Ramone [facility]el [facility]COPY    HX ANKLE FRACTURE TX      HX CATARACT REMOVAL      bilat    HX COLECTOMY      colostomy placed and revised    HX HEENT      ear surgery eddie.  HX HERNIA REPAIR      HX ORTHOPAEDIC      left hip pinning    HX PACEMAKER  2014      Prior to Admission medications    Medication Sig Start Date End Date Taking? Authorizing Provider   amLODIPine (NORVASC) 10 mg tablet Take 10 mg by mouth daily (with lunch). Yes Phys Other, MD   atorvastatin (LIPITOR) 20 mg tablet Take 20 mg by mouth daily (with dinner).    Yes Phys Other, MD   carBAMazepine XR (TEGRETOL XR) 200 mg SR tablet Take 200 mg by mouth two (2) times a day. YOVANI, Brand only   Yes Lester Lu MD   acetaminophen (TYLENOL) 500 mg tablet Take 1,000 mg by mouth every six (6) hours as needed for Pain. Yes Lester Lu MD   cholecalciferol (VITAMIN D3) 1,000 unit cap Take 1,000 Units by mouth daily (with lunch). Yes Historical Provider   meclizine (ANTIVERT) 25 mg tablet Take 25 mg by mouth every eight (8) hours as needed for Nausea. take every 8 hours as needed for nausea 5/10/18  Yes Jomar Haskins MD   PRADAXA 150 mg capsule TAKE ONE CAPSULE BY MOUTH EVERY 12 HOURS 5/16/18  Yes Sofia Rollins NP   amiodarone (CORDARONE) 200 mg tablet TAKE ONE TABLET BY MOUTH DAILY 5/9/18  Yes Jim Bray MD   lisinopril (PRINIVIL, ZESTRIL) 40 mg tablet TAKE ONE TABLET BY MOUTH DAILY 4/12/18  Yes Sofia Rollins NP   magnesium hydroxide (SOTO MILK OF MAGNESIA) 400 mg/5 mL suspension Take 30 mL by mouth daily as needed for Constipation. Yes Historical Provider   DOC-Q-LACE 100 mg capsule TAKE ONE CAPSULE BY MOUTH TWICE A DAY 4/16/16  Yes Sydnee Celaya MD   lutein 20 mg tab Take 1 Tab by mouth daily. Yes Historical Provider   senna (SENNA) 8.6 mg tablet Take 1 tablet by mouth daily. Yes Historical Provider   finasteride (PROSCAR) 5 mg tablet Take 5 mg by mouth daily. Yes Historical Provider   venlafaxine-SR (EFFEXOR-XR) 75 mg capsule TAKE ONE CAPSULE BY MOUTH DAILY 7/25/18   Sydnee Celaya MD   metoprolol tartrate (LOPRESSOR) 25 mg tablet TAKE ONE-HALF TABLET BY MOUTH TWICE A DAY 7/17/18   Sydnee Celaya MD   lactulose (CHRONULAC) 10 gram/15 mL solution TAKE 1 TEASPOONFUL (5 ML) BY MOUTH THREE TIMES AS DAY AS NEEDED  Indications: constipation 3/16/18   Bria Mcconnell NP   zolpidem (AMBIEN) 10 mg tablet Take 1 Tab by mouth nightly as needed for Sleep (please take in the bed). 9/26/16   Sydnee Celaya MD   betamethasone dipropionate (DIPROSONE) 0.05 % topical cream Apply  to affected area as needed.  5/16/16   Sydnee Celaya, MD     Allergies   Allergen Reactions    Ciprofibrate Hives        Review of Systems:  Review of systems not obtained due to patient factors. Objective:   Blood pressure 178/76, pulse (!) 109, temperature 99.6 °F (37.6 °C), resp. rate (!) 34, height 6' 2\" (1.88 m), weight 275 lb 9.2 oz (125 kg), SpO2 96 %.   Temp (24hrs), Av.8 °F (37.1 °C), Min:98 °F (36.7 °C), Max:99.6 °F (37.6 °C)    Current Facility-Administered Medications   Medication Dose Route Frequency    fentaNYL citrate (PF) injection  mcg   mcg IntraVENous Q2H PRN    nafcillin (NALLPEN) 2 g in 0.9% sodium chloride (MBP/ADV) 100 mL  2 g IntraVENous Q4H    PHENYLephrine (PF)(LILLY-SYNEPHRINE) 30 mg in 0.9% sodium chloride 250 mL infusion   mcg/min IntraVENous TITRATE    furosemide (LASIX) injection 60 mg  60 mg IntraVENous Q12H    enoxaparin (LOVENOX) injection 120 mg  120 mg SubCUTAneous Q12H    LORazepam (ATIVAN) injection 2 mg  2 mg IntraVENous Q2H PRN    acetaminophen (TYLENOL) solution 650 mg  650 mg Oral Q4H PRN    insulin lispro (HUMALOG) injection   SubCUTAneous Q6H    glucose chewable tablet 16 g  4 Tab Oral PRN    dextrose (D50W) injection syrg 12.5-25 g  12.5-25 g IntraVENous PRN    glucagon (GLUCAGEN) injection 1 mg  1 mg IntraMUSCular PRN    venlafaxine (EFFEXOR) tablet 25 mg  25 mg Oral TID    pantoprazole (PROTONIX) 40 mg in sodium chloride 0.9% 10 mL injection  40 mg IntraVENous DAILY    propofol (DIPRIVAN) infusion  0-50 mcg/kg/min IntraVENous TITRATE    carBAMazepine (TEGretol) 200 mg/10 mL suspension 100 mg  100 mg PEG Tube QID    chlorhexidine (PERIDEX) 0.12 % mouthwash 15 mL  15 mL Oral Q12H    albuterol-ipratropium (DUO-NEB) 2.5 MG-0.5 MG/3 ML  3 mL Nebulization QID RT    albuterol-ipratropium (DUO-NEB) 2.5 MG-0.5 MG/3 ML  3 mL Nebulization Q6H PRN    amiodarone (CORDARONE) tablet 200 mg  200 mg Oral DAILY    prochlorperazine (COMPAZINE) with saline injection 5 mg  5 mg IntraVENous Q6H PRN    lactulose (CHRONULAC) solution 10 g  10 g Oral BID    sodium chloride (NS) flush 10-30 mL  10-30 mL InterCATHeter PRN    sodium chloride (NS) flush 10 mL  10 mL InterCATHeter PRN    sodium chloride (NS) flush 10-40 mL  10-40 mL InterCATHeter Q8H    sodium chloride (NS) flush 10 mL  10 mL InterCATHeter Q24H    melatonin tablet 3 mg  3 mg Oral QHS    polyethylene glycol (MIRALAX) packet 17 g  17 g Oral DAILY    hydrALAZINE (APRESOLINE) 20 mg/mL injection 10 mg  10 mg IntraVENous Q6H PRN    oxyCODONE IR (ROXICODONE) tablet 5 mg  5 mg Oral Q4H PRN    oxyCODONE IR (ROXICODONE) tablet 10 mg  10 mg Oral Q4H PRN    sodium chloride (NS) flush 5-10 mL  5-10 mL IntraVENous PRN    acetaminophen (TYLENOL) tablet 650 mg  650 mg Oral Q6H PRN    ondansetron (ZOFRAN) injection 4 mg  4 mg IntraVENous Q6H PRN        Exam:    General:  Intubated, awake , minimally responsive   Eyes:  Sclera anicteric. Pupils equally round and reactive to light. Lungs:   Clear to auscultation bilaterally, good effort   CV:  Regular rate and rhythm,no murmur,   Abdomen:   Soft, non-tender.  bowel sounds+. non-distended   Extremities: No or edema   Lines/Devices:  Intact, no erythema, drainage or tenderness- LIJ     Data Review:   CBC:   Recent Labs      08/25/18   0523  08/24/18   0515  08/23/18   0342   WBC  13.3*  9.9  10.3   RBC  3.00*  2.89*  2.85*   HGB  8.0*  7.7*  7.6*   HCT  27.4*  26.3*  25.2*   PLT  233  222  221   GRANS  83*  74  76*   LYMPH  6*  14  11*   EOS  0  1  1     CMP:   Recent Labs      08/25/18   0523  08/24/18   0515  08/23/18   0717   GLU  166*  111*  163*   NA  145  143  140   K  3.3*  3.4*  3.7   CL  108  108  107   CO2  31  27  26   BUN  27*  25*  25*   CREA  0.84  0.65*  0.72   CA  7.6*  7.7*  7.6*   AGAP  6  8  7   BUCR  32*  38*  35*   AP   --   99   --    TP   --   6.5   --    ALB   --   1.6*   --    GLOB   --   4.9*   --    AGRAT   --   0.3*   --        Lab Results   Component Value Date/Time    Culture result: (A) 08/21/2018 12:22 PM     STAPHYLOCOCCUS SPECIES, COAGULASE NEGATIVE GROWING IN 1 OF 4 BOTTLES DRAWN (SITE = RW) IDENTIFICATION AND SUSCEPTIBILITY TO FOLLOW    Culture result: (A) 08/21/2018 12:22 PM     PRELIMINARY REPORT OF GRAM POSITIVE COCCI IN CLUSTERS GROWING IN 1 OF 4 BOTTLES DRAWN , SO FAR CALLED TO AND READ BACK BY CRISTIN LOCK 8/23/18 0721 SP    Culture result: REMAINING BOTTLE(S) HAS/HAVE NO GROWTH SO FAR 08/21/2018 12:22 PM    Culture result: NO GROWTH 5 DAYS 08/16/2018 11:12 AM    Culture result: (A) 08/14/2018 05:17 PM     STAPHYLOCOCCUS SPECIES, COAGULASE NEGATIVE , ISOLATED FROM 1 OF 4 BOTTLES DRAWN. .. LUE SITE    Culture result: (A) 08/14/2018 05:17 PM     PRELIMINARY REPORT OF GRAM POSITIVE COCCI IN CLUSTERS GROWING IN 1 OF 4 BOTTLES DRAWN CALLED TO AND READ BACK BY Mima Rolon RN [facility] AT 1943 ON 8/15/2018. [place] [address]    Culture result: REMAINING BOTTLE(S) HAS/HAVE NO GROWTH IN 5 DAYS 08/14/2018 05:17 PM          XR Results (most recent):    Results from [place] encounter on 07/10/18   XR CHEST PORT   Narrative EXAM:  XR CHEST PORT. INDICATION: Respiratory failure. COMPARISON: 8/23/2018. FINDINGS:   A portable AP radiograph of the chest was obtained at 0455 hours. There is a  pacemaker in the left chest with leads projecting over the right atrium and  right ventricle. Lines and tubes: The patient is on a cardiac monitor. The endotracheal tube and  nasogastric tube and left jugular triple-lumen catheter are all unchanged in  position. Lungs: There is edema throughout the lungs which is unchanged with atelectasis  at the lung bases. Pleura: Costophrenic angles are blunted suggesting small effusions. There is no  pneumothorax. Mediastinum: The cardiac and mediastinal contours and pulmonary vascularity are  normal.  Bones and soft tissues: The bones and soft tissues are grossly within normal  limits. Impression IMPRESSION: No significant change.                 ICD-10-CM ICD-9-CM    1. Anemia, unspecified type D64.9 285.9    2. Generalized weakness R53.1 780.79    3. Acute encephalopathy G93.40 348.30    4. Anasarca R60.1 782.3    5. Idiopathic hypotension I95.0 458.9    6. Counseling regarding goals of care Z71.89 V65.49       Antibiotic history  Cefotetan 7/17  Zosyn 7/20-8/7  Cefepime - 8/11-8/13  Ceftriaxone 8/13-8/17  Vancomycin 8/11-8/22  Daptomycin 8/23- 8/24-DC    Start Nafcillin IV    I have discussed the diagnosis with the patient's wife & son  and the intended plan as seen in the above orders. I have discussed medication side effects and warnings with the patients wife & son  as well.     Reviewed test results at length with patients family    Signed By: MD David Bauer MD FACP

## 2018-08-25 NOTE — PROGRESS NOTES
1930 - Bedside and Verbal shift change report given to Kodi Matos (oncoming nurse) by [place] (offgoing nurse). Report included the following information SBAR, Kardex, Procedure Summary, Intake/Output, MAR, Recent Results, Med Rec Status and Cardiac Rhythm Paced. 2000 - Shift assessment complete. Alert, focus / track with eyes. Intubated , decreased commands following, R U E  weak. Nods appropriately and smile. 2200 - Repositioned for comfort, call bell within reach. Alert, watching TV.    2220 - Titration of med Erasmo to keep MAP greater than 65. Will continue to monitor VS closely. 2327 - PRN Fentanyl given for comfort. See flow sheet and MAR for details. 0000 - Reassessment complete, resting quietly in bed. See summary for details. Eyes open, smile, decreased commands following.     0200 - Repositioned for comfort, awake, watching tv. Will continue to monitor VS.    0327 - Bathe with soap / water / CHG . Dee Benitez / Jag Nor / electrodes changed. 5946 - Behavioral pain scale > 6 during activities ( bathing ) PRN Fentanyl given for comfort. See flow sheet / MAR for details. 0400 - Reassessment complete, no changes noted from previous assessment. See summary for details. 5432 - Erasmo stopped, will continue to monitor BP and keep MAP > 65.    0730 - Bedside and Verbal shift change report given to Fernanda Adams (oncoming nurse) by Kodi Matos (offgoing nurse). Report included the following information SBAR, Kardex, ED Summary, OR Summary, Procedure Summary, Intake/Output, MAR, Recent Results, Med Rec Status and Cardiac Rhythm Paced.

## 2018-08-25 NOTE — PROGRESS NOTES
Dr. Brittni Santaan notified of CT scan results. Also updated on wifes concerns regarding patients inability to swallow and that he would lose ogt when extubated. Will hold off extubation for today, and retry in morning as instructed.

## 2018-08-25 NOTE — PROGRESS NOTES
Patient appeared tachypneic 40's,diaphoretic. Placed back on assist control. 50mg fentanyl given IVP. Dr. Thad Krabbe notified.

## 2018-08-25 NOTE — PROGRESS NOTES
08/25/18 0622 08/25/18 0637   ABCDEF Bundle   SBT Safety Screen Passed Yes --    SBT Trial Passed --  Yes   Weaning Parameters   Spontaneous Breathing Trial Complete --  Yes   Resp Rate Observed 26 28   Ve 11.2 11.3    414   RSBI 62 77   Patient remains on CPAP+6,PS 8, 40%

## 2018-08-25 NOTE — PROGRESS NOTES
Follow up visit with patient's wife Domenico Scott and her niece who was visiting from [place], [place]. Wife was very appreciative of visit and care and expressed appreciation from all of the care from [address] [place] [address] and [place]. Wife acknowledged her struggles over past few weeks and how she just never imagined she would be in this position from a surgery. Words of comfort and support offered. Mr. Marcos Cisneros was intubated at this time but was awake, and kept reaching for his wife's hand and my hand at various times. I offered a prayer for patient and wife. Wife became tearful and was thankful for prayer. She is acknowledging her acceptance of whatever will be. Spiritual care will continue to follow and provide emotional and spiritual support. [phone]  Visit by: Paco Tolbert.  Celio Stone D.Min, MA, [facility]    [employer]
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
## 2018-08-25 NOTE — PROGRESS NOTES
Patient more relaxed. Appears comfortable. Dr. Brittni Santana at bedside. Placed back on SBT trial CPAP/40%. Will monitor.

## 2018-08-25 NOTE — PROGRESS NOTES
[facility]. upon return patient tachypneic 42,labored. o2 sat 96%. Placed back on AC mode, and 50 mcgs fentanyl given.

## 2018-08-26 ENCOUNTER — APPOINTMENT (OUTPATIENT)
Dept: GENERAL RADIOLOGY | Age: 78
DRG: 329 | End: 2018-08-26
Attending: INTERNAL MEDICINE
Payer: MEDICARE

## 2018-08-26 LAB
ALBUMIN SERPL-MCNC: 1.7 G/DL (ref 3.5–5)
ALBUMIN/GLOB SERPL: 0.4 {RATIO} (ref 1.1–2.2)
ALP SERPL-CCNC: 100 U/L (ref 45–117)
ALT SERPL-CCNC: 29 U/L (ref 12–78)
ANION GAP SERPL CALC-SCNC: 6 MMOL/L (ref 5–15)
AST SERPL-CCNC: 23 U/L (ref 15–37)
BASOPHILS # BLD: 0.1 K/UL (ref 0–0.1)
BASOPHILS NFR BLD: 1 % (ref 0–1)
BILIRUB SERPL-MCNC: 0.9 MG/DL (ref 0.2–1)
BUN SERPL-MCNC: 27 MG/DL (ref 6–20)
BUN/CREAT SERPL: 29 (ref 12–20)
CALCIUM SERPL-MCNC: 7.5 MG/DL (ref 8.5–10.1)
CHLORIDE SERPL-SCNC: 108 MMOL/L (ref 97–108)
CO2 SERPL-SCNC: 33 MMOL/L (ref 21–32)
CREAT SERPL-MCNC: 0.92 MG/DL (ref 0.7–1.3)
DIFFERENTIAL METHOD BLD: ABNORMAL
EOSINOPHIL # BLD: 0.1 K/UL (ref 0–0.4)
EOSINOPHIL NFR BLD: 1 % (ref 0–7)
ERYTHROCYTE [DISTWIDTH] IN BLOOD BY AUTOMATED COUNT: 23.9 % (ref 11.5–14.5)
GLOBULIN SER CALC-MCNC: 4.8 G/DL (ref 2–4)
GLUCOSE BLD STRIP.AUTO-MCNC: 125 MG/DL (ref 65–100)
GLUCOSE BLD STRIP.AUTO-MCNC: 137 MG/DL (ref 65–100)
GLUCOSE BLD STRIP.AUTO-MCNC: 139 MG/DL (ref 65–100)
GLUCOSE BLD STRIP.AUTO-MCNC: 154 MG/DL (ref 65–100)
GLUCOSE BLD STRIP.AUTO-MCNC: 179 MG/DL (ref 65–100)
GLUCOSE SERPL-MCNC: 135 MG/DL (ref 65–100)
HCT VFR BLD AUTO: 27.6 % (ref 36.6–50.3)
HGB BLD-MCNC: 7.9 G/DL (ref 12.1–17)
IMM GRANULOCYTES # BLD: 0.1 K/UL (ref 0–0.04)
IMM GRANULOCYTES NFR BLD AUTO: 1 % (ref 0–0.5)
LYMPHOCYTES # BLD: 1.2 K/UL (ref 0.8–3.5)
LYMPHOCYTES NFR BLD: 12 % (ref 12–49)
MAGNESIUM SERPL-MCNC: 2.1 MG/DL (ref 1.6–2.4)
MCH RBC QN AUTO: 26.4 PG (ref 26–34)
MCHC RBC AUTO-ENTMCNC: 28.6 G/DL (ref 30–36.5)
MCV RBC AUTO: 92.3 FL (ref 80–99)
MONOCYTES # BLD: 0.8 K/UL (ref 0–1)
MONOCYTES NFR BLD: 8 % (ref 5–13)
NEUTS SEG # BLD: 7.7 K/UL (ref 1.8–8)
NEUTS SEG NFR BLD: 77 % (ref 32–75)
NRBC # BLD: 0 K/UL (ref 0–0.01)
NRBC BLD-RTO: 0 PER 100 WBC
PHOSPHATE SERPL-MCNC: 3.3 MG/DL (ref 2.6–4.7)
PLATELET # BLD AUTO: 215 K/UL (ref 150–400)
PMV BLD AUTO: 10.9 FL (ref 8.9–12.9)
POTASSIUM SERPL-SCNC: 2.8 MMOL/L (ref 3.5–5.1)
PROT SERPL-MCNC: 6.5 G/DL (ref 6.4–8.2)
RBC # BLD AUTO: 2.99 M/UL (ref 4.1–5.7)
RBC MORPH BLD: ABNORMAL
SERVICE CMNT-IMP: ABNORMAL
SODIUM SERPL-SCNC: 147 MMOL/L (ref 136–145)
WBC # BLD AUTO: 10 K/UL (ref 4.1–11.1)

## 2018-08-26 PROCEDURE — 5A09457 ASSISTANCE WITH RESPIRATORY VENTILATION, 24-96 CONSECUTIVE HOURS, CONTINUOUS POSITIVE AIRWAY PRESSURE: ICD-10-PCS | Performed by: INTERNAL MEDICINE

## 2018-08-26 PROCEDURE — 74011250637 HC RX REV CODE- 250/637: Performed by: INTERNAL MEDICINE

## 2018-08-26 PROCEDURE — 82962 GLUCOSE BLOOD TEST: CPT

## 2018-08-26 PROCEDURE — 77030009834 HC MSK O2 TRACH VYRM -A

## 2018-08-26 PROCEDURE — 36415 COLL VENOUS BLD VENIPUNCTURE: CPT | Performed by: INTERNAL MEDICINE

## 2018-08-26 PROCEDURE — 94640 AIRWAY INHALATION TREATMENT: CPT

## 2018-08-26 PROCEDURE — 87040 BLOOD CULTURE FOR BACTERIA: CPT | Performed by: INTERNAL MEDICINE

## 2018-08-26 PROCEDURE — 71045 X-RAY EXAM CHEST 1 VIEW: CPT

## 2018-08-26 PROCEDURE — 74011250636 HC RX REV CODE- 250/636: Performed by: INTERNAL MEDICINE

## 2018-08-26 PROCEDURE — 74011000250 HC RX REV CODE- 250: Performed by: SURGERY

## 2018-08-26 PROCEDURE — 74011000250 HC RX REV CODE- 250: Performed by: INTERNAL MEDICINE

## 2018-08-26 PROCEDURE — C9113 INJ PANTOPRAZOLE SODIUM, VIA: HCPCS | Performed by: INTERNAL MEDICINE

## 2018-08-26 PROCEDURE — 85025 COMPLETE CBC W/AUTO DIFF WBC: CPT | Performed by: INTERNAL MEDICINE

## 2018-08-26 PROCEDURE — 94003 VENT MGMT INPAT SUBQ DAY: CPT

## 2018-08-26 PROCEDURE — 65610000006 HC RM INTENSIVE CARE

## 2018-08-26 PROCEDURE — 84100 ASSAY OF PHOSPHORUS: CPT | Performed by: INTERNAL MEDICINE

## 2018-08-26 PROCEDURE — 74011636637 HC RX REV CODE- 636/637: Performed by: INTERNAL MEDICINE

## 2018-08-26 PROCEDURE — 74011250637 HC RX REV CODE- 250/637: Performed by: SURGERY

## 2018-08-26 PROCEDURE — 80053 COMPREHEN METABOLIC PANEL: CPT | Performed by: INTERNAL MEDICINE

## 2018-08-26 PROCEDURE — 74011000258 HC RX REV CODE- 258: Performed by: INTERNAL MEDICINE

## 2018-08-26 PROCEDURE — 83735 ASSAY OF MAGNESIUM: CPT | Performed by: INTERNAL MEDICINE

## 2018-08-26 RX ORDER — GENTAMICIN SULFATE 80 MG/100ML
80 INJECTION, SOLUTION INTRAVENOUS EVERY 8 HOURS
Status: DISCONTINUED | OUTPATIENT
Start: 2018-08-26 | End: 2018-08-26

## 2018-08-26 RX ORDER — GENTAMICIN SULFATE 80 MG/50ML
80 INJECTION, SOLUTION INTRAVENOUS EVERY 8 HOURS
Status: DISCONTINUED | OUTPATIENT
Start: 2018-08-26 | End: 2018-08-27 | Stop reason: SDUPTHER

## 2018-08-26 RX ORDER — POTASSIUM CHLORIDE 29.8 MG/ML
20 INJECTION INTRAVENOUS
Status: DISCONTINUED | OUTPATIENT
Start: 2018-08-26 | End: 2018-08-26 | Stop reason: SDUPTHER

## 2018-08-26 RX ADMIN — NAFCILLIN SODIUM 2 G: 2 INJECTION, POWDER, LYOPHILIZED, FOR SOLUTION INTRAMUSCULAR; INTRAVENOUS at 09:59

## 2018-08-26 RX ADMIN — SODIUM CHLORIDE 10 ML: 9 INJECTION, SOLUTION INTRAMUSCULAR; INTRAVENOUS; SUBCUTANEOUS at 21:33

## 2018-08-26 RX ADMIN — FUROSEMIDE 60 MG: 10 INJECTION, SOLUTION INTRAMUSCULAR; INTRAVENOUS at 21:35

## 2018-08-26 RX ADMIN — VENLAFAXINE 25 MG: 25 TABLET ORAL at 17:25

## 2018-08-26 RX ADMIN — SODIUM CHLORIDE 40 MG: 9 INJECTION INTRAMUSCULAR; INTRAVENOUS; SUBCUTANEOUS at 08:14

## 2018-08-26 RX ADMIN — FUROSEMIDE 60 MG: 10 INJECTION, SOLUTION INTRAMUSCULAR; INTRAVENOUS at 09:58

## 2018-08-26 RX ADMIN — CARBAMAZEPINE 100 MG: 100 SUSPENSION ORAL at 12:13

## 2018-08-26 RX ADMIN — IPRATROPIUM BROMIDE AND ALBUTEROL SULFATE 3 ML: .5; 3 SOLUTION RESPIRATORY (INHALATION) at 19:08

## 2018-08-26 RX ADMIN — FENTANYL CITRATE 100 MCG: 50 INJECTION, SOLUTION INTRAMUSCULAR; INTRAVENOUS at 06:27

## 2018-08-26 RX ADMIN — IPRATROPIUM BROMIDE AND ALBUTEROL SULFATE 3 ML: .5; 3 SOLUTION RESPIRATORY (INHALATION) at 07:57

## 2018-08-26 RX ADMIN — NAFCILLIN SODIUM 2 G: 2 INJECTION, POWDER, LYOPHILIZED, FOR SOLUTION INTRAMUSCULAR; INTRAVENOUS at 02:29

## 2018-08-26 RX ADMIN — ENOXAPARIN SODIUM 120 MG: 100 INJECTION SUBCUTANEOUS at 10:13

## 2018-08-26 RX ADMIN — VENLAFAXINE 25 MG: 25 TABLET ORAL at 08:12

## 2018-08-26 RX ADMIN — SODIUM CHLORIDE 80 MEQ: 900 INJECTION, SOLUTION INTRAVENOUS at 09:06

## 2018-08-26 RX ADMIN — AMIODARONE HYDROCHLORIDE 200 MG: 200 TABLET ORAL at 08:12

## 2018-08-26 RX ADMIN — DAPTOMYCIN 1000 MG: 500 INJECTION, POWDER, LYOPHILIZED, FOR SOLUTION INTRAVENOUS at 14:12

## 2018-08-26 RX ADMIN — CHLORHEXIDINE GLUCONATE 15 ML: 1.2 RINSE ORAL at 21:31

## 2018-08-26 RX ADMIN — GENTAMICIN SULFATE 80 MG: 80 INJECTION, SOLUTION INTRAVENOUS at 21:31

## 2018-08-26 RX ADMIN — SODIUM CHLORIDE 10 ML: 9 INJECTION, SOLUTION INTRAMUSCULAR; INTRAVENOUS; SUBCUTANEOUS at 08:23

## 2018-08-26 RX ADMIN — VENLAFAXINE 25 MG: 25 TABLET ORAL at 21:36

## 2018-08-26 RX ADMIN — IPRATROPIUM BROMIDE AND ALBUTEROL SULFATE 3 ML: .5; 3 SOLUTION RESPIRATORY (INHALATION) at 16:16

## 2018-08-26 RX ADMIN — IPRATROPIUM BROMIDE AND ALBUTEROL SULFATE 3 ML: .5; 3 SOLUTION RESPIRATORY (INHALATION) at 11:22

## 2018-08-26 RX ADMIN — SODIUM CHLORIDE 10 ML: 9 INJECTION, SOLUTION INTRAMUSCULAR; INTRAVENOUS; SUBCUTANEOUS at 13:25

## 2018-08-26 RX ADMIN — LACTULOSE 10 G: 20 SOLUTION ORAL at 17:25

## 2018-08-26 RX ADMIN — CHLORHEXIDINE GLUCONATE 15 ML: 1.2 RINSE ORAL at 09:06

## 2018-08-26 RX ADMIN — GENTAMICIN SULFATE 80 MG: 80 INJECTION, SOLUTION INTRAVENOUS at 11:54

## 2018-08-26 RX ADMIN — INSULIN LISPRO 3 UNITS: 100 INJECTION, SOLUTION INTRAVENOUS; SUBCUTANEOUS at 12:13

## 2018-08-26 RX ADMIN — CARBAMAZEPINE 100 MG: 100 SUSPENSION ORAL at 17:23

## 2018-08-26 RX ADMIN — POLYETHYLENE GLYCOL 3350 17 G: 17 POWDER, FOR SOLUTION ORAL at 08:14

## 2018-08-26 RX ADMIN — CARBAMAZEPINE 100 MG: 100 SUSPENSION ORAL at 09:06

## 2018-08-26 RX ADMIN — MELATONIN TAB 3 MG 3 MG: 3 TAB at 21:36

## 2018-08-26 RX ADMIN — LACTULOSE 10 G: 20 SOLUTION ORAL at 09:07

## 2018-08-26 RX ADMIN — ENOXAPARIN SODIUM 120 MG: 100 INJECTION SUBCUTANEOUS at 21:36

## 2018-08-26 RX ADMIN — NAFCILLIN SODIUM 2 G: 2 INJECTION, POWDER, LYOPHILIZED, FOR SOLUTION INTRAMUSCULAR; INTRAVENOUS at 06:20

## 2018-08-26 RX ADMIN — INSULIN LISPRO 3 UNITS: 100 INJECTION, SOLUTION INTRAVENOUS; SUBCUTANEOUS at 00:34

## 2018-08-26 RX ADMIN — CARBAMAZEPINE 100 MG: 100 SUSPENSION ORAL at 21:32

## 2018-08-26 NOTE — PROGRESS NOTES
Pharmacy Automatic Renal Dosing Protocol - Antimicrobials    Indication for Antimicrobials: Sepsis; GPC bacteremia; Vegetation on pacer leads noted on 18    Current Regimen of Each Antimicrobial:  Nafcillin 2 grams Q4H - started  Day 3    Previous Antimicrobial Therapy:  Daptomycin 1000 mg IV every 24 hours (Started 18; Day #2)  Vancomycin 1500 mg IV every 12 hours (Started 18; Stopped 18)  Ceftriaxone 2 gm IV every 24 hours (Started 18; Day #5 of 5)  Cefepime 2 g IV every 8 hours (Started 18; Stopped 18)  Vancomycin  Fluconazole  Zosyn  Gentamicin 100 mg Q8H (, Day 1)    Significant Cultures:    Sputum from Endotracheal - ng - pending  18 PBC - Staph Epi - oxacillin resistant +++     18 Blood culture = No growth (FINAL)  18 Blood culture = Coag neg staph in 1/4 (FINAL)  18 Respiratory culture = Heavy MSSA; Light klebsiella (FINAL)  18 Blood culture = Coag neg staph in 2/2 (FINAL)  18 Blood culture = Coag neg staph in 2/2 (FINAL)  18 Respiratory culture = No growth (FINAL)  18 Blood culture = No growth (FINAL)    Labs:  Recent Labs      18   0503  18   0523  18   0515   CREA  0.92  0.84  0.65*   BUN  27*  27*  25*   WBC  10.0  13.3*  9.9   Temp (24hrs), Av.5 °F (36.9 °C), Min:97.9 °F (36.6 °C), Max:100.5 °F (38.1 °C)    Radiology:   CTA chest - No evidence for pulmonary emboli. Bilateral pleural effusion are  stable, cardiomegaly. Left lateral chest wall swelling  Creatinine Clearance (mL/min) or Dialysis:   Estimated Creatinine Clearance (using IBW):78.2 mL/min    Impression/Plan:   · Afebrile, WBC </= 10, SCr stable  · Staph treatment was initiated on  w/ Vanc, then transitioned to Dapto. On  ID considered Dapto + Gent for synergy until rereview of Cx results indicated Staph was oxacillin sensitive, so ID changed to Nafcillin.   However new cultures from  Staph Epi, now demonstrate oxacillin resistance as of 8/25 9:55am update. On 8/24 Dr. Racheal Youngblood indicated if new cultures are Staph oxacillin resistant, she wanted switch to Daptomycin and Gentamicin. · Discussed w/ Dr. Cathy Parr and have initiated Daptomycin with Gentamicin synergy dosing. Daptomycin 1000mg (8mg/kg) IV q24h. Gentamicin 80mg IV q8h for a projected peak of 3.7 and trough of 0.72 at Css, no loading dose. · Cardiology noted that removal of wire/vegetation would be too risky however Cardiology evaluation continues. · Antimicrobial stop date: To be determined, likely long term (~6 weeks)     Pharmacy will follow daily and adjust medications as appropriate for renal function and/or serum levels.     Thank you,  Alexandr Wakefield, [facility]
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
## 2018-08-26 NOTE — PROGRESS NOTES
Has had an uneventful night. Cpap began again this morning. Tolerating well at this time. Report given.

## 2018-08-26 NOTE — ROUTINE PROCESS
Bedside and Verbal shift change report received from REBA Govea RN (offgoing nurse). Report included the following information SBAR, Kardex, ED Summary, Procedure Summary, Intake/Output, MAR, Accordion, Recent Results, Med Rec Status, Cardiac Rhythm paced rhythm, Alarm Parameters  and Quality Measures. Currently he is with a spontaneous mode on the mechanical ventilator,however his tidal volumes are low, and reading 160's-200  0830:Dr.D. De Los Santos in at the bedside, the plan of care was discussed to extubate and replenish is Potassium  0835:Extubated by CRISTIN Pate, his voice is intach. No stridor. 0900:Oral gastric tube left in place, however noted with a lot of gagging, therefore I attempted to place nasogastric tube and unsuccessful. The cardiac monitor noted frequent PVC's.  1000: His wife is at the bedside with his niece and son. 1100:  is more awake, and making noises when his wife talks to him. Tube feeding being held until his oral secretions are controlled  1200:Orally suctioned for a moderate amount of thick sputum, and repositioned for comfort. 1400:Midline abdominal wound care completed, he tolerated it well, and repositioned for comfort. 1600:No facial grimaces, or respiratory distress. His family remains at the bedside. 1800:Repositioned for comfort, and suctioned for a large amount of oral secretions. 1910:Bedside and Verbal shift change report given to REBA Govea RN (oncoming nurse) by myself (offgoing nurse). Report included the following information SBAR, Kardex, ED Summary, OR Summary, Procedure Summary, Intake/Output, MAR, Accordion, Recent Results, Med Rec Status, Cardiac Rhythm paced rhythm, Alarm Parameters , Pre Procedure Checklist and Quality Measures.

## 2018-08-26 NOTE — PROGRESS NOTES
ID   Pt afebrile . WBC count 10.00  BC 8/21- Staph epidermidis - oxacillin R -1/4  Sputum 8/25-Gram stain - 1+ Gr-ve rods, occasional Gr+ rods  CT chest - Diffuse soft tissue swelling lateral chest soft tissue low left  side  Change to Daptomycin IV / Gentamicin  for synergy. US of pacemaker pocket site    CTA chest  Diffuse soft tissue swelling is seen in the a lateral chest wall soft tissues  low left side. Bilateral pleural effusion with some loculation associated atelectasis once  again demonstrated. The a superior vena cava thrombus cannot be evaluated by  this technique due to artifact and time of injection. There is no pulmonary  emboli. NG tube and ET tube are in place. The heart size is prominent without  pericardial effusion. Soft tissues around the implanted cardiac device show no  significant abnormality by this technique.     IMPRESSION   impression: No evidence for pulmonary emboli. Bilateral pleural effusion are  stable, cardiomegaly.  Left lateral chest wall swelling    Vannessa Wayne MD [place]

## 2018-08-26 NOTE — PROGRESS NOTES
08/26/18 0545   ABCDEF Bundle   SBT Safety Screen Passed Yes   SBT Trial Passed Yes   Weaning Parameters   Spontaneous Breathing Trial Complete Yes   Resp Rate Observed 23   Ve 8.6      RSBI 64

## 2018-08-27 ENCOUNTER — APPOINTMENT (OUTPATIENT)
Dept: GENERAL RADIOLOGY | Age: 78
DRG: 329 | End: 2018-08-27
Attending: NURSE PRACTITIONER
Payer: MEDICARE

## 2018-08-27 ENCOUNTER — APPOINTMENT (OUTPATIENT)
Dept: GENERAL RADIOLOGY | Age: 78
DRG: 329 | End: 2018-08-27
Attending: INTERNAL MEDICINE
Payer: MEDICARE

## 2018-08-27 ENCOUNTER — APPOINTMENT (OUTPATIENT)
Dept: ULTRASOUND IMAGING | Age: 78
DRG: 329 | End: 2018-08-27
Attending: INTERNAL MEDICINE
Payer: MEDICARE

## 2018-08-27 LAB
ALBUMIN SERPL-MCNC: 1.9 G/DL (ref 3.5–5)
ALBUMIN/GLOB SERPL: 0.4 {RATIO} (ref 1.1–2.2)
ALP SERPL-CCNC: 96 U/L (ref 45–117)
ALT SERPL-CCNC: 26 U/L (ref 12–78)
ANION GAP SERPL CALC-SCNC: 8 MMOL/L (ref 5–15)
AST SERPL-CCNC: 24 U/L (ref 15–37)
BACTERIA SPEC CULT: ABNORMAL
BASOPHILS # BLD: 0 K/UL (ref 0–0.1)
BASOPHILS NFR BLD: 0 % (ref 0–1)
BILIRUB SERPL-MCNC: 0.9 MG/DL (ref 0.2–1)
BUN SERPL-MCNC: 23 MG/DL (ref 6–20)
BUN/CREAT SERPL: 27 (ref 12–20)
CALCIUM SERPL-MCNC: 8 MG/DL (ref 8.5–10.1)
CHLORIDE SERPL-SCNC: 111 MMOL/L (ref 97–108)
CO2 SERPL-SCNC: 31 MMOL/L (ref 21–32)
CREAT SERPL-MCNC: 0.85 MG/DL (ref 0.7–1.3)
DATE LAST DOSE: ABNORMAL
DATE LAST DOSE: NORMAL
DIFFERENTIAL METHOD BLD: ABNORMAL
EOSINOPHIL # BLD: 0 K/UL (ref 0–0.4)
EOSINOPHIL NFR BLD: 0 % (ref 0–7)
ERYTHROCYTE [DISTWIDTH] IN BLOOD BY AUTOMATED COUNT: 24.2 % (ref 11.5–14.5)
GENTAMICIN PEAK SERPL-MCNC: 4.4 UG/ML (ref 5–10)
GENTAMICIN TROUGH SERPL-MCNC: 2 UG/ML (ref 1–2)
GLOBULIN SER CALC-MCNC: 5 G/DL (ref 2–4)
GLUCOSE BLD STRIP.AUTO-MCNC: 141 MG/DL (ref 65–100)
GLUCOSE BLD STRIP.AUTO-MCNC: 150 MG/DL (ref 65–100)
GLUCOSE BLD STRIP.AUTO-MCNC: 155 MG/DL (ref 65–100)
GLUCOSE SERPL-MCNC: 118 MG/DL (ref 65–100)
HCT VFR BLD AUTO: 28.9 % (ref 36.6–50.3)
HGB BLD-MCNC: 8.4 G/DL (ref 12.1–17)
IMM GRANULOCYTES # BLD: 0.1 K/UL (ref 0–0.04)
IMM GRANULOCYTES NFR BLD AUTO: 1 % (ref 0–0.5)
LYMPHOCYTES # BLD: 0.9 K/UL (ref 0.8–3.5)
LYMPHOCYTES NFR BLD: 5 % (ref 12–49)
MAGNESIUM SERPL-MCNC: 2.1 MG/DL (ref 1.6–2.4)
MCH RBC QN AUTO: 26.8 PG (ref 26–34)
MCHC RBC AUTO-ENTMCNC: 29.1 G/DL (ref 30–36.5)
MCV RBC AUTO: 92.3 FL (ref 80–99)
MONOCYTES # BLD: 1.4 K/UL (ref 0–1)
MONOCYTES NFR BLD: 8 % (ref 5–13)
NEUTS SEG # BLD: 15.3 K/UL (ref 1.8–8)
NEUTS SEG NFR BLD: 86 % (ref 32–75)
NRBC # BLD: 0 K/UL (ref 0–0.01)
NRBC BLD-RTO: 0 PER 100 WBC
PHOSPHATE SERPL-MCNC: 3.3 MG/DL (ref 2.6–4.7)
PLATELET # BLD AUTO: 218 K/UL (ref 150–400)
PMV BLD AUTO: 11.1 FL (ref 8.9–12.9)
POTASSIUM SERPL-SCNC: 3 MMOL/L (ref 3.5–5.1)
PROCALCITONIN SERPL-MCNC: 1.75 NG/ML (ref 0–0.08)
PROT SERPL-MCNC: 6.9 G/DL (ref 6.4–8.2)
RBC # BLD AUTO: 3.13 M/UL (ref 4.1–5.7)
REPORTED DOSE,DOSE: 80 UNITS
REPORTED DOSE,DOSE: NORMAL UNITS
REPORTED DOSE/TIME,TMG: 2000
REPORTED DOSE/TIME,TMG: 400
SERVICE CMNT-IMP: ABNORMAL
SODIUM SERPL-SCNC: 150 MMOL/L (ref 136–145)
WBC # BLD AUTO: 17.7 K/UL (ref 4.1–11.1)

## 2018-08-27 PROCEDURE — 74011636320 HC RX REV CODE- 636/320

## 2018-08-27 PROCEDURE — 36415 COLL VENOUS BLD VENIPUNCTURE: CPT | Performed by: INTERNAL MEDICINE

## 2018-08-27 PROCEDURE — 74011250637 HC RX REV CODE- 250/637: Performed by: INTERNAL MEDICINE

## 2018-08-27 PROCEDURE — 74011000258 HC RX REV CODE- 258: Performed by: SURGERY

## 2018-08-27 PROCEDURE — 74011250637 HC RX REV CODE- 250/637: Performed by: SURGERY

## 2018-08-27 PROCEDURE — 65610000006 HC RM INTENSIVE CARE

## 2018-08-27 PROCEDURE — 70360 X-RAY EXAM OF NECK: CPT

## 2018-08-27 PROCEDURE — 74011000250 HC RX REV CODE- 250

## 2018-08-27 PROCEDURE — 84100 ASSAY OF PHOSPHORUS: CPT | Performed by: INTERNAL MEDICINE

## 2018-08-27 PROCEDURE — C9113 INJ PANTOPRAZOLE SODIUM, VIA: HCPCS | Performed by: INTERNAL MEDICINE

## 2018-08-27 PROCEDURE — 74011250636 HC RX REV CODE- 250/636: Performed by: INTERNAL MEDICINE

## 2018-08-27 PROCEDURE — 76450000000

## 2018-08-27 PROCEDURE — 74011000250 HC RX REV CODE- 250: Performed by: INTERNAL MEDICINE

## 2018-08-27 PROCEDURE — 82962 GLUCOSE BLOOD TEST: CPT

## 2018-08-27 PROCEDURE — 77030012879 HC MSK CPAP FLL FAC PHIL -B

## 2018-08-27 PROCEDURE — 80170 ASSAY OF GENTAMICIN: CPT | Performed by: INTERNAL MEDICINE

## 2018-08-27 PROCEDURE — 85025 COMPLETE CBC W/AUTO DIFF WBC: CPT | Performed by: INTERNAL MEDICINE

## 2018-08-27 PROCEDURE — 76604 US EXAM CHEST: CPT

## 2018-08-27 PROCEDURE — 77030018836 HC SOL IRR NACL ICUM -A

## 2018-08-27 PROCEDURE — 74011636637 HC RX REV CODE- 636/637: Performed by: INTERNAL MEDICINE

## 2018-08-27 PROCEDURE — 74011000258 HC RX REV CODE- 258: Performed by: INTERNAL MEDICINE

## 2018-08-27 PROCEDURE — 80053 COMPREHEN METABOLIC PANEL: CPT | Performed by: INTERNAL MEDICINE

## 2018-08-27 PROCEDURE — 77030019563 HC DEV ATTCH FEED HOLL -A

## 2018-08-27 PROCEDURE — 83735 ASSAY OF MAGNESIUM: CPT | Performed by: INTERNAL MEDICINE

## 2018-08-27 PROCEDURE — 74011000250 HC RX REV CODE- 250: Performed by: SURGERY

## 2018-08-27 PROCEDURE — 94640 AIRWAY INHALATION TREATMENT: CPT

## 2018-08-27 PROCEDURE — 71045 X-RAY EXAM CHEST 1 VIEW: CPT

## 2018-08-27 PROCEDURE — 77030018798 HC PMP KT ENTRL FED COVD -A

## 2018-08-27 PROCEDURE — 94660 CPAP INITIATION&MGMT: CPT

## 2018-08-27 PROCEDURE — 44500 INTRO GASTROINTESTINAL TUBE: CPT

## 2018-08-27 PROCEDURE — 74011250636 HC RX REV CODE- 250/636: Performed by: SURGERY

## 2018-08-27 RX ORDER — ALBUTEROL SULFATE 0.83 MG/ML
2.5 SOLUTION RESPIRATORY (INHALATION)
Status: DISCONTINUED | OUTPATIENT
Start: 2018-08-27 | End: 2018-09-04

## 2018-08-27 RX ORDER — POTASSIUM CHLORIDE 29.8 MG/ML
20 INJECTION INTRAVENOUS
Status: DISCONTINUED | OUTPATIENT
Start: 2018-08-27 | End: 2018-08-27 | Stop reason: SDUPTHER

## 2018-08-27 RX ORDER — LIDOCAINE HYDROCHLORIDE 20 MG/ML
JELLY TOPICAL
Status: COMPLETED
Start: 2018-08-27 | End: 2018-08-27

## 2018-08-27 RX ORDER — LIDOCAINE HYDROCHLORIDE 20 MG/ML
JELLY TOPICAL ONCE
Status: COMPLETED | OUTPATIENT
Start: 2018-08-27 | End: 2018-08-27

## 2018-08-27 RX ADMIN — LIDOCAINE HYDROCHLORIDE 1 ML: 20 JELLY TOPICAL at 12:00

## 2018-08-27 RX ADMIN — SODIUM CHLORIDE 80 MEQ: 900 INJECTION, SOLUTION INTRAVENOUS at 12:42

## 2018-08-27 RX ADMIN — LACTULOSE 10 G: 20 SOLUTION ORAL at 08:51

## 2018-08-27 RX ADMIN — GENTAMICIN SULFATE 80 MG: 80 INJECTION, SOLUTION INTRAVENOUS at 04:30

## 2018-08-27 RX ADMIN — GENTAMICIN SULFATE 90 MG: 40 INJECTION, SOLUTION INTRAMUSCULAR; INTRAVENOUS at 17:05

## 2018-08-27 RX ADMIN — DAPTOMYCIN 1000 MG: 500 INJECTION, POWDER, LYOPHILIZED, FOR SOLUTION INTRAVENOUS at 14:52

## 2018-08-27 RX ADMIN — VENLAFAXINE 25 MG: 25 TABLET ORAL at 21:36

## 2018-08-27 RX ADMIN — VENLAFAXINE 25 MG: 25 TABLET ORAL at 08:51

## 2018-08-27 RX ADMIN — CARBAMAZEPINE 100 MG: 100 SUSPENSION ORAL at 08:51

## 2018-08-27 RX ADMIN — ENOXAPARIN SODIUM 120 MG: 100 INJECTION SUBCUTANEOUS at 21:36

## 2018-08-27 RX ADMIN — SODIUM CHLORIDE 10 ML: 9 INJECTION, SOLUTION INTRAMUSCULAR; INTRAVENOUS; SUBCUTANEOUS at 21:37

## 2018-08-27 RX ADMIN — ALBUTEROL SULFATE 2.5 MG: 2.5 SOLUTION RESPIRATORY (INHALATION) at 23:14

## 2018-08-27 RX ADMIN — CHLORHEXIDINE GLUCONATE 15 ML: 1.2 RINSE ORAL at 21:00

## 2018-08-27 RX ADMIN — SODIUM CHLORIDE 10 ML: 9 INJECTION, SOLUTION INTRAMUSCULAR; INTRAVENOUS; SUBCUTANEOUS at 05:14

## 2018-08-27 RX ADMIN — CARBAMAZEPINE 100 MG: 100 SUSPENSION ORAL at 13:19

## 2018-08-27 RX ADMIN — MELATONIN TAB 3 MG 3 MG: 3 TAB at 21:36

## 2018-08-27 RX ADMIN — CHLORHEXIDINE GLUCONATE 15 ML: 1.2 RINSE ORAL at 08:58

## 2018-08-27 RX ADMIN — INSULIN LISPRO 3 UNITS: 100 INJECTION, SOLUTION INTRAVENOUS; SUBCUTANEOUS at 13:19

## 2018-08-27 RX ADMIN — VENLAFAXINE 25 MG: 25 TABLET ORAL at 17:04

## 2018-08-27 RX ADMIN — IPRATROPIUM BROMIDE AND ALBUTEROL SULFATE 3 ML: .5; 3 SOLUTION RESPIRATORY (INHALATION) at 08:06

## 2018-08-27 RX ADMIN — LACTULOSE 10 G: 20 SOLUTION ORAL at 17:04

## 2018-08-27 RX ADMIN — SODIUM CHLORIDE 40 MG: 9 INJECTION INTRAMUSCULAR; INTRAVENOUS; SUBCUTANEOUS at 08:52

## 2018-08-27 RX ADMIN — FUROSEMIDE 60 MG: 10 INJECTION, SOLUTION INTRAMUSCULAR; INTRAVENOUS at 21:36

## 2018-08-27 RX ADMIN — IPRATROPIUM BROMIDE AND ALBUTEROL SULFATE 3 ML: .5; 3 SOLUTION RESPIRATORY (INHALATION) at 19:02

## 2018-08-27 RX ADMIN — INSULIN LISPRO 3 UNITS: 100 INJECTION, SOLUTION INTRAVENOUS; SUBCUTANEOUS at 18:57

## 2018-08-27 RX ADMIN — SODIUM CHLORIDE 10 ML: 9 INJECTION, SOLUTION INTRAMUSCULAR; INTRAVENOUS; SUBCUTANEOUS at 13:19

## 2018-08-27 RX ADMIN — CARBAMAZEPINE 100 MG: 100 SUSPENSION ORAL at 21:36

## 2018-08-27 RX ADMIN — ENOXAPARIN SODIUM 120 MG: 100 INJECTION SUBCUTANEOUS at 10:18

## 2018-08-27 RX ADMIN — CARBAMAZEPINE 100 MG: 100 SUSPENSION ORAL at 17:04

## 2018-08-27 RX ADMIN — IPRATROPIUM BROMIDE AND ALBUTEROL SULFATE 3 ML: .5; 3 SOLUTION RESPIRATORY (INHALATION) at 15:11

## 2018-08-27 RX ADMIN — POLYETHYLENE GLYCOL 3350 17 G: 17 POWDER, FOR SOLUTION ORAL at 08:52

## 2018-08-27 RX ADMIN — INSULIN LISPRO 3 UNITS: 100 INJECTION, SOLUTION INTRAVENOUS; SUBCUTANEOUS at 05:12

## 2018-08-27 RX ADMIN — FUROSEMIDE 60 MG: 10 INJECTION, SOLUTION INTRAMUSCULAR; INTRAVENOUS at 08:52

## 2018-08-27 RX ADMIN — AMIODARONE HYDROCHLORIDE 200 MG: 200 TABLET ORAL at 08:51

## 2018-08-27 RX ADMIN — IOHEXOL 60 ML: 240 INJECTION, SOLUTION INTRATHECAL; INTRAVASCULAR; INTRAVENOUS; ORAL at 13:00

## 2018-08-27 NOTE — PROGRESS NOTES
Pt in mild respiratory distress, O2 sat's 88-89%, mild upper airway rhonchi noted, RR 25-30. Pt placed on Bipap at this time via full face mask. Current settings IPAP=12, EPAP=6, backup rate 4, FIO2 40%. HR 76, RR 22-24, O2 sat's 96%.  Will continue to monitor respiratory status throughout the nite

## 2018-08-27 NOTE — PROGRESS NOTES
Hematology Oncology Progress Note       Follow up for: IJ/SVC thrombus    Chart notes reviewed since last visit.     Case discussed with following: wife  Patient complains of the following: Off ventilator, weak, interacting with wife  Additional concerns noted by the staff: none    Patient Vitals for the past 24 hrs:   BP Temp Pulse Resp SpO2 Height Weight   08/27/18 1000 137/63 - 80 19 97 % - -   08/27/18 0900 128/66 - 76 21 100 % - -   08/27/18 0807 - - - - 100 % - -   08/27/18 0800 139/68 98.4 °F (36.9 °C) 77 26 100 % - 124.4 kg (274 lb 4 oz)   08/27/18 0700 106/47 - 76 26 98 % - -   08/27/18 0600 135/68 - 82 22 99 % - -   08/27/18 0500 115/59 - 81 21 99 % - -   08/27/18 0400 95/48 98.9 °F (37.2 °C) 80 25 98 % - -   08/27/18 0343 - - - - 98 % - -   08/27/18 0300 139/64 - 82 27 100 % - -   08/27/18 0200 116/56 - 79 26 100 % - -   08/27/18 0150 - - 80 25 98 % - -   08/27/18 0133 - - 82 26 (!) 87 % - -   08/27/18 0100 116/56 - 86 25 91 % - -   08/27/18 0013 - - - - 96 % - -   08/27/18 0000 152/69 98.4 °F (36.9 °C) (!) 117 (!) 36 97 % - -   08/26/18 2300 100/50 - 76 25 91 % - -   08/26/18 2200 139/58 - 81 19 99 % - -   08/26/18 2100 139/64 - 82 28 - - -   08/26/18 2000 135/67 98.4 °F (36.9 °C) 80 19 99 % - -   08/26/18 1929 - - - - 95 % - -   08/26/18 1925 133/66 - 78 20 99 % - -   08/26/18 1908 - - - - 95 % - -   08/26/18 1900 115/59 - 77 29 99 % - -   08/26/18 1800 112/58 - 81 22 97 % - -   08/26/18 1700 121/63 - 79 25 99 % - -   08/26/18 1616 - - - - 99 % - -   08/26/18 1600 120/58 98.5 °F (36.9 °C) 76 25 94 % - -   08/26/18 1515 133/62 - 76 25 97 % - -   08/26/18 1500 - - 78 27 95 % - -   08/26/18 1430 120/59 - 77 26 98 % - -   08/26/18 1400 118/54 98.4 °F (36.9 °C) 77 21 97 % - -   08/26/18 1345 114/59 - 79 21 95 % - -   08/26/18 1300 104/46 - 84 25 92 % - -   08/26/18 1216 - - 95 24 92 % - -   08/26/18 1215 111/44 - 93 24 92 % - -   08/26/18 1200 - - 92 24 95 % - -   08/26/18 1148 110/48 97.5 °F (36.4 °C) 94 30 96 % - -   08/26/18 1122 - - - - 94 % - -   08/26/18 1100 - - 92 27 94 % - -       ROS not obtainable     Physical Examination:  Gen NAD  Cv reg  Lungs clear   Abd benign    Labs:  Recent Results (from the past 24 hour(s))   GLUCOSE, POC    Collection Time: 08/26/18 11:32 AM   Result Value Ref Range    Glucose (POC) 154 (H) 65 - 100 mg/dL    Performed by [id number] Traylor, POC    Collection Time: 08/26/18  6:08 PM   Result Value Ref Range    Glucose (POC) 139 (H) 65 - 100 mg/dL    Performed by [id number] Inna Traylor, POC    Collection Time: 08/26/18 11:29 PM   Result Value Ref Range    Glucose (POC) 137 (H) 65 - 100 mg/dL    Performed by Humphrey Govea    CBC WITH AUTOMATED DIFF    Collection Time: 08/27/18  3:05 AM   Result Value Ref Range    WBC 17.7 (H) 4.1 - 11.1 K/uL    RBC 3.13 (L) 4.10 - 5.70 M/uL    HGB 8.4 (L) 12.1 - 17.0 g/dL    HCT 28.9 (L) 36.6 - 50.3 %    MCV 92.3 80.0 - 99.0 FL    MCH 26.8 26.0 - 34.0 PG    MCHC 29.1 (L) 30.0 - 36.5 g/dL    RDW 24.2 (H) 11.5 - 14.5 %    PLATELET 722 060 - 772 K/uL    MPV 11.1 8.9 - 12.9 FL    NRBC 0.0 0  WBC    ABSOLUTE NRBC 0.00 0.00 - 0.01 K/uL    NEUTROPHILS 86 (H) 32 - 75 %    LYMPHOCYTES 5 (L) 12 - 49 %    MONOCYTES 8 5 - 13 %    EOSINOPHILS 0 0 - 7 %    BASOPHILS 0 0 - 1 %    IMMATURE GRANULOCYTES 1 (H) 0.0 - 0.5 %    ABS. NEUTROPHILS 15.3 (H) 1.8 - 8.0 K/UL    ABS. LYMPHOCYTES 0.9 0.8 - 3.5 K/UL    ABS. MONOCYTES 1.4 (H) 0.0 - 1.0 K/UL    ABS. EOSINOPHILS 0.0 0.0 - 0.4 K/UL    ABS. BASOPHILS 0.0 0.0 - 0.1 K/UL    ABS. IMM.  GRANS. 0.1 (H) 0.00 - 0.04 K/UL    DF AUTOMATED     METABOLIC PANEL, COMPREHENSIVE    Collection Time: 08/27/18  3:05 AM   Result Value Ref Range    Sodium 150 (H) 136 - 145 mmol/L    Potassium 3.0 (L) 3.5 - 5.1 mmol/L    Chloride 111 (H) 97 - 108 mmol/L    CO2 31 21 - 32 mmol/L    Anion gap 8 5 - 15 mmol/L    Glucose 118 (H) 65 - 100 mg/dL    BUN 23 (H) 6 - 20 MG/DL    Creatinine 0.85 0.70 - 1.30 MG/DL BUN/Creatinine ratio 27 (H) 12 - 20      GFR est AA >60 >60 ml/min/1.73m2    GFR est non-AA >60 >60 ml/min/1.73m2    Calcium 8.0 (L) 8.5 - 10.1 MG/DL    Bilirubin, total 0.9 0.2 - 1.0 MG/DL    ALT (SGPT) 26 12 - 78 U/L    AST (SGOT) 24 15 - 37 U/L    Alk. phosphatase 96 45 - 117 U/L    Protein, total 6.9 6.4 - 8.2 g/dL    Albumin 1.9 (L) 3.5 - 5.0 g/dL    Globulin 5.0 (H) 2.0 - 4.0 g/dL    A-G Ratio 0.4 (L) 1.1 - 2.2     MAGNESIUM    Collection Time: 08/27/18  3:05 AM   Result Value Ref Range    Magnesium 2.1 1.6 - 2.4 mg/dL   PHOSPHORUS    Collection Time: 08/27/18  3:05 AM   Result Value Ref Range    Phosphorus 3.3 2.6 - 4.7 MG/DL   GENTAMICIN, TROUGH    Collection Time: 08/27/18  3:05 AM   Result Value Ref Range    Gentamicin, trough 2.0 1.0 - 2.0 ug/ml    Reported dose date: 20180826      Reported dose time: 2000      Reported dose: 80MG UNITS   GLUCOSE, POC    Collection Time: 08/27/18  5:07 AM   Result Value Ref Range    Glucose (POC) 150 (H) 65 - 100 mg/dL    Performed by Karyle Evangelist L. 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 remains on Lovenox, hbg 8.4 today. 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 Palliative care followin as well    Stage IIIB colon cancer - s/p resection. If pt recovers will need to see him to discuss possible adjuvant chemotherapy. Acute resp failure/Pna - off vent    Septic shock with persistent bacteremia -ID following abx per them    Normochromic normocytic anemia - B12  929. folate 9.8. Ferritin was 5 on 7/10/18 suggesting fairly significant iron deficiency.  He got 500 mg of IV iron in July but actually has a much higher calculated deficit. Ferritin 121 so does not need further IV iron at present. would transfuse for hbg <7.

## 2018-08-27 NOTE — PROGRESS NOTES
Infectious Disease Progress Note      IMPRESSION:   · Persistent bacteremia with coagulase negative Staph since 8/11 initially Oxacillin sensitive , last positive BC is oxacillin resistant 8/21(1/4)   · NATASHA shows definite large mass associated with pacing wire in RA which may represent vegetation ( 8/23)   · Thrombus  & tiny gas bubble of as in RIJ extending into SVC to level of RA (8/15)  s/p heparin IV now on lovenox s/q  · CTA chest - no PE, could not visualize SVC thrombus, left lateral wall  Soft tissue swelling  · Acute respiratory failure on Ventilator / h/o tracheostomy 24 years ago  , s/p extubation 8/26  · Pulmonary edema & bilateral pleural effusions improved  · Pneumonia , last sputum culture 8/11 + for Heavy staph aureus , light K.pneumoniae s/p treaed  · Ca colon Stage 3b s/p colectomy / YAMEL / enterotomies  · S/p ileus , aspiration pneumonia prior to ICU admission   · H/o traumatic brain injury 25 years ago           PLAN:       · Continue Daptomycin , Gentamicin IV. · Repeat BC until negative BC are obtained. · Thrombus in R/IJ , now thrombus vs vegetation  in RA ? Etiology ? continue anticoagulation, hematology on case. · US of chest wall evaluate swelling. 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 · Continue antimicrobials & anticoagulation       Blood culture 8/21  Culture result:    Final   STAPHYLOCOCCUS EPIDERMIDIS (OXACILLIN RESISTANT) GROWING IN 1 OF 4 BOTTLES DRAWN (SITE = RW) (A)   Culture result:    Final   PRELIMINARY REPORT OF GRAM POSITIVE COCCI IN CLUSTERS GROWING IN 1 OF 4 BOTTLES DRAWN , SO FAR CALLED TO AND READ BACK BY CRISTIN LOCK 8/23/18 8232 SP (A)   Culture result:    Final   REMAINING BOTTLE(S) HAS/HAVE NO GROWTH IN 5 DAYS         Subjective:     Pt seen .  Awake , making eye contact, attempting to respond    Review of Systems:  Review of systems not obtained due to patient factors. Objective:     Blood pressure 139/68, pulse 77, temperature 98.4 °F (36.9 °C), resp. rate 26, height 6' 2\" (1.88 m), weight 274 lb 4 oz (124.4 kg), SpO2 100 %.   Temp (24hrs), Av.4 °F (36.9 °C), Min:97.5 °F (36.4 °C), Max:98.9 °F (37.2 °C)      Patient Vitals for the past 24 hrs:   Temp Pulse Resp BP SpO2   18 0807 - - - - 100 %   18 0800 98.4 °F (36.9 °C) 77 26 139/68 100 %   18 0700 - 76 26 106/47 98 %   18 0600 - 82 22 135/68 99 %   18 0500 - 81 21 115/59 99 %   18 0400 98.9 °F (37.2 °C) 80 25 95/48 98 %   18 0343 - - - - 98 %   18 0300 - 82 27 139/64 100 %   18 0200 - 79 26 116/56 100 %   18 0150 - 80 25 - 98 %   18 0133 - 82 26 - (!) 87 %   18 0100 - 86 25 116/56 91 %   18 0013 - - - - 96 %   18 0000 98.4 °F (36.9 °C) (!) 117 (!) 36 152/69 97 %   18 2300 - 76 25 100/50 91 %   18 2200 - 81 19 139/58 99 %   18 2100 - 82 28 139/64 -   18 2000 98.4 °F (36.9 °C) 80 19 135/67 99 %   18 1929 - - - - 95 %   18 1925 - 78 20 133/66 99 %   18 1908 - - - - 95 %   18 1900 - 77 29 115/59 99 %   18 1800 - 81 22 112/58 97 %   08/26/18 1700 - 79 25 121/63 99 %   /18 1616 - - - - 99 %   18 1600 98.5 °F (36.9 °C) 76 25 120/58 94 %   /18 1515 - 76 25 133/62 97 %   /18 1500 - 78 27 - 95 %   18 1430 - 77 26 120/59 98 %   18 1400 98.4 °F (36.9 °C) 77 21 118/54 97 %   18 1345 - 79 21 114/59 95 %   /18 1300 - 84 25 104/46 92 %   /18 1216 - 95 24 - 92 %   18 1215 - 93 24 111/44 92 %   18 1200 - 92 24 - 95 %   18 1148 97.5 °F (36.4 °C) 94 30 110/48 96 %   18 1122 - - - - 94 %   18 1100 - 92 27 - 94 %   18 1045 - 91 29 98/45 93 %   18 1000 - 91 27 110/53 93 %   18 0958 - 87 27 123/60 94 %         Lines:  Central Venous Catheter:  LIJ          Physical Exam: General:  Awake, cooperative,    Lungs:    Reduced air entry bases on  auscultation  Chest wall- L/side mild prominence compared to R, pacemaker site - no swelling   CV:  Regular rate and rhythm,no murmur,   Abdomen:   Soft, non-tender. bowel sounds +distended   Extremities: Edema   Lines/Devices:  Intact, no erythema, drainage or tenderness     Data Review:   CBC:   Recent Labs      08/27/18   0305  08/26/18   0503  08/25/18   0523   WBC  17.7*  10.0  13.3*   RBC  3.13*  2.99*  3.00*   HGB  8.4*  7.9*  8.0*   HCT  28.9*  27.6*  27.4*   PLT  218  215  233   GRANS  86*  77*  83*   LYMPH  5*  12  6*   EOS  0  1  0     CMP:   Recent Labs      08/27/18   0305  08/26/18   0503  08/25/18   0523   GLU  118*  135*  166*   NA  150*  147*  145   K  3.0*  2.8*  3.3*   CL  111*  108  108   CO2  31  33*  31   BUN  23*  27*  27*   CREA  0.85  0.92  0.84   CA  8.0*  7.5*  7.6*   AGAP  8  6  6   BUCR  27*  29*  32*   AP  96  100   --    TP  6.9  6.5   --    ALB  1.9*  1.7*   --    GLOB  5.0*  4.8*   --    AGRAT  0.4*  0.4*   --        Studies:      Lab Results   Component Value Date/Time    Culture result: NO GROWTH AFTER 23 HOURS 08/26/2018 07:30 AM    Culture result: PENDING 08/25/2018 05:21 PM    Culture result: (A) 08/21/2018 12:22 PM     STAPHYLOCOCCUS EPIDERMIDIS (OXACILLIN RESISTANT) GROWING IN 1 OF 4 BOTTLES DRAWN (SITE = RW)    Culture result: (A) 08/21/2018 12:22 PM     PRELIMINARY REPORT OF GRAM POSITIVE COCCI IN CLUSTERS GROWING IN 1 OF 4 BOTTLES DRAWN , SO FAR CALLED TO AND READ BACK BY CRISTIN LOCK 8/23/18 0721 SP    Culture result: REMAINING BOTTLE(S) HAS/HAVE NO GROWTH IN 5 DAYS 08/21/2018 12:22 PM        XR Results (most recent):    Results from Hospital Encounter encounter on 07/10/18   XR CHEST PORT   Narrative EXAM:  XR CHEST PORT    INDICATION:  interval changes, pleural effusions, anemia    COMPARISON:  8/26/2018    FINDINGS: A portable AP radiograph of the chest was obtained at 0427 hours.  The  patient is on a cardiac monitor. The left IJ catheter overlies the left  brachycephalic vein. NG tube courses into the stomach but the distal tip is not  seen. ET tube has been removed. Pacemaker remains in place. Bilateral pleural effusions and bibasilar atelectasis persist. No significant  pulmonary edema. No pneumothorax. Impression IMPRESSION:  1. Persistent small bilateral pleural effusions with bibasilar atelectasis. This  is unchanged. Patient Active Problem List   Diagnosis Code    HTN (hypertension) I10    Depression F32.9    Hypercholesterolemia E78.00    Acid reflux K21.9    Seizure ([facility] [address] R56.9    Hypothyroidism E03.9    Hyponatremia E87.1    BPH (benign prostatic hyperplasia) N40.0    Rash R21    Cellulitis L03.90    Wax in ear H61.20    Ulcer HIP4032    Small bowel obstruction (HCC) K56.609    Atrial flutter (HCC) I48.92    Atrial fibrillation or flutter     CHI (closed head injury) S09. 90XA    Basal cell cancer C44.91    TBI (traumatic brain injury) ([facility] [address] S06. 9X9A    Atrial fibrillation (HCC) I48.91    Cataract H26.9    Constipation K59.00    Ankle fracture, left S82.892A    Insomnia G47.00    Tinea corporis B35.4    SSS (sick sinus syndrome) ([facility]) I49.5    Syncope R55    Seizure disorder (HCC) G40.909    RONAL on CPAP G47.33, Z99.89    Pacemaker Z95.0    Pre-op evaluation Z01.818    Chronic back pain greater than 3 months duration M54.9, G89.29    Cerebral infarction (HCC) I63.9    Acute bronchitis J20.9    Screening for colon cancer Z12.11    Chronic right shoulder pain M25.511, G89.29    Common wart B07.8    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  Anemia, unspecified type D64.9 285.9    2. Generalized weakness R53.1 780.79    3. Acute encephalopathy G93.40 348.30    4. Anasarca R60.1 782.3    5. Idiopathic hypotension I95.0 458.9    6.  Counseling regarding goals of care Z71.89 V65.49          Anti-infectives:      Daptomycin IV - 8/26   Gentamicin IV 8/26        Cefotetan 7/17  Zosyn 7/20-8/7  Cefepime - 8/11-8/13  Ceftriaxone 8/13-8/17  Vancomycin 8/11-8/22  Daptomycin 8/23- 8/24-  Naficillin IV 8/24- 8/26        Vielka Pelayo MD FACP

## 2018-08-27 NOTE — PROGRESS NOTES
Physical Therapy Note    Re-consult received. Chart reviewed. Pt discussed during IDR. Noted pt extubated on 8/26 and currently on 4 L/min O2 NC. All in agreement to defer PT re-evaluation this date and follow back tomorrow as appropriate as pt not very alert with slow processing and minimal simple command following. Will continue to follow.     Thank you,  Pierce Peacock, PT, DPT

## 2018-08-27 NOTE — PROGRESS NOTES
Pharmacy Automatic Renal Dosing Protocol - Antimicrobials    Indication for Antimicrobials: Bacteremia,  vegetation on pacer    Current Regimen of Each Antimicrobial:  Gentamicin 80 mg IV every 8 hours (Start Date: 8/26/18; Day #2)  Daptomycin 1000 mg IV every 24 hours (Started 8/26/18; Day #2)    Previous Antimicrobial Therapy:  Nafcillin 2 grams every 4 hours (Started 8/24/18; Stopped 8/26/18)  Daptomycin 1000 mg IV every 24 hours (Started 8/23/18; Stopped 8/24/18)  Vancomycin 1500 mg IV every 12 hours (Started 8/11/18; Stopped 8/23/18)  Ceftriaxone 2 g IV every 24 hours (Started 8/13/18; Stopped 8/17/18)  Cefepime 2 g IV every 8 hours (Started 8/11/18; Stopped 8/13/18)  Vancomycin  Fluconazole  Piperacillin-tazobactam    Goal Level: Gentamicin Synergy Dosing  Peak: 3-5 mcg/mL  Trough: <1 mcg/mL    Date Dose & Interval Measured (mcg/mL) Extrapolated (mcg/mL)   8/27/18 80 mg every 8 hours Peak: 4.4; Trough: 2 Peak 4.4; Trough: 1.36                  Significant Cultures:   8/26/18: NG x 23 hours (pending)  8/25/18: Sputum from Endotracheal (pending)  8/21/18 PBC - Staph epidermidis 1/4 bottles - oxacillin resistant (FINAL)  8/16/18 Blood culture = No growth (FINAL)  8/14/18 Blood culture = Coag neg staph in 1/4 (FINAL)  8/11/18 Respiratory culture = Heavy MSSA; Light klebsiella (FINAL)  8/11/18 Blood culture = Coag neg staph in 2/2 (FINAL)  8/11/18 Blood culture = Coag neg staph in 2/2 (FINAL)  7/20/18 Respiratory culture = No growth (FINAL)  7/20/18 Blood culture = No growth (FINAL)    Radiology / Imaging results: (X-ray, CT scan or MRI):   8/27/18 XR chest-  Persistent small bilateral pleural effusions with bibasilar atelectasis. This is unchanged. 8/26 XR chest- no significant change  8/25/18 CTA chest - No evidence for pulmonary emboli. Bilateral pleural effusion are stable, cardiomegaly.  Left lateral chest wall swelling    Paralysis, amputations, malnutrition: none    Labs:  Recent Labs      08/27/18   3721 18   0503  18   0523   CREA  0.85  0.92  0.84   BUN  23*  27*  27*   WBC  17.7*  10.0  13.3*     Temp (24hrs), Av.4 °F (36.9 °C), Min:97.5 °F (36.4 °C), Max:98.9 °F (37.2 °C)    Creatinine Clearance (mL/min) or Dialysis: 84.6 (based on IBW)    Impression/Plan:   · Per protocol, adjusted gentamicin to 90 mg IV every 12 hours (Predicted peak: 4.1 mcg/ml, predicted trough: 0.69 mcg/ml)  · Cardiology evaluation ongoing to determine if/when appropriate to remove pacer  · Antimicrobial stop date: TBD     Pharmacy will follow daily and adjust medications as appropriate for renal function and/or serum levels. Thank you,  Vipin Shukla, PHARMD    Recommended duration of therapy  [email or web address]. docx    Renal Dosing  [email or web address]/[email or web address]/[email or web address]. pdf

## 2018-08-27 NOTE — PROGRESS NOTES
Re-consult received. Chart reviewed. Pt discussed during IDR. Noted pt extubated on 8/26 and currently on 4 L/min O2 NC. All in agreement to defer OT re-evaluation this date and follow back tomorrow as appropriate as pt not very alert with slow processing and minimal simple command following.  Will continue to follow

## 2018-08-27 NOTE — PROGRESS NOTES
ID  US chest   FINDINGS:  There is minimal fluid in the pacemaker pocket, a thin sliver measuring  approximately 2 mm in thickness and 21 mm in length. No significant inflammation  of the overlying subcutaneous fat or skin. IMPRESSION:  No abscess. Discussed with Dr Franchesca Erwin . Discussed CT findings, US chest wall results with spouse as well . I am informed that pt is planned for pacemaker removal.   Will D/w Dr Debbe Denver as well.     Saige Parra MD [place]

## 2018-08-27 NOTE — PROGRESS NOTES
Critical care interdisciplinary rounds held on [id number]. Following members present, Pharmacy, Diabetes Treatment, Case Management, Occupational Therapy, Physical Therapy, Palliative Care,  and Nutrition. Nutritional requirements reviewed and TPN rate adjusted per recommendations of dietician. Plan of care discussed. See clinical pathway fo plan of care and interventions and desired outcomes.
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
## 2018-08-27 NOTE — PROGRESS NOTES
1900-Report received. Patient and chart visited. Patient resting on 4L NC.   0000- Patient having tachypnea, HR increased one teens, 02 sat 79%. Mucous suctioned. 02 sat improved to 92%, HR and RR remained high several minutes. Bipap initiated per RT. HR 74, normal RR, 02 sat 98%.

## 2018-08-27 NOTE — PROGRESS NOTES
Music Therapy Assessment    Maliha Garland [id number]  [id number]    1940  69 y.o.  male    Patient Telephone Number: [phone] (home)   Orthodox Affiliation: Church   Language: English   Extended Emergency Contact Information  Primary Emergency Contact: [address] Phone: [phone]  Mobile Phone: [phone]  Relation: Spouse   Patient Active Problem List    Diagnosis Date Noted    Acute encephalopathy 08/24/2018    Idiopathic hypotension 08/24/2018    Counseling regarding goals of care 08/24/2018    Acute blood loss anemia 07/10/2018    Anasarca 07/10/2018    GI bleed 07/10/2018    Severe obesity (BMI 35.0-39.9) ([address] 07/05/2018    Localized epilepsy with impairment of consciousness ([address]     Common wart 05/16/2016    Chronic right shoulder pain 01/11/2016    Screening for colon cancer 11/04/2015    Acute bronchitis 08/25/2015    Chronic back pain greater than 3 months duration 05/04/2015    Cerebral infarction ([address] 05/04/2015    Pre-op evaluation 01/29/2015    Pacemaker 10/15/2014    SSS (sick sinus syndrome) ([address] 10/13/2014    Syncope 10/13/2014    Seizure disorder ([address] 10/13/2014    RONAL on CPAP 10/13/2014    Tinea corporis 05/07/2014    Insomnia 01/03/2014    Ankle fracture, left 08/28/2013    Constipation 12/14/2012    Cataract 07/30/2012    Atrial fibrillation ([address] 06/19/2012    TBI (traumatic brain injury) ([address] 06/08/2012    CHI (closed head injury) 05/24/2012    Basal cell cancer 05/24/2012    Atrial flutter ([address] 03/02/2012    Atrial fibrillation or flutter 03/02/2012    Small bowel obstruction ([address] 02/21/2012    Ulcer 09/14/2011    Wax in ear 06/17/2011    Cellulitis 04/04/2011    Rash 03/28/2011    Hypothyroidism 12/22/2010    Hyponatremia 12/22/2010    BPH (benign prostatic hyperplasia) 12/22/2010    HTN (hypertension) 12/08/2010    Depression 12/08/2010    Hypercholesterolemia 12/08/2010    Acid reflux 12/08/2010    Seizure ([facility] [address] [address] 12/08/2010        Date: 8/27/2018       Mental Status:   [x] Alert [  ] Ijeoma Lango [  ]  Confused  [  ] Minimally responsive    Communication Status: [x] Impaired Speech [  ] Nonverbal     Physical Status:   [x] Oxygen in use  [  ] Hard of Hearing [  ] Vision Impaired  [  ] Ambulatory  [  ] Ambulatory with assistance [  ] Non-ambulatory     Music Preferences, Background: [place]. Pt played the guitar and fiddle, sometimes in public with friends who were in a band. Pt's spouse played the chord organ. Clinical Problem addressed: Support healthy pt/family coping. Goal(s) met in session:  Physical/Pain management (Scale of 1-10):    Pre-session rating: Pt's response when asked about this was unclear. Post-session rating: Pt denied pain. [  ] Increased relaxation   [  ] Regulated breathing patterns  [  ] Decreased muscle tension   [  ] Minimized physical distress     Emotional/Psychological:  [  ] Increased self-expression   [  ] Decreased aggressive behavior   [  ] Decreased sadness   [  ] Discussed healthy coping skills     [  ] Improved mood    [  ] Decreased withdrawn behavior     Social:  [  ] Decreased feelings of isolation/loneliness [x] Positive social interaction   [x] Provided support and/or comfort for family/friends    Spiritual:  [  ] Spiritual support    [  ] Expressed peace  [  ] Expressed edith    [  ] Discussed beliefs    Techniques Utilized (Check all that apply):   [  ] Procedural support MT [  ] Music for relaxation [x] Patient preferred music  [  ] Jodi analysis  [x] Song choice  [  ] Music for validation  [  ] Entrainment  [  ] Progressive muscle relax.  [  ] Guided visualization  [  ] Radha Rowan  [  ] Patient instrument playing [  ] Lorrayne Cockayne writing  [x] Sing along-pt's spouse [  ] Improvisation  [  ] Sensory stimulation  [  ] Active Listening  [  ] Music for spiritual support [  ] Making of CDs as gifts    Session Observations:  Referral from [place] [employer], [place] and from [facility], Palliative NP. Patient (pt) was alert lying in bed. His spouse Jossy Penny and niece and nephew were at bedside. This music therapist (MT) asked about pt's music preferences and pt's spouse shared about these and about pt's music background. MT sat at bedside and sang and played the [employer] [place] with guitar. Pt's family members' affects brightened and they moved their heads or tapped a toe in response to the music. Pt's spouse chose to hear a Admittor next. MT sang and played Crazy. During the first chorus, pt spoke, but his words were difficult to understand. His tone sounded agitated so MT asked if pt wanted to choose a different song. Pt's words were difficult to understand for MT and pt's spouse so MT offered to sing a AudioCatch instead. Pt agreed to this and MT sang and played [address] with guitar. Pt's spouse increased self-expression in response to this song, as evidenced by (AEB) singing along. Pt increased positive social interaction in response to this song, AEB engaging in eye contact and smiling at MT. Pt's spouse requested another AudioCatch and MT sang and played Your Cheatin' Heart. Pt's spouse, niece, and nephew reminisced about Thanksgivings and Christmases spent together following this song. They expressed enjoyment in the music and gratitude for the session. Will follow as able.     FLOR FlorenceBC (Music Therapist-Board Certified)  Spiritual Care Department  Referral-based service

## 2018-08-27 NOTE — PROGRESS NOTES
[facility]  Pulmonary, Critical Care, and Sleep Medicine    Name: Cuauhtemoc Alonso MRN: [id number]   : 1940 Hospital: [facility]   Date: 2018            IMPRESSION:   · Acute respiratory failure requiring ventilator support. Extubated to [place] on 18. bipap as needed. · Persistent bacteremia with coagulase negative Staph since  initially Oxacillin sensitive , last positive BC is oxacillin resistant (1/)   · NATASHA shows definite large mass associated with pacing wire in RA which may represent vegetation ()  · Volume overload with anasarca, still persists. Has lost 30 pounds but more to lose; unfortunately has also lost muscle mass  · Infected pacemaker wire? ? Versus chest wall infection. · Pulmonary edema and pleural effusions-stable at this point. 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 · S/P colectomy/YAMEL/enterotomies for colon cancer; Stage 3b colon ca. · Ileus/ with recurrent aspiration pneumonia prior to admission to ICU. 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 · Debility, has global weakness. · DM   · Obesity       PLAN:   · Can use bipap as needed. · ID help much appreciated; following for possible sources of infection. · XR dobhoff placement.   · Ongoing eval for pacer wire, possible infection, chest wall involvement?; cardiology evaluation ongoing  · Continue diuresis   · Tough to cross match  · On enoxaparin due to IJ catheter related thrombus   · Follow hemoglobin  · Enteral feeds   · GI prophylaxis  · Avoid sedating meds. Subjective/Interval History:   I have reviewed the flowsheet and previous days notes. 8-27-18: Plug last night? Required BIPAP for respiratory distress. Very weak. Interacting with wife. discussed management wit hwife at bedside. For neck ultrasound. D/w Dr. Azar Benson. Review of Systems   Unable to perform ROS: Intubated   Constitutional: Positive for fatigue. Eyes: Negative. Respiratory: Positive for cough. Cardiovascular: Negative. Gastrointestinal: Negative. Genitourinary: Negative for frequency. 8/10 Asked to evaluate patient to see if his pleural effusion is the cause of his rising WBC. Seen earlier this hospitalization by my partners for aspiration pneumonia. He has been hospitalized for 30 days, having had a colonic resection complicated by ileus. He was on Zosyn for 18 days and this was stopped 3 days ago and his WBC began to rise. He has a congested cough which is chronic. Can not produce sputum. He is limited in his understanding of how to do incentive spirometry due to prior traumatic brain injury. 8.11 called urgently for acute respiratory failure  RR 50's  rhonchorus breathing  On NRBM    8.12  sedated on ventilator. 270 jose de jesus. GPC on multiple BC 4/4 . Remains on Vanc     8-14-18: Last 24 hrs. Remains on moderate dose vaopressors. Noted to have decreased Hgb less than 7. Not really following commands with current meds infusing. No acute issues noted per nursing last pm.     8-15-18: Pt had increased vent needs yesterday. Has not really been able to be easily weaned from vent. Has increased WBC. Had a ct scan of chest and abdomen. Final reports are pending. 8-16-18: Events and findings from CT noted. Discussed with Dr. Laguna Service. Due to pts instability will continue on heparin drip. Discussed with pts wife, nurse this am. Still on jose de jesus drip. When tried to place on SBT his RR was in the 45s. Still on sedation. 8-17-18: No new issues.  Still has high vent support and has increased RR into the 30-40s. Not having much secretions. No overt evidence of bleeding. Hgb 7.1 on IV heparin    : Hgb lower to 6.8 Has autoantibodies. On IV heparin. Flash pulmonary edema with transfusion last night, responded to IV lasix    : Hypertensive after diuretic. Anasarca. On vent. IV heparin. Hgb 8.2 to 9.1 and later back to 8.2  : remains critically ill on mechanical ventilation (pressure control ventilation)  : no major change, failed SBT again today  : remains critically ill on mechanical ventilation. Passed SBT today, but not alert. : remains unresponsive. Passing SBT but no purposeful response. : still unresponsive on mechanical ventilation. 18: More responsive. Opens eyes. I can not get him to raise  His head or move extremities. Not having resp secretions. Concern raised about source of infection. Discussed with Nursing, Resp, DR. Villalba and DR. Quigley. Will get CT of chest with contrast and eval the chest wall for infection. 18: Tolerated SAT, SBT this am. Was extubated. Not much respiratory secretions. Seems very weak globally.      MAR REVIEWED    Objective:   Vital Signs:    Visit Vitals    /66    Pulse 76    Temp 98.4 °F (36.9 °C)    Resp 21    Ht 6' 2\" (1.88 m)    Wt 124.4 kg (274 lb 4 oz)    SpO2 100%    BMI 35.21 kg/m2       O2 Device: Nasal cannula   O2 Flow Rate (L/min): 4 l/min   Temp (24hrs), Av.4 °F (36.9 °C), Min:97.5 °F (36.4 °C), Max:98.9 °F (37.2 °C)       Intake/Output:   Last shift:      701 - 1900  In: 320   Out: 330 [Urine:255]  Last 3 shifts: 1901 -  07  In: 1880 [I.V.:600]  Out: 4970 [Urine:4495]    Intake/Output Summary (Last 24 hours) at 18 1026  Last data filed at 18 0900   Gross per 24 hour   Intake              780 ml   Output             3325 ml   Net            -2545 ml      Physical Exam   Constitutional: Vital signs are normal. He appears well-nourished. He is intubated. HENT:   Head: Normocephalic and atraumatic. Mouth/Throat: No oropharyngeal exudate. Eyes: Conjunctivae are normal. Pupils are equal, round, and reactive to light. No scleral icterus. Neck:   Old tracheostomy site   Cardiovascular: Normal rate and regular rhythm. Pulmonary/Chest: He is intubated. No respiratory distress. He has no wheezes. He has no rales. Abdominal: He exhibits no distension. There is no tenderness. Musculoskeletal: He exhibits edema. Neurological: He is alert. Seems to be globally weak. Skin: Skin is warm and dry. Data:   Labs:  Recent Labs      08/27/18   0305  08/26/18   0503  08/25/18   0523   WBC  17.7*  10.0  13.3*   HGB  8.4*  7.9*  8.0*   HCT  28.9*  27.6*  27.4*   PLT  218  215  233     Recent Labs      08/27/18   0305  08/26/18   0503  08/25/18   0523   NA  150*  147*  145   K  3.0*  2.8*  3.3*   CL  111*  108  108   CO2  31  33*  31   GLU  118*  135*  166*   BUN  23*  27*  27*   CREA  0.85  0.92  0.84   CA  8.0*  7.5*  7.6*   MG  2.1  2.1   --    PHOS  3.3  3.3   --    ALB  1.9*  1.7*   --    TBILI  0.9  0.9   --    SGOT  24  23   --    ALT  26  29   --      No results for input(s): PH, PCO2, PO2, HCO3, FIO2 in the last 72 hours. Imaging:  I have personally reviewed the patients radiographs:  8-25-18: CT of chest: IMPRESSION   impression: No evidence for pulmonary emboli. Bilateral pleural effusion are  stable, cardiomegaly.  Left lateral chest wall swelling        Tee Goodman MD

## 2018-08-27 NOTE — PROGRESS NOTES
Critical care interdisciplinary rounds held on [id number]. Following members present, Pharmacy, Diabetes Treatment, Case Management, Occupational Therapy, Physical Therapy, and Nutrition. For Dobbhoff placement today. Tube feeding to resume today. Plan of care discussed. See clinical pathway fo plan of care and interventions and desired outcomes.

## 2018-08-27 NOTE — PROGRESS NOTES
0700: Received bedside and verbal shift report from Vanesa Cunha RN.    0800: Assessment complete, see flowsheets for details; patient alert, drowsy, confused, purposefully moves R side of body, L side hemiplegia (patient baseline), pupils equal, round and reactive, paced, BP stable, on 4L NC, rhonchi and course, weak cough, and congested, afebrile, vitals stable, no complaints of pain/discomfort. 1130: Off floor to Radiology to place dobhoff under fluoroscopy     1300: Assessment complete, no changes noted, afebrile, vitals stable, no complaints of pain/discomfort. 1700: Assessment complete, see flowsheets for details; patient alert, confused and restless, wife expresses concern that patient may pull dobhoff out once she leaves d/t agitation/restlessness; Dr. Ehsan Coleman updated, orders noted for soft wrist restrains. 1800: R pupil at a 5 and does not react to light, L pupil a 3 and responds to light, Dr. Ehsan Coleman updated and at bedside; Dr. Ehsan Coleman suspects it could be from albuterol treatment, no further treatment at this time. 1810: Abdominal dressing changed, see wound doc flowsheets for details. 1856: , RR >30, placed patient back on Bi-PAP. 1900: Gave bedside and verbal shift report to Vanesa Cunha RN.

## 2018-08-28 ENCOUNTER — APPOINTMENT (OUTPATIENT)
Dept: GENERAL RADIOLOGY | Age: 78
DRG: 329 | End: 2018-08-28
Attending: INTERNAL MEDICINE
Payer: MEDICARE

## 2018-08-28 LAB
ANION GAP SERPL CALC-SCNC: 7 MMOL/L (ref 5–15)
BACTERIA SPEC CULT: ABNORMAL
BACTERIA SPEC CULT: ABNORMAL
BASOPHILS # BLD: 0 K/UL (ref 0–0.1)
BASOPHILS NFR BLD: 0 % (ref 0–1)
BUN SERPL-MCNC: 22 MG/DL (ref 6–20)
BUN/CREAT SERPL: 28 (ref 12–20)
CALCIUM SERPL-MCNC: 8 MG/DL (ref 8.5–10.1)
CHLORIDE SERPL-SCNC: 110 MMOL/L (ref 97–108)
CO2 SERPL-SCNC: 32 MMOL/L (ref 21–32)
CREAT SERPL-MCNC: 0.8 MG/DL (ref 0.7–1.3)
DIFFERENTIAL METHOD BLD: ABNORMAL
EOSINOPHIL # BLD: 0 K/UL (ref 0–0.4)
EOSINOPHIL NFR BLD: 0 % (ref 0–7)
ERYTHROCYTE [DISTWIDTH] IN BLOOD BY AUTOMATED COUNT: 23.8 % (ref 11.5–14.5)
GLUCOSE BLD STRIP.AUTO-MCNC: 134 MG/DL (ref 65–100)
GLUCOSE BLD STRIP.AUTO-MCNC: 154 MG/DL (ref 65–100)
GLUCOSE BLD STRIP.AUTO-MCNC: 159 MG/DL (ref 65–100)
GLUCOSE BLD STRIP.AUTO-MCNC: 167 MG/DL (ref 65–100)
GLUCOSE SERPL-MCNC: 156 MG/DL (ref 65–100)
GRAM STN SPEC: ABNORMAL
HCT VFR BLD AUTO: 28.5 % (ref 36.6–50.3)
HGB BLD-MCNC: 8.2 G/DL (ref 12.1–17)
IMM GRANULOCYTES # BLD: 0.1 K/UL (ref 0–0.04)
IMM GRANULOCYTES NFR BLD AUTO: 1 % (ref 0–0.5)
LYMPHOCYTES # BLD: 1.6 K/UL (ref 0.8–3.5)
LYMPHOCYTES NFR BLD: 17 % (ref 12–49)
MCH RBC QN AUTO: 26.7 PG (ref 26–34)
MCHC RBC AUTO-ENTMCNC: 28.8 G/DL (ref 30–36.5)
MCV RBC AUTO: 92.8 FL (ref 80–99)
MONOCYTES # BLD: 0.8 K/UL (ref 0–1)
MONOCYTES NFR BLD: 9 % (ref 5–13)
NEUTS SEG # BLD: 6.6 K/UL (ref 1.8–8)
NEUTS SEG NFR BLD: 72 % (ref 32–75)
NRBC # BLD: 0.02 K/UL (ref 0–0.01)
NRBC BLD-RTO: 0.2 PER 100 WBC
PLATELET # BLD AUTO: 204 K/UL (ref 150–400)
PMV BLD AUTO: 11.2 FL (ref 8.9–12.9)
POTASSIUM SERPL-SCNC: 3.1 MMOL/L (ref 3.5–5.1)
POTASSIUM SERPL-SCNC: 3.4 MMOL/L (ref 3.5–5.1)
RBC # BLD AUTO: 3.07 M/UL (ref 4.1–5.7)
SERVICE CMNT-IMP: ABNORMAL
SODIUM SERPL-SCNC: 149 MMOL/L (ref 136–145)
WBC # BLD AUTO: 9.1 K/UL (ref 4.1–11.1)

## 2018-08-28 PROCEDURE — 74011250636 HC RX REV CODE- 250/636: Performed by: INTERNAL MEDICINE

## 2018-08-28 PROCEDURE — 74011250636 HC RX REV CODE- 250/636: Performed by: SURGERY

## 2018-08-28 PROCEDURE — 80048 BASIC METABOLIC PNL TOTAL CA: CPT | Performed by: SURGERY

## 2018-08-28 PROCEDURE — 84132 ASSAY OF SERUM POTASSIUM: CPT | Performed by: INTERNAL MEDICINE

## 2018-08-28 PROCEDURE — 74011636637 HC RX REV CODE- 636/637: Performed by: INTERNAL MEDICINE

## 2018-08-28 PROCEDURE — 77010033678 HC OXYGEN DAILY

## 2018-08-28 PROCEDURE — 74011000250 HC RX REV CODE- 250: Performed by: SURGERY

## 2018-08-28 PROCEDURE — 71045 X-RAY EXAM CHEST 1 VIEW: CPT

## 2018-08-28 PROCEDURE — C9113 INJ PANTOPRAZOLE SODIUM, VIA: HCPCS | Performed by: INTERNAL MEDICINE

## 2018-08-28 PROCEDURE — 74011250637 HC RX REV CODE- 250/637: Performed by: INTERNAL MEDICINE

## 2018-08-28 PROCEDURE — 74011000250 HC RX REV CODE- 250: Performed by: INTERNAL MEDICINE

## 2018-08-28 PROCEDURE — 94660 CPAP INITIATION&MGMT: CPT

## 2018-08-28 PROCEDURE — 74011000258 HC RX REV CODE- 258: Performed by: INTERNAL MEDICINE

## 2018-08-28 PROCEDURE — 74011250637 HC RX REV CODE- 250/637: Performed by: SURGERY

## 2018-08-28 PROCEDURE — 94640 AIRWAY INHALATION TREATMENT: CPT

## 2018-08-28 PROCEDURE — 36415 COLL VENOUS BLD VENIPUNCTURE: CPT | Performed by: SURGERY

## 2018-08-28 PROCEDURE — 74011000258 HC RX REV CODE- 258: Performed by: SURGERY

## 2018-08-28 PROCEDURE — 65610000006 HC RM INTENSIVE CARE

## 2018-08-28 PROCEDURE — 85025 COMPLETE CBC W/AUTO DIFF WBC: CPT | Performed by: SURGERY

## 2018-08-28 RX ORDER — POTASSIUM CHLORIDE 14.9 MG/ML
10 INJECTION INTRAVENOUS
Status: DISCONTINUED | OUTPATIENT
Start: 2018-08-28 | End: 2018-08-28

## 2018-08-28 RX ORDER — LEVOFLOXACIN 5 MG/ML
750 INJECTION, SOLUTION INTRAVENOUS EVERY 24 HOURS
Status: DISCONTINUED | OUTPATIENT
Start: 2018-08-28 | End: 2018-08-29

## 2018-08-28 RX ORDER — POTASSIUM CHLORIDE 1.5 G/1.77G
20 POWDER, FOR SOLUTION ORAL EVERY 12 HOURS
Status: DISCONTINUED | OUTPATIENT
Start: 2018-08-28 | End: 2018-08-30

## 2018-08-28 RX ORDER — POTASSIUM CHLORIDE 29.8 MG/ML
20 INJECTION INTRAVENOUS
Status: DISCONTINUED | OUTPATIENT
Start: 2018-08-28 | End: 2018-08-28

## 2018-08-28 RX ADMIN — ALBUTEROL SULFATE 2.5 MG: 2.5 SOLUTION RESPIRATORY (INHALATION) at 16:38

## 2018-08-28 RX ADMIN — SODIUM CHLORIDE 10 ML: 9 INJECTION, SOLUTION INTRAMUSCULAR; INTRAVENOUS; SUBCUTANEOUS at 05:26

## 2018-08-28 RX ADMIN — CHLORHEXIDINE GLUCONATE 15 ML: 1.2 RINSE ORAL at 21:59

## 2018-08-28 RX ADMIN — AMIODARONE HYDROCHLORIDE 200 MG: 200 TABLET ORAL at 08:02

## 2018-08-28 RX ADMIN — LACTULOSE 10 G: 20 SOLUTION ORAL at 17:23

## 2018-08-28 RX ADMIN — POTASSIUM CHLORIDE 10 MEQ: 14.9 INJECTION, SOLUTION INTRAVENOUS at 19:48

## 2018-08-28 RX ADMIN — LACTULOSE 10 G: 20 SOLUTION ORAL at 08:02

## 2018-08-28 RX ADMIN — CARBAMAZEPINE 100 MG: 100 SUSPENSION ORAL at 12:24

## 2018-08-28 RX ADMIN — INSULIN LISPRO 3 UNITS: 100 INJECTION, SOLUTION INTRAVENOUS; SUBCUTANEOUS at 17:22

## 2018-08-28 RX ADMIN — ALBUTEROL SULFATE 2.5 MG: 2.5 SOLUTION RESPIRATORY (INHALATION) at 23:04

## 2018-08-28 RX ADMIN — VENLAFAXINE 25 MG: 25 TABLET ORAL at 16:30

## 2018-08-28 RX ADMIN — POTASSIUM CHLORIDE 20 MEQ: 1.5 POWDER, FOR SOLUTION ORAL at 17:23

## 2018-08-28 RX ADMIN — GENTAMICIN SULFATE 90 MG: 40 INJECTION, SOLUTION INTRAMUSCULAR; INTRAVENOUS at 05:25

## 2018-08-28 RX ADMIN — GENTAMICIN SULFATE 90 MG: 40 INJECTION, SOLUTION INTRAMUSCULAR; INTRAVENOUS at 16:29

## 2018-08-28 RX ADMIN — SODIUM CHLORIDE 10 ML: 9 INJECTION, SOLUTION INTRAMUSCULAR; INTRAVENOUS; SUBCUTANEOUS at 22:00

## 2018-08-28 RX ADMIN — CARBAMAZEPINE 100 MG: 100 SUSPENSION ORAL at 22:00

## 2018-08-28 RX ADMIN — VENLAFAXINE 25 MG: 25 TABLET ORAL at 08:02

## 2018-08-28 RX ADMIN — SODIUM CHLORIDE 40 MG: 9 INJECTION INTRAMUSCULAR; INTRAVENOUS; SUBCUTANEOUS at 08:03

## 2018-08-28 RX ADMIN — INSULIN LISPRO 3 UNITS: 100 INJECTION, SOLUTION INTRAVENOUS; SUBCUTANEOUS at 11:34

## 2018-08-28 RX ADMIN — CARBAMAZEPINE 100 MG: 100 SUSPENSION ORAL at 09:00

## 2018-08-28 RX ADMIN — VENLAFAXINE 25 MG: 25 TABLET ORAL at 21:59

## 2018-08-28 RX ADMIN — CHLORHEXIDINE GLUCONATE 15 ML: 1.2 RINSE ORAL at 08:08

## 2018-08-28 RX ADMIN — LEVOFLOXACIN 750 MG: 5 INJECTION, SOLUTION INTRAVENOUS at 18:34

## 2018-08-28 RX ADMIN — DAPTOMYCIN 1000 MG: 500 INJECTION, POWDER, LYOPHILIZED, FOR SOLUTION INTRAVENOUS at 15:36

## 2018-08-28 RX ADMIN — POTASSIUM CHLORIDE 10 MEQ: 14.9 INJECTION, SOLUTION INTRAVENOUS at 18:11

## 2018-08-28 RX ADMIN — FUROSEMIDE 60 MG: 10 INJECTION, SOLUTION INTRAMUSCULAR; INTRAVENOUS at 21:59

## 2018-08-28 RX ADMIN — ALBUTEROL SULFATE 2.5 MG: 2.5 SOLUTION RESPIRATORY (INHALATION) at 19:34

## 2018-08-28 RX ADMIN — ALBUTEROL SULFATE 2.5 MG: 2.5 SOLUTION RESPIRATORY (INHALATION) at 11:37

## 2018-08-28 RX ADMIN — POLYETHYLENE GLYCOL 3350 17 G: 17 POWDER, FOR SOLUTION ORAL at 08:03

## 2018-08-28 RX ADMIN — CARBAMAZEPINE 100 MG: 100 SUSPENSION ORAL at 17:31

## 2018-08-28 RX ADMIN — FUROSEMIDE 60 MG: 10 INJECTION, SOLUTION INTRAMUSCULAR; INTRAVENOUS at 08:03

## 2018-08-28 RX ADMIN — SODIUM CHLORIDE 10 ML: 9 INJECTION, SOLUTION INTRAMUSCULAR; INTRAVENOUS; SUBCUTANEOUS at 14:14

## 2018-08-28 RX ADMIN — MELATONIN TAB 3 MG 3 MG: 3 TAB at 21:59

## 2018-08-28 RX ADMIN — ALBUTEROL SULFATE 2.5 MG: 2.5 SOLUTION RESPIRATORY (INHALATION) at 07:39

## 2018-08-28 RX ADMIN — ALBUTEROL SULFATE 2.5 MG: 2.5 SOLUTION RESPIRATORY (INHALATION) at 03:10

## 2018-08-28 NOTE — PROGRESS NOTES
0700 Bedside and Verbal shift change report given to Graeme RN (oncoming nurse) by Everton Hebert RN (offgoing nurse). Report included the following information SBAR, Kardex, ED Summary, OR Summary, Procedure Summary, Intake/Output, MAR, Recent Results and Cardiac Rhythm SA with paced beats, PVCs, PACs.      0800 Shift assessment; patient is confused, anxious, & only oriented to self; right pupil sluggish to react to light; LUE/LLE flaccid with limited movement; RUE/RLE purposeful/weak; patient is hard of hearing in both ears with hearing aid at bedside; wears dentures; L IJ with maintenance fluids running; patient is paced with PACs & PVCs; respiratory pattern is regular but tachypneic at times & breath sounds are coarse and diminished; patient on NC at 4L/min; productive cough; abdomen is obese with pannus and ostomy present; flannery in place; swelling in all four extremities; skin is pale, dry, & warm; facial edema is 1+, genital edema is 2+, and LUE/LLE/RUE/RLE are all 2+/non-pitting; midline incision in abdomen; sacral wound dressing present & intact; TF at 40mL/hr with Q4 75mL flush    0804 Dr. Kaye Rojas with Cardiology at bedside to assess patient; updated him on patient condition    0830 Dr. Mitchell Moore at bedside to assess patient; updated him on patient condition     0915 Dr. Sylvester Duggan with Hematology & Oncology at bedside to assess patient    0920 Put patient back on BIPAP d/t O2 saturation in low 80s with good pleth; TF stopped at this time     1134  - 3 units of insulin given     1200 Reassessment; no changes noted to previous assessment    1412 Patient put back on NC at 6L of humidified O2 to restart TF; will continue to monitor to see how patient tolerates    1543 Turned down O2 to 4L to see how patient will tolerate    1600 Reassessment; no changes to previous assessment noted    1633 Increased TF rate to 50mL - patient now at goal; will continue to assess to see how patient tolerates feed adjustment    1722  - 3 units given     1900 Bedside and Verbal shift change report given to Harrison RN (oncoming nurse) by Cruz Barrow RN (offgoing nurse). Report included the following information SBAR, Kardex, ED Summary, OR Summary, Procedure Summary, Intake/Output, MAR, Recent Results and Cardiac Rhythm Vpaced with PVCs & PACs.

## 2018-08-28 NOTE — PROGRESS NOTES
Chart reviewed. Pt discussed during IDR. Pt is currently requiring BiPAP support and not medically stable for PT re-evaluation.  Will defer and follow back tomorrow as appropriate.

## 2018-08-28 NOTE — PROGRESS NOTES
Nutrition Assessment: 
 
RECOMMENDATIONS:  
TF adjusted as not to overfeed protein: 
 TwoCal HN @ 50mL/h + Prosource daily + 75mL H2O flush q 4h (provides 2460kcals/115gPro/1290mL) ASSESSMENT:  
Chart reviewed, case discussed during CCU rounds. Pt extubated, needs re-estimated. He is currently on BiPAP thus TF was on hold this morning but per RN plan is for him to come off of it this afternoon and TF will be resumed. TF started yesterday via Dobbhoff, was at 40mL/h prior to being held. Colostomy with daily OP, 750mL yesterday. K+ 3.1, being repleted. Diuresis, he is down 24.3L. Will keep H2O flushes at a minimum. Pt remains confused. TF at goal rate will meet 102% kcal (providing 20kcals/kg) and 95% protein needs. Dietitians Intervention(s)/Plan(s): Adjust TF SUBJECTIVE/OBJECTIVE:  
Pt confused, on BiPAP Diet Order: NPO, Other (comment) (TF via Dobbhoff: TwoCal @ 50mL/h + Prosource TID + 75mL flush q 4h (provides 2580kcals/145gPro/1290mL) ) 
% Eaten:  No data found. TwoCal HN on hold  Q4H  via Other (comment) (dobbhoff)   Residuals: NA 
Pertinent Medications:miralax, lactulose, cubicin, lasix, humalog, gentamicin, protonix. Chemistries: 
Lab Results Component Value Date/Time Sodium 149 (H) 08/28/2018 05:14 AM  
 Potassium 3.1 (L) 08/28/2018 05:14 AM  
 Chloride 110 (H) 08/28/2018 05:14 AM  
 CO2 32 08/28/2018 05:14 AM  
 Anion gap 7 08/28/2018 05:14 AM  
 Glucose 156 (H) 08/28/2018 05:14 AM  
 BUN 22 (H) 08/28/2018 05:14 AM  
 Creatinine 0.80 08/28/2018 05:14 AM  
 BUN/Creatinine ratio 28 (H) 08/28/2018 05:14 AM  
 GFR est AA >60 08/28/2018 05:14 AM  
 GFR est non-AA >60 08/28/2018 05:14 AM  
 Calcium 8.0 (L) 08/28/2018 05:14 AM  
 Albumin 1.9 (L) 08/27/2018 03:05 AM  
  
Anthropometrics: Height: 6' 2\" (188 cm) Weight: 121.5 kg (267 lb 13.7 oz)   [x]bed scale (8/28)   []stated   []unknown  IBW (%IBW):   ( ) UBW (%UBW):   (  %)   
 BMI: Body mass index is 34.39 kg/(m^2). This BMI is indicative of: 
[]Underweight   []Normal   []Overweight   [x] Obesity   [] Extreme Obesity (BMI>40) Estimated Nutrition Needs (Based on): 6373 Kcals/day (MSJ 2010 x 1.2) , 121 g (1gPro/kg) Protein Carbohydrate: At Least 130 g/day  Fluids: 1500 mL/day or per MD  
 
Last BM: colostomy-750mL    [x]Active     []Hyperactive  []Hypoactive       [] Absent   BS Skin:    [] Intact   [x] Incision  [] Breakdown   [] DTI   [] Tears/Excoriation/Abrasion  [x]Edema(+2-generalized; +2 nonpitting-all extremities)  [] Other: Wt Readings from Last 30 Encounters:  
08/28/18 121.5 kg (267 lb 13.7 oz) 07/05/18 128.5 kg (283 lb 4.8 oz) 05/15/18 120.7 kg (266 lb) 05/07/18 118.8 kg (262 lb) 04/10/18 121.1 kg (267 lb)  
03/26/18 121.1 kg (267 lb)  
03/16/18 121.1 kg (267 lb)  
09/21/17 118.4 kg (261 lb) 08/03/17 117.5 kg (259 lb) 04/06/17 117.3 kg (258 lb 8 oz) 03/31/17 117.5 kg (259 lb)  
03/16/17 117.5 kg (259 lb)  
02/23/17 119.7 kg (264 lb) 01/03/17 115.2 kg (254 lb)  
11/17/16 119.7 kg (264 lb) 10/18/16 118.8 kg (262 lb) 10/07/16 118.8 kg (262 lb)  
09/22/16 113.9 kg (251 lb 1 oz) 08/18/16 116.4 kg (256 lb 11.2 oz) 05/16/16 114.8 kg (253 lb)  
03/10/16 114.8 kg (253 lb 1.6 oz) 02/11/16 122 kg (269 lb)  
01/11/16 119.7 kg (264 lb) 11/04/15 122.5 kg (270 lb) 08/25/15 119.7 kg (264 lb) 08/13/15 116.1 kg (256 lb) 05/04/15 119.7 kg (264 lb)  
03/27/15 119.7 kg (264 lb)  
02/21/15 121.2 kg (267 lb 3.2 oz) 01/29/15 119.9 kg (264 lb 6.4 oz) NUTRITION DIAGNOSES:  
Problem:  Altered GI function Etiology: related to Ascending colon mass c/w probable colon carcinoma and Cedric's erosion with [facility] Signs/Symptoms: as evidenced by Ascending colon mass c/w probable colon carcinoma and Cedric's erosion with [facility] Previous dx re: altered GI function resolved. TF tolerated well almost at goal rate.   
 
NUTRITION INTERVENTIONS: 
 Enteral/Parenteral Nutrition: Modify rate, concentration, composition, and schedule GOAL:  
Pt will meet >90% kcal and protein needs via TF with no signs/sx of intolerance in 2-4 days. NUTRITION MONITORING AND EVALUATION Previous Goal: meet >80% of ordered TF in 2-4 days Previous Goal Met: Progressing Previous Recommendations Implemented: Yes Cultural, Hindu, or Ethnic Dietary Needs: None LEARNING NEEDS (Diet, Food/Nutrient-Drug Interaction):  
 [x] None Identified 
 [] Identified and Education Provided/Documented 
 [] Identified and Pt declined/was not appropriate [x] Interdisciplinary Care Plan Reviewed/Documented  
 [x] Participated in Discharge Planning: Unable to determine  
 [x] Interdisciplinary Rounds NUTRITION RISK:  
 [x] High              [] Moderate           []  Low  []  Minimal/Uncompromised Java KILEY Arnett, [facility] Pager [phone] Weekend Pager [phone]

## 2018-08-28 NOTE — PROGRESS NOTES
Hematology Oncology Progress Note Follow up for: IJ/SVC thrombus Chart notes reviewed since last visit. Case discussed with following:  
Patient complains of the following:weak Additional concerns noted by the staff: none Patient Vitals for the past 24 hrs: 
 BP Temp Pulse Resp SpO2 Weight 08/28/18 1100 144/60 - 82 28 100 % -  
08/28/18 1000 146/63 - 79 30 99 % -  
08/28/18 0910 - - - - - 121.5 kg (267 lb 13.7 oz) 08/28/18 0900 144/57 - 81 (!) 32 98 % -  
08/28/18 0800 145/74 97.6 °F (36.4 °C) 83 17 98 % -  
08/28/18 0742 - - - - 100 % -  
08/28/18 0700 155/66 - 78 21 99 % -  
08/28/18 0606 - - 78 26 99 % -  
08/28/18 0605 153/73 - 80 27 - -  
08/28/18 0500 - - 81 28 97 % -  
08/28/18 0400 123/51 98.5 °F (36.9 °C) 81 22 95 % -  
08/28/18 0311 - - - - 98 % -  
08/28/18 0300 111/52 - 76 (!) 31 99 % -  
08/28/18 0200 116/60 - 81 23 98 % -  
08/28/18 0100 118/72 - 81 21 100 % -  
08/28/18 0000 108/61 98.5 °F (36.9 °C) 90 22 97 % -  
08/27/18 2315 - - - - 100 % -  
08/27/18 2300 109/62 - 75 24 100 % -  
08/27/18 2200 107/56 - 75 23 98 % -  
08/27/18 2100 100/57 - 75 25 99 % -  
08/27/18 2000 97/52 - 75 23 100 % -  
08/27/18 1928 122/64 98.6 °F (37 °C) 97 29 100 % -  
08/27/18 1902 - - - - 100 % -  
08/27/18 1900 124/60 - 84 21 100 % -  
08/27/18 1856 - - (!) 109 30 95 % -  
08/27/18 1800 139/62 - 88 30 95 % -  
08/27/18 1700 124/62 98.1 °F (36.7 °C) 85 21 97 % -  
08/27/18 1600 116/65 - 88 24 99 % -  
08/27/18 1511 - - - - 100 % -  
08/27/18 1502 112/57 - 76 23 100 % -  
08/27/18 1300 138/58 98.1 °F (36.7 °C) 77 26 99 % -  
 
 
ROS not obtainable Physical Examination: 
Gen NAD Cv reg Lungs clear Abd benign Labs: 
Recent Results (from the past 24 hour(s)) GLUCOSE, POC Collection Time: 08/27/18  1:14 PM  
Result Value Ref Range Glucose (POC) 141 (H) 65 - 100 mg/dL Performed by EMERSON SANTANA, POC Collection Time: 08/27/18  5:34 PM  
Result Value Ref Range Glucose (POC) 155 (H) 65 - 100 mg/dL Performed by EMERSON RAMIREZ   
GLUCOSE, POC Collection Time: 08/27/18 11:47 PM  
Result Value Ref Range Glucose (POC) 134 (H) 65 - 100 mg/dL Performed by Yang Lundberg. Derek Fernandez CBC WITH AUTOMATED DIFF Collection Time: 08/28/18  5:14 AM  
Result Value Ref Range WBC 9.1 4.1 - 11.1 K/uL  
 RBC 3.07 (L) 4.10 - 5.70 M/uL HGB 8.2 (L) 12.1 - 17.0 g/dL HCT 28.5 (L) 36.6 - 50.3 % MCV 92.8 80.0 - 99.0 FL  
 MCH 26.7 26.0 - 34.0 PG  
 MCHC 28.8 (L) 30.0 - 36.5 g/dL RDW 23.8 (H) 11.5 - 14.5 % PLATELET 773 408 - 038 K/uL MPV 11.2 8.9 - 12.9 FL  
 NRBC 0.2 (H) 0  WBC ABSOLUTE NRBC 0.02 (H) 0.00 - 0.01 K/uL NEUTROPHILS 72 32 - 75 % LYMPHOCYTES 17 12 - 49 % MONOCYTES 9 5 - 13 % EOSINOPHILS 0 0 - 7 % BASOPHILS 0 0 - 1 % IMMATURE GRANULOCYTES 1 (H) 0.0 - 0.5 % ABS. NEUTROPHILS 6.6 1.8 - 8.0 K/UL  
 ABS. LYMPHOCYTES 1.6 0.8 - 3.5 K/UL  
 ABS. MONOCYTES 0.8 0.0 - 1.0 K/UL  
 ABS. EOSINOPHILS 0.0 0.0 - 0.4 K/UL  
 ABS. BASOPHILS 0.0 0.0 - 0.1 K/UL  
 ABS. IMM. GRANS. 0.1 (H) 0.00 - 0.04 K/UL  
 DF AUTOMATED METABOLIC PANEL, BASIC Collection Time: 08/28/18  5:14 AM  
Result Value Ref Range Sodium 149 (H) 136 - 145 mmol/L Potassium 3.1 (L) 3.5 - 5.1 mmol/L Chloride 110 (H) 97 - 108 mmol/L  
 CO2 32 21 - 32 mmol/L Anion gap 7 5 - 15 mmol/L Glucose 156 (H) 65 - 100 mg/dL BUN 22 (H) 6 - 20 MG/DL Creatinine 0.80 0.70 - 1.30 MG/DL  
 BUN/Creatinine ratio 28 (H) 12 - 20 GFR est AA >60 >60 ml/min/1.73m2 GFR est non-AA >60 >60 ml/min/1.73m2 Calcium 8.0 (L) 8.5 - 10.1 MG/DL  
GLUCOSE, POC Collection Time: 08/28/18  5:24 AM  
Result Value Ref Range Glucose (POC) 159 (H) 65 - 100 mg/dL Performed by Yang Lundberg. Derek Fernandez Assessment and Plan:  
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 Cont anticoagulation, hbg 8.2 today.   Transfuse hbg <7 
 
 Vegetation on pacer wire in RA with presistent blood cultures: 
- Cards and ID following, holding off on pacemaker removal at this time, 
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 If pt recovers will need to see him to discuss possible adjuvant chemotherapy. Acute resp failure/Pna - off vent, plan for pigtail and drainage of R pleural eff noted Septic shock with persistent bacteremia -ID following, abx per them Normochromic normocytic anemia - B12  929. folate 9.8. Ferritin was 5 on 7/10/18 suggesting fairly significant iron deficiency. He got 500 mg of IV iron in July but actually has a much higher calculated deficit. Ferritin 121 so does not need further IV iron at present. would transfuse for hbg <7.

## 2018-08-28 NOTE — PROGRESS NOTES
Has had an uneventful night. Has successfully coughed and cleared secretions a few times but swallows before I can use  [place]. Has primarily been on 02 @4 with NC. Right eye pupil improved greatly. Bilaterally reactive.

## 2018-08-28 NOTE — PROGRESS NOTES
Physical Therapy Note Chart reviewed. Pt discussed during IDR. Pt is currently requiring BiPAP support and not medically stable for PT re-evaluation. Will defer and follow back tomorrow as appropriate.  
 
Thank you, 
Osito Alvarez, PT, DPT

## 2018-08-28 NOTE — PROGRESS NOTES
[facility] Name: Ally Ashley MRN: [id number] : 1940 Hospital: [facility] Date: 2018 IMPRESSION:  
· Acute respiratory failure requiring ventilator support. Extubated to [place] on 18. bipap as needed. · Bilateral pleural effusions- no better vs Ct chest  · Persistent bacteremia with coagulase negative Staph since  initially Oxacillin sensitive , last positive BC is oxacillin resistant (1/) · NATASHA shows definite large mass associated with pacing wire in RA which may represent vegetation () adrianley infected thrombus · GNR bronchtiis on recent culture · Pneumonia, Klebsiella in sputum and Staph Aureus. treated · Volume overload with anasarca, still persists. Has lost 30 pounds but more to lose; unfortunately has also lost muscle mass · hypernatremia. · hypokalemia · Pulmonary edema and pleural effusions-stable at this point. · Dysphagia-on vent; given recent surgery not likely a good candidate for PEG or G tube- will need change to NGtube or dobhoff at time of trach · Right IJ thrombosis and tiny air bubble, 8/15/18: started on heparin drip but transitioned to enoxaparin · Anemia no overt bleeding · severe hypoalbuminemia · S/P colectomy/YAMEL/enterotomies for colon cancer; Stage 3b colon ca. · Ileus/ with recurrent aspiration pneumonia prior to admission to ICU. 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 · Debility, has global weakness. · DM  
· Obesity PLAN:  
· Can use bipap as needed. · Bronchial hygiene · Stop lovenox x 24 hours · Ask IR to place right pigtail tomorrow. · abx per ID · D/w  and Je. Hold on pacer explant for now until he is stronger and after more abx and anticoagulation · ID and Cardiology help much appreciated; [place] · XR dobhoff · Continue diuresis · Tough to cross match · Follow hemoglobin · Enteral feeds · GI prophylaxis · Avoid sedating meds. Subjective/Interval History:  
I have reviewed the flowsheet and previous days notes. 8/28 BIPAP again last night. More alert. GNR in sputum. Very congested but will not expectorate as requested. Seems disorganized, confused. Will track and engage but difficulty communicating. Large right pleural effusion . Left on CXR but no SOB. 8-27-18: Plug last night? Required BIPAP for respiratory distress. Very weak. Interacting with wife. discussed management wit hwife at bedside. For neck ultrasound. D/w Dr. Azar Besnon. Dobhoff placed by XR- took 90 minutes Review of Systems Unable to perform ROS: Intubated Constitutional: Positive for fatigue. Eyes: Negative. Respiratory: Positive for cough. Cardiovascular: Negative. Gastrointestinal: Negative. Genitourinary: Negative for frequency. 8/10 Asked to evaluate patient to see if his pleural effusion is the cause of his rising WBC. Seen earlier this hospitalization by my partners for aspiration pneumonia. He has been hospitalized for 30 days, having had a colonic resection complicated by ileus. He was on Zosyn for 18 days and this was stopped 3 days ago and his WBC began to rise. He has a congested cough which is chronic. Can not produce sputum. He is limited in his understanding of how to do incentive spirometry due to prior traumatic brain injury. 8.11 called urgently for acute respiratory failure RR 50's 
rhonchorus breathing On NRBM 
 
8.12  sedated on ventilator. 270 [address]. GPC on multiple BC 4/4 . Remains on Vanc 8-14-18: Last 24 hrs. Remains on moderate dose vaopressors. Noted to have decreased Hgb less than 7. Not really following commands with current meds infusing. No acute issues noted per nursing last pm. 8-15-18: Pt had increased vent needs yesterday.  Has not really been able to be easily weaned from vent. Has increased WBC. Had a ct scan of chest and abdomen. Final reports are pending. 18: Events and findings from CT noted. Discussed with Dr. Bon Ospina. Due to pts instability will continue on heparin drip. Discussed with pts wife, nurse this am. Still on jose de jesus drip. When tried to place on SBT his RR was in the 45s. Still on sedation. 18: No new issues. Still has high vent support and has increased RR into the 30-40s. Not having much secretions. No overt evidence of bleeding. Hgb 7.1 on IV heparin : Hgb lower to 6.8 Has autoantibodies. On IV heparin. Flash pulmonary edema with transfusion last night, responded to IV lasix : Hypertensive after diuretic. Anasarca. On vent. IV heparin. Hgb 8.2 to 9.1 and later back to 8.2 
: remains critically ill on mechanical ventilation (pressure control ventilation) : no major change, failed SBT again today : remains critically ill on mechanical ventilation. Passed SBT today, but not alert. : remains unresponsive. Passing SBT but no purposeful response. : still unresponsive on mechanical ventilation. 18: More responsive. Opens eyes. I can not get him to raise  His head or move extremities. Not having resp secretions. Concern raised about source of infection. Discussed with Nursing, Resp, DR. Villalba and DR. Quigley. Will get CT of chest with contrast and eval the chest wall for infection. 18: Tolerated SAT, SBT this am. Was extubated. Not much respiratory secretions. Seems very weak globally. MAR REVIEWED Objective:  
Vital Signs:   
Visit Vitals  /73  Pulse 78  Temp 98.5 °F (36.9 °C)  Resp 26  
 Ht 6' 2\" (1.88 m)  Wt 121.5 kg (267 lb 13.7 oz)  SpO2 100%  BMI 34.39 kg/m2 O2 Device: Nasal cannula O2 Flow Rate (L/min): 4 l/min Temp (24hrs), Av.4 °F (36.9 °C), Min:98.1 °F (36.7 °C), Max:98.6 °F (37 °C) Intake/Output: Last shift:        
Last 3 shifts:  1901 -  0700 In: 1925 [I.V.:350] Out: 4290 [ITTD] Intake/Output Summary (Last 24 hours) at 18 9939 Last data filed at 18 5714 Gross per 24 hour Intake             1445 ml Output             2940 ml Net            -1495 ml Physical Exam  
Constitutional: Vital signs are normal. He appears well-developed. He is active. He appears ill. No distress. Face mask in place. HENT:  
Head: Normocephalic and atraumatic. Mouth/Throat: No oropharyngeal exudate. Eyes: Conjunctivae are normal. Pupils are equal, round, and reactive to light. No scleral icterus. Neck:  
Old tracheostomy site Cardiovascular: Normal rate and regular rhythm. Pulmonary/Chest: No respiratory distress. He has no wheezes. He has no rales. Abdominal: He exhibits no distension. There is no tenderness. Musculoskeletal: He exhibits edema. Neurological: He is alert. Seems to be globally weak. Skin: Skin is warm and dry. Data:  
Labs: 
Recent Labs  
   18 
 0514  18 
 0305  18 
 0503 WBC  9.1  17.7*  10.0 HGB  8.2*  8.4*  7.9*  
HCT  28.5*  28.9*  27.6*  
PLT  204  218  215 Recent Labs  
   18 
 0514  18 
 0305  18 
 0503 NA  149*  150*  147* K  3.1*  3.0*  2.8*  
CL  110*  111*  108 CO2  32  31  33* GLU  156*  118*  135* BUN  22*  23*  27* CREA  0.80  0.85  0.92  
CA  8.0*  8.0*  7.5* MG   --   2.1  2.1 PHOS   --   3.3  3.3 ALB   --   1.9*  1.7* TBILI   --   0.9  0.9 SGOT   --   24  23 ALT   --   26  29 No results for input(s): PH, PCO2, PO2, HCO3, FIO2 in the last 72 hours. Imaging: 
I have personally reviewed the patients radiographs: 
18: CT of chest: IMPRESSION 
 impression: No evidence for pulmonary emboli. Bilateral pleural effusion are 
stable, cardiomegaly. Left lateral chest wall swelling  CXR right effusion larger Leigh Aceves MD

## 2018-08-28 NOTE — PROGRESS NOTES
Infectious Disease Progress Note IMPRESSION:  
· Persistent bacteremia with coagulase negative Staph since 8/11 initially oxacillin sensitive , last positive BC is oxacillin resistant 8/21(1/4) · NATASHA shows definite large mass associated with pacing wire in RA which may represent vegetation ( 8/23) · Thrombus  & tiny gas bubble of as in RIJ extending into SVC to level of RA (8/15)  s/p heparin IV now on lovenox s/q · CTA chest - no PE, could not visualize SVC thrombus, left lateral wall  Soft tissue swelling, soft tissue around pacemaker show no significant abnormality · US chest wall - minimal fluid in pacemaker pocket, no significant inflammation of overlying subcutaneous fat or skin · Acute respiratory failure on Ventilator / h/o tracheostomy 24 years ago  , s/p extubation 8/26 · Pulmonary edema & bilateral pleural effusions, large Right pleural effusion · Increased sputum production - Sputum culture 8/25 + heavy GNR · Pneumonia , last sputum culture 8/11 + for Heavy staph aureus , light K.pneumoniae s/p treaed · Ca colon Stage 3b s/p colectomy / AYMEL / enterotomies · S/p ileus , aspiration pneumonia prior to ICU admission · H/o traumatic brain injury 25 years ago    
  
  
PLAN:  
   
· Continue Daptomycin , Gentamicin IV. · Repeat BC until negative BC are obtained. · Thrombus in R/IJ , now  mass in RA which is suggestive of thrombus rather than  vegetation  in light of pacemaker pocket appearing clear of infection. ·  Hold off on removal of pacemaker at this time . D/w Dr Sylvester Aguilar , .  . · Continue antimicrobials & anticoagulation ·  For placement of pigtail & drainage of R/ pleural effusion Blood culture 8/26 Special Requests: NO SPECIAL REQUESTS   Preliminary Culture result: NO GROWTH 2 DAYS Blood culture 8/21 Culture result:    Final  
STAPHYLOCOCCUS EPIDERMIDIS (OXACILLIN RESISTANT) GROWING IN 1 OF 4 BOTTLES DRAWN (SITE = RW) (A) Culture result:    Final  
PRELIMINARY REPORT OF GRAM POSITIVE COCCI IN CLUSTERS GROWING IN 1 OF 4 BOTTLES DRAWN , SO FAR CALLED TO AND READ BACK BY CRISTIN LOCK 18 0721 SP (A) Culture result:    Final  
REMAINING BOTTLE(S) HAS/HAVE NO GROWTH IN 5 DAYS  
 
 chest - : There is minimal fluid in the pacemaker pocket, a thin sliver measuring 
approximately 2 mm in thickness and 21 mm in length. No significant inflammation 
of the overlying subcutaneous fat or skin. IMPRESSION: 
No abscess. Subjective:  
 
Pt seen . Awake , making eye contact,  responding ,on bipap Review of Systems:  Review of systems not obtained due to patient factors. Objective:  
 
Blood pressure 146/63, pulse 79, temperature 97.6 °F (36.4 °C), resp. rate 30, height 6' 2\" (1.88 m), weight 267 lb 13.7 oz (121.5 kg), SpO2 99 %. Temp (24hrs), Av.2 °F (36.8 °C), Min:97.6 °F (36.4 °C), Max:98.6 °F (37 °C) Patient Vitals for the past 24 hrs: 
 Temp Pulse Resp BP SpO2  
18 1000 - 79 30 146/63 99 % 18 0900 - 81 (!) 32 144/57 98 % 18 0800 97.6 °F (36.4 °C) 83 17 145/74 98 % 18 0742 - - - - 100 % 18 0700 - 78 21 155/66 99 % 18 0606 - 78 26 - 99 % 18 0605 - 80 27 153/73 -  
18 0500 - 81 28 - 97 % 18 0400 98.5 °F (36.9 °C) 81 22 123/51 95 % 18 0311 - - - - 98 % 18 0300 - 76 (!) 31 111/52 99 % 18 0200 - 81 23 116/60 98 % 18 0100 - 81 21 118/72 100 % 18 0000 98.5 °F (36.9 °C) 90 22 108/61 97 % 18 2315 - - - - 100 % 18 2300 - 75 24 109/62 100 % 18 2200 - 75 23 107/56 98 % 18 2100 -  25 100/57 99 % 18 -  23 97/52 100 % 18 1928 98.6 °F (37 °C) 97 29 122/64 100 % 18 1902 - - - - 100 % 18 1900 - 84 21 124/60 100 % 18 1856 - (!) 109 30 - 95 % 18 1800 - 88 30 139/62 95 % 18 1700 98.1 °F (36.7 °C) 85 21 124/62 97 % 08/27/18 1600 - 88 24 116/65 99 % 08/27/18 1511 - - - - 100 % 08/27/18 1502 - 76 23 112/57 100 % 08/27/18 1300 98.1 °F (36.7 °C) 77 26 138/58 99 % 08/27/18 1100 - 76 23 132/64 100 % Lines:  Central Venous Catheter:  LIJ Physical Exam:  
General:  Awake, cooperative, on bipap Lungs:    Reduced air entry bases on  auscultation  Chest wall- L/side mild prominence compared to R, pacemaker site - no swelling CV:  Regular rate and rhythm,no murmur, Abdomen:   Soft, non-tender. bowel sounds +distended Extremities: Edema Lines/Devices:  Intact, no erythema, drainage or tenderness Data Review: CBC:  
Recent Labs  
   08/28/18 
 0514  08/27/18 
 0305  08/26/18 
 0503 WBC  9.1  17.7*  10.0  
RBC  3.07*  3.13*  2.99* HGB  8.2*  8.4*  7.9*  
HCT  28.5*  28.9*  27.6*  
PLT  204  218  215 GRANS  72  86*  77* LYMPH  17  5*  12 EOS  0  0  1 CMP:  
Recent Labs  
   08/28/18 
 0514  08/27/18 
 0305  08/26/18 
 0503 GLU  156*  118*  135* NA  149*  150*  147* K  3.1*  3.0*  2.8*  
CL  110*  111*  108 CO2  32  31  33* BUN  22*  23*  27* CREA  0.80  0.85  0.92  
CA  8.0*  8.0*  7.5* AGAP  7  8  6 BUCR  28*  27*  29* AP   --   96  100 TP   --   6.9  6.5 ALB   --   1.9*  1.7*  
GLOB   --   5.0*  4.8* AGRAT   --   0.4*  0.4* Studies:     
Lab Results Component Value Date/Time Culture result: NO GROWTH 2 DAYS 08/26/2018 07:30 AM  
 Culture result: HEAVY APPARENT GRAM NEGATIVE RODS (A) 08/25/2018 05:21 PM  
 Culture result: NO NORMAL RESPIRATORY TANIA ISOLATED SO FAR 08/25/2018 05:21 PM  
 Culture result: (A) 08/21/2018 12:22 PM  
  STAPHYLOCOCCUS EPIDERMIDIS (OXACILLIN RESISTANT) GROWING IN 1 OF 4 BOTTLES DRAWN (SITE = RW)  Culture result: (A) 08/21/2018 12:22 PM  
  PRELIMINARY REPORT OF GRAM POSITIVE COCCI IN CLUSTERS GROWING IN 1 OF 4 BOTTLES DRAWN , SO FAR CALLED TO AND READ BACK BY CRISTIN LOCK 8/23/18 0721 SP  
 Culture result: REMAINING BOTTLE(S) HAS/HAVE NO GROWTH IN 5 DAYS 08/21/2018 12:22 PM  
  
 
XR Results (most recent): 
 
Results from Hospital Encounter encounter on 07/10/18 XR CHEST PORT Narrative INDICATION: Respiratory failure. EXAM: 
 
Portable AP chest radiograph was obtained at 0435 hours on August 28, 2018. FINDINGS: 
 
Comparison studies:  August 27, 2018. The cardiac silhouette is obscured by pulmonary infiltrates. The lungs are slightly hypo- expanded bilaterally. Multiple old bilateral rib fractures. Pacemaker hardware redemonstrated. Dobbhoff tube in place but tip not 
visualized. Persistent bibasilar infiltrates and pleural effusions. Central line 
tip over superior vena cava. Impression IMPRESSION: 
 
Persistent bibasilar infiltrates and pleural effusions. Patient Active Problem List  
Diagnosis Code  
 HTN (hypertension) I10  
 Depression F32.9  Hypercholesterolemia E78.00  Acid reflux K21.9  Seizure ([address] R56.9  Hypothyroidism E03.9  Hyponatremia E87.1  BPH (benign prostatic hyperplasia) N40.0  Rash R21  
 Cellulitis L03.90  Wax in ear H61.20  Ulcer [id number]  Small bowel obstruction ([address] R21.967  Atrial flutter (HCC) I48.92  
 Atrial fibrillation or flutter  CHI (closed head injury) S09. 90XA  Basal cell cancer C44.91  
 TBI (traumatic brain injury) ([address] S06. 9X9A  Atrial fibrillation (HCC) I48.91  
 Cataract H26.9  Constipation K59.00  Ankle fracture, left G42.594F  Insomnia G47.00  Tinea corporis B35.4  SSS (sick sinus syndrome) ([facility]) I49.5  Syncope R55  Seizure disorder ([address] G40.909  RONAL on CPAP G47.33, Z99.89  
 Pacemaker Z95.0  Pre-op evaluation Z01.818  Chronic back pain greater than 3 months duration M54.9, G89.29  
 Cerebral infarction (HCC) I63.9  Acute bronchitis J20.9  Screening for colon cancer Z12.11  
 Chronic right shoulder pain M25.511, G89.29  
  Common wart B07.8  Localized epilepsy with impairment of consciousness ([facility] G40.209  Severe obesity (BMI 35.0-39.9) ([facility]) E66.01  
 Acute blood loss anemia D62  Anasarca R60.1  GI bleed K92.2  Acute encephalopathy G93.40  Idiopathic hypotension I95.0  Counseling regarding goals of care Z71.89 ICD-10-CM ICD-9-CM 1. Anemia, unspecified type D64.9 285.9 2. Generalized weakness R53.1 780.79   
3. Acute encephalopathy G93.40 348.30   
4. Anasarca R60.1 782.3 5. Idiopathic hypotension I95.0 458.9 6. Counseling regarding goals of care Z71.89 V65.49 Anti-infectives:  
 
 Daptomycin IV - 8/26 Gentamicin IV 8/26 Cefotetan 7/17 Zosyn 7/20-8/7 Cefepime - 8/11-8/13 Ceftriaxone 8/13-8/17 Vancomycin 8/11-8/22 Daptomycin 8/23- 8/24- Naficillin IV 8/24- 8/26  Linda Cisneros MD FACP

## 2018-08-28 NOTE — PROGRESS NOTES
Care Management: 
 
Patient remains critical with complicated medical issues in the ICU. He is now extubated and is on 02 4l but does need Bipap at times. He has cardiology , pulmonology, and ID following. Pleural effusions not improving. He is on tube feeds and IV ABX. Wife at bedside each day. Chart reviewed and we will cont to follow for discharge needs as appropriate. Elver Pérez crm acm 1982

## 2018-08-28 NOTE — PROGRESS NOTES
Problem: Falls - Risk of 
Goal: *Absence of Falls Document Pari Jewell Fall Risk and appropriate interventions in the flowsheet. Outcome: Progressing Towards Goal 
Fall Risk Interventions: 
Mobility Interventions: Bed/chair exit alarm, Communicate number of staff needed for ambulation/transfer, PT Consult for mobility concerns, Strengthening exercises (ROM-active/passive) Mentation Interventions: Adequate sleep, hydration, pain control, Door open when patient unattended, Bed/chair exit alarm, Evaluate medications/consider consulting pharmacy, Family/sitter at bedside, More frequent rounding, Reorient patient, Room close to nurse's station, Toileting rounds, Update white board Medication Interventions: Bed/chair exit alarm, Evaluate medications/consider consulting pharmacy, Teach patient to arise slowly Elimination Interventions: Bed/chair exit alarm, Call light in reach, Toilet paper/wipes in reach, Toileting schedule/hourly rounds History of Falls Interventions: Bed/chair exit alarm, Door open when patient unattended, Evaluate medications/consider consulting pharmacy, Investigate reason for fall, Room close to nurse's station Problem: Pressure Injury - Risk of 
Goal: *Prevention of pressure injury Document Dagoberto Scale and appropriate interventions in the flowsheet. Outcome: Progressing Towards Goal 
Pressure Injury Interventions: 
Sensory Interventions: Assess changes in LOC, Assess need for specialty bed, Check visual cues for pain, Discuss PT/OT consult with provider, Float heels, Keep linens dry and wrinkle-free, Maintain/enhance activity level, Minimize linen layers, Monitor skin under medical devices, Pad between skin to skin, Pressure redistribution bed/mattress (bed type), Turn and reposition approx. every two hours (pillows and wedges if needed) Moisture Interventions: Absorbent underpads, Apply protective barrier, creams and emollients, Assess need for specialty bed, Maintain skin hydration (lotion/cream), Minimize layers, Moisture barrier Activity Interventions: Assess need for specialty bed, Pressure redistribution bed/mattress(bed type), Increase time out of bed, PT/OT evaluation Mobility Interventions: Assess need for specialty bed, Float heels, HOB 30 degrees or less, Pressure redistribution bed/mattress (bed type), Turn and reposition approx. every two hours(pillow and wedges) Nutrition Interventions: Document food/fluid/supplement intake, Discuss nutritional consult with provider Friction and Shear Interventions: Apply protective barrier, creams and emollients, HOB 30 degrees or less, Lift sheet, Lift team/patient mobility team, Minimize layers, Transferring/repositioning devices

## 2018-08-28 NOTE — PROGRESS NOTES
Cardiology Progress Note            [address]  [phone]    8/28/2018 8:04 AM    Admit Date: 7/10/2018    Admit Diagnosis: Acute blood loss anemia;GI bleed; Anasarca;GI Bleed;gi bleed*    Subjective:     Randall Sniff  Not responsive to questions.  Moves head side to side    Visit Vitals    /73    Pulse 78    Temp 98.5 °F (36.9 °C)    Resp 26    Ht 6' 2\" (1.88 m)    Wt 274 lb 4 oz (124.4 kg)    SpO2 100%    BMI 35.21 kg/m2     Current Facility-Administered Medications   Medication Dose Route Frequency    gentamicin (GARAMYCIN) 90 mg in 0.9% sodium chloride 100 mL IVPB  90 mg IntraVENous Q12H    albuterol (PROVENTIL VENTOLIN) nebulizer solution 2.5 mg  2.5 mg Nebulization Q4H RT    DAPTOmycin (CUBICIN) 1,000 mg in 0.9% sodium chloride 50 mL IVPB RF formulation  1,000 mg IntraVENous Q24H    fentaNYL citrate (PF) injection  mcg   mcg IntraVENous Q2H PRN    furosemide (LASIX) injection 60 mg  60 mg IntraVENous Q12H    enoxaparin (LOVENOX) injection 120 mg  120 mg SubCUTAneous Q12H    LORazepam (ATIVAN) injection 2 mg  2 mg IntraVENous Q2H PRN    acetaminophen (TYLENOL) solution 650 mg  650 mg Oral Q4H PRN    insulin lispro (HUMALOG) injection   SubCUTAneous Q6H    glucose chewable tablet 16 g  4 Tab Oral PRN    dextrose (D50W) injection syrg 12.5-25 g  12.5-25 g IntraVENous PRN    glucagon (GLUCAGEN) injection 1 mg  1 mg IntraMUSCular PRN    venlafaxine (EFFEXOR) tablet 25 mg  25 mg Oral TID    pantoprazole (PROTONIX) 40 mg in sodium chloride 0.9% 10 mL injection  40 mg IntraVENous DAILY    carBAMazepine (TEGretol) 200 mg/10 mL suspension 100 mg  100 mg PEG Tube QID    chlorhexidine (PERIDEX) 0.12 % mouthwash 15 mL  15 mL Oral Q12H    albuterol-ipratropium (DUO-NEB) 2.5 MG-0.5 MG/3 ML  3 mL Nebulization Q6H PRN    amiodarone (CORDARONE) tablet 200 mg  200 mg Oral DAILY    prochlorperazine (COMPAZINE) with saline injection 5 mg  5 mg IntraVENous Q6H PRN    lactulose (CHRONULAC) solution 10 g  10 g Oral BID    sodium chloride (NS) flush 10-30 mL  10-30 mL InterCATHeter PRN    sodium chloride (NS) flush 10 mL  10 mL InterCATHeter PRN    sodium chloride (NS) flush 10-40 mL  10-40 mL InterCATHeter Q8H    sodium chloride (NS) flush 10 mL  10 mL InterCATHeter Q24H    melatonin tablet 3 mg  3 mg Oral QHS    polyethylene glycol (MIRALAX) packet 17 g  17 g Oral DAILY    hydrALAZINE (APRESOLINE) 20 mg/mL injection 10 mg  10 mg IntraVENous Q6H PRN    oxyCODONE IR (ROXICODONE) tablet 5 mg  5 mg Oral Q4H PRN    oxyCODONE IR (ROXICODONE) tablet 10 mg  10 mg Oral Q4H PRN    sodium chloride (NS) flush 5-10 mL  5-10 mL IntraVENous PRN    acetaminophen (TYLENOL) tablet 650 mg  650 mg Oral Q6H PRN    ondansetron (ZOFRAN) injection 4 mg  4 mg IntraVENous Q6H PRN         Objective:      Visit Vitals    /73    Pulse 78    Temp 98.5 °F (36.9 °C)    Resp 26    Ht 6' 2\" (1.88 m)    Wt 274 lb 4 oz (124.4 kg)    SpO2 100%    BMI 35.21 kg/m2       Physical Exam:  Abdomen: soft, non-tender  Extremities: +edema  Heart: irr irr  Lungs: clear to auscultation bilaterally  Pulses: 2+ and symmetric    Data Review:   Labs:    Recent Labs      08/28/18   0514  08/27/18   0305  08/26/18   0503   WBC  9.1  17.7*  10.0   HGB  8.2*  8.4*  7.9*   HCT  28.5*  28.9*  27.6*   PLT  204  218  215     Recent Labs      08/28/18   0514  08/27/18   0305  08/26/18   0503   NA  149*  150*  147*   K  3.1*  3.0*  2.8*   CL  110*  111*  108   CO2  32  31  33*   GLU  156*  118*  135*   BUN  22*  23*  27*   CREA  0.80  0.85  0.92   CA  8.0*  8.0*  7.5*   MG   --   2.1  2.1   PHOS   --   3.3  3.3   ALB   --   1.9*  1.7*   TBILI   --   0.9  0.9   SGOT   --   24  23   ALT   --   26  29       No results for input(s): TROIQ, CPK, CKMB in the last 72 hours.       Intake/Output Summary (Last 24 hours) at 08/28/18 0804  Last data filed at 08/28/18 8485   Gross per 24 hour   Intake 1445 ml   Output             3070 ml   Net            -1625 ml        Telemetry: afib, rbbb    Assessment:     Principal Problem:    GI bleed (7/10/2018)    Active Problems:    Acute blood loss anemia (7/10/2018)      Anasarca (7/10/2018)      Acute encephalopathy (8/24/2018)      Idiopathic hypotension (8/24/2018)      Counseling regarding goals of care (8/24/2018)        Plan:     Ramone Lugo is hemodynamically stable. D/w Dr Dayami Devlin yesterday (who had spoken to Dr Sandip Redding) - he is in favor of longer course of IV antibiotics - does not think that the pacemaker is infected. Will defer to Dr Dayami Devlin and Dr Sandip Redding. Cont supportive care.     Mikey Davis MD, [facility]    8/28/2018

## 2018-08-29 ENCOUNTER — APPOINTMENT (OUTPATIENT)
Dept: NON INVASIVE DIAGNOSTICS | Age: 78
DRG: 329 | End: 2018-08-29
Payer: MEDICARE

## 2018-08-29 ENCOUNTER — APPOINTMENT (OUTPATIENT)
Dept: INTERVENTIONAL RADIOLOGY/VASCULAR | Age: 78
DRG: 329 | End: 2018-08-29
Attending: INTERNAL MEDICINE
Payer: MEDICARE

## 2018-08-29 ENCOUNTER — APPOINTMENT (OUTPATIENT)
Dept: GENERAL RADIOLOGY | Age: 78
DRG: 329 | End: 2018-08-29
Attending: INTERNAL MEDICINE
Payer: MEDICARE

## 2018-08-29 LAB
ANION GAP SERPL CALC-SCNC: 6 MMOL/L (ref 5–15)
APPEARANCE FLD: CLEAR
BASOPHILS # BLD: 0 K/UL (ref 0–0.1)
BASOPHILS NFR BLD: 0 % (ref 0–1)
BUN SERPL-MCNC: 19 MG/DL (ref 6–20)
BUN/CREAT SERPL: 20 (ref 12–20)
CALCIUM SERPL-MCNC: 7.7 MG/DL (ref 8.5–10.1)
CHLORIDE SERPL-SCNC: 110 MMOL/L (ref 97–108)
CO2 SERPL-SCNC: 33 MMOL/L (ref 21–32)
COLOR FLD: YELLOW
COMMENT, HOLDF: NORMAL
CREAT SERPL-MCNC: 0.94 MG/DL (ref 0.7–1.3)
DATE LAST DOSE: NORMAL
DIFFERENTIAL METHOD BLD: ABNORMAL
EOSINOPHIL # BLD: 0 K/UL (ref 0–0.4)
EOSINOPHIL NFR BLD: 0 % (ref 0–7)
EOSINOPHIL NFR FLD MANUAL: 3 %
ERYTHROCYTE [DISTWIDTH] IN BLOOD BY AUTOMATED COUNT: 23.7 % (ref 11.5–14.5)
GENTAMICIN TROUGH SERPL-MCNC: 1.4 UG/ML (ref 1–2)
GLUCOSE BLD STRIP.AUTO-MCNC: 131 MG/DL (ref 65–100)
GLUCOSE BLD STRIP.AUTO-MCNC: 141 MG/DL (ref 65–100)
GLUCOSE BLD STRIP.AUTO-MCNC: 152 MG/DL (ref 65–100)
GLUCOSE BLD STRIP.AUTO-MCNC: 176 MG/DL (ref 65–100)
GLUCOSE BLD STRIP.AUTO-MCNC: 176 MG/DL (ref 65–100)
GLUCOSE FLD-MCNC: 173 MG/DL
GLUCOSE SERPL-MCNC: 180 MG/DL (ref 65–100)
HCT VFR BLD AUTO: 26.7 % (ref 36.6–50.3)
HGB BLD-MCNC: 7.7 G/DL (ref 12.1–17)
IMM GRANULOCYTES # BLD: 0.1 K/UL (ref 0–0.04)
IMM GRANULOCYTES NFR BLD AUTO: 1 % (ref 0–0.5)
LDH FLD L TO P-CCNC: 178 U/L
LYMPHOCYTES # BLD: 1.2 K/UL (ref 0.8–3.5)
LYMPHOCYTES NFR BLD: 13 % (ref 12–49)
LYMPHOCYTES NFR FLD: 51 %
MCH RBC QN AUTO: 27.2 PG (ref 26–34)
MCHC RBC AUTO-ENTMCNC: 28.8 G/DL (ref 30–36.5)
MCV RBC AUTO: 94.3 FL (ref 80–99)
MONOCYTES # BLD: 0.9 K/UL (ref 0–1)
MONOCYTES NFR BLD: 10 % (ref 5–13)
MONOS+MACROS NFR FLD: 10 %
NEUTROPHILS NFR FLD: 36 %
NEUTS SEG # BLD: 6.9 K/UL (ref 1.8–8)
NEUTS SEG NFR BLD: 76 % (ref 32–75)
NRBC # BLD: 0.03 K/UL (ref 0–0.01)
NRBC BLD-RTO: 0.3 PER 100 WBC
NUC CELL # FLD: 129 /CU MM
PLATELET # BLD AUTO: 164 K/UL (ref 150–400)
PMV BLD AUTO: 10.6 FL (ref 8.9–12.9)
POTASSIUM SERPL-SCNC: 3.4 MMOL/L (ref 3.5–5.1)
PROT FLD-MCNC: 2.2 G/DL
RBC # BLD AUTO: 2.83 M/UL (ref 4.1–5.7)
RBC # FLD: >100 /CU MM
RBC MORPH BLD: ABNORMAL
REPORTED DOSE,DOSE: NORMAL UNITS
REPORTED DOSE/TIME,TMG: NORMAL
SAMPLES BEING HELD,HOLD: NORMAL
SERVICE CMNT-IMP: ABNORMAL
SODIUM SERPL-SCNC: 149 MMOL/L (ref 136–145)
SPECIMEN SOURCE FLD: ABNORMAL
SPECIMEN SOURCE FLD: NORMAL
WBC # BLD AUTO: 9.1 K/UL (ref 4.1–11.1)

## 2018-08-29 PROCEDURE — 36415 COLL VENOUS BLD VENIPUNCTURE: CPT | Performed by: SURGERY

## 2018-08-29 PROCEDURE — 74011250636 HC RX REV CODE- 250/636: Performed by: INTERNAL MEDICINE

## 2018-08-29 PROCEDURE — 97530 THERAPEUTIC ACTIVITIES: CPT

## 2018-08-29 PROCEDURE — 87040 BLOOD CULTURE FOR BACTERIA: CPT | Performed by: INTERNAL MEDICINE

## 2018-08-29 PROCEDURE — G8987 SELF CARE CURRENT STATUS: HCPCS

## 2018-08-29 PROCEDURE — 97535 SELF CARE MNGMENT TRAINING: CPT

## 2018-08-29 PROCEDURE — C1892 INTRO/SHEATH,FIXED,PEEL-AWAY: HCPCS

## 2018-08-29 PROCEDURE — 77030002996 HC SUT SLK J&J -A

## 2018-08-29 PROCEDURE — 84157 ASSAY OF PROTEIN OTHER: CPT | Performed by: INTERNAL MEDICINE

## 2018-08-29 PROCEDURE — 75989 ABSCESS DRAINAGE UNDER X-RAY: CPT

## 2018-08-29 PROCEDURE — 74011636637 HC RX REV CODE- 636/637: Performed by: INTERNAL MEDICINE

## 2018-08-29 PROCEDURE — 71045 X-RAY EXAM CHEST 1 VIEW: CPT

## 2018-08-29 PROCEDURE — 74011250637 HC RX REV CODE- 250/637: Performed by: INTERNAL MEDICINE

## 2018-08-29 PROCEDURE — 74011000258 HC RX REV CODE- 258: Performed by: INTERNAL MEDICINE

## 2018-08-29 PROCEDURE — 74011250636 HC RX REV CODE- 250/636: Performed by: RADIOLOGY

## 2018-08-29 PROCEDURE — C1769 GUIDE WIRE: HCPCS

## 2018-08-29 PROCEDURE — 77010033678 HC OXYGEN DAILY

## 2018-08-29 PROCEDURE — 97164 PT RE-EVAL EST PLAN CARE: CPT

## 2018-08-29 PROCEDURE — 80170 ASSAY OF GENTAMICIN: CPT | Performed by: SURGERY

## 2018-08-29 PROCEDURE — 0W9930Z DRAINAGE OF RIGHT PLEURAL CAVITY WITH DRAINAGE DEVICE, PERCUTANEOUS APPROACH: ICD-10-PCS | Performed by: RADIOLOGY

## 2018-08-29 PROCEDURE — 89050 BODY FLUID CELL COUNT: CPT | Performed by: INTERNAL MEDICINE

## 2018-08-29 PROCEDURE — 83615 LACTATE (LD) (LDH) ENZYME: CPT | Performed by: INTERNAL MEDICINE

## 2018-08-29 PROCEDURE — 80048 BASIC METABOLIC PNL TOTAL CA: CPT | Performed by: INTERNAL MEDICINE

## 2018-08-29 PROCEDURE — 94640 AIRWAY INHALATION TREATMENT: CPT

## 2018-08-29 PROCEDURE — 74011000258 HC RX REV CODE- 258: Performed by: SURGERY

## 2018-08-29 PROCEDURE — C1729 CATH, DRAINAGE: HCPCS

## 2018-08-29 PROCEDURE — 74011250637 HC RX REV CODE- 250/637: Performed by: SURGERY

## 2018-08-29 PROCEDURE — 74011000250 HC RX REV CODE- 250: Performed by: INTERNAL MEDICINE

## 2018-08-29 PROCEDURE — 85025 COMPLETE CBC W/AUTO DIFF WBC: CPT | Performed by: SURGERY

## 2018-08-29 PROCEDURE — 32555 ASPIRATE PLEURA W/ IMAGING: CPT

## 2018-08-29 PROCEDURE — G8978 MOBILITY CURRENT STATUS: HCPCS

## 2018-08-29 PROCEDURE — 88305 TISSUE EXAM BY PATHOLOGIST: CPT

## 2018-08-29 PROCEDURE — 74011000250 HC RX REV CODE- 250: Performed by: SURGERY

## 2018-08-29 PROCEDURE — 87205 SMEAR GRAM STAIN: CPT | Performed by: INTERNAL MEDICINE

## 2018-08-29 PROCEDURE — 97168 OT RE-EVAL EST PLAN CARE: CPT

## 2018-08-29 PROCEDURE — C9113 INJ PANTOPRAZOLE SODIUM, VIA: HCPCS | Performed by: INTERNAL MEDICINE

## 2018-08-29 PROCEDURE — 77030012390 HC DRN CHST BTL GTNG -B

## 2018-08-29 PROCEDURE — G8988 SELF CARE GOAL STATUS: HCPCS

## 2018-08-29 PROCEDURE — G8979 MOBILITY GOAL STATUS: HCPCS

## 2018-08-29 PROCEDURE — 82945 GLUCOSE OTHER FLUID: CPT | Performed by: INTERNAL MEDICINE

## 2018-08-29 PROCEDURE — 77030011229 HC DIL VESL COON COOK -A

## 2018-08-29 PROCEDURE — 88112 CYTOPATH CELL ENHANCE TECH: CPT | Performed by: HOSPITALIST

## 2018-08-29 PROCEDURE — 82962 GLUCOSE BLOOD TEST: CPT

## 2018-08-29 PROCEDURE — 65610000006 HC RM INTENSIVE CARE

## 2018-08-29 PROCEDURE — 77030019563 HC DEV ATTCH FEED HOLL -A

## 2018-08-29 PROCEDURE — 74011250636 HC RX REV CODE- 250/636: Performed by: SURGERY

## 2018-08-29 RX ORDER — LEVOFLOXACIN 5 MG/ML
750 INJECTION, SOLUTION INTRAVENOUS EVERY 24 HOURS
Status: COMPLETED | OUTPATIENT
Start: 2018-08-29 | End: 2018-09-16

## 2018-08-29 RX ORDER — ENOXAPARIN SODIUM 100 MG/ML
1 INJECTION SUBCUTANEOUS EVERY 12 HOURS
Status: DISCONTINUED | OUTPATIENT
Start: 2018-08-29 | End: 2018-09-03

## 2018-08-29 RX ORDER — HEPARIN SODIUM 200 [USP'U]/100ML
400 INJECTION, SOLUTION INTRAVENOUS ONCE
Status: COMPLETED | OUTPATIENT
Start: 2018-08-29 | End: 2018-08-30

## 2018-08-29 RX ORDER — ENOXAPARIN SODIUM 100 MG/ML
1 INJECTION SUBCUTANEOUS EVERY 12 HOURS
Status: DISCONTINUED | OUTPATIENT
Start: 2018-08-29 | End: 2018-08-29

## 2018-08-29 RX ORDER — LEVOFLOXACIN 5 MG/ML
500 INJECTION, SOLUTION INTRAVENOUS EVERY 24 HOURS
Status: DISCONTINUED | OUTPATIENT
Start: 2018-08-29 | End: 2018-08-29 | Stop reason: SDUPTHER

## 2018-08-29 RX ORDER — LIDOCAINE HYDROCHLORIDE 20 MG/ML
50 INJECTION, SOLUTION INFILTRATION; PERINEURAL ONCE
Status: COMPLETED | OUTPATIENT
Start: 2018-08-29 | End: 2018-08-29

## 2018-08-29 RX ADMIN — AMIODARONE HYDROCHLORIDE 200 MG: 200 TABLET ORAL at 08:24

## 2018-08-29 RX ADMIN — SODIUM CHLORIDE 10 ML: 9 INJECTION, SOLUTION INTRAMUSCULAR; INTRAVENOUS; SUBCUTANEOUS at 21:10

## 2018-08-29 RX ADMIN — FENTANYL CITRATE 50 MCG: 50 INJECTION, SOLUTION INTRAMUSCULAR; INTRAVENOUS at 08:54

## 2018-08-29 RX ADMIN — LIDOCAINE HYDROCHLORIDE 200 MG: 20 INJECTION, SOLUTION INFILTRATION; PERINEURAL at 09:35

## 2018-08-29 RX ADMIN — POTASSIUM CHLORIDE 20 MEQ: 1.5 POWDER, FOR SOLUTION ORAL at 18:22

## 2018-08-29 RX ADMIN — CARBAMAZEPINE 100 MG: 100 SUSPENSION ORAL at 12:43

## 2018-08-29 RX ADMIN — ALBUTEROL SULFATE 2.5 MG: 2.5 SOLUTION RESPIRATORY (INHALATION) at 23:00

## 2018-08-29 RX ADMIN — FUROSEMIDE 60 MG: 10 INJECTION, SOLUTION INTRAMUSCULAR; INTRAVENOUS at 08:24

## 2018-08-29 RX ADMIN — CHLORHEXIDINE GLUCONATE 15 ML: 1.2 RINSE ORAL at 08:25

## 2018-08-29 RX ADMIN — INSULIN LISPRO 3 UNITS: 100 INJECTION, SOLUTION INTRAVENOUS; SUBCUTANEOUS at 06:25

## 2018-08-29 RX ADMIN — VENLAFAXINE 25 MG: 25 TABLET ORAL at 18:23

## 2018-08-29 RX ADMIN — CARBAMAZEPINE 100 MG: 100 SUSPENSION ORAL at 08:25

## 2018-08-29 RX ADMIN — SODIUM CHLORIDE 40 MG: 9 INJECTION INTRAMUSCULAR; INTRAVENOUS; SUBCUTANEOUS at 08:23

## 2018-08-29 RX ADMIN — POTASSIUM CHLORIDE 20 MEQ: 1.5 POWDER, FOR SOLUTION ORAL at 04:30

## 2018-08-29 RX ADMIN — SODIUM CHLORIDE 40 ML: 9 INJECTION, SOLUTION INTRAMUSCULAR; INTRAVENOUS; SUBCUTANEOUS at 21:11

## 2018-08-29 RX ADMIN — ACETAMINOPHEN 650 MG: 160 SUSPENSION ORAL at 19:47

## 2018-08-29 RX ADMIN — SODIUM CHLORIDE 10 ML: 9 INJECTION, SOLUTION INTRAMUSCULAR; INTRAVENOUS; SUBCUTANEOUS at 06:23

## 2018-08-29 RX ADMIN — ENOXAPARIN SODIUM 120 MG: 100 INJECTION SUBCUTANEOUS at 21:10

## 2018-08-29 RX ADMIN — ALBUTEROL SULFATE 2.5 MG: 2.5 SOLUTION RESPIRATORY (INHALATION) at 07:12

## 2018-08-29 RX ADMIN — HEPARIN SODIUM IN SODIUM CHLORIDE: 200 INJECTION INTRAVENOUS at 09:35

## 2018-08-29 RX ADMIN — DAPTOMYCIN 1000 MG: 500 INJECTION, POWDER, LYOPHILIZED, FOR SOLUTION INTRAVENOUS at 14:24

## 2018-08-29 RX ADMIN — ALBUTEROL SULFATE 2.5 MG: 2.5 SOLUTION RESPIRATORY (INHALATION) at 19:18

## 2018-08-29 RX ADMIN — SODIUM CHLORIDE 10 ML: 9 INJECTION, SOLUTION INTRAMUSCULAR; INTRAVENOUS; SUBCUTANEOUS at 14:26

## 2018-08-29 RX ADMIN — ALBUTEROL SULFATE 2.5 MG: 2.5 SOLUTION RESPIRATORY (INHALATION) at 15:18

## 2018-08-29 RX ADMIN — GENTAMICIN SULFATE 90 MG: 40 INJECTION, SOLUTION INTRAMUSCULAR; INTRAVENOUS at 17:58

## 2018-08-29 RX ADMIN — ALBUTEROL SULFATE 2.5 MG: 2.5 SOLUTION RESPIRATORY (INHALATION) at 11:10

## 2018-08-29 RX ADMIN — VENLAFAXINE 25 MG: 25 TABLET ORAL at 21:10

## 2018-08-29 RX ADMIN — ENOXAPARIN SODIUM 120 MG: 100 INJECTION SUBCUTANEOUS at 10:12

## 2018-08-29 RX ADMIN — VENLAFAXINE 25 MG: 25 TABLET ORAL at 08:24

## 2018-08-29 RX ADMIN — LORAZEPAM 1 MG: 2 INJECTION INTRAMUSCULAR; INTRAVENOUS at 09:13

## 2018-08-29 RX ADMIN — INSULIN LISPRO 3 UNITS: 100 INJECTION, SOLUTION INTRAVENOUS; SUBCUTANEOUS at 18:11

## 2018-08-29 RX ADMIN — INSULIN LISPRO 3 UNITS: 100 INJECTION, SOLUTION INTRAVENOUS; SUBCUTANEOUS at 11:53

## 2018-08-29 RX ADMIN — ALBUTEROL SULFATE 2.5 MG: 2.5 SOLUTION RESPIRATORY (INHALATION) at 03:01

## 2018-08-29 RX ADMIN — CHLORHEXIDINE GLUCONATE 15 ML: 1.2 RINSE ORAL at 21:10

## 2018-08-29 RX ADMIN — CARBAMAZEPINE 100 MG: 100 SUSPENSION ORAL at 18:00

## 2018-08-29 RX ADMIN — CARBAMAZEPINE 100 MG: 100 SUSPENSION ORAL at 21:10

## 2018-08-29 RX ADMIN — FUROSEMIDE 60 MG: 10 INJECTION, SOLUTION INTRAMUSCULAR; INTRAVENOUS at 21:10

## 2018-08-29 RX ADMIN — LACTULOSE 10 G: 20 SOLUTION ORAL at 08:24

## 2018-08-29 RX ADMIN — POLYETHYLENE GLYCOL 3350 17 G: 17 POWDER, FOR SOLUTION ORAL at 08:24

## 2018-08-29 RX ADMIN — LEVOFLOXACIN 750 MG: 5 INJECTION, SOLUTION INTRAVENOUS at 18:22

## 2018-08-29 RX ADMIN — MELATONIN TAB 3 MG 3 MG: 3 TAB at 21:10

## 2018-08-29 RX ADMIN — INSULIN LISPRO 3 UNITS: 100 INJECTION, SOLUTION INTRAVENOUS; SUBCUTANEOUS at 01:26

## 2018-08-29 RX ADMIN — GENTAMICIN SULFATE 90 MG: 40 INJECTION, SOLUTION INTRAMUSCULAR; INTRAVENOUS at 06:00
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
## 2018-08-29 NOTE — PROGRESS NOTES
Follow up visit with patient's wife and niece. Supportive listening offered to wife as she spoke of her hopes and some of her own anxiety. Pastoral presence and care provided. Assured wife of continued pastoral care support and presence. Wife very appreciative of prayers and care. [phone]. Visit by: Rodrigo Burnham. Betty Langford D.Min, MA, [facility] Lead  [employer]

## 2018-08-29 NOTE — PROGRESS NOTES
Hematology Oncology Progress Note Follow up for: IJ/SVC thrombus Chart notes reviewed since last visit. Case discussed with following:  
Patient complains of the following:weak Additional concerns noted by the staff: none Patient Vitals for the past 24 hrs: 
 BP Temp Pulse Resp SpO2 Weight 08/29/18 0900 123/64 - 81 21 98 % -  
08/29/18 0800 125/56 98 °F (36.7 °C) 89 25 100 % -  
08/29/18 0712 - - - - 100 % -  
08/29/18 0700 126/56 - 77 (!) 32 99 % -  
08/29/18 0620 - - - - - 121.4 kg (267 lb 10.2 oz) 08/29/18 0600 141/67 - 78 27 100 % -  
08/29/18 0500 112/59 - 76 23 100 % -  
08/29/18 0400 97/46 98.7 °F (37.1 °C) 75 27 98 % -  
08/29/18 0301 - - - - 98 % -  
08/29/18 0300 (!) 164/20 - 83 (!) 32 99 % -  
08/29/18 0200 111/57 - 78 27 99 % -  
08/29/18 0100 127/57 - 81 21 100 % -  
08/29/18 0000 127/57 99.1 °F (37.3 °C) 81 (!) 35 96 % -  
08/28/18 2304 - - - - 98 % -  
08/28/18 2300 126/64 - 85 26 99 % -  
08/28/18 2200 141/72 - 86 29 99 % -  
08/28/18 2100 126/63 - 83 29 99 % -  
08/28/18 2000 153/64 99.2 °F (37.3 °C) 86 (!) 31 100 % -  
08/28/18 1935 - - - - 100 % -  
08/28/18 1900 120/65 - 81 28 100 % -  
08/28/18 1800 131/52 - 86 28 100 % -  
08/28/18 1700 138/62 - 80 24 98 % -  
08/28/18 1638 - - - - 100 % -  
08/28/18 1600 125/65 98.5 °F (36.9 °C) 83 27 100 % -  
08/28/18 1500 151/63 - 88 25 100 % -  
08/28/18 1400 156/74 - 80 29 100 % -  
08/28/18 1300 165/86 - 81 25 100 % -  
08/28/18 1200 152/81 97.8 °F (36.6 °C) 77 26 100 % -  
08/28/18 1137 - - - - 100 % -  
08/28/18 1100 144/60 - 82 28 100 % -  
08/28/18 1000 146/63 - 79 30 99 % -  
 
 
ROS not obtainable Physical Examination: 
Gen NAD Cv reg Lungs clear Abd benign Labs: 
Recent Results (from the past 24 hour(s)) GLUCOSE, POC Collection Time: 08/28/18 11:28 AM  
Result Value Ref Range Glucose (POC) 167 (H) 65 - 100 mg/dL Performed by Grant Montoya, POC  Collection Time: 08/28/18  5:18 PM  
 Result Value Ref Range Glucose (POC) 154 (H) 65 - 100 mg/dL Performed by Mortimer Collier Collection Time: 08/28/18 10:17 PM  
Result Value Ref Range Potassium 3.4 (L) 3.5 - 5.1 mmol/L  
GLUCOSE, POC Collection Time: 08/29/18  1:24 AM  
Result Value Ref Range Glucose (POC) 176 (H) 65 - 100 mg/dL Performed by Ever Major. Ijeoma Laurent CBC WITH AUTOMATED DIFF Collection Time: 08/29/18  4:21 AM  
Result Value Ref Range WBC 9.1 4.1 - 11.1 K/uL  
 RBC 2.83 (L) 4.10 - 5.70 M/uL HGB 7.7 (L) 12.1 - 17.0 g/dL HCT 26.7 (L) 36.6 - 50.3 % MCV 94.3 80.0 - 99.0 FL  
 MCH 27.2 26.0 - 34.0 PG  
 MCHC 28.8 (L) 30.0 - 36.5 g/dL RDW 23.7 (H) 11.5 - 14.5 % PLATELET 553 414 - 668 K/uL MPV 10.6 8.9 - 12.9 FL  
 NRBC 0.3 (H) 0  WBC ABSOLUTE NRBC 0.03 (H) 0.00 - 0.01 K/uL NEUTROPHILS 76 (H) 32 - 75 % LYMPHOCYTES 13 12 - 49 % MONOCYTES 10 5 - 13 % EOSINOPHILS 0 0 - 7 % BASOPHILS 0 0 - 1 % IMMATURE GRANULOCYTES 1 (H) 0.0 - 0.5 % ABS. NEUTROPHILS 6.9 1.8 - 8.0 K/UL  
 ABS. LYMPHOCYTES 1.2 0.8 - 3.5 K/UL  
 ABS. MONOCYTES 0.9 0.0 - 1.0 K/UL  
 ABS. EOSINOPHILS 0.0 0.0 - 0.4 K/UL  
 ABS. BASOPHILS 0.0 0.0 - 0.1 K/UL  
 ABS. IMM. GRANS. 0.1 (H) 0.00 - 0.04 K/UL  
 DF AUTOMATED    
 RBC COMMENTS ANISOCYTOSIS 
2+ METABOLIC PANEL, BASIC Collection Time: 08/29/18  4:21 AM  
Result Value Ref Range Sodium 149 (H) 136 - 145 mmol/L Potassium 3.4 (L) 3.5 - 5.1 mmol/L Chloride 110 (H) 97 - 108 mmol/L  
 CO2 33 (H) 21 - 32 mmol/L Anion gap 6 5 - 15 mmol/L Glucose 180 (H) 65 - 100 mg/dL BUN 19 6 - 20 MG/DL Creatinine 0.94 0.70 - 1.30 MG/DL  
 BUN/Creatinine ratio 20 12 - 20 GFR est AA >60 >60 ml/min/1.73m2 GFR est non-AA >60 >60 ml/min/1.73m2 Calcium 7.7 (L) 8.5 - 10.1 MG/DL  
GENTAMICIN, TROUGH Collection Time: 08/29/18  4:21 AM  
Result Value Ref Range Gentamicin, trough 1.4 1.0 - 2.0 ug/ml Reported dose date: NOT PROVIDED Reported dose time: NOT PROVIDED Reported dose: NOT PROVIDED UNITS  
GLUCOSE, POC Collection Time: 08/29/18  6:11 AM  
Result Value Ref Range Glucose (POC) 176 (H) 65 - 100 mg/dL Performed by Dorthula Cooks. Isma Boyce Assessment and Plan:  
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 Discussed with Dr. Mireya Ryder, pit back on lovenox now that he has pigtail placed and pleural eff drained, Cont anticoagulation, hbg 7.7 today. 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 
- Cards and ID following, holding off on pacemaker removal at this time, 
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 If pt recovers will need to see him to discuss possible adjuvant chemotherapy. Acute resp failure/Pna - off vent, Had pigtail and drainage of R pleural eff Septic shock with persistent bacteremia -ID following, abx per them Normochromic normocytic anemia - B12  929. folate 9.8. Ferritin was 5 on 7/10/18 suggesting fairly significant iron deficiency. He got 500 mg of IV iron in July but actually has a much higher calculated deficit. Ferritin 121 so does not need further IV iron at present. would transfuse for hbg <7.

## 2018-08-29 NOTE — PROGRESS NOTES
Problem: Self Care Deficits Care Plan (Adult) Goal: *Acute Goals and Plan of Care (Insert Text) Occupational Therapy Goals Weekly reassessment 7/31/18 1. Patient will perform one grooming task seated EOB with minimal assistance within 7 day(s). 2.  Patient will perform upper body dressing with moderate assistance  within 7 day(s). 3.  Patient will perform supine <> sit to participate in ADL tasks decrease caregiver burden with moderate assistance  within 7 day(s). 4.  Patient will perform sit <> stand to prepare for self care transfers with moderate assistance within 7 day(s). 5.  Patient will participate in R AROM and L self PROM upper extremity therapeutic exercise/activities with contact guard assist for 8 minutes within 7 day(s). 6.  Patient will utilize energy conservation techniques during functional activities with verbal cues within 7 day(s). Initiated 7/22/2018 1. Patient will perform one grooming task seated EOB with minimal assistance within 7 day(s). 2.  Patient will perform upper body dressing with moderate assistance  within 7 day(s). 3.  Patient will perform supine <> sit to participate in ADL tasks decrease caregiver burdenwith maximal assistance  within 7 day(s). 4.  Patient will perform sit <> stand to prepare for self care transfers with maximal assistance within 7 day(s). 5.  Patient will participate in R AROM and L self PROM upper extremity therapeutic exercise/activities with contact guard assist for 8 minutes within 7 day(s). 6.  Patient will utilize energy conservation techniques during functional activities with verbal cues within 7 day(s). Occupational Therapy Goals Initiated 8/29/2018 1. Patient will perform grooming with maximal assistance within 7 day(s). 2.  Patient will be able to sit on EOb with max assist of 2 for 2 minutes, in prep for ADLs.  
3. Patient to be able to tolerate UE ex on right UE for 3 minutes in order to increase his overall endurance and strength, with in 7 days. Occupational Therapy ReEVALUATION Patient: Maliha Garland (85 y.o. male) Date: 8/29/2018 Diagnosis: Acute blood loss anemia GI bleed Anasarca GI Bleed 
gi bleed ASCENDING COLON CANCER  GI bleed Procedure(s) (LRB): 
LAPAROSCOPIC  ASSISTED TO OPEN RIGHT COLECTOMY AND SMALL BOWEL RESECTION (Right) 43 Days Post-Op Precautions:   
 
ASSESSMENT : 
Based on the objective data described below, the patient presents with generalized weakness, decreased endurance, strength, LUE/LLE flaccidity,decreased attention to left and difficulty with tracking to the left, functional mobility and ADLs and cognition. Pt was able to stand with one person assist to transfer to wheelchair at home with wife and was max for ADLs. Pt has been intubated [facility] Pt with Rapid Response on 8/11/18 requiring transfer into CCU and intubation. Pt has been intubated and sedated since 8/26/18 and then has not been following commands well since until this date. Pt was cleared to be seen and was supine in bed with family at bedside and willing to work with therapy. VSS on 4 L/min O2 at rest and with mobility. Pt participated in bed mobility, however required max A x 2. Pt performed long sitting and rolling to L,  needed constant Verbal and tactilecueing, along with hand-over-hand assistance for each task. Pts right UE is now his dominant arm and he is very weak in right UE but is able to wave good bye to therapy. . Placed pt in modified chair position and positioned with pillows for comfort. Pt tolerated chair position, therefore left pt in chair position with all needs met, RN aware, and family present. Pt will require rehab at discharge caused from patient's extended hospitalization, at rehab at [facility]. Patient will benefit from skilled intervention to address the above impairments. Patients rehabilitation potential is considered to be Fair Factors which may influence rehabilitation potential include:  
[]                None noted [x]                Mental ability/status [x]                Medical condition []                Home/family situation and support systems []                Safety awareness []                Pain tolerance/management 
[]                Other: PLAN : 
Recommendations and Planned Interventions: 
[x]                  Self Care Training                  []           Therapeutic Activities [x]                  Functional Mobility Training    []           Cognitive Retraining 
[x]                  Therapeutic Exercises           [x]           Endurance Activities [x]                  Balance Training                   []           Neuromuscular Re-Education []                  Visual/Perceptual Training     [x]      Home Safety Training 
[x]                  Patient Education                 [x]           Family Training/Education []                  Other (comment): Frequency/Duration: Patient will be followed by occupational therapy 3 times a week to address goals. Discharge Recommendations: Loki Davies Further Equipment Recommendations for Discharge: tbd SUBJECTIVE:  
Patient stated Thank you.  OBJECTIVE DATA SUMMARY:  
Hospital course since last seen and reason for reevaluation: pt has been intubated for long period of time and orders for OT were d/c and now that pt Is able to follow commands, orders received to eval 
Cognitive/Behavioral Status: 
Neurologic State: Alert;Eyes open spontaneously Orientation Level: Oriented to person;Oriented to place; Disoriented to situation;Disoriented to time Cognition: Follows commands;Recognition of people/places; Poor safety awareness Skin: in fair health Edema: swelling in BUE Vision/Perceptual:   
    
   Decreased attention to left side and vision appears grossly intact on right to midline and he was able to cross midline once during eval 
    
  
    
    
  
 
Range of Motion: 
 
AROM: Generally decreased, functional 
PROM: Generally decreased, functional 
  
  
  
  
  
  
Strength: 
 
Strength: Generally decreased, functional 
  
  
  
  
Coordination: 
Coordination: Within functional limits Tone & Sensation: 
 
Tone: Normal 
  
  
  
  
  
  
  
 
Balance: 
Sitting: Impaired; With support (long sitting in bed) Sitting - Static: Poor (constant support); Supported sitting Functional Mobility and Transfers for ADLs: 
Bed Mobility: 
Rolling: Maximum assistance;Assist x2 Scooting: Maximum assistance;Assist x2 ADL Assessment: 
Feeding: Total assistance Oral Facial Hygiene/Grooming: Total assistance Bathing: Total assistance Upper Body Dressing: Total assistance Lower Body Dressing: Total assistance Toileting: Total assistance Functional Measure: 
Barthel Index: 
 
Bathin Bladder: 0 Bowels: 0 Groomin Dressin Feedin Mobility: 0 Stairs: 0 Toilet Use: 0 Transfer (Bed to Chair and Back): 0 Total: 0 Barthel and G-code impairment scale: 
Percentage of impairment CH 
0% CI 
1-19% CJ 
20-39% CK 
40-59% CL 
60-79% CM 
80-99% CN 
100% Barthel Score 0-100 100 99-80 79-60 59-40 20-39 1-19 
 0 Barthel Score 0-20 20 17-19 13-16 9-12 5-8 1-4 0 The Barthel ADL Index: Guidelines 1. The index should be used as a record of what a patient does, not as a record of what a patient could do. 2. The main aim is to establish degree of independence from any help, physical or verbal, however minor and for whatever reason. 3. The need for supervision renders the patient not independent. 4. A patient's performance should be established using the best available evidence. Asking the patient, friends/relatives and nurses are the usual sources, but direct observation and common sense are also important. However direct testing is not needed. 5. Usually the patient's performance over the preceding 24-48 hours is important, but occasionally longer periods will be relevant. 6. Middle categories imply that the patient supplies over 50 per cent of the effort. 7. Use of aids to be independent is allowed. Ines De La Rosa., Barthel, D.W. (5662). Functional evaluation: the Barthel Index. [facility] (14)2. Kelechi Chacon nicanor ELIAS Hurley, Gwen Landry., Jewel Figueroa., [place], [facility] (1999). Measuring the change indisability after inpatient rehabilitation; comparison of the responsiveness of the Barthel Index and Functional Giles Measure. Journal of Neurology, Neurosurgery, and Psychiatry, 66(4), 148-050. LIANNA Walton, GABY Bains, & Terri West M.A. (2004.) Assessment of post-stroke quality of life in cost-effectiveness studies: The usefulness of the Barthel Index and the EuroQoL-5D. [facility], 13, 907-23 G codes: In compliance with CMSs Claims Based Outcome Reporting, the following G-code set was chosen for this patient based on their primary functional limitation being treated: The outcome measure chosen to determine the severity of the functional limitation was the Barthel with a score of 0/100 which was correlated with the impairment scale. ? Self Care:  
  - CURRENT STATUS: CN - 100% impaired, limited or restricted  - GOAL STATUS: CM - 80%-99% impaired, limited or restricted  - D/C STATUS:  ---------------To be determined--------------- Occupational Therapy Evaluation Charge Determination History Examination Decision-Making HIGH Complexity : Extensive review of history including physical, cognitive and psychosocial history  HIGH Complexity : 5 or more performance deficits relating to physical, cognitive , or psychosocial skils that result in activity limitations and / or participation restrictions HIGH Complexity : Patient presents with comorbidities that affect occupational performance. Signifigant modification of tasks or assistance (eg, physical or verbal) with assessment (s) is necessary to enable patient to complete evaluation Based on the above components, the patient evaluation is determined to be of the following complexity level: HIGH Pain: 
Pain Scale 1: Numeric (0 - 10) Pain Intensity 1: 0 Activity Tolerance:  
vss/poor Please refer to the flowsheet for vital signs taken during this treatment. After treatment:  
[] Patient left in no apparent distress sitting up in chair 
[x] Patient left in no apparent distress in bed 
[x] Call bell left within reach [x] Nursing notified 
[] Caregiver present 
[] Bed alarm activated COMMUNICATION/EDUCATION:  
The patients plan of care was discussed with: Physical Therapist and Registered Nurse. [x]    Home safety education was provided and the patient/caregiver indicated understanding. []    Patient/family have participated as able in goal setting and plan of care. []    Patient/family agree to work toward stated goals and plan of care. []    Patient understands intent and goals of therapy, but is neutral about his/her participation. [x]    Patient is unable to participate in goal setting and plan of care. This patients plan of care is appropriate for delegation to [facility]. Thank you for this referral. 
Rosario Wharton OT Time Calculation: 25 mins

## 2018-08-29 NOTE — PROGRESS NOTES
Essentially an uneventful night. OT suctioned once, significant secretions. No bipap needed. Tolerating feeding. Report given. Patient and chart visited.

## 2018-08-29 NOTE — PROGRESS NOTES
Interdisciplinary team rounds were held 8/29/2018 with the following team members:Care Management, Diabetes Treatment Specialist, Nursing, Nutrition, Pastoral Care, Pharmacy, Physical Therapy, Physician and Respiratory Therapy. Plan of care discussed. Goal: Adjust medications, continue to monitor and support. See MD orders and progress notes for further  interventions and desired outcomes.

## 2018-08-29 NOTE — PROGRESS NOTES
Problem: Mobility Impaired (Adult and Pediatric) Goal: *Acute Goals and Plan of Care (Insert Text) Physical Therapy Goals Goals reviewed and revised 8/29/18 1. Patient will move from supine to sit and sit to supine , scoot up and down and roll side to side in bed with moderate assistance within 7 day(s). 2.  Patient will transfer from bed to chair and chair to bed with max assistance x 2 using the least restrictive device within 7 day(s). 3.  Patient will perform sit to stand with moderate assistance x 2 within 7 day(s). 4.  Patient will perform stand pivot transfer to wheelchair with max assistance x 2 in 7 days. Reviewed 8/3/18- goals remain appropriate Reviewed and revised 7/26/2018 1. Patient will move from supine to sit and sit to supine , scoot up and down and roll side to side in bed with minimal assistance within 7 day(s). 2.  Patient will transfer from bed to chair and chair to bed with moderate assistance using the least restrictive device within 7 day(s). 3.  Patient will perform sit to stand with moderate assistance within 7 day(s). 4.  Patient will perform stand pivot transfer to wheelchair with moderate assistance in 7 days. Reviewed and revised 7/19/2018 1. Patient will move from supine to sit and sit to supine , scoot up and down and roll side to side in bed with minimal assistance within 7 day(s). 2.  Patient will transfer from bed to chair and chair to bed with moderate assistance using the least restrictive device within 7 day(s). 3.  Patient will perform sit to stand with minimal assistance within 7 day(s). 4.  Patient will perform stand pivot transfer to wheelchair with minimal assistance in 7 days. Initiated 7/12/2018 1. Patient will move from supine to sit and sit to supine , scoot up and down and roll side to side in bed with modified independence within 7 day(s).    
2.  Patient will transfer from bed to chair and chair to bed with supervision/set-up using the least restrictive device within 7 day(s). 3.  Patient will perform sit to stand with supervision/set-up within 7 day(s). 4.  Patient will perform stand pivot transfer to wheelchair with modified independence in 7 days. physical Therapy REEVALUATION Patient: Betty Joya (14 y.o. male) Date: 8/29/2018 Primary Diagnosis: Acute blood loss anemia GI bleed Anasarca GI Bleed 
gi bleed ASCENDING COLON CANCER Procedure(s) (LRB): 
LAPAROSCOPIC  ASSISTED TO OPEN RIGHT COLECTOMY AND SMALL BOWEL RESECTION (Right) 43 Days Post-Op Precautions: Fall Chart, physical therapy assessment, plan of care and goals were reviewed. ASSESSMENT : 
Based on the objective data described below, the patient presents with impaired functional mobility secondary to impaired balance, gross strength deficits with LUE/LLE flaccidity, decreased attention/concentration with moderate inattention to L side, decreased ROM, impaired coordination, and decreased activity tolerance. Pt with Rapid Response on 8/11/18 requiring transfer into CCU and intubation. Pt has been intubated and sedated since 8/26/18 and then has not been following commands well since until this date. Pt received supine in bed with family at bedside and agreeable to PT re-evaluation. Pt cleared by nursing for mobility. VSS on 4 L/min O2 at rest and with mobility. RR did increase to 40s with some mobility. Pt participated in bed mobility, however required max A x 2. Pt performed long sitting and rolling to L, however needed constant instruction and cueing, along with hand-over-hand assistance. Placed pt in modified chair position and positioned with pillows for comfort. Pt tolerated chair position, therefore left pt in chair position with all needs met, RN aware, and family present. Pt will require rehab at discharge to address mobility impairments caused from patient's extended hospitalization.  PT will continue to follow to address mobility impairments as noted above. Recommend with nursing patient to complete as able in order to maintain strength, endurance and independence: Modified chair position in bed 3x/day for at least 30-60 minutes. Thank you for your assistance. Patient will benefit from skilled intervention to address the above impairments. Patients rehabilitation potential is considered to be Fair Factors which may influence rehabilitation potential include:  
[]           None noted 
[]           Mental ability/status [x]           Medical condition 
[]           Home/family situation and support systems 
[]           Safety awareness 
[]           Pain tolerance/management 
[]           Other: PLAN : 
Recommendations and Planned Interventions: 
[x]             Bed Mobility Training             []      Neuromuscular Re-Education [x]             Transfer Training                   []      Orthotic/Prosthetic Training 
[]             Gait Training                         []      Modalities [x]             Therapeutic Exercises           []      Edema Management/Control [x]             Therapeutic Activities            [x]      Patient and Family Training/Education []             Other (comment): Frequency/Duration: Patient will be followed by physical therapy 3 times a week to address goals. Discharge Recommendations: Loki Davies Further Equipment Recommendations for Discharge: TBD by rehab SUBJECTIVE:  
Patient stated Thank you.  OBJECTIVE DATA SUMMARY:  
 
Past Medical History:  
Diagnosis Date  [facility]  Acute bronchitis 8/25/2015  Anxiety  Arrhythmia   
 atrial fibrillation  Arthritis  BCC (basal cell carcinoma of skin)  BPH (benign prostatic hyperplasia)  Chronic back pain greater than 3 months duration 5/4/2015  Chronic right shoulder pain 1/11/2016  Common wart 5/16/2016  Constipation 12/14/2012  CVA (cerebral infarction) 5/4/2015  Depression  GERD (gastroesophageal reflux disease)  Hearing loss   
 bilateral hearing aids  Hemiplegia following CVA (cerebrovascular accident) ([facility]   
 left side hemiparesis  History of colostomy  HTN (hypertension)  Hyperlipidemia  Localized epilepsy with impairment of consciousness ([facility]  Prostate cancer ([facility] Status post XRT, Lupron  Screening for colon cancer 11/4/2015  Stroke [facility])   
 traumatic from neck crushing  TBI (traumatic brain injury) ([facility] 1994  Unspecified sleep apnea   
 on CPAP Past Surgical History:  
Procedure Laterality Date  COLONOSCOPY N/A 7/12/2018 COLONOSCOPY through stoma performed by Anthony Lama MD at \[facility]\"" ENDOSCOPY  
 HX ANKLE FRACTURE TX    
 HX CATARACT REMOVAL    
 bilat  HX COLECTOMY colostomy placed and revised  HX HEENT    
 ear surgery eddie.  HX HERNIA REPAIR    
 HX ORTHOPAEDIC    
 left hip pinning  HX PACEMAKER  2014 Hospital course since last seen and reason for reevaluation: Pt with Rapid Response on 8/11/18 requiring transfer into CCU and intubation. Pt has been intubated and sedated since 8/26/18 and then has not been following commands well since until this date. Critical Behavior: 
Neurologic State: Alert, Eyes open spontaneously Orientation Level: Oriented to person, Oriented to place, Disoriented to situation, Disoriented to time Cognition: Follows commands, Recognition of people/places, Poor safety awareness Safety/Judgement: Awareness of environment Skin:  Intact Strength:   
Strength: Generally decreased, functional 
  
  
  
  
  
 
  
Tone & Sensation:  
Tone: Normal 
  
  
  
  
  
  
  
  
   
Range Of Motion: 
AROM: Generally decreased, functional 
  
  
  
PROM: Generally decreased, functional 
  
  
  
Coordination: 
Coordination: Within functional limits Functional Mobility: 
Bed Mobility: Rolling: Maximum assistance;Assist x2 Scooting: Maximum assistance;Assist x2 Transfers: 
  
  
     
  
     
  
Balance:  
Sitting: Impaired; With support (long sitting in bed) Sitting - Static: Poor (constant support); Supported sitting Functional Measure: 
Barthel Index: 
 
Bathin Bladder: 0 Bowels: 0 Groomin Dressin Feedin Mobility: 0 Stairs: 0 Toilet Use: 0 Transfer (Bed to Chair and Back): 0 Total: 0 Barthel and G-code impairment scale: 
Percentage of impairment CH 
0% CI 
1-19% CJ 
20-39% CK 
40-59% CL 
60-79% CM 
80-99% CN 
100% Barthel Score 0-100 100 99-80 79-60 59-40 20-39 1-19 
 0 Barthel Score 0-20 20 17-19 13-16 9-12 5-8 1-4 0 The Barthel ADL Index: Guidelines 1. The index should be used as a record of what a patient does, not as a record of what a patient could do. 2. The main aim is to establish degree of independence from any help, physical or verbal, however minor and for whatever reason. 3. The need for supervision renders the patient not independent. 4. A patient's performance should be established using the best available evidence. Asking the patient, friends/relatives and nurses are the usual sources, but direct observation and common sense are also important. However direct testing is not needed. 5. Usually the patient's performance over the preceding 24-48 hours is important, but occasionally longer periods will be relevant. 6. Middle categories imply that the patient supplies over 50 per cent of the effort. 7. Use of aids to be independent is allowed. Jannette Hernandez., Barthel, D.W. (3200). Functional evaluation: the Barthel Index. [facility] (14)2. ELIAS Schumacher, Shwetha Alfaro., Fox Almeida, [address] (). Measuring the change indisability after inpatient rehabilitation; comparison of the responsiveness of the Barthel Index and Functional [place] Measure.  Journal of Neurology, Neurosurgery, and Psychiatry, 66(2), 982-196. LIANNA Walls, GABY Bains, & Cj Coleman M.A. (2004.) Assessment of post-stroke quality of life in cost-effectiveness studies: The usefulness of the Barthel Index and the EuroQoL-5D. [facility], 13, 504-29 G codes: In compliance with CMSs Claims Based Outcome Reporting, the following G-code set was chosen for this patient based on their primary functional limitation being treated: The outcome measure chosen to determine the severity of the functional limitation was the barthel index with a score of 0/100 which was correlated with the impairment scale. ? Mobility - Walking and Moving Around:  
  - CURRENT STATUS: CN - 100% impaired, limited or restricted  - GOAL STATUS: CL - 60%-79% impaired, limited or restricted  - D/C STATUS:  ---------------To be determined---------------  
 
Pain: 
Pain Scale 1: Numeric (0 - 10) Pain Intensity 1: 0 Activity Tolerance:  
Fair - increased pain/discomfort around chest tube site; VSS on 4 L/min O2 Please refer to the flowsheet for vital signs taken during this treatment. After treatment:  
[]  Patient left in no apparent distress sitting up in chair 
[x]  Patient left in no apparent distress in bed - modified chair position 
[x]  Call bell left within reach [x]  Nursing notified 
[x]  Caregiver present 
[]  Bed alarm activated COMMUNICATION/EDUCATION:  
The patients plan of care was discussed with: Physical Therapist, Occupational Therapist and Registered Nurse. [x]  Fall prevention education was provided and the patient/caregiver indicated understanding. [x]  Patient/family have participated as able in goal setting and plan of care. [x]  Patient/family agree to work toward stated goals and plan of care. []  Patient understands intent and goals of therapy, but is neutral about his/her participation. []  Patient is unable to participate in goal setting and plan of care. Thank you for this referral. 
Xu Case, PT, DPT Time Calculation: 25 mins

## 2018-08-29 NOTE — PROGRESS NOTES
Infectious Disease Progress Note IMPRESSION:  
· Persistent bacteremia with coagulase negative Staph since 8/11 initially oxacillin sensitive , last positive BC is oxacillin resistant 8/21(1/4) · NATASHA shows definite large mass associated with pacing wire in RA which may represent vegetation ( 8/23) · Thrombus  & tiny gas bubble of as in RIJ extending into SVC to level of RA (8/15)  s/p heparin IV now on lovenox s/q · CTA chest - no PE, could not visualize SVC thrombus, left lateral wall soft tissue swelling, soft tissue around pacemaker show no significant abnormality · US chest wall - minimal fluid in pacemaker pocket, no significant inflammation of overlying subcutaneous fat or skin · Acute respiratory failure on Ventilator / h/o tracheostomy 24 years ago  , s/p extubation 8/26 · R/ pleural effusion s/p chest tube placement · Increased sputum production - Sputum culture 8/25 + heavy Burkholderia cepacia · Pneumonia , last sputum culture 8/11 + for Heavy staph aureus , light K.pneumoniae s/p treaed · Ca colon Stage 3b s/p colectomy / YAMEL / enterotomies · S/p ileus , aspiration pneumonia prior to ICU admission · H/o traumatic brain injury 25 years ago    
  
  
PLAN:  
   
· Continue Daptomycin , Gentamicin IV, Levaquin for + sputum culture. · Repeat [facility]  8/30 · Thrombus in R/IJ, now  mass in RA which is suggestive of thrombus rather than vegetation in light of pacemaker pocket appearing clear of infection. · Hold off on removal of pacemaker at this time . D/w Dr Anoop Vallejo , .  . · Continue antimicrobials & anticoagulation Blood culture 8/26 Special Requests: NO SPECIAL REQUESTS   Preliminary Culture result: NO GROWTH 2 DAYS Blood culture 8/21 Culture result:    Final  
STAPHYLOCOCCUS EPIDERMIDIS (OXACILLIN RESISTANT) GROWING IN 1 OF 4 BOTTLES DRAWN (SITE = RW) (A) Culture result:    Final  
PRELIMINARY REPORT OF GRAM POSITIVE COCCI IN CLUSTERS GROWING IN 1 OF 4 BOTTLES DRAWN , SO FAR CALLED TO AND READ BACK BY CRISTIN LOCK 18 0721 SP (A) Culture result:    Final  
REMAINING BOTTLE(S) HAS/HAVE NO GROWTH IN 5 DAYS  
 
US chest - : There is minimal fluid in the pacemaker pocket, a thin sliver measuring 
approximately 2 mm in thickness and 21 mm in length. No significant inflammation 
of the overlying subcutaneous fat or skin. IMPRESSION: 
No abscess. Subjective:  
 
Pt seen . Resting Review of Systems:  Review of systems not obtained due to patient factors. Objective:  
 
Blood pressure 126/68, pulse 85, temperature 98.5 °F (36.9 °C), resp. rate 24, height 6' 2\" (1.88 m), weight 267 lb 10.2 oz (121.4 kg), SpO2 96 %. Temp (24hrs), Av.7 °F (37.1 °C), Min:98 °F (36.7 °C), Max:99.2 °F (37.3 °C) Patient Vitals for the past 24 hrs: 
 Temp Pulse Resp BP SpO2  
18 1200 98.5 °F (36.9 °C) 85 24 126/68 96 % 18 1110 - - - - 100 % 18 1100 - 75 26 128/72 100 % 18 1000 - 76 (!) 31 128/65 100 % 18 0900 - 81 21 123/64 98 % 18 0800 98 °F (36.7 °C) 89 25 125/56 100 % 18 6832 - - - - 100 % 18 0700 - 77 (!) 32 126/56 99 % 18 0600 - 78 27 141/67 100 % 18 0500 - 76 23 112/59 100 % 18 0400 98.7 °F (37.1 °C) 75 27 97/46 98 % 18 0301 - - - - 98 % 18 0300 - 83 (!) 32 (!) 164/20 99 % 18 0200 - 78 27 111/57 99 % 18 0100 - 81 21 127/57 100 % 18 0000 99.1 °F (37.3 °C) 81 (!) 35 127/57 96 % 18 2304 - - - - 98 % 18 2300 - 85 26 126/64 99 % 18 2200 - 86 29 141/72 99 % 18 2100 - 83 29 126/63 99 % 18 2000 99.2 °F (37.3 °C) 86 (!) 31 153/64 100 % 18 1935 - - - - 100 % 18 1900 - 81 28 120/65 100 % 18 1800 - 86 28 131/52 100 % 18 1700 - 80 24 138/62 98 % 18 1638 - - - - 100 % 18 1600 98.5 °F (36.9 °C) 83 27 125/65 100 % 18 1500 - 88 25 151/63 100 % 08/28/18 1400 - 80 29 156/74 100 % 08/28/18 1300 - 81 25 165/86 100 % Lines:  Central Venous Catheter:  LIJ Physical Exam:  
General:  Resting Lungs:    Reduced air entry bases on  auscultation  Chest wall-  pacemaker site - no swelling. R/chest tube +  
CV:  Regular rate and rhythm,no murmur, Abdomen:   Soft, non-tender. bowel sounds +distended Extremities: Edema Lines/Devices:  Intact, no erythema, drainage or tenderness Data Review: CBC:  
Recent Labs  
   08/29/18 
 0421  08/28/18 
 0514  08/27/18 
 0305 WBC  9.1  9.1  17.7*  
RBC  2.83*  3.07*  3.13* HGB  7.7*  8.2*  8.4* HCT  26.7*  28.5*  28.9*  
PLT  164  204  218 GRANS  76*  72  86* LYMPH  13  17  5* EOS  0  0  0 CMP:  
Recent Labs  
   08/29/18 
 0421  08/28/18 2217  08/28/18 
 6879  08/27/18 
 1285 GLU  180*   --   156*  118* NA  149*   --   149*  150*  
K  3.4*  3.4*  3.1*  3.0*  
CL  110*   --   110*  111* CO2  33*   --   32  31 BUN  19   --   22*  23* CREA  0.94   --   0.80  0.85 CA  7.7*   --   8.0*  8.0* AGAP  6   --   7  8 BUCR  20   --   28*  27* AP   --    --    --   96  
TP   --    --    --   6.9 ALB   --    --    --   1.9*  
GLOB   --    --    --   5.0* AGRAT   --    --    --   0.4* Studies:     
Lab Results Component Value Date/Time Culture result: NO GROWTH 3 DAYS 08/26/2018 07:30 AM  
 Culture result: HEAVY BURKHOLDERIA (PSEUDOMONAS) CEPACIA (A) 08/25/2018 05:21 PM  
 Culture result: NO NORMAL RESPIRATORY TANIA ISOLATED 08/25/2018 05:21 PM  
 Culture result: (A) 08/21/2018 12:22 PM  
  STAPHYLOCOCCUS EPIDERMIDIS (OXACILLIN RESISTANT) GROWING IN 1 OF 4 BOTTLES DRAWN (SITE = RW) Culture result: (A) 08/21/2018 12:22 PM  
  PRELIMINARY REPORT OF GRAM POSITIVE COCCI IN CLUSTERS GROWING IN 1 OF 4 BOTTLES DRAWN , SO FAR CALLED TO AND READ BACK BY CRISTIN LOCK 8/23/18 0721 SP  Culture result: REMAINING BOTTLE(S) HAS/HAVE NO GROWTH IN 5 DAYS 08/21/2018 12:22 PM  
  
 
 XR Results (most recent): 
 
Results from Hospital Encounter encounter on 07/10/18 XR CHEST PORT Narrative PORTABLE CHEST RADIOGRAPH/S: 8/29/2018 11:12 AM 
 
INDICATION: Right chest tube placement. COMPARISON: 8/28/2018, 8/27/2018, 8/26/2018, 8/24/2018. TECHNIQUE: Portable frontal semiupright radiograph/s of the chest. 
 
FINDINGS:  
Right pleural pigtail catheter has been placed. Right pleural effusion is 
decreased or resolved. No apical pneumothorax. A layering left pleural effusion 
associated with passive atelectasis. There is right basilar atelectasis as well. A feeding tube extends below the hemidiaphragms. A left IJ central line 
terminates at the confluence of the brachiocephalic veins. A pacemaker is in 
place. There are multiple old left rib fractures. The central airways are 
patent. Impression IMPRESSION:  
1. Decreased or resolved right pleural effusion and no apical pneumothorax after 
right pleural pigtail catheter placement. 2. Left pleural effusion and bibasilar atelectasis. Patient Active Problem List  
Diagnosis Code  
 HTN (hypertension) I10  
 Depression F32.9  Hypercholesterolemia E78.00  Acid reflux K21.9  Seizure ([address] R56.9  Hypothyroidism E03.9  Hyponatremia E87.1  BPH (benign prostatic hyperplasia) N40.0  Rash R21  
 Cellulitis L03.90  Wax in ear H61.20  Ulcer [id number]  Small bowel obstruction ([address] N91.966  Atrial flutter (HCC) I48.92  
 Atrial fibrillation or flutter  CHI (closed head injury) S09. 90XA  Basal cell cancer C44.91  
 TBI (traumatic brain injury) ([address] S06. 9X9A  Atrial fibrillation (HCC) I48.91  
 Cataract H26.9  Constipation K59.00  Ankle fracture, left Q92.883S  Insomnia G47.00  Tinea corporis B35.4  SSS (sick sinus syndrome) ([facility]) I49.5  Syncope R55  Seizure disorder ([address] G40.909  RONAL on CPAP G47.33, Z99.89  
 Pacemaker Z95.0  Pre-op evaluation Z01.818  
  Chronic back pain greater than 3 months duration M54.9, G89.29  
 Cerebral infarction ([facility]) I63.9  Acute bronchitis J20.9  Screening for colon cancer Z12.11  
 Chronic right shoulder pain M25.511, G89.29  
 Common wart B07.8  Localized epilepsy with impairment of consciousness ([facility] G40.209  Severe obesity (BMI 35.0-39.9) ([facility]) E66.01  
 Acute blood loss anemia D62  Anasarca R60.1  GI bleed K92.2  Acute encephalopathy G93.40  Idiopathic hypotension I95.0  Counseling regarding goals of care Z71.89 ICD-10-CM ICD-9-CM 1. Anemia, unspecified type D64.9 285.9 2. Generalized weakness R53.1 780.79   
3. Acute encephalopathy G93.40 348.30   
4. Anasarca R60.1 782.3 5. Idiopathic hypotension I95.0 458.9 6. Counseling regarding goals of care Z71.89 V65.49 Anti-infectives:  
 
 Daptomycin IV - 8/26 Gentamicin IV 8/26 Cefotetan 7/17 Zosyn 7/20-8/7 Cefepime - 8/11-8/13 Ceftriaxone 8/13-8/17 Vancomycin 8/11-8/22 Daptomycin 8/23- 8/24- Naficillin IV 8/24- 8/26  Jc Caba MD [facility]

## 2018-08-29 NOTE — PROGRESS NOTES
Attended Interdisciplinary rounds in Critical Care Unit, where patient care was discussed. Visit by: Scot Keyes. Tera Crespo. José Manuel Gallego MA, [address] [place] [address]

## 2018-08-29 NOTE — PROGRESS NOTES
0700 Bedside and Verbal shift change report given to Jillian/Omaira RN (oncoming nurse) by Sherren Shores, RN (offgoing nurse). Report included the following information SBAR, Kardex, ED Summary, Procedure Summary, Intake/Output, MAR, Recent Results and Cardiac Rhythm Vpaced with PVCs. 46 Dr. Reena Grijalva at bedside to assess patient; updated him on patient condition 0800 Shift assessment; patient is oriented to person & place, disoriented to time & situation; more calm & cooperative today; right pupil sluggish to react to light; LUE/LLE flaccid with limited movement; RUE/RLE purposeful/weak; patient is hard of hearing in both ears with hearing aid at bedside; wears dentures (at bedside); LIJ with maintenance fluids running - site clean, dry, & intact; respiratory pattern in regular but tachypneic at times & breath sounds are coarse & diminished; NC at 4 L/min humidified O2; productive cough; abdomen is obese with pannus, midline incision & ostomy present - site clean, dry, & intact; flannery in place - site clean, dry, & intact; skin is pale & dry; facial edema is 1+, genital edema is 2+, and all extremities are 2+ non-pitting; sacral wound dressing present & intact; TF at goal at 50mL/hr with Q4 75mL flush; Vpaced with PVCs 0845 Angio at bedside to prep patient for right chest tube placement  
 
8722 Dr. Anton Phillips with Cardiology at bedside to assess patient  
 
0854 PRN Fentanyl 50mcg given for procedure prep  
 
0913 PRN Ativan 1mg given during procedure 
 
0914 Chest tube placed by Dr. Neeta Mc; patient tolerated procedure fairly welll; O2 sats dropped to 86 so Ventimask was put on patient and his O2 sats remained in low 90s 8623 Dr. Essie Pelayo at bedside to assess patient; updated him on patient condition 56 Dr. Shreya Serrano with Infectious Disease at bedside to assess patient; updated on patient condition 1153  - 3 units given 
 
1200 Reassessment; no changes noted to previous assessment 1430 PT/OT with patient at bedside; they stated patient did well 
 
1600 Reassessment; no changes noted to previous assessment 1700 Bedside and Verbal shift change report given to Deb Ferraro RN (oncoming nurse) by Cruz Barrow RN (offgoing nurse). Report included the following information SBAR, ED Summary, OR Summary, Procedure Summary, Intake/Output, MAR, Recent Results and Cardiac Rhythm Vpaced with PVCs.

## 2018-08-29 NOTE — PROGRESS NOTES
1657 
Bedside and Verbal shift change report received from Graeme RN (outgoing nurse) to self Ashish Ravi RN (oncoming nurse). Report included the following information SBAR, Kardex, ED Summary, Procedure Summary, Intake/Output, MAR, Recent Results and Cardiac Rhythm Vpaced with PVCs. 8[address] Amol Alert, disoriented, spontaneously says 1-2 words randomly. Tracks with eyes, smiles appropriately. Lungs coarse, chest tube to LCWS -20 serous drainage 1000 ml's. RR 20- 34bpm, coached to slow breathing, SPO2 100 % Oxygen at 6 LM via NC. ABD obese, colostomy with brown soft fecal matter. NGT (Dubhoff) in use with [employer] at 50 ml/hr. Erazo patent and draining  Clear yellow urine, 60cc's emptied. Supportive spouse at [facility].

## 2018-08-29 NOTE — PROGRESS NOTES
[facility] Name: Perla Lawler MRN: [id number] : 1940 Hospital: [facility] Date: 2018 IMPRESSION:  
· Acute respiratory failure requiring ventilator support. Extubated to [place] on 18. bipap as needed. 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 treated · Volume overload with anasarca, still persists. Has lost 30 pounds but more to lose; unfortunately has also lost muscle mass · hypernatremia. · hypokalemia · Pulmonary edema and pleural effusions-stable at this point. · Dysphagia-on vent; given recent surgery not likely a good candidate for PEG or G tube- will need change to NGtube or dobhoff at time of trach · Right IJ thrombosis and tiny air bubble, 8/15/18: started on heparin drip but transitioned to enoxaparin · Anemia no overt bleeding · severe hypoalbuminemia · S/P colectomy/YAMEL/enterotomies for colon cancer; Stage 3b colon ca. · Ileus/ with recurrent aspiration pneumonia prior to admission to ICU. 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 · Debility, has global weakness. · DM  
· Obesity PLAN:  
· Can use bipap as needed. · Bronchial hygiene · IV abx · IR to place right pigtail today and will follow with cultures · Resume Lovenox after chest tube if no bleeding · abx per ID 
 · D/w  and Je. Hold on pacer explant for now until he is stronger and after more abx and anticoagulation · ID and Cardiology help much appreciated;. · XR dobhoff · Continue diuresis · Tough to cross match · Follow hemoglobin · Enteral feeds · GI prophylaxis · Avoid sedating meds. Subjective/Interval History:  
I have reviewed the flowsheet and previous days notes. 8/29 more alert and cooperative. Less congested. Cultures noted. 8/28 BIPAP again last night. More alert. GNR in sputum. Very congested but will not expectorate as requested. Seems disorganized, confused. Will track and engage but difficulty communicating. Large right pleural effusion . Left on CXR but no SOB. IV levaquin started 8-27-18: Plug last night? Required BIPAP for respiratory distress. Very weak. Interacting with wife. discussed management wit hwife at bedside. For neck ultrasound. D/w Dr. Eloisa Garner. Dobhoff placed by XR- took 90 minutes Review of Systems Unable to perform ROS: Intubated Constitutional: Positive for fatigue. Eyes: Negative. Respiratory: Positive for cough. Cardiovascular: Negative. Gastrointestinal: Negative. Genitourinary: Negative for frequency. 8/10 Asked to evaluate patient to see if his pleural effusion is the cause of his rising WBC. Seen earlier this hospitalization by my partners for aspiration pneumonia. He has been hospitalized for 30 days, having had a colonic resection complicated by ileus. He was on Zosyn for 18 days and this was stopped 3 days ago and his WBC began to rise. He has a congested cough which is chronic. Can not produce sputum. He is limited in his understanding of how to do incentive spirometry due to prior traumatic brain injury. 8.11 called urgently for acute respiratory failure RR 50's 
rhonchorus breathing On NRBM 
 
8.12  sedated on ventilator. 270 [address]us. GPC on multiple BC 4/4 . Remains on Vanc 8-14-18: Last 24 hrs. Remains on moderate dose vaopressors. Noted to have decreased Hgb less than 7. Not really following commands with current meds infusing. No acute issues noted per nursing last pm. 8-15-18: Pt had increased vent needs yesterday. Has not really been able to be easily weaned from vent. Has increased WBC. Had a ct scan of chest and abdomen. Final reports are pending. 8-16-18: Events and findings from CT noted. Discussed with Dr. Andre Alvarez. Due to pts instability will continue on heparin drip. Discussed with pts wife, nurse this am. Still on jose de jesus drip. When tried to place on SBT his RR was in the 45s. Still on sedation. 8-17-18: No new issues. Still has high vent support and has increased RR into the 30-40s. Not having much secretions. No overt evidence of bleeding. Hgb 7.1 on IV heparin 8/18: Hgb lower to 6.8 Has autoantibodies. On IV heparin. Flash pulmonary edema with transfusion last night, responded to IV lasix 8/19: Hypertensive after diuretic. Anasarca. On vent. IV heparin. Hgb 8.2 to 9.1 and later back to 8.2 
8/20: remains critically ill on mechanical ventilation (pressure control ventilation) 8/20: no major change, failed SBT again today 8-21: remains critically ill on mechanical ventilation. Passed SBT today, but not alert. 8/23: remains unresponsive. Passing SBT but no purposeful response. 8/24: still unresponsive on mechanical ventilation. 8-25-18: More responsive. Opens eyes. I can not get him to raise  His head or move extremities. Not having resp secretions. Concern raised about source of infection. Discussed with Nursing, Resp, DR. Villalba and DR. Quigley. Will get CT of chest with contrast and eval the chest wall for infection. 8-26-18: Tolerated SAT, SBT this am. Was extubated. Not much respiratory secretions. Seems very weak globally. MAR REVIEWED MEDS:  
Current Facility-Administered Medications Medication  sodium bicarbonate (4%) (NEUT) injection 1 mL  levoFLOXacin (LEVAQUIN) 750 mg in D5W IVPB  enoxaparin (LOVENOX) injection 120 mg  
 [START ON 8/30/2018] GENTAMICIN PEAK REMINDER NOTE  [START ON 8/30/2018] GENTAMICIN TROUGH REMINDER NOTE  potassium chloride (KLOR-CON) packet 20 mEq  gentamicin (GARAMYCIN) 90 mg in 0.9% sodium chloride 100 mL IVPB  albuterol (PROVENTIL VENTOLIN) nebulizer solution 2.5 mg  
 DAPTOmycin (CUBICIN) 1,000 mg in 0.9% sodium chloride 50 mL IVPB RF formulation  fentaNYL citrate (PF) injection  mcg  furosemide (LASIX) injection 60 mg  LORazepam (ATIVAN) injection 2 mg  acetaminophen (TYLENOL) solution 650 mg  
 insulin lispro (HUMALOG) injection  glucose chewable tablet 16 g  
 dextrose (D50W) injection syrg 12.5-25 g  
 glucagon (GLUCAGEN) injection 1 mg  
 venlafaxine (EFFEXOR) tablet 25 mg  carBAMazepine (TEGretol) 200 mg/10 mL suspension 100 mg  chlorhexidine (PERIDEX) 0.12 % mouthwash 15 mL  albuterol-ipratropium (DUO-NEB) 2.5 MG-0.5 MG/3 ML  
 amiodarone (CORDARONE) tablet 200 mg  prochlorperazine (COMPAZINE) with saline injection 5 mg  lactulose (CHRONULAC) solution 10 g  
 sodium chloride (NS) flush 10-30 mL  sodium chloride (NS) flush 10 mL  sodium chloride (NS) flush 10-40 mL  sodium chloride (NS) flush 10 mL  melatonin tablet 3 mg  polyethylene glycol (MIRALAX) packet 17 g  hydrALAZINE (APRESOLINE) 20 mg/mL injection 10 mg  
 oxyCODONE IR (ROXICODONE) tablet 5 mg  oxyCODONE IR (ROXICODONE) tablet 10 mg  
 sodium chloride (NS) flush 5-10 mL  acetaminophen (TYLENOL) tablet 650 mg  
 ondansetron (ZOFRAN) injection 4 mg Objective:  
Vital Signs:   
Visit Vitals  /68 (BP 1 Location: Right arm, BP Patient Position: At rest)  Pulse 85  Temp 98.5 °F (36.9 °C)  Resp 24  
 Ht 6' 2\" (1.88 m)  Wt 121.4 kg (267 lb 10.2 oz)  SpO2 96%  BMI 34.36 kg/m2 O2 Device: Nasal cannula, Humidifier O2 Flow Rate (L/min): 6 l/min Temp (24hrs), Av.7 °F (37.1 °C), Min:98 °F (36.7 °C), Max:99.2 °F (37.3 °C) Intake/Output:  
Last shift:       07 - 1900 In: 365 Out: 1335 [[id number]] Last 3 shifts: 1901 -  0700 In: 0901 [I.V.:600] Out: 4320 [IZENH:6712] Intake/Output Summary (Last 24 hours) at 18 1241 Last data filed at 18 1100 Gross per 24 hour Intake             2165 ml Output             3140 ml Net             -975 ml Physical Exam  
Constitutional: Vital signs are normal. He appears well-developed. He is active. He appears ill. No distress. Face mask in place. HENT:  
Head: Normocephalic and atraumatic. Mouth/Throat: No oropharyngeal exudate. Eyes: Conjunctivae are normal. Pupils are equal, round, and reactive to light. No scleral icterus. Neck:  
Old tracheostomy site Cardiovascular: Normal rate and regular rhythm. Pulmonary/Chest: No respiratory distress. He has no wheezes. He has no rales. Abdominal: He exhibits no distension. There is no tenderness. Musculoskeletal: He exhibits edema. Neurological: He is alert. Seems to be globally weak. Skin: Skin is warm and dry. Data:  
Labs: 
Recent Labs  
   18 
 0421  18 
 4962  18 
 6461 WBC  9.1  9.1  17.7* HGB  7.7*  8.2*  8.4* HCT  26.7*  28.5*  28.9*  
PLT  164  204  218 Recent Labs  
   18 
 0421  18 2217  18 
 8875  18 
 6157 NA  149*   --   149*  150*  
K  3.4*  3.4*  3.1*  3.0*  
CL  110*   --   110*  111* CO2  33*   --   32  31 GLU  180*   --   156*  118* BUN  19   --   22*  23* CREA  0.94   --   0.80  0.85 CA  7.7*   --   8.0*  8.0*  
MG   --    --    --   2.1 PHOS   --    --    --   3.3 ALB   --    --    --   1.9* TBILI   --    --    --   0.9 SGOT   --    --    --   24 ALT   --    --    --   26  
 
 No results for input(s): PH, PCO2, PO2, HCO3, FIO2 in the last 72 hours. Imaging: 
I have personally reviewed the patients radiographs: 
8-25-18: CT of chest: IMPRESSION 
 impression: No evidence for pulmonary emboli. Bilateral pleural effusion are 
stable, cardiomegaly. Left lateral chest wall swelling 8/28 CXR right effusion larger Steve Berkowitz MD

## 2018-08-29 NOTE — PROGRESS NOTES
Cardiology Progress Note [address], [facility], [address] [facility]  [phone] 
 
8/29/2018 9:18 AM 
 
Admit Date: 7/10/2018 Admit Diagnosis: Acute blood loss anemia;GI bleed; Anasarca;GI Bleed;gi bleed* Subjective:  
 
Chan Petersen   denies none, chest pain. Visit Vitals  /64 (BP 1 Location: Right arm, BP Patient Position: At rest)  Pulse 81  Temp 98 °F (36.7 °C)  Resp 21  
 Ht 6' 2\" (1.88 m)  Wt 267 lb 10.2 oz (121.4 kg)  SpO2 98%  BMI 34.36 kg/m2 Current Facility-Administered Medications Medication Dose Route Frequency  lidocaine (XYLOCAINE) 20 mg/mL (2 %) injection 1,000 mg  50 mL SubCUTAneous ONCE  
 sodium bicarbonate (4%) (NEUT) injection 1 mL  1 mL SubCUTAneous RAD ONCE  
 heparinized saline 2 units/mL infusion 800 Units  400 mL Irrigation ONCE  potassium chloride (KLOR-CON) packet 20 mEq  20 mEq Oral Q12H  levoFLOXacin (LEVAQUIN) 750 mg in D5W IVPB  750 mg IntraVENous Q24H  
 gentamicin (GARAMYCIN) 90 mg in 0.9% sodium chloride 100 mL IVPB  90 mg IntraVENous Q12H  
 albuterol (PROVENTIL VENTOLIN) nebulizer solution 2.5 mg  2.5 mg Nebulization Q4H RT  
 DAPTOmycin (CUBICIN) 1,000 mg in 0.9% sodium chloride 50 mL IVPB RF formulation  1,000 mg IntraVENous Q24H  
 fentaNYL citrate (PF) injection  mcg   mcg IntraVENous Q2H PRN  
 furosemide (LASIX) injection 60 mg  60 mg IntraVENous Q12H  
 LORazepam (ATIVAN) injection 2 mg  2 mg IntraVENous Q2H PRN  
 acetaminophen (TYLENOL) solution 650 mg  650 mg Oral Q4H PRN  
 insulin lispro (HUMALOG) injection   SubCUTAneous Q6H  
 glucose chewable tablet 16 g  4 Tab Oral PRN  
 dextrose (D50W) injection syrg 12.5-25 g  12.5-25 g IntraVENous PRN  
 glucagon (GLUCAGEN) injection 1 mg  1 mg IntraMUSCular PRN  
 venlafaxine (EFFEXOR) tablet 25 mg  25 mg Oral TID  pantoprazole (PROTONIX) 40 mg in sodium chloride 0.9% 10 mL injection  40 mg IntraVENous DAILY  carBAMazepine (TEGretol) 200 mg/10 mL suspension 100 mg  100 mg PEG Tube QID  chlorhexidine (PERIDEX) 0.12 % mouthwash 15 mL  15 mL Oral Q12H  
 albuterol-ipratropium (DUO-NEB) 2.5 MG-0.5 MG/3 ML  3 mL Nebulization Q6H PRN  
 amiodarone (CORDARONE) tablet 200 mg  200 mg Oral DAILY  prochlorperazine (COMPAZINE) with saline injection 5 mg  5 mg IntraVENous Q6H PRN  
 lactulose (CHRONULAC) solution 10 g  10 g Oral BID  sodium chloride (NS) flush 10-30 mL  10-30 mL InterCATHeter PRN  
 sodium chloride (NS) flush 10 mL  10 mL InterCATHeter PRN  
 sodium chloride (NS) flush 10-40 mL  10-40 mL InterCATHeter Q8H  
 sodium chloride (NS) flush 10 mL  10 mL InterCATHeter Q24H  
 melatonin tablet 3 mg  3 mg Oral QHS  polyethylene glycol (MIRALAX) packet 17 g  17 g Oral DAILY  hydrALAZINE (APRESOLINE) 20 mg/mL injection 10 mg  10 mg IntraVENous Q6H PRN  
 oxyCODONE IR (ROXICODONE) tablet 5 mg  5 mg Oral Q4H PRN  
 oxyCODONE IR (ROXICODONE) tablet 10 mg  10 mg Oral Q4H PRN  
 sodium chloride (NS) flush 5-10 mL  5-10 mL IntraVENous PRN  
 acetaminophen (TYLENOL) tablet 650 mg  650 mg Oral Q6H PRN  
 ondansetron (ZOFRAN) injection 4 mg  4 mg IntraVENous Q6H PRN Objective:  
  
Visit Vitals  /64 (BP 1 Location: Right arm, BP Patient Position: At rest)  Pulse 81  Temp 98 °F (36.7 °C)  Resp 21  
 Ht 6' 2\" (1.88 m)  Wt 267 lb 10.2 oz (121.4 kg)  SpO2 98%  BMI 34.36 kg/m2 Physical Exam: Abdomen: soft, non-tender Extremities: extremities normal 
Heart: regular rate and rhythm Lungs: decreased bs right lung Pulses: 2+ and symmetric Data Review:  
Labs:   
Recent Labs  
   08/29/18 
 0421  08/28/18 
 9052  08/27/18 
 0305 WBC  9.1  9.1  17.7* HGB  7.7*  8.2*  8.4* HCT  26.7*  28.5*  28.9*  
PLT  164  204  218 Recent Labs  
   08/29/18 
 0421  08/28/18 2217  08/28/18 
 0488  08/27/18 
 0157 NA  149*   --   149*  150*  
K  3.4*  3.4*  3.1*  3.0*  
 CL  110*   --   110*  111* CO2  33*   --   32  31 GLU  180*   --   156*  118* BUN  19   --   22*  23* CREA  0.94   --   0.80  0.85 CA  7.7*   --   8.0*  8.0*  
MG   --    --    --   2.1 PHOS   --    --    --   3.3 ALB   --    --    --   1.9* TBILI   --    --    --   0.9 SGOT   --    --    --   24 ALT   --    --    --   26 No results for input(s): TROIQ, CPK, CKMB in the last 72 hours. Intake/Output Summary (Last 24 hours) at 08/29/18 0903 Last data filed at 08/29/18 0900 Gross per 24 hour Intake             2140 ml Output             2790 ml Net             -650 ml Telemetry: nsr Assessment:  
 
Principal Problem: 
  GI bleed (7/10/2018) Active Problems: 
  Acute blood loss anemia (7/10/2018) Anasarca (7/10/2018) Acute encephalopathy (8/24/2018) Idiopathic hypotension (8/24/2018) Counseling regarding goals of care (8/24/2018) Plan:  
 
Indy Both is hemodynamically stable. For right sided chest tube per IR this am. Cont abx per ID. Will follow peripherally - available if needed for extraction. Waldemar Howard MD, [facility] - [facility] 
 
8/29/2018

## 2018-08-30 ENCOUNTER — APPOINTMENT (OUTPATIENT)
Dept: GENERAL RADIOLOGY | Age: 78
DRG: 329 | End: 2018-08-30
Attending: INTERNAL MEDICINE
Payer: MEDICARE

## 2018-08-30 LAB
ANION GAP SERPL CALC-SCNC: 6 MMOL/L (ref 5–15)
BASOPHILS # BLD: 0 K/UL (ref 0–0.1)
BASOPHILS NFR BLD: 0 % (ref 0–1)
BUN SERPL-MCNC: 17 MG/DL (ref 6–20)
BUN/CREAT SERPL: 20 (ref 12–20)
CALCIUM SERPL-MCNC: 8 MG/DL (ref 8.5–10.1)
CHLORIDE SERPL-SCNC: 110 MMOL/L (ref 97–108)
CO2 SERPL-SCNC: 34 MMOL/L (ref 21–32)
CREAT SERPL-MCNC: 0.84 MG/DL (ref 0.7–1.3)
DATE LAST DOSE: ABNORMAL
DATE LAST DOSE: NORMAL
DIFFERENTIAL METHOD BLD: ABNORMAL
EOSINOPHIL # BLD: 0 K/UL (ref 0–0.4)
EOSINOPHIL NFR BLD: 0 % (ref 0–7)
ERYTHROCYTE [DISTWIDTH] IN BLOOD BY AUTOMATED COUNT: 23.9 % (ref 11.5–14.5)
GENTAMICIN PEAK SERPL-MCNC: 5 UG/ML (ref 5–10)
GENTAMICIN TROUGH SERPL-MCNC: 2.2 UG/ML (ref 1–2)
GLUCOSE BLD STRIP.AUTO-MCNC: 119 MG/DL (ref 65–100)
GLUCOSE BLD STRIP.AUTO-MCNC: 136 MG/DL (ref 65–100)
GLUCOSE BLD STRIP.AUTO-MCNC: 136 MG/DL (ref 65–100)
GLUCOSE SERPL-MCNC: 125 MG/DL (ref 65–100)
HCT VFR BLD AUTO: 29.2 % (ref 36.6–50.3)
HGB BLD-MCNC: 8.4 G/DL (ref 12.1–17)
IMM GRANULOCYTES # BLD: 0.1 K/UL (ref 0–0.04)
IMM GRANULOCYTES NFR BLD AUTO: 1 % (ref 0–0.5)
LYMPHOCYTES # BLD: 1.6 K/UL (ref 0.8–3.5)
LYMPHOCYTES NFR BLD: 15 % (ref 12–49)
MCH RBC QN AUTO: 27.1 PG (ref 26–34)
MCHC RBC AUTO-ENTMCNC: 28.8 G/DL (ref 30–36.5)
MCV RBC AUTO: 94.2 FL (ref 80–99)
MONOCYTES # BLD: 1.1 K/UL (ref 0–1)
MONOCYTES NFR BLD: 10 % (ref 5–13)
NEUTS SEG # BLD: 8.1 K/UL (ref 1.8–8)
NEUTS SEG NFR BLD: 74 % (ref 32–75)
NRBC # BLD: 0.05 K/UL (ref 0–0.01)
NRBC BLD-RTO: 0.5 PER 100 WBC
PLATELET # BLD AUTO: 211 K/UL (ref 150–400)
PMV BLD AUTO: 11.1 FL (ref 8.9–12.9)
POTASSIUM SERPL-SCNC: 3.8 MMOL/L (ref 3.5–5.1)
RBC # BLD AUTO: 3.1 M/UL (ref 4.1–5.7)
RBC MORPH BLD: ABNORMAL
REPORTED DOSE,DOSE: ABNORMAL UNITS
REPORTED DOSE,DOSE: NORMAL UNITS
REPORTED DOSE/TIME,TMG: ABNORMAL
REPORTED DOSE/TIME,TMG: NORMAL
SERVICE CMNT-IMP: ABNORMAL
SODIUM SERPL-SCNC: 150 MMOL/L (ref 136–145)
WBC # BLD AUTO: 10.9 K/UL (ref 4.1–11.1)

## 2018-08-30 PROCEDURE — 82962 GLUCOSE BLOOD TEST: CPT

## 2018-08-30 PROCEDURE — 85025 COMPLETE CBC W/AUTO DIFF WBC: CPT | Performed by: SURGERY

## 2018-08-30 PROCEDURE — 71045 X-RAY EXAM CHEST 1 VIEW: CPT

## 2018-08-30 PROCEDURE — 80048 BASIC METABOLIC PNL TOTAL CA: CPT | Performed by: INTERNAL MEDICINE

## 2018-08-30 PROCEDURE — 74011250636 HC RX REV CODE- 250/636: Performed by: INTERNAL MEDICINE

## 2018-08-30 PROCEDURE — 74011000258 HC RX REV CODE- 258: Performed by: SURGERY

## 2018-08-30 PROCEDURE — 80170 ASSAY OF GENTAMICIN: CPT | Performed by: SURGERY

## 2018-08-30 PROCEDURE — 92610 EVALUATE SWALLOWING FUNCTION: CPT

## 2018-08-30 PROCEDURE — 77010033678 HC OXYGEN DAILY

## 2018-08-30 PROCEDURE — 94640 AIRWAY INHALATION TREATMENT: CPT

## 2018-08-30 PROCEDURE — 36415 COLL VENOUS BLD VENIPUNCTURE: CPT | Performed by: SURGERY

## 2018-08-30 PROCEDURE — 74011000250 HC RX REV CODE- 250: Performed by: INTERNAL MEDICINE

## 2018-08-30 PROCEDURE — 74011000258 HC RX REV CODE- 258: Performed by: INTERNAL MEDICINE

## 2018-08-30 PROCEDURE — 77030010547 HC BG URIN W/UMETER -A

## 2018-08-30 PROCEDURE — 74011250636 HC RX REV CODE- 250/636: Performed by: SURGERY

## 2018-08-30 PROCEDURE — 65610000006 HC RM INTENSIVE CARE

## 2018-08-30 RX ORDER — GENTAMICIN SULFATE 100 MG/50ML
100 INJECTION, SOLUTION INTRAVENOUS EVERY 24 HOURS
Status: DISCONTINUED | OUTPATIENT
Start: 2018-08-31 | End: 2018-08-31

## 2018-08-30 RX ADMIN — SODIUM CHLORIDE 40 ML: 9 INJECTION, SOLUTION INTRAMUSCULAR; INTRAVENOUS; SUBCUTANEOUS at 14:40

## 2018-08-30 RX ADMIN — ALBUTEROL SULFATE 2.5 MG: 2.5 SOLUTION RESPIRATORY (INHALATION) at 12:52

## 2018-08-30 RX ADMIN — ALBUTEROL SULFATE 2.5 MG: 2.5 SOLUTION RESPIRATORY (INHALATION) at 20:07

## 2018-08-30 RX ADMIN — DAPTOMYCIN 1000 MG: 500 INJECTION, POWDER, LYOPHILIZED, FOR SOLUTION INTRAVENOUS at 15:26

## 2018-08-30 RX ADMIN — FUROSEMIDE 60 MG: 10 INJECTION, SOLUTION INTRAMUSCULAR; INTRAVENOUS at 21:22

## 2018-08-30 RX ADMIN — ALBUTEROL SULFATE 2.5 MG: 2.5 SOLUTION RESPIRATORY (INHALATION) at 23:00

## 2018-08-30 RX ADMIN — GENTAMICIN SULFATE 90 MG: 40 INJECTION, SOLUTION INTRAMUSCULAR; INTRAVENOUS at 04:57

## 2018-08-30 RX ADMIN — ALBUTEROL SULFATE 2.5 MG: 2.5 SOLUTION RESPIRATORY (INHALATION) at 07:17

## 2018-08-30 RX ADMIN — ALBUTEROL SULFATE 2.5 MG: 2.5 SOLUTION RESPIRATORY (INHALATION) at 15:11

## 2018-08-30 RX ADMIN — ALBUTEROL SULFATE 2.5 MG: 2.5 SOLUTION RESPIRATORY (INHALATION) at 03:05

## 2018-08-30 RX ADMIN — SODIUM CHLORIDE 40 ML: 9 INJECTION, SOLUTION INTRAMUSCULAR; INTRAVENOUS; SUBCUTANEOUS at 06:36

## 2018-08-30 RX ADMIN — SODIUM CHLORIDE 30 ML: 9 INJECTION, SOLUTION INTRAMUSCULAR; INTRAVENOUS; SUBCUTANEOUS at 21:23

## 2018-08-30 RX ADMIN — CHLORHEXIDINE GLUCONATE 15 ML: 1.2 RINSE ORAL at 09:30

## 2018-08-30 RX ADMIN — ENOXAPARIN SODIUM 120 MG: 100 INJECTION SUBCUTANEOUS at 11:41

## 2018-08-30 RX ADMIN — CHLORHEXIDINE GLUCONATE 15 ML: 1.2 RINSE ORAL at 21:22

## 2018-08-30 RX ADMIN — SODIUM CHLORIDE 10 ML: 9 INJECTION, SOLUTION INTRAMUSCULAR; INTRAVENOUS; SUBCUTANEOUS at 21:23

## 2018-08-30 RX ADMIN — LEVOFLOXACIN 750 MG: 5 INJECTION, SOLUTION INTRAVENOUS at 17:47

## 2018-08-30 RX ADMIN — ENOXAPARIN SODIUM 120 MG: 100 INJECTION SUBCUTANEOUS at 21:23

## 2018-08-30 RX ADMIN — FUROSEMIDE 60 MG: 10 INJECTION, SOLUTION INTRAMUSCULAR; INTRAVENOUS at 11:41

## 2018-08-30 NOTE — PROGRESS NOTES
Infectious Disease Progress Note IMPRESSION:  
· Persistent bacteremia with coagulase negative Staph since 8/11 initially oxacillin sensitive , last positive BC is oxacillin resistant 8/21(1/4) · Repeat [facility] 8/24, 8/29 no growth ·  S/p fever spike last night 101.5 · NATASHA shows definite large mass associated with pacing wire in RA which may represent vegetation ( 8/23) · Thrombus  & tiny gas bubble of as in RIJ extending into SVC to level of RA (8/15)  s/p heparin IV now on lovenox s/q · CTA chest - no PE, could not visualize SVC thrombus, left lateral wall soft tissue swelling, soft tissue around pacemaker show no significant abnormality · US chest wall - minimal fluid in pacemaker pocket, no significant inflammation of overlying subcutaneous fat or skin · Acute respiratory failure on Ventilator / h/o tracheostomy 24 years ago  , s/p extubation 8/26 · R/ pleural effusion s/p chest tube placement · Increased sputum production - Sputum culture 8/25 + heavy Burkholderia cepacia · Pneumonia , last sputum culture 8/11 + for Heavy staph aureus , light K.pneumoniae s/p treaed · Ca colon Stage 3b s/p colectomy / YAMEL / enterotomies · S/p ileus , aspiration pneumonia prior to ICU admission · H/o traumatic brain injury 25 years ago    
  
  
PLAN:  
   
· Continue Daptomycin , Gentamicin IV, Levaquin for + sputum culture. · Thrombus in R/IJ, now  mass in RA which is suggestive of thrombus rather than vegetation in light of pacemaker pocket appearing clear of infection. · Hold off on removal of pacemaker at this time . D/w Dr Roselle Flowers Dr Margrett Spatz , .  . · Continue antimicrobials & anticoagulation ·  Consider changing to condom catheter Blood culture 8/26 Special Requests: NO SPECIAL REQUESTS   Preliminary Culture result: NO GROWTH 2 DAYS Blood culture 8/21 Culture result:    Final  
STAPHYLOCOCCUS EPIDERMIDIS (OXACILLIN RESISTANT) GROWING IN 1 OF 4 BOTTLES DRAWN (SITE = RW) (A) Culture result:    Final  
PRELIMINARY REPORT OF GRAM POSITIVE COCCI IN CLUSTERS GROWING IN 1 OF 4 BOTTLES DRAWN , SO FAR CALLED TO AND READ BACK BY CRISTIN LOCK 18 0721 SP (A) Culture result:    Final  
REMAINING BOTTLE(S) HAS/HAVE NO GROWTH IN 5 DAYS  
 
 chest - : There is minimal fluid in the pacemaker pocket, a thin sliver measuring 
approximately 2 mm in thickness and 21 mm in length. No significant inflammation 
of the overlying subcutaneous fat or skin. IMPRESSION: 
No abscess. Subjective:  
 
Pt seen . Resting , arousable . Wife & niece at bedside Review of Systems:  Review of systems not obtained due to patient factors. Objective:  
 
Blood pressure 129/73, pulse 83, temperature 97.5 °F (36.4 °C), resp. rate 21, height 6' 2\" (1.88 m), weight 263 lb 3.7 oz (119.4 kg), SpO2 98 %. Temp (24hrs), Av °F (37.2 °C), Min:97.5 °F (36.4 °C), Max:101.5 °F (38.6 °C) Patient Vitals for the past 24 hrs: 
 Temp Pulse Resp BP SpO2  
18 1600 - 83 21 129/73 98 % 18 1511 - - - - 100 % 18 1500 - 78 26 146/69 98 % 18 1400 - 78 26 142/71 100 % 18 1300 - 78 24 144/73 100 % 18 1252 - - - - 100 % 18 1200 - 79 25 134/71 99 % 18 1100 - 76 27 145/79 100 % 18 1000 - 75 28 111/60 99 % 18 0900 - 82 26 (!) 164/93 100 % 18 0800 97.5 °F (36.4 °C) 81 20 145/52 98 % 18 0717 - - - - 99 % 18 0700 - 83 26 (!) 152/92 100 % 18 0600 - 81 28 149/70 97 % 18 0500 - 75 27 130/66 100 % 18 0400 98.5 °F (36.9 °C) 79 23 142/63 100 % 18 0305 - - - - 97 % 18 0300 - 76 27 115/58 99 % 18 0200 - 80 27 104/58 92 % 18 0100 - 81 24 121/74 97 % 18 0030 - 80 27 103/47 95 % 18 0000 98.6 °F (37 °C) 82 27 127/77 99 % 18 2300 - 83 28 101/48 98 % 18 2230 - 87 28 121/50 93 % 18 2200 - 89 28 134/70 (!) 88 % 08/29/18 2130 - 88 21 136/74 98 % 08/29/18 2100 - 87 21 152/77 97 % 08/29/18 1930 (!) 101.5 °F (38.6 °C) 87 (!) 37 150/68 100 % 08/29/18 1918 - - - - 97 % 08/29/18 1800 - 80 27 136/60 100 % 08/29/18 1742 99 °F (37.2 °C) - - - - Lines:  Central Venous Catheter:  LIJ Physical Exam:  
General:  Resting , arousable Lungs:    Reduced air entry bases on  auscultation  Chest wall-  pacemaker site - no swelling. R/chest tube +  
CV:  Regular rate and rhythm,no murmur, Abdomen:   Soft, non-tender. bowel sounds +distended Extremities: Edema Lines/Devices:  Intact, no erythema, drainage or tenderness Data Review: CBC:  
Recent Labs 08/30/18 
 2669  08/29/18 
 0421  08/28/18 
 2900 WBC  10.9  9.1  9.1  
RBC  3.10*  2.83*  3.07* HGB  8.4*  7.7*  8.2* HCT  29.2*  26.7*  28.5* PLT  211  164  204 GRANS  74  76*  72 LYMPH  15  13  17 EOS  0  0  0 CMP:  
Recent Labs 08/30/18 
 0353  08/29/18 
 0421  08/28/18 2217  08/28/18 
 2091 GLU  125*  180*   --   156* NA  150*  149*   --   149*  
K  3.8  3.4*  3.4*  3.1*  
CL  110*  110*   --   110* CO2  34*  33*   --   32 BUN  17  19   --   22* CREA  0.84  0.94   --   0.80 CA  8.0*  7.7*   --   8.0* AGAP  6  6   --   7  
BUCR  20  20   --   28* Studies:     
Lab Results Component Value Date/Time Culture result: NO GROWTH AFTER 10 HOURS 08/29/2018 08:57 PM  
 Culture result: NO GROWTH AFTER 10 HOURS 08/29/2018 08:48 PM  
 Culture result: NO GROWTH AFTER 20 HOURS 08/29/2018 09:23 AM  
 Culture result: NO GROWTH 4 DAYS 08/26/2018 07:30 AM  
 Culture result: HEAVY BURKHOLDERIA (PSEUDOMONAS) CEPACIA (A) 08/25/2018 05:21 PM  
 Culture result: NO NORMAL RESPIRATORY TANIA ISOLATED 08/25/2018 05:21 PM  
  
 
XR Results (most recent): 
 
Results from Hospital Encounter encounter on 07/10/18 XR CHEST PORT Narrative INDICATION:    possible aspiration s/p pt D/C dobhoff EXAMINATION:  AP CHEST, PORTABLE 
 COMPARISON: 8/29/2018 FINDINGS: Single AP portable view of the chest at 452 hours demonstrates 
interval removal of the feeding tube. Right basilar chest tube and left IJ 
central venous catheter have not significantly changed, nor has the left 
pacemaker. The cardiomediastinal silhouette is stable. There is no new airspace 
disease. There are persistent bibasilar opacities. Impression IMPRESSION:  
Interval removal of the feeding tube. No other significant change. Patient Active Problem List  
Diagnosis Code  
 HTN (hypertension) I10  
 Depression F32.9  Hypercholesterolemia E78.00  Acid reflux K21.9  Seizure ([address] R56.9  Hypothyroidism E03.9  Hyponatremia E87.1  BPH (benign prostatic hyperplasia) N40.0  Rash R21  
 Cellulitis L03.90  Wax in ear H61.20  Ulcer [id number]  Small bowel obstruction ([facility] O50.502  Atrial flutter (HCC) I48.92  
 Atrial fibrillation or flutter  CHI (closed head injury) S09. 90XA  Basal cell cancer C44.91  
 TBI (traumatic brain injury) ([facility] S06. 9X9A  Atrial fibrillation (HCC) I48.91  
 Cataract H26.9  Constipation K59.00  Ankle fracture, left X93.557S  Insomnia G47.00  Tinea corporis B35.4  SSS (sick sinus syndrome) ([facility]) I49.5  Syncope R55  Seizure disorder ([facility] G40.909  RONAL on CPAP G47.33, Z99.89  
 Pacemaker Z95.0  Pre-op evaluation Z01.818  Chronic back pain greater than 3 months duration M54.9, G89.29  
 Cerebral infarction ([facility]) I63.9  Acute bronchitis J20.9  Screening for colon cancer Z12.11  
 Chronic right shoulder pain M25.511, G89.29  
 Common wart B07.8  Localized epilepsy with impairment of consciousness ([facility] G40.209  Severe obesity (BMI 35.0-39.9) ([facility]) E66.01  
 Acute blood loss anemia D62  Anasarca R60.1  GI bleed K92.2  Acute encephalopathy G93.40  Idiopathic hypotension I95.0  Counseling regarding goals of care Z71.89  
 
 
 ICD-10-CM ICD-9-CM 1. Anemia, unspecified type D64.9 285.9 2. Generalized weakness R53.1 780.79   
3. Acute encephalopathy G93.40 348.30   
4. Anasarca R60.1 782.3 5. Idiopathic hypotension I95.0 458.9 6. Counseling regarding goals of care Z71.89 V65.49 Anti-infectives:  
 
 Daptomycin IV - 8/26 Gentamicin IV 8/26 Cefotetan 7/17 Zosyn 7/20-8/7 Cefepime - 8/11-8/13 Ceftriaxone 8/13-8/17 Vancomycin 8/11-8/22 Daptomycin 8/23- 8/24- Naficillin IV 8/24- 8/26  Justo Wiley MD [facility]

## 2018-08-30 NOTE — PROGRESS NOTES
Problem: Dysphagia (Adult) Goal: *Acute Goals and Plan of Care (Insert Text) Speech path goals Initiated 8/30/2018 1. Patient will participate in [facility] tomorrow 1. Pt will tolerate purees and nectar thick liquids with no overt s/s of aspiration. Discontinue Speech pathology goals Initiated 8/3/2018 1. Patient will tolerate sips and chips with no overt s/s aspiration within 7 days. Goal met 8/10. 
2. Patient will participate in re-evaluation of swallow function within 7 days. Goal met 8/10. 3. Pt will participate with MBS today 8/10. Goal met 8/10. Speech LAnguage Pathology bedside swallow evaluation Patient: Betty Joya (77 y.o. male) Date: 8/30/2018 Primary Diagnosis: Acute blood loss anemia GI bleed Anasarca GI Bleed 
gi bleed ASCENDING COLON CANCER Procedure(s) (LRB): 
LAPAROSCOPIC  ASSISTED TO OPEN RIGHT COLECTOMY AND SMALL BOWEL RESECTION (Right) 44 Days Post-Op Precautions: swallow ASSESSMENT : 
SLP re-consulted for re-evaluation of swallow function. MBS was completed 8/10/18 with recommendation for puree/nectar liquid diet mostly due to oral dysphagia. Since then, patient was intubated with some concern for aspiration, however note RR increased to 40's-50's and RRT called at 5:00am 8/11/18 which did not occur with PO intake. Patient intubated for 15 days from 8/11/18-8/26/18. Patient now extubated and pulled his dobhoff tube. Patient with thick, dried secretions and SLP attempted to remove with green swab, however patient with poor command following making this difficult. Patient alert but confused. Based on the objective data described below, the patient presents with moderate oral and mild-moderate pharyngeal dysphagia. Patient with slow oral prep, suspected premature spillage, delayed posterior propulsion, delayed swallow initiation, and decreased hyolaryngeal elevation/excursion.  No coughing/throat clearing, however RR and congestion increased with 2 tiny ice chips. Patient is at high risk for aspiration, and recommend MBS prior to diet initiation. Discussed with RN, wife, and Dr. Gamal Asher, and MBS scheduled for tomorrow. Wife reported being displeased with patient's nutrition, aspiration, and pulling dobhoff. Wife reported that she did not feel she was being listened to. Notified RN and Dr. Gamal Asher and provided wife with phone number for patient advocacy. Patient will benefit from skilled intervention to address the above impairments. Patients rehabilitation potential is considered to be Guarded Factors which may influence rehabilitation potential include:  
[]            None noted [x]            Mental ability/status [x]            Medical condition []            Home/family situation and support systems [x]            Safety awareness 
[]            Pain tolerance/management []            Other: PLAN : 
Recommendations and Planned Interventions: 
--NPO 
--Frequent oral care 
--MBS tomorrow Frequency/Duration: Patient will be followed by speech-language pathology 2 times a week to address goals. Discharge Recommendations: To Be Determined SUBJECTIVE:  
Patient stated I want to go.  Oriented to person and hospital only. OBJECTIVE:  
 
Past Medical History:  
Diagnosis Date  A-fib ([facility] [address]  Acute bronchitis 8/25/2015  Anxiety  Arrhythmia   
 atrial fibrillation  Arthritis  BCC (basal cell carcinoma of skin)  BPH (benign prostatic hyperplasia)  Chronic back pain greater than 3 months duration 5/4/2015  Chronic right shoulder pain 1/11/2016  Common wart 5/16/2016  Constipation 12/14/2012  CVA (cerebral infarction) 5/4/2015  Depression  GERD (gastroesophageal reflux disease)  Hearing loss   
 bilateral hearing aids  Hemiplegia following CVA (cerebrovascular accident) ([facility] [address]   
 left side hemiparesis  History of colostomy  HTN (hypertension)  Hyperlipidemia  Localized epilepsy with impairment of consciousness ([facility] [address]  Prostate cancer ([facility] [address] Status post XRT, Lupron  Screening for colon cancer 11/4/2015  Stroke [facility])   
 traumatic from neck crushing  TBI (traumatic brain injury) ([facility] [address] 1994  Unspecified sleep apnea   
 on CPAP Past Surgical History:  
Procedure Laterality Date  COLONOSCOPY N/A 7/12/2018 COLONOSCOPY through stoma performed by Vanita Valdez MD at hospitals ENDOSCOPY  
 HX ANKLE FRACTURE TX    
 HX CATARACT REMOVAL    
 bilat  HX COLECTOMY colostomy placed and revised  HX HEENT    
 ear surgery eddie.  HX HERNIA REPAIR    
 HX ORTHOPAEDIC    
 left hip pinning  HX PACEMAKER  2014 Prior Level of Function/Home Situation:  
Home Situation Home Environment: Private residence One/Two Story Residence: One story Living Alone: No 
Support Systems: Spouse/Significant Other/Partner Patient Expects to be Discharged to[de-identified] Private residence Current DME Used/Available at Home: Wheelchair (AFO) Tub or Shower Type:  (unable to access, sponge bath at sink) Diet prior to admission: regular/thin Current Diet:  NPO Cognitive and Communication Status: 
Neurologic State: Alert, Confused Orientation Level: Oriented to person, Oriented to place, Disoriented to time, Disoriented to situation Cognition: Decreased command following, Decreased attention/concentration Perception: Appears intact Perseveration: No perseveration noted Safety/Judgement: Decreased awareness of environment, Decreased awareness of need for assistance, Decreased awareness of need for safety, Decreased insight into deficits Oral Assessment: 
Oral Assessment Labial: No impairment Dentition: Edentulous Oral Hygiene: dry oral mucosa, attempted to clean with green swab however dried secretions are thick and not easy to remove Lingual: Decreased rate;Decreased strength Velum: Unable to visualize Mandible: No impairment P.O. Trials: 
Patient Position: upright in bed Vocal quality prior to P.O.: Low volume Consistency Presented: Ice chips How Presented: SLP-fed/presented;Spoon How Much: 2 (scant) Bolus Acceptance: No impairment Bolus Formation/Control: Impaired Type of Impairment: Delayed Propulsion: Delayed (# of seconds) Oral Residue: None Initiation of Swallow: Delayed (# of seconds) Laryngeal Elevation: Decreased Aspiration Signs/Symptoms: None; Other (comment); Increase in RR (increased congestion audible with breathing) Pharyngeal Phase Characteristics: No impairment, issues, or problems Oral Phase Severity: Moderate Pharyngeal Phase Severity : Mild-moderate NOMS:  
The NOMS functional outcome measure was used to quantify this patient's level of swallowing impairment. Based on the NOMS, the patient was determined to be at level 1 for swallow function G Codes: In compliance with CMSs Claims Based Outcome Reporting, the following G-code set was chosen for this patient based the use of the NOMS functional outcome to quantify this patient's level of swallowing impairment. Using the NOMS, the patient was determined to be at level 1 for swallow function which correlates with the CN= 100% level of severity. Based on the objective assessment provided within this note, the current, goal, and discharge g-codes are as follows: 
 
Swallow  Swallowing: 
 Swallow Current Status CN= 100%  Swallow Goal Status CL= 60-79% NOMS Swallowing Levels: 
Level 1 (CN): NPO Level 2 (CM): NPO but takes consistency in therapy Level 3 (CL): Takes less than 50% of nutrition p.o. and continues with nonoral feedings; and/or safe with mod cues; and/or max diet restriction Level 4 (CK): Safe swallow but needs mod cues; and/or mod diet restriction; and/or still requires some nonoral feeding/supplements Level 5 (CJ): Safe swallow with min diet restriction; and/or needs min cues Level 6 (CI): Independent with p.o.; rare cues; usually self cues; may need to avoid some foods or needs extra time Level 7 (CH): Independent for all p.o. [place]. (2003). National Outcomes Measurement System (NOMS): Adult Speech-Language Pathology User's Guide. Pain: 
Pain Scale 1: Adult Nonverbal Pain Scale Pain Intensity 1: 0 After treatment:  
[]            Patient left in no apparent distress sitting up in chair 
[x]            Patient left in no apparent distress in bed 
[x]            Call bell left within reach [x]            Nursing notified 
[x]            Caregiver present 
[]            Bed alarm activated COMMUNICATION/EDUCATION:  
The patients plan of care including recommendations, planned interventions, and recommended diet changes were discussed with: Registered Nurse. [x]            Patient/family have participated as able in goal setting and plan of care. [x]            Patient/family agree to work toward stated goals and plan of care. []            Patient understands intent and goals of therapy, but is neutral about his/her participation. []            Patient is unable to participate in goal setting and plan of care. Thank you for this referral. 
Kelly Khan SLP Time Calculation: 29 mins

## 2018-08-30 NOTE — PROGRESS NOTES
Bedside shift change report given to Xu Lopez (oncoming nurse) by Mandy Petty RN (offgoing nurse). Report included the following information SBAR. Bedside shift change report given to Mirela BRITTON RN (oncoming nurse) by Andrea Griggs RN (offgoing nurse). Report included the following information SBAR.

## 2018-08-30 NOTE — PROGRESS NOTES
Follow up visit in CCU to offer ongoing pastoral support to Mr. Jacquelyn Bellamy and his wife. Mrs. Jacquelyn Bellamy shared briefly some of her current concerns as well as her hopes that his time in CCU will soon be over. He repeatedly said he wants to be at home. Offered assurance and validated patient's wish to be home. Pastoral care continues to be available for support. Colin De Jessu, [facility], [address], [place] [facility] Paging Service  [phone]

## 2018-08-30 NOTE — PROGRESS NOTES
CRS Progress Note No active issues. More alert. He removed the Dobhoff last night. Spiked temp to 101 and blood cultures repeated. /60  Pulse 75  Temp 97.5 °F (36.4 °C)  Resp 28  Ht 6' 2\" (1.88 m)  Wt 119.4 kg (263 lb 3.7 oz)  SpO2 99%  BMI 33.8 kg/m2 NAD, more alert Abd soft, nontender Incision c/d/i Ostomy with stool in bag 
 
 
 
-Okay to use GI tract for feeding once deemed safe from speech/swallow standpoint. He aspirated last time so I suspect he will ultimately need PEG tube 
-Pacemaker wire with vegetations. Defer to cardiology and ID for this. I would hold off on Palliative care discussions for now. He definitely seems to be getting stronger and more alert.

## 2018-08-30 NOTE — PROGRESS NOTES
Bedside and Verbal shift change report given to BASIL Lopez RN (oncoming nurse) by Leighann Cervantes RN (offgoing nurse). Report included the following information SBAR, Kardex, Intake/Output, MAR, Recent Results and Cardiac Rhythm Paced. 1930: Assessment completed. T 101.5 orally. Lungs coarse, NC 4L. Congested cough, non-productive. Abdomen soft, active bowel sounds. R nare Dobhoff infusing TwoCal HN @ 50ml/hr. Erazo patent and draining jesus alberto urine. 2+ generalized edema. Peripheral pulses palpable. Moves R side spontaneously and intermittently to commands. Pupils 3mm bilaterally, brisk reaction to light. Non-comprehensible words rarely verbalized. Focuses and tracks with eyes. LIJ CVL infusing Gentamycin and Levaquin at this time. Temp 101.5 orally. Medicated with Tylenol 650mg per Dobhoff.  
2022: Paged Dr. Bushra Schwartz re: fever. Patient already on Niesha, Daptomycin and Levaquin. Orders received to obtain blood cultures, one set from CVL. 2100: Blood cultures drawn, peripheral set from L hand, then set drawn from [address] CVL. 2150: RN in room to turn patient. Found Dobhoff lying on patient's stomach. 2155: O2 sats down to 83% on 4L NC. Increased O2 to 6L, NT suctioned large amount thick tan secretions. Placed on VM 50% with 4L NC, sats up to 96%. 2225: Paged Dr. Yolis Woodard re: patient pulled out Dobhoff with subsequent desaturation and thick tan secretions suctioned. 2245: Notified Dr. Yolis Woodard that patient pulled out Dobhoff, then O2 sats dropped to 82%, suctioned large amount thick tan fluid via NT suction, currently 98% on 4L NC and 50% VM. Orders received to try hi-flow O2 if needed, portable chest x ray in am. Leave Dobhoff off tonight, hold tube feedings for tonight. 0000: Reassessment unchanged except Dobhoff discontinued. Lungs remain coarse. O2 sats 97% on 4L NC and 40% VM. 0030: Patient removed VM. O2 sats 96% on 4L NC.  
0400: Reassessment unchanged. Lungs remain coarse, diminished R side. Weak, non-productive cough on demand. O2 4L NC, sats 93-96%. 0730: Bedside and Verbal shift change report given to K. Jeffry Severin, RN (oncoming nurse) by BASIL Tomlin RN (offgoing nurse). Report included the following information SBAR, Kardex, Intake/Output, MAR, Recent Results and Cardiac Rhythm Paced/A fib.

## 2018-08-30 NOTE — PROGRESS NOTES
Hematology Oncology Progress Note Follow up for: IJ/SVC thrombus Chart notes reviewed since last visit. Case discussed with following:  
Patient complains of the following:weak, more alert Additional concerns noted by the staff: none Patient Vitals for the past 24 hrs: 
 BP Temp Pulse Resp SpO2 Weight 08/30/18 0800 145/52 97.5 °F (36.4 °C) 81 20 98 % -  
08/30/18 0717 - - - - 99 % -  
08/30/18 0700 (!) 152/92 - 83 26 100 % -  
08/30/18 0630 - - - - - 119.4 kg (263 lb 3.7 oz) 08/30/18 0600 149/70 - 81 28 97 % -  
08/30/18 0500 130/66 - 75 27 100 % -  
08/30/18 0400 142/63 98.5 °F (36.9 °C) 79 23 100 % -  
08/30/18 0305 - - - - 97 % -  
08/30/18 0300 115/58 - 76 27 99 % -  
08/30/18 0200 104/58 - 80 27 92 % -  
08/30/18 0100 121/74 - 81 24 97 % -  
08/30/18 0030 103/47 - 80 27 95 % -  
08/30/18 0000 127/77 98.6 °F (37 °C) 82 27 99 % -  
08/29/18 2300 101/48 - 83 28 98 % -  
08/29/18 2230 121/50 - 87 28 93 % -  
08/29/18 2200 134/70 - 89 28 (!) 88 % -  
08/29/18 2130 136/74 - 88 21 98 % -  
08/29/18 2100 152/77 - 87 21 97 % -  
08/29/18 1930 150/68 (!) 101.5 °F (38.6 °C) 87 (!) 37 100 % -  
08/29/18 1918 - - - - 97 % -  
08/29/18 1800 136/60 - 80 27 100 % -  
08/29/18 1742 - 99 °F (37.2 °C) - - - -  
08/29/18 1700 122/64 98 °F (36.7 °C) 78 30 100 % -  
08/29/18 1600 117/49 - 78 (!) 32 98 % -  
08/29/18 1518 - - - - 98 % -  
08/29/18 1500 155/67 - 76 28 99 % -  
08/29/18 1400 139/59 - 82 25 98 % -  
08/29/18 1300 130/61 - 81 30 96 % -  
08/29/18 1200 126/68 98.5 °F (36.9 °C) 85 24 96 % -  
08/29/18 1110 - - - - 100 % -  
08/29/18 1100 128/72 - 75 26 100 % -  
08/29/18 1000 128/65 - 76 (!) 31 100 % -  
 
 
ROS negative Physical Examination: 
Gen NAD Cv reg Lungs clear Abd benign Labs: 
Recent Results (from the past 24 hour(s)) CULTURE, BODY FLUID W GRAM STAIN Collection Time: 08/29/18  9:23 AM  
Result Value Ref Range Special Requests: NO SPECIAL REQUESTS GRAM STAIN NO WBC'S SEEN    
 GRAM STAIN NO ORGANISMS SEEN Culture result: PENDING   
CELL COUNT, BODY FLUID Collection Time: 08/29/18  9:23 AM  
Result Value Ref Range BODY FLUID TYPE PLEURAL FLUID FLUID COLOR YELLOW    
 FLUID APPEARANCE CLEAR    
 FLUID RBC CT. >100 (H) 0 /cu mm FLUID NUCLEATED CELLS 129 /cu mm  
 FLD NEUTROPHILS 36 (A) NRRE % FLD LYMPHS 51 (A) NRRE % FLD MONO/MACROPHAGES 10 (A) NRRE % FLD EOSINS 3 (A) NRRE % GLUCOSE, FLUID Collection Time: 08/29/18  9:23 AM  
Result Value Ref Range Fluid Type: THORCENFLD Glucose, body fld. 173 MG/DL  
LDH, BODY FLUID Collection Time: 08/29/18  9:23 AM  
Result Value Ref Range Fluid Type: PLEURAL FLUID    
 LD, body fld. 178 U/L  
PROTEIN TOTAL, FLUID Collection Time: 08/29/18  9:23 AM  
Result Value Ref Range Fluid Type: PLEURAL FLUID Protein total, body fld. 2.2 g/dL SAMPLES BEING HELD Collection Time: 08/29/18  9:25 AM  
Result Value Ref Range SAMPLES BEING HELD ANAROBIC TUBE COMMENT Add-on orders for these samples will be processed based on acceptable specimen integrity and analyte stability, which may vary by analyte. GLUCOSE, POC Collection Time: 08/29/18 11:40 AM  
Result Value Ref Range Glucose (POC) 152 (H) 65 - 100 mg/dL Performed by Grant Montoya, POC Collection Time: 08/29/18  6:10 PM  
Result Value Ref Range Glucose (POC) 141 (H) 65 - 100 mg/dL Performed by Teresa Harris CULTURE, BLOOD, PERIPHERAL Collection Time: 08/29/18  8:48 PM  
Result Value Ref Range Special Requests: NO SPECIAL REQUESTS Culture result: NO GROWTH AFTER 10 HOURS    
CULTURE, BLOOD, LINE Collection Time: 08/29/18  8:57 PM  
Result Value Ref Range Special Requests: NO SPECIAL REQUESTS Culture result: NO GROWTH AFTER 10 HOURS    
GLUCOSE, POC Collection Time: 08/29/18 11:42 PM  
Result Value Ref Range Glucose (POC) 131 (H) 65 - 100 mg/dL Performed by Tahir Cortés   
CBC WITH AUTOMATED DIFF Collection Time: 08/30/18  3:53 AM  
Result Value Ref Range WBC 10.9 4.1 - 11.1 K/uL  
 RBC 3.10 (L) 4.10 - 5.70 M/uL HGB 8.4 (L) 12.1 - 17.0 g/dL HCT 29.2 (L) 36.6 - 50.3 % MCV 94.2 80.0 - 99.0 FL  
 MCH 27.1 26.0 - 34.0 PG  
 MCHC 28.8 (L) 30.0 - 36.5 g/dL RDW 23.9 (H) 11.5 - 14.5 % PLATELET 424 261 - 224 K/uL MPV 11.1 8.9 - 12.9 FL  
 NRBC 0.5 (H) 0  WBC ABSOLUTE NRBC 0.05 (H) 0.00 - 0.01 K/uL NEUTROPHILS 74 32 - 75 % LYMPHOCYTES 15 12 - 49 % MONOCYTES 10 5 - 13 % EOSINOPHILS 0 0 - 7 % BASOPHILS 0 0 - 1 % IMMATURE GRANULOCYTES 1 (H) 0.0 - 0.5 % ABS. NEUTROPHILS 8.1 (H) 1.8 - 8.0 K/UL  
 ABS. LYMPHOCYTES 1.6 0.8 - 3.5 K/UL  
 ABS. MONOCYTES 1.1 (H) 0.0 - 1.0 K/UL  
 ABS. EOSINOPHILS 0.0 0.0 - 0.4 K/UL  
 ABS. BASOPHILS 0.0 0.0 - 0.1 K/UL  
 ABS. IMM. GRANS. 0.1 (H) 0.00 - 0.04 K/UL  
 DF AUTOMATED    
 RBC COMMENTS ANISOCYTOSIS 2+ 
    
 RBC COMMENTS POLYCHROMASIA PRESENT 
    
 RBC COMMENTS HYPOCHROMIA PRESENT 
    
METABOLIC PANEL, BASIC Collection Time: 08/30/18  3:53 AM  
Result Value Ref Range Sodium 150 (H) 136 - 145 mmol/L Potassium 3.8 3.5 - 5.1 mmol/L Chloride 110 (H) 97 - 108 mmol/L  
 CO2 34 (H) 21 - 32 mmol/L Anion gap 6 5 - 15 mmol/L Glucose 125 (H) 65 - 100 mg/dL BUN 17 6 - 20 MG/DL Creatinine 0.84 0.70 - 1.30 MG/DL  
 BUN/Creatinine ratio 20 12 - 20 GFR est AA >60 >60 ml/min/1.73m2 GFR est non-AA >60 >60 ml/min/1.73m2 Calcium 8.0 (L) 8.5 - 10.1 MG/DL  
GENTAMICIN, TROUGH Collection Time: 08/30/18  3:53 AM  
Result Value Ref Range Gentamicin, trough 2.2 (HH) 1.0 - 2.0 ug/ml Reported dose date: NOT PROVIDED Reported dose time: NOT PROVIDED Reported dose: NOT PROVIDED UNITS  
GLUCOSE, POC Collection Time: 08/30/18  6:35 AM  
Result Value Ref Range Glucose (POC) 136 (H) 65 - 100 mg/dL  Performed by Tahir Cortés   
 
 Assessment and Plan:  
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 Cont anticoagulation, hbg 8.4 today. 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 
- Cards and ID following, holding off on pacemaker removal at this time, 
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 If pt recovers will need to see him to discuss possible adjuvant chemotherapy. Acute resp failure/Pna - off vent, Had pigtail and drainage of R pleural eff Septic shock with persistent bacteremia -ID following, abx per them Normochromic normocytic anemia - B12  929. folate 9.8. Ferritin was 5 on 7/10/18 suggesting fairly significant iron deficiency. He got 500 mg of IV iron in July but actually has a much higher calculated deficit. Ferritin 121 so does not need further IV iron at present. would transfuse for hbg <7.

## 2018-08-30 NOTE — PROGRESS NOTES
[facility] Name: Perla Lawler MRN: [id number] : 1940 Hospital: [facility] Date: 2018 IMPRESSION:  
· Acute respiratory failure requiring ventilator support. Extubated to [place] on 18. bipap as needed. 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 treated · Volume overload with anasarca, still persists. Has lost 30 pounds but more to lose; unfortunately has also lost muscle mass · hypernatremia. · hypokalemia · Pulmonary edema and pleural effusions-stable at this point. · Dysphagia-on vent; given recent surgery not likely a good candidate for PEG or G tube- will need change to NGtube or dobhoff at time of trach · Right IJ thrombosis and tiny air bubble, 8/15/18: started on heparin drip but transitioned to enoxaparin · Anemia no overt bleeding · severe hypoalbuminemia · S/P colectomy/YAMEL/enterotomies for colon cancer; Stage 3b colon ca. · Ileus/ with recurrent aspiration pneumonia prior to admission to ICU. 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 · Debility, has global weakness. · DM  
· Obesity PLAN:  
· Speech therapy consult · MBS? · Can use bipap as needed. · Oral and Bronchial hygiene · IV abx · right pigtail pleural cathteter · follow cultures · Resume Lovenox · abx per ID · D/w  and Je. Hold on pacer explant for now until he is stronger and after more abx and anticoagulation · ID and Cardiology help much appreciated;. · XR dobhoff · Continue diuresis · Tough to cross match · Follow hemoglobin · Enteral feeds · GI prophylaxis · Avoid sedating meds. Subjective/Interval History:  
I have reviewed the flowsheet and previous days notes. 8/30 He removed the Dobhoff last night. questionof aspiration? Spiked temp to 101 and blood cultures repeated. Very alert and following some commands. No BIPAP needed. Less congestion? No SOB. 8/29 more alert and cooperative. Less congested. Cultures noted. 8/28 BIPAP again last night. More alert. GNR in sputum. Very congested but will not expectorate as requested. Seems disorganized, confused. Will track and engage but difficulty communicating. Large right pleural effusion . Left on CXR but no SOB. IV levaquin started 8-27-18: Plug last night? Required BIPAP for respiratory distress. Very weak. Interacting with wife. discussed management wit hwife at bedside. For neck ultrasound. D/w Dr. Genoveva Perez. Dobhoff placed by XR- took 90 minutes Review of Systems Unable to perform ROS: Intubated Constitutional: Positive for fatigue. Eyes: Negative. Respiratory: Positive for cough. Cardiovascular: Negative. Gastrointestinal: Negative. Genitourinary: Negative for frequency. 8/10 Asked to evaluate patient to see if his pleural effusion is the cause of his rising WBC. Seen earlier this hospitalization by my partners for aspiration pneumonia. He has been hospitalized for 30 days, having had a colonic resection complicated by ileus. He was on Zosyn for 18 days and this was stopped 3 days ago and his WBC began to rise. He has a congested cough which is chronic. Can not produce sputum.   He is limited in his understanding of how to do incentive spirometry due to prior traumatic brain injury. 8.11 called urgently for acute respiratory failure RR 50's 
rhonchorus breathing On NRBM 
 
8.12  sedated on ventilator. 270 jose de jesus. GPC on multiple BC 4/4 . Remains on Vanc 8-14-18: Last 24 hrs. Remains on moderate dose vaopressors. Noted to have decreased Hgb less than 7. Not really following commands with current meds infusing. No acute issues noted per nursing last pm. 8-15-18: Pt had increased vent needs yesterday. Has not really been able to be easily weaned from vent. Has increased WBC. Had a ct scan of chest and abdomen. Final reports are pending. 8-16-18: Events and findings from CT noted. Discussed with Dr. Landon Heimlich. Due to pts instability will continue on heparin drip. Discussed with pts wife, nurse this am. Still on jose de jesus drip. When tried to place on SBT his RR was in the 45s. Still on sedation. 8-17-18: No new issues. Still has high vent support and has increased RR into the 30-40s. Not having much secretions. No overt evidence of bleeding. Hgb 7.1 on IV heparin 8/18: Hgb lower to 6.8 Has autoantibodies. On IV heparin. Flash pulmonary edema with transfusion last night, responded to IV lasix 8/19: Hypertensive after diuretic. Anasarca. On vent. IV heparin. Hgb 8.2 to 9.1 and later back to 8.2 
8/20: remains critically ill on mechanical ventilation (pressure control ventilation) 8/20: no major change, failed SBT again today 8-21: remains critically ill on mechanical ventilation. Passed SBT today, but not alert. 8/23: remains unresponsive. Passing SBT but no purposeful response. 8/24: still unresponsive on mechanical ventilation. 8-25-18: More responsive. Opens eyes. I can not get him to raise  His head or move extremities. Not having resp secretions. Concern raised about source of infection. Discussed with Nursing, Resp, DR. Villalba and DR. Quigley.  Will get CT of chest with contrast and eval the chest wall for infection. 8-26-18: Tolerated SAT, SBT this am. Was extubated. Not much respiratory secretions. Seems very weak globally. MAR REVIEWED MEDS:  
Current Facility-Administered Medications Medication  [START ON 8/31/2018] gentamicin in saline (GARAMYCIN) infusion 100 mg  [START ON 8/31/2018] Gentamicin - please send trough before starting dose due at 5am  
 [START ON 8/31/2018] Gentamicin - please send peak 30 min after infusion complete  levoFLOXacin (LEVAQUIN) 750 mg in D5W IVPB  enoxaparin (LOVENOX) injection 120 mg  
 albuterol (PROVENTIL VENTOLIN) nebulizer solution 2.5 mg  
 DAPTOmycin (CUBICIN) 1,000 mg in 0.9% sodium chloride 50 mL IVPB RF formulation  fentaNYL citrate (PF) injection  mcg  furosemide (LASIX) injection 60 mg  LORazepam (ATIVAN) injection 2 mg  acetaminophen (TYLENOL) solution 650 mg  
 insulin lispro (HUMALOG) injection  glucose chewable tablet 16 g  
 dextrose (D50W) injection syrg 12.5-25 g  
 glucagon (GLUCAGEN) injection 1 mg  
 venlafaxine (EFFEXOR) tablet 25 mg  carBAMazepine (TEGretol) 200 mg/10 mL suspension 100 mg  chlorhexidine (PERIDEX) 0.12 % mouthwash 15 mL  albuterol-ipratropium (DUO-NEB) 2.5 MG-0.5 MG/3 ML  
 amiodarone (CORDARONE) tablet 200 mg  prochlorperazine (COMPAZINE) with saline injection 5 mg  sodium chloride (NS) flush 10-30 mL  sodium chloride (NS) flush 10 mL  sodium chloride (NS) flush 10-40 mL  sodium chloride (NS) flush 10 mL  hydrALAZINE (APRESOLINE) 20 mg/mL injection 10 mg  
 oxyCODONE IR (ROXICODONE) tablet 5 mg  oxyCODONE IR (ROXICODONE) tablet 10 mg  
 sodium chloride (NS) flush 5-10 mL  acetaminophen (TYLENOL) tablet 650 mg  
 ondansetron (ZOFRAN) injection 4 mg Objective:  
Vital Signs:   
Visit Vitals  /60  Pulse 75  Temp 97.5 °F (36.4 °C)  Resp 28  Ht 6' 2\" (1.88 m)  Wt 119.4 kg (263 lb 3.7 oz)  SpO2 100%  BMI 33.8 kg/m2 O2 Device: Nasal cannula O2 Flow Rate (L/min): 3 l/min Temp (24hrs), Av.9 °F (37.2 °C), Min:97.5 °F (36.4 °C), Max:101.5 °F (38.6 °C) Intake/Output:  
Last shift:      701 - 1900 In: -  
Out: 335 [Urine:285] Last 3 shifts: 1901 - 700 In: 2922.5 [I.V.:750] Out: Iain Becerra [HTRUT:5134] Intake/Output Summary (Last 24 hours) at 18 1436 Last data filed at 18 1000 Gross per 24 hour Intake           1087.5 ml Output             3315 ml Net          -2227.5 ml Physical Exam  
Constitutional: Vital signs are normal. He appears well-developed. He is active. He appears ill. No distress. Face mask in place. HENT:  
Head: Normocephalic and atraumatic. Mouth/Throat: No oropharyngeal exudate. Eyes: Conjunctivae are normal. Pupils are equal, round, and reactive to light. No scleral icterus. Neck:  
Old tracheostomy site Cardiovascular: Normal rate and regular rhythm. Pulmonary/Chest: No respiratory distress. He has no wheezes. He has no rales. Abdominal: He exhibits no distension. There is no tenderness. Musculoskeletal: He exhibits edema. Neurological: He is alert. Seems to be globally weak. Skin: Skin is warm and dry. Data:  
Labs: 
Recent Labs 18 
 1459  18 
 0421  18 
 2718 WBC  10.9  9.1  9.1 HGB  8.4*  7.7*  8.2* HCT  29.2*  26.7*  28.5* PLT  211  164  204 Recent Labs 18 
 0353  18 
 0421  18 2217  18 
 6975 NA  150*  149*   --   149*  
K  3.8  3.4*  3.4*  3.1*  
CL  110*  110*   --   110* CO2  34*  33*   --   32 GLU  125*  180*   --   156* BUN  17  19   --   22* CREA  0.84  0.94   --   0.80 CA  8.0*  7.7*   --   8.0* No results for input(s): PH, PCO2, PO2, HCO3, FIO2 in the last 72 hours. Imaging: 
I have personally reviewed the patients radiographs: 8-25-18: CT of chest: IMPRESSION 
 impression: No evidence for pulmonary emboli. Bilateral pleural effusion are 
stable, cardiomegaly. Left lateral chest wall swelling 8/28 CXR right effusion larger Kalyan Pearce MD

## 2018-08-31 ENCOUNTER — APPOINTMENT (OUTPATIENT)
Dept: GENERAL RADIOLOGY | Age: 78
DRG: 329 | End: 2018-08-31
Attending: INTERNAL MEDICINE
Payer: MEDICARE

## 2018-08-31 LAB
ANION GAP SERPL CALC-SCNC: 8 MMOL/L (ref 5–15)
BACTERIA SPEC CULT: NORMAL
BASOPHILS # BLD: 0 K/UL (ref 0–0.1)
BASOPHILS NFR BLD: 0 % (ref 0–1)
BUN SERPL-MCNC: 17 MG/DL (ref 6–20)
BUN/CREAT SERPL: 17 (ref 12–20)
CALCIUM SERPL-MCNC: 8.6 MG/DL (ref 8.5–10.1)
CHLORIDE SERPL-SCNC: 108 MMOL/L (ref 97–108)
CO2 SERPL-SCNC: 34 MMOL/L (ref 21–32)
CREAT SERPL-MCNC: 1.01 MG/DL (ref 0.7–1.3)
DATE LAST DOSE: ABNORMAL
DATE LAST DOSE: NORMAL
DIFFERENTIAL METHOD BLD: ABNORMAL
EOSINOPHIL # BLD: 0.1 K/UL (ref 0–0.4)
EOSINOPHIL NFR BLD: 1 % (ref 0–7)
ERYTHROCYTE [DISTWIDTH] IN BLOOD BY AUTOMATED COUNT: 24 % (ref 11.5–14.5)
GENTAMICIN PEAK SERPL-MCNC: 5.7 UG/ML (ref 5–10)
GENTAMICIN TROUGH SERPL-MCNC: 0.7 UG/ML (ref 1–2)
GLUCOSE BLD STRIP.AUTO-MCNC: 106 MG/DL (ref 65–100)
GLUCOSE BLD STRIP.AUTO-MCNC: 108 MG/DL (ref 65–100)
GLUCOSE BLD STRIP.AUTO-MCNC: 121 MG/DL (ref 65–100)
GLUCOSE BLD STRIP.AUTO-MCNC: 142 MG/DL (ref 65–100)
GLUCOSE SERPL-MCNC: 115 MG/DL (ref 65–100)
HCT VFR BLD AUTO: 31.8 % (ref 36.6–50.3)
HGB BLD-MCNC: 9.2 G/DL (ref 12.1–17)
IMM GRANULOCYTES # BLD: 0.1 K/UL (ref 0–0.04)
IMM GRANULOCYTES NFR BLD AUTO: 1 % (ref 0–0.5)
LYMPHOCYTES # BLD: 2.3 K/UL (ref 0.8–3.5)
LYMPHOCYTES NFR BLD: 18 % (ref 12–49)
MCH RBC QN AUTO: 27.1 PG (ref 26–34)
MCHC RBC AUTO-ENTMCNC: 28.9 G/DL (ref 30–36.5)
MCV RBC AUTO: 93.5 FL (ref 80–99)
MONOCYTES # BLD: 1.2 K/UL (ref 0–1)
MONOCYTES NFR BLD: 9 % (ref 5–13)
NEUTS SEG # BLD: 9.1 K/UL (ref 1.8–8)
NEUTS SEG NFR BLD: 71 % (ref 32–75)
NRBC # BLD: 0.05 K/UL (ref 0–0.01)
NRBC BLD-RTO: 0.4 PER 100 WBC
PLATELET # BLD AUTO: 264 K/UL (ref 150–400)
PMV BLD AUTO: 11.1 FL (ref 8.9–12.9)
POTASSIUM SERPL-SCNC: 3.4 MMOL/L (ref 3.5–5.1)
RBC # BLD AUTO: 3.4 M/UL (ref 4.1–5.7)
RBC MORPH BLD: ABNORMAL
REPORTED DOSE,DOSE: ABNORMAL UNITS
REPORTED DOSE,DOSE: NORMAL UNITS
REPORTED DOSE/TIME,TMG: ABNORMAL
REPORTED DOSE/TIME,TMG: NORMAL
SERVICE CMNT-IMP: ABNORMAL
SERVICE CMNT-IMP: NORMAL
SODIUM SERPL-SCNC: 150 MMOL/L (ref 136–145)
WBC # BLD AUTO: 12.8 K/UL (ref 4.1–11.1)

## 2018-08-31 PROCEDURE — 80170 ASSAY OF GENTAMICIN: CPT | Performed by: INTERNAL MEDICINE

## 2018-08-31 PROCEDURE — 77010033678 HC OXYGEN DAILY

## 2018-08-31 PROCEDURE — 74011250636 HC RX REV CODE- 250/636: Performed by: INTERNAL MEDICINE

## 2018-08-31 PROCEDURE — 76937 US GUIDE VASCULAR ACCESS: CPT

## 2018-08-31 PROCEDURE — 82962 GLUCOSE BLOOD TEST: CPT

## 2018-08-31 PROCEDURE — 71045 X-RAY EXAM CHEST 1 VIEW: CPT

## 2018-08-31 PROCEDURE — 36569 INSJ PICC 5 YR+ W/O IMAGING: CPT | Performed by: INTERNAL MEDICINE

## 2018-08-31 PROCEDURE — 80048 BASIC METABOLIC PNL TOTAL CA: CPT | Performed by: SURGERY

## 2018-08-31 PROCEDURE — 92526 ORAL FUNCTION THERAPY: CPT

## 2018-08-31 PROCEDURE — 74230 X-RAY XM SWLNG FUNCJ C+: CPT

## 2018-08-31 PROCEDURE — 74011000258 HC RX REV CODE- 258: Performed by: INTERNAL MEDICINE

## 2018-08-31 PROCEDURE — 77030018786 HC NDL GD F/USND BARD -B

## 2018-08-31 PROCEDURE — C1751 CATH, INF, PER/CENT/MIDLINE: HCPCS

## 2018-08-31 PROCEDURE — 94640 AIRWAY INHALATION TREATMENT: CPT

## 2018-08-31 PROCEDURE — 77030038269 HC DRN EXT URIN PURWCK BARD -A

## 2018-08-31 PROCEDURE — 02HV33Z INSERTION OF INFUSION DEVICE INTO SUPERIOR VENA CAVA, PERCUTANEOUS APPROACH: ICD-10-PCS | Performed by: INTERNAL MEDICINE

## 2018-08-31 PROCEDURE — 36415 COLL VENOUS BLD VENIPUNCTURE: CPT | Performed by: SURGERY

## 2018-08-31 PROCEDURE — 65610000006 HC RM INTENSIVE CARE

## 2018-08-31 PROCEDURE — 74011000250 HC RX REV CODE- 250: Performed by: INTERNAL MEDICINE

## 2018-08-31 PROCEDURE — 92611 MOTION FLUOROSCOPY/SWALLOW: CPT

## 2018-08-31 PROCEDURE — 77030010547 HC BG URIN W/UMETER -A

## 2018-08-31 PROCEDURE — 85025 COMPLETE CBC W/AUTO DIFF WBC: CPT | Performed by: SURGERY

## 2018-08-31 RX ORDER — SODIUM CHLORIDE 0.9 % (FLUSH) 0.9 %
10-40 SYRINGE (ML) INJECTION EVERY 8 HOURS
Status: DISCONTINUED | OUTPATIENT
Start: 2018-08-31 | End: 2018-10-02 | Stop reason: HOSPADM

## 2018-08-31 RX ORDER — SODIUM CHLORIDE 0.9 % (FLUSH) 0.9 %
20 SYRINGE (ML) INJECTION EVERY 24 HOURS
Status: DISCONTINUED | OUTPATIENT
Start: 2018-08-31 | End: 2018-10-02 | Stop reason: HOSPADM

## 2018-08-31 RX ORDER — FUROSEMIDE 10 MG/ML
60 INJECTION INTRAMUSCULAR; INTRAVENOUS DAILY
Status: DISCONTINUED | OUTPATIENT
Start: 2018-09-01 | End: 2018-09-07

## 2018-08-31 RX ORDER — SODIUM CHLORIDE 0.9 % (FLUSH) 0.9 %
10-30 SYRINGE (ML) INJECTION AS NEEDED
Status: DISCONTINUED | OUTPATIENT
Start: 2018-08-31 | End: 2018-10-02 | Stop reason: HOSPADM

## 2018-08-31 RX ORDER — BACITRACIN 500 UNIT/G
1 PACKET (EA) TOPICAL AS NEEDED
Status: DISCONTINUED | OUTPATIENT
Start: 2018-08-31 | End: 2018-10-02 | Stop reason: HOSPADM

## 2018-08-31 RX ORDER — HEPARIN 100 UNIT/ML
300 SYRINGE INTRAVENOUS AS NEEDED
Status: DISCONTINUED | OUTPATIENT
Start: 2018-08-31 | End: 2018-10-02 | Stop reason: HOSPADM

## 2018-08-31 RX ORDER — GENTAMICIN SULFATE 80 MG/100ML
80 INJECTION, SOLUTION INTRAVENOUS EVERY 24 HOURS
Status: DISCONTINUED | OUTPATIENT
Start: 2018-09-01 | End: 2018-09-09 | Stop reason: SDUPTHER

## 2018-08-31 RX ORDER — ACETYLCYSTEINE 200 MG/ML
400 SOLUTION ORAL; RESPIRATORY (INHALATION)
Status: DISCONTINUED | OUTPATIENT
Start: 2018-08-31 | End: 2018-09-02

## 2018-08-31 RX ORDER — SODIUM CHLORIDE 0.9 % (FLUSH) 0.9 %
10 SYRINGE (ML) INJECTION EVERY 24 HOURS
Status: DISCONTINUED | OUTPATIENT
Start: 2018-08-31 | End: 2018-08-31 | Stop reason: SDUPTHER

## 2018-08-31 RX ORDER — LEVETIRACETAM 5 MG/ML
500 INJECTION INTRAVASCULAR EVERY 12 HOURS
Status: DISCONTINUED | OUTPATIENT
Start: 2018-08-31 | End: 2018-09-03

## 2018-08-31 RX ADMIN — ALBUTEROL SULFATE 2.5 MG: 2.5 SOLUTION RESPIRATORY (INHALATION) at 19:28

## 2018-08-31 RX ADMIN — ALBUTEROL SULFATE 2.5 MG: 2.5 SOLUTION RESPIRATORY (INHALATION) at 11:28

## 2018-08-31 RX ADMIN — ALBUTEROL SULFATE 2.5 MG: 2.5 SOLUTION RESPIRATORY (INHALATION) at 15:09

## 2018-08-31 RX ADMIN — Medication 20 ML: at 14:49

## 2018-08-31 RX ADMIN — ACETYLCYSTEINE 400 MG: 200 SOLUTION ORAL; RESPIRATORY (INHALATION) at 15:09

## 2018-08-31 RX ADMIN — LEVETIRACETAM 500 MG: 5 INJECTION INTRAVENOUS at 11:12

## 2018-08-31 RX ADMIN — ALBUTEROL SULFATE 2.5 MG: 2.5 SOLUTION RESPIRATORY (INHALATION) at 23:58

## 2018-08-31 RX ADMIN — FUROSEMIDE 60 MG: 10 INJECTION, SOLUTION INTRAMUSCULAR; INTRAVENOUS at 11:12

## 2018-08-31 RX ADMIN — SODIUM CHLORIDE 10 ML: 9 INJECTION, SOLUTION INTRAMUSCULAR; INTRAVENOUS; SUBCUTANEOUS at 14:49

## 2018-08-31 RX ADMIN — Medication 10 ML: at 14:49

## 2018-08-31 RX ADMIN — ACETYLCYSTEINE 400 MG: 200 SOLUTION ORAL; RESPIRATORY (INHALATION) at 23:58

## 2018-08-31 RX ADMIN — Medication 20 ML: at 21:22

## 2018-08-31 RX ADMIN — SODIUM CHLORIDE 40 ML: 9 INJECTION, SOLUTION INTRAMUSCULAR; INTRAVENOUS; SUBCUTANEOUS at 05:36

## 2018-08-31 RX ADMIN — ALBUTEROL SULFATE 2.5 MG: 2.5 SOLUTION RESPIRATORY (INHALATION) at 03:00

## 2018-08-31 RX ADMIN — GENTAMICIN SULFATE 100 MG: 100 INJECTION, SOLUTION INTRAVENOUS at 04:36

## 2018-08-31 RX ADMIN — ENOXAPARIN SODIUM 120 MG: 100 INJECTION SUBCUTANEOUS at 21:22

## 2018-08-31 RX ADMIN — DAPTOMYCIN 1000 MG: 500 INJECTION, POWDER, LYOPHILIZED, FOR SOLUTION INTRAVENOUS at 14:49

## 2018-08-31 RX ADMIN — ALBUTEROL SULFATE 2.5 MG: 2.5 SOLUTION RESPIRATORY (INHALATION) at 07:16

## 2018-08-31 RX ADMIN — LEVETIRACETAM 500 MG: 5 INJECTION INTRAVENOUS at 22:52

## 2018-08-31 RX ADMIN — ASCORBIC ACID: 500 INJECTION, SOLUTION INTRAMUSCULAR; INTRAVENOUS; SUBCUTANEOUS at 17:56

## 2018-08-31 RX ADMIN — LEVOFLOXACIN 750 MG: 5 INJECTION, SOLUTION INTRAVENOUS at 18:00

## 2018-08-31 RX ADMIN — ACETYLCYSTEINE 400 MG: 200 SOLUTION ORAL; RESPIRATORY (INHALATION) at 19:28

## 2018-08-31 NOTE — PROGRESS NOTES
Bedside and Verbal shift change report given to BASIL Franks RN (oncoming nurse) by Ghassan Neal RN (offgoing nurse). Report included the following information SBAR, Kardex, Intake/Output, MAR, Recent Results and Cardiac Rhythm Paced/A fib.  
2000: Assessment completed. 2300: Urimeter bag leaking, changed urimeter bag using sterile technique. 0000: Assessment unchanged. 0400: Assessment unchanged. 0406: Bedside and Verbal shift change report given to JESSICA Neal RN (oncoming nurse) by BASIL Franks RN (offgoing nurse). Report included the following information SBAR, Kardex, Intake/Output, MAR, Recent Results and Cardiac Rhythm Paced/afib.

## 2018-08-31 NOTE — INTERDISCIPLINARY ROUNDS
Interdisciplinary team rounds were held 8/31/18 with the following team members:Care Management, Diabetes Treatment Specialist, Nursing, Nutrition, Pharmacy, Physician, Respiratory Therapy and Clinical Coordinator. Plan of care discussed. Goal: See MD orders and progress notes for further  interventions and desired outcomes.

## 2018-08-31 NOTE — PROGRESS NOTES
Bedside and Verbal shift change report given to BASIL Carmichael RN (oncoming nurse) by Mario Beavers. Faye Nguyen, RN (offgoing nurse). Report included the following information SBAR, Kardex, Intake/Output, MAR, Recent Results and Cardiac Rhythm Paced. 1928: Assessment completed. Patient drowsy, but easily aroused. Follows commands, oriented to person and place, speech clear but delayed. Lungs coarse, diminished at bases. Congested, non-productive cough. R chest tube to 20cm suction, draining serous fluid; dressing dry and intact. Paced on bedside monitor. BP stable. Afebrile. Abdomen soft, active bowel sounds. L abd colostomy, stoma pink, scant brown loose stool. Midline abd incision, dressing dry and intact. Incontinent of urine. Pericare provided. Urine hazy jesus alberto. Peripheral pulses palpable. Generalized edema persists, but R hand edema improved. 0000: Reassessment unchanged. R flank weeping serous fluid. R posterior chest tube dressing dry and intact. Connections secure. [address], linens, gown changed. 0400: Reassessment unchanged. No weeping noted at this time. 0730: Bedside and Verbal shift change report given to REBA Ramirez (oncoming nurse) by BASIL Carmichael RN (offgoing nurse). Report included the following information SBAR, Kardex, Intake/Output, MAR, Recent Results and Cardiac Rhythm Paced/A fib.

## 2018-08-31 NOTE — PROGRESS NOTES
Gastroenterology Daily Progress Note (Dr. Miguel Angel Taylor) [facility] Admit Date: 7/10/2018 Subjective:   
  
Patient was seen this am on rounds. He is extubated and has removed his dobhoff feeding tubes. Current Facility-Administered Medications Medication Dose Route Frequency  gentamicin in saline (GARAMYCIN) infusion 100 mg  100 mg IntraVENous Q24H  
 levoFLOXacin (LEVAQUIN) 750 mg in D5W IVPB  750 mg IntraVENous Q24H  
 enoxaparin (LOVENOX) injection 120 mg  1 mg/kg SubCUTAneous Q12H  
 albuterol (PROVENTIL VENTOLIN) nebulizer solution 2.5 mg  2.5 mg Nebulization Q4H RT  
 DAPTOmycin (CUBICIN) 1,000 mg in 0.9% sodium chloride 50 mL IVPB RF formulation  1,000 mg IntraVENous Q24H  
 fentaNYL citrate (PF) injection  mcg   mcg IntraVENous Q2H PRN  
 furosemide (LASIX) injection 60 mg  60 mg IntraVENous Q12H  
 LORazepam (ATIVAN) injection 2 mg  2 mg IntraVENous Q2H PRN  
 acetaminophen (TYLENOL) solution 650 mg  650 mg Oral Q4H PRN  
 insulin lispro (HUMALOG) injection   SubCUTAneous Q6H  
 glucose chewable tablet 16 g  4 Tab Oral PRN  
 dextrose (D50W) injection syrg 12.5-25 g  12.5-25 g IntraVENous PRN  
 glucagon (GLUCAGEN) injection 1 mg  1 mg IntraMUSCular PRN  
 venlafaxine (EFFEXOR) tablet 25 mg  25 mg Oral TID  carBAMazepine (TEGretol) 200 mg/10 mL suspension 100 mg  100 mg PEG Tube QID  albuterol-ipratropium (DUO-NEB) 2.5 MG-0.5 MG/3 ML  3 mL Nebulization Q6H PRN  
 amiodarone (CORDARONE) tablet 200 mg  200 mg Oral DAILY  prochlorperazine (COMPAZINE) with saline injection 5 mg  5 mg IntraVENous Q6H PRN  
 sodium chloride (NS) flush 10-30 mL  10-30 mL InterCATHeter PRN  
 sodium chloride (NS) flush 10 mL  10 mL InterCATHeter PRN  
 sodium chloride (NS) flush 10-40 mL  10-40 mL InterCATHeter Q8H  
 sodium chloride (NS) flush 10 mL  10 mL InterCATHeter Q24H  hydrALAZINE (APRESOLINE) 20 mg/mL injection 10 mg  10 mg IntraVENous Q6H PRN  
 oxyCODONE IR (ROXICODONE) tablet 5 mg  5 mg Oral Q4H PRN  
 oxyCODONE IR (ROXICODONE) tablet 10 mg  10 mg Oral Q4H PRN  
 sodium chloride (NS) flush 5-10 mL  5-10 mL IntraVENous PRN  
 acetaminophen (TYLENOL) tablet 650 mg  650 mg Oral Q6H PRN  
 ondansetron (ZOFRAN) injection 4 mg  4 mg IntraVENous Q6H PRN Objective:  
 
Visit Vitals  /70 (BP 1 Location: Right arm, BP Patient Position: At rest)  Pulse 93  Temp 98.5 °F (36.9 °C)  Resp 23  
 Ht 6' 2\" (1.88 m)  Wt 119.4 kg (263 lb 3.7 oz)  SpO2 97%  BMI 33.8 kg/m2 Blood pressure 137/70, pulse 93, temperature 98.5 °F (36.9 °C), resp. rate 23, height 6' 2\" (1.88 m), weight 119.4 kg (263 lb 3.7 oz), SpO2 97 %. 08/31 0701 - 08/31 1900 In: 150 [I.V.:150] Out: 95 [Urine:75] 
08/29 1901 - 08/31 0700 In: 5667 [I.V.:647.5] Out: 7470 [[facility]:6281] Intake/Output Summary (Last 24 hours) at 08/31/18 6821 Last data filed at 08/31/18 0800 Gross per 24 hour Intake            347.5 ml Output             4760 ml Net          -4412.5 ml Physical Exam:  
 
General: elderly WM with some confusion in NAD Chest:  CTA, No rhonchi, rales or rubs. Heart: S1, S2, RRR 
GI: Soft, NT, ND + bowel sounds + Ostomy Labs:  
 
 
Recent Results (from the past 24 hour(s)) GLUCOSE, POC Collection Time: 08/30/18 12:37 PM  
Result Value Ref Range Glucose (POC) 136 (H) 65 - 100 mg/dL Performed by Owen Longo GLUCOSE, POC Collection Time: 08/30/18  5:20 PM  
Result Value Ref Range Glucose (POC) 119 (H) 65 - 100 mg/dL Performed by Owen Longo GLUCOSE, POC Collection Time: 08/31/18 12:18 AM  
Result Value Ref Range Glucose (POC) 108 (H) 65 - 100 mg/dL Performed by Sebastian Mustafa   
CBC WITH AUTOMATED DIFF Collection Time: 08/31/18  3:48 AM  
Result Value Ref Range WBC 12.8 (H) 4.1 - 11.1 K/uL RBC 3.40 (L) 4.10 - 5.70 M/uL HGB 9.2 (L) 12.1 - 17.0 g/dL HCT 31.8 (L) 36.6 - 50.3 % MCV 93.5 80.0 - 99.0 FL  
 MCH 27.1 26.0 - 34.0 PG  
 MCHC 28.9 (L) 30.0 - 36.5 g/dL RDW 24.0 (H) 11.5 - 14.5 % PLATELET 414 570 - 306 K/uL MPV 11.1 8.9 - 12.9 FL  
 NRBC 0.4 (H) 0  WBC ABSOLUTE NRBC 0.05 (H) 0.00 - 0.01 K/uL NEUTROPHILS 71 32 - 75 % LYMPHOCYTES 18 12 - 49 % MONOCYTES 9 5 - 13 % EOSINOPHILS 1 0 - 7 % BASOPHILS 0 0 - 1 % IMMATURE GRANULOCYTES 1 (H) 0.0 - 0.5 % ABS. NEUTROPHILS 9.1 (H) 1.8 - 8.0 K/UL  
 ABS. LYMPHOCYTES 2.3 0.8 - 3.5 K/UL  
 ABS. MONOCYTES 1.2 (H) 0.0 - 1.0 K/UL  
 ABS. EOSINOPHILS 0.1 0.0 - 0.4 K/UL  
 ABS. BASOPHILS 0.0 0.0 - 0.1 K/UL  
 ABS. IMM. GRANS. 0.1 (H) 0.00 - 0.04 K/UL  
 DF AUTOMATED    
 RBC COMMENTS ANISOCYTOSIS 2+ 
    
 RBC COMMENTS POLYCHROMASIA 1+ 
    
 RBC COMMENTS HYPOCHROMIA PRESENT 
    
GENTAMICIN, TROUGH Collection Time: 08/31/18  3:48 AM  
Result Value Ref Range Gentamicin, trough 0.7 (L) 1.0 - 2.0 ug/ml Reported dose date: NOT PROVIDED Reported dose time: NOT PROVIDED Reported dose: NOT PROVIDED UNITS METABOLIC PANEL, BASIC Collection Time: 08/31/18  3:48 AM  
Result Value Ref Range Sodium 150 (H) 136 - 145 mmol/L Potassium 3.4 (L) 3.5 - 5.1 mmol/L Chloride 108 97 - 108 mmol/L  
 CO2 34 (H) 21 - 32 mmol/L Anion gap 8 5 - 15 mmol/L Glucose 115 (H) 65 - 100 mg/dL BUN 17 6 - 20 MG/DL Creatinine 1.01 0.70 - 1.30 MG/DL  
 BUN/Creatinine ratio 17 12 - 20 GFR est AA >60 >60 ml/min/1.73m2 GFR est non-AA >60 >60 ml/min/1.73m2 Calcium 8.6 8.5 - 10.1 MG/DL  
GENTAMICIN, PEAK Collection Time: 08/31/18  5:33 AM  
Result Value Ref Range Gentamicin, peak 5.7 5.0 - 10.0 ug/ml Reported dose date: NOT PROVIDED Reported dose time: NOT PROVIDED Reported dose: NOT PROVIDED UNITS  
GLUCOSE, POC Collection Time: 08/31/18  5:36 AM  
Result Value Ref Range Glucose (POC) 121 (H) 65 - 100 mg/dL Performed by Bonnie Samson   
LABRCNT(wbc:2,hgb:2,hct:2,plt:2,) Recent Labs 08/31/18 
 9569  08/30/18 
 2359  08/29/18 
 0421 NA  150*  150*  149*  
K  3.4*  3.8  3.4*  
CL  108  110*  110* CO2  34*  34*  33* BUN  17  17  19 CREA  1.01  0.84  0.94 GLU  115*  125*  180* CA  8.6  8.0*  7.7* Impression: 
 
Endocarditis Staph Epi bactermia S/p R hemicolectomy for colon ca Aspiration pneumonia Feeding difficulties Plan: 
Patient is being evaluated by speech path to see if he can take oral intake and if not then a PEG tube would be an option. Awaiting blood cultures to make sure not still bacteremic to reduce but not eliminate risk of PEG site infection. Will have on call GI see once over weekend and then could be considered for PEG placement on Tuesday after weekend. Plan d/w Dr. Suellen Ge. Ramone Patel MD 
 
8/31/2018 [address] [place] [address], [address] [id number] Loc: [phone]

## 2018-08-31 NOTE — ADT AUTH CERT NOTES
Utilization Review  
  
  
  Bowel Surgery: Colectomy, Partial, with or without Ostomy - Care Day 52 (8/30/2018) by Susannah Cadet     
  Review Status Review Entered    
  Completed 8/31/2018    
  Details    
      
  Care Day: 52 Care Date: 8/30/2018 Level of Care: ICU    
  Guideline Day 2     
  Clinical Status    
  (X) * Procedure completed    
  8/31/2018 4:22 PM EDT by Arlyss Fraction    
    S/P colectomy/YAMEL/enterotomies for colon cancer; Stage 3b colon ca.    
      
  ( ) * Hemodynamic stability    
  8/31/2018 4:22 PM EDT by Arlyss Fraction    
    98.8-150/566-594-89-97% RA    
      
      
  Activity    
  ( ) * Progressive ambulation    
      
  Routes    
  (X) IV fluids, medications    
      
  Interventions    
  (X) Hgb/Hct    
  8/31/2018 4:22 PM EDT by Arlyss Fraction    
    hgb 8.4 hct 29.2    
      
      
  Medications    
  (X) Epidural, PCA, or other analgesia    
      
      
      
      
  * Milestone    
      
  Additional Notes    
  98.8-150/885-034-92-97% RA    
  8/30 He removed the Dobhoff last night. questionof aspiration? Spiked temp to 101 and blood cultures repeated. Very alert and following some commands. No BIPAP needed. Less congestion? No SOB.    
  Acute respiratory failure requiring ventilator support. Extubated to [place] on 8/26/18. bipap as needed.     
  Dysphagia more alert than ever before- swallowing may be better; had been tolerating Tf prior to dobhoff    
  Bilateral pleural effusions- no better vs Ct chest 8/25; right chest tube    
  Persistent bacteremia with coagulase negative Staph since 8/11 initially Oxacillin sensitive , last positive BC is oxacillin resistant 8/21(1/4)     
  NATASHA shows definite large mass associated with pacing wire in RA which may represent vegetation (8/23) gabriel infected thrombus    
  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    
  Pneumonia, Klebsiella in sputum and Staph Aureus.  treated    
   Volume overload with anasarca, still persists. Has lost 30 pounds but more to lose; unfortunately has also lost muscle mass    
  Pulmonary edema and pleural effusions-stable at this point.     
  Dysphagia-on vent; given recent surgery not likely a good candidate for PEG or G tube- will need change to NGtube or dobhoff at time of trach    
  Right IJ thrombosis and tiny air bubble, 8/15/18: started on heparin drip but transitioned to enoxaparin    
  Anemia no overt bleeding      
  severe hypoalbuminemia    
  Ileus/ with recurrent aspiration pneumonia prior to admission to ICU.     
  Remote history of tracheostomy    
  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     
  Debility, has global weakness.     
  DM     
  Obesity    
  Speech therapy consult    
  MBS?    
  Can use bipap as needed.    
  Oral and Bronchial hygiene    
  IV abx    
  right pigtail pleural cathteter    
  follow cultures    
  Resume Lovenox    
  abx per ID    
  D/w  and Je. Hold on pacer explant for now until he is stronger and after more abx and anticoagulation    
  ID and Cardiology help much appreciated; .     
  XR dobhoff     
  Continue diuresis     
  Tough to cross match     
  Follow hemoglobin    
  Enteral feeds     
  GI prophylaxis    
  Avoid sedating meds.     
       
  rbc 3.10 glu 136    
      
  CXR - Interval removal of the feeding tube.  No other significant change    
      
  SLP eval and treat    
      
  consult mobility services    
      
  wound care    
      
  albuterol neb x6    
      
  cubicin iv x1    
  lovenox sc x2    
  Levaquin iv x1    
  Lasix iv x2    
  gentamicin iv x1    
   
  Bowel Surgery: Colectomy, Partial, with or without Ostomy - Care Day 51 (8/29/2018) by Cyn Crabtree [address]     
  Review Status Review Entered    
  Completed 8/30/2018    
  Details    
      
   Care Day: 46 Care Date: 8/29/2018 Level of Care: ICU    
  Guideline Day 2     
  Clinical Status    
  (X) * Procedure completed    
  8/30/2018 1:34 PM EDT by Elsa Castro    
    S/P colectomy/YAMEL/enterotomies for colon cancer; Stage 3b colon ca.    
      
  ( ) * Hemodynamic stability    
  8/30/2018 1:34 PM EDT by Elsa Castro    
    101.5-150/31-30-% 4L NC    
      
      
  Activity    
  ( ) * Progressive ambulation    
  8/30/2018 1:34 PM EDT by Elsa Castro    
    out of bed with assistance    
      
      
  Routes    
  (X) IV fluids, medications    
      
  Medications    
  (X) Epidural, PCA, or other analgesia    
  8/30/2018 1:34 PM EDT by Elsa Castro    
    fentanyl iv x1    
      
      
      
      
      
  * Milestone    
      
  Additional Notes    
  O2 sats 88% on ventimask NC 4l/min    
  Acute respiratory failure requiring ventilator support. Extubated to [place] on 8/26/18. bipap as needed.     
  Bilateral pleural effusions- no better vs Ct chest 8/25; needs right chest tube    
  Persistent bacteremia with coagulase negative Staph since 8/11 initially Oxacillin sensitive , last positive BC is oxacillin resistant 8/21(1/4)     
  NATASHA shows definite large mass associated with pacing wire in RA which may represent vegetation (8/23) gabriel infected thrombus    
  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    
  Pneumonia, Klebsiella in sputum and Staph Aureus. treated    
  Volume overload with anasarca, still persists.  Has lost 30 pounds but more to lose; unfortunately has also lost muscle mass    
  hypernatremia.     
  hypokalemia    
  Pulmonary edema and pleural effusions-stable at this point.     
  Dysphagia-on vent; given recent surgery not likely a good candidate for PEG or G tube- will need change to NGtube or dobhoff at time of trach    
  Right IJ thrombosis and tiny air bubble, 8/15/18: started on heparin drip but transitioned to enoxaparin    
  Anemia no overt bleeding      
      
  severe hypoalbuminemia    
  Ileus/ with recurrent aspiration pneumonia prior to admission to ICU.     
  Remote history of tracheostomy    
  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     
  Debility, has global weakness.     
  DM    
  Can use bipap as needed.    
  Bronchial hygiene    
  IV abx    
  IR to place right pigtail today and will follow with cultures    
  Resume Lovenox after chest tube if no bleeding    
  abx per ID    
  D/w  and Je. Hold on pacer explant for now until he is stronger and after more abx and anticoagulation    
  ID and Cardiology help much appreciated; .     
  XR dobhoff     
  Continue diuresis     
  Tough to cross match     
  Follow hemoglobin    
  Enteral feeds     
  GI prophylaxis    
  Avoid sedating meds.     
  glu 176     
  body/fluid cultures pending    
  acapella    
  Hi flow oxygen    
  consult mobility services    
  tube feeding    
  albuterol neb x6    
  amiodarone po x1    
  cubicin iv x1    
  lovenox sc x2    
  Lasix iv x2    
  insulin sc x4    
  Levaquin iv x1    
  Ativan iv x1    
  gentamicin iv x2    
  protonix iv x1    
  pot chlor po x2    
   
  Bowel Surgery: Colectomy, Partial, with or without Ostomy - Care Day 50 (8/28/2018) by Cristina Cadet     
  Review Status Review Entered    
  Completed 8/29/2018    
  Details    
      
  Care Day: 50 Care Date: 8/28/2018 Level of Care: ICU    
  Guideline Day 2     
  Clinical Status    
  (X) * Procedure completed    
  8/29/2018 4:49 PM EDT by Luis Vanessa    
    S/P colectomy/YAMEL/enterotomies for colon cancer; Stage 3b colon ca.    
      
  ( ) * Hemodynamic stability    
  8/29/2018 4:49 PM EDT by Luis Vanessa    
    99.1-164/20-83-32-4L NC    
      
      
  Activity    
  ( ) * Progressive ambulation    
      
  Routes    
   (X) IV fluids, medications    
      
  Medications    
  (X) Epidural, PCA, or other analgesia    
  8/29/2018 4:49 PM EDT by Paddy Oquendo    
    fentanyl iv x1    
      
      
      
      
      
  * Milestone    
      
  Additional Notes    
  Acute respiratory failure requiring ventilator support. Extubated to [address] on 8/26/18. bipap as needed.     
  Bilateral pleural effusions- no better vs Ct chest 8/25    
  Persistent bacteremia with coagulase negative Staph since 8/11 initially Oxacillin sensitive , last positive BC is oxacillin resistant 8/21(1/4)     
  NATASHA shows definite large mass associated with pacing wire in RA which may represent vegetation (8/23) gabriel infected thrombus    
  GNR bronchtiis on recent culture    
  Pneumonia, Klebsiella in sputum and Staph Aureus. treated    
  Volume overload with anasarca, still persists.  Has lost 30 pounds but more to lose; unfortunately has also lost muscle mass    
  hypernatremia.     
  hypokalemia    
  Pulmonary edema and pleural effusions-stable at this point.     
  Dysphagia-on vent; given recent surgery not likely a good candidate for PEG or G tube- will need change to NGtube or dobhoff at time of trach    
  Right IJ thrombosis and tiny air bubble, 8/15/18: started on heparin drip but transitioned to enoxaparin    
  Anemia no overt bleeding      
  severe hypoalbuminemia    
  Ileus/ with recurrent aspiration pneumonia prior to admission to ICU.     
  Remote history of tracheostomy    
  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     
  Debility, has global weakness.     
  DM     
  Obesity    
  Can use bipap as needed.    
  Bronchial hygiene    
  Stop lovenox x 24 hours    
  Ask IR to place right pigtail tomorrow.     
  abx per ID    
  D/w . Hold on pacer explant for now until he is stronger and after more abx and anticoagulation    
   ID and Cardiology help much appreciated; .     
  XR dobhoff     
  Continue diuresis     
  Tough to cross match     
  Follow hemoglobin    
  Enteral feeds     
  GI prophylaxis    
  Avoid sedating meds.    
  Na 149 pot 3.1 glu 167 bun 22     
  CXR - Persistent bibasilar infiltrates and pleural effusions.    
   restraints    
  diet tube feeding    
  consult mobility services    
  wound care    
  albulterol neb x4    
  amioadarone po x1    
  cubicin iv x1    
  lovenox sc x1    
  Lasix iv x1    
  gentamcin iv x1    
  insulin sc x3    
  Ativan iv x1    
  pot chlor po x1    
  protonix iv x1

## 2018-08-31 NOTE — PROGRESS NOTES
Problem: Dysphagia (Adult) Goal: *Acute Goals and Plan of Care (Insert Text) Speech path goals Initiated 8/30/2018 1. Patient will participate in [facility] tomorrow. Completed. 2. Pt will participate with oral and pharyngeal swallowing exercises to improve swallowing function. 1. Pt will tolerate purees and nectar thick liquids with no overt s/s of aspiration. Discontinue Speech pathology goals Initiated 8/3/2018 1. Patient will tolerate sips and chips with no overt s/s aspiration within 7 days. Goal met 8/10. 
2. Patient will participate in re-evaluation of swallow function within 7 days. Goal met 8/10. 3. Pt will participate with MBS today 8/10. Goal met 8/10. Speech Pathology Modified barium swallow Study Patient: Betty Joya (26 y.o. male) Date: 8/31/2018 Primary Diagnosis: Acute blood loss anemia GI bleed Anasarca GI Bleed 
gi bleed ASCENDING COLON CANCER Procedure(s) (LRB): 
LAPAROSCOPIC  ASSISTED TO OPEN RIGHT COLECTOMY AND SMALL BOWEL RESECTION (Right) 45 Days Post-Op Precautions: aspiration ASSESSMENT : 
Based on the objective data described below, the patient presents with mod to severe oral and mild to mod pharyngeal dysphagia. Orally he had trace premature spillage with thins, very discoordinated oral propulsion of thins, thick liquids and purees. With purees he took 10-15 seconds to propel the bolus posteriorly due to discoordination (with avg oral phase supposed to be 1 second.) One tsp of purees fell out of his mouth and he did not seem to be aware of this. There was significant oral residue on his tongue from the anterior tongue to the base of tongue with no attempt to dry swallow most likely due to poor sensation (residue with all textures). When given verbal cues to swallow he did not dry swallow. There was mild vallecular residue with thins and mild to mod as textures thickened again with no attempt to swallow. There was trace penetration deep with thins during the swallow due to reduced laryngeal excursion and elevation. It eventually reduced with subsequent bolus swallows. There was reduced epiglottic inversion with some swallows due to reduced tongue base retraction. While there was no aspiration or penetration with nectar, honey or purees, there residue orally and in the valleculae put him at risk to aspirate. Patient will benefit from skilled intervention to address the above impairments. Patients rehabilitation potential is considered to be Fair Factors which may influence rehabilitation potential include:  
[]              None noted [x]              Mental ability/status [x]              Medical condition [x]              Home/family situation and support systems []              Safety awareness []              Pain tolerance/management 
[]              Other: PLAN : 
Recommendations and Planned Interventions: NPO with alternative feeding due to significant oral and mild to mod pharyngeal dysphagia. This MBS shows a decline in function since previous MBS 3 weeks ago and most of it is orally based and due to reduced awareness/sensation. His anxiety was much better and only once did he start to breathe fast.  
Frequency/Duration: Patient will be followed by speech-language pathology 4 times a week to address goals. Discharge Recommendations: None SUBJECTIVE:  
Patient introduced himself as \"Binu. OBJECTIVE:  
 
Past Medical History:  
Diagnosis Date  [facility]  Acute bronchitis 8/25/2015  Anxiety  Arrhythmia   
 atrial fibrillation  Arthritis  BCC (basal cell carcinoma of skin)  BPH (benign prostatic hyperplasia)  Chronic back pain greater than 3 months duration 5/4/2015  Chronic right shoulder pain 1/11/2016  Common wart 5/16/2016  Constipation 12/14/2012  CVA (cerebral infarction) 5/4/2015  Depression  GERD (gastroesophageal reflux disease)  Hearing loss   
 bilateral hearing aids  Hemiplegia following CVA (cerebrovascular accident) ([facility]   
 left side hemiparesis  History of colostomy  HTN (hypertension)  Hyperlipidemia  Localized epilepsy with impairment of consciousness ([facility]  Prostate cancer ([facility] Status post XRT, Lupron  Screening for colon cancer 11/4/2015  Stroke [facility])   
 traumatic from neck crushing  TBI (traumatic brain injury) ([facility] 1994  Unspecified sleep apnea   
 on CPAP Past Surgical History:  
Procedure Laterality Date  COLONOSCOPY N/A 7/12/2018 COLONOSCOPY through stoma performed by Ayden Olivas MD at \[facility]\"" ENDOSCOPY  
 HX ANKLE FRACTURE TX    
 HX CATARACT REMOVAL    
 bilat  HX COLECTOMY colostomy placed and revised  HX HEENT    
 ear surgery eddie.  HX HERNIA REPAIR    
 HX ORTHOPAEDIC    
 left hip pinning  HX PACEMAKER  2014 Prior Level of Function/Home Situation:  
Home Situation Home Environment: Private residence One/Two Story Residence: One story Living Alone: No 
Support Systems: Spouse/Significant Other/Partner Patient Expects to be Discharged to[de-identified] Private residence Current DME Used/Available at Home: Wheelchair (AFO) Tub or Shower Type:  (unable to access, sponge bath at sink) Diet prior to admission:  
Current Diet:  NPO Radiologist: Dr. Dwain Leonard Film Views: Lateral;Fluoro Patient Position: upright in transmotion chair Trial 1:  
Consistency Presented: Thin liquid; Nectar thick liquid;Puree; Honey thick liquid How Presented:  (couild not hold the cup or extract liquid from the straw. speech had to present spoon and cup) Bolus Acceptance: Impaired Bolus Formation/Control: Impaired: Delayed; Posterior;Premature spillage Propulsion: Delayed (# of seconds); Discoordination; Tongue pumping Oral Residue: Lingual (diffuse on the entire tongue surface, back of brendan and base of tongue. ) Initiation of Swallow: Triggered at vallecula Timing: Pooling 1-5 sec Penetration: Trace;During swallow;From initial swallow Aspiration/Timing: No evidence of aspiration Pharyngeal Clearance: Vallecular residue Decreased Tongue Base Retraction?: Yes Laryngeal Elevation: Inadequate epiglottic inversion; Reduced excursion with laryngeal vestibule gap; Incomplete laryngeal closure Pharyngeal Symmetry: Not assessed Pharyngeal Dysfunction: Decreased elevation/closure;Decreased strength Oral Phase Severity: Moderate-severe Pharyngeal Phase Severity: Mild moderate NOMS:  
The NOMS functional outcome measure was used to quantify this patient's level of swallowing impairment. Based on the NOMS, the patient was determined to be at level 2 for swallow function G Codes: In compliance with CMSs Claims Based Outcome Reporting, the following G-code set was chosen for this patient based the use of the NOMS functional outcome to quantify this patient's level of swallowing impairment. Using the NOMS, the patient was determined to be at level 2 for swallow function which correlates with the CM= 80-99% level of severity. Based on the objective assessment provided within this note, the current, goal, and discharge g-codes are as follows: 
 
Swallow  Swallowing: 
 Swallow Current Status CM= 80-99%  Swallow Goal Status CL= 60-79% NOMS Swallowing Levels: 
Level 1 (CN): NPO Level 2 (CM): NPO but takes consistency in therapy Level 3 (CL): Takes less than 50% of nutrition p.o. and continues with nonoral feedings; and/or safe with mod cues; and/or max diet restriction Level 4 (CK): Safe swallow but needs mod cues; and/or mod diet restriction; and/or still requires some nonoral feeding/supplements Level 5 (CJ): Safe swallow with min diet restriction; and/or needs min cues Level 6 (CI): Independent with p.o.; rare cues; usually self cues; may need to avoid some foods or needs extra time Level 7 (CH): Independent for all p.o. [place]. (2003). National Outcomes Measurement System (NOMS): Adult Speech-Language Pathology User's Guide. COMMUNICATION/EDUCATION:  
Patient was educated regarding His deficit(s) of dysphagia as this relates to His diagnosis of prolonged illness, intubation, TBI hx and anxiety . He demonstrated Guarded understanding as evidenced by . The patients plan of care including findings from [facility], recommendations, planned interventions, and recommended diet changes were discussed with: Registered Nurse. 
[]  Posted safety precautions in patient's room. [x]  Patient/family have participated as able in goal setting and plan of care. []  Patient/family agree to work toward stated goals and plan of care. []  Patient understands intent and goals of therapy, but is neutral about his/her participation. []  Patient is unable to participate in goal setting and plan of care. Thank you for this referral. 
DIPAK Salazar Time Calculation: 25 mins

## 2018-08-31 NOTE — PROGRESS NOTES
Bedside shift change report given to Jemima Andrew (oncoming nurse) by Lety Watson RN (offgoing nurse). Report included the following information SBAR.  
 
0745: AM assessment complete. See flow sheet. 0845: Pt down to barium swallow. 0945: Pt back to room from [facility].  
 
1245: Reassessment complete. See changes in flow sheet. 1300: VAT at bedside to place a PICC. 1415: Ok to use PICC line per VAT.  
 
1600: LIJ removed with out incident. No signs of bleeding or hematoma. Pressure applied for 5 minutes and then a pressure dressing was put in place. Erazo catheter removed and a purewick was placed. Pt is not a candidate for the condom catheter. 1645: Reassessment complete. No changes. 1800: All new tubing and bags changed and switch over to PICC line. TPN started. 1900: Bedside shift change report given to Mirela BRITTON RN (oncoming nurse) by Lydia Duong RN (offgoing nurse). Report included the following information SBAR.

## 2018-08-31 NOTE — PROGRESS NOTES
While passing through [facility], met  [facility] in [facility]. She shared latest news about her  and plans for ongoing treatment. Her niece is here from [place] providing support. Mrs. [facility] expressed deep appreciation for the staff who have cared for her  and been so good to her. She stated she feels blessed and is putting her edith in God as God has always seen them through the ups and downs of life. Offered assurance of prayer and provided pastoral presence. Will continue to follow as able. SHAWNA Zafar, [facility],  [facility]  Paging Service  [phone])

## 2018-08-31 NOTE — PROGRESS NOTES
Speech path Met with the pt and his spouse to discuss MBS results. Wife did not appear upset and accepted the results well. She is aware that he will get a PEG hopefully Tuesday. We discussed speech therapy will treat the pt's deficits and work toward him resuming po in the future. We discussed that his anxiety causes him to breathe heavily and rapid RR can cause aspiration as well.   
Tex Vázquez, SLP

## 2018-08-31 NOTE — PROGRESS NOTES
PICC (Peripherally Inserted Central Catheter) line insertion  procedure note 1346 : Procedure explained to patient  's wife  along with risks and benefits and  patient  's wife  agreed to proceed. Informed consent obtained from patient  's wife Patient teaching completed. Timeout completed. Pre-procedure assessment done. Maximum sterile barrier precautions observed throughout procedure. Lidocaine 1%  3.0    ml SQ given prior to cannulation. Cannulated   brachial  vein using ultrasound guidance and modified seldinger technique. Inserted   5  Italian  double  lumen PICC to   right  arm using [employer] Tip Location system. Blood return verified and flushed with 20 ml normal saline in each port. Sterile dressing applied with Biopatch, StatLock and occlusive dressing as per protocol. Curos caps applied to each port. Patient tolerated procedure well with minimal blood loss. Patient education material provided. PICC procedure performed by  :  Urbano Rothman RN. PICC nurse Assisted by  : Kendrick Nuñez RN. PICC nurse Reason for access : MD order / Reliable access /  Hemodynamically unstable /  TPN infusion / Vesicant IV medication administration   / Remove I J central line, replace with PICC line Complications related to insertion  : none Total Trimmed Length    46  cm External Length :  0   cm PICC line site arm circumference:     39  cm PICC catheter occupies    19  %  of vein Type of PICC: Bard Power PICC SOLO Ref # :  [id number] Lot # :  [id number] Expiration Date :  2019-09-30 Portable Chest X-Ray ordered. Pt has  A fib / paced   rhythm. Primary nurse   Dequan Agudelo RN aware not use until  PICC Tip placement  Is confirmed by  Radiologist. 
1430 : PICC line placement : tip of PICC line lies over the cava atrial junction per Dr Becki Larios  radiologist.    Notified to primary nurse  Dequan Agudelo RN that PICC line is in satisfactory position per radiologist and  it   can be used now. Shahriar  RN. BSN. RIVKA,CMSRN. Clinician IV .  PICC Nurse, Vascular Access Team

## 2018-08-31 NOTE — PROGRESS NOTES
Infectious Disease Progress Note IMPRESSION:  
· Persistent bacteremia with coagulase negative Staph since 8/11 initially oxacillin sensitive , last positive BC is oxacillin resistant 8/21(1/4) · Repeat [facility] 8/26, 8/29 no growth ·  S/p failed MBS · NATASHA shows definite large mass associated with pacing wire in RA which may represent vegetation ( 8/23) · Thrombus  & tiny gas bubble of as in RIJ extending into SVC to level of RA (8/15)  s/p heparin IV now on lovenox s/q · CTA chest - no PE, could not visualize SVC thrombus, left lateral wall soft tissue swelling, soft tissue around pacemaker show no significant abnormality · US chest wall - minimal fluid in pacemaker pocket, no significant inflammation of overlying subcutaneous fat or skin · Acute respiratory failure on Ventilator / h/o tracheostomy 24 years ago  , s/p extubation 8/26 · R/ pleural effusion s/p chest tube placement · Increased sputum production - Sputum culture 8/25 + heavy Burkholderia cepacia · Pneumonia , last sputum culture 8/11 + for Heavy staph aureus , light K.pneumoniae s/p treaed · Ca colon Stage 3b s/p colectomy / YAMEL / enterotomies · S/p ileus , aspiration pneumonia prior to ICU admission · H/o traumatic brain injury 25 years ago    
  
  
PLAN:  
   
· Continue Daptomycin , Gentamicin IV, Levaquin. · Thrombus in R/IJ, now  mass in RA which is suggestive of thrombus rather than vegetation in light of pacemaker pocket appearing clear of infection. · Hold off on removal of pacemaker at this time . D/w Dr Ashley Doan , .  . · Continue antimicrobials & anticoagulation ·  Consider changing to condom catheter ·  For change of IV line , start on TPN , plan for peg next week if stable. Blood Culture / line culture 8/29 Special Requests: NO SPECIAL REQUESTS   Preliminary Culture result: NO GROWTH AFTER 10 HOURS Blood culture 8/26 Special Requests: NO SPECIAL REQUESTS   Preliminary Culture result: NO GROWTH 2 DAYS Blood culture  Culture result:    Final  
STAPHYLOCOCCUS EPIDERMIDIS (OXACILLIN RESISTANT) GROWING IN 1 OF 4 BOTTLES DRAWN (SITE = RW) (A) Culture result:    Final  
PRELIMINARY REPORT OF GRAM POSITIVE COCCI IN CLUSTERS GROWING IN 1 OF 4 BOTTLES DRAWN , SO FAR CALLED TO AND READ BACK BY CRISTIN LOCK 18 [id number] SP (A) Culture result:    Final  
REMAINING BOTTLE(S) HAS/HAVE NO GROWTH IN 5 DAYS  
 
US chest - : There is minimal fluid in the pacemaker pocket, a thin sliver measuring 
approximately 2 mm in thickness and 21 mm in length. No significant inflammation 
of the overlying subcutaneous fat or skin. IMPRESSION: 
No abscess. Subjective:  
 
Pt seen . Awake , engaging in conversation . Wife & niece at bedside Review of Systems:  Review of systems not obtained due to patient factors. Objective:  
 
Blood pressure 136/76, pulse 98, temperature 98.5 °F (36.9 °C), resp. rate (!) 33, height 6' 2\" (1.88 m), weight 263 lb 3.7 oz (119.4 kg), SpO2 99 %. Temp (24hrs), Av.3 °F (36.8 °C), Min:97.7 °F (36.5 °C), Max:99.1 °F (37.3 °C) Patient Vitals for the past 24 hrs: 
 Temp Pulse Resp BP SpO2  
18 1158 - - - - 99 % 18 1130 - - - - 99 % 18 1100 - 98 (!) 33 136/76 96 % 18 1000 - 98 28 142/70 93 % 18 0950 - 95 29 137/73 95 % 18 0800 - 88 26 148/82 98 % 18 0716 - - - - 97 % 18 0700 98.5 °F (36.9 °C) 93 23 137/70 95 % 18 0500 - 97 28 139/59 95 % 18 0400 97.8 °F (36.6 °C) 90 26 (!) 126/101 96 % 18 0300 - 94 24 156/78 94 % 18 0211 - 95 21 136/75 93 % 18 0100 - 84 25 149/77 96 % 18 0000 99.1 °F (37.3 °C) 91 22 175/82 96 % 18 2300 - 87 24 152/83 96 % 18 2200 - 90 19 - 95 % 18 2100 - 87 16 150/62 94 % 18 - - - - 98 % 18 97.7 °F (36.5 °C) 84 23 137/77 96 % 18 1900 - 79 23 156/81 100 % 08/30/18 1800 - 80 22 163/65 99 % 08/30/18 1700 - 85 23 (!) 126/109 100 % 08/30/18 1600 98.5 °F (36.9 °C) 83 21 129/73 98 % 08/30/18 1511 - - - - 100 % 08/30/18 1500 - 78 26 146/69 98 % 08/30/18 1400 - 78 26 142/71 100 % 08/30/18 1300 - 78 24 144/73 100 % 08/30/18 1252 - - - - 100 % Lines:  Central Venous Catheter:  LIJ Physical Exam:  
General:  Awake, talking Lungs:    Reduced air entry bases on  auscultation  chest wall-  pacemaker site - no swelling. R/chest tube + rhonchi + b/l CV:  Regular rate and rhythm,no murmur, Abdomen:   Soft, non-tender. bowel sounds +distended Extremities: Edema Lines/Devices:  Intact, no erythema, drainage or tenderness Data Review: CBC:  
Recent Labs 08/31/18 
 2660  08/30/18 
 9845  08/29/18 
 0421 WBC  12.8*  10.9  9.1  
RBC  3.40*  3.10*  2.83* HGB  9.2*  8.4*  7.7* HCT  31.8*  29.2*  26.7*  
PLT  264  211  164 GRANS  71  74  76* LYMPH  18  15  13 EOS  1  0  0 CMP:  
Recent Labs 08/31/18 
 4941  08/30/18 
 1068  08/29/18 
 0421 GLU  115*  125*  180* NA  150*  150*  149*  
K  3.4*  3.8  3.4*  
CL  108  110*  110* CO2  34*  34*  33* BUN  17  17  19 CREA  1.01  0.84  0.94  
CA  8.6  8.0*  7.7* AGAP  8  6  6 BUCR  17  20  20 Studies:     
Lab Results Component Value Date/Time Culture result: NO GROWTH AFTER 10 HOURS 08/29/2018 08:57 PM  
 Culture result: NO GROWTH AFTER 10 HOURS 08/29/2018 08:48 PM  
 Culture result: NO GROWTH AFTER 20 HOURS 08/29/2018 09:23 AM  
 Culture result: NO GROWTH 5 DAYS 08/26/2018 07:30 AM  
 Culture result: HEAVY BURKHOLDERIA (PSEUDOMONAS) CEPACIA (A) 08/25/2018 05:21 PM  
 Culture result: NO NORMAL RESPIRATORY TANIA ISOLATED 08/25/2018 05:21 PM  
  
 
XR Results (most recent): 
 
Results from Hospital Encounter encounter on 07/10/18 XR SWALLOW FUNC VIDEO Narrative Clinical indication: Difficulty swallowing. Fluoroscopy provided for a modified barium swallow performed with the speech 
pathologist, one image was obtained, Blanchard be time is 3.7 minutes. Patient 
cooperation was limited. Significant or dysfunction. Some penetration for water 
consistency without definite aspiration. Improvement is seen with thick 
consistency. Impression impression: Moderate abnormalities of the swallowing mechanism as above. Patient Active Problem List  
Diagnosis Code  
 HTN (hypertension) I10  
 Depression F32.9  Hypercholesterolemia E78.00  Acid reflux K21.9  Seizure ([address] R56.9  Hypothyroidism E03.9  Hyponatremia E87.1  BPH (benign prostatic hyperplasia) N40.0  Rash R21  
 Cellulitis L03.90  Wax in ear H61.20  Ulcer [id number]  Small bowel obstruction ([facility] [address] C40.678  Atrial flutter (HCC) I48.92  
 Atrial fibrillation or flutter  CHI (closed head injury) S09. 90XA  Basal cell cancer C44.91  
 TBI (traumatic brain injury) ([facility] [address] S06. 9X9A  Atrial fibrillation (HCC) I48.91  
 Cataract H26.9  Constipation K59.00  Ankle fracture, left D56.686F  Insomnia G47.00  Tinea corporis B35.4  SSS (sick sinus syndrome) ([facility]) I49.5  Syncope R55  Seizure disorder ([facility] [address] G40.909  RONAL on CPAP G47.33, Z99.89  
 Pacemaker Z95.0  Pre-op evaluation Z01.818  Chronic back pain greater than 3 months duration M54.9, G89.29  
 Cerebral infarction ([facility]) I63.9  Acute bronchitis J20.9  Screening for colon cancer Z12.11  
 Chronic right shoulder pain M25.511, G89.29  
 Common wart B07.8  Localized epilepsy with impairment of consciousness ([facility] [address] G40.209  Severe obesity (BMI 35.0-39.9) ([facility]) E66.01  
 Acute blood loss anemia D62  Anasarca R60.1  GI bleed K92.2  Acute encephalopathy G93.40  Idiopathic hypotension I95.0  Counseling regarding goals of care Z71.89 ICD-10-CM ICD-9-CM 1. Anemia, unspecified type D64.9 285.9 2. Generalized weakness R53.1 780.79   
3. Acute encephalopathy G93.40 348.30   
4. Anasarca R60.1 782.3 5. Idiopathic hypotension I95.0 458.9 6. Counseling regarding goals of care Z71.89 V65.49 Anti-infectives:  
 
 Daptomycin IV - 8/26 Gentamicin IV 8/26 Cefotetan 7/17 Zosyn 7/20-8/7 Cefepime - 8/11-8/13 Ceftriaxone 8/13-8/17 Vancomycin 8/11-8/22 Daptomycin 8/23- 8/24- Naficillin IV 8/24- 8/26  Vielka Pelayo MD FACP

## 2018-08-31 NOTE — PROGRESS NOTES
Nutrition Assessment: 
 
RECOMMENDATIONS:  
TPN recommendations: 
 Goal Rate of D20, 5% AA @ 100mL/h (provides 2112kcals/120gPro) ASSESSMENT:  
Chart reviewed, case discussed during CCU rounds. Pt pulled out his Dobbhoff yesterday, SLP completed an MBS with pt this morning which he failed. Plan is to start him on TPN for the holiday weekend and ideally PEG placement early next week. Provided TPN recommendations above. As he was on TF at goal as of Wednesday, TPN to start tonight at 83mL/h which will meet 73% kcal and 83% protein needs and will be increased as BG and volume status allows. TPN recommendations above will meet 88% kcal and 99% protein needs. Pt is down 33L since admission, but is still with anasarca. Wt loss noted since Tuesday, likely 2' diuresis. Dietitians Intervention(s)/Plan(s): TPN recommendations SUBJECTIVE/OBJECTIVE:  
Pt for MBS Diet Order: NPO, Other (comment) (TPN: D20, 5% AA @ 83mL/h (provides 1753kcals/100gPro) ) 
% Eaten:  No data found. Pertinent Medications:gentamicin, levaquin, cubicin, lasix. Chemistries: 
Lab Results Component Value Date/Time Sodium 150 (H) 08/31/2018 03:48 AM  
 Potassium 3.4 (L) 08/31/2018 03:48 AM  
 Chloride 108 08/31/2018 03:48 AM  
 CO2 34 (H) 08/31/2018 03:48 AM  
 Anion gap 8 08/31/2018 03:48 AM  
 Glucose 115 (H) 08/31/2018 03:48 AM  
 BUN 17 08/31/2018 03:48 AM  
 Creatinine 1.01 08/31/2018 03:48 AM  
 BUN/Creatinine ratio 17 08/31/2018 03:48 AM  
 GFR est AA >60 08/31/2018 03:48 AM  
 GFR est non-AA >60 08/31/2018 03:48 AM  
 Calcium 8.6 08/31/2018 03:48 AM  
 Albumin 1.9 (L) 08/27/2018 03:05 AM  
  
Anthropometrics: Height: 6' 2\" (188 cm) Weight: 116 kg (255 lb 11.7 oz)   [x]bed scale (8/31)   []stated   []unknown IBW (%IBW):   ( ) UBW (%UBW):   (  %) BMI: Body mass index is 32.83 kg/(m^2). This BMI is indicative of: 
[]Underweight   []Normal   []Overweight   [x] Obesity   [] Extreme Obesity (BMI>40) Estimated Nutrition Needs (Based on): 1075 Kcals/day (MSJ 2010 x 1.2) , 121 g (1gPro/kg) Protein Carbohydrate: At Least 130 g/day  Fluids: 2000 mL/day or per MD  
 
Last BM: colostomy-no OP yesterday? [x]Active     []Hyperactive  []Hypoactive       [] Absent   BS Skin:    [] Intact   [x] Incision  [] Breakdown   [] DTI   [] Tears/Excoriation/Abrasion  [x]Edema(anasarca; +2 nonpitting-all extremities)  [] Other: Wt Readings from Last 30 Encounters:  
08/31/18 116 kg (255 lb 11.7 oz) 07/05/18 128.5 kg (283 lb 4.8 oz) 05/15/18 120.7 kg (266 lb) 05/07/18 118.8 kg (262 lb) 04/10/18 121.1 kg (267 lb)  
03/26/18 121.1 kg (267 lb)  
03/16/18 121.1 kg (267 lb)  
09/21/17 118.4 kg (261 lb) 08/03/17 117.5 kg (259 lb) 04/06/17 117.3 kg (258 lb 8 oz) 03/31/17 117.5 kg (259 lb)  
03/16/17 117.5 kg (259 lb)  
02/23/17 119.7 kg (264 lb) 01/03/17 115.2 kg (254 lb)  
11/17/16 119.7 kg (264 lb) 10/18/16 118.8 kg (262 lb) 10/07/16 118.8 kg (262 lb)  
09/22/16 113.9 kg (251 lb 1 oz) 08/18/16 116.4 kg (256 lb 11.2 oz) 05/16/16 114.8 kg (253 lb)  
03/10/16 114.8 kg (253 lb 1.6 oz) 02/11/16 122 kg (269 lb)  
01/11/16 119.7 kg (264 lb) 11/04/15 122.5 kg (270 lb) 08/25/15 119.7 kg (264 lb) 08/13/15 116.1 kg (256 lb) 05/04/15 119.7 kg (264 lb)  
03/27/15 119.7 kg (264 lb)  
02/21/15 121.2 kg (267 lb 3.2 oz) 01/29/15 119.9 kg (264 lb 6.4 oz) NUTRITION DIAGNOSES:  
Problem:  Swallowing difficulty Etiology: related to    dysphagia Signs/Symptoms: as evidenced by failed MBS, need for TPN over the weekend and PEG next week. NUTRITION INTERVENTIONS: 
 Enteral/Parenteral Nutrition: Initiate parenteral nutrition GOAL:  
Pt will meet >85% kcal and >90% protein needs via TPN with BG <200mg/dL and electrolytes WNL in 2-4 days. NUTRITION MONITORING AND EVALUATION Previous Goal: Pt will meet >90% kcal and protein needs via TF with no signs/sx of intolerance in 2-4 days Previous Goal Met: N/A (TF DCed 2' Dobbhoff removed) Previous Recommendations Implemented: Yes Cultural, Gnosticism, or Ethnic Dietary Needs: None LEARNING NEEDS (Diet, Food/Nutrient-Drug Interaction):  
 [x] None Identified 
 [] Identified and Education Provided/Documented 
 [] Identified and Pt declined/was not appropriate [x] Interdisciplinary Care Plan Reviewed/Documented  
 [x] Participated in Discharge Planning: Unable to determine  
 [x] Interdisciplinary Rounds NUTRITION RISK:  
 [x] High              [] Moderate           []  Low  []  Minimal/Uncompromised Tung Orozco RD, [facility] Pager [phone] Weekend Pager [phone]

## 2018-08-31 NOTE — PROGRESS NOTES
Hematology Oncology Progress Note Follow up for: IJ/SVC thrombus Chart notes reviewed since last visit. Case discussed with following:  
Patient complains of the following:weak, more alert Additional concerns noted by the staff: none Patient Vitals for the past 24 hrs: 
 BP Temp Pulse Resp SpO2  
08/31/18 0950 137/73 - 95 29 95 % 08/31/18 0800 148/82 - 88 26 98 % 08/31/18 0716 - - - - 97 % 08/31/18 0700 137/70 98.5 °F (36.9 °C) 93 23 95 % 08/31/18 0500 139/59 - 97 28 95 % 08/31/18 0400 (!) 126/101 97.8 °F (36.6 °C) 90 26 96 % 08/31/18 0300 156/78 - 94 24 94 % 08/31/18 0211 136/75 - 95 21 93 % 08/31/18 0100 149/77 - 84 25 96 % 08/31/18 0000 175/82 99.1 °F (37.3 °C) 91 22 96 % 08/30/18 2300 152/83 - 87 24 96 % 08/30/18 2200 - - 90 19 95 % 08/30/18 2100 150/62 - 87 16 94 % 08/30/18 2007 - - - - 98 % 08/30/18 2000 137/77 97.7 °F (36.5 °C) 84 23 96 % 08/30/18 1900 156/81 - 79 23 100 % 08/30/18 1800 163/65 - 80 22 99 % 08/30/18 1700 (!) 126/109 - 85 23 100 % 08/30/18 1600 129/73 98.5 °F (36.9 °C) 83 21 98 % 08/30/18 1511 - - - - 100 % 08/30/18 1500 146/69 - 78 26 98 % 08/30/18 1400 142/71 - 78 26 100 % 08/30/18 1300 144/73 - 78 24 100 % 08/30/18 1252 - - - - 100 % 08/30/18 1200 134/71 97.5 °F (36.4 °C) 79 25 99 % 08/30/18 1100 145/79 - 76 27 100 % ROS negative Physical Examination: 
Gen NAD Cv reg Lungs clear Abd benign Labs: 
Recent Results (from the past 24 hour(s)) GLUCOSE, POC Collection Time: 08/30/18 12:37 PM  
Result Value Ref Range Glucose (POC) 136 (H) 65 - 100 mg/dL Performed by José Miguel Senemelina GLUCOSE, POC Collection Time: 08/30/18  5:20 PM  
Result Value Ref Range Glucose (POC) 119 (H) 65 - 100 mg/dL Performed by José Miguel Senemelina GLUCOSE, POC Collection Time: 08/31/18 12:18 AM  
Result Value Ref Range Glucose (POC) 108 (H) 65 - 100 mg/dL  Performed by Sandy Traylor   
CBC WITH AUTOMATED DIFF  
 Collection Time: 08/31/18  3:48 AM  
Result Value Ref Range WBC 12.8 (H) 4.1 - 11.1 K/uL  
 RBC 3.40 (L) 4.10 - 5.70 M/uL HGB 9.2 (L) 12.1 - 17.0 g/dL HCT 31.8 (L) 36.6 - 50.3 % MCV 93.5 80.0 - 99.0 FL  
 MCH 27.1 26.0 - 34.0 PG  
 MCHC 28.9 (L) 30.0 - 36.5 g/dL RDW 24.0 (H) 11.5 - 14.5 % PLATELET 255 234 - 483 K/uL MPV 11.1 8.9 - 12.9 FL  
 NRBC 0.4 (H) 0  WBC ABSOLUTE NRBC 0.05 (H) 0.00 - 0.01 K/uL NEUTROPHILS 71 32 - 75 % LYMPHOCYTES 18 12 - 49 % MONOCYTES 9 5 - 13 % EOSINOPHILS 1 0 - 7 % BASOPHILS 0 0 - 1 % IMMATURE GRANULOCYTES 1 (H) 0.0 - 0.5 % ABS. NEUTROPHILS 9.1 (H) 1.8 - 8.0 K/UL  
 ABS. LYMPHOCYTES 2.3 0.8 - 3.5 K/UL  
 ABS. MONOCYTES 1.2 (H) 0.0 - 1.0 K/UL  
 ABS. EOSINOPHILS 0.1 0.0 - 0.4 K/UL  
 ABS. BASOPHILS 0.0 0.0 - 0.1 K/UL  
 ABS. IMM. GRANS. 0.1 (H) 0.00 - 0.04 K/UL  
 DF AUTOMATED    
 RBC COMMENTS ANISOCYTOSIS 2+ 
    
 RBC COMMENTS POLYCHROMASIA 1+ 
    
 RBC COMMENTS HYPOCHROMIA PRESENT 
    
GENTAMICIN, TROUGH Collection Time: 08/31/18  3:48 AM  
Result Value Ref Range Gentamicin, trough 0.7 (L) 1.0 - 2.0 ug/ml Reported dose date: NOT PROVIDED Reported dose time: NOT PROVIDED Reported dose: NOT PROVIDED UNITS METABOLIC PANEL, BASIC Collection Time: 08/31/18  3:48 AM  
Result Value Ref Range Sodium 150 (H) 136 - 145 mmol/L Potassium 3.4 (L) 3.5 - 5.1 mmol/L Chloride 108 97 - 108 mmol/L  
 CO2 34 (H) 21 - 32 mmol/L Anion gap 8 5 - 15 mmol/L Glucose 115 (H) 65 - 100 mg/dL BUN 17 6 - 20 MG/DL Creatinine 1.01 0.70 - 1.30 MG/DL  
 BUN/Creatinine ratio 17 12 - 20 GFR est AA >60 >60 ml/min/1.73m2 GFR est non-AA >60 >60 ml/min/1.73m2 Calcium 8.6 8.5 - 10.1 MG/DL  
GENTAMICIN, PEAK Collection Time: 08/31/18  5:33 AM  
Result Value Ref Range Gentamicin, peak 5.7 5.0 - 10.0 ug/ml Reported dose date: NOT PROVIDED  Reported dose time: NOT PROVIDED    
 Reported dose: NOT PROVIDED UNITS  
GLUCOSE, POC Collection Time: 08/31/18  5:36 AM  
Result Value Ref Range Glucose (POC) 121 (H) 65 - 100 mg/dL Performed by Ryan Eastman Assessment and Plan:  
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 Cont anticoagulation, hbg 9.2 today. 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 
- Cards and ID following, holding off on pacemaker removal at this time, 
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 If pt recovers will need to see him to discuss possible adjuvant chemotherapy. Acute resp failure/Pna - off vent, Had pigtail and drainage of R pleural eff, cytology pending Septic shock with persistent bacteremia -ID following, abx per them Normochromic normocytic anemia - B12  929. folate 9.8. Ferritin was 5 on 7/10/18 suggesting fairly significant iron deficiency. He got 500 mg of IV iron in July but actually has a much higher calculated deficit. Ferritin 121 so does not need further IV iron at present. would transfuse for hbg <7. Please call over the holiday weekend if needed. Will have service f/u on Tuesday

## 2018-08-31 NOTE — PROGRESS NOTES
[facility] Name: Shin Valenzuela MRN: [id number] : 1940 Hospital: [facility] Date: 2018 IMPRESSION:  
· Acute respiratory failure requiring ventilator support. Extubated to [place] on 18. bipap as needed. 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 treated · Volume overload with anasarca, still persists. Has lost 30 pounds but more to lose; unfortunately has also lost muscle mass · Urinary retention? Condom cath will not work due to anatomy · hypernatremia. · hypokalemia · Pulmonary edema and pleural effusions-stable at this point. · Dysphagia-on vent; given recent surgery not likely a good candidate for PEG or G tube- will need change to NGtube or dobhoff at time of trach · Right IJ thrombosis and tiny air bubble, 8/15/18: started on heparin drip but transitioned to enoxaparin · Anemia no overt bleeding · severe hypoalbuminemia · S/P colectomy/YAMEL/enterotomies for colon cancer; Stage 3b colon ca. · Ileus/ with recurrent aspiration pneumonia prior to admission to ICU. 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 · Debility, has global weakness. · DM  
· Obesity PLAN:  
· Speech therapy help appreciated · NPO for now · D/c PO meds and use comparable IV agents like keppra. Will stop effexor and amiodarone for now; once PEG in place, can resume · D/c old central line · Pt needs double lumen PICC for TPN and other meds · Hold lovenox until PICC placed · Will need to hold lovenox for 24 hours prior to PEG next week if blood cultures remain negative- per Dr. Fletcher Mathur- d/c wife at bedside- PEG will still carry risks · TPN over the weekend and possibly PEG next week · Reduce Lasix to daily but may need to increase again given all the IVfluids · Can use bipap as needed. · right pigtail pleural cathteter - pull next week of fluid down · follow cultures · Mucomyst with nebs, bronchial hygiene · abx per ID · D/w  and Je. Hold on pacer explant for now until he is stronger and after more abx and anticoagulation · ID and Cardiology help much appreciated;. · Continue diuresis · Replace lytes · Tough to cross match · Follow hemoglobin · Enteral feeds · GI prophylaxis · Avoid sedating meds. Subjective/Interval History:  
I have reviewed the flowsheet and previous days notes. 8/31 Awke. Room air. Intermittently congested cough. Follows some commands but delayed. No fever. Latest BCX negative x 2 days. D/w Dr. lFetcher Mathur and nursing. More alert. Denies any pain at the moment. No dyspnea. MBS done this AM. Failed. Pigtail drainage down. 8/30 He removed the Dobhoff last night. questionof aspiration? Spiked temp to 101 and blood cultures repeated. Very alert and following some commands. No BIPAP needed. Less congestion? No SOB. 8/29 more alert and cooperative. Less congested. Cultures noted. 8/28 BIPAP again last night. More alert. GNR in sputum. Very congested but will not expectorate as requested. Seems disorganized, confused. Will track and engage but difficulty communicating. Large right pleural effusion . Left on CXR but no SOB. IV levaquin started 8-27-18: Plug last night? Required BIPAP for respiratory distress. Very weak. Interacting with wife. discussed management wit hwife at bedside. For neck ultrasound. D/w Dr. Thad Krabbe. Dobhoff placed by XR- took 90 minutes Review of Systems Unable to perform ROS: Intubated Constitutional: Positive for fatigue. Eyes: Negative. Respiratory: Positive for cough. Cardiovascular: Negative. Gastrointestinal: Negative. Genitourinary: Negative for frequency. 8/10 Asked to evaluate patient to see if his pleural effusion is the cause of his rising WBC. Seen earlier this hospitalization by my partners for aspiration pneumonia. He has been hospitalized for 30 days, having had a colonic resection complicated by ileus. He was on Zosyn for 18 days and this was stopped 3 days ago and his WBC began to rise. He has a congested cough which is chronic. Can not produce sputum. He is limited in his understanding of how to do incentive spirometry due to prior traumatic brain injury. 8.11 called urgently for acute respiratory failure RR 50's 
rhonchorus breathing On NRBM 
 
8.12  sedated on ventilator. 270 jose de jesus. GPC on multiple BC 4/4 . Remains on Vanc 8-14-18: Last 24 hrs. Remains on moderate dose vaopressors. Noted to have decreased Hgb less than 7. Not really following commands with current meds infusing. No acute issues noted per nursing last pm. 8-15-18: Pt had increased vent needs yesterday. Has not really been able to be easily weaned from vent. Has increased WBC. Had a ct scan of chest and abdomen. Final reports are pending. 8-16-18: Events and findings from CT noted. Discussed with Dr. Katherine Anthony. Due to pts instability will continue on heparin drip. Discussed with pts wife, nurse this am. Still on jose de jesus drip. When tried to place on SBT his RR was in the 45s. Still on sedation. 8-17-18: No new issues.  Still has high vent support and has increased RR into the 30-40s. Not having much secretions. No overt evidence of bleeding. Hgb 7.1 on IV heparin 8/18: Hgb lower to 6.8 Has autoantibodies. On IV heparin. Flash pulmonary edema with transfusion last night, responded to IV lasix 8/19: Hypertensive after diuretic. Anasarca. On vent. IV heparin. Hgb 8.2 to 9.1 and later back to 8.2 
8/20: remains critically ill on mechanical ventilation (pressure control ventilation) 8/20: no major change, failed SBT again today 8-21: remains critically ill on mechanical ventilation. Passed SBT today, but not alert. 8/23: remains unresponsive. Passing SBT but no purposeful response. 8/24: still unresponsive on mechanical ventilation. 8-25-18: More responsive. Opens eyes. I can not get him to raise  His head or move extremities. Not having resp secretions. Concern raised about source of infection. Discussed with Nursing, Resp, DR. Villalba and DR. Quigley. Will get CT of chest with contrast and eval the chest wall for infection. 8-26-18: Tolerated SAT, SBT this am. Was extubated. Not much respiratory secretions. Seems very weak globally. MAR REVIEWED MEDS:  
Current Facility-Administered Medications Medication  levETIRAcetam (KEPPRA) 500 mg in saline (iso-osm) 100 ml IVPB  acetylcysteine (MUCOMYST) 200 mg/mL (20 %) solution 400 mg  
 gentamicin in saline (GARAMYCIN) infusion 100 mg  levoFLOXacin (LEVAQUIN) 750 mg in D5W IVPB  enoxaparin (LOVENOX) injection 120 mg  
 albuterol (PROVENTIL VENTOLIN) nebulizer solution 2.5 mg  
 DAPTOmycin (CUBICIN) 1,000 mg in 0.9% sodium chloride 50 mL IVPB RF formulation  fentaNYL citrate (PF) injection  mcg  furosemide (LASIX) injection 60 mg  LORazepam (ATIVAN) injection 2 mg  acetaminophen (TYLENOL) solution 650 mg  
 insulin lispro (HUMALOG) injection  glucose chewable tablet 16 g  
 dextrose (D50W) injection syrg 12.5-25 g  
 glucagon (GLUCAGEN) injection 1 mg  albuterol-ipratropium (DUO-NEB) 2.5 MG-0.5 MG/3 ML  
 prochlorperazine (COMPAZINE) with saline injection 5 mg  sodium chloride (NS) flush 10-30 mL  sodium chloride (NS) flush 10 mL  sodium chloride (NS) flush 10-40 mL  sodium chloride (NS) flush 10 mL  hydrALAZINE (APRESOLINE) 20 mg/mL injection 10 mg  
 oxyCODONE IR (ROXICODONE) tablet 5 mg  oxyCODONE IR (ROXICODONE) tablet 10 mg  
 sodium chloride (NS) flush 5-10 mL  acetaminophen (TYLENOL) tablet 650 mg  
 ondansetron (ZOFRAN) injection 4 mg Objective:  
Vital Signs:   
Visit Vitals  /73  Pulse 95  Temp 98.5 °F (36.9 °C)  Resp 29  
 Ht 6' 2\" (1.88 m)  Wt 119.4 kg (263 lb 3.7 oz)  SpO2 95%  BMI 33.8 kg/m2 O2 Device: Room air O2 Flow Rate (L/min): 3 l/min Temp (24hrs), Av.2 °F (36.8 °C), Min:97.5 °F (36.4 °C), Max:99.1 °F (37.3 °C) Intake/Output:  
Last shift:       07 - 1900 In: 150 [I.V.:150] Out: 95 [Urine:75] Last 3 shifts:  190 -  0700 In: 2746 [I.V.:647.5] Out: 7470 [XNBQE:2571] Intake/Output Summary (Last 24 hours) at 18 1116 Last data filed at 18 0800 Gross per 24 hour Intake            347.5 ml Output             4335 ml Net          -3987.5 ml Physical Exam  
Constitutional: Vital signs are normal. He appears well-developed. He is active and cooperative. He appears ill. No distress. HENT:  
Head: Normocephalic and atraumatic. Mouth/Throat: No oropharyngeal exudate. Eyes: Conjunctivae are normal. Pupils are equal, round, and reactive to light. No scleral icterus. Neck:  
Old tracheostomy site Cardiovascular: Normal rate and regular rhythm. Pulmonary/Chest: No respiratory distress. He has no wheezes. He has no rales. Abdominal: He exhibits no distension. There is no tenderness. Musculoskeletal: He exhibits edema. Neurological: He is alert. Seems to be globally weak. Skin: Skin is warm and dry. Data:  
Labs: 
Recent Labs 08/31/18 
 9484  08/30/18 
 4610  08/29/18 
 0421 WBC  12.8*  10.9  9.1 HGB  9.2*  8.4*  7.7* HCT  31.8*  29.2*  26.7*  
PLT  264  211  164 Recent Labs 08/31/18 
 1578  08/30/18 
 0269  08/29/18 
 0421 NA  150*  150*  149*  
K  3.4*  3.8  3.4*  
CL  108  110*  110* CO2  34*  34*  33* GLU  115*  125*  180* BUN  17  17  19 CREA  1.01  0.84  0.94  
CA  8.6  8.0*  7.7* No results for input(s): PH, PCO2, PO2, HCO3, FIO2 in the last 72 hours. Imaging: 
I have personally reviewed the patients radiographs: 
8-25-18: CT of chest: IMPRESSION 
 impression: No evidence for pulmonary emboli. Bilateral pleural effusion are 
stable, cardiomegaly. Left lateral chest wall swelling 8/28 CXR right effusion larger David Littlejohn MD

## 2018-08-31 NOTE — PROGRESS NOTES
CRS Progress Note No active issues. More alert. Denies any pain at the moment. No dyspnea. Plan for MBS today /70 (BP 1 Location: Right arm, BP Patient Position: At rest)  Pulse 93  Temp 98.5 °F (36.9 °C)  Resp 23  Ht 6' 2\" (1.88 m)  Wt 119.4 kg (263 lb 3.7 oz)  SpO2 97%  BMI 33.8 kg/m2 NAD, more alert Abd soft, nontender Incision c/d/i Ostomy with stool in bag 
 
 
-Plan for MBS today. 
-Likely will need TPN over the weekend and possibly PEG next week if he fails MBS. Blood cultures would have to continue to be negative. Agree with plan per GI 
-Pacemaker wire with vegetations. Defer to cardiology and ID for this. I would hold off on Palliative care discussions for now. He definitely seems to be getting stronger and more alert. My partner, Dr. Luis Gustafson, will be covering over the weekend and will see prn. No active colorectal issues at this time. Please call if needed

## 2018-09-01 ENCOUNTER — APPOINTMENT (OUTPATIENT)
Dept: GENERAL RADIOLOGY | Age: 78
DRG: 329 | End: 2018-09-01
Attending: INTERNAL MEDICINE
Payer: MEDICARE

## 2018-09-01 LAB
ALBUMIN SERPL-MCNC: 2 G/DL (ref 3.5–5)
ALBUMIN/GLOB SERPL: 0.4 {RATIO} (ref 1.1–2.2)
ALP SERPL-CCNC: 84 U/L (ref 45–117)
ALT SERPL-CCNC: 28 U/L (ref 12–78)
ANION GAP SERPL CALC-SCNC: 9 MMOL/L (ref 5–15)
AST SERPL-CCNC: 30 U/L (ref 15–37)
BASOPHILS # BLD: 0 K/UL (ref 0–0.1)
BASOPHILS NFR BLD: 0 % (ref 0–1)
BILIRUB SERPL-MCNC: 0.8 MG/DL (ref 0.2–1)
BUN SERPL-MCNC: 23 MG/DL (ref 6–20)
BUN/CREAT SERPL: 22 (ref 12–20)
CALCIUM SERPL-MCNC: 8.7 MG/DL (ref 8.5–10.1)
CHLORIDE SERPL-SCNC: 111 MMOL/L (ref 97–108)
CO2 SERPL-SCNC: 31 MMOL/L (ref 21–32)
CREAT SERPL-MCNC: 1.05 MG/DL (ref 0.7–1.3)
DATE LAST DOSE: ABNORMAL
DATE LAST DOSE: ABNORMAL
DIFFERENTIAL METHOD BLD: ABNORMAL
EOSINOPHIL # BLD: 0.1 K/UL (ref 0–0.4)
EOSINOPHIL NFR BLD: 0 % (ref 0–7)
ERYTHROCYTE [DISTWIDTH] IN BLOOD BY AUTOMATED COUNT: 23.7 % (ref 11.5–14.5)
GENTAMICIN PEAK SERPL-MCNC: 3.4 UG/ML (ref 5–10)
GENTAMICIN TROUGH SERPL-MCNC: 0.5 UG/ML (ref 1–2)
GLOBULIN SER CALC-MCNC: 5.3 G/DL (ref 2–4)
GLUCOSE BLD STRIP.AUTO-MCNC: 149 MG/DL (ref 65–100)
GLUCOSE BLD STRIP.AUTO-MCNC: 162 MG/DL (ref 65–100)
GLUCOSE BLD STRIP.AUTO-MCNC: 185 MG/DL (ref 65–100)
GLUCOSE BLD STRIP.AUTO-MCNC: 232 MG/DL (ref 65–100)
GLUCOSE SERPL-MCNC: 199 MG/DL (ref 65–100)
HCT VFR BLD AUTO: 32.1 % (ref 36.6–50.3)
HGB BLD-MCNC: 9.3 G/DL (ref 12.1–17)
IMM GRANULOCYTES # BLD: 0.1 K/UL (ref 0–0.04)
IMM GRANULOCYTES NFR BLD AUTO: 1 % (ref 0–0.5)
LYMPHOCYTES # BLD: 2 K/UL (ref 0.8–3.5)
LYMPHOCYTES NFR BLD: 16 % (ref 12–49)
MCH RBC QN AUTO: 27 PG (ref 26–34)
MCHC RBC AUTO-ENTMCNC: 29 G/DL (ref 30–36.5)
MCV RBC AUTO: 93 FL (ref 80–99)
MONOCYTES # BLD: 1.1 K/UL (ref 0–1)
MONOCYTES NFR BLD: 9 % (ref 5–13)
NEUTS SEG # BLD: 9.4 K/UL (ref 1.8–8)
NEUTS SEG NFR BLD: 74 % (ref 32–75)
NRBC # BLD: 0.07 K/UL (ref 0–0.01)
NRBC BLD-RTO: 0.6 PER 100 WBC
PLATELET # BLD AUTO: 258 K/UL (ref 150–400)
PMV BLD AUTO: 11.3 FL (ref 8.9–12.9)
POTASSIUM SERPL-SCNC: 2.8 MMOL/L (ref 3.5–5.1)
PROT SERPL-MCNC: 7.3 G/DL (ref 6.4–8.2)
RBC # BLD AUTO: 3.45 M/UL (ref 4.1–5.7)
REPORTED DOSE,DOSE: ABNORMAL UNITS
REPORTED DOSE,DOSE: ABNORMAL UNITS
REPORTED DOSE/TIME,TMG: ABNORMAL
REPORTED DOSE/TIME,TMG: ABNORMAL
SERVICE CMNT-IMP: ABNORMAL
SODIUM SERPL-SCNC: 151 MMOL/L (ref 136–145)
WBC # BLD AUTO: 12.6 K/UL (ref 4.1–11.1)

## 2018-09-01 PROCEDURE — 74011250636 HC RX REV CODE- 250/636: Performed by: INTERNAL MEDICINE

## 2018-09-01 PROCEDURE — 77030018836 HC SOL IRR NACL ICUM -A

## 2018-09-01 PROCEDURE — 65610000006 HC RM INTENSIVE CARE

## 2018-09-01 PROCEDURE — 51798 US URINE CAPACITY MEASURE: CPT

## 2018-09-01 PROCEDURE — 36415 COLL VENOUS BLD VENIPUNCTURE: CPT | Performed by: SURGERY

## 2018-09-01 PROCEDURE — 74011000258 HC RX REV CODE- 258: Performed by: INTERNAL MEDICINE

## 2018-09-01 PROCEDURE — 77030010541

## 2018-09-01 PROCEDURE — 80170 ASSAY OF GENTAMICIN: CPT | Performed by: INTERNAL MEDICINE

## 2018-09-01 PROCEDURE — 74011000250 HC RX REV CODE- 250: Performed by: INTERNAL MEDICINE

## 2018-09-01 PROCEDURE — 85025 COMPLETE CBC W/AUTO DIFF WBC: CPT | Performed by: SURGERY

## 2018-09-01 PROCEDURE — 77030013160 HC PROTCT ARM THMB DERY -A

## 2018-09-01 PROCEDURE — 77030018846 HC SOL IRR STRL H20 ICUM -A

## 2018-09-01 PROCEDURE — 65660000000 HC RM CCU STEPDOWN

## 2018-09-01 PROCEDURE — 80053 COMPREHEN METABOLIC PANEL: CPT | Performed by: INTERNAL MEDICINE

## 2018-09-01 PROCEDURE — 71045 X-RAY EXAM CHEST 1 VIEW: CPT

## 2018-09-01 PROCEDURE — 77030010547 HC BG URIN W/UMETER -A

## 2018-09-01 PROCEDURE — 74011636637 HC RX REV CODE- 636/637: Performed by: INTERNAL MEDICINE

## 2018-09-01 PROCEDURE — 94640 AIRWAY INHALATION TREATMENT: CPT

## 2018-09-01 PROCEDURE — 74011250637 HC RX REV CODE- 250/637: Performed by: INTERNAL MEDICINE

## 2018-09-01 PROCEDURE — 36592 COLLECT BLOOD FROM PICC: CPT

## 2018-09-01 RX ORDER — POTASSIUM CHLORIDE 29.8 MG/ML
20 INJECTION INTRAVENOUS
Status: COMPLETED | OUTPATIENT
Start: 2018-09-01 | End: 2018-09-02

## 2018-09-01 RX ADMIN — ALBUTEROL SULFATE 2.5 MG: 2.5 SOLUTION RESPIRATORY (INHALATION) at 11:20

## 2018-09-01 RX ADMIN — ALBUTEROL SULFATE 2.5 MG: 2.5 SOLUTION RESPIRATORY (INHALATION) at 15:18

## 2018-09-01 RX ADMIN — ALBUTEROL SULFATE 2.5 MG: 2.5 SOLUTION RESPIRATORY (INHALATION) at 03:48

## 2018-09-01 RX ADMIN — ACETYLCYSTEINE 400 MG: 200 SOLUTION ORAL; RESPIRATORY (INHALATION) at 03:48

## 2018-09-01 RX ADMIN — LEVOFLOXACIN 750 MG: 5 INJECTION, SOLUTION INTRAVENOUS at 18:04

## 2018-09-01 RX ADMIN — ENOXAPARIN SODIUM 120 MG: 100 INJECTION SUBCUTANEOUS at 22:12

## 2018-09-01 RX ADMIN — ACETYLCYSTEINE 400 MG: 200 SOLUTION ORAL; RESPIRATORY (INHALATION) at 07:52

## 2018-09-01 RX ADMIN — POTASSIUM CHLORIDE 20 MEQ: 400 INJECTION, SOLUTION INTRAVENOUS at 12:11

## 2018-09-01 RX ADMIN — Medication 10 ML: at 15:47

## 2018-09-01 RX ADMIN — INSULIN LISPRO 3 UNITS: 100 INJECTION, SOLUTION INTRAVENOUS; SUBCUTANEOUS at 00:04

## 2018-09-01 RX ADMIN — Medication 20 ML: at 15:48

## 2018-09-01 RX ADMIN — Medication 30 ML: at 22:11

## 2018-09-01 RX ADMIN — INSULIN LISPRO 3 UNITS: 100 INJECTION, SOLUTION INTRAVENOUS; SUBCUTANEOUS at 18:03

## 2018-09-01 RX ADMIN — POTASSIUM CHLORIDE 20 MEQ: 400 INJECTION, SOLUTION INTRAVENOUS at 10:17

## 2018-09-01 RX ADMIN — LEVETIRACETAM 500 MG: 5 INJECTION INTRAVENOUS at 23:03

## 2018-09-01 RX ADMIN — INSULIN LISPRO 4 UNITS: 100 INJECTION, SOLUTION INTRAVENOUS; SUBCUTANEOUS at 06:21

## 2018-09-01 RX ADMIN — Medication 40 ML: at 06:16

## 2018-09-01 RX ADMIN — Medication: at 23:12

## 2018-09-01 RX ADMIN — ALBUTEROL SULFATE 2.5 MG: 2.5 SOLUTION RESPIRATORY (INHALATION) at 23:50

## 2018-09-01 RX ADMIN — ALBUTEROL SULFATE 2.5 MG: 2.5 SOLUTION RESPIRATORY (INHALATION) at 19:39

## 2018-09-01 RX ADMIN — GENTAMICIN SULFATE 80 MG: 80 INJECTION, SOLUTION INTRAVENOUS at 04:57

## 2018-09-01 RX ADMIN — ACETYLCYSTEINE 400 MG: 200 SOLUTION ORAL; RESPIRATORY (INHALATION) at 15:18

## 2018-09-01 RX ADMIN — POTASSIUM CHLORIDE 20 MEQ: 400 INJECTION, SOLUTION INTRAVENOUS at 13:59

## 2018-09-01 RX ADMIN — ENOXAPARIN SODIUM 120 MG: 100 INJECTION SUBCUTANEOUS at 09:03

## 2018-09-01 RX ADMIN — INSULIN LISPRO 3 UNITS: 100 INJECTION, SOLUTION INTRAVENOUS; SUBCUTANEOUS at 12:18

## 2018-09-01 RX ADMIN — ACETYLCYSTEINE 400 MG: 200 SOLUTION ORAL; RESPIRATORY (INHALATION) at 23:50

## 2018-09-01 RX ADMIN — ASCORBIC ACID: 500 INJECTION, SOLUTION INTRAMUSCULAR; INTRAVENOUS; SUBCUTANEOUS at 18:12

## 2018-09-01 RX ADMIN — AMIODARONE HYDROCHLORIDE 1 MG/MIN: 50 INJECTION, SOLUTION INTRAVENOUS at 20:38

## 2018-09-01 RX ADMIN — LEVETIRACETAM 500 MG: 5 INJECTION INTRAVENOUS at 12:11

## 2018-09-01 RX ADMIN — FUROSEMIDE 60 MG: 10 INJECTION, SOLUTION INTRAMUSCULAR; INTRAVENOUS at 09:00

## 2018-09-01 RX ADMIN — ACETYLCYSTEINE 400 MG: 200 SOLUTION ORAL; RESPIRATORY (INHALATION) at 19:39

## 2018-09-01 RX ADMIN — AMIODARONE HYDROCHLORIDE 150 MG: 50 INJECTION, SOLUTION INTRAVENOUS at 20:27

## 2018-09-01 RX ADMIN — ALBUTEROL SULFATE 2.5 MG: 2.5 SOLUTION RESPIRATORY (INHALATION) at 07:52

## 2018-09-01 RX ADMIN — ACETYLCYSTEINE 400 MG: 200 SOLUTION ORAL; RESPIRATORY (INHALATION) at 11:20

## 2018-09-01 RX ADMIN — DAPTOMYCIN 1000 MG: 500 INJECTION, POWDER, LYOPHILIZED, FOR SOLUTION INTRAVENOUS at 15:44

## 2018-09-01 NOTE — PROGRESS NOTES
GI Progress Note (for Karrie) Aury Larson QQN:[id number] BOH:[id number] Prim GI: Phillip Antunez MD  PCP: Loni Gandara NP Date/Time:  9/1/2018 9:47 AM  
Assessment: - Endocarditis 
- Staph Epi bactermia - S/p R hemicolectomy for colon ca - Aspiration pneumonia 
- Oropharyngeal dysphagia (on evaluation by Speech Pathology on 8/31 w/ MBS pt needs alternative nutrition) Plan: · As per discussion between Dr. Jaron Sanders and Dr Nik Bacon, given negative BCx x2 on 8/29 will plan on PEG on 9/4 · Hold TF's at midnight (ordered) Subjective:  
Pt resting in bed w/o complaints this AM. Complaint Y/N Description Abdominal Pain Hematemesis Hematochezia Melena Constipation Diarrhea Dyspepsia Dysphagia Jaundiced Nausea/vomiting Review of Systems: 
Symptom Y/N Comments  Symptom Y/N Comments Fever/Chills    Chest Pain Cough    Headaches Sputum    Joint Pain SOB/BECERRIL    Pruritis/Rash Tolerating Diet    Other Could NOT obtain due to:   
 
Objective: VITALS:  
Last 24hrs VS reviewed since prior progress note. Most recent are: 
Visit Vitals  /59  Pulse 94  Temp 99 °F (37.2 °C)  Resp 25  
 Ht 6' 2\" (1.88 m)  Wt 116 kg (255 lb 11.7 oz)  SpO2 90%  BMI 32.83 kg/m2 Intake/Output Summary (Last 24 hours) at 09/01/18 2078 Last data filed at 09/01/18 6226 Gross per 24 hour Intake          1647.03 ml Output             2120 ml Net          -472.97 ml PHYSICAL EXAM: 
General: WD, WN. Alert, cooperative, no acute distress   
HEENT: NC, Atraumatic. PERRLA, EOMI. Anicteric sclerae. Lungs:  CTA Bilaterally. No Wheezing/Rhonchi/Rales. Heart:  Regular  rhythm,  No murmur (), No Rubs, No Gallops Abdomen: Soft, Non distended, Non tender.  +Bowel sounds, no HSM Extremities: No c/c/e Neurologic:  No acute neurological distress . Lab and Radiology Data Reviewed: (see below) Medications Reviewed: (see below) PMH/SH reviewed - no change compared to H&P 
________________________________________________________________________ Care Plan discussed with: 
Patient x Family RN Consultant:    
 
Slava Mustafa MD  
 
Procedures: see electronic medical records for all procedures/Xrays and details which were not copied into this note but were reviewed prior to creation of Plan. LABS: 
Recent Labs  
   09/01/18 0410 08/31/18 0348 WBC  12.6*  12.8* HGB  9.3*  9.2* HCT  32.1*  31.8*  
PLT  258  264 Recent Labs  
   09/01/18 0410 08/31/18 0348 08/30/18 
 5289 NA  151*  150*  150*  
K  2.8*  3.4*  3.8 CL  111*  108  110* CO2  31  34*  34* BUN  23*  17  17 CREA  1.05  1.01  0.84 GLU  199*  115*  125* CA  8.7  8.6  8.0* Recent Labs  
   09/01/18 0410 SGOT  30 AP  84  
TP  7.3 ALB  2.0*  
GLOB  5.3* No results for input(s): INR, PTP, APTT in the last 72 hours. No lab exists for component: INREXT No results for input(s): FE, TIBC, PSAT, FERR in the last 72 hours. Lab Results Component Value Date/Time Folate 9.8 08/17/2018 04:04 AM  
 
No results for input(s): PH, PCO2, PO2 in the last 72 hours. No results for input(s): CPK, CKMB in the last 72 hours. No lab exists for component: TROPONINI Lab Results Component Value Date/Time  Color DARK YELLOW 08/11/2018 05:12 PM  
 Appearance TURBID (A) 08/11/2018 05:12 PM  
 Specific gravity >1.030 (H) 08/11/2018 05:12 PM  
 Specific gravity 1.023 08/06/2018 11:21 AM  
 pH (UA) 5.0 08/11/2018 05:12 PM  
 Protein 30 (A) 08/11/2018 05:12 PM  
 Glucose NEGATIVE  08/11/2018 05:12 PM  
 Ketone TRACE (A) 08/11/2018 05:12 PM  
 Bilirubin NEGATIVE  10/13/2014 03:38 PM  
 Urobilinogen 1.0 08/11/2018 05:12 PM  
 Nitrites POSITIVE (A) 08/11/2018 05:12 PM  
 Leukocyte Esterase MODERATE (A) 08/11/2018 05:12 PM  
 Epithelial cells FEW 08/11/2018 05:12 PM  
 Bacteria 2+ (A) 08/11/2018 05:12 PM  
 WBC 10-20 08/11/2018 05:12 PM  
 RBC 0-5 08/11/2018 05:12 PM  
 
 
MEDICATIONS: 
Current Facility-Administered Medications Medication Dose Route Frequency  potassium chloride 20 mEq in 50 ml IVPB  20 mEq IntraVENous Q1H  
 levETIRAcetam (KEPPRA) 500 mg in saline (iso-osm) 100 ml IVPB  500 mg IntraVENous Q12H  
 acetylcysteine (MUCOMYST) 200 mg/mL (20 %) solution 400 mg  400 mg Nebulization Q4H RT  
 furosemide (LASIX) injection 60 mg  60 mg IntraVENous DAILY  sodium chloride (NS) flush 10-30 mL  10-30 mL InterCATHeter PRN  
 sodium chloride (NS) flush 10-40 mL  10-40 mL InterCATHeter Q8H  
 sodium chloride (NS) flush 20 mL  20 mL InterCATHeter Q24H  
 heparin (porcine) pf 300 Units  300 Units InterCATHeter PRN  
 bacitracin 500 unit/gram packet 1 Packet  1 Packet Topical PRN  
 TPN ADULT - CENTRAL AA 5% D20% W/ CA + ELECTROLYTES   IntraVENous CONTINUOUS  
 gentamicin in Saline (Iso-osm) (GARAMYCIN) 80 mg/100 mL IVPB premix 80 mg  80 mg IntraVENous Q24H  
 levoFLOXacin (LEVAQUIN) 750 mg in D5W IVPB  750 mg IntraVENous Q24H  
 enoxaparin (LOVENOX) injection 120 mg  1 mg/kg SubCUTAneous Q12H  
 albuterol (PROVENTIL VENTOLIN) nebulizer solution 2.5 mg  2.5 mg Nebulization Q4H RT  
 DAPTOmycin (CUBICIN) 1,000 mg in 0.9% sodium chloride 50 mL IVPB RF formulation  1,000 mg IntraVENous Q24H  
 fentaNYL citrate (PF) injection  mcg   mcg IntraVENous Q2H PRN  
 LORazepam (ATIVAN) injection 2 mg  2 mg IntraVENous Q2H PRN  
 acetaminophen (TYLENOL) solution 650 mg  650 mg Oral Q4H PRN  
 insulin lispro (HUMALOG) injection   SubCUTAneous Q6H  
 glucose chewable tablet 16 g  4 Tab Oral PRN  
 dextrose (D50W) injection syrg 12.5-25 g  12.5-25 g IntraVENous PRN  
 glucagon (GLUCAGEN) injection 1 mg  1 mg IntraMUSCular PRN  
 albuterol-ipratropium (DUO-NEB) 2.5 MG-0.5 MG/3 ML  3 mL Nebulization Q6H PRN  
  prochlorperazine (COMPAZINE) with saline injection 5 mg  5 mg IntraVENous Q6H PRN  
 sodium chloride (NS) flush 10-30 mL  10-30 mL InterCATHeter PRN  
 sodium chloride (NS) flush 10 mL  10 mL InterCATHeter PRN  
 hydrALAZINE (APRESOLINE) 20 mg/mL injection 10 mg  10 mg IntraVENous Q6H PRN  
 oxyCODONE IR (ROXICODONE) tablet 5 mg  5 mg Oral Q4H PRN  
 oxyCODONE IR (ROXICODONE) tablet 10 mg  10 mg Oral Q4H PRN  
 sodium chloride (NS) flush 5-10 mL  5-10 mL IntraVENous PRN  
 acetaminophen (TYLENOL) tablet 650 mg  650 mg Oral Q6H PRN  
 ondansetron (ZOFRAN) injection 4 mg  4 mg IntraVENous Q6H PRN

## 2018-09-01 NOTE — PROGRESS NOTES
[facility] Name: Yeimi Chavis MRN: [id number] : 1940 Hospital: [facility] Date: 2018 IMPRESSION:  
· Acute respiratory failure requiring ventilator support. Extubated to [place] on 18. 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 treated · Volume overload with anasarca, still persists. Has lost 30 pounds but more to lose; unfortunately has also lost muscle mass · Urinary retention? Condom cath will not work due to anatomy · hypernatremia. · hypokalemia · Pulmonary edema and pleural effusions-stable at this point. · Dysphagia-on vent; given recent surgery not likely a good candidate for PEG or G tube- will need change to NGtube or dobhoff at time of trach · Right IJ thrombosis and tiny air bubble, 8/15/18: started on heparin drip but transitioned to enoxaparin · Anemia no overt bleeding · severe hypoalbuminemia · S/P colectomy/YAMEL/enterotomies for colon cancer; Stage 3b colon ca. · Ileus/ with recurrent aspiration pneumonia prior to admission to ICU. 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 · Debility, has global weakness. · DM  
· Obesity PLAN:  
· Speech therapy help appreciated · NPO for now · Will need to hold lovenox for 24 hours prior to PEG next week if blood cultures remain negative- per Dr. Blair Nance- d/c wife at bedside- PEG will still carry risks · TPN over the weekend and possibly PEG next week · Lasix daily but may need to increase again given all the IVfluids · DC bipap. · right pigtail pleural cathteter - pull next week of fluid down · follow cultures · Mucomyst with nebs, bronchial hygiene · abx per ID · D/w  and Je. Hold on pacer explant for now until he is stronger and after more abx and anticoagulation · ID and Cardiology help much appreciated;. · Replace K  
· Follow hemoglobin · GI prophylaxis · Avoid sedating meds. · Transfer to surgical tele today Subjective/Interval History:  
I have reviewed the flowsheet and previous days notes. No distress Off bipap On RA No complaints Review of Systems Unable to perform ROS: Intubated Constitutional: Positive for fatigue. Eyes: Negative. Respiratory: Positive for cough. Cardiovascular: Negative. Gastrointestinal: Negative. Genitourinary: Negative for frequency. MAR REVIEWED MEDS:  
Current Facility-Administered Medications Medication  potassium chloride 20 mEq in 50 ml IVPB  levETIRAcetam (KEPPRA) 500 mg in saline (iso-osm) 100 ml IVPB  acetylcysteine (MUCOMYST) 200 mg/mL (20 %) solution 400 mg  furosemide (LASIX) injection 60 mg  
 sodium chloride (NS) flush 10-30 mL  sodium chloride (NS) flush 10-40 mL  sodium chloride (NS) flush 20 mL  heparin (porcine) pf 300 Units  bacitracin 500 unit/gram packet 1 Packet  TPN ADULT - CENTRAL AA 5% D20% W/ CA + ELECTROLYTES  gentamicin in Saline (Iso-osm) (GARAMYCIN) 80 mg/100 mL IVPB premix 80 mg  levoFLOXacin (LEVAQUIN) 750 mg in D5W IVPB  enoxaparin (LOVENOX) injection 120 mg  
 albuterol (PROVENTIL VENTOLIN) nebulizer solution 2.5 mg  
  DAPTOmycin (CUBICIN) 1,000 mg in 0.9% sodium chloride 50 mL IVPB RF formulation  fentaNYL citrate (PF) injection  mcg  LORazepam (ATIVAN) injection 2 mg  acetaminophen (TYLENOL) solution 650 mg  
 insulin lispro (HUMALOG) injection  glucose chewable tablet 16 g  
 dextrose (D50W) injection syrg 12.5-25 g  
 glucagon (GLUCAGEN) injection 1 mg  
 albuterol-ipratropium (DUO-NEB) 2.5 MG-0.5 MG/3 ML  
 prochlorperazine (COMPAZINE) with saline injection 5 mg  sodium chloride (NS) flush 10-30 mL  sodium chloride (NS) flush 10 mL  hydrALAZINE (APRESOLINE) 20 mg/mL injection 10 mg  
 oxyCODONE IR (ROXICODONE) tablet 5 mg  oxyCODONE IR (ROXICODONE) tablet 10 mg  
 sodium chloride (NS) flush 5-10 mL  acetaminophen (TYLENOL) tablet 650 mg  
 ondansetron (ZOFRAN) injection 4 mg Objective:  
Vital Signs:   
Visit Vitals  /59  Pulse 94  Temp 99 °F (37.2 °C)  Resp 25  
 Ht 6' 2\" (1.88 m)  Wt 116 kg (255 lb 11.7 oz)  SpO2 90%  BMI 32.83 kg/m2 O2 Device: Room air O2 Flow Rate (L/min): 3 l/min Temp (24hrs), Av.1 °F (37.3 °C), Min:98.3 °F (36.8 °C), Max:99.9 °F (37.7 °C) Intake/Output:  
Last shift:      701 - 1900 In: -  
Out: 10 Last 3 shifts: 1901 -  07 In: 1944.5 [I.V.:1944.5] Out: 4665 [QFTFI:3754] Intake/Output Summary (Last 24 hours) at 18 1442 Last data filed at 18 1069 Gross per 24 hour Intake          1647.03 ml Output             2120 ml Net          -472.97 ml Physical Exam  
Constitutional: Vital signs are normal. He appears well-developed. He is active and cooperative. He appears ill. No distress. HENT:  
Head: Normocephalic and atraumatic. Mouth/Throat: No oropharyngeal exudate. Eyes: Conjunctivae are normal. Pupils are equal, round, and reactive to light. No scleral icterus. Neck:  
Old tracheostomy site Cardiovascular: Normal rate and regular rhythm. Pulmonary/Chest: No respiratory distress. He has no wheezes. He has no rales. Abdominal: He exhibits no distension. There is no tenderness. Musculoskeletal: He exhibits edema. Neurological: He is alert. Seems to be globally weak. Skin: Skin is warm and dry. Data:  
Labs: 
Recent Labs  
   09/01/18 0410 08/31/18 0348 08/30/18 
 1962 WBC  12.6*  12.8*  10.9 HGB  9.3*  9.2*  8.4* HCT  32.1*  31.8*  29.2*  
PLT  258  264  211 Recent Labs  
   09/01/18 0410 08/31/18 0348 08/30/18 
 5849 NA  151*  150*  150*  
K  2.8*  3.4*  3.8 CL  111*  108  110* CO2  31  34*  34* GLU  199*  115*  125* BUN  23*  17  17 CREA  1.05  1.01  0.84 CA  8.7  8.6  8.0* ALB  2.0*   --    --   
TBILI  0.8   --    --   
SGOT  30   --    --   
ALT  28   --    -- No results for input(s): PH, PCO2, PO2, HCO3, FIO2 in the last 72 hours. Imaging: 
I have personally reviewed the patients radiographs: CXR: luz Dotson MD

## 2018-09-01 NOTE — PROGRESS NOTES
Pharmacy Automatic Renal Dosing Protocol - Antimicrobials Indication for Antimicrobials: Bacteremia; Vegetation on pacer Current Regimen of Each Antimicrobial: 
Daptomycin 1000 mg IV every 24 hours (Started 8/26/18; Day #7) Gentamicin 80 mg IV every 24 hours (Start Date: 8/26/18; Day #7) Levofloxacin 750 mg IV every 24 hours (Start Date: 8/28/18; Day #5 of 7) Previous Antimicrobial Therapy: 
Nafcillin 2 grams every 4 hours (Started 8/24/18; Stopped 8/26/18) Daptomycin 1000 mg IV every 24 hours (Started 8/23/18; Stopped 8/24/18) Vancomycin 1500 mg IV every 12 hours (Started 8/11/18; Stopped 8/23/18) Ceftriaxone 2 g IV every 24 hours (Started 8/13/18; Stopped 8/17/18) Cefepime 2 g IV every 8 hours (Started 8/11/18; Stopped 8/13/18) Vancomycin Fluconazole Piperacillin-tazobactam 
 
Goal Gentamicin Levels (Synergy Dosing): 
Peak: 3-5 mcg/mL Trough: < 1 mcg/mL Gentamicin Level Assessment: 
Date Dose & Interval Measured (mcg/mL) Extrapolated (mcg/mL)  
8/27/18 80 mg IV every 8 hours Peak = 4.4 Trough = 2 Peak = 4.4 Trough = 1.36   
8/30 90 mg IV every 12 hours Peak = 5.0 Trough = 2.2 Peak = 5.2 Trough = 2.01  
8/31/18 100 mg IV every 24 hours Peak = 5.7 Trough = 0.7 Peak = 6 Trough = 0.65  
9/1/18 80 mg IV every 24 hours Peak = 3.4 Trough = 0.5 Peak = 4.6 Trough = 0.58 Significant Cultures:  
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 
Recent Labs  
   18 
 0410  18 
 0348  18 
 5085 CREA  1.05  1.01  0.84 BUN  23*  17  17 WBC  12.6*  12.8*  10.9 Temp (24hrs), Av.1 °F (37.3 °C), Min:98.3 °F (36.8 °C), Max:99.9 °F (37.7 °C) Creatinine Clearance (mL/min) or Dialysis: ~68 mL/min Impression/Plan:  
· Daptomycin dosed appropriately based on indication and renal function. Continue current regimen. Last CK on 18. Will order a CK with AM labs on 18. · Gentamicin peak and trough were drawn before steady state was achieved with the new dosing regimen. Extrapolated peak and trough based off of assessment utilizing the past gentamicin doses administered and their correlated peak/trough levels resulted in an assessment that gentamicin 100 mg IV every 24 hours achieves appropriate synergy gentamicin peaks and troughs. Appropriate to continue current gentamicin regimen. Will reassess for repeat levels on 18. · Levofloxacin dosed appropriately based on indication and renal function. Continue current regimen. · Antimicrobial stop date: Levofloxacin stop date of 7 days entered per ID; Daptomycin and gentamicin durations are to be determined. Pharmacy will follow daily and adjust medications as appropriate for renal function and/or serum levels. Thank you, Harjeet Nuñez, MIRLANDED

## 2018-09-01 NOTE — PROGRESS NOTES
Bedside and Verbal shift change report given to BASIL Choi RN (oncoming nurse) by Low Powers. Raul Taylor (offgoing nurse). Report included the following information SBAR, Kardex, Intake/Output, MAR, Recent Results and Cardiac Rhythm A fib.  
1929: Assessment complete. Patient resting in bed quietly. Oriented to person only, delayed responses. Follows commands with R side; L side flaccid (baseline). Lungs clear at this time. Room air sats WNL. Abdomen soft, bowel sounds active. L abd colostomy with small amount loose brown stool. Midline abd incision with dry dressing. Incontinent of urine, pericare complete. Peripheral pulses palpable. Generalized 2+ edema. A fib, occasional paced beats on monitor, rate controlled 's. BP stable, afebrile at this time. 1950: 's-150's. PO Amiodarone last dose 8/29 r/t NPO status. Paged Dr. Dai Orosco to notify. 2000: Notified Dr. Dai Orosco of af, rate uncontrolled since 1027 [facility] held since 8/30 due to NPO status. Orders received to give Amio bolus, then 1mg/min x 6 hours then decreased to 0.5mg/min. 2027: Amiodarone bolus then drip started. Needs more IV access for abx/anti-seizure meds. 20g PIV started L wrist x 1 attempt. Patient tolerated well. 2038: HR down to 100 s/p Amio bolus. Drip started at 1mg/min. 2200: Incontinent of urine. Patient bathed, linens changed. Mepilex on sacrum changed. No breakdown on sacrum noted. Midline abd incision cleaned with NS, packed with 1/2\" saline soaked packing, covered with folded 4x4 and secured with hypofix tape. Colostomy bag leaking. Site care performed, bag changed. 0000: reassessment unchanged except lungs coarse in upper lobes, diminished eddie lower lobes. Encouraged patient to keep CPAP on. Incontinent of urine. Linens wet. Pericare/incont care provided. Linens changed. Desitin applied to scrotum and groin. 0246: Amiodarone decreased to 0.5mg/min. Incontinent of urine. Cristine care/incont care provided.  Bedpad, gown, top sheet changed. Desitin applied to scrotum and groin. 0400: Reassessment completed, lungs clear at this time, resting on home CPAP. 6557: Bedside and Verbal shift change report given to Isaías Valladares RN (oncoming nurse) by BASIL Calero RN (offgoing nurse). Report included the following information SBAR, Kardex, Intake/Output, MAR, Recent Results and Cardiac Rhythm A fib.

## 2018-09-01 NOTE — PROGRESS NOTES
0715 - Bedside and Verbal shift change report given to Soledad Fair RN (oncoming nurse) by Macarena Gregory RN (offgoing nurse). Report included the following informatiPaced w/ underlying A-fibSBAR, Kardex, Procedure Summary, Intake/Output, MAR, Recent Results and Cardiac Rhythm Paced w/ underlying A-fib. Patient is awake and alert, resting quietly in bed. Patient able to state his own full name when asked but unable to recite his . Patient able to squeeze RN's hand with his RUE upon command. Hx Belkofski.   
 
0900 - IV Lasix given. 1045 - At bedside with patient. Patient's bed linens heavily saturated with urine. Linens changed, gown changed, back and perineal care provided, new disposable chucks placed. [phone] - Patient's wife present at bedside with patient's home CPAP machine. Biomed paged for service request.   
 
1400 [facility] - At bedside with patient. Oral temp 99.4, mild diaphoresis noted. Dry, rattling, nonproductive cough noted. Patient provided with NT and oral suctioning. Large amount of thick tan secretions noted. SPO2 87%. 2L NC provided. Elevated HOB to  45 degrees. 1420 - Patient's disposable chucks pad saturated with urine. Removed soiled pad and provided with new clean chucks pad.   
 
191 - Bedside and Verbal shift change report given to CRISTIN Dietz (oncoming nurse) by Soledad Fair RN (offgoing nurse). Report included the following information SBAR, Kardex, Procedure Summary, Intake/Output, MAR and Cardiac Rhythm Paced/Afib.

## 2018-09-02 LAB
ANION GAP SERPL CALC-SCNC: 8 MMOL/L (ref 5–15)
BACTERIA SPEC CULT: NORMAL
BACTERIA SPEC CULT: NORMAL
BASOPHILS # BLD: 0 K/UL (ref 0–0.1)
BASOPHILS NFR BLD: 0 % (ref 0–1)
BUN SERPL-MCNC: 26 MG/DL (ref 6–20)
BUN/CREAT SERPL: 26 (ref 12–20)
CALCIUM SERPL-MCNC: 8.7 MG/DL (ref 8.5–10.1)
CHLORIDE SERPL-SCNC: 113 MMOL/L (ref 97–108)
CK SERPL-CCNC: 40 U/L (ref 39–308)
CO2 SERPL-SCNC: 29 MMOL/L (ref 21–32)
CREAT SERPL-MCNC: 1 MG/DL (ref 0.7–1.3)
DIFFERENTIAL METHOD BLD: ABNORMAL
EOSINOPHIL # BLD: 0.1 K/UL (ref 0–0.4)
EOSINOPHIL NFR BLD: 1 % (ref 0–7)
ERYTHROCYTE [DISTWIDTH] IN BLOOD BY AUTOMATED COUNT: 23.4 % (ref 11.5–14.5)
GLUCOSE BLD STRIP.AUTO-MCNC: 145 MG/DL (ref 65–100)
GLUCOSE BLD STRIP.AUTO-MCNC: 151 MG/DL (ref 65–100)
GLUCOSE BLD STRIP.AUTO-MCNC: 236 MG/DL (ref 65–100)
GLUCOSE BLD STRIP.AUTO-MCNC: 239 MG/DL (ref 65–100)
GLUCOSE SERPL-MCNC: 228 MG/DL (ref 65–100)
GRAM STN SPEC: NORMAL
GRAM STN SPEC: NORMAL
HCT VFR BLD AUTO: 32.4 % (ref 36.6–50.3)
HGB BLD-MCNC: 9.1 G/DL (ref 12.1–17)
IMM GRANULOCYTES # BLD: 0.1 K/UL (ref 0–0.04)
IMM GRANULOCYTES NFR BLD AUTO: 1 % (ref 0–0.5)
LYMPHOCYTES # BLD: 2.4 K/UL (ref 0.8–3.5)
LYMPHOCYTES NFR BLD: 19 % (ref 12–49)
MCH RBC QN AUTO: 26.5 PG (ref 26–34)
MCHC RBC AUTO-ENTMCNC: 28.1 G/DL (ref 30–36.5)
MCV RBC AUTO: 94.2 FL (ref 80–99)
MONOCYTES # BLD: 1.2 K/UL (ref 0–1)
MONOCYTES NFR BLD: 10 % (ref 5–13)
NEUTS SEG # BLD: 8.6 K/UL (ref 1.8–8)
NEUTS SEG NFR BLD: 69 % (ref 32–75)
NRBC # BLD: 0.05 K/UL (ref 0–0.01)
NRBC BLD-RTO: 0.4 PER 100 WBC
PLATELET # BLD AUTO: 235 K/UL (ref 150–400)
PMV BLD AUTO: 11.2 FL (ref 8.9–12.9)
POTASSIUM SERPL-SCNC: 3.2 MMOL/L (ref 3.5–5.1)
RBC # BLD AUTO: 3.44 M/UL (ref 4.1–5.7)
RBC MORPH BLD: ABNORMAL
RBC MORPH BLD: ABNORMAL
SERVICE CMNT-IMP: ABNORMAL
SERVICE CMNT-IMP: NORMAL
SODIUM SERPL-SCNC: 150 MMOL/L (ref 136–145)
WBC # BLD AUTO: 12.4 K/UL (ref 4.1–11.1)

## 2018-09-02 PROCEDURE — 74011250636 HC RX REV CODE- 250/636: Performed by: INTERNAL MEDICINE

## 2018-09-02 PROCEDURE — 80048 BASIC METABOLIC PNL TOTAL CA: CPT | Performed by: SURGERY

## 2018-09-02 PROCEDURE — 74011000258 HC RX REV CODE- 258: Performed by: INTERNAL MEDICINE

## 2018-09-02 PROCEDURE — 82550 ASSAY OF CK (CPK): CPT | Performed by: INTERNAL MEDICINE

## 2018-09-02 PROCEDURE — 65660000000 HC RM CCU STEPDOWN

## 2018-09-02 PROCEDURE — 74011000250 HC RX REV CODE- 250: Performed by: INTERNAL MEDICINE

## 2018-09-02 PROCEDURE — 94640 AIRWAY INHALATION TREATMENT: CPT

## 2018-09-02 PROCEDURE — 82962 GLUCOSE BLOOD TEST: CPT

## 2018-09-02 PROCEDURE — 36592 COLLECT BLOOD FROM PICC: CPT

## 2018-09-02 PROCEDURE — 74011636637 HC RX REV CODE- 636/637: Performed by: INTERNAL MEDICINE

## 2018-09-02 PROCEDURE — 85025 COMPLETE CBC W/AUTO DIFF WBC: CPT | Performed by: SURGERY

## 2018-09-02 PROCEDURE — 77030012390 HC DRN CHST BTL GTNG -B

## 2018-09-02 PROCEDURE — 77030034850

## 2018-09-02 PROCEDURE — 36415 COLL VENOUS BLD VENIPUNCTURE: CPT | Performed by: INTERNAL MEDICINE

## 2018-09-02 RX ORDER — POTASSIUM CHLORIDE 29.8 MG/ML
20 INJECTION INTRAVENOUS
Status: COMPLETED | OUTPATIENT
Start: 2018-09-02 | End: 2018-09-02

## 2018-09-02 RX ADMIN — DAPTOMYCIN 1000 MG: 500 INJECTION, POWDER, LYOPHILIZED, FOR SOLUTION INTRAVENOUS at 13:07

## 2018-09-02 RX ADMIN — LEVOFLOXACIN 750 MG: 5 INJECTION, SOLUTION INTRAVENOUS at 17:38

## 2018-09-02 RX ADMIN — POTASSIUM CHLORIDE 20 MEQ: 400 INJECTION, SOLUTION INTRAVENOUS at 15:00

## 2018-09-02 RX ADMIN — LEVETIRACETAM 500 MG: 5 INJECTION INTRAVENOUS at 10:04

## 2018-09-02 RX ADMIN — ALBUTEROL SULFATE 2.5 MG: 2.5 SOLUTION RESPIRATORY (INHALATION) at 23:32

## 2018-09-02 RX ADMIN — LEVETIRACETAM 500 MG: 5 INJECTION INTRAVENOUS at 23:51

## 2018-09-02 RX ADMIN — ACETYLCYSTEINE 400 MG: 200 SOLUTION ORAL; RESPIRATORY (INHALATION) at 04:03

## 2018-09-02 RX ADMIN — INSULIN LISPRO 3 UNITS: 100 INJECTION, SOLUTION INTRAVENOUS; SUBCUTANEOUS at 23:51

## 2018-09-02 RX ADMIN — AMIODARONE HYDROCHLORIDE 0.5 MG/MIN: 50 INJECTION, SOLUTION INTRAVENOUS at 15:49

## 2018-09-02 RX ADMIN — GENTAMICIN SULFATE 80 MG: 80 INJECTION, SOLUTION INTRAVENOUS at 05:25

## 2018-09-02 RX ADMIN — ALBUTEROL SULFATE 2.5 MG: 2.5 SOLUTION RESPIRATORY (INHALATION) at 07:43

## 2018-09-02 RX ADMIN — ALBUTEROL SULFATE 2.5 MG: 2.5 SOLUTION RESPIRATORY (INHALATION) at 15:51

## 2018-09-02 RX ADMIN — INSULIN LISPRO 3 UNITS: 100 INJECTION, SOLUTION INTRAVENOUS; SUBCUTANEOUS at 18:43

## 2018-09-02 RX ADMIN — INSULIN LISPRO 3 UNITS: 100 INJECTION, SOLUTION INTRAVENOUS; SUBCUTANEOUS at 00:08

## 2018-09-02 RX ADMIN — AMIODARONE HYDROCHLORIDE 0.5 MG/MIN: 50 INJECTION, SOLUTION INTRAVENOUS at 02:46

## 2018-09-02 RX ADMIN — ASCORBIC ACID: 500 INJECTION, SOLUTION INTRAMUSCULAR; INTRAVENOUS; SUBCUTANEOUS at 19:56

## 2018-09-02 RX ADMIN — ACETYLCYSTEINE 400 MG: 200 SOLUTION ORAL; RESPIRATORY (INHALATION) at 07:43

## 2018-09-02 RX ADMIN — ALBUTEROL SULFATE 2.5 MG: 2.5 SOLUTION RESPIRATORY (INHALATION) at 20:22

## 2018-09-02 RX ADMIN — ALBUTEROL SULFATE 2.5 MG: 2.5 SOLUTION RESPIRATORY (INHALATION) at 04:04

## 2018-09-02 RX ADMIN — POTASSIUM CHLORIDE 20 MEQ: 400 INJECTION, SOLUTION INTRAVENOUS at 13:31

## 2018-09-02 RX ADMIN — ENOXAPARIN SODIUM 120 MG: 100 INJECTION SUBCUTANEOUS at 09:15

## 2018-09-02 RX ADMIN — FUROSEMIDE 60 MG: 10 INJECTION, SOLUTION INTRAMUSCULAR; INTRAVENOUS at 08:22

## 2018-09-02 RX ADMIN — Medication 40 ML: at 05:34

## 2018-09-02 RX ADMIN — INSULIN LISPRO 4 UNITS: 100 INJECTION, SOLUTION INTRAVENOUS; SUBCUTANEOUS at 11:40

## 2018-09-02 RX ADMIN — Medication 10 ML: at 23:51

## 2018-09-02 RX ADMIN — INSULIN LISPRO 4 UNITS: 100 INJECTION, SOLUTION INTRAVENOUS; SUBCUTANEOUS at 05:39

## 2018-09-02 RX ADMIN — ALBUTEROL SULFATE 2.5 MG: 2.5 SOLUTION RESPIRATORY (INHALATION) at 11:21

## 2018-09-02 NOTE — PROGRESS NOTES
0815: patient incontinent large amt urine overnight and this morning; receiving large doses of lasix and scrotum red and pt constantly moist,c/o of being uncomfortable from being wet. Unable to place condom cath r/t anatomy. Patient also not receiving BPH meds due to NPO status. Received orders for flannery. 0920: Dr. Thea Pallas notified of K+ 3.2; MD to place orders. TRANSFER - OUT REPORT: 
 
Verbal report given to Mitra(name) on [facility]  being transferred to PCU(unit) for routine progression of care Report consisted of patients Situation, Background, Assessment and  
Recommendations(SBAR). Information from the following report(s) SBAR, Kardex, ED Summary, OR Summary, Procedure Summary, Intake/Output, MAR, Accordion, Recent Results and Cardiac Rhythm A.fib/paced was reviewed with the receiving nurse. Lines: PICC Double Lumen 08/31/18 Right;Brachial (Active) Central Line Being Utilized Yes 9/2/2018 11:00 AM  
Criteria for Appropriate Use Hemodynamically unstable, requiring monitoring lines, vasopressors, or volume resuscitation 9/2/2018 11:00 AM  
Site Assessment Clean, dry, & intact 9/2/2018 11:00 AM  
Phlebitis Assessment 0 9/2/2018 11:00 AM  
Infiltration Assessment 0 9/2/2018 11:00 AM  
Arm Circumference (cm) 39 cm 8/31/2018  1:40 PM  
Date of Last Dressing Change 08/31/18 9/2/2018 11:00 AM  
Dressing Status Clean, dry, & intact 9/2/2018 11:00 AM  
Action Taken Open ports on tubing capped 9/2/2018 11:00 AM  
External Catheter Length (cm) 0 centimeters 8/31/2018  1:40 PM  
Dressing Type Disk with Chlorhexadine gluconate (CHG); Transparent 9/2/2018 11:00 AM  
Hub Color/Line Status Purple; Infusing 9/2/2018 11:00 AM  
Positive Blood Return (Site #1) Yes 9/2/2018  5:00 AM  
Hub Color/Line Status Red; Infusing 9/2/2018 11:00 AM  
Positive Blood Return (Site #2) Yes 9/2/2018  5:00 AM  
Alcohol Cap Used Yes 9/2/2018  5:00 AM  
   
Peripheral IV 09/01/18 Left Wrist (Active) Site Assessment Clean, dry, & intact 9/2/2018 11:00 AM  
Phlebitis Assessment 0 9/2/2018 11:00 AM  
Infiltration Assessment 0 9/2/2018 11:00 AM  
Dressing Status Clean, dry, & intact 9/2/2018 11:00 AM  
Dressing Type Transparent;Tape 9/2/2018 11:00 AM  
Hub Color/Line Status Pink; Infusing 9/2/2018 11:00 AM  
Action Taken Open ports on tubing capped 9/2/2018 11:00 AM  
Alcohol Cap Used Yes 9/2/2018 11:00 AM  
  
 
Opportunity for questions and clarification was provided. Patient transported with: 
 Registered Nurse Tech

## 2018-09-02 NOTE — PROGRESS NOTES
Cardiology Progress Note 9/2/2018 7:07 AM 
 
Admit Date: 7/10/2018 Admit Diagnosis: Acute blood loss anemia;GI bleed; Anasarca;GI Bleed;gi bleed* Subjective:  
 
Luke Cárdenas has no complaints. On IV amiodarone since yesterday. Oral dose on hold for several days. Visit Vitals  /71  Pulse 88  Temp 99.6 °F (37.6 °C)  Resp 28  Ht 6' 2\" (1.88 m)  Wt 246 lb 11.1 oz (111.9 kg)  SpO2 98%  BMI 31.67 kg/m2 Current Facility-Administered Medications Medication Dose Route Frequency  TPN ADULT - CENTRAL AA 5% D20% W/ CA + ELECTROLYTES   IntraVENous CONTINUOUS  
 zinc oxide-cod liver oil (DESITIN) 40 % paste   Topical PRN  
 amiodarone (CORDARONE) 375 mg/250 mL D5W infusion  0.5-1 mg/min IntraVENous TITRATE  levETIRAcetam (KEPPRA) 500 mg in saline (iso-osm) 100 ml IVPB  500 mg IntraVENous Q12H  
 acetylcysteine (MUCOMYST) 200 mg/mL (20 %) solution 400 mg  400 mg Nebulization Q4H RT  
 furosemide (LASIX) injection 60 mg  60 mg IntraVENous DAILY  sodium chloride (NS) flush 10-30 mL  10-30 mL InterCATHeter PRN  
 sodium chloride (NS) flush 10-40 mL  10-40 mL InterCATHeter Q8H  
 sodium chloride (NS) flush 20 mL  20 mL InterCATHeter Q24H  
 heparin (porcine) pf 300 Units  300 Units InterCATHeter PRN  
 bacitracin 500 unit/gram packet 1 Packet  1 Packet Topical PRN  
 gentamicin in Saline (Iso-osm) (GARAMYCIN) 80 mg/100 mL IVPB premix 80 mg  80 mg IntraVENous Q24H  
 levoFLOXacin (LEVAQUIN) 750 mg in D5W IVPB  750 mg IntraVENous Q24H  
 enoxaparin (LOVENOX) injection 120 mg  1 mg/kg SubCUTAneous Q12H  
 albuterol (PROVENTIL VENTOLIN) nebulizer solution 2.5 mg  2.5 mg Nebulization Q4H RT  
 DAPTOmycin (CUBICIN) 1,000 mg in 0.9% sodium chloride 50 mL IVPB RF formulation  1,000 mg IntraVENous Q24H  
 fentaNYL citrate (PF) injection  mcg   mcg IntraVENous Q2H PRN  
 LORazepam (ATIVAN) injection 2 mg  2 mg IntraVENous Q2H PRN  
  acetaminophen (TYLENOL) solution 650 mg  650 mg Oral Q4H PRN  
 insulin lispro (HUMALOG) injection   SubCUTAneous Q6H  
 glucose chewable tablet 16 g  4 Tab Oral PRN  
 dextrose (D50W) injection syrg 12.5-25 g  12.5-25 g IntraVENous PRN  
 glucagon (GLUCAGEN) injection 1 mg  1 mg IntraMUSCular PRN  
 albuterol-ipratropium (DUO-NEB) 2.5 MG-0.5 MG/3 ML  3 mL Nebulization Q6H PRN  prochlorperazine (COMPAZINE) with saline injection 5 mg  5 mg IntraVENous Q6H PRN  
 sodium chloride (NS) flush 10-30 mL  10-30 mL InterCATHeter PRN  
 sodium chloride (NS) flush 10 mL  10 mL InterCATHeter PRN  
 hydrALAZINE (APRESOLINE) 20 mg/mL injection 10 mg  10 mg IntraVENous Q6H PRN  
 oxyCODONE IR (ROXICODONE) tablet 5 mg  5 mg Oral Q4H PRN  
 oxyCODONE IR (ROXICODONE) tablet 10 mg  10 mg Oral Q4H PRN  
 sodium chloride (NS) flush 5-10 mL  5-10 mL IntraVENous PRN  
 acetaminophen (TYLENOL) tablet 650 mg  650 mg Oral Q6H PRN  
 ondansetron (ZOFRAN) injection 4 mg  4 mg IntraVENous Q6H PRN Objective:  
  
Physical Exam: 
Visit Vitals  /73 (BP 1 Location: Left arm, BP Patient Position: At rest)  Pulse 97  Temp 97.8 °F (36.6 °C)  Resp 30  
 Ht 6' 2\" (1.88 m)  Wt 246 lb 11.1 oz (111.9 kg)  SpO2 97%  BMI 31.67 kg/m2 General Appearance:  Well developed, well nourished,alert and oriented x 2, and individual in no acute distress. Ears/Nose/Mouth/Throat:   Hearing grossly normal. 
  
    Neck: Supple. Chest:   Lungs clear to auscultation bilaterally. Cardiovascular:  Regular rate and rhythm, S1, S2 normal, no murmur. Abdomen:   Soft, non-tender, bowel sounds are active. Extremities: No edema bilaterally. Skin: Warm and dry. Data Review:  
Labs:   
Recent Results (from the past 24 hour(s)) GLUCOSE, POC Collection Time: 09/01/18 12:10 PM  
Result Value Ref Range Glucose (POC) 185 (H) 65 - 100 mg/dL Performed by Ansley Brizuela* GLUCOSE, POC  
 Collection Time: 09/01/18  5:52 PM  
Result Value Ref Range Glucose (POC) 162 (H) 65 - 100 mg/dL Performed by Hannah Crooks* GLUCOSE, POC Collection Time: 09/02/18 12:05 AM  
Result Value Ref Range Glucose (POC) 151 (H) 65 - 100 mg/dL Performed by Chuck Husain, POC Collection Time: 09/02/18  5:32 AM  
Result Value Ref Range Glucose (POC) 236 (H) 65 - 100 mg/dL Performed by Sebastian Mustafa   
CBC WITH AUTOMATED DIFF Collection Time: 09/02/18  5:33 AM  
Result Value Ref Range WBC 12.4 (H) 4.1 - 11.1 K/uL  
 RBC 3.44 (L) 4.10 - 5.70 M/uL HGB 9.1 (L) 12.1 - 17.0 g/dL HCT 32.4 (L) 36.6 - 50.3 % MCV 94.2 80.0 - 99.0 FL  
 MCH 26.5 26.0 - 34.0 PG  
 MCHC 28.1 (L) 30.0 - 36.5 g/dL RDW 23.4 (H) 11.5 - 14.5 % PLATELET 565 904 - 541 K/uL MPV 11.2 8.9 - 12.9 FL  
 NRBC 0.4 (H) 0  WBC ABSOLUTE NRBC 0.05 (H) 0.00 - 0.01 K/uL NEUTROPHILS 69 32 - 75 % LYMPHOCYTES 19 12 - 49 % MONOCYTES 10 5 - 13 % EOSINOPHILS 1 0 - 7 % BASOPHILS 0 0 - 1 % IMMATURE GRANULOCYTES 1 (H) 0.0 - 0.5 % ABS. NEUTROPHILS 8.6 (H) 1.8 - 8.0 K/UL  
 ABS. LYMPHOCYTES 2.4 0.8 - 3.5 K/UL  
 ABS. MONOCYTES 1.2 (H) 0.0 - 1.0 K/UL  
 ABS. EOSINOPHILS 0.1 0.0 - 0.4 K/UL  
 ABS. BASOPHILS 0.0 0.0 - 0.1 K/UL  
 ABS. IMM. GRANS. 0.1 (H) 0.00 - 0.04 K/UL  
 DF AUTOMATED    
 RBC COMMENTS ANISOCYTOSIS 2+ 
    
 RBC COMMENTS HYPOCHROMIA PRESENT 
    
CK Collection Time: 09/02/18  5:33 AM  
Result Value Ref Range CK 40 39 - 308 U/L Telemetry: normal sinus rhythm Assessment:  
 
Principal Problem: 
  GI bleed (7/10/2018) Active Problems: 
  Acute blood loss anemia (7/10/2018) Anasarca (7/10/2018) Acute encephalopathy (8/24/2018) Idiopathic hypotension (8/24/2018) Counseling regarding goals of care (8/24/2018) Plan:  
 
Continue IV amiodarone. Will transition to oral dose once PEG tube in place.  
 
Maxx Thakur MD

## 2018-09-02 NOTE — PROGRESS NOTES
Received report from [facility]. SBAR, KARDEX, MAR, RECENT RESULTS. Per report Peg tube placement scheduled for 09/04/18 Tuesday. Hold pm Lovenox on 09/03/18 and am Lovenox on 09/04/18.  
 
1700: Call to CCU request meds for Saige Mater. Received meds from CCU unit several bags of potassium 10meq when 20 meq is ordered.  
 
1800. Checked flannery and scanned patient bladder 18ml. Flannery draining properly. 1925: Call to pharmacy to request tpn not delivered. Spoke to [place]. She stated med was delivered today. Adv CCU supposedly gave all the patient meds present. She stated she would quantros both units for not transferring patient medication.

## 2018-09-02 NOTE — PROGRESS NOTES
[facility] Name: Luke Cárdenas MRN: [id number] : 1940 Hospital: [facility] Date: 2018 IMPRESSION:  
· Acute respiratory failure requiring ventilator support. Extubated to [place] on 18. 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 treated · Volume overload with anasarca, still persists. Has lost 30 pounds but more to lose; unfortunately has also lost muscle mass · Urinary retention? Condom cath will not work due to anatomy · hypernatremia. · hypokalemia · Pulmonary edema and pleural effusions-stable at this point. · Dysphagia-on vent; given recent surgery not likely a good candidate for PEG or G tube- will need change to NGtube or dobhoff at time of trach · Right IJ thrombosis and tiny air bubble, 8/15/18: started on heparin drip but transitioned to enoxaparin · Anemia no overt bleeding · severe hypoalbuminemia · S/P colectomy/YAMEL/enterotomies for colon cancer; Stage 3b colon ca. · Ileus/ with recurrent aspiration pneumonia prior to admission to ICU. 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 · Debility, has global weakness. · DM  
· Obesity PLAN:  
· NPO for now · Will need to hold lovenox for 24 hours prior to PEG next week if blood cultures remain negative- per Dr. Cristian Keith- d/c wife at bedside- PEG will still carry risks · TPN over the weekend and possibly PEG next week · Lasix daily but may need to increase again given all the IVfluids · DC bipap. · right pigtail pleural cathteter - pull next week if fluid down · follow cultures · Mucomyst with nebs, bronchial hygiene · abx per ID · Hold on pacer explant for now until he is stronger and after more abx and anticoagulation · ID and Cardiology help much appreciated;. · Follow hemoglobin · GI prophylaxis · Avoid sedating meds. · Transfer to pcu Subjective/Interval History:  
I have reviewed the flowsheet and previous days notes. No distress Off bipap On RA No complaints Review of Systems Unable to perform ROS: Intubated Constitutional: Positive for fatigue. Eyes: Negative. Respiratory: Positive for cough. Cardiovascular: Negative. Gastrointestinal: Negative. Genitourinary: Negative for frequency. MAR REVIEWED MEDS:  
Current Facility-Administered Medications Medication  TPN ADULT - CENTRAL AA 5% D20% W/ CA + ELECTROLYTES  zinc oxide-cod liver oil (DESITIN) 40 % paste  amiodarone (CORDARONE) 375 mg/250 mL D5W infusion  levETIRAcetam (KEPPRA) 500 mg in saline (iso-osm) 100 ml IVPB  acetylcysteine (MUCOMYST) 200 mg/mL (20 %) solution 400 mg  furosemide (LASIX) injection 60 mg  
 sodium chloride (NS) flush 10-30 mL  sodium chloride (NS) flush 10-40 mL  sodium chloride (NS) flush 20 mL  heparin (porcine) pf 300 Units  bacitracin 500 unit/gram packet 1 Packet  gentamicin in Saline (Iso-osm) (GARAMYCIN) 80 mg/100 mL IVPB premix 80 mg  levoFLOXacin (LEVAQUIN) 750 mg in D5W IVPB  enoxaparin (LOVENOX) injection 120 mg  
 albuterol (PROVENTIL VENTOLIN) nebulizer solution 2.5 mg  
 DAPTOmycin (CUBICIN) 1,000 mg in 0.9% sodium chloride 50 mL IVPB RF formulation  fentaNYL citrate (PF) injection  mcg  LORazepam (ATIVAN) injection 2 mg  acetaminophen (TYLENOL) solution 650 mg  
 insulin lispro (HUMALOG) injection  glucose chewable tablet 16 g  
 dextrose (D50W) injection syrg 12.5-25 g  
 glucagon (GLUCAGEN) injection 1 mg  
 albuterol-ipratropium (DUO-NEB) 2.5 MG-0.5 MG/3 ML  
 prochlorperazine (COMPAZINE) with saline injection 5 mg  sodium chloride (NS) flush 10-30 mL  sodium chloride (NS) flush 10 mL  hydrALAZINE (APRESOLINE) 20 mg/mL injection 10 mg  
 oxyCODONE IR (ROXICODONE) tablet 5 mg  oxyCODONE IR (ROXICODONE) tablet 10 mg  
 sodium chloride (NS) flush 5-10 mL  acetaminophen (TYLENOL) tablet 650 mg  
 ondansetron (ZOFRAN) injection 4 mg Objective:  
Vital Signs:   
Visit Vitals  /75  Pulse 98  Temp 97.8 °F (36.6 °C)  Resp (!) 32  
 Ht 6' 2\" (1.88 m)  Wt 111.9 kg (246 lb 11.1 oz)  SpO2 97%  BMI 31.67 kg/m2 O2 Device: Room air O2 Flow Rate (L/min): 3 l/min Temp (24hrs), Av.6 °F (37 °C), Min:97.8 °F (36.6 °C), Max:99.6 °F (37.6 °C) Intake/Output:  
Last shift:      701 - 1900 In: 509 [I.V.:509] Out: - Last 3 shifts: 1901 -  07 In: 2989.9 [I.V.:2989.9] Out: 640 [Urine:450] Intake/Output Summary (Last 24 hours) at 18 0945 Last data filed at 18 0800 Gross per 24 hour Intake           2095.4 ml Output              150 ml Net           1945.4 ml Physical Exam  
Constitutional: Vital signs are normal. He appears well-developed. He is active and cooperative. He appears ill. No distress. HENT:  
Head: Normocephalic and atraumatic. Mouth/Throat: No oropharyngeal exudate. Eyes: Conjunctivae are normal. Pupils are equal, round, and reactive to light. No scleral icterus. Neck:  
Old tracheostomy site Cardiovascular: Normal rate and regular rhythm. Pulmonary/Chest: No respiratory distress. He has no wheezes. He has no rales. Abdominal: He exhibits no distension. There is no tenderness. Musculoskeletal: He exhibits edema. Neurological: He is alert. Seems to be globally weak. Skin: Skin is warm and dry. Data:  
Labs: 
Recent Labs  
   09/02/18 0533 09/01/18 0410  08/31/18 
 7671 WBC  12.4*  12.6*  12.8* HGB  9.1*  9.3*  9.2* HCT  32.4*  32.1*  31.8*  
PLT  235  258  264 Recent Labs  
   09/02/18 0533  09/01/18 0410  08/31/18 
 1458 NA  150*  151*  150*  
K  3.2*  2.8*  3.4*  
CL  113*  111*  108 CO2  29  31  34* GLU  228*  199*  115* BUN  26*  23*  17  
CREA  1.00  1.05  1.01  
CA  8.7  8.7  8.6 ALB   --   2.0*   --   
TBILI   --   0.8   --   
SGOT   --   30   --   
ALT   --   28   -- No results for input(s): PH, PCO2, PO2, HCO3, FIO2 in the last 72 hours. Imaging: 
I have personally reviewed the patients radiographs: CXR: luz Angel MD

## 2018-09-03 ENCOUNTER — APPOINTMENT (OUTPATIENT)
Dept: GENERAL RADIOLOGY | Age: 78
DRG: 329 | End: 2018-09-03
Attending: INTERNAL MEDICINE
Payer: MEDICARE

## 2018-09-03 LAB
ALBUMIN SERPL-MCNC: 1.9 G/DL (ref 3.5–5)
ALBUMIN/GLOB SERPL: 0.3 {RATIO} (ref 1.1–2.2)
ALP SERPL-CCNC: 95 U/L (ref 45–117)
ALT SERPL-CCNC: 75 U/L (ref 12–78)
ANION GAP SERPL CALC-SCNC: 9 MMOL/L (ref 5–15)
AST SERPL-CCNC: 84 U/L (ref 15–37)
BASOPHILS # BLD: 0 K/UL (ref 0–0.1)
BASOPHILS NFR BLD: 0 % (ref 0–1)
BILIRUB SERPL-MCNC: 1.1 MG/DL (ref 0.2–1)
BUN SERPL-MCNC: 28 MG/DL (ref 6–20)
BUN/CREAT SERPL: 24 (ref 12–20)
CALCIUM SERPL-MCNC: 8.6 MG/DL (ref 8.5–10.1)
CHLORIDE SERPL-SCNC: 114 MMOL/L (ref 97–108)
CO2 SERPL-SCNC: 26 MMOL/L (ref 21–32)
CREAT SERPL-MCNC: 1.15 MG/DL (ref 0.7–1.3)
DIFFERENTIAL METHOD BLD: ABNORMAL
EOSINOPHIL # BLD: 0.2 K/UL (ref 0–0.4)
EOSINOPHIL NFR BLD: 1 % (ref 0–7)
ERYTHROCYTE [DISTWIDTH] IN BLOOD BY AUTOMATED COUNT: 23.4 % (ref 11.5–14.5)
GLOBULIN SER CALC-MCNC: 5.7 G/DL (ref 2–4)
GLUCOSE BLD STRIP.AUTO-MCNC: 159 MG/DL (ref 65–100)
GLUCOSE BLD STRIP.AUTO-MCNC: 200 MG/DL (ref 65–100)
GLUCOSE BLD STRIP.AUTO-MCNC: 217 MG/DL (ref 65–100)
GLUCOSE BLD STRIP.AUTO-MCNC: 246 MG/DL (ref 65–100)
GLUCOSE SERPL-MCNC: 203 MG/DL (ref 65–100)
HCT VFR BLD AUTO: 33.8 % (ref 36.6–50.3)
HGB BLD-MCNC: 9.7 G/DL (ref 12.1–17)
IMM GRANULOCYTES # BLD: 0.1 K/UL (ref 0–0.04)
IMM GRANULOCYTES NFR BLD AUTO: 1 % (ref 0–0.5)
LYMPHOCYTES # BLD: 2.7 K/UL (ref 0.8–3.5)
LYMPHOCYTES NFR BLD: 19 % (ref 12–49)
MCH RBC QN AUTO: 26.8 PG (ref 26–34)
MCHC RBC AUTO-ENTMCNC: 28.7 G/DL (ref 30–36.5)
MCV RBC AUTO: 93.4 FL (ref 80–99)
MONOCYTES # BLD: 1.4 K/UL (ref 0–1)
MONOCYTES NFR BLD: 10 % (ref 5–13)
NEUTS SEG # BLD: 10.1 K/UL (ref 1.8–8)
NEUTS SEG NFR BLD: 70 % (ref 32–75)
NRBC # BLD: 0.04 K/UL (ref 0–0.01)
NRBC BLD-RTO: 0.3 PER 100 WBC
PLATELET # BLD AUTO: 253 K/UL (ref 150–400)
PMV BLD AUTO: 10.5 FL (ref 8.9–12.9)
POTASSIUM SERPL-SCNC: 3.8 MMOL/L (ref 3.5–5.1)
PROT SERPL-MCNC: 7.6 G/DL (ref 6.4–8.2)
RBC # BLD AUTO: 3.62 M/UL (ref 4.1–5.7)
SERVICE CMNT-IMP: ABNORMAL
SODIUM SERPL-SCNC: 149 MMOL/L (ref 136–145)
WBC # BLD AUTO: 14.5 K/UL (ref 4.1–11.1)

## 2018-09-03 PROCEDURE — 74011250636 HC RX REV CODE- 250/636: Performed by: INTERNAL MEDICINE

## 2018-09-03 PROCEDURE — 92526 ORAL FUNCTION THERAPY: CPT

## 2018-09-03 PROCEDURE — 85025 COMPLETE CBC W/AUTO DIFF WBC: CPT | Performed by: SURGERY

## 2018-09-03 PROCEDURE — 80053 COMPREHEN METABOLIC PANEL: CPT | Performed by: INTERNAL MEDICINE

## 2018-09-03 PROCEDURE — 82962 GLUCOSE BLOOD TEST: CPT

## 2018-09-03 PROCEDURE — 74011000258 HC RX REV CODE- 258: Performed by: INTERNAL MEDICINE

## 2018-09-03 PROCEDURE — 74011000250 HC RX REV CODE- 250: Performed by: INTERNAL MEDICINE

## 2018-09-03 PROCEDURE — 36415 COLL VENOUS BLD VENIPUNCTURE: CPT | Performed by: SURGERY

## 2018-09-03 PROCEDURE — 87103 BLOOD FUNGUS CULTURE: CPT | Performed by: INTERNAL MEDICINE

## 2018-09-03 PROCEDURE — 74011250636 HC RX REV CODE- 250/636: Performed by: SURGERY

## 2018-09-03 PROCEDURE — 94640 AIRWAY INHALATION TREATMENT: CPT

## 2018-09-03 PROCEDURE — 65660000000 HC RM CCU STEPDOWN

## 2018-09-03 PROCEDURE — 74011636637 HC RX REV CODE- 636/637: Performed by: INTERNAL MEDICINE

## 2018-09-03 PROCEDURE — 71045 X-RAY EXAM CHEST 1 VIEW: CPT

## 2018-09-03 RX ORDER — FLUCONAZOLE 2 MG/ML
200 INJECTION, SOLUTION INTRAVENOUS DAILY
Status: DISCONTINUED | OUTPATIENT
Start: 2018-09-03 | End: 2018-09-06

## 2018-09-03 RX ORDER — SODIUM CHLORIDE 450 MG/100ML
75 INJECTION, SOLUTION INTRAVENOUS CONTINUOUS
Status: DISCONTINUED | OUTPATIENT
Start: 2018-09-03 | End: 2018-09-06

## 2018-09-03 RX ADMIN — Medication 10 ML: at 12:15

## 2018-09-03 RX ADMIN — LEVETIRACETAM 250 MG: 100 INJECTION, SOLUTION, CONCENTRATE INTRAVENOUS at 15:00

## 2018-09-03 RX ADMIN — GENTAMICIN SULFATE 80 MG: 80 INJECTION, SOLUTION INTRAVENOUS at 06:00

## 2018-09-03 RX ADMIN — Medication 10 ML: at 22:00

## 2018-09-03 RX ADMIN — AMIODARONE HYDROCHLORIDE 0.5 MG/MIN: 50 INJECTION, SOLUTION INTRAVENOUS at 19:00

## 2018-09-03 RX ADMIN — ALBUTEROL SULFATE 2.5 MG: 2.5 SOLUTION RESPIRATORY (INHALATION) at 23:41

## 2018-09-03 RX ADMIN — ALBUTEROL SULFATE 2.5 MG: 2.5 SOLUTION RESPIRATORY (INHALATION) at 15:26

## 2018-09-03 RX ADMIN — ENOXAPARIN SODIUM 110 MG: 100 INJECTION SUBCUTANEOUS at 13:00

## 2018-09-03 RX ADMIN — INSULIN LISPRO 4 UNITS: 100 INJECTION, SOLUTION INTRAVENOUS; SUBCUTANEOUS at 12:12

## 2018-09-03 RX ADMIN — ALBUTEROL SULFATE 2.5 MG: 2.5 SOLUTION RESPIRATORY (INHALATION) at 19:29

## 2018-09-03 RX ADMIN — SODIUM CHLORIDE 75 ML/HR: 450 INJECTION, SOLUTION INTRAVENOUS at 17:38

## 2018-09-03 RX ADMIN — AMIODARONE HYDROCHLORIDE 0.5 MG/MIN: 50 INJECTION, SOLUTION INTRAVENOUS at 04:30

## 2018-09-03 RX ADMIN — INSULIN LISPRO 4 UNITS: 100 INJECTION, SOLUTION INTRAVENOUS; SUBCUTANEOUS at 06:00

## 2018-09-03 RX ADMIN — ALBUTEROL SULFATE 2.5 MG: 2.5 SOLUTION RESPIRATORY (INHALATION) at 07:33

## 2018-09-03 RX ADMIN — FLUCONAZOLE 200 MG: 2 INJECTION INTRAVENOUS at 21:26

## 2018-09-03 RX ADMIN — LEVOFLOXACIN 750 MG: 5 INJECTION, SOLUTION INTRAVENOUS at 18:43

## 2018-09-03 RX ADMIN — ALBUTEROL SULFATE 2.5 MG: 2.5 SOLUTION RESPIRATORY (INHALATION) at 04:34

## 2018-09-03 RX ADMIN — INSULIN LISPRO 200 UNITS: 100 INJECTION, SOLUTION INTRAVENOUS; SUBCUTANEOUS at 17:30

## 2018-09-03 RX ADMIN — DAPTOMYCIN 1000 MG: 500 INJECTION, POWDER, LYOPHILIZED, FOR SOLUTION INTRAVENOUS at 14:59

## 2018-09-03 RX ADMIN — ALBUTEROL SULFATE 2.5 MG: 2.5 SOLUTION RESPIRATORY (INHALATION) at 12:03

## 2018-09-03 RX ADMIN — FUROSEMIDE 60 MG: 10 INJECTION, SOLUTION INTRAMUSCULAR; INTRAVENOUS at 12:13

## 2018-09-03 RX ADMIN — Medication 10 ML: at 06:01

## 2018-09-03 RX ADMIN — ASCORBIC ACID: 500 INJECTION, SOLUTION INTRAMUSCULAR; INTRAVENOUS; SUBCUTANEOUS at 19:00

## 2018-09-03 NOTE — PROGRESS NOTES
12:55 PM order to change Keppra to 250mg Q12 per Dr. Sonya Powers. Per Dr. Luis Gustafson consult hospitalist for medical management. Primary RN aware.

## 2018-09-03 NOTE — PROGRESS NOTES
Music Therapy Assessment Julianna Dodd [id number]  [id number]   
1940  69 y.o.  male Patient Telephone Number: [phone] (home) Buddhist Affiliation: Restorationist  
Language: [place] Extended Emergency Contact Information Primary Emergency Contact: Jon Bay Home Phone: [phone] Mobile Phone: [phone] Relation: Spouse Patient Active Problem List  
 Diagnosis Date Noted  Acute encephalopathy 08/24/2018  Idiopathic hypotension 08/24/2018  Counseling regarding goals of care 08/24/2018  Acute blood loss anemia 07/10/2018  Anasarca 07/10/2018  GI bleed 07/10/2018  Severe obesity (BMI 35.0-39.9) ([address] 07/05/2018  Localized epilepsy with impairment of consciousness ([address]  Common wart 05/16/2016  Chronic right shoulder pain 01/11/2016  Screening for colon cancer 11/04/2015  Acute bronchitis 08/25/2015  Chronic back pain greater than 3 months duration 05/04/2015  Cerebral infarction ([address] 05/04/2015  Pre-op evaluation 01/29/2015  Pacemaker 10/15/2014  SSS (sick sinus syndrome) ([address] 10/13/2014  Syncope 10/13/2014  Seizure disorder ([address] 10/13/2014  RONAL on CPAP 10/13/2014  Tinea corporis 05/07/2014  Insomnia 01/03/2014  Ankle fracture, left 08/28/2013  Constipation 12/14/2012  Cataract 07/30/2012  Atrial fibrillation ([address] 06/19/2012  TBI (traumatic brain injury) ([address] 06/08/2012  CHI (closed head injury) 05/24/2012  Basal cell cancer 05/24/2012  Atrial flutter ([address] 03/02/2012  Atrial fibrillation or flutter 03/02/2012  Small bowel obstruction ([address] 02/21/2012  Ulcer 09/14/2011  Wax in ear 06/17/2011  Cellulitis 04/04/2011  Rash 03/28/2011  Hypothyroidism 12/22/2010  Hyponatremia 12/22/2010  BPH (benign prostatic hyperplasia) 12/22/2010  
 HTN (hypertension) 12/08/2010  Depression 12/08/2010  Hypercholesterolemia 12/08/2010  Acid reflux 12/08/2010  Se[facility]ure ([facility] 12/08/2010 Date: 9/3/2018 Mental Status:   [x] Alert [x] Forgetful [  ]  Confused  [  ] Minimally responsive Communication Status: [x] Impaired Speech [  ] Nonverbal  
 
Physical Status:   [  ] Oxygen in use  [  ] Hard of Hearing [  ] Vision Impaired [  ] Ambulatory  [  ] Ambulatory with assistance [  ] Non-ambulatory -N/A Music Preferences, Background: [address], [place] He Pt played the guitar and fiddle, sometimes in public with friends who were in a band. Pt's spouse played the chord organ. Clinical Problem addressed: Support healthy pt/family coping, improve mood. Goal(s) met in session: 
Physical/Pain management (Scale of 1-10): Pre-session rating: Pt didn't respond when MT asked him if he had pain. Pt's spouse said this hasn't been an issue for pt. Post-session rating: No change observed or reported. [  ] Increased relaxation   [  ] Regulated breathing patterns [  ] Decreased muscle tension   [  ] Minimized physical distress Emotional/Psychological: 
[  ] Increased self-expression   [  ] Decreased aggressive behavior [  ] Decreased sadness   [  ] Discussed healthy coping skills [x] Improved mood    [  ] Decreased withdrawn behavior Social: 
[  ] Decreased feelings of isolation/loneliness [x] Positive social interaction  
[x] Provided support and/or comfort for family/friends Spiritual: 
[  ] Spiritual support    [  ] Expressed peace [  ] Expressed edith    [  ] Discussed beliefs Techniques Utilized (Check all that apply):  
[  ] Procedural support MT [  ] Music for relaxation [x] Patient preferred music 
[  ] Jodi analysis  [x] Song choice  [  ] Music for validation [  ] Entrainment  [  ] Progressive muscle relax. [  ] Guided visualization [  ] Lziz Stamp  [  ] Patient instrument playing [  ] Hermes landrum [  ] Victoria Pepper along   [  ] Darral Ducking  [  ] Sensory stimulation [  ] Active Listening  [  ] Music for spiritual support [  ] Making of CDs as gifts Session Observations:  F/u visit; Patient (pt) was alert lying in bed with his spouse Coleen Garcia at bedside. Pt nodded yes when asked if he'd like music, and pt's spouse requested the David Claros song Your Cheatin' Heart. This music therapist (MT) sat at bedside and sang and played this with [place]. Pt displayed forgetfulness throughout the session. He repeatedly reached his arm and spoke, though his words were difficult for MT to understand. Pt's spouse said pt was asking for a urinal, and had difficulty remembering that he now had a catheter. Pt's affect brightened and he engaged in eye contact with MT when MT offered him another song. Pt nodded yes. Pt's spouse chose to hear a Doylene Decant song next. MT sang and played [address] with CamG[employer]r. Pt appeared to become distracted during each song in the session, but when asked about the music, his affect consistently brightened and his facial expression became more animated. Pt's niece then entered. Pt's spouse requested the Everangelikaa Mozaico song [facility] and MT sang and played this. Pt's spouse increased emotional expression in response to this song, as evidenced by (AEB) becoming tearful. Pt's niece offered her support, putting her arm around pt's spouse. Pt's spouse and niece expressed enjoyment in the music and gratitude for the session. Will follow as able. FLOR BriggsBC (Music Therapist-Board Certified) Spiritual Care Department Referral-based service

## 2018-09-03 NOTE — PROGRESS NOTES
Pharmacy  Enoxaparin (Lovenox®) Monitoring/Dosing Indication: AFIB (patient on dabigatran PTA) Current Dose: Enoxaparin 120 mg subcutaneously every 12 hours Creatinine Clearance (mL/min): Greater than 60 mL/min Labs: 
Recent Labs  
   09/03/18 
 0457  09/02/18 
 0533  09/01/18 
 0410 CREA  1.15  1.00  1.05  
HGB  9.7*  9.1*  9.3*  
PLT  253  235  258 Wt Readings from Last 1 Encounters:  
09/03/18 112.4 kg (247 lb 12.8 oz) Ht Readings from Last 1 Encounters:  
07/05/18 188 cm (74\") Impression/Plan: · SCr, HGB and PLT stable. · Will change to 110 mg as the weight has decreased per dosing protocol. Thanks, Ismael Deleon, PHARMD

## 2018-09-03 NOTE — PROGRESS NOTES
Cardiology Progress Note [address], [place], [address] [facility]  [phone] 
 
9/3/2018 9:36 AM 
 
Admit Date: 7/10/2018 Admit Diagnosis: Acute blood loss anemia;GI bleed; Anasarca;GI Bleed;gi bleed* Subjective:  
 
Amparo Elizabeth   denies chest pain. Visit Vitals  /74 (BP 1 Location: Left arm, BP Patient Position: At rest)  Pulse 97  Temp 98.6 °F (37 °C)  Resp 18  Ht 6' 2\" (1.88 m)  Wt 247 lb 12.8 oz (112.4 kg)  SpO2 96%  BMI 31.82 kg/m2 Current Facility-Administered Medications Medication Dose Route Frequency  TPN ADULT - CENTRAL AA 5% D20% W/ CA + ELECTROLYTES   IntraVENous CONTINUOUS  
 zinc oxide-cod liver oil (DESITIN) 40 % paste   Topical PRN  
 amiodarone (CORDARONE) 375 mg/250 mL D5W infusion  0.5-1 mg/min IntraVENous TITRATE  levETIRAcetam (KEPPRA) 500 mg in saline (iso-osm) 100 ml IVPB  500 mg IntraVENous Q12H  furosemide (LASIX) injection 60 mg  60 mg IntraVENous DAILY  sodium chloride (NS) flush 10-30 mL  10-30 mL InterCATHeter PRN  
 sodium chloride (NS) flush 10-40 mL  10-40 mL InterCATHeter Q8H  
 sodium chloride (NS) flush 20 mL  20 mL InterCATHeter Q24H  
 heparin (porcine) pf 300 Units  300 Units InterCATHeter PRN  
 bacitracin 500 unit/gram packet 1 Packet  1 Packet Topical PRN  
 gentamicin in Saline (Iso-osm) (GARAMYCIN) 80 mg/100 mL IVPB premix 80 mg  80 mg IntraVENous Q24H  
 levoFLOXacin (LEVAQUIN) 750 mg in D5W IVPB  750 mg IntraVENous Q24H  
 enoxaparin (LOVENOX) injection 120 mg  1 mg/kg SubCUTAneous Q12H  
 albuterol (PROVENTIL VENTOLIN) nebulizer solution 2.5 mg  2.5 mg Nebulization Q4H RT  
 DAPTOmycin (CUBICIN) 1,000 mg in 0.9% sodium chloride 50 mL IVPB RF formulation  1,000 mg IntraVENous Q24H  
 fentaNYL citrate (PF) injection  mcg   mcg IntraVENous Q2H PRN  
 LORazepam (ATIVAN) injection 2 mg  2 mg IntraVENous Q2H PRN  
  acetaminophen (TYLENOL) solution 650 mg  650 mg Oral Q4H PRN  
 insulin lispro (HUMALOG) injection   SubCUTAneous Q6H  
 glucose chewable tablet 16 g  4 Tab Oral PRN  
 dextrose (D50W) injection syrg 12.5-25 g  12.5-25 g IntraVENous PRN  
 glucagon (GLUCAGEN) injection 1 mg  1 mg IntraMUSCular PRN  
 albuterol-ipratropium (DUO-NEB) 2.5 MG-0.5 MG/3 ML  3 mL Nebulization Q6H PRN  prochlorperazine (COMPAZINE) with saline injection 5 mg  5 mg IntraVENous Q6H PRN  
 sodium chloride (NS) flush 10-30 mL  10-30 mL InterCATHeter PRN  
 sodium chloride (NS) flush 10 mL  10 mL InterCATHeter PRN  
 hydrALAZINE (APRESOLINE) 20 mg/mL injection 10 mg  10 mg IntraVENous Q6H PRN  
 oxyCODONE IR (ROXICODONE) tablet 5 mg  5 mg Oral Q4H PRN  
 oxyCODONE IR (ROXICODONE) tablet 10 mg  10 mg Oral Q4H PRN  
 sodium chloride (NS) flush 5-10 mL  5-10 mL IntraVENous PRN  
 acetaminophen (TYLENOL) tablet 650 mg  650 mg Oral Q6H PRN  
 ondansetron (ZOFRAN) injection 4 mg  4 mg IntraVENous Q6H PRN Objective:  
  
Visit Vitals  /74 (BP 1 Location: Left arm, BP Patient Position: At rest)  Pulse 97  Temp 98.6 °F (37 °C)  Resp 18  Ht 6' 2\" (1.88 m)  Wt 247 lb 12.8 oz (112.4 kg)  SpO2 96%  BMI 31.82 kg/m2 Physical Exam: Abdomen: soft, non-tender Extremities: extremities normal 
Heart: regular rate and rhythm Lungs: clear to auscultation bilaterally Pulses: 2+ and symmetric Data Review:  
Labs:   
Recent Labs  
   09/03/18 0457 09/02/18 
 0533  09/01/18 
 0410 WBC  14.5*  12.4*  12.6* HGB  9.7*  9.1*  9.3* HCT  33.8*  32.4*  32.1*  
PLT  253  235  258 Recent Labs  
   09/03/18 
 0457 09/02/18 
 0533  09/01/18 
 0410 NA  149*  150*  151*  
K  3.8  3.2*  2.8*  
CL  114*  113*  111* CO2  26  29  31 GLU  203*  228*  199* BUN  28*  26*  23* CREA  1.15  1.00  1.05  
CA  8.6  8.7  8.7 ALB  1.9*   --   2.0*  
TBILI  1.1*   --   0.8 SGOT  84*   --   30  
 ALT  75   --   28 Recent Labs  
   09/02/18 
 0533 CPK  40 Intake/Output Summary (Last 24 hours) at 09/03/18 9840 Last data filed at 09/03/18 2829 Gross per 24 hour Intake              409 ml Output             1890 ml Net            -1481 ml Telemetry: afib Assessment:  
 
Principal Problem: 
  GI bleed (7/10/2018) Active Problems: 
  Acute blood loss anemia (7/10/2018) Anasarca (7/10/2018) Acute encephalopathy (8/24/2018) Idiopathic hypotension (8/24/2018) Counseling regarding goals of care (8/24/2018) Plan:  
 
Ally Ashley is in rate controlled af. He had a low grade fever overnight and wbc went up. bld cultures are NTD 5 days. Cont supportive care. Cont iv amio for rate control. For peg when stable Yari Montoya MD, [facility] 
 
9/3/2018

## 2018-09-03 NOTE — PROGRESS NOTES
1230: Page to Dr. Lele French re: family concern of increased lethargy today compared to 2-3 days ago. I received patient at  09/02/18 and not good  and he seems the same mentation to me, family persists. I have not given am dose of Keppra as of yet. WBC elevated overnight temp increase to 102. Temp WNL since 0700 today. Very coarse. Received order per Dr. Lele French to decrease Keppra to 250 mg as patient wife continually concerned about lethargy. 1500: Mouth care a large amount of debris in mouth white patches on roof of mouth and tongue. Notified Dr. Lottie Funes. 
 
1700: Spoke to Dr. Alfonzo Granda in person for new neuro consult. 1800: Fungal blood cultures left AC peripheral stick Erazo continues to leak after receiving lasix however output 1250 ml. Still no output from chest tube and also colostomy.

## 2018-09-03 NOTE — PROGRESS NOTES
Problem: Dysphagia (Adult) Goal: *Acute Goals and Plan of Care (Insert Text) Speech path goals Initiated 8/30/2018 1. Patient will participate in [facility] tomorrow. Completed. 2. Pt will participate with oral and pharyngeal swallowing exercises to improve swallowing function. 1. Pt will tolerate purees and nectar thick liquids with no overt s/s of aspiration. Discontinue Speech pathology goals Initiated 8/3/2018 1. Patient will tolerate sips and chips with no overt s/s aspiration within 7 days. Goal met 8/10. 
2. Patient will participate in re-evaluation of swallow function within 7 days. Goal met 8/10. 3. Pt will participate with MBS today 8/10. Goal met 8/10. Speech language pathology dysphagia treatment Patient: Perla Lawler (3 y.o. male) Date: 9/3/2018 Diagnosis: Acute blood loss anemia GI bleed Anasarca GI Bleed 
gi bleed ASCENDING COLON CANCER  GI bleed Procedure(s) (LRB): 
LAPAROSCOPIC  ASSISTED TO OPEN RIGHT COLECTOMY AND SMALL BOWEL RESECTION (Right) 48 Days Post-Op Precautions:    
 
ASSESSMENT: 
Pt had extensive mouth completed by myself and nsg. He had thick dried mucous on his palatal area, faucial arches and in his b upper lateral sulci. His resp status fluctuates with him due to his anxiety. There were periods of tachypnea during oral care and with ice chip trials. Very small amts of ice were offered and the pt was verbally cued to swallow. After the swallow, he would open his mouth and the ice chip would spill out anteriorly. Very discoordinated oral phase. Mild swallow delay. Reduced hyolaryngeal excursion noted. No coughing or throat clearing after tsp of thins. He is not ready for po diet based on bedside today and MBS Friday. Pt will be receiving a PEG tomorrow. Progression toward goals: 
[]         Improving appropriately and progressing toward goals 
[]         Improving slowly and progressing toward goals [x]         Not making progress toward goals and plan of care will be adjusted PLAN: 
Recommendations and Planned Interventions: NPO Patient continues to benefit from skilled intervention to address the above impairments. Continue treatment per established plan of care. Discharge Recommendations:  None SUBJECTIVE:  
Patient stated I'm sorry. \" when the ice chip would fall from his mouth. OBJECTIVE:  
Cognitive and Communication Status: 
Neurologic State: Alert Orientation Level: Oriented to person Cognition: Follows commands Perception: Appears intact Perseveration: Perseverates during conversation Safety/Judgement: Decreased awareness of environment, Decreased awareness of need for assistance, Decreased awareness of need for safety, Decreased insight into deficits Dysphagia Treatment: 
Oral Assessment: P.O. Trials: 
Patient Position: upright in bed Vocal quality prior to P.O.: Low volume Consistency Presented: Thin liquid; Ice chips How Presented: Self-fed/presented;Cup/sip Bolus Acceptance: No impairment Bolus Formation/Control: Impaired Type of Impairment: Delayed;Poor;Mastication;Posterior Propulsion: Delayed (# of seconds); Discoordination Oral Residue: Greater than 50% of bolus Initiation of Swallow: Delayed (# of seconds) Laryngeal Elevation: Decreased Aspiration Signs/Symptoms: None Oral Phase Severity: Moderate-severe Pharyngeal Phase Severity : Moderate Pain: 
Pain Scale 1: Numeric (0 - 10) Pain Intensity 1: 0 After treatment:  
[]              Patient left in no apparent distress sitting up in chair 
[x]              Patient left in no apparent distress in bed 
[x]              Call bell left within reach [x]              Nursing notified 
[x]              Caregiver present 
[]              Bed alarm activated COMMUNICATION/EDUCATION:  
Pt is not able to retain education The patients plan of care including recommendations, planned interventions, and recommended diet changes were discussed with: Registered Nurse. []              Posted safety precautions in patient's room. DIPAK Angela Time Calculation: 15 mins

## 2018-09-03 NOTE — CONSULTS
Hospitalist Consultation Note NAME:  Zeeshan Calzada :   1940 MRN:   [id number] ATTENDING: Phillip Lazcano MD 
PCP:  Cristal Carrel, NP Date/Time:  9/3/2018 4:25 PM 
 
 
Recommendations/Plan:  
 
Acute Encephalopathy - noted in past 3 days as per family, could be mulitfactorial 
? Poor baseline mental status from TBI/CVA but pt was functional as per family H/o Seizure disorder - but seizure free for a long time as per Dr Tobi Bamberger, only on low dose tegretol Hypernatremia - much improved now at 149 H/o Prolonged TPN- due to Dysphagia with ? Oral thrush with severe crusting NEW Fever in past 24 hrs- 100.5 WBC trending up in past 24 hrs- 14.5 K Last bacterial Blood Cx neg x 5 day DC Keppra for now DC all psychotropic meds in [facility], re-introduce as needed IP neurology consult for further recommendations- remain off Anti seizure meds versus Vimpat? ?- pt is a long term pt of Dr Tobi Bamberger (at [facility] neurology group) Send Blood Cx for fungal growth, will start low dose Diflucan for now empirically- further recommendations as per ID- following; follow fever trend IVF- 1/2NS at low rate to correct hypernatremia Plan to get PEG tube to get off TPN ASAP if remains afebrile & WBC improves in AM 
 
Acute resp Failure s/p Ventilator support , now off it since  Kareem Pleural Effusions s/p R chest tube Pulm following ? Infected Thrombus on NATASHA Bacteremia SSS S/p PPM 
On IV ABx as per ID Genta + Daptomycin On Lovenox therapeutic Dysphagia GI bleed s/p colectomy/enterotomies Ileus with Aspiration PNA Hb stable now On TPN Not the best candidate for PEG tube Palliative consulted this admission already Code Status: Full DVT Prophylaxis: on lovenox for the thrombus Subjective:  
REQUESTING PHYSICIAN: Dr. Nydia Ablert ([address] [id number]) REASON FOR CONSULT:  Medical management, AMS/lethargy x 3 days Nani Marquez is a 69 y.o.    male who I was asked to see for medical management of this critically ill but improving patient just transferred out of ICU in last 24 hrs . Pt's family noticed pt is more lethargic in past 3 days. Was cracking Jokes in ICU 3 days ago, now more lethargic. H/o complex hospital course in past 55 days- chart reviewed & flow of events confirmed with wife at bedside. 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 - had been on tegretol for years- keppra was added for a brief period by primary neuro Dr Thom Willis (at [facility]) but later taken off it once his \"episodes of ?seizures\" were thought to be from [facility] [employer] for which he received PPM per cardiology. Wife denies pt had any issues with Keppra PO then at 500 mg BID with respect to Drowsiness (adverse effect) Pt currently on Keppra IV BID as pt is NPO & cannot take anything PO as per the SLP & Colorectal surgery for the LGIB requiring colectomy Pt also has spiked fever in past 24 hrs (100.5 at 8pm yesterday) Pt is currently on TPN for a long time- planned for PEG tube in AM to switch to enteral feeding. Pt is currently on Daptomycin & Gentamycin as per ID post op Pt also has R chest tube - managed by Pulmonary Past Medical History:  
Diagnosis Date  A-fib ([address]  Acute bronchitis 8/25/2015  Anxiety  Arrhythmia   
 atrial fibrillation  Arthritis  BCC (basal cell carcinoma of skin)  BPH (benign prostatic hyperplasia)  Chronic back pain greater than 3 months duration 5/4/2015  Chronic right shoulder pain 1/11/2016  Common wart 5/16/2016  Constipation 12/14/2012  CVA (cerebral infarction) 5/4/2015  Depression  GERD (gastroesophageal reflux disease)  Hearing loss   
 bilateral hearing aids  Hemiplegia following CVA (cerebrovascular accident) ([address]   
 left side hemiparesis  History of colostomy  HTN (hypertension)  Hyperlipidemia  Localized epilepsy with impairment of consciousness ([address]  Prostate cancer ([address] Status post XRT, Lupron  Screening for colon cancer 11/4/2015  Stroke [facility])   
 traumatic from neck crushing  TBI (traumatic brain injury) ([facility] 1994  Unspecified sleep apnea   
 on CPAP Past Surgical History:  
Procedure Laterality Date  COLONOSCOPY N/A 7/12/2018 COLONOSCOPY through stoma performed by Vanita Valdez MD at [facility] ENDOSCOPY  
 HX ANKLE FRACTURE TX    
 HX CATARACT REMOVAL    
 bilat  HX COLECTOMY colostomy placed and revised  HX HEENT    
 ear surgery dedie.  HX HERNIA REPAIR    
 HX ORTHOPAEDIC    
 left hip pinning  HX PACEMAKER  2014 Social History Substance Use Topics  Smoking status: Former Smoker Years: 10.00 Types: Pipe Quit date: 1/29/1990  Smokeless tobacco: Never Used Comment: QUIT 1970'S  Alcohol use No  
  
Family History Problem Relation Age of Onset  Alzheimer Mother   
  dec [de-identified]  
 Cancer Father   
  dec 76 kidney  Heart Disease Brother  No Known Problems Son Allergies Allergen Reactions  Ciprofibrate Hives Prior to Admission medications Medication Sig Start Date End Date Taking? Authorizing Provider  
amLODIPine (NORVASC) 10 mg tablet Take 10 mg by mouth daily (with lunch). Yes Lester Lu MD  
atorvastatin (LIPITOR) 20 mg tablet Take 20 mg by mouth daily (with dinner). Yes Lester Lu MD  
carBAMazepine XR (TEGRETOL XR) 200 mg SR tablet Take 200 mg by mouth two (2) times a day. YOVANI, Brand only   Yes Lester Lu MD  
acetaminophen (TYLENOL) 500 mg tablet Take 1,000 mg by mouth every six (6) hours as needed for Pain. Yes Lester Lu MD  
cholecalciferol (VITAMIN D3) 1,000 unit cap Take 1,000 Units by mouth daily (with lunch). Yes Historical Provider  
meclizine (ANTIVERT) 25 mg tablet Take 25 mg by mouth every eight (8) hours as needed for Nausea.  take every 8 hours as needed for nausea 5/10/18  Yes Allison Soils MD  
 PRADAXA 150 mg capsule TAKE ONE CAPSULE BY MOUTH EVERY 12 HOURS 5/16/18  Yes Scott Monk NP  
amiodarone (CORDARONE) 200 mg tablet TAKE ONE TABLET BY MOUTH DAILY 5/9/18  Yes Isabell Ferris MD  
lisinopril (PRINIVIL, ZESTRIL) 40 mg tablet TAKE ONE TABLET BY MOUTH DAILY 4/12/18  Yes Scott Monk NP  
magnesium hydroxide (SOTO MILK OF MAGNESIA) 400 mg/5 mL suspension Take 30 mL by mouth daily as needed for Constipation. Yes Historical Provider DOC-Q-LACE 100 mg capsule TAKE ONE CAPSULE BY MOUTH TWICE A DAY 4/16/16  Yes Delilah Belle MD  
lutein 20 mg tab Take 1 Tab by mouth daily. Yes Historical Provider  
senna (SENNA) 8.6 mg tablet Take 1 tablet by mouth daily. Yes Historical Provider  
finasteride (PROSCAR) 5 mg tablet Take 5 mg by mouth daily. Yes Historical Provider  
venlafaxine-SR (EFFEXOR-XR) 75 mg capsule TAKE ONE CAPSULE BY MOUTH DAILY 7/25/18   Delilah Belle MD  
metoprolol tartrate (LOPRESSOR) 25 mg tablet TAKE ONE-HALF TABLET BY MOUTH TWICE A DAY 7/17/18   Delilah Belle MD  
lactulose (CHRONULAC) 10 gram/15 mL solution TAKE 1 TEASPOONFUL (5 ML) BY MOUTH THREE TIMES AS DAY AS NEEDED  Indications: constipation 3/16/18   Xochilt Rodríguez NP  
zolpidem (AMBIEN) 10 mg tablet Take 1 Tab by mouth nightly as needed for Sleep (please take in the bed). 9/26/16   Delilah Belle MD  
betamethasone dipropionate (DIPROSONE) 0.05 % topical cream Apply  to affected area as needed. 5/16/16   Delilah Belle MD  
 
 
REVIEW OF SYSTEMS:    
Total of 12 systems reviewed as follows:  
Limited review of systems because: The patient is intubated and sedated  
x The patient has altered mental status due to his acute medical problems The patient has baseline aphasia from prior stroke(s) The patient has baseline dementia and is not reliable historian POSITIVE= underlined text  Negative = text not underlined General:  Fever (100.5 in past 24 hrs), chills, sweats, generalized weakness, weight loss/gain,  
   loss of appetite Eyes:    blurred vision, eye pain, loss of vision, double vision ENT:    rhinorrhea, pharyngitis Respiratory:   cough, sputum production, SOB, wheezing, BECERRIL, pleuritic pain  
Cardiology:   chest pain, palpitations, orthopnea, PND, edema, syncope Gastrointestinal:  abdominal pain , N/V, dysphagia, diarrhea, constipation, bleeding Genitourinary:  frequency, urgency, dysuria, hematuria, incontinence Muskuloskeletal :  arthralgia, myalgia Hematology:  easy bruising, nose or gum bleeding, lymphadenopathy Dermatological: rash, ulceration, pruritis Endocrine:   hot flashes or polydipsia Neurological:  headache, dizziness, confusion, focal weakness, paresthesia, Speech difficulties, memory loss, gait disturbance Psychological: Feelings of anxiety, depression, agitation Objective: VITALS:   
Visit Vitals  /53  Pulse 79  Temp 98.5 °F (36.9 °C)  Resp 18  Ht 6' 2\" (1.88 m)  Wt 112.4 kg (247 lb 12.8 oz)  SpO2 97%  BMI 31.82 kg/m2 Temp (24hrs), Av.9 °F (37.2 °C), Min:98 °F (36.7 °C), Max:100.5 °F (38.1 °C) PHYSICAL EXAM:  
General:    Alert & Arousable but drowsy in past 3 days a per the wife, cooperative but does get agitated once in a while (pulled NGT out in ICU), no obvious distress, appears stated age. HEENT: Atraumatic, anicteric sclerae, pink conjunctivae, Oropharyngeal crusting noted- ? Marlyne Honor No oral ulcers, mucosa dry +, throat clear Neck:  Supple, symmetrical,  thyroid: non tender Lungs:   Clear to auscultation bilaterally. No Wheezing or Rhonchi. No rales. R chest tube noted + Chest wall:  No tenderness  No Accessory muscle use. Heart:   Regular  rhythm,  No  murmur   No edema Abdomen:   Soft, non-tender. Not distended. Bowel sounds normal, Colostomy bag noted , no blood in it+ Extremities: No cyanosis. No clubbing Skin:     Not pale. Not Jaundiced  No rashes Psych:  Poor insight. Not depressed. Not anxious or agitated. Neurologic: EOMs intact. No facial asymmetry. No aphasia or slurred speech. Symmetrical strength, Alert and oriented X 4.  
 
_______________________________________________________________________ Care Plan discussed with: 
  Comments Patient x Family  x Wife & niece at bedside in [phone] RN x Care Manager Consultant:     
____________________________________________________________________ TOTAL TIME:     46 mins Comments  
 x Reviewed previous records  
>50% of visit spent in counseling and coordination of care x Discussion with patient's wife and family(niece) and questions answered at bedside in U # [phone] Critical Care Provided     Minutes non procedure based 
________________________________________________________________________ Signed: Danya Perera MD 
 
 
Procedures: see electronic medical records for all procedures/Xrays and details which were not copied into this note but were reviewed prior to creation of Plan. LAB DATA REVIEWED:   
Recent Results (from the past 24 hour(s)) GLUCOSE, POC Collection Time: 09/02/18  5:37 PM  
Result Value Ref Range Glucose (POC) 145 (H) 65 - 100 mg/dL Performed by Mariam Byrd, POC Collection Time: 09/02/18 11:44 PM  
Result Value Ref Range Glucose (POC) 159 (H) 65 - 100 mg/dL Performed by Kelley CARRENO WITH AUTOMATED DIFF Collection Time: 09/03/18  4:57 AM  
Result Value Ref Range WBC 14.5 (H) 4.1 - 11.1 K/uL  
 RBC 3.62 (L) 4.10 - 5.70 M/uL HGB 9.7 (L) 12.1 - 17.0 g/dL HCT 33.8 (L) 36.6 - 50.3 % MCV 93.4 80.0 - 99.0 FL  
 MCH 26.8 26.0 - 34.0 PG  
 MCHC 28.7 (L) 30.0 - 36.5 g/dL RDW 23.4 (H) 11.5 - 14.5 % PLATELET 284 711 - 015 K/uL MPV 10.5 8.9 - 12.9 FL  
 NRBC 0.3 (H) 0  WBC ABSOLUTE NRBC 0.04 (H) 0.00 - 0.01 K/uL NEUTROPHILS 70 32 - 75 % LYMPHOCYTES 19 12 - 49 % MONOCYTES 10 5 - 13 % EOSINOPHILS 1 0 - 7 % BASOPHILS 0 0 - 1 % IMMATURE GRANULOCYTES 1 (H) 0.0 - 0.5 % ABS. NEUTROPHILS 10.1 (H) 1.8 - 8.0 K/UL  
 ABS. LYMPHOCYTES 2.7 0.8 - 3.5 K/UL  
 ABS. MONOCYTES 1.4 (H) 0.0 - 1.0 K/UL  
 ABS. EOSINOPHILS 0.2 0.0 - 0.4 K/UL  
 ABS. BASOPHILS 0.0 0.0 - 0.1 K/UL  
 ABS. IMM. GRANS. 0.1 (H) 0.00 - 0.04 K/UL  
 DF AUTOMATED METABOLIC PANEL, COMPREHENSIVE Collection Time: 09/03/18  4:57 AM  
Result Value Ref Range Sodium 149 (H) 136 - 145 mmol/L Potassium 3.8 3.5 - 5.1 mmol/L Chloride 114 (H) 97 - 108 mmol/L  
 CO2 26 21 - 32 mmol/L Anion gap 9 5 - 15 mmol/L Glucose 203 (H) 65 - 100 mg/dL BUN 28 (H) 6 - 20 MG/DL Creatinine 1.15 0.70 - 1.30 MG/DL  
 BUN/Creatinine ratio 24 (H) 12 - 20 GFR est AA >60 >60 ml/min/1.73m2 GFR est non-AA >60 >60 ml/min/1.73m2 Calcium 8.6 8.5 - 10.1 MG/DL Bilirubin, total 1.1 (H) 0.2 - 1.0 MG/DL  
 ALT (SGPT) 75 12 - 78 U/L  
 AST (SGOT) 84 (H) 15 - 37 U/L Alk. phosphatase 95 45 - 117 U/L Protein, total 7.6 6.4 - 8.2 g/dL Albumin 1.9 (L) 3.5 - 5.0 g/dL Globulin 5.7 (H) 2.0 - 4.0 g/dL A-G Ratio 0.3 (L) 1.1 - 2.2 GLUCOSE, POC Collection Time: 09/03/18  5:31 AM  
Result Value Ref Range Glucose (POC) 246 (H) 65 - 100 mg/dL Performed by Edouard Rubio GLUCOSE, POC Collection Time: 09/03/18 11:51 AM  
Result Value Ref Range Glucose (POC) 217 (H) 65 - 100 mg/dL Performed by Derek Pena   
 
 
_____________________________ Hospitalist: Daily Carrillo MD

## 2018-09-03 NOTE — PROGRESS NOTES
ADULT PROTOCOL: JET AEROSOL ASSESSMENT Patient  Maty Robles     69 y.o.   male     9/3/2018  4:50 AM 
 
Breath Sounds Pre Procedure: Right Breath Sounds: Diminished Left Breath Sounds: Diminished Breath Sounds Post Procedure: Right Breath Sounds: Diminished Left Breath Sounds: Diminished Breathing pattern: Pre procedure Breathing Pattern: Tachypneic Post procedure Breathing Pattern: Tachypneic Heart Rate: Pre procedure Pulse: 113 Post procedure Pulse: 114 Resp Rate: Pre procedure Respirations: 28 
         Post procedure Respirations: 28 Incentive Spirometry:  Actual Volume (ml): 750 ml 
     
 
Cough: Pre procedure Cough: Non-productive Post procedure Cough: Non-productive Suctioned: NO Sputum: Pre procedure Sputum amount: Scant Sputum color/odor: Tan 
Sputum consistency: Thick Sputum method obtained: Endotracheal 
               Post procedure Oxygen: O2 Device: Room air Changed: NO SpO2: Pre procedure SpO2: 94 %   without oxygen Post procedure SpO2: 94 %  without oxygen Nebulizer Therapy: Current medications Aerosolized Medications: Albuterol Changed: NO Smoking History: former smoker Problem List:  
Patient Active Problem List  
Diagnosis Code  
 HTN (hypertension) I10  
 Depression F32.9  Hypercholesterolemia E78.00  Acid reflux K21.9  Seizure ([facility] R56.9  Hypothyroidism E03.9  Hyponatremia E87.1  BPH (benign prostatic hyperplasia) N40.0  Rash R21  
 Cellulitis L03.90  Wax in ear H61.20  Ulcer [id number]  Small bowel obstruction ([facility] A84.431  Atrial flutter (HCC) I48.92  
 Atrial fibrillation or flutter  CHI (closed head injury) S09. 90XA  Basal cell cancer C44.91  
 TBI (traumatic brain injury) ([facility] [facility] S06. 9X9A  Atrial fibrillation (HCC) I48.91  
 Cataract H26.9  Constipation K59.00  
  Ankle fracture, left L85.916G  Insomnia G47.00  Tinea corporis B35.4  SSS (sick sinus syndrome) ([facility]) I49.5  Syncope R55  Seizure disorder ([facility] G40.909  RONAL on CPAP G47.33, Z99.89  
 Pacemaker Z95.0  Pre-op evaluation Z01.818  Chronic back pain greater than 3 months duration M54.9, G89.29  
 Cerebral infarction ([facility]) I63.9  Acute bronchitis J20.9  Screening for colon cancer Z12.11  
 Chronic right shoulder pain M25.511, G89.29  
 Common wart B07.8  Localized epilepsy with impairment of consciousness ([facility] G40.209  Severe obesity (BMI 35.0-39.9) ([facility]) E66.01  
 Acute blood loss anemia D62  Anasarca R60.1  GI bleed K92.2  Acute encephalopathy G93.40  Idiopathic hypotension I95.0  Counseling regarding goals of care Z71.89 Respiratory Therapist: Sanford Gunderson RT

## 2018-09-03 NOTE — PROGRESS NOTES
RRT Rounding note: RRT RN rounded on patient at bedside, pt resting, opens eyes spontaneously and able to answer simple questions. When asked if he is in pain or uncomfortable, states \"No\". VS and labs reviewed. Spoke with primary RN who informed That patient did have a drop in SpO2 reading overnight and an intermittent elevation in temperature. CXR obtained. Physician aware. Otherwise no nursing concerns.

## 2018-09-04 ENCOUNTER — ANESTHESIA EVENT (OUTPATIENT)
Dept: SURGERY | Age: 78
DRG: 329 | End: 2018-09-04
Payer: MEDICARE

## 2018-09-04 LAB
ANION GAP SERPL CALC-SCNC: 8 MMOL/L (ref 5–15)
BACTERIA SPEC CULT: NORMAL
BACTERIA SPEC CULT: NORMAL
BASOPHILS # BLD: 0 K/UL (ref 0–0.1)
BASOPHILS NFR BLD: 0 % (ref 0–1)
BUN SERPL-MCNC: 30 MG/DL (ref 6–20)
BUN/CREAT SERPL: 28 (ref 12–20)
CALCIUM SERPL-MCNC: 8.3 MG/DL (ref 8.5–10.1)
CHLORIDE SERPL-SCNC: 112 MMOL/L (ref 97–108)
CO2 SERPL-SCNC: 26 MMOL/L (ref 21–32)
CREAT SERPL-MCNC: 1.09 MG/DL (ref 0.7–1.3)
DATE LAST DOSE: ABNORMAL
DATE LAST DOSE: ABNORMAL
DIFFERENTIAL METHOD BLD: ABNORMAL
EOSINOPHIL # BLD: 0.1 K/UL (ref 0–0.4)
EOSINOPHIL NFR BLD: 1 % (ref 0–7)
ERYTHROCYTE [DISTWIDTH] IN BLOOD BY AUTOMATED COUNT: 23.2 % (ref 11.5–14.5)
GENTAMICIN PEAK SERPL-MCNC: 3.3 UG/ML (ref 5–10)
GENTAMICIN TROUGH SERPL-MCNC: 0.4 UG/ML (ref 1–2)
GLUCOSE BLD STRIP.AUTO-MCNC: 139 MG/DL (ref 65–100)
GLUCOSE BLD STRIP.AUTO-MCNC: 142 MG/DL (ref 65–100)
GLUCOSE BLD STRIP.AUTO-MCNC: 176 MG/DL (ref 65–100)
GLUCOSE BLD STRIP.AUTO-MCNC: 187 MG/DL (ref 65–100)
GLUCOSE BLD STRIP.AUTO-MCNC: 227 MG/DL (ref 65–100)
GLUCOSE SERPL-MCNC: 168 MG/DL (ref 65–100)
HCT VFR BLD AUTO: 32.2 % (ref 36.6–50.3)
HGB BLD-MCNC: 9.1 G/DL (ref 12.1–17)
IMM GRANULOCYTES # BLD: 0.1 K/UL (ref 0–0.04)
IMM GRANULOCYTES NFR BLD AUTO: 1 % (ref 0–0.5)
LYMPHOCYTES # BLD: 2.4 K/UL (ref 0.8–3.5)
LYMPHOCYTES NFR BLD: 18 % (ref 12–49)
MCH RBC QN AUTO: 26.7 PG (ref 26–34)
MCHC RBC AUTO-ENTMCNC: 28.3 G/DL (ref 30–36.5)
MCV RBC AUTO: 94.4 FL (ref 80–99)
MONOCYTES # BLD: 1.4 K/UL (ref 0–1)
MONOCYTES NFR BLD: 10 % (ref 5–13)
NEUTS SEG # BLD: 9.5 K/UL (ref 1.8–8)
NEUTS SEG NFR BLD: 70 % (ref 32–75)
NRBC # BLD: 0.03 K/UL (ref 0–0.01)
NRBC BLD-RTO: 0.2 PER 100 WBC
PLATELET # BLD AUTO: 232 K/UL (ref 150–400)
PMV BLD AUTO: 10.6 FL (ref 8.9–12.9)
POTASSIUM SERPL-SCNC: 3.4 MMOL/L (ref 3.5–5.1)
RBC # BLD AUTO: 3.41 M/UL (ref 4.1–5.7)
RBC MORPH BLD: ABNORMAL
REPORTED DOSE,DOSE: ABNORMAL UNITS
REPORTED DOSE,DOSE: ABNORMAL UNITS
REPORTED DOSE/TIME,TMG: ABNORMAL
REPORTED DOSE/TIME,TMG: ABNORMAL
SERVICE CMNT-IMP: ABNORMAL
SERVICE CMNT-IMP: NORMAL
SERVICE CMNT-IMP: NORMAL
SODIUM SERPL-SCNC: 146 MMOL/L (ref 136–145)
WBC # BLD AUTO: 13.5 K/UL (ref 4.1–11.1)

## 2018-09-04 PROCEDURE — 74011000250 HC RX REV CODE- 250: Performed by: INTERNAL MEDICINE

## 2018-09-04 PROCEDURE — 74011250636 HC RX REV CODE- 250/636: Performed by: INTERNAL MEDICINE

## 2018-09-04 PROCEDURE — 80170 ASSAY OF GENTAMICIN: CPT | Performed by: INTERNAL MEDICINE

## 2018-09-04 PROCEDURE — 85025 COMPLETE CBC W/AUTO DIFF WBC: CPT | Performed by: SURGERY

## 2018-09-04 PROCEDURE — 80048 BASIC METABOLIC PNL TOTAL CA: CPT | Performed by: INTERNAL MEDICINE

## 2018-09-04 PROCEDURE — 82962 GLUCOSE BLOOD TEST: CPT

## 2018-09-04 PROCEDURE — 74011000258 HC RX REV CODE- 258: Performed by: INTERNAL MEDICINE

## 2018-09-04 PROCEDURE — 36415 COLL VENOUS BLD VENIPUNCTURE: CPT | Performed by: SURGERY

## 2018-09-04 PROCEDURE — 65660000000 HC RM CCU STEPDOWN

## 2018-09-04 PROCEDURE — 74011636637 HC RX REV CODE- 636/637: Performed by: INTERNAL MEDICINE

## 2018-09-04 PROCEDURE — 74011250636 HC RX REV CODE- 250/636: Performed by: SURGERY

## 2018-09-04 PROCEDURE — 94640 AIRWAY INHALATION TREATMENT: CPT

## 2018-09-04 RX ORDER — LEVETIRACETAM 5 MG/ML
500 INJECTION INTRAVASCULAR EVERY 12 HOURS
Status: DISCONTINUED | OUTPATIENT
Start: 2018-09-04 | End: 2018-09-05

## 2018-09-04 RX ORDER — ALBUTEROL SULFATE 0.83 MG/ML
2.5 SOLUTION RESPIRATORY (INHALATION)
Status: DISCONTINUED | OUTPATIENT
Start: 2018-09-04 | End: 2018-09-12

## 2018-09-04 RX ORDER — LEVOFLOXACIN 5 MG/ML
750 INJECTION, SOLUTION INTRAVENOUS EVERY 24 HOURS
Status: DISCONTINUED | OUTPATIENT
Start: 2018-09-04 | End: 2018-09-06

## 2018-09-04 RX ADMIN — INSULIN LISPRO 3 UNITS: 100 INJECTION, SOLUTION INTRAVENOUS; SUBCUTANEOUS at 18:14

## 2018-09-04 RX ADMIN — LEVOFLOXACIN 750 MG: 5 INJECTION, SOLUTION INTRAVENOUS at 17:29

## 2018-09-04 RX ADMIN — LEVETIRACETAM 500 MG: 5 INJECTION INTRAVENOUS at 19:12

## 2018-09-04 RX ADMIN — ALBUTEROL SULFATE 2.5 MG: 2.5 SOLUTION RESPIRATORY (INHALATION) at 07:21

## 2018-09-04 RX ADMIN — Medication 10 ML: at 15:37

## 2018-09-04 RX ADMIN — INSULIN LISPRO 3 UNITS: 100 INJECTION, SOLUTION INTRAVENOUS; SUBCUTANEOUS at 05:01

## 2018-09-04 RX ADMIN — INSULIN LISPRO 4 UNITS: 100 INJECTION, SOLUTION INTRAVENOUS; SUBCUTANEOUS at 12:02

## 2018-09-04 RX ADMIN — INSULIN LISPRO 3 UNITS: 100 INJECTION, SOLUTION INTRAVENOUS; SUBCUTANEOUS at 00:30

## 2018-09-04 RX ADMIN — Medication 10 ML: at 05:01

## 2018-09-04 RX ADMIN — AMIODARONE HYDROCHLORIDE 0.5 MG/MIN: 50 INJECTION, SOLUTION INTRAVENOUS at 08:39

## 2018-09-04 RX ADMIN — ALBUTEROL SULFATE 2.5 MG: 2.5 SOLUTION RESPIRATORY (INHALATION) at 13:49

## 2018-09-04 RX ADMIN — SODIUM CHLORIDE 75 ML/HR: 450 INJECTION, SOLUTION INTRAVENOUS at 11:55

## 2018-09-04 RX ADMIN — FLUCONAZOLE 200 MG: 2 INJECTION INTRAVENOUS at 21:01

## 2018-09-04 RX ADMIN — FUROSEMIDE 60 MG: 10 INJECTION, SOLUTION INTRAMUSCULAR; INTRAVENOUS at 08:44

## 2018-09-04 RX ADMIN — GENTAMICIN SULFATE 80 MG: 80 INJECTION, SOLUTION INTRAVENOUS at 04:00

## 2018-09-04 RX ADMIN — Medication 10 ML: at 21:01

## 2018-09-04 RX ADMIN — AMIODARONE HYDROCHLORIDE 0.5 MG/MIN: 50 INJECTION, SOLUTION INTRAVENOUS at 20:55

## 2018-09-04 RX ADMIN — DAPTOMYCIN 1000 MG: 500 INJECTION, POWDER, LYOPHILIZED, FOR SOLUTION INTRAVENOUS at 15:36

## 2018-09-04 RX ADMIN — ALBUTEROL SULFATE 2.5 MG: 2.5 SOLUTION RESPIRATORY (INHALATION) at 21:42

## 2018-09-04 RX ADMIN — SODIUM CHLORIDE 75 ML/HR: 450 INJECTION, SOLUTION INTRAVENOUS at 03:59

## 2018-09-04 RX ADMIN — ASCORBIC ACID: 500 INJECTION, SOLUTION INTRAMUSCULAR; INTRAVENOUS; SUBCUTANEOUS at 17:27

## 2018-09-04 NOTE — PROGRESS NOTES
CRS Progress Note Overall slowly improving. Better mentation, slightly more conversant. Low grade temp two nights ago. PEG postponed and to be done in OR tomorrow. /72 (BP 1 Location: Left arm, BP Patient Position: At rest)  Pulse 82  Temp 99.7 °F (37.6 °C)  Resp 26  Ht 6' 2\" (1.88 m)  Wt 107.6 kg (237 lb 4.8 oz)  SpO2 96%  BMI 30.47 kg/m2 NAD Abd soft, nontender, nondistended Incision c/d/i with small opening for packing. No erythema or foul smelling drainage Ostomy pink with small amount of stool in bag Plan -PEG tomorrow. Continue speech/swallow afterwards 
-Continue TPN until at goal for tube feeds 
-Appreciate neuro/cards/heme/ID recs. Holding off on pacemaker wire removal (?noninfected thrombus vs infected vegetation). On abx for now and may need reassessment if bacteremia recurs. So far most recent blood cx is negative.

## 2018-09-04 NOTE — DIABETES MGMT
DTC Progress Note Recommendations/ Comments: Pt with BG > 200 mg/dL yesterday and today. Pt required 10 units so far of correction insulin today. Please consider adding 5 units/L regular insulin to TPN. Chart reviewed on Geoff Amanda for hyperglycemia. A1c:  
No results found for: HBA1C, HGBE8, EYF1XSKO, JEW0RESS Recent Glucose Results:  
Lab Results Component Value Date/Time  (H) 09/04/2018 03:24 AM  
 GLUCPOC 227 (H) 09/04/2018 11:51 AM  
 GLUCPOC 176 (H) 09/04/2018 04:56 AM  
 GLUCPOC 142 (H) 09/04/2018 12:21 AM  
  
 
Lab Results Component Value Date/Time Creatinine 1.09 09/04/2018 03:24 AM  
 
Estimated Creatinine Clearance: 74.2 mL/min (based on Cr of 1.09). Active Orders Diet DIET NPO Past Midnight PO intake: No data found. Will continue to follow as needed. Thank you. Alicia Alford RD [facility]

## 2018-09-04 NOTE — PROGRESS NOTES
Gastroenterology Daily Progress Note (Dr. Jaron Sanders) [facility] Admit Date: 7/10/2018 Subjective:   
  
Patient receiving TPN. On PCU and not agitated this am. 
Low grade fever 99 last night. Current Facility-Administered Medications Medication Dose Route Frequency  albuterol (PROVENTIL VENTOLIN) nebulizer solution 2.5 mg  2.5 mg Nebulization Q6H RT  
 enoxaparin (LOVENOX) 110 mg  110 mg SubCUTAneous Q12H  TPN ADULT-CENTRAL AA 5% D20%-MVI W/ VIT K-RCH   IntraVENous CONTINUOUS  
 0.45% sodium chloride infusion  75 mL/hr IntraVENous CONTINUOUS  
 fluconazole (DIFLUCAN) 200mg/100 mL IVPB (premix)  200 mg IntraVENous DAILY  zinc oxide-cod liver oil (DESITIN) 40 % paste   Topical PRN  
 amiodarone (CORDARONE) 375 mg/250 mL D5W infusion  0.5-1 mg/min IntraVENous TITRATE  furosemide (LASIX) injection 60 mg  60 mg IntraVENous DAILY  sodium chloride (NS) flush 10-30 mL  10-30 mL InterCATHeter PRN  
 sodium chloride (NS) flush 10-40 mL  10-40 mL InterCATHeter Q8H  
 sodium chloride (NS) flush 20 mL  20 mL InterCATHeter Q24H  
 heparin (porcine) pf 300 Units  300 Units InterCATHeter PRN  
 bacitracin 500 unit/gram packet 1 Packet  1 Packet Topical PRN  
 gentamicin in Saline (Iso-osm) (GARAMYCIN) 80 mg/100 mL IVPB premix 80 mg  80 mg IntraVENous Q24H  
 DAPTOmycin (CUBICIN) 1,000 mg in 0.9% sodium chloride 50 mL IVPB RF formulation  1,000 mg IntraVENous Q24H  
 acetaminophen (TYLENOL) solution 650 mg  650 mg Oral Q4H PRN  
 insulin lispro (HUMALOG) injection   SubCUTAneous Q6H  
 glucose chewable tablet 16 g  4 Tab Oral PRN  
 dextrose (D50W) injection syrg 12.5-25 g  12.5-25 g IntraVENous PRN  
 glucagon (GLUCAGEN) injection 1 mg  1 mg IntraMUSCular PRN  
 albuterol-ipratropium (DUO-NEB) 2.5 MG-0.5 MG/3 ML  3 mL Nebulization Q6H PRN  
 sodium chloride (NS) flush 10-30 mL  10-30 mL InterCATHeter PRN  
  sodium chloride (NS) flush 10 mL  10 mL InterCATHeter PRN  
 hydrALAZINE (APRESOLINE) 20 mg/mL injection 10 mg  10 mg IntraVENous Q6H PRN  
 sodium chloride (NS) flush 5-10 mL  5-10 mL IntraVENous PRN  
 acetaminophen (TYLENOL) tablet 650 mg  650 mg Oral Q6H PRN  
 ondansetron (ZOFRAN) injection 4 mg  4 mg IntraVENous Q6H PRN Objective:  
 
Visit Vitals  /76 (BP 1 Location: Left arm, BP Patient Position: At rest)  Pulse 90  Temp 98 °F (36.7 °C)  Resp 18  Ht 6' 2\" (1.88 m)  Wt 107.6 kg (237 lb 4.8 oz)  SpO2 93%  BMI 30.47 kg/m2 Blood pressure 135/76, pulse 90, temperature 98 °F (36.7 °C), resp. rate 18, height 6' 2\" (1.88 m), weight 107.6 kg (237 lb 4.8 oz), SpO2 93 %. 09/02 1901 - 09/04 0700 In: 0 Out: 6689 [Urine:2845] Intake/Output Summary (Last 24 hours) at 09/04/18 5610 Last data filed at 09/04/18 0410 Gross per 24 hour Intake                0 ml Output             2575 ml Net            -2575 ml Physical Exam:  
 
General: elderly WM with some confusion in NAD 
GI: Soft, NT, ND + bowel sounds + Ostomy Labs:  
 
 
Recent Results (from the past 24 hour(s)) GLUCOSE, POC Collection Time: 09/03/18 11:51 AM  
Result Value Ref Range Glucose (POC) 217 (H) 65 - 100 mg/dL Performed by Gutierrez Queen GLUCOSE, POC Collection Time: 09/03/18  5:08 PM  
Result Value Ref Range Glucose (POC) 200 (H) 65 - 100 mg/dL Performed by Unkown  CULTURE, BLOOD FOR FUNGUS Collection Time: 09/03/18  5:45 PM  
Result Value Ref Range Special Requests: HOLD 21 DAYS FOR FUNGUS Culture result: NO GROWTH AFTER 12 HOURS    
GLUCOSE, POC Collection Time: 09/04/18 12:21 AM  
Result Value Ref Range Glucose (POC) 142 (H) 65 - 100 mg/dL Performed by Flex Biggs (PCT) CBC WITH AUTOMATED DIFF Collection Time: 09/04/18  3:24 AM  
Result Value Ref Range WBC 13.5 (H) 4.1 - 11.1 K/uL  
 RBC 3.41 (L) 4.10 - 5.70 M/uL HGB 9.1 (L) 12.1 - 17.0 g/dL HCT 32.2 (L) 36.6 - 50.3 % MCV 94.4 80.0 - 99.0 FL  
 MCH 26.7 26.0 - 34.0 PG  
 MCHC 28.3 (L) 30.0 - 36.5 g/dL RDW 23.2 (H) 11.5 - 14.5 % PLATELET 396 518 - 996 K/uL MPV 10.6 8.9 - 12.9 FL  
 NRBC 0.2 (H) 0  WBC ABSOLUTE NRBC 0.03 (H) 0.00 - 0.01 K/uL NEUTROPHILS 70 32 - 75 % LYMPHOCYTES 18 12 - 49 % MONOCYTES 10 5 - 13 % EOSINOPHILS 1 0 - 7 % BASOPHILS 0 0 - 1 % IMMATURE GRANULOCYTES 1 (H) 0.0 - 0.5 % ABS. NEUTROPHILS 9.5 (H) 1.8 - 8.0 K/UL  
 ABS. LYMPHOCYTES 2.4 0.8 - 3.5 K/UL  
 ABS. MONOCYTES 1.4 (H) 0.0 - 1.0 K/UL  
 ABS. EOSINOPHILS 0.1 0.0 - 0.4 K/UL  
 ABS. BASOPHILS 0.0 0.0 - 0.1 K/UL  
 ABS. IMM. GRANS. 0.1 (H) 0.00 - 0.04 K/UL  
 DF SMEAR SCANNED    
 RBC COMMENTS ANISOCYTOSIS 2+ Audrey Huber Collection Time: 09/04/18  3:24 AM  
Result Value Ref Range Gentamicin, trough 0.4 (L) 1.0 - 2.0 ug/ml Reported dose date: NOT PROVIDED Reported dose time: NOT PROVIDED Reported dose: NOT PROVIDED UNITS METABOLIC PANEL, BASIC Collection Time: 09/04/18  3:24 AM  
Result Value Ref Range Sodium 146 (H) 136 - 145 mmol/L Potassium 3.4 (L) 3.5 - 5.1 mmol/L Chloride 112 (H) 97 - 108 mmol/L  
 CO2 26 21 - 32 mmol/L Anion gap 8 5 - 15 mmol/L Glucose 168 (H) 65 - 100 mg/dL BUN 30 (H) 6 - 20 MG/DL Creatinine 1.09 0.70 - 1.30 MG/DL  
 BUN/Creatinine ratio 28 (H) 12 - 20 GFR est AA >60 >60 ml/min/1.73m2 GFR est non-AA >60 >60 ml/min/1.73m2 Calcium 8.3 (L) 8.5 - 10.1 MG/DL  
GLUCOSE, POC Collection Time: 09/04/18  4:56 AM  
Result Value Ref Range Glucose (POC) 176 (H) 65 - 100 mg/dL Performed by Checo Mac Collection Time: 09/04/18  4:57 AM  
Result Value Ref Range Gentamicin, peak 3.3 (L) 5.0 - 10.0 ug/ml Reported dose date: NOT PROVIDED Reported dose time: NOT PROVIDED Reported dose: NOT PROVIDED UNITS LABRCNT(wbc:2,hgb:2,hct:2,plt:2,) Recent Labs  
   09/04/18 
 0324  09/03/18 
 0457  09/02/18 
 0533 NA  146*  149*  150*  
K  3.4*  3.8  3.2*  
CL  112*  114*  113* CO2  26  26  29 BUN  30*  28*  26* CREA  1.09  1.15  1.00 GLU  168*  203*  228* CA  8.3*  8.6  8.7 Impression: 
 
Endocarditis Staph Epi bactermia S/p R hemicolectomy for colon ca Aspiration pneumonia Feeding difficulties Plan: 
Blood cx neg x 5 days. Will proceed with EGD with PEG today with anesthesia support. His anticoagulation is on hold for procedure. Narcisa Foster MD 
 
9/4/2018 [address] [facility], [address] [facility] [address] Loc: [phone]

## 2018-09-04 NOTE — PROGRESS NOTES
Verbal bedside report received from Deng, [address] [facility]. Patient knows his name and  and that he is at [id number] [place] [address].  He is not sure why or why he is here in Endoscopy. Pre op questions answered by reviewing chart and via help of primary nurse, Deng.

## 2018-09-04 NOTE — PROGRESS NOTES
Patient transported back to room on PCU with Juan Candelario of this note, Jamie Acuña, CRISTIN and Gareth Rosario RN. Patient stable at time of transfer. Beside report given to Leif Garg RN who assumed care of patient upon arrival to unit.

## 2018-09-04 NOTE — PROGRESS NOTES
Due to patient's overall condition, Dr. Janell Santos and Dr. Carlos Alberto Durant have decided to move patient to main OR tomorrow, for patient's safety. Will let patient's primary nurse, Natalie Boudreaux, know.

## 2018-09-04 NOTE — PROGRESS NOTES
Bedside shift change report given to [address] [place] [address] (oncoming nurse) by Cali Ríos (offgoing nurse). Report included the following information SBAR, Kardex and Procedure Summary. 5631- Notified IR of need for chest tube removal. They will try to schedule according to PEG tube placement. 1015- Consent for PEG tube insertion obtained from wife. Signed and placed on chart. 1240- Pt taken to EGD suite in bed.  
 
1257- Pt returned to room. Chest tube removed by IR. PEG tube insertion postponed till tomorrow. Wife at bedside and updated on plan. 1905- Bedside shift change report given to Cali Ríos (oncoming nurse) by Alize Ohara (offgoing nurse). Report included the following information SBAR, Kardex, Procedure Summary, Intake/Output, MAR, Recent Results and Cardiac Rhythm PACED.

## 2018-09-04 NOTE — PROGRESS NOTES
[facility] Name: Arabella Gaines MRN: [id number] : 1940 Hospital: [facility] Date: 2018 IMPRESSION:  
· Acute respiratory failure requiring ventilator support. 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 · Debility, has global weakness. · DM  
· Obesity PLAN:  
· On room air · Remove chest tube - no output and no residual fluid · NPO for now · For PEG today · Diuresis · Abx per ID · Hold on pacer explant for now until he is stronger and after more abx and anticoagulation · ID and Cardiology help much appreciated · Avoid sedating meds. · Little else to add at this time. On room air, hemodynamically stable, chest tube to be removed. Will be available PRN. Subjective/Interval History:  
I have reviewed the flowsheet and previous days notes. On room air. Not agitated. No specific complaints to me. Review of Systems Constitutional: Positive for fatigue. Eyes: Negative. Cardiovascular: Negative. Gastrointestinal: Negative. Genitourinary: Negative for frequency. MAR REVIEWED MEDS:  
Current Facility-Administered Medications Medication  albuterol (PROVENTIL VENTOLIN) nebulizer solution 2.5 mg  
 enoxaparin (LOVENOX) 110 mg  TPN ADULT-CENTRAL AA 5% D20%-MVI W/ VIT K-RCH  
 0.45% sodium chloride infusion  fluconazole (DIFLUCAN) 200mg/100 mL IVPB (premix)  zinc oxide-cod liver oil (DESITIN) 40 % paste  amiodarone (CORDARONE) 375 mg/250 mL D5W infusion  furosemide (LASIX) injection 60 mg  
 sodium chloride (NS) flush 10-30 mL  sodium chloride (NS) flush 10-40 mL  sodium chloride (NS) flush 20 mL  heparin (porcine) pf 300 Units  bacitracin 500 unit/gram packet 1 Packet  gentamicin in Saline (Iso-osm) (GARAMYCIN) 80 mg/100 mL IVPB premix 80 mg  DAPTOmycin (CUBICIN) 1,000 mg in 0.9% sodium chloride 50 mL IVPB RF formulation  acetaminophen (TYLENOL) solution 650 mg  
 insulin lispro (HUMALOG) injection  glucose chewable tablet 16 g  
 dextrose (D50W) injection syrg 12.5-25 g  
 glucagon (GLUCAGEN) injection 1 mg  
 albuterol-ipratropium (DUO-NEB) 2.5 MG-0.5 MG/3 ML  sodium chloride (NS) flush 10-30 mL  sodium chloride (NS) flush 10 mL  hydrALAZINE (APRESOLINE) 20 mg/mL injection 10 mg  
 sodium chloride (NS) flush 5-10 mL  acetaminophen (TYLENOL) tablet 650 mg  
 ondansetron (ZOFRAN) injection 4 mg Objective:  
Vital Signs:   
Visit Vitals  /76 (BP 1 Location: Left arm, BP Patient Position: At rest)  Pulse 90  Temp 98 °F (36.7 °C)  Resp 18  Ht 6' 2\" (1.88 m)  Wt 107.6 kg (237 lb 4.8 oz)  SpO2 93%  BMI 30.47 kg/m2 O2 Device: Room air O2 Flow Rate (L/min): 3 l/min Temp (24hrs), Av.7 °F (37.1 °C), Min:98 °F (36.7 °C), Max:99.9 °F (37.7 °C) Intake/Output:  
Last shift:        
Last 3 shifts:  1901 -  0700 In: 0 Out: 9386 [Urine:2845] Intake/Output Summary (Last 24 hours) at 18 0831 Last data filed at 09/04/18 0410 Gross per 24 hour Intake                0 ml Output             2575 ml Net            -2575 ml Physical Exam  
Constitutional: Vital signs are normal. He appears well-developed. He is active and cooperative. He appears ill. No distress. HENT:  
Head: Normocephalic and atraumatic. Mouth/Throat: No oropharyngeal exudate. Eyes: Conjunctivae are normal. Pupils are equal, round, and reactive to light. No scleral icterus. Neck:  
Old tracheostomy site Cardiovascular: Normal rate and regular rhythm. Pulmonary/Chest: No respiratory distress. He has no wheezes. He has no rales. Abdominal: He exhibits no distension. There is no tenderness. Musculoskeletal: He exhibits edema. Neurological: He is alert. Seems to be globally weak. Skin: Skin is warm and dry. Data:  
Labs: 
Recent Labs  
   09/04/18 0324 09/03/18 0457 09/02/18 
 0533 WBC  13.5*  14.5*  12.4* HGB  9.1*  9.7*  9.1*  
HCT  32.2*  33.8*  32.4*  
PLT  232  253  235 Recent Labs  
   09/04/18 0324 09/03/18 0457  09/02/18 
 0533 NA  146*  149*  150*  
K  3.4*  3.8  3.2*  
CL  112*  114*  113* CO2  26  26  29 GLU  168*  203*  228* BUN  30*  28*  26* CREA  1.09  1.15  1.00  
CA  8.3*  8.6  8.7 ALB   --   1.9*   --   
TBILI   --   1.1*   --   
SGOT   --   84*   --   
ALT   --   75   -- No results for input(s): PH, PCO2, PO2, HCO3, FIO2 in the last 72 hours. Imaging: 
I have personally reviewed the patients radiographs: 
No acute process yesterday Nikki Benitez MD

## 2018-09-04 NOTE — PROGRESS NOTES
Occupational Therapy Chart reviewed. Attempted to see pt for OT. Pt is off the floor for endoscopy. Will continue to follow.  Hola Said, OT

## 2018-09-04 NOTE — PROGRESS NOTES
Hospitalist Progress Note NAME: Geoff Amanda :  1940 MRN:  [id number] Assessment / Plan: 
Acute Encephalopathy - noted in past 3 days as per family, could be mulitfactorial 
? Poor baseline mental status from TBI/CVA but pt was functional as per family H/o Seizure disorder - but seizure free for a long time as per Dr Sarita Castro, only on low dose tegretol (po not available IV) Nathen Keith for Seizure precaution until he gets an Oral Access to restart the Oral tegretol Hypernatremia - much improved now at 149 H/o Prolonged TPN- due to Dysphagia with ? Oral thrush with severe crusting NEW Fever in past 24 hrs- 100.5 WBC trending up in past 24 hrs- 14.5 K Last bacterial Blood Cx neg x 5 day 
  
DC Keppra for now DC all psychotropic meds in [facility], re-introduce as needed IP neurology consult for further recommendations- remain off Anti seizure meds versus Vimpat? ?- pt is a long term pt of Dr Sarita Castro (at [address] [place] [address] neurology group) Send Blood Cx for fungal growth, will start low dose Diflucan for now empirically- further recommendations as per ID- following; follow fever trend IVF- 1/2NS at low rate to correct hypernatremia Plan to get PEG tube to get off TPN ASAP if remains afebrile & WBC improves in AM 
  
Acute resp Failure s/p Ventilator support , now off it since  Kareem Pleural Effusions s/p R chest tube Pulm following 
  ? Infected Thrombus on NATASHA Bacteremia SSS S/p PPM 
On IV ABx as per ID Genta + Daptomycin On Lovenox therapeutic 
  
Dysphagia GI bleed s/p colectomy/enterotomies Ileus with Aspiration PNA Hb stable now On TPN 
PEG placement in OR in am 
Palliative consulted this admission already 
  
  
  
Code Status: Full DVT Prophylaxis: on lovenox for the thrombus Subjective: Chief Complaint / Reason for Physician Visit  followup lethargy \"Im doing better\". Discussed with RN events overnight. Review of Systems: Symptom Y/N Comments  Symptom Y/N Comments Fever/Chills    Chest Pain Poor Appetite    Edema Cough    Abdominal Pain Sputum    Joint Pain SOB/BECERRIL    Pruritis/Rash Nausea/vomit    Tolerating PT/OT Diarrhea    Tolerating Diet Constipation    Other Could NOT obtain due to:   
 
Objective: VITALS:  
Last 24hrs VS reviewed since prior progress note. Most recent are: 
Patient Vitals for the past 24 hrs: 
 Temp Pulse Resp BP SpO2  
09/04/18 1513 99.7 °F (37.6 °C) 82 26 131/72 96 % 09/04/18 1349 - - - - 97 % 09/04/18 1342 99.4 °F (37.4 °C) 86 22 117/65 97 % 09/04/18 1257 - 88 24 129/73 94 % 09/04/18 1046 98 °F (36.7 °C) 99 20 126/59 96 % 09/04/18 0844 - 76 - 123/59 -  
09/04/18 0721 - - - - 93 % 09/04/18 0715 98 °F (36.7 °C) 90 - 135/76 95 % 09/04/18 0257 99.9 °F (37.7 °C) 91 18 123/44 91 % 09/03/18 2338 - - - - 99 % 09/03/18 2255 98.1 °F (36.7 °C) 92 18 129/45 96 % 09/03/18 1927 - - - - 95 % 09/03/18 1915 99.3 °F (37.4 °C) 95 18 117/65 96 % Intake/Output Summary (Last 24 hours) at 09/04/18 1704 Last data filed at 09/04/18 1513 Gross per 24 hour Intake                0 ml Output             3305 ml Net            -3305 ml PHYSICAL EXAM: 
General: WD, WN. Lethargic but rousable and Ox3 cooperative, no acute distress   
EENT:  EOMI. Anicteric sclerae. MMM Resp:  CTA bilaterally, no wheezing or rales. No accessory muscle use CV:  Regular  rhythm,  No edema GI:  Soft, Non distended, Non tender.  +Bowel sounds Neurologic:  Alert and oriented X 3, normal speech, Psych:   Good insight. Not anxious nor agitated Skin:  No rashes. No jaundice Reviewed most current lab test results and cultures  YES Reviewed most current radiology test results   YES Review and summation of old records today    NO Reviewed patient's current orders and MAR    YES 
PMH/SH reviewed - no change compared to H&P 
 ________________________________________________________________________ Care Plan discussed with: 
  Comments Patient y Family  y Wife bedside RN y bedside Care Manager Consultant Multidiciplinary team rounds were held today with , nursing, pharmacist and clinical coordinator. Patient's plan of care was discussed; medications were reviewed and discharge planning was addressed. ________________________________________________________________________ Total NON critical care TIME:  45 Minutes Total CRITICAL CARE TIME Spent:   Minutes non procedure based Comments >50% of visit spent in counseling and coordination of care YES Spent   25 revieweing chart and 20 min talking to wife answering her questtions that she has previously asked specialist  
________________________________________________________________________ [facility] MD Kayla  
 
Procedures: see electronic medical records for all procedures/Xrays and details which were not copied into this note but were reviewed prior to creation of Plan. LABS: 
I reviewed today's most current labs and imaging studies. Pertinent labs include: 
Recent Labs  
   09/04/18 
 0324  09/03/18 
 0457  09/02/18 
 0533 WBC  13.5*  14.5*  12.4* HGB  9.1*  9.7*  9.1*  
HCT  32.2*  33.8*  32.4*  
PLT  232  253  235 Recent Labs  
   09/04/18 
 0324  09/03/18 
 0457  09/02/18 
 0533 NA  146*  149*  150*  
K  3.4*  3.8  3.2*  
CL  112*  114*  113* CO2  26  26  29 GLU  168*  203*  228* BUN  30*  28*  26* CREA  1.09  1.15  1.00  
CA  8.3*  8.6  8.7 ALB   --   1.9*   --   
TBILI   --   1.1*   --   
SGOT   --   84*   --   
ALT   --   75   --   
 
 
Signed: Ewa Garza MD

## 2018-09-04 NOTE — PROGRESS NOTES
Chest Tube removed while patient was in Endoscopy. Patient tolerated removal well. Wound dressed with vasoline gauze, folded 4x4, tegaderm, and  transpore tape. Removal site may drain temporarily, dressing may need to be changed when patient gets back to his room.

## 2018-09-04 NOTE — PROGRESS NOTES
Physical Therapy Note Chart reviewed. Attempted to see pt for PT. Pt is off the floor for endoscopy. Will continue to follow.  
 
Thank you, 
Albert Farias, PT, DPT

## 2018-09-04 NOTE — PROGRESS NOTES
Pharmacy Automatic Renal Dosing Protocol - Antimicrobials Indication for Antimicrobials: Bacteremia; Vegetation vs thrombus on pacer Current Regimen of Each Antimicrobial: 
Daptomycin 1000 mg (9.2mg/kg) IV every 24 hours (Started 8/26/18; Day #10) Gentamicin 80 mg IV every 24 hours (Start Date: 8/26/18; Day #10) Previous Antimicrobial Therapy: 
Levofloxacin 750 mg IV every 24 hours (Start Date: 8/28/18; Day #7 of 7) Nafcillin 2 grams every 4 hours (Started 8/24/18; Stopped 8/26/18) Daptomycin 1000 mg IV every 24 hours (Started 8/23/18; Stopped 8/24/18) Vancomycin 1500 mg IV every 12 hours (Started 8/11/18; Stopped 8/23/18) Ceftriaxone 2 g IV every 24 hours (Started 8/13/18; Stopped 8/17/18) Cefepime 2 g IV every 8 hours (Started 8/11/18; Stopped 8/13/18) Vancomycin Fluconazole Piperacillin-tazobactam 
 
Goal Gentamicin Levels (Synergy Dosing): 
Peak: 3-5 mcg/mL Trough: < 1 mcg/mL Gentamicin Level Assessment: 
Date Dose & Interval Measured (mcg/mL) Extrapolated (mcg/mL)  
8/27/18 80 mg IV every 8 hours Peak = 4.4 Trough = 2 Peak = 4.4 Trough = 1.36   
8/30/18 90 mg IV every 12 hours Peak = 5.0 Trough = 2.2 Peak = 5.2 Trough = 2.01  
8/31/18 100 mg IV every 24 hours Peak = 5.7 Trough = 0.7 Peak = 6 Trough = 0.65  
9/1/18 80 mg IV every 24 hours Peak = 3.4 Trough = 0.5 Peak = 4.6 Trough = 0.58  
9/4/18 80mg IV q24h Peak = 3.3 Trough = 0.4 Peak = 3.3 Trough = 0.36 Significant Cultures:  
9/3 BCx for fungus = pending 8/29/18 Blood culture = No growth x 6 days - final 
8/29/18 Blood culture = No growth x 6 days - final 
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 
Recent Labs  
   18 
 0324  18 
 0457  18 
 0533 CREA  1.09  1.15  1.00 BUN  30*  28*  26* WBC  13.5*  14.5*  12.4* Temp (24hrs), Av.8 °F (37.1 °C), Min:98 °F (36.7 °C), Max:99.9 °F (37.7 °C) Creatinine Clearance (mL/min) or Dialysis: Greater than 66 mL/min Impression/Plan: · SCr stable, afebrile to temps of 99.9 over weekend, and WBC remains elevated · Persistent bacteremia w/ last positive BCx oxacillin resistant on , Repeat BCx ,  and 9/3 no growth. 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 No repeat RCx aspirate to confirm cleared Burkholderia. Discussed w/ Dr. Traci Marques, Pulmonary, for Levofloxacin treatment extension to 14 days. If pt remains w/ low grade fevers and WBC elevation, we may consider adding Ceftazidime and dc Levofloxacin in the event we are experiencing quinolone resistance/ 
· Per provider Progress Note: · Mass in RA suggestive of thrombus in RIJ rather than pacer vegetation, plan is to hold off on removal of pacemaker presently · Last CK  = 40, will re-order weekly QFriday so we evaluate on weekday · Continue Daptomycin current dosing · Gentamicin synergy dosing has resulted with peak and trough at goal levels, thus will continue current dosing · PEG tube placement Tue  · Antimicrobial stop date: Daptomycin and Gentamicin durations are to be determined.    
 
Pharmacy will follow daily and adjust medications as appropriate for renal function and/or serum levels.  
 
Thank you, 
Destini Walker, [facility]

## 2018-09-04 NOTE — PROGRESS NOTES
Nutrition Assessment: 
 
INTERVENTIONS/RECOMMENDATIONS:  
TF via PEG · TwoCal @ 50 ml/hr + 1 packet of prosouce BID + 200 ml q 4 hrs · 2320 kcal, 122 g protein and 1970 ml free water · Can lower free water flushes if needed due anasarca · Wean TPN once TF initiated ASSESSMENT:  
Chart reviewed. Pt to get PEG placed today. Currently on TPN. TF recommendations shown above. Diet Order: NPO, Other (comment) (TPN: D20, 5% AA @ 83mL/h (provides 1753kcals/100gPro) ) 
% Eaten:  No data found. Pertinent Medications: [x]Reviewed: 1/2 NS, lasix, humalog, Pertinent Labs: [x]Reviewed: K+ 3.4, Food Allergies: [x]NKFA  []Other Last BM:  ostomy last output 9/2 Edema:      [x]RUE 1+  [x]LUE 2+  [x]RLE 1+  [x]LLE 2+ Pressure Ulcer:      [] Stage I   [] Stage II   [] Stage III   [] Stage IV Anthropometrics: Height: 6' 2\" (188 cm) Weight: 107.6 kg (237 lb 4.8 oz) IBW (%IBW):   ( ) UBW (%UBW):   (  %) BMI: Body mass index is 30.47 kg/(m^2). This BMI is indicative of: 
[]Underweight   []Normal   []Overweight   [x] Obesity   [] Extreme Obesity (BMI>40) Last Weight Metrics: 
Weight Loss Metrics 9/4/2018 7/10/2018 7/5/2018 5/15/2018 5/7/2018 4/10/2018 3/26/2018 Today's Wt 237 lb 4.8 oz - 283 lb 4.8 oz 266 lb 262 lb 267 lb 267 lb BMI - 30.47 kg/m2 36.37 kg/m2 34.15 kg/m2 33.64 kg/m2 34.28 kg/m2 34.28 kg/m2 Estimated Nutrition Needs (Based on): 5488 Kcals/day (MSJ 2010 x 1.2) , 121 g (1gPro/kg) Protein Carbohydrate: At Least 130 g/day  Fluids: 2412 mL/day or per primary team 
 
NUTRITION DIAGNOSES:  
Problem:  Altered GI function Etiology: related to Ascending colon mass c/w probable colon carcinoma and Cedric's erosion with [place] Signs/Symptoms: as evidenced by Ascending colon mass c/w probable colon carcinoma and Cedric's erosion with [place] Previous Nutrition Dx:  [] Resolved  [] Unresolved           [x] Progressing NUTRITION INTERVENTIONS: 
 Meals/Snacks: Other (advance diet as/if medically able per SLP ) Enteral/Parenteral Nutrition: Initiate parenteral nutrition Supplements: Commercial supplement GOAL:  
initiate TF in 24-48 hrs NUTRITION MONITORING AND EVALUATION Food/Nutrient Intake Outcomes: Enteral/parenteral nutrition intake Physical Signs/Symptoms Outcomes: GI, Glucose profile, GI profile, Electrolyte and renal profile, Weight/weight change Previous Goal Met: 
 [x] Met              [] Progressing Towards Goal              [] Not Progressing Towards Goal  
Previous Recommendations: 
 [x] Implemented          [] Not Implemented          [] Not Applicable LEARNING NEEDS (Diet, Food/Nutrient-Drug Interaction):  
 [x] None Identified 
 [] Identified and Education Provided/Documented 
 [] Identified and Pt declined/was not appropriate Cultural, Yazidism, OR Ethnic Dietary Needs:  
 [x] None Identified 
 [] Identified and Addressed 
 
 [x] Interdisciplinary Care Plan Reviewed/Documented  
 [x] Discharge Planning: TBD [] Participated in Interdisciplinary Rounds NUTRITION RISK:  
 [x] High              [] Moderate           []  Low  []  Minimal/Uncompromised James Sotelo RDN Pager [phone] Weekend Pager [phone]
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
## 2018-09-04 NOTE — PROGRESS NOTES
Infectious Disease Progress Note IMPRESSION:  
· S/p low grade temps Tmax 100.5 on 9/2 ·  S/p removal of L/IJ placement of PICC RUE , TPN on 8/31 ·  Fluconazole IV added 9/3 · Persistent bacteremia with coagulase negative Staph since 8/11 initially oxacillin sensitive , last positive BC is oxacillin resistant 8/21(1/4) · Repeat BC 8/26, 8/29 , 9/3 no growth ·  S/p failed MBS · NATASHA shows definite large mass associated with pacing wire in RA which may represent vegetation ( 8/23) · Thrombus  & tiny gas bubble of as in RIJ extending into SVC to level of RA (8/15)  s/p heparin IV now on lovenox s/q · CTA chest - no PE, could not visualize SVC thrombus, left lateral wall soft tissue swelling, soft tissue around pacemaker show no significant abnormality · US chest wall - minimal fluid in pacemaker pocket, no significant inflammation of overlying subcutaneous fat or skin · Acute respiratory failure on Ventilator / h/o tracheostomy 24 years ago  , s/p extubation 8/26 · R/ pleural effusion s/p chest tube placement , CXr clear · Increased sputum production - 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    
  
  
PLAN:  
   
· Continue Daptomycin , Gentamicin IV,Fluconazole. · Thrombus in R/IJ, now  mass in RA which is suggestive of thrombus rather than vegetation in light of pacemaker pocket appearing clear of infection. · Hold off on removal of pacemaker at this time . D/w Dr Sylvester Aguilar , .  . · Continue antimicrobials & anticoagulation · Agree with plan for peg ·  Needs  Erazo catheter removal whenever possible ·  For removal of chest tube ·  D/w family at bedside Blood Culture / line culture 8/29 Special Requests: NO SPECIAL REQUESTS   Preliminary Culture result: NO GROWTH AFTER 10 HOURS Blood culture  Special Requests: NO SPECIAL REQUESTS   Preliminary Culture result: NO GROWTH 2 DAYS Blood culture  Culture result:    Final  
STAPHYLOCOCCUS EPIDERMIDIS (OXACILLIN RESISTANT) GROWING IN 1 OF 4 BOTTLES DRAWN (SITE = RW) (A) Culture result:    Final  
PRELIMINARY REPORT OF GRAM POSITIVE COCCI IN CLUSTERS GROWING IN 1 OF 4 BOTTLES DRAWN , SO FAR CALLED TO AND READ BACK BY CRISTIN LOCK 18 0721 SP (A) Culture result:    Final  
REMAINING BOTTLE(S) HAS/HAVE NO GROWTH IN 5 DAYS  
 
US chest - : There is minimal fluid in the pacemaker pocket, a thin sliver measuring 
approximately 2 mm in thickness and 21 mm in length. No significant inflammation 
of the overlying subcutaneous fat or skin. IMPRESSION: 
No abscess. Subjective:  
 
Pt seen . Drowsy due to to [address] per wife . Wife & niece at bedside Review of Systems:  Review of systems not obtained due to patient factors. Objective:  
 
Blood pressure 126/59, pulse 99, temperature 98 °F (36.7 °C), resp. rate 20, height 6' 2\" (1.88 m), weight 237 lb 4.8 oz (107.6 kg), SpO2 96 %. Temp (24hrs), Av.6 °F (37 °C), Min:98 °F (36.7 °C), Max:99.9 °F (37.7 °C) Patient Vitals for the past 24 hrs: 
 Temp Pulse Resp BP SpO2  
18 1046 98 °F (36.7 °C) 99 20 126/59 96 % 18 0844 - 76 - 123/59 -  
18 0721 - - - - 93 % 18 0715 98 °F (36.7 °C) 90 - 135/76 95 % 18 0257 99.9 °F (37.7 °C) 91 18 123/44 91 % 18 2338 - - - - 99 % 18 2255 98.1 °F (36.7 °C) 92 18 129/45 96 % 18 1927 - - - - 95 % 18 1915 99.3 °F (37.4 °C) 95 18 117/65 96 % 18 1600 98.3 °F (36.8 °C) 79 18 122/53 97 % 18 1526 - - - - 98 % 18 1400 - (!) 104 - 141/72 98 % 18 1213 98.5 °F (36.9 °C) 91 18 133/58 95 % 18 1207 - - - - 95 % 09/03/18 1202 - 96 - 133/58 94 % Lines:  Central Venous Catheter:  LIJ Physical Exam:  
General:  Awake, talking Lungs:    Reduced air entry bases on  auscultation  chest wall-  pacemaker site - no swelling. R/chest tube + rhonchi + b/l CV:  Regular rate and rhythm,no murmur, Abdomen:   Soft, non-tender. bowel sounds +distended Extremities: Edema Lines/Devices:  Intact, no erythema, drainage or tenderness Data Review: CBC:  
Recent Labs  
   09/04/18 0324 09/03/18 0457 09/02/18 
 0533 WBC  13.5*  14.5*  12.4*  
RBC  3.41*  3.62*  3.44* HGB  9.1*  9.7*  9.1*  
HCT  32.2*  33.8*  32.4*  
PLT  232  253  235 GRANS  70  70  69 LYMPH  18  19  19 EOS  1  1  1 CMP:  
Recent Labs  
   09/04/18 0324 09/03/18 0457 09/02/18 
 0533 GLU  168*  203*  228* NA  146*  149*  150*  
K  3.4*  3.8  3.2*  
CL  112*  114*  113* CO2  26  26  29 BUN  30*  28*  26* CREA  1.09  1.15  1.00  
CA  8.3*  8.6  8.7 AGAP  8  9  8 BUCR  28*  24*  26* AP   --   95   --   
TP   --   7.6   --   
ALB   --   1.9*   --   
GLOB   --   5.7*   --   
AGRAT   --   0.3*   --   
 
 
Studies:     
Lab Results Component Value Date/Time Culture result: NO GROWTH AFTER 12 HOURS 09/03/2018 05:45 PM  
 Culture result: NO GROWTH 6 DAYS 08/29/2018 08:57 PM  
 Culture result: NO GROWTH 6 DAYS 08/29/2018 08:48 PM  
 Culture result: Culture performed on Unspun Fluid 08/29/2018 09:23 AM  
 Culture result: NO GROWTH 4 DAYS 08/29/2018 09:23 AM  
  
 
XR Results (most recent): 
 
Results from [place] encounter on 07/10/18 XR CHEST PORT Narrative EXAM:  XR CHEST PORT INDICATION:  SOB 
 
COMPARISON:  Chest x-ray 9/1/2018. FINDINGS: A portable AP radiograph of the chest was obtained at 05:35 hours. The 
patient is on a cardiac monitor. Right-sided pleural drainage catheter is in 
stable position with no pneumothorax or pleural effusion. Right PICC traverses stable and expected course to terminate in the distal superior vena cava. Left-sided pacemaker with 2 intact and fragments appear stable and as expected. There is elevation right diaphragm with grossly clear lungs apart from bibasilar 
atelectasis. The cardiac and mediastinal contours and pulmonary vascularity are 
normal.  The bones and soft tissues are grossly within normal limits. Impression IMPRESSION: No acute findings or change from prior. Patient Active Problem List  
Diagnosis Code  
 HTN (hypertension) I10  
 Depression F32.9  Hypercholesterolemia E78.00  Acid reflux K21.9  Seizure ([address] R56.9  Hypothyroidism E03.9  Hyponatremia E87.1  BPH (benign prostatic hyperplasia) N40.0  Rash R21  
 Cellulitis L03.90  Wax in ear H61.20  Ulcer [id number]  Small bowel obstruction ([facility] [address] S55.347  Atrial flutter (HCC) I48.92  
 Atrial fibrillation or flutter  CHI (closed head injury) S09. 90XA  Basal cell cancer C44.91  
 TBI (traumatic brain injury) ([facility] [address] S06. 9X9A  Atrial fibrillation (HCC) I48.91  
 Cataract H26.9  Constipation K59.00  Ankle fracture, left O33.989O  Insomnia G47.00  Tinea corporis B35.4  SSS (sick sinus syndrome) ([facility]) I49.5  Syncope R55  Seizure disorder ([address] G40.909  RONAL on CPAP G47.33, Z99.89  
 Pacemaker Z95.0  Pre-op evaluation Z01.818  Chronic back pain greater than 3 months duration M54.9, G89.29  
 Cerebral infarction (HCC) I63.9  Acute bronchitis J20.9  Screening for colon cancer Z12.11  
 Chronic right shoulder pain M25.511, G89.29  
 Common wart B07.8  Localized epilepsy with impairment of consciousness ([facility] G40.209  Severe obesity (BMI 35.0-39.9) ([facility]) E66.01  
 Acute blood loss anemia D62  Anasarca R60.1  GI bleed K92.2  Acute encephalopathy G93.40  Idiopathic hypotension I95.0  Counseling regarding goals of care Z71.89 ICD-10-CM ICD-9-CM 1. Anemia, unspecified type D64.9 285.9 2. Generalized weakness R53.1 780.79   
3. Acute encephalopathy G93.40 348.30   
4. Anasarca R60.1 782.3 5. Idiopathic hypotension I95.0 458.9 6. Counseling regarding goals of care Z71.89 V65.49 Anti-infectives: Fluconazole IV- 9/3 Daptomycin IV - 8/26- 9/3 Gentamicin IV 8/26 S/p levaquin 8/29-9/3 Cefotetan 7/17 Zosyn 7/20-8/7 Cefepime - 8/11-8/13 Ceftriaxone 8/13-8/17 Vancomycin 8/11-8/22 Daptomycin 8/23- 8/24- Naficillin IV 8/24- 8/26  Felicity Dunbar MD [facility]

## 2018-09-05 ENCOUNTER — ANESTHESIA (OUTPATIENT)
Dept: SURGERY | Age: 78
DRG: 329 | End: 2018-09-05
Payer: MEDICARE

## 2018-09-05 ENCOUNTER — TELEPHONE (OUTPATIENT)
Dept: NEUROLOGY | Age: 78
End: 2018-09-05

## 2018-09-05 LAB
ARTERIAL PATENCY WRIST A: ABNORMAL
BASE EXCESS BLDA CALC-SCNC: 1.3 MMOL/L
BASOPHILS # BLD: 0 K/UL (ref 0–0.1)
BASOPHILS NFR BLD: 0 % (ref 0–1)
BDY SITE: ABNORMAL
DIFFERENTIAL METHOD BLD: ABNORMAL
EOSINOPHIL # BLD: 0.2 K/UL (ref 0–0.4)
EOSINOPHIL NFR BLD: 2 % (ref 0–7)
EPAP/CPAP/PEEP, PAPEEP: 9
ERYTHROCYTE [DISTWIDTH] IN BLOOD BY AUTOMATED COUNT: 23.2 % (ref 11.5–14.5)
FIO2 ON VENT: 21 %
GLUCOSE BLD STRIP.AUTO-MCNC: 142 MG/DL (ref 65–100)
GLUCOSE BLD STRIP.AUTO-MCNC: 168 MG/DL (ref 65–100)
GLUCOSE BLD STRIP.AUTO-MCNC: 196 MG/DL (ref 65–100)
GLUCOSE BLD STRIP.AUTO-MCNC: 210 MG/DL (ref 65–100)
HCO3 BLDA-SCNC: 24 MMOL/L (ref 22–26)
HCT VFR BLD AUTO: 32.7 % (ref 36.6–50.3)
HGB BLD-MCNC: 9.3 G/DL (ref 12.1–17)
IMM GRANULOCYTES # BLD: 0.1 K/UL (ref 0–0.04)
IMM GRANULOCYTES NFR BLD AUTO: 1 % (ref 0–0.5)
LYMPHOCYTES # BLD: 2 K/UL (ref 0.8–3.5)
LYMPHOCYTES NFR BLD: 17 % (ref 12–49)
MCH RBC QN AUTO: 27 PG (ref 26–34)
MCHC RBC AUTO-ENTMCNC: 28.4 G/DL (ref 30–36.5)
MCV RBC AUTO: 95.1 FL (ref 80–99)
MONOCYTES # BLD: 1.2 K/UL (ref 0–1)
MONOCYTES NFR BLD: 10 % (ref 5–13)
NEUTS SEG # BLD: 8.4 K/UL (ref 1.8–8)
NEUTS SEG NFR BLD: 71 % (ref 32–75)
NRBC # BLD: 0 K/UL (ref 0–0.01)
NRBC BLD-RTO: 0 PER 100 WBC
PCO2 BLDA: 33 MMHG (ref 35–45)
PH BLDA: 7.48 [PH] (ref 7.35–7.45)
PLATELET # BLD AUTO: 239 K/UL (ref 150–400)
PMV BLD AUTO: 10.6 FL (ref 8.9–12.9)
PO2 BLDA: 79 MMHG (ref 80–100)
RBC # BLD AUTO: 3.44 M/UL (ref 4.1–5.7)
SAO2 % BLD: 97 % (ref 92–97)
SAO2% DEVICE SAO2% SENSOR NAME: ABNORMAL
SERVICE CMNT-IMP: ABNORMAL
SPECIMEN SITE: ABNORMAL
WBC # BLD AUTO: 11.9 K/UL (ref 4.1–11.1)

## 2018-09-05 PROCEDURE — 94640 AIRWAY INHALATION TREATMENT: CPT

## 2018-09-05 PROCEDURE — 77010033678 HC OXYGEN DAILY

## 2018-09-05 PROCEDURE — 74011250636 HC RX REV CODE- 250/636

## 2018-09-05 PROCEDURE — 74011000258 HC RX REV CODE- 258: Performed by: INTERNAL MEDICINE

## 2018-09-05 PROCEDURE — 74011250636 HC RX REV CODE- 250/636: Performed by: INTERNAL MEDICINE

## 2018-09-05 PROCEDURE — 76010000138 HC OR TIME 0.5 TO 1 HR: Performed by: SPECIALIST

## 2018-09-05 PROCEDURE — 74011250636 HC RX REV CODE- 250/636: Performed by: PSYCHIATRY & NEUROLOGY

## 2018-09-05 PROCEDURE — 36415 COLL VENOUS BLD VENIPUNCTURE: CPT | Performed by: SURGERY

## 2018-09-05 PROCEDURE — 85025 COMPLETE CBC W/AUTO DIFF WBC: CPT | Performed by: SURGERY

## 2018-09-05 PROCEDURE — 74011000250 HC RX REV CODE- 250: Performed by: INTERNAL MEDICINE

## 2018-09-05 PROCEDURE — 82962 GLUCOSE BLOOD TEST: CPT

## 2018-09-05 PROCEDURE — 65660000000 HC RM CCU STEPDOWN

## 2018-09-05 PROCEDURE — 74011636637 HC RX REV CODE- 636/637: Performed by: INTERNAL MEDICINE

## 2018-09-05 PROCEDURE — 36600 WITHDRAWAL OF ARTERIAL BLOOD: CPT | Performed by: INTERNAL MEDICINE

## 2018-09-05 PROCEDURE — 77030005122 HC CATH GASTMY PEG BSC -B: Performed by: SPECIALIST

## 2018-09-05 PROCEDURE — 0DH63UZ INSERTION OF FEEDING DEVICE INTO STOMACH, PERCUTANEOUS APPROACH: ICD-10-PCS | Performed by: SPECIALIST

## 2018-09-05 PROCEDURE — 76210000006 HC OR PH I REC 0.5 TO 1 HR: Performed by: SPECIALIST

## 2018-09-05 PROCEDURE — 74011250636 HC RX REV CODE- 250/636: Performed by: SPECIALIST

## 2018-09-05 PROCEDURE — 87077 CULTURE AEROBIC IDENTIFY: CPT | Performed by: INTERNAL MEDICINE

## 2018-09-05 PROCEDURE — 87186 SC STD MICRODIL/AGAR DIL: CPT | Performed by: INTERNAL MEDICINE

## 2018-09-05 PROCEDURE — 87040 BLOOD CULTURE FOR BACTERIA: CPT | Performed by: INTERNAL MEDICINE

## 2018-09-05 PROCEDURE — L3710 EO ELAS W/METAL JNTS PRE OTS: HCPCS

## 2018-09-05 PROCEDURE — 77030036554: Performed by: SPECIALIST

## 2018-09-05 PROCEDURE — 76060000032 HC ANESTHESIA 0.5 TO 1 HR: Performed by: SPECIALIST

## 2018-09-05 PROCEDURE — 82803 BLOOD GASES ANY COMBINATION: CPT | Performed by: INTERNAL MEDICINE

## 2018-09-05 PROCEDURE — 74011250636 HC RX REV CODE- 250/636: Performed by: SURGERY

## 2018-09-05 RX ORDER — MIDAZOLAM HYDROCHLORIDE 1 MG/ML
1 INJECTION, SOLUTION INTRAMUSCULAR; INTRAVENOUS AS NEEDED
Status: DISCONTINUED | OUTPATIENT
Start: 2018-09-05 | End: 2018-09-05 | Stop reason: HOSPADM

## 2018-09-05 RX ORDER — HYDROMORPHONE HYDROCHLORIDE 1 MG/ML
0.5 INJECTION, SOLUTION INTRAMUSCULAR; INTRAVENOUS; SUBCUTANEOUS
Status: CANCELLED | OUTPATIENT
Start: 2018-09-05

## 2018-09-05 RX ORDER — SODIUM CHLORIDE 0.9 % (FLUSH) 0.9 %
5-10 SYRINGE (ML) INJECTION AS NEEDED
Status: DISCONTINUED | OUTPATIENT
Start: 2018-09-05 | End: 2018-09-05 | Stop reason: HOSPADM

## 2018-09-05 RX ORDER — SODIUM CHLORIDE 0.9 % (FLUSH) 0.9 %
5-10 SYRINGE (ML) INJECTION EVERY 8 HOURS
Status: DISCONTINUED | OUTPATIENT
Start: 2018-09-05 | End: 2018-09-05 | Stop reason: HOSPADM

## 2018-09-05 RX ORDER — SODIUM CHLORIDE, SODIUM LACTATE, POTASSIUM CHLORIDE, CALCIUM CHLORIDE 600; 310; 30; 20 MG/100ML; MG/100ML; MG/100ML; MG/100ML
100 INJECTION, SOLUTION INTRAVENOUS CONTINUOUS
Status: CANCELLED | OUTPATIENT
Start: 2018-09-05

## 2018-09-05 RX ORDER — SODIUM CHLORIDE, SODIUM LACTATE, POTASSIUM CHLORIDE, CALCIUM CHLORIDE 600; 310; 30; 20 MG/100ML; MG/100ML; MG/100ML; MG/100ML
100 INJECTION, SOLUTION INTRAVENOUS CONTINUOUS
Status: DISCONTINUED | OUTPATIENT
Start: 2018-09-05 | End: 2018-09-05 | Stop reason: HOSPADM

## 2018-09-05 RX ORDER — LIDOCAINE HYDROCHLORIDE 10 MG/ML
0.1 INJECTION, SOLUTION EPIDURAL; INFILTRATION; INTRACAUDAL; PERINEURAL AS NEEDED
Status: DISCONTINUED | OUTPATIENT
Start: 2018-09-05 | End: 2018-09-05 | Stop reason: HOSPADM

## 2018-09-05 RX ORDER — PROPOFOL 10 MG/ML
INJECTION, EMULSION INTRAVENOUS AS NEEDED
Status: DISCONTINUED | OUTPATIENT
Start: 2018-09-05 | End: 2018-09-05 | Stop reason: HOSPADM

## 2018-09-05 RX ORDER — ROPIVACAINE HYDROCHLORIDE 5 MG/ML
30 INJECTION, SOLUTION EPIDURAL; INFILTRATION; PERINEURAL AS NEEDED
Status: DISCONTINUED | OUTPATIENT
Start: 2018-09-05 | End: 2018-09-05 | Stop reason: HOSPADM

## 2018-09-05 RX ORDER — FENTANYL CITRATE 50 UG/ML
25 INJECTION, SOLUTION INTRAMUSCULAR; INTRAVENOUS
Status: CANCELLED | OUTPATIENT
Start: 2018-09-05

## 2018-09-05 RX ORDER — SODIUM CHLORIDE 9 MG/ML
25 INJECTION, SOLUTION INTRAVENOUS CONTINUOUS
Status: DISCONTINUED | OUTPATIENT
Start: 2018-09-05 | End: 2018-09-05 | Stop reason: HOSPADM

## 2018-09-05 RX ORDER — MIDAZOLAM HYDROCHLORIDE 1 MG/ML
0.5 INJECTION, SOLUTION INTRAMUSCULAR; INTRAVENOUS
Status: CANCELLED | OUTPATIENT
Start: 2018-09-05

## 2018-09-05 RX ORDER — DIPHENHYDRAMINE HYDROCHLORIDE 50 MG/ML
12.5 INJECTION, SOLUTION INTRAMUSCULAR; INTRAVENOUS AS NEEDED
Status: CANCELLED | OUTPATIENT
Start: 2018-09-05 | End: 2018-09-05

## 2018-09-05 RX ORDER — FENTANYL CITRATE 50 UG/ML
50 INJECTION, SOLUTION INTRAMUSCULAR; INTRAVENOUS AS NEEDED
Status: DISCONTINUED | OUTPATIENT
Start: 2018-09-05 | End: 2018-09-05 | Stop reason: HOSPADM

## 2018-09-05 RX ORDER — SODIUM CHLORIDE 0.9 % (FLUSH) 0.9 %
5-10 SYRINGE (ML) INJECTION AS NEEDED
Status: CANCELLED | OUTPATIENT
Start: 2018-09-05

## 2018-09-05 RX ORDER — MORPHINE SULFATE 10 MG/ML
2 INJECTION, SOLUTION INTRAMUSCULAR; INTRAVENOUS
Status: CANCELLED | OUTPATIENT
Start: 2018-09-05

## 2018-09-05 RX ORDER — SODIUM CHLORIDE, SODIUM LACTATE, POTASSIUM CHLORIDE, CALCIUM CHLORIDE 600; 310; 30; 20 MG/100ML; MG/100ML; MG/100ML; MG/100ML
INJECTION, SOLUTION INTRAVENOUS
Status: DISCONTINUED | OUTPATIENT
Start: 2018-09-05 | End: 2018-09-05 | Stop reason: HOSPADM

## 2018-09-05 RX ORDER — LIDOCAINE HYDROCHLORIDE 20 MG/ML
INJECTION, SOLUTION INFILTRATION; PERINEURAL AS NEEDED
Status: DISCONTINUED | OUTPATIENT
Start: 2018-09-05 | End: 2018-09-05 | Stop reason: HOSPADM

## 2018-09-05 RX ORDER — LEVETIRACETAM 5 MG/ML
500 INJECTION INTRAVASCULAR
Status: DISCONTINUED | OUTPATIENT
Start: 2018-09-05 | End: 2018-09-07

## 2018-09-05 RX ORDER — LEVETIRACETAM 5 MG/ML
500 INJECTION INTRAVASCULAR EVERY 12 HOURS
Status: DISCONTINUED | OUTPATIENT
Start: 2018-09-12 | End: 2018-09-07

## 2018-09-05 RX ORDER — ONDANSETRON 2 MG/ML
4 INJECTION INTRAMUSCULAR; INTRAVENOUS AS NEEDED
Status: CANCELLED | OUTPATIENT
Start: 2018-09-05

## 2018-09-05 RX ADMIN — PROPOFOL 20 MG: 10 INJECTION, EMULSION INTRAVENOUS at 13:08

## 2018-09-05 RX ADMIN — Medication 10 ML: at 05:02

## 2018-09-05 RX ADMIN — AMIODARONE HYDROCHLORIDE 0.5 MG/MIN: 50 INJECTION, SOLUTION INTRAVENOUS at 20:44

## 2018-09-05 RX ADMIN — AMIODARONE HYDROCHLORIDE 0.5 MG/MIN: 50 INJECTION, SOLUTION INTRAVENOUS at 06:17

## 2018-09-05 RX ADMIN — PROPOFOL 20 MG: 10 INJECTION, EMULSION INTRAVENOUS at 13:13

## 2018-09-05 RX ADMIN — ALBUTEROL SULFATE 2.5 MG: 2.5 SOLUTION RESPIRATORY (INHALATION) at 20:28

## 2018-09-05 RX ADMIN — PROPOFOL 20 MG: 10 INJECTION, EMULSION INTRAVENOUS at 13:21

## 2018-09-05 RX ADMIN — PROPOFOL 20 MG: 10 INJECTION, EMULSION INTRAVENOUS at 13:16

## 2018-09-05 RX ADMIN — LEVETIRACETAM 500 MG: 5 INJECTION INTRAVENOUS at 05:02

## 2018-09-05 RX ADMIN — GENTAMICIN SULFATE 80 MG: 80 INJECTION, SOLUTION INTRAVENOUS at 04:14

## 2018-09-05 RX ADMIN — PROPOFOL 20 MG: 10 INJECTION, EMULSION INTRAVENOUS at 13:03

## 2018-09-05 RX ADMIN — INSULIN LISPRO 3 UNITS: 100 INJECTION, SOLUTION INTRAVENOUS; SUBCUTANEOUS at 05:51

## 2018-09-05 RX ADMIN — Medication 10 ML: at 14:53

## 2018-09-05 RX ADMIN — FUROSEMIDE 60 MG: 10 INJECTION, SOLUTION INTRAMUSCULAR; INTRAVENOUS at 08:56

## 2018-09-05 RX ADMIN — LEVETIRACETAM 500 MG: 5 INJECTION INTRAVENOUS at 21:44

## 2018-09-05 RX ADMIN — INSULIN LISPRO 3 UNITS: 100 INJECTION, SOLUTION INTRAVENOUS; SUBCUTANEOUS at 19:10

## 2018-09-05 RX ADMIN — ENOXAPARIN SODIUM 110 MG: 100 INJECTION SUBCUTANEOUS at 13:00

## 2018-09-05 RX ADMIN — ASCORBIC ACID: 500 INJECTION, SOLUTION INTRAMUSCULAR; INTRAVENOUS; SUBCUTANEOUS at 17:59

## 2018-09-05 RX ADMIN — SODIUM CHLORIDE, SODIUM LACTATE, POTASSIUM CHLORIDE, CALCIUM CHLORIDE: 600; 310; 30; 20 INJECTION, SOLUTION INTRAVENOUS at 12:57

## 2018-09-05 RX ADMIN — DAPTOMYCIN 1000 MG: 500 INJECTION, POWDER, LYOPHILIZED, FOR SOLUTION INTRAVENOUS at 15:02

## 2018-09-05 RX ADMIN — PROPOFOL 20 MG: 10 INJECTION, EMULSION INTRAVENOUS at 13:00

## 2018-09-05 RX ADMIN — Medication 20 ML: at 14:54

## 2018-09-05 RX ADMIN — ALBUTEROL SULFATE 2.5 MG: 2.5 SOLUTION RESPIRATORY (INHALATION) at 02:28

## 2018-09-05 RX ADMIN — ALBUTEROL SULFATE 2.5 MG: 2.5 SOLUTION RESPIRATORY (INHALATION) at 08:09

## 2018-09-05 RX ADMIN — FLUCONAZOLE 200 MG: 2 INJECTION INTRAVENOUS at 20:35

## 2018-09-05 RX ADMIN — LEVOFLOXACIN 750 MG: 5 INJECTION, SOLUTION INTRAVENOUS at 16:44

## 2018-09-05 RX ADMIN — PROPOFOL 20 MG: 10 INJECTION, EMULSION INTRAVENOUS at 13:19

## 2018-09-05 RX ADMIN — Medication 10 ML: at 21:44

## 2018-09-05 RX ADMIN — SODIUM CHLORIDE 75 ML/HR: 450 INJECTION, SOLUTION INTRAVENOUS at 04:14

## 2018-09-05 RX ADMIN — PROPOFOL 20 MG: 10 INJECTION, EMULSION INTRAVENOUS at 13:10

## 2018-09-05 NOTE — PERIOP NOTES
Handoff Report from Operating Room to PACU Report received from CRISTIN Rowland and Fred Salinas CRNA regarding Dario Mijares. Surgeon(s): 
Vanita Valdez MD  And Procedure(s) (LRB): ESOPHAGOGASTRODUODENOSCOPY (EGD) PERCUTANEOUS ENDOSCOPIC GASTROSTOMY TUBE INSERTION (N/A)  confirmed  
with allergies, drains and dressings discussed. Anesthesia type, drugs, patient history, complications, estimated blood loss, vital signs, intake and output, and last pain medication, lines and temperature were reviewed.

## 2018-09-05 NOTE — ANESTHESIA POSTPROCEDURE EVALUATION
Post-Anesthesia Evaluation and Assessment Patient: Loida Pedersen MRN: [id number]  SSN: [id number] YOB: 1940  Age: 69 y.o. Sex: male Cardiovascular Function/Vital Signs Visit Vitals  /74 (BP 1 Location: Left arm, BP Patient Position: At rest)  Pulse 76  Temp 36.6 °C (97.9 °F)  Resp 20  
 Ht 6' 2\" (1.88 m)  Wt 106.8 kg (235 lb 7.2 oz)  SpO2 100%  BMI 30.23 kg/m2 Patient is status post general anesthesia for Procedure(s): ESOPHAGOGASTRODUODENOSCOPY (EGD) PERCUTANEOUS ENDOSCOPIC GASTROSTOMY TUBE INSERTION. Nausea/Vomiting: None Postoperative hydration reviewed and adequate. Pain: 
Pain Scale 1: Numeric (0 - 10) (09/05/18 1400) Pain Intensity 1: 0 (09/05/18 1400) Managed Neurological Status:  
Neuro (WDL): Exceptions to WDL (09/05/18 1345) Neuro Neurologic State: Drowsy; Eyes open spontaneously (09/05/18 1345) Orientation Level: Oriented to person;Oriented to place (09/05/18 1345) Cognition: Follows commands (09/05/18 1345) Speech: Slurred; Incomprehensible words (09/05/18 1345) Assessment L Pupil: Deferred (09/05/18 1345) Size L Pupil (mm): 3 (09/03/18 0800) Assessment R Pupil: Deferred (09/05/18 1345) Size R Pupil (mm): 3 (09/03/18 0800) LUE Motor Response: Weak;Flaccid (09/05/18 1345) LLE Motor Response: Weak;Flaccid (09/05/18 1345) RUE Motor Response: Purposeful;Weak (09/05/18 1345) RLE Motor Response: Purposeful;Weak (09/05/18 1345) At baseline Mental Status and Level of Consciousness: Arousable Pulmonary Status:  
O2 Device: CO2 nasal cannula (09/05/18 1400) Adequate oxygenation and airway patent Complications related to anesthesia: None Post-anesthesia assessment completed. No concerns Signed By: Shanon Jacobson MD   
 September 5, 2018

## 2018-09-05 NOTE — PROGRESS NOTES
Hematology Oncology Progress Note Follow up for: IJ/SVC thrombus Chart notes reviewed since last visit. Case discussed with following:  
Patient complains of the following: Laying in bed, not answering questions. Discussed with wife and answered several questions. Going for PEG in OR today. Additional concerns noted by the staff: none Patient Vitals for the past 24 hrs: 
 BP Temp Pulse Resp SpO2 Weight 09/05/18 1000 - - - - - 106.8 kg (235 lb 7.2 oz) 09/05/18 0856 130/66 - 77 - - -  
09/05/18 0809 - - - - 99 % -  
09/05/18 0711 116/69 - 75 - 98 % -  
09/05/18 0709 116/69 97.8 °F (36.6 °C) 79 20 97 % -  
09/05/18 0400 (!) 136/114 98.7 °F (37.1 °C) 81 20 96 % -  
09/05/18 0228 - - - - 95 % -  
09/04/18 2346 121/69 98.1 °F (36.7 °C) 88 20 - -  
09/04/18 2142 - - - - 98 % -  
09/04/18 1917 134/73 98.1 °F (36.7 °C) 80 20 96 % -  
09/04/18 1513 131/72 99.7 °F (37.6 °C) 82 26 96 % -  
09/04/18 1349 - - - - 97 % -  
09/04/18 1342 117/65 99.4 °F (37.4 °C) 86 22 97 % -  
09/04/18 1257 129/73 - 88 24 94 % -  
 
 
ROS negative Physical Examination: 
Gen NAD Heent no thrush or mucositis Cv reg Lungs clear Abd benign Neuro: awake, not oriented or conversive. Labs: 
Recent Results (from the past 24 hour(s)) GLUCOSE, POC Collection Time: 09/04/18 11:51 AM  
Result Value Ref Range Glucose (POC) 227 (H) 65 - 100 mg/dL Performed by [employer] ([facility]) GLUCOSE, POC Collection Time: 09/04/18  4:41 PM  
Result Value Ref Range Glucose (POC) 187 (H) 65 - 100 mg/dL Performed by [employer] ([facility]) GLUCOSE, POC Collection Time: 09/04/18 11:24 PM  
Result Value Ref Range Glucose (POC) 139 (H) 65 - 100 mg/dL Performed by Eran Tenorio (MICHELLE) CBC WITH AUTOMATED DIFF Collection Time: 09/05/18  1:20 AM  
Result Value Ref Range WBC 11.9 (H) 4.1 - 11.1 K/uL  
 RBC 3.44 (L) 4.10 - 5.70 M/uL HGB 9.3 (L) 12.1 - 17.0 g/dL HCT 32.7 (L) 36.6 - 50.3 % MCV 95.1 80.0 - 99.0 FL  
 MCH 27.0 26.0 - 34.0 PG  
 MCHC 28.4 (L) 30.0 - 36.5 g/dL RDW 23.2 (H) 11.5 - 14.5 % PLATELET 634 648 - 956 K/uL MPV 10.6 8.9 - 12.9 FL  
 NRBC 0.0 0  WBC ABSOLUTE NRBC 0.00 0.00 - 0.01 K/uL NEUTROPHILS 71 32 - 75 % LYMPHOCYTES 17 12 - 49 % MONOCYTES 10 5 - 13 % EOSINOPHILS 2 0 - 7 % BASOPHILS 0 0 - 1 % IMMATURE GRANULOCYTES 1 (H) 0.0 - 0.5 % ABS. NEUTROPHILS 8.4 (H) 1.8 - 8.0 K/UL  
 ABS. LYMPHOCYTES 2.0 0.8 - 3.5 K/UL  
 ABS. MONOCYTES 1.2 (H) 0.0 - 1.0 K/UL  
 ABS. EOSINOPHILS 0.2 0.0 - 0.4 K/UL  
 ABS. BASOPHILS 0.0 0.0 - 0.1 K/UL  
 ABS. IMM. GRANS. 0.1 (H) 0.00 - 0.04 K/UL  
 DF AUTOMATED    
GLUCOSE, POC Collection Time: 09/05/18  5:46 AM  
Result Value Ref Range Glucose (POC) 196 (H) 65 - 100 mg/dL Performed by Jesus Coffman (PCT) GLUCOSE, POC Collection Time: 09/05/18 11:15 AM  
Result Value Ref Range Glucose (POC) 210 (H) 65 - 100 mg/dL Performed by Josee Lau (PCT) Assessment and Plan:  
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 Cont anticoagulation,  Transfuse hbg <7 Large mass on pacing wire- ?infected thrombus vs vegetation. Cardiology not planning to remove wire for now. Stage IIIB colon cancer - s/p resection. If pt recovers will need to see him to discuss possible adjuvant chemotherapy. Acute resp failure/Pna - off vent, Had pigtail and drainage of R pleural eff, cytology negative. Septic shock with persistent bacteremia -ID following, abx per them. Diflucan started and fungal cx ordered today. Normochromic normocytic anemia - B12  929. folate 9.8. Ferritin was 5 on 7/10/18 suggesting fairly significant iron deficiency. He got 500 mg of IV iron in July but actually has a much higher calculated deficit. Ferritin 121 so does not need further IV iron at present. would transfuse for hbg <7.

## 2018-09-05 NOTE — PERIOP NOTES
TRANSFER - IN REPORT: 
 
Verbal report received from [employer] on [employer]  being received from [phone] for ordered procedure Report consisted of patients Situation, Background, Assessment and  
Recommendations(SBAR). Information from the following report(s) SBAR, ED Summary, OR Summary, Procedure Summary, Intake/Output and MAR was reviewed with the receiving nurse. Opportunity for questions and clarification was provided. Assessment completed upon patients arrival to unit and care assumed.

## 2018-09-05 NOTE — PERIOP NOTES
12:15= brought wife to Pre Op to help complete questions. 12:30= old mepilex dsg removed from upper part of sacrum; reapplied new mepilex dsg; no erythema or skin breakdown noted; skin CDI. Did not want Paul hugger applied.

## 2018-09-05 NOTE — ANESTHESIA PREPROCEDURE EVALUATION
Anesthetic History No history of anesthetic complications Review of Systems / Medical History Patient summary reviewed, nursing notes reviewed and pertinent labs reviewed Pulmonary Sleep apnea: CPAP Neuro/Psych  
 
seizures CVA Psychiatric history Cardiovascular Hypertension Dysrhythmias : atrial fibrillation Exercise tolerance: <4 METS Comments: Ef 60% GI/Hepatic/Renal 
  
GERD Endo/Other Hypothyroidism Morbid obesity, arthritis and cancer Other Findings Comments: sepsis Physical Exam 
 
Airway Mallampati: II 
TM Distance: 4 - 6 cm Neck ROM: normal range of motion Mouth opening: Normal 
 
 Cardiovascular Regular rate and rhythm,  S1 and S2 normal,  no murmur, click, rub, or gallop Dental 
No notable dental hx Pulmonary Breath sounds clear to auscultation Abdominal 
GI exam deferred Other Findings Anesthetic Plan ASA: 4 Anesthesia type: general and total IV anesthesia Induction: Intravenous Anesthetic plan and risks discussed with: Patient

## 2018-09-05 NOTE — PROGRESS NOTES
Hospitalist Progress Note NAME: Dario Mijares :  1940 MRN:  [id number] Assessment / Plan: 
Acute Encephalopathy - noted in past 3 days as per family, could be mulitfactorial 
? Poor baseline mental status from TBI/CVA but pt was functional as per family H/o Seizure disorder - but seizure free for a long time as per Dr Sandy Hu, only on low dose tegretol (po not available IV) Enrrique Ruffin for Seizure precaution until he gets an Oral Access to restart the Oral tegretol Hypernatremia - much improved now at 149 H/o Prolonged TPN- due to Dysphagia with ? Oral thrush with severe crusting NEW Fever in past 24 hrs- 100.5 WBC trending up in past 24 hrs- 14.5 K Send Blood Cx for fungal growth, will start low dose Diflucan for now empirically- further recommendations as per ID- following; follow fever trend IVF- 1/2NS at low rate to correct hypernatremia Plan to get PEG tube to get off TPN ASAP if remains afebrile & WBC improves in AM 
 
? Early Cellulitis of Lt forearm Blood cultures x 2 Follow by exam 
  
Acute resp Failure s/p Ventilator support , now off it since  Kareem Pleural Effusions s/p R chest tube Pulm following RONAL CPAP q HS 
  
?Infected Thrombus on NATASHA Bacteremia SSS S/p PPM 
On IV ABx as per ID Genta + Daptomycin On Lovenox therapeutic 
  
Dysphagia GI bleed s/p colectomy/enterotomies Ileus with Aspiration PNA Hb stable now On TPN, renewed PEG placement  Palliative consulted this admission already 
  
  
  
Code Status: Full DVT Prophylaxis: on lovenox for the thrombus Subjective: Chief Complaint / Reason for Physician Visit  followup lethargy \"Im doing better\". Discussed with RN events overnight. Review of Systems: 
Symptom Y/N Comments  Symptom Y/N Comments Fever/Chills    Chest Pain Poor Appetite    Edema Cough    Abdominal Pain Sputum    Joint Pain SOB/BECERRIL    Pruritis/Rash Nausea/vomit    Tolerating PT/OT Diarrhea    Tolerating Diet Constipation    Other Could NOT obtain due to:   
 
Objective: VITALS:  
Last 24hrs VS reviewed since prior progress note. Most recent are: 
Patient Vitals for the past 24 hrs: 
 Temp Pulse Resp BP SpO2  
09/05/18 1415 100.1 °F (37.8 °C) 77 26 133/71 100 % 09/05/18 1400 - 76 20 129/74 100 % 09/05/18 1355 - 75 23 119/68 100 % 09/05/18 1350 - 77 17 129/70 100 % 09/05/18 1345 - 72 17 125/72 100 % 09/05/18 1340 - 75 27 126/70 100 % 09/05/18 1339 - 77 23 - 100 % 09/05/18 1338 97.9 °F (36.6 °C) 76 25 125/69 100 % 09/05/18 1204 97.6 °F (36.4 °C) 77 20 125/64 96 % 09/05/18 0856 - 77 - 130/66 -  
09/05/18 0809 - - - - 99 % 09/05/18 0711 - 75 - 116/69 98 % 09/05/18 0709 97.8 °F (36.6 °C) 79 20 116/69 97 % 09/05/18 0400 98.7 °F (37.1 °C) 81 20 (!) 136/114 96 % 09/05/18 0228 - - - - 95 % 09/04/18 2346 98.1 °F (36.7 °C) 88 20 121/69 -  
09/04/18 2142 - - - - 98 % 09/04/18 1917 98.1 °F (36.7 °C) 80 20 134/73 96 % Intake/Output Summary (Last 24 hours) at 09/05/18 1912 Last data filed at 09/05/18 1430 Gross per 24 hour Intake              250 ml Output             2250 ml Net            -2000 ml PHYSICAL EXAM: 
General: WD, WN. Lethargic but rousable and Ox3 cooperative, no acute distress   
EENT:  EOMI. Anicteric sclerae. MMM Resp:  CTA bilaterally, no wheezing or rales. No accessory muscle use CV:  Regular  rhythm,  No edema GI:  Soft, Non distended, Non tender.  +Bowel sounds Neurologic:  Alert and oriented X 3, normal speech, Psych:   Good insight. Not anxious nor agitated Skin:  No rashes. No jaundice Reviewed most current lab test results and cultures  YES Reviewed most current radiology test results   YES Review and summation of old records today    NO Reviewed patient's current orders and MAR    YES 
PMH/SH reviewed - no change compared to H&P 
 ________________________________________________________________________ Care Plan discussed with: 
  Comments Patient y Family  y Wife bedside RN y bedside Care Manager Consultant Multidiciplinary team rounds were held today with , nursing, pharmacist and clinical coordinator. Patient's plan of care was discussed; medications were reviewed and discharge planning was addressed. ________________________________________________________________________ Total NON critical care TIME:  30 Minutes Total CRITICAL CARE TIME Spent:   Minutes non procedure based Comments >50% of visit spent in counseling and coordination of care YES Spent   25 revieweing chart and 20 min talking to wife answering her questtions that she has previously asked specialist  
________________________________________________________________________ Dann Dean MD  
 
Procedures: see electronic medical records for all procedures/Xrays and details which were not copied into this note but were reviewed prior to creation of Plan. LABS: 
I reviewed today's most current labs and imaging studies. Pertinent labs include: 
Recent Labs  
   09/05/18 
 0120  09/04/18 0324 09/03/18 
 0457 WBC  11.9*  13.5*  14.5* HGB  9.3*  9.1*  9.7* HCT  32.7*  32.2*  33.8*  
PLT  239  232  253 Recent Labs  
   09/04/18 
 0324  09/03/18 
 0457 NA  146*  149*  
K  3.4*  3.8 CL  112*  114* CO2  26  26 GLU  168*  203* BUN  30*  28* CREA  1.09  1.15  
CA  8.3*  8.6 ALB   --   1.9* TBILI   --   1.1*  
SGOT   --   84* ALT   --   75 Signed: Dann Dean MD

## 2018-09-05 NOTE — PROGRESS NOTES
Occupational Therapy Completed chart review and attempted OT weekly re-assessment. Patient currently off the floor for procedure. Will continue to follow for OT services. Recommend turn team for bed mobility for skin integrity and maximize patient's mobility and recommend bed in chair position as tolerated to increase upright sitting tolerance. Thank you, Jj Stallings OTR/L

## 2018-09-05 NOTE — PROGRESS NOTES
ADULT PROTOCOL: JET AEROSOL  REASSESSMENT Patient  Rico Jin     69 y.o.   male     9/5/2018  5:14 PM 
 
Breath Sounds Pre Procedure: Right Breath Sounds: Coarse, Rales Left Breath Sounds: Coarse, Rales Breath Sounds Post Procedure: Right Breath Sounds: Coarse, Rales Left Breath Sounds: Coarse, Rales Breathing pattern: Pre procedure Breathing Pattern: Regular Post procedure Breathing Pattern: Regular Heart Rate: Pre procedure Pulse: 75 Post procedure Pulse: 75 Resp Rate: Pre procedure Respirations: 20 
         Post procedure Respirations: 20 Incentive Spirometry:  Actual Volume (ml): 750 ml 
     
 
Cough: Pre procedure Cough: Congested Post procedure Cough: Congested Sputum: Pre procedure Sputum amount: Scant Sputum color/odor: Tan 
Sputum consistency: Thick Sputum method obtained: Endotracheal 
               Post procedure Oxygen: O2 Device: CO2 nasal cannula   Flow rate (L/min) 2 Changed: NO SpO2: Pre procedure SpO2: 99 %   with oxygen Post procedure SpO2: 98 %  with oxygen Nebulizer Therapy: Current medications Aerosolized Medications: Albuterol Changed: NO 
 
 
 
Problem List:  
Patient Active Problem List  
Diagnosis Code  
 HTN (hypertension) I10  
 Depression F32.9  Hypercholesterolemia E78.00  Acid reflux K21.9  Seizure ([facility] [address] R56.9  Hypothyroidism E03.9  Hyponatremia E87.1  BPH (benign prostatic hyperplasia) N40.0  Rash R21  
 Cellulitis L03.90  Wax in ear H61.20  Ulcer [id number]  Small bowel obstruction ([facility] [address] X87.121  Atrial flutter (HCC) I48.92  
 Atrial fibrillation or flutter  CHI (closed head injury) S09. 90XA  Basal cell cancer C44.91  
 TBI [facility]traumatic [facility]) ([facility] S06. 9X9A  Atrial fibrillation (HCC) I48.91  
 Cataract H26.9  Constipation K59.00  Ankle fracture, left H24.304C  Insomnia G47.00  Tinea corporis B35.4  SSS (sick sinus syndrome) ([facility]) I49.5  Syncope R55  Seizure disorder ([facility] [address] G40.909  ORNAL on CPAP G47.33, Z99.89  
 Pacemaker Z95.0  Pre-op evaluation Z01.818  Chronic back pain greater than 3 months duration M54.9, G89.29  
 Cerebral infarction ([facility]) I63.9  Acute bronchitis J20.9  Screening for colon cancer Z12.11  
 Chronic right shoulder pain M25.511, G89.29  
 Common wart B07.8  Localized epilepsy with impairment of consciousness ([facility] [address] G40.209  Severe obesity (BMI 35.0-39.9) ([facility]) E66.01  
 Acute blood loss anemia D62  Anasarca R60.1  GI bleed K92.2  Acute encephalopathy G93.40  Idiopathic hypotension I95.0  Counseling regarding goals of care Z71.89 Respiratory Therapist: Hasmukh Nicolas RT

## 2018-09-05 NOTE — PROGRESS NOTES
Bedside shift change report given to Tito Torres RN (oncoming nurse) by Priyanka Verduzco RN (offgoing nurse). Report included the following information SBAR.

## 2018-09-05 NOTE — PROGRESS NOTES
Physical Therapy Note Chart reviewed. Pt currently KEEGAN for procedure. Will defer and continue to follow.  
 
Thank you, 
El Patrick, PT, DPT

## 2018-09-05 NOTE — PROGRESS NOTES
Chief Complaint: seizures Scheduled for dobhoff today. Started on keppra 500 bid. Groggy and irritable. Discussed with family that the plan was to titrate to that dose and that I will change the orders accordingly. If he is too irritable or somnolent on keppra then the plan would be to resume the tegretol and discontinue the keppra. Keppra chosen over the tegretol because of safer and easier to adminster profile when  compared to tegretol which is desirable in this complicated patient. Assesment and Plan 1. Seizures Post traumatic. Doses adjusted as discussed above This was discussed with his wife and niece.  
  
2. Generalized weakness PT when appropriate.  
  
3. Acute encephalopathy Should improve as his overall condition improves. EEG-pending 
  
4. Anasarca Albumin 1.9 
  
5. Colon cancer Followed by GI and oncology.  
  
6. Atrial fibrillation On lovenox Allergies Ciprofibrate Medications Current Facility-Administered Medications Medication Dose Route Frequency  lactated Ringers infusion  100 mL/hr IntraVENous CONTINUOUS  
 0.9% sodium chloride infusion  25 mL/hr IntraVENous CONTINUOUS  
 sodium chloride (NS) flush 5-10 mL  5-10 mL IntraVENous Q8H  
 sodium chloride (NS) flush 5-10 mL  5-10 mL IntraVENous PRN  
 lidocaine (PF) (XYLOCAINE) 10 mg/mL (1 %) injection 0.1 mL  0.1 mL SubCUTAneous PRN  
 fentaNYL citrate (PF) injection 50 mcg  50 mcg IntraVENous PRN  
 midazolam (VERSED) injection 1 mg  1 mg IntraVENous PRN  
 midazolam (VERSED) injection 1 mg  1 mg IntraVENous PRN  
 ropivacaine (PF) (NAROPIN) 5 mg/mL (0.5 %) injection 150 mg  30 mL Intra artICUlar PRN  
 levETIRAcetam (KEPPRA) 500 mg in saline (iso-osm) 100 ml IVPB  500 mg IntraVENous QHS  [START ON 9/6/2018] levETIRAcetam (KEPPRA) 250 mg in 0.9% sodium chloride 100 mL IVPB  250 mg IntraVENous DAILY  [START ON 9/12/2018] levETIRAcetam (KEPPRA) 500 mg in saline (iso-osm) 100 ml IVPB  500 mg IntraVENous Q12H  
 albuterol (PROVENTIL VENTOLIN) nebulizer solution 2.5 mg  2.5 mg Nebulization Q6H RT  
 TPN ADULT - CENTRAL AA 5% D20% W/ CA + ELECTROLYTES   IntraVENous CONTINUOUS  
 levoFLOXacin (LEVAQUIN) 750 mg in D5W IVPB  750 mg IntraVENous Q24H  
 enoxaparin (LOVENOX) 110 mg  110 mg SubCUTAneous Q12H  
 0.45% sodium chloride infusion  75 mL/hr IntraVENous CONTINUOUS  
 fluconazole (DIFLUCAN) 200mg/100 mL IVPB (premix)  200 mg IntraVENous DAILY  zinc oxide-cod liver oil (DESITIN) 40 % paste   Topical PRN  
 amiodarone (CORDARONE) 375 mg/250 mL D5W infusion  0.5-1 mg/min IntraVENous TITRATE  furosemide (LASIX) injection 60 mg  60 mg IntraVENous DAILY  sodium chloride (NS) flush 10-30 mL  10-30 mL InterCATHeter PRN  
 sodium chloride (NS) flush 10-40 mL  10-40 mL InterCATHeter Q8H  
 sodium chloride (NS) flush 20 mL  20 mL InterCATHeter Q24H  
 heparin (porcine) pf 300 Units  300 Units InterCATHeter PRN  
 bacitracin 500 unit/gram packet 1 Packet  1 Packet Topical PRN  
 gentamicin in Saline (Iso-osm) (GARAMYCIN) 80 mg/100 mL IVPB premix 80 mg  80 mg IntraVENous Q24H  
 DAPTOmycin (CUBICIN) 1,000 mg in 0.9% sodium chloride 50 mL IVPB RF formulation  1,000 mg IntraVENous Q24H  
 acetaminophen (TYLENOL) solution 650 mg  650 mg Oral Q4H PRN  
 insulin lispro (HUMALOG) injection   SubCUTAneous Q6H  
 glucose chewable tablet 16 g  4 Tab Oral PRN  
 dextrose (D50W) injection syrg 12.5-25 g  12.5-25 g IntraVENous PRN  
 glucagon (GLUCAGEN) injection 1 mg  1 mg IntraMUSCular PRN  
 albuterol-ipratropium (DUO-NEB) 2.5 MG-0.5 MG/3 ML  3 mL Nebulization Q6H PRN  
 sodium chloride (NS) flush 10-30 mL  10-30 mL InterCATHeter PRN  
 sodium chloride (NS) flush 10 mL  10 mL InterCATHeter PRN  
 hydrALAZINE (APRESOLINE) 20 mg/mL injection 10 mg  10 mg IntraVENous Q6H PRN  
 sodium chloride (NS) flush 5-10 mL  5-10 mL IntraVENous PRN  
  acetaminophen (TYLENOL) tablet 650 mg  650 mg Oral Q6H PRN  
 ondansetron (ZOFRAN) injection 4 mg  4 mg IntraVENous Q6H PRN Medical History Past Medical History:  
Diagnosis Date  A-fib ([facility]  Acute bronchitis 8/25/2015  Anxiety  Arrhythmia   
 atrial fibrillation  Arthritis  BCC (basal cell carcinoma of skin)  BPH (benign prostatic hyperplasia)  Chronic back pain greater than 3 months duration 5/4/2015  Chronic right shoulder pain 1/11/2016  Common wart 5/16/2016  Constipation 12/14/2012  CVA (cerebral infarction) 5/4/2015  Depression  GERD (gastroesophageal reflux disease)  Hearing loss   
 bilateral hearing aids  Hemiplegia following CVA (cerebrovascular accident) ([facility]   
 left side hemiparesis  History of colostomy  HTN (hypertension)  Hyperlipidemia  Localized epilepsy with impairment of consciousness ([facility] [address]  Prostate cancer ([facility] [address] Status post XRT, Lupron  Screening for colon cancer 11/4/2015  Stroke [facility])   
 traumatic from neck crushing  TBI (traumatic brain injury) ([facility] [address] 1994  Unspecified sleep apnea   
 on CPAP  
 
ROS Unobtainable patient is confused Exam: 
 
Visit Vitals  /64 (BP 1 Location: Left arm, BP Patient Position: At rest)  Pulse 77  Temp 97.6 °F (36.4 °C)  Resp 20  
 Ht 6' 2\" (1.88 m)  Wt 235 lb 7.2 oz (106.8 kg)  SpO2 96%  BMI 30.23 kg/m2 General: Laying in bed well developed in discomfort. Head: Normocephalic, atraumatic, anicteric sclera Neck Normal ROM,  No thyromegally Cardiac: Irregular rhythm no murmur Skin: Supple no rash  
  
NeurologicExam: 
Mental Status: Awake confused smiles and follows simple commands. Speech: Unintelligible random Cranial Nerves:  Opens eyes spontaneously Hearing intact EOMI  Tracts appropriately has left homonymous hemianopsia PERRLA Corneal reflex intact Symmetric grimmace Palate is symetric Motor: Left hemiparesis . Imaging CT Results (most recent): 
 
Results from Hospital Encounter encounter on 07/10/18 CTA CHEST W OR W WO CONT Narrative Clinical indication: Acute chest pain, possible pacemaker infection, superior 
vena cava thrombus follow-up. Localizer obtained without contrast at the level of the pulmonary arteries. Fast 
injection rate of 80 cc of Isovue-370 with review of the raw data and MIP 
reconstructions, comparison August 15. CT dose reduction was achieved through 
the use of a standardized protocol tailored for this examination and automatic 
exposure control for dose modulation . Ava Copper Diffuse soft tissue swelling is seen in the a lateral chest wall soft tissues 
low left side. Bilateral pleural effusion with some loculation associated atelectasis once 
again demonstrated. The a superior vena cava thrombus cannot be evaluated by 
this technique due to artifact and time of injection. There is no pulmonary 
emboli. NG tube and ET tube are in place. The heart size is prominent without 
pericardial effusion. Soft tissues around the implanted cardiac device show no 
significant abnormality by this technique. Impression impression: No evidence for pulmonary emboli. Bilateral pleural effusion are 
stable, cardiomegaly. Left lateral chest wall swelling MRI Results (most recent): 
 
[facility] encounter on 08/23/12 MRI BRAIN W WO CONT Narrative **Final Report** 
 
 
ICD Codes / Adm. Diagnosis: V15.52  345.80 / Personal history of traumatic Other forms of epilepsy and r Examination:  MR BRAIN [place]  - [id number] - Aug 23 2012 10:47AM 
Accession No:  [id number] Reason:  Traumatic brain injury; Epilepsy; Long term use of medications. REPORT: 
HISTORY:  Epilepsy, traumatic brain injury COMPARISON:  07/20/2012 TECHNIQUE:  MR imaging of the brain was performed with sagittal T1, axial  
 T1, T2, FLAIR, GRE, DWI/ADC; coronal T2 and flair; pre and post contrast  
multiplanar T1 utilizing 20  mL Magnevist. 
 
FINDINGS:   
 
Again demonstrated is the large area of cystic encephalomalacia within the  
right MCA distribution. There is ex vacuo dilatation of the right lateral  
ventricle and there is overall enlargement of the ventricles compatible with  
volume loss. There is high signal within the periventricular white matter. There is atrophy of the right middle cerebral peduncle, compatible with  
wallerian degeneration. There is no evidence of acute infarct. There is  
susceptibility artifact in this large area of encephalomalacia likely due to  
previous hemorrhage. An acute hemorrhage is not identified. There is no  
midline shift or mass lesion. Given motion there are no areas of abnormal  
enhancement. The flow-voids at the skull base are maintained. No evidence of  
marrow replacement. IMPRESSION: 
1. Large area of cystic encephalomalacia within the right MCA distribution  
with evidence of prior intracranial hemorrhage. No acute infarct or  
hemorrhage is demonstrated 2. Severe periventricular white matter change with enlargement of the  
ventricles likely due to volume loss. Ajay Schwarz Signing/Reading Doctor: Antoinette SALDANA ([id number]) Elisha Vora ([id number])  08/23/2012 Lab Review Lab Results Component Value Date/Time WBC 11.9 (H) 09/05/2018 01:20 AM  
 HCT 32.7 (L) 09/05/2018 01:20 AM  
 HGB 9.3 (L) 09/05/2018 01:20 AM  
 PLATELET 513 70/10/6495 01:20 AM  
 
 
Lab Results Component Value Date/Time  Sodium 146 (H) 09/04/2018 03:24 AM  
 Potassium 3.4 (L) 09/04/2018 03:24 AM  
 Chloride 112 (H) 09/04/2018 03:24 AM  
 CO2 26 09/04/2018 03:24 AM  
 Glucose 168 (H) 09/04/2018 03:24 AM  
 BUN 30 (H) 09/04/2018 03:24 AM  
 Creatinine 1.09 09/04/2018 03:24 AM  
 Calcium 8.3 (L) 09/04/2018 03:24 AM  
 
 
 No components found for: TROPQUANT Lab Results Component Value Date/Time Vitamin B12 929 08/17/2018 04:04 AM  
 Folate 9.8 08/17/2018 04:04 AM  
 
 
 
Lab Results Component Value Date/Time  Cholesterol, total 135 03/16/2018 01:31 PM  
 HDL Cholesterol 55 03/16/2018 01:31 PM  
 LDL, calculated 66 03/16/2018 01:31 PM  
 VLDL, calculated 14 03/16/2018 01:31 PM  
 Triglyceride 132 08/20/2018 04:45 AM

## 2018-09-05 NOTE — TELEPHONE ENCOUNTER
Issa - Please call pt's wife: Tell her I'm sorry to hear  Leland is having such a rough time and in the hospital.  I will send a message to Dr. Xochitl Keller, the neurologist caring for him to ask why he was switched and see if he can be switched back. It may be that he needs to temporarily be on Keppra until after acute illness. Tegretol XR has to be taken by mouth and with GI issues, I suspect they switched to Keppra so that it could easily be given IV if needed.

## 2018-09-05 NOTE — PROCEDURES
Esophagogastroduodenoscopy with Percutaneous Enterostomy Procedure Note Dr. Anoop Levine Procedure: Esophagogastroduodenoscopy --diagnostic, with placement of a percutaneous endoscopic gastrostomy tube Pre-Procedure Diagnosis: Aspiration pneumonia with feeding difficulties Post-Procedure Diagnosis: EGD s/p PEG placement Indications:  feeding difficulties and dysphagia Anesthesia/Sedation: MAC anesthesia Propofol Surgeon: SRIDHAR Luna and Dr. Anoop Levine (endoscopist) Pre-Procedure Assessment: 
 
Current Facility-Administered Medications Medication Dose Route Frequency  lactated Ringers infusion  100 mL/hr IntraVENous CONTINUOUS  
 0.9% sodium chloride infusion  25 mL/hr IntraVENous CONTINUOUS  
 sodium chloride (NS) flush 5-10 mL  5-10 mL IntraVENous Q8H  
 sodium chloride (NS) flush 5-10 mL  5-10 mL IntraVENous PRN  
 lidocaine (PF) (XYLOCAINE) 10 mg/mL (1 %) injection 0.1 mL  0.1 mL SubCUTAneous PRN  
 fentaNYL citrate (PF) injection 50 mcg  50 mcg IntraVENous PRN  
 midazolam (VERSED) injection 1 mg  1 mg IntraVENous PRN  
 midazolam (VERSED) injection 1 mg  1 mg IntraVENous PRN  
 ropivacaine (PF) (NAROPIN) 5 mg/mL (0.5 %) injection 150 mg  30 mL Intra artICUlar PRN  
 levETIRAcetam (KEPPRA) 500 mg in saline (iso-osm) 100 ml IVPB  500 mg IntraVENous QHS  [START ON 9/6/2018] levETIRAcetam (KEPPRA) 250 mg in 0.9% sodium chloride 100 mL IVPB  250 mg IntraVENous DAILY  [START ON 9/12/2018] levETIRAcetam (KEPPRA) 500 mg in saline (iso-osm) 100 ml IVPB  500 mg IntraVENous Q12H  
 lidocaine (XYLOCAINE) 20 mg/mL (2 %) injection    PRN  
 albuterol (PROVENTIL VENTOLIN) nebulizer solution 2.5 mg  2.5 mg Nebulization Q6H RT  
 TPN ADULT - CENTRAL AA 5% D20% W/ CA + ELECTROLYTES   IntraVENous CONTINUOUS  
 levoFLOXacin (LEVAQUIN) 750 mg in D5W IVPB  750 mg IntraVENous Q24H  
 enoxaparin (LOVENOX) 110 mg  110 mg SubCUTAneous Q12H  0.45% sodium chloride infusion  75 mL/hr IntraVENous CONTINUOUS  
 fluconazole (DIFLUCAN) 200mg/100 mL IVPB (premix)  200 mg IntraVENous DAILY  zinc oxide-cod liver oil (DESITIN) 40 % paste   Topical PRN  
 amiodarone (CORDARONE) 375 mg/250 mL D5W infusion  0.5-1 mg/min IntraVENous TITRATE  furosemide (LASIX) injection 60 mg  60 mg IntraVENous DAILY  sodium chloride (NS) flush 10-30 mL  10-30 mL InterCATHeter PRN  
 sodium chloride (NS) flush 10-40 mL  10-40 mL InterCATHeter Q8H  
 sodium chloride (NS) flush 20 mL  20 mL InterCATHeter Q24H  
 heparin (porcine) pf 300 Units  300 Units InterCATHeter PRN  
 bacitracin 500 unit/gram packet 1 Packet  1 Packet Topical PRN  
 gentamicin in Saline (Iso-osm) (GARAMYCIN) 80 mg/100 mL IVPB premix 80 mg  80 mg IntraVENous Q24H  
 DAPTOmycin (CUBICIN) 1,000 mg in 0.9% sodium chloride 50 mL IVPB RF formulation  1,000 mg IntraVENous Q24H  
 acetaminophen (TYLENOL) solution 650 mg  650 mg Oral Q4H PRN  
 insulin lispro (HUMALOG) injection   SubCUTAneous Q6H  
 glucose chewable tablet 16 g  4 Tab Oral PRN  
 dextrose (D50W) injection syrg 12.5-25 g  12.5-25 g IntraVENous PRN  
 glucagon (GLUCAGEN) injection 1 mg  1 mg IntraMUSCular PRN  
 albuterol-ipratropium (DUO-NEB) 2.5 MG-0.5 MG/3 ML  3 mL Nebulization Q6H PRN  
 sodium chloride (NS) flush 10-30 mL  10-30 mL InterCATHeter PRN  
 sodium chloride (NS) flush 10 mL  10 mL InterCATHeter PRN  
 hydrALAZINE (APRESOLINE) 20 mg/mL injection 10 mg  10 mg IntraVENous Q6H PRN  
 sodium chloride (NS) flush 5-10 mL  5-10 mL IntraVENous PRN  
 acetaminophen (TYLENOL) tablet 650 mg  650 mg Oral Q6H PRN  
 ondansetron (ZOFRAN) injection 4 mg  4 mg IntraVENous Q6H PRN Facility-Administered Medications Ordered in Other Encounters Medication Dose Route Frequency  propofol (DIPRIVAN) 10 mg/mL injection   IntraVENous PRN  
 lactated Ringers infusion   IntraVENous CONTINUOUS Allergies Allergen Reactions  Ciprofibrate Hives Physical Exam: 
 
Blood pressure 125/64, pulse 77, temperature 97.6 °F (36.4 °C), resp. rate 20, height 6' 2\" (1.88 m), weight 106.8 kg (235 lb 7.2 oz), SpO2 96 %. Airway: clear Heart: normal S1and S2 Lungs: clear bilateral 
Abdomen: soft, nontender, bowel sounds present and normal in all quadrants Mental Status: awake, alert, and oriented to person, place, and time Procedure Details:  
 
Informed consent was obtained for the procedure, including conscious sedation. Risks of pancreatitis, infection, perforation, hemorrhage, adverse drug reaction, and aspiration were discussed. The patient was placed in the left lateral decubitus position. Based on the pre-procedure assessment, including review of the patient's medical history, medications, allergies, and review of systems, he had been deemed to be an appropriate candidate for conscious sedation; he was therefore sedated with the medications listed above. He was monitored continuously with electrocardiogram tracing, pulse oximetry, blood pressure monitoring, and direct observation. The gastroscope was inserted into the mouth and advanced under direct vision to second portion of the duodenum. A careful inspection was made as the gastroscope was withdrawn, including a retroflexed view of the proximal stomach. An appropriate location for Percutaneous Endoscopic Gastrostomy tube placement was found by transillumination and palpation of the anterior abdominal wall. The skin was prepped and draped in a sterile fashion, then the skin and underlying soft tissues were anesthetized with lidocaine. A needle was passed through the abdominal wall and grasped using a snare placed through the endoscope. A wire was then passed through the needle, grasped with the snare, and brought out through the patient's mouth along with the endoscope.   Following this, a Percutaneous Endoscopic Gastrostomy tube was placed by the pull technique. Further findings and interventions are described below. Findings: 
 
Esophagus:  normal mucosa throughout Stomach: gastritis, s/p successful placement of 20 FR gastrostomy tube after location of site on abdominal wall and then lidocaine used to anesthetize tract; incision made then 20 FR gastrostomy tube advanced over wire into left of midline incision Duodenum: normal 
 
Findings:  -successful PEG tube placement Specimens: None EBL: less than 50 ml Complications: None; patient tolerated the procedure well. Impression:   
-Normal upper endoscopy, with no endoscopic evidence of neoplasia or mucosal abnormality. , -Successful PEG tube placement. Recommendations: 
-The PEG tube may be used for feedings as soon as bowel sounds are confirmed post-procedure. The head of the bed should be kept elevated to 30 degrees during tube feeds, and the tube should be flushed with 30 mL of water every 8 hours and after giving medications through the tube. Monitor the PEG site for bleeding and infection.  
 
Signed By: Nguyễn Ocampo MD

## 2018-09-05 NOTE — TELEPHONE ENCOUNTER
Patient is inpatient for sepsis s/p removal of colon mass. His neurologist would like to d/c his Tegretol and start him on Keppra. Patient's wife stated patient hasn't had a seizure in years and she doesn't understand why he needs to go on Keppra. She also stated he has been \"very grouchy\" since starting Keppra. Please advise.

## 2018-09-05 NOTE — PERIOP NOTES
TRANSFER - OUT REPORT: 
 
Verbal report given to Miroslava Lee RN on Kristie eYn  being transferred to U, [phone] for routine post - op Report consisted of patients Situation, Background, Assessment and  
Recommendations(SBAR). Information from the following report(s) SBAR, Kardex, OR Summary, Intake/Output, MAR and Cardiac Rhythm paced was reviewed with the receiving nurse. Opportunity for questions and clarification was provided. Patient transported with: 
 Monitor O2 @ 2 liters Registered Nurse

## 2018-09-05 NOTE — TELEPHONE ENCOUNTER
Pt's wife called crying and frustrated. Pt just had surgery at [facility], where he still is at. Wife states they are messing with his seizure medication. She says he was fine with what Dr. Jaren Sheriff put him on but they keep pushing. She needs advice from Dr. Jaren Sheriff as to what to do. Please call back.

## 2018-09-06 ENCOUNTER — APPOINTMENT (OUTPATIENT)
Dept: GENERAL RADIOLOGY | Age: 78
DRG: 329 | End: 2018-09-06
Attending: SURGERY
Payer: MEDICARE

## 2018-09-06 LAB
ALBUMIN SERPL-MCNC: 1.7 G/DL (ref 3.5–5)
ALBUMIN/GLOB SERPL: 0.3 {RATIO} (ref 1.1–2.2)
ALP SERPL-CCNC: 103 U/L (ref 45–117)
ALT SERPL-CCNC: 54 U/L (ref 12–78)
ANION GAP SERPL CALC-SCNC: 8 MMOL/L (ref 5–15)
AST SERPL-CCNC: 53 U/L (ref 15–37)
BASOPHILS # BLD: 0 K/UL (ref 0–0.1)
BASOPHILS NFR BLD: 0 % (ref 0–1)
BILIRUB SERPL-MCNC: 1.1 MG/DL (ref 0.2–1)
BUN SERPL-MCNC: 29 MG/DL (ref 6–20)
BUN/CREAT SERPL: 25 (ref 12–20)
CALCIUM SERPL-MCNC: 8.3 MG/DL (ref 8.5–10.1)
CHLORIDE SERPL-SCNC: 110 MMOL/L (ref 97–108)
CO2 SERPL-SCNC: 24 MMOL/L (ref 21–32)
CREAT SERPL-MCNC: 1.15 MG/DL (ref 0.7–1.3)
DIFFERENTIAL METHOD BLD: ABNORMAL
EOSINOPHIL # BLD: 0.1 K/UL (ref 0–0.4)
EOSINOPHIL NFR BLD: 1 % (ref 0–7)
ERYTHROCYTE [DISTWIDTH] IN BLOOD BY AUTOMATED COUNT: 22.7 % (ref 11.5–14.5)
GLOBULIN SER CALC-MCNC: 5.5 G/DL (ref 2–4)
GLUCOSE BLD STRIP.AUTO-MCNC: 137 MG/DL (ref 65–100)
GLUCOSE BLD STRIP.AUTO-MCNC: 184 MG/DL (ref 65–100)
GLUCOSE BLD STRIP.AUTO-MCNC: 185 MG/DL (ref 65–100)
GLUCOSE BLD STRIP.AUTO-MCNC: 187 MG/DL (ref 65–100)
GLUCOSE SERPL-MCNC: 138 MG/DL (ref 65–100)
HCT VFR BLD AUTO: 32.5 % (ref 36.6–50.3)
HGB BLD-MCNC: 9.5 G/DL (ref 12.1–17)
IMM GRANULOCYTES # BLD: 0.1 K/UL (ref 0–0.04)
IMM GRANULOCYTES NFR BLD AUTO: 1 % (ref 0–0.5)
LYMPHOCYTES # BLD: 2.2 K/UL (ref 0.8–3.5)
LYMPHOCYTES NFR BLD: 19 % (ref 12–49)
MAGNESIUM SERPL-MCNC: 2.5 MG/DL (ref 1.6–2.4)
MCH RBC QN AUTO: 27.5 PG (ref 26–34)
MCHC RBC AUTO-ENTMCNC: 29.2 G/DL (ref 30–36.5)
MCV RBC AUTO: 93.9 FL (ref 80–99)
MONOCYTES # BLD: 1.3 K/UL (ref 0–1)
MONOCYTES NFR BLD: 11 % (ref 5–13)
NEUTS SEG # BLD: 7.5 K/UL (ref 1.8–8)
NEUTS SEG NFR BLD: 67 % (ref 32–75)
NRBC # BLD: 0.02 K/UL (ref 0–0.01)
NRBC BLD-RTO: 0.2 PER 100 WBC
PLATELET # BLD AUTO: 212 K/UL (ref 150–400)
PMV BLD AUTO: 10.6 FL (ref 8.9–12.9)
POTASSIUM SERPL-SCNC: 3.7 MMOL/L (ref 3.5–5.1)
PROT SERPL-MCNC: 7.2 G/DL (ref 6.4–8.2)
RBC # BLD AUTO: 3.46 M/UL (ref 4.1–5.7)
SERVICE CMNT-IMP: ABNORMAL
SODIUM SERPL-SCNC: 142 MMOL/L (ref 136–145)
WBC # BLD AUTO: 11.2 K/UL (ref 4.1–11.1)

## 2018-09-06 PROCEDURE — 74011250636 HC RX REV CODE- 250/636: Performed by: INTERNAL MEDICINE

## 2018-09-06 PROCEDURE — 65660000000 HC RM CCU STEPDOWN

## 2018-09-06 PROCEDURE — 74011000258 HC RX REV CODE- 258: Performed by: INTERNAL MEDICINE

## 2018-09-06 PROCEDURE — 74011000250 HC RX REV CODE- 250: Performed by: INTERNAL MEDICINE

## 2018-09-06 PROCEDURE — 83735 ASSAY OF MAGNESIUM: CPT | Performed by: INTERNAL MEDICINE

## 2018-09-06 PROCEDURE — 74011250637 HC RX REV CODE- 250/637: Performed by: INTERNAL MEDICINE

## 2018-09-06 PROCEDURE — 85025 COMPLETE CBC W/AUTO DIFF WBC: CPT | Performed by: INTERNAL MEDICINE

## 2018-09-06 PROCEDURE — 82962 GLUCOSE BLOOD TEST: CPT

## 2018-09-06 PROCEDURE — 97535 SELF CARE MNGMENT TRAINING: CPT

## 2018-09-06 PROCEDURE — 80053 COMPREHEN METABOLIC PANEL: CPT | Performed by: INTERNAL MEDICINE

## 2018-09-06 PROCEDURE — 74018 RADEX ABDOMEN 1 VIEW: CPT

## 2018-09-06 PROCEDURE — 36415 COLL VENOUS BLD VENIPUNCTURE: CPT | Performed by: INTERNAL MEDICINE

## 2018-09-06 PROCEDURE — 97530 THERAPEUTIC ACTIVITIES: CPT

## 2018-09-06 PROCEDURE — 74011636637 HC RX REV CODE- 636/637: Performed by: INTERNAL MEDICINE

## 2018-09-06 PROCEDURE — 74011250636 HC RX REV CODE- 250/636: Performed by: SURGERY

## 2018-09-06 PROCEDURE — 74011000258 HC RX REV CODE- 258: Performed by: PSYCHIATRY & NEUROLOGY

## 2018-09-06 PROCEDURE — 94760 N-INVAS EAR/PLS OXIMETRY 1: CPT

## 2018-09-06 PROCEDURE — 74011250636 HC RX REV CODE- 250/636: Performed by: PSYCHIATRY & NEUROLOGY

## 2018-09-06 PROCEDURE — 92526 ORAL FUNCTION THERAPY: CPT | Performed by: SPEECH-LANGUAGE PATHOLOGIST

## 2018-09-06 PROCEDURE — 94640 AIRWAY INHALATION TREATMENT: CPT

## 2018-09-06 RX ORDER — FLUCONAZOLE 200 MG/1
200 TABLET ORAL EVERY 24 HOURS
Status: DISCONTINUED | OUTPATIENT
Start: 2018-09-06 | End: 2018-09-07

## 2018-09-06 RX ORDER — LEVOFLOXACIN 500 MG/1
500 TABLET, FILM COATED ORAL EVERY 24 HOURS
Status: DISCONTINUED | OUTPATIENT
Start: 2018-09-06 | End: 2018-09-07

## 2018-09-06 RX ADMIN — AMIODARONE HYDROCHLORIDE 0.5 MG/MIN: 50 INJECTION, SOLUTION INTRAVENOUS at 17:32

## 2018-09-06 RX ADMIN — GENTAMICIN SULFATE 80 MG: 80 INJECTION, SOLUTION INTRAVENOUS at 05:09

## 2018-09-06 RX ADMIN — FUROSEMIDE 60 MG: 10 INJECTION, SOLUTION INTRAMUSCULAR; INTRAVENOUS at 08:35

## 2018-09-06 RX ADMIN — ALBUTEROL SULFATE 2.5 MG: 2.5 SOLUTION RESPIRATORY (INHALATION) at 14:09

## 2018-09-06 RX ADMIN — INSULIN LISPRO 3 UNITS: 100 INJECTION, SOLUTION INTRAVENOUS; SUBCUTANEOUS at 00:04

## 2018-09-06 RX ADMIN — ALBUTEROL SULFATE 2.5 MG: 2.5 SOLUTION RESPIRATORY (INHALATION) at 19:54

## 2018-09-06 RX ADMIN — Medication 20 ML: at 15:39

## 2018-09-06 RX ADMIN — Medication 10 ML: at 21:11

## 2018-09-06 RX ADMIN — INSULIN LISPRO 3 UNITS: 100 INJECTION, SOLUTION INTRAVENOUS; SUBCUTANEOUS at 05:15

## 2018-09-06 RX ADMIN — Medication 10 ML: at 15:39

## 2018-09-06 RX ADMIN — FLUCONAZOLE 200 MG: 200 TABLET ORAL at 20:58

## 2018-09-06 RX ADMIN — ENOXAPARIN SODIUM 110 MG: 100 INJECTION SUBCUTANEOUS at 15:37

## 2018-09-06 RX ADMIN — INSULIN LISPRO 3 UNITS: 100 INJECTION, SOLUTION INTRAVENOUS; SUBCUTANEOUS at 18:19

## 2018-09-06 RX ADMIN — INSULIN LISPRO 3 UNITS: 100 INJECTION, SOLUTION INTRAVENOUS; SUBCUTANEOUS at 23:12

## 2018-09-06 RX ADMIN — LEVETIRACETAM 250 MG: 100 INJECTION, SOLUTION, CONCENTRATE INTRAVENOUS at 08:37

## 2018-09-06 RX ADMIN — ENOXAPARIN SODIUM 110 MG: 100 INJECTION SUBCUTANEOUS at 01:00

## 2018-09-06 RX ADMIN — ASCORBIC ACID: 500 INJECTION, SOLUTION INTRAMUSCULAR; INTRAVENOUS; SUBCUTANEOUS at 18:48

## 2018-09-06 RX ADMIN — ALBUTEROL SULFATE 2.5 MG: 2.5 SOLUTION RESPIRATORY (INHALATION) at 02:47

## 2018-09-06 RX ADMIN — DAPTOMYCIN 1000 MG: 500 INJECTION, POWDER, LYOPHILIZED, FOR SOLUTION INTRAVENOUS at 15:39

## 2018-09-06 RX ADMIN — ALBUTEROL SULFATE 2.5 MG: 2.5 SOLUTION RESPIRATORY (INHALATION) at 07:39

## 2018-09-06 RX ADMIN — LEVETIRACETAM 500 MG: 5 INJECTION INTRAVENOUS at 21:03

## 2018-09-06 RX ADMIN — Medication 10 ML: at 05:09

## 2018-09-06 NOTE — PROGRESS NOTES
Spoke with patient's wife re dcp. She has a lot of concerns and wants patient to get the best care. She asked about patient going home with home health however realizes that will be a lot. List of facilities given to her to review and she will talk with some of her friends and family regarding the facilities. Emotional support given.

## 2018-09-06 NOTE — PROGRESS NOTES
Gastroenterology Daily Progress Note (Dr. Adelaide Mukherjee) [facility] Admit Date: 7/10/2018 Subjective:   
  
Patient is doing well on tube feedings. Current Facility-Administered Medications Medication Dose Route Frequency  levETIRAcetam (KEPPRA) 500 mg in saline (iso-osm) 100 ml IVPB  500 mg IntraVENous QHS  levETIRAcetam (KEPPRA) 250 mg in 0.9% sodium chloride 100 mL IVPB  250 mg IntraVENous DAILY  [START ON 9/12/2018] levETIRAcetam (KEPPRA) 500 mg in saline (iso-osm) 100 ml IVPB  500 mg IntraVENous Q12H  TPN ADULT - CENTRAL AA 5% D20% W/ CA + ELECTROLYTES   IntraVENous CONTINUOUS  
 albuterol (PROVENTIL VENTOLIN) nebulizer solution 2.5 mg  2.5 mg Nebulization Q6H RT  
 levoFLOXacin (LEVAQUIN) 750 mg in D5W IVPB  750 mg IntraVENous Q24H  
 enoxaparin (LOVENOX) 110 mg  110 mg SubCUTAneous Q12H  
 0.45% sodium chloride infusion  75 mL/hr IntraVENous CONTINUOUS  
 fluconazole (DIFLUCAN) 200mg/100 mL IVPB (premix)  200 mg IntraVENous DAILY  zinc oxide-cod liver oil (DESITIN) 40 % paste   Topical PRN  
 amiodarone (CORDARONE) 375 mg/250 mL D5W infusion  0.5-1 mg/min IntraVENous TITRATE  furosemide (LASIX) injection 60 mg  60 mg IntraVENous DAILY  sodium chloride (NS) flush 10-30 mL  10-30 mL InterCATHeter PRN  
 sodium chloride (NS) flush 10-40 mL  10-40 mL InterCATHeter Q8H  
 sodium chloride (NS) flush 20 mL  20 mL InterCATHeter Q24H  
 heparin (porcine) pf 300 Units  300 Units InterCATHeter PRN  
 bacitracin 500 unit/gram packet 1 Packet  1 Packet Topical PRN  
 gentamicin in Saline (Iso-osm) (GARAMYCIN) 80 mg/100 mL IVPB premix 80 mg  80 mg IntraVENous Q24H  
 DAPTOmycin (CUBICIN) 1,000 mg in 0.9% sodium chloride 50 mL IVPB RF formulation  1,000 mg IntraVENous Q24H  
 acetaminophen (TYLENOL) solution 650 mg  650 mg Oral Q4H PRN  
 insulin lispro (HUMALOG) injection   SubCUTAneous Q6H  
 glucose chewable tablet 16 g  4 Tab Oral PRN  
  dextrose (D50W) injection syrg 12.5-25 g  12.5-25 g IntraVENous PRN  
 glucagon (GLUCAGEN) injection 1 mg  1 mg IntraMUSCular PRN  
 albuterol-ipratropium (DUO-NEB) 2.5 MG-0.5 MG/3 ML  3 mL Nebulization Q6H PRN  
 sodium chloride (NS) flush 10-30 mL  10-30 mL InterCATHeter PRN  
 sodium chloride (NS) flush 10 mL  10 mL InterCATHeter PRN  
 hydrALAZINE (APRESOLINE) 20 mg/mL injection 10 mg  10 mg IntraVENous Q6H PRN  
 sodium chloride (NS) flush 5-10 mL  5-10 mL IntraVENous PRN  
 acetaminophen (TYLENOL) tablet 650 mg  650 mg Oral Q6H PRN  
 ondansetron (ZOFRAN) injection 4 mg  4 mg IntraVENous Q6H PRN Objective:  
 
Visit Vitals  /66 (BP 1 Location: Left arm, BP Patient Position: At rest)  Pulse 74  Temp 99.1 °F (37.3 °C)  Resp 22  
 Ht 6' 2\" (1.88 m)  Wt 106.8 kg (235 lb 7.2 oz)  SpO2 95%  BMI 30.23 kg/m2 Blood pressure 128/66, pulse 74, temperature 99.1 °F (37.3 °C), resp. rate 22, height 6' 2\" (1.88 m), weight 106.8 kg (235 lb 7.2 oz), SpO2 95 %. 09/06 0701 - 09/06 1900 In: 350 [P.O.:350] Out: -  
09/04 1901 - 09/06 0700 In: 650 [I.V.:250] Out: 3150 [YHXWO:2157] Intake/Output Summary (Last 24 hours) at 09/06/18 3357 Last data filed at 09/06/18 4207 Gross per 24 hour Intake             1000 ml Output             2500 ml Net            -1500 ml Physical Exam:  
 
General: elderly WM with some confusion in NAD 
GI: Soft, NT, ND + bowel sounds + Ostomy Labs:  
 
 
Recent Results (from the past 24 hour(s)) GLUCOSE, POC Collection Time: 09/05/18 11:15 AM  
Result Value Ref Range Glucose (POC) 210 (H) 65 - 100 mg/dL Performed by [place] Pila (PCT) BLOOD GAS, ARTERIAL Collection Time: 09/05/18  6:14 PM  
Result Value Ref Range pH 7.48 (H) 7.35 - 7.45    
 PCO2 33 (L) 35.0 - 45.0 mmHg PO2 79 (L) 80 - 100 mmHg O2 SAT 97 92 - 97 %  BICARBONATE 24 22 - 26 mmol/L  
 BASE EXCESS 1.3 mmol/L  
 O2 METHOD CPAP    
 FIO2 21 % EPAP/CPAP/PEEP 9.0 Sample source ARTERIAL    
 SITE RIGHT RADIAL BONNIE'S TEST N/A    
GLUCOSE, POC Collection Time: 09/05/18  6:28 PM  
Result Value Ref Range Glucose (POC) 168 (H) 65 - 100 mg/dL Performed by [place] (PCT) GLUCOSE, POC Collection Time: 09/05/18 11:01 PM  
Result Value Ref Range Glucose (POC) 142 (H) 65 - 100 mg/dL Performed by Abena Mcdonald METABOLIC PANEL, COMPREHENSIVE Collection Time: 09/06/18  1:42 AM  
Result Value Ref Range Sodium 142 136 - 145 mmol/L Potassium 3.7 3.5 - 5.1 mmol/L Chloride 110 (H) 97 - 108 mmol/L  
 CO2 24 21 - 32 mmol/L Anion gap 8 5 - 15 mmol/L Glucose 138 (H) 65 - 100 mg/dL BUN 29 (H) 6 - 20 MG/DL Creatinine 1.15 0.70 - 1.30 MG/DL  
 BUN/Creatinine ratio 25 (H) 12 - 20 GFR est AA >60 >60 ml/min/1.73m2 GFR est non-AA >60 >60 ml/min/1.73m2 Calcium 8.3 (L) 8.5 - 10.1 MG/DL Bilirubin, total 1.1 (H) 0.2 - 1.0 MG/DL  
 ALT (SGPT) 54 12 - 78 U/L  
 AST (SGOT) 53 (H) 15 - 37 U/L Alk. phosphatase 103 45 - 117 U/L Protein, total 7.2 6.4 - 8.2 g/dL Albumin 1.7 (L) 3.5 - 5.0 g/dL Globulin 5.5 (H) 2.0 - 4.0 g/dL A-G Ratio 0.3 (L) 1.1 - 2.2 MAGNESIUM Collection Time: 09/06/18  1:42 AM  
Result Value Ref Range Magnesium 2.5 (H) 1.6 - 2.4 mg/dL CBC WITH AUTOMATED DIFF Collection Time: 09/06/18  1:42 AM  
Result Value Ref Range WBC 11.2 (H) 4.1 - 11.1 K/uL  
 RBC 3.46 (L) 4.10 - 5.70 M/uL HGB 9.5 (L) 12.1 - 17.0 g/dL HCT 32.5 (L) 36.6 - 50.3 % MCV 93.9 80.0 - 99.0 FL  
 MCH 27.5 26.0 - 34.0 PG  
 MCHC 29.2 (L) 30.0 - 36.5 g/dL RDW 22.7 (H) 11.5 - 14.5 % PLATELET 576 272 - 648 K/uL MPV 10.6 8.9 - 12.9 FL  
 NRBC 0.2 (H) 0  WBC ABSOLUTE NRBC 0.02 (H) 0.00 - 0.01 K/uL NEUTROPHILS 67 32 - 75 % LYMPHOCYTES 19 12 - 49 % MONOCYTES 11 5 - 13 % EOSINOPHILS 1 0 - 7 % BASOPHILS 0 0 - 1 % IMMATURE GRANULOCYTES 1 (H) 0.0 - 0.5 % ABS. NEUTROPHILS 7.5 1.8 - 8.0 K/UL  
 ABS. LYMPHOCYTES 2.2 0.8 - 3.5 K/UL  
 ABS. MONOCYTES 1.3 (H) 0.0 - 1.0 K/UL  
 ABS. EOSINOPHILS 0.1 0.0 - 0.4 K/UL  
 ABS. BASOPHILS 0.0 0.0 - 0.1 K/UL  
 ABS. IMM. GRANS. 0.1 (H) 0.00 - 0.04 K/UL  
 DF AUTOMATED    
GLUCOSE, POC Collection Time: 09/06/18  5:13 AM  
Result Value Ref Range Glucose (POC) 184 (H) 65 - 100 mg/dL Performed by Luisana Vila Recent Labs  
   09/06/18 
 0142  09/04/18 
 2816 NA  142  146*  
K  3.7  3.4*  
CL  110*  112* CO2  24  26 BUN  29*  30* CREA  1.15  1.09  
GLU  138*  168* CA  8.3*  8.3*  
MG  2.5*   -- Impression: 
 
Endocarditis Staph Epi bactermia S/p R hemicolectomy for colon ca Aspiration pneumonia Feeding difficulties Plan: 
Patient is tolerating his tube feedings thus far and would advance to 50 cc/hr. Would keep on abdominal binder for now and consider removing tomorrow. Will see again on request.  Signing off for now. Ayden Olivas MD 
 
9/6/2018 [address] [facility], [address] [address] Loc: [phone]

## 2018-09-06 NOTE — PROGRESS NOTES
Nutrition Assessment: 
 
INTERVENTIONS/RECOMMENDATIONS:  
Advance TF to goal rate of TwoCal @ 50 ml/hr + prosource 1 packet daily + 200 mL water flushes q 4 hours (2260 kcal, 107g protein, 1970 mL water, based on 22 hours/day to account for time off pump) Wean TPN off   
 
ASSESSMENT:  
Chart reviewed; patient is s/p PEG placement yesterday. TF started and advancing to goal. Goal rate as recommended above will meet 100% of estimated kcal and protein needs. Currently running at 30 mL/hr which is meeting >50% of estimated kcal needs. Okay to wean TPN off as long as TF continues to be tolerated. Remains inappropriate for PO per SLP. Will monitor TF tolerance, labs, and weights. Diet Order: NPO 
% Eaten:  Patient Vitals for the past 72 hrs: 
 % Diet Eaten 09/06/18 0736 100 % Pertinent Medications: [x] Reviewed []Other: Lasix, Humalog, Keppra Pertinent Labs: [x]Reviewed  []Other:  [phone] Food Allergies: [x]None []Other:    
Last BM: 9/5   []Active     []Hyperactive  [x]Hypoactive       [] Absent  BS Skin:    [] Intact   [x] Incision  [] Breakdown   [x]Edema   []Other: Anthropometrics: Height: 6' 2\" (188 cm) Weight: 106.8 kg (235 lb 7.2 oz) IBW (%IBW):   ( ) UBW (%UBW):   (  %) BMI: Body mass index is 30.23 kg/(m^2). This BMI is indicative of: 
[]Underweight   []Normal   []Overweight   [x] Obesity   [] Extreme Obesity (BMI>40) Last Weight Metrics: 
Weight Loss Metrics 9/5/2018 7/10/2018 7/5/2018 5/15/2018 5/7/2018 4/10/2018 3/26/2018 Today's Wt 235 lb 7.2 oz - 283 lb 4.8 oz 266 lb 262 lb 267 lb 267 lb BMI - 30.23 kg/m2 36.37 kg/m2 34.15 kg/m2 33.64 kg/m2 34.28 kg/m2 34.28 kg/m2 Estimated Nutrition Needs (Based on): 8039 Kcals/day (BMR (1865) x 1. 2AF) , 107 g (1.0 g/kg bw) Protein Carbohydrate: At Least 130 g/day  Fluids: 2000 mL/day Pt expected to meet estimated nutrient needs: [x]Yes []No 
 
NUTRITION DIAGNOSES:  
Problem:  Swallowing difficulty Etiology: related to AMS, dysphagia Signs/Symptoms: as evidenced by PEG placement NUTRITION INTERVENTIONS: 
 Enteral/Parenteral Nutrition: Initiate enteral nutrition, Modify rate, concentration, composition, and schedule GOAL:  
TF advanced to goal and TPN discontinued next 2-4 days NUTRITION MONITORING AND EVALUATION Food/Nutrient Intake Outcomes: Enteral/parenteral nutrition intake Physical Signs/Symptoms Outcomes: Weight/weight change, GI profile, Electrolyte and renal profile, Glucose profile Previous Goal Met: 
 [] Met              [x] Progressing Towards Goal              [] Not Progressing Towards Goal  
Previous Recommendations: 
 [x] Implemented          [] Not Implemented          [] Not Applicable LEARNING NEEDS (Diet, Food/Nutrient-Drug Interaction):  
 [x] None Identified 
 [] Identified and Education Provided/Documented 
 [] Identified and Pt declined/was not appropriate Cultural, Anglican, OR Ethnic Dietary Needs:  
 [x] None Identified 
 [] Identified and Addressed 
 
 [x] Interdisciplinary Care Plan Reviewed/Documented  
 [x] Discharge Planning: See TF recommendations above [x] Participated in Interdisciplinary Rounds NUTRITION RISK:  
 [x] High              [] Moderate           []  Low  []  Minimal/Uncompromised Sharyle Hoop Pager [phone] Weekend Pager [phone]

## 2018-09-06 NOTE — PROGRESS NOTES
Infectious Disease Progress Note IMPRESSION:  
· Improved temps  temps Tmax 99.1 this am,  WBC 11.2 
· S/p peg placement,post procedural? 
· Swelling , erythema of left forearm, early cellulitis? · S/p removal of chest tube 9/4 ·  S/p removal of L/IJ placement of PICC RUE , TPN on 8/31 ·  Fluconazole IV added 9/3 · Persistent bacteremia with coagulase negative Staph since 8/11 initially oxacillin sensitive , last positive BC is oxacillin resistant 8/21(1/4) · Repeat BC 8/26, 8/29 , 9/3 no growth ·  S/p failed MBS · NATASHA shows definite large mass associated with pacing wire in RA which may represent vegetation ( 8/23) · Thrombus  & tiny gas bubble of as in RIJ extending into SVC to level of RA (8/15)  s/p heparin IV now on lovenox s/q · CTA chest - no PE, could not visualize SVC thrombus, left lateral wall soft tissue swelling, soft tissue around pacemaker show no significant abnormality · US chest wall - minimal fluid in pacemaker pocket, no significant inflammation of overlying subcutaneous fat or skin · Acute respiratory failure on Ventilator / h/o tracheostomy 24 years ago  , s/p extubation 8/26 · R/ pleural effusion s/p chest tube placement , CXr clear · Increased sputum production - 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    
  
  
PLAN:  
   
· Continue Daptomycin, Gentamicin IV, change Fluconazole.& levaquin to po ·  DC peripheral line · Thrombus in R/IJ, now  mass in RA which is suggestive of thrombus rather than vegetation in light of pacemaker pocket appearing clear of infection. · Hold off on removal of pacemaker at this time . D/w Dr Rita Harrison , .  . · Continue antimicrobials & anticoagulation ·  Needs  Erazo catheter removal whenever possible ·   D/w family at bedside Blood Culture / line culture  Special Requests: NO SPECIAL REQUESTS   Preliminary Culture result: NO GROWTH AFTER 10 HOURS Blood culture  Special Requests: NO SPECIAL REQUESTS   Preliminary Culture result: NO GROWTH 2 DAYS Blood culture  Culture result:    Final  
STAPHYLOCOCCUS EPIDERMIDIS (OXACILLIN RESISTANT) GROWING IN 1 OF 4 BOTTLES DRAWN (SITE = RW) (A) Culture result:    Final  
PRELIMINARY REPORT OF GRAM POSITIVE COCCI IN CLUSTERS GROWING IN 1 OF 4 BOTTLES DRAWN , SO FAR CALLED TO AND READ BACK BY CRISTIN LOCK 18 0721 SP (A) Culture result:    Final  
REMAINING BOTTLE(S) HAS/HAVE NO GROWTH IN 5 DAYS  
 
US chest - : There is minimal fluid in the pacemaker pocket, a thin sliver measuring 
approximately 2 mm in thickness and 21 mm in length. No significant inflammation 
of the overlying subcutaneous fat or skin. IMPRESSION: 
No abscess. Subjective:  
 
Pt seen  . Drowsy due to to [address] per wife . Wife & niece at bedside. Review of Systems:  Review of systems not obtained due to patient factors. Objective:  
 
Blood pressure 120/68, pulse 76, temperature 98.9 °F (37.2 °C), resp. rate 22, height 6' 2\" (1.88 m), weight 235 lb 7.2 oz (106.8 kg), SpO2 98 %. Temp (24hrs), Av.5 °F (36.9 °C), Min:97.8 °F (36.6 °C), Max:99.1 °F (37.3 °C) Patient Vitals for the past 24 hrs: 
 Temp Pulse Resp BP SpO2  
18 1600 98.9 °F (37.2 °C) 76 22 120/68 98 % 18 1558 - 75 - 126/69 99 % 18 1410 - - - - 98 % 18 1100 97.9 °F (36.6 °C) - - - -  
18 1000 98 °F (36.7 °C) 81 16 125/67 100 % 18 0800 97.8 °F (36.6 °C) 77 16 - 96 % 18 0740 - - - - 95 % 18 0718 98.5 °F (36.9 °C) 79 18 121/65 97 % 18 0400 99.1 °F (37.3 °C) 74 22 128/66 100 % 18 0247 - - - - 96 % 09/06/18 0200 - 82 - 128/54 100 % 09/06/18 0000 98.9 °F (37.2 °C) 79 20 140/70 97 % 09/05/18 2200 - 85 - 128/74 96 % 09/05/18 2028 - - - - 98 % 09/05/18 2000 - 84 - 132/72 100 % 09/05/18 1922 99.1 °F (37.3 °C) 84 24 157/71 99 % Lines:  Central Venous Catheter:  LIJ Physical Exam:  
General:   Drowsy, arousable Lungs:    Reduced air entry bases on  auscultation  chest wall-  pacemaker site -no swelling, erythemal  
CV:  Regular rate and rhythm,no murmur, Abdomen:   Soft, non-tender. bowel sounds +distended Extremities: Edema, lUE - erythema distal to peripheral IV Lines/Devices:  Intact, no erythema, drainage or tenderness Data Review: CBC:  
Recent Labs  
   09/06/18 
 0142  09/05/18 
 0120  09/04/18 
 4449 WBC  11.2*  11.9*  13.5*  
RBC  3.46*  3.44*  3.41* HGB  9.5*  9.3*  9.1*  
HCT  32.5*  32.7*  32.2*  
PLT  212  239  232 GRANS  67  71  70 LYMPH  19  17  18 EOS  1  2  1 CMP:  
Recent Labs  
   09/06/18 
 0142  09/04/18 
 0324 GLU  138*  168* NA  142  146*  
K  3.7  3.4*  
CL  110*  112* CO2  24  26 BUN  29*  30* CREA  1.15  1.09  
CA  8.3*  8.3* AGAP  8  8 BUCR  25*  28* AP  103   --   
TP  7.2   --   
ALB  1.7*   --   
GLOB  5.5*   --   
AGRAT  0.3*   --   
 
 
Studies:     
Lab Results Component Value Date/Time Culture result:  09/05/2018 07:03 PM  
  ONE OF TWO BOTTLES HAS BEEN FLAGGED POSITIVE BY INSTRUMENT. BOTTLE HAS BEEN SENT TO [facility] TO ASSESS FOR POSSIBLE GROWTH. Culture result: REMAINING BOTTLE(S) HAS/HAVE NO GROWTH SO FAR 09/05/2018 07:03 PM  
 Culture result: NO GROWTH 3 DAYS 09/03/2018 05:45 PM  
 Culture result: NO GROWTH 6 DAYS 08/29/2018 08:57 PM  
 Culture result: NO GROWTH 6 DAYS 08/29/2018 08:48 PM  
  
 
XR Results (most recent): 
 
Results from Hospital Encounter encounter on 07/10/18 XR CHEST PORT Narrative EXAM:  XR CHEST PORT INDICATION:  SOB 
 
COMPARISON:  Chest x-ray 9/1/2018. FINDINGS: A portable AP radiograph of the chest was obtained at 05:35 hours. The 
patient is on a cardiac monitor. Right-sided pleural drainage catheter is in 
stable position with no pneumothorax or pleural effusion. Right PICC traverses 
stable and expected course to terminate in the distal superior vena cava. Left-sided pacemaker with 2 intact and fragments appear stable and as expected. There is elevation right diaphragm with grossly clear lungs apart from bibasilar 
atelectasis. The cardiac and mediastinal contours and pulmonary vascularity are 
normal.  The bones and soft tissues are grossly within normal limits. Impression IMPRESSION: No acute findings or change from prior. Patient Active Problem List  
Diagnosis Code  
 HTN (hypertension) I10  
 Depression F32.9  Hypercholesterolemia E78.00  Acid reflux K21.9  Seizure ([address] R56.9  Hypothyroidism E03.9  Hyponatremia E87.1  BPH (benign prostatic hyperplasia) N40.0  Rash R21  
 Cellulitis L03.90  Wax in ear H61.20  Ulcer ICE1393  Small bowel obstruction ([address] P95.310  Atrial flutter (HCC) I48.92  
 Atrial fibrillation or flutter  CHI (closed head injury) S09. 90XA  Basal cell cancer C44.91  
 TBI (traumatic brain injury) ([address] S06. 9X9A  Atrial fibrillation (HCC) I48.91  
 Cataract H26.9  Constipation K59.00  Ankle fracture, left B07.019J  Insomnia G47.00  Tinea corporis B35.4  SSS (sick sinus syndrome) ([facility]) I49.5  Syncope R55  Seizure disorder ([address] G40.909  RONAL on CPAP G47.33, Z99.89  
 Pacemaker Z95.0  Pre-op evaluation Z01.818  Chronic back pain greater than 3 months duration M54.9, G89.29  
 Cerebral infarction (HCC) I63.9  Acute bronchitis J20.9  Screening for colon cancer Z12.11  
 Chronic right shoulder pain M25.511, G89.29  
 Common wart B07.8  Localized epilepsy with impairment of consciousness ([address] G40.209  Severe obesity (BMI 35.0-39.9) ([facility]) E66.01  
 Acute blood loss anemia D62  Anasarca R60.1  GI bleed K92.2  Acute encephalopathy G93.40  Idiopathic hypotension I95.0  Counseling regarding goals of care Z71.89 ICD-10-CM ICD-9-CM 1. Anemia, unspecified type D64.9 285.9 2. Generalized weakness R53.1 780.79   
3. Acute encephalopathy G93.40 348.30   
4. Anasarca R60.1 782.3 5. Idiopathic hypotension I95.0 458.9 6. Counseling regarding goals of care Z71.89 V65.49   
7. [facility] G40.909 345.90 S06. 9X9S 907.0 8. Chronic atrial fibrillation (HCC) I48.2 427.31   
9. Malignant neoplasm of ascending colon (HCC) C18.2 153.6 Anti-infectives: Fluconazole IV- 9/3 Daptomycin IV - 8/26- 9/3 Gentamicin IV 8/26 S/p levaquin 8/29-9/3 Cefotetan 7/17 Zosyn 7/20-8/7 Cefepime - 8/11-8/13 Ceftriaxone 8/13-8/17 Vancomycin 8/11-8/22 Daptomycin 8/23- 8/24- Naficillin IV 8/24- 8/26  Talia Johnson MD [facility]

## 2018-09-06 NOTE — PROGRESS NOTES
Problem: Self Care Deficits Care Plan (Adult) Goal: *Acute Goals and Plan of Care (Insert Text) Occupational Therapy Goals Weekly reassessment 7/31/18 1. Patient will perform one grooming task seated EOB with minimal assistance within 7 day(s). 2.  Patient will perform upper body dressing with moderate assistance  within 7 day(s). 3.  Patient will perform supine <> sit to participate in ADL tasks decrease caregiver burden with moderate assistance  within 7 day(s). 4.  Patient will perform sit <> stand to prepare for self care transfers with moderate assistance within 7 day(s). 5.  Patient will participate in R AROM and L self PROM upper extremity therapeutic exercise/activities with contact guard assist for 8 minutes within 7 day(s). 6.  Patient will utilize energy conservation techniques during functional activities with verbal cues within 7 day(s). Initiated 7/22/2018 1. Patient will perform one grooming task seated EOB with minimal assistance within 7 day(s). 2.  Patient will perform upper body dressing with moderate assistance  within 7 day(s). 3.  Patient will perform supine <> sit to participate in ADL tasks decrease caregiver burdenwith maximal assistance  within 7 day(s). 4.  Patient will perform sit <> stand to prepare for self care transfers with maximal assistance within 7 day(s). 5.  Patient will participate in R AROM and L self PROM upper extremity therapeutic exercise/activities with contact guard assist for 8 minutes within 7 day(s). 6.  Patient will utilize energy conservation techniques during functional activities with verbal cues within 7 day(s). Occupational Therapy Goals Re-eval 9/6/2018 and goals remain the same Initiated 8/29/2018 1. Patient will perform grooming with maximal assistance within 7 day(s). 2.  Patient will be able to sit on EOb with max assist of 2 for 2 minutes, in prep for ADLs. 3. Patient to be able to tolerate UE ex on right UE for 3 minutes in order to increase his overall endurance and strength, with in 7 days. Occupational Therapy TREATMENT: WEEKLY REASSESSMENT Patient: Sherly Marc (41 y.o. male) Date: 9/6/2018 Diagnosis: Acute blood loss anemia GI bleed Anasarca GI Bleed 
gi bleed ASCENDING COLON CANCER  
aspiration ASPIRATION PNEUMONIA GI bleed Procedure(s) (LRB): ESOPHAGOGASTRODUODENOSCOPY (EGD) PERCUTANEOUS ENDOSCOPIC GASTROSTOMY TUBE INSERTION (N/A) 1 Day Post-Op Precautions:   
Chart, occupational therapy assessment, plan of care, and goals were reviewed. ASSESSMENT: 
Pt was cleared to be seen for therapy and all VSS and he was supine in bed and wife was present during tx. He was max assist of 2 for supine to sit and was max to total assist of 1 for sitting on EOb. He was able to sit with CGA for approx 45 seconds once and would be able to lean forward with tactile cues and VC and be able to keep his balance in sitting for short periods of time. Worked with pt on reaching across his body, with his right arm to touch therapist hand and he was able to do so 2 times. Pt was hand over hand with washing his face and he threw cloth once and didn't want to do so and finally did wash his face. Pt was total assist of 2 and was left sitting in bed in chair position, and pt continued to lean to the left and was not able to hold his head at midline and neck was tight. He was placed with left side touching the bed rail on left and he was not able to lean sideways in the bed. Recommend that pt have further therapy at discharge at rehab at [facility]. Progression toward goals: 
[x]            Improving appropriately and progressing toward goals []            Improving slowly and progressing toward goals []            Not making progress toward goals and plan of care will be adjusted PLAN: 
Goals have been updated based on progression since last assessment. Patient continues to benefit from skilled intervention to address the above impairments. Continue to follow patient 3 times a week to address goals. Planned Interventions: 
[x]                    Self Care Training                  []             Therapeutic Activities [x]                    Functional Mobility Training    []             Cognitive Retraining 
[x]                    Therapeutic Exercises           [x]             Endurance Activities [x]                    Balance Training                   []             Neuromuscular Re-Education []                    Visual/Perceptual Training     [x]        Home Safety Training 
[x]                    Patient Education                 [x]             Family Training/Education []                    Other (comment): 
Discharge Recommendations: Loki Davies Further Equipment Recommendations for Discharge: tbd SUBJECTIVE:  
Patient stated Just put me in skilled nursing.  OBJECTIVE DATA SUMMARY:  
Cognitive/Behavioral Status: 
Neurologic State: Alert;Confused Orientation Level: Oriented to person Cognition: Memory loss; Impaired decision making; Impulsive;Decreased attention/concentration;Decreased command following Perseveration: Perseverates during conversation Safety/Judgement: Fall prevention;Lack of insight into deficits Functional Mobility and Transfers for ADLs: 
Bed Mobility: 
Rolling: Maximum assistance;Assist x2 Supine to Sit: Maximum assistance;Assist x2 Sit to Supine: Maximum assistance;Assist x2 Scooting: Maximum assistance Balance: 
Sitting: Impaired Sitting - Static: Poor (constant support) Sitting - Dynamic: Poor (constant support) Barthel Index: 
 
Bathin Bladder: 0 Bowels: 0 Groomin Dressin Feedin Mobility: 0 Stairs: 0 Toilet Use: 0 Transfer (Bed to Chair and Back): 0 Total: 0 Barthel and G-code impairment scale: 
Percentage of impairment CH 
0% CI 
1-19% CJ 
20-39% CK 
40-59% CL 
 60-79% CM 
80-99% CN 
100% Barthel Score 0-100 100 99-80 79-60 59-40 20-39 1-19 
 0 Barthel Score 0-20 20 17-19 13-16 9-12 5-8 1-4 0 The Barthel ADL Index: Guidelines 1. The index should be used as a record of what a patient does, not as a record of what a patient could do. 2. The main aim is to establish degree of independence from any help, physical or verbal, however minor and for whatever reason. 3. The need for supervision renders the patient not independent. 4. A patient's performance should be established using the best available evidence. Asking the patient, friends/relatives and nurses are the usual sources, but direct observation and common sense are also important. However direct testing is not needed. 5. Usually the patient's performance over the preceding 24-48 hours is important, but occasionally longer periods will be relevant. 6. Middle categories imply that the patient supplies over 50 per cent of the effort. 7. Use of aids to be independent is allowed. Murali Jack., Barthel, D.W. (8922). Functional evaluation: the Barthel Index. [facility] (14)2. Ara Harding nicanor ELIAS Hurley, Adan Jaffe., Luis Curtis., [place], [address] [place] [address] (1999). Measuring the change indisability after inpatient rehabilitation; comparison of the responsiveness of the Barthel Index and Functional [place] Measure. Journal of Neurology, Neurosurgery, and Psychiatry, 66(4), 590-012. Otilia Conklin, [place].RODOLFO, GABY Bains, & Jorge A Hatch MDaydayA. (2004.) Assessment of post-stroke quality of life in cost-effectiveness studies: The usefulness of the Barthel Index and the EuroQoL-5D. [facility], 13, 228-37 ADL Intervention: 
Feeding Feeding Assistance: Total assistance (dependent) 
 
 total assist with ADLs Toileting Toileting Assistance: Total assistance(dependent) Bladder Hygiene: Total assistance (dependent) Bowel Hygiene: Total assistance (dependent) Cognitive Retraining Safety/Judgement: Fall prevention;Lack of insight into deficits Pain: 
Pain Scale 1: Visual 
Pain Intensity 1: 0 Activity Tolerance:  
Fair/poor Please refer to the flowsheet for vital signs taken during this treatment. After treatment:  
[] Patient left in no apparent distress sitting up in chair 
[x] Patient left in no apparent distress in bed 
[x] Call bell left within reach [x] Nursing notified 
[x] Caregiver present 
[] Bed alarm activated COMMUNICATION/COLLABORATION:  
The patients plan of care was discussed with: Physical Therapist, Registered Nurse and wife ElysejunoPORTILLO Time Calculation: 46 mins

## 2018-09-06 NOTE — PROGRESS NOTES
Shift change report given to [facility]

## 2018-09-06 NOTE — PROGRESS NOTES
Problem: Falls - Risk of 
Goal: *Absence of Falls Document Long Lopezh Fall Risk and appropriate interventions in the flowsheet. Outcome: Progressing Towards Goal 
Fall Risk Interventions: 
Mobility Interventions: Bed/chair exit alarm Mentation Interventions: Adequate sleep, hydration, pain control Medication Interventions: Bed/chair exit alarm, Patient to call before getting OOB Elimination Interventions: Bed/chair exit alarm, Call light in reach, Patient to call for help with toileting needs History of Falls Interventions: Bed/chair exit alarm, Room close to nurse's station, Door open when patient unattended

## 2018-09-06 NOTE — PROGRESS NOTES
Problem: Dysphagia (Adult) Goal: *Acute Goals and Plan of Care (Insert Text) Speech path goals Initiated 9/6/18 1. Patient will follow simple oropharyngeal strengthening exercises with max cues within 7 days Initiated 8/30/2018 1. Patient will participate in [facility] tomorrow. Completed. 2. Pt will participate with oral and pharyngeal swallowing exercises to improve swallowing function. 1. Pt will tolerate purees and nectar thick liquids with no overt s/s of aspiration. Discontinue Speech pathology goals Initiated 8/3/2018 1. Patient will tolerate sips and chips with no overt s/s aspiration within 7 days. Goal met 8/10. 
2. Patient will participate in re-evaluation of swallow function within 7 days. Goal met 8/10. 3. Pt will participate with MBS today 8/10. Goal met 8/10. Speech language pathology treatment: WEEKLY REASSESSMENT Patient: Maliha Garland (49 y.o. male) Date: 9/6/2018 Diagnosis: Acute blood loss anemia GI bleed Anasarca GI Bleed 
gi bleed ASCENDING COLON CANCER  
aspiration ASPIRATION PNEUMONIA GI bleed Procedure(s) (LRB): ESOPHAGOGASTRODUODENOSCOPY (EGD) PERCUTANEOUS ENDOSCOPIC GASTROSTOMY TUBE INSERTION (N/A) 1 Day Post-Op ASSESSMENT: 
Patient underwent PEG placement yesterday. Per recent MBS and today's re-evaluation, he continues to be unsafe for PO at this time. Given confusion and inability to follow more complex commands, he is limited in his ability to perform oropharyngeal strengthening exercises. Today he was able to perform simple lingual ROM exercises with max cues. Oral care with suctioning was performed today. There was wet vocal quality present prior, during and post oral care. Patient's progression toward goals since last assessment: Limited PLAN: 
Goals have been updated based on progression since last assessment. Patient continues to benefit from skilled intervention to address the above impairments. Continue to follow the patient 3 times a week to address goals. Recommendations and Planned Interventions: 
Strict NPO status Frequent oral care Discharge Recommendations: Loki Davies SUBJECTIVE:  
Patient stated Yes.  when asked if his mouth felt better after oral care. OBJECTIVE:  
Mental Status: 
Neurologic State: Confused, Alert Orientation Level: Oriented to person Cognition: Other (comment), Impaired decision making, Impulsive, Poor safety awareness (follows some commands) Perception: Appears intact Perseveration: Perseverates during conversation Safety/Judgement: Decreased awareness of need for safety, Decreased insight into deficits, Decreased awareness of environment, Decreased awareness of need for assistance Treatment & Interventions: Motor Speech: 
  
  
  
  
  
  
  
  
  
  
  
Language Comprehension and Expression: Auditory Comprehension Hearing Aid: With patient Verbal Expression Neuro-Linguistics: 
  
  
  
  
  
  
  
  
  
  
  
  
  
  
  
  
Voice: 
  
 Swallowing exercises: simple lingual ROM exercises given max cues for attention to task. Response & Tolerance to Activities: 
  
Pain: 
Pain Scale 1: Visual 
Pain Intensity 1: 0 After treatment:  
[]         Patient left in no apparent distress sitting up in chair 
[x]         Patient left in no apparent distress in bed 
[x]         Call bell left within reach [x]         Nursing notified 
[]         Caregiver present 
[]         Bed alarm activated COMMUNICATION/COLLABORATION:  
Patient was unable to participate in education due to confusion level. The patients plan of care was discussed with: Registered Nurse Car Cortés SLP Time Calculation: 15 mins

## 2018-09-06 NOTE — PROGRESS NOTES
Problem: Mobility Impaired (Adult and Pediatric) Goal: *Acute Goals and Plan of Care (Insert Text) Physical Therapy Goals Goals reviewed 9/6/18- goals remain appropriate Goals reviewed and revised 8/29/18 1. Patient will move from supine to sit and sit to supine , scoot up and down and roll side to side in bed with moderate assistance within 7 day(s). 2.  Patient will transfer from bed to chair and chair to bed with max assistance x 2 using the least restrictive device within 7 day(s). 3.  Patient will perform sit to stand with moderate assistance x 2 within 7 day(s). 4.  Patient will perform stand pivot transfer to wheelchair with max assistance x 2 in 7 days. Reviewed 8/3/18- goals remain appropriate Reviewed and revised 7/26/2018 1. Patient will move from supine to sit and sit to supine , scoot up and down and roll side to side in bed with minimal assistance within 7 day(s). 2.  Patient will transfer from bed to chair and chair to bed with moderate assistance using the least restrictive device within 7 day(s). 3.  Patient will perform sit to stand with moderate assistance within 7 day(s). 4.  Patient will perform stand pivot transfer to wheelchair with moderate assistance in 7 days. Reviewed and revised 7/19/2018 1. Patient will move from supine to sit and sit to supine , scoot up and down and roll side to side in bed with minimal assistance within 7 day(s). 2.  Patient will transfer from bed to chair and chair to bed with moderate assistance using the least restrictive device within 7 day(s). 3.  Patient will perform sit to stand with minimal assistance within 7 day(s). 4.  Patient will perform stand pivot transfer to wheelchair with minimal assistance in 7 days. Initiated 7/12/2018 1. Patient will move from supine to sit and sit to supine , scoot up and down and roll side to side in bed with modified independence within 7 day(s). 2.  Patient will transfer from bed to chair and chair to bed with supervision/set-up using the least restrictive device within 7 day(s). 3.  Patient will perform sit to stand with supervision/set-up within 7 day(s). 4.  Patient will perform stand pivot transfer to wheelchair with modified independence in 7 days. physical Therapy TREATMENT: WEEKLY REASSESSMENT Patient: Rico Jin (45 y.o. male) Date: 9/6/2018 Diagnosis: Acute blood loss anemia GI bleed Anasarca GI Bleed 
gi bleed ASCENDING COLON CANCER  
aspiration ASPIRATION PNEUMONIA GI bleed Procedure(s) (LRB): ESOPHAGOGASTRODUODENOSCOPY (EGD) PERCUTANEOUS ENDOSCOPIC GASTROSTOMY TUBE INSERTION (N/A) 1 Day Post-Op Precautions:   
Chart, physical therapy assessment, plan of care and goals were reviewed. ASSESSMENT: 
Pt was received in supine on 2L and cleared by nursing to mobilize. Pt speech difficulty to understand, wife reports this is worse since going on Keppra. Bed mobility was performed with max A x 2 to come to the EOB with poor sitting balance. Able to tolerate sitting EOB for 20 minutes with support. Attempted to work on sitting balance, midline awareness, and reaching. He was able to maintain sitting balance for 50 seconds without support. VSS during session. He was returned to supine with max A x 2 and placed in chair position. Pillows placed to prevent pt from leaning to the L and pushing onto that side. Continue to recommend SNF. Patient's progression toward goals since last assessment: goal remain the same PLAN: 
Goals have been updated based on progression since last assessment. Patient continues to benefit from skilled intervention to address the above impairments. Continue to follow the patient 3 times a week to address goals. Planned Interventions: 
[x]              Bed Mobility Training             []       Neuromuscular Re-Education [x]              Transfer Training                   [] Orthotic/Prosthetic Training 
[]              Gait Training                         []       Modalities [x]              Therapeutic Exercises           []       Edema Management/Control [x]              Therapeutic Activities            [x]       Patient and Family Training/Education []              Other (comment): 
Discharge Recommendations: Loki Davies Further Equipment Recommendations for Discharge: TBD SUBJECTIVE:  
Patient stated Vandana chen.  OBJECTIVE DATA SUMMARY:  
Critical Behavior: 
Neurologic State: Confused, Alert Orientation Level: Oriented to person Cognition: Other (comment), Impaired decision making, Impulsive, Poor safety awareness (follows some commands) Safety/Judgement: Decreased awareness of need for safety, Decreased insight into deficits, Decreased awareness of environment, Decreased awareness of need for assistance Functional Mobility Training: 
Bed Mobility: 
Rolling: Maximum assistance;Assist x2 Supine to Sit: Maximum assistance;Assist x2 Sit to Supine: Maximum assistance;Assist x2 Scooting: Maximum assistance Transfers: 
  
  
    unsafe to attempt today Balance: 
Sitting: Impaired Sitting - Static: Poor (constant support) Sitting - Dynamic: Poor (constant support) Neuro Re-Education: 
Worked on sitting balance and maintaining midline in sitting Pain: 
Pain Scale 1: Visual 
Pain Intensity 1: 0 Activity Tolerance: VSS Please refer to the flowsheet for vital signs taken during this treatment. After treatment:  
[]    Patient left in no apparent distress sitting up in chair 
[x]    Patient left in no apparent distress in bed (chair position) [x]    Call bell left within reach [x]    Nursing notified 
[x]    Caregiver present 
[]    Bed alarm activated COMMUNICATION/COLLABORATION:  
The patients plan of care was discussed with: Occupational Therapist and Registered Nurse Scott Rocha, PT, DPT Time Calculation: 45 mins

## 2018-09-06 NOTE — PROGRESS NOTES
Discussed option of Vibra with Dr Jayden Saunders. Spoke with wife and she is receptive to this.  Referral sent to [facility] to have representative call the wife and schedule an appointment to talk with her while she is visiting patient in the hospital.

## 2018-09-06 NOTE — PROGRESS NOTES
Hematology Oncology Progress Note Follow up for: IJ/SVC thrombus Chart notes reviewed since last visit. Case discussed with following:  
Patient complains of the following: Laying in bed, not answering questions. Had PEG placed yesterday. TF started. Additional concerns noted by the staff: none Patient Vitals for the past 24 hrs: 
 BP Temp Pulse Resp SpO2  
09/06/18 0800 131/64 - 77 - 96 % 09/06/18 0740 - - - - 95 % 09/06/18 0718 121/65 - 79 - 97 % 09/06/18 0400 128/66 99.1 °F (37.3 °C) 74 22 100 % 09/06/18 0247 - - - - 96 % 09/06/18 0200 128/54 - 82 - 100 % 09/06/18 0000 140/70 98.9 °F (37.2 °C) 79 20 97 % 09/05/18 2200 128/74 - 85 - 96 % 09/05/18 2028 - - - - 98 % 09/05/18 2000 132/72 - 84 - 100 % 09/05/18 1922 157/71 99.1 °F (37.3 °C) 84 24 99 % 09/05/18 1415 133/71 100.1 °F (37.8 °C) 77 26 100 % 09/05/18 1400 129/74 - 76 20 100 % 09/05/18 1355 119/68 - 75 23 100 % 09/05/18 1350 129/70 - 77 17 100 % 09/05/18 1345 125/72 - 72 17 100 % 09/05/18 1340 126/70 - 75 27 100 % 09/05/18 1339 - - 77 23 100 % 09/05/18 1338 125/69 97.9 °F (36.6 °C) 76 25 100 % 09/05/18 1204 125/64 97.6 °F (36.4 °C) 77 20 96 % ROS negative Physical Examination: 
Gen NAD Heent no thrush or mucositis Cv reg Lungs clear Abd benign, ostomy in place. Peg in place Neuro: awake, not oriented or conversive. Labs: 
Recent Results (from the past 24 hour(s)) GLUCOSE, POC Collection Time: 09/05/18 11:15 AM  
Result Value Ref Range Glucose (POC) 210 (H) 65 - 100 mg/dL Performed by Jean Paul Floyd (PCT) BLOOD GAS, ARTERIAL Collection Time: 09/05/18  6:14 PM  
Result Value Ref Range pH 7.48 (H) 7.35 - 7.45    
 PCO2 33 (L) 35.0 - 45.0 mmHg PO2 79 (L) 80 - 100 mmHg O2 SAT 97 92 - 97 % BICARBONATE 24 22 - 26 mmol/L  
 BASE EXCESS 1.3 mmol/L  
 O2 METHOD CPAP    
 FIO2 21 % EPAP/CPAP/PEEP 9.0  Sample source ARTERIAL    
 SITE RIGHT RADIAL    
 BONNIE'S TEST N/A    
GLUCOSE, POC Collection Time: 09/05/18  6:28 PM  
Result Value Ref Range Glucose (POC) 168 (H) 65 - 100 mg/dL Performed by Elieser Cabral (PCT) CULTURE, BLOOD Collection Time: 09/05/18  7:03 PM  
Result Value Ref Range Special Requests: NO SPECIAL REQUESTS Culture result: NO GROWTH AFTER 12 HOURS    
GLUCOSE, POC Collection Time: 09/05/18 11:01 PM  
Result Value Ref Range Glucose (POC) 142 (H) 65 - 100 mg/dL Performed by Stacey Sykes METABOLIC PANEL, COMPREHENSIVE Collection Time: 09/06/18  1:42 AM  
Result Value Ref Range Sodium 142 136 - 145 mmol/L Potassium 3.7 3.5 - 5.1 mmol/L Chloride 110 (H) 97 - 108 mmol/L  
 CO2 24 21 - 32 mmol/L Anion gap 8 5 - 15 mmol/L Glucose 138 (H) 65 - 100 mg/dL BUN 29 (H) 6 - 20 MG/DL Creatinine 1.15 0.70 - 1.30 MG/DL  
 BUN/Creatinine ratio 25 (H) 12 - 20 GFR est AA >60 >60 ml/min/1.73m2 GFR est non-AA >60 >60 ml/min/1.73m2 Calcium 8.3 (L) 8.5 - 10.1 MG/DL Bilirubin, total 1.1 (H) 0.2 - 1.0 MG/DL  
 ALT (SGPT) 54 12 - 78 U/L  
 AST (SGOT) 53 (H) 15 - 37 U/L Alk. phosphatase 103 45 - 117 U/L Protein, total 7.2 6.4 - 8.2 g/dL Albumin 1.7 (L) 3.5 - 5.0 g/dL Globulin 5.5 (H) 2.0 - 4.0 g/dL A-G Ratio 0.3 (L) 1.1 - 2.2 MAGNESIUM Collection Time: 09/06/18  1:42 AM  
Result Value Ref Range Magnesium 2.5 (H) 1.6 - 2.4 mg/dL CBC WITH AUTOMATED DIFF Collection Time: 09/06/18  1:42 AM  
Result Value Ref Range WBC 11.2 (H) 4.1 - 11.1 K/uL  
 RBC 3.46 (L) 4.10 - 5.70 M/uL HGB 9.5 (L) 12.1 - 17.0 g/dL HCT 32.5 (L) 36.6 - 50.3 % MCV 93.9 80.0 - 99.0 FL  
 MCH 27.5 26.0 - 34.0 PG  
 MCHC 29.2 (L) 30.0 - 36.5 g/dL RDW 22.7 (H) 11.5 - 14.5 % PLATELET 790 413 - 908 K/uL MPV 10.6 8.9 - 12.9 FL  
 NRBC 0.2 (H) 0  WBC ABSOLUTE NRBC 0.02 (H) 0.00 - 0.01 K/uL NEUTROPHILS 67 32 - 75 % LYMPHOCYTES 19 12 - 49 % MONOCYTES 11 5 - 13 % EOSINOPHILS 1 0 - 7 % BASOPHILS 0 0 - 1 % IMMATURE GRANULOCYTES 1 (H) 0.0 - 0.5 % ABS. NEUTROPHILS 7.5 1.8 - 8.0 K/UL  
 ABS. LYMPHOCYTES 2.2 0.8 - 3.5 K/UL  
 ABS. MONOCYTES 1.3 (H) 0.0 - 1.0 K/UL  
 ABS. EOSINOPHILS 0.1 0.0 - 0.4 K/UL  
 ABS. BASOPHILS 0.0 0.0 - 0.1 K/UL  
 ABS. IMM. GRANS. 0.1 (H) 0.00 - 0.04 K/UL  
 DF AUTOMATED    
GLUCOSE, POC Collection Time: 09/06/18  5:13 AM  
Result Value Ref Range Glucose (POC) 184 (H) 65 - 100 mg/dL Performed by Mariah Dozier Assessment and Plan:  
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 Cont anticoagulation,  Transfuse hbg <7 Large mass on pacing wire- ?infected thrombus vs vegetation. Cardiology not planning to remove wire for now. Stage IIIB colon cancer - s/p resection. If pt recovers will need to see him to discuss possible adjuvant chemotherapy. Acute resp failure/Pna - off vent, Had pigtail and drainage of R pleural eff, cytology negative. Septic shock with persistent bacteremia -ID following, abx per them. Diflucan started and fungal cx ordered pending. Normochromic normocytic anemia - B12  929. folate 9.8. Ferritin was 5 on 7/10/18 suggesting fairly significant iron deficiency. He got 500 mg of IV iron in July but actually has a much higher calculated deficit. Ferritin 121 so does not need further IV iron at present. would transfuse for hbg <7.

## 2018-09-06 NOTE — PROGRESS NOTES
2Cal HN tubefeed started infusing via peg per orders. Abdominal binder in place. Pt continues to be confused, rambling unclear speech. Left sided weakness noted. Left arm edema and redness unchanged. CPAP is on.

## 2018-09-06 NOTE — PROGRESS NOTES
Infectious Disease Progress Note IMPRESSION:  
· S/p low grade temps Tmax 100.5 on 9/2,  temps of 99.0 thereafter & again 100.1 this afternoon, WBC 11.9 · S/p peg placement,post procedural? 
· S/p removal of chest tube 9/4 ·  S/p removal of L/IJ placement of PICC RUE , TPN on 8/31 ·  Fluconazole IV added 9/3 · Persistent bacteremia with coagulase negative Staph since 8/11 initially oxacillin sensitive , last positive BC is oxacillin resistant 8/21(1/4) · Repeat BC 8/26, 8/29 , 9/3 no growth ·  S/p failed MBS · NATASHA shows definite large mass associated with pacing wire in RA which may represent vegetation ( 8/23) · Thrombus  & tiny gas bubble of as in RIJ extending into SVC to level of RA (8/15)  s/p heparin IV now on lovenox s/q · CTA chest - no PE, could not visualize SVC thrombus, left lateral wall soft tissue swelling, soft tissue around pacemaker show no significant abnormality · US chest wall - minimal fluid in pacemaker pocket, no significant inflammation of overlying subcutaneous fat or skin · Acute respiratory failure on Ventilator / h/o tracheostomy 24 years ago  , s/p extubation 8/26 · R/ pleural effusion s/p chest tube placement , CXr clear · Increased sputum production - 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    
  
  
PLAN:  
   
· Continue Daptomycin, Gentamicin IV,Fluconazole. · BCx2 · Thrombus in R/IJ, now  mass in RA which is suggestive of thrombus rather than vegetation in light of pacemaker pocket appearing clear of infection. · Hold off on removal of pacemaker at this time . D/w Dr Noreen Springereling Dr Kaye Rojas , .  . · Continue antimicrobials & anticoagulation ·  Needs  Erazo catheter removal whenever possible ·   D/w family at bedside Blood Culture / line culture  Special Requests: NO SPECIAL REQUESTS   Preliminary Culture result: NO GROWTH AFTER 10 HOURS Blood culture  Special Requests: NO SPECIAL REQUESTS   Preliminary Culture result: NO GROWTH 2 DAYS Blood culture  Culture result:    Final  
STAPHYLOCOCCUS EPIDERMIDIS (OXACILLIN RESISTANT) GROWING IN 1 OF 4 BOTTLES DRAWN (SITE = RW) (A) Culture result:    Final  
PRELIMINARY REPORT OF GRAM POSITIVE COCCI IN CLUSTERS GROWING IN 1 OF 4 BOTTLES DRAWN , SO FAR CALLED TO AND READ BACK BY CRISTIN LOCK 18 0721 SP (A) Culture result:    Final  
REMAINING BOTTLE(S) HAS/HAVE NO GROWTH IN 5 DAYS  
 
US chest - : There is minimal fluid in the pacemaker pocket, a thin sliver measuring 
approximately 2 mm in thickness and 21 mm in length. No significant inflammation 
of the overlying subcutaneous fat or skin. IMPRESSION: 
No abscess. Subjective:  
 
Pt seen earlier today. Drowsy due to to [address] per wife . Wife & niece at bedside. Pt getting ready to be wheeled out for peg placement Review of Systems:  Review of systems not obtained due to patient factors. Objective:  
 
Blood pressure 157/71, pulse 84, temperature 99.1 °F (37.3 °C), resp. rate 24, height 6' 2\" (1.88 m), weight 235 lb 7.2 oz (106.8 kg), SpO2 98 %. Temp (24hrs), Av.5 °F (36.9 °C), Min:97.6 °F (36.4 °C), Max:100.1 °F (37.8 °C) Patient Vitals for the past 24 hrs: 
 Temp Pulse Resp BP SpO2  
18 - - - - 98 % 18 1922 99.1 °F (37.3 °C) 84 24 157/71 99 % 18 1415 100.1 °F (37.8 °C) 77 26 133/71 100 % 18 1400 - 76 20 129/74 100 % 18 1355 - 75 23 119/68 100 % 18 1350 - 77 17 129/70 100 % 18 1345 - 72 17 125/72 100 % 18 1340 - 75 27 126/70 100 % 18 5679 - 77 23 - 100 % 09/05/18 1338 97.9 °F (36.6 °C) 76 25 125/69 100 % 09/05/18 1204 97.6 °F (36.4 °C) 77 20 125/64 96 % 09/05/18 0856 - 77 - 130/66 -  
09/05/18 0809 - - - - 99 % 09/05/18 0711 - 75 - 116/69 98 % 09/05/18 0709 97.8 °F (36.6 °C) 79 20 116/69 97 % 09/05/18 0400 98.7 °F (37.1 °C) 81 20 (!) 136/114 96 % 09/05/18 0228 - - - - 95 % 09/04/18 2346 98.1 °F (36.7 °C) 88 20 121/69 -  
09/04/18 2142 - - - - 98 % Lines:  Central Venous Catheter:  [facility] Physical Exam:  
General:   Drowsy,but arousable Lungs:    Reduced air entry bases on  auscultation  chest wall-  pacemaker site -no swelling, erythemal  
CV:  Regular rate and rhythm,no murmur, Abdomen:   Soft, non-tender. bowel sounds +distended Extremities: Edema Lines/Devices:  Intact, no erythema, drainage or tenderness Data Review: CBC:  
Recent Labs  
   09/05/18 
 0120  09/04/18 
 0324  09/03/18 
 0457 WBC  11.9*  13.5*  14.5*  
RBC  3.44*  3.41*  3.62* HGB  9.3*  9.1*  9.7* HCT  32.7*  32.2*  33.8*  
PLT  239  232  253 GRANS  71  70  70 LYMPH  17  18  19 EOS  2  1  1 CMP:  
Recent Labs  
   09/04/18 
 0324  09/03/18 
 0457 GLU  168*  203* NA  146*  149*  
K  3.4*  3.8 CL  112*  114* CO2  26  26 BUN  30*  28* CREA  1.09  1.15  
CA  8.3*  8.6 AGAP  8  9 BUCR  28*  24* AP   --   95  
TP   --   7.6 ALB   --   1.9*  
GLOB   --   5.7* AGRAT   --   0.3* Studies:     
Lab Results Component Value Date/Time Culture result: NO GROWTH 2 DAYS 09/03/2018 05:45 PM  
 Culture result: NO GROWTH 6 DAYS 08/29/2018 08:57 PM  
 Culture result: NO GROWTH 6 DAYS 08/29/2018 08:48 PM  
 Culture result: Culture performed on Unspun Fluid 08/29/2018 09:23 AM  
 Culture result: NO GROWTH 4 DAYS 08/29/2018 09:23 AM  
  
 
XR Results (most recent): 
 
Results from Hospital Encounter encounter on 07/10/18 XR CHEST PORT Narrative EXAM:  XR CHEST PORT INDICATION:  SOB 
 
COMPARISON:  Chest x-ray 9/1/2018. FINDINGS: A portable AP radiograph of the chest was obtained at 05:35 hours. The 
patient is on a cardiac monitor. Right-sided pleural drainage catheter is in 
stable position with no pneumothorax or pleural effusion. Right PICC traverses 
stable and expected course to terminate in the distal superior vena cava. Left-sided pacemaker with 2 intact and fragments appear stable and as expected. There is elevation right diaphragm with grossly clear lungs apart from bibasilar 
atelectasis. The cardiac and mediastinal contours and pulmonary vascularity are 
normal.  The bones and soft tissues are grossly within normal limits. Impression IMPRESSION: No acute findings or change from prior. Patient Active Problem List  
Diagnosis Code  
 HTN (hypertension) I10  
 Depression F32.9  Hypercholesterolemia E78.00  Acid reflux K21.9  Seizure ([address] R56.9  Hypothyroidism E03.9  Hyponatremia E87.1  BPH (benign prostatic hyperplasia) N40.0  Rash R21  
 Cellulitis L03.90  Wax in ear H61.20  Ulcer [id number]  Small bowel obstruction ([address] J10.476  Atrial flutter (HCC) I48.92  
 Atrial fibrillation or flutter  CHI (closed head injury) S09. 90XA  Basal cell cancer C44.91  
 TBI (traumatic brain injury) ([address] S06. 9X9A  Atrial fibrillation (HCC) I48.91  
 Cataract H26.9  Constipation K59.00  Ankle fracture, left N13.226W  Insomnia G47.00  Tinea corporis B35.4  SSS (sick sinus syndrome) ([facility]) I49.5  Syncope R55  Seizure disorder ([address] G40.909  RONAL on CPAP G47.33, Z99.89  
 Pacemaker Z95.0  Pre-op evaluation Z01.818  Chronic back pain greater than 3 months duration M54.9, G89.29  
 Cerebral infarction (HCC) I63.9  Acute bronchitis J20.9  Screening for colon cancer Z12.11  
 Chronic right shoulder pain M25.511, G89.29  
 Common wart B07.8  Localized epilepsy with impairment of consciousness ([address] G40.209  Severe obesity (BMI 35.0-39.9) ([facility]) E66.01  
 Acute blood loss anemia D62  Anasarca R60.1  GI bleed K92.2  Acute encephalopathy G93.40  Idiopathic hypotension I95.0  Counseling regarding goals of care Z71.89 ICD-10-CM ICD-9-CM 1. Anemia, unspecified type D64.9 285.9 2. Generalized weakness R53.1 780.79   
3. Acute encephalopathy G93.40 348.30   
4. Anasarca R60.1 782.3 5. Idiopathic hypotension I95.0 458.9 6. Counseling regarding goals of care Z71.89 V65.49   
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 9X9S 907.0 8. Chronic atrial fibrillation (HCC) I48.2 427.31   
9. Malignant neoplasm of ascending colon (HCC) C18.2 153.6 Anti-infectives: Fluconazole IV- 9/3 Daptomycin IV - 8/26- 9/3 Gentamicin IV 8/26 S/p levaquin 8/29-9/3 Cefotetan 7/17 Zosyn 7/20-8/7 Cefepime - 8/11-8/13 Ceftriaxone 8/13-8/17 Vancomycin 8/11-8/22 Daptomycin 8/23- 8/24- Naficillin IV 8/24- 8/26  Suhail Stiles MD [facility]

## 2018-09-06 NOTE — PROGRESS NOTES
Hospitalist Progress Note NAME: Radha Menjivar :  1940 MRN:  [id number] Assessment / Plan: 
Acute Encephalopathy - noted in past 3 days as per family, could be mulitfactorial 
? Poor baseline mental status from TBI/CVA but pt was functional as per family H/o Seizure disorder - but seizure free for a long time as per Dr Natividad Dick, only on low dose tegretol (po not available IV) Cathy Daniels for Seizure precaution until he gets an Oral Access to restart the Oral tegretol Hypernatremia - much improved now at 149 H/o Prolonged TPN- due to Dysphagia with ? Oral thrush with severe crusting NEW Fever in past 24 hrs- 100.5 WBC trending up in past 24 hrs- 14.5 K Send Blood Cx for fungal growth, will start low dose Diflucan for now empirically- further recommendations as per ID- following; follow fever trend IVF- 1/2NS at low rate to correct hypernatremia Plan to get PEG tube to get off TPN ASAP if remains afebrile & WBC improves in AM 
 
? Early Cellulitis of Lt forearm. Not, no increase in size Blood cultures x 2 Follow by exam 
  
Acute resp Failure s/p Ventilator support , now off it since  Kareem Pleural Effusions s/p R chest tube Pulm following RONAL CPAP q HS 
  
?Infected Thrombus on NATASHA Bacteremia SSS S/p PPM 
On IV ABx as per ID Genta + Daptomycin On Lovenox therapeutic 
  
Dysphagia GI bleed s/p colectomy/enterotomies Ileus with Aspiration PNA Hb stable now On TPN, last bag today PEG placement  tolerating and increase. No residuals Palliative consulted this admission already 
  
  
  
Code Status: Full DVT Prophylaxis: on lovenox for the thrombus Subjective: per staff tolerating Tube feeding Chief Complaint / Reason for Physician Visit  followup lethargy \"Im doing better\". Discussed with RN events overnight. Review of Systems: 
Symptom Y/N Comments  Symptom Y/N Comments Fever/Chills    Chest Pain Poor Appetite    Edema Cough    Abdominal Pain Sputum    Joint Pain SOB/BECERRIL    Pruritis/Rash Nausea/vomit    Tolerating PT/OT Diarrhea    Tolerating Diet Constipation    Other Could NOT obtain due to:   
 
Objective: VITALS:  
Last 24hrs VS reviewed since prior progress note. Most recent are: 
Patient Vitals for the past 24 hrs: 
 Temp Pulse Resp BP SpO2  
09/06/18 1600 98.9 °F (37.2 °C) 76 22 120/68 98 % 09/06/18 1558 - 75 - 126/69 99 % 09/06/18 1410 - - - - 98 % 09/06/18 1100 97.9 °F (36.6 °C) - - - -  
09/06/18 1000 98 °F (36.7 °C) 81 16 125/67 100 % 09/06/18 0800 97.8 °F (36.6 °C) 77 16 - 96 % 09/06/18 0740 - - - - 95 % 09/06/18 0718 98.5 °F (36.9 °C) 79 18 121/65 97 % 09/06/18 0400 99.1 °F (37.3 °C) 74 22 128/66 100 % 09/06/18 0247 - - - - 96 % 09/06/18 0200 - 82 - 128/54 100 % 09/06/18 0000 98.9 °F (37.2 °C) 79 20 140/70 97 % 09/05/18 2200 - 85 - 128/74 96 % 09/05/18 2028 - - - - 98 % 09/05/18 2000 - 84 - 132/72 100 % 09/05/18 1922 99.1 °F (37.3 °C) 84 24 157/71 99 % Intake/Output Summary (Last 24 hours) at 09/06/18 1901 Last data filed at 09/06/18 1600 Gross per 24 hour Intake             1050 ml Output             3950 ml Net            -2900 ml PHYSICAL EXAM: 
General: WD, WN. Pleasant, Awake, NAD and Ox3 cooperative, no acute distress   
EENT:  EOMI. Anicteric sclerae. MMM Resp:  CTA bilaterally, no wheezing or rales. No accessory muscle use CV:  Regular  rhythm,  No edema GI:  Soft, Non distended, Non tender.  +Bowel sounds Neurologic:  Alert and oriented X 3, normal speech, Psych:   Good insight. Not anxious nor agitated Skin:  No rashes. No jaundice Reviewed most current lab test results and cultures  YES Reviewed most current radiology test results   YES Review and summation of old records today    NO Reviewed patient's current orders and MAR    YES 
PMH/SH reviewed - no change compared to H&P 
 ________________________________________________________________________ Care Plan discussed with: 
  Comments Patient y Family  y   
RN y bedside Care Manager y CM for Discharge   y OREN Multidiciplinary team rounds were held today with , nursing, pharmacist and clinical coordinator. Patient's plan of care was discussed; medications were reviewed and discharge planning was addressed. ________________________________________________________________________ Total NON critical care TIME:  30 Minutes Total CRITICAL CARE TIME Spent:   Minutes non procedure based Comments >50% of visit spent in counseling and coordination of care YES Spent   25 revieweing chart and 20 min talking to wife answering her questtions that she has previously asked specialist  
________________________________________________________________________ Hema Zendejas MD  
 
Procedures: see electronic medical records for all procedures/Xrays and details which were not copied into this note but were reviewed prior to creation of Plan. LABS: 
I reviewed today's most current labs and imaging studies. Pertinent labs include: 
Recent Labs  
   09/06/18 
 0142  09/05/18 
 0120  09/04/18 
 3361 WBC  11.2*  11.9*  13.5* HGB  9.5*  9.3*  9.1*  
HCT  32.5*  32.7*  32.2*  
PLT  212  239  232 Recent Labs  
   09/06/18 
 0142  09/04/18 
 5058 NA  142  146*  
K  3.7  3.4*  
CL  110*  112* CO2  24  26 GLU  138*  168* BUN  29*  30* CREA  1.15  1.09  
CA  8.3*  8.3*  
MG  2.5*   --   
ALB  1.7*   --   
TBILI  1.1*   --   
SGOT  53*   --   
ALT  54   --   
 
 
Signed: Hema Zendejas MD

## 2018-09-07 ENCOUNTER — APPOINTMENT (OUTPATIENT)
Dept: CT IMAGING | Age: 78
DRG: 329 | End: 2018-09-07
Attending: INTERNAL MEDICINE
Payer: MEDICARE

## 2018-09-07 LAB
CK SERPL-CCNC: 61 U/L (ref 39–308)
GLUCOSE BLD STRIP.AUTO-MCNC: 137 MG/DL (ref 65–100)
GLUCOSE BLD STRIP.AUTO-MCNC: 145 MG/DL (ref 65–100)
GLUCOSE BLD STRIP.AUTO-MCNC: 154 MG/DL (ref 65–100)
GLUCOSE BLD STRIP.AUTO-MCNC: 236 MG/DL (ref 65–100)
GLUCOSE BLD STRIP.AUTO-MCNC: 242 MG/DL (ref 65–100)
SERVICE CMNT-IMP: ABNORMAL

## 2018-09-07 PROCEDURE — 74011250636 HC RX REV CODE- 250/636: Performed by: INTERNAL MEDICINE

## 2018-09-07 PROCEDURE — 82550 ASSAY OF CK (CPK): CPT | Performed by: SURGERY

## 2018-09-07 PROCEDURE — 71260 CT THORAX DX C+: CPT

## 2018-09-07 PROCEDURE — 74011000250 HC RX REV CODE- 250: Performed by: SURGERY

## 2018-09-07 PROCEDURE — 65660000000 HC RM CCU STEPDOWN

## 2018-09-07 PROCEDURE — 74011250637 HC RX REV CODE- 250/637: Performed by: INTERNAL MEDICINE

## 2018-09-07 PROCEDURE — 94640 AIRWAY INHALATION TREATMENT: CPT

## 2018-09-07 PROCEDURE — 74011636320 HC RX REV CODE- 636/320: Performed by: INTERNAL MEDICINE

## 2018-09-07 PROCEDURE — 36415 COLL VENOUS BLD VENIPUNCTURE: CPT | Performed by: SURGERY

## 2018-09-07 PROCEDURE — 74011636637 HC RX REV CODE- 636/637: Performed by: INTERNAL MEDICINE

## 2018-09-07 PROCEDURE — 74011000258 HC RX REV CODE- 258: Performed by: INTERNAL MEDICINE

## 2018-09-07 PROCEDURE — 74011250637 HC RX REV CODE- 250/637: Performed by: SURGERY

## 2018-09-07 PROCEDURE — 74011250636 HC RX REV CODE- 250/636: Performed by: SURGERY

## 2018-09-07 PROCEDURE — 74011000250 HC RX REV CODE- 250: Performed by: INTERNAL MEDICINE

## 2018-09-07 PROCEDURE — 82962 GLUCOSE BLOOD TEST: CPT

## 2018-09-07 RX ORDER — VENLAFAXINE 37.5 MG/1
37.5 TABLET ORAL 2 TIMES DAILY WITH MEALS
Status: DISCONTINUED | OUTPATIENT
Start: 2018-09-07 | End: 2018-10-02 | Stop reason: HOSPADM

## 2018-09-07 RX ORDER — ACETAMINOPHEN 325 MG/1
650 TABLET ORAL
Status: DISCONTINUED | OUTPATIENT
Start: 2018-09-07 | End: 2018-10-02 | Stop reason: HOSPADM

## 2018-09-07 RX ORDER — LEVOFLOXACIN 750 MG/1
750 TABLET ORAL EVERY 24 HOURS
Status: DISCONTINUED | OUTPATIENT
Start: 2018-09-07 | End: 2018-09-07 | Stop reason: ALTCHOICE

## 2018-09-07 RX ORDER — FLUCONAZOLE 200 MG/1
200 TABLET ORAL EVERY 24 HOURS
Status: COMPLETED | OUTPATIENT
Start: 2018-09-07 | End: 2018-09-09

## 2018-09-07 RX ORDER — SODIUM CHLORIDE 0.9 % (FLUSH) 0.9 %
10 SYRINGE (ML) INJECTION
Status: COMPLETED | OUTPATIENT
Start: 2018-09-07 | End: 2018-09-07

## 2018-09-07 RX ORDER — AMIODARONE HYDROCHLORIDE 200 MG/1
200 TABLET ORAL DAILY
Status: DISCONTINUED | OUTPATIENT
Start: 2018-09-08 | End: 2018-10-02 | Stop reason: HOSPADM

## 2018-09-07 RX ORDER — POLYETHYLENE GLYCOL 3350 17 G/17G
17 POWDER, FOR SOLUTION ORAL DAILY
Status: DISCONTINUED | OUTPATIENT
Start: 2018-09-07 | End: 2018-09-07

## 2018-09-07 RX ORDER — SODIUM CHLORIDE 9 MG/ML
50 INJECTION, SOLUTION INTRAVENOUS
Status: COMPLETED | OUTPATIENT
Start: 2018-09-07 | End: 2018-09-16

## 2018-09-07 RX ORDER — POLYETHYLENE GLYCOL 3350 17 G/17G
17 POWDER, FOR SOLUTION ORAL DAILY
Status: DISCONTINUED | OUTPATIENT
Start: 2018-09-08 | End: 2018-10-02 | Stop reason: HOSPADM

## 2018-09-07 RX ORDER — MAGNESIUM SULFATE 100 %
4 CRYSTALS MISCELLANEOUS AS NEEDED
Status: DISCONTINUED | OUTPATIENT
Start: 2018-09-07 | End: 2018-10-02 | Stop reason: HOSPADM

## 2018-09-07 RX ADMIN — LACTULOSE 10 G: 20 SOLUTION ORAL at 08:26

## 2018-09-07 RX ADMIN — Medication 10 ML: at 05:02

## 2018-09-07 RX ADMIN — ALBUTEROL SULFATE 2.5 MG: 2.5 SOLUTION RESPIRATORY (INHALATION) at 14:40

## 2018-09-07 RX ADMIN — LEVETIRACETAM 250 MG: 500 SOLUTION ORAL at 20:29

## 2018-09-07 RX ADMIN — INSULIN LISPRO 4 UNITS: 100 INJECTION, SOLUTION INTRAVENOUS; SUBCUTANEOUS at 05:09

## 2018-09-07 RX ADMIN — INSULIN LISPRO 3 UNITS: 100 INJECTION, SOLUTION INTRAVENOUS; SUBCUTANEOUS at 19:24

## 2018-09-07 RX ADMIN — AMIODARONE HYDROCHLORIDE 0.5 MG/MIN: 50 INJECTION, SOLUTION INTRAVENOUS at 06:04

## 2018-09-07 RX ADMIN — Medication 10 ML: at 20:11

## 2018-09-07 RX ADMIN — IOPAMIDOL 100 ML: 755 INJECTION, SOLUTION INTRAVENOUS at 20:11

## 2018-09-07 RX ADMIN — INSULIN LISPRO 3 UNITS: 100 INJECTION, SOLUTION INTRAVENOUS; SUBCUTANEOUS at 23:50

## 2018-09-07 RX ADMIN — ENOXAPARIN SODIUM 110 MG: 100 INJECTION SUBCUTANEOUS at 01:00

## 2018-09-07 RX ADMIN — Medication 10 ML: at 13:10

## 2018-09-07 RX ADMIN — Medication 10 ML: at 20:29

## 2018-09-07 RX ADMIN — LACTULOSE 10 G: 20 SOLUTION ORAL at 19:24

## 2018-09-07 RX ADMIN — AMIODARONE HYDROCHLORIDE 0.25 MG/MIN: 50 INJECTION, SOLUTION INTRAVENOUS at 20:28

## 2018-09-07 RX ADMIN — SODIUM CHLORIDE 50 ML/HR: 900 INJECTION, SOLUTION INTRAVENOUS at 20:11

## 2018-09-07 RX ADMIN — FUROSEMIDE 60 MG: 10 INJECTION, SOLUTION INTRAMUSCULAR; INTRAVENOUS at 09:44

## 2018-09-07 RX ADMIN — LEVETIRACETAM 250 MG: 500 SOLUTION ORAL at 11:39

## 2018-09-07 RX ADMIN — DAPTOMYCIN 1000 MG: 500 INJECTION, POWDER, LYOPHILIZED, FOR SOLUTION INTRAVENOUS at 14:30

## 2018-09-07 RX ADMIN — POLYETHYLENE GLYCOL 3350 17 G: 17 POWDER, FOR SOLUTION ORAL at 08:26

## 2018-09-07 RX ADMIN — ALBUTEROL SULFATE 2.5 MG: 2.5 SOLUTION RESPIRATORY (INHALATION) at 19:12

## 2018-09-07 RX ADMIN — INSULIN LISPRO 4 UNITS: 100 INJECTION, SOLUTION INTRAVENOUS; SUBCUTANEOUS at 13:08

## 2018-09-07 RX ADMIN — ACETAMINOPHEN 650 MG: 325 TABLET ORAL at 11:41

## 2018-09-07 RX ADMIN — ALBUTEROL SULFATE 2.5 MG: 2.5 SOLUTION RESPIRATORY (INHALATION) at 08:57

## 2018-09-07 RX ADMIN — VENLAFAXINE 37.5 MG: 37.5 TABLET ORAL at 16:25

## 2018-09-07 RX ADMIN — FLUCONAZOLE 200 MG: 200 TABLET ORAL at 20:29

## 2018-09-07 RX ADMIN — ENOXAPARIN SODIUM 110 MG: 100 INJECTION SUBCUTANEOUS at 23:49

## 2018-09-07 RX ADMIN — ENOXAPARIN SODIUM 110 MG: 100 INJECTION SUBCUTANEOUS at 13:00

## 2018-09-07 RX ADMIN — ALBUTEROL SULFATE 2.5 MG: 2.5 SOLUTION RESPIRATORY (INHALATION) at 01:28

## 2018-09-07 RX ADMIN — GENTAMICIN SULFATE 80 MG: 80 INJECTION, SOLUTION INTRAVENOUS at 05:01

## 2018-09-07 RX ADMIN — Medication 20 ML: at 13:10

## 2018-09-07 NOTE — PROGRESS NOTES
PCU SHIFT NURSING NOTE Bedside and Verbal shift change report given to Jill Ingram RN (oncoming nurse) by Tomasa Conley (offgoing nurse). Report included the following information SBAR, Kardex, Intake/Output, MAR, Recent Results and Cardiac Rhythm Paced. Shift Summary: Received pt in bed with HOB elevated. No noted distress. PEG tube feeding in progress at 30cc/hr with goal 50cc/hr. Pt sleeping but arouses easily but does not want to be bothered. See flowcharting for full assessment. Will monitor. Admission Date 7/10/2018 Admission Diagnosis Acute blood loss anemia GI bleed Anasarca GI Bleed 
gi bleed ASCENDING COLON CANCER  
aspiration ASPIRATION PNEUMONIA Consults IP CONSULT TO GASTROENTEROLOGY 
IP CONSULT TO INTERVENTIONAL RADIOLOGY 
IP CONSULT TO INTENSIVIST 
IP CONSULT TO NEPHROLOGY 
IP CONSULT TO HEMATOLOGY 
IP CONSULT TO GASTROENTEROLOGY 
IP CONSULT TO INFECTIOUS DISEASES 
IP CONSULT TO PALLIATIVE CARE - PROVIDER 
IP CONSULT TO NEUROLOGY 
IP CONSULT TO CARDIOLOGY 
IP CONSULT TO COLORECTAL SURGERY 
IP CONSULT TO PULMONOLOGY 
IP CONSULT TO PULMONOLOGY 
IP CONSULT TO HOSPITALIST Consults []PT []OT []Speech  
[]Case Management  
  
[] Palliative Cardiac Monitoring Order []Yes []No  
 
IV drips []Yes Drip:                            Dose: 
Drip:                            Dose: 
Drip:                            Dose:  
[]No  
 
GI Prophylaxis []Yes []No  
 
 
 
DVT Prophylaxis SCDs:  Sequential Compression Device: Bilateral  
  Patient Refused VTE Prophylaxis: Yes Ra stockings:  Graduated Compression Stockings: Bilateral  
  
[] Medication []Contraindicated []None Activity Level Activity Level: Bed Rest   
 Activity Assistance: Complete care Purposeful Rounding every 1-2 hour? []Yes Mckee Score  Total Score: 3 Bed Alarm (If score 3 or >) []Yes [] Refused (See signed refusal form in chart) Dagoberto Score  Dagoberto Score: 12 Dagoberto Score (if score 14 or less) []PMT consult  
[]Wound Care consult []Specialty bed  
[] Nutrition consult Needs prior to discharge:  
Home O2 required:   
[]Yes []No  
 If yes, how much O2 required? Other:  
 Last Bowel Movement: Last Bowel Movement Date:  (L colostomy) Influenza Vaccine Received Flu Vaccine for Current Season (usually Sept-March): Not Flu Season Pneumonia Vaccine Diet Active Orders Diet DIET NPO With Tube Feedings LDAs PICC Double Lumen 08/31/18 Right;Brachial (Active) Central Line Being Utilized Yes 9/6/2018  7:24 PM  
Criteria for Appropriate Use Total parenteral nutrition 9/6/2018  7:24 PM  
Site Assessment Clean, dry, & intact 9/6/2018  7:24 PM  
Phlebitis Assessment 0 9/6/2018  7:24 PM  
Infiltration Assessment 0 9/6/2018  7:24 PM  
Arm Circumference (cm) 39 cm 9/6/2018  7:24 PM  
Date of Last Dressing Change 08/31/18 9/6/2018  7:24 PM  
Dressing Status Clean, dry, & intact 9/6/2018  7:24 PM  
Action Taken Open ports on tubing capped 9/6/2018  7:24 PM  
External Catheter Length (cm) 0 centimeters 9/6/2018  7:24 PM  
Dressing Type Transparent 9/6/2018  7:24 PM  
Hub Color/Line Status Red; Infusing 9/6/2018  7:24 PM  
Positive Blood Return (Site #1) Yes 9/6/2018  7:24 PM  
Hub Color/Line Status Purple; Infusing 9/6/2018  7:24 PM  
Positive Blood Return (Site #2) Yes 9/6/2018  7:24 PM  
Alcohol Cap Used Yes 9/6/2018  7:24 PM  
      
Peripheral IV 09/04/18 Left Antecubital (Active) Site Assessment Clean, dry, & intact 9/6/2018  7:24 PM  
Phlebitis Assessment 0 9/6/2018  7:24 PM  
Infiltration Assessment 0 9/6/2018  7:24 PM  
Dressing Status Clean, dry, & intact 9/6/2018  7:24 PM  
Dressing Type Transparent 9/6/2018  7:24 PM  
Hub Color/Line Status Blue;Flushed 9/6/2018  7:24 PM  
Action Taken Open ports on tubing capped 9/6/2018  4:03 AM  
 Alcohol Cap Used Yes 9/6/2018  7:24 PM  
      
PEG/Gastrostomy Tube 09/05/18 (Active) Site Assessment Clean, dry, & intact 9/6/2018  7:24 PM  
Dressing Status Clean, dry, & intact 9/6/2018  7:24 PM  
G Port Status Infusing 9/6/2018  7:24 PM  
Action Taken Placement verified (comment) 9/6/2018  7:24 PM  
Drainage Description Tan 9/6/2018  7:24 PM  
Gastric Residual (mL) 0 ml 9/6/2018  7:24 PM  
Tube Feeding/Formula Options Twocal HN 9/6/2018  7:24 PM  
Modular Nutrients Protein liquid 9/6/2018  7:24 PM  
Tube Feeding/Verify Rate (mL/hr) 30 9/6/2018  7:24 PM  
Water Flush Volume (mL) 100 mL 9/6/2018  7:24 PM  
Medication Volume 0 ml 9/6/2018  7:24 PM  
   
Colostomy 07/10/18 Left Abdomen (Active) Drainage Color Other (Comment) 9/6/2018  7:24 PM  
Site Assessment Clean, dry, intact 9/6/2018  7:24 PM  
Treatment Site care 9/6/2018  7:24 PM  
Output (ml) 0 mL 9/5/2018 12:10 PM  
            
Urinary Catheter [REMOVED] Urinary Catheter 07/17/18 2- way; Erazo-Indications for Use: Accurate measurement of urinary output [REMOVED] Urinary Catheter 08/11/18 Erazo-Indications for Use: Accurate measurement of urinary output Urinary Catheter 09/02/18 Erazo-Indications for Use: Acute urinary retention/bladder outlet obstruction Intake & Output Date 09/05/18 1900 - 09/06/18 7203 09/06/18 0700 - 09/07/18 9020 Shift 2466-8747 24 Hour Total [id number] 4725-4925 24 Hour Total  
I 
N 
T 
A 
K 
E 
 P.O.   350  350  
   P. O.   350  350 I.V. 
(mL/kg/hr)  250 Volume (lactated Ringers infusion)  250 NG/ 400 300 100 400 Water Flush Volume (mL) (PEG/Gastrostomy Tube 09/05/18) 400 400 300 100 400 Medication Volume (PEG/Gastrostomy Tube 09/05/18) 0 0  0 0 Shift Total 
(mL/kg) 400 
(3.7) 650 
(6.1) 650 
(6.1) 100 
(0.9) 750 
(7) O 
U T 
P 
U Lenell Montiel Urine (mL/kg/hr) 900 2500 3050 
(2.4)  3050 Urine Output (mL) (Urinary Catheter 09/02/18 Erazo) 900 2500 3050  3050  Stool  0     
 Output (ml) (Colostomy 07/10/18 Left Abdomen)  0 Shift Total 
(mL/kg) 900 
(8.4) 2500 
(23.4) 3050 
(28.6)  3050 
(28.6) NET -500 -1850 -2400 100 -2300 Weight (kg) 106.8 106.8 106.8 106.8 106.8 Readmission Risk Assessment Tool Score High Risk   
      
 21 Total Score 3 Has Seen PCP in Last 6 Months (Yes=3, No=0) 2 . Living with Significant Other. Assisted Living. LTAC. SNF. or  
Rehab  
 3 Patient Length of Stay (>5 days = 3)  
 9 Pt. Coverage (Medicare=5 , Medicaid, or Self-Pay=4) 4 Charlson Comorbidity Score (Age + Comorbid Conditions) Criteria that do not apply:  
 IP Visits Last 12 Months (1-3=4, 4=9, >4=11) Expected Length of Stay 10d 19h Actual Length of Stay 58

## 2018-09-07 NOTE — PROGRESS NOTES
Terri Bingham from [facility] states she talked with the wife, however she was teary eyed because she informed Terri Bingham that md said patient had new infection and will need to stay longer. Per Terri Bingham states they can accept pending stability and auth from payor . Terri Bingham will follow up next week.

## 2018-09-07 NOTE — PROGRESS NOTES
Received call from wife about dcp. She states she talked with her family last night and they feel [address] [place] [address] is too far for her to come everyday to see him. Dr Shahbaz Gillette and I discussed with her he will need a lot care when discharged and she agrees. She is receptive to Destinee Garduno from [address] [place] [address] coming to talk with her ([phone]) today.

## 2018-09-07 NOTE — PROGRESS NOTES
1657 Patient had 21 beat run of VTACH. Dr. Jensen Sites cardiology group paged. Spoke with Dennis Staples who states MD will call within 20 minutes. [phone] ,cardiology returned call and wants BMP and Mag Lab for tomorrow morning. No other interventions noted.

## 2018-09-07 NOTE — PROGRESS NOTES
Hematology Oncology Progress Note Follow up for: IJ/SVC thrombus Chart notes reviewed since last visit. Case discussed with following:  
Patient complains of the following: Laying in bed,not answering questions. Additional concerns noted by the staff: none Patient Vitals for the past 24 hrs: 
 BP Temp Pulse Resp SpO2 Weight 09/07/18 0857 - - - - 99 % -  
09/07/18 0800 123/74 99.6 °F (37.6 °C) 75 28 98 % -  
09/07/18 0524 - - - - - 110.2 kg (242 lb 15.2 oz) 09/07/18 0300 115/88 98.4 °F (36.9 °C) 85 22 100 % -  
09/07/18 0128 - - - - 100 % -  
09/07/18 0000 114/73 - 87 - - -  
09/06/18 2300 108/52 98.3 °F (36.8 °C) 77 24 99 % -  
09/06/18 2200 107/67 - 82 - - -  
09/06/18 2100 101/56 - 95 - - -  
09/06/18 2054 - - - - - 110.8 kg (244 lb 4.3 oz) 09/06/18 2000 140/60 - 77 - 100 % -  
09/06/18 1954 - - - - 100 % -  
09/06/18 1924 125/74 98.2 °F (36.8 °C) 81 22 99 % -  
09/06/18 1600 120/68 98.9 °F (37.2 °C) 76 22 98 % -  
09/06/18 1558 126/69 - 75 - 99 % -  
09/06/18 1410 - - - - 98 % -  
 
 
ROS negative Physical Examination: 
Gen NAD Heent Perrl, eomi, no thyromegaly Cv reg Lungs clear Abd benign, ostomy in place. Peg in place Neuro: awake, not oriented or conversive. Labs: 
Recent Results (from the past 24 hour(s)) GLUCOSE, POC Collection Time: 09/06/18 12:19 PM  
Result Value Ref Range Glucose (POC) 137 (H) 65 - 100 mg/dL Performed by Nacho Howard GLUCOSE, POC Collection Time: 09/06/18  5:57 PM  
Result Value Ref Range Glucose (POC) 185 (H) 65 - 100 mg/dL Performed by Tomy Gamboa GLUCOSE, POC Collection Time: 09/06/18 11:09 PM  
Result Value Ref Range Glucose (POC) 187 (H) 65 - 100 mg/dL Performed by Ashwin Berumen CK Collection Time: 09/07/18  1:59 AM  
Result Value Ref Range CK 61 39 - 308 U/L  
GLUCOSE, POC Collection Time: 09/07/18  5:05 AM  
Result Value Ref Range Glucose (POC) 242 (H) 65 - 100 mg/dL Performed by Dayami Spivey Assessment and Plan:  
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 Cont anticoagulation,  Transfuse hbg <7 Large mass on pacing wire- ?infected thrombus vs vegetation. Cardiology not planning to remove wire for now. Stage IIIB colon cancer - s/p resection. If pt recovers will need to see him to discuss possible adjuvant chemotherapy. Acute resp failure/Pna - off vent, Had pigtail and drainage of R pleural eff, cytology negative. Septic shock with persistent bacteremia -ID following, abx per them. Diflucan started and fungal cx ordered pending. Normochromic normocytic anemia - B12  929. folate 9.8. Ferritin was 5 on 7/10/18 suggesting fairly significant iron deficiency. He got 500 mg of IV iron in July but actually has a much higher calculated deficit. Ferritin 121 so does not need further IV iron at present. would transfuse for hbg <7.

## 2018-09-07 NOTE — PROGRESS NOTES
Hospitalist Progress Note NAME: Luke Cárdenas :  1940 MRN:  [id number] Assessment / Plan: 
Acute Encephalopathy - noted in past 3 days as per family, could be mulitfactorial 
? Poor baseline mental status from TBI/CVA but pt was functional as per family H/o Seizure disorder - but seizure free for a long time as per Dr Frieda Ku, only on low dose tegretol (po not available IV) Cordelia Hung for Seizure precaution until he gets an Oral Access to restart the Oral tegretol Hypernatremia - much improved now at 149 H/o Prolonged TPN- due to Dysphagia with ? Oral thrush with severe crusting NEW Fever in past 24 hrs- 100.5 WBC trending up in past 24 hrs- 14.5 K Send Blood Cx for fungal growth, will start low dose Diflucan for now empirically- further recommendations as per ID- following; follow fever trend IVF- 1/2NS at low rate to correct hypernatremia Plan to get PEG tube to get off TPN ASAP if remains afebrile & WBC improves in AM 
 
? Early Cellulitis of Lt forearm. Not, no increase in size Blood cultures x 2 is Positive. Looks like Staph Coag neg ID is Aware,  Patient on Dapto, Trula Terri, Levoflox and Diflucan Follow by exam 
 
Oral Eugenio Rick Nystatin Swabs 
  
Acute resp Failure s/p Ventilator support , now off it since  Kareem Pleural Effusions s/p R chest tube Pulm following RONAL CPAP q HS 
  
?Infected Thrombus on NATASHA Bacteremia Afib Change IV infusion to Peg Amiodarone SSS S/p PPM 
On IV ABx as per ID Genta + Daptomycin On Lovenox therapeutic was on Pradaxa outpatient , needs to be on Eliquis if to be given by Peg 
  Dysphagia GI bleed s/p colectomy/enterotomies Ileus with Aspiration PNA Hb stable now On TPN, last bag today PEG placement  tolerating and increase. No residuals Palliative consulted this admission already 
  
  
  
Code Status: Full DVT Prophylaxis: on lovenox for the thrombus Subjective: Awake, pleasant Chief Complaint / Reason for Physician Visit  followup lethargy \"Im doing better\". Discussed with RN events overnight. Review of Systems: 
Symptom Y/N Comments  Symptom Y/N Comments Fever/Chills    Chest Pain Poor Appetite    Edema Cough    Abdominal Pain Sputum    Joint Pain SOB/BECERRIL    Pruritis/Rash Nausea/vomit    Tolerating PT/OT Diarrhea    Tolerating Diet Constipation    Other Could NOT obtain due to:   
 
Objective: VITALS:  
Last 24hrs VS reviewed since prior progress note. Most recent are: 
Patient Vitals for the past 24 hrs: 
 Temp Pulse Resp BP SpO2  
09/07/18 1600 98.2 °F (36.8 °C) 83 22 94/54 100 % 09/07/18 1441 - - - - 98 % 09/07/18 1200 98.3 °F (36.8 °C) 81 24 112/61 96 % 09/07/18 0857 - - - - 99 % 09/07/18 0800 99.6 °F (37.6 °C) 75 28 123/74 98 % 09/07/18 0300 98.4 °F (36.9 °C) 85 22 115/88 100 % 09/07/18 0128 - - - - 100 % 09/07/18 0000 - 87 - 114/73 -  
09/06/18 2300 98.3 °F (36.8 °C) 77 24 108/52 99 % 09/06/18 2200 - 82 - 107/67 -  
09/06/18 2100 - 95 - 101/56 -  
09/06/18 2000 - 77 - 140/60 100 % 09/06/18 1954 - - - - 100 % 09/06/18 1924 98.2 °F (36.8 °C) 81 22 125/74 99 % Intake/Output Summary (Last 24 hours) at 09/07/18 1713 Last data filed at 09/07/18 1652 Gross per 24 hour Intake           780.67 ml Output             2675 ml Net         -1894.33 ml PHYSICAL EXAM: 
General: WD, WN. Pleasant, Awake, NAD and Ox3 cooperative, no acute distress   
EENT:  EOMI. Anicteric sclerae. MMM Resp:  CTA bilaterally, no wheezing or rales. No accessory muscle use CV:  Regular  rhythm,  No edema GI:  Soft, Non distended, Non tender.  +Bowel sounds Neurologic:  Alert and oriented X 3, normal speech, Psych:   Good insight. Not anxious nor agitated Skin:  No rashes. No jaundice, Left Hand Cellulitic looking old iv site but not worsening Reviewed most current lab test results and cultures  YES 
 Reviewed most current radiology test results   YES Review and summation of old records today    NO Reviewed patient's current orders and MAR    YES 
PMH/SH reviewed - no change compared to H&P 
________________________________________________________________________ Care Plan discussed with: 
  Comments Patient y Family  y   
RN y bedside Care Manager y CM for Discharge   y OREN  
                 y Multidiciplinary team rounds were held today with , nursing, pharmacist and clinical coordinator. Patient's plan of care was discussed; medications were reviewed and discharge planning was addressed. ________________________________________________________________________ Total NON critical care TIME:  30 Minutes Total CRITICAL CARE TIME Spent:   Minutes non procedure based Comments >50% of visit spent in counseling and coordination of care YES Spent   25 revieweing chart and 20 min talking to wife answering her questtions that she has previously asked specialist  
________________________________________________________________________ Skipper Her, MD  
 
Procedures: see electronic medical records for all procedures/Xrays and details which were not copied into this note but were reviewed prior to creation of Plan. LABS: 
I reviewed today's most current labs and imaging studies. Pertinent labs include: 
Recent Labs  
   09/06/18 0142 09/05/18 
 0120 WBC  11.2*  11.9* HGB  9.5*  9.3* HCT  32.5*  32.7*  
PLT  212  239 Recent Labs  
   09/06/18 
 0142 NA  142  
K  3.7 CL  110* CO2  24 GLU  138* BUN  29* CREA  1.15  
CA  8.3*  
MG  2.5* ALB  1.7* TBILI  1.1*  
SGOT  53* ALT  54 Signed: Reji Wilson MD

## 2018-09-07 NOTE — PROGRESS NOTES
Problem: Falls - Risk of 
Goal: *Absence of Falls Document Dannial Shadow Fall Risk and appropriate interventions in the flowsheet. Outcome: Progressing Towards Goal 
Fall Risk Interventions: 
Mobility Interventions: Communicate number of staff needed for ambulation/transfer, OT consult for ADLs, PT Consult for mobility concerns Mentation Interventions: Bed/chair exit alarm, Door open when patient unattended Medication Interventions: Bed/chair exit alarm Elimination Interventions: Call light in reach History of Falls Interventions: Bed/chair exit alarm, Consult care management for discharge planning, Door open when patient unattended, Room close to nurse's station Problem: Pressure Injury - Risk of 
Goal: *Prevention of pressure injury Document Dagoberto Scale and appropriate interventions in the flowsheet. Outcome: Progressing Towards Goal 
Pressure Injury Interventions: 
Sensory Interventions: Assess changes in LOC, Assess need for specialty bed, Discuss PT/OT consult with provider, Float heels, Keep linens dry and wrinkle-free, Maintain/enhance activity level, Minimize linen layers, Monitor skin under medical devices, Pressure redistribution bed/mattress (bed type) Moisture Interventions: Absorbent underpads Activity Interventions: PT/OT evaluation Mobility Interventions: Pressure redistribution bed/mattress (bed type) Nutrition Interventions: Document food/fluid/supplement intake Friction and Shear Interventions: Lift sheet, Lift team/patient mobility team, Minimize layers

## 2018-09-07 NOTE — PROGRESS NOTES
Problem: Falls - Risk of 
Goal: *Absence of Falls Document [place] Kappa Fall Risk and appropriate interventions in the flowsheet. Outcome: Progressing Towards Goal 
Fall Risk Interventions: 
Mobility Interventions: Bed/chair exit alarm, PT Consult for mobility concerns Mentation Interventions: Bed/chair exit alarm, Door open when patient unattended Medication Interventions: Bed/chair exit alarm Elimination Interventions: Bed/chair exit alarm History of Falls Interventions: Bed/chair exit alarm

## 2018-09-07 NOTE — PROGRESS NOTES
Infectious Disease Progress Note IMPRESSION:  
· Bacteremia again with BC 2/2 + for Coagulase negative Staph · S/p peg placement,post procedural? 
· Swelling , erythema of left forearm, probable cellulitis, line not removed yet · S/p removal of chest tube 9/4 ·  S/p removal of L/IJ placement of PICC RUE , TPN on 8/31 ·  Fluconazole IV added 9/3 · Persistent bacteremia with coagulase negative Staph since 8/11 initially oxacillin sensitive , last positive BC is oxacillin resistant 8/21(1/4) · Repeat BC 8/26, 8/29 , 9/3 no growth ·  S/p failed MBS · NATASHA shows definite large mass associated with pacing wire in RA which may represent vegetation ( 8/23) · Thrombus  & tiny gas bubble of as in RIJ extending into SVC to level of RA (8/15)  s/p heparin IV now on lovenox s/q · CTA chest - no PE, could not visualize SVC thrombus, left lateral wall soft tissue swelling, soft tissue around pacemaker show no significant abnormality · US chest wall - minimal fluid in pacemaker pocket, no significant inflammation of overlying subcutaneous fat or skin · Acute respiratory failure on Ventilator / h/o tracheostomy 24 years ago  , s/p extubation 8/26 · R/ pleural effusion s/p chest tube placement , CXr clear · Increased sputum production - 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    
  
  
PLAN:  
   
· DC peripheral line · Continue Daptomycin, Gentamicin IV, s/p change Fluconazole. to po, DC levaquin ·  CT scan of chest to evaluate the RIJ  & RA thrombus · Thrombus in R/IJ, now  mass in RA which is suggestive of thrombus rather than vegetation in light of pacemaker pocket appearing clear of infection. · Hold off on removal of pacemaker at this time . D/w Dr Job Jackson , .  . · Continue antimicrobials & anticoagulation ·  Needs  Erazo catheter removal whenever possible ·   D/w family at bedside Blood Culture / line culture  Special Requests: NO SPECIAL REQUESTS   Preliminary Culture result: NO GROWTH AFTER 10 HOURS Blood culture  Special Requests: NO SPECIAL REQUESTS   Preliminary Culture result: NO GROWTH 2 DAYS Blood culture  Culture result:    Final  
STAPHYLOCOCCUS EPIDERMIDIS (OXACILLIN RESISTANT) GROWING IN 1 OF 4 BOTTLES DRAWN (SITE = RW) (A) Culture result:    Final  
PRELIMINARY REPORT OF GRAM POSITIVE COCCI IN CLUSTERS GROWING IN 1 OF 4 BOTTLES DRAWN , SO FAR CALLED TO AND READ BACK BY CRISTIN LOCK 18 0071 SP (A) Culture result:    Final  
REMAINING BOTTLE(S) HAS/HAVE NO GROWTH IN 5 DAYS  
 
US chest - : There is minimal fluid in the pacemaker pocket, a thin sliver measuring 
approximately 2 mm in thickness and 21 mm in length. No significant inflammation 
of the overlying subcutaneous fat or skin. IMPRESSION: 
No abscess. Subjective:  
 
Pt seen  . Drowsy due to to [address] per wife . Wife & niece at bedside. Review of Systems:  Review of systems not obtained due to patient factors. Objective:  
 
Blood pressure 112/61, pulse 81, temperature 98.3 °F (36.8 °C), resp. rate 24, height 6' 2\" (1.88 m), weight 242 lb 15.2 oz (110.2 kg), SpO2 98 %. Temp (24hrs), Av.6 °F (37 °C), Min:98.2 °F (36.8 °C), Max:99.6 °F (37.6 °C) Patient Vitals for the past 24 hrs: 
 Temp Pulse Resp BP SpO2  
18 1441 - - - - 98 % 18 1200 98.3 °F (36.8 °C) 81 24 112/61 96 % 18 0857 - - - - 99 % 18 0800 99.6 °F (37.6 °C) 75 28 123/74 98 % 18 0300 98.4 °F (36.9 °C) 85 22 115/88 100 % 18 0128 - - - - 100 % 18 0000 - 87 - 114/73 -  
18 2300 98.3 °F (36.8 °C) 77 24 108/52 99 % 09/06/18 2200 - 82 - 107/67 -  
09/06/18 2100 - 95 - 101/56 -  
09/06/18 2000 - 77 - 140/60 100 % 09/06/18 1954 - - - - 100 % 09/06/18 1924 98.2 °F (36.8 °C) 81 22 125/74 99 % 09/06/18 1600 98.9 °F (37.2 °C) 76 22 120/68 98 % 09/06/18 1558 - 75 - 126/69 99 % Lines:  Central Venous Catheter:  LIJ Physical Exam:  
General:   Drowsy, arousable Lungs:    Reduced air entry bases on  auscultation  chest wall-  pacemaker site -no swelling, erythemal  
CV:  Regular rate and rhythm,no murmur, Abdomen:   Soft, non-tender. bowel sounds +distended Extremities: Edema, lUE - erythema distal to peripheral IV Lines/Devices:  Intact, no erythema, drainage or tenderness Data Review: CBC:  
Recent Labs  
   09/06/18 
 0142  09/05/18 
 0120 WBC  11.2*  11.9*  
RBC  3.46*  3.44* HGB  9.5*  9.3* HCT  32.5*  32.7*  
PLT  212  239 GRANS  67  71 LYMPH  19  17 EOS  1  2 CMP:  
Recent Labs  
   09/06/18 
 0142 GLU  138* NA  142  
K  3.7 CL  110* CO2  24 BUN  29* CREA  1.15  
CA  8.3* AGAP  8  
BUCR  25* AP  103  
TP  7.2 ALB  1.7*  
GLOB  5.5* AGRAT  0.3* Studies:     
Lab Results Component Value Date/Time Culture result: (A) 09/05/2018 07:03 PM  
  STAPHYLOCOCCUS SPECIES, COAGULASE NEGATIVE , ISOLATED FROM 1 OF 2 BOTTLES DRAWN. ... LEFT HAND SITE. PLATES HELD 3 DAYS. CALL MICROBIOLOGY LAB IF SENSITIVITIES ARE NEEDED. Culture result: (A) 09/05/2018 07:03 PM  
  PRELIMINARY REPORT OF GRAM POSITIVE COCCI IN CLUSTERS GROWING IN 1 OF 2 BOTTLES DRAWN CALLED TO AND READ BACK BY FELIX CAMPOS RN [facility] AT 2106 ON 9/6/2018. [place] [id number] Culture result: REMAINING BOTTLE(S) HAS/HAVE NO GROWTH SO FAR 09/05/2018 07:03 PM  
 Culture result: NO GROWTH 4 DAYS 09/03/2018 05:45 PM  
 Culture result: NO GROWTH 6 DAYS 08/29/2018 08:57 PM  
  
 
XR Results (most recent): 
 
Results from Hospital Encounter encounter on 07/10/18 XR ABD PORT  1 V  Narrative EXAM:  XR ABD PORT  1 V. 
 INDICATION: Abdominal distention. COMPARISON: 8/18/2018. FINDINGS:  
A portable supine radiograph of the abdomen was obtained at 1622 hours and shows 
a gastrostomy tube projecting over the stomach and contrast material in the 
ascending and transverse colon. There is no small bowel dilatation. The midline 
skin staples have been removed. There are pins in the left hip. No soft tissue 
masses or pathologic calcifications are identified. Impression IMPRESSION: Gastrostomy tube with contrast in the colon. No evidence of 
obstruction. Patient Active Problem List  
Diagnosis Code  
 HTN (hypertension) I10  
 Depression F32.9  Hypercholesterolemia E78.00  Acid reflux K21.9  Seizure ([facility] R56.9  Hypothyroidism E03.9  Hyponatremia E87.1  BPH (benign prostatic hyperplasia) N40.0  Rash R21  
 Cellulitis L03.90  Wax in ear H61.20  Ulcer [id number]  Small bowel obstruction ([facility] F20.170  Atrial flutter (HCC) I48.92  
 Atrial fibrillation or flutter  CHI (closed head injury) S09. 90XA  Basal cell cancer C44.91  
 TBI (traumatic brain injury) ([facility] S06. 9X9A  Atrial fibrillation (HCC) I48.91  
 Cataract H26.9  Constipation K59.00  Ankle fracture, left R04.731Y  Insomnia G47.00  Tinea corporis B35.4  SSS (sick sinus syndrome) ([facility]) I49.5  Syncope R55  Seizure disorder ([facility] G40.909  RONAL on CPAP G47.33, Z99.89  
 Pacemaker Z95.0  Pre-op evaluation Z01.818  Chronic back pain greater than 3 months duration M54.9, G89.29  
 Cerebral infarction ([facility]) I63.9  Acute bronchitis J20.9  Screening for colon cancer Z12.11  
 Chronic right shoulder pain M25.511, G89.29  
 Common wart B07.8  Localized epilepsy with impairment of consciousness ([facility] G40.209  Severe obesity (BMI 35.0-39.9) ([facility]) E66.01  
 Acute blood loss anemia D62  Anasarca R60.1  GI bleed K92.2  Acute encephalopathy G93.40  Idiopathic hypotension I95.0  Counseling regarding goals of care Z71.89 ICD-10-CM ICD-9-CM 1. Anemia, unspecified type D64.9 285.9 2. Generalized weakness R53.1 780.79   
3. Acute encephalopathy G93.40 348.30   
4. Anasarca R60.1 782.3 5. Idiopathic hypotension I95.0 458.9 6. Counseling regarding goals of care Z71.89 V65.49   
7. 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 9X9S 907.0 8. Chronic atrial fibrillation (HCC) I48.2 427.31   
9. Malignant neoplasm of ascending colon (HCC) C18.2 153.6 Anti-infectives: Fluconazole IV- 9/3- 96IV  Changed to po Daptomycin IV - 8/26- 9/3 Gentamicin IV 8/26 S/p levaquin 8/29-9/7 Cefotetan 7/17 Zosyn 7/20-8/7 Cefepime - 8/11-8/13 Ceftriaxone 8/13-8/17 Vancomycin 8/11-8/22 Daptomycin 8/23- 8/24- Naficillin IV 8/24- 8/26  Yassine Barbour MD [facility]

## 2018-09-08 LAB
ANION GAP SERPL CALC-SCNC: 9 MMOL/L (ref 5–15)
BUN SERPL-MCNC: 29 MG/DL (ref 6–20)
BUN/CREAT SERPL: 26 (ref 12–20)
CALCIUM SERPL-MCNC: 7.9 MG/DL (ref 8.5–10.1)
CHLORIDE SERPL-SCNC: 105 MMOL/L (ref 97–108)
CO2 SERPL-SCNC: 26 MMOL/L (ref 21–32)
CREAT SERPL-MCNC: 1.11 MG/DL (ref 0.7–1.3)
GLUCOSE BLD STRIP.AUTO-MCNC: 150 MG/DL (ref 65–100)
GLUCOSE BLD STRIP.AUTO-MCNC: 161 MG/DL (ref 65–100)
GLUCOSE BLD STRIP.AUTO-MCNC: 166 MG/DL (ref 65–100)
GLUCOSE BLD STRIP.AUTO-MCNC: 172 MG/DL (ref 65–100)
GLUCOSE SERPL-MCNC: 144 MG/DL (ref 65–100)
MAGNESIUM SERPL-MCNC: 2.6 MG/DL (ref 1.6–2.4)
POTASSIUM SERPL-SCNC: 3.8 MMOL/L (ref 3.5–5.1)
SERVICE CMNT-IMP: ABNORMAL
SODIUM SERPL-SCNC: 140 MMOL/L (ref 136–145)

## 2018-09-08 PROCEDURE — 74011636637 HC RX REV CODE- 636/637: Performed by: INTERNAL MEDICINE

## 2018-09-08 PROCEDURE — 74011250637 HC RX REV CODE- 250/637: Performed by: INTERNAL MEDICINE

## 2018-09-08 PROCEDURE — 36415 COLL VENOUS BLD VENIPUNCTURE: CPT | Performed by: INTERNAL MEDICINE

## 2018-09-08 PROCEDURE — 74011000250 HC RX REV CODE- 250: Performed by: INTERNAL MEDICINE

## 2018-09-08 PROCEDURE — 65660000000 HC RM CCU STEPDOWN

## 2018-09-08 PROCEDURE — 83735 ASSAY OF MAGNESIUM: CPT | Performed by: INTERNAL MEDICINE

## 2018-09-08 PROCEDURE — 94640 AIRWAY INHALATION TREATMENT: CPT

## 2018-09-08 PROCEDURE — 87040 BLOOD CULTURE FOR BACTERIA: CPT | Performed by: INTERNAL MEDICINE

## 2018-09-08 PROCEDURE — 74011250636 HC RX REV CODE- 250/636: Performed by: INTERNAL MEDICINE

## 2018-09-08 PROCEDURE — 74011250636 HC RX REV CODE- 250/636: Performed by: SURGERY

## 2018-09-08 PROCEDURE — 80048 BASIC METABOLIC PNL TOTAL CA: CPT | Performed by: INTERNAL MEDICINE

## 2018-09-08 PROCEDURE — 82962 GLUCOSE BLOOD TEST: CPT

## 2018-09-08 PROCEDURE — 74011000258 HC RX REV CODE- 258: Performed by: INTERNAL MEDICINE

## 2018-09-08 RX ADMIN — VENLAFAXINE 37.5 MG: 37.5 TABLET ORAL at 09:34

## 2018-09-08 RX ADMIN — ALBUTEROL SULFATE 2.5 MG: 2.5 SOLUTION RESPIRATORY (INHALATION) at 14:36

## 2018-09-08 RX ADMIN — ALBUTEROL SULFATE 2.5 MG: 2.5 SOLUTION RESPIRATORY (INHALATION) at 19:38

## 2018-09-08 RX ADMIN — VENLAFAXINE 37.5 MG: 37.5 TABLET ORAL at 17:26

## 2018-09-08 RX ADMIN — LEVETIRACETAM 250 MG: 500 SOLUTION ORAL at 09:33

## 2018-09-08 RX ADMIN — Medication 10 ML: at 03:54

## 2018-09-08 RX ADMIN — Medication 10 ML: at 20:40

## 2018-09-08 RX ADMIN — FLUCONAZOLE 200 MG: 200 TABLET ORAL at 20:40

## 2018-09-08 RX ADMIN — Medication 10 ML: at 14:04

## 2018-09-08 RX ADMIN — ENOXAPARIN SODIUM 110 MG: 100 INJECTION SUBCUTANEOUS at 13:00

## 2018-09-08 RX ADMIN — FUROSEMIDE 60 MG: 40 TABLET ORAL at 09:34

## 2018-09-08 RX ADMIN — Medication 20 ML: at 14:04

## 2018-09-08 RX ADMIN — ALBUTEROL SULFATE 2.5 MG: 2.5 SOLUTION RESPIRATORY (INHALATION) at 01:00

## 2018-09-08 RX ADMIN — LEVETIRACETAM 250 MG: 500 SOLUTION ORAL at 20:40

## 2018-09-08 RX ADMIN — ALBUTEROL SULFATE 2.5 MG: 2.5 SOLUTION RESPIRATORY (INHALATION) at 07:47

## 2018-09-08 RX ADMIN — POLYETHYLENE GLYCOL 3350 17 G: 17 POWDER, FOR SOLUTION ORAL at 09:33

## 2018-09-08 RX ADMIN — GENTAMICIN SULFATE 80 MG: 80 INJECTION, SOLUTION INTRAVENOUS at 03:54

## 2018-09-08 RX ADMIN — AMIODARONE HYDROCHLORIDE 200 MG: 200 TABLET ORAL at 09:33

## 2018-09-08 RX ADMIN — LACTULOSE 10 G: 20 SOLUTION ORAL at 17:26

## 2018-09-08 RX ADMIN — INSULIN LISPRO 3 UNITS: 100 INJECTION, SOLUTION INTRAVENOUS; SUBCUTANEOUS at 11:57

## 2018-09-08 RX ADMIN — LACTULOSE 10 G: 20 SOLUTION ORAL at 09:34

## 2018-09-08 RX ADMIN — INSULIN LISPRO 3 UNITS: 100 INJECTION, SOLUTION INTRAVENOUS; SUBCUTANEOUS at 17:50

## 2018-09-08 RX ADMIN — INSULIN LISPRO 3 UNITS: 100 INJECTION, SOLUTION INTRAVENOUS; SUBCUTANEOUS at 05:05

## 2018-09-08 RX ADMIN — DAPTOMYCIN 1000 MG: 500 INJECTION, POWDER, LYOPHILIZED, FOR SOLUTION INTRAVENOUS at 14:03

## 2018-09-08 NOTE — PROGRESS NOTES
Hospitalist Progress Note NAME: Indy Both :  1940 MRN:  [id number] Assessment / Plan: 
Acute Encephalopathy - noted in past 3 days as per family, could be mulitfactorial 
? Poor baseline mental status from TBI/CVA but pt was functional as per family H/o Seizure disorder - but seizure free for a long time as per Dr Octavio Navarro, only on low dose tegretol (po not available IV) Letta Stabs for Seizure precaution until he gets an Oral Access to restart the Oral tegretol Hypernatremia - much improved now at 149 H/o Prolonged TPN- due to Dysphagia with ? Oral thrush with severe crusting NEW Fever in past 24 hrs- 100.5 WBC trending up in past 24 hrs- 14.5 K Send Blood Cx for fungal growth, will start low dose Diflucan for now empirically- further recommendations as per ID- following; follow fever trend IVF- 1/2NS at low rate to correct hypernatremia Plan to get PEG tube to get off TPN ASAP if remains afebrile & WBC improves in AM 
 
? Early Cellulitis of Lt forearm. No increase in size, shrinking Blood cultures x 2 is Positive. Looks like Staph Coag neg, no fever Chest CT shows same thrombus in line that extends to atria ID is Aware,  Patient on Dapto, Barbara Leep, Levoflox and Diflucan Follow by exam 
 
Oral Barbara Apt Nystatin Swabs 
  
Acute resp Failure s/p Ventilator support , now off it since  Kareem Pleural Effusions s/p R chest tube Pulm following RONAL CPAP q HS 
  
?Infected Thrombus on NATASHA Bacteremia Afib Change IV infusion to Peg Amiodarone SSS S/p PPM 
On IV ABx as per ID Genta + Daptomycin On Lovenox therapeutic was on Pradaxa outpatient , needs to be on Eliquis if to be given by Peg 
  Dysphagia GI bleed s/p colectomy/enterotomies Ileus with Aspiration PNA Hb stable now On TPN, last bag today PEG placement  tolerating and increase. No residuals Palliative consulted this admission already 
  
  
  
Code Status: Full DVT Prophylaxis: on lovenox for the thrombus Subjective: Awake, pleasant Chief Complaint / Reason for Physician Visit  followup lethargy \"Im doing better\". Discussed with RN events overnight. Review of Systems: 
Symptom Y/N Comments  Symptom Y/N Comments Fever/Chills    Chest Pain Poor Appetite    Edema Cough    Abdominal Pain Sputum    Joint Pain SOB/BECERRIL    Pruritis/Rash Nausea/vomit    Tolerating PT/OT Diarrhea    Tolerating Diet Constipation    Other Could NOT obtain due to:   
 
Objective: VITALS:  
Last 24hrs VS reviewed since prior progress note. Most recent are: 
Patient Vitals for the past 24 hrs: 
 Temp Pulse Resp BP SpO2  
09/08/18 1437 - - - - 99 % 09/08/18 1054 97.6 °F (36.4 °C) 77 20 113/57 99 % 09/08/18 0845 98.4 °F (36.9 °C) 84 22 120/60 96 % 09/08/18 0748 - - - - 99 % 09/08/18 0300 - 81 - 96/50 97 % 09/08/18 0200 98.2 °F (36.8 °C) 82 22 117/51 99 % 09/08/18 0100 - 85 - 116/55 99 % 09/08/18 0000 98.4 °F (36.9 °C) 75 22 105/62 99 % 09/07/18 2200 - 78 - 121/57 100 % 09/07/18 2100 - 75 - 130/79 100 % 09/07/18 2041 - 77 - 109/71 100 % 09/07/18 1929 98.2 °F (36.8 °C) 76 22 106/61 100 % 09/07/18 1912 - - - - 100 % 09/07/18 1900 - 74 - 104/55 99 % Intake/Output Summary (Last 24 hours) at 09/08/18 1722 Last data filed at 09/08/18 1440 Gross per 24 hour Intake             1273 ml Output             1025 ml Net              248 ml PHYSICAL EXAM: 
General: WD, WN. Pleasant, Awake, NAD and Ox3 cooperative, no acute distress   
EENT:  EOMI. Anicteric sclerae. MMM Resp:  CTA bilaterally, no wheezing or rales. No accessory muscle use CV:  Regular  rhythm,  No edema GI:  Soft, Non distended, Non tender.  +Bowel sounds Neurologic:  Alert and oriented X 3, normal speech, Psych:   Good insight. Not anxious nor agitated Skin:  No rashes.   No jaundice, Left Hand Cellulitic looking old iv site but not worsening Reviewed most current lab test results and cultures  YES Reviewed most current radiology test results   YES Review and summation of old records today    NO Reviewed patient's current orders and MAR    YES 
PMH/SH reviewed - no change compared to H&P 
________________________________________________________________________ Care Plan discussed with: 
  Comments Patient y Family  y   
RN y bedside Care Manager y CM for Discharge   marnie LOTT  
                 y Multidiciplinary team rounds were held today with , nursing, pharmacist and clinical coordinator. Patient's plan of care was discussed; medications were reviewed and discharge planning was addressed. ________________________________________________________________________ Total NON critical care TIME:  30 Minutes Total CRITICAL CARE TIME Spent:   Minutes non procedure based Comments >50% of visit spent in counseling and coordination of care YES Spent   25 revieweing chart and 20 min talking to wife answering her questtions that she has previously asked specialist  
________________________________________________________________________ Fallon Hernandez MD  
 
Procedures: see electronic medical records for all procedures/Xrays and details which were not copied into this note but were reviewed prior to creation of Plan. LABS: 
I reviewed today's most current labs and imaging studies. Pertinent labs include: 
Recent Labs  
   09/06/18 0142 WBC  11.2* HGB  9.5* HCT  32.5*  
PLT  212 Recent Labs  
   09/08/18 0146 09/06/18 0142 NA  140  142  
K  3.8  3.7 CL  105  110* CO2  26  24 GLU  144*  138* BUN  29*  29* CREA  1.11  1.15  
CA  7.9*  8.3*  
MG  2.6*  2.5* ALB   --   1.7* TBILI   --   1.1*  
SGOT   --   53* ALT   --   54 Signed: Fallon Hernandez MD

## 2018-09-08 NOTE — PROGRESS NOTES
Hospitalist Progress Note NAME: Shin Valenzuela :  1940 MRN:  [id number] Assessment / Plan: 
Acute Encephalopathy - noted in past 3 days as per family, could be mulitfactorial 
? Poor baseline mental status from TBI/CVA but pt was functional as per family H/o Seizure disorder - but seizure free for a long time as per Dr Toby Goldberg, only on low dose tegretol (po not available IV) Lisy Binford for Seizure precaution until he gets an Oral Access to restart the Oral tegretol Hypernatremia - much improved now at 149 H/o Prolonged TPN- due to Dysphagia with ? Oral thrush with severe crusting NEW Fever in past 24 hrs- 100.5 WBC trending up in past 24 hrs- 14.5 K Send Blood Cx for fungal growth, will start low dose Diflucan for now empirically- further recommendations as per ID- following; follow fever trend IVF- 1/2NS at low rate to correct hypernatremia Plan to get PEG tube to get off TPN ASAP if remains afebrile & WBC improves in AM 
 
? Early Cellulitis of Lt forearm. Not, no increase in size Blood cultures x 2 is Positive. Looks like Staph Coag neg ID is Aware,  Patient on Dapto, Claire Hard, Levoflox and Diflucan Follow by exam 
 
Oral [place] Nystatin Swabs 
  
Acute resp Failure s/p Ventilator support , now off it since  Kareem Pleural Effusions s/p R chest tube Pulm following RONAL CPAP q HS 
  
?Infected Thrombus on NATASHA Bacteremia Afib Change IV infusion to Peg Amiodarone SSS S/p PPM 
On IV ABx as per ID Genta + Daptomycin On Lovenox therapeutic was on Pradaxa outpatient , needs to be on Eliquis if to be given by Peg 
  Dysphagia GI bleed s/p colectomy/enterotomies Ileus with Aspiration PNA Hb stable now On TPN, last bag today PEG placement  tolerating and increase. No residuals Palliative consulted this admission already 
  
  
  
Code Status: Full DVT Prophylaxis: on lovenox for the thrombus Subjective: Awake, pleasant Chief Complaint / Reason for Physician Visit  followup lethargy \"Im doing better\". Discussed with RN events overnight. Review of Systems: 
Symptom Y/N Comments  Symptom Y/N Comments Fever/Chills    Chest Pain Poor Appetite    Edema Cough    Abdominal Pain Sputum    Joint Pain SOB/BECERRIL    Pruritis/Rash Nausea/vomit    Tolerating PT/OT Diarrhea    Tolerating Diet Constipation    Other Could NOT obtain due to:   
 
Objective: VITALS:  
Last 24hrs VS reviewed since prior progress note. Most recent are: 
Patient Vitals for the past 24 hrs: 
 Temp Pulse Resp BP SpO2  
09/07/18 1929 98.2 °F (36.8 °C) 76 22 106/61 100 % 09/07/18 1912 - - - - 100 % 09/07/18 1600 98.2 °F (36.8 °C) 83 22 94/54 100 % 09/07/18 1441 - - - - 98 % 09/07/18 1200 98.3 °F (36.8 °C) 81 24 112/61 96 % 09/07/18 0857 - - - - 99 % 09/07/18 0800 99.6 °F (37.6 °C) 75 28 123/74 98 % 09/07/18 0300 98.4 °F (36.9 °C) 85 22 115/88 100 % 09/07/18 0128 - - - - 100 % 09/07/18 0000 - 87 - 114/73 -  
09/06/18 2300 98.3 °F (36.8 °C) 77 24 108/52 99 % 09/06/18 2200 - 82 - 107/67 -  
09/06/18 2100 - 95 - 101/56 - Intake/Output Summary (Last 24 hours) at 09/07/18 2047 Last data filed at 09/07/18 1652 Gross per 24 hour Intake           830.67 ml Output             2675 ml Net         -1844.33 ml PHYSICAL EXAM: 
General: WD, WN. Pleasant, Awake, NAD and Ox3 cooperative, no acute distress   
EENT:  EOMI. Anicteric sclerae. MMM Resp:  CTA bilaterally, no wheezing or rales. No accessory muscle use CV:  Regular  rhythm,  No edema GI:  Soft, Non distended, Non tender.  +Bowel sounds Neurologic:  Alert and oriented X 3, normal speech, Psych:   Good insight. Not anxious nor agitated Skin:  No rashes. No jaundice, Left Hand Cellulitic looking old iv site but not worsening Reviewed most current lab test results and cultures  YES Reviewed most current radiology test results   YES 
 Review and summation of old records today    NO Reviewed patient's current orders and MAR    YES 
PMH/SH reviewed - no change compared to H&P 
________________________________________________________________________ Care Plan discussed with: 
  Comments Patient y Family  y   
RN y bedside Care Manager y CM for Discharge   y ID  
                 y Multidiciplinary team rounds were held today with , nursing, pharmacist and clinical coordinator. Patient's plan of care was discussed; medications were reviewed and discharge planning was addressed. ________________________________________________________________________ Total NON critical care TIME:  30 Minutes Total CRITICAL CARE TIME Spent:   Minutes non procedure based Comments >50% of visit spent in counseling and coordination of care YES Spent   25 revieweing chart and 20 min talking to wife answering her questtions that she has previously asked specialist  
________________________________________________________________________ Jaron Stallings MD  
 
Procedures: see electronic medical records for all procedures/Xrays and details which were not copied into this note but were reviewed prior to creation of Plan. LABS: 
I reviewed today's most current labs and imaging studies. Pertinent labs include: 
Recent Labs  
   09/06/18 0142 09/05/18 
 0120 WBC  11.2*  11.9* HGB  9.5*  9.3* HCT  32.5*  32.7*  
PLT  212  239 Recent Labs  
   09/06/18 0142 NA  142  
K  3.7 CL  110* CO2  24 GLU  138* BUN  29* CREA  1.15  
CA  8.3*  
MG  2.5* ALB  1.7* TBILI  1.1*  
SGOT  53* ALT  54 Signed: Jaron Stallings MD

## 2018-09-08 NOTE — PROGRESS NOTES
PCU SHIFT NURSING NOTE Bedside and Verbal shift change report given to Zeus Edwards RN (oncoming nurse) by Korina Perea RN (offgoing nurse). Report included the following information SBAR, Kardex, [facility], Recent Results and Cardiac Rhythm Paced. Shift Summary: Received pt lying in bed with HOB elevated. No noted distress. See flowcharting for full assessment. Will monitor. Instr in report that MD plans to start po Amio tomorrow and wants Amio weaned during night. Admission Date 7/10/2018 Admission Diagnosis Acute blood loss anemia GI bleed Anasarca GI Bleed 
gi bleed ASCENDING COLON CANCER  
aspiration ASPIRATION PNEUMONIA Consults IP CONSULT TO GASTROENTEROLOGY 
IP CONSULT TO INTERVENTIONAL RADIOLOGY 
IP CONSULT TO INTENSIVIST 
IP CONSULT TO NEPHROLOGY 
IP CONSULT TO HEMATOLOGY 
IP CONSULT TO GASTROENTEROLOGY 
IP CONSULT TO INFECTIOUS DISEASES 
IP CONSULT TO PALLIATIVE CARE - PROVIDER 
IP CONSULT TO NEUROLOGY 
IP CONSULT TO CARDIOLOGY 
IP CONSULT TO COLORECTAL SURGERY 
IP CONSULT TO PULMONOLOGY 
IP CONSULT TO PULMONOLOGY 
IP CONSULT TO HOSPITALIST Consults []PT []OT []Speech  
[]Case Management  
  
[] Palliative Cardiac Monitoring Order []Yes []No  
 
IV drips []Yes Drip:                            Dose: 
Drip:                            Dose: 
Drip:                            Dose:  
[]No  
 
GI Prophylaxis []Yes []No  
 
 
 
DVT Prophylaxis SCDs:  Sequential Compression Device: Bilateral  
  Patient Refused VTE Prophylaxis: Yes Ra stockings:  Graduated Compression Stockings: Bilateral  
  
[] Medication []Contraindicated []None Activity Level Activity Level: Bed Rest   
 Activity Assistance: Complete care Purposeful Rounding every 1-2 hour? []Yes Mckee Score  Total Score: 2 Bed Alarm (If score 3 or >) []Yes  
[] Refused (See signed refusal form in chart) Dagoberto Score  Dagoberto Score: 12 Dagoberto Score (if score 14 or less) []PMT consult  
[]Wound Care consult []Specialty bed  
[] Nutrition consult Needs prior to discharge:  
Home O2 required:   
[]Yes []No  
 If yes, how much O2 required? Other:  
 Last Bowel Movement: Last Bowel Movement Date: 09/07/18 Influenza Vaccine Received Flu Vaccine for Current Season (usually Sept-March): Not Flu Season Pneumonia Vaccine Diet Active Orders Diet DIET NPO With Tube Feedings LDAs PICC Double Lumen 08/31/18 Right;Brachial (Active) Central Line Being Utilized Yes 9/7/2018  7:29 PM  
Criteria for Appropriate Use Total parenteral nutrition 9/7/2018  7:29 PM  
Site Assessment Clean, dry, & intact 9/7/2018  7:29 PM  
Phlebitis Assessment 0 9/7/2018  7:29 PM  
Infiltration Assessment 0 9/7/2018  7:29 PM  
Arm Circumference (cm) 39 cm 9/7/2018  7:29 PM  
Date of Last Dressing Change 08/31/18 9/7/2018  7:29 PM  
Dressing Status Clean, dry, & intact 9/7/2018  7:29 PM  
Action Taken Open ports on tubing capped 9/7/2018  7:29 PM  
External Catheter Length (cm) 0 centimeters 9/7/2018  7:29 PM  
Dressing Type Disk with Chlorhexadine gluconate (CHG) 9/7/2018  7:29 PM  
Hub Color/Line Status Red;Capped;Flushed 9/7/2018  7:29 PM  
Positive Blood Return (Site #1) Yes 9/7/2018  7:29 PM  
Hub Color/Line Status Purple; Infusing 9/7/2018  7:29 PM  
Positive Blood Return (Site #2) Yes 9/7/2018  7:29 PM  
Alcohol Cap Used Yes 9/7/2018  7:29 PM  
            
PEG/Gastrostomy Tube 09/05/18 (Active) Site Assessment Clean, dry, & intact 9/7/2018  7:29 PM  
Dressing Status Clean, dry, & intact 9/7/2018  7:29 PM  
G Port Status Infusing 9/7/2018  7:29 PM  
Action Taken Placement verified (comment) 9/7/2018  7:29 PM  
Drainage Description Tan 9/7/2018  7:30 AM  
Gastric Residual (mL) 30 ml 9/7/2018  7:29 PM  
Tube Feeding/Formula Options Twocal HN 9/7/2018  7:29 PM  
 Modular Nutrients Protein liquid 9/7/2018  7:29 PM  
Tube Feeding/Verify Rate (mL/hr) 50 9/7/2018  7:29 PM  
Water Flush Volume (mL) 200 mL 9/7/2018  7:29 PM  
Intake (ml) 360 ml 9/7/2018  5:32 AM  
Medication Volume 0 ml 9/6/2018  7:24 PM  
   
Colostomy 07/10/18 Left Abdomen (Active) Drainage Color Brown 9/7/2018  7:29 PM  
Site Assessment Clean, dry, intact 9/7/2018  7:29 PM  
Treatment Site care 9/7/2018  7:29 PM  
Output (ml) 150 mL 9/7/2018 12:06 PM  
            
Urinary Catheter [REMOVED] Urinary Catheter 07/17/18 2- way; Erazo-Indications for Use: Accurate measurement of urinary output [REMOVED] Urinary Catheter 08/11/18 Erazo-Indications for Use: Accurate measurement of urinary output Urinary Catheter 09/02/18 Erazo-Indications for Use: Chronic urinary retention/bladder outlet obstruction Intake & Output Date 09/06/18 1900 - 09/07/18 8857 09/07/18 0700 - 09/08/18 3869 Shift 3027-2780 24 Hour Total 4748-3894 1151-0198 24 Hour Total  
I 
N 
T 
A 
K 
E 
 P.O.  350 0  0  
   P. O.  350 0  0 I.V. 
(mL/kg/hr)   270.7 
(0.2)  270.7 Amiodarone Volume   120.7  120.7 Volume (0.9% sodium chloride infusion)   0  0 Volume (DAPTOmycin (CUBICIN) 1,000 mg in 0.9% sodium chloride 50 mL IVPB RF formulation)   50  50 Volume (gentamicin in Saline (Iso-osm) (GARAMYCIN) 80 mg/100 mL IVPB premix 80 mg)   100  100 NG/ 860 200 200 400 Water Flush Volume (mL) (PEG/Gastrostomy Tube 09/05/18) 200 500 200 200 400 Medication Volume (PEG/Gastrostomy Tube 09/05/18) 0 0 Intake (ml) (PEG/Gastrostomy Tube 09/05/18) 360 360 Shift Total 
(mL/kg) 560 
(5.1) 1210 
(11) 470.7 
(4.3) 200 
(1.8) 670.7 
(6.1) O 
U T 
P 
U Alexandrea Stephanie Urine (mL/kg/hr) 825 3875 1400 
(1.1)  1400 Urine Output (mL) (Urinary Catheter 09/02/18 Erazo) 825 3875 1400  1400 Stool 150 150 300  300 Stool   150  150 Output (ml) (Colostomy 07/10/18 Left Abdomen) 150 150 150  150  Shift Total 
 (mL/kg) 975 
(8.8) 4025 
(36.5) 1700 
(15.4)  1700 
(15.4) NET -415 -9935 -1229.3 200 -1029.3 Weight (kg) 110.2 110.2 110.2 110.2 110.2 Readmission Risk Assessment Tool Score High Risk   
      
 21 Total Score 3 Has Seen PCP in Last 6 Months (Yes=3, No=0) 2 . Living with Significant Other. Assisted Living. LTAC. SNF. or  
Rehab  
 3 Patient Length of Stay (>5 days = 3)  
 9 Pt. Coverage (Medicare=5 , Medicaid, or Self-Pay=4) 4 Charlson Comorbidity Score (Age + Comorbid Conditions) Criteria that do not apply:  
 IP Visits Last 12 Months (1-3=4, 4=9, >4=11) Expected Length of Stay 10d 19h Actual Length of Stay 59

## 2018-09-08 NOTE — PROGRESS NOTES
PCU SHIFT NURSING NOTE Bedside and Verbal shift change report given to Emmett Beaulieu RN (oncoming nurse) by Tammie Holt RN (offgoing nurse). Report included the following information SBAR, Kardex, Intake/Output, MAR, Recent Results and Cardiac Rhythm Paced. Shift Summary: Received pt lying in bed with HOB elevated. No noted distress. See flowcharting for full assessment. Admission Date 7/10/2018 Admission Diagnosis Acute blood loss anemia GI bleed Anasarca GI Bleed 
gi bleed ASCENDING COLON CANCER  
aspiration ASPIRATION PNEUMONIA Consults IP CONSULT TO GASTROENTEROLOGY 
IP CONSULT TO INTERVENTIONAL RADIOLOGY 
IP CONSULT TO INTENSIVIST 
IP CONSULT TO NEPHROLOGY 
IP CONSULT TO HEMATOLOGY 
IP CONSULT TO GASTROENTEROLOGY 
IP CONSULT TO INFECTIOUS DISEASES 
IP CONSULT TO PALLIATIVE CARE - PROVIDER 
IP CONSULT TO NEUROLOGY 
IP CONSULT TO CARDIOLOGY 
IP CONSULT TO COLORECTAL SURGERY 
IP CONSULT TO PULMONOLOGY 
IP CONSULT TO PULMONOLOGY 
IP CONSULT TO HOSPITALIST Consults []PT []OT []Speech  
[]Case Management  
  
[] Palliative Cardiac Monitoring Order []Yes []No  
 
IV drips []Yes Drip:                            Dose: 
Drip:                            Dose: 
Drip:                            Dose:  
[]No  
 
GI Prophylaxis []Yes []No  
 
 
 
DVT Prophylaxis SCDs:  Sequential Compression Device: Bilateral  
  Patient Refused VTE Prophylaxis: Yes Ra stockings:  Graduated Compression Stockings: Bilateral  
  
[] Medication []Contraindicated []None Activity Level Activity Level: Bed Rest   
 Activity Assistance: Complete care Purposeful Rounding every 1-2 hour? []Yes Mckee Score  Total Score: 3 Bed Alarm (If score 3 or >) []Yes  
[] Refused (See signed refusal form in chart) Dagoberto Score  Dagoberto Score: 13 Dagoberto Score (if score 14 or less) []PMT consult  
[]Wound Care consult []Specialty bed  
[] Nutrition consult Needs prior to discharge:  
Home O2 required:   
[]Yes []No  
 If yes, how much O2 required? Other:  
 Last Bowel Movement: Last Bowel Movement Date: 09/08/18 Influenza Vaccine Received Flu Vaccine for Current Season (usually Sept-March): Not Flu Season Pneumonia Vaccine Diet Active Orders Diet DIET NPO With Tube Feedings LDAs PICC Double Lumen 08/31/18 Right;Brachial (Active) Central Line Being Utilized Yes 9/8/2018  7:00 PM  
Criteria for Appropriate Use Total parenteral nutrition 9/8/2018  7:00 PM  
Site Assessment Clean, dry, & intact 9/8/2018  7:00 PM  
Phlebitis Assessment 0 9/8/2018  7:00 PM  
Infiltration Assessment 0 9/8/2018  7:00 PM  
Arm Circumference (cm) 39 cm 9/8/2018  7:00 PM  
Date of Last Dressing Change 08/31/18 9/8/2018  7:00 PM  
Dressing Status Clean, dry, & intact 9/8/2018  7:00 PM  
Action Taken Open ports on tubing capped 9/8/2018  7:00 PM  
External Catheter Length (cm) 0 centimeters 9/8/2018  7:00 PM  
Dressing Type Disk with Chlorhexadine gluconate (CHG) 9/8/2018  7:00 PM  
Hub Color/Line Status Red;Flushed;Capped 9/8/2018  7:00 PM  
Positive Blood Return (Site #1) Yes 9/8/2018  7:00 PM  
Hub Color/Line Status Purple;Flushed;Capped 9/8/2018  7:00 PM  
Positive Blood Return (Site #2) Yes 9/8/2018  7:00 PM  
Alcohol Cap Used Yes 9/8/2018  7:00 PM  
            
PEG/Gastrostomy Tube 09/05/18 (Active) Site Assessment Clean, dry, & intact 9/8/2018  7:00 PM  
Dressing Status Clean, dry, & intact 9/8/2018  7:00 PM  
G Port Status Infusing 9/8/2018  7:00 PM  
Action Taken Placement verified (comment) 9/8/2018  7:00 PM  
Drainage Description Tan 9/8/2018  7:00 PM  
Gastric Residual (mL) 33 ml 9/8/2018  7:00 PM  
Tube Feeding/Formula Options Twocal HN 9/8/2018  7:00 PM  
Modular Nutrients Protein liquid 9/8/2018  7:00 PM  
Tube Feeding/Verify Rate (mL/hr) 50 9/8/2018  7:00 PM  
 Water Flush Volume (mL) 200 mL 9/8/2018  7:00 PM  
Intake (ml) 413 ml 9/8/2018  2:40 PM  
Medication Volume 60 ml 9/8/2018  2:40 PM  
   
Colostomy 07/10/18 Left Abdomen (Active) Drainage Color Brown 9/8/2018  7:00 PM  
Site Assessment Clean, dry, intact 9/8/2018  7:00 PM  
Treatment Site care 9/8/2018  7:00 PM  
Output (ml) 125 mL 9/8/2018  5:34 AM  
            
Urinary Catheter [REMOVED] Urinary Catheter 07/17/18 2- way; Erazo-Indications for Use: Accurate measurement of urinary output [REMOVED] Urinary Catheter 08/11/18 Erazo-Indications for Use: Accurate measurement of urinary output Urinary Catheter 09/02/18 Erazo-Indications for Use: Chronic urinary retention/bladder outlet obstruction Intake & Output Date 09/07/18 1900 - 09/08/18 4258 09/08/18 0700 - 09/09/18 8313 Shift 1595-8419 24 Hour Total 0700-1859 1900-0659 24 Hour Total  
I 
N 
T 
A 
K 
E 
 P. O.  0     
   P. O.  0 I.V. 
(mL/kg/hr)  270.7 200 
(0.2)  200 Amiodarone Volume  120.7 Volume (0.9% sodium chloride infusion)  0 Volume (DAPTOmycin (CUBICIN) 1,000 mg in 0.9% sodium chloride 50 mL IVPB RF formulation)  50 100  100 Volume (gentamicin in Saline (Iso-osm) (GARAMYCIN) 80 mg/100 mL IVPB premix 80 mg)  100 100  100 NG/ 400  Water Flush Volume (mL) (PEG/Gastrostomy Tube 09/05/18) 200 400 400 200 600 Medication Volume (PEG/Gastrostomy Tube 09/05/18)   60  60 Intake (ml) (PEG/Gastrostomy Tube 09/05/18)   413  413 Shift Total 
(mL/kg) 200 
(1.8) 670.7 
(6.1) 1073 
(9.8) 200 
(1.8) 1273 
(11.6) O 
U T 
P 
U Woodridge Fothergill Urine (mL/kg/hr) 900 2300 Urine Output (mL) (Urinary Catheter 09/02/18 Erazo) 900 2300 Stool 125 425 Stool  150 Output (ml) (Colostomy 07/10/18 Left Abdomen) 125 275 Shift Total 
(mL/kg) 1025 
(9.4) 2725 
(24.9) NET -82 -7804. 3 1185 199 6428 Weight (kg) 109.3 109.3 109.3 109.3 109.3 Readmission Risk Assessment Tool Score High Risk   
      
 21 Total Score 3 Has Seen PCP in Last 6 Months (Yes=3, No=0) 2 . Living with Significant Other. Assisted Living. LTAC. SNF. or  
Rehab  
 3 Patient Length of Stay (>5 days = 3)  
 9 Pt. Coverage (Medicare=5 , Medicaid, or Self-Pay=4) 4 Charlson Comorbidity Score (Age + Comorbid Conditions) Criteria that do not apply:  
 IP Visits Last 12 Months (1-3=4, 4=9, >4=11) Expected Length of Stay 10d 19h Actual Length of Stay 60

## 2018-09-09 LAB
ALBUMIN SERPL-MCNC: 1.8 G/DL (ref 3.5–5)
ALBUMIN/GLOB SERPL: 0.3 {RATIO} (ref 1.1–2.2)
ALP SERPL-CCNC: 125 U/L (ref 45–117)
ALT SERPL-CCNC: 58 U/L (ref 12–78)
ANION GAP SERPL CALC-SCNC: 8 MMOL/L (ref 5–15)
AST SERPL-CCNC: 47 U/L (ref 15–37)
BASOPHILS # BLD: 0 K/UL (ref 0–0.1)
BASOPHILS NFR BLD: 0 % (ref 0–1)
BILIRUB SERPL-MCNC: 0.4 MG/DL (ref 0.2–1)
BUN SERPL-MCNC: 27 MG/DL (ref 6–20)
BUN/CREAT SERPL: 26 (ref 12–20)
CALCIUM SERPL-MCNC: 7.8 MG/DL (ref 8.5–10.1)
CHLORIDE SERPL-SCNC: 105 MMOL/L (ref 97–108)
CO2 SERPL-SCNC: 27 MMOL/L (ref 21–32)
CREAT SERPL-MCNC: 1.05 MG/DL (ref 0.7–1.3)
DATE LAST DOSE: ABNORMAL
DATE LAST DOSE: ABNORMAL
DIFFERENTIAL METHOD BLD: ABNORMAL
EOSINOPHIL # BLD: 0.2 K/UL (ref 0–0.4)
EOSINOPHIL NFR BLD: 2 % (ref 0–7)
ERYTHROCYTE [DISTWIDTH] IN BLOOD BY AUTOMATED COUNT: 22.5 % (ref 11.5–14.5)
GENTAMICIN PEAK SERPL-MCNC: 4 UG/ML (ref 5–10)
GENTAMICIN TROUGH SERPL-MCNC: 0.6 UG/ML (ref 1–2)
GLOBULIN SER CALC-MCNC: 5.6 G/DL (ref 2–4)
GLUCOSE BLD STRIP.AUTO-MCNC: 148 MG/DL (ref 65–100)
GLUCOSE BLD STRIP.AUTO-MCNC: 154 MG/DL (ref 65–100)
GLUCOSE BLD STRIP.AUTO-MCNC: 184 MG/DL (ref 65–100)
GLUCOSE SERPL-MCNC: 160 MG/DL (ref 65–100)
HCT VFR BLD AUTO: 30.9 % (ref 36.6–50.3)
HGB BLD-MCNC: 9 G/DL (ref 12.1–17)
IMM GRANULOCYTES # BLD: 0.1 K/UL (ref 0–0.04)
IMM GRANULOCYTES NFR BLD AUTO: 1 % (ref 0–0.5)
LYMPHOCYTES # BLD: 1.5 K/UL (ref 0.8–3.5)
LYMPHOCYTES NFR BLD: 17 % (ref 12–49)
MCH RBC QN AUTO: 27 PG (ref 26–34)
MCHC RBC AUTO-ENTMCNC: 29.1 G/DL (ref 30–36.5)
MCV RBC AUTO: 92.8 FL (ref 80–99)
MONOCYTES # BLD: 0.9 K/UL (ref 0–1)
MONOCYTES NFR BLD: 10 % (ref 5–13)
NEUTS SEG # BLD: 6.4 K/UL (ref 1.8–8)
NEUTS SEG NFR BLD: 70 % (ref 32–75)
NRBC # BLD: 0 K/UL (ref 0–0.01)
NRBC BLD-RTO: 0 PER 100 WBC
PLATELET # BLD AUTO: 271 K/UL (ref 150–400)
PMV BLD AUTO: 10.6 FL (ref 8.9–12.9)
POTASSIUM SERPL-SCNC: 4.4 MMOL/L (ref 3.5–5.1)
PROT SERPL-MCNC: 7.4 G/DL (ref 6.4–8.2)
RBC # BLD AUTO: 3.33 M/UL (ref 4.1–5.7)
RBC MORPH BLD: ABNORMAL
REPORTED DOSE,DOSE: ABNORMAL UNITS
REPORTED DOSE,DOSE: ABNORMAL UNITS
REPORTED DOSE/TIME,TMG: ABNORMAL
REPORTED DOSE/TIME,TMG: ABNORMAL
SERVICE CMNT-IMP: ABNORMAL
SODIUM SERPL-SCNC: 140 MMOL/L (ref 136–145)
WBC # BLD AUTO: 9.1 K/UL (ref 4.1–11.1)

## 2018-09-09 PROCEDURE — 74011250636 HC RX REV CODE- 250/636: Performed by: SURGERY

## 2018-09-09 PROCEDURE — 36415 COLL VENOUS BLD VENIPUNCTURE: CPT | Performed by: INTERNAL MEDICINE

## 2018-09-09 PROCEDURE — 74011250637 HC RX REV CODE- 250/637: Performed by: INTERNAL MEDICINE

## 2018-09-09 PROCEDURE — 82962 GLUCOSE BLOOD TEST: CPT

## 2018-09-09 PROCEDURE — 77030018798 HC PMP KT ENTRL FED COVD -A

## 2018-09-09 PROCEDURE — 74011000258 HC RX REV CODE- 258: Performed by: INTERNAL MEDICINE

## 2018-09-09 PROCEDURE — 65660000000 HC RM CCU STEPDOWN

## 2018-09-09 PROCEDURE — 74011250636 HC RX REV CODE- 250/636: Performed by: INTERNAL MEDICINE

## 2018-09-09 PROCEDURE — 74011000250 HC RX REV CODE- 250: Performed by: INTERNAL MEDICINE

## 2018-09-09 PROCEDURE — 74011000250 HC RX REV CODE- 250: Performed by: SURGERY

## 2018-09-09 PROCEDURE — 80053 COMPREHEN METABOLIC PANEL: CPT | Performed by: INTERNAL MEDICINE

## 2018-09-09 PROCEDURE — 74011636637 HC RX REV CODE- 636/637: Performed by: INTERNAL MEDICINE

## 2018-09-09 PROCEDURE — 80170 ASSAY OF GENTAMICIN: CPT | Performed by: INTERNAL MEDICINE

## 2018-09-09 PROCEDURE — 85025 COMPLETE CBC W/AUTO DIFF WBC: CPT | Performed by: INTERNAL MEDICINE

## 2018-09-09 PROCEDURE — 94640 AIRWAY INHALATION TREATMENT: CPT

## 2018-09-09 RX ADMIN — GENTAMICIN SULFATE 80 MG: 80 INJECTION, SOLUTION INTRAVENOUS at 04:10

## 2018-09-09 RX ADMIN — LEVETIRACETAM 250 MG: 500 SOLUTION ORAL at 09:51

## 2018-09-09 RX ADMIN — LACTULOSE 10 G: 20 SOLUTION ORAL at 09:53

## 2018-09-09 RX ADMIN — INSULIN LISPRO 3 UNITS: 100 INJECTION, SOLUTION INTRAVENOUS; SUBCUTANEOUS at 00:06

## 2018-09-09 RX ADMIN — DAPTOMYCIN 1000 MG: 500 INJECTION, POWDER, LYOPHILIZED, FOR SOLUTION INTRAVENOUS at 15:43

## 2018-09-09 RX ADMIN — INSULIN LISPRO 3 UNITS: 100 INJECTION, SOLUTION INTRAVENOUS; SUBCUTANEOUS at 05:51

## 2018-09-09 RX ADMIN — FLUCONAZOLE 200 MG: 200 TABLET ORAL at 21:11

## 2018-09-09 RX ADMIN — ALBUTEROL SULFATE 2.5 MG: 2.5 SOLUTION RESPIRATORY (INHALATION) at 20:52

## 2018-09-09 RX ADMIN — Medication 20 ML: at 14:46

## 2018-09-09 RX ADMIN — LACTULOSE 10 G: 20 SOLUTION ORAL at 18:42

## 2018-09-09 RX ADMIN — Medication 10 ML: at 21:11

## 2018-09-09 RX ADMIN — VENLAFAXINE 37.5 MG: 37.5 TABLET ORAL at 09:52

## 2018-09-09 RX ADMIN — LEVETIRACETAM 250 MG: 500 SOLUTION ORAL at 21:11

## 2018-09-09 RX ADMIN — INSULIN LISPRO 2 UNITS: 100 INJECTION, SOLUTION INTRAVENOUS; SUBCUTANEOUS at 12:48

## 2018-09-09 RX ADMIN — ALBUTEROL SULFATE 2.5 MG: 2.5 SOLUTION RESPIRATORY (INHALATION) at 01:06

## 2018-09-09 RX ADMIN — Medication 10 ML: at 14:46

## 2018-09-09 RX ADMIN — FUROSEMIDE 60 MG: 40 TABLET ORAL at 09:52

## 2018-09-09 RX ADMIN — IPRATROPIUM BROMIDE AND ALBUTEROL SULFATE 3 ML: .5; 3 SOLUTION RESPIRATORY (INHALATION) at 07:52

## 2018-09-09 RX ADMIN — INSULIN LISPRO 3 UNITS: 100 INJECTION, SOLUTION INTRAVENOUS; SUBCUTANEOUS at 18:42

## 2018-09-09 RX ADMIN — VENLAFAXINE 37.5 MG: 37.5 TABLET ORAL at 18:33

## 2018-09-09 RX ADMIN — ENOXAPARIN SODIUM 110 MG: 100 INJECTION SUBCUTANEOUS at 01:00

## 2018-09-09 RX ADMIN — POLYETHYLENE GLYCOL 3350 17 G: 17 POWDER, FOR SOLUTION ORAL at 09:57

## 2018-09-09 RX ADMIN — AMIODARONE HYDROCHLORIDE 200 MG: 200 TABLET ORAL at 09:52

## 2018-09-09 RX ADMIN — ENOXAPARIN SODIUM 110 MG: 100 INJECTION SUBCUTANEOUS at 13:01

## 2018-09-09 RX ADMIN — Medication 10 ML: at 05:52

## 2018-09-09 RX ADMIN — ALBUTEROL SULFATE 2.5 MG: 2.5 SOLUTION RESPIRATORY (INHALATION) at 13:38

## 2018-09-09 NOTE — PROGRESS NOTES
0700-Received report from Remigio Braxton RN Pt alert in bed without complaints. MEWS Score 1 
0935-pt sleeping, easy to arouse. Denies having pain. PEG tube patent, tolerating tube feeding without problems. 1245-pt awake watching TV denies having any pain at this time. 1442-pt sleeping

## 2018-09-09 NOTE — PROGRESS NOTES
Hospitalist Progress Note NAME: Jeevan Major :  1940 MRN:  [id number] Assessment / Plan: 
Acute Encephalopathy - noted in past 3 days as per family, could be mulitfactorial 
? Poor baseline mental status from TBI/CVA but pt was functional as per family H/o Seizure disorder - but seizure free for a long time as per Dr Cuco Wade, only on low dose tegretol (po not available IV) Lavon Moreno for Seizure precaution until he gets an Oral Access to restart the Oral tegretol Hypernatremia - much improved now at 149 H/o Prolonged TPN- due to Dysphagia with ? Oral thrush with severe crusting NEW Fever in past 24 hrs- 100.5 WBC trending up in past 24 hrs- 14.5 K Send Blood Cx for fungal growth, will start low dose Diflucan for now empirically- further recommendations as per ID- following; follow fever trend IVF- 1/2NS at low rate to correct hypernatremia Plan to get PEG tube to get off TPN ASAP if remains afebrile & WBC improves in AM 
 
? Early Cellulitis of Lt forearm. No increase in size, shrinking Warm moist compress Blood cultures x 2 is Positive. Looks like Staph Coag neg, no fever Chest CT shows same thrombus in line that extends to atria ID is Aware,  Patient on Dapto, Toma Phan, Levoflox and Diflucan Follow by exam 
 
Oral Flora Patient Nystatin Swabs 
  
Acute resp Failure s/p Ventilator support , now off it since  Kareem Pleural Effusions s/p R chest tube Pulm following RONAL CPAP q HS 
  
?Infected Thrombus on NATASHA Bacteremia Afib Change IV infusion to Peg Amiodarone Blood culture shows Staph Coag neg but nothing new on CT Scan Continues to have no fever normal WBC 
 
SSS S/p PPM 
On IV ABx as per ID Genta + Daptomycin On Lovenox therapeutic was on Pradaxa outpatient , needs to be on Eliquis if to be given by Peg 
  Dysphagia GI bleed s/p colectomy/enterotomies Ileus with Aspiration PNA Hb stable now On TPN, last bag today PEG placement 9/5 tolerating and increase. No residuals Palliative consulted this admission already 
  
  
  
Code Status: Full DVT Prophylaxis: on lovenox for the thrombus Subjective: Awake, pleasant Chief Complaint / Reason for Physician Visit  followup lethargy \"Im doing better\". Discussed with RN events overnight. Review of Systems: 
Symptom Y/N Comments  Symptom Y/N Comments Fever/Chills    Chest Pain Poor Appetite    Edema Cough    Abdominal Pain Sputum    Joint Pain SOB/BECERRIL    Pruritis/Rash Nausea/vomit    Tolerating PT/OT Diarrhea    Tolerating Diet Constipation    Other Could NOT obtain due to:   
 
Objective: VITALS:  
Last 24hrs VS reviewed since prior progress note. Most recent are: 
Patient Vitals for the past 24 hrs: 
 Temp Pulse Resp BP SpO2  
09/09/18 1551 - 74 - 124/56 98 % 09/09/18 1339 - - - - 100 % 09/09/18 1200 98.1 °F (36.7 °C) 75 18 104/63 100 % 09/09/18 0951 - 75 - 112/49 -  
09/09/18 0753 - - - - 98 % 09/09/18 0700 98.1 °F (36.7 °C) 75 18 119/62 99 % 09/09/18 0300 98 °F (36.7 °C) 76 20 116/54 97 % 09/09/18 0105 - - - - 99 % 09/09/18 0000 98.3 °F (36.8 °C) 77 20 115/55 100 % 09/08/18 2200 - 85 - 122/69 97 % 09/08/18 2100 - 84 - (!) 138/95 98 % 09/08/18 2000 - 78 - 126/56 98 % 09/08/18 1937 - - - - 99 % 09/08/18 1900 97.8 °F (36.6 °C) 76 20 116/64 99 % 09/08/18 1723 98.2 °F (36.8 °C) 79 20 129/64 98 % Intake/Output Summary (Last 24 hours) at 09/09/18 1630 Last data filed at 09/09/18 9884 Gross per 24 hour Intake              200 ml Output             1775 ml Net            -1575 ml PHYSICAL EXAM: 
General: WD, WN. Pleasant, Awake, NAD and Ox3 cooperative, no acute distress   
EENT:  EOMI. Anicteric sclerae. MMM Resp:  CTA bilaterally, no wheezing or rales. No accessory muscle use CV:  Regular  rhythm,  No edema GI:  Soft, Non distended, Non tender.  +Bowel sounds Neurologic:  Alert and oriented X 3, normal speech, Psych:   Good insight. Not anxious nor agitated Skin:  No rashes. No jaundice, Left Hand Cellulitic looking old iv site but not worsening Reviewed most current lab test results and cultures  YES Reviewed most current radiology test results   YES Review and summation of old records today    NO Reviewed patient's current orders and MAR    YES 
PMH/SH reviewed - no change compared to H&P 
________________________________________________________________________ Care Plan discussed with: 
  Comments Patient y Family  y   
RN y bedside Care Manager y CM for Discharge   marnie LOTT  
                 y Multidiciplinary team rounds were held today with , nursing, pharmacist and clinical coordinator. Patient's plan of care was discussed; medications were reviewed and discharge planning was addressed. ________________________________________________________________________ Total NON critical care TIME:  30 Minutes Total CRITICAL CARE TIME Spent:   Minutes non procedure based Comments >50% of visit spent in counseling and coordination of care YES Spent   25 revieweing chart and 20 min talking to wife answering her questtions that she has previously asked specialist  
________________________________________________________________________ Lawyer Bryan MD  
 
Procedures: see electronic medical records for all procedures/Xrays and details which were not copied into this note but were reviewed prior to creation of Plan. LABS: 
I reviewed today's most current labs and imaging studies. Pertinent labs include: 
Recent Labs  
   09/09/18 
 0405 WBC  9.1 HGB  9.0*  
HCT  30.9* PLT  271 Recent Labs  
   09/09/18 
 0405  09/08/18 
 0146 NA  140  140  
K  4.4  3.8 CL  105  105 CO2  27  26 GLU  160*  144* BUN  27*  29* CREA  1.05  1.11  
CA  7.8*  7.9*  
MG   --   2.6* ALB  1.8*   --   
 TBILI  0.4   --   
SGOT  47*   --   
ALT  58   --   
 
 
Signed: Lissette Ríos MD

## 2018-09-10 LAB
ANION GAP SERPL CALC-SCNC: 8 MMOL/L (ref 5–15)
BUN SERPL-MCNC: 25 MG/DL (ref 6–20)
BUN/CREAT SERPL: 23 (ref 12–20)
CALCIUM SERPL-MCNC: 8.1 MG/DL (ref 8.5–10.1)
CHLORIDE SERPL-SCNC: 105 MMOL/L (ref 97–108)
CO2 SERPL-SCNC: 27 MMOL/L (ref 21–32)
CREAT SERPL-MCNC: 1.08 MG/DL (ref 0.7–1.3)
GLUCOSE BLD STRIP.AUTO-MCNC: 134 MG/DL (ref 65–100)
GLUCOSE BLD STRIP.AUTO-MCNC: 139 MG/DL (ref 65–100)
GLUCOSE BLD STRIP.AUTO-MCNC: 151 MG/DL (ref 65–100)
GLUCOSE BLD STRIP.AUTO-MCNC: 162 MG/DL (ref 65–100)
GLUCOSE SERPL-MCNC: 163 MG/DL (ref 65–100)
MAGNESIUM SERPL-MCNC: 2.6 MG/DL (ref 1.6–2.4)
POTASSIUM SERPL-SCNC: 4.3 MMOL/L (ref 3.5–5.1)
SERVICE CMNT-IMP: ABNORMAL
SODIUM SERPL-SCNC: 140 MMOL/L (ref 136–145)

## 2018-09-10 PROCEDURE — 97535 SELF CARE MNGMENT TRAINING: CPT

## 2018-09-10 PROCEDURE — 92526 ORAL FUNCTION THERAPY: CPT

## 2018-09-10 PROCEDURE — 74011250636 HC RX REV CODE- 250/636: Performed by: SURGERY

## 2018-09-10 PROCEDURE — 74011250636 HC RX REV CODE- 250/636: Performed by: INTERNAL MEDICINE

## 2018-09-10 PROCEDURE — 82962 GLUCOSE BLOOD TEST: CPT

## 2018-09-10 PROCEDURE — 83735 ASSAY OF MAGNESIUM: CPT | Performed by: HOSPITALIST

## 2018-09-10 PROCEDURE — 74011000250 HC RX REV CODE- 250: Performed by: INTERNAL MEDICINE

## 2018-09-10 PROCEDURE — 80048 BASIC METABOLIC PNL TOTAL CA: CPT | Performed by: HOSPITALIST

## 2018-09-10 PROCEDURE — 74011636637 HC RX REV CODE- 636/637: Performed by: INTERNAL MEDICINE

## 2018-09-10 PROCEDURE — 97530 THERAPEUTIC ACTIVITIES: CPT

## 2018-09-10 PROCEDURE — 36415 COLL VENOUS BLD VENIPUNCTURE: CPT | Performed by: HOSPITALIST

## 2018-09-10 PROCEDURE — 74011250637 HC RX REV CODE- 250/637: Performed by: INTERNAL MEDICINE

## 2018-09-10 PROCEDURE — 94640 AIRWAY INHALATION TREATMENT: CPT

## 2018-09-10 PROCEDURE — 65660000000 HC RM CCU STEPDOWN

## 2018-09-10 PROCEDURE — 74011000258 HC RX REV CODE- 258: Performed by: INTERNAL MEDICINE

## 2018-09-10 RX ADMIN — INSULIN LISPRO 3 UNITS: 100 INJECTION, SOLUTION INTRAVENOUS; SUBCUTANEOUS at 19:22

## 2018-09-10 RX ADMIN — ALBUTEROL SULFATE 2.5 MG: 2.5 SOLUTION RESPIRATORY (INHALATION) at 07:42

## 2018-09-10 RX ADMIN — FUROSEMIDE 60 MG: 40 TABLET ORAL at 09:42

## 2018-09-10 RX ADMIN — ENOXAPARIN SODIUM 110 MG: 100 INJECTION SUBCUTANEOUS at 14:16

## 2018-09-10 RX ADMIN — VENLAFAXINE 37.5 MG: 37.5 TABLET ORAL at 17:27

## 2018-09-10 RX ADMIN — Medication 10 ML: at 21:34

## 2018-09-10 RX ADMIN — Medication 20 ML: at 17:28

## 2018-09-10 RX ADMIN — LACTULOSE 10 G: 20 SOLUTION ORAL at 17:28

## 2018-09-10 RX ADMIN — ALBUTEROL SULFATE 2.5 MG: 2.5 SOLUTION RESPIRATORY (INHALATION) at 13:54

## 2018-09-10 RX ADMIN — LEVETIRACETAM 250 MG: 500 SOLUTION ORAL at 09:41

## 2018-09-10 RX ADMIN — ENOXAPARIN SODIUM 110 MG: 100 INJECTION SUBCUTANEOUS at 00:32

## 2018-09-10 RX ADMIN — ALBUTEROL SULFATE 2.5 MG: 2.5 SOLUTION RESPIRATORY (INHALATION) at 19:30

## 2018-09-10 RX ADMIN — Medication 10 ML: at 06:17

## 2018-09-10 RX ADMIN — VENLAFAXINE 37.5 MG: 37.5 TABLET ORAL at 09:42

## 2018-09-10 RX ADMIN — LACTULOSE 10 G: 20 SOLUTION ORAL at 09:41

## 2018-09-10 RX ADMIN — Medication 10 ML: at 18:44

## 2018-09-10 RX ADMIN — GENTAMICIN SULFATE 80 MG: 40 INJECTION, SOLUTION INTRAMUSCULAR; INTRAVENOUS at 06:16

## 2018-09-10 RX ADMIN — ALBUTEROL SULFATE 2.5 MG: 2.5 SOLUTION RESPIRATORY (INHALATION) at 02:47

## 2018-09-10 RX ADMIN — LEVETIRACETAM 250 MG: 500 SOLUTION ORAL at 21:34

## 2018-09-10 RX ADMIN — AMIODARONE HYDROCHLORIDE 200 MG: 200 TABLET ORAL at 09:00

## 2018-09-10 RX ADMIN — POLYETHYLENE GLYCOL 3350 17 G: 17 POWDER, FOR SOLUTION ORAL at 09:41

## 2018-09-10 RX ADMIN — INSULIN LISPRO 3 UNITS: 100 INJECTION, SOLUTION INTRAVENOUS; SUBCUTANEOUS at 06:17

## 2018-09-10 RX ADMIN — Medication 10 ML: at 18:42

## 2018-09-10 RX ADMIN — DAPTOMYCIN 1000 MG: 500 INJECTION, POWDER, LYOPHILIZED, FOR SOLUTION INTRAVENOUS at 14:16

## 2018-09-10 NOTE — PROGRESS NOTES
0700: Bedside shift change report given to Jaleel Carmona RN (oncoming nurse) by Janell Duggan RN (offgoing nurse). Report included the following information SBAR, Kardex, ED Summary, Procedure Summary, Intake/Output, MAR, Recent Results and Cardiac Rhythm Paced. 1100: PT and OT at bedside working with patient. 1120:Mouth care performed with ST at bedside. Patient has dried mucous at the base of throat which could potentially cause harm to the patient. Patient's mouth is extremely dry. Dr. Christa Segura at bedside to extract mucous from throat. Will continue frequent mouth care on patient. 1400: Mouth care performed. Humidified air applied. Patient resting in bed.  
 
1600: PCT performed mouth care. Patient resting in bed.  
 
1900: Bedside shift change report given to Janell Duggan RN (oncoming nurse) by Jaleel Carmona RN (offgoing nurse). Report included the following information SBAR, Kardex, ED Summary, OR Summary, Procedure Summary, Intake/Output, MAR, Recent Results and Cardiac Rhythm PACED.

## 2018-09-10 NOTE — PROGRESS NOTES
ADULT PROTOCOL: JET AEROSOL ASSESSMENT Patient  Zeeshan Calzada     69 y.o.   male     9/9/2018  10:49 PM 
 
Breath Sounds Pre Procedure: Right Breath Sounds: Coarse Left Breath Sounds: Coarse Breath Sounds Post Procedure: Right Breath Sounds: Coarse Left Breath Sounds: Coarse Breathing pattern: Pre procedure Breathing Pattern: Regular Post procedure Breathing Pattern: Regular Heart Rate: Pre procedure Pulse: 75 Post procedure Pulse: 78 Resp Rate: Pre procedure Respirations: 20 
         Post procedure Respirations: 18 Peak Flow: Pre bronchodilator Post bronchodilator FVC/FEV1:  N/A Incentive Spirometry:  Actual Volume (ml): 750 ml 
     
 
Cough: Pre procedure Cough: Non-productive, Congested Post procedure Cough: Non-productive Suctioned: NO Sputum: Pre procedure Sputum amount: Scant Sputum color/odor: Tan 
Sputum consistency: Thick Sputum method obtained: Endotracheal 
               Post procedure Oxygen: O2 Device: Room air   Room Air Changed: NO SpO2: Pre procedure SpO2: 99 %   without oxygen Post procedure SpO2: 98 %  without oxygen Nebulizer Therapy: Current medications Aerosolized Medications: Albuterol Changed: NO Smoking History:  
 Smoking status: Former Smoker  
    Years: 10.00  
    Types: Pipe  
    Quit date: 1/29/1990  Smokeless tobacco: Never Used  
      Comment: QUIT 1970'S Problem List:  
Patient Active Problem List  
Diagnosis Code  
 HTN (hypertension) I10  
 Depression F32.9  Hypercholesterolemia E78.00  Acid reflux K21.9  Seizure ([address] R56.9  Hypothyroidism E03.9  Hyponatremia E87.1  BPH (benign prostatic hyperplasia) N40.0  Rash R21  
 Cellulitis L03.90  Wax in ear H61.20  Ulcer HRA5654  Small bowel obstruction ([address] S05.695  Atrial flutter ([facility] I48.92  
  Atrial fibrillation or flutter  CHI (closed head injury) S09. 90XA  Basal cell cancer C44.91  
 TBI (traumatic brain injury) ([facility] [address] S06. 9X9A  Atrial fibrillation (HCC) I48.91  
 Cataract H26.9  Constipation K59.00  Ankle fracture, left C39.842E  Insomnia G47.00  Tinea corporis B35.4  SSS (sick sinus syndrome) ([facility]) I49.5  Syncope R55  Seizure disorder ([facility] [address] G40.909  RONLA on CPAP G47.33, Z99.89  
 Pacemaker Z95.0  Pre-op evaluation Z01.818  Chronic back pain greater than 3 months duration M54.9, G89.29  
 Cerebral infarction (HCC) I63.9  Acute bronchitis J20.9  Screening for colon cancer Z12.11  
 Chronic right shoulder pain M25.511, G89.29  
 Common wart B07.8  Localized epilepsy with impairment of consciousness ([facility] [address] G40.209  Severe obesity (BMI 35.0-39.9) (HCC) E66.01  
 Acute blood loss anemia D62  Anasarca R60.1  GI bleed K92.2  Acute encephalopathy G93.40  Idiopathic hypotension I95.0  Counseling regarding goals of care Z71.89 Respiratory Therapist: Fariba Troncoso RT

## 2018-09-10 NOTE — PROGRESS NOTES
Problem: Mobility Impaired (Adult and Pediatric) Goal: *Acute Goals and Plan of Care (Insert Text) Physical Therapy Goals Goals reviewed 9/6/18- goals remain appropriate Goals reviewed and revised 8/29/18 1. Patient will move from supine to sit and sit to supine , scoot up and down and roll side to side in bed with moderate assistance within 7 day(s). 2.  Patient will transfer from bed to chair and chair to bed with max assistance x 2 using the least restrictive device within 7 day(s). 3.  Patient will perform sit to stand with moderate assistance x 2 within 7 day(s). 4.  Patient will perform stand pivot transfer to wheelchair with max assistance x 2 in 7 days. Reviewed 8/3/18- goals remain appropriate Reviewed and revised 7/26/2018 1. Patient will move from supine to sit and sit to supine , scoot up and down and roll side to side in bed with minimal assistance within 7 day(s). 2.  Patient will transfer from bed to chair and chair to bed with moderate assistance using the least restrictive device within 7 day(s). 3.  Patient will perform sit to stand with moderate assistance within 7 day(s). 4.  Patient will perform stand pivot transfer to wheelchair with moderate assistance in 7 days. Reviewed and revised 7/19/2018 1. Patient will move from supine to sit and sit to supine , scoot up and down and roll side to side in bed with minimal assistance within 7 day(s). 2.  Patient will transfer from bed to chair and chair to bed with moderate assistance using the least restrictive device within 7 day(s). 3.  Patient will perform sit to stand with minimal assistance within 7 day(s). 4.  Patient will perform stand pivot transfer to wheelchair with minimal assistance in 7 days. Initiated 7/12/2018 1. Patient will move from supine to sit and sit to supine , scoot up and down and roll side to side in bed with modified independence within 7 day(s). 2.  Patient will transfer from bed to chair and chair to bed with supervision/set-up using the least restrictive device within 7 day(s). 3.  Patient will perform sit to stand with supervision/set-up within 7 day(s). 4.  Patient will perform stand pivot transfer to wheelchair with modified independence in 7 days. physical Therapy TREATMENT Patient: Julita eJrry (83 y.o. male) Date: 9/10/2018 Diagnosis: Acute blood loss anemia GI bleed Anasarca GI Bleed 
gi bleed ASCENDING COLON CANCER  
aspiration ASPIRATION PNEUMONIA GI bleed Procedure(s) (LRB): ESOPHAGOGASTRODUODENOSCOPY (EGD) PERCUTANEOUS ENDOSCOPIC GASTROSTOMY TUBE INSERTION (N/A) 5 Days Post-Op Precautions:   
Chart, physical therapy assessment, plan of care and goals were reviewed. ASSESSMENT: 
Pt was received in supine on RA and cleared by nursing to mobilize. Pt needed maximum encouragement to work with therapy. He required max A x 2 to come to the EOB and needed cues for maintaining midline. Pt \"gave up\" quickly today and needed cues and encouragement to work on sitting balance to perform ADLs with OT. He was able to tolerate EOB for ~20 minutes with fair sitting balance. Working on reaching out JOHNY to improve overall trunk control and balance. He was returned to supine at the end of the session and placed in chair position. Discussed with nursing about having lift team coming to [facility] pt to recliner later in the day. Progression toward goals: 
[]    Improving appropriately and progressing toward goals [x]    Improving slowly and progressing toward goals 
[]    Not making progress toward goals and plan of care will be adjusted PLAN: 
Patient continues to benefit from skilled intervention to address the above impairments. Continue treatment per established plan of care. Discharge Recommendations:  Loki Davies Further Equipment Recommendations for Discharge:  TBD SUBJECTIVE:  
 Patient stated Charolett Lemme down yet.  OBJECTIVE DATA SUMMARY:  
Critical Behavior: 
Neurologic State: Alert, Confused Orientation Level: Oriented to person, Oriented to place, Oriented to situation Cognition: Impaired decision making, Follows commands Safety/Judgement: Fall prevention, Lack of insight into deficits Functional Mobility Training: 
Bed Mobility: 
Rolling: Maximum assistance;Assist x2 Supine to Sit: Maximum assistance;Assist x2 Sit to Supine: Total assistance;Assist x2 Scooting: Maximum assistance Transfers: 
  
  
     
 unsafe to attempt today Balance: 
Sitting: Impaired Sitting - Static: Fair (occasional) Sitting - Dynamic: Fair (occasional) Pain: 
Pain Scale 1: Numeric (0 - 10) Pain Intensity 1: 0 Activity Tolerance: VSS Please refer to the flowsheet for vital signs taken during this treatment. After treatment:  
[]    Patient left in no apparent distress sitting up in chair 
[x]    Patient left in no apparent distress in bed (chair position) [x]    Call bell left within reach [x]    Nursing notified 
[]    Caregiver present [x]    Bed alarm activated COMMUNICATION/COLLABORATION:  
The patients plan of care was discussed with: Occupational Therapist and Registered Nurse Ruben Olmedo PT, DPT Time Calculation: 36 mins

## 2018-09-10 NOTE — PROGRESS NOTES
PCU SHIFT NURSING NOTE Bedside and Verbal shift change report given to Marin Adams RN (oncoming nurse) by Wilder Guido RN (offgoing nurse). Report included the following information SBAR, Kardex, MAR and Recent Results. Shift Summary:  
2030: Applied warm moist compress to patients Left forearm. 2205: Patient had 6 beats of Vtach, he is sleeping and asymptomatic. 
0200: Spoke with Dr. Fam Ramos to inform of 6 beats of Vtach, labs ordered, will continue to monitor. 0400: Applied warm moist compress to patients Left forearm. 0700: Bedside and Verbal shift change report given to Violet Dupree RN (oncoming nurse) by Marin Adams RN (offgoing nurse). Report included the following information SBAR, Kardex, MAR and Recent Results. Admission Date 7/10/2018 Admission Diagnosis Acute blood loss anemia GI bleed Anasarca GI Bleed 
gi bleed ASCENDING COLON CANCER  
aspiration ASPIRATION PNEUMONIA Consults IP CONSULT TO GASTROENTEROLOGY 
IP CONSULT TO INTERVENTIONAL RADIOLOGY 
IP CONSULT TO INTENSIVIST 
IP CONSULT TO NEPHROLOGY 
IP CONSULT TO HEMATOLOGY 
IP CONSULT TO GASTROENTEROLOGY 
IP CONSULT TO INFECTIOUS DISEASES 
IP CONSULT TO PALLIATIVE CARE - PROVIDER 
IP CONSULT TO NEUROLOGY 
IP CONSULT TO CARDIOLOGY 
IP CONSULT TO COLORECTAL SURGERY 
IP CONSULT TO PULMONOLOGY 
IP CONSULT TO PULMONOLOGY 
IP CONSULT TO HOSPITALIST Consults []PT []OT []Speech  
[]Case Management  
  
[] Palliative Cardiac Monitoring Order []Yes []No  
 
IV drips []Yes Drip:                            Dose: 
Drip:                            Dose: 
Drip:                            Dose:  
[]No  
 
GI Prophylaxis []Yes []No  
 
 
 
DVT Prophylaxis SCDs:  Sequential Compression Device: Bilateral  
  Patient Refused VTE Prophylaxis: Yes Ra stockings:  Graduated Compression Stockings: Bilateral  
  
[] Medication []Contraindicated []None Activity Level Activity Level: Bed Rest   
 Activity Assistance: Complete care Purposeful Rounding every 1-2 hour? []Yes Mckee Score  Total Score: 3 Bed Alarm (If score 3 or >) []Yes  
[] Refused (See signed refusal form in chart) Dagoberto Score  Dagoberto Score: 11 Dagoberto Score (if score 14 or less) []PMT consult  
[]Wound Care consult []Specialty bed  
[] Nutrition consult Needs prior to discharge:  
Home O2 required:   
[]Yes []No  
 If yes, how much O2 required? Other:  
 Last Bowel Movement: Last Bowel Movement Date: 09/09/18 Influenza Vaccine Received Flu Vaccine for Current Season (usually Sept-March): Not Flu Season Pneumonia Vaccine Diet Active Orders Diet DIET NPO With Tube Feedings LDAs PICC Double Lumen 08/31/18 Right;Brachial (Active) Central Line Being Utilized Yes 9/9/2018  8:18 PM  
Criteria for Appropriate Use Long term IV/antibiotic administration 9/9/2018  8:18 PM  
Site Assessment Clean, dry, & intact 9/9/2018  8:18 PM  
Phlebitis Assessment 0 9/9/2018  8:18 PM  
Infiltration Assessment 0 9/9/2018  8:18 PM  
Arm Circumference (cm) 39 cm 9/8/2018  7:00 PM  
Date of Last Dressing Change 08/31/18 9/9/2018  8:18 PM  
Dressing Status Clean, dry, & intact 9/9/2018  8:18 PM  
Action Taken Open ports on tubing capped 9/9/2018  8:18 PM  
External Catheter Length (cm) 0 centimeters 9/8/2018  7:00 PM  
Dressing Type Transparent 9/9/2018  8:18 PM  
Hub Color/Line Status Red 9/9/2018  8:18 PM  
Positive Blood Return (Site #1) Yes 9/9/2018  8:18 PM  
Hub Color/Line Status Purple 9/9/2018  8:18 PM  
Positive Blood Return (Site #2) Yes 9/9/2018  8:18 PM  
Alcohol Cap Used Yes 9/9/2018  8:18 PM  
            
PEG/Gastrostomy Tube 09/05/18 (Active) Site Assessment Clean, dry, & intact 9/9/2018  8:18 PM  
Dressing Status Clean, dry, & intact 9/9/2018  8:18 PM  
G Port Status Infusing 9/9/2018  8:18 PM  
 Action Taken Placement verified (comment) 9/9/2018  8:18 PM  
Drainage Description Tan 9/9/2018  8:18 PM  
Gastric Residual (mL) 100 ml 9/9/2018  8:18 PM  
Tube Feeding/Formula Options Twocal HN 9/9/2018  8:18 PM  
Modular Nutrients Protein liquid 9/8/2018  7:00 PM  
Tube Feeding/Verify Rate (mL/hr) 50 9/9/2018  8:18 PM  
Water Flush Volume (mL) 200 mL 9/9/2018  8:18 PM  
Intake (ml) 413 ml 9/8/2018  2:40 PM  
Medication Volume 50 ml 9/9/2018  9:11 PM  
   
Colostomy 07/10/18 Left Abdomen (Active) Drainage Color Brown 9/9/2018  8:18 PM  
Site Assessment Clean, dry, intact 9/9/2018  8:18 PM  
Treatment Site care 9/8/2018  7:00 PM  
Output (ml) 175 mL 9/9/2018  6:42 PM  
            
Urinary Catheter [REMOVED] Urinary Catheter 07/17/18 2- way; Erazo-Indications for Use: Accurate measurement of urinary output [REMOVED] Urinary Catheter 08/11/18 Erazo-Indications for Use: Accurate measurement of urinary output Urinary Catheter 09/02/18 Erazo-Indications for Use: Acute urinary retention/bladder outlet obstruction Intake & Output Date 09/09/18 0700 - 09/10/18 0260 09/10/18 0700 - 09/11/18 3085 Shift 2749-1069 8575-3206 24 Hour Total 0700-1859 2398-1346 24 Hour Total  
I 
N 
T 
A 
K 
E 
 I.V. 
(mL/kg/hr) 100 
(0.1)  100 Volume (DAPTOmycin (CUBICIN) 1,000 mg in 0.9% sodium chloride 50 mL IVPB RF formulation) 100  100 NG/ 919 5493 Water Flush Volume (mL) (PEG/Gastrostomy Tube 09/05/18)  Medication Volume (PEG/Gastrostomy Tube 09/05/18) 45 50 95 Shift Total 
(mL/kg) 945 
(8.7) 250 
(2.3) 1195 
(11) O 
U T 
P 
U [place] Urine (mL/kg/hr) 900 
(0.7)  900 Urine Output (mL) (Urinary Catheter 09/02/18 Erazo) 900  900 Stool 175  175 Output (ml) (Colostomy 07/10/18 Left Abdomen) 175  175 Shift Total 
(mL/kg) 1075 
(9.9)  1075 
(9.9) NET -130 250 120 Weight (kg) 108. 2 108. 2 108. 2 108. 2 108. 2 108.2 Readmission Risk Assessment Tool Score High Risk   
      
 21 Total Score 3 Has Seen PCP in Last 6 Months (Yes=3, No=0) 2 . Living with Significant Other. Assisted Living. LTAC. SNF. or  
Rehab  
 3 Patient Length of Stay (>5 days = 3)  
 9 Pt. Coverage (Medicare=5 , Medicaid, or Self-Pay=4) 4 Charlson Comorbidity Score (Age + Comorbid Conditions) Criteria that do not apply:  
 IP Visits Last 12 Months (1-3=4, 4=9, >4=11) Expected Length of Stay 10d 19h Actual Length of Stay 62

## 2018-09-10 NOTE — PROGRESS NOTES
Antibiotic regimen per ID, Dr. Mukund Shine. Complete total 6 weeks of daptomycin and gentamycin from the latest (-) blood cx which was done on 9/8/2018.

## 2018-09-10 NOTE — PROGRESS NOTES
Hospitalist Progress Note NAME: Ramone Lugo :  1940 MRN:  [id number] Assessment / Plan   
 
Admitted for Lower GI Bleed and Anemia  Colonoscopy showed a LARGE Colon Mass S/P  Colectomy Stage 3 B this hospitalization done  Developed Ileus then aspiration Pna, complications S/P Ileus Aspiration PNA , intubated and prolonged ICU stay Acute resp Failure s/p Ventilator support , extubated  Pneumonia , last sputum culture  + for Heavy staph aureus , light K.pneumoniae Sputum culture  + heavy Burkholderia cepacia S/p treated with Levaquin Kareem Pleural Effusions s/p R chest tube removed  RONAL CPAP q HS 
  
Dysphagia, Failed MBS 
S/p TPN in hospital due to Dysphagia S/P Peg  Wed  Tolerating Tube feedings well ? Infected Thrombus on NATASHA 
NATASHA shows definite large mass associated with pacing wire in RA which may represent vegetation ( ) PER  Infectious Disease Continue Daptomycin, Gentamicin IV, change Fluconazole ( 9/3). & levaquin to po Complete total 6 weeks of daptomycin and gentamycin from the latest (-) blood Culture  which was done on 2018. So until  Has A RT ARM PICC LINE On Lovenox therapeutic Fluconazole IV added 9/3 Bacteremia S/p removal of L/IJ placement of PICC RUE , TPN on  Persistent bacteremia with coagulase negative Staph since  initially oxacillin sensitive , last positive BC is oxacillin resistant (1/) Repeat BC ,  , 9/3 no growth,  Positive Staph Coag Neg,  ( negative Chest CT repeated . 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 Hold off on removal of pacemaker at this time . D/w Dr Loni Montano , .  . 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 Rory Paz discussed with Dr Shawanda Casey ( patients Neurologist) Patient  lethargic on 500 mg BID  Keppra but both states Keep on Keppra 250 BID 
 due to better Drug interactions and liver  with all his meds and less Liver issues on Severe Deconditioning, He needs to go to either [facility] or Snf ( higher likelihood he wont get better.) He has been accepted to [facility] awaiting Pre Auth, - wife is hesitant due to distance We are encouring her to agree to [facility] 2nd choice is [facility] in [facility] to her but due to multiple antibiotics and complexity of care, high risk of failure. PALLATIVE CARE HAS PREVIOUSLY TALKED TO WIFE AND SHE WANTS EVERYTHING DONE He needs to follow up with ID or Cardiology close to October 20 for Reimaging and Cultures or can be done in [facility]. To determine what to do with Pacers Hypernatremia - much improved, now on free fluid via Peg 
  
 
  
  
  
Code Status: Full NOK POA  :  WIFE 
DVT Prophylaxis: on lovenox for the thrombus Subjective: per staff tolerating Tube feeding Chief Complaint / Reason for Physician Visit  followup lethargy \"Im doing better\". Discussed with RN events overnight. Review of Systems: 
Symptom Y/N Comments  Symptom Y/N Comments Fever/Chills    Chest Pain Poor Appetite    Edema Cough    Abdominal Pain Sputum    Joint Pain SOB/BECERRIL    Pruritis/Rash Nausea/vomit    Tolerating PT/OT Diarrhea    Tolerating Diet Constipation    Other Could NOT obtain due to:   
 
Objective: VITALS:  
 Last 24hrs VS reviewed since prior progress note. Most recent are: 
Patient Vitals for the past 24 hrs: 
 Temp Pulse Resp BP SpO2  
09/10/18 1048 96.8 °F (36 °C) 79 18 129/69 95 % 09/10/18 0700 98.6 °F (37 °C) 78 18 115/62 98 % 09/10/18 0300 97.9 °F (36.6 °C) 89 20 126/72 97 % 09/10/18 0246 - - - - 97 % 09/09/18 2336 98 °F (36.7 °C) 79 20 130/65 96 % 09/09/18 2051 - - - - 99 % 09/09/18 2018 97.5 °F (36.4 °C) 78 20 122/57 100 % 09/09/18 2000 - 75 - 143/66 100 % Intake/Output Summary (Last 24 hours) at 09/10/18 1839 Last data filed at 09/10/18 1446 Gross per 24 hour Intake             2775 ml Output             1245 ml Net             1530 ml PHYSICAL EXAM: 
General: WD, WN. Pleasant, Awake, NAD and Ox3 cooperative, no acute distress   
EENT:  EOMI. Anicteric sclerae. MMM Resp:  CTA bilaterally, no wheezing or rales. No accessory muscle use CV:  Regular  rhythm,  No edema GI:  Soft, Non distended, Non tender.  +Bowel sounds Neurologic:  Alert and oriented X 3, normal speech, Psych:   Good insight. Not anxious nor agitated Skin:  No rashes. No jaundice Reviewed most current lab test results and cultures  YES Reviewed most current radiology test results   YES Review and summation of old records today    NO Reviewed patient's current orders and MAR    YES 
PMH/SH reviewed - no change compared to H&P 
________________________________________________________________________ Care Plan discussed with: 
  Comments Patient y Family  y   
RN y bedside Care Manager y CM for Discharge   y GI Multidiciplinary team rounds were held today with , nursing, pharmacist and clinical coordinator. Patient's plan of care was discussed; medications were reviewed and discharge planning was addressed. ________________________________________________________________________ Total NON critical care TIME:  30 Minutes Total CRITICAL CARE TIME Spent:   Minutes non procedure based Comments >50% of visit spent in counseling and coordination of care YES Spent   25 revieweing chart and 20 min talking to wife answering her questtions that she has previously asked specialist  
________________________________________________________________________ Kamar Arciniega MD  
 
Procedures: see electronic medical records for all procedures/Xrays and details which were not copied into this note but were reviewed prior to creation of Plan. LABS: 
I reviewed today's most current labs and imaging studies. Pertinent labs include: 
Recent Labs  
   09/09/18 
 0405 WBC  9.1 HGB  9.0*  
HCT  30.9* PLT  271 Recent Labs  
   09/10/18 
 0341  09/09/18 
 0405  09/08/18 
 0146 NA  140  140  140  
K  4.3  4.4  3.8 CL  105  105  105 CO2  27  27  26 GLU  163*  160*  144* BUN  25*  27*  29* CREA  1.08  1.05  1.11  
CA  8.1*  7.8*  7.9*  
MG  2.6*   --   2.6* ALB   --   1.8*   --   
TBILI   --   0.4   --   
SGOT   --   47*   --   
ALT   --   58   --   
 
 
Signed: Kamar Arciniega MD

## 2018-09-10 NOTE — PROGRESS NOTES
Nutrition Assessment: 
 
INTERVENTIONS/RECOMMENDATIONS:  
Consider increasing goal rate of TF to allow for time off pump - TwoCal @ 80 mL/hr x 14 hours to provide 2240 kcal, 93.5 g protein + 200 mL water flushes q 4 hours x 24 hours (1984 mL water) ASSESSMENT:  
Chart reviewed; patient discussed during PCU rounds. Tolerating TF @ goal rate. Current TF is meeting 99% of estimated kcal needs and 100% of estimated protein needs. Remains inappropriate for PO per SLP. Will continue to monitor tolerance, weights, and labs. Diet Order: NPO (TwoCal @ 50 + prosource daily + 200 mL flushes q 4 hours; 2260kcal, 107g protein, 1970 mL water) % Eaten:  Patient Vitals for the past 72 hrs: 
 % Diet Eaten 09/10/18 0700 0 % Pertinent Medications: [x] Reviewed []Other: Lasix, Humalog, Lactulose, Keppra, Miralax Pertinent Labs: [x]Reviewed  []Other:  [phone] Food Allergies: [x]None []Other:    
Last BM: 9/10   [x]Active     []Hyperactive  []Hypoactive       [] Absent  BS Skin:    [x] Intact   [] Incision  [] Breakdown   []Edema   []Other: Anthropometrics: Height: 6' 2\" (188 cm) Weight: 111.5 kg (245 lb 13 oz) IBW (%IBW):   ( ) UBW (%UBW):   (  %) BMI: Body mass index is 31.56 kg/(m^2). This BMI is indicative of: 
[]Underweight   []Normal   []Overweight   [x] Obesity   [] Extreme Obesity (BMI>40) Last Weight Metrics: 
Weight Loss Metrics 9/10/2018 7/10/2018 7/5/2018 5/15/2018 5/7/2018 4/10/2018 3/26/2018 Today's Wt 245 lb 13 oz - 283 lb 4.8 oz 266 lb 262 lb 267 lb 267 lb BMI - 31.56 kg/m2 36.37 kg/m2 34.15 kg/m2 33.64 kg/m2 34.28 kg/m2 34.28 kg/m2 Estimated Nutrition Needs (Based on): 4601 Kcals/day (BMR (1905) x 1. 2AF) , 89 g (-111g (0.8-1.0 g/kg bw)) Protein Carbohydrate: At Least 130 g/day  Fluids: 2000 mL/day Pt expected to meet estimated nutrient needs: [x]Yes []No 
 
NUTRITION DIAGNOSES:  
Problem:  No nutritional diagnosis at this time Etiology: related to AMS, dysphagia Signs/Symptoms: as evidenced by PEG placement NUTRITION INTERVENTIONS: 
 Enteral/Parenteral Nutrition: Other GOAL:  
Patient will tolerate TF @ goal with residual <250 mL next 2-4 days NUTRITION MONITORING AND EVALUATION Food/Nutrient Intake Outcomes: Enteral/parenteral nutrition intake Physical Signs/Symptoms Outcomes: Weight/weight change, GI profile Previous Goal Met: 
 [x] Met              [] Progressing Towards Goal              [] Not Progressing Towards Goal  
Previous Recommendations: 
 [x] Implemented          [] Not Implemented          [] Not Applicable LEARNING NEEDS (Diet, Food/Nutrient-Drug Interaction):  
 [x] None Identified 
 [] Identified and Education Provided/Documented 
 [] Identified and Pt declined/was not appropriate Cultural, Jew, OR Ethnic Dietary Needs:  
 [x] None Identified 
 [] Identified and Addressed 
 
 [x] Interdisciplinary Care Plan Reviewed/Documented  
 [x] Discharge Planning: See TF recommendations above [x] Participated in Interdisciplinary Rounds NUTRITION RISK:  
 [x] High              [] Moderate           []  Low  []  Minimal/Uncompromised Adela Martinez Pager [phone] Weekend Pager [phone]

## 2018-09-10 NOTE — PROGRESS NOTES
Hematology Oncology Progress Note Follow up for: IJ/SVC thrombus Chart notes reviewed since last visit. Case discussed with following: family not at bedside Patient complains of the following: sleeping - did not rouse with verbal stimulation. Additional concerns noted by the staff: none Patient Vitals for the past 24 hrs: 
 BP Temp Pulse Resp SpO2 Weight  
09/10/18 1048 129/69 96.8 °F (36 °C) 79 - 95 % -  
09/10/18 0700 115/62 98.6 °F (37 °C) 78 18 98 % -  
09/10/18 0610 - - - - - 111.5 kg (245 lb 13 oz) 09/10/18 0300 126/72 97.9 °F (36.6 °C) 89 20 97 % -  
09/10/18 0246 - - - - 97 % -  
09/09/18 2336 130/65 98 °F (36.7 °C) 79 20 96 % -  
09/09/18 2051 - - - - 99 % -  
09/09/18 2018 122/57 97.5 °F (36.4 °C) 78 20 100 % -  
09/09/18 2000 143/66 - 75 - 100 % -  
09/09/18 1551 124/56 98.4 °F (36.9 °C) 74 18 98 % -  
09/09/18 1339 - - - - 100 % -  
 
 
ROS negative Physical Examination: 
 
Constitutional obtunded. Appears close to chronological age. Well nourished. Well developed. Head Normocephalic; no scars Eyes Eyes closed ENMT unremarkable Neck Supple without masses or thyromegaly. No jugular venous distension. Hematologic/Lymphatic No petechiae or purpura. No tender or palpable lymph nodes noted Respiratory Fairly clear at present Cardiovascular Regular rate and rhythm of heart Chest / Line Site Chest is symmetric with no chest wall deformities. Abdomen Non-tender, non-distended, no masses, ascites or hepatosplenomegaly. Good bowel sounds. Musculoskeletal Puffy with edema Extremities  edematous. Skin No rashes, scars, or lesions suggestive of malignancy. No petechiae, purpura, or ecchymoses. No excoriations. Neurologic Not tested Psychiatric Not able to assess. Labs: 
Recent Results (from the past 24 hour(s)) GLUCOSE, POC Collection Time: 09/09/18  5:45 PM  
Result Value Ref Range Glucose (POC) 148 (H) 65 - 100 mg/dL Performed by John Carroll GLUCOSE, POC Collection Time: 09/10/18 12:30 AM  
Result Value Ref Range Glucose (POC) 139 (H) 65 - 100 mg/dL Performed by Willard Adkins Collection Time: 09/10/18  3:41 AM  
Result Value Ref Range Magnesium 2.6 (H) 1.6 - 2.4 mg/dL METABOLIC PANEL, BASIC Collection Time: 09/10/18  3:41 AM  
Result Value Ref Range Sodium 140 136 - 145 mmol/L Potassium 4.3 3.5 - 5.1 mmol/L Chloride 105 97 - 108 mmol/L  
 CO2 27 21 - 32 mmol/L Anion gap 8 5 - 15 mmol/L Glucose 163 (H) 65 - 100 mg/dL BUN 25 (H) 6 - 20 MG/DL Creatinine 1.08 0.70 - 1.30 MG/DL  
 BUN/Creatinine ratio 23 (H) 12 - 20 GFR est AA >60 >60 ml/min/1.73m2 GFR est non-AA >60 >60 ml/min/1.73m2 Calcium 8.1 (L) 8.5 - 10.1 MG/DL  
GLUCOSE, POC Collection Time: 09/10/18  6:09 AM  
Result Value Ref Range Glucose (POC) 162 (H) 65 - 100 mg/dL Performed by Annelise ROY   
GLUCOSE, POC Collection Time: 09/10/18 11:47 AM  
Result Value Ref Range Glucose (POC) 134 (H) 65 - 100 mg/dL Performed by Lucero Collins (PCT) Assessment and Plan:  
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 Cont anticoagulation with enoxaparin,  Transfuse hgb  <7 
 
mass on pacing wire- ?infected thrombus vs vegetation. Cardiology not planning to remove wire for now. encepalopathy - lethargic at present. Stage IIIB colon cancer - s/p resection. If pt recovers, will need to see if candidate for adjuvant chemotherapy. Acute resp failure/Pnuemonia - off vent, Had pigtail and drainage of R pleural eff, cytology negative. Septic shock with persistent bacteremia -ID following, abx per them. Diflucan started and fungal cx ordered pending. Normochromic normocytic anemia - B12  929. folate 9.8. Ferritin was 5 on 7/10/18 suggesting fairly significant iron deficiency. He got 500 mg of IV iron in July but actually has a much higher calculated deficit.  Ferritin 121 so does not need further IV iron at present. would transfuse for hgb <7. Hx seizure disorder Cellulitis of left forearm, oral thrush - currently on daptomycin, gentamycin, levaquin, and diflucan RONAL - CPAP at night. Atrial fib - on amiodaone.

## 2018-09-10 NOTE — PROGRESS NOTES
Problem: Self Care Deficits Care Plan (Adult) Goal: *Acute Goals and Plan of Care (Insert Text) Occupational Therapy Goals Weekly reassessment 7/31/18 1. Patient will perform one grooming task seated EOB with minimal assistance within 7 day(s). 2.  Patient will perform upper body dressing with moderate assistance  within 7 day(s). 3.  Patient will perform supine <> sit to participate in ADL tasks decrease caregiver burden with moderate assistance  within 7 day(s). 4.  Patient will perform sit <> stand to prepare for self care transfers with moderate assistance within 7 day(s). 5.  Patient will participate in R AROM and L self PROM upper extremity therapeutic exercise/activities with contact guard assist for 8 minutes within 7 day(s). 6.  Patient will utilize energy conservation techniques during functional activities with verbal cues within 7 day(s). Initiated 7/22/2018 1. Patient will perform one grooming task seated EOB with minimal assistance within 7 day(s). 2.  Patient will perform upper body dressing with moderate assistance  within 7 day(s). 3.  Patient will perform supine <> sit to participate in ADL tasks decrease caregiver burdenwith maximal assistance  within 7 day(s). 4.  Patient will perform sit <> stand to prepare for self care transfers with maximal assistance within 7 day(s). 5.  Patient will participate in R AROM and L self PROM upper extremity therapeutic exercise/activities with contact guard assist for 8 minutes within 7 day(s). 6.  Patient will utilize energy conservation techniques during functional activities with verbal cues within 7 day(s). Occupational Therapy Goals Re-eval 9/6/2018 and goals remain the same Initiated 8/29/2018 1. Patient will perform grooming with maximal assistance within 7 day(s). 2.  Patient will be able to sit on EOb with max assist of 2 for 2 minutes, in prep for ADLs. 3. Patient to be able to tolerate UE ex on right UE for 3 minutes in order to increase his overall endurance and strength, with in 7 days. Occupational Therapy TREATMENT Patient: Jeevan Major (52 y.o. male) Date: 9/10/2018 Diagnosis: Acute blood loss anemia GI bleed Anasarca GI Bleed 
gi bleed ASCENDING COLON CANCER  
aspiration ASPIRATION PNEUMONIA GI bleed Procedure(s) (LRB): ESOPHAGOGASTRODUODENOSCOPY (EGD) PERCUTANEOUS ENDOSCOPIC GASTROSTOMY TUBE INSERTION (N/A) 5 Days Post-Op Precautions:   
Chart, occupational therapy assessment, plan of care, and goals were reviewed. ASSESSMENT: 
Pt was cleared to be seen for therapy and all VSS and pt was supine in bed. Pt needed verbal encouragement to work with therapy. He was total to max assist of 2 for bed mobility and was mod to max assist for sitting on EOB at beginning of the session. He was mod to min assist for sitting balance the rest of the session. .  Worked with pt on sitting and combing his hair and reaching for items on his table. He continues to have difficulty looking to the left and finding objects. He was able to sit up for approx 20 minutes. Pt was max to total assist for sit to supine and left in bed in chair position with call bell with in reach. Recommend that pt have further therapy at discharge at rehab at [facility]. Asked nursing to get lift team to get pt up in chair with stephy Progression toward goals: 
[]       Improving appropriately and progressing toward goals [x]       Improving slowly and progressing toward goals 
[]       Not making progress toward goals and plan of care will be adjusted PLAN: 
Patient continues to benefit from skilled intervention to address the above impairments. Continue treatment per established plan of care. Discharge Recommendations:  Loki Davies Further Equipment Recommendations for Discharge:  tbd SUBJECTIVE:  
Patient stated I am tired. I need to lay down.  OBJECTIVE DATA SUMMARY:  
Cognitive/Behavioral Status: 
Neurologic State: Alert;Confused Orientation Level: Oriented to person;Oriented to place;Oriented to situation Cognition: Impaired decision making; Follows commands Functional Mobility and Transfers for ADLs: 
Bed Mobility: 
Rolling: Maximum assistance;Assist x2 Supine to Sit: Maximum assistance;Assist x2 Sit to Supine: Total assistance;Assist x2 Scooting: Maximum assistance Transfers: 
  
 stephy Balance: 
Sitting: Impaired Sitting - Static: Fair (occasional) Sitting - Dynamic: Fair (occasional) ADL Intervention: 
  
Pt is total assist for ADLs Pain: 
Pain Scale 1: Numeric (0 - 10) Pain Intensity 1: 0 Activity Tolerance:  
poor Please refer to the flowsheet for vital signs taken during this treatment. After treatment:  
[] Patient left in no apparent distress sitting up in chair 
[x] Patient left in no apparent distress in bed 
[x] Call bell left within reach [x] Nursing notified 
[] Caregiver present 
[] Bed alarm activated COMMUNICATION/COLLABORATION:  
The patients plan of care was discussed with: Physical Therapist and Registered Nurse PORTILLO Wilks Time Calculation: 33 mins

## 2018-09-10 NOTE — PROGRESS NOTES
Problem: Dysphagia (Adult) Goal: *Acute Goals and Plan of Care (Insert Text) Speech path goals Initiated 9/6/18 1. Patient will follow simple oropharyngeal strengthening exercises with max cues within 7 days Initiated 8/30/2018 1. Patient will participate in [facility] tomorrow. Completed. 2. Pt will participate with oral and pharyngeal swallowing exercises to improve swallowing function. 1. Pt will tolerate purees and nectar thick liquids with no overt s/s of aspiration. Discontinue Speech pathology goals Initiated 8/3/2018 1. Patient will tolerate sips and chips with no overt s/s aspiration within 7 days. Goal met 8/10. 
2. Patient will participate in re-evaluation of swallow function within 7 days. Goal met 8/10. 3. Pt will participate with MBS today 8/10. Goal met 8/10. Speech language pathology dysphagia treatment Patient: Guillaume Pompa (44 y.o. male) Date: 9/10/2018 Diagnosis: Acute blood loss anemia GI bleed Anasarca GI Bleed 
gi bleed ASCENDING COLON CANCER  
aspiration ASPIRATION PNEUMONIA GI bleed Procedure(s) (LRB): ESOPHAGOGASTRODUODENOSCOPY (EGD) PERCUTANEOUS ENDOSCOPIC GASTROSTOMY TUBE INSERTION (N/A) 5 Days Post-Op Precautions:    
 
ASSESSMENT: 
Pt seen twice today. With nsg, Dr. Curry Velasco, pulmonologist and myself we completed oral care and cleared  dried oral secretions and dried oropharyngeal secretions. Dr. Curry Velasco cleared the oropharynx for safety. Concern for aspiration of oropharyngeal secretions. Pt had difficulty following commands to open and close his mouth. Pt was offered ice chips via spoon and after brief oral manipulation and swallow, he opened his mouth and the ice chip fell out of his mouth 100% of the time. He was not able to propel the bolus posteriorly even with verbal cues. He continues with severe oral dysphagia ; poor posterior propulsion as was noted on MBS. Progression toward goals: 
[]         Improving appropriately and progressing toward goals []         Improving slowly and progressing toward goals [x]         Not making progress toward goals and plan of care will be adjusted PLAN: 
Recommendations and Planned Interventions: NPO with moisture on the swabs offered only by nsg to moisten his oral mucosa. Needs mouth care every hour ; discussed using a face tent for humidification as he gets thick layer of mucous all over his hard/soft palate, and on his tongue. He is at risk to aspirate the dried mucous. The dried mucus is a breeding ground for bacteria. Patient continues to benefit from skilled intervention to address the above impairments. Continue treatment per established plan of care. Discharge Recommendations: To Be Determined SUBJECTIVE:  
Patient stated the sponge was wet and cold. OBJECTIVE:  
Cognitive and Communication Status: 
Neurologic State: Alert, Confused Orientation Level: Oriented to person, Oriented to place, Oriented to situation Cognition: Impaired decision making, Follows commands Perception: Appears intact Perseveration: Perseverates during conversation Safety/Judgement: Fall prevention, Lack of insight into deficits Dysphagia Treatment: 
Oral Assessment: P.O. Trials: 
  
Vocal quality prior to P.O.:   
  
  
  
  offered ice chips via spoon and after the swallow when he opened his mouth, the ice fell out of his mouth 100% of the time Allowed pt to suck on wet sponges and there was a swallow with no cough. The swallow felt weak ; reduced hyolaryngeal excursion Pain: 
Pain Scale 1: Numeric (0 - 10) Pain Intensity 1: 0 After treatment:  
[]              Patient left in no apparent distress sitting up in chair 
[x]              Patient left in no apparent distress in bed 
[x]              Call bell left within reach [x]              Nursing notified 
[]              Caregiver present 
[]              Bed alarm activated COMMUNICATION/EDUCATION:  
 
The patients plan of care including recommendations, planned interventions, and recommended diet changes were discussed with: Registered Nurse. []              Posted safety precautions in patient's room. DIPAK Ball Time Calculation: 19 mins

## 2018-09-11 LAB
GLUCOSE BLD STRIP.AUTO-MCNC: 121 MG/DL (ref 65–100)
GLUCOSE BLD STRIP.AUTO-MCNC: 132 MG/DL (ref 65–100)
GLUCOSE BLD STRIP.AUTO-MCNC: 142 MG/DL (ref 65–100)
GLUCOSE BLD STRIP.AUTO-MCNC: 152 MG/DL (ref 65–100)
GLUCOSE BLD STRIP.AUTO-MCNC: 168 MG/DL (ref 65–100)
SERVICE CMNT-IMP: ABNORMAL

## 2018-09-11 PROCEDURE — 74011636637 HC RX REV CODE- 636/637: Performed by: INTERNAL MEDICINE

## 2018-09-11 PROCEDURE — 77010026065 HC OXYGEN MINIMUM MEDICAL AIR

## 2018-09-11 PROCEDURE — 74011250636 HC RX REV CODE- 250/636: Performed by: INTERNAL MEDICINE

## 2018-09-11 PROCEDURE — 65660000000 HC RM CCU STEPDOWN

## 2018-09-11 PROCEDURE — 74011000258 HC RX REV CODE- 258: Performed by: INTERNAL MEDICINE

## 2018-09-11 PROCEDURE — 74011250637 HC RX REV CODE- 250/637: Performed by: INTERNAL MEDICINE

## 2018-09-11 PROCEDURE — 74011250636 HC RX REV CODE- 250/636: Performed by: SURGERY

## 2018-09-11 PROCEDURE — 94640 AIRWAY INHALATION TREATMENT: CPT

## 2018-09-11 PROCEDURE — 74011000250 HC RX REV CODE- 250: Performed by: SURGERY

## 2018-09-11 PROCEDURE — 74011000250 HC RX REV CODE- 250: Performed by: INTERNAL MEDICINE

## 2018-09-11 PROCEDURE — 77030018798 HC PMP KT ENTRL FED COVD -A

## 2018-09-11 PROCEDURE — 82962 GLUCOSE BLOOD TEST: CPT

## 2018-09-11 RX ADMIN — LACTULOSE 10 G: 20 SOLUTION ORAL at 09:23

## 2018-09-11 RX ADMIN — ENOXAPARIN SODIUM 110 MG: 100 INJECTION SUBCUTANEOUS at 13:29

## 2018-09-11 RX ADMIN — Medication 10 ML: at 06:13

## 2018-09-11 RX ADMIN — ALBUTEROL SULFATE 2.5 MG: 2.5 SOLUTION RESPIRATORY (INHALATION) at 13:05

## 2018-09-11 RX ADMIN — LEVETIRACETAM 250 MG: 500 SOLUTION ORAL at 20:53

## 2018-09-11 RX ADMIN — ALBUTEROL SULFATE 2.5 MG: 2.5 SOLUTION RESPIRATORY (INHALATION) at 01:30

## 2018-09-11 RX ADMIN — LEVETIRACETAM 250 MG: 500 SOLUTION ORAL at 09:23

## 2018-09-11 RX ADMIN — ALBUTEROL SULFATE 2.5 MG: 2.5 SOLUTION RESPIRATORY (INHALATION) at 19:41

## 2018-09-11 RX ADMIN — AMIODARONE HYDROCHLORIDE 200 MG: 200 TABLET ORAL at 09:24

## 2018-09-11 RX ADMIN — GENTAMICIN SULFATE 80 MG: 40 INJECTION, SOLUTION INTRAMUSCULAR; INTRAVENOUS at 06:13

## 2018-09-11 RX ADMIN — Medication 10 ML: at 13:36

## 2018-09-11 RX ADMIN — POLYETHYLENE GLYCOL 3350 17 G: 17 POWDER, FOR SOLUTION ORAL at 09:23

## 2018-09-11 RX ADMIN — ALBUTEROL SULFATE 2.5 MG: 2.5 SOLUTION RESPIRATORY (INHALATION) at 07:16

## 2018-09-11 RX ADMIN — INSULIN LISPRO 3 UNITS: 100 INJECTION, SOLUTION INTRAVENOUS; SUBCUTANEOUS at 13:30

## 2018-09-11 RX ADMIN — INSULIN LISPRO 3 UNITS: 100 INJECTION, SOLUTION INTRAVENOUS; SUBCUTANEOUS at 00:56

## 2018-09-11 RX ADMIN — LACTULOSE 10 G: 20 SOLUTION ORAL at 18:11

## 2018-09-11 RX ADMIN — Medication 10 ML: at 21:02

## 2018-09-11 RX ADMIN — Medication 20 ML: at 13:36

## 2018-09-11 RX ADMIN — ENOXAPARIN SODIUM 110 MG: 100 INJECTION SUBCUTANEOUS at 00:56

## 2018-09-11 RX ADMIN — FUROSEMIDE 60 MG: 40 TABLET ORAL at 09:24

## 2018-09-11 RX ADMIN — DAPTOMYCIN 1000 MG: 500 INJECTION, POWDER, LYOPHILIZED, FOR SOLUTION INTRAVENOUS at 13:36

## 2018-09-11 RX ADMIN — VENLAFAXINE 37.5 MG: 37.5 TABLET ORAL at 09:24

## 2018-09-11 RX ADMIN — VENLAFAXINE 37.5 MG: 37.5 TABLET ORAL at 18:11

## 2018-09-11 RX ADMIN — INSULIN LISPRO 3 UNITS: 100 INJECTION, SOLUTION INTRAVENOUS; SUBCUTANEOUS at 06:27

## 2018-09-11 NOTE — PROGRESS NOTES
Palliative Medicine [place]: [phone] ([phone]) formerly [facility]: [phone] ([phone]) LCSW went by patient's room to see if wife was preset, to provide support and touch base with her. Patient was asleep. No family present. Will follow up tomorrow as able. Goals are clear, full restorative care, Full Code.

## 2018-09-11 NOTE — PROGRESS NOTES
Infectious Disease Progress Note IMPRESSION:  
· Bacteremia again with BC 1/2 + for Coagulase negative Staph( 9/5) , ID, sensitivities requested. Repeat bC 9/7 no growth · Cellulitis LUE improving · Repeat CT chest ( 9/7) shows non occlusive thrombus in R/ IJ vein ,small amount of thrombus along right PICC catheter/ pacer leads in SVC which extends to right atrium · S/p peg placement ·  S/p removal of L/IJ placement of PICC RUE , TPN on 8/31 ·  Persistent bacteremia with coagulase negative Staph since 8/11 initially oxacillin sensitive , last positive BC is oxacillin resistant 8/21(1/4) · Repeat BC 8/26, 8/29 , 9/3 no growth ·  S/p failed MBS · NATASHA shows definite large mass associated with pacing wire in RA which may represent vegetation ( 8/23) · Thrombus  & tiny gas bubble of as in RIJ extending into SVC to level of RA (8/15)  s/p heparin IV now on lovenox s/q · CTA chest - no PE, could not visualize SVC thrombus, left lateral wall soft tissue swelling, soft tissue around pacemaker show no significant abnormality · US chest wall - minimal fluid in pacemaker pocket, no significant inflammation of overlying subcutaneous fat or skin · Acute respiratory failure on Ventilator / h/o tracheostomy 24 years ago  , s/p extubation 8/26 · R/ pleural effusion s/p chest tube placement , CXr clear · Increased sputum production - 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    
  
  
PLAN:  
   
· Continue Daptomycin x 6 weeks , Gentamicin IV x 4 weeks from last negative cultures. 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 · Decision made to hold off on removal of pacemaker at this time . D/w Dr Jenni Bell , .  . · Continue antimicrobials & anticoagulation ·  Needs  Erazo catheter removal whenever possible CT chest -  IMPRESSION: 
  
1. There is a small amount of nonocclusive thrombus in the right internal 
jugular vein. 
Marcha Sang is also a small amount of thrombus along the right PICC catheter/pacer 
leads in the SVC which extends to the right atrium. 
 There is improvement in the bibasilar atelectasis/effusions Blood culture - Special Requests: NO SPECIAL REQUESTS   Preliminary Culture result: NO GROWTH 2 DAYS Blood culture - Culture result:    Preliminary STAPHYLOCOCCUS SPECIES, COAGULASE NEGATIVE GROWING IN 1 OF 2 BOTTLES DRAWN (SITE = L HAND) IDENTIFICATION AND SUSCEPTIBILITY TO FOLLOW (A) Culture result:    Preliminary PRELIMINARY REPORT OF GRAM POSITIVE COCCI IN CLUSTERS GROWING IN 1 OF 2 BOTTLES DRAWN CALLED TO AND READ BACK BY FELIX CAMPOS RN [facility] AT 2106 ON 2018. [facility] (A) Subjective:  
 
Pt seen  . Awake , talking . On asking Juan Antonio Elena is your wife  ? \" pt responds Shakira Brown is sick\" Review of Systems:  Review of systems not obtained due to patient factors. Objective:  
 
Blood pressure 118/57, pulse 77, temperature 97.9 °F (36.6 °C), resp. rate 20, height 6' 2\" (1.88 m), weight 245 lb 13 oz (111.5 kg), SpO2 99 %. Temp (24hrs), Av.8 °F (36.6 °C), Min:96.8 °F (36 °C), Max:98.6 °F (37 °C) Patient Vitals for the past 24 hrs: 
 Temp Pulse Resp BP SpO2  
18 0129 - - - - 99 % 09/10/18 2300 97.9 °F (36.6 °C) 77 20 118/57 99 % 09/10/18 2000 97.7 °F (36.5 °C) 78 20 130/62 98 % 09/10/18 1925 - - - - 96 % 09/10/18 1600 97.7 °F (36.5 °C) 82 20 137/61 96 % 09/10/18 1048 96.8 °F (36 °C) 79 18 129/69 95 % 09/10/18 0700 98.6 °F (37 °C) 78 18 115/62 98 % 09/10/18 0300 97.9 °F (36.6 °C) 89 20 126/72 97 % 09/10/18 0246 - - - - 97 % Lines:  Central Venous Catheter:  LIJ Physical Exam:  
General:   Awake , talking Lungs:    Reduced air entry bases on  auscultation  chest wall-  pacemaker site -no swelling, erythemal  
CV:  Regular rate and rhythm,no murmur, Abdomen:   Soft, non-tender. bowel sounds +distended Extremities: LUE - erythema distal forearm is less Lines/Devices:  Intact, no erythema, drainage or tenderness Data Review: CBC:  
Recent Labs  
   09/09/18 
 0405 WBC  9.1  
RBC  3.33* HGB  9.0*  
HCT  30.9* PLT  271 GRANS  70 LYMPH  17 EOS  2 CMP:  
Recent Labs  
   09/10/18 
 0341  09/09/18 
 0405 GLU  163*  160* NA  140  140  
K  4.3  4.4  
CL  105  105 CO2  27  27 BUN  25*  27* CREA  1.08  1.05  
CA  8.1*  7.8* AGAP  8  8 BUCR  23*  26* AP   --   125* TP   --   7.4 ALB   --   1.8*  
GLOB   --   5.6* AGRAT   --   0.3* Studies:     
Lab Results Component Value Date/Time Culture result: NO GROWTH 2 DAYS 09/08/2018 01:46 AM  
 Culture result: (A) 09/05/2018 07:03 PM  
  STAPHYLOCOCCUS SPECIES, COAGULASE NEGATIVE GROWING IN 1 OF 2 BOTTLES DRAWN (SITE = L HAND) IDENTIFICATION AND SUSCEPTIBILITY TO FOLLOW Culture result: (A) 09/05/2018 07:03 PM  
  PRELIMINARY REPORT OF GRAM POSITIVE COCCI IN CLUSTERS GROWING IN 1 OF 2 BOTTLES DRAWN CALLED TO AND READ BACK BY FELIX CAMPOS RN [id number] AT 2106 ON 9/6/2018. [place] [id number] Culture result:  09/05/2018 07:03 PM  
   [facility] REQUESTING IDENTIFICATION AND SENSITIVITIES 9/10/18 TA. Culture result: REMAINING BOTTLE(S) HAS/HAVE NO GROWTH IN 5 DAYS 09/05/2018 07:03 PM  
  
 
XR Results (most recent): 
 
Results from Hospital Encounter encounter on 07/10/18 XR ABD PORT  1 V Narrative EXAM:  XR ABD PORT  1 V. 
INDICATION: Abdominal distention. COMPARISON: 8/18/2018. FINDINGS:  
A portable supine radiograph of the abdomen was obtained at 1622 hours and shows 
a gastrostomy tube projecting over the stomach and contrast material in the 
ascending and transverse colon. There is no small bowel dilatation. The midline 
skin staples have been removed. There are pins in the left hip. No soft tissue 
masses or pathologic calcifications are identified. Impression IMPRESSION: Gastrostomy tube with contrast in the colon. No evidence of 
obstruction. Patient Active Problem List  
Diagnosis Code  
 HTN (hypertension) I10  
 Depression F32.9  Hypercholesterolemia E78.00  Acid reflux K21.9  Seizure ([facility] [address] R56.9  Hypothyroidism E03.9  Hyponatremia E87.1  BPH (benign prostatic hyperplasia) N40.0  Rash R21  
 Cellulitis L03.90  Wax in ear H61.20  Ulcer [id number]  Small bowel obstruction ([facility] Y30.614  Atrial flutter (HCC) I48.92  
 Atrial fibrillation or flutter  CHI (closed head injury) S09. 90XA  Basal cell cancer C44.91  
 TBI (traumatic brain injury) ([facility] [facility] S06. 9X9A  Atrial fibrillation (HCC) I48.91  
 Cataract H26.9  Constipation K59.00  Ankle fracture, left X11.686E  Insomnia G47.00  Tinea corporis B35.4  SSS (sick sinus syndrome) ([facility]) I49.5  Syncope R55  Seizure disorder ([facility] G40.909  RONAL on CPAP G47.33, Z99.89  
 Pacemaker Z95.0  Pre-op evaluation Z01.818  Chronic back pain greater than 3 months duration M54.9, G89.29  
 Cerebral infarction (HCC) I63.9  Acute bronchitis J20.9  Screening for colon cancer Z12.11  
 Chronic right shoulder pain M25.511, G89.29  
 Common wart B07.8  Localized epilepsy with impairment of consciousness ([facility] G40.209  Severe obesity (BMI 35.0-39.9) ([facility]) E66.01  
 Acute blood loss anemia D62  Anasarca R60.1  GI bleed K92.2  Acute encephalopathy G93.40  Idiopathic hypotension I95.0  Counseling regarding goals of care Z71.89 ICD-10-CM ICD-9-CM 1. Anemia, unspecified type D64.9 285.9 2. Generalized weakness R53.1 780.79   
3. Acute encephalopathy G93.40 348.30   
4. Anasarca R60.1 782.3 5. Idiopathic hypotension I95.0 458.9 6. Counseling regarding goals of care Z71.89 V65.49   
7. 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 9X9S 907.0 8. Chronic atrial fibrillation (HCC) I48.2 427.31   
9. Malignant neoplasm of ascending colon (HCC) C18.2 153.6 Anti-infectives: Fluconazole IV- 9/3- 9/6 IV  Changed to po/ completed 9/10 Daptomycin IV - 8/26- 9/3 Gentamicin IV 8/26 S/p levaquin 8/29-9/7 Cefotetan 7/17 Zosyn 7/20-8/7 Cefepime - 8/11-8/13 Ceftriaxone 8/13-8/17 Vancomycin 8/11-8/22 Daptomycin 8/23- 8/24- Naficillin IV 8/24- 8/26  Marti North MD [facility]

## 2018-09-11 NOTE — PROGRESS NOTES
Infectious Disease Progress Note IMPRESSION:  
· Bacteremia again with BC 1/2 + for Coagulase negative Staph( 9/5) , ID, sensitivities requested. Repeat [facility] 9/7 no growth · Cellulitis LUE nearly resolved · Repeat CT chest ( 9/7) shows non occlusive thrombus in R/ IJ vein ,small amount of thrombus along right PICC catheter/ pacer leads in SVC which extends to right atrium · S/p peg placement ·  S/p removal of L/IJ placement of PICC RUE , TPN on 8/31 ·  Persistent bacteremia with coagulase negative Staph since 8/11 initially oxacillin sensitive , last positive BC is oxacillin resistant 8/21(1/4) · Repeat BC 8/26, 8/29 , 9/3 no growth ·  S/p failed MBS · NATASHA shows definite large mass associated with pacing wire in RA which may represent vegetation ( 8/23) · Thrombus  & tiny gas bubble of as in RIJ extending into SVC to level of RA (8/15)  s/p heparin IV now on lovenox s/q · CTA chest - no PE, could not visualize SVC thrombus, left lateral wall soft tissue swelling, soft tissue around pacemaker show no significant abnormality · US chest wall - minimal fluid in pacemaker pocket, no significant inflammation of overlying subcutaneous fat or skin · Acute respiratory failure on Ventilator / h/o tracheostomy 24 years ago  , s/p extubation 8/26 · R/ pleural effusion s/p chest tube placement , CXr clear · Increased sputum production - 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    
  
  
PLAN:  
   
· Continue Daptomycin x 6 weeks , Gentamicin IV x 4 weeks from last negative cultures. 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 · Thrombus in R/IJ,   mass in RA which is suggestive of thrombus rather than vegetation in light of pacemaker pocket appearing clear of infection. · Decision made to hold off on removal of pacemaker at this time . D/w Dr Ruby Pop , .  . · Continue antimicrobials & anticoagulation ·  Needs  Erazo catheter removal whenever possible CT chest -  IMPRESSION: 
  
1. There is a small amount of nonocclusive thrombus in the right internal 
jugular vein. 
Jeneal Leodan is also a small amount of thrombus along the right PICC catheter/pacer 
leads in the SVC which extends to the right atrium. 
 There is improvement in the bibasilar atelectasis/effusions Blood culture - Special Requests: NO SPECIAL REQUESTS   Preliminary Culture result: NO GROWTH 2 DAYS Blood culture - Culture result:    Preliminary STAPHYLOCOCCUS SPECIES, COAGULASE NEGATIVE GROWING IN 1 OF 2 BOTTLES DRAWN (SITE = L HAND) IDENTIFICATION AND SUSCEPTIBILITY TO FOLLOW (A) Culture result:    Preliminary PRELIMINARY REPORT OF GRAM POSITIVE COCCI IN CLUSTERS GROWING IN 1 OF 2 BOTTLES DRAWN CALLED TO AND READ BACK BY FELIX CAMPOS RN [facility] AT 2106 ON 2018. [facility] (A) Subjective:  
 
Pt seen  . Awake , talking, waving right hand in recognition . \" I am good\" Review of Systems:  Review of systems not obtained due to patient factors. Objective:  
 
Blood pressure 137/64, pulse 76, temperature 98.3 °F (36.8 °C), resp. rate 20, height 6' 2\" (1.88 m), weight 246 lb 14.6 oz (112 kg), SpO2 98 %. Temp (24hrs), Av.9 °F (36.6 °C), Min:97.7 °F (36.5 °C), Max:98.3 °F (36.8 °C) Patient Vitals for the past 24 hrs: 
 Temp Pulse Resp BP SpO2  
18 1453 98.3 °F (36.8 °C) 76 20 137/64 98 % 18 1128 - 77 - 143/66 97 % 18 1101 97.8 °F (36.6 °C) 75 20 127/65 100 % 18 0923 - 79 - 121/68 -  
18 0700 - 74 - 125/58 98 % 18 0300 97.8 °F (36.6 °C) 75 20 114/59 100 % 09/11/18 0129 - - - - 99 % 09/10/18 2300 97.9 °F (36.6 °C) 77 20 118/57 99 % 09/10/18 2000 97.7 °F (36.5 °C) 78 20 130/62 98 % 09/10/18 1925 - - - - 96 % Lines:  Central Venous Catheter:  LIJ Physical Exam:  
General:   Awake , talking Lungs:    Reduced air entry bases on  auscultation  chest wall-  pacemaker site -no swelling, erythemal  
CV:  Regular rate and rhythm,no murmur, Abdomen:   Soft, non-tender. bowel sounds +distended Extremities: LUE - erythema distal forearm is less Lines/Devices:  Intact, no erythema, drainage or tenderness Data Review: CBC:  
Recent Labs  
   09/09/18 
 0405 WBC  9.1  
RBC  3.33* HGB  9.0*  
HCT  30.9* PLT  271 GRANS  70 LYMPH  17 EOS  2 CMP:  
Recent Labs  
   09/10/18 
 0341  09/09/18 
 0405 GLU  163*  160* NA  140  140  
K  4.3  4.4  
CL  105  105 CO2  27  27 BUN  25*  27* CREA  1.08  1.05  
CA  8.1*  7.8* AGAP  8  8 BUCR  23*  26* AP   --   125* TP   --   7.4 ALB   --   1.8*  
GLOB   --   5.6* AGRAT   --   0.3* Studies:     
Lab Results Component Value Date/Time Culture result: NO GROWTH 3 DAYS 09/08/2018 01:46 AM  
 Culture result: (A) 09/05/2018 07:03 PM  
  STAPHYLOCOCCUS SPECIES, COAGULASE NEGATIVE GROWING IN 1 OF 2 BOTTLES DRAWN (SITE = L HAND) IDENTIFICATION AND SUSCEPTIBILITY TO FOLLOW Culture result: (A) 09/05/2018 07:03 PM  
  PRELIMINARY REPORT OF GRAM POSITIVE COCCI IN CLUSTERS GROWING IN 1 OF 2 BOTTLES DRAWN CALLED TO AND READ BACK BY FELIX CAMPOS RN [facility] AT 2106 ON 9/6/2018. [place] [address] Culture result:  09/05/2018 07:03 PM  
   [facility] REQUESTING IDENTIFICATION AND SENSITIVITIES 9/10/18 TA. Culture result: REMAINING BOTTLE(S) HAS/HAVE NO GROWTH IN 5 DAYS 09/05/2018 07:03 PM  
  
 
XR Results (most recent): 
 
Results from Hospital Encounter encounter on 07/10/18 XR ABD PORT  1 V Narrative EXAM:  XR ABD PORT  1 V. 
INDICATION: Abdominal distention. COMPARISON: 8/18/2018. FINDINGS:  
A portable supine radiograph of the abdomen was obtained at 1622 hours and shows 
a gastrostomy tube projecting over the stomach and contrast material in the 
ascending and transverse colon. There is no small bowel dilatation. The midline 
skin staples have been removed. There are pins in the left hip. No soft tissue 
masses or pathologic calcifications are identified. Impression IMPRESSION: Gastrostomy tube with contrast in the colon. No evidence of 
obstruction. Patient Active Problem List  
Diagnosis Code  
 HTN (hypertension) I10  
 Depression F32.9  Hypercholesterolemia E78.00  Acid reflux K21.9  Seizure ([address] R56.9  Hypothyroidism E03.9  Hyponatremia E87.1  BPH (benign prostatic hyperplasia) N40.0  Rash R21  
 Cellulitis L03.90  Wax in ear H61.20  Ulcer [id number]  Small bowel obstruction ([address] B68.788  Atrial flutter (HCC) I48.92  
 Atrial fibrillation or flutter  CHI (closed head injury) S09. 90XA  Basal cell cancer C44.91  
 TBI (traumatic brain injury) ([facility] [address] S06. 9X9A  Atrial fibrillation (HCC) I48.91  
 Cataract H26.9  Constipation K59.00  Ankle fracture, left D50.224C  Insomnia G47.00  Tinea corporis B35.4  SSS (sick sinus syndrome) ([facility]) I49.5  Syncope R55  Seizure disorder ([facility] [address] G40.909  RONAL on CPAP G47.33, Z99.89  
 Pacemaker Z95.0  Pre-op evaluation Z01.818  Chronic back pain greater than 3 months duration M54.9, G89.29  
 Cerebral infarction (HCC) I63.9  Acute bronchitis J20.9  Screening for colon cancer Z12.11  
 Chronic right shoulder pain M25.511, G89.29  
 Common wart B07.8  Localized epilepsy with impairment of consciousness ([facility] [address] G40.209  Severe obesity (BMI 35.0-39.9) ([facility]) E66.01  
 Acute blood loss anemia D62  Anasarca R60.1  GI bleed K92.2  Acute encephalopathy G93.40  Idiopathic hypotension I95.0  Counseling regarding goals of care Z71.89  
 
 
 ICD-10-CM ICD-9-CM 1. Anemia, unspecified type D64.9 285.9 2. Generalized weakness R53.1 780.79   
3. Acute encephalopathy G93.40 348.30   
4. Anasarca R60.1 782.3 5. Idiopathic hypotension I95.0 458.9 6. Counseling regarding goals of care Z71.89 V65.49   
7. [facility] G40.909 345.90 S06. 9X9S 907.0 8. Chronic atrial fibrillation (HCC) I48.2 427.31   
9. Malignant neoplasm of ascending colon (HCC) C18.2 153.6 Anti-infectives: Fluconazole IV- 9/3- 9/6 IV  Changed to po, completed 9/10 Daptomycin IV - 8/26- 9/3 Gentamicin IV 8/26 S/p levaquin 8/29-9/7 Cefotetan 7/17 Zosyn 7/20-8/7 Cefepime - 8/11-8/13 Ceftriaxone 8/13-8/17 Vancomycin 8/11-8/22 Daptomycin 8/23- 8/24- Naficillin IV 8/24- 8/26  Ivania Hewitt MD [facility]

## 2018-09-11 NOTE — ADT AUTH CERT NOTES
Utilization Review  
  
  
  Bowel Surgery: Colectomy, Partial, with or without Ostomy - Care Day 61 (9/8/2018) by Enzo Cadet     
  Review Status Review Entered    
  Completed 9/11/2018    
  Details    
      
  Care Day: 61 Care Date: 9/8/2018 Level of Care: Step Down    
  Guideline Day 2     
  Clinical Status    
  (X) * Procedure completed    
  (X) * Hemodynamic stability    
  9/11/2018 2:58 PM EDT by Suki Cowart    
    97.6-113/57-77-20-99% RA    
      
      
  Activity    
  ( ) * Progressive ambulation    
  9/11/2018 2:58 PM EDT by Suki Cowart    
    out of bed with assistance    
      
      
  Routes    
  (X) IV fluids, medications    
      
      
      
      
  * Milestone    
      
  Additional Notes    
  Acute Encephalopathy - noted in past 3 days as per family, could be mulitfactorial    
  ? Poor baseline mental status from TBI/CVA but pt was functional as per family    
  H/o Seizure disorder - but seizure free for a long time as per Dr Stephan Ku, only on low dose tegretol (po not available IV)    
  CONTINUE IV KEPPRA for Seizure precaution until he gets an Oral Access to restart the Oral tegretol    
  Hypernatremia - much improved now at 149    
  H/o Prolonged TPN- due to Dysphagia with ? Oral thrush with severe crusting    
  NEW Fever in past 24 hrs- 100. 5    
  WBC trending up in past 24 hrs- 14.5 K    
  Send Blood Cx for fungal growth, will start low dose Diflucan for now empirically- further recommendations as per ID- following; follow fever trend    
  IVF- 1/2NS at low rate to correct hypernatremia    
  Plan to get PEG tube to get off TPN ASAP if remains afebrile & WBC improves in AM    
       
  ? Early Cellulitis of Lt forearm.  No increase in size, shrinking    
  Blood cultures x 2 is Positive.  Looks like Staph Coag neg, no fever    
  Chest CT shows same thrombus in line that extends to atria    
   ID is Aware,  Patient on Dapto, Gent, Levoflox and Diflucan    
  Follow by exam    
       
  Oral Thrush    
  Nystatin Swabs    
        
  Acute resp Failure s/p Ventilator support , now off it since 8/26    
  Kareem Pleural Effusions s/p R chest tube    
  Pulm following    
       
  RONAL      
  CPAP q HS    
        
  ?Infected Thrombus on NATASHA    
  Bacteremia    
  Afib    
       Change IV infusion to Peg Amiodarone    
  SSS S/p PPM    
  On IV ABx as per ID Genta + Daptomycin    
  On Lovenox therapeutic was on Pradaxa outpatient , needs to be on Eliquis if to be given by Peg    
        
  Dysphagia    
  GI bleed s/p colectomy/enterotomies    
  Ileus with Aspiration PNA    
  Hb stable now    
  On TPN, last bag today    
  PEG placement 9/5 tolerating and increase.  No residuals    
  Palliative consulted this admission already    
        
  glu 162 mag 2.6 bun 25     
  spot check pulse ox    
  consult mobility services    
  NPO    
  tube feedings    
  wound care    
  albuterol neb x4    
  amiodarone x1 per gtube    
  cubicin iv x1    
  lovenox sc x1    
  Lasix x1 per gtube    
  insulin sc x3    
  keppra x2 per gtube    
  diflucan x1 per gtube    
  amiodarone drip    
  gentamicin iv x1    
   
  Bowel Surgery: Colectomy, Partial, with or without Ostomy - Care Day 60 (9/7/2018) by Errol Cadet     
  Review Status Review Entered    
  Completed 9/10/2018    
  Details    
      
  Care Day: 60 Care Date: 9/7/2018 Level of Care: Step Down    
  Guideline Day 2     
  Clinical Status    
  (X) * Procedure completed    
  (X) * Hemodynamic stability    
  9/10/2018 4:03 PM EDT by Faizan Gleason    
    98.3-112/61-81-24-96% 1L NC    
      
      
  Activity    
  ( ) * Progressive ambulation    
      
  Routes    
  (X) IV fluids, medications    
      
  Interventions    
  (X) Hgb/Hct    
      
      
      
      
  * Milestone    
      
  Additional Notes    
   Acute Encephalopathy - noted in past 3 days as per family, could be mulitfactorial    
  ? Poor baseline mental status from TBI/CVA but pt was functional as per family    
  H/o Seizure disorder - but seizure free for a long time as per Dr Dinesh Mariscal, only on low dose tegretol (po not available IV)    
  CONTINUE IV KEPPRA for Seizure precaution until he gets an Oral Access to restart the Oral tegretol    
  Hypernatremia - much improved now at 149    
  H/o Prolonged TPN- due to Dysphagia with ? Oral thrush with severe crusting    
  NEW Fever in past 24 hrs- 100. 5    
  WBC trending up in past 24 hrs- 14.5 K    
  Send Blood Cx for fungal growth, will start low dose Diflucan for now empirically- further recommendations as per ID- following; follow fever trend    
  IVF- 1/2NS at low rate to correct hypernatremia    
  Plan to get PEG tube to get off TPN ASAP if remains afebrile & WBC improves in AM    
       
  ? Early Cellulitis of Lt forearm. Not, no increase in size    
  Blood cultures x 2 is Positive.  Looks like Staph Coag neg    
  ID is Aware,  Patient on Dapto, Gent, Levoflox and Diflucan    
  Follow by exam    
       
  Oral Thrush    
  Nystatin Swabs    
        
  Acute resp Failure s/p Ventilator support , now off it since 8/26    
  Kareem Pleural Effusions s/p R chest tube    
  Pulm following    
       
  RONAL      
  CPAP q HS    
        
  ?Infected Thrombus on NATASHA    
  Bacteremia    
  Afib    
       Change IV infusion to Peg Amiodarone    
  SSS S/p PPM    
  On IV ABx as per ID Genta + Daptomycin    
  On Lovenox therapeutic was on Pradaxa outpatient , needs to be on Eliquis if to be given by Peg    
        
  Dysphagia    
  GI bleed s/p colectomy/enterotomies    
  Ileus with Aspiration PNA    
  Hb stable now    
  On TPN, last bag today    
  PEG placement 9/5 tolerating and increase.  No residuals    
  Palliative consulted this admission already    
        
  glu 242     
   CT chest - There is a small amount of nonocclusive thrombus in the right internal    
  jugular vein.    
       
  There is also a small amount of thrombus along the right PICC catheter/pacer    
  leads in the SVC which extends to the right atrium.    
       
  There is improvement in the bibasilar atelectasis/effusions.    
      
  consult OT/PT    
  consult mobility services    
  NPO    
  tube feedings    
  out of bed with assistance    
  wound care    
  albuterol neb x4    
  cubicin iv x1    
  lovenox sc x3    
  insulin sc x4    
  keppra x2 per gtube    
  diflucan x1 per gtube    
  amiodarone drip    
  Lasix iv x1    
  gentamicin iv x1    
   
  Bowel Surgery: Colectomy, Partial, with or without Ostomy - Care Day 59 (9/6/2018) by Parth Cadet     
  Review Status Review Entered    
  Completed 9/10/2018    
  Details    
      
  Care Day: 59 Care Date: 9/6/2018 Level of Care: Step Down    
  Guideline Day 2     
  Clinical Status    
  (X) * Procedure completed    
  (X) * Hemodynamic stability    
  9/10/2018 3:48 PM EDT by Jhonathan Gaines    
    98.9-120/68-76-22-98% 2L NC    
      
      
  Activity    
  ( ) * Progressive ambulation    
      
  Routes    
  (X) IV fluids, medications    
      
  Interventions    
  (X) Hgb/Hct    
  9/10/2018 3:48 PM EDT by Jhonathan Gaines    
    hgb 9.5 hct 32.5    
      
      
      
      
      
  * Milestone    
      
  Additional Notes    
  98.9-120/68-76-22-98% 2L NC    
      
  Acute Encephalopathy - noted in past 3 days as per family, could be mulitfactorial    
  ? Poor baseline mental status from TBI/CVA but pt was functional as per family    
  H/o Seizure disorder - but seizure free for a long time as per Dr Galo Bob, only on low dose tegretol (po not available IV)    
  CONTINUE IV KEPPRA for Seizure precaution until he gets an Oral Access to restart the Oral tegretol    
  Hypernatremia - much improved now at 149    
   H/o Prolonged TPN- due to Dysphagia with ? Oral thrush with severe crusting    
  NEW Fever in past 24 hrs- 100. 5    
  WBC trending up in past 24 hrs- 14.5 K    
  Send Blood Cx for fungal growth, will start low dose Diflucan for now empirically- further recommendations as per ID- following; follow fever trend    
  IVF- 1/2NS at low rate to correct hypernatremia    
  Plan to get PEG tube to get off TPN ASAP if remains afebrile & WBC improves in AM    
       
  ? Early Cellulitis of Lt forearm. Not, no increase in size    
  Blood cultures x 2    
  Follow by exam    
        
  Acute resp Failure s/p Ventilator support , now off it since 8/26    
  Kareem Pleural Effusions s/p R chest tube    
  Pulm following    
       
  RONAL      
  CPAP q HS    
        
  ?Infected Thrombus on NATASHA    
  Bacteremia    
  SSS S/p PPM    
  On IV ABx as per ID Genta + Daptomycin    
  On Lovenox therapeutic    
        
  Dysphagia    
  GI bleed s/p colectomy/enterotomies    
  Ileus with Aspiration PNA    
  Hb stable now    
  On TPN, last bag today    
  PEG placement 9/5 tolerating and increase. No residuals    
  Palliative consulted this admission already    
        
  Continue Daptomycin, Gentamicin IV, change Fluconazole.& levaquin to po    
   DC peripheral line    
  Thrombus in R/IJ, now  mass in RA which is suggestive of thrombus rather than vegetation in light of pacemaker pocket appearing clear of infection.     
  Hold off on removal of pacemaker at this time .  D/w Dr Shon Marie & Dr Binh Bell , .  .    
  Continue antimicrobials & anticoagulation    
   Needs  Erazo catheter removal whenever possible    
      
  glu 187 bun 29 bili tot 1.1 ast 53 wbc 11.2 rbc 3.46     
      
  consult mobility services    
  NPO    
  tube feedings    
  out of bed with assistance    
  wound care    
  albuterol neb x4    
  cubicin iv x1    
  lovenox sc x2    
  insulin sc x5    
  amiodarone drip    
  Lasix iv x1    
   gentamicin iv x1    
  diflucan po x1    
  keppra iv x2    
  TPN    
   
  Bowel Surgery: Colectomy, Partial, with or without Ostomy - Care Day 58 (9/5/2018) by Sadia Zamudio RN     
  Review Status Review Entered    
  Completed 9/10/2018    
  Details    
      
  Care Day: 58 Care Date: 9/5/2018 Level of Care: Step Down    
  Guideline Day 2     
  Clinical Status    
  (X) * Procedure completed    
  (X) * Hemodynamic stability    
  9/10/2018 3:01 PM EDT by Kelechi Fields    
    HR 79, /69    
      
      
  Activity    
  ( ) * Progressive ambulation    
  9/10/2018 3:01 PM EDT by Kelechi Fields    
    PT/OT    
      
      
  Routes    
  (X) IV fluids, medications    
  ( ) Advance diet as tolerated    
  9/10/2018 3:01 PM EDT by Kelechi Fields    
    Tube feeding    
      
      
  Interventions    
  (X) Hgb/Hct    
  9/10/2018 3:01 PM EDT by Kelechi Fields    
    hgb 9.3, hct 32.7    
      
      
  9/10/2018 3:02 PM EDT by Kelechi Fields    
  Subject: Additional Clinical Information    
  9/5/18PEG placement:Procedure: Esophagogastroduodenoscopy--diagnostic, with placement of a percutaneous endoscopic gastrostomy tubePre-Procedure Diagnosis: Aspiration pneumonia with feeding difficultiesPost-Procedure Diagnosis: EGD s/p PEG placementIndications: feeding difficulties and dysphagia    
      
  9/10/2018 3:01 PM EDT by Kelechi Fields    
  Subject: Additional Clinical Information    
  9/5/18Vitals: 97.8, 79, 20, 116/69, 97% meds: albuterol neb Q6h, Cubicin 1000mg IV Q24h, lovenox 110mg SC Q12h, 0.45% NaCl infusion 75mL/hr, amiodarone gtt 0.5mg/min, diflucan 200mg IV daily, lasix 60mg IV daily, garamycin 80mg IV Q24h, keppra 500mg IV Q12h, Levaquin 500mg IV Q24h, TPN 63mL/hr    
  Labs: WBC 11.9, hgb 9.3, hct 32.7    
  Abg: pH 7.48, pCO2 33, pO2 79, HCO3 24, O2 97%    
  Blood Cultures: STAPHYLOCOCCUS SPECIES, COAGULASE NEGATIVE GROWING IN 1 OF 2 BOTTLES DRAWN    
    Plan: IV fluids, IV abx, Amio gtt, TPN, IV lasix, IV keppra, PEG placement, Tube feeding, AM labs, SCDs, I&O, PT/OT, wound care daily    
      
      
      
      
      
  * Milestone    
      
  Additional Notes    
  9/5/18    
  IM:    
  \"Im doing better\".  Discussed with RN events overnight.    
      
  Acute Encephalopathy - noted in past 3 days as per family, could be mulitfactorial    
  ? Poor baseline mental status from TBI/CVA but pt was functional as per family    
  H/o Seizure disorder - but seizure free for a long time as per Dr Sandra Valladares, only on low dose tegretol (po not available IV)    
  CONTINUE IV KEPPRA for Seizure precaution until he gets an Oral Access to restart the Oral tegretol    
  Hypernatremia - much improved now at 149    
  H/o Prolonged TPN- due to Dysphagia with ? Oral thrush with severe crusting    
  NEW Fever in past 24 hrs- 100. 5    
  WBC trending up in past 24 hrs- 14.5 K    
  Send Blood Cx for fungal growth, will start low dose Diflucan for now empirically- further recommendations as per ID- following; follow fever trend    
  IVF- 1/2NS at low rate to correct hypernatremia    
  Plan to get PEG tube to get off TPN ASAP if remains afebrile & WBC improves in AM    
       
  ? Early Cellulitis of Lt forearm    
  Blood cultures x 2    
  Follow by exam    
        
  Acute resp Failure s/p Ventilator support , now off it since 8/26    
  Kareem Pleural Effusions s/p R chest tube    
  Pulm following    
       
  RONAL      
  CPAP q HS    
        
  ?Infected Thrombus on NATASHA    
  Bacteremia    
  SSS S/p PPM    
  On IV ABx as per ID Genta + Daptomycin    
  On Lovenox therapeutic    
        
  Dysphagia    
  GI bleed s/p colectomy/enterotomies    
  Ileus with Aspiration PNA    
  Hb stable now    
  On TPN, renewed    
  PEG placement 9/5    
      
      
  Neurology:    
  Started on keppra 500 bid. Groggy and irritable.  Discussed with family that the plan was to titrate to that dose and that I will change the orders accordingly. If he is too irritable or somnolent on keppra then the plan would be to resume the tegretol and discontinue the keppra.    
  Assesment and Plan    
  1. Seizures    
  Post traumatic.     
  Doses adjusted as discussed above    
  This was discussed with his wife and niece.     
        
  2. Generalized weakness    
  PT when appropriate.     
        
  3. Acute encephalopathy    
  Should improve as his overall condition improves.     
  EEG-pending    
        
  4. Anasarca    
  Albumin 1.9    
        
  5. Colon cancer    
  Followed by GI and oncology.     
        
  6. Atrial fibrillation    
  On lovenox    
      
  Hematology and Oncology:    
  Laying in bed, not answering questions. Discussed with wife and answered several questions.  Going for PEG in OR today.    
  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 Cont anticoagulation,  Transfuse hbg <7    
       
  Large mass on pacing wire- ?infected thrombus vs vegetation. Cardiology not planning to remove wire for now.     
       
       
  Stage IIIB colon cancer - s/p resection.  If pt recovers will need to see him to discuss possible adjuvant chemotherapy.     
       
  Acute resp failure/Pna - off vent, Had pigtail and drainage of R pleural eff, cytology negative.    
       
  Septic shock with persistent bacteremia -ID following, abx per them. Diflucan started and fungal cx ordered today.    
       
  Normochromic normocytic anemia - B12  929. folate 9.8. Ferritin was 5 on 7/10/18 suggesting fairly significant iron deficiency. He got 500 mg of IV iron in July but actually has a much higher calculated deficit. Ferritin 121 so does not need further IV iron at present.  would transfuse for hbg <7.      
       
  ID:    
  Pt seen earlier today. Drowsy due to to [address] per wife .  Wife & niece at bedside. Pt getting ready to be wheeled out for peg placement    
  \" Continue Daptomycin, Gentamicin IV,Fluconazole.    
  \" BCx2    
  \" Thrombus in R/IJ, now  mass in RA which is suggestive of thrombus rather than vegetation in light of pacemaker pocket appearing clear of infection.     
  \" Hold off on removal of pacemaker at this time . D/w Dr Eugenio Swift & Dr Kelsie Allen , .  .    
  \" Continue antimicrobials & anticoagulation    
  \" Needs  Erazo catheter removal whenever possible    
  \"  D/w family at bedside    
   
  Bowel Surgery: Colectomy, Partial, with or without Ostomy - Care Day 57 (9/4/2018) by Bethany Cadet     
  Review Status Review Entered    
  Completed 9/7/2018    
  Details    
      
  Care Day: 57 Care Date: 9/4/2018 Level of Care: Step Down    
  Guideline Day 2     
  Clinical Status    
  (X) * Procedure completed    
  9/7/2018 4:43 PM EDT by Kamlesh Brennan    
    S/P colectomy/YAMEL/enterotomies for colon cancer; Stage 3b colon ca.    
      
  (X) * Hemodynamic stability    
  9/7/2018 4:43 PM EDT by Kamlesh Brennan    
    99.7-131/72-82-26-96% RA    
      
      
  Activity    
  ( ) * Progressive ambulation    
  9/7/2018 4:43 PM EDT by Kamlesh Brennan    
    consult mobility services out of bed with assistance    
      
      
  Routes    
  (X) IV fluids, medications    
  9/7/2018 4:43 PM EDT by Kamlesh Brennan    
    ivf 75cc/hr    
      
  ( ) Advance diet as tolerated    
  9/7/2018 4:43 PM EDT by Kamlesh Brennan    
    NPO for now    
      
      
  Interventions    
  (X) Hgb/Hct    
      
      
      
      
  * Milestone    
      
  Additional Notes    
  99.7-131/72-82-26-96% RA    
  Patient receiving TPN.     
  On PCU and not agitated this am.    
  Low grade fever 99 last night.    
  Impression:    
       
  Endocarditis    
  Staph Epi bactermia    
  S/p R hemicolectomy for colon ca    
  Aspiration pneumonia    
  Feeding difficulties    
  Plan:    
   Blood cx neg x 5 days. Nannie Krabbe proceed with EGD with PEG today with anesthesia support.  His anticoagulation is on hold for procedure.    
  Acute respiratory failure requiring ventilator support. Now on room air    
  Dysphagia    
  Bilateral pleural effusions-resolved    
  Persistent bacteremia with coagulase negative Staph since 8/11 initially Oxacillin sensitive , last positive BC is oxacillin resistant 8/21(1/4)     
  NATASHA shows definite large mass associated with pacing wire in RA which may represent vegetation (8/23) gabriel infected thrombus    
  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    
  Pneumonia, Klebsiella in sputum and Staph Aureus     
  Volume overload with anasarca     
  Urinary retention     
  Right IJ thrombosis and tiny air bubble, 8/15/18: started on heparin drip but transitioned to enoxaparin    
  Anemia no overt bleeding      
  Severe hypoalbuminemia    
  Remote history of tracheostomy    
  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     
  Debility, has global weakness.     
  DM     
  Obesity    
  On room air    
  Remove chest tube - no output and no residual fluid    
  NPO for now    
  For PEG today    
  Diuresis     
  Abx per ID    
  Hold on pacer explant for now until he is stronger and after more abx and anticoagulation    
  ID and Cardiology help much appreciated      
  Avoid sedating meds.    
  Continue Daptomycin , Gentamicin IV,Fluconazole.    
  Thrombus in R/IJ, now  mass in RA which is suggestive of thrombus rather than vegetation in light of pacemaker pocket appearing clear of infection.     
  Hold off on removal of pacemaker at this time .  D/w Dr Gamal Asher & Dr Dominique Toure , .  .    
  Continue antimicrobials & anticoagulation    
  Agree with plan for peg    
   Needs  Erazo catheter removal whenever possible    
  Na 146 glu 227 bun 30     
  albuterol neb x3    
  amiodarone drip    
   cubicin iv x1    
  gentamicin iv x1    
  insulin sc x4    
  diflucan iv x1    
  Lasix iv x1    
  keppra iv x1    
  Levaquin iv x1    
  TPN

## 2018-09-11 NOTE — PROGRESS NOTES
Follow up visit on PCU. Patient's son was present. He explained Lucia Khoury, patient's wife, has been under the weather. Offered MrDayday Rocha Heber words of encouragement. He is happy to finally have his hearing aids according to his nurse, Lala Thomas. Will continue to provide pastoral support as needed. Griselda Mages, [facility], [address] [facility] Paging Service  [phone])

## 2018-09-11 NOTE — PROGRESS NOTES
Hospitalist Progress Note NAME: Radha Menjivar :  1940 MRN:  [id number] Assessment / Plan   
 
Admitted for Lower GI Bleed and Anemia  Colonoscopy showed a LARGE Colon Mass S/P  Colectomy Stage 3 B this hospitalization done  Developed Ileus then aspiration Pna, complications S/P Ileus Aspiration PNA , intubated and prolonged ICU stay Acute resp Failure s/p Ventilator support , extubated  Pneumonia , last sputum culture  + for Heavy staph aureus , light K.pneumoniae Sputum culture  + heavy Burkholderia cepacia S/p treated with Levaquin Kareem Pleural Effusions s/p R chest tube removed  RONAL CPAP q HS 
  
Dysphagia, Failed MBS 
S/p TPN in hospital due to Dysphagia S/P Peg  Wed  Tolerating Tube feedings well ? Infected Thrombus on NATASHA 
NATASHA shows definite large mass associated with pacing wire in RA which may represent vegetation ( ) PER  Infectious Disease Continue Daptomycin, Gentamicin IV, change Fluconazole ( 9/3). & levaquin to po Complete total 6 weeks of daptomycin and gentamycin from the latest (-) blood Culture  which was done on 2018. So until  Has A RT ARM PICC LINE On Lovenox therapeutic Fluconazole IV added 9/3 Bacteremia S/p removal of L/IJ placement of PICC RUE , TPN on  Persistent bacteremia with coagulase negative Staph since  initially oxacillin sensitive , last positive BC is oxacillin resistant (1/) Repeat BC ,  , 9/3 no growth,  Positive Staph Coag Neg,  ( negative Chest CT repeated . 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 Hold off on removal of pacemaker at this time . D/w Dr Francesca Haskins , .  . 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 Maeve Oviedo discussed with Dr Bird Butler ( patients Neurologist) Patient  lethargic on 500 mg BID  Keppra but both states Keep on Keppra 250 BID 
 due to better Drug interactions and liver  with all his meds and less Liver issues on Severe Deconditioning Dispostion: He needs to go to either [facility] or Snf ( higher likelihood he wont get better.) He has been accepted to [facility] awaiting Pre Auth, - wife is hesitant due to distance, We are encouring her to agree to [facility] 2nd choice is [facility] in [facility] to her but due to multiple antibiotics and complexity of care, high risk of failure. PALLATIVE CARE HAS PREVIOUSLY TALKED TO WIFE AND SHE WANTS EVERYTHING DONE He needs to follow up with ID or Cardiology close to October 20 for Reimaging and Cultures or can be done in [facility]. To determine what to do with Pacers Hypernatremia - much improved, now on free fluid via Peg 
  
 
  
  
  
Code Status: Full NOK POA  :  WIFE 
DVT Prophylaxis: on lovenox for the thrombus Subjective: per staff tolerating Tube feeding Chief Complaint / Reason for Physician Visit : F/U AMS, dysphagia, Bacteremia, PNA, GI bleed, Colon Ca \"I am fine\". Discussed with RN events overnight. Review of Systems: 
Symptom Y/N Comments  Symptom Y/N Comments Fever/Chills n   Chest Pain n   
Poor Appetite n   Edema n   
Cough n   Abdominal Pain n   
Sputum n   Joint Pain n   
SOB/BECERRIL    Pruritis/Rash Nausea/vomit    Tolerating PT/OT y Diarrhea    Tolerating Diet y PEG tube Constipation    Other Could NOT obtain due to:   
 
Objective: VITALS:  
Last 24hrs VS reviewed since prior progress note. Most recent are: 
Patient Vitals for the past 24 hrs: 
 Temp Pulse Resp BP SpO2  
09/11/18 1128 - 77 - 143/66 97 % 09/11/18 1101 97.8 °F (36.6 °C) 75 20 127/65 100 % 09/11/18 0923 - 79 - 121/68 -  
09/11/18 0700 - 74 - 125/58 98 % 09/11/18 0300 97.8 °F (36.6 °C) 75 20 114/59 100 % 09/11/18 0129 - - - - 99 % 09/10/18 2300 97.9 °F (36.6 °C) 77 20 118/57 99 % 09/10/18 2000 97.7 °F (36.5 °C) 78 20 130/62 98 % 09/10/18 1925 - - - - 96 % 09/10/18 1600 97.7 °F (36.5 °C) 82 20 137/61 96 % Intake/Output Summary (Last 24 hours) at 09/11/18 1213 Last data filed at 09/11/18 1138 Gross per 24 hour Intake             1900 ml Output             2320 ml Net             -420 ml PHYSICAL EXAM: 
General: WD, WN. Pleasant, Awake, NAD and Ox3 cooperative, no acute distress   
EENT:  EOMI. Anicteric sclerae. MMM Resp:  CTA bilaterally, no wheezing or rales. No accessory muscle use CV:  Regular  rhythm,  No edema GI:  Soft, Non distended, Non tender.  +Bowel sounds, PEG tube Noted + Neurologic:  Alert and oriented X 3, normal speech, Psych:   Good insight. Not anxious nor agitated Skin:  No rashes. No jaundice, R PICC line noted Reviewed most current lab test results and cultures  YES Reviewed most current radiology test results   YES Review and summation of old records today    NO Reviewed patient's current orders and MAR    YES 
PMH/SH reviewed - no change compared to H&P 
________________________________________________________________________ Care Plan discussed with: 
  Comments Patient x Family  x wife RN x Care Manager x Tamela Lynne Consultant Multidiciplinary team rounds were held today with , nursing, pharmacist and clinical coordinator. Patient's plan of care was discussed; medications were reviewed and discharge planning was addressed. ________________________________________________________________________ Total NON critical care TIME:  26 Minutes Total CRITICAL CARE TIME Spent:   Minutes non procedure based Comments >50% of visit spent in counseling and coordination of care    
________________________________________________________________________ Roland Holley MD  
 
Procedures: see electronic medical records for all procedures/Xrays and details which were not copied into this note but were reviewed prior to creation of Plan. LABS: 
I reviewed today's most current labs and imaging studies. Pertinent labs include: 
Recent Labs  
   09/09/18 
 0405 WBC  9.1 HGB  9.0*  
HCT  30.9* PLT  271 Recent Labs  
   09/10/18 
 0341  09/09/18 
 0405 NA  140  140  
K  4.3  4.4  
CL  105  105 CO2  27  27 GLU  163*  160* BUN  25*  27* CREA  1.08  1.05  
CA  8.1*  7.8*  
MG  2.6*   --   
ALB   --   1.8* TBILI   --   0.4 SGOT   --   47* ALT   --   58 Signed: Roland Holley MD

## 2018-09-11 NOTE — PROGRESS NOTES
Hematology Oncology Progress Note Follow up for: IJ/SVC thrombus Chart notes reviewed since last visit. Case discussed with following: family not at bedside Patient complains of the following: awake, alert, hard of hearing but indicated that he had no jovan concerns today. Additional concerns noted by the staff: none Patient Vitals for the past 24 hrs: 
 BP Temp Pulse Resp SpO2 Weight  
09/11/18 1128 143/66 - 77 - 97 % -  
09/11/18 1101 127/65 97.8 °F (36.6 °C) 75 20 100 % -  
09/11/18 0923 121/68 - 79 - - -  
09/11/18 0700 125/58 - 74 - 98 % -  
09/11/18 0612 - - - - - 112 kg (246 lb 14.6 oz) 09/11/18 0300 114/59 97.8 °F (36.6 °C) 75 20 100 % -  
09/11/18 0129 - - - - 99 % -  
09/10/18 2300 118/57 97.9 °F (36.6 °C) 77 20 99 % -  
09/10/18 2000 130/62 97.7 °F (36.5 °C) 78 20 98 % -  
09/10/18 1925 - - - - 96 % -  
09/10/18 1600 137/61 97.7 °F (36.5 °C) 82 20 96 % -  
 
 
ROS negative Physical Examination: 
 
Constitutional obtunded. Appears close to chronological age. Well nourished. Well developed. Head Normocephalic; no scars Eyes Eyes closed ENMT unremarkable Neck Supple without masses or thyromegaly. No jugular venous distension. Hematologic/Lymphatic No petechiae or purpura. No tender or palpable lymph nodes noted Respiratory Fairly clear at present Cardiovascular Regular rate and rhythm of heart Chest / Line Site Chest is symmetric with no chest wall deformities. Abdomen Non-tender, non-distended, no masses, ascites or hepatosplenomegaly. Good bowel sounds. Musculoskeletal Puffy with edema Extremities  edematous. Skin No rashes, scars, or lesions suggestive of malignancy. No petechiae, purpura, or ecchymoses. No excoriations. Neurologic Not tested Psychiatric Not able to assess. Labs: 
Recent Results (from the past 24 hour(s)) GLUCOSE, POC Collection Time: 09/10/18  5:42 PM  
Result Value Ref Range Glucose (POC) 151 (H) 65 - 100 mg/dL Performed by Cira Isabel (PCT) GLUCOSE, POC Collection Time: 09/11/18 12:22 AM  
Result Value Ref Range Glucose (POC) 142 (H) 65 - 100 mg/dL Performed by Doyle Dynadec   
GLUCOSE, POC Collection Time: 09/11/18  6:24 AM  
Result Value Ref Range Glucose (POC) 152 (H) 65 - 100 mg/dL Performed by UI Robot   
GLUCOSE, POC Collection Time: 09/11/18 11:51 AM  
Result Value Ref Range Glucose (POC) 168 (H) 65 - 100 mg/dL Performed by Bobby Decker (PCT) Assessment and Plan:  
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 Cont anticoagulation with enoxaparin,  Transfuse hgb  <7 
 
mass on pacing wire- ?infected thrombus vs vegetation. Cardiology not planning to remove pacing wire for now. encepalopathy - improved at present. Stage IIIB colon cancer - s/p resection. If pt recovers, will need to see if candidate for adjuvant chemotherapy. Acute resp failure/Pnuemonia - off vent, Had pigtail and drainage of R pleural eff, cytology negative. Septic shock with persistent bacteremia -ID following, abx per them. Diflucan started and seems to be tolerating his current course of therapy. . 
 
Normochromic normocytic anemia - B12  929. folate 9.8. Ferritin was 5 on 7/10/18 suggesting fairly significant iron deficiency. He got 500 mg of IV iron in July but actually has a much higher calculated deficit. Ferritin 121 so does not need further IV iron at present. would transfuse for hgb <7. Hx seizure disorder Cellulitis of left forearm, oral thrush - currently on daptomycin, gentamycin, levaquin, and diflucan RONAL - CPAP at night. Atrial fib - on amiodaone. Little to add - will follow peripherally.

## 2018-09-11 NOTE — PROGRESS NOTES
0700-Received report  From Gissel RN pt in bed sleeping, humidified face tent on no distress noted. 0919-pt awake in bed, face tent removed by RT sats 98% pt denies pain, but awaits batteries for his hearing aids. Both hearing aids in . 
1000-wound care complete. Tolerated well. 1130-resting in bed watching tv  
1448-catheter removed, replaced with purewick. 1615-pt's son visited. Update given per request. Pt turned and batteries in hearing aids changed. Pt able to hear and is happier. Voice clearer today as well as mentation. 1845-pt resting bed without complaints. Turned.  Report given to Tayo Garrett

## 2018-09-11 NOTE — PROGRESS NOTES
PCU SHIFT NURSING NOTE Bedside and Verbal shift change report given to Zulma Christianson RN (oncoming nurse) by Brian Packer RN (offgoing nurse). Report included the following information SBAR, Kardex, MAR and Recent Results. Shift Summary:  
2130: Warm moist pack applied to left lower arm.  
0600: Warm moist pack applied to left lower arm. 
0700: Bedside and Verbal shift change report given to Merlin Myron, RN (oncoming nurse) by Zulma Christianson RN (offgoing nurse). Report included the following information SBAR, Kardex, MAR and Recent Results. Admission Date 7/10/2018 Admission Diagnosis Acute blood loss anemia GI bleed Anasarca GI Bleed 
gi bleed ASCENDING COLON CANCER  
aspiration ASPIRATION PNEUMONIA Consults IP CONSULT TO GASTROENTEROLOGY 
IP CONSULT TO INTERVENTIONAL RADIOLOGY 
IP CONSULT TO INTENSIVIST 
IP CONSULT TO NEPHROLOGY 
IP CONSULT TO HEMATOLOGY 
IP CONSULT TO GASTROENTEROLOGY 
IP CONSULT TO INFECTIOUS DISEASES 
IP CONSULT TO PALLIATIVE CARE - PROVIDER 
IP CONSULT TO NEUROLOGY 
IP CONSULT TO CARDIOLOGY 
IP CONSULT TO COLORECTAL SURGERY 
IP CONSULT TO PULMONOLOGY 
IP CONSULT TO PULMONOLOGY 
IP CONSULT TO HOSPITALIST Consults []PT []OT []Speech  
[]Case Management  
  
[] Palliative Cardiac Monitoring Order []Yes []No  
 
IV drips []Yes Drip:                            Dose: 
Drip:                            Dose: 
Drip:                            Dose:  
[]No  
 
GI Prophylaxis []Yes []No  
 
 
 
DVT Prophylaxis SCDs:  Sequential Compression Device: Bilateral  
  Patient Refused VTE Prophylaxis: Yes Ra stockings:  Graduated Compression Stockings: Bilateral  
  
[] Medication []Contraindicated []None Activity Level Activity Level: Dangle Side of Bed Activity Assistance: Complete care Purposeful Rounding every 1-2 hour? []Yes Mckee Score  Total Score: 3 Bed Alarm (If score 3 or >) []Yes [] Refused (See signed refusal form in chart) Dagoberto Score  Dagoberto Score: 13 Dagoberto Score (if score 14 or less) []PMT consult  
[]Wound Care consult []Specialty bed  
[] Nutrition consult Needs prior to discharge:  
Home O2 required:   
[]Yes []No  
 If yes, how much O2 required? Other:  
 Last Bowel Movement: Last Bowel Movement Date: 09/10/18 Influenza Vaccine Received Flu Vaccine for Current Season (usually Sept-March): Not Flu Season Pneumonia Vaccine Diet Active Orders Diet DIET NPO With Tube Feedings LDAs PICC Double Lumen 08/31/18 Right;Brachial (Active) Central Line Being Utilized Yes 9/10/2018  6:45 PM  
Criteria for Appropriate Use Hemodynamically unstable, requiring monitoring lines, vasopressors, or volume resuscitation 9/10/2018  6:45 PM  
Site Assessment Clean, dry, & intact 9/10/2018  6:45 PM  
Phlebitis Assessment 0 9/10/2018  6:45 PM  
Infiltration Assessment 0 9/10/2018  6:45 PM  
Arm Circumference (cm) 39 cm 9/8/2018  7:00 PM  
Date of Last Dressing Change 09/10/18 9/10/2018  6:45 PM  
Dressing Status Clean, dry, & intact;New;Occlusive 9/10/2018  6:45 PM  
Action Taken Dressing changed 9/10/2018  6:45 PM  
External Catheter Length (cm) 0 centimeters 9/10/2018  6:45 PM  
Dressing Type Disk with Chlorhexadine gluconate (CHG); Transparent 9/10/2018  6:45 PM  
Hub Color/Line Status Purple;Flushed;Patent;Cap end changed 9/10/2018  6:45 PM  
Positive Blood Return (Site #1) Yes 9/10/2018  6:45 PM  
Hub Color/Line Status Red;Cap end changed; Flushed;Patent 9/10/2018  6:45 PM  
Positive Blood Return (Site #2) Yes 9/10/2018  6:45 PM  
Alcohol Cap Used Yes 9/10/2018  6:45 PM  
            
PEG/Gastrostomy Tube 09/05/18 (Active) Site Assessment Clean, dry, & intact 9/10/2018  2:46 PM  
Dressing Status Clean, dry, & intact 9/10/2018  2:46 PM  
G Port Status Infusing 9/10/2018  2:46 PM  
 Action Taken Placement verified (comment) 9/10/2018  2:46 PM  
Drainage Description Tan 9/10/2018  2:46 PM  
Gastric Residual (mL) 10 ml 9/10/2018  2:46 PM  
Tube Feeding/Formula Options Twocal HN 9/10/2018  2:46 PM  
Modular Nutrients Protein liquid 9/8/2018  7:00 PM  
Tube Feeding/Verify Rate (mL/hr) 50 9/10/2018  2:46 PM  
Water Flush Volume (mL) 200 mL 9/10/2018  2:46 PM  
Intake (ml) 60 ml 9/10/2018  2:46 PM  
Medication Volume 100 ml 9/10/2018  7:00 AM  
   
Colostomy 07/10/18 Left Abdomen (Active) Drainage Color Brown 9/10/2018  2:46 PM  
Site Assessment Clean, dry, intact 9/10/2018  2:46 PM  
Treatment Site care 9/8/2018  7:00 PM  
Output (ml) 20 mL 9/10/2018  2:46 PM  
            
Urinary Catheter [REMOVED] Urinary Catheter 07/17/18 2- way; Erazo-Indications for Use: Accurate measurement of urinary output [REMOVED] Urinary Catheter 08/11/18 Erazo-Indications for Use: Accurate measurement of urinary output Urinary Catheter 09/02/18 Erazo-Indications for Use: Acute urinary retention/bladder outlet obstruction Intake & Output Date 09/09/18 1900 - 09/10/18 6929 09/10/18 0700 - 09/11/18 8518 Shift 8198-1574 24 Hour Total 4773-7556 7900-6248 24 Hour Total  
I 
N 
T 
A 
K 
E 
 P. O.   0  0  
   P. O.   0  0 I.V. 
(mL/kg/hr)  100 Volume (DAPTOmycin (CUBICIN) 1,000 mg in 0.9% sodium chloride 50 mL IVPB RF formulation)  100 NG/GT 1450 2295 680  680 Water Flush Volume (mL) (PEG/Gastrostomy Tube 09/05/18) 200 1000 400  400 Medication Volume (PEG/Gastrostomy Tube 09/05/18) 50 95 100  100 Intake (ml) (PEG/Gastrostomy Tube 09/05/18) 1200 1200 180  180 Shift Total 
(mL/kg) 1450 
(13) 2395 
(21.5) 680 
(6.1)  680 
(6.1) O 
U T 
P 
U [place] Fother[place]ill Urine (mL/kg/hr) 550 1450 650 
(0.5)  650 Urine Voided   325  325 Urine Output (mL) (Urinary Catheter 09/02/18 Erazo) 550 1450 325  325 Stool 175 350 20  20    Output (ml) (Colostomy 07/10/18 Left Abdomen) 175 350 20  20  
 Shift Total 
(mL/kg) 725 
(6.5) 1800 
(16.1) 670 
(6)  670 
(6)  595 10  10 Weight (kg) 111.5 111.5 111.5 111.5 111.5 Readmission Risk Assessment Tool Score High Risk   
      
 21 Total Score 3 Has Seen PCP in Last 6 Months (Yes=3, No=0) 2 . Living with Significant Other. Assisted Living. LTAC. SNF. or  
Rehab  
 3 Patient Length of Stay (>5 days = 3)  
 9 Pt. Coverage (Medicare=5 , Medicaid, or Self-Pay=4) 4 Charlson Comorbidity Score (Age + Comorbid Conditions) Criteria that do not apply:  
 IP Visits Last 12 Months (1-3=4, 4=9, >4=11) Expected Length of Stay 10d 19h Actual Length of Stay 62

## 2018-09-12 ENCOUNTER — APPOINTMENT (OUTPATIENT)
Dept: GENERAL RADIOLOGY | Age: 78
DRG: 329 | End: 2018-09-12
Attending: SURGERY
Payer: MEDICARE

## 2018-09-12 LAB
ANION GAP SERPL CALC-SCNC: 9 MMOL/L (ref 5–15)
BUN SERPL-MCNC: 24 MG/DL (ref 6–20)
BUN/CREAT SERPL: 25 (ref 12–20)
CALCIUM SERPL-MCNC: 8.2 MG/DL (ref 8.5–10.1)
CHLORIDE SERPL-SCNC: 103 MMOL/L (ref 97–108)
CO2 SERPL-SCNC: 27 MMOL/L (ref 21–32)
CREAT SERPL-MCNC: 0.97 MG/DL (ref 0.7–1.3)
GLUCOSE BLD STRIP.AUTO-MCNC: 122 MG/DL (ref 65–100)
GLUCOSE BLD STRIP.AUTO-MCNC: 144 MG/DL (ref 65–100)
GLUCOSE BLD STRIP.AUTO-MCNC: 161 MG/DL (ref 65–100)
GLUCOSE SERPL-MCNC: 125 MG/DL (ref 65–100)
POTASSIUM SERPL-SCNC: 3.9 MMOL/L (ref 3.5–5.1)
SERVICE CMNT-IMP: ABNORMAL
SODIUM SERPL-SCNC: 139 MMOL/L (ref 136–145)

## 2018-09-12 PROCEDURE — 74011250636 HC RX REV CODE- 250/636: Performed by: INTERNAL MEDICINE

## 2018-09-12 PROCEDURE — 82962 GLUCOSE BLOOD TEST: CPT

## 2018-09-12 PROCEDURE — 77030018798 HC PMP KT ENTRL FED COVD -A

## 2018-09-12 PROCEDURE — 74018 RADEX ABDOMEN 1 VIEW: CPT

## 2018-09-12 PROCEDURE — 74011000258 HC RX REV CODE- 258: Performed by: INTERNAL MEDICINE

## 2018-09-12 PROCEDURE — 80048 BASIC METABOLIC PNL TOTAL CA: CPT | Performed by: INTERNAL MEDICINE

## 2018-09-12 PROCEDURE — 74011636637 HC RX REV CODE- 636/637: Performed by: INTERNAL MEDICINE

## 2018-09-12 PROCEDURE — 74011000250 HC RX REV CODE- 250: Performed by: INTERNAL MEDICINE

## 2018-09-12 PROCEDURE — 94640 AIRWAY INHALATION TREATMENT: CPT

## 2018-09-12 PROCEDURE — 74011250637 HC RX REV CODE- 250/637: Performed by: INTERNAL MEDICINE

## 2018-09-12 PROCEDURE — 74011250636 HC RX REV CODE- 250/636: Performed by: SURGERY

## 2018-09-12 PROCEDURE — 65660000000 HC RM CCU STEPDOWN

## 2018-09-12 PROCEDURE — 77030038269 HC DRN EXT URIN PURWCK BARD -A

## 2018-09-12 PROCEDURE — 36415 COLL VENOUS BLD VENIPUNCTURE: CPT | Performed by: INTERNAL MEDICINE

## 2018-09-12 RX ORDER — NYSTATIN 100000 [USP'U]/ML
500000 SUSPENSION ORAL 4 TIMES DAILY
Status: DISCONTINUED | OUTPATIENT
Start: 2018-09-12 | End: 2018-10-02 | Stop reason: HOSPADM

## 2018-09-12 RX ADMIN — Medication 10 ML: at 05:38

## 2018-09-12 RX ADMIN — INSULIN LISPRO 3 UNITS: 100 INJECTION, SOLUTION INTRAVENOUS; SUBCUTANEOUS at 06:03

## 2018-09-12 RX ADMIN — NYSTATIN 500000 UNITS: 100000 SUSPENSION ORAL at 14:56

## 2018-09-12 RX ADMIN — APIXABAN 5 MG: 5 TABLET, FILM COATED ORAL at 21:02

## 2018-09-12 RX ADMIN — AMIODARONE HYDROCHLORIDE 200 MG: 200 TABLET ORAL at 09:00

## 2018-09-12 RX ADMIN — LEVETIRACETAM 250 MG: 500 SOLUTION ORAL at 21:02

## 2018-09-12 RX ADMIN — POLYETHYLENE GLYCOL 3350 17 G: 17 POWDER, FOR SOLUTION ORAL at 09:00

## 2018-09-12 RX ADMIN — NYSTATIN 500000 UNITS: 100000 SUSPENSION ORAL at 21:02

## 2018-09-12 RX ADMIN — ALBUTEROL SULFATE 2.5 MG: 2.5 SOLUTION RESPIRATORY (INHALATION) at 01:52

## 2018-09-12 RX ADMIN — Medication 10 ML: at 14:00

## 2018-09-12 RX ADMIN — GENTAMICIN SULFATE 80 MG: 40 INJECTION, SOLUTION INTRAMUSCULAR; INTRAVENOUS at 05:37

## 2018-09-12 RX ADMIN — DAPTOMYCIN 1000 MG: 500 INJECTION, POWDER, LYOPHILIZED, FOR SOLUTION INTRAVENOUS at 14:55

## 2018-09-12 RX ADMIN — LEVETIRACETAM 250 MG: 500 SOLUTION ORAL at 09:02

## 2018-09-12 RX ADMIN — LACTULOSE 10 G: 20 SOLUTION ORAL at 18:06

## 2018-09-12 RX ADMIN — NYSTATIN 500000 UNITS: 100000 SUSPENSION ORAL at 18:06

## 2018-09-12 RX ADMIN — ACETAMINOPHEN 650 MG: 325 TABLET ORAL at 21:04

## 2018-09-12 RX ADMIN — VENLAFAXINE 37.5 MG: 37.5 TABLET ORAL at 18:06

## 2018-09-12 RX ADMIN — VENLAFAXINE 37.5 MG: 37.5 TABLET ORAL at 09:01

## 2018-09-12 RX ADMIN — FUROSEMIDE 60 MG: 40 TABLET ORAL at 09:00

## 2018-09-12 RX ADMIN — LACTULOSE 10 G: 20 SOLUTION ORAL at 09:02

## 2018-09-12 RX ADMIN — ALBUTEROL SULFATE 2.5 MG: 2.5 SOLUTION RESPIRATORY (INHALATION) at 08:16

## 2018-09-12 RX ADMIN — Medication 10 ML: at 21:02

## 2018-09-12 RX ADMIN — ENOXAPARIN SODIUM 110 MG: 100 INJECTION SUBCUTANEOUS at 01:20

## 2018-09-12 RX ADMIN — Medication 20 ML: at 14:45

## 2018-09-12 NOTE — PROGRESS NOTES
ADULT PROTOCOL: JET AEROSOL  REASSESSMENT Patient  Radha Menjivar     69 y.o.   male     9/12/2018  9:47 AM 
 
Breath Sounds Pre Procedure: Right Breath Sounds: Upper, Coarse Left Breath Sounds: Upper, Coarse Breath Sounds Post Procedure: Right Breath Sounds: Coarse Left Breath Sounds: Coarse Breathing pattern: Pre procedure Breathing Pattern: Regular Post procedure Breathing Pattern: Regular Heart Rate: Pre procedure Pulse: 75 Post procedure Pulse: 76 Resp Rate: Pre procedure Respirations: 18 Post procedure Respirations: 18 Incentive Spirometry:  Actual Volume (ml): 750 ml 
     
 
Cough: Pre procedure Cough: Non-productive Post procedure Cough: Non-productive Oxygen: O2 Device: Room air SpO2: Pre procedure SpO2: 100 % Post procedure SpO2: 99 % Nebulizer Therapy: Current medications Aerosolized Medications: Albuterol Smoking History: Former Problem List:  
Patient Active Problem List  
Diagnosis Code  
 HTN (hypertension) I10  
 Depression F32.9  Hypercholesterolemia E78.00  Acid reflux K21.9  Seizure ([facility] [address] R56.9  Hypothyroidism E03.9  Hyponatremia E87.1  BPH (benign prostatic hyperplasia) N40.0  Rash R21  
 Cellulitis L03.90  Wax in ear H61.20  Ulcer [id number]  Small bowel obstruction ([facility] [address] J03.971  Atrial flutter (HCC) I48.92  
 Atrial fibrillation or flutter  CHI (closed head injury) S09. 90XA  Basal cell cancer C44.91  
 TBI (traumatic brain injury) ([facility] [address] S06. 9X9A  Atrial fibrillation (HCC) I48.91  
 Cataract H26.9  Constipation K59.00  Ankle fracture, left J01.098C  Insomnia G47.00  Tinea corporis B35.4  SSS (sick sinus syndrome) ([facility]) I49.5  Syncope R55  Seizure disorder ([facility] [address] G40.909  RONAL on CPAP G47.33, Z99.89  
 Pacemaker Z95.0  Pre-op evaluation Z01.818  
  Chronic back pain greater than 3 months duration M54.9, G89.29  
 Cerebral infarction (HCC) I63.9  Acute bronchitis J20.9  Screening for colon cancer Z12.11  
 Chronic right shoulder pain M25.511, G89.29  
 Common wart B07.8  Localized epilepsy with impairment of consciousness ([facility] [address] G40.209  Severe obesity (BMI 35.0-39.9) (HCC) E66.01  
 Acute blood loss anemia D62  Anasarca R60.1  GI bleed K92.2  Acute encephalopathy G93.40  Idiopathic hypotension I95.0  Counseling regarding goals of care Z71.89 Respiratory Therapist: Mariajose Ordonez V RT

## 2018-09-12 NOTE — PROGRESS NOTES
0732-pt alert in bed without complaints. 1523-turned, mouth care, nowt sleeping. 1845-pt cleaned, turned, mouth care done, purewick changed, tube feeding and ostomy care complete. No complaints from pt. Report given to oncoming nurse.

## 2018-09-12 NOTE — PROGRESS NOTES
Hospitalist Progress Note NAME: Bev Duffy :  1940 MRN:  [id number] Assessment / Plan   
 
Admitted for Lower GI Bleed and Anemia  Colonoscopy showed a LARGE Colon Mass S/P  Colectomy Stage 3 B this hospitalization done  Developed Ileus then aspiration Pna, complications S/P Ileus Aspiration PNA , intubated and prolonged ICU stay Acute resp Failure s/p Ventilator support , extubated  Pneumonia , last sputum culture  + for Heavy staph aureus , light K.pneumoniae Sputum culture  + heavy Burkholderia cepacia S/p treated with Levaquin Kareem Pleural Effusions s/p R chest tube removed  RONAL CPAP q HS 
  
Dysphagia, Failed MBS 
S/p TPN in hospital due to Dysphagia S/P Peg  Wed  Tolerating Tube feedings well Oral Candidiasis ? Infected Thrombus on NATASHA 
NATASHA shows definite large mass associated with pacing wire in RA which may represent vegetation ( ) PER  Infectious Disease Continue Daptomycin, Gentamicin IV, change Fluconazole ( 9/3). & levaquin to po Nystatin Swish & spit Complete total 6 weeks of daptomycin and gentamycin from the latest (-) blood Culture  which was done on 2018. So until  Has A RT ARM PICC LINE On Lovenox therapeutic Fluconazole IV added 9/3 Bacteremia S/p removal of L/IJ placement of PICC RUE , TPN on  Persistent bacteremia with coagulase negative Staph since  initially oxacillin sensitive , last positive BC is oxacillin resistant (1/) Repeat BC ,  , 9/3 no growth,  Positive Staph Coag Neg,  ( negative Chest CT repeated . 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 Hold off on removal of pacemaker at this time . D/w Dr Kathryn Berrios , .  . 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 Evi Garcia discussed with Dr Etienne Greenberg ( patients Neurologist) Patient  lethargic on 500 mg BID  Keppra but both states Keep on Keppra 250 BID 
 due to better Drug interactions and liver  with all his meds and less Liver issues on Severe Deconditioning Dispostion: He needs to go to either [facility] or Snf ( higher likelihood he wont get better.) He has been accepted to [facility] awaiting Pre Auth, - wife is hesitant due to distance, We are encouring her to agree to [facility] 2nd choice is [facility] in [facility] to her but due to multiple antibiotics and complexity of care, high risk of failure. PALLATIVE CARE HAS PREVIOUSLY TALKED TO WIFE AND SHE WANTS EVERYTHING DONE He needs to follow up with ID or Cardiology close to October 20 for Reimaging and Cultures or can be done in at rehab. To determine what to do with Pacers Hypernatremia - much improved, now on free fluid via Peg 
  
 
  
  
  
Code Status: Full NOK POA  :  WIFE 
DVT Prophylaxis: on lovenox for the thrombus Disposition-- Pt's wife wants [facility] for rehab, declined [facility]- CM notified- working on insurance authorization, bed availability & acceptance to plan for DC ASAP- 24-48 hrs- family aware of the potential discharge soon Subjective: per staff tolerating Tube feeding Chief Complaint / Reason for Physician Visit : F/U AMS, dysphagia, Bacteremia, PNA, GI bleed, Colon Ca \"I am fine\". Discussed with RN events overnight. Review of Systems: 
Symptom Y/N Comments  Symptom Y/N Comments Fever/Chills n   Chest Pain n   
Poor Appetite n   Edema n Cough n   Abdominal Pain n   
Sputum n   Joint Pain n   
SOB/BECERRIL    Pruritis/Rash Nausea/vomit    Tolerating PT/OT y Diarrhea    Tolerating Diet y PEG tube feeding at goal rate Constipation    Other Could NOT obtain due to:   
 
Objective: VITALS:  
Last 24hrs VS reviewed since prior progress note. Most recent are: 
Patient Vitals for the past 24 hrs: 
 Temp Pulse Resp BP SpO2  
09/12/18 1103 98 °F (36.7 °C) 75 20 145/66 100 % 09/12/18 0900 - 87 - (!) 125/93 -  
09/12/18 0817 - - - - 100 % 09/12/18 0800 - 75 - 124/80 100 % 09/12/18 0732 98 °F (36.7 °C) 75 18 133/64 97 % 09/12/18 0300 97.2 °F (36.2 °C) 88 20 122/55 93 % 09/12/18 0152 - - - - 94 % 09/11/18 2300 98 °F (36.7 °C) 75 20 115/85 96 % 09/11/18 2000 97.7 °F (36.5 °C) 81 20 126/59 98 % 09/1940 - - - - 96 % 09/11/18 1453 98.3 °F (36.8 °C) 76 20 137/64 98 % Intake/Output Summary (Last 24 hours) at 09/12/18 1339 Last data filed at 09/12/18 5857 Gross per 24 hour Intake             2100 ml Output             1250 ml Net              850 ml PHYSICAL EXAM: 
General: WD, WN. Pleasant, Awake, NAD and Ox3 cooperative, no acute distress   
EENT:  EOMI. Anicteric sclerae. MMM Resp:  CTA bilaterally, no wheezing or rales. No accessory muscle use CV:  Regular  rhythm,  No edema GI:  Soft, Non distended, Non tender.  +Bowel sounds, PEG tube Noted + Neurologic:  Alert and oriented X 3, normal speech, Psych:   Good insight. Not anxious nor agitated Skin:  No rashes. No jaundice, R PICC line noted Reviewed most current lab test results and cultures  YES Reviewed most current radiology test results   YES Review and summation of old records today    NO Reviewed patient's current orders and MAR    YES 
PMH/SH reviewed - no change compared to H&P 
________________________________________________________________________ Care Plan discussed with: 
  Comments Patient x Family  x At bedside RN x   
 Care Manager x [employer] Consultant     
                 x Multidiciplinary team rounds were held today with , nursing, pharmacist and clinical coordinator. Patient's plan of care was discussed; medications were reviewed and discharge planning was addressed. ________________________________________________________________________ Total NON critical care TIME:  16 Minutes Total CRITICAL CARE TIME Spent:   Minutes non procedure based Comments >50% of visit spent in counseling and coordination of care    
________________________________________________________________________ Zeynep Armenta MD  
 
Procedures: see electronic medical records for all procedures/Xrays and details which were not copied into this note but were reviewed prior to creation of Plan. LABS: 
I reviewed today's most current labs and imaging studies. Pertinent labs include: No results for input(s): WBC, HGB, HCT, PLT, HGBEXT, HCTEXT, PLTEXT, HGBEXT, HCTEXT, PLTEXT in the last 72 hours. Recent Labs  
   09/12/18 
 0300  09/10/18 
 0341 NA  139  140  
K  3.9  4.3 CL  103  105 CO2  27  27 GLU  125*  163* BUN  24*  25* CREA  0.97  1.08  
CA  8.2*  8.1*  
MG   --   2.6* Signed: Zeynep Armenta MD

## 2018-09-12 NOTE — PROGRESS NOTES
PALLIATIVE MEDICINE Palliative Medicine was consulted for goals of care. Pt's goals are clear, he wants to continue aggressive medical care and remain FULL CODE. Will sign off. Looks like he will be discharged soon to rehab.  please re-consult if Palliative Medicine can be of further assistance. Thank you for including Palliative Medicine in this patient's care.   
Sujit Lara, HAYLIE-C

## 2018-09-12 NOTE — PROGRESS NOTES
Wife does not want patient to go to [facility] because of the distance she has to drive. She would like patient to go to [address] [place] [address]. Referral sent and medicals faxed to them at [phone].  Spoke with [employer] and informed him of patient's history and he will relay the message to [facility]. No one answered the telephone in admissions at [facility].

## 2018-09-12 NOTE — PROGRESS NOTES
PCU SHIFT NURSING NOTE Bedside and Verbal shift change report given to Velia Segura RN (oncoming nurse) by Maco Emanuel RN (offgoing nurse). Report included the following information SBAR, Kardex, MAR and Recent Results. Shift Summary:  
2100: Moist warm pack applied to lower left arm  
0550: Moist warm pack applied to lower left arm  
0715: Bedside and Verbal shift change report given to Maco Emanuel RN (oncoming nurse) by Velia Segura RN (offgoing nurse). Report included the following information SBAR, Kardex, [facility] and Recent Results. ' 
 
Admission Date 7/10/2018 Admission Diagnosis Acute blood loss anemia GI bleed Anasarca GI Bleed 
gi bleed ASCENDING COLON CANCER  
aspiration ASPIRATION PNEUMONIA Consults IP CONSULT TO GASTROENTEROLOGY 
IP CONSULT TO INTERVENTIONAL RADIOLOGY 
IP CONSULT TO INTENSIVIST 
IP CONSULT TO NEPHROLOGY 
IP CONSULT TO HEMATOLOGY 
IP CONSULT TO GASTROENTEROLOGY 
IP CONSULT TO INFECTIOUS DISEASES 
IP CONSULT TO PALLIATIVE CARE - PROVIDER 
IP CONSULT TO NEUROLOGY 
IP CONSULT TO CARDIOLOGY 
IP CONSULT TO COLORECTAL SURGERY 
IP CONSULT TO PULMONOLOGY 
IP CONSULT TO PULMONOLOGY 
IP CONSULT TO HOSPITALIST Consults []PT []OT []Speech  
[]Case Management  
  
[] Palliative Cardiac Monitoring Order []Yes []No  
 
IV drips []Yes Drip:                            Dose: 
Drip:                            Dose: 
Drip:                            Dose:  
[]No  
 
GI Prophylaxis []Yes []No  
 
 
 
DVT Prophylaxis SCDs:  Sequential Compression Device: Bilateral  
  Patient Refused VTE Prophylaxis: Yes Ra stockings:  Graduated Compression Stockings: Bilateral  
  
[] Medication []Contraindicated []None Activity Level Activity Level: Bed Rest   
 Activity Assistance: Complete care Purposeful Rounding every 1-2 hour? []Yes Mckee Score  Total Score: 2 Bed Alarm (If score 3 or >) []Yes  
[] Refused (See signed refusal form in chart) Dagoberto Score  Dagoberto Score: 12 Dagoberto Score (if score 14 or less) []PMT consult  
[]Wound Care consult []Specialty bed  
[] Nutrition consult Needs prior to discharge:  
Home O2 required:   
[]Yes []No  
 If yes, how much O2 required? Other:  
 Last Bowel Movement: Last Bowel Movement Date: 09/11/18 Influenza Vaccine Received Flu Vaccine for Current Season (usually Sept-March): Not Flu Season Pneumonia Vaccine Diet Active Orders Diet DIET NPO With Tube Feedings LDAs PICC Double Lumen 08/31/18 Right;Brachial (Active) Central Line Being Utilized Yes 9/11/2018  8:00 PM  
Criteria for Appropriate Use Long term IV/antibiotic administration 9/11/2018  8:00 PM  
Site Assessment Clean, dry, & intact 9/11/2018  8:00 PM  
Phlebitis Assessment 0 9/11/2018  8:00 PM  
Infiltration Assessment 0 9/11/2018  8:00 PM  
Arm Circumference (cm) 39 cm 9/8/2018  7:00 PM  
Date of Last Dressing Change 09/10/18 9/11/2018  8:00 PM  
Dressing Status Clean, dry, & intact 9/11/2018  8:00 PM  
Action Taken Open ports on tubing capped 9/11/2018  8:00 PM  
External Catheter Length (cm) 0 centimeters 9/10/2018  6:45 PM  
Dressing Type Transparent 9/11/2018  8:00 PM  
Hub Color/Line Status Purple;Flushed;Capped 9/11/2018  8:00 PM  
Positive Blood Return (Site #1) Yes 9/11/2018  8:00 PM  
Hub Color/Line Status Red;Flushed;Capped 9/11/2018  8:00 PM  
Positive Blood Return (Site #2) Yes 9/11/2018  8:00 PM  
Alcohol Cap Used Yes 9/11/2018  8:00 PM  
            
PEG/Gastrostomy Tube 09/05/18 (Active) Site Assessment Clean, dry, & intact 9/11/2018  8:00 PM  
Dressing Status Clean, dry, & intact 9/11/2018  8:00 PM  
G Port Status Infusing 9/11/2018  8:00 PM  
Action Taken Placement verified (comment) 9/11/2018  8:00 PM  
Drainage Description Tan 9/11/2018  8:00 PM  
Gastric Residual (mL) 5 ml 9/11/2018  8:00 PM  
 Tube Feeding/Formula Options Twocal HN 9/11/2018  8:00 PM  
Modular Nutrients Protein liquid 9/11/2018  8:00 PM  
Tube Feeding/Verify Rate (mL/hr) 50 9/11/2018  8:00 PM  
Water Flush Volume (mL) 200 mL 9/11/2018  8:00 PM  
Intake (ml) 400 ml 9/11/2018  6:59 AM  
Medication Volume 60 ml 9/11/2018  8:00 PM  
   
Colostomy 07/10/18 Left Abdomen (Active) Drainage Color Brown 9/11/2018  8:00 PM  
Site Assessment Clean, dry, intact 9/11/2018  8:00 PM  
Treatment Site care 9/8/2018  7:00 PM  
Output (ml) 150 mL 9/11/2018  6:53 PM  
            
Urinary Catheter [REMOVED] Urinary Catheter 07/17/18 2- way; Erazo-Indications for Use: Accurate measurement of urinary output [REMOVED] Urinary Catheter 08/11/18 Erazo-Indications for Use: Accurate measurement of urinary output [REMOVED] Urinary Catheter 09/02/18 Earzo-Indications for Use: Chronic urinary retention/bladder outlet obstruction, Accurate measurement of urinary output Intake & Output Date 09/11/18 0700 - 09/12/18 9153 09/12/18 0700 - 09/13/18 8999 Shift 8375-6404 0608-6313 24 Hour Total 7813-3392 7805-4496 24 Hour Total  
I 
N 
T 
A 
K 
E 
 NG/ 260 890 Water Flush Volume (mL) (PEG/Gastrostomy Tube 09/05/18) 600 200 800 Medication Volume (PEG/Gastrostomy Tube 09/05/18) 30 60 90 Shift Total 
(mL/kg) 630 
(5.6) 260 
(2.3) 890 
(7.9) O 
U T 
P 
U [place] B[place]ard Urine (mL/kg/hr) 850 
(0.6)  850 Urine Voided 200  200 Urine Output (mL) ([REMOVED] Urinary Catheter 09/02/18 Erazo) 650  650 Stool 300  300 Output (ml) (Colostomy 07/10/18 Left Abdomen) 300  300 Shift Total 
(mL/kg) 1150 
(10.3)  1150 
(10.3) NET -520 260 -260 Weight (kg) 112 112 112 112 112 112 Readmission Risk Assessment Tool Score High Risk   
      
 21 Total Score 3 Has Seen PCP in Last 6 Months (Yes=3, No=0) 2 . Living with Significant Other. Assisted Living. LTAC. SNF. or  
Rehab 3 Patient Length of Stay (>5 days = 3)  
 9 Pt. Coverage (Medicare=5 , Medicaid, or Self-Pay=4) 4 Charlson Comorbidity Score (Age + Comorbid Conditions) Criteria that do not apply:  
 IP Visits Last 12 Months (1-3=4, 4=9, >4=11) Expected Length of Stay 10d 19h Actual Length of Stay 64

## 2018-09-12 NOTE — PROGRESS NOTES
Pharmacy Automatic Direct Oral Anticoagulant Renal Dosing Protocol Patient currently receiving Apixaban 5 mg bid with an indication of AFIB (patient on dabigatran PTA); thrombus R IJ. Start date: 9/12 evening Major interacting medications: none Platelet inhibitors (dose/frequency): none Labs: 
Recent Labs  
   09/12/18 
 0300  09/10/18 
 0341 CREA  0.97  1.08 Wt Readings from Last 1 Encounters:  
09/12/18 112 kg (246 lb 14.6 oz) Creatinine Clearance: >50 Impression/Plan:  
? Prior to admission, the patient was on dabigatran and upon admission, he was on Lovenox. ? Patient has PEG tube placement and he can NOT go back on the dabigatran, since it can NOT be given via peg tube. ? Will discontinue the Lovenox 
? Start Apixaban 5 mg BID; starting 9/12 evening dose Pharmacy will follow daily and adjust as appropriate. Thank you, Amanda Espana, PHARMD  
 
?  Child Carter Score calculator 
[facility]. ? Apixaban ? Dabigatran 
? Edoxaban ? Rivaroxaban 
 
[email or web address]/Pharmacy/Clinical%20Companion/[email or web address]. pdf

## 2018-09-12 NOTE — ADT AUTH CERT NOTES
Utilization Review  
  
  
  Bowel Surgery: Colectomy, Partial, with or without Ostomy - Care Day 64 (9/11/2018) by Ani Cadet     
  Review Status Review Entered    
  Completed 9/12/2018    
  Details    
      
  Care Day: 64 Care Date: 9/11/2018 Level of Care: Step Down    
  Guideline Day 2     
  Clinical Status    
  (X) * Procedure completed    
  9/12/2018 3:43 PM EDT by Faizan Gleason    
    S/P  Colectomy Stage 3    
      
  (X) * Hemodynamic stability    
  9/12/2018 3:43 PM EDT by Faizan Gleason    
    98.2-137/70-75-18-97% RA    
      
      
  Activity    
  ( ) * Progressive ambulation    
  9/12/2018 3:43 PM EDT by Faizan Gleason    
    out of bed with assistance    
      
      
  Routes    
  (X) IV fluids, medications    
      
  Interventions    
  (X) Hgb/Hct    
      
      
      
      
  * Milestone    
      
  Additional Notes    
  Admitted for Lower GI Bleed and Anemia 7/12    
  Colonoscopy showed a LARGE Colon Mass    
  S/P  Colectomy Stage 3 B this hospitalization done July 17    
        Developed Ileus then aspiration Pna, complications      
  S/P Ileus    
       
  Aspiration PNA , intubated and prolonged ICU stay    
  Acute resp Failure s/p Ventilator support , extubated 8/26    
  Pneumonia , last sputum culture 8/11 + for Heavy staph aureus , light K.pneumoniae     
   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    
  S/p treated with Levaquin    
  Kareem Pleural Effusions s/p R chest tube removed 9/4    
  RONAL      
  CPAP q HS    
        
  Dysphagia, Failed MBS    
  S/p TPN in hospital due to Dysphagia     
  S/P Peg 9/5 Wed  Tolerating Tube feedings well    
       
  ?Infected Thrombus on NATASHA    
  NATASHA shows definite large mass associated with pacing wire in RA which may represent vegetation ( 8/23)     
  PER  Infectious Disease     
  Continue Daptomycin, Gentamicin IV, change Fluconazole ( 9/3). & levaquin to po    
   Complete total 6 weeks of daptomycin and gentamycin from the latest (-) blood Culture  which was done on 9/8/2018. So until October 20    
  Has A RT ARM PICC LINE    
  On Lovenox therapeutic    
   Fluconazole IV added 9/3    
  Bacteremia    
  S/p removal of L/IJ placement of PICC RUE , TPN on 8/31    
  Persistent bacteremia with coagulase negative Staph since 8/11 initially oxacillin sensitive , last positive BC is oxacillin resistant 8/21(1/4)    
  Repeat BC 8/26, 8/29 , 9/3 no growth, 9/5 Positive Staph Coag Neg, 9/8 ( negative    
  Chest CT repeated 9/8.  No change in SVC thrombus and Atrial clot size    
       
  SSS S/p PPM and Pacer dependent    
  PAF, on chronic Amiodarone,  Was on IV amiodarone when he was NPO but now on Amiodarone per PEG with GOod control    
  Per ID consult   Thrombus in R/IJ, now  mass in RA which is suggestive of thrombus rather than vegetation in light of pacemaker pocket appearing clear of infection. Hold off on removal of pacemaker at this time .  D/w Dr Enrique Murray & Dr Margrett Spatz , .  .    
  Continue antimicrobials & anticoagulation    
       
  Acute Encephalopathy  now resolved    
  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    
  Hx of Seizures due to TBI    
       Has been on Tegretol chronic with controlled Seizures    
    Was changed to IV Keppra when NPO    
    Dr. David Garcia discussed with Dr Stephan Ku ( patients Neurologist)    
  Patient  lethargic on 500 mg BID  Keppra but both states Keep on Keppra 250 BID    
   due to better Drug interactions and liver  with all his meds and less Liver issues on    
       
  Severe Deconditioning    
  Dispostion:     
  He needs to go to either [facility] or [facility] ( higher likelihood he wont get better.)    
  He has been accepted to [facility] awaiting Pre Auth, - wife is hesitant due to distance, We are encouring her to agree to [facility]    
  2nd choice is [facility] in [place] [address]- convinient to her but due to multiple antibiotics and complexity of care, high risk of failure.    
  PALLATIVE CARE HAS PREVIOUSLY TALKED TO WIFE AND SHE WANTS EVERYTHING DONE    
       
  He needs to follow up with ID or Cardiology close to October 20 for Reimaging and Cultures or can be done in [facility].  To determine what to do with Pacers    
       
       
  Hypernatremia - much improved, now on free fluid via Peg    
      
  glu 168     
        
  consult mobility services    
  wound care    
  NPO    
  tube feeding    
  amiodarone x1 per gtube    
  cubicin iv x1    
  kasix z1 per gtube    
  gentamicin iv x1    
  insulin cs x3    
  keppra x2 per gtube    
  albuterol neb x4    
  lovenox sc x2    
   
  Bowel Surgery: Colectomy, Partial, with or without Ostomy - Care Day 63 (9/10/2018) by Chetan Cadet     
  Review Status Review Entered    
  Completed 9/12/2018    
  Details    
      
  Care Day: 63 Care Date: 9/10/2018 Level of Care: Step Down    
  Guideline Day 2     
  Clinical Status    
  (X) * Procedure completed    
  9/12/2018 3:37 PM EDT by Zhane Asa    
    S/P  Colectomy Stage 3    
      
  (X) * Hemodynamic stability    
  9/12/2018 3:37 PM EDT by [facility] Asa    
    98.6-115/62-78-18-98% RA    
      
      
  Activity    
  ( ) * Progressive ambulation    
  9/12/2018 3:37 PM EDT by Zhane Asa    
    out of bed with assistance    
      
      
  Routes    
  (X) IV fluids, medications    
      
  Interventions    
  (X) Hgb/Hct    
      
      
      
      
  * Milestone    
      
  Additional Notes    
  Admitted for Lower GI Bleed and Anemia 7/12    
  Colonoscopy showed a LARGE Colon Mass    
  S/P  Colectomy Stage 3 B this hospitalization done July 17    
        Developed Ileus then aspiration Pna, complications      
  S/P Ileus    
       
  Aspiration PNA , intubated and prolonged ICU stay    
  Acute resp Failure s/p Ventilator support , extubated 8/26    
   Pneumonia , last sputum culture 8/11 + for Heavy staph aureus , light K.pneumoniae     
   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    
  S/p treated with Levaquin    
  Kareem Pleural Effusions s/p R chest tube removed 9/4    
  RONAL      
  CPAP q HS    
       
  Dysphagia, Failed MBS    
  S/p TPN in hospital due to Dysphagia     
  S/P Peg 9/5 Wed  Tolerating Tube feedings well    
       
  ?Infected Thrombus on NATASHA    
  NATASHA shows definite large mass associated with pacing wire in RA which may represent vegetation ( 8/23)     
  PER  Infectious Disease     
  Continue Daptomycin, Gentamicin IV, change Fluconazole ( 9/3). & levaquin to po    
  Complete total 6 weeks of daptomycin and gentamycin from the latest (-) blood Culture  which was done on 9/8/2018. So until October 20    
  Has A RT ARM PICC LINE    
  On Lovenox therapeutic    
   Fluconazole IV added 9/3    
  Bacteremia    
  S/p removal of L/IJ placement of PICC RUE , TPN on 8/31    
  Persistent bacteremia with coagulase negative Staph since 8/11 initially oxacillin sensitive , last positive BC is oxacillin resistant 8/21(1/4)    
  Repeat BC 8/26, 8/29 , 9/3 no growth, 9/5 Positive Staph Coag Neg, 9/8 ( negative    
  Chest CT repeated 9/8.  No change in SVC thrombus and Atrial clot size    
       
  SSS S/p PPM and Pacer dependent    
  PAF, on chronic Amiodarone,  Was on IV amiodarone when he was NPO but now on Amiodarone per PEG with GOod control    
  Per ID consult   Thrombus in R/IJ, now  mass in RA which is suggestive of thrombus rather than vegetation in light of pacemaker pocket appearing clear of infection. Hold off on removal of pacemaker at this time .  D/w Dr Bob Miranda & Dr Mago Aaron , .  .    
  Continue antimicrobials & anticoagulation    
       
  Acute Encephalopathy  now resolved    
  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    
  Hx of Seizures due to TBI    
        Has been on Tegretol chronic with controlled Seizures    
    Was changed to IV Keppra when NPO    
    Dr. Mike Hargrove discussed with Dr Geeta Boland ( patients Neurologist)    
  Patient  lethargic on 500 mg BID  Keppra but both states Keep on Keppra 250 BID    
   due to better Drug interactions and liver  with all his meds and less Liver issues on    
       
  Severe Deconditioning,     
   He needs to go to either [facility] or [facility] ( higher likelihood he wont get better.)    
  He has been accepted to [facility] awaiting Pre Auth, - wife is hesitant due to distance    
    We are encouring her to agree to [facility]    
  2nd choice is [facility] in [address]- convinient to her but due to multiple antibiotics and complexity of care, high risk of failure.    
  PALLATIVE CARE HAS PREVIOUSLY TALKED TO WIFE AND SHE WANTS EVERYTHING DONE    
       
  He needs to follow up with ID or Cardiology close to October 20 for Reimaging and Cultures or can be done in [facility].  To determine what to do with Pacers    
       
       
  Hypernatremia - much improved, now on free fluid via Peg    
        
   glu 163 mag 2.6 bun 25     
  consult mobility services    
  wound care    
  NPO    
  tube feeding    
  out of bed with assistance    
  amiodarone x1 per gtube    
  cubicin iv x1    
  kasix z1 per gtube    
  gentamicin iv x1    
  insulin cs x2    
  keppra x2 per gtube    
  albuterol neb x4    
  lovenox sc x2    
   
  Bowel Surgery: Colectomy, Partial, with or without Ostomy - Care Day 62 (9/9/2018) by Cyn Crabtree [address]     
  Review Status Review Entered    
  Completed 9/12/2018    
  Details    
      
  Care Day: 58 Care Date: 9/9/2018 Level of Care: Step Down    
  Guideline Day 2     
  Clinical Status    
  (X) * Procedure completed    
  9/12/2018 12:51 PM EDT by Aryan Oconnell    
    s/p colectomy    
      
  (X) * Hemodynamic stability    
  9/12/2018 12:51 PM EDT by [place]ond[place] Parents  
     98.1-119/62-75-18-99% RA    
      
      
  Activity    
  ( ) * Progressive ambulation    
      
  Routes    
  (X) IV fluids, medications    
  ( ) Advance diet as tolerated    
  9/12/2018 12:51 PM EDT by Magali Gonzales    
    NPO tube feedings    
      
      
  Interventions    
  (X) Hgb/Hct    
  9/12/2018 12:51 PM EDT by Magali Gonzales    
    hgb 9 hct 30. 9    
      
      
      
      
      
  * Milestone    
      
  Additional Notes    
  Acute Encephalopathy - noted in past 3 days as per family, could be mulitfactorial    
  ? Poor baseline mental status from TBI/CVA but pt was functional as per family    
  H/o Seizure disorder - but seizure free for a long time as per Dr Dinesh Mariscal, only on low dose tegretol (po not available IV)    
  CONTINUE IV KEPPRA for Seizure precaution until he gets an Oral Access to restart the Oral tegretol    
  Hypernatremia - much improved now at 149    
  H/o Prolonged TPN- due to Dysphagia with ? Oral thrush with severe crusting    
  NEW Fever in past 24 hrs- 100. 5    
  WBC trending up in past 24 hrs- 14.5 K    
  Send Blood Cx for fungal growth, will start low dose Diflucan for now empirically- further recommendations as per ID- following; follow fever trend    
  IVF- 1/2NS at low rate to correct hypernatremia    
  Plan to get PEG tube to get off TPN ASAP if remains afebrile & WBC improves in AM    
       
  ? Early Cellulitis of Lt forearm.  No increase in size, shrinking    
  Warm moist compress    
  Blood cultures x 2 is Positive.  Looks like Staph Coag neg, no fever    
  Chest CT shows same thrombus in line that extends to atria    
  ID is Aware,  Patient on Dapto, Gent, Levoflox and Diflucan    
  Follow by exam    
       
  Oral Thrush    
  Nystatin Swabs    
        
  Acute resp Failure s/p Ventilator support, now off it since 8/26    
  Kareem Pleural Effusions s/p R chest tube    
  Pulm following    
       
  RONAL      
  CPAP q HS    
         
  ?Infected Thrombus on NATASHA    
  Bacteremia    
  Afib    
       Change IV infusion to Peg Amiodarone    
  Blood culture shows Staph Coag neg but nothing new on CT Scan    
  Continues to have no fever normal WBC    
       
  SSS S/p PPM    
  On IV ABx as per ID Genta + Daptomycin    
  On Lovenox therapeutic was on Pradaxa outpatient , needs to be on Eliquis if to be given by Peg    
        
  Dysphagia    
  GI bleed s/p colectomy/enterotomies    
  Ileus with Aspiration PNA    
  Hb stable now    
  On TPN, last bag today    
  PEG placement 9/5 tolerating and increase.  No residuals    
  Palliative consulted this admission already    
        
  rbc 3.33 glu 160 bun 27 Ca 7.8 ast 47 alk phos 125     
  consult mobility services    
  wound care    
  duo neb x1    
  amiodarone x1 per gtube    
  cubincin iv x1    
  lovenox sc x2    
  Lasix x1 per gtube    
  insulin sc x4    
  keppra x2 per gtube    
  diflucan x1 per gtube    
  albuterol neb x3    
  gentamicin iv x1

## 2018-09-13 LAB
ALBUMIN SERPL-MCNC: 1.9 G/DL (ref 3.5–5)
ALBUMIN/GLOB SERPL: 0.3 {RATIO} (ref 1.1–2.2)
ALP SERPL-CCNC: 113 U/L (ref 45–117)
ALT SERPL-CCNC: 43 U/L (ref 12–78)
ANION GAP SERPL CALC-SCNC: 8 MMOL/L (ref 5–15)
AST SERPL-CCNC: 39 U/L (ref 15–37)
BACTERIA SPEC CULT: ABNORMAL
BILIRUB SERPL-MCNC: 0.3 MG/DL (ref 0.2–1)
BUN SERPL-MCNC: 24 MG/DL (ref 6–20)
BUN/CREAT SERPL: 27 (ref 12–20)
CALCIUM SERPL-MCNC: 8.1 MG/DL (ref 8.5–10.1)
CHLORIDE SERPL-SCNC: 103 MMOL/L (ref 97–108)
CO2 SERPL-SCNC: 25 MMOL/L (ref 21–32)
CREAT SERPL-MCNC: 0.89 MG/DL (ref 0.7–1.3)
ERYTHROCYTE [DISTWIDTH] IN BLOOD BY AUTOMATED COUNT: 21.2 % (ref 11.5–14.5)
GLOBULIN SER CALC-MCNC: 5.7 G/DL (ref 2–4)
GLUCOSE BLD STRIP.AUTO-MCNC: 131 MG/DL (ref 65–100)
GLUCOSE BLD STRIP.AUTO-MCNC: 142 MG/DL (ref 65–100)
GLUCOSE SERPL-MCNC: 131 MG/DL (ref 65–100)
HCT VFR BLD AUTO: 31.3 % (ref 36.6–50.3)
HGB BLD-MCNC: 9.1 G/DL (ref 12.1–17)
MAGNESIUM SERPL-MCNC: 2.4 MG/DL (ref 1.6–2.4)
MCH RBC QN AUTO: 26.9 PG (ref 26–34)
MCHC RBC AUTO-ENTMCNC: 29.1 G/DL (ref 30–36.5)
MCV RBC AUTO: 92.6 FL (ref 80–99)
NRBC # BLD: 0 K/UL (ref 0–0.01)
NRBC BLD-RTO: 0 PER 100 WBC
PHOSPHATE SERPL-MCNC: 4.3 MG/DL (ref 2.6–4.7)
PLATELET # BLD AUTO: 353 K/UL (ref 150–400)
PMV BLD AUTO: 10.7 FL (ref 8.9–12.9)
POTASSIUM SERPL-SCNC: 4.5 MMOL/L (ref 3.5–5.1)
PROT SERPL-MCNC: 7.6 G/DL (ref 6.4–8.2)
RBC # BLD AUTO: 3.38 M/UL (ref 4.1–5.7)
SERVICE CMNT-IMP: ABNORMAL
SODIUM SERPL-SCNC: 136 MMOL/L (ref 136–145)
WBC # BLD AUTO: 9.5 K/UL (ref 4.1–11.1)

## 2018-09-13 PROCEDURE — 74011250637 HC RX REV CODE- 250/637: Performed by: INTERNAL MEDICINE

## 2018-09-13 PROCEDURE — 74011000250 HC RX REV CODE- 250: Performed by: INTERNAL MEDICINE

## 2018-09-13 PROCEDURE — 97535 SELF CARE MNGMENT TRAINING: CPT

## 2018-09-13 PROCEDURE — 77030018798 HC PMP KT ENTRL FED COVD -A

## 2018-09-13 PROCEDURE — 84100 ASSAY OF PHOSPHORUS: CPT | Performed by: INTERNAL MEDICINE

## 2018-09-13 PROCEDURE — 77030038269 HC DRN EXT URIN PURWCK BARD -A

## 2018-09-13 PROCEDURE — 74011000258 HC RX REV CODE- 258: Performed by: INTERNAL MEDICINE

## 2018-09-13 PROCEDURE — 83735 ASSAY OF MAGNESIUM: CPT | Performed by: INTERNAL MEDICINE

## 2018-09-13 PROCEDURE — 92526 ORAL FUNCTION THERAPY: CPT | Performed by: SPEECH-LANGUAGE PATHOLOGIST

## 2018-09-13 PROCEDURE — 36415 COLL VENOUS BLD VENIPUNCTURE: CPT | Performed by: INTERNAL MEDICINE

## 2018-09-13 PROCEDURE — 82962 GLUCOSE BLOOD TEST: CPT

## 2018-09-13 PROCEDURE — 65660000000 HC RM CCU STEPDOWN

## 2018-09-13 PROCEDURE — 97530 THERAPEUTIC ACTIVITIES: CPT

## 2018-09-13 PROCEDURE — 80053 COMPREHEN METABOLIC PANEL: CPT | Performed by: INTERNAL MEDICINE

## 2018-09-13 PROCEDURE — 74011250636 HC RX REV CODE- 250/636: Performed by: INTERNAL MEDICINE

## 2018-09-13 PROCEDURE — 85027 COMPLETE CBC AUTOMATED: CPT | Performed by: INTERNAL MEDICINE

## 2018-09-13 RX ADMIN — NYSTATIN 500000 UNITS: 100000 SUSPENSION ORAL at 17:52

## 2018-09-13 RX ADMIN — LACTULOSE 10 G: 20 SOLUTION ORAL at 08:49

## 2018-09-13 RX ADMIN — NYSTATIN 500000 UNITS: 100000 SUSPENSION ORAL at 15:11

## 2018-09-13 RX ADMIN — POLYETHYLENE GLYCOL 3350 17 G: 17 POWDER, FOR SOLUTION ORAL at 08:48

## 2018-09-13 RX ADMIN — APIXABAN 5 MG: 5 TABLET, FILM COATED ORAL at 20:51

## 2018-09-13 RX ADMIN — Medication 10 ML: at 15:09

## 2018-09-13 RX ADMIN — DAPTOMYCIN 1000 MG: 500 INJECTION, POWDER, LYOPHILIZED, FOR SOLUTION INTRAVENOUS at 16:29

## 2018-09-13 RX ADMIN — VENLAFAXINE 37.5 MG: 37.5 TABLET ORAL at 08:53

## 2018-09-13 RX ADMIN — FUROSEMIDE 60 MG: 40 TABLET ORAL at 08:48

## 2018-09-13 RX ADMIN — NYSTATIN 500000 UNITS: 100000 SUSPENSION ORAL at 08:48

## 2018-09-13 RX ADMIN — AMIODARONE HYDROCHLORIDE 200 MG: 200 TABLET ORAL at 08:49

## 2018-09-13 RX ADMIN — Medication 10 ML: at 15:11

## 2018-09-13 RX ADMIN — LEVETIRACETAM 250 MG: 500 SOLUTION ORAL at 20:51

## 2018-09-13 RX ADMIN — LEVETIRACETAM 250 MG: 500 SOLUTION ORAL at 08:48

## 2018-09-13 RX ADMIN — APIXABAN 5 MG: 5 TABLET, FILM COATED ORAL at 08:48

## 2018-09-13 RX ADMIN — VENLAFAXINE 37.5 MG: 37.5 TABLET ORAL at 18:46

## 2018-09-13 RX ADMIN — NYSTATIN 500000 UNITS: 100000 SUSPENSION ORAL at 20:54

## 2018-09-13 RX ADMIN — GENTAMICIN SULFATE 80 MG: 40 INJECTION, SOLUTION INTRAMUSCULAR; INTRAVENOUS at 06:20

## 2018-09-13 RX ADMIN — LACTULOSE 10 G: 20 SOLUTION ORAL at 17:51

## 2018-09-13 RX ADMIN — Medication 10 ML: at 20:54

## 2018-09-13 RX ADMIN — Medication 10 ML: at 06:20

## 2018-09-13 NOTE — PROGRESS NOTES
Infectious Disease Progress Note IMPRESSION:  
· Bacteremia again with BC 1/2 + for Coagulase negative Staph- Staph Capitis Oxacillin R ( 9/5) ·  Repeat [facility] 9/7 no growth · Cellulitis LUE nearly resolved · Repeat CT chest ( 9/7) shows non occlusive thrombus in R/ IJ vein ,small amount of thrombus along right PICC catheter/ pacer leads in SVC which extends to right atrium · S/p peg placement ·  S/p removal of L/IJ placement of PICC RUE , TPN on 8/31 ·  Persistent bacteremia with coagulase negative Staph since 8/11 initially oxacillin sensitive , last positive BC is oxacillin resistant 8/21(1/4) · Repeat BC 8/26, 8/29 , 9/3 no growth ·  S/p failed MBS · NATASHA shows definite large mass associated with pacing wire in RA which may represent vegetation ( 8/23) · Thrombus  & tiny gas bubble of as in RIJ extending into SVC to level of RA (8/15)  s/p heparin IV now on lovenox s/q · CTA chest - no PE, could not visualize SVC thrombus, left lateral wall soft tissue swelling, soft tissue around pacemaker show no significant abnormality · US chest wall - minimal fluid in pacemaker pocket, no significant inflammation of overlying subcutaneous fat or skin · Acute respiratory failure on Ventilator / h/o tracheostomy 24 years ago  , s/p extubation 8/26 · R/ pleural effusion s/p chest tube placement , CXr clear · Increased sputum production - 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    
  
  
PLAN:  
   
· Continue Daptomycin x 6 weeks , Gentamicin IV x 4 weeks from last negative cultures. End date for Daptomycin is Oct 19,for Gentamicin is Oct 5.  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 · Thrombus in R/IJ,   mass in RA which is suggestive of thrombus rather than vegetation in light of pacemaker pocket appearing clear of infection. · Decision made to hold off on removal of pacemaker at this time . D/w Dr Fredis Addison , .  . · Continue antimicrobials & anticoagulation ·  Needs  Erazo catheter removal whenever possible CT chest -  IMPRESSION: 
  
1. There is a small amount of nonocclusive thrombus in the right internal 
jugular vein. 
[place] is also a small amount of thrombus along the right PICC catheter/pacer 
leads in the SVC which extends to the right atrium. 
 There is improvement in the bibasilar atelectasis/effusions Blood culture - Special Requests: NO SPECIAL REQUESTS   Preliminary Culture result: NO GROWTH 2 DAYS Blood culture - Culture result:    Final  
STAPHYLOCOCCUS CAPITIS SUBSPECIES UREOLYTICUS (OXACILLIN RESISTANT) , ISOLATED FROM 1 OF 2 BOTTLES DRAWN. .. LEFT HAND SITE (A) Subjective:  
 
Pt seen  . Awake , working with PT. Review of Systems:  Pt denies complaints. 10 point ROS obtained Objective:  
 
Blood pressure 138/60, pulse 75, temperature 97.9 °F (36.6 °C), resp. rate 20, height 6' 2\" (1.88 m), weight 242 lb 15.2 oz (110.2 kg), SpO2 100 %. Temp (24hrs), Av °F (36.7 °C), Min:97.8 °F (36.6 °C), Max:98.4 °F (36.9 °C) Patient Vitals for the past 24 hrs: 
 Temp Pulse Resp BP SpO2  
18 1136 97.9 °F (36.6 °C) 75 20 138/60 100 % 18 0732 98 °F (36.7 °C) - - - -  
18 0724 98 °F (36.7 °C) 75 20 135/65 99 % 18 0400 98.4 °F (36.9 °C) 75 18 134/73 99 % 18 0050 - 76 18 127/63 94 % 18 1915 97.8 °F (36.6 °C) 76 20 142/60 100 % Lines:  Central Venous Catheter:  LIJ Physical Exam:  
General:   Awake , talking Lungs:    Reduced air entry bases on  auscultation  chest wall-  pacemaker site -no swelling, erythemal  
 CV:  Regular rate and rhythm,no murmur, Abdomen:   Soft, non-tender. bowel sounds +distended Extremities: LUE - erythema distal forearm is less Lines/Devices:  Intact, no erythema, drainage or tenderness Data Review: CBC:  
Recent Labs  
   09/13/18 
 9167 WBC  9.5  
RBC  3.38* HGB  9.1*  
HCT  31.3*  
PLT  353 CMP:  
Recent Labs  
   09/13/18 
 0621  09/12/18 
 0300 GLU  131*  125* NA  136  139  
K  4.5  3.9 CL  103  103 CO2  25  27 BUN  24*  24* CREA  0.89  0.97 CA  8.1*  8.2* AGAP  8  9 BUCR  27*  25* AP  113   --   
TP  7.6   --   
ALB  1.9*   --   
GLOB  5.7*   --   
AGRAT  0.3*   --   
 
 
Studies:     
Lab Results Component Value Date/Time Culture result: NO GROWTH 5 DAYS 09/08/2018 01:46 AM  
 Culture result: (A) 09/05/2018 07:03 PM  
  STAPHYLOCOCCUS CAPITIS SUBSPECIES UREOLYTICUS (OXACILLIN RESISTANT) , ISOLATED FROM 1 OF 2 BOTTLES DRAWN. .. LEFT HAND SITE Culture result: (A) 09/05/2018 07:03 PM  
  PRELIMINARY REPORT OF GRAM POSITIVE COCCI IN CLUSTERS GROWING IN 1 OF 2 BOTTLES DRAWN CALLED TO AND READ BACK BY FELIX CAMPOS RN [id number] AT 2106 ON 9/6/2018. [place] [address] Culture result:  09/05/2018 07:03 PM  
  [facility] REQUESTING IDENTIFICATION AND SENSITIVITIES 9/10/18 TA. Culture result: REMAINING BOTTLE(S) HAS/HAVE NO GROWTH IN 5 DAYS 09/05/2018 07:03 PM  
  
 
XR Results (most recent): 
 
Results from Hospital Encounter encounter on 07/10/18 XR ABD PORT  1 V Narrative EXAM: KUB INDICATION: abd distension. 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 Portable supine KUB obtained at 0729 hours shows no bowel distention. There are 
vascular calcifications. Impression IMPRESSION: Normal bowel gas pattern. Patient Active Problem List  
Diagnosis Code  
 HTN (hypertension) I10  
 Depression F32.9  Hypercholesterolemia E78.00  Acid reflux K21.9  Seizure ([address] R56.9  Hypothyroidism E03.9  Hyponatremia E87.1  BPH (benign prostatic hyperplasia) N40.0  Rash R21  
 Cellulitis L03.90  Wax in ear H61.20  Ulcer [id number]  Small bowel obstruction ([facility] P48.299  Atrial flutter (HCC) I48.92  
 Atrial fibrillation or flutter  CHI (closed head injury) S09. 90XA  Basal cell cancer C44.91  
 TBI (traumatic [facility] injury) ([facility] S06. 9X9A  Atrial fibrillation (HCC) I48.91  
 Cataract H26.9  Constipation K59.00  Ankle fracture, left P58.451X  Insomnia G47.00  Tinea corporis B35.4  SSS (sick sinus syndrome) ([facility]) I49.5  Syncope R55  Seizure disorder ([facility] G40.909  RONAL on CPAP G47.33, Z99.89  
 Pacemaker Z95.0  Pre-op evaluation Z01.818  Chronic back pain greater than 3 months duration M54.9, G89.29  
 Cerebral infarction ([facility]) I63.9  Acute bronchitis J20.9  Screening for colon cancer Z12.11  
 Chronic right shoulder pain M25.511, G89.29  
 Common wart B07.8  Localized epilepsy with impairment of consciousness ([facility] G40.209  Severe obesity (BMI 35.0-39.9) ([facility]) E66.01  
 Acute blood loss anemia D62  Anasarca R60.1  GI bleed K92.2  Acute encephalopathy G93.40  Idiopathic hypotension I95.0  Counseling regarding goals of care Z71.89 ICD-10-CM ICD-9-CM 1. Anemia, unspecified type D64.9 285.9 2. Generalized weakness R53.1 780.79   
3. Acute encephalopathy G93.40 348.30   
4. Anasarca R60.1 782.3 5. Idiopathic hypotension I95.0 458.9 6. Counseling regarding goals of care Z71.89 V65.49   
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 9X9S 907.0 8. Chronic atrial fibrillation (HCC) I48.2 427.31   
9. Malignant neoplasm of ascending colon (HCC) C18.2 153.6 Anti-infectives: Fluconazole IV- 9/3- 9/6 IV  Changed to po, completed 9/10 Daptomycin IV - 8/26- 9/3 Gentamicin IV 8/26 S/p levaquin 8/29-9/7 Cefotetan 7/17 Zosyn 7/20-8/7 Cefepime - 8/11-8/13 Ceftriaxone 8/13-8/17 Vancomycin 8/11-8/22 Daptomycin 8/23- 8/24- Naficillin IV 8/24- 8/26  Vielka Pelayo MD FACP

## 2018-09-13 NOTE — PROGRESS NOTES
Infectious Disease Progress Note IMPRESSION:  
· Bacteremia again with BC 1/2 + for Coagulase negative Staph( 9/5) , ID, sensitivities requested. Repeat [facility] 9/7 no growth · Cellulitis LUE nearly resolved · Repeat CT chest ( 9/7) shows non occlusive thrombus in R/ IJ vein ,small amount of thrombus along right PICC catheter/ pacer leads in SVC which extends to right atrium · S/p peg placement ·  S/p removal of L/IJ placement of PICC RUE , TPN on 8/31 ·  Persistent bacteremia with coagulase negative Staph since 8/11 initially oxacillin sensitive , last positive BC is oxacillin resistant 8/21(1/4) · Repeat BC 8/26, 8/29 , 9/3 no growth ·  S/p failed MBS · NATASHA shows definite large mass associated with pacing wire in RA which may represent vegetation ( 8/23) · Thrombus  & tiny gas bubble of as in RIJ extending into SVC to level of RA (8/15)  s/p heparin IV now on lovenox s/q · CTA chest - no PE, could not visualize SVC thrombus, left lateral wall soft tissue swelling, soft tissue around pacemaker show no significant abnormality · US chest wall - minimal fluid in pacemaker pocket, no significant inflammation of overlying subcutaneous fat or skin · Acute respiratory failure on Ventilator / h/o tracheostomy 24 years ago  , s/p extubation 8/26 · R/ pleural effusion s/p chest tube placement , CXr clear · Increased sputum production - 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    
  
  
PLAN:  
   
· Continue Daptomycin x 6 weeks , Gentamicin IV x 4 weeks from last negative cultures. End date for Daptomycin is Oct 19,for Gentamicin is Oct 5.  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 · Thrombus in R/IJ,   mass in RA which is suggestive of thrombus rather than vegetation in light of pacemaker pocket appearing clear of infection. · Decision made to hold off on removal of pacemaker at this time . D/w Dr Cynthia Courtney , .  . · Continue antimicrobials & anticoagulation ·  Needs  Erazo catheter removal whenever possible CT chest -  IMPRESSION: 
  
1. There is a small amount of nonocclusive thrombus in the right internal 
jugular vein. 
Scottell Han is also a small amount of thrombus along the right PICC catheter/pacer 
leads in the SVC which extends to the right atrium. 
 There is improvement in the bibasilar atelectasis/effusions Blood culture - Special Requests: NO SPECIAL REQUESTS   Preliminary Culture result: NO GROWTH 2 DAYS Blood culture - Culture result:    Preliminary STAPHYLOCOCCUS SPECIES, COAGULASE NEGATIVE GROWING IN 1 OF 2 BOTTLES DRAWN (SITE = L HAND) IDENTIFICATION AND SUSCEPTIBILITY TO FOLLOW (A) Culture result:    Preliminary PRELIMINARY REPORT OF GRAM POSITIVE COCCI IN CLUSTERS GROWING IN 1 OF 2 BOTTLES DRAWN CALLED TO AND READ BACK BY FELIX CAMPOS RN [facility] AT 2106 ON 2018. [facility] (A) Subjective:  
 
Pt seen  . Awake , talking, No complaints Review of Systems:  Pt denies complaints. 10 point ROS obtained Objective:  
 
Blood pressure 134/73, pulse 75, temperature 98.4 °F (36.9 °C), resp. rate 18, height 6' 2\" (1.88 m), weight 246 lb 14.6 oz (112 kg), SpO2 99 %. Temp (24hrs), Av.1 °F (36.7 °C), Min:97.8 °F (36.6 °C), Max:98.4 °F (36.9 °C) Patient Vitals for the past 24 hrs: 
 Temp Pulse Resp BP SpO2  
18 0400 98.4 °F (36.9 °C) 75 18 134/73 99 % 18 0050 - 76 18 127/63 94 % 18 1915 97.8 °F (36.6 °C) 76 20 142/60 100 % 18 1500 98.2 °F (36.8 °C) 75 18 137/70 97 % 18 1103 98 °F (36.7 °C) 75 20 145/66 100 % 18 0900 - 87 - (!) 125/93 -  
 09/12/18 0817 - - - - 100 % 09/12/18 0800 - 75 - 124/80 100 % 09/12/18 0732 98 °F (36.7 °C) 75 18 133/64 97 % Lines:  Central Venous Catheter:  LIJ Physical Exam:  
General:   Awake , talking Lungs:    Reduced air entry bases on  auscultation  chest wall-  pacemaker site -no swelling, erythemal  
CV:  Regular rate and rhythm,no murmur, Abdomen:   Soft, non-tender. bowel sounds +distended Extremities: LUE - erythema distal forearm is less Lines/Devices:  Intact, no erythema, drainage or tenderness Data Review: CBC:  
Recent Labs  
   09/13/18 
 2241 WBC  9.5  
RBC  3.38* HGB  9.1*  
HCT  31.3*  
PLT  353 CMP:  
Recent Labs  
   09/12/18 
 0300 GLU  125* NA  139  
K  3.9 CL  103 CO2  27 BUN  24* CREA  0.97 CA  8.2* AGAP  9  
BUCR  25* Studies:     
Lab Results Component Value Date/Time Culture result: NO GROWTH 4 DAYS 09/08/2018 01:46 AM  
 Culture result: (A) 09/05/2018 07:03 PM  
  STAPHYLOCOCCUS SPECIES, COAGULASE NEGATIVE GROWING IN 1 OF 2 BOTTLES DRAWN (SITE = L HAND) IDENTIFICATION AND SUSCEPTIBILITY TO FOLLOW Culture result: (A) 09/05/2018 07:03 PM  
  PRELIMINARY REPORT OF GRAM POSITIVE COCCI IN CLUSTERS GROWING IN 1 OF 2 BOTTLES DRAWN CALLED TO AND READ BACK BY FELIX CAMPOS RN [facility] AT 2106 ON 9/6/2018. [place] [address] Culture result:  09/05/2018 07:03 PM  
   [facility] REQUESTING IDENTIFICATION AND SENSITIVITIES 9/10/18 TA. Culture result: REMAINING BOTTLE(S) HAS/HAVE NO GROWTH IN 5 DAYS 09/05/2018 07:03 PM  
  
 
XR Results (most recent): 
 
Results from Hospital Encounter encounter on 07/10/18 XR ABD PORT  1 V Narrative EXAM: KUB INDICATION: abd distension. 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 Portable supine KUB obtained at 0729 hours shows no bowel distention. There are 
vascular calcifications. Impression IMPRESSION: Normal bowel gas pattern. Patient Active Problem List  
Diagnosis Code  HTN (hypertension) I10  
 Depression F32.9  Hypercholesterolemia E78.00  Acid reflux K21.9  Seizure ([email or web address] R56.9  Hypothyroidism E03.9  Hyponatremia E87.1  BPH (benign prostatic hyperplasia) N40.0  Rash R21  
 Cellulitis L03.90  Wax in ear H61.20  Ulcer SOC4035  Small bowel obstruction ([email or web address] R40.033  Atrial flutter (HCC) I48.92  
 Atrial fibrillation or flutter  CHI (closed head injury) S09. 90XA  Basal cell cancer C44.91  
 TBI (traumatic brain injury) ([email or web address] S06. 9X9A  Atrial fibrillation ([facility]) I48.91  
 Cataract H26.9  Constipation K59.00  Ankle fracture, left H45.361T  Insomnia G47.00  Tinea corporis B35.4  SSS (sick sinus syndrome) ([facility]) I49.5  Syncope R55  Seizure disorder ([facility] G40.909  RONAL on CPAP G47.33, Z99.89  
 Pacemaker Z95.0  Pre-op evaluation Z01.818  Chronic back pain greater than 3 months duration M54.9, G89.29  
 Cerebral infarction ([facility]) I63.9  Acute bronchitis J20.9  Screening for colon cancer Z12.11  
 Chronic right shoulder pain M25.511, G89.29  
 Common wart B07.8  Localized epilepsy with impairment of consciousness ([email or web address] G40.209  Severe obesity (BMI 35.0-39.9) ([facility]) E66.01  
 Acute blood loss anemia D62  Anasarca R60.1  GI bleed K92.2  Acute encephalopathy G93.40  Idiopathic hypotension I95.0  Counseling regarding goals of care Z71.89 ICD-10-CM ICD-9-CM 1. Anemia, unspecified type D64.9 285.9 2. Generalized weakness R53.1 780.79   
3. Acute encephalopathy G93.40 348.30   
4. Anasarca R60.1 782.3 5. Idiopathic hypotension I95.0 458.9 6. Counseling regarding goals of care Z71.89 V65.49   
7. 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 9X9S 907.0 8. Chronic atrial fibrillation (HCC) I48.2 427.31   
9. Malignant neoplasm of ascending colon (HCC) C18.2 153.6 Anti-infectives: Fluconazole IV- 9/3- 9/6 IV  Changed to po, completed 9/10 Daptomycin IV - 8/26- 9/3 Gentamicin IV 8/26 S/p levaquin 8/29-9/7 Cefotetan 7/17 Zosyn 7/20-8/7 Cefepime - 8/11-8/13 Ceftriaxone 8/13-8/17 Vancomycin 8/11-8/22 Daptomycin 8/23- 8/24- Naficillin IV 8/24- 8/26  Florentino Lott MD FACP

## 2018-09-13 NOTE — PROGRESS NOTES
Doc Crowley from [facility] spoke with wife earlier today and wife has agreed to have patient go to [facility]. Doc Crowley has started insurance authorization and will follow up with me once decision has been made.

## 2018-09-13 NOTE — PROGRESS NOTES
Problem: Self Care Deficits Care Plan (Adult) Goal: *Acute Goals and Plan of Care (Insert Text) Occupational Therapy Goals Weekly reassessment 7/31/18 1. Patient will perform one grooming task seated EOB with minimal assistance within 7 day(s). 2.  Patient will perform upper body dressing with moderate assistance  within 7 day(s). 3.  Patient will perform supine <> sit to participate in ADL tasks decrease caregiver burden with moderate assistance  within 7 day(s). 4.  Patient will perform sit <> stand to prepare for self care transfers with moderate assistance within 7 day(s). 5.  Patient will participate in R AROM and L self PROM upper extremity therapeutic exercise/activities with contact guard assist for 8 minutes within 7 day(s). 6.  Patient will utilize energy conservation techniques during functional activities with verbal cues within 7 day(s). Initiated 7/22/2018 1. Patient will perform one grooming task seated EOB with minimal assistance within 7 day(s). 2.  Patient will perform upper body dressing with moderate assistance  within 7 day(s). 3.  Patient will perform supine <> sit to participate in ADL tasks decrease caregiver burdenwith maximal assistance  within 7 day(s). 4.  Patient will perform sit <> stand to prepare for self care transfers with maximal assistance within 7 day(s). 5.  Patient will participate in R AROM and L self PROM upper extremity therapeutic exercise/activities with contact guard assist for 8 minutes within 7 day(s). 6.  Patient will utilize energy conservation techniques during functional activities with verbal cues within 7 day(s). Occupational Therapy Goals Re-eval 9/6/2018 and goals remain the same Initiated 8/29/2018 1. Patient will perform grooming with maximal assistance within 7 day(s). 2.  Patient will be able to sit on EOb with max assist of 2 for 2 minutes, in prep for ADLs. 3. Patient to be able to tolerate UE ex on right UE for 3 minutes in order to increase his overall endurance and strength, with in 7 days. Occupational Therapy Goals Revised 9/13/2018 1. Patient will perform self-feeding with minimal assistance/contact guard assist within 7 day(s). 2.  Patient will perform grooming with moderate assistance  within 7 day(s). 3.  Patient will perform upper body dressing with moderate assistance  within 7 day(s). 4. Pt will be able to sit on EOB with for 5 to 7 minutes in prep for ADLs, with in 7days Occupational Therapy TREATMENT: WEEKLY REASSESSMENT Patient: Eva Singh (38 y.o. male) Date: 9/13/2018 Diagnosis: Acute blood loss anemia GI bleed Anasarca GI Bleed 
gi bleed ASCENDING COLON CANCER  
aspiration ASPIRATION PNEUMONIA GI bleed Procedure(s) (LRB): ESOPHAGOGASTRODUODENOSCOPY (EGD) PERCUTANEOUS ENDOSCOPIC GASTROSTOMY TUBE INSERTION (N/A) 8 Days Post-Op Precautions:   
Chart, occupational therapy assessment, plan of care, and goals were reviewed. ASSESSMENT: 
Pt was cleared to be seen for therapy and all VSS and he was supine in bed and noted that he was not turning his head to the left. OT worked with pt on ranging his neck and pt was very tight to turn head to the left . Pt continues to have difficulty with tracking to the far left. Pt was max assist of 2 for bed mobility and he was leaning a great deal to the right and back. Worked with pt on leaning forward and to the left and he was able to sit up right for short while and then started to lean to the left. He was able to sit up on EOB for approx 15 minutes and worked on combing his hair and washing his face. Pt is making progress slowly and was max of 2 for sit to supine and left in bed in chair position with call bell and turned the bed in order for pt to have to look to the left to see TV. Recommend that pt have further therapy at [facility] at rehab at [facility]. Progression toward goals: [x]            Improving appropriately and progressing toward goals []            Improving slowly and progressing toward goals []            Not making progress toward goals and plan of care will be adjusted PLAN: 
Goals have been updated based on progression since last assessment. Patient continues to benefit from skilled intervention to address the above impairments. Continue to follow patient 3 times a week to address goals. Planned Interventions: 
[x]                    Self Care Training                  []             Therapeutic Activities [x]                    Functional Mobility Training    []             Cognitive Retraining 
[x]                    Therapeutic Exercises           [x]             Endurance Activities [x]                    Balance Training                   []             Neuromuscular Re-Education []                    Visual/Perceptual Training     [x]        Home Safety Training 
[x]                    Patient Education                 [x]             Family Training/Education []                    Other (comment): 
Discharge Recommendations: Loki Davies Further Equipment Recommendations for Discharge: ttbd SUBJECTIVE:  
Patient stated I am so afraid that I will fall.  OBJECTIVE DATA SUMMARY:  
Cognitive/Behavioral Status: 
Neurologic State: Alert Orientation Level: Oriented to person Cognition: Impaired decision making;Decreased command following;Decreased attention/concentration Perception: Cues to attend to left side of body;Cues to attend left visual field Perseveration: Perseverates during conversation Safety/Judgement: Fall prevention Functional Mobility and Transfers for ADLs: 
Bed Mobility: 
Rolling: Maximum assistance Supine to Sit: Maximum assistance;Assist x2 Sit to Supine: Maximum assistance;Assist x2 Transfers: 
 stephy lift Balance: 
Sitting: Impaired Sitting - Static: Fair (occasional) Sitting - Dynamic: Fair (occasional) ADL Intervention: 
  
Pt is max for ADLs Toileting Toileting Assistance: Total assistance(dependent) Bladder Hygiene: Total assistance (dependent) Bowel Hygiene: Total assistance (dependent) Cognitive Retraining Safety/Judgement: Fall prevention Activity Tolerance:  
fair Please refer to the flowsheet for vital signs taken during this treatment. After treatment:  
[] Patient left in no apparent distress sitting up in chair 
[x] Patient left in no apparent distress in bed 
[x] Call bell left within reach [x] Nursing notified 
[] Caregiver present 
[] Bed alarm activated COMMUNICATION/COLLABORATION:  
The patients plan of care was discussed with: Physical Therapist and Registered Nurse PORTILLO Wilks Time Calculation: 36 mins

## 2018-09-13 NOTE — PROGRESS NOTES
Called wife and she is very tearful. Emotional support given. She is thinking of having patient go to [facility] . She left message for Michael Vences with [facility] to call her. She has spoken with Dr Parth Carter also today. Michael Vences will call the wife back and let me know of the decision.

## 2018-09-13 NOTE — PROGRESS NOTES
Received call from Skylar Traore ([phone]) with [facility] and informed her the wife is looking at other options however Skylar Traore will hold on to information until she hears back from us of wife's definite decision.

## 2018-09-13 NOTE — PROGRESS NOTES
Problem: Dysphagia (Adult) Goal: *Acute Goals and Plan of Care (Insert Text) Speech path goals Initiated 9/6/18 1. Patient will follow simple oropharyngeal strengthening exercises with max cues within 7 days Initiated 8/30/2018 1. Patient will participate in [facility] tomorrow. Completed. 2. Pt will participate with oral and pharyngeal swallowing exercises to improve swallowing function. 1. Pt will tolerate purees and nectar thick liquids with no overt s/s of aspiration. Discontinue Speech pathology goals Initiated 8/3/2018 1. Patient will tolerate sips and chips with no overt s/s aspiration within 7 days. Goal met 8/10. 
2. Patient will participate in re-evaluation of swallow function within 7 days. Goal met 8/10. 3. Pt will participate with MBS today 8/10. Goal met 8/10. Speech language pathology dysphagia treatment Patient: Yeimi Chavis (44 y.o. male) Date: 9/13/2018 Diagnosis: Acute blood loss anemia GI bleed Anasarca GI Bleed 
gi bleed ASCENDING COLON CANCER  
aspiration ASPIRATION PNEUMONIA GI bleed Procedure(s) (LRB): ESOPHAGOGASTRODUODENOSCOPY (EGD) PERCUTANEOUS ENDOSCOPIC GASTROSTOMY TUBE INSERTION (N/A) 8 Days Post-Op Precautions:    
 
ASSESSMENT: 
Patient offered ice chips to facilitate effortful swallow. Patient able to complete 4/5 effortful swallows. While patient able to initiate swallow note ice chip remains in oral cavity 3 of 4 trials. When offered green swab with water patient unable to initiate swallow. Progression toward goals: 
[]         Improving appropriately and progressing toward goals [x]         Improving slowly and progressing toward goals 
[]         Not making progress toward goals and plan of care will be adjusted PLAN: 
Recommendations and Planned Interventions: 
Continue SLP treatment per plan Patient continues to benefit from skilled intervention to address the above impairments. Continue treatment per established plan of care. Discharge Recommendations: To Be Determined SUBJECTIVE:  
Patient stated Did I swallow it? . Patient agree to dysphagia treatment. OBJECTIVE:  
Cognitive and Communication Status: 
Neurologic State: Alert Orientation Level: Oriented to person, Oriented to place Cognition: Follows commands Perception: Appears intact Perseveration: Perseverates during conversation Safety/Judgement: Fall prevention, Lack of insight into deficits Dysphagia Treatment: 
Oral Assessment: P.O. Trials: 
Patient Position: Upright in bed, bed in chair position Vocal quality prior to P.O.: Low volume Consistency Presented: Ice chips How Presented: SLP-fed/presented;Spoon Bolus Acceptance: No impairment Bolus Formation/Control: Impaired Type of Impairment: Anterior;Spillage;Delayed;Poor;Mastication Propulsion: Delayed (# of seconds); Discoordination Oral Residue: Greater than 50% of bolus Initiation of Swallow: Delayed (# of seconds) Laryngeal Elevation: Decreased;Weak Aspiration Signs/Symptoms: None Oral Phase Severity: Moderate-severe Pharyngeal Phase Severity : Moderate After treatment:  
[]              Patient left in no apparent distress sitting up in chair 
[x]              Patient left in no apparent distress in bed 
[x]              Call bell left within reach 
[]              Nursing notified 
[]              Caregiver present 
[]              Bed alarm activated COMMUNICATION/EDUCATION:  
Patient was educated regarding role of SLP and swallow exercises. Patient with off topic response. The patients plan of care including recommendations, planned interventions, and recommended diet changes were discussed with:  
[]              Posted safety precautions in patient's room. DIPAK Cunningham Time Calculation: 15 mins

## 2018-09-13 NOTE — PROGRESS NOTES
Hospitalist Progress Note NAME: Cuauhtemoc Alonso :  1940 MRN:  [id number] Assessment / Plan   
Admitted for Lower GI Bleed and Anemia  Colonoscopy showed a LARGE Colon Mass S/P  Colectomy Stage 3 B this hospitalization done  Developed Ileus then aspiration Pna, complications S/P Ileus Aspiration PNA , intubated and prolonged ICU stay Acute resp Failure s/p Ventilator support , extubated  Pneumonia , last sputum culture  + for Heavy staph aureus , light K.pneumoniae Sputum culture  + heavy Burkholderia cepacia S/p treated with Levaquin Kareem Pleural Effusions s/p R chest tube removed  RONAL CPAP q HS 
  
Dysphagia, Failed MBS 
S/p TPN in hospital due to Dysphagia S/P Peg  Wed  Tolerating Tube feedings well Oral Candidiasis ? Infected Thrombus on NATASHA 
NATASHA shows definite large mass associated with pacing wire in RA which may represent vegetation ( ) PER  Infectious Disease Continue Daptomycin, Gentamicin IV, change Fluconazole ( 9/3). & levaquin to po Nystatin Swish & spit Complete total 6 weeks of daptomycin and gentamycin from the latest (-) blood Culture  which was done on 2018. So until  Has A RT ARM PICC LINE On Lovenox therapeutic Fluconazole IV added 9/3 Bacteremia S/p removal of L/IJ placement of PICC RUE , TPN on  Persistent bacteremia with coagulase negative Staph since  initially oxacillin sensitive , last positive BC is oxacillin resistant (1/) Repeat BC ,  , 9/3 no growth,  Positive Staph Coag Neg,  ( negative Chest CT repeated . 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 Hold off on removal of pacemaker at this time . D/w Dr Ruby Pop , .  . 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 Stella García discussed with Dr Dinesh Mariscal ( patients Neurologist) Patient  lethargic on 500 mg BID  Keppra but both states Keep on Keppra 250 BID 
 due to better Drug interactions and liver  with all his meds and less Liver issues on Severe Deconditioning. PALLATIVE CARE HAS PREVIOUSLY TALKED TO WIFE AND SHE WANTS EVERYTHING DONE He needs to follow up with ID or Cardiology close to October 20 for Reimaging and Cultures or can be done in at [facility]. To determine what to do with Pacers Hypernatremia - much improved, now on free fluid via Peg 
  
 
  
  
  
Code Status: Full NOK POA  :  WIFE 
DVT Prophylaxis: on lovenox for the thrombus Disposition-- Pt's wife has finally decided to go for [facility] as the next place of care- CM working with [facility] - will be getting Insurance Authorization now, DC in 1-2 days anticipated Subjective: per staff tolerating Tube feeding Chief Complaint / Reason for Physician Visit : F/U AMS, dysphagia, Bacteremia, PNA, GI bleed, Colon Ca \"I am fine\". Discussed with RN events overnight. Review of Systems: 
Symptom Y/N Comments  Symptom Y/N Comments Fever/Chills n   Chest Pain n   
Poor Appetite n   Edema n   
Cough n   Abdominal Pain n   
Sputum n   Joint Pain n   
SOB/BECERRIL    Pruritis/Rash Nausea/vomit    Tolerating PT/OT y Diarrhea    Tolerating Diet y PEG tube feeding at goal rate Constipation    Other Could NOT obtain due to:   
 
Objective: VITALS:  
Last 24hrs VS reviewed since prior progress note. Most recent are: 
Patient Vitals for the past 24 hrs: 
 Temp Pulse Resp BP SpO2 09/13/18 0732 98 °F (36.7 °C) - - - -  
09/13/18 0724 98 °F (36.7 °C) 75 20 135/65 99 % 09/13/18 0400 98.4 °F (36.9 °C) 75 18 134/73 99 % 09/13/18 0050 - 76 18 127/63 94 % 09/12/18 1915 97.8 °F (36.6 °C) 76 20 142/60 100 % 09/12/18 1500 98.2 °F (36.8 °C) 75 18 137/70 97 % Intake/Output Summary (Last 24 hours) at 09/13/18 1128 Last data filed at 09/13/18 0700 Gross per 24 hour Intake             2950 ml Output              600 ml Net             2350 ml PHYSICAL EXAM: 
General: WD, WN. Pleasant, Awake, NAD and Ox3 cooperative, no acute distress   
EENT:  EOMI. Anicteric sclerae. MMM Resp:  CTA bilaterally, no wheezing or rales. No accessory muscle use CV:  Regular  rhythm,  No edema GI:  Soft, Non distended, Non tender.  +Bowel sounds, PEG tube Noted + Neurologic:  Alert and oriented X 3, normal speech, Psych:   Good insight. Not anxious nor agitated Skin:  No rashes. No jaundice, R PICC line noted Reviewed most current lab test results and cultures  YES Reviewed most current radiology test results   YES Review and summation of old records today    NO Reviewed patient's current orders and MAR    YES 
PMH/ reviewed - no change compared to H&P 
________________________________________________________________________ Care Plan discussed with: 
  Comments Patient x Family  x Wife on the phone RN x Care Manager x Karolina Crews Consultant     
                 x Multidiciplinary team rounds were held today with , nursing, pharmacist and clinical coordinator. Patient's plan of care was discussed; medications were reviewed and discharge planning was addressed. ________________________________________________________________________ Total NON critical care TIME:  16 Minutes Total CRITICAL CARE TIME Spent:   Minutes non procedure based Comments >50% of visit spent in counseling and coordination of care ________________________________________________________________________ Tima Boyd MD  
 
Procedures: see electronic medical records for all procedures/Xrays and details which were not copied into this note but were reviewed prior to creation of Plan. LABS: 
I reviewed today's most current labs and imaging studies. Pertinent labs include: 
Recent Labs  
   09/13/18 
 5266 WBC  9.5 HGB  9.1*  
HCT  31.3*  
PLT  353 Recent Labs  
   09/13/18 
 0621  09/12/18 
 0300 NA  136  139  
K  4.5  3.9 CL  103  103 CO2  25  27 GLU  131*  125* BUN  24*  24* CREA  0.89  0.97 CA  8.1*  8.2* MG  2.4   --   
PHOS  4.3   --   
ALB  1.9*   --   
TBILI  0.3   --   
SGOT  39*   --   
ALT  43   --   
 
 
Signed: Tima Boyd MD

## 2018-09-13 NOTE — PROGRESS NOTES
Problem: Mobility Impaired (Adult and Pediatric) Goal: *Acute Goals and Plan of Care (Insert Text) Physical Therapy Goals Reviewed 9/13/18- goals remain appropriate Goals reviewed 9/6/18- goals remain appropriate Goals reviewed and revised 8/29/18 1. Patient will move from supine to sit and sit to supine , scoot up and down and roll side to side in bed with moderate assistance within 7 day(s). 2.  Patient will transfer from bed to chair and chair to bed with max assistance x 2 using the least restrictive device within 7 day(s). 3.  Patient will perform sit to stand with moderate assistance x 2 within 7 day(s). 4.  Patient will perform stand pivot transfer to wheelchair with max assistance x 2 in 7 days. Reviewed 8/3/18- goals remain appropriate Reviewed and revised 7/26/2018 1. Patient will move from supine to sit and sit to supine , scoot up and down and roll side to side in bed with minimal assistance within 7 day(s). 2.  Patient will transfer from bed to chair and chair to bed with moderate assistance using the least restrictive device within 7 day(s). 3.  Patient will perform sit to stand with moderate assistance within 7 day(s). 4.  Patient will perform stand pivot transfer to wheelchair with moderate assistance in 7 days. Reviewed and revised 7/19/2018 1. Patient will move from supine to sit and sit to supine , scoot up and down and roll side to side in bed with minimal assistance within 7 day(s). 2.  Patient will transfer from bed to chair and chair to bed with moderate assistance using the least restrictive device within 7 day(s). 3.  Patient will perform sit to stand with minimal assistance within 7 day(s). 4.  Patient will perform stand pivot transfer to wheelchair with minimal assistance in 7 days. Initiated 7/12/2018 1.   Patient will move from supine to sit and sit to supine , scoot up and down and roll side to side in bed with modified independence within 7 day(s). 2.  Patient will transfer from bed to chair and chair to bed with supervision/set-up using the least restrictive device within 7 day(s). 3.  Patient will perform sit to stand with supervision/set-up within 7 day(s). 4.  Patient will perform stand pivot transfer to wheelchair with modified independence in 7 days. physical Therapy TREATMENT: WEEKLY REASSESSMENT Patient: Rico Jin (67 y.o. male) Date: 9/13/2018 Diagnosis: Acute blood loss anemia GI bleed Anasarca GI Bleed 
gi bleed ASCENDING COLON CANCER  
aspiration ASPIRATION PNEUMONIA GI bleed Procedure(s) (LRB): ESOPHAGOGASTRODUODENOSCOPY (EGD) PERCUTANEOUS ENDOSCOPIC GASTROSTOMY TUBE INSERTION (N/A) 8 Days Post-Op Precautions:   
Chart, physical therapy assessment, plan of care and goals were reviewed. ASSESSMENT: 
Pt was received in supine and cleared by nursing to mobilize, VSS. Bed mobility improved but continues to be max A x 2 to come to sitting on the EOB. Worked on sitting balance, he was able to tolerate SOB for ~15 minutes with both verbal and tactile cues to maintain midline. Noted LOB to the R and L. Reaching out of JOHNY much improved today. He did fatigue and was returned to supine. He was able to assist with scooting to the [facility] by pushing with the RLE. Placed in chair position and notified nursing to utilize mobility team to stephy pt to chair. Continue to recommend SNF. Patient's progression toward goals since last assessment: goals remain the same PLAN: 
Goals have been updated based on progression since last assessment. Patient continues to benefit from skilled intervention to address the above impairments. Continue to follow the patient 3 times a week to address goals. Planned Interventions: 
[x]              Bed Mobility Training             []       Neuromuscular Re-Education [x]              Transfer Training                   []       Orthotic/Prosthetic Training 
[]              Gait Training                         []       Modalities [x]              Therapeutic Exercises           []       Edema Management/Control [x]              Therapeutic Activities            [x]       Patient and Family Training/Education []              Other (comment): 
Discharge Recommendations: Loki Davies Further Equipment Recommendations for Discharge: TBD SUBJECTIVE:  
Patient stated my neck hurts.  OBJECTIVE DATA SUMMARY:  
Critical Behavior: 
Neurologic State: Alert Orientation Level: Oriented to person, Oriented to place Cognition: Follows commands Safety/Judgement: Fall prevention, Lack of insight into deficits Strength:  
  
  
  
 GW 
  
  
  
 
Functional Mobility Training: 
Bed Mobility: 
Rolling: Maximum assistance Supine to Sit: Maximum assistance;Assist x2 Sit to Supine: Maximum assistance;Assist x2 Balance: 
Sitting: Impaired Sitting - Static: Fair (occasional) Sitting - Dynamic: Fair (occasional) Neuro Re-Education: 
Worked on sitting balance and midline awareness, tactile and verbal cues used Activity Tolerance: VSS Please refer to the flowsheet for vital signs taken during this treatment. After treatment:  
[]  Patient left in no apparent distress sitting up in chair 
[x]  Patient left in no apparent distress in bed (chair position) [x]  Call bell left within reach [x]  Nursing notified 
[]  Caregiver present [x]  Bed alarm activated COMMUNICATION/COLLABORATION:  
The patients plan of care was discussed with: Occupational Therapist and Registered Nurse Ruben Olmedo, PT, DPT Time Calculation: 35 mins

## 2018-09-13 NOTE — PROGRESS NOTES
Spoke with Lisa Calero at [facility] and informed her not to obtain insurance authorization.

## 2018-09-14 LAB
ANION GAP SERPL CALC-SCNC: 7 MMOL/L (ref 5–15)
BACTERIA SPEC CULT: NORMAL
BUN SERPL-MCNC: 24 MG/DL (ref 6–20)
BUN/CREAT SERPL: 27 (ref 12–20)
CALCIUM SERPL-MCNC: 8.4 MG/DL (ref 8.5–10.1)
CHLORIDE SERPL-SCNC: 102 MMOL/L (ref 97–108)
CK SERPL-CCNC: 41 U/L (ref 39–308)
CO2 SERPL-SCNC: 26 MMOL/L (ref 21–32)
CREAT SERPL-MCNC: 0.89 MG/DL (ref 0.7–1.3)
DATE LAST DOSE: ABNORMAL
GENTAMICIN TROUGH SERPL-MCNC: 0.4 UG/ML (ref 1–2)
GLUCOSE BLD STRIP.AUTO-MCNC: 120 MG/DL (ref 65–100)
GLUCOSE BLD STRIP.AUTO-MCNC: 124 MG/DL (ref 65–100)
GLUCOSE SERPL-MCNC: 129 MG/DL (ref 65–100)
POTASSIUM SERPL-SCNC: 4.5 MMOL/L (ref 3.5–5.1)
REPORTED DOSE,DOSE: ABNORMAL UNITS
REPORTED DOSE/TIME,TMG: ABNORMAL
SERVICE CMNT-IMP: ABNORMAL
SERVICE CMNT-IMP: ABNORMAL
SERVICE CMNT-IMP: NORMAL
SODIUM SERPL-SCNC: 135 MMOL/L (ref 136–145)

## 2018-09-14 PROCEDURE — 74011250636 HC RX REV CODE- 250/636: Performed by: INTERNAL MEDICINE

## 2018-09-14 PROCEDURE — 36415 COLL VENOUS BLD VENIPUNCTURE: CPT | Performed by: INTERNAL MEDICINE

## 2018-09-14 PROCEDURE — 82550 ASSAY OF CK (CPK): CPT | Performed by: INTERNAL MEDICINE

## 2018-09-14 PROCEDURE — 80170 ASSAY OF GENTAMICIN: CPT | Performed by: INTERNAL MEDICINE

## 2018-09-14 PROCEDURE — 97530 THERAPEUTIC ACTIVITIES: CPT

## 2018-09-14 PROCEDURE — 74011250637 HC RX REV CODE- 250/637: Performed by: INTERNAL MEDICINE

## 2018-09-14 PROCEDURE — 97112 NEUROMUSCULAR REEDUCATION: CPT

## 2018-09-14 PROCEDURE — 74011000250 HC RX REV CODE- 250: Performed by: INTERNAL MEDICINE

## 2018-09-14 PROCEDURE — 80048 BASIC METABOLIC PNL TOTAL CA: CPT | Performed by: INTERNAL MEDICINE

## 2018-09-14 PROCEDURE — 77030038269 HC DRN EXT URIN PURWCK BARD -A

## 2018-09-14 PROCEDURE — 65660000000 HC RM CCU STEPDOWN

## 2018-09-14 PROCEDURE — 74011000258 HC RX REV CODE- 258: Performed by: INTERNAL MEDICINE

## 2018-09-14 PROCEDURE — 92526 ORAL FUNCTION THERAPY: CPT

## 2018-09-14 PROCEDURE — 82962 GLUCOSE BLOOD TEST: CPT

## 2018-09-14 RX ADMIN — NYSTATIN 500000 UNITS: 100000 SUSPENSION ORAL at 18:07

## 2018-09-14 RX ADMIN — GENTAMICIN SULFATE 80 MG: 40 INJECTION, SOLUTION INTRAMUSCULAR; INTRAVENOUS at 09:00

## 2018-09-14 RX ADMIN — POLYETHYLENE GLYCOL 3350 17 G: 17 POWDER, FOR SOLUTION ORAL at 09:36

## 2018-09-14 RX ADMIN — APIXABAN 5 MG: 5 TABLET, FILM COATED ORAL at 21:36

## 2018-09-14 RX ADMIN — AMIODARONE HYDROCHLORIDE 200 MG: 200 TABLET ORAL at 09:37

## 2018-09-14 RX ADMIN — Medication 10 ML: at 18:07

## 2018-09-14 RX ADMIN — LACTULOSE 10 G: 20 SOLUTION ORAL at 18:07

## 2018-09-14 RX ADMIN — LEVETIRACETAM 250 MG: 500 SOLUTION ORAL at 09:36

## 2018-09-14 RX ADMIN — APIXABAN 5 MG: 5 TABLET, FILM COATED ORAL at 09:37

## 2018-09-14 RX ADMIN — FUROSEMIDE 60 MG: 40 TABLET ORAL at 09:36

## 2018-09-14 RX ADMIN — LEVETIRACETAM 250 MG: 500 SOLUTION ORAL at 21:36

## 2018-09-14 RX ADMIN — Medication 10 ML: at 21:37

## 2018-09-14 RX ADMIN — LACTULOSE 10 G: 20 SOLUTION ORAL at 09:36

## 2018-09-14 RX ADMIN — NYSTATIN 500000 UNITS: 100000 SUSPENSION ORAL at 13:40

## 2018-09-14 RX ADMIN — VENLAFAXINE 37.5 MG: 37.5 TABLET ORAL at 09:40

## 2018-09-14 RX ADMIN — Medication 10 ML: at 05:24

## 2018-09-14 RX ADMIN — VENLAFAXINE 37.5 MG: 37.5 TABLET ORAL at 18:06

## 2018-09-14 RX ADMIN — NYSTATIN 500000 UNITS: 100000 SUSPENSION ORAL at 09:36

## 2018-09-14 RX ADMIN — DAPTOMYCIN 1000 MG: 500 INJECTION, POWDER, LYOPHILIZED, FOR SOLUTION INTRAVENOUS at 18:06

## 2018-09-14 RX ADMIN — NYSTATIN 500000 UNITS: 100000 SUSPENSION ORAL at 21:36

## 2018-09-14 NOTE — PROGRESS NOTES
Hematology Oncology Progress Note Follow up for: IJ/SVC thrombus, stage IIIB colon cancer. Chart notes reviewed since last visit. Case discussed with following: family not at bedside Patient complains of the following: awake, alert, hard of hearing. More interactive and having appropriate conversation with his wife today. Additional concerns noted by the staff: none Patient Vitals for the past 24 hrs: 
 BP Temp Pulse Resp SpO2 Weight  
09/14/18 1125 119/58 98.3 °F (36.8 °C) 75 - 99 % -  
09/14/18 0824 - - - - - 107.3 kg (236 lb 8 oz) 09/14/18 0700 116/60 97.9 °F (36.6 °C) 75 18 99 % -  
09/14/18 0329 131/64 97.6 °F (36.4 °C) 75 17 100 % -  
09/14/18 0000 123/72 97.7 °F (36.5 °C) 75 22 100 % -  
09/13/18 2300 131/67 - 75 - 100 % -  
09/13/18 2200 124/53 - 75 - 95 % -  
09/13/18 2000 132/54 98 °F (36.7 °C) 82 24 98 % -  
09/13/18 1900 150/84 - - - 100 % -  
09/13/18 1800 146/66 - 77 - 100 % -  
09/13/18 1600 130/62 98.2 °F (36.8 °C) 78 18 96 % -  
 
 
ROS negative Physical Examination: 
 
Constitutional obtunded. Appears close to chronological age. Well nourished. Well developed. Head Normocephalic; no scars Eyes Eyes closed ENMT unremarkable Neck Supple without masses or thyromegaly. No jugular venous distension. Hematologic/Lymphatic No petechiae or purpura. No tender or palpable lymph nodes noted Respiratory Fairly clear at present Cardiovascular Regular rate and rhythm of heart Chest / Line Site Chest is symmetric with no chest wall deformities. Abdomen Non-tender, non-distended, no masses, ascites or hepatosplenomegaly. Good bowel sounds. Musculoskeletal Puffy with edema Extremities  edematous. Skin No rashes, scars, or lesions suggestive of malignancy. No petechiae, purpura, or ecchymoses. No excoriations. Neurologic Not tested Psychiatric Not able to assess. Labs: 
Recent Results (from the past 24 hour(s)) GLUCOSE, POC  
 Collection Time: 09/13/18  5:48 PM  
Result Value Ref Range Glucose (POC) 131 (H) 65 - 100 mg/dL Performed by Jean Paul Floyd (PCT) METABOLIC PANEL, BASIC Collection Time: 09/14/18  3:46 AM  
Result Value Ref Range Sodium 135 (L) 136 - 145 mmol/L Potassium 4.5 3.5 - 5.1 mmol/L Chloride 102 97 - 108 mmol/L  
 CO2 26 21 - 32 mmol/L Anion gap 7 5 - 15 mmol/L Glucose 129 (H) 65 - 100 mg/dL BUN 24 (H) 6 - 20 MG/DL Creatinine 0.89 0.70 - 1.30 MG/DL  
 BUN/Creatinine ratio 27 (H) 12 - 20 GFR est AA >60 >60 ml/min/1.73m2 GFR est non-AA >60 >60 ml/min/1.73m2 Calcium 8.4 (L) 8.5 - 10.1 MG/DL  
GENTAMICIN, TROUGH Collection Time: 09/14/18  3:46 AM  
Result Value Ref Range Gentamicin, trough 0.4 (L) 1.0 - 2.0 ug/ml Reported dose date: NOT PROVIDED Reported dose time: NOT PROVIDED Reported dose: NOT PROVIDED UNITS  
CK Collection Time: 09/14/18  3:46 AM  
Result Value Ref Range CK 41 39 - 308 U/L  
GLUCOSE, POC Collection Time: 09/14/18 11:51 AM  
Result Value Ref Range Glucose (POC) 124 (H) 65 - 100 mg/dL Performed by Barby Hwang (PCT) Assessment and Plan:  
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 Cont anticoagulation with enoxaparin 
 
mass on pacing wire- ?infected thrombus vs vegetation. Cardiology not planning to remove pacing wire for now. Will be completing a prolonged course of abx approx 10/20/18. encepalopathy - much improved at present. Stage IIIB colon cancer - s/p resection. If pt recovers, will need to see if candidate for adjuvant chemotherapy. His wife and I reviewed this today and she has my card. Once he is discharged from [facility], she will schedule appt with me. Acute resp failure/Pnuemonia - off vent, Had pigtail and drainage of R pleural eff, cytology negative. Septic shock with bacteremia -ID following. On daptomycin, gentamycin. Juan Sin Normochromic normocytic anemia - B12  929. folate 9.8. Ferritin was 5 on 7/10/18 suggesting fairly significant iron deficiency. He got 500 mg of IV iron in July but actually has a much higher calculated deficit. Ferritin 121 so does not need further IV iron at present. would transfuse for hgb <7. Hx seizure disorder RONAL - CPAP at night. Atrial fib - on amiodaone. Little to add - have given wife my card. At time of discharge, please ask patient and wife to schedule appt as outpatient for followup of his stage 3 colon cancer with me so we can determine re careful f/up vs oral capecitabine therapy.

## 2018-09-14 NOTE — PROGRESS NOTES
Nutrition Assessment: 
 
INTERVENTIONS/RECOMMENDATIONS:  
Continue current TF regimen ASSESSMENT:  
Chart reviewed; continues to tolerate TF @ recommended goal rate. Minimal residual. TF currently meeting 100% of estimated kcal and protein needs. Awaiting insurance authorization for discharge to [address] [place] [address] [address]. If continues to tolerate TF, would consider increasing goal rate to allow time off pump. Recommend TwoCal @ 80 mL/hr x 14 hours to provide 2240 kcal, 93.5 g protein. Diet Order: NPO (PEG: TwoCal @ 50 mL/hr x 22 hours + prosource daily + 200 mL flushes q 4 hours; 2260kcal, 107g protein, 1970 mL water) % Eaten:  No data found. Pertinent Medications: [x] Reviewed []Other: Lasix, Humalog, Lactulose, Keppra, Miralax Pertinent Labs: [x]Reviewed  []Other: Na 135 Food Allergies: [x]None []Other:    
Last BM: 9/13   [x]Active     []Hyperactive  []Hypoactive       [] Absent  BS Skin:    [x] Intact   [] Incision  [] Breakdown   []Edema   []Other: Anthropometrics: Height: 6' 2\" (188 cm) Weight: 107.3 kg (236 lb 8 oz) IBW (%IBW):   ( ) UBW (%UBW):   (  %) BMI: Body mass index is 30.36 kg/(m^2). This BMI is indicative of: 
[]Underweight   []Normal   []Overweight   [x] Obesity   [] Extreme Obesity (BMI>40) Last Weight Metrics: 
Weight Loss Metrics 9/14/2018 7/10/2018 7/5/2018 5/15/2018 5/7/2018 4/10/2018 3/26/2018 Today's Wt 236 lb 8 oz - 283 lb 4.8 oz 266 lb 262 lb 267 lb 267 lb BMI - 30.36 kg/m2 36.37 kg/m2 34.15 kg/m2 33.64 kg/m2 34.28 kg/m2 34.28 kg/m2 Estimated Nutrition Needs (Based on): 2250 Kcals/day (BMR (1875) x 1. 3AF) , 86 g (-108g (0.8-1.0 g/kg bw)) Protein Carbohydrate: At Least 130 g/day  Fluids: 2000 mL/day Pt expected to meet estimated nutrient needs: [x]Yes []No 
 
NUTRITION DIAGNOSES:  
Problem:  No nutritional diagnosis at this time Etiology: related to Signs/Symptoms: as evidenced by NUTRITION INTERVENTIONS: 
 Enteral/Parenteral Nutrition: Other Supplements: Commercial supplement GOAL:  
Patient will continue to tolerate TF @ goal with residual <250 mL next 3-5 days NUTRITION MONITORING AND EVALUATION Food/Nutrient Intake Outcomes: Enteral/parenteral nutrition intake Physical Signs/Symptoms Outcomes: Weight/weight change, Electrolyte and renal profile Previous Goal Met: 
 [x] Met              [] Progressing Towards Goal              [] Not Progressing Towards Goal  
Previous Recommendations: 
 [x] Implemented          [] Not Implemented          [] Not Applicable LEARNING NEEDS (Diet, Food/Nutrient-Drug Interaction):  
 [x] None Identified 
 [] Identified and Education Provided/Documented 
 [] Identified and Pt declined/was not appropriate Cultural, Mosque, OR Ethnic Dietary Needs:  
 [x] None Identified 
 [] Identified and Addressed 
 
 [x] Interdisciplinary Care Plan Reviewed/Documented  
 [x] Discharge Planning: See TF recommendations above [x] Participated in Interdisciplinary Rounds NUTRITION RISK:  
 [] High              [x] Moderate           []  Low  []  Minimal/Uncompromised Rosina Moctezuma Pager [phone] Weekend Pager [phone]

## 2018-09-14 NOTE — PROGRESS NOTES
Hospitalist Progress Note NAME: Luann Quintero :  1940 MRN:  [id number] Assessment / Plan   
Admitted for Lower GI Bleed and Anemia  Colonoscopy showed a LARGE Colon Mass S/P  Colectomy Stage 3 B this hospitalization done  Developed Ileus then aspiration Pna, complications S/P Ileus Aspiration PNA , intubated and prolonged ICU stay Acute resp Failure s/p Ventilator support , extubated  Pneumonia , last sputum culture  + for Heavy staph aureus , light K.pneumoniae Sputum culture  + heavy Burkholderia cepacia S/p treated with Levaquin Kareem Pleural Effusions s/p R chest tube removed  RONAL CPAP q HS 
  
Dysphagia, Failed MBS 
S/p TPN in hospital due to Dysphagia S/P Peg  Wed  Tolerating Tube feedings well Oral Candidiasis ? Infected Thrombus on NATASHA 
NATASHA shows definite large mass associated with pacing wire in RA which may represent vegetation ( ) PER  Infectious Disease Continue Daptomycin, Gentamicin IV, change Fluconazole ( 9/3). & levaquin to po Nystatin Swish & spit Complete total 6 weeks of daptomycin and gentamycin from the latest (-) blood Culture  which was done on 2018. So until  Has A RT ARM PICC LINE On Lovenox therapeutic Fluconazole IV added 9/3 Bacteremia S/p removal of L/IJ placement of PICC RUE , TPN on  Persistent bacteremia with coagulase negative Staph since  initially oxacillin sensitive , last positive BC is oxacillin resistant (1/) Repeat BC ,  , 9/3 no growth,  Positive Staph Coag Neg,  ( negative Chest CT repeated . 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 Hold off on removal of pacemaker at this time . D/w Dr Maliha Rubi , .  . 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 Keena Schroeder discussed with Dr Chuy Motley ( patients Neurologist) Patient  lethargic on 500 mg BID  Keppra but both states Keep on Keppra 250 BID 
 due to better Drug interactions and liver  with all his meds and less Liver issues on Severe Deconditioning. PALLATIVE CARE HAS PREVIOUSLY TALKED TO WIFE AND SHE WANTS EVERYTHING DONE He needs to follow up with ID or Cardiology close to October 20 for Reimaging and Cultures or can be done in at [facility]. To determine what to do with Pacers Hypernatremia - much improved, now on free fluid via Peg 
  
 
  
  
  
Code Status: Full NOK POA  :  WIFE 
DVT Prophylaxis: on lovenox for the thrombus Disposition-- Pt's wife has finally decided to go for [facility] as the next place of care- CM working with [facility] - will be getting Insurance Authorization now, DC in 1-2 days anticipated- awaiting "[employer] Subjective: per staff tolerating Tube feeding Chief Complaint / Reason for Physician Visit : F/U AMS, dysphagia, Bacteremia, PNA, GI bleed, Colon Ca \"I am fine\". Discussed with RN events overnight. Review of Systems: 
Symptom Y/N Comments  Symptom Y/N Comments Fever/Chills n   Chest Pain n   
Poor Appetite n   Edema n   
Cough n   Abdominal Pain n   
Sputum n   Joint Pain n   
SOB/BECERRIL    Pruritis/Rash Nausea/vomit    Tolerating PT/OT y Diarrhea    Tolerating Diet y PEG tube feeding at goal rate Constipation    Other Could NOT obtain due to:   
 
Objective: VITALS:  
Last 24hrs VS reviewed since prior progress note. Most recent are: 
Patient Vitals for the past 24 hrs: Temp Pulse Resp BP SpO2  
09/14/18 0700 97.9 °F (36.6 °C) 75 18 116/60 99 % 09/14/18 0329 97.6 °F (36.4 °C) 75 17 131/64 100 % 09/14/18 0000 97.7 °F (36.5 °C) 75 22 123/72 100 % 09/13/18 2300 - 75 - 131/67 100 % 09/13/18 2200 - 75 - 124/53 95 % 09/13/18 2000 98 °F (36.7 °C) 82 24 132/54 98 % 09/13/18 1900 - - - 150/84 100 % 09/13/18 1800 - 77 - 146/66 100 % 09/13/18 1600 98.2 °F (36.8 °C) 78 18 130/62 96 % 09/13/18 1136 97.9 °F (36.6 °C) 75 20 138/60 100 % Intake/Output Summary (Last 24 hours) at 09/14/18 0940 Last data filed at 09/14/18 0500 Gross per 24 hour Intake             1775 ml Output              275 ml Net             1500 ml PHYSICAL EXAM: 
General: WD, WN. Pleasant, Awake, NAD and Ox3 cooperative, no acute distress   
EENT:  EOMI. Anicteric sclerae. MMM Resp:  CTA bilaterally, no wheezing or rales. No accessory muscle use CV:  Regular  rhythm,  No edema GI:  Soft, Non distended, Non tender.  +Bowel sounds, PEG tube Noted + Neurologic:  Alert and oriented X 3, normal speech, Psych:   Good insight. Not anxious nor agitated Skin:  No rashes. No jaundice, R PICC line noted Reviewed most current lab test results and cultures  YES Reviewed most current radiology test results   YES Review and summation of old records today    NO Reviewed patient's current orders and MAR    YES 
PMH/SH reviewed - no change compared to H&P 
________________________________________________________________________ Care Plan discussed with: 
  Comments Patient x Family  x Wife on the phone RN x Care Manager x Keith Raines Consultant     
                 x Multidiciplinary team rounds were held today with , nursing, pharmacist and clinical coordinator. Patient's plan of care was discussed; medications were reviewed and discharge planning was addressed. ________________________________________________________________________ Total NON critical care TIME:  16 Minutes Total CRITICAL CARE TIME Spent:   Minutes non procedure based Comments >50% of visit spent in counseling and coordination of care    
________________________________________________________________________ Jhonnie Gowers, MD  
 
Procedures: see electronic medical records for all procedures/Xrays and details which were not copied into this note but were reviewed prior to creation of Plan. LABS: 
I reviewed today's most current labs and imaging studies. Pertinent labs include: 
Recent Labs  
   09/13/18 
 6258 WBC  9.5 HGB  9.1*  
HCT  31.3*  
PLT  353 Recent Labs  
   09/14/18 
 0346  09/13/18 
 0621  09/12/18 
 0300 NA  135*  136  139  
K  4.5  4.5  3.9 CL  102  103  103 CO2  26  25  27 GLU  129*  131*  125* BUN  24*  24*  24* CREA  0.89  0.89  0.97 CA  8.4*  8.1*  8.2* MG   --   2.4   --   
PHOS   --   4.3   --   
ALB   --   1.9*   --   
TBILI   --   0.3   --   
SGOT   --   39*   --   
ALT   --   43   --   
 
 
Signed: Jhonnie Gowers, MD

## 2018-09-14 NOTE — PROGRESS NOTES
Problem: Self Care Deficits Care Plan (Adult) Goal: *Acute Goals and Plan of Care (Insert Text) Occupational Therapy Goals Weekly reassessment 7/31/18 1. Patient will perform one grooming task seated EOB with minimal assistance within 7 day(s). 2.  Patient will perform upper body dressing with moderate assistance  within 7 day(s). 3.  Patient will perform supine <> sit to participate in ADL tasks decrease caregiver burden with moderate assistance  within 7 day(s). 4.  Patient will perform sit <> stand to prepare for self care transfers with moderate assistance within 7 day(s). 5.  Patient will participate in R AROM and L self PROM upper extremity therapeutic exercise/activities with contact guard assist for 8 minutes within 7 day(s). 6.  Patient will utilize energy conservation techniques during functional activities with verbal cues within 7 day(s). Initiated 7/22/2018 1. Patient will perform one grooming task seated EOB with minimal assistance within 7 day(s). 2.  Patient will perform upper body dressing with moderate assistance  within 7 day(s). 3.  Patient will perform supine <> sit to participate in ADL tasks decrease caregiver burdenwith maximal assistance  within 7 day(s). 4.  Patient will perform sit <> stand to prepare for self care transfers with maximal assistance within 7 day(s). 5.  Patient will participate in R AROM and L self PROM upper extremity therapeutic exercise/activities with contact guard assist for 8 minutes within 7 day(s). 6.  Patient will utilize energy conservation techniques during functional activities with verbal cues within 7 day(s). Occupational Therapy Goals Re-eval 9/6/2018 and goals remain the same Initiated 8/29/2018 1. Patient will perform grooming with maximal assistance within 7 day(s). 2.  Patient will be able to sit on EOb with max assist of 2 for 2 minutes, in prep for ADLs. 3. Patient to be able to tolerate UE ex on right UE for 3 minutes in order to increase his overall endurance and strength, with in 7 days. Occupational Therapy Goals Revised 9/13/2018 1. Patient will perform self-feeding with minimal assistance/contact guard assist within 7 day(s). 2.  Patient will perform grooming with moderate assistance  within 7 day(s). 3.  Patient will perform upper body dressing with moderate assistance  within 7 day(s). 4. Pt will be able to sit on EOB with for 5 to 7 minutes in prep for ADLs, with in 7days Occupational Therapy TREATMENT Patient: Rico Jin (68 y.o. male) Date: 9/14/2018 Diagnosis: Acute blood loss anemia GI bleed Anasarca GI Bleed 
gi bleed ASCENDING COLON CANCER  
aspiration ASPIRATION PNEUMONIA GI bleed Procedure(s) (LRB): ESOPHAGOGASTRODUODENOSCOPY (EGD) PERCUTANEOUS ENDOSCOPIC GASTROSTOMY TUBE INSERTION (N/A) 9 Days Post-Op Precautions:   
Chart, occupational therapy assessment, plan of care, and goals were reviewed. ASSESSMENT: 
Patient received supine in bed following sheet change with PCTs. Patient remains oriented x2 only. Requiring Max A for bed mobility and only able to tolerate independently sitting EOB for approximately 5 seconds prior to falling backwards and requiring Mod A to correct. Patient only tolerating approximately 8 minutes at EOB today, please see section below for details on exercises. Patient returned to supine with bed alarm activated and call bell in reach. Will benefit from d/c to SNF rehab when medically stable. Progression toward goals: 
[]       Improving appropriately and progressing toward goals [x]       Improving slowly and progressing toward goals 
[]       Not making progress toward goals and plan of care will be adjusted PLAN: 
Patient continues to benefit from skilled intervention to address the above impairments. Continue treatment per established plan of care. Discharge Recommendations:  [facility] Further Equipment Recommendations for Discharge:  TBD SUBJECTIVE:  
Patient stated My legs feel like they're throbbing.  Reoriented patient to SCDs multiple times. OBJECTIVE DATA SUMMARY:  
Cognitive/Behavioral Status: 
Neurologic State: Alert Orientation Level: Oriented to person;Oriented to place Cognition: Decreased attention/concentration; Follows commands; Impulsive Perception: Cues to maintain midline in sitting Safety/Judgement: Awareness of environment; Fall prevention Functional Mobility and Transfers for ADLs: 
Bed Mobility: 
Rolling: Maximum assistance Supine to Sit: Maximum assistance Sit to Supine: Maximum assistance Balance: 
Sitting: Impaired Sitting - Static: Fair (occasional) Sitting - Dynamic: Fair (occasional) ADL Intervention: 
 
Cognitive Retraining Safety/Judgement: Awareness of environment; Fall prevention Neuro Re-Education: 
 Patient able to tolerate sitting EOB for approximately 8 minutes to participate in multiple core and BUE strengthening exercises Patient cued to hold R hand on R knee and use as a support to keep self in midline at EOB, able to hold for approximately 5 seconds independently before leaning backwards Patient requiring Total positioning to place L hand and keep on knee Patient able to reach for one ADL item placed in front of him, requiring Max A to correct balance as he began falling back Patient completing five sit ups from EOB with therapist holding both hands and patient pulling himself to midline Activity Tolerance:  
Good. VSS. Please refer to the flowsheet for vital signs taken during this treatment. After treatment:  
[] Patient left in no apparent distress sitting up in chair 
[x] Patient left in no apparent distress in bed 
[x] Call bell left within reach [x] Nursing notified 
[x] Caregiver present [x] Bed alarm activated COMMUNICATION/COLLABORATION:  
 The patients plan of care was discussed with: Physical Therapist and Registered Nurse Leia Beard OT Time Calculation: 25 mins

## 2018-09-14 NOTE — PROGRESS NOTES
Problem: Dysphagia (Adult) Goal: *Acute Goals and Plan of Care (Insert Text) Speech path reeval 
1. Pt will complete simple oropharyngeal strengthening exercises with max cues. Speech path goals Initiated 9/6/18 1. Patient will follow simple oropharyngeal strengthening exercises with max cues within 7 days Initiated 8/30/2018 1. Patient will participate in [facility] tomorrow. Completed. 2. Pt will participate with oral and pharyngeal swallowing exercises to improve swallowing function. 1. Pt will tolerate purees and nectar thick liquids with no overt s/s of aspiration. Discontinue Speech pathology goals Initiated 8/3/2018 1. Patient will tolerate sips and chips with no overt s/s aspiration within 7 days. Goal met 8/10. 
2. Patient will participate in re-evaluation of swallow function within 7 days. Goal met 8/10. 3. Pt will participate with MBS today 8/10. Goal met 8/10. Speech language pathology dysphagia treatment: WEEKLY REASSESSMENT Patient: Betty Joya (24 y.o. male) Date: 9/14/2018 Diagnosis: Acute blood loss anemia GI bleed Anasarca GI Bleed 
gi bleed ASCENDING COLON CANCER  
aspiration ASPIRATION PNEUMONIA GI bleed Procedure(s) (LRB): ESOPHAGOGASTRODUODENOSCOPY (EGD) PERCUTANEOUS ENDOSCOPIC GASTROSTOMY TUBE INSERTION (N/A) 9 Days Post-Op Precautions:    
 
ASSESSMENT: 
Pt was offered ice chip trials. He accepted the ice chip on the spoon well. Brief oral manipulation noted. Pt would swallow but the ice chip was always remaining on his tongue or there was anterior spillage of the ice chip out onto his chest. Reduced awareness of bolus. Patient's progression toward goals since last assessment: fair PLAN: 
Goals have been updated based on progression since last assessment. Recommendations and Planned Interventions: 
Swallowing therapy Discharge Recommendations: Inpatient Rehab SUBJECTIVE:  
Patient stated he wanted to watch football.   
 
OBJECTIVE:  
 Cognitive and Communication Status: 
Neurologic State: Eyes open spontaneously Orientation Level: Oriented to person, Oriented to place Cognition: Decreased attention/concentration, Follows commands Perception: Cues to attend to left side of body, Cues to attend left visual field Perseveration: Perseverates during conversation Safety/Judgement: Fall prevention Dysphagia Treatment: 
Oral Assessment: P.O. Trials: 
  
Vocal quality prior to P.O.:   
  
  
  
  
  
  
  
  
  
  
  
  
  
  
  
   
  
After treatment:  
[]                Patient left in no apparent distress sitting up in chair 
[x]                Patient left in no apparent distress in bed 
[x]                Call bell left within reach [x]                Nursing notified 
[]                Caregiver present 
[]                Bed alarm activated COMMUNICATION/EDUCATION:  
 
 
The patients plan of care including recommendations, planned interventions, and recommended diet changes were discussed with: Registered Nurse. []                Posted safety precautions in patient's room. DIPAK Gill Time Calculation: 10 mins

## 2018-09-14 NOTE — PROGRESS NOTES
Spoke with Doc Crowley with [facility] and she states they are awaiting to hear from the insurance company for authorization.

## 2018-09-14 NOTE — PROGRESS NOTES
Problem: Mobility Impaired (Adult and Pediatric) Goal: *Acute Goals and Plan of Care (Insert Text) Physical Therapy Goals Reviewed 9/13/18- goals remain appropriate Goals reviewed 9/6/18- goals remain appropriate Goals reviewed and revised 8/29/18 1. Patient will move from supine to sit and sit to supine , scoot up and down and roll side to side in bed with moderate assistance within 7 day(s). 2.  Patient will transfer from bed to chair and chair to bed with max assistance x 2 using the least restrictive device within 7 day(s). 3.  Patient will perform sit to stand with moderate assistance x 2 within 7 day(s). 4.  Patient will perform stand pivot transfer to wheelchair with max assistance x 2 in 7 days. Reviewed 8/3/18- goals remain appropriate Reviewed and revised 7/26/2018 1. Patient will move from supine to sit and sit to supine , scoot up and down and roll side to side in bed with minimal assistance within 7 day(s). 2.  Patient will transfer from bed to chair and chair to bed with moderate assistance using the least restrictive device within 7 day(s). 3.  Patient will perform sit to stand with moderate assistance within 7 day(s). 4.  Patient will perform stand pivot transfer to wheelchair with moderate assistance in 7 days. Reviewed and revised 7/19/2018 1. Patient will move from supine to sit and sit to supine , scoot up and down and roll side to side in bed with minimal assistance within 7 day(s). 2.  Patient will transfer from bed to chair and chair to bed with moderate assistance using the least restrictive device within 7 day(s). 3.  Patient will perform sit to stand with minimal assistance within 7 day(s). 4.  Patient will perform stand pivot transfer to wheelchair with minimal assistance in 7 days. Initiated 7/12/2018 1.   Patient will move from supine to sit and sit to supine , scoot up and down and roll side to side in bed with modified independence within 7 day(s). 2.  Patient will transfer from bed to chair and chair to bed with supervision/set-up using the least restrictive device within 7 day(s). 3.  Patient will perform sit to stand with supervision/set-up within 7 day(s). 4.  Patient will perform stand pivot transfer to wheelchair with modified independence in 7 days. physical Therapy TREATMENT Patient: Ama Corona (17 y.o. male) Date: 9/14/2018 Diagnosis: Acute blood loss anemia GI bleed Anasarca GI Bleed 
gi bleed ASCENDING COLON CANCER  
aspiration ASPIRATION PNEUMONIA GI bleed Procedure(s) (LRB): ESOPHAGOGASTRODUODENOSCOPY (EGD) PERCUTANEOUS ENDOSCOPIC GASTROSTOMY TUBE INSERTION (N/A) 9 Days Post-Op Precautions:   
Chart, physical therapy assessment, plan of care and goals were reviewed. ASSESSMENT: 
Pt was received in supine and cleared by nursing to mobilize. VSS during session. He continues to require max A x 2 for bed mobility. He presents with fair sitting balance. He was not able to tolerate as much today with back to back therapy sessions. Tolerated 8 minutes. Continues to need constant cues to maintain midline. Worked on reaching out of JOHNY. He was returned to supine. Nursing notified of using stephy lift to get pt out of bed by calling mobility team. Asiya Clark pad in the room and number for mobility team on the white board. Progression toward goals: 
[]    Improving appropriately and progressing toward goals [x]    Improving slowly and progressing toward goals 
[]    Not making progress toward goals and plan of care will be adjusted PLAN: 
Patient continues to benefit from skilled intervention to address the above impairments. Continue treatment per established plan of care. Discharge Recommendations:  Skilled Nursing Facility/ [facility] Further Equipment Recommendations for Discharge:  TBD SUBJECTIVE:  
Patient stated David Landen down now.  OBJECTIVE DATA SUMMARY:  
Critical Behavior: 
Neurologic State: Eyes open spontaneously Orientation Level: Oriented to person, Oriented to place Cognition: Decreased attention/concentration, Follows commands Safety/Judgement: Fall prevention Functional Mobility Training: 
Bed Mobility: 
Rolling: Maximum assistance Supine to Sit: Maximum assistance Sit to Supine: Maximum assistance Balance: 
Sitting: Impaired Sitting - Static: Fair (occasional) Sitting - Dynamic: Fair (occasional) Activity Tolerance: VSS Please refer to the flowsheet for vital signs taken during this treatment. After treatment:  
[]    Patient left in no apparent distress sitting up in chair 
[x]    Patient left in no apparent distress in bed 
[x]    Call bell left within reach [x]    Nursing notified 
[]    Caregiver present [x]    Bed alarm activated COMMUNICATION/COLLABORATION:  
The patients plan of care was discussed with: Occupational Therapist and Registered Nurse Adrian Rivera PT, DPT Time Calculation: 25 mins

## 2018-09-15 LAB
ANION GAP SERPL CALC-SCNC: 10 MMOL/L (ref 5–15)
BUN SERPL-MCNC: 23 MG/DL (ref 6–20)
BUN/CREAT SERPL: 24 (ref 12–20)
CALCIUM SERPL-MCNC: 8.5 MG/DL (ref 8.5–10.1)
CHLORIDE SERPL-SCNC: 100 MMOL/L (ref 97–108)
CO2 SERPL-SCNC: 25 MMOL/L (ref 21–32)
CREAT SERPL-MCNC: 0.96 MG/DL (ref 0.7–1.3)
GLUCOSE BLD STRIP.AUTO-MCNC: 106 MG/DL (ref 65–100)
GLUCOSE BLD STRIP.AUTO-MCNC: 117 MG/DL (ref 65–100)
GLUCOSE BLD STRIP.AUTO-MCNC: 144 MG/DL (ref 65–100)
GLUCOSE BLD STRIP.AUTO-MCNC: 150 MG/DL (ref 65–100)
GLUCOSE SERPL-MCNC: 127 MG/DL (ref 65–100)
POTASSIUM SERPL-SCNC: 4.4 MMOL/L (ref 3.5–5.1)
SERVICE CMNT-IMP: ABNORMAL
SODIUM SERPL-SCNC: 135 MMOL/L (ref 136–145)

## 2018-09-15 PROCEDURE — 74011000258 HC RX REV CODE- 258: Performed by: INTERNAL MEDICINE

## 2018-09-15 PROCEDURE — 77030034849

## 2018-09-15 PROCEDURE — 65660000000 HC RM CCU STEPDOWN

## 2018-09-15 PROCEDURE — 74011250637 HC RX REV CODE- 250/637: Performed by: INTERNAL MEDICINE

## 2018-09-15 PROCEDURE — 74011250636 HC RX REV CODE- 250/636: Performed by: INTERNAL MEDICINE

## 2018-09-15 PROCEDURE — 36415 COLL VENOUS BLD VENIPUNCTURE: CPT | Performed by: INTERNAL MEDICINE

## 2018-09-15 PROCEDURE — 82962 GLUCOSE BLOOD TEST: CPT

## 2018-09-15 PROCEDURE — 80048 BASIC METABOLIC PNL TOTAL CA: CPT | Performed by: INTERNAL MEDICINE

## 2018-09-15 PROCEDURE — 74011636637 HC RX REV CODE- 636/637: Performed by: INTERNAL MEDICINE

## 2018-09-15 PROCEDURE — 77030010545

## 2018-09-15 RX ADMIN — GENTAMICIN SULFATE 80 MG: 40 INJECTION, SOLUTION INTRAMUSCULAR; INTRAVENOUS at 06:46

## 2018-09-15 RX ADMIN — LEVETIRACETAM 250 MG: 500 SOLUTION ORAL at 09:10

## 2018-09-15 RX ADMIN — Medication 10 ML: at 06:47

## 2018-09-15 RX ADMIN — NYSTATIN 500000 UNITS: 100000 SUSPENSION ORAL at 12:59

## 2018-09-15 RX ADMIN — NYSTATIN 500000 UNITS: 100000 SUSPENSION ORAL at 09:12

## 2018-09-15 RX ADMIN — APIXABAN 5 MG: 5 TABLET, FILM COATED ORAL at 09:04

## 2018-09-15 RX ADMIN — INSULIN LISPRO 3 UNITS: 100 INJECTION, SOLUTION INTRAVENOUS; SUBCUTANEOUS at 12:59

## 2018-09-15 RX ADMIN — VENLAFAXINE 37.5 MG: 37.5 TABLET ORAL at 17:25

## 2018-09-15 RX ADMIN — Medication 20 ML: at 13:03

## 2018-09-15 RX ADMIN — LACTULOSE 10 G: 20 SOLUTION ORAL at 17:25

## 2018-09-15 RX ADMIN — AMIODARONE HYDROCHLORIDE 200 MG: 200 TABLET ORAL at 09:04

## 2018-09-15 RX ADMIN — NYSTATIN 500000 UNITS: 100000 SUSPENSION ORAL at 21:41

## 2018-09-15 RX ADMIN — VENLAFAXINE 37.5 MG: 37.5 TABLET ORAL at 09:04

## 2018-09-15 RX ADMIN — FUROSEMIDE 60 MG: 40 TABLET ORAL at 09:04

## 2018-09-15 RX ADMIN — APIXABAN 5 MG: 5 TABLET, FILM COATED ORAL at 21:41

## 2018-09-15 RX ADMIN — Medication 10 ML: at 21:42

## 2018-09-15 RX ADMIN — Medication 10 ML: at 15:16

## 2018-09-15 RX ADMIN — LACTULOSE 10 G: 20 SOLUTION ORAL at 09:11

## 2018-09-15 RX ADMIN — DAPTOMYCIN 1000 MG: 500 INJECTION, POWDER, LYOPHILIZED, FOR SOLUTION INTRAVENOUS at 15:01

## 2018-09-15 RX ADMIN — NYSTATIN 500000 UNITS: 100000 SUSPENSION ORAL at 17:25

## 2018-09-15 RX ADMIN — LEVETIRACETAM 250 MG: 500 SOLUTION ORAL at 21:41

## 2018-09-15 NOTE — PROGRESS NOTES
Attempted follow up visit on several occasions with hopes of speaking with pt's wife Lay Hewitt. Found pt sleeping, being changed by staff and then laying in bed with eyes open but did not respond to 's touching of hand or calling of name. Silent prayer on behalf of pt and Lay Hewitt. Chaplains will continue to follow up as able/needed. KIRILL Kennedy. [place]

## 2018-09-15 NOTE — PROGRESS NOTES
Hospitalist Progress Note NAME: Chan Petersen :  1940 MRN:  [id number] Assessment / Plan   
Admitted for Lower GI Bleed and Anemia  Colonoscopy showed a LARGE Colon Mass S/P  Colectomy Stage 3 B this hospitalization done  Developed Ileus then aspiration Pna, complications S/P Ileus Aspiration PNA , intubated and prolonged ICU stay Acute resp Failure s/p Ventilator support , extubated  Pneumonia , last sputum culture  + for Heavy staph aureus , light K.pneumoniae Sputum culture  + heavy Burkholderia cepacia S/p treated with Levaquin Kareem Pleural Effusions s/p R chest tube removed  RONAL CPAP q HS 
  
Dysphagia, Failed MBS 
S/p TPN in hospital due to Dysphagia S/P Peg  Wed  Tolerating Tube feedings well Oral Candidiasis ? Infected Thrombus on NATASHA 
NATASHA shows definite large mass associated with pacing wire in RA which may represent vegetation ( ) PER  Infectious Disease Continue Daptomycin, Gentamicin IV, change Fluconazole ( 9/3). & levaquin to po Nystatin Swish & spit Complete total 6 weeks of daptomycin and gentamycin from the latest (-) blood Culture  which was done on 2018. So until  Has A RT ARM PICC LINE On Lovenox therapeutic Fluconazole IV added 9/3 Bacteremia S/p removal of L/IJ placement of PICC RUE , TPN on  Persistent bacteremia with coagulase negative Staph since  initially oxacillin sensitive , last positive BC is oxacillin resistant (1/) Repeat BC ,  , 9/3 no growth,  Positive Staph Coag Neg,  ( negative Chest CT repeated . 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 Hold off on removal of pacemaker at this time . D/w Dr Maria De Jesus Olsen , .  . 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 Abigail Mendoza discussed with Dr Doroteo Cool ( patients Neurologist) Patient  lethargic on 500 mg BID  Keppra but both states Keep on Keppra 250 BID 
 due to better Drug interactions and liver  with all his meds and less Liver issues on Severe Deconditioning. PALLATIVE CARE HAS PREVIOUSLY TALKED TO WIFE AND SHE WANTS EVERYTHING DONE He needs to follow up with ID or Cardiology close to October 20 for Reimaging and Cultures or can be done in at [facility]. To determine what to do with Pacers Hypernatremia - much improved, now on free fluid via Peg 
  
 
  
  
  
Code Status: Full NOK POA  :  WIFE 
DVT Prophylaxis: on lovenox for the thrombus Disposition-- Pt's wife has finally decided to go for [facility] as the next place of care- [facility] - will be getting Insurance Authorization now, DC in 1-2 days anticipated- still awaiting Insurance Authorization Subjective: per staff tolerating Tube feeding Chief Complaint / Reason for Physician Visit : F/U AMS, dysphagia, Bacteremia, PNA, GI bleed, Colon Ca \"I am fine\". Discussed with RN events overnight. Review of Systems: 
Symptom Y/N Comments  Symptom Y/N Comments Fever/Chills n   Chest Pain n   
Poor Appetite n   Edema n   
Cough n   Abdominal Pain n   
Sputum n   Joint Pain n   
SOB/BECERRIL    Pruritis/Rash Nausea/vomit    Tolerating PT/OT y Diarrhea    Tolerating Diet y PEG tube feeding at goal rate Constipation    Other Could NOT obtain due to:   
 
Objective: VITALS:  
Last 24hrs VS reviewed since prior progress note. Most recent are: Patient Vitals for the past 24 hrs: 
 Temp Pulse Resp BP SpO2  
09/15/18 0829 97.6 °F (36.4 °C) 82 24 115/41 96 % 09/15/18 0300 98 °F (36.7 °C) 75 18 129/54 96 % 09/14/18 2319 97.8 °F (36.6 °C) 80 18 115/68 98 % 09/14/18 1954 97.5 °F (36.4 °C) 76 18 126/50 98 % 09/14/18 1125 98.3 °F (36.8 °C) 75 18 119/58 99 % Intake/Output Summary (Last 24 hours) at 09/15/18 9396 Last data filed at 09/15/18 4285 Gross per 24 hour Intake             2135 ml Output             1000 ml Net             1135 ml PHYSICAL EXAM: 
General: WD, WN. Pleasant, Awake, NAD and Ox3 cooperative, no acute distress   
EENT:  EOMI. Anicteric sclerae. MMM Resp:  CTA bilaterally, no wheezing or rales. No accessory muscle use CV:  Regular  rhythm,  No edema GI:  Soft, Non distended, Non tender.  +Bowel sounds, PEG tube Noted + Neurologic:  Alert and oriented X 3, normal speech, Psych:   Good insight. Not anxious nor agitated Skin:  No rashes. No jaundice, R PICC line noted Reviewed most current lab test results and cultures  YES Reviewed most current radiology test results   YES Review and summation of old records today    NO Reviewed patient's current orders and MAR    YES 
PMH/SH reviewed - no change compared to H&P 
________________________________________________________________________ Care Plan discussed with: 
  Comments Patient x Family RN x Care Manager Consultant  x Dr Tayler Hadley (ID) yesterday Multidiciplinary team rounds were held today with , nursing, pharmacist and clinical coordinator. Patient's plan of care was discussed; medications were reviewed and discharge planning was addressed. ________________________________________________________________________ Total NON critical care TIME:  16 Minutes Total CRITICAL CARE TIME Spent:   Minutes non procedure based Comments >50% of visit spent in counseling and coordination of care ________________________________________________________________________ Itz Funk MD  
 
Procedures: see electronic medical records for all procedures/Xrays and details which were not copied into this note but were reviewed prior to creation of Plan. LABS: 
I reviewed today's most current labs and imaging studies. Pertinent labs include: 
Recent Labs  
   09/13/18 
 4399 WBC  9.5 HGB  9.1*  
HCT  31.3*  
PLT  353 Recent Labs  
   09/15/18 
 0425  09/14/18 
 0346  09/13/18 
 7924 NA  135*  135*  136  
K  4.4  4.5  4.5  
CL  100  102  103 CO2  25  26  25 GLU  127*  129*  131* BUN  23*  24*  24* CREA  0.96  0.89  0.89 CA  8.5  8.4*  8.1*  
MG   --    --   2.4 PHOS   --    --   4.3 ALB   --    --   1.9* TBILI   --    --   0.3 SGOT   --    --   39* ALT   --    --   43 Signed: Itz Funk MD

## 2018-09-15 NOTE — PROGRESS NOTES
Problem: Falls - Risk of 
Goal: *Absence of Falls Document Samantha Vencese Fall Risk and appropriate interventions in the flowsheet. Outcome: Progressing Towards Goal 
Fall Risk Interventions: 
Mobility Interventions: Strengthening exercises (ROM-active/passive) Mentation Interventions: Bed/chair exit alarm Medication Interventions: Bed/chair exit alarm Elimination Interventions: Call light in reach History of Falls Interventions: Bed/chair exit alarm Problem: Pressure Injury - Risk of 
Goal: *Prevention of pressure injury Document Dagoberto Scale and appropriate interventions in the flowsheet. Outcome: Progressing Towards Goal 
Pressure Injury Interventions: 
Sensory Interventions: Pressure redistribution bed/mattress (bed type) Moisture Interventions: Absorbent underpads Activity Interventions: Increase time out of bed, Pressure redistribution bed/mattress(bed type) Mobility Interventions: Pressure redistribution bed/mattress (bed type), HOB 30 degrees or less Nutrition Interventions: Document food/fluid/supplement intake Friction and Shear Interventions: HOB 30 degrees or less Comments: Gregory Ford to get patient to chair ABD dressing changes daily Colostomy bag changed 9/15 SCD's, Prevalon boots 
 
------------------- Wife to be here tomorrow

## 2018-09-15 NOTE — PROGRESS NOTES
PCU SHIFT NURSING NOTE 
 
9130:Bedside  shift change report given to [facility] [employer] (oncoming nurse) by Rachael Rocha (offgoing nurse). Report included the following information SBAR, Kardex, Intake/Output, MAR, Recent Results, Cardiac Rhythm paced rhythm and Alarm Parameters . 2230: The patient resting in bed with eyes open. He denied pain or discomfort at this time. Meds administered through PEG tube  as per schedule. To continue to monitor. 0300: The patient resting in bed with eyes closed. No signs of pain or distress noted. The colostomy bag emptied. Urine output through the pure wick is 600. To continue to monitor. 0600: The patient resting in bed with eyes open. No signs of distress or pain noted. Blood collected for BMP. Shift Summary:  
 
 
Admission Date 7/10/2018 Admission Diagnosis Acute blood loss anemia GI bleed Anasarca GI Bleed 
gi bleed ASCENDING COLON CANCER  
aspiration ASPIRATION PNEUMONIA Consults IP CONSULT TO GASTROENTEROLOGY 
IP CONSULT TO INTERVENTIONAL RADIOLOGY 
IP CONSULT TO INTENSIVIST 
IP CONSULT TO NEPHROLOGY 
IP CONSULT TO HEMATOLOGY 
IP CONSULT TO GASTROENTEROLOGY 
IP CONSULT TO INFECTIOUS DISEASES 
IP CONSULT TO NEUROLOGY 
IP CONSULT TO CARDIOLOGY 
IP CONSULT TO COLORECTAL SURGERY 
IP CONSULT TO PULMONOLOGY 
IP CONSULT TO PULMONOLOGY 
IP CONSULT TO HOSPITALIST Consults [x]PT [x]OT [x]Speech [x]Case Management  
  
[x] Palliative Cardiac Monitoring Order  
[x]Yes []No  
 
IV drips []Yes Drip:                            Dose: 
Drip:                            Dose: 
Drip:                            Dose:  
[x]No  
 
GI Prophylaxis [x]Yes []No  
 
 
 
DVT Prophylaxis SCDs:  Sequential Compression Device: Bilateral  
    
 Ra stockings:    
  
[x] Medication []Contraindicated []None Activity Level Activity Level: Bed Rest   
 Activity Assistance: Complete care Purposeful Rounding every 1-2 hour? [x]Yes Mckee Score  Total Score: 3 Bed Alarm (If score 3 or >) [x]Yes  
[] Refused (See signed refusal form in chart) Dagoberto Score  Dagoberto Score: 14 Dagoberto Score (if score 14 or less) [x]PMT consult [x]Wound Care consult []Specialty bed  
[] Nutrition consult Needs prior to discharge:  
Home O2 required:   
[]Yes  
[x]No  
 If yes, how much O2 required? Other:  
 Last Bowel Movement: Last Bowel Movement Date: 09/14/18 Influenza Vaccine Received Flu Vaccine for Current Season (usually Sept-March): Not Flu Season Pneumonia Vaccine Diet Active Orders Diet DIET NPO With Tube Feedings LDAs PICC Double Lumen 08/31/18 Right;Brachial (Active) Central Line Being Utilized Yes 9/14/2018  7:54 PM  
Criteria for Appropriate Use Limited/no vessel suitable for conventional peripheral access 9/14/2018  7:54 PM  
Site Assessment Clean, dry, & intact 9/14/2018  7:54 PM  
Phlebitis Assessment 0 9/14/2018  7:54 PM  
Infiltration Assessment 0 9/14/2018  7:54 PM  
Arm Circumference (cm) 39 cm 9/8/2018  7:00 PM  
Date of Last Dressing Change 09/10/18 9/14/2018  7:54 PM  
Dressing Status Clean, dry, & intact 9/14/2018  7:54 PM  
Action Taken Open ports on tubing capped 9/14/2018  7:54 PM  
External Catheter Length (cm) 0 centimeters 9/10/2018  6:45 PM  
Dressing Type Disk with Chlorhexadine gluconate (CHG) 9/14/2018  7:54 PM  
Hub Color/Line Status Blue 9/14/2018  7:54 PM  
Positive Blood Return (Site #1) Yes 9/14/2018  7:54 PM  
Hub Color/Line Status Red 9/14/2018  7:54 PM  
Positive Blood Return (Site #2) Yes 9/14/2018  7:54 PM  
Alcohol Cap Used Yes 9/14/2018  7:54 PM  
            
PEG/Gastrostomy Tube 09/05/18 (Active) Site Assessment Clean, dry, & intact 9/14/2018  7:54 PM  
Dressing Status Clean, dry, & intact 9/14/2018  7:54 PM  
G Port Status Infusing 9/14/2018  7:54 PM  
 Action Taken Tubing changed 9/14/2018 12:00 PM  
Drainage Description Clear 9/14/2018  7:54 PM  
Gastric Residual (mL) 0 ml 9/14/2018  7:54 PM  
Tube Feeding/Formula Options Twocal HN 9/14/2018  8:00 AM  
Modular Nutrients Protein liquid 9/14/2018  4:00 AM  
Tube Feeding/Verify Rate (mL/hr) 50 9/14/2018  7:54 PM  
Water Flush Volume (mL) 200 mL 9/14/2018  7:54 PM  
Intake (ml) 200 ml 9/14/2018  4:00 PM  
Medication Volume 50 ml 9/13/2018  7:00 AM  
   
Colostomy 07/10/18 Left Abdomen (Active) Drainage Color Brown 9/12/2018  8:31 PM  
Site Assessment Clean, dry, intact 9/14/2018 12:00 PM  
Treatment Site care 9/8/2018  7:00 PM  
Output (ml) 200 mL 9/13/2018  1:56 AM  
   
External Female Catheter 09/12/18 (Active) Site Assessment Clean, dry, & intact 9/13/2018  6:19 AM  
Repositioned Yes 9/13/2018  6:19 AM  
Perineal Care Yes 9/13/2018  6:19 AM  
Wick Changed Yes 9/13/2018  6:19 AM  
Suction Canister/Tubing Changed No 9/13/2018  6:19 AM  
Urine Output (mL) 250 ml 9/13/2018  6:19 AM  
            
Urinary Catheter [REMOVED] Urinary Catheter 07/17/18 2- way; Erazo-Indications for Use: Accurate measurement of urinary output [REMOVED] Urinary Catheter 08/11/18 Erazo-Indications for Use: Accurate measurement of urinary output [REMOVED] Urinary Catheter 09/02/18 Erazo-Indications for Use: Chronic urinary retention/bladder outlet obstruction, Accurate measurement of urinary output Intake & Output Date 09/13/18 1900 - 09/14/18 1585 09/14/18 0700 - 09/15/18 3444 Shift 7626-1359 24 Hour Total 9901-3951 1216-8860 24 Hour Total  
I 
N 
T 
A 
K 
E 
 NG/GT 1775 3025 2458 907 8864 Water Flush Volume (mL) (PEG/Gastrostomy Tube 09/05/18) 600 1200 600 200 800 Medication Volume (PEG/Gastrostomy Tube 09/05/18)  50 Intake (ml) (PEG/Gastrostomy Tube 09/05/18) 1175 1775 600  600 Shift Total 
(mL/kg) 1775 
(16.1) 3025 
(27.5) 1200 
(11.2) 200 
(1.9) 1400 
(13.1) O 
U T 
P 
U Durga Pool Urine (mL/kg/hr) 275 275 Urine 275 275 Urine Occurrence(s)   1 x  1 x Shift Total 
(mL/kg) 275 
(2.5) 275 
(2.5) NET 1500 2750 2345 874 9823 Weight (kg) 110.2 110.2 107.3 107.3 107.3 Readmission Risk Assessment Tool Score High Risk   
      
 21 Total Score 3 Has Seen PCP in Last 6 Months (Yes=3, No=0) 2 . Living with Significant Other. Assisted Living. LTAC. SNF. or  
Rehab  
 3 Patient Length of Stay (>5 days = 3)  
 9 Pt. Coverage (Medicare=5 , Medicaid, or Self-Pay=4) 4 Charlson Comorbidity Score (Age + Comorbid Conditions) Criteria that do not apply:  
 IP Visits Last 12 Months (1-3=4, 4=9, >4=11) Expected Length of Stay 10d 19h Actual Length of Stay 66

## 2018-09-15 NOTE — PROGRESS NOTES
Est. Time, patient urinated on gown, condom catheter not in place. Turn team and this RN set patient in recliner with lift. RN notified Tech that patient needs another condom catheter/ use with stick sealant. Tech reported she will \"take care of it. \"

## 2018-09-15 NOTE — PROGRESS NOTES
Infectious Disease Progress Note IMPRESSION:  
· Bacteremia again with BC 1/2 + for Coagulase negative Staph- Staph Capitis Oxacillin R ( 9/5) ·  Repeat [facility] 9/7 no growth · Cellulitis LUE nearly resolved · Repeat CT chest ( 9/7) shows non occlusive thrombus in R/ IJ vein ,small amount of thrombus along right PICC catheter/ pacer leads in SVC which extends to right atrium · S/p peg placement ·  S/p removal of L/IJ placement of PICC RUE , TPN on 8/31 ·  Persistent bacteremia with coagulase negative Staph since 8/11 initially oxacillin sensitive , last positive BC is oxacillin resistant 8/21(1/4) · Repeat BC 8/26, 8/29 , 9/3 no growth ·  S/p failed MBS · NATASHA shows definite large mass associated with pacing wire in RA which may represent vegetation ( 8/23) · Thrombus  & tiny gas bubble of as in RIJ extending into SVC to level of RA (8/15)  s/p heparin IV now on lovenox s/q · CTA chest - no PE, could not visualize SVC thrombus, left lateral wall soft tissue swelling, soft tissue around pacemaker show no significant abnormality · US chest wall - minimal fluid in pacemaker pocket, no significant inflammation of overlying subcutaneous fat or skin · Acute respiratory failure on Ventilator / h/o tracheostomy 24 years ago  , s/p extubation 8/26 · R/ pleural effusion s/p chest tube placement , CXr clear · Increased sputum production - 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    
  
  
PLAN:  
   
· Continue Daptomycin x 6 weeks , Gentamicin IV x 4 weeks from last negative cultures. End date for Daptomycin is Oct 19,for Gentamicin is Oct 5.  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 · Weekly CBC, CMP, CK , gentamicin trough & peak q weekly , MD at [facility] to be notified about f/u on labs /also send reports to my office at [phone]. D/w Dr Lottie Funes. · Thrombus in R/IJ,   mass in RA which is suggestive of thrombus rather than vegetation in light of pacemaker pocket appearing clear of infection. · Decision made to hold off on removal of pacemaker at this time . D/w Dr Storm Gottlieb , .  . · Continue antimicrobials & anticoagulation CT chest -  IMPRESSION: 
  
1. There is a small amount of nonocclusive thrombus in the right internal 
jugular vein. 
Allan Elenita is also a small amount of thrombus along the right PICC catheter/pacer 
leads in the SVC which extends to the right atrium. 
 There is improvement in the bibasilar atelectasis/effusions Blood culture - Special Requests: NO SPECIAL REQUESTS   Preliminary Culture result: NO GROWTH 2 DAYS Blood culture - Culture result:    Final  
STAPHYLOCOCCUS CAPITIS SUBSPECIES UREOLYTICUS (OXACILLIN RESISTANT) , ISOLATED FROM 1 OF 2 BOTTLES DRAWN. .. LEFT HAND SITE (A) Subjective:  
 
Pt seen  . Resting, arousable Review of Systems:  Pt denies complaints. 10 point ROS obtained Objective:  
 
Blood pressure 129/54, pulse 75, temperature 98 °F (36.7 °C), resp. rate 18, height 6' 2\" (1.88 m), weight 245 lb 6 oz (111.3 kg), SpO2 96 %. Temp (24hrs), Av.9 °F (36.6 °C), Min:97.5 °F (36.4 °C), Max:98.3 °F (36.8 °C) Patient Vitals for the past 24 hrs: 
 Temp Pulse Resp BP SpO2  
09/15/18 0300 98 °F (36.7 °C) 75 18 129/54 96 % 18 2319 97.8 °F (36.6 °C) 80 18 115/68 98 % 18 1954 97.5 °F (36.4 °C) 76 18 126/50 98 % 18 1125 98.3 °F (36.8 °C) 75 18 119/58 99 % Lines:  Central Venous Catheter:  LIJ Physical Exam:  
General:   Resting, arousable Lungs:    Reduced air entry bases on  auscultation  chest wall-  pacemaker site -no swelling, erythemal  
 CV:  Regular rate and rhythm,no murmur, Abdomen:   Soft, non-tender. bowel sounds +distended Extremities: LUE - erythema distal forearm is less Lines/Devices:  Intact, no erythema, drainage or tenderness Data Review: CBC:  
Recent Labs  
   09/13/18 
 3626 WBC  9.5  
RBC  3.38* HGB  9.1*  
HCT  31.3*  
PLT  353 CMP:  
Recent Labs  
   09/15/18 
 0425  09/14/18 
 0346  09/13/18 
 3569 GLU  127*  129*  131* NA  135*  135*  136  
K  4.4  4.5  4.5  
CL  100  102  103 CO2  25  26  25 BUN  23*  24*  24* CREA  0.96  0.89  0.89 CA  8.5  8.4*  8.1* AGAP  10  7  8 BUCR  24*  27*  27* AP   --    --   113 TP   --    --   7.6 ALB   --    --   1.9*  
GLOB   --    --   5.7* AGRAT   --    --   0.3* Studies:     
Lab Results Component Value Date/Time Culture result: NO GROWTH 6 DAYS 09/08/2018 01:46 AM  
 Culture result: (A) 09/05/2018 07:03 PM  
  STAPHYLOCOCCUS CAPITIS SUBSPECIES UREOLYTICUS (OXACILLIN RESISTANT) , ISOLATED FROM 1 OF 2 BOTTLES DRAWN. .. LEFT HAND SITE Culture result: (A) 09/05/2018 07:03 PM  
  PRELIMINARY REPORT OF GRAM POSITIVE COCCI IN CLUSTERS GROWING IN 1 OF 2 BOTTLES DRAWN CALLED TO AND READ BACK BY FELIX CAMPOS RN [id number] AT 2106 ON 9/6/2018. [place] [address] Culture result:  09/05/2018 07:03 PM  
   [facility] REQUESTING IDENTIFICATION AND SENSITIVITIES 9/10/18 TA. Culture result: REMAINING BOTTLE(S) HAS/HAVE NO GROWTH IN 5 DAYS 09/05/2018 07:03 PM  
  
 
XR Results (most recent): 
 
Results from Hospital Encounter encounter on 07/10/18 XR ABD PORT  1 V Narrative EXAM: KUB INDICATION: abd distension. 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 Portable supine KUB obtained at 0729 hours shows no bowel distention. There are 
vascular calcifications. Impression IMPRESSION: Normal bowel gas pattern. Patient Active Problem List  
Diagnosis Code  
 HTN (hypertension) I10  
 Depression F32.9  Hypercholesterolemia E78.00  
  Acid reflux K21.9  Seizure ([facility] R56.9  Hypothyroidism E03.9  Hyponatremia E87.1  BPH (benign prostatic hyperplasia) N40.0  Rash R21  
 Cellulitis L03.90  Wax in ear H61.20  Ulcer [id number]  Small bowel obstruction ([facility] U68.147  Atrial flutter (HCC) I48.92  
 Atrial fibrillation or flutter  CHI (closed head injury) S09. 90XA  Basal cell cancer C44.91  
 TBI (traumatic brain injury) ([facility] S06. 9X9A  Atrial fibrillation (HCC) I48.91  
 Cataract H26.9  Constipation K59.00  Ankle fracture, left X22.574R  Insomnia G47.00  Tinea corporis B35.4  SSS (sick sinus syndrome) ([facility]) I49.5  Syncope R55  Seizure disorder ([facility] G40.909  RONAL on CPAP G47.33, Z99.89  
 Pacemaker Z95.0  Pre-op evaluation Z01.818  Chronic back pain greater than 3 months duration M54.9, G89.29  
 Cerebral infarction ([facility]) I63.9  Acute bronchitis J20.9  Screening for colon cancer Z12.11  
 Chronic right shoulder pain M25.511, G89.29  
 Common wart B07.8  Localized epilepsy with impairment of consciousness ([facility] G40.209  Severe obesity (BMI 35.0-39.9) ([facility]) E66.01  
 Acute blood loss anemia D62  Anasarca R60.1  GI bleed K92.2  Acute encephalopathy G93.40  Idiopathic hypotension I95.0  Counseling regarding goals of care Z71.89 ICD-10-CM ICD-9-CM 1. Anemia, unspecified type D64.9 285.9 2. Generalized weakness R53.1 780.79   
3. Acute encephalopathy G93.40 348.30   
4. Anasarca R60.1 782.3 5. Idiopathic hypotension I95.0 458.9 6. Counseling regarding goals of care Z71.89 V65.49   
7. 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 9X9S 907.0 8. Chronic atrial fibrillation (HCC) I48.2 427.31   
9. Malignant neoplasm of ascending colon ([facility]) C18.2 153.6 Anti-infectives: Fluconazole IV- 9/3- 9/6 IV  Changed to po, completed 9/10 Daptomycin IV - 8/26- 9/3 Gentamicin IV 8/26 S/p levaquin 8/29-9/7 Cefotetan 7/17 Zosyn 7/20-8/7 Cefepime - 8/11-8/13 Ceftriaxone 8/13-8/17 Vancomycin 8/11-8/22 Daptomycin 8/23- 8/24- Naficillin IV 8/24- 8/26  Pipo Russo MD FACP

## 2018-09-16 LAB
ANION GAP SERPL CALC-SCNC: 7 MMOL/L (ref 5–15)
BUN SERPL-MCNC: 24 MG/DL (ref 6–20)
BUN/CREAT SERPL: 27 (ref 12–20)
CALCIUM SERPL-MCNC: 8.3 MG/DL (ref 8.5–10.1)
CHLORIDE SERPL-SCNC: 99 MMOL/L (ref 97–108)
CO2 SERPL-SCNC: 28 MMOL/L (ref 21–32)
CREAT SERPL-MCNC: 0.89 MG/DL (ref 0.7–1.3)
GLUCOSE BLD STRIP.AUTO-MCNC: 106 MG/DL (ref 65–100)
GLUCOSE BLD STRIP.AUTO-MCNC: 119 MG/DL (ref 65–100)
GLUCOSE BLD STRIP.AUTO-MCNC: 140 MG/DL (ref 65–100)
GLUCOSE BLD STRIP.AUTO-MCNC: 144 MG/DL (ref 65–100)
GLUCOSE BLD STRIP.AUTO-MCNC: 145 MG/DL (ref 65–100)
GLUCOSE SERPL-MCNC: 126 MG/DL (ref 65–100)
POTASSIUM SERPL-SCNC: 4.3 MMOL/L (ref 3.5–5.1)
SERVICE CMNT-IMP: ABNORMAL
SODIUM SERPL-SCNC: 134 MMOL/L (ref 136–145)

## 2018-09-16 PROCEDURE — 36415 COLL VENOUS BLD VENIPUNCTURE: CPT | Performed by: INTERNAL MEDICINE

## 2018-09-16 PROCEDURE — 74011000258 HC RX REV CODE- 258: Performed by: INTERNAL MEDICINE

## 2018-09-16 PROCEDURE — 74011250636 HC RX REV CODE- 250/636: Performed by: INTERNAL MEDICINE

## 2018-09-16 PROCEDURE — 74011636637 HC RX REV CODE- 636/637: Performed by: INTERNAL MEDICINE

## 2018-09-16 PROCEDURE — 74011000250 HC RX REV CODE- 250: Performed by: INTERNAL MEDICINE

## 2018-09-16 PROCEDURE — 74011250637 HC RX REV CODE- 250/637: Performed by: INTERNAL MEDICINE

## 2018-09-16 PROCEDURE — 77030018798 HC PMP KT ENTRL FED COVD -A

## 2018-09-16 PROCEDURE — 80048 BASIC METABOLIC PNL TOTAL CA: CPT | Performed by: INTERNAL MEDICINE

## 2018-09-16 PROCEDURE — 65660000000 HC RM CCU STEPDOWN

## 2018-09-16 PROCEDURE — 82962 GLUCOSE BLOOD TEST: CPT

## 2018-09-16 RX ADMIN — Medication 10 ML: at 14:05

## 2018-09-16 RX ADMIN — Medication 20 ML: at 14:05

## 2018-09-16 RX ADMIN — NYSTATIN 500000 UNITS: 100000 SUSPENSION ORAL at 12:34

## 2018-09-16 RX ADMIN — FUROSEMIDE 60 MG: 40 TABLET ORAL at 10:01

## 2018-09-16 RX ADMIN — POLYETHYLENE GLYCOL 3350 17 G: 17 POWDER, FOR SOLUTION ORAL at 10:00

## 2018-09-16 RX ADMIN — INSULIN LISPRO 3 UNITS: 100 INJECTION, SOLUTION INTRAVENOUS; SUBCUTANEOUS at 12:34

## 2018-09-16 RX ADMIN — ACETAMINOPHEN 650 MG: 325 TABLET ORAL at 10:04

## 2018-09-16 RX ADMIN — GENTAMICIN SULFATE 80 MG: 40 INJECTION, SOLUTION INTRAMUSCULAR; INTRAVENOUS at 04:13

## 2018-09-16 RX ADMIN — INSULIN LISPRO 3 UNITS: 100 INJECTION, SOLUTION INTRAVENOUS; SUBCUTANEOUS at 05:45

## 2018-09-16 RX ADMIN — VENLAFAXINE 37.5 MG: 37.5 TABLET ORAL at 10:01

## 2018-09-16 RX ADMIN — Medication 10 ML: at 04:14

## 2018-09-16 RX ADMIN — APIXABAN 5 MG: 5 TABLET, FILM COATED ORAL at 10:01

## 2018-09-16 RX ADMIN — NYSTATIN 500000 UNITS: 100000 SUSPENSION ORAL at 20:31

## 2018-09-16 RX ADMIN — LEVETIRACETAM 250 MG: 500 SOLUTION ORAL at 20:31

## 2018-09-16 RX ADMIN — LACTULOSE 10 G: 20 SOLUTION ORAL at 10:00

## 2018-09-16 RX ADMIN — APIXABAN 5 MG: 5 TABLET, FILM COATED ORAL at 20:32

## 2018-09-16 RX ADMIN — Medication 10 ML: at 20:32

## 2018-09-16 RX ADMIN — NYSTATIN 500000 UNITS: 100000 SUSPENSION ORAL at 17:17

## 2018-09-16 RX ADMIN — DAPTOMYCIN 1000 MG: 500 INJECTION, POWDER, LYOPHILIZED, FOR SOLUTION INTRAVENOUS at 14:05

## 2018-09-16 RX ADMIN — ACETAMINOPHEN 650 MG: 325 TABLET ORAL at 20:32

## 2018-09-16 RX ADMIN — Medication 10 ML: at 23:33

## 2018-09-16 RX ADMIN — NYSTATIN 500000 UNITS: 100000 SUSPENSION ORAL at 10:00

## 2018-09-16 RX ADMIN — Medication 10 ML: at 05:25

## 2018-09-16 RX ADMIN — VENLAFAXINE 37.5 MG: 37.5 TABLET ORAL at 17:17

## 2018-09-16 RX ADMIN — AMIODARONE HYDROCHLORIDE 200 MG: 200 TABLET ORAL at 10:01

## 2018-09-16 RX ADMIN — LEVETIRACETAM 250 MG: 500 SOLUTION ORAL at 10:01

## 2018-09-16 RX ADMIN — LACTULOSE 10 G: 20 SOLUTION ORAL at 17:17

## 2018-09-16 NOTE — PROGRESS NOTES
PCU SHIFT NURSING NOTE Bedside and Verbal shift change report given to Adrian Don RN (oncoming nurse) by Ashley Dangelo RN (offgoing nurse). Report included the following information SBAR, Kardex, MAR, Recent Results and Cardiac Rhythm paced. Shift Summary:  
0830:  Removed condom cath due to leaking. Pt cleaned up and repositioned. 1700:  Colostomy emptied. PEG tube dressing changed. PEG tube feed set changed. Irrigation piston changed. Admission Date 7/10/2018 Admission Diagnosis Acute blood loss anemia GI bleed Anasarca GI Bleed 
gi bleed ASCENDING COLON CANCER  
aspiration ASPIRATION PNEUMONIA Consults IP CONSULT TO GASTROENTEROLOGY 
IP CONSULT TO INTERVENTIONAL RADIOLOGY 
IP CONSULT TO INTENSIVIST 
IP CONSULT TO NEPHROLOGY 
IP CONSULT TO HEMATOLOGY 
IP CONSULT TO GASTROENTEROLOGY 
IP CONSULT TO INFECTIOUS DISEASES 
IP CONSULT TO NEUROLOGY 
IP CONSULT TO CARDIOLOGY 
IP CONSULT TO COLORECTAL SURGERY 
IP CONSULT TO PULMONOLOGY 
IP CONSULT TO PULMONOLOGY 
IP CONSULT TO HOSPITALIST Consults [x]PT [x]OT []Speech [x]Case Management  
  
[] Palliative Cardiac Monitoring Order  
[x]Yes []No  
 
IV drips []Yes Drip:                            Dose: 
Drip:                            Dose: 
Drip:                            Dose:  
[x]No  
 
GI Prophylaxis [x]Yes []No  
 
 
 
DVT Prophylaxis SCDs:  Sequential Compression Device: Bilateral  
  Patient Refused VTE Prophylaxis: Yes (took off/ reports been on for awhile) Ra stockings:  Graduated Compression Stockings: Bilateral  
  
[x] Medication []Contraindicated []None Activity Level Activity Level: Bed Rest   
 Activity Assistance: Complete care Purposeful Rounding every 1-2 hour? [x]Yes Mckee Score  Total Score: 2 Bed Alarm (If score 3 or >) []Yes  
[] Refused (See signed refusal form in chart) Dagoberto Score  Dagoberto Score: 14  
 Dagoberto Score (if score 14 or less) [x]PMT consult [x]Wound Care consult []Specialty bed  
[x] Nutrition consult Needs prior to discharge:  
Home O2 required:   
[]Yes  
[x]No  
 If yes, how much O2 required? Other:  
 Last Bowel Movement: Last Bowel Movement Date: 09/16/18 Influenza Vaccine Received Flu Vaccine for Current Season (usually Sept-March): Not Flu Season Pneumonia Vaccine Diet Active Orders Diet DIET NPO With Tube Feedings LDAs PICC Double Lumen 08/31/18 Right;Brachial (Active) Central Line Being Utilized Yes 9/16/2018  3:22 AM  
Criteria for Appropriate Use Limited/no vessel suitable for conventional peripheral access 9/16/2018  3:22 AM  
Site Assessment Clean, dry, & intact 9/16/2018  3:22 AM  
Phlebitis Assessment 0 9/16/2018  3:22 AM  
Infiltration Assessment 0 9/16/2018  3:22 AM  
Arm Circumference (cm) 39 cm 9/8/2018  7:00 PM  
Date of Last Dressing Change 09/10/18 9/16/2018  3:22 AM  
Dressing Status Clean, dry, & intact 9/16/2018  3:22 AM  
Action Taken Open ports on tubing capped 9/16/2018  3:22 AM  
External Catheter Length (cm) 0 centimeters 9/10/2018  6:45 PM  
Dressing Type Disk with Chlorhexadine gluconate (CHG) 9/16/2018  3:22 AM  
Hub Color/Line Status Blue 9/16/2018  3:22 AM  
Positive Blood Return (Site #1) Yes 9/16/2018  3:22 AM  
Hub Color/Line Status Red 9/16/2018  3:22 AM  
Positive Blood Return (Site #2) Yes 9/16/2018  3:22 AM  
Alcohol Cap Used Yes 9/16/2018  3:22 AM  
            
PEG/Gastrostomy Tube 09/05/18 (Active) Site Assessment Clean, dry, & intact 9/16/2018  3:22 AM  
Dressing Status Clean, dry, & intact 9/16/2018  3:22 AM  
G Port Status Infusing 9/16/2018  3:22 AM  
Action Taken Other (comment) 9/16/2018  3:22 AM  
Drainage Description Clear 9/14/2018  7:54 PM  
Gastric Residual (mL) 0 ml 9/16/2018  3:22 AM  
Tube Feeding/Formula Options Twocal HN 9/16/2018  3:22 AM  
 Modular Nutrients Protein liquid 9/16/2018  3:22 AM  
Tube Feeding/Verify Rate (mL/hr) 50 9/16/2018  3:22 AM  
Water Flush Volume (mL) 200 mL 9/16/2018  3:22 AM  
Intake (ml) 154 ml 9/16/2018  3:22 AM  
Medication Volume 50 ml 9/13/2018  7:00 AM  
   
Colostomy 07/10/18 Left Abdomen (Active) Drainage Color Brown 9/15/2018  3:57 AM  
Site Assessment Clean, dry, intact 9/15/2018  3:57 AM  
Treatment Site care 9/15/2018  3:57 AM  
Output (ml) 200 mL 9/15/2018  3:57 AM  
   
Condom Catheter 09/15/18 (Active) Indications for Use Prolonged immobilization due to unstable fracture; Accurate measurement of urinary output 9/16/2018  3:22 AM  
Status Draining 9/16/2018  3:22 AM  
Site Condition No abnormalities 9/16/2018  3:22 AM  
Drainage Tube Clipped to Bed Yes 9/16/2018  3:22 AM  
Catheter Secured to Thigh Yes 9/16/2018  3:22 AM  
Tamper Seal Intact Yes 9/16/2018  3:22 AM  
Bag Below Bladder/Not on Floor Yes 9/16/2018  3:22 AM  
Lack of Dependent Loop in Tubing Yes 9/16/2018  3:22 AM  
Drainage Bag Less Than Half Full Yes 9/16/2018  3:22 AM  
Sterile Solution Used for  Irrigation N/A 9/16/2018  3:22 AM  
Urine Output (mL) 200 ml 9/16/2018  3:22 AM  
            
Urinary Catheter [REMOVED] Urinary Catheter 07/17/18 2- way; Erazo-Indications for Use: Accurate measurement of urinary output [REMOVED] Urinary Catheter 08/11/18 Erazo-Indications for Use: Accurate measurement of urinary output [REMOVED] Urinary Catheter 09/02/18 Erazo-Indications for Use: Chronic urinary retention/bladder outlet obstruction, Accurate measurement of urinary output Condom Catheter 09/15/18-Indications for Use: Prolonged immobilization due to unstable fracture, Accurate measurement of urinary output Intake & Output Date 09/15/18 0700 - 09/16/18 6984 09/16/18 0700 - 09/17/18 0106 Shift 3027-3817 0547-8573 24 Hour Total 7379-8176 8073-0403 24 Hour Total  
I 
N 
T 
A 
K 
E 
 NG/GT 6971 5010 9963 Water Flush Volume (mL) (PEG/Gastrostomy Tube 09/05/18)  Intake (ml) (PEG/Gastrostomy Tube 09/05/18)  Shift Total 
(mL/kg) 1135 
(10.2) 1195 
(10.7) 2330 
(20.9) O 
U T 
P 
U Rita Drybranch Urine (mL/kg/hr)  200 
(0.1) 200 
(0.1) Urine Occurrence(s) 2 x  2 x Urine Output (mL) (Condom Catheter 09/15/18)  200 200 Stool  150 150 Stool  150 150 Shift Total 
(mL/kg)  350 
(3.1) 350 
(3.1) NET 0499 13 05 85 Weight (kg) 111.3 111.3 111.3 111.3 111.3 111.3 Readmission Risk Assessment Tool Score High Risk   
      
 21 Total Score 3 Has Seen PCP in Last 6 Months (Yes=3, No=0) 2 . Living with Significant Other. Assisted Living. LTAC. SNF. or  
Rehab  
 3 Patient Length of Stay (>5 days = 3)  
 9 Pt. Coverage (Medicare=5 , Medicaid, or Self-Pay=4) 4 Charlson Comorbidity Score (Age + Comorbid Conditions) Criteria that do not apply:  
 IP Visits Last 12 Months (1-3=4, 4=9, >4=11) Expected Length of Stay 10d 19h Actual Length of Stay 68 (0) independent

## 2018-09-16 NOTE — PROGRESS NOTES
PCU SHIFT NURSING NOTE 
 
 
1920: Bedside shift change report given to [facility] (oncoming nurse) by Umberto Kaplan (offgoing nurse). Report included the following information SBAR, Kardex, Intake/Output, MAR, Recent Results, Cardiac Rhythm Paced and Alarm Parameters . 2230: The patient repositioned to the right side. Noted leakage of tube feeding in bed, patient cleaned and linen changed. Meds administered through PEG tube as per the orders. To continue to monitor. 0330: The patient resting in bed with eyes closed. No complains of discomfort. The colostomy bag emptied of liquid brown stool. Urine output from the condom catheter is 200mls. 0600: The patient resting in bed with eyes open. Blood Sugar is 145, administered 3 units of regular insulin as per the sliding scale. No complains of pain. The colostomy site is intact and in place. Mid abdominal incision woundcarew done as per the orders. Shift Summary:  
 
 
Admission Date 7/10/2018 Admission Diagnosis Acute blood loss anemia GI bleed Anasarca GI Bleed 
gi bleed ASCENDING COLON CANCER  
aspiration ASPIRATION PNEUMONIA Consults IP CONSULT TO GASTROENTEROLOGY 
IP CONSULT TO INTERVENTIONAL RADIOLOGY 
IP CONSULT TO INTENSIVIST 
IP CONSULT TO NEPHROLOGY 
IP CONSULT TO HEMATOLOGY 
IP CONSULT TO GASTROENTEROLOGY 
IP CONSULT TO INFECTIOUS DISEASES 
IP CONSULT TO NEUROLOGY 
IP CONSULT TO CARDIOLOGY 
IP CONSULT TO COLORECTAL SURGERY 
IP CONSULT TO PULMONOLOGY 
IP CONSULT TO PULMONOLOGY 
IP CONSULT TO HOSPITALIST Consults [x]PT [x]OT [x]Speech [x]Case Management  
  
[] Palliative Cardiac Monitoring Order  
[x]Yes []No  
 
IV drips []Yes Drip:                            Dose: 
Drip:                            Dose: 
Drip:                            Dose:  
[]No  
 
GI Prophylaxis [x]Yes []No  
 
 
 
DVT Prophylaxis SCDs:  Sequential Compression Device: Bilateral  
  Patient Refused VTE Prophylaxis: Yes (took off/ reports been on for awhile) Ra stockings:  Graduated Compression Stockings: Bilateral  
  
[x] Medication []Contraindicated []None Activity Level Activity Level: Up with Assistance Activity Assistance: Partial (two people) Purposeful Rounding every 1-2 hour? [x]Yes Mckee Score  Total Score: 2 Bed Alarm (If score 3 or >) [x]Yes  
[] Refused (See signed refusal form in chart) Dagoberto Score  Dagoberto Score: 15 Dagoberto Score (if score 14 or less) [x]PMT consult [x]Wound Care consult []Specialty bed  
[] Nutrition consult Needs prior to discharge:  
Home O2 required:   
[]Yes []No  
 If yes, how much O2 required? Other:  
 Last Bowel Movement: Last Bowel Movement Date: 09/14/18 Influenza Vaccine Received Flu Vaccine for Current Season (usually Sept-March): Not Flu Season Pneumonia Vaccine Diet Active Orders Diet DIET NPO With Tube Feedings LDAs PICC Double Lumen 08/31/18 Right;Brachial (Active) Central Line Being Utilized Yes 9/14/2018  7:54 PM  
Criteria for Appropriate Use Limited/no vessel suitable for conventional peripheral access 9/14/2018  7:54 PM  
Site Assessment Clean, dry, & intact 9/15/2018  8:40 AM  
Phlebitis Assessment 0 9/15/2018  8:40 AM  
Infiltration Assessment 0 9/15/2018  8:40 AM  
Arm Circumference (cm) 39 cm 9/8/2018  7:00 PM  
Date of Last Dressing Change 09/10/18 9/15/2018  8:40 AM  
Dressing Status Clean, dry, & intact 9/15/2018  8:40 AM  
Action Taken Open ports on tubing capped 9/14/2018  7:54 PM  
External Catheter Length (cm) 0 centimeters 9/10/2018  6:45 PM  
Dressing Type Transparent 9/15/2018  8:40 AM  
Hub Color/Line Status Blue 9/14/2018  7:54 PM  
Positive Blood Return (Site #1) Yes 9/14/2018  7:54 PM  
Hub Color/Line Status Red 9/14/2018  7:54 PM  
 Positive Blood Return (Site #2) Yes 9/14/2018  7:54 PM  
Alcohol Cap Used Yes 9/14/2018  7:54 PM  
            
PEG/Gastrostomy Tube 09/05/18 (Active) Site Assessment Drainage (comment) 9/15/2018  8:40 AM  
Dressing Status Intact 9/15/2018  8:40 AM  
G Port Status Infusing 9/14/2018  7:54 PM  
Action Taken Tubing changed 9/14/2018 12:00 PM  
Drainage Description Clear 9/14/2018  7:54 PM  
Gastric Residual (mL) 0 ml 9/14/2018  7:54 PM  
Tube Feeding/Formula Options Twocal HN 9/15/2018  8:40 AM  
Modular Nutrients Protein liquid 9/14/2018  4:00 AM  
Tube Feeding/Verify Rate (mL/hr) 50 9/15/2018  8:40 AM  
Water Flush Volume (mL) 600 mL 9/15/2018  8:40 AM  
Intake (ml) 535 ml 9/15/2018  8:40 AM  
Medication Volume 50 ml 9/13/2018  7:00 AM  
   
Colostomy 07/10/18 Left Abdomen (Active) Drainage Color Brown 9/15/2018  3:57 AM  
Site Assessment Clean, dry, intact 9/15/2018  3:57 AM  
Treatment Site care 9/15/2018  3:57 AM  
Output (ml) 200 mL 9/15/2018  3:57 AM  
   
Condom Catheter 09/15/18 (Active) Status Draining 9/15/2018  3:54 PM  
Bag Below Bladder/Not on Floor Yes 9/15/2018  3:54 PM  
            
Urinary Catheter [REMOVED] Urinary Catheter 07/17/18 2- way; Erazo-Indications for Use: Accurate measurement of urinary output [REMOVED] Urinary Catheter 08/11/18 Erazo-Indications for Use: Accurate measurement of urinary output [REMOVED] Urinary Catheter 09/02/18 Erazo-Indications for Use: Chronic urinary retention/bladder outlet obstruction, Accurate measurement of urinary output Intake & Output Date 09/14/18 1900 - 09/15/18 5081 09/15/18 0700 - 09/16/18 0704 Shift 8566-4162 24 Hour Total 2581-5171 2236-7564 24 Hour Total  
I 
N 
T 
A 
K 
E 
 NG/ 1400 1135  1135 Water Flush Volume (mL) (PEG/Gastrostomy Tube 09/05/18) 200 800 600  600 Intake (ml) (PEG/Gastrostomy Tube 09/05/18)  600 535  535 Shift Total 
(mL/kg) 200 
(1.8) 1400 
(12.6) 1135 
(10.2)  1135 
(10.2) O 
U T 
P 
U Alexandrea Brown 
 Urine (mL/kg/hr) 600 600 Urine Occurrence(s) 1 x 2 x 2 x  2 x Urine Output (mL) ([REMOVED] External Female Catheter 09/12/18) 600 600 Stool 400 400 Stool 200 200 Output (ml) (Colostomy 07/10/18 Left Abdomen) 200 200 Shift Total 
(mL/kg) 1000 
(9) 1000 
(9) NET -  1633 Weight (kg) 111.3 111.3 111.3 111.3 111.3 Readmission Risk Assessment Tool Score High Risk   
      
 21 Total Score 3 Has Seen PCP in Last 6 Months (Yes=3, No=0) 2 . Living with Significant Other. Assisted Living. LTAC. SNF. or  
Rehab  
 3 Patient Length of Stay (>5 days = 3)  
 9 Pt. Coverage (Medicare=5 , Medicaid, or Self-Pay=4) 4 Charlson Comorbidity Score (Age + Comorbid Conditions) Criteria that do not apply:  
 IP Visits Last 12 Months (1-3=4, 4=9, >4=11) Expected Length of Stay 10d 19h Actual Length of Stay 67

## 2018-09-16 NOTE — PROGRESS NOTES
Hospitalist Progress Note NAME: Chan Petersen :  1940 MRN:  [id number] Assessment / Plan   
Admitted for Lower GI Bleed and Anemia  Colonoscopy showed a LARGE Colon Mass S/P  Colectomy Stage 3 B this hospitalization done  Developed Ileus then aspiration Pna, complications S/P Ileus Aspiration PNA , intubated and prolonged ICU stay Acute resp Failure s/p Ventilator support , extubated  Pneumonia , last sputum culture  + for Heavy staph aureus , light K.pneumoniae Sputum culture  + heavy Burkholderia cepacia S/p treated with Levaquin Kareem Pleural Effusions s/p R chest tube removed  RONAL CPAP q HS 
  
Dysphagia, Failed MBS 
S/p TPN in hospital due to Dysphagia S/P Peg  Wed  Tolerating Tube feedings well Oral Candidiasis ? Infected Thrombus on NATASHA 
NATASHA shows definite large mass associated with pacing wire in RA which may represent vegetation ( ) PER  Infectious Disease Continue Daptomycin, Gentamicin IV, change Fluconazole ( 9/3). & levaquin to po Nystatin Swish & spit Complete total 6 weeks of daptomycin and gentamycin from the latest (-) blood Culture  which was done on 2018. So until  Has A RT ARM PICC LINE On Lovenox therapeutic Fluconazole IV added 9/3 Bacteremia S/p removal of L/IJ placement of PICC RUE , TPN on  Persistent bacteremia with coagulase negative Staph since  initially oxacillin sensitive , last positive BC is oxacillin resistant (1/) Repeat BC ,  , 9/3 no growth,  Positive Staph Coag Neg,  ( negative Chest CT repeated . 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 Hold off on removal of pacemaker at this time . D/w Dr Anoop Phillips , .  . 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 Richard Bee discussed with Dr Natividad Dick ( patients Neurologist) Patient  lethargic on 500 mg BID  Keppra but both states Keep on Keppra 250 BID 
 due to better Drug interactions and liver  with all his meds and less Liver issues on Severe Deconditioning. PALLATIVE CARE HAS PREVIOUSLY TALKED TO WIFE AND SHE WANTS EVERYTHING DONE He needs to follow up with ID or Cardiology close to October 20 for Reimaging and Cultures or can be done in at [facility]. To determine what to do with Pacers Hypernatremia - much improved, now on free fluid via Peg 
  
 
  
  
  
Code Status: Full NOK POA  :  WIFE 
DVT Prophylaxis: on lovenox for the thrombus Disposition-- Pt's wife has finally decided to go for [facility] as the next place of care- CM working with [facility] - will be getting Insurance Authorization now, DC in 1-2 days anticipated- still awaiting Insurance Authorization Subjective: per staff tolerating Tube feeding Chief Complaint / Reason for Physician Visit : F/U AMS, dysphagia, Bacteremia, PNA, GI bleed, Colon Ca \"I am fine\". Discussed with RN events overnight. Review of Systems: 
Symptom Y/N Comments  Symptom Y/N Comments Fever/Chills n   Chest Pain n   
Poor Appetite n   Edema n   
Cough n   Abdominal Pain n   
Sputum n   Joint Pain n   
SOB/BECERRIL    Pruritis/Rash Nausea/vomit    Tolerating PT/OT y Diarrhea    Tolerating Diet y PEG tube feeding at goal rate Constipation    Other Could NOT obtain due to:   
 
Objective: VITALS:  
Last 24hrs VS reviewed since prior progress note. Most recent are: Patient Vitals for the past 24 hrs: 
 Temp Pulse Resp BP SpO2  
09/16/18 0716 99.3 °F (37.4 °C) 75 18 95/65 97 % 09/16/18 0600 - 75 - 119/48 -  
09/16/18 0300 - 77 - 117/51 -  
09/15/18 2300 98.1 °F (36.7 °C) 75 18 124/64 96 % 09/15/18 1952 98.3 °F (36.8 °C) 77 20 137/56 100 % 09/15/18 1719 98.2 °F (36.8 °C) 80 22 135/69 100 % 09/15/18 1150 97.7 °F (36.5 °C) 77 22 114/67 99 % Intake/Output Summary (Last 24 hours) at 09/16/18 0920 Last data filed at 09/16/18 2184 Gross per 24 hour Intake             1195 ml Output              350 ml Net              845 ml PHYSICAL EXAM: 
General: WD, WN. Pleasant, Awake, NAD and Ox3 cooperative, no acute distress   
EENT:  EOMI. Anicteric sclerae. MMM Resp:  CTA bilaterally, no wheezing or rales. No accessory muscle use CV:  Regular  rhythm,  No edema GI:  Soft, Non distended, Non tender.  +Bowel sounds, PEG tube Noted +, Colostomy bag noted + Neurologic:  Alert and oriented X 3, normal speech, Psych:   Good insight. Not anxious nor agitated Skin:  No rashes. No jaundice, R PICC line noted Reviewed most current lab test results and cultures  YES Reviewed most current radiology test results   YES Review and summation of old records today    NO Reviewed patient's current orders and MAR    YES 
PMH/SH reviewed - no change compared to H&P 
________________________________________________________________________ Care Plan discussed with: 
  Comments Patient x Family RN x Care Manager Consultant Multidiciplinary team rounds were held today with , nursing, pharmacist and clinical coordinator. Patient's plan of care was discussed; medications were reviewed and discharge planning was addressed. ________________________________________________________________________ Total NON critical care TIME:  16 Minutes Total CRITICAL CARE TIME Spent:   Minutes non procedure based Comments >50% of visit spent in counseling and coordination of care    
________________________________________________________________________ Jessica Jefferson MD  
 
Procedures: see electronic medical records for all procedures/Xrays and details which were not copied into this note but were reviewed prior to creation of Plan. LABS: 
I reviewed today's most current labs and imaging studies. Pertinent labs include: No results for input(s): WBC, HGB, HCT, PLT, HGBEXT, HCTEXT, PLTEXT, HGBEXT, HCTEXT, PLTEXT in the last 72 hours. Recent Labs  
   09/16/18 
 0405  09/15/18 
 0425  09/14/18 
 7259 NA  134*  135*  135* K  4.3  4.4  4.5  
CL  99  100  102 CO2  28  25  26 GLU  126*  127*  129* BUN  24*  23*  24* CREA  0.89  0.96  0.89 CA  8.3*  8.5  8.4* Signed: Jessica Jefferson MD

## 2018-09-17 LAB
ANION GAP SERPL CALC-SCNC: 9 MMOL/L (ref 5–15)
BUN SERPL-MCNC: 24 MG/DL (ref 6–20)
BUN/CREAT SERPL: 25 (ref 12–20)
CALCIUM SERPL-MCNC: 8.4 MG/DL (ref 8.5–10.1)
CHLORIDE SERPL-SCNC: 97 MMOL/L (ref 97–108)
CO2 SERPL-SCNC: 26 MMOL/L (ref 21–32)
CREAT SERPL-MCNC: 0.96 MG/DL (ref 0.7–1.3)
GLUCOSE BLD STRIP.AUTO-MCNC: 101 MG/DL (ref 65–100)
GLUCOSE BLD STRIP.AUTO-MCNC: 125 MG/DL (ref 65–100)
GLUCOSE BLD STRIP.AUTO-MCNC: 130 MG/DL (ref 65–100)
GLUCOSE BLD STRIP.AUTO-MCNC: 140 MG/DL (ref 65–100)
GLUCOSE BLD STRIP.AUTO-MCNC: 152 MG/DL (ref 65–100)
GLUCOSE SERPL-MCNC: 129 MG/DL (ref 65–100)
POTASSIUM SERPL-SCNC: 4.5 MMOL/L (ref 3.5–5.1)
SERVICE CMNT-IMP: ABNORMAL
SODIUM SERPL-SCNC: 132 MMOL/L (ref 136–145)

## 2018-09-17 PROCEDURE — C1751 CATH, INF, PER/CENT/MIDLINE: HCPCS

## 2018-09-17 PROCEDURE — 74011000250 HC RX REV CODE- 250: Performed by: INTERNAL MEDICINE

## 2018-09-17 PROCEDURE — 97112 NEUROMUSCULAR REEDUCATION: CPT

## 2018-09-17 PROCEDURE — 65660000000 HC RM CCU STEPDOWN

## 2018-09-17 PROCEDURE — 74011250636 HC RX REV CODE- 250/636: Performed by: INTERNAL MEDICINE

## 2018-09-17 PROCEDURE — 74011000258 HC RX REV CODE- 258: Performed by: INTERNAL MEDICINE

## 2018-09-17 PROCEDURE — 74011636637 HC RX REV CODE- 636/637: Performed by: INTERNAL MEDICINE

## 2018-09-17 PROCEDURE — 97530 THERAPEUTIC ACTIVITIES: CPT

## 2018-09-17 PROCEDURE — 74011250637 HC RX REV CODE- 250/637: Performed by: INTERNAL MEDICINE

## 2018-09-17 PROCEDURE — 80048 BASIC METABOLIC PNL TOTAL CA: CPT | Performed by: INTERNAL MEDICINE

## 2018-09-17 PROCEDURE — 36415 COLL VENOUS BLD VENIPUNCTURE: CPT | Performed by: INTERNAL MEDICINE

## 2018-09-17 PROCEDURE — 82962 GLUCOSE BLOOD TEST: CPT

## 2018-09-17 RX ADMIN — Medication 10 ML: at 10:44

## 2018-09-17 RX ADMIN — GENTAMICIN SULFATE 80 MG: 40 INJECTION, SOLUTION INTRAMUSCULAR; INTRAVENOUS at 04:20

## 2018-09-17 RX ADMIN — LACTULOSE 10 G: 20 SOLUTION ORAL at 09:34

## 2018-09-17 RX ADMIN — NYSTATIN 500000 UNITS: 100000 SUSPENSION ORAL at 21:42

## 2018-09-17 RX ADMIN — NYSTATIN 500000 UNITS: 100000 SUSPENSION ORAL at 09:34

## 2018-09-17 RX ADMIN — NYSTATIN 500000 UNITS: 100000 SUSPENSION ORAL at 14:48

## 2018-09-17 RX ADMIN — APIXABAN 5 MG: 5 TABLET, FILM COATED ORAL at 09:34

## 2018-09-17 RX ADMIN — Medication 10 ML: at 05:42

## 2018-09-17 RX ADMIN — ACETAMINOPHEN 650 MG: 325 TABLET ORAL at 17:26

## 2018-09-17 RX ADMIN — Medication 10 ML: at 10:41

## 2018-09-17 RX ADMIN — LEVETIRACETAM 250 MG: 500 SOLUTION ORAL at 09:33

## 2018-09-17 RX ADMIN — VENLAFAXINE 37.5 MG: 37.5 TABLET ORAL at 17:13

## 2018-09-17 RX ADMIN — AMIODARONE HYDROCHLORIDE 200 MG: 200 TABLET ORAL at 09:34

## 2018-09-17 RX ADMIN — LACTULOSE 10 G: 20 SOLUTION ORAL at 17:13

## 2018-09-17 RX ADMIN — INSULIN LISPRO 3 UNITS: 100 INJECTION, SOLUTION INTRAVENOUS; SUBCUTANEOUS at 05:41

## 2018-09-17 RX ADMIN — APIXABAN 5 MG: 5 TABLET, FILM COATED ORAL at 21:42

## 2018-09-17 RX ADMIN — LEVETIRACETAM 250 MG: 500 SOLUTION ORAL at 21:42

## 2018-09-17 RX ADMIN — POLYETHYLENE GLYCOL 3350 17 G: 17 POWDER, FOR SOLUTION ORAL at 09:34

## 2018-09-17 RX ADMIN — NYSTATIN 500000 UNITS: 100000 SUSPENSION ORAL at 17:14

## 2018-09-17 RX ADMIN — DAPTOMYCIN 1000 MG: 500 INJECTION, POWDER, LYOPHILIZED, FOR SOLUTION INTRAVENOUS at 14:48

## 2018-09-17 RX ADMIN — Medication 10 ML: at 21:43

## 2018-09-17 RX ADMIN — FUROSEMIDE 60 MG: 40 TABLET ORAL at 09:34

## 2018-09-17 RX ADMIN — Medication 10 ML: at 14:49

## 2018-09-17 RX ADMIN — VENLAFAXINE 37.5 MG: 37.5 TABLET ORAL at 09:34

## 2018-09-17 NOTE — PROGRESS NOTES
Supportive visit to patient and his wife on PCU. Mr. Mitra Orpoeza was complaining about the beep on his medicine drip. Sandra Parker RN in [place] came into patient's room and corrected the issue. Domenico Scott, his wife, shared that patient will be leaving soon. ...they are waiting for acceptance from [facility]. She expressed appreciation for pastoral support received throughout Mr. Brice's time here. Offered assurance of continued prayers for his healing as well as pastoral presence. SHAWNA May, [facility],  [facility] Paging Service  [phone])

## 2018-09-17 NOTE — PROGRESS NOTES
Problem: Mobility Impaired (Adult and Pediatric) Goal: *Acute Goals and Plan of Care (Insert Text) Physical Therapy Goals Reviewed 9/13/18- goals remain appropriate Goals reviewed 9/6/18- goals remain appropriate Goals reviewed and revised 8/29/18 1. Patient will move from supine to sit and sit to supine , scoot up and down and roll side to side in bed with moderate assistance within 7 day(s). 2.  Patient will transfer from bed to chair and chair to bed with max assistance x 2 using the least restrictive device within 7 day(s). 3.  Patient will perform sit to stand with moderate assistance x 2 within 7 day(s). 4.  Patient will perform stand pivot transfer to wheelchair with max assistance x 2 in 7 days. Reviewed 8/3/18- goals remain appropriate Reviewed and revised 7/26/2018 1. Patient will move from supine to sit and sit to supine , scoot up and down and roll side to side in bed with minimal assistance within 7 day(s). 2.  Patient will transfer from bed to chair and chair to bed with moderate assistance using the least restrictive device within 7 day(s). 3.  Patient will perform sit to stand with moderate assistance within 7 day(s). 4.  Patient will perform stand pivot transfer to wheelchair with moderate assistance in 7 days. Reviewed and revised 7/19/2018 1. Patient will move from supine to sit and sit to supine , scoot up and down and roll side to side in bed with minimal assistance within 7 day(s). 2.  Patient will transfer from bed to chair and chair to bed with moderate assistance using the least restrictive device within 7 day(s). 3.  Patient will perform sit to stand with minimal assistance within 7 day(s). 4.  Patient will perform stand pivot transfer to wheelchair with minimal assistance in 7 days. Initiated 7/12/2018 1.   Patient will move from supine to sit and sit to supine , scoot up and down and roll side to side in bed with modified independence within 7 day(s). 2.  Patient will transfer from bed to chair and chair to bed with supervision/set-up using the least restrictive device within 7 day(s). 3.  Patient will perform sit to stand with supervision/set-up within 7 day(s). 4.  Patient will perform stand pivot transfer to wheelchair with modified independence in 7 days. physical Therapy TREATMENT Patient: Arabella Gaines (19 y.o. male) Date: 9/17/2018 Diagnosis: Acute blood loss anemia GI bleed Anasarca GI Bleed 
gi bleed ASCENDING COLON CANCER  
aspiration ASPIRATION PNEUMONIA GI bleed Procedure(s) (LRB): ESOPHAGOGASTRODUODENOSCOPY (EGD) PERCUTANEOUS ENDOSCOPIC GASTROSTOMY TUBE INSERTION (N/A) 12 Days Post-Op Precautions:   
Chart, physical therapy assessment, plan of care and goals were reviewed. ASSESSMENT: 
Pt was received in supine and cleared by nursing to mobilize. Pt needed increased encouragement today. Bed mobility was performed with mod A x 2 to come to the EOB, increased assistance with scooting to the EOB max. He was able tolerate EOB for 7 minutes with good sitting balance, pt was able to correct all LOB without cues. He was also able to reach out of JOHNY and return to midline despite increased fatigue. He was returned to supine at the end of the session. Pt is slowly making progress with therapy. Pt is planned to discharge to [address] [place] [address]. Progression toward goals: 
[]    Improving appropriately and progressing toward goals [x]    Improving slowly and progressing toward goals 
[]    Not making progress toward goals and plan of care will be adjusted PLAN: 
Patient continues to benefit from skilled intervention to address the above impairments. Continue treatment per established plan of care. Discharge Recommendations:  [address] [place] [address] Further Equipment Recommendations for Discharge:  TBD SUBJECTIVE:  
Patient stated I am tired.  OBJECTIVE DATA SUMMARY:  
 Critical Behavior: 
Neurologic State: Alert Orientation Level: Oriented to person, Disoriented to time, Disoriented to situation, Oriented to place Cognition: Follows commands Safety/Judgement: Awareness of environment, Fall prevention Functional Mobility Training: 
Bed Mobility: 
Rolling: Maximum assistance Supine to Sit: Moderate assistance;Assist x2 Sit to Supine: Maximum assistance;Assist x2 Balance: 
Sitting: Impaired Sitting - Static: Good (unsupported) Sitting - Dynamic: Fair (occasional) Neuro Re-Education: 
Midline awareness much improved and pt correcting LOB without cues Pain: 
Pain Scale 1: Numeric (0 - 10) Pain Intensity 1: 0 Activity Tolerance: VSS Please refer to the flowsheet for vital signs taken during this treatment. After treatment:  
[]    Patient left in no apparent distress sitting up in chair 
[x]    Patient left in no apparent distress in bed 
[x]    Call bell left within reach [x]    Nursing notified 
[]    Caregiver present 
[]    Bed alarm activated COMMUNICATION/COLLABORATION:  
The patients plan of care was discussed with: Registered Nurse Clarita Crooks, PT, DPT Time Calculation: 24 mins

## 2018-09-17 NOTE — PROGRESS NOTES
Hospitalist Progress Note NAME: Radha Menjivar :  1940 MRN:  [id number] Assessment / Plan   
Admitted for Lower GI Bleed and Anemia  Colonoscopy showed a LARGE Colon Mass S/P  Colectomy Stage 3 B this hospitalization done  Developed Ileus then aspiration Pna, complications S/P Ileus Aspiration PNA , intubated and prolonged ICU stay Acute resp Failure s/p Ventilator support , extubated  Pneumonia , last sputum culture  + for Heavy staph aureus , light K.pneumoniae Sputum culture  + heavy Burkholderia cepacia S/p treated with Levaquin Kareem Pleural Effusions s/p R chest tube removed  RONAL CPAP q HS 
  
Dysphagia, Failed MBS 
S/p TPN in hospital due to Dysphagia S/P Peg  Wed  Tolerating Tube feedings well Oral Candidiasis ? Infected Thrombus on NATASHA 
NATASHA shows definite large mass associated with pacing wire in RA which may represent vegetation ( ) PER  Infectious Disease Continue Daptomycin, Gentamicin IV, change Fluconazole ( 9/3). & levaquin to po Nystatin Swish & spit Complete total 6 weeks of daptomycin and gentamycin from the latest (-) blood Culture  which was done on 2018. So until  Has A RT ARM PICC LINE On Lovenox therapeutic Fluconazole IV added 9/3 Bacteremia S/p removal of L/IJ placement of PICC RUE , TPN on  Persistent bacteremia with coagulase negative Staph since  initially oxacillin sensitive , last positive BC is oxacillin resistant (1/) Repeat BC ,  , 9/3 no growth,  Positive Staph Coag Neg,  ( negative Chest CT repeated . 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 Hold off on removal of pacemaker at this time . D/w Dr Rachel Nava , .  . 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 Mary Breen discussed with Dr Jb Rajan ( patients Neurologist) Patient  lethargic on 500 mg BID  Keppra but both states Keep on Keppra 250 BID 
 due to better Drug interactions and liver  with all his meds and less Liver issues on Severe Deconditioning. PALLATIVE CARE HAS PREVIOUSLY TALKED TO WIFE AND SHE WANTS EVERYTHING DONE He needs to follow up with ID or Cardiology close to October 20 for Reimaging and Cultures or can be done in at [facility]. To determine what to do with Pacers Hypernatremia - much improved, now on free fluid via Peg 
  
 
  
  
  
Code Status: Full NOK POA  :  WIFE 
DVT Prophylaxis: on lovenox for the thrombus Disposition-- Pt's wife has finally decided to go for [facility] as the next place of care- CM working with [facility] - will be getting Insurance Authorization now, DC in 1-2 days anticipated- still awaiting Insurance Authorization- check with [facility] today for possible DC today Subjective: per staff tolerating Tube feeding Chief Complaint / Reason for Physician Visit : F/U AMS, dysphagia, Bacteremia, PNA, GI bleed, Colon Ca \"I am fine\". Discussed with RN events overnight. Review of Systems: 
Symptom Y/N Comments  Symptom Y/N Comments Fever/Chills n   Chest Pain n   
Poor Appetite n   Edema n   
Cough n   Abdominal Pain n   
Sputum n   Joint Pain n   
SOB/BECERRIL    Pruritis/Rash Nausea/vomit    Tolerating PT/OT y Diarrhea    Tolerating Diet y PEG tube feeding at goal rate Constipation    Other Could NOT obtain due to:   
 
Objective: VITALS:  
 Last 24hrs VS reviewed since prior progress note. Most recent are: 
Patient Vitals for the past 24 hrs: 
 Temp Pulse Resp BP SpO2  
09/17/18 0718 99 °F (37.2 °C) 82 16 133/64 98 % 09/17/18 0331 98 °F (36.7 °C) 78 16 148/70 100 % 09/16/18 2338 98.1 °F (36.7 °C) 75 16 127/68 96 % 09/16/18 1905 98.2 °F (36.8 °C) 75 16 147/68 100 % 09/16/18 1500 97.8 °F (36.6 °C) 76 18 142/67 100 % 09/16/18 1103 97.6 °F (36.4 °C) 69 18 154/85 100 % Intake/Output Summary (Last 24 hours) at 09/17/18 0825 Last data filed at 09/17/18 4948 Gross per 24 hour Intake             2225 ml Output             1850 ml Net              375 ml PHYSICAL EXAM: 
General: WD, WN. Pleasant, Awake, NAD and Ox3 cooperative, no acute distress   
EENT:  EOMI. Anicteric sclerae. MMM Resp:  CTA bilaterally, no wheezing or rales. No accessory muscle use CV:  Regular  rhythm,  No edema GI:  Soft, Non distended, Non tender.  +Bowel sounds, PEG tube Noted +, Colostomy bag noted + Neurologic:  Alert and oriented X 3, normal speech, Psych:   Good insight. Not anxious nor agitated Skin:  No rashes. No jaundice, R PICC line noted Reviewed most current lab test results and cultures  YES Reviewed most current radiology test results   YES Review and summation of old records today    NO Reviewed patient's current orders and MAR    YES 
PMH/SH reviewed - no change compared to H&P 
________________________________________________________________________ Care Plan discussed with: 
  Comments Patient x Family RN x Care Manager x Consultant     
                 x Multidiciplinary team rounds were held today with , nursing, pharmacist and clinical coordinator. Patient's plan of care was discussed; medications were reviewed and discharge planning was addressed. ________________________________________________________________________ Total NON critical care TIME:  16 Minutes Total CRITICAL CARE TIME Spent:   Minutes non procedure based Comments >50% of visit spent in counseling and coordination of care    
________________________________________________________________________ Vel Estrada MD  
 
Procedures: see electronic medical records for all procedures/Xrays and details which were not copied into this note but were reviewed prior to creation of Plan. LABS: 
I reviewed today's most current labs and imaging studies. Pertinent labs include: No results for input(s): WBC, HGB, HCT, PLT, HGBEXT, HCTEXT, PLTEXT, HGBEXT, HCTEXT, PLTEXT in the last 72 hours. Recent Labs  
   09/17/18 
 0418  09/16/18 
 0405  09/15/18 
 0425 NA  132*  134*  135* K  4.5  4.3  4.4 CL  97  99  100 CO2  26  28  25 GLU  129*  126*  127* BUN  24*  24*  23* CREA  0.96  0.89  0.96  
CA  8.4*  8.3*  8.5 Signed: Vel Estrada MD

## 2018-09-17 NOTE — PROGRESS NOTES
Received call from Theodis Brittle with [employer] and they are still awaiting insurance authorization.

## 2018-09-17 NOTE — PROGRESS NOTES
PCU SHIFT NURSING NOTE 
 
 
1900: Bedside  shift change report given to [employer]Y (oncoming nurse) by  Lory Perez (offgoing nurse). Report included the following information SBAR, Kardex, Intake/Output, MAR, Recent Results, Cardiac Rhythm Paced and Alarm Parameters . 2030: The patient reported headache, tylenol administered. Other evening meds administered as per schedule. To continue to monitor. 2330: The patient is asleep now. No signs of distress noted. 0600: The patient had a calm night. No complains of discomfort, no signs of distress. Colostomy bag emptied and cleaned (output 150mls). The urine output through the prewick suction was 500. Labs collected as per the orders. To continue to monitor. Shift Summary:  
 
 
Admission Date 7/10/2018 Admission Diagnosis Acute blood loss anemia GI bleed Anasarca GI Bleed 
gi bleed ASCENDING COLON CANCER  
aspiration ASPIRATION PNEUMONIA Consults IP CONSULT TO GASTROENTEROLOGY 
IP CONSULT TO INTERVENTIONAL RADIOLOGY 
IP CONSULT TO INTENSIVIST 
IP CONSULT TO NEPHROLOGY 
IP CONSULT TO HEMATOLOGY 
IP CONSULT TO GASTROENTEROLOGY 
IP CONSULT TO INFECTIOUS DISEASES 
IP CONSULT TO NEUROLOGY 
IP CONSULT TO CARDIOLOGY 
IP CONSULT TO COLORECTAL SURGERY 
IP CONSULT TO PULMONOLOGY 
IP CONSULT TO PULMONOLOGY 
IP CONSULT TO HOSPITALIST Consults [x]PT []OT [x]Speech [x]Case Management  
  
[] Palliative Cardiac Monitoring Order  
[x]Yes []No  
 
IV drips []Yes Drip:                            Dose: 
Drip:                            Dose: 
Drip:                            Dose:  
[x]No  
 
GI Prophylaxis []Yes []No  
 
 
 
DVT Prophylaxis SCDs:  Sequential Compression Device: Bilateral  
    
 Ra stockings:    
  
[x] Medication []Contraindicated []None Activity Level Activity Level: Bed Rest   
 Activity Assistance: Complete care Purposeful Rounding every 1-2 hour? [x]Yes Mckee Score  Total Score: 3 Bed Alarm (If score 3 or >) [x]Yes  
[] Refused (See signed refusal form in chart) Dagoberto Score  Dagoberto Score: 14 Dagoberto Score (if score 14 or less) []PMT consult  
[]Wound Care consult []Specialty bed  
[x] Nutrition consult Needs prior to discharge:  
Home O2 required:   
[]Yes  
[x]No  
 If yes, how much O2 required? Other:  
 Last Bowel Movement: Last Bowel Movement Date: 09/16/18 Influenza Vaccine Received Flu Vaccine for Current Season (usually Sept-March): Not Flu Season Pneumonia Vaccine Diet Active Orders Diet DIET NPO With Tube Feedings LDAs PICC Double Lumen 08/31/18 Right;Brachial (Active) Central Line Being Utilized Yes 9/16/2018  7:57 PM  
Criteria for Appropriate Use Long term IV/antibiotic administration 9/16/2018  7:57 PM  
Site Assessment Clean, dry, & intact 9/16/2018  7:57 PM  
Phlebitis Assessment 0 9/16/2018  7:57 PM  
Infiltration Assessment 0 9/16/2018  7:57 PM  
Arm Circumference (cm) 39 cm 9/8/2018  7:00 PM  
Date of Last Dressing Change 09/10/18 9/16/2018  7:57 PM  
Dressing Status Clean, dry, & intact 9/16/2018  7:57 PM  
Action Taken Open ports on tubing capped 9/16/2018  7:57 PM  
External Catheter Length (cm) 0 centimeters 9/10/2018  6:45 PM  
Dressing Type Disk with Chlorhexadine gluconate (CHG) 9/16/2018  7:57 PM  
Hub Color/Line Status Blue 9/16/2018  7:57 PM  
Positive Blood Return (Site #1) Yes 9/16/2018  7:57 PM  
Hub Color/Line Status Red 9/16/2018  7:57 PM  
Positive Blood Return (Site #2) Yes 9/16/2018  7:57 PM  
Alcohol Cap Used Yes 9/16/2018  7:57 PM  
            
PEG/Gastrostomy Tube 09/05/18 (Active) Site Assessment Clean, dry, & intact 9/16/2018  7:57 PM  
Dressing Status Clean, dry, & intact 9/16/2018  7:57 PM  
G Port Status Infusing 9/16/2018  7:57 PM  
Action Taken Retaped 9/16/2018  7:57 PM  
 Drainage Description Lu 9/16/2018  5:00 PM  
Gastric Residual (mL) 5 ml 9/16/2018  7:57 PM  
Tube Feeding/Formula Options Twocal HN 9/16/2018  7:57 PM  
Modular Nutrients Protein liquid 9/16/2018  7:57 PM  
Tube Feeding/Verify Rate (mL/hr) 50 9/16/2018  7:57 PM  
Water Flush Volume (mL) 200 mL 9/16/2018  7:57 PM  
Intake (ml) 772 ml 9/16/2018  7:57 PM  
Medication Volume 200 ml 9/16/2018  5:00 PM  
   
Colostomy 07/10/18 Left Abdomen (Active) Drainage Color Brown 9/16/2018  7:57 PM  
Site Assessment Clean, dry, intact 9/16/2018  7:57 PM  
Treatment Site care 9/16/2018  7:57 PM  
Output (ml) 150 mL 9/16/2018  7:57 PM  
            
Urinary Catheter [REMOVED] Urinary Catheter 07/17/18 2- way; Erazo-Indications for Use: Accurate measurement of urinary output [REMOVED] Urinary Catheter 08/11/18 Erazo-Indications for Use: Accurate measurement of urinary output [REMOVED] Urinary Catheter 09/02/18 Erazo-Indications for Use: Chronic urinary retention/bladder outlet obstruction, Accurate measurement of urinary output [REMOVED] Condom Catheter 09/15/18-Indications for Use: Prolonged immobilization due to unstable fracture, Accurate measurement of urinary output Intake & Output Date 09/15/18 1900 - 09/16/18 5688 09/16/18 0700 - 09/17/18 0542 Shift 1858-6564 24 Hour Total 7397-5752 7018-2785 24 Hour Total  
I 
N 
T 
A 
K 
E 
 NG/GT 1195 2330  Water Flush Volume (mL) (PEG/Gastrostomy Tube 09/05/18) 400 1000 400 200 600 Medication Volume (PEG/Gastrostomy Tube 09/05/18)   200  200 Intake (ml) (PEG/Gastrostomy Tube 09/05/18) 795 1330  772 772 Shift Total 
(mL/kg) 1195 
(10.7) 2330 
(20.9) 600 
(5.4) 972 
(8.8) 1572 
(14.2) O 
U T 
P 
U Mars Bustard Urine (mL/kg/hr) 200 200 800 
(0.6)  800 Urine Voided   800  800 Urine Occurrence(s)  2 x 1 x  1 x Urine Output (mL) ([REMOVED] Condom Catheter 09/15/18) 200 200 Stool 150 150 200 150 350 Stool 150 150 Output (ml) (Colostomy 07/10/18 Left Abdomen)   200 150 350 Shift Total 
(mL/kg) 350 
(3.1) 350 
(3.1) 1000 
(9.1) 150 
(1.4) 1150 
(10.4) NET [id number] Noah Bojorquez,RUST 200710 529 175 Weight (kg) 111.3 111.3 110.4 110.4 110.4 Readmission Risk Assessment Tool Score High Risk   
      
 21 Total Score 3 Has Seen PCP in Last 6 Months (Yes=3, No=0) 2 . Living with Significant Other. Assisted Living. LTAC. SNF. or  
Rehab  
 3 Patient Length of Stay (>5 days = 3)  
 9 Pt. Coverage (Medicare=5 , Medicaid, or Self-Pay=4) 4 Charlson Comorbidity Score (Age + Comorbid Conditions) Criteria that do not apply:  
 IP Visits Last 12 Months (1-3=4, 4=9, >4=11) Expected Length of Stay 10d 19h Actual Length of Stay 68

## 2018-09-17 NOTE — PROGRESS NOTES
PCU SHIFT NURSING NOTE Bedside and Verbal shift change report given to Christa Arevalo RN (oncoming nurse) by Viki Cooley RN (offgoing nurse). Report included the following information SBAR, Kardex, MAR, Recent Results and Cardiac Rhythm paced. Shift Summary:  
0930:  PEG tube infusing. 0 ml residual prior to meds. Pt ostomy dry and intact. PICC dry and intact. Pt repositioned per request. 
1120: Pt wife called to check on transport of pt and if he will leave today? To verify that we till pack up all of his belongings and send wheelchair. .  Wife also asking about antivert prior to transfer to prevent pt from getting sick. Will ask MD about antivert and spoke with case management about wheelchair going with pt Admission Date 7/10/2018 Admission Diagnosis Acute blood loss anemia GI bleed Anasarca GI Bleed 
gi bleed ASCENDING COLON CANCER  
aspiration ASPIRATION PNEUMONIA Consults IP CONSULT TO GASTROENTEROLOGY 
IP CONSULT TO INTERVENTIONAL RADIOLOGY 
IP CONSULT TO INTENSIVIST 
IP CONSULT TO NEPHROLOGY 
IP CONSULT TO HEMATOLOGY 
IP CONSULT TO GASTROENTEROLOGY 
IP CONSULT TO INFECTIOUS DISEASES 
IP CONSULT TO NEUROLOGY 
IP CONSULT TO CARDIOLOGY 
IP CONSULT TO COLORECTAL SURGERY 
IP CONSULT TO PULMONOLOGY 
IP CONSULT TO PULMONOLOGY 
IP CONSULT TO HOSPITALIST Consults [x]PT [x]OT []Speech [x]Case Management  
  
[] Palliative Cardiac Monitoring Order  
[x]Yes []No  
 
IV drips []Yes Drip:                            Dose: 
Drip:                            Dose: 
Drip:                            Dose:  
[x]No  
 
GI Prophylaxis [x]Yes []No  
 
 
 
DVT Prophylaxis SCDs:  Sequential Compression Device: Bilateral  
  Patient Refused VTE Prophylaxis: Yes (took off/ reports been on for awhile) Ra stockings:  Graduated Compression Stockings: Bilateral  
  
[x] Medication []Contraindicated []None Activity Level Activity Level: Bed Rest   
 Activity Assistance: Complete care Purposeful Rounding every 1-2 hour? [x]Yes Mckee Score  Total Score: 3 Bed Alarm (If score 3 or >) [x]Yes  
[] Refused (See signed refusal form in chart) Dagoberto Score  Dagoberto Score: 14 Dagoberto Score (if score 14 or less) [x]PMT consult  
[]Wound Care consult [x]Specialty bed  
[] Nutrition consult Needs prior to discharge:  
Home O2 required:   
[]Yes  
[x]No  
 If yes, how much O2 required? Other:  
 Last Bowel Movement: Last Bowel Movement Date: 09/16/18 Influenza Vaccine Received Flu Vaccine for Current Season (usually Sept-March): Not Flu Season Pneumonia Vaccine Diet Active Orders Diet DIET NPO With Tube Feedings LDAs PICC Double Lumen 08/31/18 Right;Brachial (Active) Central Line Being Utilized Yes 9/17/2018 11:02 AM  
Criteria for Appropriate Use Limited/no vessel suitable for conventional peripheral access 9/17/2018  8:07 AM  
Site Assessment Clean, dry, & intact 9/17/2018 11:02 AM  
Phlebitis Assessment 0 9/17/2018 11:02 AM  
Infiltration Assessment 0 9/17/2018 11:02 AM  
Arm Circumference (cm) 39 cm 9/8/2018  7:00 PM  
Date of Last Dressing Change 09/17/18 9/17/2018 11:02 AM  
Dressing Status Clean, dry, & intact;New;Occlusive 9/17/2018 11:02 AM  
Action Taken Dressing changed 9/17/2018 11:02 AM  
External Catheter Length (cm) 0 centimeters 9/17/2018 11:02 AM  
Dressing Type Disk with Chlorhexadine gluconate (CHG); Transparent 9/17/2018 11:02 AM  
Hub Color/Line Status Purple;Cap end changed; Flushed;Patent 9/17/2018 11:02 AM  
Positive Blood Return (Site #1) Yes 9/17/2018 11:02 AM  
Hub Color/Line Status Red;Cap end changed; Flushed;Patent 9/17/2018 11:02 AM  
Positive Blood Return (Site #2) Yes 9/17/2018 11:02 AM  
Alcohol Cap Used Yes 9/17/2018  4:51 AM  
            
PEG/Gastrostomy Tube 09/05/18 (Active) Site Assessment Clean, dry, & intact 9/17/2018  8:07 AM  
Dressing Status Clean, dry, & intact 9/17/2018  8:07 AM  
G Port Status Infusing 9/17/2018  8:07 AM  
Action Taken Feed set changed 9/17/2018  4:51 AM  
Drainage Description Tan 9/17/2018  8:07 AM  
Gastric Residual (mL) 0 ml 9/17/2018  9:30 AM  
Tube Feeding/Formula Options Twocal HN 9/17/2018  8:07 AM  
Modular Nutrients Protein liquid 9/17/2018  4:51 AM  
Tube Feeding/Verify Rate (mL/hr) 50 9/17/2018  8:07 AM  
Water Flush Volume (mL) 200 mL 9/17/2018  8:07 AM  
Intake (ml) 453 ml 9/17/2018  4:51 AM  
Medication Volume 200 ml 9/16/2018  5:00 PM  
   
Colostomy 07/10/18 Left Abdomen (Active) Drainage Color Brown 9/17/2018  8:07 AM  
Site Assessment Clean, dry, intact 9/17/2018  8:07 AM  
Treatment Site care 9/17/2018  4:51 AM  
Output (ml) 200 mL 9/17/2018  4:51 AM  
            
Urinary Catheter [REMOVED] Urinary Catheter 07/17/18 2- way; Erazo-Indications for Use: Accurate measurement of urinary output [REMOVED] Urinary Catheter 08/11/18 Erazo-Indications for Use: Accurate measurement of urinary output [REMOVED] Urinary Catheter 09/02/18 Erazo-Indications for Use: Chronic urinary retention/bladder outlet obstruction, Accurate measurement of urinary output [REMOVED] Condom Catheter 09/15/18-Indications for Use: Prolonged immobilization due to unstable fracture, Accurate measurement of urinary output Intake & Output Date 09/16/18 0700 - 09/17/18 3269 09/17/18 0700 - 09/18/18 8746 Shift 8359-7966 7262-1853 24 Hour Total 8458-2965 5382-8359 24 Hour Total  
I 
N 
T 
A 
K 
E 
 NG/ 1625 2225 200  200 Water Flush Volume (mL) (PEG/Gastrostomy Tube 09/05/18) 400 400 800 200  200 Medication Volume (PEG/Gastrostomy Tube 09/05/18) 200  200 Intake (ml) (PEG/Gastrostomy Tube 09/05/18)  1225 1225 Shift Total 
(mL/kg) 600 
(5.4) 1625 
(14.7) 2225 
(20.1) 200 
(1.8)  200 
(1.8) O 
U T 
P 
U Roland Edmonds Urine (mL/kg/hr) 800 
(0.6) 500 (0.4) 1300 
(0.5) Urine Voided  Urine Occurrence(s) 1 x  1 x Stool 200 350 550 Output (ml) (Colostomy 07/10/18 Left Abdomen) 200 350 550 Shift Total 
(mL/kg) 1000 
(9.1) 850 
(7.7) 1850 
(16.7) NET -400 775 375 200  200 Weight (kg) 110.4 110.4 110.4 110.4 110.4 110.4 Readmission Risk Assessment Tool Score High Risk   
      
 21 Total Score 3 Has Seen PCP in Last 6 Months (Yes=3, No=0) 2 . Living with Significant Other. Assisted Living. LTAC. SNF. or  
Rehab  
 3 Patient Length of Stay (>5 days = 3)  
 9 Pt. Coverage (Medicare=5 , Medicaid, or Self-Pay=4) 4 Charlson Comorbidity Score (Age + Comorbid Conditions) Criteria that do not apply:  
 IP Visits Last 12 Months (1-3=4, 4=9, >4=11) Expected Length of Stay 10d 19h Actual Length of Stay 69

## 2018-09-18 ENCOUNTER — APPOINTMENT (OUTPATIENT)
Dept: CT IMAGING | Age: 78
DRG: 329 | End: 2018-09-18
Attending: INTERNAL MEDICINE
Payer: MEDICARE

## 2018-09-18 LAB
ANION GAP SERPL CALC-SCNC: 9 MMOL/L (ref 5–15)
BUN SERPL-MCNC: 23 MG/DL (ref 6–20)
BUN/CREAT SERPL: 23 (ref 12–20)
CALCIUM SERPL-MCNC: 8.5 MG/DL (ref 8.5–10.1)
CHLORIDE SERPL-SCNC: 97 MMOL/L (ref 97–108)
CO2 SERPL-SCNC: 25 MMOL/L (ref 21–32)
CREAT SERPL-MCNC: 0.98 MG/DL (ref 0.7–1.3)
DATE LAST DOSE: ABNORMAL
DATE LAST DOSE: ABNORMAL
GENTAMICIN PEAK SERPL-MCNC: 4 UG/ML (ref 5–10)
GENTAMICIN TROUGH SERPL-MCNC: 0.5 UG/ML (ref 1–2)
GLUCOSE BLD STRIP.AUTO-MCNC: 113 MG/DL (ref 65–100)
GLUCOSE BLD STRIP.AUTO-MCNC: 124 MG/DL (ref 65–100)
GLUCOSE BLD STRIP.AUTO-MCNC: 134 MG/DL (ref 65–100)
GLUCOSE BLD STRIP.AUTO-MCNC: 154 MG/DL (ref 65–100)
GLUCOSE SERPL-MCNC: 143 MG/DL (ref 65–100)
POTASSIUM SERPL-SCNC: 4.3 MMOL/L (ref 3.5–5.1)
REPORTED DOSE,DOSE: ABNORMAL UNITS
REPORTED DOSE,DOSE: ABNORMAL UNITS
REPORTED DOSE/TIME,TMG: 400
REPORTED DOSE/TIME,TMG: 500
SERVICE CMNT-IMP: ABNORMAL
SODIUM SERPL-SCNC: 131 MMOL/L (ref 136–145)

## 2018-09-18 PROCEDURE — 74011000258 HC RX REV CODE- 258: Performed by: INTERNAL MEDICINE

## 2018-09-18 PROCEDURE — 74011250636 HC RX REV CODE- 250/636: Performed by: INTERNAL MEDICINE

## 2018-09-18 PROCEDURE — 92526 ORAL FUNCTION THERAPY: CPT | Performed by: SPEECH-LANGUAGE PATHOLOGIST

## 2018-09-18 PROCEDURE — 74011250637 HC RX REV CODE- 250/637: Performed by: INTERNAL MEDICINE

## 2018-09-18 PROCEDURE — 74011636637 HC RX REV CODE- 636/637: Performed by: INTERNAL MEDICINE

## 2018-09-18 PROCEDURE — 90686 IIV4 VACC NO PRSV 0.5 ML IM: CPT | Performed by: INTERNAL MEDICINE

## 2018-09-18 PROCEDURE — 65270000029 HC RM PRIVATE

## 2018-09-18 PROCEDURE — 80048 BASIC METABOLIC PNL TOTAL CA: CPT | Performed by: INTERNAL MEDICINE

## 2018-09-18 PROCEDURE — 74011000250 HC RX REV CODE- 250: Performed by: INTERNAL MEDICINE

## 2018-09-18 PROCEDURE — 82962 GLUCOSE BLOOD TEST: CPT

## 2018-09-18 PROCEDURE — 80170 ASSAY OF GENTAMICIN: CPT | Performed by: INTERNAL MEDICINE

## 2018-09-18 PROCEDURE — 77030018798 HC PMP KT ENTRL FED COVD -A

## 2018-09-18 PROCEDURE — 70450 CT HEAD/BRAIN W/O DYE: CPT

## 2018-09-18 PROCEDURE — 36415 COLL VENOUS BLD VENIPUNCTURE: CPT | Performed by: INTERNAL MEDICINE

## 2018-09-18 RX ADMIN — DAPTOMYCIN 1000 MG: 500 INJECTION, POWDER, LYOPHILIZED, FOR SOLUTION INTRAVENOUS at 18:39

## 2018-09-18 RX ADMIN — VENLAFAXINE 37.5 MG: 37.5 TABLET ORAL at 18:27

## 2018-09-18 RX ADMIN — GENTAMICIN SULFATE 80 MG: 40 INJECTION, SOLUTION INTRAMUSCULAR; INTRAVENOUS at 04:36

## 2018-09-18 RX ADMIN — Medication 10 ML: at 21:26

## 2018-09-18 RX ADMIN — NYSTATIN 500000 UNITS: 100000 SUSPENSION ORAL at 21:25

## 2018-09-18 RX ADMIN — POLYETHYLENE GLYCOL 3350 17 G: 17 POWDER, FOR SOLUTION ORAL at 08:07

## 2018-09-18 RX ADMIN — NYSTATIN 500000 UNITS: 100000 SUSPENSION ORAL at 18:27

## 2018-09-18 RX ADMIN — Medication 10 ML: at 13:54

## 2018-09-18 RX ADMIN — LACTULOSE 10 G: 20 SOLUTION ORAL at 18:27

## 2018-09-18 RX ADMIN — INSULIN LISPRO 3 UNITS: 100 INJECTION, SOLUTION INTRAVENOUS; SUBCUTANEOUS at 11:31

## 2018-09-18 RX ADMIN — LEVETIRACETAM 250 MG: 500 SOLUTION ORAL at 08:07

## 2018-09-18 RX ADMIN — LEVETIRACETAM 250 MG: 500 SOLUTION ORAL at 21:25

## 2018-09-18 RX ADMIN — Medication 10 ML: at 06:35

## 2018-09-18 RX ADMIN — VENLAFAXINE 37.5 MG: 37.5 TABLET ORAL at 08:08

## 2018-09-18 RX ADMIN — Medication 20 ML: at 13:54

## 2018-09-18 RX ADMIN — INFLUENZA VIRUS VACCINE 0.5 ML: 15; 15; 15; 15 SUSPENSION INTRAMUSCULAR at 08:10

## 2018-09-18 RX ADMIN — FUROSEMIDE 60 MG: 40 TABLET ORAL at 08:08

## 2018-09-18 RX ADMIN — NYSTATIN 500000 UNITS: 100000 SUSPENSION ORAL at 08:10

## 2018-09-18 RX ADMIN — APIXABAN 5 MG: 5 TABLET, FILM COATED ORAL at 08:08

## 2018-09-18 RX ADMIN — LACTULOSE 10 G: 20 SOLUTION ORAL at 08:07

## 2018-09-18 RX ADMIN — AMIODARONE HYDROCHLORIDE 200 MG: 200 TABLET ORAL at 08:08

## 2018-09-18 RX ADMIN — NYSTATIN 500000 UNITS: 100000 SUSPENSION ORAL at 14:01

## 2018-09-18 RX ADMIN — APIXABAN 5 MG: 5 TABLET, FILM COATED ORAL at 21:25

## 2018-09-18 NOTE — PROGRESS NOTES
Problem: Dysphagia (Adult) Goal: *Acute Goals and Plan of Care (Insert Text) Speech path reeval 
1. Pt will complete simple oropharyngeal strengthening exercises with max cues. Speech path goals Initiated 9/6/18 1. Patient will follow simple oropharyngeal strengthening exercises with max cues within 7 days Initiated 8/30/2018 1. Patient will participate in [facility] tomorrow. Completed. 2. Pt will participate with oral and pharyngeal swallowing exercises to improve swallowing function. 1. Pt will tolerate purees and nectar thick liquids with no overt s/s of aspiration. Discontinue Speech pathology goals Initiated 8/3/2018 1. Patient will tolerate sips and chips with no overt s/s aspiration within 7 days. Goal met 8/10. 
2. Patient will participate in re-evaluation of swallow function within 7 days. Goal met 8/10. 3. Pt will participate with MBS today 8/10. Goal met 8/10. Speech language pathology dysphagia treatment Patient: Young Galina (61 y.o. male) Date: 9/18/2018 Diagnosis: Acute blood loss anemia GI bleed Anasarca GI Bleed 
gi bleed ASCENDING COLON CANCER  
aspiration ASPIRATION PNEUMONIA GI bleed Procedure(s) (LRB): ESOPHAGOGASTRODUODENOSCOPY (EGD) PERCUTANEOUS ENDOSCOPIC GASTROSTOMY TUBE INSERTION (N/A) 13 Days Post-Op Precautions:    
 
ASSESSMENT: 
Ice chips used to facilitate effortful swallow exercises. Oral care completed prior to ice chip trials. Patient able to complete pharyngeal swallow x10. Strong cough noted x 1. Patient continues to have oral residue after pharyngeal swallow. Patient with poor awareness of ice chip remaining on tongue and not always able to initiate a second swallow. Progression toward goals: 
[]         Improving appropriately and progressing toward goals [x]         Improving slowly and progressing toward goals 
[]         Not making progress toward goals and plan of care will be adjusted PLAN: 
 Recommendations and Planned Interventions: 
Continue swallowing exercises. Patient continues to benefit from skilled intervention to address the above impairments. Continue treatment per established plan of care. Discharge Recommendations: To Be Determined SUBJECTIVE:  
Patient stated Throw it in there and let's see what happens. When asked if he was ready for another ice chip. Wife at bedside. OBJECTIVE:  
Cognitive and Communication Status: 
Neurologic State: Alert, Confused, Eyes open to voice Orientation Level: Oriented to person, Disoriented to place, Disoriented to time, Disoriented to situation Cognition: Decreased attention/concentration, Decreased command following, Impaired decision making Perception: Cues to maintain midline in sitting Perseveration: Perseverates during conversation Safety/Judgement: Awareness of environment, Fall prevention Dysphagia Treatment: 
Exercises: 
Laryngeal Exercises: 
  
  
  
  
  
  
Effortful Swallow: Yes Sets : 1 Reps : 10 After treatment:  
[]              Patient left in no apparent distress sitting up in chair 
[x]              Patient left in no apparent distress in bed 
[x]              Call bell left within reach [x]              Nursing notified 
[x]              Caregiver present 
[]              Bed alarm activated COMMUNICATION/EDUCATION:  
Patient was educated regarding role of SLP and POC. Patient did not respond. Wife verbalized understanding. The patients plan of care including recommendations, planned interventions, and recommended diet changes were discussed with: Registered Nurse. []              Posted safety precautions in patient's room. DIPAK Torres Time Calculation: 12 mins

## 2018-09-18 NOTE — PROGRESS NOTES
Bedside shift change report given to Ryan Recio (oncoming nurse) by Ryanne Valdes (offgoing nurse). Report included the following information SBAR, Kardex, ED Summary, Procedure Summary, Intake/Output, MAR, Recent Results and Cardiac Rhythm Paced. 1145- TRANSFER - OUT REPORT: 
 
Verbal report given to receiving RN(name) on Cassius Trevizo  being transferred to [facility](unit) for routine progression of care Report consisted of patients Situation, Background, Assessment and  
Recommendations(SBAR). Information from the following report(s) SBAR, Kardex, ED Summary, Procedure Summary, Intake/Output, MAR, Recent Results and Cardiac Rhythm Paced was reviewed with the receiving nurse. RN will call when room is clean so patient can be transferred. Loki Mccord notified of pt's upcoming transfer upstairs

## 2018-09-18 NOTE — PROGRESS NOTES
Bedside and Verbal shift change report given to Antonette Rivera (oncoming nurse) by Lia Shirley (offgoing nurse). Report included the following information SBAR, Kardex, Procedure Summary, Intake/Output, MAR and Recent Results.

## 2018-09-18 NOTE — PROGRESS NOTES
PCU CM completed chart review. Noted that we are just waiting on authorization from [address] [place] [address] for [facility] stay. WILFRIDO called [facility] Admissions: [phone]. CM talked to them and they are still working on authorization from [employer]. [facility] will call us once a decision is made. WILFRIDO received a TPC from Sin Kong, [address] [place] [address] with Roro Begum and she wanted update regarding discharge plan and Pt status. She would like for CM to update her every few days regarding discharge plan. CM let her know that we are trying to get auth from insurance now for [facility]. She will follow Pt at [facility] once he transitions over there. WILFRIDO received a TPC from Pt wife, Bree Good [phone]: And she wanted to confirm that we are still waiting on authorization for [facility]. WILFRIDO left a VM explaining that we are still waiting on auth and that CM was notified that Pt would be transfering to Room 3252 today. CM let her know that Marlena Gallagher will be the case manger for that room and be following the case. CM will give handoff to [facility]  And CM will continue to monitor discharge plan. 1:41 PM  
Sent updates via [employer] to [facility] and let them know that Radha White would be handling CM since Pt transferred. Sumanth Rosario, WILFRIDO Ext [phone]

## 2018-09-18 NOTE — PROGRESS NOTES
Hospitalist Progress Note NAME: Chan Petersen :  1940 MRN:  [id number] Assessment / Plan   
Acute Blood Loss Anemia due to Lower GI Bleed POA 
> Colonoscopy showed a LARGE Colon Mass > S/P  Colectomy Stage 3 B this hospitalization done  > Developed Ileus then aspiration PNA Was intubated (due to acute respiratory failure) and prolonged ICU stay Extubated  Pneumonia , last sputum culture  + for Heavy staph aureus , light K.pneumoniae Sputum culture  + heavy Burkholderia cepacia S/p treated with Levaquin Bilateral Pleural Effusions s/p R chest tube removed  RONAL CPAP q HS 
  
Dysphagia, Failed MBS 
S/p TPN in hospital due to Dysphagia S/P Peg  Wed > Tolerating Tube feedings well Oral Candidiasis ? Infected Thrombus on NATASHA 
NATASHA shows definite large mass associated with pacing wire in RA which may represent vegetation ( ) PER  Infectious Disease Continue Daptomycin, Gentamicin IV, change Fluconazole ( 9/3). & levaquin to po Nystatin Swish & spit Complete total 6 weeks of daptomycin and gentamycin from the latest (-) blood Culture  which was done on 2018. So until  Has A RT ARM PICC LINE On Lovenox therapeutic Fluconazole IV added 9/3 Bacteremia S/p removal of L/IJ placement of PICC RUE , TPN on  Persistent bacteremia with coagulase negative Staph since  initially oxacillin sensitive , last positive BC is oxacillin resistant (1/) Repeat BC ,  , 9/3 no growth,  Positive Staph Coag Neg,  ( negative Chest CT repeated . 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 Hold off on removal of pacemaker at this time . D/w Dr Rachel Nava , .  . 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 Mary Breen discussed with Dr Jb Rajan (patients Neurologist) Patient  lethargic on 500 mg BID  Keppra but both states Keep on Keppra 250 BID 
due to better Drug interactions and liver  with all his meds and less Liver issues on Severe Deconditioning. Palliative care was involved with discussion with family who decided everything to be done He needs to follow up with ID or Cardiology close to October 20 for Reimaging and Cultures or can be done in at [facility]. To determine what to do with Pacers Hypernatremia - much improved, now on free fluid via Peg 
   
Code Status: Full NOK POA  :  WIFE 
DVT Prophylaxis: on lovenox for the thrombus Disposition - Pt's wife has finally decided to go for [facility] as the next place of care- CM working with [facility] - awaiting Insurance Authorization Subjective: Pt seen and examined at bedside. NAD. Tolerating tube feeding. Overnight events d/w RN Chief Complaint / Reason for Physician Visit : F/U AMS, dysphagia, Bacteremia, PNA, GI bleed, Colon Ca Review of Systems: 
Symptom Y/N Comments  Symptom Y/N Comments Fever/Chills n   Chest Pain n   
Poor Appetite n   Edema n   
Cough n   Abdominal Pain n   
Sputum n   Joint Pain n   
SOB/BECERRIL    Pruritis/Rash Nausea/vomit    Tolerating PT/OT y Diarrhea    Tolerating Diet y PEG tube feeding at goal rate Constipation    Other Could NOT obtain due to:   
 
Objective: VITALS:  
Last 24hrs VS reviewed since prior progress note. Most recent are: 
Patient Vitals for the past 24 hrs: 
 Temp Pulse Resp BP SpO2  
09/18/18 0751 98 °F (36.7 °C) 75 18 109/76 99 % 09/18/18 0318 - 76 - 106/51 96 % 09/18/18 0315 98.3 °F (36.8 °C) 75 16 106/51 97 % 09/17/18 2222 98 °F (36.7 °C) 76 16 121/61 99 % 09/17/18 2000 98.8 °F (37.1 °C) 77 16 122/52 97 % 09/17/18 1530 98.6 °F (37 °C) 70 18 130/88 -  
09/17/18 1101 98.6 °F (37 °C) 75 - 129/57 100 % Intake/Output Summary (Last 24 hours) at 09/18/18 1168 Last data filed at 09/18/18 2601 Gross per 24 hour Intake          2799.17 ml Output              940 ml Net          1859.17 ml PHYSICAL EXAM: 
General: WD, WN. Pleasant, Awake, NAD and Ox3 cooperative, no acute distress   
EENT:  EOMI. Anicteric sclerae. MMM Resp:  CTA bilaterally, no wheezing or rales. No accessory muscle use CV:  Regular  rhythm,  No edema GI:  Soft, Non distended, Non tender.  +Bowel sounds, PEG tube Noted +, Colostomy bag noted + Neurologic:  Alert and oriented X 3, normal speech, Psych:   Good insight. Not anxious nor agitated Skin:  No rashes. No jaundice, R PICC line noted Reviewed most current lab test results and cultures  YES Reviewed most current radiology test results   YES Review and summation of old records today    NO Reviewed patient's current orders and MAR    YES 
PMH/ reviewed - no change compared to H&P 
________________________________________________________________________ Care Plan discussed with: 
  Comments Patient x Family RN x Care Manager x Consultant Multidiciplinary team rounds were held today with , nursing, pharmacist and clinical coordinator. Patient's plan of care was discussed; medications were reviewed and discharge planning was addressed. ________________________________________________________________________ Total NON critical care TIME: 35 Minutes Total CRITICAL CARE TIME Spent:   Minutes non procedure based Comments >50% of visit spent in counseling and coordination of care x Chart review ________________________________________________________________________ Joel Ferguson MD  
 
Procedures: see electronic medical records for all procedures/Xrays and details which were not copied into this note but were reviewed prior to creation of Plan. LABS: 
I reviewed today's most current labs and imaging studies. Pertinent labs include: No results for input(s): WBC, HGB, HCT, PLT, HGBEXT, HCTEXT, PLTEXT, HGBEXT, HCTEXT, PLTEXT in the last 72 hours. Recent Labs  
   09/18/18 
 0433  09/17/18 
 0418  09/16/18 
 0405 NA  131*  132*  134* K  4.3  4.5  4.3 CL  97  97  99 CO2  25  26  28 GLU  143*  129*  126* BUN  23*  24*  24* CREA  0.98  0.96  0.89 CA  8.5  8.4*  8.3* Signed: Joel Ferguson MD

## 2018-09-18 NOTE — PROGRESS NOTES
PCU SHIFT NURSING NOTE Bedside and Verbal shift change report given to Prema Alva RN (oncoming nurse) [facility] (off[facility] nurse). Report included the following information SBAR, Kardex, MAR and Recent Results. Shift Summary:  
2215: Warm moist pack applied to patients arm 
0545: Warm moist pack applied to patients arm  
0705: Bedside and Verbal shift change report given to Nolberto Almonte RN (oncoming nurse) by Prema Alva RN (offgoing nurse). Report included the following information SBAR, Kardex, MAR and Recent Results. Admission Date 7/10/2018 Admission Diagnosis Acute blood loss anemia GI bleed Anasarca GI Bleed 
gi bleed ASCENDING COLON CANCER  
aspiration ASPIRATION PNEUMONIA Consults IP CONSULT TO GASTROENTEROLOGY 
IP CONSULT TO INTERVENTIONAL RADIOLOGY 
IP CONSULT TO INTENSIVIST 
IP CONSULT TO NEPHROLOGY 
IP CONSULT TO HEMATOLOGY 
IP CONSULT TO GASTROENTEROLOGY 
IP CONSULT TO INFECTIOUS DISEASES 
IP CONSULT TO NEUROLOGY 
IP CONSULT TO CARDIOLOGY 
IP CONSULT TO COLORECTAL SURGERY 
IP CONSULT TO PULMONOLOGY 
IP CONSULT TO PULMONOLOGY 
IP CONSULT TO HOSPITALIST Consults []PT []OT []Speech  
[]Case Management  
  
[] Palliative Cardiac Monitoring Order []Yes []No  
 
IV drips []Yes Drip:                            Dose: 
Drip:                            Dose: 
Drip:                            Dose:  
[]No  
 
GI Prophylaxis []Yes []No  
 
 
 
DVT Prophylaxis SCDs:  Sequential Compression Device: Bilateral  
  Patient Refused VTE Prophylaxis: Yes (took off/ reports been on for awhile) Ra stockings:  Graduated Compression Stockings: Bilateral  
  
[] Medication []Contraindicated []None Activity Level Activity Level: Bed Rest   
 Activity Assistance: Complete care Purposeful Rounding every 1-2 hour? []Yes Mckee Score  Total Score: 3 Bed Alarm (If score 3 or >) []Yes [] Refused (See signed refusal form in chart) Dagoberto Score  Dagoberto Score: 14 Dagoberto Score (if score 14 or less) []PMT consult  
[]Wound Care consult []Specialty bed  
[] Nutrition consult Needs prior to discharge:  
Home O2 required:   
[]Yes []No  
 If yes, how much O2 required? Other:  
 Last Bowel Movement: Last Bowel Movement Date: 09/18/18 Influenza Vaccine Received Flu Vaccine for Current Season (usually Sept-March): Not Flu Season Pneumonia Vaccine Diet Active Orders Diet DIET NPO With Tube Feedings LDAs PICC Double Lumen 08/31/18 Right;Brachial (Active) Central Line Being Utilized Yes 9/17/2018  8:00 PM  
Criteria for Appropriate Use Limited/no vessel suitable for conventional peripheral access 9/17/2018  8:00 PM  
Site Assessment Clean, dry, & intact 9/17/2018  8:00 PM  
Phlebitis Assessment 0 9/17/2018  8:00 PM  
Infiltration Assessment 0 9/17/2018  8:00 PM  
Arm Circumference (cm) 39 cm 9/8/2018  7:00 PM  
Date of Last Dressing Change 09/17/18 9/17/2018  8:00 PM  
Dressing Status Clean, dry, & intact 9/17/2018  8:00 PM  
Action Taken Dressing changed 9/17/2018  8:00 PM  
External Catheter Length (cm) 0 centimeters 9/17/2018 11:02 AM  
Dressing Type Disk with Chlorhexadine gluconate (CHG) 9/17/2018  8:00 PM  
Hub Color/Line Status Purple;Capped 9/17/2018  8:00 PM  
Positive Blood Return (Site #1) Yes 9/17/2018  8:00 PM  
Hub Color/Line Status Red;Capped 9/17/2018  8:00 PM  
Positive Blood Return (Site #2) Yes 9/17/2018  8:00 PM  
Alcohol Cap Used Yes 9/17/2018  8:00 PM  
            
PEG/Gastrostomy Tube 09/05/18 (Active) Site Assessment Clean, dry, & intact 9/17/2018  8:00 PM  
Dressing Status Clean, dry, & intact 9/17/2018  8:00 PM  
G Port Status Infusing 9/17/2018  8:00 PM  
Action Taken Feed set changed;Placement verified (comment) 9/17/2018  8:00 PM  
Drainage Description Tan 9/17/2018  8:00 PM  
 Gastric Residual (mL) 110 ml 9/17/2018  8:00 PM  
Tube Feeding/Formula Options Twocal HN 9/17/2018  8:00 PM  
Modular Nutrients Protein liquid 9/17/2018  4:51 AM  
Tube Feeding/Verify Rate (mL/hr) 50 9/17/2018  8:00 PM  
Water Flush Volume (mL) 200 mL 9/17/2018  8:00 PM  
Intake (ml) 453 ml 9/17/2018  4:51 AM  
Medication Volume 200 ml 9/16/2018  5:00 PM  
   
Colostomy 07/10/18 Left Abdomen (Active) Drainage Color Brown 9/17/2018  8:00 PM  
Site Assessment Clean, dry, intact 9/17/2018  8:00 PM  
Treatment Site care 9/17/2018  4:51 AM  
Output (ml) 300 mL 9/17/2018  4:18 PM  
            
Urinary Catheter [REMOVED] Urinary Catheter 07/17/18 2- way; Erazo-Indications for Use: Accurate measurement of urinary output [REMOVED] Urinary Catheter 08/11/18 Erazo-Indications for Use: Accurate measurement of urinary output [REMOVED] Urinary Catheter 09/02/18 Erazo-Indications for Use: Chronic urinary retention/bladder outlet obstruction, Accurate measurement of urinary output [REMOVED] Condom Catheter 09/15/18-Indications for Use: Prolonged immobilization due to unstable fracture, Accurate measurement of urinary output Intake & Output Date 09/17/18 0700 - 09/18/18 2199 09/18/18 0700 - 09/19/18 8340 Shift 0675-5056 0092-8090 24 Hour Total 0700-1859 0763-7484 24 Hour Total  
I 
N 
T 
A 
K 
E 
 NG/ 200 400 Water Flush Volume (mL) (PEG/Gastrostomy Tube 09/05/18) 200 200 400 Shift Total 
(mL/kg) 200 
(1.8) 200 
(1.8) 400 
(3.6) O 
U T 
P 
U [place] Fother[place]ill Stool 300  300 Output (ml) (Colostomy 07/10/18 Left Abdomen) 300  300 Shift Total 
(mL/kg) 300 
(2.7)  300 
(2.7) NET -100 200 100 Weight (kg) 111.1 111.1 111.1 111.1 111.1 111.1 Readmission Risk Assessment Tool Score High Risk   
      
 21 Total Score 3 Has Seen PCP in Last 6 Months (Yes=3, No=0) 2 . Living with Significant Other. Assisted Living. LTAC. SNF. or  
Rehab 3 Patient Length of Stay (>5 days = 3)  
 9 Pt. Coverage (Medicare=5 , Medicaid, or Self-Pay=4) 4 Charlson Comorbidity Score (Age + Comorbid Conditions) Criteria that do not apply:  
 IP Visits Last 12 Months (1-3=4, 4=9, >4=11) Expected Length of Stay 10d 19h Actual Length of Stay 70

## 2018-09-18 NOTE — PROGRESS NOTES
Infectious Disease Progress Note IMPRESSION:  
· Bacteremia again with BC 1/2 + for Coagulase negative Staph- Staph Capitis Oxacillin R ( 9/5) · Repeat [facility] 9/7 no growth · S/p Cellulitis LUE · Repeat CT chest ( 9/7) shows non occlusive thrombus in R/ IJ vein ,small amount of thrombus along right PICC catheter/ pacer leads in SVC which extends to right atrium · S/p peg placement · S/p removal of L/IJ placement of PICC RUE , TPN on 8/31 · Persistent bacteremia with coagulase negative Staph since 8/11 initially oxacillin sensitive , last positive BC is oxacillin resistant 8/21(1/4) · Repeat BC 8/26, 8/29 , 9/3 no growth ·  S/p failed MBS · NATASHA shows definite large mass associated with pacing wire in RA which may represent vegetation ( 8/23) · Thrombus  & tiny gas bubble of as in RIJ extending into SVC to level of RA (8/15)  s/p heparin IV now on lovenox s/q · CTA chest - no PE, could not visualize SVC thrombus, left lateral wall soft tissue swelling, soft tissue around pacemaker show no significant abnormality · US chest wall - minimal fluid in pacemaker pocket, no significant inflammation of overlying subcutaneous fat or skin · Acute respiratory failure s/p  Ventilator / h/o tracheostomy 24 years ago  , s/p extubation 8/26 · S/p R/ pleural effusion s/p chest tube placement · 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    
  
  
PLAN:  
   
· Continue Daptomycin x 6 weeks , Gentamicin IV x 4 weeks from last negative cultures. End date for Daptomycin is Oct 19,for Gentamicin is Oct 5. 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 · Weekly CBC, CMP, CK , gentamicin trough & peak q weekly , MD at [facility] to be notified about f/u on labs /also send reports to my office at [phone]. D/w Dr Stacey Escalante. · Thrombus in R/IJ,   mass in RA which is suggestive of thrombus rather than vegetation in light of pacemaker pocket appearing clear of infection. · Decision made to hold off on removal of pacemaker at this time . D/w Dr Luis Felipe Boyd , .  . · Continue antimicrobials & anticoagulation ·  Pt' s wife advised to call if recurrence of fever after completion of therapy CT chest -  IMPRESSION: 
 1. There is a small amount of nonocclusive thrombus in the right internal 
jugular vein. 
 There is also a small amount of thrombus along the right PICC catheter/pacer 
leads in the SVC which extends to the right atrium. 
 There is improvement in the bibasilar atelectasis/effusions Blood culture - Special Requests: NO SPECIAL REQUESTS   Preliminary Culture result: NO GROWTH 2 DAYS Blood culture - Culture result:    Final  
STAPHYLOCOCCUS CAPITIS SUBSPECIES UREOLYTICUS (OXACILLIN RESISTANT) , ISOLATED FROM 1 OF 2 BOTTLES DRAWN. .. LEFT HAND SITE (A) Subjective:  
 
Pt seen  . Awake, wife at bedside Review of Systems:  Pt denies complaints. 10 point ROS obtained Objective:  
 
Blood pressure 106/51, pulse 76, temperature 98.3 °F (36.8 °C), resp. rate 16, height 6' 2\" (1.88 m), weight 253 lb 15.5 oz (115.2 kg), SpO2 96 %. Temp (24hrs), Av.6 °F (37 °C), Min:98 °F (36.7 °C), Max:99 °F (37.2 °C) Patient Vitals for the past 24 hrs: 
 Temp Pulse Resp BP SpO2  
18 0318 - 76 - 106/51 96 % 18 0315 98.3 °F (36.8 °C) 75 16 106/51 97 % 18 2222 98 °F (36.7 °C) 76 16 121/61 99 % 18 2000 98.8 °F (37.1 °C) 77 16 122/52 97 % 18 1530 98.6 °F (37 °C) 70 18 130/88 -  
18 1101 98.6 °F (37 °C) 75 - 129/57 100 % 18 0718 99 °F (37.2 °C) 82 16 133/64 98 % Lines:  picc Physical Exam:  
General:   awake, answering questions Lungs:    Reduced air entry bases on  auscultation  chest wall-  pacemaker site -no swelling, erythemal  
CV:  Regular rate and rhythm,no murmur, Abdomen:   Soft, non-tender. bowel sounds +distended Extremities: LUE - erythema distal forearm is less Lines/Devices:  Intact, no erythema, drainage or tenderness Data Review: CBC:  
No results for input(s): WBC, RBC, HGB, HCT, PLT, GRANS, LYMPH, EOS, HGBEXT, HCTEXT, PLTEXT in the last 72 hours. CMP:  
Recent Labs  
   09/18/18 
 0433  09/17/18 
 0418  09/16/18 
 0405 GLU  143*  129*  126* NA  131*  132*  134* K  4.3  4.5  4.3 CL  97  97  99 CO2  25  26  28 BUN  23*  24*  24* CREA  0.98  0.96  0.89 CA  8.5  8.4*  8.3* AGAP  9  9  7 BUCR  23*  25*  27* Studies:     
Lab Results Component Value Date/Time Culture result: NO GROWTH 6 DAYS 09/08/2018 01:46 AM  
 Culture result: (A) 09/05/2018 07:03 PM  
  STAPHYLOCOCCUS CAPITIS SUBSPECIES UREOLYTICUS (OXACILLIN RESISTANT) , ISOLATED FROM 1 OF 2 BOTTLES DRAWN. .. LEFT HAND SITE Culture result: (A) 09/05/2018 07:03 PM  
  PRELIMINARY REPORT OF GRAM POSITIVE COCCI IN CLUSTERS GROWING IN 1 OF 2 BOTTLES DRAWN CALLED TO AND READ BACK BY FELIX CAMPOS RN [address] AT 2106 ON 9/6/2018. [place] [address] Culture result:  09/05/2018 07:03 PM  
   [facility] REQUESTING IDENTIFICATION AND SENSITIVITIES 9/10/18 TA. Culture result: REMAINING BOTTLE(S) HAS/HAVE NO GROWTH IN 5 DAYS 09/05/2018 07:03 PM  
  
 
XR Results (most recent): 
 
Results from Hospital Encounter encounter on 07/10/18 XR ABD PORT  1 V Narrative EXAM: KUB INDICATION: abd distension. 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 Portable supine KUB obtained at 0729 hours shows no bowel distention. There are 
vascular calcifications. Impression IMPRESSION: Normal bowel gas pattern. Patient Active Problem List  
Diagnosis Code  HTN (hypertension) I10  
 Depression F32.9  Hypercholesterolemia E78.00  Acid reflux K21.9  [facility] R56.9  Hypothyroidism E03.9  Hyponatremia E87.1  BPH (benign prostatic hyperplasia) N40.0  Rash R21  
 Cellulitis L03.90  Wax in ear H61.20  Ulcer [id number]  Small bowel obstruction ([facility] D47.760  Atrial flutter (HCC) I48.92  
 Atrial fibrillation or flutter  CHI (closed head injury) S09. 90XA  Basal cell cancer C44.91  
 TBI (traumatic brain injury) ([facility] S06. 9X9A  Atrial fibrillation ([facility]) I48.91  
 Cataract H26.9  Constipation K59.00  Ankle fracture, left U00.932P  Insomnia G47.00  Tinea corporis B35.4  SSS (sick sinus syndrome) ([facility]) I49.5  Syncope R55  Seizure disorder ([facility] G40.909  RONAL on CPAP G47.33, Z99.89  
 Pacemaker Z95.0  Pre-op evaluation Z01.818  Chronic back pain greater than 3 months duration M54.9, G89.29  
 Cerebral infarction ([facility]) I63.9  Acute bronchitis J20.9  Screening for colon cancer Z12.11  
 Chronic right shoulder pain M25.511, G89.29  
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  
 Acute blood loss anemia D62  Anasarca R60.1  GI bleed K92.2  Acute encephalopathy G93.40  Idiopathic hypotension I95.0  Counseling regarding goals of care Z71.89 ICD-10-CM ICD-9-CM 1. Anemia, unspecified type D64.9 285.9 2. Generalized weakness R53.1 780.79   
3. Acute encephalopathy G93.40 348.30   
4. Anasarca R60.1 782.3 5. Idiopathic hypotension I95.0 458.9 6. Counseling regarding goals of care Z71.89 V65.49   
7. 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 9X9S 907.0 8. Chronic atrial fibrillation (HCC) I48.2 427.31   
9. Malignant neoplasm of ascending colon (HCC) C18.2 153.6 Anti-infectives:  
  
 Daptomycin IV - 8/26- 9/3 Gentamicin IV 8/26 S/p levaquin 8/29-9/7 Cefotetan 7/17 Zosyn 7/20-8/7 Cefepime - 8/11-8/13 Ceftriaxone 8/13-8/17 Fluconazole IV- 9/3- 9/6 IV , 9/6-9/10-po Vancomycin 8/11-8/22 Daptomycin 8/23- 8/24- Naficillin IV 8/24- 8/26  Yanick Young MD FACP

## 2018-09-18 NOTE — PROGRESS NOTES
On my way to visit a patient on Med [facility], saw Juancarlos Brady (patient's wife) at doorway of patient's room. He had been transferred from PCU; waiting on acceptance to [address] [place] [address]. Juancarlos Brady became tearful as she shared Evan Kahn is going downstairs for another test as he was complaining of head pain. She also shared that he has expressed being tired. ..tired of being ill and all he has had to go through. Juancarlos Brady expressed trust in God's healing and seems to understand and accept it may not mean a cure for her . Offered pastoral presence, emotional support and assurance of continued prayer. Taz Parada, SHAWNA, [address],  [facility]  Paging Service  [phone])

## 2018-09-19 LAB
ANION GAP SERPL CALC-SCNC: 8 MMOL/L (ref 5–15)
BUN SERPL-MCNC: 24 MG/DL (ref 6–20)
BUN/CREAT SERPL: 26 (ref 12–20)
CALCIUM SERPL-MCNC: 8.7 MG/DL (ref 8.5–10.1)
CHLORIDE SERPL-SCNC: 97 MMOL/L (ref 97–108)
CO2 SERPL-SCNC: 27 MMOL/L (ref 21–32)
CREAT SERPL-MCNC: 0.92 MG/DL (ref 0.7–1.3)
GLUCOSE BLD STRIP.AUTO-MCNC: 113 MG/DL (ref 65–100)
GLUCOSE BLD STRIP.AUTO-MCNC: 148 MG/DL (ref 65–100)
GLUCOSE BLD STRIP.AUTO-MCNC: 151 MG/DL (ref 65–100)
GLUCOSE BLD STRIP.AUTO-MCNC: 153 MG/DL (ref 65–100)
GLUCOSE SERPL-MCNC: 125 MG/DL (ref 65–100)
POTASSIUM SERPL-SCNC: 4.1 MMOL/L (ref 3.5–5.1)
SERVICE CMNT-IMP: ABNORMAL
SODIUM SERPL-SCNC: 132 MMOL/L (ref 136–145)

## 2018-09-19 PROCEDURE — 80048 BASIC METABOLIC PNL TOTAL CA: CPT | Performed by: INTERNAL MEDICINE

## 2018-09-19 PROCEDURE — 74011000258 HC RX REV CODE- 258: Performed by: INTERNAL MEDICINE

## 2018-09-19 PROCEDURE — 74011250636 HC RX REV CODE- 250/636: Performed by: INTERNAL MEDICINE

## 2018-09-19 PROCEDURE — 74011636637 HC RX REV CODE- 636/637: Performed by: INTERNAL MEDICINE

## 2018-09-19 PROCEDURE — 74011250637 HC RX REV CODE- 250/637: Performed by: INTERNAL MEDICINE

## 2018-09-19 PROCEDURE — 94760 N-INVAS EAR/PLS OXIMETRY 1: CPT

## 2018-09-19 PROCEDURE — 74011000250 HC RX REV CODE- 250: Performed by: INTERNAL MEDICINE

## 2018-09-19 PROCEDURE — 77030018798 HC PMP KT ENTRL FED COVD -A

## 2018-09-19 PROCEDURE — 77010033678 HC OXYGEN DAILY

## 2018-09-19 PROCEDURE — 65270000029 HC RM PRIVATE

## 2018-09-19 PROCEDURE — 36415 COLL VENOUS BLD VENIPUNCTURE: CPT | Performed by: INTERNAL MEDICINE

## 2018-09-19 PROCEDURE — 82962 GLUCOSE BLOOD TEST: CPT

## 2018-09-19 RX ORDER — LIDOCAINE 4 G/100G
1 PATCH TOPICAL EVERY 24 HOURS
Status: DISCONTINUED | OUTPATIENT
Start: 2018-09-19 | End: 2018-10-02 | Stop reason: HOSPADM

## 2018-09-19 RX ADMIN — POLYETHYLENE GLYCOL 3350 17 G: 17 POWDER, FOR SOLUTION ORAL at 09:37

## 2018-09-19 RX ADMIN — NYSTATIN 500000 UNITS: 100000 SUSPENSION ORAL at 12:49

## 2018-09-19 RX ADMIN — AMIODARONE HYDROCHLORIDE 200 MG: 200 TABLET ORAL at 09:37

## 2018-09-19 RX ADMIN — Medication 20 ML: at 16:00

## 2018-09-19 RX ADMIN — LACTULOSE 10 G: 20 SOLUTION ORAL at 09:38

## 2018-09-19 RX ADMIN — Medication 10 ML: at 16:01

## 2018-09-19 RX ADMIN — INSULIN LISPRO 3 UNITS: 100 INJECTION, SOLUTION INTRAVENOUS; SUBCUTANEOUS at 06:22

## 2018-09-19 RX ADMIN — APIXABAN 5 MG: 5 TABLET, FILM COATED ORAL at 09:37

## 2018-09-19 RX ADMIN — NYSTATIN 500000 UNITS: 100000 SUSPENSION ORAL at 09:38

## 2018-09-19 RX ADMIN — VENLAFAXINE 37.5 MG: 37.5 TABLET ORAL at 09:37

## 2018-09-19 RX ADMIN — INSULIN LISPRO 3 UNITS: 100 INJECTION, SOLUTION INTRAVENOUS; SUBCUTANEOUS at 12:49

## 2018-09-19 RX ADMIN — ACETAMINOPHEN 650 MG: 325 TABLET ORAL at 09:37

## 2018-09-19 RX ADMIN — DAPTOMYCIN 1000 MG: 500 INJECTION, POWDER, LYOPHILIZED, FOR SOLUTION INTRAVENOUS at 17:51

## 2018-09-19 RX ADMIN — Medication 10 ML: at 06:22

## 2018-09-19 RX ADMIN — NYSTATIN 500000 UNITS: 100000 SUSPENSION ORAL at 17:51

## 2018-09-19 RX ADMIN — LACTULOSE 10 G: 20 SOLUTION ORAL at 17:53

## 2018-09-19 RX ADMIN — NYSTATIN 500000 UNITS: 100000 SUSPENSION ORAL at 21:02

## 2018-09-19 RX ADMIN — LEVETIRACETAM 250 MG: 500 SOLUTION ORAL at 09:37

## 2018-09-19 RX ADMIN — APIXABAN 5 MG: 5 TABLET, FILM COATED ORAL at 21:02

## 2018-09-19 RX ADMIN — VENLAFAXINE 37.5 MG: 37.5 TABLET ORAL at 17:52

## 2018-09-19 RX ADMIN — LEVETIRACETAM 250 MG: 500 SOLUTION ORAL at 21:02

## 2018-09-19 RX ADMIN — GENTAMICIN SULFATE 80 MG: 40 INJECTION, SOLUTION INTRAMUSCULAR; INTRAVENOUS at 04:35

## 2018-09-19 RX ADMIN — FUROSEMIDE 60 MG: 40 TABLET ORAL at 09:37

## 2018-09-19 NOTE — PROGRESS NOTES
Jayne Jeans still pending. Nida Betancourt at [facility]. Dr Elena Felder is aware to do a peer to peer:  [phone] #3 reference # [id number] with Dr Anamaria Qiu. Kalyn Chandler will be our back up as needed but will need to obtain auth prior to d/c.

## 2018-09-19 NOTE — PROGRESS NOTES
Bedside shift change report given to Mine Warren RN (oncoming nurse) by Randi Loera RN (offgoing nurse). Report included the following information SBAR, Kardex, Intake/Output, MAR and Recent Results.

## 2018-09-19 NOTE — PROGRESS NOTES
Nutrition Assessment: 
 
INTERVENTIONS/RECOMMENDATIONS:  
Enteral/Parenteral Nutrition: Other: Continue current TF recommendations ASSESSMENT:  
Chart reviewed; visited pt in room. TF currently at goal rate. Pt continues to tolerate current TF recommendations with minimal residuals. TF currently meeting 100% of kcal and protein needs. Pt waiting for insurance authorization for discharge to [facility] Will continue to monitor tolerance of TF. Diet Order: NPO (PEG: TwoCal @ 50 mL/hr x 22 hours + prosource daily + 250 mL flushes q 6 hours; 2260kcal, 107g protein, 1770 mL water) % Eaten:  No data found. Pertinent Medications: [x] Reviewed []Other: Lasix, Humalog, Zofran Pertinent Labs: [x]Reviewed  []Other: Na 132, Glu [phone] Food Allergies: [x]None []Other:    
Last BM: 9/18   [x]Active     []Hyperactive  []Hypoactive       [] Absent  BS Skin:    [x] Intact   [] Incision  [] Breakdown   []Edema   [x]Other: 1+ generalized edema Anthropometrics: Height: 6' 2\" (188 cm) Weight: 108.6 kg (239 lb 6.4 oz) IBW (%IBW):   ( ) UBW (%UBW):   (  %) BMI: Body mass index is 30.74 kg/(m^2). This BMI is indicative of: 
[]Underweight   []Normal   [x]Overweight   [] Obesity   [] Extreme Obesity (BMI>40) Last Weight Metrics: 
Weight Loss Metrics 9/19/2018 7/10/2018 7/5/2018 5/15/2018 5/7/2018 4/10/2018 3/26/2018 Today's Wt 239 lb 6.4 oz - 283 lb 4.8 oz 266 lb 262 lb 267 lb 267 lb BMI - 30.74 kg/m2 36.37 kg/m2 34.15 kg/m2 33.64 kg/m2 34.28 kg/m2 34.28 kg/m2 Estimated Nutrition Needs (Based on): 2250 Kcals/day (BMR (1875) x 1. 3AF) , 86 g (-108g (0.8-1.0 g/kg bw)) Protein Carbohydrate: At Least 130 g/day  Fluids: 2000 mL/day NUTRITION DIAGNOSES:  
Problem:  No nutritional diagnosis at this time Etiology: related to Signs/Symptoms: as evidenced by    
Previous Nutrition Dx:  [] Resolved  [] Unresolved           [] Progressing NUTRITION INTERVENTIONS: 
 Enteral/Parenteral Nutrition: Other GOAL:  
Patient will continue to tolerate TF @ goal with residual <250 mL next 3-5 days NUTRITION MONITORING AND EVALUATION Food/Nutrient Intake Outcomes: Enteral/parenteral nutrition intake Physical Signs/Symptoms Outcomes: Weight/weight change, Electrolyte and renal profile Previous Goal Met: 
 [x] Met              [] Progressing Towards Goal              [] Not Progressing Towards Goal  
Previous Recommendations: 
 [x] Implemented          [] Not Implemented          [] Not Applicable LEARNING NEEDS (Diet, Food/Nutrient-Drug Interaction):  
 [x] None Identified 
 [] Identified and Education Provided/Documented 
 [] Identified and Pt declined/was not appropriate Cultural, Nondenominational, OR Ethnic Dietary Needs:  
 [x] None Identified 
 [] Identified and Addressed 
 
 [x] Interdisciplinary Care Plan Reviewed/Documented  
 [x] Discharge Planning: Continue current TF recommendations [] Participated in Interdisciplinary Rounds NUTRITION RISK:  
 [x] Patient At Nutritional Risk 
  [] High              [x] Moderate/Mild           []  Low   
 [] Patient Not At Nutritional Risk Alex Burton Pager [phone] Weekend Pager [phone]

## 2018-09-19 NOTE — PROGRESS NOTES
Infectious Disease Progress Note IMPRESSION:  
· Bacteremia again with BC 1/2 + for Coagulase negative Staph- Staph Capitis Oxacillin R ( 9/5) · Repeat [facility] 9/7 no growth · S/p Cellulitis LUE · Repeat CT chest ( 9/7) shows non occlusive thrombus in R/ IJ vein ,small amount of thrombus along right PICC catheter/ pacer leads in SVC which extends to right atrium · S/p peg placement · S/p removal of L/IJ placement of PICC RUE , TPN on 8/31 · Persistent bacteremia with coagulase negative Staph since 8/11 initially oxacillin sensitive , last positive BC is oxacillin resistant 8/21(1/4) · Repeat BC 8/26, 8/29 , 9/3 no growth ·  S/p failed MBS · NATASHA shows definite large mass associated with pacing wire in RA which may represent vegetation ( 8/23) · Thrombus  & tiny gas bubble of as in RIJ extending into SVC to level of RA (8/15)  s/p heparin IV now on lovenox s/q · CTA chest - no PE, could not visualize SVC thrombus, left lateral wall soft tissue swelling, soft tissue around pacemaker show no significant abnormality · US chest wall - minimal fluid in pacemaker pocket, no significant inflammation of overlying subcutaneous fat or skin · Acute respiratory failure s/p  Ventilator / h/o tracheostomy 24 years ago  , s/p extubation 8/26 · S/p R/ pleural effusion s/p chest tube placement · 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    
  
  
PLAN:  
   
· Continue Daptomycin x 6 weeks , Gentamicin IV x 4 weeks from last negative cultures. End date for Daptomycin is Oct 19,for Gentamicin is Oct 5. 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 · Weekly CBC, CMP, CK , gentamicin trough & peak q weekly , MD at [facility] to be notified about f/u on labs /also send reports to my office at [phone]. D/w Dr Sherwin Simpson. · Thrombus in R/IJ,   mass in RA which is suggestive of thrombus rather than vegetation in light of pacemaker pocket appearing clear of infection. · Decision made to hold off on removal of pacemaker at this time . D/w Dr Loni Montano , .  . · Continue antimicrobials & anticoagulation ·  Pt' s wife advised to call if recurrence of fever after completion of therapy CT chest -  IMPRESSION: 
 1. There is a small amount of nonocclusive thrombus in the right internal 
jugular vein. 
 There is also a small amount of thrombus along the right PICC catheter/pacer 
leads in the SVC which extends to the right atrium. 
 There is improvement in the bibasilar atelectasis/effusions Blood culture - Special Requests: NO SPECIAL REQUESTS   Preliminary Culture result: NO GROWTH 2 DAYS Blood culture - Culture result:    Final  
STAPHYLOCOCCUS CAPITIS SUBSPECIES UREOLYTICUS (OXACILLIN RESISTANT) , ISOLATED FROM 1 OF 2 BOTTLES DRAWN. .. LEFT HAND SITE (A) Subjective:  
 
Pt seen  . Awake, talking. No complaints Review of Systems:  Pt denies complaints. 10 point ROS obtained Objective:  
 
Blood pressure 159/79, pulse 76, temperature 97.6 °F (36.4 °C), resp. rate 18, height 6' 2\" (1.88 m), weight 253 lb 15.5 oz (115.2 kg), SpO2 98 %. Temp (24hrs), Av.2 °F (36.8 °C), Min:97.6 °F (36.4 °C), Max:100.1 °F (37.8 °C) Patient Vitals for the past 24 hrs: 
 Temp Pulse Resp BP SpO2  
18 1911 97.6 °F (36.4 °C) 76 18 159/79 98 % 18 1336 97.7 °F (36.5 °C) - - - -  
18 1333 100.1 °F (37.8 °C) 79 18 120/72 98 % 18 1040 97.9 °F (36.6 °C) 82 18 118/77 97 % 18 0751 98 °F (36.7 °C) 75 18 109/76 99 % 18 0318 - 76 - 106/51 96 % 18 0315 98.3 °F (36.8 °C) 75 16 106/51 97 % 09/17/18 2222 98 °F (36.7 °C) 76 16 121/61 99 % Lines:  picc Physical Exam:  
General:   awake, answering questions Lungs:    Reduced air entry bases on  auscultation  chest wall-  pacemaker site -no swelling, erythemal  
CV:  Regular rate and rhythm,no murmur, Abdomen:   Soft, non-tender. bowel sounds +distended Extremities: LUE - erythema distal forearm is less Lines/Devices:  Intact, no erythema, drainage or tenderness Data Review: CBC:  
No results for input(s): WBC, RBC, HGB, HCT, PLT, GRANS, LYMPH, EOS, HGBEXT, HCTEXT, PLTEXT in the last 72 hours. CMP:  
Recent Labs  
   09/18/18 
 0433  09/17/18 
 0418  09/16/18 
 0405 GLU  143*  129*  126* NA  131*  132*  134* K  4.3  4.5  4.3 CL  97  97  99 CO2  25  26  28 BUN  23*  24*  24* CREA  0.98  0.96  0.89 CA  8.5  8.4*  8.3* AGAP  9  9  7 BUCR  23*  25*  27* Studies:     
Lab Results Component Value Date/Time Culture result: NO GROWTH 6 DAYS 09/08/2018 01:46 AM  
 Culture result: (A) 09/05/2018 07:03 PM  
  STAPHYLOCOCCUS CAPITIS SUBSPECIES UREOLYTICUS (OXACILLIN RESISTANT) , ISOLATED FROM 1 OF 2 BOTTLES DRAWN. .. LEFT HAND SITE Culture result: (A) 09/05/2018 07:03 PM  
  PRELIMINARY REPORT OF GRAM POSITIVE COCCI IN CLUSTERS GROWING IN 1 OF 2 BOTTLES DRAWN CALLED TO AND READ BACK BY FELIX CAMPOS RN [facility] AT 2106 ON 9/6/2018. [place] 1850 Culture result:  09/05/2018 07:03 PM  
   [facility] REQUESTING IDENTIFICATION AND SENSITIVITIES 9/10/18 TA. Culture result: REMAINING BOTTLE(S) HAS/HAVE NO GROWTH IN 5 DAYS 09/05/2018 07:03 PM  
  
 
XR Results (most recent): 
 
Results from Hospital Encounter encounter on 07/10/18 XR ABD PORT  1 V Narrative EXAM: KUB INDICATION: abd distension. 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 Portable supine KUB obtained at 0729 hours shows no bowel distention. There are 
vascular calcifications. Impression IMPRESSION: Normal bowel gas pattern. Patient Active Problem List  
Diagnosis Code  
 HTN (hypertension) I10  
 Depression F32.9  Hypercholesterolemia E78.00  Acid reflux K21.9  Seizure ([facility] R56.9  Hypothyroidism E03.9  Hyponatremia E87.1  BPH (benign prostatic hyperplasia) N40.0  Rash R21  
 Cellulitis L03.90  Wax in ear H61.20  Ulcer PCA5106  Small bowel obstruction ([facility] [place] Y45.403  Atrial flutter ([facility]) I48.92  
 Atrial fibrillation or flutter  CHI (closed head injury) S09. 90XA  Basal cell cancer C44.91  
 TBI (traumatic brain injury) ([facility] S06. 9X9A  Atrial fibrillation ([facility]) I48.91  
 Cataract H26.9  Constipation K59.00  Ankle fracture, left V87.538V  Insomnia G47.00  Tinea corporis B35.4  SSS (sick sinus syndrome) ([facility]) I49.5  Syncope R55  Seizure disorder ([facility] G40.909  RONAL on CPAP G47.33, Z99.89  
 Pacemaker Z95.0  Pre-op evaluation Z01.818  Chronic back pain greater than 3 months duration M54.9, G89.29  
 Cerebral infarction ([facility]) I63.9  Acute bronchitis J20.9  Screening for colon cancer Z12.11  
 Chronic right shoulder pain M25.511, G89.29  
 Common wart B07.8  Localized epilepsy with impairment of consciousness ([facility] G40.209  Severe obesity (BMI 35.0-39.9) ([facility]) E66.01  
 Acute blood loss anemia D62  Anasarca R60.1  GI bleed K92.2  Acute encephalopathy G93.40  Idiopathic hypotension I95.0  Counseling regarding goals of care Z71.89 ICD-10-CM ICD-9-CM 1. Anemia, unspecified type D64.9 285.9 2. Generalized weakness R53.1 780.79   
3. Acute encephalopathy G93.40 348.30   
4. Anasarca R60.1 782.3 5. Idiopathic hypotension I95.0 458.9 6. Counseling regarding goals of care Z71.89 V65.49   
7. 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 9X9S 907.0 8. Chronic atrial fibrillation (HCC) I48.2 427.31   
9. Malignant neoplasm of ascending colon (HCC) C18.2 153.6 Anti-infectives: Daptomycin IV - 8/26- 9/3 Gentamicin IV 8/26 S/p levaquin 8/29-9/7 Cefotetan 7/17 Zosyn 7/20-8/7 Cefepime - 8/11-8/13 Ceftriaxone 8/13-8/17 Fluconazole IV- 9/3- 9/6 IV , 9/6-9/10-po Vancomycin 8/11-8/22 Daptomycin 8/23- 8/24- Naficillin IV 8/24- 8/26  Marge Monroe MD FACP

## 2018-09-19 NOTE — PROGRESS NOTES
Hospitalist Progress Note NAME: Guillaume Pompa :  1940 MRN:  [id number] Assessment / Plan   
Acute Blood Loss Anemia due to Lower GI Bleed POA 
> Colonoscopy showed a LARGE Colon Mass > S/P Colectomy Stage 3 B this hospitalization done  > Developed Ileus then aspiration PNA Was intubated (due to acute respiratory failure) and prolonged ICU stay Extubated  Pneumonia , last sputum culture  + for Heavy staph aureus , light K.pneumoniae Sputum culture  + heavy Burkholderia cepacia S/p treated with Levaquin Bilateral Pleural Effusions s/p R chest tube removed  RONAL CPAP q HS 
  
Dysphagia, Failed MBS 
S/p TPN in hospital due to Dysphagia S/P Peg  Wed > Tolerating Tube feedings well Oral Candidiasis ? Infected Thrombus on NATASHA 
NATASHA shows definite large mass associated with pacing wire in RA which may represent vegetation ( ) PER  Infectious Disease Continue Daptomycin, Gentamicin IV, change Fluconazole ( 9/3). & levaquin to po Nystatin Swish & spit Complete total 6 weeks of daptomycin and gentamycin from the latest (-) blood Culture  which was done on 2018. So until  Has A RT ARM PICC LINE On Lovenox therapeutic Fluconazole IV added 9/3 Bacteremia S/p removal of L/IJ placement of PICC RUE , TPN on  Persistent bacteremia with coagulase negative Staph since  initially oxacillin sensitive , last positive BC is oxacillin resistant (1/) Repeat BC ,  , 9/3 no growth,  Positive Staph Coag Neg,  ( negative Chest CT repeated . 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 Hold off on removal of pacemaker at this time . D/w Dr Ruby Pop , .  . 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 Stella García discussed with Dr Dinesh Mariscal (patients Neurologist) Patient  lethargic on 500 mg BID  Keppra but both states Keep on Keppra 250 BID 
due to better Drug interactions and liver  with all his meds and less Liver issues on Severe Deconditioning. Palliative care was involved with discussion with family who decided everything to be done He needs to follow up with ID or Cardiology close to October 20 for Reimaging and Cultures or can be done in at [facility]. To determine what to do with Pacers Hypernatremia - much improved, now on free fluid via Peg 
   
Code Status: Full NOK POA  :  WIFE 
DVT Prophylaxis: on lovenox for the thrombus Disposition - Pt's wife has finally decided to go for [facility] as the next place of care- CM working with [facility] - awaiting Insurance Authorization Subjective: Pt seen and examined at bedside. NAD. Tolerating tube feeding. Overnight events d/w RN Chief Complaint / Reason for Physician Visit : F/U AMS, dysphagia, Bacteremia, PNA, GI bleed, Colon Ca Review of Systems: 
Symptom Y/N Comments  Symptom Y/N Comments Fever/Chills n   Chest Pain n   
Poor Appetite n   Edema n   
Cough n   Abdominal Pain n   
Sputum n   Joint Pain n   
SOB/BECERRIL    Pruritis/Rash Nausea/vomit    Tolerating PT/OT y Diarrhea    Tolerating Diet y PEG tube feeding at goal rate Constipation    Other Could NOT obtain due to:   
 
Objective: VITALS:  
Last 24hrs VS reviewed since prior progress note. Most recent are: 
Patient Vitals for the past 24 hrs: 
 Temp Pulse Resp BP SpO2  
09/19/18 1047 98.5 °F (36.9 °C) 75 16 143/63 97 % 09/19/18 0713 99.4 °F (37.4 °C) 77 16 156/85 98 % 09/19/18 0507 97.9 °F (36.6 °C) 75 16 133/65 96 % 09/18/18 2256 97.7 °F (36.5 °C) 74 16 140/74 97 % 09/18/18 1911 97.6 °F (36.4 °C) 76 18 159/79 98 % 09/18/18 1336 97.7 °F (36.5 °C) - - - -  
09/18/18 1333 100.1 °F (37.8 °C) 79 18 120/72 98 % Intake/Output Summary (Last 24 hours) at 09/19/18 1310 Last data filed at 09/19/18 9313 Gross per 24 hour Intake             1535 ml Output              450 ml Net             1085 ml PHYSICAL EXAM: 
General: WD, WN. Pleasant, Awake, NAD and Ox3 cooperative, no acute distress   
EENT:  EOMI. Anicteric sclerae. MMM Resp:  CTA bilaterally, no wheezing or rales. No accessory muscle use CV:  Regular  rhythm,  No edema GI:  Soft, Non distended, Non tender.  +Bowel sounds, PEG tube Noted +, Colostomy bag noted + Neurologic:  Alert and oriented X 3, normal speech, Psych:   Good insight. Not anxious nor agitated Skin:  No rashes. No jaundice, R PICC line noted Reviewed most current lab test results and cultures  YES Reviewed most current radiology test results   YES Review and summation of old records today    NO Reviewed patient's current orders and MAR    YES 
PMH/SH reviewed - no change compared to H&P 
________________________________________________________________________ Care Plan discussed with: 
  Comments Patient x Family RN x Care Manager x Consultant Multidiciplinary team rounds were held today with , nursing, pharmacist and clinical coordinator. Patient's plan of care was discussed; medications were reviewed and discharge planning was addressed. ________________________________________________________________________ Total NON critical care TIME: 10 Minutes Total CRITICAL CARE TIME Spent:   Minutes non procedure based Comments >50% of visit spent in counseling and coordination of care ________________________________________________________________________ Lindsey Soares MD  
 
Procedures: see electronic medical records for all procedures/Xrays and details which were not copied into this note but were reviewed prior to creation of Plan. LABS: 
I reviewed today's most current labs and imaging studies. Pertinent labs include: No results for input(s): WBC, HGB, HCT, PLT, HGBEXT, HCTEXT, PLTEXT, HGBEXT, HCTEXT, PLTEXT in the last 72 hours. Recent Labs  
   09/19/18 
 0432  09/18/18 
 0433  09/17/18 
 0733 NA  132*  131*  132* K  4.1  4.3  4.5  
CL  97  97  97 CO2  27  25  26 GLU  125*  143*  129* BUN  24*  23*  24* CREA  0.92  0.98  0.96  
CA  8.7  8.5  8.4* Signed: Lindsey Saores MD

## 2018-09-19 NOTE — PROGRESS NOTES
Physical Therapy Note: 
 
Chart reviewed, discussed with RN, PT treatment attempted and deferred. Pt cleared for PT by RN however pt declining participation stating fatigue and not having slept much overnight. Encouraged pt to participate in functional mobility training or at least supine exercises, discussed negative effects of bed rest, and reflected on progress with PT thus far. Pt continues to decline participation with PT. RN notified. Pt left NAD with call bell in reach as found. Will continue to follow per POC.

## 2018-09-19 NOTE — PROGRESS NOTES
Problem: Falls - Risk of 
Goal: *Absence of Falls Document Anabelle Samayoa Fall Risk and appropriate interventions in the flowsheet. Outcome: Progressing Towards Goal 
Fall Risk Interventions: 
Mobility Interventions: Bed/chair exit alarm, Communicate number of staff needed for ambulation/transfer, Strengthening exercises (ROM-active/passive) Mentation Interventions: Bed/chair exit alarm, Door open when patient unattended, More frequent rounding, Reorient patient, Toileting rounds Medication Interventions: Evaluate medications/consider consulting pharmacy, Patient to call before getting OOB, Teach patient to arise slowly Elimination Interventions: Call light in reach, Toileting schedule/hourly rounds, Bed/chair exit alarm History of Falls Interventions: Bed/chair exit alarm

## 2018-09-19 NOTE — PROGRESS NOTES
0700: Bedside shift change report given to [address] [facility] (oncoming nurse) by Lorelei Davidson (offgoing nurse). Report included the following information SBAR, Kardex, Procedure Summary, Intake/Output, MAR and Recent Results.

## 2018-09-20 LAB
ANION GAP SERPL CALC-SCNC: 10 MMOL/L (ref 5–15)
BUN SERPL-MCNC: 28 MG/DL (ref 6–20)
BUN/CREAT SERPL: 28 (ref 12–20)
CALCIUM SERPL-MCNC: 8.7 MG/DL (ref 8.5–10.1)
CHLORIDE SERPL-SCNC: 97 MMOL/L (ref 97–108)
CO2 SERPL-SCNC: 25 MMOL/L (ref 21–32)
CREAT SERPL-MCNC: 0.99 MG/DL (ref 0.7–1.3)
GLUCOSE BLD STRIP.AUTO-MCNC: 125 MG/DL (ref 65–100)
GLUCOSE BLD STRIP.AUTO-MCNC: 150 MG/DL (ref 65–100)
GLUCOSE BLD STRIP.AUTO-MCNC: 157 MG/DL (ref 65–100)
GLUCOSE SERPL-MCNC: 132 MG/DL (ref 65–100)
POTASSIUM SERPL-SCNC: 4.3 MMOL/L (ref 3.5–5.1)
SERVICE CMNT-IMP: ABNORMAL
SODIUM SERPL-SCNC: 132 MMOL/L (ref 136–145)

## 2018-09-20 PROCEDURE — 97164 PT RE-EVAL EST PLAN CARE: CPT

## 2018-09-20 PROCEDURE — 74011000250 HC RX REV CODE- 250: Performed by: INTERNAL MEDICINE

## 2018-09-20 PROCEDURE — 97530 THERAPEUTIC ACTIVITIES: CPT | Performed by: PHYSICAL THERAPIST

## 2018-09-20 PROCEDURE — 74011000258 HC RX REV CODE- 258: Performed by: INTERNAL MEDICINE

## 2018-09-20 PROCEDURE — 74011636637 HC RX REV CODE- 636/637: Performed by: INTERNAL MEDICINE

## 2018-09-20 PROCEDURE — 74011250637 HC RX REV CODE- 250/637: Performed by: INTERNAL MEDICINE

## 2018-09-20 PROCEDURE — 74011250636 HC RX REV CODE- 250/636: Performed by: INTERNAL MEDICINE

## 2018-09-20 PROCEDURE — 77030038269 HC DRN EXT URIN PURWCK BARD -A

## 2018-09-20 PROCEDURE — 92526 ORAL FUNCTION THERAPY: CPT

## 2018-09-20 PROCEDURE — 65270000029 HC RM PRIVATE

## 2018-09-20 PROCEDURE — 36415 COLL VENOUS BLD VENIPUNCTURE: CPT | Performed by: INTERNAL MEDICINE

## 2018-09-20 PROCEDURE — 80048 BASIC METABOLIC PNL TOTAL CA: CPT | Performed by: INTERNAL MEDICINE

## 2018-09-20 PROCEDURE — 82962 GLUCOSE BLOOD TEST: CPT

## 2018-09-20 RX ORDER — MECLIZINE HCL 12.5 MG 12.5 MG/1
25 TABLET ORAL AS NEEDED
Status: DISCONTINUED | OUTPATIENT
Start: 2018-09-20 | End: 2018-10-02 | Stop reason: HOSPADM

## 2018-09-20 RX ADMIN — POLYETHYLENE GLYCOL 3350 17 G: 17 POWDER, FOR SOLUTION ORAL at 09:30

## 2018-09-20 RX ADMIN — NYSTATIN 500000 UNITS: 100000 SUSPENSION ORAL at 09:30

## 2018-09-20 RX ADMIN — NYSTATIN 500000 UNITS: 100000 SUSPENSION ORAL at 21:21

## 2018-09-20 RX ADMIN — NYSTATIN 500000 UNITS: 100000 SUSPENSION ORAL at 18:03

## 2018-09-20 RX ADMIN — INSULIN LISPRO 3 UNITS: 100 INJECTION, SOLUTION INTRAVENOUS; SUBCUTANEOUS at 11:49

## 2018-09-20 RX ADMIN — LEVETIRACETAM 250 MG: 500 SOLUTION ORAL at 21:11

## 2018-09-20 RX ADMIN — APIXABAN 5 MG: 5 TABLET, FILM COATED ORAL at 21:11

## 2018-09-20 RX ADMIN — LACTULOSE 10 G: 20 SOLUTION ORAL at 18:03

## 2018-09-20 RX ADMIN — VENLAFAXINE 37.5 MG: 37.5 TABLET ORAL at 18:04

## 2018-09-20 RX ADMIN — LACTULOSE 10 G: 20 SOLUTION ORAL at 09:30

## 2018-09-20 RX ADMIN — Medication 10 ML: at 00:20

## 2018-09-20 RX ADMIN — Medication 20 ML: at 14:58

## 2018-09-20 RX ADMIN — LEVETIRACETAM 250 MG: 500 SOLUTION ORAL at 09:30

## 2018-09-20 RX ADMIN — Medication 10 ML: at 14:58

## 2018-09-20 RX ADMIN — GENTAMICIN SULFATE 80 MG: 40 INJECTION, SOLUTION INTRAMUSCULAR; INTRAVENOUS at 04:24

## 2018-09-20 RX ADMIN — DAPTOMYCIN 1000 MG: 500 INJECTION, POWDER, LYOPHILIZED, FOR SOLUTION INTRAVENOUS at 18:10

## 2018-09-20 RX ADMIN — Medication 10 ML: at 06:16

## 2018-09-20 RX ADMIN — APIXABAN 5 MG: 5 TABLET, FILM COATED ORAL at 09:30

## 2018-09-20 RX ADMIN — Medication 10 ML: at 21:23

## 2018-09-20 RX ADMIN — FUROSEMIDE 60 MG: 40 TABLET ORAL at 09:30

## 2018-09-20 RX ADMIN — VENLAFAXINE 37.5 MG: 37.5 TABLET ORAL at 09:30

## 2018-09-20 RX ADMIN — INSULIN LISPRO 3 UNITS: 100 INJECTION, SOLUTION INTRAVENOUS; SUBCUTANEOUS at 06:15

## 2018-09-20 RX ADMIN — INSULIN LISPRO 3 UNITS: 100 INJECTION, SOLUTION INTRAVENOUS; SUBCUTANEOUS at 00:19

## 2018-09-20 RX ADMIN — NYSTATIN 500000 UNITS: 100000 SUSPENSION ORAL at 13:54

## 2018-09-20 RX ADMIN — AMIODARONE HYDROCHLORIDE 200 MG: 200 TABLET ORAL at 09:30

## 2018-09-20 NOTE — PROGRESS NOTES
D/C plan discussed with MD and wife. Awaiting peer to peer due to insurance denial.  If upheld, will ask for an expedited appeal (per Eli Kerr, r) in which [address] [facility] office writes up an appeal, Yessica(Jenny) will bring it to [facility] for Dr Gerda Waller to sign, and it is sent for review by CMS which takes ~72hours. They have an 100% overturn rate. Wife is aware we may need to fall back on [facility] if denial is upheld. She is not feeling well and will go home soon. If discharged to [facility], pt will go via [facility] ambulance as this will be an EMTALA d/c. We will need the name of an accepting MD, room number, number to call report, and if they can accept over the weekend.

## 2018-09-20 NOTE — PROGRESS NOTES
Problem: Dysphagia (Adult) Goal: *Acute Goals and Plan of Care (Insert Text) Speech path reeval, continue goals 9/20/2018 1. Pt will complete simple oropharyngeal strengthening exercises with max cues. Continue for 7 days 9/20/2018 Speech path goals Initiated 9/6/18 1. Patient will follow simple oropharyngeal strengthening exercises with max cues within 7 days Initiated 8/30/2018 1. Patient will participate in [facility] tomorrow. Completed. 2. Pt will participate with oral and pharyngeal swallowing exercises to improve swallowing function. 1. Pt will tolerate purees and nectar thick liquids with no overt s/s of aspiration. Discontinue Speech pathology goals Initiated 8/3/2018 1. Patient will tolerate sips and chips with no overt s/s aspiration within 7 days. Goal met 8/10. 
2. Patient will participate in re-evaluation of swallow function within 7 days. Goal met 8/10. 3. Pt will participate with MBS today 8/10. Goal met 8/10. Speech language pathology dysphagia treatment: WEEKLY REASSESSMENT Patient: Elsa Davis (86 y.o. male) Date: 9/20/2018 Diagnosis: Acute blood loss anemia GI bleed Anasarca GI Bleed 
gi bleed ASCENDING COLON CANCER  
aspiration ASPIRATION PNEUMONIA GI bleed Procedure(s) (LRB): ESOPHAGOGASTRODUODENOSCOPY (EGD) PERCUTANEOUS ENDOSCOPIC GASTROSTOMY TUBE INSERTION (N/A) 15 Days Post-Op Precautions: swallow ASSESSMENT: 
Patient required mod encouragement to complete minimal effortful swallows facilitated by ice chips. Patient stated multiple times, \"I'm done\" and \"I'm getting tired\" after minimal trials. Patient required verbal cues during ~50% of trials to manipulate ice chips and elicit a swallow. No oral residue observed this date. Patient with strong cough x1. Patient's progression toward goals since last assessment: Limited. Recommend decrease frequency to 2x/week given poor progress and decreased participation to date.   
 
PLAN: 
 Goals have been updated based on progression since last assessment. Patient continues to benefit from skilled intervention to address the above impairments. Continue to follow the patient 2 times a week to address goals. Recommendations and Planned Interventions: 
--Continue NPO 
--Frequent oral care 
--SLP to follow for swallowing exercises Discharge Recommendations: Loki Davies SUBJECTIVE:  
Patient stated I'm getting tired.  OBJECTIVE:  
Cognitive and Communication Status: 
Neurologic State: Alert Orientation Level: Oriented X4 Cognition: Impaired decision making Perception: Cues to maintain midline in sitting Perseveration: Perseverates during conversation Safety/Judgement: Awareness of environment, Fall prevention Dysphagia Treatment: 
  
  
Exercises: 
Laryngeal Exercises: 
  
  
  
  
  
  
Effortful Swallow: Yes Sets : 1 Reps : 10 Pain: 
Pain Scale 1: Numeric (0 - 10) Pain Intensity 1: 2 Pain Location 1: Back After treatment:  
[]                Patient left in no apparent distress sitting up in chair 
[x]                Patient left in no apparent distress in bed 
[x]                Call bell left within reach [x]                Nursing notified 
[]                Caregiver present 
[]                Bed alarm activated COMMUNICATION/EDUCATION:  
The patients plan of care including recommendations, planned interventions, and recommended diet changes were discussed with: Registered Nurse. [x]                Posted safety precautions in patient's room. DIPAK Louis Time Calculation: 10 mins

## 2018-09-20 NOTE — PROGRESS NOTES
Problem: Mobility Impaired (Adult and Pediatric) Goal: *Acute Goals and Plan of Care (Insert Text) Physical Therapy Goals Reviewed 9/20/2018: goals remain appropriate Reviewed 9/13/18- goals remain appropriate Goals reviewed 9/6/18- goals remain appropriate Goals reviewed and revised 8/29/18 1. Patient will move from supine to sit and sit to supine , scoot up and down and roll side to side in bed with moderate assistance within 7 day(s). 2.  Patient will transfer from bed to chair and chair to bed with max assistance x 2 using the least restrictive device within 7 day(s). 3.  Patient will perform sit to stand with moderate assistance x 2 within 7 day(s). 4.  Patient will perform stand pivot transfer to wheelchair with max assistance x 2 in 7 days. Reviewed 8/3/18- goals remain appropriate Reviewed and revised 7/26/2018 1. Patient will move from supine to sit and sit to supine , scoot up and down and roll side to side in bed with minimal assistance within 7 day(s). 2.  Patient will transfer from bed to chair and chair to bed with moderate assistance using the least restrictive device within 7 day(s). 3.  Patient will perform sit to stand with moderate assistance within 7 day(s). 4.  Patient will perform stand pivot transfer to wheelchair with moderate assistance in 7 days. Reviewed and revised 7/19/2018 1. Patient will move from supine to sit and sit to supine , scoot up and down and roll side to side in bed with minimal assistance within 7 day(s). 2.  Patient will transfer from bed to chair and chair to bed with moderate assistance using the least restrictive device within 7 day(s). 3.  Patient will perform sit to stand with minimal assistance within 7 day(s). 4.  Patient will perform stand pivot transfer to wheelchair with minimal assistance in 7 days. Initiated 7/12/2018 1.   Patient will move from supine to sit and sit to supine , scoot up and down and roll side to side in bed with modified independence within 7 day(s). 2.  Patient will transfer from bed to chair and chair to bed with supervision/set-up using the least restrictive device within 7 day(s). 3.  Patient will perform sit to stand with supervision/set-up within 7 day(s). 4.  Patient will perform stand pivot transfer to wheelchair with modified independence in 7 days. physical Therapy TREATMENT: WEEKLY REASSESSMENT Patient: Cassius Trevizo (27 y.o. male) Date: 9/20/2018 Diagnosis: Acute blood loss anemia GI bleed Anasarca GI Bleed 
gi bleed ASCENDING COLON CANCER  
aspiration ASPIRATION PNEUMONIA GI bleed Procedure(s) (LRB): ESOPHAGOGASTRODUODENOSCOPY (EGD) PERCUTANEOUS ENDOSCOPIC GASTROSTOMY TUBE INSERTION (N/A) 15 Days Post-Op Precautions:   
Chart, physical therapy assessment, plan of care and goals were reviewed. ASSESSMENT: 
Patient's participation in therapy is starting to decline somewhat, he was self-limiting today (\"I can't sit for long\"), and he declined PT session 9/19/2018. He has been sitting at edge of bed for up to 7 minutes in other sessions this week; today he was up for less than 1 minute before he began to return himself to supine position. Rehab at [facility] is optimal discharge plan; the change of scenery and therapeutic environment may spark patient's motivation to participate and rehabilitate. Patient's progression toward goals since last assessment: minimal, kept same goals as before. PLAN: 
Goals have been updated based on progression since last assessment. Patient continues to benefit from skilled intervention to address the above impairments. Continue to follow the patient 3 times a week to address goals. Planned Interventions: 
[x]              Bed Mobility Training             []       Neuromuscular Re-Education [x]              Transfer Training                   []       Orthotic/Prosthetic Training []              Gait Training                         []       Modalities [x]              Therapeutic Exercises           []       Edema Management/Control []              Therapeutic Activities            [x]       Patient and Family Training/Education []              Other (comment): 
Discharge Recommendations: Kyler Isabel Further Equipment Recommendations for Discharge: to be determined SUBJECTIVE:  
Patient stated I'm worn out.  OBJECTIVE DATA SUMMARY:  
Critical Behavior: 
Neurologic State: Alert Orientation Level: Oriented X4 Cognition: Impaired decision making Safety/Judgement: Awareness of environment, Fall prevention Strength:  
 Right UE/LE are at least 3+/5. Functional Mobility Training: 
Bed Mobility: 
  
Supine to Sit: Maximum assistance;Assist x2; Additional time Sit to Supine: Maximum assistance;Assist x2; Additional time Scooting: Total assistance;Assist x2 Balance: 
Sitting: Impaired Sitting - Static: Poor (constant support) Sitting - Dynamic: None Neuro Re-Education: 
Patient tolerates static sitting with increased right lean and minimal to moderate assist x 2 for less than one minute. Therapeutic Exercises:  
Supine right LE AROM, including SLR; both UE ROM with self-AAROM of left UE shoulder flexion. Pain: 
Pain Scale 1: Numeric (0 - 10) Pain Intensity 1: 2 Pain Location 1: Back Pain Orientation 1: Lower Pain Description 1: Aching Pain Intervention(s) 1: Repositioned Activity Tolerance:  
Poor. Please refer to the flowsheet for vital signs taken during this treatment. After treatment:  
[]  Patient left in no apparent distress sitting up in chair 
[x]  Patient left in no apparent distress in bed 
[x]  Call bell left within reach [x]  Nursing notified 
[]  Caregiver present 
[]  Bed alarm activated COMMUNICATION/COLLABORATION:  
The patients plan of care was discussed with: Physical Therapy Assistant, Registered Nurse and Rehabilitation Attendant Maty Gardner, PT Time Calculation: 18 mins

## 2018-09-20 NOTE — PROGRESS NOTES
Infectious Disease Progress Note IMPRESSION:  
· Bacteremia again with BC 1/2 + for Coagulase negative Staph- Staph Capitis Oxacillin R ( 9/5) · Repeat [facility] 9/7 no growth · S/p Cellulitis LUE · Repeat CT chest ( 9/7) shows non occlusive thrombus in R/ IJ vein ,small amount of thrombus along right PICC catheter/ pacer leads in SVC which extends to right atrium · S/p peg placement · S/p removal of L/IJ placement of PICC RUE , TPN on 8/31 · S/p persistent bacteremia with coagulase negative Staph since 8/11 initially oxacillin sensitive , last positive BC is oxacillin resistant 8/21(1/4) · Repeat BC 8/26, 8/29 , 9/3 no growth ·  S/p failed MBS · NATASHA shows definite large mass associated with pacing wire in RA which may represent vegetation ( 8/23) · Thrombus  & tiny gas bubble of as in RIJ extending into SVC to level of RA (8/15)  s/p heparin IV now on lovenox s/q · CTA chest - no PE, could not visualize SVC thrombus, left lateral wall soft tissue swelling, soft tissue around pacemaker show no significant abnormality · US chest wall - minimal fluid in pacemaker pocket, no significant inflammation of overlying subcutaneous fat or skin · Acute respiratory failure s/p  Ventilator / h/o tracheostomy 24 years ago  , s/p extubation 8/26 · S/p R/ pleural effusion s/p chest tube placement · 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    
  
  
PLAN:  
   
· Continue Daptomycin x 6 weeks , Gentamicin IV x 4 weeks from last negative cultures. End date for Daptomycin is Oct 19,for Gentamicin is Oct 5. 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 · Weekly CBC, CMP, CK , gentamicin trough & peak q weekly , MD at [facility] to be notified about f/u on labs /also send reports to my office at [phone]. D/w Dr Kasey Mendez. · Thrombus in R/IJ,   mass in RA which is suggestive of thrombus rather than vegetation in light of pacemaker pocket appearing clear of infection. · Decision made to hold off on removal of pacemaker at this time . D/w Dr Alka Avelar , .  . · Continue antimicrobials & anticoagulation ·  Pt' s wife advised to call if recurrence of fever after completion of therapy CT chest -  IMPRESSION: 
 1. There is a small amount of nonocclusive thrombus in the right internal 
jugular vein. 
 There is also a small amount of thrombus along the right PICC catheter/pacer 
leads in the SVC which extends to the right atrium. 
 There is improvement in the bibasilar atelectasis/effusions Blood culture - Special Requests: NO SPECIAL REQUESTS   Preliminary Culture result: NO GROWTH 2 DAYS Blood culture - Culture result:    Final  
STAPHYLOCOCCUS CAPITIS SUBSPECIES UREOLYTICUS (OXACILLIN RESISTANT) , ISOLATED FROM 1 OF 2 BOTTLES DRAWN. .. LEFT HAND SITE (A) Subjective:  
 
Pt seen  . Awake, talking. \" I am cold\" Review of Systems:  Pt denies complaints. 10 point ROS obtained Objective:  
 
Blood pressure 135/79, pulse 74, temperature 98.1 °F (36.7 °C), resp. rate 18, height 6' 2\" (1.88 m), weight 239 lb 6.4 oz (108.6 kg), SpO2 96 %. Temp (24hrs), Av.4 °F (36.9 °C), Min:97.7 °F (36.5 °C), Max:99.4 °F (37.4 °C) Patient Vitals for the past 24 hrs: 
 Temp Pulse Resp BP SpO2  
18 2253 98.1 °F (36.7 °C) 74 18 135/79 96 % 18 1919 97.7 °F (36.5 °C) 74 18 136/64 96 % 18 1431 98.5 °F (36.9 °C) 75 16 134/64 96 % 18 1047 98.5 °F (36.9 °C) 75 16 143/63 97 % 18 0713 99.4 °F (37.4 °C) 77 16 156/85 98 % 18 0507 97.9 °F (36.6 °C) 75 16 133/65 96 % Lines:  [facility] Physical Exam:  
General:   awake, talking Lungs:    Reduced air entry bases on  auscultation  chest wall-  pacemaker site -no swelling, erythemal  
CV:  Regular rate and rhythm,no murmur, Abdomen:   Soft, non-tender. bowel sounds +distended Extremities: LUE - erythema distal forearm is less Lines/Devices:  Intact, no erythema, drainage or tenderness Data Review: CBC:  
No results for input(s): WBC, RBC, HGB, HCT, PLT, GRANS, LYMPH, EOS, HGBEXT, HCTEXT, PLTEXT in the last 72 hours. CMP:  
Recent Labs  
   09/19/18 
 0432  09/18/18 
 0433  09/17/18 
 5010 GLU  125*  143*  129* NA  132*  131*  132* K  4.1  4.3  4.5  
CL  97  97  97 CO2  27  25  26 BUN  24*  23*  24* CREA  0.92  0.98  0.96  
CA  8.7  8.5  8.4* AGAP  8  9  9 BUCR  26*  23*  25* Studies:     
Lab Results Component Value Date/Time Culture result: NO GROWTH 6 DAYS 09/08/2018 01:46 AM  
 Culture result: (A) 09/05/2018 07:03 PM  
  STAPHYLOCOCCUS CAPITIS SUBSPECIES UREOLYTICUS (OXACILLIN RESISTANT) , ISOLATED FROM 1 OF 2 BOTTLES DRAWN. .. LEFT HAND SITE Culture result: (A) 09/05/2018 07:03 PM  
  PRELIMINARY REPORT OF GRAM POSITIVE COCCI IN CLUSTERS GROWING IN 1 OF 2 BOTTLES DRAWN CALLED TO AND READ BACK BY FELIX CAMPOS RN [facility] AT 2106 ON 9/6/2018. [place] 1850 Culture result:  09/05/2018 07:03 PM  
   [facility] REQUESTING IDENTIFICATION AND SENSITIVITIES 9/10/18 TA. Culture result: REMAINING BOTTLE(S) HAS/HAVE NO GROWTH IN 5 DAYS 09/05/2018 07:03 PM  
  
 
XR Results (most recent): 
 
Results from Hospital Encounter encounter on 07/10/18 XR ABD PORT  1 V Narrative EXAM: KUB INDICATION: abd distension. 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 Portable supine KUB obtained at 0729 hours shows no bowel distention. There are 
vascular calcifications. Impression IMPRESSION: Normal bowel gas pattern.    
 
 
Patient Active Problem List  
Diagnosis Code  
 HTN (hypertension) I10  
  Depression F32.9  Hypercholesterolemia E78.00  Acid reflux K21.9  Seizure ([facility] R56.9  Hypothyroidism E03.9  Hyponatremia E87.1  BPH (benign prostatic hyperplasia) N40.0  Rash R21  
 Cellulitis L03.90  Wax in ear H61.20  Ulcer GRE8359  [facility] obstruction ([facility] B09.462  Atrial flutter (HCC) I48.92  
 Atrial fibrillation or flutter  CHI (closed head injury) S09. 90XA  Basal cell cancer C44.91  
 TBI (traumatic brain injury) ([facility] S06. 9X9A  Atrial fibrillation ([facility]) I48.91  
 Cataract H26.9  Constipation K59.00  Ankle fracture, left G65.269E  Insomnia G47.00  Tinea corporis B35.4  SSS (sick sinus syndrome) ([facility]) I49.5  Syncope R55  Seizure disorder ([facility] G40.909  RONAL on CPAP G47.33, Z99.89  
 Pacemaker Z95.0  Pre-op evaluation Z01.818  Chronic back pain greater than 3 months duration M54.9, G89.29  
 Cerebral infarction ([facility]) I63.9  Acute bronchitis J20.9  Screening for colon cancer Z12.11  
 Chronic right shoulder pain M25.511, G89.29  
 Common wart B07.8  Localized epilepsy with impairment of consciousness ([facility] G40.209  Severe obesity (BMI 35.0-39.9) ([facility]) E66.01  
 Acute blood loss anemia D62  Anasarca R60.1  GI bleed K92.2  Acute encephalopathy G93.40  Idiopathic hypotension I95.0  Counseling regarding goals of care Z71.89 ICD-10-CM ICD-9-CM 1. Anemia, unspecified type D64.9 285.9 2. Generalized weakness R53.1 780.79   
3. Acute encephalopathy G93.40 348.30   
4. Anasarca R60.1 782.3 5. Idiopathic hypotension I95.0 458.9 6. Counseling regarding goals of care Z71.89 V65.49   
7. 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 9X9S 907.0 8. Chronic atrial fibrillation (HCC) I48.2 427.31   
9. Malignant neoplasm of ascending colon (HCC) C18.2 153.6 Anti-infectives:  
  
 Daptomycin IV - 8/26- 9/3 Gentamicin IV 8/26 S/p levaquin 8/29-9/7 Cefotetan 7/17 Zosyn 7/20-8/7 Cefepime - 8/11-8/13 Ceftriaxone 8/13-8/17 Fluconazole IV- 9/3- 9/6 IV , 9/6-9/10-po Vancomycin 8/11-8/22 Daptomycin 8/23- 8/24- Naficillin IV 8/24- 8/26  Marti North MD FACP

## 2018-09-20 NOTE — PROGRESS NOTES
Hospitalist Progress Note NAME: Luke Cárdenas :  1940 MRN:  [id number] Assessment / Plan   
Disposition. Insurance denies Rossy Schradersin, spoke to Dr Arrieta Savers from insurance as peer to peer but they continue to decline. Now will appeal the decision. Acute Blood Loss Anemia due to Lower GI Bleed POA 
> Colonoscopy showed a LARGE Colon Mass > S/P Colectomy Stage 3 B this hospitalization done  > Developed Ileus then aspiration PNA Was intubated (due to acute respiratory failure) and prolonged ICU stay Extubated  Pneumonia , last sputum culture  + for Heavy staph aureus , light K.pneumoniae Sputum culture  + heavy Burkholderia cepacia S/p treated with Levaquin Bilateral Pleural Effusions s/p R chest tube removed  RONAL CPAP q HS 
  
Dysphagia, Failed MBS 
S/p TPN in hospital due to Dysphagia S/P Peg  Wed > Tolerating Tube feedings well Oral Candidiasis ? Infected Thrombus on NATASHA 
NATASHA shows definite large mass associated with pacing wire in RA which may represent vegetation ( ) PER  Infectious Disease Continue Daptomycin, Gentamicin IV, change Fluconazole ( 9/3). & levaquin to po Nystatin Swish & spit Complete total 6 weeks of daptomycin and gentamycin from the latest (-) blood Culture  which was done on 2018. So until  Has A RT ARM PICC LINE On Lovenox therapeutic Fluconazole IV added 9/3 Bacteremia S/p removal of L/IJ placement of PICC RUE , TPN on  Persistent bacteremia with coagulase negative Staph since  initially oxacillin sensitive , last positive BC is oxacillin resistant (1/) Repeat BC ,  , 9/3 no growth,  Positive Staph Coag Neg,  ( negative Chest CT repeated . 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 Hold off on removal of pacemaker at this time . D/w Dr Anoop Vallejo , .  . 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 Nenita Bolanos discussed with Dr Sandy Hu (patients Neurologist) Patient  lethargic on 500 mg BID  Keppra but both states Keep on Keppra 250 BID 
due to better Drug interactions and liver  with all his meds and less Liver issues on Severe Deconditioning. Palliative care was involved with discussion with family who decided everything to be done He needs to follow up with ID or Cardiology close to October 20 for Reimaging and Cultures or can be done in at [facility]. To determine what to do with Pacers Hypernatremia - much improved, now on free fluid via Peg 
   
Code Status: Full NOK POA  :  WIFE 
DVT Prophylaxis: on lovenox for the thrombus Subjective: Pt seen and examined at bedside. NAD. Tolerating tube feeding. Overnight events d/w RN Chief Complaint / Reason for Physician Visit : F/U AMS, dysphagia, Bacteremia, PNA, GI bleed, Colon Ca Review of Systems: 
Symptom Y/N Comments  Symptom Y/N Comments Fever/Chills n   Chest Pain n   
Poor Appetite n   Edema n   
Cough n   Abdominal Pain n   
Sputum n   Joint Pain n   
SOB/BECERRIL    Pruritis/Rash Nausea/vomit    Tolerating PT/OT y Diarrhea    Tolerating Diet y PEG tube feeding at goal rate Constipation    Other Could NOT obtain due to:   
 
Objective: VITALS:  
Last 24hrs VS reviewed since prior progress note. Most recent are: 
Patient Vitals for the past 24 hrs: 
 Temp Pulse Resp BP SpO2  
09/20/18 1600 98.1 °F (36.7 °C) 78 18 130/65 97 % 09/20/18 1141 98.1 °F (36.7 °C) 80 16 121/65 100 % 09/20/18 0711 99.3 °F (37.4 °C) 86 18 141/60 97 % 09/20/18 0313 97 °F (36.1 °C) 68 18 130/75 97 % 09/19/18 2253 98.1 °F (36.7 °C) 74 18 135/79 96 % 09/19/18 1919 97.7 °F (36.5 °C) 74 18 136/64 96 % Intake/Output Summary (Last 24 hours) at 09/20/18 1843 Last data filed at 09/20/18 1810 Gross per 24 hour Intake             1900 ml Output              125 ml Net             1775 ml PHYSICAL EXAM: 
General: WD, WN. Pleasant, Awake, NAD and Ox3 cooperative, no acute distress   
EENT:  EOMI. Anicteric sclerae. MMM Resp:  CTA bilaterally, no wheezing or rales. No accessory muscle use CV:  Regular  rhythm,  No edema GI:  Soft, Non distended, Non tender.  +Bowel sounds, PEG tube Noted +, Colostomy bag noted + Neurologic:  Alert and oriented X 3, normal speech, Psych:   Good insight. Not anxious nor agitated Skin:  No rashes. No jaundice, R PICC line noted Reviewed most current lab test results and cultures  YES Reviewed most current radiology test results   YES Review and summation of old records today    NO Reviewed patient's current orders and MAR    YES 
PMH/ reviewed - no change compared to H&P 
________________________________________________________________________ Care Plan discussed with: 
  Comments Patient x Family RN x Care Manager x Consultant  x insurance company Multidiciplinary team rounds were held today with , nursing, pharmacist and clinical coordinator. Patient's plan of care was discussed; medications were reviewed and discharge planning was addressed. ________________________________________________________________________ Total NON critical care TIME: 20 Minutes Total CRITICAL CARE TIME Spent:   Minutes non procedure based Comments >50% of visit spent in counseling and coordination of care ________________________________________________________________________ Doc Wolfe MD  
 
Procedures: see electronic medical records for all procedures/Xrays and details which were not copied into this note but were reviewed prior to creation of Plan. LABS: 
I reviewed today's most current labs and imaging studies. Pertinent labs include: No results for input(s): WBC, HGB, HCT, PLT, HGBEXT, HCTEXT, PLTEXT, HGBEXT, HCTEXT, PLTEXT in the last 72 hours. Recent Labs  
   09/20/18 
 0422  09/19/18 
 0432  09/18/18 
 3293 NA  132*  132*  131* K  4.3  4.1  4.3 CL  97  97  97 CO2  25  27  25 GLU  132*  125*  143* BUN  28*  24*  23* CREA  0.99  0.92  0.98  
CA  8.7  8.7  8.5 Signed: Doc Wolfe MD

## 2018-09-20 NOTE — PROGRESS NOTES
Problem: Falls - Risk of 
Goal: *Absence of Falls Document Debria Check Fall Risk and appropriate interventions in the flowsheet. Outcome: Progressing Towards Goal 
Fall Risk Interventions: 
Mobility Interventions: Bed/chair exit alarm, Communicate number of staff needed for ambulation/transfer, Patient to call before getting OOB, Strengthening exercises (ROM-active/passive), Utilize walker, cane, or other assistive device Mentation Interventions: Adequate sleep, hydration, pain control, Bed/chair exit alarm, Door open when patient unattended, Increase mobility, More frequent rounding, Reorient patient, Toileting rounds Medication Interventions: Bed/chair exit alarm, Patient to call before getting OOB, Teach patient to arise slowly Elimination Interventions: Bed/chair exit alarm, Call light in reach, Patient to call for help with toileting needs, Toileting schedule/hourly rounds History of Falls Interventions: Bed/chair exit alarm Problem: Pressure Injury - Risk of 
Goal: *Prevention of pressure injury Document Dagoberto Scale and appropriate interventions in the flowsheet. Outcome: Progressing Towards Goal 
Pressure Injury Interventions: 
Sensory Interventions: Assess changes in LOC, Assess need for specialty bed, Avoid rigorous massage over bony prominences, Chair cushion Moisture Interventions: Absorbent underpads, Apply protective barrier, creams and emollients, Assess need for specialty bed, Check for incontinence Q2 hours and as needed Activity Interventions: Assess need for specialty bed, Chair cushion, Increase time out of bed Mobility Interventions: Assess need for specialty bed, Chair cushion, Turn and reposition approx. every two hours(pillow and wedges) Nutrition Interventions: Document food/fluid/supplement intake, Offer support with meals,snacks and hydration Friction and Shear Interventions: Apply protective barrier, creams and emollients, Lift sheet

## 2018-09-20 NOTE — PROGRESS NOTES
Patient back in supine after briefly working PT. Observed part of session from wolf and noted patient demonstrating self limiting behavior, putting forth little effort during PT. Patient ending session with PT by starting to lay down when he was unwilling to continue. Will defer OT for today 2/2 patient clearly with limited interest in participating in therapy today. Will follow back tomorrow for OT intervention as able.

## 2018-09-20 NOTE — PROGRESS NOTES
7061 bedside shift report completed with Aury Connolly. Writer assumed care of patient. 4730 Spouse @ bedside stating that patient needs antivert 1/2 hour before transport ride when DC. Information placed on white board for MD 
 
[phone] MD entered room. Spouse informed MD of above stated he would put in order. 1538 per [facility] RN monitor room called and stated patient had 6 beats of Vtach. Patient asymptomatic. MD paged 1 MD returned page with orders to add Mag level to AM labs 1910 bedside report given to [facility]. Passed on that patient still needed abd dressing change

## 2018-09-20 NOTE — PROGRESS NOTES
Bedside shift change report given to [facility] (oncoming nurse) by Roger Aguilar (offgoing nurse). Report included the following information SBAR, Kardex, Intake/Output, MAR, Accordion and Recent Results.

## 2018-09-21 LAB
ANION GAP SERPL CALC-SCNC: 8 MMOL/L (ref 5–15)
BUN SERPL-MCNC: 27 MG/DL (ref 6–20)
BUN/CREAT SERPL: 27 (ref 12–20)
CALCIUM SERPL-MCNC: 8.6 MG/DL (ref 8.5–10.1)
CHLORIDE SERPL-SCNC: 98 MMOL/L (ref 97–108)
CK SERPL-CCNC: 44 U/L (ref 39–308)
CO2 SERPL-SCNC: 26 MMOL/L (ref 21–32)
CREAT SERPL-MCNC: 0.99 MG/DL (ref 0.7–1.3)
CRP SERPL-MCNC: 10.6 MG/DL (ref 0–0.6)
DATE LAST DOSE: ABNORMAL
DATE LAST DOSE: ABNORMAL
ERYTHROCYTE [SEDIMENTATION RATE] IN BLOOD: 64 MM/HR (ref 0–20)
GENTAMICIN PEAK SERPL-MCNC: 3.9 UG/ML (ref 5–10)
GENTAMICIN TROUGH SERPL-MCNC: 0.7 UG/ML (ref 1–2)
GLUCOSE BLD STRIP.AUTO-MCNC: 123 MG/DL (ref 65–100)
GLUCOSE BLD STRIP.AUTO-MCNC: 134 MG/DL (ref 65–100)
GLUCOSE BLD STRIP.AUTO-MCNC: 141 MG/DL (ref 65–100)
GLUCOSE BLD STRIP.AUTO-MCNC: 151 MG/DL (ref 65–100)
GLUCOSE SERPL-MCNC: 140 MG/DL (ref 65–100)
MAGNESIUM SERPL-MCNC: 2 MG/DL (ref 1.6–2.4)
POTASSIUM SERPL-SCNC: 4.3 MMOL/L (ref 3.5–5.1)
REPORTED DOSE,DOSE: ABNORMAL UNITS
REPORTED DOSE,DOSE: ABNORMAL UNITS
REPORTED DOSE/TIME,TMG: ABNORMAL
REPORTED DOSE/TIME,TMG: ABNORMAL
SERVICE CMNT-IMP: ABNORMAL
SODIUM SERPL-SCNC: 132 MMOL/L (ref 136–145)

## 2018-09-21 PROCEDURE — 82550 ASSAY OF CK (CPK): CPT | Performed by: INTERNAL MEDICINE

## 2018-09-21 PROCEDURE — 86140 C-REACTIVE PROTEIN: CPT | Performed by: INTERNAL MEDICINE

## 2018-09-21 PROCEDURE — 74011000258 HC RX REV CODE- 258: Performed by: INTERNAL MEDICINE

## 2018-09-21 PROCEDURE — 74011636637 HC RX REV CODE- 636/637: Performed by: INTERNAL MEDICINE

## 2018-09-21 PROCEDURE — 74011250636 HC RX REV CODE- 250/636: Performed by: INTERNAL MEDICINE

## 2018-09-21 PROCEDURE — 74011000250 HC RX REV CODE- 250: Performed by: INTERNAL MEDICINE

## 2018-09-21 PROCEDURE — 82962 GLUCOSE BLOOD TEST: CPT

## 2018-09-21 PROCEDURE — 65270000029 HC RM PRIVATE

## 2018-09-21 PROCEDURE — 85652 RBC SED RATE AUTOMATED: CPT | Performed by: INTERNAL MEDICINE

## 2018-09-21 PROCEDURE — 80170 ASSAY OF GENTAMICIN: CPT | Performed by: INTERNAL MEDICINE

## 2018-09-21 PROCEDURE — 77030018798 HC PMP KT ENTRL FED COVD -A

## 2018-09-21 PROCEDURE — 80048 BASIC METABOLIC PNL TOTAL CA: CPT | Performed by: INTERNAL MEDICINE

## 2018-09-21 PROCEDURE — 74011250637 HC RX REV CODE- 250/637: Performed by: INTERNAL MEDICINE

## 2018-09-21 PROCEDURE — 36415 COLL VENOUS BLD VENIPUNCTURE: CPT | Performed by: INTERNAL MEDICINE

## 2018-09-21 PROCEDURE — 83735 ASSAY OF MAGNESIUM: CPT | Performed by: INTERNAL MEDICINE

## 2018-09-21 PROCEDURE — 94760 N-INVAS EAR/PLS OXIMETRY 1: CPT

## 2018-09-21 RX ADMIN — VENLAFAXINE 37.5 MG: 37.5 TABLET ORAL at 18:37

## 2018-09-21 RX ADMIN — NYSTATIN 500000 UNITS: 100000 SUSPENSION ORAL at 18:37

## 2018-09-21 RX ADMIN — VENLAFAXINE 37.5 MG: 37.5 TABLET ORAL at 10:26

## 2018-09-21 RX ADMIN — NYSTATIN 500000 UNITS: 100000 SUSPENSION ORAL at 21:15

## 2018-09-21 RX ADMIN — GENTAMICIN SULFATE 80 MG: 40 INJECTION, SOLUTION INTRAMUSCULAR; INTRAVENOUS at 05:43

## 2018-09-21 RX ADMIN — AMIODARONE HYDROCHLORIDE 200 MG: 200 TABLET ORAL at 10:26

## 2018-09-21 RX ADMIN — FUROSEMIDE 60 MG: 40 TABLET ORAL at 10:26

## 2018-09-21 RX ADMIN — APIXABAN 5 MG: 5 TABLET, FILM COATED ORAL at 10:26

## 2018-09-21 RX ADMIN — LEVETIRACETAM 250 MG: 500 SOLUTION ORAL at 21:16

## 2018-09-21 RX ADMIN — POLYETHYLENE GLYCOL 3350 17 G: 17 POWDER, FOR SOLUTION ORAL at 10:25

## 2018-09-21 RX ADMIN — NYSTATIN 500000 UNITS: 100000 SUSPENSION ORAL at 10:25

## 2018-09-21 RX ADMIN — APIXABAN 5 MG: 5 TABLET, FILM COATED ORAL at 21:16

## 2018-09-21 RX ADMIN — LACTULOSE 10 G: 20 SOLUTION ORAL at 18:37

## 2018-09-21 RX ADMIN — Medication 20 ML: at 13:05

## 2018-09-21 RX ADMIN — DAPTOMYCIN 1000 MG: 500 INJECTION, POWDER, LYOPHILIZED, FOR SOLUTION INTRAVENOUS at 19:04

## 2018-09-21 RX ADMIN — Medication 10 ML: at 05:44

## 2018-09-21 RX ADMIN — Medication 10 ML: at 13:05

## 2018-09-21 RX ADMIN — NYSTATIN 500000 UNITS: 100000 SUSPENSION ORAL at 13:04

## 2018-09-21 RX ADMIN — Medication 10 ML: at 21:27

## 2018-09-21 RX ADMIN — LACTULOSE 10 G: 20 SOLUTION ORAL at 10:25

## 2018-09-21 RX ADMIN — LEVETIRACETAM 250 MG: 500 SOLUTION ORAL at 10:24

## 2018-09-21 RX ADMIN — INSULIN LISPRO 3 UNITS: 100 INJECTION, SOLUTION INTRAVENOUS; SUBCUTANEOUS at 06:18

## 2018-09-21 RX ADMIN — INSULIN LISPRO 3 UNITS: 100 INJECTION, SOLUTION INTRAVENOUS; SUBCUTANEOUS at 13:05

## 2018-09-21 NOTE — PROGRESS NOTES
6 beats of VTACH @ 1504, pt nonsymptomatic, VS stable; paged Dr. Po Pennington and informed him; no new orders received.

## 2018-09-21 NOTE — PROGRESS NOTES
Appeal letter has been submitted for auth to [facility]. They have 72 hours to respond. 1300  If the appeal is overturned, the staff would want to send the pt by [facility] ambulance([phone]), find out from the transfer center person ([phone]) what her name is (hospital representative), name of the accepting MD, number to call report, room number, call report, send facesheet, ambulance form, emar, d/c instructions, and completed EMTALA form.

## 2018-09-21 NOTE — PROGRESS NOTES
Bedside and Verbal shift change report given to April Mustafa (oncoming nurse) by Haley Hamm RN (offgoing nurse). Report included the following information SBAR, Kardex, Intake/Output, MAR and Recent Results.

## 2018-09-21 NOTE — PROGRESS NOTES
Spiritual Care Partner Volunteer visited patient on Telemetry Unit on 9/21/18. Visited by Rev. Diego Rubio, [facility]  paging service: [phone])

## 2018-09-21 NOTE — ADT AUTH CERT NOTES
Utilization Review  
  
  
  Bowel Surgery: Colectomy, Partial, with or without Ostomy - Care Day 73 (9/20/2018) by Vielka Hernandez RN     
  Review Status Review Entered    
  Completed 9/21/2018    
  Details    
      
  Care Day: 68 Care Date: 9/20/2018 Level of Care: Telemetry    
  Guideline Day 2     
  Level Of Care    
  (X) Floor    
  9/21/2018 2:47 PM EDT by Ryan Ortiz    
    Telemetry floor    
      
      
  Clinical Status    
  (X) * Procedure completed    
  9/21/2018 2:47 PM EDT by Ryan Ortiz    
    S/P Colectomy Stage 3    
      
  (X) * Hemodynamic stability    
  9/21/2018 2:47 PM EDT by Ryan Ortiz    
    Vitals- 98.1, 78, 18, 130/65, 97%    
      
      
  Activity    
  ( ) * Progressive ambulation    
      
  Routes    
  (X) IV fluids, medications    
  9/21/2018 2:47 PM EDT by Ryan Ortiz    
    daptomycin 1000mg IV q 24hr, gentamicin 80mg IV q 24hr    
      
      
  Interventions    
  (X) Enterostomy therapy    
  9/21/2018 2:47 PM EDT by Ryan Ortiz    
    tube feeds    
      
      
  9/21/2018 2:47 PM EDT by Ryan Ortiz    
  Subject: Additional Clinical Information    
  9-20-18NAD. Tolerating tube feeding. GI: Soft, Non distended, Non tender.   +Bowel sounds, PEG tube Noted +, Colostomy bag noted  Abnl labs- sodium 132, glucose 132, BUN 28Meds- amiodarone 200mg per g tube, eliquis 5mg per g tube, lasix 60mg per G tube, SS insulin, lactulose 10g per G tube, keppra 250mg per g tube, nystatin 500,000 u per g tube, effexor 37.5mg per g tubePlan- flannery catheter, I/O, PT/OT    
      
      
      
      
  * Milestone    
      
  Additional Notes    
  9-20-18    
      
  IM Assessment / Plan      
  Disposition. Insurance denies [facility], spoke to Dr Kelly Slade from insurance as peer to peer but they continue to decline.  Now will appeal the decision.    
       
  Acute Blood Loss Anemia due to Lower GI Bleed POA    
   > Colonoscopy showed a LARGE Colon Mass > S/P Colectomy Stage 3 B this hospitalization done July 17 > Developed Ileus then aspiration PNA    
  Was intubated (due to acute respiratory failure) and prolonged ICU stay    
  Extubated 8/26    
  Pneumonia , last sputum culture 8/11 + for Heavy staph aureus , light K.pneumoniae     
  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    
  S/p treated with Levaquin    
  Bilateral Pleural Effusions s/p R chest tube removed 9/4    
  RONAL      
  CPAP q HS    
        
  Dysphagia, Failed MBS    
  S/p TPN in hospital due to Dysphagia     
  S/P Peg 9/5 Wed > Tolerating Tube feedings well    
  Oral Candidiasis    
  ? Infected Thrombus on NATASHA    
  NATASHA shows definite large mass associated with pacing wire in RA which may represent vegetation ( 8/23)     
  PER  Infectious Disease     
  Continue Daptomycin, Gentamicin IV, change Fluconazole ( 9/3). & levaquin to po    
  Nystatin Swish & spit     
       
  Complete total 6 weeks of daptomycin and gentamycin from the latest (-) blood Culture  which was done on 9/8/2018.  So until October 20    
  Has A RT ARM PICC LINE    
  On Lovenox therapeutic    
  Fluconazole IV added 9/3    
  Bacteremia    
  S/p removal of L/IJ placement of PICC RUE , TPN on 8/31    
  Persistent bacteremia with coagulase negative Staph since 8/11 initially oxacillin sensitive , last positive BC is oxacillin resistant 8/21(1/4)    
  Repeat BC 8/26, 8/29 , 9/3 no growth, 9/5 Positive Staph Coag Neg, 9/8 ( negative    
  Chest CT repeated 9/8.  No change in SVC thrombus and Atrial clot size    
       
  SSS S/p PPM and Pacer dependent    
  PAF, on chronic Amiodarone,  Was on IV amiodarone when he was NPO but now on Amiodarone per PEG with GOod control    
  Per ID consult > Thrombus in R/IJ, now  mass in RA which is suggestive of thrombus rather than vegetation in light of pacemaker pocket appearing clear of infection. Hold off on removal of pacemaker at this time .  D/w Dr Erica De La Rosa & Dr Thom Suazo , .  .    
  Continue antimicrobials & anticoagulation    
       
  Acute Encephalopathy - resolved    
  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    
  Hx of Seizures due to TBI    
  Has been on Tegretol chronic with controlled Seizures    
  Was changed to IV Keppra when NPO    
  Dr. Shreya Madden discussed with Dr Batsheva Galarza (patients Neurologist)    
  Patient  lethargic on 500 mg BID  Keppra but both states Keep on Keppra 250 BID    
  due to better Drug interactions and liver  with all his meds and less Liver issues on    
       
  Severe Deconditioning.    
  Palliative care was involved with discussion with family who decided everything to be done    
       
  He needs to follow up with ID or Cardiology close to October 20 for Reimaging and Cultures or can be done in at [address] [place] [address] determine what to do with Pacers    
       
       
  Hypernatremia - much improved, now on free fluid via Peg    
      
      
      
  ID A/P:    
  IMPRESSION:    
  · Bacteremia again with BC 1/2 + for Coagulase negative Staph- Staph Capitis Oxacillin R ( 9/5)     
  · Repeat BC 9/7 no growth    
  · S/p Cellulitis LUE     
  · Repeat CT chest ( 9/7) shows non occlusive thrombus in R/ IJ vein ,small amount of thrombus along right PICC catheter/ pacer leads in SVC which extends to right atrium    
  · S/p peg placement     
  · S/p removal of L/IJ placement of PICC RUE , TPN on 8/31    
  · S/p persistent bacteremia with coagulase negative Staph since 8/11 initially oxacillin sensitive , last positive BC is oxacillin resistant 8/21(1/4)     
  · Repeat BC 8/26, 8/29 , 9/3 no growth    
  · S/p failed MBS    
  · NATASHA shows definite large mass associated with pacing wire in RA which may represent vegetation ( 8/23)     
  · Thrombus  & tiny gas bubble of as in RIJ extending into SVC to level of RA (8/15)  s/p heparin IV now on lovenox s/q    
  · CTA chest - no PE, could not visualize SVC thrombus, left lateral wall soft tissue swelling, soft tissue around pacemaker show no significant abnormality    
  · US chest wall - minimal fluid in pacemaker pocket, no significant inflammation of overlying subcutaneous fat or skin    
  · Acute respiratory failure s/p  Ventilator / h/o tracheostomy 24 years ago  , s/p extubation 8/26    
  · S/p R/ pleural effusion s/p chest tube placement     
  · S/p sputum culture 8/25 + heavy Burkholderia cepacia, s/p treated with Levaquin    
  · Pneumonia , last sputum culture 8/11 + for Heavy staph aureus , light K.pneumoniae , treated    
  · Ca colon Stage 3b s/p colectomy / YAMEL / enterotomies    
  · S/p ileus , aspiration pneumonia prior to ICU admission     
  · H/o traumatic brain injury 25 years ago      
        
        
  PLAN:    
        
  · Continue Daptomycin x 6 weeks , Gentamicin IV x 4 weeks from last negative cultures. End date for Daptomycin is Oct 19,for Gentamicin is Oct 5. 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    
  · Weekly CBC, CMP, CK , gentamicin trough & peak q weekly , MD at [facility] to be notified about f/u on labs /also send reports to my office at [phone]. D/w Dr Poly Robles.    
  · Thrombus in R/IJ,   mass in RA which is suggestive of thrombus rather than vegetation in light of pacemaker pocket appearing clear of infection.     
  · Decision made to hold off on removal of pacemaker at this time .  D/w Dr Aryan Murguia & Dr Yenny Simms , .  .    
  · Continue antimicrobials & anticoagulation    
  · Pt' s wife advised to call if recurrence of fever after completion of therapy    
   
  Bowel Surgery: Colectomy, Partial, with or without Ostomy - Care Day 72 (9/19/2018) by Kenisha Gupta RN     
  Review Status Review Entered    
  Completed 9/21/2018    
  Details    
      
   Care Day: 67 Care Date: 9/19/2018 Level of Care: Telemetry    
  Guideline Day 2     
  Level Of Care    
  (X) Floor    
      
  Clinical Status    
  (X) * Procedure completed    
  9/21/2018 2:40 PM EDT by Archie Cunningham    
    S/P Colectomy Stage 3    
      
  (X) * Hemodynamic stability    
  9/21/2018 2:40 PM EDT by Archie Cunningham    
    Vitals- 99.4, 77, 16, 156/85, 98%    
      
      
  Activity    
  ( ) * Progressive ambulation    
      
  Routes    
  (X) IV fluids, medications    
  9/21/2018 2:40 PM EDT by Archie Cunningham    
    daptomycin 1000mg IV q 24hr, gentamicin 80mg IV q 24hr    
      
      
  Interventions    
  (X) Enterostomy therapy    
  9/21/2018 2:40 PM EDT by Archie Cunningham    
    tube feeds    
      
      
  9/21/2018 2:40 PM EDT by Archie Cunningham    
  Subject: Additional Clinical Information    
  9-19-18NAD.  Tolerating tube feeding.  GI: Soft, Non distended, Non tender.   +Bowel sounds, PEG tube Noted +, Colostomy bag noted  Abnl labs- sodium 132, glucose 132, BUN 28Meds- amiodarone 200mg per g tube, eliquis 5mg per g tube, lasix 60mg per G tube, SS insulin, lactulose 10g per G tube, keppra 250mg per g tube, nystatin 500,000 u per g tube, effexor 37.5mg per g tubePlan- flannery catheter, I/O, PT/OT    
      
      
      
      
  * Milestone    
      
  Additional Notes    
  9-19-18    
      
  IM Assessment / Plan      
  Acute Blood Loss Anemia due to Lower GI Bleed POA    
  > Colonoscopy showed a LARGE Colon Mass > S/P Colectomy Stage 3 B this hospitalization done July 17 > Developed Ileus then aspiration PNA    
  Was intubated (due to acute respiratory failure) and prolonged ICU stay    
  Extubated 8/26    
  Pneumonia , last sputum culture 8/11 + for Heavy staph aureus , light K.pneumoniae     
  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    
  S/p treated with Levaquin    
  Bilateral Pleural Effusions s/p R chest tube removed 9/4    
   RONAL      
  CPAP q HS    
        
  Dysphagia, Failed MBS    
  S/p TPN in hospital due to Dysphagia     
  S/P Peg 9/5 Wed > Tolerating Tube feedings well    
  Oral Candidiasis    
  ? Infected Thrombus on NATASHA    
  NATASHA shows definite large mass associated with pacing wire in RA which may represent vegetation ( 8/23)     
  PER  Infectious Disease     
  Continue Daptomycin, Gentamicin IV, change Fluconazole ( 9/3). & levaquin to po    
  Nystatin Swish & spit     
       
  Complete total 6 weeks of daptomycin and gentamycin from the latest (-) blood Culture  which was done on 9/8/2018. So until October 20    
  Has A RT ARM PICC LINE    
  On Lovenox therapeutic    
  Fluconazole IV added 9/3    
  Bacteremia    
  S/p removal of L/IJ placement of PICC RUE , TPN on 8/31    
  Persistent bacteremia with coagulase negative Staph since 8/11 initially oxacillin sensitive , last positive BC is oxacillin resistant 8/21(1/4)    
  Repeat BC 8/26, 8/29 , 9/3 no growth, 9/5 Positive Staph Coag Neg, 9/8 ( negative    
  Chest CT repeated 9/8.  No change in SVC thrombus and Atrial clot size    
       
  SSS S/p PPM and Pacer dependent    
  PAF, on chronic Amiodarone,  Was on IV amiodarone when he was NPO but now on Amiodarone per PEG with GOod control    
  Per ID consult > Thrombus in R/IJ, now  mass in RA which is suggestive of thrombus rather than vegetation in light of pacemaker pocket appearing clear of infection. Hold off on removal of pacemaker at this time .  D/w Dr Candace Salas & Dr Nomi Mendoza , .  .    
  Continue antimicrobials & anticoagulation    
       
  Acute Encephalopathy - resolved    
  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    
  Hx of Seizures due to TBI    
  Has been on Tegretol chronic with controlled Seizures    
  Was changed to IV Keppra when NPO    
  Dr. Clayton Castillo discussed with Dr Loida Lopez (patients Neurologist)    
  Patient  lethargic on 500 mg BID Jocelyne Dimes but both states Keep on Keppra 250 BID    
  due to better Drug interactions and liver  with all his meds and less Liver issues on    
       
  Severe Deconditioning.    
  Palliative care was involved with discussion with family who decided everything to be done    
       
  He needs to follow up with ID or Cardiology close to October 20 for Reimaging and Cultures or can be done in at [address] [facility] determine what to do with Pacers    
       
       
  Hypernatremia - much improved, now on free fluid via Peg    
        
  Code Status: Full    
  NOK POA  :  WIFE    
  DVT Prophylaxis: on lovenox for the thrombus    
       
  Disposition - Pt's wife has finally decided to go for [facility] as the next place of care- CM working with [facility] - awaiting Insurance Authorization     
      
      
  ID A/P:    
  IMPRESSION:    
  · Bacteremia again with BC 1/2 + for Coagulase negative Staph- Staph Capitis Oxacillin R ( 9/5)     
  · Repeat BC 9/7 no growth    
  · S/p Cellulitis LUE     
  · Repeat CT chest ( 9/7) shows non occlusive thrombus in R/ IJ vein ,small amount of thrombus along right PICC catheter/ pacer leads in SVC which extends to right atrium    
  · S/p peg placement     
  · S/p removal of L/IJ placement of PICC RUE , TPN on 8/31    
  · S/p persistent bacteremia with coagulase negative Staph since 8/11 initially oxacillin sensitive , last positive BC is oxacillin resistant 8/21(1/4)     
  · Repeat BC 8/26, 8/29 , 9/3 no growth    
  · S/p failed MBS    
  · NATASHA shows definite large mass associated with pacing wire in RA which may represent vegetation ( 8/23)     
  · Thrombus  & tiny gas bubble of as in RIJ extending into SVC to level of RA (8/15)  s/p heparin IV now on lovenox s/q    
  · CTA chest - no PE, could not visualize SVC thrombus, left lateral wall soft tissue swelling, soft tissue around pacemaker show no significant abnormality    
  · US chest wall - minimal fluid in pacemaker pocket, no significant inflammation of overlying subcutaneous fat or skin    
  · Acute respiratory failure s/p  Ventilator / h/o tracheostomy 24 years ago  , s/p extubation 8/26    
  · S/p R/ pleural effusion s/p chest tube placement     
  · S/p sputum culture 8/25 + heavy Burkholderia cepacia, s/p treated with Levaquin    
  · Pneumonia , last sputum culture 8/11 + for Heavy staph aureus , light K.pneumoniae , treated    
  · Ca colon Stage 3b s/p colectomy / YAMEL / enterotomies    
  · S/p ileus , aspiration pneumonia prior to ICU admission     
  · H/o traumatic brain injury 25 years ago      
        
        
  PLAN:    
        
  · Continue Daptomycin x 6 weeks , Gentamicin IV x 4 weeks from last negative cultures. End date for Daptomycin is Oct 19,for Gentamicin is Oct 5. 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    
  · Weekly CBC, CMP, CK , gentamicin trough & peak q weekly , MD at [facility] to be notified about f/u on labs /also send reports to my office at [phone]. D/w Dr René Peterson.    
  · Thrombus in R/IJ,   mass in RA which is suggestive of thrombus rather than vegetation in light of pacemaker pocket appearing clear of infection.     
  · Decision made to hold off on removal of pacemaker at this time .  D/w Dr Enrique Murray & Dr Margrett Spatz , .  .    
  · Continue antimicrobials & anticoagulation    
  · Pt' s wife advised to call if recurrence of fever after completion of therapy    
   
  Bowel Surgery: Colectomy, Partial, with or without Ostomy - Care Day 71 (9/18/2018) by Murphy Mendoza     
  Review Status Review Entered    
  Completed 9/20/2018    
  Details    
      
  Care Day: 70 Care Date: 9/18/2018 Level of Care: Telemetry    
  Guideline Day 2     
  Level Of Care    
  (X) Floor    
  9/20/2018 2:49 PM EDT by Suki Cowart    
    telemetry    
      
      
  Clinical Status    
  (X) * Procedure completed    
  9/20/2018 2:49 PM EDT by Catarino Soto  
     Colonoscopy showed a LARGE Colon Mass S/P  Colectomy Stage 3 B this hospitalization done July 17  Developed Ileus then aspiration PNA    
      
  (X) * Hemodynamic stability    
  9/20/2018 2:49 PM EDT by Tigre Goes    
    100.1-120/72-79-18-98% RA    
      
      
  Activity    
  ( ) * Progressive ambulation    
  9/20/2018 2:49 PM EDT by Tigre Goes    
    out of bed with assistance    
      
      
  Routes    
  (X) IV fluids, medications    
      
      
      
      
  * Milestone    
      
  Additional Notes    
  Acute Blood Loss Anemia due to Lower GI Bleed POA    
  Was intubated (due to acute respiratory failure) and prolonged ICU stay    
  Extubated 8/26    
  Pneumonia , last sputum culture 8/11 + for Heavy staph aureus , light K.pneumoniae     
  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    
  S/p treated with Levaquin    
  Bilateral Pleural Effusions s/p R chest tube removed 9/4    
  RONAL      
  CPAP q HS    
        
  Dysphagia, Failed MBS    
  S/p TPN in hospital due to Dysphagia     
  S/P Peg 9/5 Wed > Tolerating Tube feedings well    
  Oral Candidiasis    
  ? Infected Thrombus on NATASHA    
  NATASHA shows definite large mass associated with pacing wire in RA which may represent vegetation ( 8/23)     
  PER  Infectious Disease     
  Continue Daptomycin, Gentamicin IV, change Fluconazole ( 9/3). & levaquin to po    
  Nystatin Swish & spit     
       
  Complete total 6 weeks of daptomycin and gentamycin from the latest (-) blood Culture  which was done on 9/8/2018.  So until October 20    
  Has A RT ARM PICC LINE    
  On Lovenox therapeutic    
  Fluconazole IV added 9/3    
  Bacteremia    
  S/p removal of L/IJ placement of PICC RUE , TPN on 8/31    
  Persistent bacteremia with coagulase negative Staph since 8/11 initially oxacillin sensitive , last positive BC is oxacillin resistant 8/21(1/4)    
   Repeat BC 8/26, 8/29 , 9/3 no growth, 9/5 Positive Staph Coag Neg, 9/8 ( negative    
  Chest CT repeated 9/8.  No change in SVC thrombus and Atrial clot size    
       
  SSS S/p PPM and Pacer dependent    
  PAF, on chronic Amiodarone,  Was on IV amiodarone when he was NPO but now on Amiodarone per PEG with GOod control    
  Per ID consult > Thrombus in R/IJ, now  mass in RA which is suggestive of thrombus rather than vegetation in light of pacemaker pocket appearing clear of infection. Hold off on removal of pacemaker at this time . D/w Dr Isiah Eaton & Dr Ursula Gottlieb , .  .    
  Continue antimicrobials & anticoagulation    
       
  Acute Encephalopathy - resolved    
  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    
  Hx of Seizures due to TBI    
  Has been on Tegretol chronic with controlled Seizures    
  Was changed to IV Keppra when NPO    
  Dr. Alfonzo Granda discussed with Dr Thom Willis (patients Neurologist)    
  Patient  lethargic on 500 mg BID  Keppra but both states Keep on Keppra 250 BID    
  due to better Drug interactions and liver  with all his meds and less Liver issues on    
       
  Severe Deconditioning.    
  Palliative care was involved with discussion with family who decided everything to be done    
       
  He needs to follow up with ID or Cardiology close to October 20 for Reimaging and Cultures or can be done in at [address] [place] [address] determine what to do with Pacers    
       
       
  Hypernatremia - much improved, now on free fluid via Peg    
        
  Na 131 glu 143 bun 23 gentamicin trough 0.5 gentamicin peak 4 glu 154    
  CT head - 1. No evidence of acute intracranial abnormality.    
  2.  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    
  microvascular ischemic disease    
  tube feeding    
  care management consult    
  consult mobility services    
  NPO    
  wound care    
  amiodarone x1 per gtube    
  eliquis x2 per gtube    
  cubicin iv x1    
   Lasix x1 per gtube    
  genatmicin iv x1    
  insulin c x1    
  keppra x2 per gtube    
   
  Bowel Surgery: Colectomy, Partial, with or without Ostomy - Care Day 70 (9/17/2018) by Abel Councilman Redd     
  Review Status Review Entered    
  Completed 9/19/2018    
  Details    
      
  Care Day: 70 Care Date: 9/17/2018 Level of Care: Step Down    
  Guideline Day 2     
  Level Of Care    
  (X) Floor    
      
  Clinical Status    
  (X) * Procedure completed    
  9/19/2018 4:48 PM EDT by Norval Lapping    
    S/P  Colectomy Stage 3    
      
  (X) * Hemodynamic stability    
  9/19/2018 4:48 PM EDT by Norval Lapping    
    /64-82-16-98% RA    
      
      
  Activity    
  ( ) * Progressive ambulation    
  9/19/2018 4:48 PM EDT by Norval Lapping    
    out of bed with assistance    
      
      
  Routes    
  (X) IV fluids, medications    
      
      
      
      
  * Milestone    
      
  Additional Notes    
  Admitted for Lower GI Bleed and Anemia 7/12    
  Colonoscopy showed a LARGE Colon Mass    
  S/P  Colectomy Stage 3 B this hospitalization done July 17    
        Developed Ileus then aspiration Pna, complications      
  S/P Ileus    
  Aspiration PNA , intubated and prolonged ICU stay    
  Acute resp Failure s/p Ventilator support , extubated 8/26    
  Pneumonia , last sputum culture 8/11 + for Heavy staph aureus , light K.pneumoniae     
   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    
  S/p treated with Levaquin    
  Kareem Pleural Effusions s/p R chest tube removed 9/4    
  RONAL      
  CPAP q HS    
        
  Dysphagia, Failed MBS    
  S/p TPN in hospital due to Dysphagia     
  S/P Peg 9/5 Wed  Tolerating Tube feedings well    
  Oral Candidiasis    
  ?Infected Thrombus on NATASHA    
  NATASHA shows definite large mass associated with pacing wire in RA which may represent vegetation ( 8/23)     
  PER  Infectious Disease     
   Continue Daptomycin, Gentamicin IV, change Fluconazole ( 9/3). & levaquin to po    
  Nystatin Swish & spit     
       
  Complete total 6 weeks of daptomycin and gentamycin from the latest (-) blood Culture  which was done on 9/8/2018. So until October 20    
  Has A RT ARM PICC LINE    
  On Lovenox therapeutic    
   Fluconazole IV added 9/3    
  Bacteremia    
  S/p removal of L/IJ placement of PICC RUE , TPN on 8/31    
  Persistent bacteremia with coagulase negative Staph since 8/11 initially oxacillin sensitive , last positive BC is oxacillin resistant 8/21(1/4)    
  Repeat BC 8/26, 8/29 , 9/3 no growth, 9/5 Positive Staph Coag Neg, 9/8 ( negative    
  Chest CT repeated 9/8.  No change in SVC thrombus and Atrial clot size    
       
  SSS S/p PPM and Pacer dependent    
  PAF, on chronic Amiodarone,  Was on IV amiodarone when he was NPO but now on Amiodarone per PEG with GOod control    
  Per ID consult   Thrombus in R/IJ, now  mass in RA which is suggestive of thrombus rather than vegetation in light of pacemaker pocket appearing clear of infection. Hold off on removal of pacemaker at this time .  D/w Dr Alfredo Nolasco & Dr Gama Avelar , .  .    
  Continue antimicrobials & anticoagulation    
       
  Acute Encephalopathy  now resolved    
  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    
  Hx of Seizures due to TBI    
       Has been on Tegretol chronic with controlled Seizures    
    Was changed to IV Keppra when NPO    
    Dr. Wally Rao discussed with Dr Carol Jackson ( patients Neurologist)    
  Patient  lethargic on 500 mg BID  Keppra but both states Keep on Keppra 250 BID    
   due to better Drug interactions and liver  with all his meds and less Liver issues on    
       
  Severe Deconditioning.    
  PALLATIVE CARE HAS PREVIOUSLY TALKED TO WIFE AND SHE WANTS EVERYTHING DONE    
       
  He needs to follow up with ID or Cardiology close to October 20 for Reimaging and Cultures or can be done in at [facility].  To determine what to do with Pacers    
       
       
  Hypernatremia - much improved, now on free fluid via Peg    
        
  Na 132 glu 152 bun 24     
  care management consult    
  consult mobility services    
  NPO    
  wound care    
  amiodarone x1 per gtube    
  eliquis x2 per gtube    
  cubicin iv x1    
  Lasix x1 per gtube    
  gentamicin iv x1    
  insulin sc x1    
  keppra x2 per gtube    
   
  Bowel Surgery: Colectomy, Partial, with or without Ostomy - Care Day 69 (9/16/2018) by Elsa Cadet     
  Review Status Review Entered    
  Completed 9/18/2018    
  Details    
      
  Care Day: 70 Care Date: 9/16/2018 Level of Care: Step Down    
  Guideline Day 2     
  Level Of Care    
  (X) Floor    
  9/18/2018 12:06 PM EDT by Yifan Rowland    
    stepdown    
      
      
  Clinical Status    
  (X) * Procedure completed    
  9/18/2018 12:06 PM EDT by Yifan Rowland    
    S/P  Colectomy Stage 3    
      
  (X) * Hemodynamic stability    
  9/18/2018 12:06 PM EDT by Yifan Rowland    
    99.3-95/65-75-18-97% RA    
      
      
  Activity    
  ( ) * Progressive ambulation    
  9/18/2018 12:06 PM EDT by Yifan Rowland    
    out of bed with assistance    
      
      
  Routes    
  (X) IV fluids, medications    
      
      
      
      
  * Milestone    
      
  Additional Notes    
  Admitted for Lower GI Bleed and Anemia 7/12    
  Colonoscopy showed a LARGE Colon Mass    
  S/P  Colectomy Stage 3 B this hospitalization done July 17    
        Developed Ileus then aspiration Pna, complications      
  S/P Ileus    
  Aspiration PNA , intubated and prolonged ICU stay    
  Acute resp Failure s/p Ventilator support , extubated 8/26    
  Pneumonia , last sputum culture 8/11 + for Heavy staph aureus , light K.pneumoniae     
   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    
  S/p treated with Levaquin    
   Kareem Pleural Effusions s/p R chest tube removed 9/4    
  RONAL      
  CPAP q HS    
        
  Dysphagia, Failed MBS    
  S/p TPN in hospital due to Dysphagia     
  S/P Peg 9/5 Wed  Tolerating Tube feedings well    
  Oral Candidiasis    
  ?Infected Thrombus on NATASHA    
  NATASHA shows definite large mass associated with pacing wire in RA which may represent vegetation ( 8/23)     
  PER  Infectious Disease     
  Continue Daptomycin, Gentamicin IV, change Fluconazole ( 9/3). & levaquin to po    
  Nystatin Swish & spit     
       
  Complete total 6 weeks of daptomycin and gentamycin from the latest (-) blood Culture  which was done on 9/8/2018. So until October 20    
  Has A RT ARM PICC LINE    
  On Lovenox therapeutic    
   Fluconazole IV added 9/3    
  Bacteremia    
  S/p removal of L/IJ placement of PICC RUE , TPN on 8/31    
  Persistent bacteremia with coagulase negative Staph since 8/11 initially oxacillin sensitive , last positive BC is oxacillin resistant 8/21(1/4)    
  Repeat BC 8/26, 8/29 , 9/3 no growth, 9/5 Positive Staph Coag Neg, 9/8 ( negative    
  Chest CT repeated 9/8.  No change in SVC thrombus and Atrial clot size    
       
  SSS S/p PPM and Pacer dependent    
  PAF, on chronic Amiodarone,  Was on IV amiodarone when he was NPO but now on Amiodarone per PEG with GOod control    
  Per ID consult   Thrombus in R/IJ, now  mass in RA which is suggestive of thrombus rather than vegetation in light of pacemaker pocket appearing clear of infection. Hold off on removal of pacemaker at this time .  D/w Dr Suellen Ge & Dr Nicolas Pop , .  .    
  Continue antimicrobials & anticoagulation    
       
  Acute Encephalopathy  now resolved    
  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    
  Hx of Seizures due to TBI    
       Has been on Tegretol chronic with controlled Seizures    
    Was changed to IV Keppra when NPO    
    Dr. Stella García discussed with Dr Dinesh Mariscal ( patients Neurologist)    
   Patient  lethargic on 500 mg BID  Keppra but both states Keep on Keppra 250 BID    
   due to better Drug interactions and liver  with all his meds and less Liver issues on    
       
  Severe Deconditioning.    
  PALLATIVE CARE HAS PREVIOUSLY TALKED TO WIFE AND SHE WANTS EVERYTHING DONE    
       
  He needs to follow up with ID or Cardiology close to October 20 for Reimaging and Cultures or can be done in at [facility] determine what to do with Pacers    
       
       
  Hypernatremia - much improved, now on free fluid via Peg    
        
  glu 140 bun 24     
  consult mobility services    
  NPO    
  wound care    
  amiodarone x1 per gtube    
  eliquis x2 per gtube    
  cubicin iv x1    
  Lasix x1 per gtube    
  gentamicin iv x1    
  insulin sc x2    
  keppra x2 per gtube    
   
  Bowel Surgery: Colectomy, Partial, with or without Ostomy - Care Day 68 (9/15/2018) by Ninfa Cadet     
  Review Status Review Entered    
  Completed 9/17/2018    
  Details    
      
  Care Day: 69 Care Date: 9/15/2018 Level of Care: Step Down    
  Guideline Day 2     
  Clinical Status    
  (X) * Procedure completed    
  9/17/2018 3:02 PM EDT by Magali Gonzales    
    S/P  Colectomy Stage 3    
      
  (X) * Hemodynamic stability    
  9/17/2018 3:02 PM EDT by Magali Gonzales    
    97.7-114/67-77-22-99% RA    
      
      
  Activity    
  ( ) * Progressive ambulation    
  9/17/2018 3:02 PM EDT by Magali Gonzales    
    out of bed with assistance    
      
      
  Routes    
  (X) IV fluids, medications    
      
  Interventions    
  (X) Hgb/Hct    
      
      
      
      
  * Milestone    
      
  Additional Notes    
  97.7-114/67-77-22-99% RA    
  Admitted for Lower GI Bleed and Anemia 7/12    
  Colonoscopy showed a LARGE Colon Mass    
  S/P  Colectomy Stage 3 B this hospitalization done July 17    
        Developed Ileus then aspiration Pna, complications      
  S/P Ileus    
   Aspiration PNA , intubated and prolonged ICU stay    
  Acute resp Failure s/p Ventilator support , extubated 8/26    
  Pneumonia , last sputum culture 8/11 + for Heavy staph aureus , light K.pneumoniae     
   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    
  S/p treated with Levaquin    
  Kareem Pleural Effusions s/p R chest tube removed 9/4    
  RONAL      
  CPAP q HS    
        
  Dysphagia, Failed MBS    
  S/p TPN in hospital due to Dysphagia     
  S/P Peg 9/5 Wed  Tolerating Tube feedings well    
  Oral Candidiasis    
  ?Infected Thrombus on NATASHA    
  NATASHA shows definite large mass associated with pacing wire in RA which may represent vegetation ( 8/23)     
  PER  Infectious Disease     
  Continue Daptomycin, Gentamicin IV, change Fluconazole ( 9/3). & levaquin to po    
  Nystatin Swish & spit     
       
  Complete total 6 weeks of daptomycin and gentamycin from the latest (-) blood Culture  which was done on 9/8/2018. So until October 20    
  Has A RT ARM PICC LINE    
  On Lovenox therapeutic    
   Fluconazole IV added 9/3    
  Bacteremia    
  S/p removal of L/IJ placement of PICC RUE , TPN on 8/31    
  Persistent bacteremia with coagulase negative Staph since 8/11 initially oxacillin sensitive , last positive BC is oxacillin resistant 8/21(1/4)    
  Repeat BC 8/26, 8/29 , 9/3 no growth, 9/5 Positive Staph Coag Neg, 9/8 ( negative    
  Chest CT repeated 9/8.  No change in SVC thrombus and Atrial clot size    
       
  SSS S/p PPM and Pacer dependent    
  PAF, on chronic Amiodarone,  Was on IV amiodarone when he was NPO but now on Amiodarone per PEG with GOod control    
  Per ID consult   Thrombus in R/IJ, now  mass in RA which is suggestive of thrombus rather than vegetation in light of pacemaker pocket appearing clear of infection. Hold off on removal of pacemaker at this time .  D/w Dr Aryan Murguia & Dr Yenny Simms , .  .    
  Continue antimicrobials & anticoagulation    
       
   Acute Encephalopathy  now resolved    
  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    
  Hx of Seizures due to TBI    
       Has been on Tegretol chronic with controlled Seizures    
    Was changed to IV Keppra when NPO    
    Dr. Rory Paz discussed with Dr Shawanda Casey ( patients Neurologist)    
  Patient  lethargic on 500 mg BID  Keppra but both states Keep on Keppra 250 BID    
   due to better Drug interactions and liver  with all his meds and less Liver issues on    
       
  Severe Deconditioning.    
  PALLATIVE CARE HAS PREVIOUSLY TALKED TO WIFE AND SHE WANTS EVERYTHING DONE    
       
  He needs to follow up with ID or Cardiology close to October 20 for Reimaging and Cultures or can be done in at [address] [place] [address] determine what to do with Pacers    
       
       
  Hypernatremia - much improved, now on free fluid via Peg    
        
   glu 150 bun 23     
  consult mobility services    
  NPO    
  tube feeding    
  amiodarone x1 per gtube    
  eliquis x2 per gtube    
  cubicin iv x1    
  Lasix x1 per gtube    
  gentamicin iv x1    
  insulin sc x1    
  keppra x2 per gtube    
   
  Bowel Surgery: Colectomy, Partial, with or without Ostomy - Care Day 67 (9/14/2018) by Ander Cadet     
  Review Status Review Entered    
  Completed 9/17/2018    
  Details    
      
  Care Day: 68 Care Date: 9/14/2018 Level of Care: Step Down    
  Guideline Day 2     
  Clinical Status    
  (X) * Procedure completed    
  9/17/2018 1:06 PM EDT by Shauna Tucker    
    S/P  Colectomy Stage 3    
      
  (X) * Hemodynamic stability    
  9/17/2018 1:06 PM EDT by Shauna Tucker    
    98.3-119/58-75-18-99% RA    
      
      
  Activity    
  ( ) * Progressive ambulation    
      
  Routes    
  (X) IV fluids, medications    
      
  Interventions    
  (X) Hgb/Hct    
      
      
      
      
  * Milestone    
      
  Additional Notes    
  98.3-119/58-75-18-99% RA    
   Admitted for Lower GI Bleed and Anemia 7/12    
  Colonoscopy showed a LARGE Colon Mass    
  S/P  Colectomy Stage 3 B this hospitalization done July 17    
        Developed Ileus then aspiration Pna, complications      
  S/P Ileus    
  Aspiration PNA , intubated and prolonged ICU stay    
  Acute resp Failure s/p Ventilator support , extubated 8/26    
  Pneumonia , last sputum culture 8/11 + for Heavy staph aureus , light K.pneumoniae     
   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    
  S/p treated with Levaquin    
  Kareem Pleural Effusions s/p R chest tube removed 9/4    
  RONAL      
  CPAP q HS    
        
  Dysphagia, Failed MBS    
  S/p TPN in hospital due to Dysphagia     
  S/P Peg 9/5 Wed  Tolerating Tube feedings well    
  Oral Candidiasis    
  ?Infected Thrombus on NATASHA    
  NATASHA shows definite large mass associated with pacing wire in RA which may represent vegetation ( 8/23)     
  PER  Infectious Disease     
  Continue Daptomycin, Gentamicin IV, change Fluconazole ( 9/3). & levaquin to po    
  Nystatin Swish & spit     
       
  Complete total 6 weeks of daptomycin and gentamycin from the latest (-) blood Culture  which was done on 9/8/2018.  So until October 20    
  Has A RT ARM PICC LINE    
  On Lovenox therapeutic    
   Fluconazole IV added 9/3    
  Bacteremia    
  S/p removal of L/IJ placement of PICC RUE , TPN on 8/31    
  Persistent bacteremia with coagulase negative Staph since 8/11 initially oxacillin sensitive , last positive BC is oxacillin resistant 8/21(1/4)    
  Repeat BC 8/26, 8/29 , 9/3 no growth, 9/5 Positive Staph Coag Neg, 9/8 ( negative    
  Chest CT repeated 9/8.  No change in SVC thrombus and Atrial clot size    
       
  SSS S/p PPM and Pacer dependent    
  PAF, on chronic Amiodarone,  Was on IV amiodarone when he was NPO but now on Amiodarone per PEG with GOod control    
  Per ID consult   Thrombus in R/IJ, now  mass in RA which is suggestive of thrombus rather than vegetation in light of pacemaker pocket appearing clear of infection. Hold off on removal of pacemaker at this time .  D/w Dr Aldo Franks & Dr Anton Phillips , .  .    
  Continue antimicrobials & anticoagulation    
       
  Acute Encephalopathy  now resolved    
  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    
  Hx of Seizures due to TBI    
       Has been on Tegretol chronic with controlled Seizures    
    Was changed to IV Keppra when NPO    
    Dr. Richard Bee discussed with Dr Natividad Dick ( patients Neurologist)    
  Patient  lethargic on 500 mg BID  Keppra but both states Keep on Keppra 250 BID    
   due to better Drug interactions and liver  with all his meds and less Liver issues on    
       
  Severe Deconditioning.    
  PALLATIVE CARE HAS PREVIOUSLY TALKED TO WIFE AND SHE WANTS EVERYTHING DONE    
       
  He needs to follow up with ID or Cardiology close to October 20 for Reimaging and Cultures or can be done in at [address] [place] [address] determine what to do with Pacers    
       
       
  Hypernatremia - much improved, now on free fluid via Peg    
      
  glu 129 bun 24     
      
  consult mobility services    
  NPO    
  tube feeding    
  out of bed with assistance    
  wound care    
  amiodarone x1 per gtube    
  eliquis x2 per gtube    
  cubicin iv x1    
  Lasix x1 per gtube    
  genatmicin iv x1    
  keppra x2 per gtube    
   
  Bowel Surgery: Colectomy, Partial, with or without Ostomy - Care Day 66 (9/13/2018) by Gaby Cadet     
  Review Status Review Entered    
  Completed 9/14/2018    
  Details    
      
  Care Day: 67 Care Date: 9/13/2018 Level of Care: Step Down    
  Guideline Day 2     
  Clinical Status    
  (X) * Procedure completed    
  9/14/2018 12:25 PM EDT by Chauncey Chiu    
    S/P  Colectomy Stage 3 B this hospitalization done July 17       Developed Ileus then aspiration Pna    
      
  (X) * Hemodynamic stability    
   9/14/2018 12:25 PM EDT by Tigre Mac    
    /65-75-20-99% RA    
      
      
  Activity    
  ( ) * Progressive ambulation    
      
  Routes    
  (X) IV fluids, medications    
      
  Interventions    
  (X) Hgb/Hct    
  9/14/2018 12:25 PM EDT by Tigre Mac    
    hgb 9.1 hct 31. 3    
      
      
      
      
      
  * Milestone    
      
  Additional Notes    
  Admitted for Lower GI Bleed and Anemia 7/12    
  Colonoscopy showed a LARGE Colon Mass    
  S/P Ileus    
  Aspiration PNA , intubated and prolonged ICU stay    
  Acute resp Failure s/p Ventilator support , extubated 8/26    
  Pneumonia , last sputum culture 8/11 + for Heavy staph aureus , light K.pneumoniae     
   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    
  S/p treated with Levaquin    
  Kareem Pleural Effusions s/p R chest tube removed 9/4    
  RONAL      
  CPAP q HS    
        
  Dysphagia, Failed MBS    
  S/p TPN in hospital due to Dysphagia     
  S/P Peg 9/5 Wed  Tolerating Tube feedings well    
  Oral Candidiasis    
  ?Infected Thrombus on NATASHA    
  NATASHA shows definite large mass associated with pacing wire in RA which may represent vegetation ( 8/23)     
  PER  Infectious Disease     
  Continue Daptomycin, Gentamicin IV, change Fluconazole ( 9/3). & levaquin to po    
  Nystatin Swish & spit     
       
  Complete total 6 weeks of daptomycin and gentamycin from the latest (-) blood Culture  which was done on 9/8/2018.  So until October 20    
  Has A RT ARM PICC LINE    
  On Lovenox therapeutic    
   Fluconazole IV added 9/3    
  Bacteremia    
  S/p removal of L/IJ placement of PICC RUE , TPN on 8/31    
  Persistent bacteremia with coagulase negative Staph since 8/11 initially oxacillin sensitive , last positive BC is oxacillin resistant 8/21(1/4)    
  Repeat BC 8/26, 8/29 , 9/3 no growth, 9/5 Positive Staph Coag Neg, 9/8 ( negative    
   Chest CT repeated 9/8.  No change in SVC thrombus and Atrial clot size    
       
  SSS S/p PPM and Pacer dependent    
  PAF, on chronic Amiodarone,  Was on IV amiodarone when he was NPO but now on Amiodarone per PEG with GOod control    
  Per ID consult   Thrombus in R/IJ, now  mass in RA which is suggestive of thrombus rather than vegetation in light of pacemaker pocket appearing clear of infection. Hold off on removal of pacemaker at this time . D/w Dr Mary Miller & Dr Dannie Almendarez , .  .    
  Continue antimicrobials & anticoagulation    
       
  Acute Encephalopathy  now resolved    
  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    
  Hx of Seizures due to TBI    
       Has been on Tegretol chronic with controlled Seizures    
    Was changed to IV Keppra when NPO    
    Dr. Yifan Nichols discussed with Dr Viraj Mays ( patients Neurologist)    
  Patient  lethargic on 500 mg BID  Keppra but both states Keep on Keppra 250 BID    
   due to better Drug interactions and liver  with all his meds and less Liver issues on    
       
  Severe Deconditioning.    
  PALLATIVE CARE HAS PREVIOUSLY TALKED TO WIFE AND SHE WANTS EVERYTHING DONE    
       
  He needs to follow up with ID or Cardiology close to October 20 for Reimaging and Cultures or can be done in at [address] [place] [address] determine what to do with Pacers    
       
       
  Hypernatremia - much improved, now on free fluid via Peg    
      
  Continue Daptomycin x 6 weeks , Gentamicin IV x 4 weeks from last negative cultures. End date for Daptomycin is Oct 19,for Gentamicin is Oct 5. 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    
  Thrombus in R/IJ,   mass in RA which is suggestive of thrombus rather than vegetation in light of pacemaker pocket appearing clear of infection.     
  Decision made to hold off on removal of pacemaker at this time .  D/w Dr Mary Miller & Dr Dannie Almendarez , .  .    
  Continue antimicrobials & anticoagulation    
    Needs  Erazo catheter removal whenever possible    
    rbc 3.38 glu 131 bun 24 ast 39     
  consult mobility services    
  NPO    
  tube feeding    
  out of bed with assistance    
  wound care    
  amiodarone x1 per gtube    
  eliquis x2 per gtube    
  cubicin iv x1    
  Lasix x1 per gtube    
  gentamicin iv x1    
  keppra x2 per gtube    
   
  Bowel Surgery: Colectomy, Partial, with or without Ostomy - Care Day 65 (9/12/2018) by Teagan Cadet     
  Review Status Review Entered    
  Completed 9/13/2018    
  Details    
      
  Care Day: 66 Care Date: 9/12/2018 Level of Care: Step Down    
  Guideline Day 2     
  Clinical Status    
  (X) * Procedure completed    
  9/13/2018 11:20 AM EDT by Timi العلي    
    S/P  Colectomy Stage 3    
      
  (X) * Hemodynamic stability    
  9/13/2018 11:20 AM EDT by Timi العلي    
    /64-75-18-97% RA    
      
      
  Activity    
  ( ) * Progressive ambulation    
  9/13/2018 11:20 AM EDT by Timi العلي    
    out of bed with assistance    
      
      
  Routes    
  (X) IV fluids, medications    
  ( ) Advance diet as tolerated    
  9/13/2018 11:20 AM EDT by Timi العلي    
    NPO tube feeding    
      
      
      
      
      
  * Milestone    
      
  Additional Notes    
  Admitted for Lower GI Bleed and Anemia 7/12    
  Colonoscopy showed a LARGE Colon Mass    
  S/P  Colectomy Stage 3 B this hospitalization done July 17    
        Developed Ileus then aspiration Pna, complications      
  S/P Ileus    
       
  Aspiration PNA , intubated and prolonged ICU stay    
  Acute resp Failure s/p Ventilator support , extubated 8/26    
  Pneumonia , last sputum culture 8/11 + for Heavy staph aureus , light K.pneumoniae     
   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    
  S/p treated with Levaquin    
  Kareem Pleural Effusions s/p R chest tube removed 9/4    
  RONAL      
  CPAP q HS    
        
   Dysphagia, Failed MBS    
  S/p TPN in hospital due to Dysphagia     
  S/P Peg 9/5 Wed  Tolerating Tube feedings well    
  Oral Candidiasis    
  ?Infected Thrombus on NATASHA    
  NATASHA shows definite large mass associated with pacing wire in RA which may represent vegetation ( 8/23)     
  PER  Infectious Disease     
  Continue Daptomycin, Gentamicin IV, change Fluconazole ( 9/3). & levaquin to po    
  Nystatin Swish & spit     
  Complete total 6 weeks of daptomycin and gentamycin from the latest (-) blood Culture  which was done on 9/8/2018. So until October 20    
  Has A RT ARM PICC LINE    
  On Lovenox therapeutic    
   Fluconazole IV added 9/3    
  Bacteremia    
  S/p removal of L/IJ placement of PICC RUE , TPN on 8/31    
  Persistent bacteremia with coagulase negative Staph since 8/11 initially oxacillin sensitive , last positive BC is oxacillin resistant 8/21(1/4)    
  Repeat BC 8/26, 8/29 , 9/3 no growth, 9/5 Positive Staph Coag Neg, 9/8 ( negative    
  Chest CT repeated 9/8.  No change in SVC thrombus and Atrial clot size    
       
  SSS S/p PPM and Pacer dependent    
  PAF, on chronic Amiodarone,  Was on IV amiodarone when he was NPO but now on Amiodarone per PEG with GOod control    
  Per ID consult   Thrombus in R/IJ, now  mass in RA which is suggestive of thrombus rather than vegetation in light of pacemaker pocket appearing clear of infection. Hold off on removal of pacemaker at this time .  D/w Dr Isiah Eaton & Dr Ursula Gottlieb , .  .    
  Continue antimicrobials & anticoagulation    
       
  Acute Encephalopathy  now resolved    
  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    
  Hx of Seizures due to TBI    
       Has been on Tegretol chronic with controlled Seizures    
    Was changed to IV Keppra when NPO    
    Dr. Alfonzo Granda discussed with Dr Thom Willis ( patients Neurologist)    
  Patient  lethargic on 500 mg BID  Keppra but both states Keep on Keppra 250 BID    
    due to better Drug interactions and liver  with all his meds and less Liver issues on    
       
  Severe Deconditioning    
  Dispostion:     
  He needs to go to either [facility] or [facility] ( higher likelihood he wont get better.)    
  He has been accepted to [facility] awaiting Pre Auth, - wife is hesitant due to distance, We are encouring her to agree to [facility]    
  2nd choice is [facility] in [address]- convinient to her but due to multiple antibiotics and complexity of care, high risk of failure.    
  PALLATIVE CARE HAS PREVIOUSLY TALKED TO WIFE AND SHE WANTS EVERYTHING DONE    
       
  He needs to follow up with ID or Cardiology close to October 20 for Reimaging and Cultures or can be done in at rehab.  To determine what to do with Pacers    
       
       
  Hypernatremia - much improved, now on free fluid via Peg    
  glu 161    
  XR abd normal gas pattern    
  consult mobility services    
  wound care    
  amiodarone x1 per gtube    
  eliquis x1 per gtube    
  cubicin iv x1    
  Lasix x1 per gtube    
  gentamicin iv x1    
  insulin sc x1    
  keppra x2 per gtube    
  albuterol neb x2    
  lovenox sc x1

## 2018-09-21 NOTE — PROGRESS NOTES
Infectious Disease Progress Note IMPRESSION:  
· Bacteremia again with BC 1/2 + for Coagulase negative Staph- Staph Capitis Oxacillin R ( 9/5) · Repeat [facility] 9/7 no growth · S/p Cellulitis LUE · Repeat CT chest ( 9/7) shows non occlusive thrombus in R/ IJ vein ,small amount of thrombus along right PICC catheter/ pacer leads in SVC which extends to right atrium · S/p peg placement · S/p removal of L/IJ placement of PICC RUE , TPN on 8/31 · S/p persistent bacteremia with coagulase negative Staph since 8/11 initially oxacillin sensitive , last positive BC is oxacillin resistant 8/21(1/4) · Repeat BC 8/26, 8/29 , 9/3 no growth ·  S/p failed MBS · NATASHA shows definite large mass associated with pacing wire in RA which may represent vegetation ( 8/23) · Thrombus  & tiny gas bubble of as in RIJ extending into SVC to level of RA (8/15)  s/p heparin IV now on lovenox s/q · CTA chest - no PE, could not visualize SVC thrombus, left lateral wall soft tissue swelling, soft tissue around pacemaker show no significant abnormality · US chest wall - minimal fluid in pacemaker pocket, no significant inflammation of overlying subcutaneous fat or skin · Acute respiratory failure s/p  Ventilator / h/o tracheostomy 24 years ago  , s/p extubation 8/26 · S/p R/ pleural effusion s/p chest tube placement · 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    
  
  
PLAN:  
   
· Continue Daptomycin x 6 weeks , Gentamicin IV x 4 weeks from last negative cultures. End date for Daptomycin is Oct 19,for Gentamicin is Oct 5. 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 · Weekly CBC, CMP, CK , gentamicin trough & peak q weekly , MD at [facility] to be notified about f/u on labs /also send reports to my office at [phone]. D/w Dr Juan Diego Hobson. · Thrombus in R/IJ,   mass in RA which is suggestive of thrombus rather than vegetation in light of pacemaker pocket appearing clear of infection. · Decision made to hold off on removal of pacemaker at this time . D/w Dr Huma Jimenez , .  . · Continue antimicrobials & anticoagulation ·  Pt' s wife advised to call if recurrence of fever after completion of therapy CT chest -  IMPRESSION: 
 1. There is a small amount of nonocclusive thrombus in the right internal 
jugular vein. 
 There is also a small amount of thrombus along the right PICC catheter/pacer 
leads in the SVC which extends to the right atrium. 
 There is improvement in the bibasilar atelectasis/effusions Blood culture - Special Requests: NO SPECIAL REQUESTS   Preliminary Culture result: NO GROWTH 2 DAYS Blood culture - Culture result:    Final  
STAPHYLOCOCCUS CAPITIS SUBSPECIES UREOLYTICUS (OXACILLIN RESISTANT) , ISOLATED FROM 1 OF 2 BOTTLES DRAWN. .. LEFT HAND SITE (A) Subjective:  
 
Pt seen  . Awake, talking. \" I am tired\" Review of Systems:  Pt denies complaints. 10 point ROS obtained Objective:  
 
Blood pressure 116/66, pulse 94, temperature 98.4 °F (36.9 °C), resp. rate 18, height 6' 2\" (1.88 m), weight 241 lb 3.2 oz (109.4 kg), SpO2 100 %. Temp (24hrs), Av.1 °F (36.7 °C), Min:97 °F (36.1 °C), Max:99.3 °F (37.4 °C) Patient Vitals for the past 24 hrs: 
 Temp Pulse Resp BP SpO2  
18 2241 98.4 °F (36.9 °C) 94 18 116/66 100 % 18 1936 97.9 °F (36.6 °C) 87 18 133/75 98 %  
18 1600 98.1 °F (36.7 °C) 78 18 130/65 97 % 18 1141 98.1 °F (36.7 °C) 80 16 121/65 100 % 18 0711 99.3 °F (37.4 °C) 86 18 141/60 97 % 18 0313 97 °F (36.1 °C) 68 18 130/75 97 % Lines:  picc Physical Exam:  
General:   awake, talking Lungs:    Reduced air entry bases on  auscultation  chest wall-  pacemaker site -no swelling, erythemal  
CV:  Regular rate and rhythm,no murmur, Abdomen:   Soft, non-tender. bowel sounds +distended Extremities: LUE - erythema distal forearm is less Lines/Devices:  Intact, no erythema, drainage or tenderness Data Review: CBC:  
No results for input(s): WBC, RBC, HGB, HCT, PLT, GRANS, LYMPH, EOS, HGBEXT, HCTEXT, PLTEXT in the last 72 hours. CMP:  
Recent Labs  
   09/20/18 
 0422  09/19/18 
 0432  09/18/18 
 9905 GLU  132*  125*  143* NA  132*  132*  131* K  4.3  4.1  4.3 CL  97  97  97 CO2  25  27  25 BUN  28*  24*  23* CREA  0.99  0.92  0.98  
CA  8.7  8.7  8.5 AGAP  10  8  9 BUCR  28*  26*  23* Studies:     
Lab Results Component Value Date/Time Culture result: NO GROWTH 6 DAYS 09/08/2018 01:46 AM  
 Culture result: (A) 09/05/2018 07:03 PM  
  STAPHYLOCOCCUS CAPITIS SUBSPECIES UREOLYTICUS (OXACILLIN RESISTANT) , ISOLATED FROM 1 OF 2 BOTTLES DRAWN. .. LEFT HAND SITE Culture result: (A) 09/05/2018 07:03 PM  
  PRELIMINARY REPORT OF GRAM POSITIVE COCCI IN CLUSTERS GROWING IN 1 OF 2 BOTTLES DRAWN CALLED TO AND READ BACK BY FELIX CAMPOS RN [facility] AT 2106 ON 9/6/2018. [place] [address] Culture result:  09/05/2018 07:03 PM  
   [facility] REQUESTING IDENTIFICATION AND SENSITIVITIES 9/10/18 TA. Culture result: REMAINING BOTTLE(S) HAS/HAVE NO GROWTH IN 5 DAYS 09/05/2018 07:03 PM  
  
 
XR Results (most recent): 
 
Results from Hospital Encounter encounter on 07/10/18 XR ABD PORT  1 V Narrative EXAM: KUB INDICATION: abd distension. 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 Portable supine KUB obtained at 0729 hours shows no bowel distention. There are 
vascular calcifications. Impression IMPRESSION: Normal bowel gas pattern.    
 
 
Patient Active Problem List  
Diagnosis Code  
 HTN (hypertension) I10  
  Depression F32.9  Hypercholesterolemia E78.00  Acid reflux K21.9  Seizure ([facility] R56.9  Hypothyroidism E03.9  Hyponatremia E87.1  BPH (benign prostatic hyperplasia) N40.0  Rash R21  
 Cellulitis L03.90  Wax in ear H61.20  Ulcer [id number]  [facility] S83.798  Atrial flutter (HCC) I48.92  
 Atrial fibrillation or flutter  CHI (closed head injury) S09. 90XA  Basal cell cancer C44.91  
 TBI (traumatic [facility] injury) ([facility] S06. 9X9A  Atrial fibrillation ([facility]) I48.91  
 Cataract H26.9  Constipation K59.00  Ankle fracture, left Q96.266V  Insomnia G47.00  Tinea corporis B35.4  SSS (sick sinus syndrome) ([facility]) I49.5  Syncope R55  Seizure disorder ([facility] G40.909  RONAL on CPAP G47.33, Z99.89  
 Pacemaker Z95.0  Pre-op evaluation Z01.818  Chronic back pain greater than 3 months duration M54.9, G89.29  
 Cerebral infarction ([facility]) I63.9  Acute bronchitis J20.9  Screening for colon cancer Z12.11  
 Chronic right shoulder pain M25.511, G89.29  
 Common wart B07.8  Localized epilepsy with impairment of consciousness ([facility] G40.209  Severe obesity (BMI 35.0-39.9) ([facility]) E66.01  
 Acute blood loss anemia D62  Anasarca R60.1  GI bleed K92.2  Acute encephalopathy G93.40  Idiopathic hypotension I95.0  Counseling regarding goals of care Z71.89 ICD-10-CM ICD-9-CM 1. Anemia, unspecified type D64.9 285.9 2. Generalized weakness R53.1 780.79   
3. Acute encephalopathy G93.40 348.30   
4. Anasarca R60.1 782.3 5. Idiopathic hypotension I95.0 458.9 6. Counseling regarding goals of care Z71.89 V65.49   
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 9X9S 907.0 8. Chronic atrial fibrillation (HCC) I48.2 427.31   
9. Malignant neoplasm of ascending colon (HCC) C18.2 153.6 Anti-infectives:  
  
 Daptomycin IV - 8/26- 9/3 Gentamicin IV 8/26 S/p levaquin 8/29-9/7 Cefotetan 7/17 Zosyn 7/20-8/7 Cefepime - 8/11-8/13 Ceftriaxone 8/13-8/17 Fluconazole IV- 9/3- 9/6 IV , 9/6-9/10-po Vancomycin 8/11-8/22 Daptomycin 8/23- 8/24- Naficillin IV 8/24- 8/26  Ivania Hewitt MD FACP

## 2018-09-21 NOTE — PROGRESS NOTES
Infectious Disease Progress Note IMPRESSION:  
· Bacteremia again with BC 1/2 + for Coagulase negative Staph- Staph Capitis Oxacillin R ( 9/5) · Repeat [facility] 9/7 no growth · S/p Cellulitis LUE · Repeat CT chest ( 9/7) shows non occlusive thrombus in R/ IJ vein ,small amount of thrombus along right PICC catheter/ pacer leads in SVC which extends to right atrium · S/p peg placement · S/p removal of L/IJ placement of PICC RUE , TPN on 8/31 · S/p persistent bacteremia with coagulase negative Staph since 8/11 initially oxacillin sensitive , last positive BC is oxacillin resistant 8/21(1/4) · Repeat BC 8/26, 8/29 , 9/3 no growth ·  S/p failed MBS · NATASHA shows definite large mass associated with pacing wire in RA which may represent vegetation ( 8/23) · Thrombus  & tiny gas bubble of as in RIJ extending into SVC to level of RA (8/15)  s/p heparin IV now on lovenox s/q · CTA chest - no PE, could not visualize SVC thrombus, left lateral wall soft tissue swelling, soft tissue around pacemaker show no significant abnormality · US chest wall - minimal fluid in pacemaker pocket, no significant inflammation of overlying subcutaneous fat or skin · Acute respiratory failure s/p  Ventilator / h/o tracheostomy 24 years ago  , s/p extubation 8/26 · S/p R/ pleural effusion s/p chest tube placement · 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    
  
  
PLAN:  
   
· Continue Daptomycin x 6 weeks , Gentamicin IV x 4 weeks from last negative cultures. End date for Daptomycin is Oct 19,for Gentamicin is Oct 5. 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 · Weekly CBC, CMP, CK , gentamicin trough & peak q weekly , MD at [facility] to be notified about f/u on labs /also send reports to my office at [phone]. D/w Dr Elise Correia. · Thrombus in R/IJ,   mass in RA which is suggestive of thrombus rather than vegetation in light of pacemaker pocket appearing clear of infection. · Decision made to hold off on removal of pacemaker at this time . D/w Dr Seymour Lozano , .  . · Continue antimicrobials & anticoagulation ·  Pt' s wife advised to call if recurrence of fever after completion of therapy CT chest -  IMPRESSION: 
 1. There is a small amount of nonocclusive thrombus in the right internal 
jugular vein. 
 There is also a small amount of thrombus along the right PICC catheter/pacer 
leads in the SVC which extends to the right atrium. 
 There is improvement in the bibasilar atelectasis/effusions Blood culture - Special Requests: NO SPECIAL REQUESTS   Preliminary Culture result: NO GROWTH 2 DAYS Blood culture - Culture result:    Final  
STAPHYLOCOCCUS CAPITIS SUBSPECIES UREOLYTICUS (OXACILLIN RESISTANT) , ISOLATED FROM 1 OF 2 BOTTLES DRAWN. .. LEFT HAND SITE (A) Subjective:  
 
Pt seen  . Awake, talking. Son at bedside. Review of Systems:  Pt denies complaints. 10 point ROS obtained Objective:  
 
Blood pressure 110/60, pulse 75, temperature 97.6 °F (36.4 °C), resp. rate 20, height 6' 2\" (1.88 m), weight 241 lb 3.2 oz (109.4 kg), SpO2 100 %. Temp (24hrs), Av.8 °F (36.6 °C), Min:97.5 °F (36.4 °C), Max:98.4 °F (36.9 °C) Patient Vitals for the past 24 hrs: 
 Temp Pulse Resp BP SpO2  
18 1623 97.6 °F (36.4 °C) 75 20 110/60 100 % 18 1039 - 81 - 124/67 99 % 18 0900 97.6 °F (36.4 °C) 97 16 127/46 97 % 18 0304 97.5 °F (36.4 °C) 99 20 141/72 97 % 18 2241 98.4 °F (36.9 °C) 94 18 116/66 100 % 18 1936 97.9 °F (36.6 °C) 87 18 133/75 98 % Lines:  picc Physical Exam: General:   awake, talking Lungs:    Reduced air entry bases on  auscultation  chest wall-  pacemaker site -no swelling, erythemal  
CV:  Regular rate and rhythm,no murmur, Abdomen:   Soft, non-tender. bowel sounds +distended Extremities: LUE - erythema distal forearm is less Lines/Devices:  Intact, no erythema, drainage or tenderness Data Review: CBC:  
No results for input(s): WBC, RBC, HGB, HCT, PLT, GRANS, LYMPH, EOS, HGBEXT, HCTEXT, PLTEXT in the last 72 hours. CMP:  
Recent Labs  
   09/21/18 
 0425  09/20/18 
 0422  09/19/18 
 5891 GLU  140*  132*  125* NA  132*  132*  132* K  4.3  4.3  4.1 CL  98  97  97 CO2  26  25  27 BUN  27*  28*  24* CREA  0.99  0.99  0.92  
CA  8.6  8.7  8.7 AGAP  8  10  8 BUCR  27*  28*  26* Studies:     
Lab Results Component Value Date/Time Culture result: NO GROWTH 6 DAYS 09/08/2018 01:46 AM  
 Culture result: (A) 09/05/2018 07:03 PM  
  STAPHYLOCOCCUS CAPITIS SUBSPECIES UREOLYTICUS (OXACILLIN RESISTANT) , ISOLATED FROM 1 OF 2 BOTTLES DRAWN. .. LEFT HAND SITE Culture result: (A) 09/05/2018 07:03 PM  
  PRELIMINARY REPORT OF GRAM POSITIVE COCCI IN CLUSTERS GROWING IN 1 OF 2 BOTTLES DRAWN CALLED TO AND READ BACK BY FELIX CAMPOS RN [facility] AT 2106 ON 9/6/2018. [place] 1850 Culture result:  09/05/2018 07:03 PM  
   [facility] REQUESTING IDENTIFICATION AND SENSITIVITIES 9/10/18 TA. Culture result: REMAINING BOTTLE(S) HAS/HAVE NO GROWTH IN 5 DAYS 09/05/2018 07:03 PM  
  
 
XR Results (most recent): 
 
Results from Hospital Encounter encounter on 07/10/18 XR ABD PORT  1 V Narrative EXAM: KUB INDICATION: abd distension. 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 Portable supine KUB obtained at 0729 hours shows no bowel distention. There are 
vascular calcifications. Impression IMPRESSION: Normal bowel gas pattern. Patient Active Problem List  
Diagnosis Code  
 HTN (hypertension) I10  
 Depression F32.9  Hypercholesterolemia E78.00  Acid reflux K21.9  [facility] ([email or web address] R56.9  Hypothyroidism E03.9  Hyponatremia E87.1  BPH (benign prostatic hyperplasia) N40.0  Rash R21  
 Cellulitis L03.90  Wax in ear H61.20  Ulcer [id number]  Small bowel obstruction ([email or web address] S33.944  Atrial flutter (HCC) I48.92  
 Atrial fibrillation or flutter  CHI (closed head injury) S09. 90XA  Basal cell cancer C44.91  
 TBI (traumatic brain injury) ([facility] S06. 9X9A  Atrial fibrillation (HCC) I48.91  
 Cataract H26.9  Constipation K59.00  Ankle fracture, left A24.371X  Insomnia G47.00  Tinea corporis B35.4  SSS (sick sinus syndrome) ([facility]) I49.5  Syncope R55  Seizure disorder ([facility] G40.909  RONAL on CPAP G47.33, Z99.89  
 Pacemaker Z95.0  Pre-op evaluation Z01.818  Chronic back pain greater than 3 months duration M54.9, G89.29  
 Cerebral infarction ([facility]) I63.9  Acute bronchitis J20.9  Screening for colon cancer Z12.11  
 Chronic right shoulder pain M25.511, G89.29  
 Common wart B07.8  Localized epilepsy with impairment of consciousness ([facility] G40.209  Severe obesity (BMI 35.0-39.9) ([facility]) E66.01  
 Acute blood loss anemia D62  Anasarca R60.1  GI bleed K92.2  Acute encephalopathy G93.40  Idiopathic hypotension I95.0  Counseling regarding goals of care Z71.89 ICD-10-CM ICD-9-CM 1. Anemia, unspecified type D64.9 285.9 2. Generalized weakness R53.1 780.79   
3. Acute encephalopathy G93.40 348.30   
4. Anasarca R60.1 782.3 5. Idiopathic hypotension I95.0 458.9 6. Counseling regarding goals of care Z71.89 V65.49   
7. 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 9X9S 907.0 8. Chronic atrial fibrillation (HCC) I48.2 427.31   
9. Malignant neoplasm of ascending colon (HCC) C18.2 153.6 Anti-infectives:  
  
 Daptomycin IV - 8/26- 9/3 Gentamicin IV 8/26 S/p levaquin 8/29-9/7 Cefotetan 7/17 Zosyn 7/20-8/7 
 Cefepime - 8/11-8/13 Ceftriaxone 8/13-8/17 Fluconazole IV- 9/3- 9/6 IV , 9/6-9/10-po Vancomycin 8/11-8/22 Daptomycin 8/23- 8/24- Naficillin IV 8/24- 8/26  Yassine Barbour MD FACP

## 2018-09-21 NOTE — PROGRESS NOTES
Wound care to ABD completed per orders. 0700: Bedside shift change report given to CRISTIN Baker (oncoming nurse) by Tip Maciel (offgoing nurse). Report included the following information SBAR, Kardex, Procedure Summary, Intake/Output, MAR and Recent Results.

## 2018-09-21 NOTE — PROGRESS NOTES
Pharmacy Automatic Renal Dosing Protocol - Antimicrobials Indication for Antimicrobials: Bacteremia; aspiration PNA Current Regimen of Each Antimicrobial: 
Daptomycin 1000 mg (9.2mg/kg) IV every 24 hours (Started 8/26/18; Day #25 Gentamicin 80 mg IV every 24 hours (Start Date: 8/26/18; Day #25 Previous Antimicrobial Therapy: 
Fluconazole 200 mg every day (9/3-9/9) Levofloxacin 500 mg q 24 hours (9/4 -9/11) Levofloxacin 750 mg IV every 24 hours (Start Date: 8/28/18; Day #7 of 7) Nafcillin 2 grams every 4 hours (Started 8/24/18; Stopped 8/26/18) Daptomycin 1000 mg IV every 24 hours (Started 8/23/18; Stopped 8/24/18) Vancomycin 1500 mg IV every 12 hours (Started 8/11/18; Stopped 8/23/18) Ceftriaxone 2 g IV every 24 hours (Started 8/13/18; Stopped 8/17/18) Cefepime 2 g IV every 8 hours (Started 8/11/18; Stopped 8/13/18) Vancomycin Fluconazole Piperacillin-tazobactam 
 
Goal Gentamicin Levels (Synergy Dosing): 
Peak: 3-5 mcg/mL Trough: < 1 mcg/mL Gentamicin Level Assessment: 
Date Dose & Interval Measured (mcg/mL) Extrapolated (mcg/mL)  
8/27/18 80 mg IV every 8 hours Peak = 4.4 Trough = 2 Peak = 4.4 Trough = 1.36   
8/30/18 90 mg IV every 12 hours Peak = 5.0 Trough = 2.2 Peak = 5.2 Trough = 2.01  
8/31/18 100 mg IV every 24 hours Peak = 5.7 Trough = 0.7 Peak = 6 Trough = 0.65  
9/1/18 80 mg IV every 24 hours Peak = 3.4 Trough = 0.5 Peak = 4.6 Trough = 0.58  
9/4/18 80mg IV q24h Peak = 3.3 Trough = 0.4 Peak = 3.3 Trough = 0.36  
9/9/18 80 mg q24h Peak = 4.0 Trough = 0.6    
9/18/18 80 mg q 24 hours  Peak 4 Trough 0.5 Peak 3.7 Trough 0.5  
9/21/18 80 mg [id number] Peak = 3.9 mcg/ml Trough = 0.7 mcg/ml Peak = 4.7 mcg/ml Trough = 0.55 mcg/ml Significant Cultures:  
9/8 blood: ng  x6 days - Final 
9/5: Blood cx: STAPHYLOCOCCUS CAPITIS SUBSPECIES UREOLYTICUS (OXACILLIN RESISTANT) - Final (MDR, but sensitive to daptomycin) 9/3 BCx for fungus = ng pending 18 Blood culture = No growth x 6 days - final 
18 Blood culture = No growth x 6 days - final 
18 Body fluid thoracentesis = No growth x 4 days (Final) 18 Blood culture = No growth (FINAL) 18 Sputum culture = Heavy Burkholderia cepacia (FINAL) 18 Blood culture = Staph epidermidis / bottles - oxacillin resistant (FINAL) 18 Blood culture = No growth (FINAL) 18 Blood culture = Coag neg staph in  (FINAL) 18 Respiratory culture = Heavy MSSA; Light klebsiella (FINAL) 18 Blood culture = Coag neg staph in 2/ (FINAL) 18 Blood culture = Coag neg staph in / (FINAL) 18 Respiratory culture = No growth (FINAL) 18 Blood culture = No growth (FINAL) Labs: 
Recent Labs  
   18 
 0422  18 
 7678  18 
 8770 CREA  0.99  0.92  0.98  
BUN  28*  24*  23* Temp (24hrs), Av.1 °F (36.7 °C), Min:97 °F (36.1 °C), Max:99.3 °F (37.4 °C) Creatinine Clearance (mL/min) or Dialysis: 78 ml/min Impression/Plan: · First negative blood culture is 18 · CK  = 44 
· Peak/Trough on  at goal (see above table) · Note that dosing looks inappropriate for synergy (Q24H), but troughs were supratherapeutic on Q8H and Q12H regimens · BMP ordered daily · Continue current Daptomycin and Gentamicin for synergy x 6 weeks, through Oct 20 per ID (Although gentamicin currently ordered to end 2 weeks prior to daptomycin, will have to clarify with ID if patient is not discharged beforehand) Pharmacy will follow daily and adjust medications as appropriate for renal function and/or serum levels. Thank you, Sharif Doll, MIRLANDED

## 2018-09-21 NOTE — PROGRESS NOTES
Hospitalist Progress Note NAME: Julianna Dodd :  1940 MRN:  [id number] Assessment / Plan   
Acute Blood Loss Anemia due to Lower GI Bleed POA 
> Colonoscopy showed a Large Colon Mass > S/P Colectomy Stage 3 B this hospitalization done  > Developed Ileus then aspiration PNA Was intubated (due to acute respiratory failure) and prolonged ICU stay Extubated  Pneumonia , last sputum culture  + for Heavy staph aureus , light K.pneumoniae Sputum culture  + heavy Burkholderia cepacia S/p treated with Levaquin Bilateral Pleural Effusions s/p R chest tube removed  RONAL CPAP Q HS 
  
Dysphagia, Failed MBS 
S/p TPN in hospital due to Dysphagia S/P Peg  Wed > Tolerating Tube feedings well Oral Candidiasis ? Infected Thrombus on NATASHA 
NATASHA shows definite large mass associated with pacing wire in RA which may represent vegetation ( ) PER  Infectious Disease Continue Daptomycin, Gentamicin IV, change Fluconazole ( 9/3). & levaquin to po Nystatin Swish & spit Complete total 6 weeks of daptomycin and gentamycin from the latest (-) blood Culture  which was done on 2018. So until  Has A RT ARM PICC LINE On Lovenox therapeutic Fluconazole IV added 9/3 Bacteremia S/p removal of L/IJ placement of PICC RUE , TPN on  Persistent bacteremia with coagulase negative Staph since  initially oxacillin sensitive , last positive BC is oxacillin resistant (1/) Repeat BC ,  , 9/3 no growth,  Positive Staph Coag Neg,  ( negative Chest CT repeated . 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 Hold off on removal of pacemaker at this time . D/w Dr Tahir Zamudio , .  . 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 Gem Vincent discussed with Dr Toby Goldberg (patients Neurologist) Patient  lethargic on 500 mg BID  Keppra but both states Keep on Keppra 250 BID 
due to better Drug interactions and liver  with all his meds and less Liver issues on Severe Deconditioning. Palliative care was involved with discussion with family who decided everything to be done He needs to follow up with ID or Cardiology close to October 20 for Reimaging and Cultures or can be done in at [facility]. To determine what to do with Pacers Hypernatremia - much improved, now on free fluid via Peg 
   
Code Status: Full NOK POA  :  WIFE 
DVT Prophylaxis: on lovenox for the thrombus Disposition. Insurance denies Kimberly Lipoma, spoke to Dr Garcia Covert from insurance as peer to peer on 9/20 but they continue to decline. Now have appealed the decision. Subjective: Pt seen and examined at bedside. NAD. Tolerating tube feeding. Overnight events d/w RN Chief Complaint / Reason for Physician Visit : F/U AMS, dysphagia, Bacteremia, PNA, GI bleed, Colon Ca Review of Systems: 
Symptom Y/N Comments  Symptom Y/N Comments Fever/Chills n   Chest Pain n   
Poor Appetite n   Edema n   
Cough n   Abdominal Pain n   
Sputum n   Joint Pain n   
SOB/BECERRIL    Pruritis/Rash Nausea/vomit    Tolerating PT/OT y Diarrhea    Tolerating Diet y PEG tube feeding at goal rate Constipation    Other Could NOT obtain due to:   
 
Objective: VITALS:  
Last 24hrs VS reviewed since prior progress note. Most recent are: 
Patient Vitals for the past 24 hrs: 
 Temp Pulse Resp BP SpO2  
09/21/18 1039 - 81 - 124/67 99 % 09/21/18 0900 97.6 °F (36.4 °C) 97 16 127/46 97 % 09/21/18 0304 97.5 °F (36.4 °C) 99 20 141/72 97 % 09/20/18 2241 98.4 °F (36.9 °C) 94 18 116/66 100 % 09/20/18 1936 97.9 °F (36.6 °C) 87 18 133/75 98 % Intake/Output Summary (Last 24 hours) at 09/21/18 1617 Last data filed at 09/21/18 1515 Gross per 24 hour Intake             3045 ml Output              350 ml Net             2695 ml PHYSICAL EXAM: 
General: WD, WN. Pleasant, Awake, NAD and Ox3 cooperative, no acute distress   
EENT:  EOMI. Anicteric sclerae. MMM Resp:  CTA bilaterally, no wheezing or rales. No accessory muscle use CV:  Regular  rhythm,  No edema GI:  Soft, Non distended, Non tender.  +Bowel sounds, PEG tube Noted +, Colostomy bag noted + Neurologic:  Alert and oriented X 3, normal speech, Psych:   Good insight. Not anxious nor agitated Skin:  No rashes. No jaundice, R PICC line noted Reviewed most current lab test results and cultures  YES Reviewed most current radiology test results   YES Review and summation of old records today    NO Reviewed patient's current orders and MAR    YES 
PMH/ reviewed - no change compared to H&P 
________________________________________________________________________ Care Plan discussed with: 
  Comments Patient x Family RN x Care Manager x Consultant Multidiciplinary team rounds were held today with , nursing, pharmacist and clinical coordinator. Patient's plan of care was discussed; medications were reviewed and discharge planning was addressed. ________________________________________________________________________ Total NON critical care TIME: 10 Minutes Total CRITICAL CARE TIME Spent:   Minutes non procedure based Comments >50% of visit spent in counseling and coordination of care    
________________________________________________________________________ Maureen Terry MD  
 
 Procedures: see electronic medical records for all procedures/Xrays and details which were not copied into this note but were reviewed prior to creation of Plan. LABS: 
I reviewed today's most current labs and imaging studies. Pertinent labs include: No results for input(s): WBC, HGB, HCT, PLT, HGBEXT, HCTEXT, PLTEXT, HGBEXT, HCTEXT, PLTEXT in the last 72 hours. Recent Labs  
   09/21/18 
 0425  09/20/18 
 0422  09/19/18 
 3442 NA  132*  132*  132* K  4.3  4.3  4.1 CL  98  97  97 CO2  26  25  27 GLU  140*  132*  125* BUN  27*  28*  24* CREA  0.99  0.99  0.92  
CA  8.6  8.7  8.7 MG  2.0   --    --   
 
 
Signed: Cathy Weathers MD

## 2018-09-22 LAB
GLUCOSE BLD STRIP.AUTO-MCNC: 135 MG/DL (ref 65–100)
GLUCOSE BLD STRIP.AUTO-MCNC: 141 MG/DL (ref 65–100)
GLUCOSE BLD STRIP.AUTO-MCNC: 145 MG/DL (ref 65–100)
GLUCOSE BLD STRIP.AUTO-MCNC: 163 MG/DL (ref 65–100)
GLUCOSE BLD STRIP.AUTO-MCNC: 164 MG/DL (ref 65–100)
GLUCOSE BLD STRIP.AUTO-MCNC: 173 MG/DL (ref 65–100)
SERVICE CMNT-IMP: ABNORMAL

## 2018-09-22 PROCEDURE — 74011250637 HC RX REV CODE- 250/637: Performed by: INTERNAL MEDICINE

## 2018-09-22 PROCEDURE — 77030018846 HC SOL IRR STRL H20 ICUM -A

## 2018-09-22 PROCEDURE — 74011000258 HC RX REV CODE- 258: Performed by: INTERNAL MEDICINE

## 2018-09-22 PROCEDURE — 77030018798 HC PMP KT ENTRL FED COVD -A

## 2018-09-22 PROCEDURE — 82962 GLUCOSE BLOOD TEST: CPT

## 2018-09-22 PROCEDURE — 74011000250 HC RX REV CODE- 250: Performed by: INTERNAL MEDICINE

## 2018-09-22 PROCEDURE — 74011250636 HC RX REV CODE- 250/636: Performed by: INTERNAL MEDICINE

## 2018-09-22 PROCEDURE — 65270000029 HC RM PRIVATE

## 2018-09-22 PROCEDURE — 74011636637 HC RX REV CODE- 636/637: Performed by: INTERNAL MEDICINE

## 2018-09-22 RX ADMIN — LACTULOSE 10 G: 20 SOLUTION ORAL at 09:16

## 2018-09-22 RX ADMIN — Medication 10 ML: at 16:27

## 2018-09-22 RX ADMIN — NYSTATIN 500000 UNITS: 100000 SUSPENSION ORAL at 21:52

## 2018-09-22 RX ADMIN — INSULIN LISPRO 3 UNITS: 100 INJECTION, SOLUTION INTRAVENOUS; SUBCUTANEOUS at 13:07

## 2018-09-22 RX ADMIN — VENLAFAXINE 37.5 MG: 37.5 TABLET ORAL at 18:16

## 2018-09-22 RX ADMIN — VENLAFAXINE 37.5 MG: 37.5 TABLET ORAL at 09:16

## 2018-09-22 RX ADMIN — NYSTATIN 500000 UNITS: 100000 SUSPENSION ORAL at 09:17

## 2018-09-22 RX ADMIN — NYSTATIN 500000 UNITS: 100000 SUSPENSION ORAL at 18:16

## 2018-09-22 RX ADMIN — FUROSEMIDE 60 MG: 40 TABLET ORAL at 09:17

## 2018-09-22 RX ADMIN — APIXABAN 5 MG: 5 TABLET, FILM COATED ORAL at 09:17

## 2018-09-22 RX ADMIN — Medication 10 ML: at 21:52

## 2018-09-22 RX ADMIN — NYSTATIN 500000 UNITS: 100000 SUSPENSION ORAL at 13:07

## 2018-09-22 RX ADMIN — LEVETIRACETAM 250 MG: 500 SOLUTION ORAL at 09:17

## 2018-09-22 RX ADMIN — LEVETIRACETAM 250 MG: 500 SOLUTION ORAL at 21:52

## 2018-09-22 RX ADMIN — DAPTOMYCIN 1000 MG: 500 INJECTION, POWDER, LYOPHILIZED, FOR SOLUTION INTRAVENOUS at 19:29

## 2018-09-22 RX ADMIN — APIXABAN 5 MG: 5 TABLET, FILM COATED ORAL at 21:52

## 2018-09-22 RX ADMIN — POLYETHYLENE GLYCOL 3350 17 G: 17 POWDER, FOR SOLUTION ORAL at 09:18

## 2018-09-22 RX ADMIN — GENTAMICIN SULFATE 80 MG: 40 INJECTION, SOLUTION INTRAMUSCULAR; INTRAVENOUS at 04:59

## 2018-09-22 RX ADMIN — LACTULOSE 10 G: 20 SOLUTION ORAL at 18:16

## 2018-09-22 RX ADMIN — ACETAMINOPHEN 650 MG: 325 TABLET ORAL at 13:21

## 2018-09-22 RX ADMIN — Medication 20 ML: at 16:27

## 2018-09-22 RX ADMIN — Medication 10 ML: at 05:03

## 2018-09-22 RX ADMIN — AMIODARONE HYDROCHLORIDE 200 MG: 200 TABLET ORAL at 09:17

## 2018-09-22 NOTE — PROGRESS NOTES
Hospitalist Progress Note NAME: Cuauhtemoc Alonso :  1940 MRN:  [id number] Assessment / Plan   
Acute Blood Loss Anemia due to Lower GI Bleed POA 
> Colonoscopy showed a Large Colon Mass > S/P Colectomy Stage 3 B this hospitalization done  > Developed Ileus then aspiration PNA Was intubated (due to acute respiratory failure) and prolonged ICU stay Extubated  Pneumonia , last sputum culture  + for Heavy staph aureus , light K.pneumoniae Sputum culture  + heavy Burkholderia cepacia S/p treated with Levaquin Bilateral Pleural Effusions s/p R chest tube removed  RONAL CPAP Q HS 
  
Dysphagia, Failed MBS 
S/p TPN in hospital due to Dysphagia S/P Peg  Wed > Tolerating Tube feedings well Oral Candidiasis ? Infected Thrombus on NATASHA 
NATASHA shows definite large mass associated with pacing wire in RA which may represent vegetation ( ) PER  Infectious Disease Continue Daptomycin, Gentamicin IV, change Fluconazole ( 9/3). & levaquin to po Nystatin Swish & spit Complete total 6 weeks of daptomycin and gentamycin from the latest (-) blood Culture  which was done on 2018. So until  Has A RT ARM PICC LINE On Lovenox therapeutic Fluconazole IV added 9/3 Bacteremia S/p removal of L/IJ placement of PICC RUE , TPN on  Persistent bacteremia with coagulase negative Staph since  initially oxacillin sensitive , last positive BC is oxacillin resistant (1/) Repeat BC ,  , 9/3 no growth,  Positive Staph Coag Neg,  ( negative Chest CT repeated . 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 Hold off on removal of pacemaker at this time . D/w Dr Noreen Rojas , .  . 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 Chitra Molina discussed with Dr Maxim Mott (patients Neurologist) Patient  lethargic on 500 mg BID  Keppra but both states Keep on Keppra 250 BID 
due to better Drug interactions and liver  with all his meds and less Liver issues on Severe Deconditioning. Palliative care was involved with discussion with family who decided everything to be done He needs to follow up with ID or Cardiology close to October 20 for Reimaging and Cultures or can be done in at [facility]. To determine what to do with Pacers Hypernatremia - much improved, now on free fluid via Peg 
   
Code Status: Full NOK POA  :  WIFE 
DVT Prophylaxis: on lovenox for the thrombus Disposition. Insurance denies Freddy Navarro, spoke to Dr Latasha Perez from insurance as peer to peer on 9/20 but they continue to decline. Now have appealed the decision. Subjective: Pt seen and examined at bedside. NAD. Tolerating tube feeding. Overnight events d/w RN Chief Complaint / Reason for Physician Visit : F/U AMS, dysphagia, Bacteremia, PNA, GI bleed, Colon Ca Review of Systems: 
Symptom Y/N Comments  Symptom Y/N Comments Fever/Chills n   Chest Pain n   
Poor Appetite n   Edema n   
Cough n   Abdominal Pain n   
Sputum n   Joint Pain n   
SOB/BECERRIL    Pruritis/Rash Nausea/vomit    Tolerating PT/OT y Diarrhea    Tolerating Diet y PEG tube feeding at goal rate Constipation    Other Could NOT obtain due to:   
 
Objective: VITALS:  
Last 24hrs VS reviewed since prior progress note. Most recent are: 
Patient Vitals for the past 24 hrs: 
 Temp Pulse Resp BP SpO2  
09/22/18 1503 98 °F (36.7 °C) 78 18 133/61 99 % 09/22/18 1113 97.9 °F (36.6 °C) 81 18 111/57 100 % 09/22/18 0815 98.7 °F (37.1 °C) 72 19 114/61 100 % 09/22/18 0244 98.8 °F (37.1 °C) 97 18 120/58 100 % 09/21/18 2317 98.1 °F (36.7 °C) 95 18 140/61 99 % 09/21/18 1932 98.1 °F (36.7 °C) 88 19 106/65 99 % 09/21/18 1623 97.6 °F (36.4 °C) 75 20 110/60 100 % Intake/Output Summary (Last 24 hours) at 09/22/18 1532 Last data filed at 09/22/18 1431 Gross per 24 hour Intake             1460 ml Output              200 ml Net             1260 ml PHYSICAL EXAM: 
General: WD, WN. Pleasant, Awake, NAD and Ox3 cooperative, no acute distress   
EENT:  EOMI. Anicteric sclerae. MMM Resp:  CTA bilaterally, no wheezing or rales. No accessory muscle use CV:  Regular  rhythm,  No edema GI:  Soft, Non distended, Non tender.  +Bowel sounds, PEG tube Noted +, Colostomy bag noted + Neurologic:  Alert and oriented X 3, normal speech, Psych:   Good insight. Not anxious nor agitated Skin:  No rashes. No jaundice, R PICC line noted Reviewed most current lab test results and cultures  YES Reviewed most current radiology test results   YES Review and summation of old records today    NO Reviewed patient's current orders and MAR    YES 
PMH/ reviewed - no change compared to H&P 
________________________________________________________________________ Care Plan discussed with: 
  Comments Patient x Family RN x Care Manager x Consultant Multidiciplinary team rounds were held today with , nursing, pharmacist and clinical coordinator. Patient's plan of care was discussed; medications were reviewed and discharge planning was addressed. ________________________________________________________________________ Total NON critical care TIME: 10 Minutes Total CRITICAL CARE TIME Spent:   Minutes non procedure based Comments >50% of visit spent in counseling and coordination of care ________________________________________________________________________ Pema Sloan MD  
 
Procedures: see electronic medical records for all procedures/Xrays and details which were not copied into this note but were reviewed prior to creation of Plan. LABS: 
I reviewed today's most current labs and imaging studies. Pertinent labs include: No results for input(s): WBC, HGB, HCT, PLT, HGBEXT, HCTEXT, PLTEXT, HGBEXT, HCTEXT, PLTEXT in the last 72 hours. Recent Labs  
   09/21/18 
 0425  09/20/18 
 0422 NA  132*  132* K  4.3  4.3 CL  98  97 CO2  26  25 GLU  140*  132* BUN  27*  28* CREA  0.99  0.99 CA  8.6  8.7 MG  2.0   --   
 
 
Signed: Pema Sloan MD

## 2018-09-22 NOTE — PROGRESS NOTES
Bedside and Verbal shift change report given to [address] (oncoming nurse) by Tata Chavez RN (offgoing nurse). Report included the following information SBAR, Kardex, Intake/Output, MAR and Recent Results.

## 2018-09-23 LAB
ANION GAP SERPL CALC-SCNC: 8 MMOL/L (ref 5–15)
BUN SERPL-MCNC: 26 MG/DL (ref 6–20)
BUN/CREAT SERPL: 29 (ref 12–20)
CALCIUM SERPL-MCNC: 8.3 MG/DL (ref 8.5–10.1)
CHLORIDE SERPL-SCNC: 99 MMOL/L (ref 97–108)
CO2 SERPL-SCNC: 26 MMOL/L (ref 21–32)
CREAT SERPL-MCNC: 0.9 MG/DL (ref 0.7–1.3)
GLUCOSE BLD STRIP.AUTO-MCNC: 135 MG/DL (ref 65–100)
GLUCOSE BLD STRIP.AUTO-MCNC: 138 MG/DL (ref 65–100)
GLUCOSE BLD STRIP.AUTO-MCNC: 144 MG/DL (ref 65–100)
GLUCOSE BLD STRIP.AUTO-MCNC: 159 MG/DL (ref 65–100)
GLUCOSE SERPL-MCNC: 143 MG/DL (ref 65–100)
POTASSIUM SERPL-SCNC: 4.1 MMOL/L (ref 3.5–5.1)
SERVICE CMNT-IMP: ABNORMAL
SODIUM SERPL-SCNC: 133 MMOL/L (ref 136–145)

## 2018-09-23 PROCEDURE — 74011250636 HC RX REV CODE- 250/636: Performed by: INTERNAL MEDICINE

## 2018-09-23 PROCEDURE — 74011250637 HC RX REV CODE- 250/637: Performed by: INTERNAL MEDICINE

## 2018-09-23 PROCEDURE — 80048 BASIC METABOLIC PNL TOTAL CA: CPT | Performed by: INTERNAL MEDICINE

## 2018-09-23 PROCEDURE — 94760 N-INVAS EAR/PLS OXIMETRY 1: CPT

## 2018-09-23 PROCEDURE — 74011636637 HC RX REV CODE- 636/637: Performed by: INTERNAL MEDICINE

## 2018-09-23 PROCEDURE — 74011000258 HC RX REV CODE- 258: Performed by: INTERNAL MEDICINE

## 2018-09-23 PROCEDURE — 74011000250 HC RX REV CODE- 250: Performed by: INTERNAL MEDICINE

## 2018-09-23 PROCEDURE — 65270000029 HC RM PRIVATE

## 2018-09-23 PROCEDURE — 36415 COLL VENOUS BLD VENIPUNCTURE: CPT | Performed by: INTERNAL MEDICINE

## 2018-09-23 PROCEDURE — 82962 GLUCOSE BLOOD TEST: CPT

## 2018-09-23 RX ADMIN — VENLAFAXINE 37.5 MG: 37.5 TABLET ORAL at 08:50

## 2018-09-23 RX ADMIN — POLYETHYLENE GLYCOL 3350 17 G: 17 POWDER, FOR SOLUTION ORAL at 08:51

## 2018-09-23 RX ADMIN — Medication 10 ML: at 07:05

## 2018-09-23 RX ADMIN — DAPTOMYCIN 1000 MG: 500 INJECTION, POWDER, LYOPHILIZED, FOR SOLUTION INTRAVENOUS at 17:47

## 2018-09-23 RX ADMIN — Medication 20 ML: at 14:00

## 2018-09-23 RX ADMIN — APIXABAN 5 MG: 5 TABLET, FILM COATED ORAL at 08:50

## 2018-09-23 RX ADMIN — AMIODARONE HYDROCHLORIDE 200 MG: 200 TABLET ORAL at 08:51

## 2018-09-23 RX ADMIN — LEVETIRACETAM 250 MG: 500 SOLUTION ORAL at 22:20

## 2018-09-23 RX ADMIN — APIXABAN 5 MG: 5 TABLET, FILM COATED ORAL at 22:20

## 2018-09-23 RX ADMIN — LACTULOSE 10 G: 20 SOLUTION ORAL at 17:47

## 2018-09-23 RX ADMIN — INSULIN LISPRO 3 UNITS: 100 INJECTION, SOLUTION INTRAVENOUS; SUBCUTANEOUS at 12:00

## 2018-09-23 RX ADMIN — LACTULOSE 10 G: 20 SOLUTION ORAL at 08:51

## 2018-09-23 RX ADMIN — VENLAFAXINE 37.5 MG: 37.5 TABLET ORAL at 17:47

## 2018-09-23 RX ADMIN — NYSTATIN 500000 UNITS: 100000 SUSPENSION ORAL at 17:48

## 2018-09-23 RX ADMIN — FUROSEMIDE 60 MG: 40 TABLET ORAL at 08:50

## 2018-09-23 RX ADMIN — NYSTATIN 500000 UNITS: 100000 SUSPENSION ORAL at 13:00

## 2018-09-23 RX ADMIN — NYSTATIN 500000 UNITS: 100000 SUSPENSION ORAL at 08:51

## 2018-09-23 RX ADMIN — LEVETIRACETAM 250 MG: 500 SOLUTION ORAL at 08:50

## 2018-09-23 RX ADMIN — NYSTATIN 500000 UNITS: 100000 SUSPENSION ORAL at 22:20

## 2018-09-23 RX ADMIN — GENTAMICIN SULFATE 80 MG: 40 INJECTION, SOLUTION INTRAMUSCULAR; INTRAVENOUS at 04:29

## 2018-09-23 RX ADMIN — Medication 10 ML: at 22:20

## 2018-09-23 NOTE — PROGRESS NOTES
Per telemetry call, pt ran 9 beats of vtach. Assessed pt. He is asymptomatic. Alert and oriented. Denies any cp, sob, or dyspnea at this time. Bed is in  low and call bell wihin reach.

## 2018-09-23 NOTE — PROGRESS NOTES
Assumed care of pt this am. Pt is alert and oriented this morning. Pt had incontinent episode this morning now changed. Second incontient episode cleaned. Pt has been repositioned with pillows q2 hours my shift. Pt states back hurts, patch applied.

## 2018-09-23 NOTE — PROGRESS NOTES
Hospitalist Progress Note NAME: Luann Quintero :  1940 MRN:  [id number] Assessment / Plan   
Acute Blood Loss Anemia due to Lower GI Bleed POA 
> Colonoscopy showed a Large Colon Mass > S/P Colectomy Stage 3 B this hospitalization done  > Developed Ileus then aspiration PNA Was intubated (due to acute respiratory failure) and prolonged ICU stay Extubated  Pneumonia , last sputum culture  + for Heavy staph aureus , light K.pneumoniae Sputum culture  + heavy Burkholderia cepacia S/p treated with Levaquin Bilateral Pleural Effusions s/p R chest tube removed  RONAL CPAP Q HS 
  
Dysphagia, Failed MBS 
S/p TPN in hospital due to Dysphagia S/P Peg  Wed > Tolerating Tube feedings well Oral Candidiasis ? Infected Thrombus on NATASHA 
NATASHA shows definite large mass associated with pacing wire in RA which may represent vegetation ( ) PER  Infectious Disease Continue Daptomycin, Gentamicin IV, change Fluconazole ( 9/3). & levaquin to po Nystatin Swish & spit Complete total 6 weeks of daptomycin and gentamycin from the latest (-) blood Culture  which was done on 2018. So until  Has A RT ARM PICC LINE On Lovenox therapeutic Fluconazole IV added 9/3 Bacteremia S/p removal of L/IJ placement of PICC RUE , TPN on  Persistent bacteremia with coagulase negative Staph since  initially oxacillin sensitive , last positive BC is oxacillin resistant (1/) Repeat BC ,  , 9/3 no growth,  Positive Staph Coag Neg,  ( negative Chest CT repeated . 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 Hold off on removal of pacemaker at this time . D/w Dr Job Jackson , .  . 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 Christine Wang discussed with Dr Sarita Castro (patients Neurologist) Patient  lethargic on 500 mg BID  Keppra but both states Keep on Keppra 250 BID 
due to better Drug interactions and liver  with all his meds and less Liver issues on Severe Deconditioning. Palliative care was involved with discussion with family who decided everything to be done He needs to follow up with ID or Cardiology close to October 20 for Reimaging and Cultures or can be done in at [facility]. To determine what to do with Pacers Hypernatremia - much improved, now on free fluid via Peg 
   
Code Status: Full NOK POA  :  WIFE 
DVT Prophylaxis: on lovenox for the thrombus Disposition. Insurance denies Aracelis Luque, spoke to Dr Lizzie Rangel from insurance as peer to peer on 9/20 but they continue to decline. Now have appealed the decision. Subjective: Pt seen and examined at bedside. NAD. Tolerating tube feeding. Overnight events d/w RN Chief Complaint / Reason for Physician Visit : F/U AMS, dysphagia, Bacteremia, PNA, GI bleed, Colon Ca Review of Systems: 
Symptom Y/N Comments  Symptom Y/N Comments Fever/Chills n   Chest Pain n   
Poor Appetite n   Edema n   
Cough n   Abdominal Pain n   
Sputum n   Joint Pain n   
SOB/BECERRIL    Pruritis/Rash Nausea/vomit    Tolerating PT/OT y Diarrhea    Tolerating Diet y PEG tube feeding at goal rate Constipation    Other Could NOT obtain due to:   
 
Objective: VITALS:  
Last 24hrs VS reviewed since prior progress note. Most recent are: 
Patient Vitals for the past 24 hrs: 
 Temp Pulse Resp BP SpO2  
09/23/18 1236 97.6 °F (36.4 °C) 73 18 130/64 98 % 09/23/18 0953 98.1 °F (36.7 °C) 70 16 126/60 -  
09/23/18 0246 97 °F (36.1 °C) 70 16 137/69 97 % 09/22/18 2255 98.2 °F (36.8 °C) 72 18 142/64 97 % 09/22/18 1936 97.8 °F (36.6 °C) 75 18 136/67 96 %  
09/22/18 1503 98 °F (36.7 °C) 78 18 133/61 99 % Intake/Output Summary (Last 24 hours) at 09/23/18 1414 Last data filed at 09/23/18 4521 Gross per 24 hour Intake             1210 ml Output              300 ml Net              910 ml PHYSICAL EXAM: 
General: WD, WN. Pleasant, Awake, NAD and Ox3 cooperative, no acute distress   
EENT:  EOMI. Anicteric sclerae. MMM Resp:  CTA bilaterally, no wheezing or rales. No accessory muscle use CV:  Regular  rhythm,  No edema GI:  Soft, Non distended, Non tender.  +Bowel sounds, PEG tube Noted +, Colostomy bag noted + Neurologic:  Alert and oriented X 3, normal speech, Psych:   Good insight. Not anxious nor agitated Skin:  No rashes. No jaundice, R PICC line noted Reviewed most current lab test results and cultures  YES Reviewed most current radiology test results   YES Review and summation of old records today    NO Reviewed patient's current orders and MAR    YES 
PMH/ reviewed - no change compared to H&P 
________________________________________________________________________ Care Plan discussed with: 
  Comments Patient x Family RN x Care Manager x Consultant Multidiciplinary team rounds were held today with , nursing, pharmacist and clinical coordinator. Patient's plan of care was discussed; medications were reviewed and discharge planning was addressed. ________________________________________________________________________ Total NON critical care TIME: 10 Minutes Total CRITICAL CARE TIME Spent:   Minutes non procedure based Comments >50% of visit spent in counseling and coordination of care    
________________________________________________________________________ Cathy Weathers MD  
 
Procedures: see electronic medical records for all procedures/Xrays and details which were not copied into this note but were reviewed prior to creation of Plan. LABS: 
I reviewed today's most current labs and imaging studies. Pertinent labs include: No results for input(s): WBC, HGB, HCT, PLT, HGBEXT, HCTEXT, PLTEXT, HGBEXT, HCTEXT, PLTEXT in the last 72 hours. Recent Labs  
   09/23/18 
 0350  09/21/18 
 0425 NA  133*  132* K  4.1  4.3 CL  99  98  
CO2  26  26 GLU  143*  140* BUN  26*  27* CREA  0.90  0.99 CA  8.3*  8.6 MG   --   2.0 Signed: Cathy Weathers MD

## 2018-09-24 LAB
ANION GAP SERPL CALC-SCNC: 8 MMOL/L (ref 5–15)
BACTERIA SPEC CULT: NORMAL
BUN SERPL-MCNC: 29 MG/DL (ref 6–20)
BUN/CREAT SERPL: 32 (ref 12–20)
CALCIUM SERPL-MCNC: 8.8 MG/DL (ref 8.5–10.1)
CHLORIDE SERPL-SCNC: 97 MMOL/L (ref 97–108)
CO2 SERPL-SCNC: 28 MMOL/L (ref 21–32)
CREAT SERPL-MCNC: 0.92 MG/DL (ref 0.7–1.3)
GLUCOSE BLD STRIP.AUTO-MCNC: 122 MG/DL (ref 65–100)
GLUCOSE BLD STRIP.AUTO-MCNC: 131 MG/DL (ref 65–100)
GLUCOSE BLD STRIP.AUTO-MCNC: 146 MG/DL (ref 65–100)
GLUCOSE BLD STRIP.AUTO-MCNC: 153 MG/DL (ref 65–100)
GLUCOSE SERPL-MCNC: 130 MG/DL (ref 65–100)
POTASSIUM SERPL-SCNC: 4 MMOL/L (ref 3.5–5.1)
SERVICE CMNT-IMP: ABNORMAL
SERVICE CMNT-IMP: NORMAL
SODIUM SERPL-SCNC: 133 MMOL/L (ref 136–145)

## 2018-09-24 PROCEDURE — 74011250637 HC RX REV CODE- 250/637: Performed by: INTERNAL MEDICINE

## 2018-09-24 PROCEDURE — C1751 CATH, INF, PER/CENT/MIDLINE: HCPCS

## 2018-09-24 PROCEDURE — 36415 COLL VENOUS BLD VENIPUNCTURE: CPT | Performed by: INTERNAL MEDICINE

## 2018-09-24 PROCEDURE — 74011000250 HC RX REV CODE- 250: Performed by: INTERNAL MEDICINE

## 2018-09-24 PROCEDURE — 97530 THERAPEUTIC ACTIVITIES: CPT | Performed by: PHYSICAL THERAPIST

## 2018-09-24 PROCEDURE — 74011000258 HC RX REV CODE- 258: Performed by: INTERNAL MEDICINE

## 2018-09-24 PROCEDURE — 74011250636 HC RX REV CODE- 250/636: Performed by: INTERNAL MEDICINE

## 2018-09-24 PROCEDURE — 82962 GLUCOSE BLOOD TEST: CPT

## 2018-09-24 PROCEDURE — 80048 BASIC METABOLIC PNL TOTAL CA: CPT | Performed by: INTERNAL MEDICINE

## 2018-09-24 PROCEDURE — 65270000029 HC RM PRIVATE

## 2018-09-24 PROCEDURE — 92526 ORAL FUNCTION THERAPY: CPT

## 2018-09-24 PROCEDURE — 97530 THERAPEUTIC ACTIVITIES: CPT | Performed by: OCCUPATIONAL THERAPIST

## 2018-09-24 PROCEDURE — 74011636637 HC RX REV CODE- 636/637: Performed by: INTERNAL MEDICINE

## 2018-09-24 PROCEDURE — 94760 N-INVAS EAR/PLS OXIMETRY 1: CPT

## 2018-09-24 RX ADMIN — LACTULOSE 10 G: 20 SOLUTION ORAL at 17:28

## 2018-09-24 RX ADMIN — POLYETHYLENE GLYCOL 3350 17 G: 17 POWDER, FOR SOLUTION ORAL at 08:29

## 2018-09-24 RX ADMIN — APIXABAN 5 MG: 5 TABLET, FILM COATED ORAL at 08:29

## 2018-09-24 RX ADMIN — NYSTATIN 500000 UNITS: 100000 SUSPENSION ORAL at 22:58

## 2018-09-24 RX ADMIN — LACTULOSE 10 G: 20 SOLUTION ORAL at 08:29

## 2018-09-24 RX ADMIN — INSULIN LISPRO 3 UNITS: 100 INJECTION, SOLUTION INTRAVENOUS; SUBCUTANEOUS at 13:21

## 2018-09-24 RX ADMIN — INSULIN LISPRO 3 UNITS: 100 INJECTION, SOLUTION INTRAVENOUS; SUBCUTANEOUS at 00:49

## 2018-09-24 RX ADMIN — NYSTATIN 500000 UNITS: 100000 SUSPENSION ORAL at 17:28

## 2018-09-24 RX ADMIN — VENLAFAXINE 37.5 MG: 37.5 TABLET ORAL at 08:29

## 2018-09-24 RX ADMIN — NYSTATIN 500000 UNITS: 100000 SUSPENSION ORAL at 08:29

## 2018-09-24 RX ADMIN — AMIODARONE HYDROCHLORIDE 200 MG: 200 TABLET ORAL at 08:29

## 2018-09-24 RX ADMIN — DAPTOMYCIN 1000 MG: 500 INJECTION, POWDER, LYOPHILIZED, FOR SOLUTION INTRAVENOUS at 17:26

## 2018-09-24 RX ADMIN — Medication 10 ML: at 22:59

## 2018-09-24 RX ADMIN — INSULIN LISPRO 3 UNITS: 100 INJECTION, SOLUTION INTRAVENOUS; SUBCUTANEOUS at 06:48

## 2018-09-24 RX ADMIN — NYSTATIN 500000 UNITS: 100000 SUSPENSION ORAL at 13:21

## 2018-09-24 RX ADMIN — Medication 10 ML: at 06:52

## 2018-09-24 RX ADMIN — Medication 10 ML: at 18:17

## 2018-09-24 RX ADMIN — APIXABAN 5 MG: 5 TABLET, FILM COATED ORAL at 22:58

## 2018-09-24 RX ADMIN — VENLAFAXINE 37.5 MG: 37.5 TABLET ORAL at 17:28

## 2018-09-24 RX ADMIN — Medication 10 ML: at 08:29

## 2018-09-24 RX ADMIN — Medication 10 ML: at 14:32

## 2018-09-24 RX ADMIN — LEVETIRACETAM 250 MG: 500 SOLUTION ORAL at 22:58

## 2018-09-24 RX ADMIN — GENTAMICIN SULFATE 80 MG: 40 INJECTION, SOLUTION INTRAMUSCULAR; INTRAVENOUS at 06:52

## 2018-09-24 RX ADMIN — FUROSEMIDE 60 MG: 40 TABLET ORAL at 08:29

## 2018-09-24 RX ADMIN — LEVETIRACETAM 250 MG: 500 SOLUTION ORAL at 08:29

## 2018-09-24 RX ADMIN — Medication 20 ML: at 14:32

## 2018-09-24 NOTE — PROGRESS NOTES
Nutrition Assessment: 
 
INTERVENTIONS/RECOMMENDATIONS:  
Continue current TF regimen ASSESSMENT:  
Chart reviewed; patient continues to wait for disposition. Receiving TF via PEG tube at recommended goal rate. TF is meting 98% of estimated kcal needs and 100% of protein needs. Minimal residual. Continue current nutrition plan of care. Diet Order: NPO (PEG: TwoCal @ 50 mL/hr x 22 hours + prosource daily + 250 mL flushes q 6 hours; 2260kcal, 107g protein, 1770 mL water) % Eaten:  No data found. Pertinent Medications: [x] Reviewed []Other: Amiodarone, Eliquis, Lasix, Humalog, Lactulose, Keppra, Miralax, Effexor Pertinent Labs: [x]Reviewed  []Other:  
Food Allergies: [x]None []Other:    
Last BM: 9/23   [x]Active     []Hyperactive  []Hypoactive       [] Absent  BS Skin:    [] Intact   [x] Incision  [] Breakdown   []Edema   []Other: Anthropometrics: Height: 6' 2\" (188 cm) Weight: 112.9 kg (249 lb) IBW (%IBW):   ( ) UBW (%UBW):   (  %) BMI: Body mass index is 31.97 kg/(m^2). This BMI is indicative of: 
[]Underweight   []Normal   []Overweight   [x] Obesity   [] Extreme Obesity (BMI>40) Last Weight Metrics: 
Weight Loss Metrics 9/24/2018 7/10/2018 7/5/2018 5/15/2018 5/7/2018 4/10/2018 3/26/2018 Today's Wt 249 lb - 283 lb 4.8 oz 266 lb 262 lb 267 lb 267 lb BMI - 31.97 kg/m2 36.37 kg/m2 34.15 kg/m2 33.64 kg/m2 34.28 kg/m2 34.28 kg/m2 Estimated Nutrition Needs (Based on): 1010 Kcals/day (BMR (1925) x 1. 2AF) , 90 g (-113g (0.8-1.0 g/kg bw)) Protein Carbohydrate: At Least 130 g/day  Fluids: 1800 mL/day Pt expected to meet estimated nutrient needs: [x]Yes []No 
 
NUTRITION DIAGNOSES:  
Problem:  No nutritional diagnosis at this time Etiology: related to Signs/Symptoms: as evidenced by NUTRITION INTERVENTIONS: 
 Enteral/Parenteral Nutrition: Other GOAL:  
Patient will continue to tolerate TF @ goal with residual <250 mL next 3-5 days NUTRITION MONITORING AND EVALUATION Food/Nutrient Intake Outcomes: Enteral/parenteral nutrition intake Physical Signs/Symptoms Outcomes: Weight/weight change, Electrolyte and renal profile Previous Goal Met: 
 [x] Met              [] Progressing Towards Goal              [] Not Progressing Towards Goal  
Previous Recommendations: 
 [x] Implemented          [] Not Implemented          [] Not Applicable LEARNING NEEDS (Diet, Food/Nutrient-Drug Interaction):  
 [x] None Identified 
 [] Identified and Education Provided/Documented 
 [] Identified and Pt declined/was not appropriate Cultural, Buddhism, OR Ethnic Dietary Needs:  
 [x] None Identified 
 [] Identified and Addressed 
 
 [x] Interdisciplinary Care Plan Reviewed/Documented  
 [x] Discharge Planning: Continue current TF regimen 
 [] Participated in Interdisciplinary Rounds NUTRITION RISK:  
 [] High              [x] Moderate           []  Low  []  Minimal/Uncompromised Bonnie Moreno Pager [phone] Weekend Pager [phone]

## 2018-09-24 NOTE — PROGRESS NOTES
The [employer] denial was still upheld after the appeal.  Will discuss with the wife and pursue [facility]. I sent updates to [facility](daptomycin may be an issure) who will need to obtain auth. I've asked them if they can accept, can we aim for 11a Tuesday. Will need to discuss with wife. Bettye Ganser from [facility] spoke with wife and updated her on the above. Wife is \"devastated\" and now prefers [facility] over [facility]. Chart cclink'd to them. 1230  D/C plans discussed with pt, wife, staff, [facility], and MD.  We are waiting to see if SNF can get a \"carve out\" for the cost of dapto which is doubtful; 2nd choice would be d/c home with HCP and [facility] for the duration of dapto followed by SNF; 3rd choice would be to remain here until dapto has been completed. Wife doubts she can arrange enough care at home. She will think about it tonight and talk with her support systems. She is familiar with [facility] and would like them. We said we would regroup tomorrow with the info we find out. Mgr Zulema is aware of the above-the SNF will not accept a contract from the hospital per [employer]). Wife states the kids work. He does get Personal Care thru [facility] thru [facility] encouraged her to call them today to assess pt for additional hours.

## 2018-09-24 NOTE — PROGRESS NOTES
Responded to call from nursing staff to provide support to patient's wife following discouraging news about discharge. At time of visit patient's wife was gone and patient indicated she would not be returning today. Attempted to engage with patient regarding his extended hospitalization. Patient shared some of his frustrations and discouragement but did not openly engage at this time. Advised patient of  availability and assured of ongoing support as needed and desired. Chaplain Sandip Lott M.Div.  Paging Service [phone])

## 2018-09-24 NOTE — PROGRESS NOTES
Revised 9/13/2018, Goals reviewed and revised PRN as per 9/24 weekly reevaluation. 1.  Patient will perform self-feeding with minimal assistance/contact guard assist within 7 day(s). Continue goal 
2. Patient will perform grooming with moderate assistance  within 7 day(s). Goal met, change to Min A. 
3.  Patient will perform upper body dressing with moderate assistance  within 7 day(s). Continue goal 
4. Pt will be able to sit on EOB with for 5 to 7 minutes in prep for ADLs, with in 7days. Goal met, Change to 10 minutes. Occupational Therapy TREATMENT: WEEKLY REASSESSMENT Patient: Julianna Dodd (83 y.o. male) Date: 9/24/2018 Diagnosis: Acute blood loss anemia GI bleed Anasarca GI Bleed 
gi bleed ASCENDING COLON CANCER  
aspiration ASPIRATION PNEUMONIA GI bleed Procedure(s) (LRB): ESOPHAGOGASTRODUODENOSCOPY (EGD) PERCUTANEOUS ENDOSCOPIC GASTROSTOMY TUBE INSERTION (N/A) 19 Days Post-Op Precautions:   
Chart, occupational therapy assessment, plan of care, and goals were reviewed. ASSESSMENT: 
Patient with extremely slow progress with therapy 2/2 his complicated medical course. He is receptive to participation in therapy, but requires coaxing. He remains greatly limited in his ability to perform ADLs and functional mobility 2/2 continued GW, h/o L hemiparesis, decreased balance and poor activity tolerance. Overall he was min A of 2 to max A of 2 for bed mobility,  demonstrated poor sitting balance and was mod A for seated grooming. At this point he remains well below his baseline of min A overall for ADLs and functional mobility performed from a  level, and will need SNF rehab to maximize his functional independence after discharge. Progression toward goals: 
[]            Improving appropriately and progressing toward goals [x]            Improving slowly and progressing toward goals []            Not making progress toward goals and plan of care will be adjusted PLAN: 
 Goals have been updated based on progression since last assessment. Patient continues to benefit from skilled intervention to address the above impairments. Continue to follow patient 3 times a week to address goals. Planned Interventions: 
[x]                    Self Care Training                  []             Therapeutic Activities [x]                    Functional Mobility Training    [x]             Cognitive Retraining 
[x]                    Therapeutic Exercises           [x]             Endurance Activities [x]                    Balance Training                   [x]             Neuromuscular Re-Education [x]                   Perceptual Training     [x]        Home Safety Training 
[x]                    Patient Education                 [x]             Family Training/Education []                    Other (comment): 
Discharge Recommendations: Loki Davies Further Equipment Recommendations for Discharge: TBD OBJECTIVE DATA SUMMARY:  
Cognitive/Behavioral Status: 
Neurologic State: Alert Orientation Level: Oriented X4 Cognition: Follows commands Perception: Cues to maintain midline in sitting; Tactile;Verbal;Visual (LOB posteriorly and R) Functional Mobility and Transfers for ADLs: 
Bed Mobility: 
Rolling: Minimum assistance;Assist x2 Supine to Sit: Maximum assistance;Assist x2 Sit to Supine: Moderate assistance;Assist x2 Scooting: Maximum assistance Balance: 
Sitting: Impaired Sitting - Static: Fair (occasional) (poor initially demonstrating R lateral and posterior lean) Sitting - Dynamic: Poor (constant support) Tactile, verbal and visual cueing provided to assist patient in correct LOB/lean preference to midline. ADL Intervention: 
Grooming Brushing/Combing Hair: Moderate assistance (seated EOB) Cues: Tactile cues provided;Verbal cues provided;Visual cues provided Barthel Index: 
 
Bathin Bladder: 0 Bowels: 0 Groomin Dressin Feedin Mobility: 0 Stairs: 0 Toilet Use: 0 Transfer (Bed to Chair and Back): 5 Total: 5 Barthel and G-code impairment scale: 
Percentage of impairment CH 
0% CI 
1-19% CJ 
20-39% CK 
40-59% CL 
60-79% CM 
80-99% CN 
100% Barthel Score 0-100 100 99-80 79-60 59-40 20-39 1-19 
 0 Barthel Score 0-20 20 17-19 13-16 9-12 5-8 1-4 0 The Barthel ADL Index: Guidelines 1. The index should be used as a record of what a patient does, not as a record of what a patient could do. 2. The main aim is to establish degree of independence from any help, physical or verbal, however minor and for whatever reason. 3. The need for supervision renders the patient not independent. 4. A patient's performance should be established using the best available evidence. Asking the patient, friends/relatives and nurses are the usual sources, but direct observation and common sense are also important. However direct testing is not needed. 5. Usually the patient's performance over the preceding 24-48 hours is important, but occasionally longer periods will be relevant. 6. Middle categories imply that the patient supplies over 50 per cent of the effort. 7. Use of aids to be independent is allowed. Alfredo Vigil., Barthel, D.W. (0518). Functional evaluation: the Barthel Index. [facility] (14)2. Trevor Berry, ELIAS, Marleni Horne., Humphrey Fleming., [place], [address] [place] [address] (). Measuring the change indisability after inpatient rehabilitation; comparison of the responsiveness of the Barthel Index and Functional [place] Measure. Journal of Neurology, Neurosurgery, and Psychiatry, 66(4), 343-161. Izabela Kumar, N.J.A, MAXWELL Bains.KIRILL, & Elvin Fernandez MALEX. (2004.) Assessment of post-stroke quality of life in cost-effectiveness studies: The usefulness of the Barthel Index and the EuroQoL-5D. [facility], 13, 466-61 Pain: 
Pain Scale 1: Numeric (0 - 10) Pain Intensity 1: 0 
  
  
  
  
 Activity Tolerance:  
Poor overall only tolerating 8 minutes seated EOB before requesting to return to supine. Please refer to the flowsheet for vital signs taken during this treatment. After treatment:  
[] Patient left in no apparent distress sitting up in chair 
[x] Patient left in no apparent distress in bed, with HOB 30 degrees. [x] Call bell left within reach [x] Nursing notified 
[] Caregiver present 
[] Bed alarm activated COMMUNICATION/COLLABORATION:  
The patients plan of care was discussed with: Physical Therapist 
 
CANDACE Melendez/L Time Calculation: 28 mins

## 2018-09-24 NOTE — PROGRESS NOTES
Bedside and Verbal shift change report given to Ana Laura Sharp 70 (oncoming nurse) by Melissa Ponce (offgoing nurse). Report included the following information SBAR, Kardex, Intake/Output, MAR and Recent Results.

## 2018-09-24 NOTE — PROGRESS NOTES
Bedside shift change report given to [facility] (oncoming nurse) by [facility] (offgoing nurse). Report included the following information SBAR, Kardex, Intake/Output, MAR, Recent Results and Cardiac Rhythm ,. Wound care completed per order. Awaiting discharge disposition.

## 2018-09-24 NOTE — PROGRESS NOTES
Infectious Disease Progress Note IMPRESSION:  
· Bacteremia again with BC 1/2 + for Coagulase negative Staph- Staph Capitis Oxacillin R ( 9/5) · Repeat [facility] 9/7 no growth · S/p Cellulitis LUE · Repeat CT chest ( 9/7) shows non occlusive thrombus in R/ IJ vein ,small amount of thrombus along right PICC catheter/ pacer leads in SVC which extends to right atrium · S/p peg placement · S/p removal of L/IJ placement of PICC RUE , TPN on 8/31 · S/p persistent bacteremia with coagulase negative Staph since 8/11 initially oxacillin sensitive , last positive BC is oxacillin resistant 8/21(1/4) · Repeat BC 8/26, 8/29 , 9/3 no growth ·  S/p failed MBS · NATASHA shows definite large mass associated with pacing wire in RA which may represent vegetation ( 8/23) · Thrombus  & tiny gas bubble of as in RIJ extending into SVC to level of RA (8/15)  s/p heparin IV now on lovenox s/q · CTA chest - no PE, could not visualize SVC thrombus, left lateral wall soft tissue swelling, soft tissue around pacemaker show no significant abnormality · US chest wall - minimal fluid in pacemaker pocket, no significant inflammation of overlying subcutaneous fat or skin · Acute respiratory failure s/p  Ventilator / h/o tracheostomy 24 years ago  , s/p extubation 8/26 · S/p R/ pleural effusion s/p chest tube placement · 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    
  
  
PLAN:  
   
· Continue Daptomycin x 6 weeks , Gentamicin IV x 4 weeks from last negative cultures. End date for Daptomycin is Oct 19,for Gentamicin is Oct 5. 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 · Weekly CBC, CMP, CK , gentamicin trough & peak q weekly , MD at [facility] to be notified about f/u on labs /also send reports to my office at [phone]. D/w Dr Keila Moore. · Thrombus in R/IJ,   mass in RA which is suggestive of thrombus rather than vegetation in light of pacemaker pocket appearing clear of infection. · Decision made to hold off on removal of pacemaker at this time . D/w Dr Tahir Zamudio , .  . · Continue antimicrobials & anticoagulation ·  Pt' s wife advised to call if recurrence of fever after completion of therapy CT chest -  IMPRESSION: 
 1. There is a small amount of nonocclusive thrombus in the right internal 
jugular vein. 
 There is also a small amount of thrombus along the right PICC catheter/pacer 
leads in the SVC which extends to the right atrium. 
 There is improvement in the bibasilar atelectasis/effusions Blood culture - Special Requests: NO SPECIAL REQUESTS   Preliminary Culture result: NO GROWTH 2 DAYS Blood culture - Culture result:    Final  
STAPHYLOCOCCUS CAPITIS SUBSPECIES UREOLYTICUS (OXACILLIN RESISTANT) , ISOLATED FROM 1 OF 2 BOTTLES DRAWN. .. LEFT HAND SITE (A) Subjective:  
 
Pt seen. Awake, talking. Wife at bedside. Review of Systems:  Pt denies complaints. 10 point ROS obtained Objective:  
 
Blood pressure 146/80, pulse 76, temperature 97.4 °F (36.3 °C), resp. rate 16, height 6' 2\" (1.88 m), weight 249 lb (112.9 kg), SpO2 98 %. Temp (24hrs), Av.7 °F (36.5 °C), Min:97.4 °F (36.3 °C), Max:98 °F (36.7 °C) Patient Vitals for the past 24 hrs: 
 Temp Pulse Resp BP SpO2  
18 1100 97.4 °F (36.3 °C) 76 16 146/80 98 %  
18 0750 97.4 °F (36.3 °C) 75 18 127/71 99 % 18 0200 97.7 °F (36.5 °C) 75 18 136/74 95 % 18 2238 97.7 °F (36.5 °C) 77 16 138/69 95 % 18 1817 98 °F (36.7 °C) 75 16 122/45 98 %  
18 1623 97.8 °F (36.6 °C) 77 15 131/73 98 % Lines:  picc Physical Exam:  
General:   awake, talking Lungs:    Reduced air entry bases on  auscultation  chest wall-  pacemaker site -no swelling, erythemal  
CV:  Regular rate and rhythm,no murmur, Abdomen:   Soft, non-tender. bowel sounds +distended Extremities: LUE - erythema distal forearm is less Lines/Devices:  Intact, no erythema, drainage or tenderness Data Review: CBC:  
No results for input(s): WBC, RBC, HGB, HCT, PLT, GRANS, LYMPH, EOS, HGBEXT, HCTEXT, PLTEXT in the last 72 hours. CMP:  
Recent Labs  
   09/24/18 
 0144  09/23/18 
 0350 GLU  130*  143* NA  133*  133* K  4.0  4.1 CL  97  99 CO2  28  26 BUN  29*  26* CREA  0.92  0.90  
CA  8.8  8.3* AGAP  8  8 BUCR  32*  29* Studies:     
Lab Results Component Value Date/Time Culture result: NO GROWTH 6 DAYS 09/08/2018 01:46 AM  
 Culture result: (A) 09/05/2018 07:03 PM  
  STAPHYLOCOCCUS CAPITIS SUBSPECIES UREOLYTICUS (OXACILLIN RESISTANT) , ISOLATED FROM 1 OF 2 BOTTLES DRAWN. .. LEFT HAND SITE Culture result: (A) 09/05/2018 07:03 PM  
  PRELIMINARY REPORT OF GRAM POSITIVE COCCI IN CLUSTERS GROWING IN 1 OF 2 BOTTLES DRAWN CALLED TO AND READ BACK BY FELIX CAMPOS RN [id number] AT 2106 ON 9/6/2018. [place] [address] Culture result:  09/05/2018 07:03 PM  
  [facility] REQUESTING IDENTIFICATION AND SENSITIVITIES 9/10/18 TA. Culture result: REMAINING BOTTLE(S) HAS/HAVE NO GROWTH IN 5 DAYS 09/05/2018 07:03 PM  
  
 
XR Results (most recent): 
 
Results from Hospital Encounter encounter on 07/10/18 XR ABD PORT  1 V Narrative EXAM: KUB INDICATION: abd distension. 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 Portable supine KUB obtained at 0729 hours shows no bowel distention. There are 
vascular calcifications. Impression IMPRESSION: Normal bowel gas pattern. Patient Active Problem List  
Diagnosis Code  
 HTN (hypertension) I10  
 Depression F32.9  Hypercholesterolemia E78.00  Acid reflux K21.9  Seizure ([facility] R56.9  Hypothyroidism E03.9  Hyponatremia E87.1  BPH (benign prostatic hyperplasia) N40.0  Rash R21  
 Cellulitis L03.90  Wax in ear H61.20  Ulcer [id number]  Small bowel obstruction ([facility] C65.348  Atrial flutter (HCC) I48.92  
 Atrial fibrillation or flutter  CHI (closed head injury) S09. 90XA  Basal cell cancer C44.91  
 TBI (traumatic brain injury) ([email or web address] S06. 9X9A  Atrial fibrillation (HCC) I48.91  
 Cataract H26.9  Constipation K59.00  Ankle fracture, left H95.954O  Insomnia G47.00  Tinea corporis B35.4  SSS (sick sinus syndrome) ([facility]) I49.5  Syncope R55  Seizure disorder ([email or web address] G40.909  RONAL on CPAP G47.33, Z99.89  
 Pacemaker Z95.0  Pre-op evaluation Z01.818  Chronic back pain greater than 3 months duration M54.9, G89.29  
 Cerebral infarction ([facility]) I63.9  Acute bronchitis J20.9  Screening for colon cancer Z12.11  
 Chronic right shoulder pain M25.511, G89.29  
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  
 Acute blood loss anemia D62  Anasarca R60.1  GI bleed K92.2  Acute encephalopathy G93.40  Idiopathic hypotension I95.0  Counseling regarding goals of care Z71.89 ICD-10-CM ICD-9-CM 1. Anemia, unspecified type D64.9 285.9 2. Generalized weakness R53.1 780.79   
3. Acute encephalopathy G93.40 348.30   
4. Anasarca R60.1 782.3 5. Idiopathic hypotension I95.0 458.9 6. Counseling regarding goals of care Z71.89 V65.49   
7. 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 9X9S 907.0 8. Chronic atrial fibrillation (HCC) I48.2 427.31   
9. Malignant neoplasm of ascending colon (HCC) C18.2 153.6 Anti-infectives:  
  
 Daptomycin IV - 8/26- 9/3 Gentamicin IV 8/26 S/p levaquin 8/29-9/7 Cefotetan 7/17 Zosyn 7/20-8/7 Cefepime - 8/11-8/13 Ceftriaxone 8/13-8/17 Fluconazole IV- 9/3- 9/6 IV , 9/6-9/10-po Vancomycin 8/11-8/22 Daptomycin 8/23- 8/24- Naficillin IV 8/24- 8/26  Maria Fernanda Hernandez MD FACP

## 2018-09-24 NOTE — PROGRESS NOTES
Problem: Dysphagia (Adult) Goal: *Acute Goals and Plan of Care (Insert Text) Speech path reeval, continue goals 9/20/2018 1. Pt will complete simple oropharyngeal strengthening exercises with max cues. Continue for 7 days 9/20/2018 2. Added 9/24/2018 patient will participate in repeat MBS within 7 days Speech path goals Initiated 9/6/18 1. Patient will follow simple oropharyngeal strengthening exercises with max cues within 7 days Initiated 8/30/2018 1. Patient will participate in [facility] tomorrow. Completed. 2. Pt will participate with oral and pharyngeal swallowing exercises to improve swallowing function. 1. Pt will tolerate purees and nectar thick liquids with no overt s/s of aspiration. Discontinue Speech pathology goals Initiated 8/3/2018 1. Patient will tolerate sips and chips with no overt s/s aspiration within 7 days. Goal met 8/10. 
2. Patient will participate in re-evaluation of swallow function within 7 days. Goal met 8/10. 3. Pt will participate with MBS today 8/10. Goal met 8/10. Speech language pathology dysphagia treatment Patient: Dat Hines (89 y.o. male) Date: 9/24/2018 Diagnosis: Acute blood loss anemia GI bleed Anasarca GI Bleed 
gi bleed ASCENDING COLON CANCER  
aspiration ASPIRATION PNEUMONIA GI bleed Procedure(s) (LRB): ESOPHAGOGASTRODUODENOSCOPY (EGD) PERCUTANEOUS ENDOSCOPIC GASTROSTOMY TUBE INSERTION (N/A) 19 Days Post-Op Precautions:    
 
ASSESSMENT: 
Patient complaining of xerostomia an was eager for ice chip trials. Patient participated in effortful swallows utilizing ice chips and puree today. He continues with mildly prolonged bolus manipulation but complete oral clearance today. Trialed 3 small sips of thin via medicine cup and no overt s/s aspiration. Certainly cannot r/o silent penetration or aspiration.  
 
This patient is well known to this discipline but new to this particular therapist. Reviewed his MBS from 8/31. At that time, patient was largely limited by oral dysphagia. While overall participation has been poor in tx sessions, would consider repeat MBS as improved swallow function at this point and ability to safely consume and PO may assist with d/c disposition. Progression toward goals: 
[]         Improving appropriately and progressing toward goals [x]         Improving slowly and progressing toward goals 
[]         Not making progress toward goals and plan of care will be adjusted PLAN: 
Recommendations and Planned Interventions: 
-- continue NPO. OK for ice chips for comfort if patient requesting 
-- consider repeating mbs for objective assessment and improvement in swallow function may aid in d/c disposition Patient continues to benefit from skilled intervention to address the above impairments. Continue treatment per established plan of care. Discharge Recommendations: To Be Determined SUBJECTIVE:  
Patient stated I would really like some ice chips. OBJECTIVE:  
Cognitive and Communication Status: 
Neurologic State: Alert Orientation Level: Oriented X4 (periods of confusion) Cognition: Follows commands Perception: Cues to maintain midline in sitting Perseveration: Perseverates during conversation Safety/Judgement: Awareness of environment, Fall prevention Dysphagia Treatment: 
 
  
  
Exercises: 
Laryngeal Exercises: 
  
  
Effortful Swallow: Yes Sets : 3 Reps : 10 Pain: 
Pain Scale 1: Numeric (0 - 10) Pain Intensity 1: 0 After treatment:  
[]              Patient left in no apparent distress sitting up in chair 
[x]              Patient left in no apparent distress in bed 
[x]              Call bell left within reach [x]              Nursing notified 
[]              Caregiver present 
[]              Bed alarm activated COMMUNICATION/EDUCATION:  
 
The patients plan of care including recommendations, planned interventions, and recommended diet changes were discussed with: Registered Nurse. []              Posted safety precautions in patient's room. DIPAK [place] Time Calculation: 17 mins

## 2018-09-24 NOTE — PROGRESS NOTES
Physical Therapy Goals Reviewed 9/20/2018: goals remain appropriate Reviewed 9/13/18- goals remain appropriate Goals reviewed 9/6/18- goals remain appropriate Goals reviewed and revised 8/29/18 1. Patient will move from supine to sit and sit to supine , scoot up and down and roll side to side in bed with moderate assistance within 7 day(s). 2.  Patient will transfer from bed to chair and chair to bed with max assistance x 2 using the least restrictive device within 7 day(s). 3.  Patient will perform sit to stand with moderate assistance x 2 within 7 day(s). 4.  Patient will perform stand pivot transfer to wheelchair with max assistance x 2 in 7 days. Reviewed 8/3/18- goals remain appropriate Reviewed and revised 7/26/2018 1. Patient will move from supine to sit and sit to supine , scoot up and down and roll side to side in bed with minimal assistance within 7 day(s). 2.  Patient will transfer from bed to chair and chair to bed with moderate assistance using the least restrictive device within 7 day(s). 3.  Patient will perform sit to stand with moderate assistance within 7 day(s). 4.  Patient will perform stand pivot transfer to wheelchair with moderate assistance in 7 days. Reviewed and revised 7/19/2018 1. Patient will move from supine to sit and sit to supine , scoot up and down and roll side to side in bed with minimal assistance within 7 day(s). 2.  Patient will transfer from bed to chair and chair to bed with moderate assistance using the least restrictive device within 7 day(s). 3.  Patient will perform sit to stand with minimal assistance within 7 day(s). 4.  Patient will perform stand pivot transfer to wheelchair with minimal assistance in 7 days. Initiated 7/12/2018 1. Patient will move from supine to sit and sit to supine , scoot up and down and roll side to side in bed with modified independence within 7 day(s). 2.  Patient will transfer from bed to chair and chair to bed with supervision/set-up using the least restrictive device within 7 day(s). 3.  Patient will perform sit to stand with supervision/set-up within 7 day(s). 4.  Patient will perform stand pivot transfer to wheelchair with modified independence in 7 days. physical Therapy TREATMENT Patient: Channing Winkler (8 y.o. male) Date: 9/24/2018 Diagnosis: Acute blood loss anemia GI bleed Anasarca GI Bleed 
gi bleed ASCENDING COLON CANCER  
aspiration ASPIRATION PNEUMONIA GI bleed Procedure(s) (LRB): ESOPHAGOGASTRODUODENOSCOPY (EGD) PERCUTANEOUS ENDOSCOPIC GASTROSTOMY TUBE INSERTION (N/A) 19 Days Post-Op Precautions:   falls Chart, physical therapy assessment, plan of care and goals were reviewed. ASSESSMENT: Patient alert and interactive with therapists today. Minimal encouragement to participate in therapy. Patient requiring min A of 2 for rolling and max A of 2 to transition to sitting EOB. Patient able to sit EOB for approximately 8 minutes before requesting to return to supine. Patient able to perform reaching exercises with min A to maintain balance. Patient with h/o CVA and wears L AFO. L ankle noted to demonstrate tightness and unable to achieve neutral foot position. Recommend daily stretching of L ankle and once neutral ankle is achieved patient would benefit from wearing L AFO to maintain ankle position with frequent skin checks to prevent skin breakdown. Continue to recommend SNF following discharge. Progression toward goals: 
[]    Improving appropriately and progressing toward goals [x]    Improving slowly and progressing toward goals 
[]    Not making progress toward goals and plan of care will be adjusted PLAN: 
Patient continues to benefit from skilled intervention to address the above impairments. Continue treatment per established plan of care. Discharge Recommendations:  Loki Davies Further Equipment Recommendations for Discharge:  TBD by SNF; likely has appropriate equipment at home SUBJECTIVE:  
Patient stated I'm so tired OBJECTIVE DATA SUMMARY:  
Critical Behavior: 
Neurologic State: Alert Orientation Level: Oriented X4 (periods of confusion) Cognition: Follows commands Safety/Judgement: Awareness of environment, Fall prevention Functional Mobility Training: 
Bed Mobility: 
Rolling: Minimum assistance;Assist x2 Supine to Sit: Maximum assistance;Assist x2 Sit to Supine: Moderate assistance;Assist x2 Scooting: Maximum assistance Transfers: 
  
 not able to safely assess today Balance: 
Sitting: Impaired Sitting - Static: Fair (occasional) (poor initially demonstrating R lateral and posterior lean) Sitting - Dynamic: Poor (constant support) Neuro Re-Education: 
Patient performing reaching exercises in sitting requiring CGA to min A to maintain balance; only able to shift weight minimally from midline; mild R lateral and posterior lean which pateint is able to correct with VC Pain: 
Pain Scale 1: Numeric (0 - 10) Pain Intensity 1: 0 Activity Tolerance: *HR=98 and O2 sats=98% on RA during activity- sitting EOB Please refer to the flowsheet for vital signs taken during this treatment. After treatment:  
[]    Patient left in no apparent distress sitting up in chair 
[x]    Patient left in no apparent distress in bed 
[x]    Call bell left within reach [x]    Nursing notified 
[]    Caregiver present 
[]    Bed alarm activated COMMUNICATION/COLLABORATION:  
The patients plan of care was discussed with: Occupational Therapist and Registered Nurse Fidel Freitas PT Time Calculation: 28 mins

## 2018-09-24 NOTE — PROGRESS NOTES
Hospitalist Progress Note NAME: Arabella Gaines :  1940 MRN:  [id number] Assessment / Plan   
Acute Blood Loss Anemia due to Lower GI Bleed POA 
> Colonoscopy showed a Large Colon Mass > S/P Colectomy Stage 3 B this hospitalization done  > Developed Ileus then aspiration PNA Was intubated (due to acute respiratory failure) and prolonged ICU stay Extubated  Pneumonia , last sputum culture  + for Heavy staph aureus , light K.pneumoniae Sputum culture  + heavy Burkholderia cepacia S/p treated with Levaquin Bilateral Pleural Effusions s/p R chest tube removed  RONAL CPAP Q HS 
  
Dysphagia, Failed MBS 
S/p TPN in hospital due to Dysphagia S/P Peg  Wed > Tolerating Tube feedings well Oral Candidiasis ? Infected Thrombus on NATASHA 
NATASHA shows definite large mass associated with pacing wire in RA which may represent vegetation ( ) PER  Infectious Disease Continue Daptomycin, Gentamicin IV, change Fluconazole ( 9/3). & levaquin to po Nystatin Swish & spit Complete total 6 weeks of daptomycin and gentamycin from the latest (-) blood Culture  which was done on 2018. So until  Has A RT ARM PICC LINE On Lovenox therapeutic Fluconazole IV added 9/3 Bacteremia S/p removal of L/IJ placement of PICC RUE , TPN on  Persistent bacteremia with coagulase negative Staph since  initially oxacillin sensitive , last positive BC is oxacillin resistant (1/) Repeat BC ,  , 9/3 no growth,  Positive Staph Coag Neg,  ( negative Chest CT repeated . 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 Hold off on removal of pacemaker at this time . D/w Dr Francesca Haskins , .  . 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 Maeve Oviedo discussed with Dr Bird Butler (patients Neurologist) Patient  lethargic on 500 mg BID  Keppra but both states Keep on Keppra 250 BID 
due to better Drug interactions and liver  with all his meds and less Liver issues on Severe Deconditioning. Palliative care was involved with discussion with family who decided everything to be done He needs to follow up with ID or Cardiology close to October 20 for Reimaging and Cultures or can be done in at [facility]. To determine what to do with Pacers Hypernatremia - much improved, now on free fluid via Peg 
   
Code Status: Full NOK POA  :  WIFE 
DVT Prophylaxis: on lovenox for the thrombus Disposition. Insurance denies Gareth Gonsalez, spoke to Dr Brianna Vigil from insurance as peer to peer on 9/20 but they continue to decline. We have appealed and today found out that has been declined as well. CM working on d.c planning. Tong Butler Declined Daptomycin. Wife can't take care of him at home with his deconditioning Subjective: Pt seen and examined at bedside. NAD. Tolerating tube feeding. Overnight events d/w RN Chief Complaint / Reason for Physician Visit : F/U AMS, dysphagia, Bacteremia, PNA, GI bleed, Colon Ca Review of Systems: 
Symptom Y/N Comments  Symptom Y/N Comments Fever/Chills n   Chest Pain n   
Poor Appetite n   Edema n   
Cough n   Abdominal Pain n   
Sputum n   Joint Pain n   
SOB/BECERRIL    Pruritis/Rash Nausea/vomit    Tolerating PT/OT y Diarrhea    Tolerating Diet y PEG tube feeding at goal rate Constipation    Other Could NOT obtain due to:   
 
Objective: VITALS:  
 Last 24hrs VS reviewed since prior progress note. Most recent are: 
Patient Vitals for the past 24 hrs: 
 Temp Pulse Resp BP SpO2  
09/24/18 1659 98.7 °F (37.1 °C) 78 20 152/82 96 %  
09/24/18 1100 97.4 °F (36.3 °C) 76 16 146/80 98 %  
09/24/18 0750 97.4 °F (36.3 °C) 75 18 127/71 99 % 09/24/18 0200 97.7 °F (36.5 °C) 75 18 136/74 95 % 09/23/18 2238 97.7 °F (36.5 °C) 77 16 138/69 95 % 09/23/18 1817 98 °F (36.7 °C) 75 16 122/45 98 % Intake/Output Summary (Last 24 hours) at 09/24/18 1801 Last data filed at 09/24/18 1734 Gross per 24 hour Intake             1515 ml Output              450 ml Net             1065 ml PHYSICAL EXAM: 
General: WD, WN. Pleasant, Awake, NAD and Ox3 cooperative, no acute distress   
EENT:  EOMI. Anicteric sclerae. MMM Resp:  CTA bilaterally, no wheezing or rales. No accessory muscle use CV:  Regular  rhythm,  No edema GI:  Soft, Non distended, Non tender.  +Bowel sounds, PEG tube Noted +, Colostomy bag noted + Neurologic:  Alert and oriented X 3, normal speech, Psych:   Good insight. Not anxious nor agitated Skin:  No rashes. No jaundice, R PICC line noted Reviewed most current lab test results and cultures  YES Reviewed most current radiology test results   YES Review and summation of old records today    NO Reviewed patient's current orders and MAR    YES 
PMH/ reviewed - no change compared to H&P 
________________________________________________________________________ Care Plan discussed with: 
  Comments Patient x Family  x Wife over the phone RN x Care Manager x Consultant Multidiciplinary team rounds were held today with , nursing, pharmacist and clinical coordinator. Patient's plan of care was discussed; medications were reviewed and discharge planning was addressed. ________________________________________________________________________ Total NON critical care TIME: 10 Minutes Total CRITICAL CARE TIME Spent:   Minutes non procedure based Comments >50% of visit spent in counseling and coordination of care    
________________________________________________________________________ Radha Anderson MD  
 
Procedures: see electronic medical records for all procedures/Xrays and details which were not copied into this note but were reviewed prior to creation of Plan. LABS: 
I reviewed today's most current labs and imaging studies. Pertinent labs include: No results for input(s): WBC, HGB, HCT, PLT, HGBEXT, HCTEXT, PLTEXT, HGBEXT, HCTEXT, PLTEXT in the last 72 hours. Recent Labs  
   09/24/18 
 0144  09/23/18 
 0350 NA  133*  133* K  4.0  4.1 CL  97  99 CO2  28  26 GLU  130*  143* BUN  29*  26* CREA  0.92  0.90  
CA  8.8  8.3* Signed: Radha Anderson MD

## 2018-09-24 NOTE — PROGRESS NOTES
Pharmacy Automatic Renal Dosing Protocol - Antimicrobials Indication for Antimicrobials: Bacteremia; aspiration PNA Current Regimen of Each Antimicrobial: 
Daptomycin 1000 mg (9.2mg/kg) IV every 24 hours (Started 8/26/18; Day #28) Gentamicin 80 mg IV every 24 hours (Start Date: 8/26/18; Day #28) Previous Antimicrobial Therapy: 
Fluconazole 200 mg every day (9/3-9/9) Levofloxacin 500 mg q 24 hours (9/4 -9/11) Levofloxacin 750 mg IV every 24 hours (Start Date: 8/28/18; Day #7 of 7) Nafcillin 2 grams every 4 hours (Started 8/24/18; Stopped 8/26/18) Daptomycin 1000 mg IV every 24 hours (Started 8/23/18; Stopped 8/24/18) Vancomycin 1500 mg IV every 12 hours (Started 8/11/18; Stopped 8/23/18) Ceftriaxone 2 g IV every 24 hours (Started 8/13/18; Stopped 8/17/18) Cefepime 2 g IV every 8 hours (Started 8/11/18; Stopped 8/13/18) Vancomycin Fluconazole Piperacillin-tazobactam 
 
Goal Gentamicin Levels (Synergy Dosing): 
Peak: 3-5 mcg/mL Trough: < 1 mcg/mL Gentamicin Level Assessment: 
Date Dose & Interval Measured (mcg/mL) Extrapolated (mcg/mL)  
8/27/18 80 mg IV every 8 hours Peak = 4.4 Trough = 2 Peak = 4.4 Trough = 1.36   
8/30/18 90 mg IV every 12 hours Peak = 5.0 Trough = 2.2 Peak = 5.2 Trough = 2.01  
8/31/18 100 mg IV every 24 hours Peak = 5.7 Trough = 0.7 Peak = 6 Trough = 0.65  
9/1/18 80 mg IV every 24 hours Peak = 3.4 Trough = 0.5 Peak = 4.6 Trough = 0.58  
9/4/18 80mg IV q24h Peak = 3.3 Trough = 0.4 Peak = 3.3 Trough = 0.36  
9/9/18 80 mg q24h Peak = 4.0 Trough = 0.6    
9/18/18 80 mg q 24 hours  Peak 4 Trough 0.5 Peak 3.7 Trough 0.5  
9/21/18 80 mg [id number] Peak = 3.9 mcg/ml Trough = 0.7 mcg/ml Peak = 4.7 mcg/ml Trough = 0.55 mcg/ml Significant Cultures:  
9/8 blood: ng  x6 days - Final 
9/5: Blood cx: STAPHYLOCOCCUS CAPITIS SUBSPECIES UREOLYTICUS (OXACILLIN RESISTANT) - Final (MDR, but sensitive to daptomycin) 9/3 BCx for fungus = ng pending 18 Blood culture = No growth x 6 days - final 
18 Blood culture = No growth x 6 days - final 
18 Body fluid thoracentesis = No growth x 4 days (Final) 18 Blood culture = No growth (FINAL) 18 Sputum culture = Heavy Burkholderia cepacia (FINAL) 18 Blood culture = Staph epidermidis / bottles - oxacillin resistant (FINAL) 18 Blood culture = No growth (FINAL) 18 Blood culture = Coag neg staph in 1/ (FINAL) 18 Respiratory culture = Heavy MSSA; Light klebsiella (FINAL) 18 Blood culture = Coag neg staph in 2/2 (FINAL) 18 Blood culture = Coag neg staph in 2/ (FINAL) 18 Respiratory culture = No growth (FINAL) 18 Blood culture = No growth (FINAL) Labs: 
Recent Labs  
   18 
 0144  18 
 0350 CREA  0.92  0.90 BUN  29*  26* Temp (24hrs), Av.7 °F (36.5 °C), Min:97.4 °F (36.3 °C), Max:98 °F (36.7 °C) Creatinine Clearance (mL/min) or Dialysis: 78.2 mL/min Impression/Plan: · Discussed switching to vancomycin (JETT = 1) so that patient could be discharged to facility. Currently refusing admittance due to daptomycin cost. At this time ID elects to continue daptomycin due to concern of infected thrombi and concern that infection was not clearing with vancomycin · Perhaps [facility]? Pharmacy resident will discuss tomorrow. · Will continue current regimen as appropriate for indication and renal function. · First negative blood culture is 18 · CK  = 44 
· Note that dosing looks inappropriate for synergy (Q24H), but troughs were supratherapeutic on Q8H and Q12H regimens · BMP ordered daily · Continue current Daptomycin and Gentamicin for synergy x 6 weeks, through Oct 20 per ID (Although gentamicin currently ordered to end 2 weeks prior to daptomycin, will have to clarify with ID if patient is not discharged beforehand) Pharmacy will follow daily and adjust medications as appropriate for renal function and/or serum levels. Thank you, Lady Chandra, PHARMD

## 2018-09-25 LAB
ANION GAP SERPL CALC-SCNC: 8 MMOL/L (ref 5–15)
BASOPHILS # BLD: 0.1 K/UL (ref 0–0.1)
BASOPHILS NFR BLD: 1 % (ref 0–1)
BUN SERPL-MCNC: 30 MG/DL (ref 6–20)
BUN/CREAT SERPL: 31 (ref 12–20)
CALCIUM SERPL-MCNC: 9.2 MG/DL (ref 8.5–10.1)
CHLORIDE SERPL-SCNC: 96 MMOL/L (ref 97–108)
CO2 SERPL-SCNC: 28 MMOL/L (ref 21–32)
CREAT SERPL-MCNC: 0.98 MG/DL (ref 0.7–1.3)
DIFFERENTIAL METHOD BLD: ABNORMAL
EOSINOPHIL # BLD: 0.4 K/UL (ref 0–0.4)
EOSINOPHIL NFR BLD: 3 % (ref 0–7)
ERYTHROCYTE [DISTWIDTH] IN BLOOD BY AUTOMATED COUNT: 18.6 % (ref 11.5–14.5)
GLUCOSE BLD STRIP.AUTO-MCNC: 122 MG/DL (ref 65–100)
GLUCOSE BLD STRIP.AUTO-MCNC: 127 MG/DL (ref 65–100)
GLUCOSE BLD STRIP.AUTO-MCNC: 156 MG/DL (ref 65–100)
GLUCOSE BLD STRIP.AUTO-MCNC: 162 MG/DL (ref 65–100)
GLUCOSE SERPL-MCNC: 142 MG/DL (ref 65–100)
HCT VFR BLD AUTO: 29.5 % (ref 36.6–50.3)
HGB BLD-MCNC: 8.9 G/DL (ref 12.1–17)
IMM GRANULOCYTES # BLD: 0.1 K/UL (ref 0–0.04)
IMM GRANULOCYTES NFR BLD AUTO: 1 % (ref 0–0.5)
LYMPHOCYTES # BLD: 1.8 K/UL (ref 0.8–3.5)
LYMPHOCYTES NFR BLD: 13 % (ref 12–49)
MCH RBC QN AUTO: 26.3 PG (ref 26–34)
MCHC RBC AUTO-ENTMCNC: 30.2 G/DL (ref 30–36.5)
MCV RBC AUTO: 87.3 FL (ref 80–99)
MONOCYTES # BLD: 1.3 K/UL (ref 0–1)
MONOCYTES NFR BLD: 10 % (ref 5–13)
NEUTS SEG # BLD: 9.7 K/UL (ref 1.8–8)
NEUTS SEG NFR BLD: 73 % (ref 32–75)
NRBC # BLD: 0 K/UL (ref 0–0.01)
NRBC BLD-RTO: 0 PER 100 WBC
PLATELET # BLD AUTO: 259 K/UL (ref 150–400)
PMV BLD AUTO: 10.2 FL (ref 8.9–12.9)
POTASSIUM SERPL-SCNC: 4.1 MMOL/L (ref 3.5–5.1)
RBC # BLD AUTO: 3.38 M/UL (ref 4.1–5.7)
SERVICE CMNT-IMP: ABNORMAL
SODIUM SERPL-SCNC: 132 MMOL/L (ref 136–145)
WBC # BLD AUTO: 13.3 K/UL (ref 4.1–11.1)

## 2018-09-25 PROCEDURE — 74011250636 HC RX REV CODE- 250/636: Performed by: INTERNAL MEDICINE

## 2018-09-25 PROCEDURE — 74011636637 HC RX REV CODE- 636/637: Performed by: INTERNAL MEDICINE

## 2018-09-25 PROCEDURE — 74011250637 HC RX REV CODE- 250/637: Performed by: INTERNAL MEDICINE

## 2018-09-25 PROCEDURE — 74011000250 HC RX REV CODE- 250: Performed by: INTERNAL MEDICINE

## 2018-09-25 PROCEDURE — 92526 ORAL FUNCTION THERAPY: CPT

## 2018-09-25 PROCEDURE — 74011000258 HC RX REV CODE- 258: Performed by: INTERNAL MEDICINE

## 2018-09-25 PROCEDURE — 80048 BASIC METABOLIC PNL TOTAL CA: CPT | Performed by: INTERNAL MEDICINE

## 2018-09-25 PROCEDURE — 82962 GLUCOSE BLOOD TEST: CPT

## 2018-09-25 PROCEDURE — 65270000029 HC RM PRIVATE

## 2018-09-25 PROCEDURE — 36415 COLL VENOUS BLD VENIPUNCTURE: CPT | Performed by: INTERNAL MEDICINE

## 2018-09-25 PROCEDURE — 85025 COMPLETE CBC W/AUTO DIFF WBC: CPT | Performed by: INTERNAL MEDICINE

## 2018-09-25 PROCEDURE — 77030018798 HC PMP KT ENTRL FED COVD -A

## 2018-09-25 RX ADMIN — LEVETIRACETAM 250 MG: 500 SOLUTION ORAL at 20:23

## 2018-09-25 RX ADMIN — AMIODARONE HYDROCHLORIDE 200 MG: 200 TABLET ORAL at 08:54

## 2018-09-25 RX ADMIN — NYSTATIN 500000 UNITS: 100000 SUSPENSION ORAL at 14:01

## 2018-09-25 RX ADMIN — LEVETIRACETAM 250 MG: 500 SOLUTION ORAL at 08:57

## 2018-09-25 RX ADMIN — INSULIN LISPRO 3 UNITS: 100 INJECTION, SOLUTION INTRAVENOUS; SUBCUTANEOUS at 06:52

## 2018-09-25 RX ADMIN — INSULIN LISPRO 3 UNITS: 100 INJECTION, SOLUTION INTRAVENOUS; SUBCUTANEOUS at 12:05

## 2018-09-25 RX ADMIN — NYSTATIN 500000 UNITS: 100000 SUSPENSION ORAL at 22:00

## 2018-09-25 RX ADMIN — LACTULOSE 10 G: 20 SOLUTION ORAL at 17:29

## 2018-09-25 RX ADMIN — VENLAFAXINE 37.5 MG: 37.5 TABLET ORAL at 09:10

## 2018-09-25 RX ADMIN — FUROSEMIDE 60 MG: 40 TABLET ORAL at 08:56

## 2018-09-25 RX ADMIN — VENLAFAXINE 37.5 MG: 37.5 TABLET ORAL at 17:29

## 2018-09-25 RX ADMIN — NYSTATIN 500000 UNITS: 100000 SUSPENSION ORAL at 08:57

## 2018-09-25 RX ADMIN — Medication 10 ML: at 06:46

## 2018-09-25 RX ADMIN — Medication 10 ML: at 13:41

## 2018-09-25 RX ADMIN — DAPTOMYCIN 1000 MG: 500 INJECTION, POWDER, LYOPHILIZED, FOR SOLUTION INTRAVENOUS at 17:26

## 2018-09-25 RX ADMIN — GENTAMICIN SULFATE 80 MG: 40 INJECTION, SOLUTION INTRAMUSCULAR; INTRAVENOUS at 04:31

## 2018-09-25 RX ADMIN — APIXABAN 5 MG: 5 TABLET, FILM COATED ORAL at 20:23

## 2018-09-25 RX ADMIN — Medication 10 ML: at 21:57

## 2018-09-25 RX ADMIN — Medication 20 ML: at 13:41

## 2018-09-25 RX ADMIN — LACTULOSE 10 G: 20 SOLUTION ORAL at 08:57

## 2018-09-25 RX ADMIN — NYSTATIN 500000 UNITS: 100000 SUSPENSION ORAL at 17:30

## 2018-09-25 RX ADMIN — POLYETHYLENE GLYCOL 3350 17 G: 17 POWDER, FOR SOLUTION ORAL at 08:58

## 2018-09-25 RX ADMIN — APIXABAN 5 MG: 5 TABLET, FILM COATED ORAL at 08:55

## 2018-09-25 NOTE — PROGRESS NOTES
Problem: Dysphagia (Adult) Goal: *Acute Goals and Plan of Care (Insert Text) Speech path reeval, continue goals 9/20/2018 1. Pt will complete simple oropharyngeal strengthening exercises with max cues. Continue for 7 days 9/20/2018 2. Added 9/24/2018 patient will participate in repeat MBS within 7 days Speech path goals Initiated 9/6/18 1. Patient will follow simple oropharyngeal strengthening exercises with max cues within 7 days Initiated 8/30/2018 1. Patient will participate in [facility] tomorrow. Completed. 2. Pt will participate with oral and pharyngeal swallowing exercises to improve swallowing function. 1. Pt will tolerate purees and nectar thick liquids with no overt s/s of aspiration. Discontinue Speech pathology goals Initiated 8/3/2018 1. Patient will tolerate sips and chips with no overt s/s aspiration within 7 days. Goal met 8/10. 
2. Patient will participate in re-evaluation of swallow function within 7 days. Goal met 8/10. 3. Pt will participate with MBS today 8/10. Goal met 8/10. Speech language pathology dysphagia treatment Patient: Arabella Gaines (67 y.o. male) Date: 9/25/2018 Diagnosis: Acute blood loss anemia GI bleed Anasarca GI Bleed 
gi bleed ASCENDING COLON CANCER  
aspiration ASPIRATION PNEUMONIA GI bleed Procedure(s) (LRB): ESOPHAGOGASTRODUODENOSCOPY (EGD) PERCUTANEOUS ENDOSCOPIC GASTROSTOMY TUBE INSERTION (N/A) 20 Days Post-Op Precautions:    
 
ASSESSMENT: 
He is accepting thin liquids with no coughing or choking and no significant oropharyngeal dysphagia. With ice chips he does not propel them posteriorly and they fall off of his tongue after the swallow. Poor propulsion of the ice has been ongoing for several weeks with no improvement. He is OX2 plus year but not month. Endurance for po better than when last seen. Could repeat MBS soon. Progression toward goals: 
[]         Improving appropriately and progressing toward goals [x]         Improving slowly and progressing toward goals 
[]         Not making progress toward goals and plan of care will be adjusted PLAN: 
Recommendations and Planned Interventions: 
Recommend NPO and repeat MBS soon. Patient continues to benefit from skilled intervention to address the above impairments. Continue treatment per established plan of care. Discharge Recommendations:  None SUBJECTIVE:  
Patient stated he wanted to take a napcat and then corrected to catnap. OBJECTIVE:  
Cognitive and Communication Status: 
Neurologic State: Alert Orientation Level: Oriented to person, Oriented to place (oriented to year only) Cognition: Follows commands Perception: Appears intact Perseveration: No perseveration noted Safety/Judgement: Awareness of environment, Fall prevention Dysphagia Treatment: 
Oral Assessment: P.O. Trials: 
  
Vocal quality prior to P.O.: No impairment Consistency Presented: Thin liquid; Ice chips How Presented: Self-fed/presented;Straw;SLP-fed/presented;Spoon Bolus Acceptance: No impairment Bolus Formation/Control: Impaired Type of Impairment: Delayed; Incomplete;Posterior Propulsion: Delayed (# of seconds); Discoordination Oral Residue: Greater than 50% of bolus (with ice chip) Effective Modifications: None Oral Phase Severity: Moderate Pharyngeal Phase Severity : Moderate Pain: 
Pain Scale 1: Numeric (0 - 10) Pain Intensity 1: 0 After treatment:  
[]              Patient left in no apparent distress sitting up in chair 
[x]              Patient left in no apparent distress in bed 
[x]              Call bell left within reach [x]              Nursing notified 
[]              Caregiver present 
[]              Bed alarm activated COMMUNICATION/EDUCATION:  
Educated the pt that he is still not swallowing the ice chip.  
 
The patients plan of care including recommendations, planned interventions, and recommended diet changes were discussed with: Registered Nurse. []              Posted safety precautions in patient's room. DIPAK Morales Time Calculation: 20 mins

## 2018-09-25 NOTE — PROGRESS NOTES
Bedside shift change report given to Blaine Aragon (oncoming nurse) by Freda Eric (offgoing nurse). Report included the following information SBAR, Kardex, Intake/Output, MAR, Recent Results and Cardiac Rhythm . Wound care completed per order. Colostomy bag changed today.

## 2018-09-25 NOTE — PROGRESS NOTES
Hospitalist Progress Note NAME: Amparo Elizabeth :  1940 MRN:  [id number] Assessment / Plan   
Acute Blood Loss Anemia due to Lower GI Bleed POA 
> Colonoscopy showed a Large Colon Mass > S/P Colectomy Stage 3 B this hospitalization done  > Developed Ileus then aspiration PNA Was intubated (due to acute respiratory failure) and prolonged ICU stay Extubated  Pneumonia , last sputum culture  + for Heavy staph aureus , light K.pneumoniae Sputum culture  + heavy Burkholderia cepacia S/p treated with Levaquin Bilateral Pleural Effusions s/p R chest tube removed  RONAL CPAP Q HS 
  
Dysphagia, Failed MBS 
S/p TPN in hospital due to Dysphagia S/P Peg  Wed > Tolerating Tube feedings well Oral Candidiasis ? Infected Thrombus on NATASHA 
NATASHA shows definite large mass associated with pacing wire in RA which may represent vegetation ( ) PER  Infectious Disease Continue Daptomycin, Gentamicin IV, change Fluconazole ( 9/3). & levaquin to po Nystatin Swish & spit Complete total 6 weeks of daptomycin and gentamycin from the latest (-) blood Culture  which was done on 2018. So until  Has A RT ARM PICC LINE On Lovenox therapeutic Fluconazole IV added 9/3 Bacteremia S/p removal of L/IJ placement of PICC RUE , TPN on  Persistent bacteremia with coagulase negative Staph since  initially oxacillin sensitive , last positive BC is oxacillin resistant (1/) Repeat BC ,  , 9/3 no growth,  Positive Staph Coag Neg,  ( negative Chest CT repeated . 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 Hold off on removal of pacemaker at this time . D/w Dr Fredis Addison , .  . 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 Ilene Joel discussed with Dr Terese Lagos (patients Neurologist) Patient  lethargic on 500 mg BID  Keppra but both states Keep on Keppra 250 BID 
due to better Drug interactions and liver  with all his meds and less Liver issues on Severe Deconditioning. Palliative care was involved with discussion with family who decided everything to be done He needs to follow up with ID or Cardiology close to October 20 for Reimaging and Cultures or can be done in at [facility]. To determine what to do with Pacers Hypernatremia - much improved, now on free fluid via Peg 
   
Code Status: Full NOK POA  :  WIFE 
DVT Prophylaxis: on lovenox for the thrombus Disposition. Insurance denies [facility], Dr Mary Burrell spoke to Dr Rosina Rowell from insurance as peer to peer on 9/20 but they continue to decline. We have appealed and today found out that has been declined as well. CM working on d.c planning. Sloanmichael Butler Declined Daptomycin. Wife can't take care of him at home with his deconditioning Subjective: Pt seen and examined at bedside. NAD. Tolerating tube feeding. Overnight events d/w RN Chief Complaint / Reason for Physician Visit : F/U AMS, dysphagia, Bacteremia, PNA, GI bleed, Colon Ca Review of Systems: 
Symptom Y/N Comments  Symptom Y/N Comments Fever/Chills n   Chest Pain n   
Poor Appetite n   Edema n   
Cough n   Abdominal Pain n   
Sputum n   Joint Pain n   
SOB/BECERRIL    Pruritis/Rash Nausea/vomit    Tolerating PT/OT y Diarrhea    Tolerating Diet y PEG tube feeding at goal rate Constipation    Other Could NOT obtain due to:   
 
Objective: VITALS:  
 Last 24hrs VS reviewed since prior progress note. Most recent are: 
Patient Vitals for the past 24 hrs: 
 Temp Pulse Resp BP SpO2  
09/25/18 1703 97.7 °F (36.5 °C) 76 18 112/59 97 % 09/25/18 1112 97.3 °F (36.3 °C) 82 18 131/61 97 % 09/25/18 0724 97.5 °F (36.4 °C) 81 18 122/66 94 % 09/25/18 0300 98.7 °F (37.1 °C) 86 19 103/52 98 %  
09/24/18 2300 97.2 °F (36.2 °C) 79 18 127/64 98 %  
09/24/18 1923 97.3 °F (36.3 °C) 83 19 132/61 97 % Intake/Output Summary (Last 24 hours) at 09/25/18 1807 Last data filed at 09/25/18 1730 Gross per 24 hour Intake             1700 ml Output              150 ml Net             1550 ml PHYSICAL EXAM: 
General: WD, WN. Pleasant, Awake, NAD and Ox3 cooperative, no acute distress   
EENT:  EOMI. Anicteric sclerae. MMM Resp:  CTA bilaterally, no wheezing or rales. No accessory muscle use CV:  Regular  rhythm,  No edema GI:  Soft, Non distended, Non tender.  +Bowel sounds, PEG tube Noted +, Colostomy bag noted + Neurologic:  Alert and oriented X 3, normal speech, Psych:   Good insight. Not anxious nor agitated Skin:  No rashes. No jaundice, R PICC line noted Reviewed most current lab test results and cultures  YES Reviewed most current radiology test results   YES Review and summation of old records today    NO Reviewed patient's current orders and MAR    YES 
PMH/ reviewed - no change compared to H&P 
________________________________________________________________________ Care Plan discussed with: 
  Comments Patient x Family RN x Care Manager x Consultant Multidiciplinary team rounds were held today with , nursing, pharmacist and clinical coordinator. Patient's plan of care was discussed; medications were reviewed and discharge planning was addressed. ________________________________________________________________________ Total NON critical care TIME: 10 Minutes Total CRITICAL CARE TIME Spent:   Minutes non procedure based Comments >50% of visit spent in counseling and coordination of care    
________________________________________________________________________ Patricia Sutherland MD  
 
Procedures: see electronic medical records for all procedures/Xrays and details which were not copied into this note but were reviewed prior to creation of Plan. LABS: 
I reviewed today's most current labs and imaging studies. Pertinent labs include: 
Recent Labs  
   09/25/18 
 0433 WBC  13.3* HGB  8.9* HCT  29.5*  
PLT  259 Recent Labs  
   09/25/18 
 0433  09/24/18 
 0144  09/23/18 
 0350 NA  132*  133*  133* K  4.1  4.0  4.1 CL  96*  97  99 CO2  28  28  26 GLU  142*  130*  143* BUN  30*  29*  26* CREA  0.98  0.92  0.90  
CA  9.2  8.8  8.3* Signed: Patricia Sutherland MD

## 2018-09-25 NOTE — PROGRESS NOTES
[facility] is awaiting word from insurance company if they will provide more money due to cost of dapto. If not, then the best option is home with HHC//DME for IV abx. The wife is aware of this and is looking into support options at home such as out of pocket help, friends, family, and additional personal care thru Medicaid.

## 2018-09-25 NOTE — PROGRESS NOTES
Occupational Therapy Chart reviewed; patient off unit for procedure. Will retry tomorrow.  Ivis Lares OTR/L

## 2018-09-25 NOTE — ADT AUTH CERT NOTES
Utilization Review  
  
  
  Bowel Surgery: Colectomy, Partial, with or without Ostomy - Care Day 74 (9/21/2018) by Luis Cadet     
  Review Status Review Entered    
  Completed 9/24/2018    
  Details    
      
  Care Day: 74 Care Date: 9/21/2018 Level of Care: Telemetry    
  Guideline Day 2     
  Level Of Care    
  (X) Floor    
  9/24/2018 4:20 PM EDT by Cj Camargo    
    telemetry    
      
      
  Clinical Status    
  (X) * Procedure completed    
  ( ) * Hemodynamic stability    
  9/24/2018 4:20 PM EDT by Cj Camargo    
    99.3-141/60-86-18-97% CPAP    
      
      
  Activity    
  ( ) * Progressive ambulation    
  9/24/2018 4:20 PM EDT by Cj Camargo    
    out of bed with assistance    
      
      
  Routes    
  (X) IV fluids, medications    
      
  Interventions    
  (X) Enterostomy therapy    
      
      
      
      
  * Milestone    
      
  Additional Notes    
  Acute Blood Loss Anemia due to Lower GI Bleed POA    
  > Colonoscopy showed a Large Colon Mass > S/P Colectomy Stage 3 B this hospitalization done July 17 > Developed Ileus then aspiration PNA    
  Was intubated (due to acute respiratory failure) and prolonged ICU stay    
  Extubated 8/26    
  Pneumonia , last sputum culture 8/11 + for Heavy staph aureus , light K.pneumoniae     
  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    
  S/p treated with Levaquin    
  Bilateral Pleural Effusions s/p R chest tube removed 9/4    
  RONAL      
  CPAP Q HS    
        
  Dysphagia, Failed MBS    
  S/p TPN in hospital due to Dysphagia     
  S/P Peg 9/5 Wed > Tolerating Tube feedings well    
  Oral Candidiasis    
  ? Infected Thrombus on NATASHA    
  NATASHA shows definite large mass associated with pacing wire in RA which may represent vegetation ( 8/23)     
  PER  Infectious Disease     
  Continue Daptomycin, Gentamicin IV, change Fluconazole ( 9/3). & levaquin to po    
  Nystatin Swish & spit     
        
  Complete total 6 weeks of daptomycin and gentamycin from the latest (-) blood Culture  which was done on 9/8/2018. So until October 20    
  Has A RT ARM PICC LINE    
  On Lovenox therapeutic    
  Fluconazole IV added 9/3    
  Bacteremia    
  S/p removal of L/IJ placement of PICC RUE , TPN on 8/31    
  Persistent bacteremia with coagulase negative Staph since 8/11 initially oxacillin sensitive , last positive BC is oxacillin resistant 8/21(1/4)    
  Repeat BC 8/26, 8/29 , 9/3 no growth, 9/5 Positive Staph Coag Neg, 9/8 ( negative    
  Chest CT repeated 9/8.  No change in SVC thrombus and Atrial clot size    
       
  SSS S/p PPM and Pacer dependent    
  PAF, on chronic Amiodarone,  Was on IV amiodarone when he was NPO but now on Amiodarone per PEG with GOod control    
  Per ID consult > Thrombus in R/IJ, now  mass in RA which is suggestive of thrombus rather than vegetation in light of pacemaker pocket appearing clear of infection. Hold off on removal of pacemaker at this time .  D/w Dr Dev Bhatti & Dr Tristin Graff , .  .    
  Continue antimicrobials & anticoagulation    
       
  Acute Encephalopathy - resolved    
  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    
  Hx of Seizures due to TBI    
  Has been on Tegretol chronic with controlled Seizures    
  Was changed to IV Keppra when NPO    
  Dr. Una Rosario discussed with Dr Cristino Wade (patients Neurologist)    
  Patient  lethargic on 500 mg BID  Keppra but both states Keep on Keppra 250 BID    
  due to better Drug interactions and liver  with all his meds and less Liver issues on    
       
  Severe Deconditioning.    
  Palliative care was involved with discussion with family who decided everything to be done    
       
  He needs to follow up with ID or Cardiology close to October 20 for Reimaging and Cultures or can be done in at [address] [place] [address] determine what to do with Pacers    
       
       
   Hypernatremia -much improved, now on free fluid via Peg    
  Continue Daptomycin x 6 weeks , Gentamicin IV x 4 weeks from last negative cultures. End date for Daptomycin is Oct 19,for Gentamicin is Oct 5. 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    
  Weekly CBC, CMP, CK , gentamicin trough & peak q weekly , MD at [facility] to be notified about f/u on labs /also send reports to my office at [phone]. D/w Dr Gena Grace.    
  Thrombus in R/IJ,   mass in RA which is suggestive of thrombus rather than vegetation in light of pacemaker pocket appearing clear of infection.     
  Decision made to hold off on removal of pacemaker at this time .  D/w Dr Brandon Graff & Dr Devyn Alonso , .  .    
  Continue antimicrobials & anticoagulation    
  glu 123 sed rate 64 c-reactive 10.60     
  consult mobility services    
  tube feeding    
  NPO    
  wound care    
  amiodarone x1 per gtube    
  eliquis x2 per gtube    
  cubincin iv x1    
  Lasix x1 per gtube    
  gentamicin iv x1    
  insuloin sc x2    
  keppra x2 per gtube    
   
  Bowel Surgery: Colectomy, Partial, with or without Ostomy - Care Day 73 (9/20/2018) by Gamaliel Pickett RN     
  Review Status Review Entered    
  Completed 9/21/2018    
  Details    
      
  Care Day: 68 Care Date: 9/20/2018 Level of Care: Telemetry    
  Guideline Day 2     
  Level Of Care    
  (X) Floor    
  9/21/2018 2:47 PM EDT by Casie Mercado    
    Telemetry floor    
      
      
  Clinical Status    
  (X) * Procedure completed    
  9/21/2018 2:47 PM EDT by Casie Mercado    
    S/P Colectomy Stage 3    
      
  (X) * Hemodynamic stability    
  9/21/2018 2:47 PM EDT by Casie Mercado    
    Vitals- 98.1, 78, 18, 130/65, 97%    
      
      
  Activity    
  ( ) * Progressive ambulation    
      
  Routes    
  (X) IV fluids, medications    
  9/21/2018 2:47 PM EDT by Bonifacio Steven  
     daptomycin 1000mg IV q 24hr, gentamicin 80mg IV q 24hr    
      
      
  Interventions    
  (X) Enterostomy therapy    
  9/21/2018 2:47 PM EDT by Ailyn Duggan    
    tube feeds    
      
      
  9/21/2018 2:47 PM EDT by Ailyn Duggan    
  Subject: Additional Clinical Information    
  9-20-18NAD. Tolerating tube feeding. GI: Soft, Non distended, Non tender.   +Bowel sounds, PEG tube Noted +, Colostomy bag noted  Abnl labs- sodium 132, glucose 132, BUN 28Meds- amiodarone 200mg per g tube, eliquis 5mg per g tube, lasix 60mg per G tube, SS insulin, lactulose 10g per G tube, keppra 250mg per g tube, nystatin 500,000 u per g tube, effexor 37.5mg per g tubePlan- flannery catheter, I/O, PT/OT    
      
      
      
      
  * Milestone    
      
  Additional Notes    
  9-20-18    
      
  IM Assessment / Plan      
  Disposition. Insurance denies [facility], spoke to Dr Shaunna Davis from insurance as peer to peer but they continue to decline.  Now will appeal the decision.    
       
  Acute Blood Loss Anemia due to Lower GI Bleed POA    
  > Colonoscopy showed a LARGE Colon Mass > S/P Colectomy Stage 3 B this hospitalization done July 17 > Developed Ileus then aspiration PNA    
  Was intubated (due to acute respiratory failure) and prolonged ICU stay    
  Extubated 8/26    
  Pneumonia , last sputum culture 8/11 + for Heavy staph aureus , light K.pneumoniae     
  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    
  S/p treated with Levaquin    
  Bilateral Pleural Effusions s/p R chest tube removed 9/4    
  RONAL      
  CPAP q HS    
        
  Dysphagia, Failed MBS    
  S/p TPN in hospital due to Dysphagia     
  S/P Peg 9/5 Wed > Tolerating Tube feedings well    
  Oral Candidiasis    
  ? Infected Thrombus on NATASHA    
  NATASHA shows definite large mass associated with pacing wire in RA which may represent vegetation ( 8/23)     
  PER  Infectious Disease     
   Continue Daptomycin, Gentamicin IV, change Fluconazole ( 9/3). & levaquin to po    
  Nystatin Swish & spit     
       
  Complete total 6 weeks of daptomycin and gentamycin from the latest (-) blood Culture  which was done on 9/8/2018. So until October 20    
  Has A RT ARM PICC LINE    
  On Lovenox therapeutic    
  Fluconazole IV added 9/3    
  Bacteremia    
  S/p removal of L/IJ placement of PICC RUE , TPN on 8/31    
  Persistent bacteremia with coagulase negative Staph since 8/11 initially oxacillin sensitive , last positive BC is oxacillin resistant 8/21(1/4)    
  Repeat BC 8/26, 8/29 , 9/3 no growth, 9/5 Positive Staph Coag Neg, 9/8 ( negative    
  Chest CT repeated 9/8.  No change in SVC thrombus and Atrial clot size    
       
  SSS S/p PPM and Pacer dependent    
  PAF, on chronic Amiodarone,  Was on IV amiodarone when he was NPO but now on Amiodarone per PEG with GOod control    
  Per ID consult > Thrombus in R/IJ, now  mass in RA which is suggestive of thrombus rather than vegetation in light of pacemaker pocket appearing clear of infection. Hold off on removal of pacemaker at this time .  D/w Dr Annita Eller & Dr Elvis Tenorio , .  .    
  Continue antimicrobials & anticoagulation    
       
  Acute Encephalopathy - resolved    
  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    
  Hx of Seizures due to TBI    
  Has been on Tegretol chronic with controlled Seizures    
  Was changed to IV Keppra when NPO    
  Dr. Jorge Lovelace discussed with Dr Agustín Zuñiga (patients Neurologist)    
  Patient  lethargic on 500 mg BID  Keppra but both states Keep on Keppra 250 BID    
  due to better Drug interactions and liver  with all his meds and less Liver issues on    
       
  Severe Deconditioning.    
  Palliative care was involved with discussion with family who decided everything to be done    
       
  He needs to follow up with ID or Cardiology close to October 20 for Reimaging and Cultures or [facility].  To determine what to do with Pacers    
       
       
  Hypernatremia - much improved, now on free fluid via Peg    
      
      
      
  ID A/P:    
  IMPRESSION:    
  · Bacteremia again with BC 1/2 + for Coagulase negative Staph- Staph Capitis Oxacillin R ( 9/5)     
  · Repeat BC 9/7 no growth    
  · S/p Cellulitis LUE     
  · Repeat CT chest ( 9/7) shows non occlusive thrombus in R/ IJ vein ,small amount of thrombus along right PICC catheter/ pacer leads in SVC which extends to right atrium    
  · S/p peg placement     
  · S/p removal of L/IJ placement of PICC RUE , TPN on 8/31    
  · S/p persistent bacteremia with coagulase negative Staph since 8/11 initially oxacillin sensitive , last positive BC is oxacillin resistant 8/21(1/4)     
  · Repeat BC 8/26, 8/29 , 9/3 no growth    
  · S/p failed MBS    
  · NATASHA shows definite large mass associated with pacing wire in RA which may represent vegetation ( 8/23)     
  · Thrombus  & tiny gas bubble of as in RIJ extending into SVC to level of RA (8/15)  s/p heparin IV now on lovenox s/q    
  · CTA chest - no PE, could not visualize SVC thrombus, left lateral wall soft tissue swelling, soft tissue around pacemaker show no significant abnormality    
  · US chest wall - minimal fluid in pacemaker pocket, no significant inflammation of overlying subcutaneous fat or skin    
  · Acute respiratory failure s/p  Ventilator / h/o tracheostomy 24 years ago  , s/p extubation 8/26    
  · S/p R/ pleural effusion s/p chest tube placement     
  · S/p sputum culture 8/25 + heavy Burkholderia cepacia, s/p treated with Levaquin    
  · Pneumonia , last sputum culture 8/11 + for Heavy staph aureus , light K.pneumoniae , treated    
  · Ca colon Stage 3b s/p colectomy / YAMEL / enterotomies    
  · S/p ileus , aspiration pneumonia prior to ICU admission     
  · H/o traumatic brain injury 25 years ago      
        
        
   PLAN:    
        
  · Continue Daptomycin x 6 weeks , Gentamicin IV x 4 weeks from last negative cultures. End date for Daptomycin is Oct 19,for Gentamicin is Oct 5. 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    
  · Weekly CBC, CMP, CK , gentamicin trough & peak q weekly , MD at [facility] to be notified about f/u on labs /also send reports to my office at [phone]. D/w Dr Franklin Reveal.    
  · Thrombus in R/IJ,   mass in RA which is suggestive of thrombus rather than vegetation in light of pacemaker pocket appearing clear of infection.     
  · Decision made to hold off on removal of pacemaker at this time . D/w Dr Malinda Harrison & Dr Romain Haskins , .  .    
  · Continue antimicrobials & anticoagulation    
  · Pt' s wife advised to call if recurrence of fever after completion of therapy

## 2018-09-26 ENCOUNTER — APPOINTMENT (OUTPATIENT)
Dept: GENERAL RADIOLOGY | Age: 78
DRG: 329 | End: 2018-09-26
Attending: INTERNAL MEDICINE
Payer: MEDICARE

## 2018-09-26 LAB
ANION GAP SERPL CALC-SCNC: 7 MMOL/L (ref 5–15)
ANION GAP SERPL CALC-SCNC: 8 MMOL/L (ref 5–15)
BASOPHILS # BLD: 0.1 K/UL (ref 0–0.1)
BASOPHILS NFR BLD: 0 % (ref 0–1)
BUN SERPL-MCNC: 28 MG/DL (ref 6–20)
BUN SERPL-MCNC: 33 MG/DL (ref 6–20)
BUN/CREAT SERPL: 31 (ref 12–20)
BUN/CREAT SERPL: 37 (ref 12–20)
CALCIUM SERPL-MCNC: 8.6 MG/DL (ref 8.5–10.1)
CALCIUM SERPL-MCNC: 8.7 MG/DL (ref 8.5–10.1)
CHLORIDE SERPL-SCNC: 98 MMOL/L (ref 97–108)
CHLORIDE SERPL-SCNC: 98 MMOL/L (ref 97–108)
CO2 SERPL-SCNC: 27 MMOL/L (ref 21–32)
CO2 SERPL-SCNC: 29 MMOL/L (ref 21–32)
CREAT SERPL-MCNC: 0.9 MG/DL (ref 0.7–1.3)
CREAT SERPL-MCNC: 0.9 MG/DL (ref 0.7–1.3)
DATE LAST DOSE: ABNORMAL
DIFFERENTIAL METHOD BLD: ABNORMAL
EOSINOPHIL # BLD: 0.4 K/UL (ref 0–0.4)
EOSINOPHIL NFR BLD: 3 % (ref 0–7)
ERYTHROCYTE [DISTWIDTH] IN BLOOD BY AUTOMATED COUNT: 18.8 % (ref 11.5–14.5)
GENTAMICIN PEAK SERPL-MCNC: 3.8 UG/ML (ref 5–10)
GLUCOSE BLD STRIP.AUTO-MCNC: 130 MG/DL (ref 65–100)
GLUCOSE BLD STRIP.AUTO-MCNC: 142 MG/DL (ref 65–100)
GLUCOSE BLD STRIP.AUTO-MCNC: 144 MG/DL (ref 65–100)
GLUCOSE BLD STRIP.AUTO-MCNC: 159 MG/DL (ref 65–100)
GLUCOSE SERPL-MCNC: 131 MG/DL (ref 65–100)
GLUCOSE SERPL-MCNC: 135 MG/DL (ref 65–100)
HCT VFR BLD AUTO: 29.8 % (ref 36.6–50.3)
HGB BLD-MCNC: 9.1 G/DL (ref 12.1–17)
IMM GRANULOCYTES # BLD: 0.1 K/UL (ref 0–0.04)
IMM GRANULOCYTES NFR BLD AUTO: 1 % (ref 0–0.5)
LYMPHOCYTES # BLD: 2.1 K/UL (ref 0.8–3.5)
LYMPHOCYTES NFR BLD: 15 % (ref 12–49)
MAGNESIUM SERPL-MCNC: 2 MG/DL (ref 1.6–2.4)
MCH RBC QN AUTO: 26.5 PG (ref 26–34)
MCHC RBC AUTO-ENTMCNC: 30.5 G/DL (ref 30–36.5)
MCV RBC AUTO: 86.9 FL (ref 80–99)
MONOCYTES # BLD: 1.4 K/UL (ref 0–1)
MONOCYTES NFR BLD: 10 % (ref 5–13)
NEUTS SEG # BLD: 10.3 K/UL (ref 1.8–8)
NEUTS SEG NFR BLD: 72 % (ref 32–75)
NRBC # BLD: 0 K/UL (ref 0–0.01)
NRBC BLD-RTO: 0 PER 100 WBC
PHOSPHATE SERPL-MCNC: 4.4 MG/DL (ref 2.6–4.7)
PLATELET # BLD AUTO: 297 K/UL (ref 150–400)
PMV BLD AUTO: 10.6 FL (ref 8.9–12.9)
POTASSIUM SERPL-SCNC: 4 MMOL/L (ref 3.5–5.1)
POTASSIUM SERPL-SCNC: 4.3 MMOL/L (ref 3.5–5.1)
RBC # BLD AUTO: 3.43 M/UL (ref 4.1–5.7)
REPORTED DOSE,DOSE: ABNORMAL UNITS
REPORTED DOSE/TIME,TMG: ABNORMAL
SERVICE CMNT-IMP: ABNORMAL
SODIUM SERPL-SCNC: 133 MMOL/L (ref 136–145)
SODIUM SERPL-SCNC: 134 MMOL/L (ref 136–145)
WBC # BLD AUTO: 14.3 K/UL (ref 4.1–11.1)

## 2018-09-26 PROCEDURE — 80170 ASSAY OF GENTAMICIN: CPT | Performed by: INTERNAL MEDICINE

## 2018-09-26 PROCEDURE — 74011000258 HC RX REV CODE- 258: Performed by: INTERNAL MEDICINE

## 2018-09-26 PROCEDURE — 74011250637 HC RX REV CODE- 250/637: Performed by: INTERNAL MEDICINE

## 2018-09-26 PROCEDURE — 74011250636 HC RX REV CODE- 250/636: Performed by: INTERNAL MEDICINE

## 2018-09-26 PROCEDURE — 36415 COLL VENOUS BLD VENIPUNCTURE: CPT | Performed by: INTERNAL MEDICINE

## 2018-09-26 PROCEDURE — 82962 GLUCOSE BLOOD TEST: CPT

## 2018-09-26 PROCEDURE — 74011000250 HC RX REV CODE- 250: Performed by: INTERNAL MEDICINE

## 2018-09-26 PROCEDURE — 77030027138 HC INCENT SPIROMETER -A

## 2018-09-26 PROCEDURE — 71045 X-RAY EXAM CHEST 1 VIEW: CPT

## 2018-09-26 PROCEDURE — 65270000029 HC RM PRIVATE

## 2018-09-26 PROCEDURE — 80048 BASIC METABOLIC PNL TOTAL CA: CPT | Performed by: HOSPITALIST

## 2018-09-26 PROCEDURE — 83735 ASSAY OF MAGNESIUM: CPT | Performed by: HOSPITALIST

## 2018-09-26 PROCEDURE — 36592 COLLECT BLOOD FROM PICC: CPT

## 2018-09-26 PROCEDURE — 85025 COMPLETE CBC W/AUTO DIFF WBC: CPT | Performed by: INTERNAL MEDICINE

## 2018-09-26 PROCEDURE — 74011636637 HC RX REV CODE- 636/637: Performed by: INTERNAL MEDICINE

## 2018-09-26 PROCEDURE — 94760 N-INVAS EAR/PLS OXIMETRY 1: CPT

## 2018-09-26 PROCEDURE — 92526 ORAL FUNCTION THERAPY: CPT

## 2018-09-26 PROCEDURE — 80048 BASIC METABOLIC PNL TOTAL CA: CPT | Performed by: INTERNAL MEDICINE

## 2018-09-26 PROCEDURE — 84100 ASSAY OF PHOSPHORUS: CPT | Performed by: HOSPITALIST

## 2018-09-26 RX ADMIN — GENTAMICIN SULFATE 80 MG: 40 INJECTION, SOLUTION INTRAMUSCULAR; INTRAVENOUS at 04:43

## 2018-09-26 RX ADMIN — APIXABAN 5 MG: 5 TABLET, FILM COATED ORAL at 09:55

## 2018-09-26 RX ADMIN — LACTULOSE 10 G: 20 SOLUTION ORAL at 17:49

## 2018-09-26 RX ADMIN — VENLAFAXINE 37.5 MG: 37.5 TABLET ORAL at 09:57

## 2018-09-26 RX ADMIN — LEVETIRACETAM 250 MG: 500 SOLUTION ORAL at 21:37

## 2018-09-26 RX ADMIN — Medication 40 ML: at 21:37

## 2018-09-26 RX ADMIN — NYSTATIN 500000 UNITS: 100000 SUSPENSION ORAL at 17:49

## 2018-09-26 RX ADMIN — NYSTATIN 500000 UNITS: 100000 SUSPENSION ORAL at 13:04

## 2018-09-26 RX ADMIN — LEVETIRACETAM 250 MG: 500 SOLUTION ORAL at 09:56

## 2018-09-26 RX ADMIN — DAPTOMYCIN 1000 MG: 500 INJECTION, POWDER, LYOPHILIZED, FOR SOLUTION INTRAVENOUS at 17:50

## 2018-09-26 RX ADMIN — Medication 20 ML: at 13:27

## 2018-09-26 RX ADMIN — FUROSEMIDE 60 MG: 40 TABLET ORAL at 09:55

## 2018-09-26 RX ADMIN — VENLAFAXINE 37.5 MG: 37.5 TABLET ORAL at 17:50

## 2018-09-26 RX ADMIN — AMIODARONE HYDROCHLORIDE 200 MG: 200 TABLET ORAL at 09:54

## 2018-09-26 RX ADMIN — INSULIN LISPRO 3 UNITS: 100 INJECTION, SOLUTION INTRAVENOUS; SUBCUTANEOUS at 06:19

## 2018-09-26 RX ADMIN — POLYETHYLENE GLYCOL 3350 17 G: 17 POWDER, FOR SOLUTION ORAL at 09:57

## 2018-09-26 RX ADMIN — Medication 10 ML: at 06:20

## 2018-09-26 RX ADMIN — NYSTATIN 500000 UNITS: 100000 SUSPENSION ORAL at 21:37

## 2018-09-26 RX ADMIN — INSULIN LISPRO 3 UNITS: 100 INJECTION, SOLUTION INTRAVENOUS; SUBCUTANEOUS at 11:31

## 2018-09-26 RX ADMIN — APIXABAN 5 MG: 5 TABLET, FILM COATED ORAL at 21:37

## 2018-09-26 RX ADMIN — LACTULOSE 10 G: 20 SOLUTION ORAL at 09:56

## 2018-09-26 RX ADMIN — Medication 10 ML: at 13:27

## 2018-09-26 RX ADMIN — NYSTATIN 500000 UNITS: 100000 SUSPENSION ORAL at 09:57

## 2018-09-26 NOTE — PROGRESS NOTES
Bedside shift change report given to Blessing (oncoming nurse) by Jazmine Salazar (offgoing nurse). Report included the following information SBAR, Kardex, Intake/Output, MAR and Recent Results.

## 2018-09-26 NOTE — PROGRESS NOTES
Infectious Disease Progress Note IMPRESSION:  
· S/p further  increase in WBC to 14.3 pt remains afebrile, asymptomatic ·  Recurrence of bacteremia  with BC 1/2 + for Coagulase negative Staph- Staph Capitis Oxacillin R ( 9/5) Repeat BC 9/7 no growth · Repeat CT chest ( 9/7) shows non occlusive thrombus in R/ IJ vein ,small amount of thrombus along right PICC catheter/ pacer leads in SVC which extends to right atrium · S/p peg placement · S/p removal of L/IJ placement of PICC RUE , TPN on 8/31 · S/p persistent bacteremia with coagulase negative Staph since 8/11 initially oxacillin sensitive , last positive BC is oxacillin resistant 8/21(1/4) · Repeat BC 8/26, 8/29 , 9/3 no growth ·  S/p failed MBS · NATASHA shows definite large mass associated with pacing wire in RA which may represent vegetation ( 8/23) · Thrombus  & tiny gas bubble of as in RIJ extending into SVC to level of RA (8/15)  s/p heparin IV now on lovenox s/q · CTA chest - no PE, could not visualize SVC thrombus, left lateral wall soft tissue swelling, soft tissue around pacemaker show no significant abnormality · US chest wall - minimal fluid in pacemaker pocket, no significant inflammation of overlying subcutaneous fat or skin · Acute respiratory failure s/p  Ventilator / h/o tracheostomy 24 years ago  , s/p extubation 8/26 · S/p R/ pleural effusion s/p chest tube placement · 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    
  
  
PLAN:  
   
· For CXR 
·  IS  
· If further increase occurs consider repeat chest CT evaluate thrombus in RIJ, RA , around PICC · Continue Daptomycin x 6 weeks , Gentamicin IV x 4 weeks from last negative cultures. End date for Daptomycin is Oct 19,for Gentamicin is Oct 5. 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 ·  In view of SNF not able to take pt on Daptomycin/ Gentamicin  , family unable to administer meds plan for discharge on Dalbavancin when stable · Repeat CBC in am, watch for fever . · Continue antimicrobials & anticoagulation · D/w pt plan of care at length Blood culture - Special Requests: NO SPECIAL REQUESTS   Preliminary Culture result: NO GROWTH 2 DAYS Blood culture - Culture result:    Final  
STAPHYLOCOCCUS CAPITIS SUBSPECIES UREOLYTICUS (OXACILLIN RESISTANT) , ISOLATED FROM 1 OF 2 BOTTLES DRAWN. .. LEFT HAND SITE (A) Subjective:  
 
Pt seen. Awake,talking ,  no complaints. Wife at bedside. Review of Systems:  Pt denies complaints. 10 point ROS obtained Objective:  
 
Blood pressure 135/79, pulse 82, temperature 97.4 °F (36.3 °C), resp. rate 18, height 6' 2\" (1.88 m), weight 249 lb (112.9 kg), SpO2 98 %. Temp (24hrs), Av.8 °F (36.6 °C), Min:97.2 °F (36.2 °C), Max:98.6 °F (37 °C) Patient Vitals for the past 24 hrs: 
 Temp Pulse Resp BP SpO2  
18 1106 97.4 °F (36.3 °C) 82 18 135/79 98 %  
18 0746 97.7 °F (36.5 °C) 83 18 155/84 96 %  
18 0352 97.2 °F (36.2 °C) 84 20 129/56 98 %  
18 2349 98.6 °F (37 °C) 76 19 131/66 98 %  
18 1939 98.2 °F (36.8 °C) 80 18 141/74 98 %  
18 1703 97.7 °F (36.5 °C) 76 18 112/59 97 % Lines:  picc Physical Exam:  
General:   awake, talking Lungs:    Reduced air entry bases on  auscultation  chest wall-  pacemaker site -no swelling, erythemal  
CV:  Regular rate and rhythm,no murmur, Abdomen:   Soft, non-tender. bowel sounds + Extremities: No edema Lines/Devices:  Intact, no erythema, drainage or tenderness Data Review: CBC:  
Recent Labs  
   18 
 0430  18 
 0433 WBC  14.3*  13.3*  
 RBC  3.43*  3.38* HGB  9.1*  8.9* HCT  29.8*  29.5*  
PLT  297  259 GRANS  72  73 LYMPH  15  13 EOS  3  3 CMP:  
Recent Labs  
   09/26/18 
 0430  09/25/18 
 0433  09/24/18 
 0144 GLU  131*  142*  130* NA  133*  132*  133* K  4.3  4.1  4.0  
CL  98  96*  97  
CO2  27  28  28 BUN  33*  30*  29* CREA  0.90  0.98  0.92  
CA  8.6  9.2  8.8 AGAP  8  8  8 BUCR  37*  31*  32* Studies:     
Lab Results Component Value Date/Time Culture result: NO GROWTH 6 DAYS 09/08/2018 01:46 AM  
 Culture result: (A) 09/05/2018 07:03 PM  
  STAPHYLOCOCCUS CAPITIS SUBSPECIES UREOLYTICUS (OXACILLIN RESISTANT) , ISOLATED FROM 1 OF 2 BOTTLES DRAWN. .. LEFT HAND SITE Culture result: (A) 09/05/2018 07:03 PM  
  PRELIMINARY REPORT OF GRAM POSITIVE COCCI IN CLUSTERS GROWING IN 1 OF 2 BOTTLES DRAWN CALLED TO AND READ BACK BY FELIX CAMPOS RN [facility] AT 2106 ON 9/6/2018. [place] [address] Culture result:  09/05/2018 07:03 PM  
   [facility] REQUESTING IDENTIFICATION AND SENSITIVITIES 9/10/18 TA. Culture result: REMAINING BOTTLE(S) HAS/HAVE NO GROWTH IN 5 DAYS 09/05/2018 07:03 PM  
  
 
XR Results (most recent): 
 
Results from Hospital Encounter encounter on 07/10/18 XR ABD PORT  1 V Narrative EXAM: KUB INDICATION: abd distension. 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 Portable supine KUB obtained at 0729 hours shows no bowel distention. There are 
vascular calcifications. Impression IMPRESSION: Normal bowel gas pattern. Patient Active Problem List  
Diagnosis Code  
 HTN (hypertension) I10  
 Depression F32.9  Hypercholesterolemia E78.00  Acid reflux K21.9  Seizure ([address] R56.9  Hypothyroidism E03.9  Hyponatremia E87.1  BPH (benign prostatic hyperplasia) N40.0  Rash R21  
 Cellulitis L03.90  Wax in ear H61.20  Ulcer [id number]  Small bowel obstruction ([address] T64.667  Atrial flutter (HCC) I48.92  
 Atrial fibrillation or flutter  CHI (closed head injury) S09. 90XA  Basal cell cancer C44.91  
 TBI (traumatic [facility] injury) ([facility] S06. 9X9A  Atrial fibrillation (HCC) I48.91  
 Cataract H26.9  Constipation K59.00  Ankle fracture, left P37.342O  Insomnia G47.00  Tinea corporis B35.4  SSS (sick sinus syndrome) ([facility]) I49.5  Syncope R55  Seizure disorder ([facility] G40.909  RONAL on CPAP G47.33, Z99.89  
 Pacemaker Z95.0  Pre-op evaluation Z01.818  Chronic back pain greater than 3 months duration M54.9, G89.29  
 Cerebral infarction ([facility]) I63.9  Acute bronchitis J20.9  Screening for colon cancer Z12.11  
 Chronic right shoulder pain M25.511, G89.29  
 Common wart B07.8  Localized epilepsy with impairment of consciousness ([facility] G40.209  Severe obesity (BMI 35.0-39.9) ([facility]) E66.01  
 Acute blood loss anemia D62  Anasarca R60.1  GI bleed K92.2  Acute encephalopathy G93.40  Idiopathic hypotension I95.0  Counseling regarding goals of care Z71.89 ICD-10-CM ICD-9-CM 1. Anemia, unspecified type D64.9 285.9 2. Generalized weakness R53.1 780.79   
3. Acute encephalopathy G93.40 348.30   
4. Anasarca R60.1 782.3 5. Idiopathic hypotension I95.0 458.9 6. Counseling regarding goals of care Z71.89 V65.49   
7. 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 9X9S 907.0 8. Chronic atrial fibrillation (HCC) I48.2 427.31   
9. Malignant neoplasm of ascending colon ([facility]) C18.2 153.6 Anti-infectives:  
  
 Daptomycin IV - 8/26- Gentamicin IV 8/26 S/p levaquin 8/29-9/7 Cefotetan 7/17 Zosyn 7/20-8/7 Cefepime - 8/11-8/13 Ceftriaxone 8/13-8/17 Fluconazole IV- 9/3- 9/6 IV , 9/6-9/10-po Vancomycin 8/11-8/22 Daptomycin 8/23- 8/24- Naficillin IV 8/24- 8/26  Joon Abernathy MD FACP

## 2018-09-26 NOTE — PROGRESS NOTES
Pharmacy Automatic Renal Dosing Protocol - Antimicrobials Indication for Antimicrobials: Bacteremia; aspiration PNA Current Regimen of Each Antimicrobial: 
Daptomycin 1000 mg (9.2mg/kg) IV every 24 hours (Started 8/26/18; Day #30) Gentamicin 80 mg IV every 24 hours (Start Date: 8/26/18; Day #30) Previous Antimicrobial Therapy: 
Fluconazole 200 mg every day (9/3-9/9) Levofloxacin 500 mg q 24 hours (9/4 -9/11) Levofloxacin 750 mg IV every 24 hours (Start Date: 8/28/18; Day #7 of 7) Nafcillin 2 grams every 4 hours (Started 8/24/18; Stopped 8/26/18) Daptomycin 1000 mg IV every 24 hours (Started 8/23/18; Stopped 8/24/18) Vancomycin 1500 mg IV every 12 hours (Started 8/11/18; Stopped 8/23/18) Ceftriaxone 2 g IV every 24 hours (Started 8/13/18; Stopped 8/17/18) Cefepime 2 g IV every 8 hours (Started 8/11/18; Stopped 8/13/18) Vancomycin Fluconazole Piperacillin-tazobactam 
 
Goal Gentamicin Levels (Synergy Dosing): 
Peak: 3-5 mcg/mL Trough: < 1 mcg/mL Gentamicin Level Assessment: 
Date Dose & Interval Measured (mcg/mL) Extrapolated (mcg/mL)  
8/27/18 80 mg IV every 8 hours Peak = 4.4 Trough = 2 Peak = 4.4 Trough = 1.36   
8/30/18 90 mg IV every 12 hours Peak = 5.0 Trough = 2.2 Peak = 5.2 Trough = 2.01  
8/31/18 100 mg IV every 24 hours Peak = 5.7 Trough = 0.7 Peak = 6 Trough = 0.65  
9/1/18 80 mg IV every 24 hours Peak = 3.4 Trough = 0.5 Peak = 4.6 Trough = 0.58  
9/4/18 80mg IV q24h Peak = 3.3 Trough = 0.4 Peak = 3.3 Trough = 0.36  
9/9/18 80 mg q24h Peak = 4.0 Trough = 0.6    
9/18/18 80 mg q 24 hours  Peak 4 Trough 0.5 Peak 3.7 Trough 0.5  
9/21/18 80 mg [id number] Peak = 3.9 mcg/ml Trough = 0.7 mcg/ml Peak = 4.7 mcg/ml Trough = 0.55 mcg/ml 9/26/18 80 mg q24 hours Peak = 3.8 Trough = 0.4 Peak = 4.1 Trough = 0.4 Significant Cultures:  
9/8 blood: ng  x6 days - Final 
9/5: Blood cx: STAPHYLOCOCCUS CAPITIS SUBSPECIES UREOLYTICUS (OXACILLIN RESISTANT) - Final (MDR, but sensitive to daptomycin) 9/3 BCx for fungus = ng pending 18 Blood culture = No growth x 6 days - final 
18 Blood culture = No growth x 6 days - final 
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 
Recent Labs  
   18 
 0430  18 
 0433  18 
 0144 CREA  0.90  0.98  0.92  
BUN  33*  30*  29* WBC  14.3*  13.3*   -- Temp (24hrs), Av.8 °F (36.6 °C), Min:97.2 °F (36.2 °C), Max:98.6 °F (37 °C) Creatinine Clearance (mL/min) or Dialysis: 78.2 mL/min Impression/Plan:  
· Peak/Trough okay · Discussed switching to vancomycin (JETT = 1) so that patient could be discharged to facility. Currently refusing admittance due to daptomycin cost. Bug likely was slow to respond to vancomycin, but I would not consider this a failure. · Will continue current regimen as appropriate for renal function. · First negative blood culture is 18 · CK  = 44 
· Note that dosing looks inappropriate for synergy (Q24H), but troughs were supratherapeutic on Q8H and Q12H regimens · BMP ordered daily · Continue current Daptomycin and Gentamicin for synergy x 6 weeks, through Oct 20 per ID (Although gentamicin currently ordered to end 2 weeks prior to daptomycin, will have to clarify with ID if patient is not discharged beforehand) Pharmacy will follow daily and adjust medications as appropriate for renal function and/or serum levels. Thank you, Vahid Leonardo PHARMD

## 2018-09-26 NOTE — PROGRESS NOTES
Problem: Dysphagia (Adult) Goal: *Acute Goals and Plan of Care (Insert Text) Speech path reeval, continue goals 9/20/2018 1. Pt will complete simple oropharyngeal strengthening exercises with max cues. Continue for 7 days 9/20/2018 2. Added 9/24/2018 patient will participate in repeat MBS within 7 days Speech path goals Initiated 9/6/18 1. Patient will follow simple oropharyngeal strengthening exercises with max cues within 7 days Initiated 8/30/2018 1. Patient will participate in [facility] tomorrow. Completed. 2. Pt will participate with oral and pharyngeal swallowing exercises to improve swallowing function. 1. Pt will tolerate purees and nectar thick liquids with no overt s/s of aspiration. Discontinue Speech pathology goals Initiated 8/3/2018 1. Patient will tolerate sips and chips with no overt s/s aspiration within 7 days. Goal met 8/10. 
2. Patient will participate in re-evaluation of swallow function within 7 days. Goal met 8/10. 3. Pt will participate with MBS today 8/10. Goal met 8/10. Speech language pathology dysphagia treatment Patient: Cuauhtemoc Alonso (78 y.o. male) Date: 9/26/2018 Diagnosis: Acute blood loss anemia GI bleed Anasarca GI Bleed 
gi bleed ASCENDING COLON CANCER  
aspiration ASPIRATION PNEUMONIA GI bleed Procedure(s) (LRB): ESOPHAGOGASTRODUODENOSCOPY (EGD) PERCUTANEOUS ENDOSCOPIC GASTROSTOMY TUBE INSERTION (N/A) 21 Days Post-Op Precautions:  Fall, Skin, Seizure, Aspiration ASSESSMENT: 
Patient with improved cognitive status, endurance, and swallow function this date. Patient oriented x3 and only disoriented to time. Patient accepted 10 ice chips, 3 sips of water, and 3 bites of applesauce with excellent tolerance. No complaints of fatigue or requests to stop this date. With minimal trials, patient with slow oral prep, delayed posterior propulsion, and delayed swallow initiation.  No overt s/s aspiration observed. Given improvements today, recommend repeat MBS tomorrow to fully assess pharyngeal phase of swallow and determine readiness for PO initiation. Progression toward goals: 
[]         Improving appropriately and progressing toward goals [x]         Improving slowly and progressing toward goals 
[]         Not making progress toward goals and plan of care will be adjusted PLAN: 
Recommendations and Planned Interventions: 
--Continue NPO except ice chips after oral care 
--Frequent oral care 
--SLP to follow for [facility] tomorrow Patient continues to benefit from skilled intervention to address the above impairments. Continue treatment per established plan of care. Discharge Recommendations:  Loki Davies SUBJECTIVE:  
Patient stated Can I have an ice chip?  OBJECTIVE:  
Cognitive and Communication Status: 
Neurologic State: Alert, Confused Orientation Level: Oriented to person, Oriented to place, Oriented to situation, Disoriented to time Cognition: Decreased attention/concentration, Decreased command following Perception: Appears intact Perseveration: No perseveration noted Safety/Judgement: Not assessed Dysphagia Treatment: 
Oral Assessment: 
Oral Assessment Oral Hygiene: dry oral mucosa, attempted to remove however patient with gag reflex Gag Reflex: Present P.O. Trials: 
Patient Position: upright in bed Vocal quality prior to P.O.: No impairment Consistency Presented: Ice chips; Thin liquid;Puree How Presented: Self-fed/presented;Cup/sip;SLP-fed/presented;Spoon Bolus Acceptance: No impairment Bolus Formation/Control: Impaired Type of Impairment: Delayed Propulsion: Delayed (# of seconds) Oral Residue: None Initiation of Swallow: Delayed (# of seconds) Laryngeal Elevation: Functional 
Aspiration Signs/Symptoms: None Pharyngeal Phase Characteristics: No impairment, issues, or problems Pain: 
Pain Scale 1: Numeric (0 - 10) Pain Intensity 1: 0 
  
 After treatment:  
[]              Patient left in no apparent distress sitting up in chair 
[x]              Patient left in no apparent distress in bed 
[x]              Call bell left within reach [x]              Nursing notified 
[x]              Caregiver present 
[]              Bed alarm activated COMMUNICATION/EDUCATION:  
The patients plan of care including recommendations, planned interventions, and recommended diet changes were discussed with: Registered Nurse. [x]              Posted safety precautions in patient's room. DIPAK Em Time Calculation: 20 mins

## 2018-09-26 NOTE — ADT AUTH CERT NOTES
Utilization Review  
  
  
  Bowel Surgery: Colectomy, Partial, with or without Ostomy - Care Day 78 (9/25/2018) by Sona Morales RN     
  Review Status Review Entered    
  Completed 9/26/2018    
  Details    
      
  Care Day: 78 Care Date: 9/25/2018 Level of Care: Telemetry    
  Guideline Day 4     
  Level Of Care    
  (X) Floor    
  9/26/2018 1:34 PM EDT by Laurel Eubanks    
    telemetry    
      
      
  Clinical Status    
  ( ) * No evidence of postoperative or surgical site infection    
      
  Activity    
  (X) Ambulatory    
  9/26/2018 1:34 PM EDT by Laurel Eubanks    
    up with assist    
      
      
  Routes    
  ( ) * Oral hydration, medications, and diet    
  9/26/2018 1:34 PM EDT by Laurel Eubanks    
    Meds: amiodarone per gtube, eliquis per gtube, insulin sc, lactulose per gtube, keppra per gtube, lasix per gtube, lidocaine patch td, insulin sc, nystantin po, miralax per gtube, Effexor per gtube daptomycin 1000mg iv q 24hrs Gentamycin 80mg iv q 24    
      
  ( ) * Advance diet    
  9/26/2018 1:34 PM EDT by Laurel Eubanks    
    tube feeding at goal rate    
      
      
  Interventions    
  (X) Hgb/Hct    
  9/26/2018 1:34 PM EDT by Laurel Eubakns    
    h/h 8.9/29. 5    
      
      
  Medications    
  (X) * Epidural analgesia or PCA absent[D]    
      
  9/26/2018 1:34 PM EDT by Laurel Eubanks    
  Subject: Additional Clinical Information    
  VS: T: 97.3, B/P: 131/61, HR 82, RR 18, SPO2 97    
  wbc 13.3, Na 132, cl 96, gluc 142, BUN 30    
  Disposition. Insurance denies Hilario Cardozo, Dr Jennyfer Ariza spoke to Dr Eboni Tse from insurance as peer to peer on 9/20 but they continue to decline. We have appealed and today found out that has been declined as well. CM working on d.c planning. [facility] Declined Daptomycin.  Wife can't take care of him at home with his deconditioning    
      
      
      
      
      
      
  * Milestone    
      
  Additional Notes    
   9/25/2018    
  HOSPITALIST    
  Assessment / Plan      
  Acute Blood Loss Anemia due to Lower GI Bleed POA    
  > Colonoscopy showed a Large Colon Mass > S/P Colectomy Stage 3 B this hospitalization done July 17 > Developed Ileus then aspiration PNA    
  Was intubated (due to acute respiratory failure) and prolonged ICU stay    
  Extubated 8/26    
  Pneumonia , last sputum culture 8/11 + for Heavy staph aureus , light K.pneumoniae     
  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    
  S/p treated with Levaquin    
  Bilateral Pleural Effusions s/p R chest tube removed 9/4    
  RONAL      
  CPAP Q HS    
        
  Dysphagia, Failed MBS    
  S/p TPN in hospital due to Dysphagia     
  S/P Peg 9/5 Wed > Tolerating Tube feedings well    
  Oral Candidiasis    
  ? Infected Thrombus on NATASHA    
  NATASHA shows definite large mass associated with pacing wire in RA which may represent vegetation ( 8/23)     
  PER  Infectious Disease     
  Continue Daptomycin, Gentamicin IV, change Fluconazole ( 9/3). & levaquin to po    
  Nystatin Swish & spit     
       
  Complete total 6 weeks of daptomycin and gentamycin from the latest (-) blood Culture  which was done on 9/8/2018.  So until October 20    
  Has A RT ARM PICC LINE    
  On Lovenox therapeutic    
  Fluconazole IV added 9/3    
  Bacteremia    
  S/p removal of L/IJ placement of PICC RUE , TPN on 8/31    
  Persistent bacteremia with coagulase negative Staph since 8/11 initially oxacillin sensitive , last positive BC is oxacillin resistant 8/21(1/4)    
  Repeat BC 8/26, 8/29 , 9/3 no growth, 9/5 Positive Staph Coag Neg, 9/8 ( negative    
  Chest CT repeated 9/8.  No change in SVC thrombus and Atrial clot size    
       
  SSS S/p PPM and Pacer dependent    
  PAF, on chronic Amiodarone,  Was on IV amiodarone when he was NPO but now on Amiodarone per PEG with GOod control    
  Per ID consult > Thrombus in R/IJ, now  mass in RA which is suggestive of thrombus rather than vegetation in light of pacemaker pocket appearing clear of infection. Hold off on removal of pacemaker at this time . D/w Dr Isiah Eaton & Dr Ursula Gottlieb , .  .    
  Continue antimicrobials & anticoagulation    
       
  Acute Encephalopathy - resolved    
  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    
  Hx of Seizures due to TBI    
  Has been on Tegretol chronic with controlled Seizures    
  Was changed to IV Keppra when NPO    
  Dr. Alfonzo Granda discussed with Dr Thom Willis (patients Neurologist)    
  Patient  lethargic on 500 mg BID  Keppra but both states Keep on Keppra 250 BID    
  due to better Drug interactions and liver  with all his meds and less Liver issues on    
       
  Severe Deconditioning.    
  Palliative care was involved with discussion with family who decided everything to be done    
       
  He needs to follow up with ID or Cardiology close to October 20 for Reimaging and Cultures or can be done in at [address] [place] [address] determine what to do with Pacers    
       
       
  Hypernatremia - much improved, now on free fluid via Peg    
        
  Code Status: Full    
  NOK POA  :  WIFE    
  DVT Prophylaxis: on lovenox for the thrombus    
       
      
       
  Subjective: Pt seen and examined at bedside. NAD. Tolerating tube feeding.  Overnight events d/w RN    
       
      
      
  INFECTIOUS DISEASE    
      
  IMPRESSION:    
  \" Bacteremia again with BC 1/2 + for Coagulase negative Staph- Staph Capitis Oxacillin R ( 9/5) Repeat BC 9/7 no growth    
  \" S/p increase in WBC to 13.3 ,pt afebrile asymptomatic    
  \" S/p Cellulitis LUE     
  \" Repeat CT chest ( 9/7) shows non occlusive thrombus in R/ IJ vein ,small amount of thrombus along right PICC catheter/ pacer leads in SVC which extends to right atrium    
  \" S/p peg placement     
  \" S/p removal of L/IJ placement of PICC RUE , TPN on 8/31    
  \" S/p persistent bacteremia with coagulase negative Staph since 8/11 initially oxacillin sensitive , last positive BC is oxacillin resistant 8/21(1/4)     
  \" Repeat BC 8/26, 8/29 , 9/3 no growth    
  \" S/p failed MBS    
  \" NATASHA shows definite large mass associated with pacing wire in RA which may represent vegetation ( 8/23)     
  \" Thrombus  & tiny gas bubble of as in RIJ extending into SVC to level of RA (8/15)  s/p heparin IV now on lovenox s/q    
  \" CTA chest - no PE, could not visualize SVC thrombus, left lateral wall soft tissue swelling, soft tissue around pacemaker show no significant abnormality    
  \" US chest wall - minimal fluid in pacemaker pocket, no significant inflammation of overlying subcutaneous fat or skin    
  \" Acute respiratory failure s/p  Ventilator / h/o tracheostomy 24 years ago  , s/p extubation 8/26    
  \" S/p R/ pleural effusion s/p chest tube placement     
  \" S/p sputum culture 8/25 + heavy Burkholderia cepacia, s/p treated with Levaquin    
  \" Pneumonia , last sputum culture 8/11 + for Heavy staph aureus , light K.pneumoniae , treated    
  \" Ca colon Stage 3b s/p colectomy / YAMEL / enterotomies    
  \" S/p ileus , aspiration pneumonia prior to ICU admission     
  \" H/o traumatic brain injury 25 years ago      
        
        
  PLAN:    
        
  \" Continue Daptomycin x 6 weeks , Gentamicin IV x 4 weeks from last negative cultures. End date for Daptomycin is Oct 19,for Gentamicin is Oct 5. 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    
  \" Repeat CBC in am, watch for fever    
  \" Weekly CBC, CMP, CK , gentamicin trough & peak q weekly , MD at [facility] to be notified about f/u on labs /also send reports to my office at [phone]. D/w Dr Kemar Villagomez.    
  \" Thrombus in R/IJ,   mass in RA which is suggestive of thrombus rather than vegetation in light of pacemaker pocket appearing clear of infection.     
  \" Decision made to hold off on removal of pacemaker at this time .  D/w Dr Harriett Carolina & Dr Morelia Zamudio , .  .    
  \" Continue antimicrobials & anticoagulation    
  \" Pt' s wife advised to call if recurrence of fever after completion of therapy    
   
  Bowel Surgery: Colectomy, Partial, with or without Ostomy - Care Day 77 (9/24/2018) by Charlett Gilford, RN     
  Review Status Review Entered    
  Completed 9/26/2018    
  Details    
      
  Care Day: 77 Care Date: 9/24/2018 Level of Care: Telemetry    
  Guideline Day 3     
  Level Of Care    
  (X) Floor    
  9/26/2018 1:20 PM EDT by Gena Moulton    
    telemetry    
      
      
  Clinical Status    
  (X) * No evidence of ileus or bowel obstruction    
      
  Activity    
  (X) * Ambulatory[C]    
  9/26/2018 1:20 PM EDT by Gena Moulton    
    up with assist    
      
      
  Routes    
  (X) * Liquid or usual diet, advance as tolerated    
  9/26/2018 1:20 PM EDT by Gena Moulton    
    tube feeding at goal rate    
      
  (X) IV fluids, medications    
  9/26/2018 1:20 PM EDT by Gena Moulton    
    daptomycin 1000mg iv q 24hrs gentamycin 80mg iv q 24hrs    
      
      
  9/26/2018 1:20 PM EDT by Gena Moulton    
  Subject: Additional Clinical Information    
  VS: T: 9.4, B/P: 146/80, HR 76, RR 16, SPO2 98    
  Na 133, gluc 130, BUN 29,    
  Meds    
  amiodarone po, eliquis po, lasix per gtube, lactulose per gtube, keppra per gtube, lidocaine patch TD, nystantin po, miralax per gtube, Effexor per gtube, insulin sc    
      
      
      
      
      
      
  * Milestone    
      
  Additional Notes    
  9/24/2018    
  INTERNAL MED    
  Assessment / Plan      
  Acute Blood Loss Anemia due to Lower GI Bleed POA    
  > Colonoscopy showed a Large Colon Mass > S/P Colectomy Stage 3 B this hospitalization done July 17 > Developed Ileus then aspiration PNA    
  Was intubated (due to acute respiratory failure) and prolonged ICU stay    
  Extubated 8/26    
   Pneumonia , last sputum culture 8/11 + for Heavy staph aureus , light K.pneumoniae     
  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    
  S/p treated with Levaquin    
  Bilateral Pleural Effusions s/p R chest tube removed 9/4    
  RONAL      
  CPAP Q HS    
        
  Dysphagia, Failed MBS    
  S/p TPN in hospital due to Dysphagia     
  S/P Peg 9/5 Wed > Tolerating Tube feedings well    
  Oral Candidiasis    
  ? Infected Thrombus on NATASHA    
  NATASHA shows definite large mass associated with pacing wire in RA which may represent vegetation ( 8/23)     
  PER  Infectious Disease     
  Continue Daptomycin, Gentamicin IV, change Fluconazole ( 9/3). & levaquin to po    
  Nystatin Swish & spit     
       
  Complete total 6 weeks of daptomycin and gentamycin from the latest (-) blood Culture  which was done on 9/8/2018. So until October 20    
  Has A RT ARM PICC LINE    
  On Lovenox therapeutic    
  Fluconazole IV added 9/3    
  Bacteremia    
  S/p removal of L/IJ placement of PICC RUE , TPN on 8/31    
  Persistent bacteremia with coagulase negative Staph since 8/11 initially oxacillin sensitive , last positive BC is oxacillin resistant 8/21(1/4)    
  Repeat BC 8/26, 8/29 , 9/3 no growth, 9/5 Positive Staph Coag Neg, 9/8 ( negative    
  Chest CT repeated 9/8.  No change in SVC thrombus and Atrial clot size    
       
  SSS S/p PPM and Pacer dependent    
  PAF, on chronic Amiodarone,  Was on IV amiodarone when he was NPO but now on Amiodarone per PEG with GOod control    
  Per ID consult > Thrombus in R/IJ, now  mass in RA which is suggestive of thrombus rather than vegetation in light of pacemaker pocket appearing clear of infection. Hold off on removal of pacemaker at this time .  D/w Dr Ijeoma Dangelo & Dr Tiffanie Lozano , .  .    
  Continue antimicrobials & anticoagulation    
       
  Acute Encephalopathy - resolved    
  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    
  Hx of Seizures due to TBI    
   Has been on Tegretol chronic with controlled Seizures    
  Was changed to IV Keppra when NPO    
  Dr. Clayton Castillo discussed with Dr Loida Lopez (patients Neurologist)    
  Patient  lethargic on 500 mg BID  Keppra but both states Keep on Keppra 250 BID    
  due to better Drug interactions and liver  with all his meds and less Liver issues on    
       
  Severe Deconditioning.    
  Palliative care was involved with discussion with family who decided everything to be done    
       
  He needs to follow up with ID or Cardiology close to October 20 for Reimaging and Cultures or can be done in at [address] [place] [address] determine what to do with Pacers    
       
       
  Hypernatremia - much improved, now on free fluid via Peg    
        
  Code Status: Full    
  NOK POA  :  WIFE    
  DVT Prophylaxis: on lovenox for the thrombus    
       
  Disposition. Insurance denies Steff Olivas, spoke to Dr Yamilet Berrios from insurance as peer to peer on 9/20 but they continue to decline. We have appealed and today found out that has been declined as well. CM working on d.c planning. [place] [id number] Declined Daptomycin. Wife can't take care of him at home with his deconditioning    
       
  Subjective: Pt seen and examined at bedside. NAD. Tolerating tube feeding.  Overnight events d/w RN    
      
      
  INFECTIOUS DISEASE    
  IMPRESSION:    
  \" Bacteremia again with BC 1/2 + for Coagulase negative Staph- Staph Capitis Oxacillin R ( 9/5)     
  \" Repeat BC 9/7 no growth    
  \" S/p Cellulitis LUE     
  \" Repeat CT chest ( 9/7) shows non occlusive thrombus in R/ IJ vein ,small amount of thrombus along right PICC catheter/ pacer leads in SVC which extends to right atrium    
  \" S/p peg placement     
  \" S/p removal of L/IJ placement of PICC RUE , TPN on 8/31    
  \" S/p persistent bacteremia with coagulase negative Staph since 8/11 initially oxacillin sensitive , last positive BC is oxacillin resistant 8/21(1/4)     
   \" Repeat BC 8/26, 8/29 , 9/3 no growth    
  \" S/p failed MBS    
  \" NATASHA shows definite large mass associated with pacing wire in RA which may represent vegetation ( 8/23)     
  \" Thrombus  & tiny gas bubble of as in RIJ extending into SVC to level of RA (8/15)  s/p heparin IV now on lovenox s/q    
  \" CTA chest - no PE, could not visualize SVC thrombus, left lateral wall soft tissue swelling, soft tissue around pacemaker show no significant abnormality    
  \" US chest wall - minimal fluid in pacemaker pocket, no significant inflammation of overlying subcutaneous fat or skin    
  \" Acute respiratory failure s/p  Ventilator / h/o tracheostomy 24 years ago  , s/p extubation 8/26    
  \" S/p R/ pleural effusion s/p chest tube placement     
  \" S/p sputum culture 8/25 + heavy Burkholderia cepacia, s/p treated with Levaquin    
  \" Pneumonia , last sputum culture 8/11 + for Heavy staph aureus , light K.pneumoniae , treated    
  \" Ca colon Stage 3b s/p colectomy / YAMEL / enterotomies    
  \" S/p ileus , aspiration pneumonia prior to ICU admission     
  \" H/o traumatic brain injury 25 years ago      
        
        
  PLAN:    
        
  \" Continue Daptomycin x 6 weeks , Gentamicin IV x 4 weeks from last negative cultures. End date for Daptomycin is Oct 19,for Gentamicin is Oct 5. 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    
  \" Weekly CBC, CMP, CK , gentamicin trough & peak q weekly , MD at [facility] to be notified about f/u on labs /also send reports to my office at [phone]. D/w Dr Antonio Pham.    
  \" Thrombus in R/IJ,   mass in RA which is suggestive of thrombus rather than vegetation in light of pacemaker pocket appearing clear of infection.     
  \" Decision made to hold off on removal of pacemaker at this time .  D/w Dr Mitchell Moore & Dr Kaye Rojas , .  .    
  \" Continue antimicrobials & anticoagulation    
  \" Pt' s wife advised to call if recurrence of fever after completion of therapy    
   
  Bowel Surgery: Colectomy, Partial, with or without Ostomy - Care Day 76 (9/23/2018) by Antonio Nguyen RN     
  Review Status Review Entered    
  Completed 9/26/2018    
  Details    
      
  Care Day: 76 Care Date: 9/23/2018 Level of Care: Telemetry    
  Guideline Day 2     
  Level Of Care    
  (X) Floor    
      
  Clinical Status    
  (X) * Procedure completed    
  (X) * Hemodynamic stability    
      
  Activity    
  (X) * Progressive ambulation    
  9/26/2018 1:16 PM EDT by Chuy Berrios    
    VS: T: 97.8, B/P: 131/73, HR 77, RR 15, SPO2 98    
      
      
  Routes    
  (X) IV fluids, medications    
  9/26/2018 1:16 PM EDT by Chuy Berrios    
    daptomycin 1000mg iv q 24hrs gentamycin 80mg iv q 24hrs    
      
  ( ) Advance diet as tolerated    
  9/26/2018 1:16 PM EDT by Chuy Berrios    
    tube feeding at goal rate    
      
      
  Interventions    
  (X) Enterostomy therapy    
      
  Medications    
  (X) Discontinue perioperative antibiotics. [B]    
      
      
      
      
  * Milestone    
      
  Additional Notes    
  9/23/2018    
  Assessment / Plan      
  Acute Blood Loss Anemia due to Lower GI Bleed POA    
  > Colonoscopy showed a Large Colon Mass > S/P Colectomy Stage 3 B this hospitalization done July 17 > Developed Ileus then aspiration PNA    
  Was intubated (due to acute respiratory failure) and prolonged ICU stay    
  Extubated 8/26    
  Pneumonia , last sputum culture 8/11 + for Heavy staph aureus , light K.pneumoniae     
  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    
  S/p treated with Levaquin    
  Bilateral Pleural Effusions s/p R chest tube removed 9/4    
  RONAL      
  CPAP Q HS    
        
  Dysphagia, Failed MBS    
  S/p TPN in hospital due to Dysphagia     
  S/P Peg 9/5 Wed > Tolerating Tube feedings well    
  Oral Candidiasis    
  ? Infected Thrombus on NATASHA    
   NATASHA shows definite large mass associated with pacing wire in RA which may represent vegetation ( 8/23)     
  PER  Infectious Disease     
  Continue Daptomycin, Gentamicin IV, change Fluconazole ( 9/3). & levaquin to po    
  Nystatin Swish & spit     
       
  Complete total 6 weeks of daptomycin and gentamycin from the latest (-) blood Culture  which was done on 9/8/2018. So until October 20    
  Has A RT ARM PICC LINE    
  On Lovenox therapeutic    
  Fluconazole IV added 9/3    
  Bacteremia    
  S/p removal of L/IJ placement of PICC RUE , TPN on 8/31    
  Persistent bacteremia with coagulase negative Staph since 8/11 initially oxacillin sensitive , last positive BC is oxacillin resistant 8/21(1/4)    
  Repeat BC 8/26, 8/29 , 9/3 no growth, 9/5 Positive Staph Coag Neg, 9/8 ( negative    
  Chest CT repeated 9/8.  No change in SVC thrombus and Atrial clot size    
       
  SSS S/p PPM and Pacer dependent    
  PAF, on chronic Amiodarone,  Was on IV amiodarone when he was NPO but now on Amiodarone per PEG with GOod control    
  Per ID consult > Thrombus in R/IJ, now  mass in RA which is suggestive of thrombus rather than vegetation in light of pacemaker pocket appearing clear of infection. Hold off on removal of pacemaker at this time .  D/w Dr Gamal Asher & Dr Dominique Toure , .  .    
  Continue antimicrobials & anticoagulation    
       
  Acute Encephalopathy - resolved    
  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    
  Hx of Seizures due to TBI    
  Has been on Tegretol chronic with controlled Seizures    
  Was changed to IV Keppra when NPO    
  Dr. Dannie Young discussed with Dr Malcolm Coles (patients Neurologist)    
  Patient  lethargic on 500 mg BID  Keppra but both states Keep on Keppra 250 BID    
  due to better Drug interactions and liver  with all his meds and less Liver issues on    
       
  Severe Deconditioning.    
  Palliative care was involved with discussion with family who decided everything to be done    
       
  He needs to follow up with ID or Cardiology close to October 20 for Reimaging and Cultures or can be done in at [address] [facility] determine what to do with Pacers    
       
       
  Hypernatremia - much improved, now on free fluid via Peg    
        
  Code Status: Full    
  NOK POA  :  WIFE    
  DVT Prophylaxis: on lovenox for the thrombus    
       
  Disposition. Insurance denies Jase Campbell, spoke to Dr Alma Carrasco from insurance as peer to peer on 9/20 but they continue to decline. Now have appealed the decision.    
       
  Subjective: Pt seen and examined at bedside. NAD. Tolerating tube feeding.  Overnight events d/w RN    
       
  Chief Complaint / Reason for Physician Visit : F/U AMS, dysphagia, Bacteremia, PNA, GI bleed, Colon Ca    
       
  Na 133, gluc 143, BUN 26, ca 8.3    
  Meds    
  amiodarone po, eliquis po, lasix per gtube, lactulose per gtube, keppra per gtube, lidocaine patch TD, nystantin po, miralax per gtube, Effexor per gtube    
  insulin sc    
   
  Bowel Surgery: Colectomy, Partial, with or without Ostomy - Care Day 76 (9/22/2018) by Gaby Cadet     
  Review Status Review Entered    
  Completed 9/25/2018    
  Details    
      
  Care Day: 75 Care Date: 9/22/2018 Level of Care: Telemetry    
  Guideline Day 2     
  Level Of Care    
  (X) Floor    
  9/25/2018 3:39 PM EDT by Chauncey Chiu    
    telemetry    
      
      
  Clinical Status    
  (X) * Procedure completed    
  9/25/2018 3:39 PM EDT by Chauncey Chiu    
    S/P Colectomy    
      
  (X) * Hemodynamic stability    
  9/25/2018 3:39 PM EDT by Chauncey Chiu    
    97.9-111/64-70-% RA    
      
      
  Activity    
  ( ) * Progressive ambulation    
      
  Routes    
  (X) IV fluids, medications    
      
  Interventions    
  (X) Enterostomy therapy    
      
      
      
      
  * Milestone    
      
  Additional Notes    
   Acute Blood Loss Anemia due to Lower GI Bleed POA    
  > Colonoscopy showed a Large Colon Mass > S/P Colectomy Stage 3 B this hospitalization done July 17 > Developed Ileus then aspiration PNA    
  Was intubated (due to acute respiratory failure) and prolonged ICU stay    
  Extubated 8/26    
  Pneumonia , last sputum culture 8/11 + for Heavy staph aureus , light K.pneumoniae     
  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    
  S/p treated with Levaquin    
  Bilateral Pleural Effusions s/p R chest tube removed 9/4    
  RONAL      
  CPAP Q HS    
        
  Dysphagia, Failed MBS    
  S/p TPN in hospital due to Dysphagia     
  S/P Peg 9/5 Wed > Tolerating Tube feedings well    
  Oral Candidiasis    
  ? Infected Thrombus on NATASHA    
  NATASHA shows definite large mass associated with pacing wire in RA which may represent vegetation ( 8/23)     
  PER  Infectious Disease     
  Continue Daptomycin, Gentamicin IV, change Fluconazole ( 9/3). & levaquin to po    
  Nystatin Swish & spit     
       
  Complete total 6 weeks of daptomycin and gentamycin from the latest (-) blood Culture  which was done on 9/8/2018.  So until October 20    
  Has A RT ARM PICC LINE    
  On Lovenox therapeutic    
  Fluconazole IV added 9/3    
  Bacteremia    
  S/p removal of L/IJ placement of PICC RUE , TPN on 8/31    
  Persistent bacteremia with coagulase negative Staph since 8/11 initially oxacillin sensitive , last positive BC is oxacillin resistant 8/21(1/4)    
  Repeat BC 8/26, 8/29 , 9/3 no growth, 9/5 Positive Staph Coag Neg, 9/8 ( negative    
  Chest CT repeated 9/8.  No change in SVC thrombus and Atrial clot size    
       
  SSS S/p PPM and Pacer dependent    
  PAF, on chronic Amiodarone,  Was on IV amiodarone when he was NPO but now on Amiodarone per PEG with GOod control    
  Per ID consult > Thrombus in R/IJ, now  mass in RA which is suggestive of thrombus rather than vegetation in light of pacemaker pocket appearing clear of infection. Hold off on removal of pacemaker at this time . D/w Dr Nik Bacon & Dr Renetta Doan , .  .    
  Continue antimicrobials & anticoagulation    
       
  Acute Encephalopathy - resolved    
  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    
  Hx of Seizures due to TBI    
  Has been on Tegretol chronic with controlled Seizures    
  Was changed to IV Keppra when NPO    
  Dr. Marnie Clayton discussed with Dr Cuco Wade (patients Neurologist)    
  Patient  lethargic on 500 mg BID  Keppra but both states Keep on Keppra 250 BID    
  due to better Drug interactions and liver  with all his meds and less Liver issues on    
       
  Severe Deconditioning.    
  Palliative care was involved with discussion with family who decided everything to be done    
       
  He needs to follow up with ID or Cardiology close to October 20 for Reimaging and Cultures or can be done in at [address] [facility] determine what to do with Pacers    
       
       
  Hypernatremia - much improved, now on free fluid via Peg    
  glu 173     
  consult mobility services    
  amiodarone x1 per gtube    
  eliquis x2 per gtube    
  cubincin iv x1    
  Lasix iv x1    
  gentamicin iv x1    
  insulin sc x1    
  keppra x2 per gtube

## 2018-09-26 NOTE — PROGRESS NOTES
Infectious Disease Progress Note IMPRESSION:  
· Bacteremia again with BC 1/2 + for Coagulase negative Staph- Staph Capitis Oxacillin R ( 9/5) Repeat BC 9/7 no growth · S/p increase in WBC to 13.3 ,pt afebrile asymptomatic · S/p Cellulitis LUE · Repeat CT chest ( 9/7) shows non occlusive thrombus in R/ IJ vein ,small amount of thrombus along right PICC catheter/ pacer leads in SVC which extends to right atrium · S/p peg placement · S/p removal of L/IJ placement of PICC RUE , TPN on 8/31 · S/p persistent bacteremia with coagulase negative Staph since 8/11 initially oxacillin sensitive , last positive BC is oxacillin resistant 8/21(1/4) · Repeat BC 8/26, 8/29 , 9/3 no growth ·  S/p failed MBS · NATASHA shows definite large mass associated with pacing wire in RA which may represent vegetation ( 8/23) · Thrombus  & tiny gas bubble of as in RIJ extending into SVC to level of RA (8/15)  s/p heparin IV now on lovenox s/q · CTA chest - no PE, could not visualize SVC thrombus, left lateral wall soft tissue swelling, soft tissue around pacemaker show no significant abnormality · US chest wall - minimal fluid in pacemaker pocket, no significant inflammation of overlying subcutaneous fat or skin · Acute respiratory failure s/p  Ventilator / h/o tracheostomy 24 years ago  , s/p extubation 8/26 · S/p R/ pleural effusion s/p chest tube placement · 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    
  
  
PLAN:  
   
· Continue Daptomycin x 6 weeks , Gentamicin IV x 4 weeks from last negative cultures. End date for Daptomycin is Oct 19,for Gentamicin is Oct 5.  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 · Repeat CBC in am, watch for fever · Weekly CBC, CMP, CK , gentamicin trough & peak q weekly , MD at [facility] to be notified about f/u on labs /also send reports to my office at [phone]. D/w Dr Su Hoang. · Thrombus in R/IJ,   mass in RA which is suggestive of thrombus rather than vegetation in light of pacemaker pocket appearing clear of infection. · Decision made to hold off on removal of pacemaker at this time . D/w Dr Agudelo Late Dr Fowler [place] , .  . · Continue antimicrobials & anticoagulation · Pt' s wife advised to call if recurrence of fever after completion of therapy Blood culture - Special Requests: NO SPECIAL REQUESTS   Preliminary Culture result: NO GROWTH 2 DAYS Blood culture - Culture result:    Final  
STAPHYLOCOCCUS CAPITIS SUBSPECIES UREOLYTICUS (OXACILLIN RESISTANT) , ISOLATED FROM 1 OF 2 BOTTLES DRAWN. .. LEFT HAND SITE (A) Subjective:  
 
Pt seen. Awake, No complaints. Wife at bedside. Review of Systems:  Pt denies complaints. 10 point ROS obtained Objective:  
 
Blood pressure 141/74, pulse 80, temperature 98.2 °F (36.8 °C), resp. rate 18, height 6' 2\" (1.88 m), weight 249 lb (112.9 kg), SpO2 98 %. Temp (24hrs), Av.8 °F (36.6 °C), Min:97.2 °F (36.2 °C), Max:98.7 °F (37.1 °C) Patient Vitals for the past 24 hrs: 
 Temp Pulse Resp BP SpO2  
18 1939 98.2 °F (36.8 °C) 80 18 141/74 98 %  
18 1703 97.7 °F (36.5 °C) 76 18 112/59 97 % 18 1112 97.3 °F (36.3 °C) 82 18 131/61 97 % 18 0724 97.5 °F (36.4 °C) 81 18 122/66 94 % 18 0300 98.7 °F (37.1 °C) 86 19 103/52 98 %  
18 2300 97.2 °F (36.2 °C) 79 18 127/64 98 % Lines:  picc Physical Exam:  
General:   awake, talking Lungs:    Reduced air entry bases on  auscultation  chest wall-  pacemaker site -no swelling, erythemal  
CV:  Regular rate and rhythm,no murmur, Abdomen:   Soft, non-tender. bowel sounds +distended Extremities: LUE - erythema distal forearm is less Lines/Devices:  Intact, no erythema, drainage or tenderness Data Review: CBC:  
Recent Labs  
   09/25/18 
 0433 WBC  13.3*  
RBC  3.38* HGB  8.9* HCT  29.5*  
PLT  259 GRANS  73 LYMPH  13 EOS  3 CMP:  
Recent Labs  
   09/25/18 
 0433  09/24/18 
 0144  09/23/18 
 0350 GLU  142*  130*  143* NA  132*  133*  133* K  4.1  4.0  4.1 CL  96*  97  99 CO2  28  28  26 BUN  30*  29*  26* CREA  0.98  0.92  0.90  
CA  9.2  8.8  8.3* AGAP  8  8  8 BUCR  31*  32*  29* Studies:     
Lab Results Component Value Date/Time Culture result: NO GROWTH 6 DAYS 09/08/2018 01:46 AM  
 Culture result: (A) 09/05/2018 07:03 PM  
  STAPHYLOCOCCUS CAPITIS SUBSPECIES UREOLYTICUS (OXACILLIN RESISTANT) , ISOLATED FROM 1 OF 2 BOTTLES DRAWN. .. LEFT HAND SITE Culture result: (A) 09/05/2018 07:03 PM  
  PRELIMINARY REPORT OF GRAM POSITIVE COCCI IN CLUSTERS GROWING IN 1 OF 2 BOTTLES DRAWN CALLED TO AND READ BACK BY FELIX CAMPOS RN [id number] AT 2106 ON 9/6/2018. [place] [id number] Culture result:  09/05/2018 07:03 PM  
   [facility] REQUESTING IDENTIFICATION AND SENSITIVITIES 9/10/18 TA. Culture result: REMAINING BOTTLE(S) HAS/HAVE NO GROWTH IN 5 DAYS 09/05/2018 07:03 PM  
  
 
XR Results (most recent): 
 
Results from Hospital Encounter encounter on 07/10/18 XR ABD PORT  1 V Narrative EXAM: KUB INDICATION: abd distension. 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 Portable supine KUB obtained at 0729 hours shows no bowel distention. There are 
vascular calcifications. Impression IMPRESSION: Normal bowel gas pattern. Patient Active Problem List  
Diagnosis Code  
 HTN (hypertension) I10  
 Depression F32.9  Hypercholesterolemia E78.00  Acid reflux K21.9  Seizure ([facility] R56.9  Hypothyroidism E03.9  Hyponatremia E87.1  BPH (benign prostatic hyperplasia) N40.0  Rash R21  
 Cellulitis L03.90  Wax in ear H61.20  Ulcer [id number]  Small bowel obstruction ([facility] C43.187  Atrial flutter ([facility]) I48.92  
 Atrial fibrillation or flutter  CHI (closed head injury) S09. 90XA  Basal cell cancer C44.91  
 TBI (traumatic brain injury) ([facility] S06. 9X9A  Atrial fibrillation (HCC) I48.91  
 Cataract H26.9  Constipation K59.00  Ankle fracture, left F57.741O  Insomnia G47.00  Tinea corporis B35.4  SSS (sick sinus syndrome) ([facility]) I49.5  Syncope R55  Seizure disorder ([facility] G40.909  RONAL on CPAP G47.33, Z99.89  
 Pacemaker Z95.0  Pre-op evaluation Z01.818  Chronic back pain greater than 3 months duration M54.9, G89.29  
 Cerebral infarction ([facility]) I63.9  Acute bronchitis J20.9  Screening for colon cancer Z12.11  
 Chronic right shoulder pain M25.511, G89.29  
 Common wart B07.8  Localized epilepsy with impairment of consciousness ([facility] G40.209  Severe obesity (BMI 35.0-39.9) ([facility]) E66.01  
 Acute blood loss anemia D62  Anasarca R60.1  GI bleed K92.2  Acute encephalopathy G93.40  Idiopathic hypotension I95.0  Counseling regarding goals of care Z71.89 ICD-10-CM ICD-9-CM 1. Anemia, unspecified type D64.9 285.9 2. Generalized weakness R53.1 780.79   
3. Acute encephalopathy G93.40 348.30   
4. Anasarca R60.1 782.3 5. Idiopathic hypotension I95.0 458.9 6. Counseling regarding goals of care Z71.89 V65.49   
7. 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 9X9S 907.0 8. Chronic atrial fibrillation (HCC) I48.2 427.31   
9. Malignant neoplasm of ascending colon ([facility]) C18.2 153.6 Anti-infectives:  
  
 Daptomycin IV - 8/26- 9/3 Gentamicin IV 8/26 S/p levaquin 8/29-9/7 Cefotetan 7/17 Zosyn 7/20-8/7 Cefepime - 8/11-8/13 Ceftriaxone 8/13-8/17 Fluconazole IV- 9/3- 9/6 IV , 9/6-9/10-po Vancomycin 8/11-8/22 Daptomycin 8/23- 8/24- Naficillin IV 8/24- 8/26  Vielka Pelayo MD FACP

## 2018-09-26 NOTE — PROGRESS NOTES
Bedside shift change report given to [facility], RN (oncoming nurse) by Peyton Siemens, RN (offgoing nurse). Report included the following information SBAR, Kardex, MAR and Recent Results.

## 2018-09-26 NOTE — PROGRESS NOTES
Hospitalist Progress Note NAME: Real Mojica :  1940 MRN:  [id number] Interim summary: S/p Colonoscopy: Large Colon Mass > S/P Colectomy Stage 3 B this hospitalization done  > Developed Ileus then aspiration PNA Was intubated (due to acute respiratory failure) and prolonged ICU stay Extubated  Pneumonia , last sputum culture  + for Heavy staph aureus , light K.pneumoniae Sputum culture  + heavy Burkholderia cepacia S/p treated with Levaquin Bilateral Pleural Effusions s/p R chest tube removed  Assessment / Plan: 
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  
-leukocytosis is trending up 
cxray today negative  
-ID help appreciated Dapto + Genta  
-continue to monitor, if wbs continue to trend up , he may need another CT  
-S/p removal of L/IJ placement of PICC RUE Acute Blood Loss Anemia due to Lower GI Bleed POA 
-Hb is stable now S/p Colonoscopy: Large Colon Mass > S/P Colectomy Stage 3 B this hospitalization done   
   
Dysphagia - speech eval appreciated: MBS in am  
S/p TPN  --> S/P Peg  Tolerating TF  
  
SSS S/p PPM and Pacer dependent / PAF / HTN 
- HR controlled. BP stable  
-continue amiodarone  
-Per ID consult: Thrombus in R/IJ, now mass in RA which is suggestive of thrombus rather than vegetation in light of pacemaker pocket appearing clear of infection. Hold off on removal of pacemaker at this time  
-cont Eliquis  
  
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 
-Has been on Tegretol PTA which was changed to Keppra IV while NPO 
+ lethargy on keppra 500 mg bid Seen by neurology: Dr. Dannie Young discussed with Dr Malcolm Coles (patients Neurologist) and in agreement to keep pt on Keppra 250 BID due to better Drug interactions and liver  with all his meds and less Liver issues on 
  
 Severe Deconditioning. 
-Palliative care was involved with discussion with family who decided everything to be done 
  
He needs to follow up with ID or Cardiology close to October 20 for Reimaging and Cultures or can be done in at [facility]. To determine what to do with Pacers 
  
RONAL, cpap at night Hypernatremia, resolved Hyponatremia HCAP Bilateral pleural effusions Acute hypoxic respiratory failure not POA resolved Hyperlipidemia, on statin pta ( on hold with Dapto) Oral candidiasis , cont nystatin, s/p fluconazole  
 
 
   
Code Status: Full NOK POA :  Wife DVT Prophylaxis: Eliquis  
  
Disposition: difficult disposition: Insurance denies [id number] despite pear to pear and appeal. Working with [facility] if they will accept. PICC 8/31 R Subjective: Chief Complaint / Reason for Physician Visit: following bacteremia / GI bleeding Seen during am rounding He keep asking for food/ice chips Discussed with RN events overnight. Review of Systems: 
Symptom Y/N Comments  Symptom Y/N Comments Fever/Chills n   Chest Pain n   
Poor Appetite    Edema Cough    Abdominal Pain n   
Sputum    Joint Pain SOB/BECERRIL n   Pruritis/Rash Nausea/vomit    Tolerating PT/OT Diarrhea    Tolerating Diet Constipation    Other Could NOT obtain due to:   
 
Objective: VITALS:  
Last 24hrs VS reviewed since prior progress note. Most recent are: 
Patient Vitals for the past 24 hrs: 
 Temp Pulse Resp BP SpO2  
09/26/18 1543 97.4 °F (36.3 °C) 74 18 117/54 98 %  
09/26/18 1106 97.4 °F (36.3 °C) 82 18 135/79 98 %  
09/26/18 0746 97.7 °F (36.5 °C) 83 18 155/84 96 %  
09/26/18 0352 97.2 °F (36.2 °C) 84 20 129/56 98 %  
09/25/18 2349 98.6 °F (37 °C) 76 19 131/66 98 %  
09/25/18 1939 98.2 °F (36.8 °C) 80 18 141/74 98 % Intake/Output Summary (Last 24 hours) at 09/26/18 1715 Last data filed at 09/26/18 1542 Gross per 24 hour Intake             2550 ml  
 Output              500 ml Net             2050 ml PHYSICAL EXAM: 
General: WD, WN. Alert, cooperative, no acute distress   
EENT:  EOMI. Anicteric sclerae. MMM Resp:  CTA bilaterally, no wheezing or rales. No accessory muscle use CV:  Regular  rhythm,  No edema GI:  Soft, Non distended, Non tender.  +Bowel sounds. PEG tube /Colostomy intact Neurologic:  Alert and oriented X 3, normal speech, Psych:   Fair/poor insight. Not anxious nor agitated Skin:  No rashes. No jaundice Reviewed most current lab test results and cultures  YES Reviewed most current radiology test results   YES Review and summation of old records today    NO Reviewed patient's current orders and MAR    YES 
PMH/SH reviewed - no change compared to H&P 
________________________________________________________________________ Care Plan discussed with: 
  Comments Patient y Family RN y   
Care Manager Consultant Multidiciplinary team rounds were held today with , nursing, pharmacist and clinical coordinator. Patient's plan of care was discussed; medications were reviewed and discharge planning was addressed. ________________________________________________________________________ Total NON critical care TIME:  25   Minutes Total CRITICAL CARE TIME Spent:   Minutes non procedure based Comments >50% of visit spent in counseling and coordination of care    
________________________________________________________________________ Shauna Nazario MD  
 
Procedures: see electronic medical records for all procedures/Xrays and details which were not copied into this note but were reviewed prior to creation of Plan. LABS: 
I reviewed today's most current labs and imaging studies. Pertinent labs include: 
Recent Labs  
   09/26/18 
 0430  09/25/18 
 0433 WBC  14.3*  13.3* HGB  9.1*  8.9* HCT  29.8*  29.5*  
PLT  297  259 Recent Labs  
   09/26/18 0430  09/25/18 
 2188  09/24/18 
 0144 NA  133*  132*  133* K  4.3  4.1  4.0  
CL  98  96*  97  
CO2  27  28  28 GLU  131*  142*  130* BUN  33*  30*  29* CREA  0.90  0.98  0.92  
CA  8.6  9.2  8.8 Signed: Brennen Hudson MD

## 2018-09-27 ENCOUNTER — HOSPICE ADMISSION (OUTPATIENT)
Dept: HOSPICE | Facility: HOSPICE | Age: 78
End: 2018-09-27
Payer: MEDICARE

## 2018-09-27 ENCOUNTER — APPOINTMENT (OUTPATIENT)
Dept: GENERAL RADIOLOGY | Age: 78
DRG: 329 | End: 2018-09-27
Attending: HOSPITALIST
Payer: MEDICARE

## 2018-09-27 LAB
ANION GAP SERPL CALC-SCNC: 9 MMOL/L (ref 5–15)
BASOPHILS # BLD: 0.1 K/UL (ref 0–0.1)
BASOPHILS NFR BLD: 1 % (ref 0–1)
BUN SERPL-MCNC: 29 MG/DL (ref 6–20)
BUN/CREAT SERPL: 33 (ref 12–20)
CALCIUM SERPL-MCNC: 8.8 MG/DL (ref 8.5–10.1)
CHLORIDE SERPL-SCNC: 96 MMOL/L (ref 97–108)
CO2 SERPL-SCNC: 28 MMOL/L (ref 21–32)
CREAT SERPL-MCNC: 0.89 MG/DL (ref 0.7–1.3)
DIFFERENTIAL METHOD BLD: ABNORMAL
EOSINOPHIL # BLD: 0.4 K/UL (ref 0–0.4)
EOSINOPHIL NFR BLD: 3 % (ref 0–7)
ERYTHROCYTE [DISTWIDTH] IN BLOOD BY AUTOMATED COUNT: 18.6 % (ref 11.5–14.5)
GLUCOSE BLD STRIP.AUTO-MCNC: 108 MG/DL (ref 65–100)
GLUCOSE BLD STRIP.AUTO-MCNC: 134 MG/DL (ref 65–100)
GLUCOSE BLD STRIP.AUTO-MCNC: 136 MG/DL (ref 65–100)
GLUCOSE BLD STRIP.AUTO-MCNC: 163 MG/DL (ref 65–100)
GLUCOSE SERPL-MCNC: 113 MG/DL (ref 65–100)
HCT VFR BLD AUTO: 29 % (ref 36.6–50.3)
HGB BLD-MCNC: 8.8 G/DL (ref 12.1–17)
IMM GRANULOCYTES # BLD: 0.1 K/UL (ref 0–0.04)
IMM GRANULOCYTES NFR BLD AUTO: 1 % (ref 0–0.5)
LYMPHOCYTES # BLD: 2.2 K/UL (ref 0.8–3.5)
LYMPHOCYTES NFR BLD: 15 % (ref 12–49)
MCH RBC QN AUTO: 26.4 PG (ref 26–34)
MCHC RBC AUTO-ENTMCNC: 30.3 G/DL (ref 30–36.5)
MCV RBC AUTO: 87.1 FL (ref 80–99)
MONOCYTES # BLD: 1.5 K/UL (ref 0–1)
MONOCYTES NFR BLD: 10 % (ref 5–13)
NEUTS SEG # BLD: 10.4 K/UL (ref 1.8–8)
NEUTS SEG NFR BLD: 71 % (ref 32–75)
NRBC # BLD: 0 K/UL (ref 0–0.01)
NRBC BLD-RTO: 0 PER 100 WBC
PLATELET # BLD AUTO: 310 K/UL (ref 150–400)
PMV BLD AUTO: 10.2 FL (ref 8.9–12.9)
POTASSIUM SERPL-SCNC: 4.3 MMOL/L (ref 3.5–5.1)
RBC # BLD AUTO: 3.33 M/UL (ref 4.1–5.7)
SERVICE CMNT-IMP: ABNORMAL
SODIUM SERPL-SCNC: 133 MMOL/L (ref 136–145)
WBC # BLD AUTO: 14.7 K/UL (ref 4.1–11.1)

## 2018-09-27 PROCEDURE — 87040 BLOOD CULTURE FOR BACTERIA: CPT | Performed by: INTERNAL MEDICINE

## 2018-09-27 PROCEDURE — 92611 MOTION FLUOROSCOPY/SWALLOW: CPT

## 2018-09-27 PROCEDURE — 65270000029 HC RM PRIVATE

## 2018-09-27 PROCEDURE — 3C1ZX8Z IRRIGATION OF INDWELLING DEVICE USING IRRIGATING SUBSTANCE, EXTERNAL APPROACH: ICD-10-PCS | Performed by: INTERNAL MEDICINE

## 2018-09-27 PROCEDURE — 74011000258 HC RX REV CODE- 258: Performed by: INTERNAL MEDICINE

## 2018-09-27 PROCEDURE — 74011250636 HC RX REV CODE- 250/636: Performed by: INTERNAL MEDICINE

## 2018-09-27 PROCEDURE — 74011250636 HC RX REV CODE- 250/636: Performed by: HOSPITALIST

## 2018-09-27 PROCEDURE — 74011250637 HC RX REV CODE- 250/637: Performed by: INTERNAL MEDICINE

## 2018-09-27 PROCEDURE — 36593 DECLOT VASCULAR DEVICE: CPT

## 2018-09-27 PROCEDURE — 77030018798 HC PMP KT ENTRL FED COVD -A

## 2018-09-27 PROCEDURE — 74230 X-RAY XM SWLNG FUNCJ C+: CPT

## 2018-09-27 PROCEDURE — 36415 COLL VENOUS BLD VENIPUNCTURE: CPT | Performed by: HOSPITALIST

## 2018-09-27 PROCEDURE — 74011000250 HC RX REV CODE- 250: Performed by: INTERNAL MEDICINE

## 2018-09-27 PROCEDURE — 80048 BASIC METABOLIC PNL TOTAL CA: CPT | Performed by: HOSPITALIST

## 2018-09-27 PROCEDURE — 74011000250 HC RX REV CODE- 250: Performed by: HOSPITALIST

## 2018-09-27 PROCEDURE — 82962 GLUCOSE BLOOD TEST: CPT

## 2018-09-27 PROCEDURE — 74011636637 HC RX REV CODE- 636/637: Performed by: INTERNAL MEDICINE

## 2018-09-27 PROCEDURE — 77030019895 HC PCKNG STRP IODO -A

## 2018-09-27 PROCEDURE — 85025 COMPLETE CBC W/AUTO DIFF WBC: CPT | Performed by: HOSPITALIST

## 2018-09-27 RX ADMIN — NYSTATIN 500000 UNITS: 100000 SUSPENSION ORAL at 18:06

## 2018-09-27 RX ADMIN — APIXABAN 5 MG: 5 TABLET, FILM COATED ORAL at 20:40

## 2018-09-27 RX ADMIN — LEVETIRACETAM 250 MG: 500 SOLUTION ORAL at 10:35

## 2018-09-27 RX ADMIN — Medication 30 ML: at 02:38

## 2018-09-27 RX ADMIN — INSULIN LISPRO 3 UNITS: 100 INJECTION, SOLUTION INTRAVENOUS; SUBCUTANEOUS at 00:10

## 2018-09-27 RX ADMIN — LEVETIRACETAM 250 MG: 500 SOLUTION ORAL at 20:40

## 2018-09-27 RX ADMIN — LACTULOSE 10 G: 20 SOLUTION ORAL at 10:35

## 2018-09-27 RX ADMIN — DAPTOMYCIN 1000 MG: 500 INJECTION, POWDER, LYOPHILIZED, FOR SOLUTION INTRAVENOUS at 18:05

## 2018-09-27 RX ADMIN — INSULIN LISPRO 3 UNITS: 100 INJECTION, SOLUTION INTRAVENOUS; SUBCUTANEOUS at 12:27

## 2018-09-27 RX ADMIN — Medication 10 ML: at 06:04

## 2018-09-27 RX ADMIN — VENLAFAXINE 37.5 MG: 37.5 TABLET ORAL at 18:05

## 2018-09-27 RX ADMIN — FUROSEMIDE 60 MG: 40 TABLET ORAL at 10:35

## 2018-09-27 RX ADMIN — Medication 10 ML: at 18:05

## 2018-09-27 RX ADMIN — APIXABAN 5 MG: 5 TABLET, FILM COATED ORAL at 10:32

## 2018-09-27 RX ADMIN — VENLAFAXINE 37.5 MG: 37.5 TABLET ORAL at 10:32

## 2018-09-27 RX ADMIN — AMIODARONE HYDROCHLORIDE 200 MG: 200 TABLET ORAL at 10:32

## 2018-09-27 RX ADMIN — WATER 1 MG: 1 INJECTION INTRAMUSCULAR; INTRAVENOUS; SUBCUTANEOUS at 15:56

## 2018-09-27 RX ADMIN — GENTAMICIN SULFATE 80 MG: 40 INJECTION, SOLUTION INTRAMUSCULAR; INTRAVENOUS at 06:03

## 2018-09-27 RX ADMIN — POLYETHYLENE GLYCOL 3350 17 G: 17 POWDER, FOR SOLUTION ORAL at 10:35

## 2018-09-27 RX ADMIN — NYSTATIN 500000 UNITS: 100000 SUSPENSION ORAL at 21:07

## 2018-09-27 RX ADMIN — Medication 20 ML: at 18:06

## 2018-09-27 RX ADMIN — LACTULOSE 10 G: 20 SOLUTION ORAL at 18:05

## 2018-09-27 RX ADMIN — NYSTATIN 500000 UNITS: 100000 SUSPENSION ORAL at 12:57

## 2018-09-27 RX ADMIN — NYSTATIN 500000 UNITS: 100000 SUSPENSION ORAL at 10:35

## 2018-09-27 RX ADMIN — INSULIN LISPRO 3 UNITS: 100 INJECTION, SOLUTION INTRAVENOUS; SUBCUTANEOUS at 06:03

## 2018-09-27 RX ADMIN — Medication 10 ML: at 21:07

## 2018-09-27 RX ADMIN — Medication 20 ML: at 18:21

## 2018-09-27 RX ADMIN — ACETAMINOPHEN 650 MG: 325 TABLET ORAL at 01:46

## 2018-09-27 NOTE — PROGRESS NOTES
Problem: Dysphagia (Adult) Goal: *Acute Goals and Plan of Care (Insert Text) Re-evaluation 9/27/2018 1. Patient will tolerate puree/thin liquid diet with no overt s/s aspiration within 7 days 2. Patient will tolerate trials of soft solids with timely/complete mastication and full oral clearance within 7 days Speech path reeval, continue goals 9/20/2018 1. Pt will complete simple oropharyngeal strengthening exercises with max cues. Continue for 7 days 9/20/2018. Discontinue 2. Added 9/24/2018 patient will participate in repeat MBS within 7 days. MET Speech path goals Initiated 9/6/18 1. Patient will follow simple oropharyngeal strengthening exercises with max cues within 7 days. Continue Initiated 8/30/2018 1. Patient will participate in [facility] tomorrow. Completed. 2. Pt will participate with oral and pharyngeal swallowing exercises to improve swallowing function. Continue 1. Pt will tolerate purees and nectar thick liquids with no overt s/s of aspiration. Discontinue Speech pathology goals Initiated 8/3/2018 1. Patient will tolerate sips and chips with no overt s/s aspiration within 7 days. Goal met 8/10. 
2. Patient will participate in re-evaluation of swallow function within 7 days. Goal met 8/10. 3. Pt will participate with MBS today 8/10. Goal met 8/10. Speech Pathology Modified barium swallow Study Patient: Bev Duffy (51 y.o. male) Date: 9/27/2018 Primary Diagnosis: Acute blood loss anemia GI bleed Anasarca GI Bleed 
gi bleed ASCENDING COLON CANCER  
aspiration ASPIRATION PNEUMONIA Procedure(s) (LRB): ESOPHAGOGASTRODUODENOSCOPY (EGD) PERCUTANEOUS ENDOSCOPIC GASTROSTOMY TUBE INSERTION (N/A) 22 Days Post-Op Precautions: swallow,  Fall, Skin, Seizure, Aspiration ASSESSMENT : 
Based on the objective data described below, the patient presents with significantly improved swallow function compared to his past 2 MBS this admission. Patient now with mild-moderate oral and mild pharyngeal dysphagia. Oral dysphagia is characterized by prolonged, incomplete mastication, however patient was not wearing his dentures for MBS and patient reported the cracker was too dry. Patient spit out the solid. Patient also with mildly slow manipulation of puree, mild oral holding, and mildly delayed posterior propulsion. Patient with trace lingual residue with thin liquids, mild residue with purees, and mild-moderate residue with solids. Pharyngeal dysphagia is characterized by intermittently delayed swallow initiation at the vallecula and x1 at the pyriform sinus, however initiation was timely for majority of swallows. Patient also with mildly decreased anterior hyoid excursion and tongue base retraction, however pharyngeal movement was overall WFL. Patient with CP bar that did not impede bolus flow. Patient with penetration of thin liquids before the swallow x1 secondary to premature spillage with thin liquid wash after attempted solid trial. Penetration cleared with the force of the swallow. No aspiration observed. Of note, patient with cough x1 after MBS finished despite no significant residue or laryngeal compromise. AddendumMelonie Railing to patient's room to discuss MBS results with wife, however wife not available. Called wife and provided education regarding MBS results and diet recommendation. Wife with questions about tube feeding and PO intake, and deferred to MD and RD. Wife reported feeling nervous making all these decisions for her , however agreed to beginning diet. Wife reported that she wants him to be comfortable and she thinks eating (especially with diet with LOW aspiration risk) will help. Wife eager for patient to have ice chips as patient frequently asks for them. Discussed with RN and Dr. Jon Brink. Patient will benefit from skilled intervention to address the above impairments. Patients rehabilitation potential is considered to be Good Factors which may influence rehabilitation potential include:  
[]              None noted [x]              Mental ability/status [x]              Medical condition []              Home/family situation and support systems []              Safety awareness []              Pain tolerance/management 
[]              Other: PLAN : 
Recommendations and Planned Interventions: 
--Full liquid diet 
--STRICT 1:1 SUPERVISION FOR ALL PO INTAKE. STOP PO IF ANY CHANGE IN VITAL SIGNS, RESPIRATORY STATUS, OR S/S ASPIRATION 
--Straws ok, but small sips 
--Meds in puree, whole vs crushed as tolerated 
--SLP to follow for diet tolerance and solid trials as appropriate Frequency/Duration: Patient will be followed by speech-language pathology 3 times a week to address goals. Discharge Recommendations: Loki Davies SUBJECTIVE:  
Patient stated I'm uncomfortable.  Patient oriented x3. Disoriented to month however oriented to year. Cognition is improving. OBJECTIVE:  
 
Past Medical History:  
Diagnosis Date  A-fib ([facility]  Acute bronchitis 8/25/2015  Anxiety  Arrhythmia   
 atrial fibrillation  Arthritis  BCC (basal cell carcinoma of skin)  BPH (benign prostatic hyperplasia)  Chronic back pain greater than 3 months duration 5/4/2015  Chronic right shoulder pain 1/11/2016  Common wart 5/16/2016  Constipation 12/14/2012  CVA (cerebral infarction) 5/4/2015  Depression  GERD (gastroesophageal reflux disease)  Hearing loss   
 bilateral hearing aids  Hemiplegia following CVA (cerebrovascular accident) ([address]   
 left side hemiparesis  History of colostomy  HTN (hypertension)  Hyperlipidemia  Localized epilepsy with impairment of consciousness ([address]  Prostate cancer ([facility] [address] Status post XRT, Lupron  Screening for colon cancer 11/4/2015  Stroke ([facility] [address] traumatic from neck crushing  TBI (traumatic brain injury) ([facility] [address] 1994  Unspecified sleep apnea   
 on CPAP Past Surgical History:  
Procedure Laterality Date  COLONOSCOPY N/A 7/12/2018 COLONOSCOPY through stoma performed by London Madrid MD at [facility] ENDOSCOPY  
 HX ANKLE FRACTURE TX    
 HX CATARACT REMOVAL    
 bilat  HX COLECTOMY colostomy placed and revised  HX COLECTOMY  07/17/2018  
 mass removed  HX HEENT    
 ear surgery eddie.  HX HERNIA REPAIR    
 HX ORTHOPAEDIC    
 left hip pinning  HX PACEMAKER  2014 Prior Level of Function/Home Situation:  
Home Situation Home Environment: Private residence One/Two Story Residence: One story Living Alone: No 
Support Systems: Spouse/Significant Other/Partner Patient Expects to be Discharged to[de-identified] Private residence Current DME Used/Available at Home: Wheelchair (AFO) Tub or Shower Type:  (unable to access, sponge bath at sink) Diet prior to admission: regular/thin Current Diet:  NPO Radiologist: Dr. Judy Antonio Film Views: Lateral;Fluoro Patient Position: upright in chair Trial 1:  
Consistency Presented: Thin liquid;Puree; Solid How Presented: Self-fed/presented;Straw;Successive swallows;SLP-fed/presented;Spoon Bolus Acceptance: No impairment Bolus Formation/Control: Impaired: Delayed; Incomplete;Mastication Propulsion: Delayed (# of seconds) Oral Residue: Lingual;Other (comment) (mild) Initiation of Swallow: Triggered at vallecula (mostly, pyriforms x1) Timing: No impairment;Pooling 1-5 sec Penetration: Before swallow (x1, cleared with swallow) Aspiration/Timing: No evidence of aspiration Pharyngeal Clearance: No residue Attempted Modifications: Straw; Alternate liquids/solids Effective Modifications: Straw Cues for Modifications: Minimal  
   
 
 
Decreased Tongue Base Retraction?: Yes Laryngeal Elevation: Other (comment) (reduced anterior hyoid excursion) Aspiration/Penetration Score: 2 (Penetration/No residue-Contrast enters the airway penetrates, remains above the folds/cords, and is cleared) Pharyngeal Symmetry: Not assessed Pharyngeal-Esophageal Segment: Other (comment) (CP bar, did not impede bolus flow) Pharyngeal Dysfunction: Crico-pharyngeal dysfunction;Decreased tongue base retraction Oral Phase Severity: Mild-moderate Pharyngeal Phase Severity: Mild NOMS:  
The NOMS functional outcome measure was used to quantify this patient's level of swallowing impairment. Based on the NOMS, the patient was determined to be at level 3 for swallow function G Codes: In compliance with CMSs Claims Based Outcome Reporting, the following G-code set was chosen for this patient based the use of the NOMS functional outcome to quantify this patient's level of swallowing impairment. Using the NOMS, the patient was determined to be at level 3 for swallow function which correlates with the CL= 60-79% level of severity. Based on the objective assessment provided within this note, the current, goal, and discharge g-codes are as follows: 
 
Swallow  Swallowing: 
 Swallow Current Status CL= 60-79%  Swallow Goal Status CJ= 20-39% NOMS Swallowing Levels: 
Level 1 (CN): NPO Level 2 (CM): NPO but takes consistency in therapy Level 3 (CL): Takes less than 50% of nutrition p.o. and continues with nonoral feedings; and/or safe with mod cues; and/or max diet restriction Level 4 (CK): Safe swallow but needs mod cues; and/or mod diet restriction; and/or still requires some nonoral feeding/supplements Level 5 (CJ): Safe swallow with min diet restriction; and/or needs min cues Level 6 (CI): Independent with p.o.; rare cues; usually self cues; may need to avoid some foods or needs extra time Level 7 (CH): Independent for all p.o. [place]. (2003). National Outcomes Measurement System (NOMS): Adult Speech-Language Pathology User's Guide. COMMUNICATION/EDUCATION:  
The patients plan of care including findings from MBS, recommendations, planned interventions, and recommended diet changes were discussed with: Registered Nurse. [x]  Posted safety precautions in patient's room. [x]  Patient/family have participated as able in goal setting and plan of care. [x]  Patient/family agree to work toward stated goals and plan of care. []  Patient understands intent and goals of therapy, but is neutral about his/her participation. []  Patient is unable to participate in goal setting and plan of care. Thank you for this referral. 
DIPAK Louis Time Calculation: 20 mins

## 2018-09-27 NOTE — PROGRESS NOTES
Hospitalist Progress Note NAME: Yeimi Chavis :  1940 MRN:  [id number] Interim summary: S/p Colonoscopy: Large Colon Mass > S/P Colectomy Stage 3 B this hospitalization done  > Developed Ileus then aspiration PNA Was intubated (due to acute respiratory failure) and prolonged ICU stay Extubated  Pneumonia , last sputum culture  + for Heavy staph aureus , light K.pneumoniae Sputum culture  + heavy Burkholderia cepacia S/p treated with Levaquin Bilateral Pleural Effusions s/p R chest tube removed  Assessment / Plan: 
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  
-leukocytosis started going up again on   
cxray negative  
-ID help appreciated New BC done today - will follow and decide on CT Dapto + Genta  
-S/p removal of L/IJ placement of PICC RUE Acute Blood Loss Anemia due to Lower GI Bleed POA 
-Hb is stable now S/p Colonoscopy: Large Colon Mass > S/P Colectomy Stage 3 B this hospitalization done   
   
Dysphagia - speech eval appreciated: MBS in am  
S/p TPN  --> S/P Peg  Tolerating TF  
  
SSS S/p PPM and Pacer dependent / PAF / HTN 
- HR controlled. BP stable  
-continue amiodarone  
-Per ID consult: Thrombus in R/IJ, now mass in RA which is suggestive of thrombus rather than vegetation in light of pacemaker pocket appearing clear of infection. Hold off on removal of pacemaker at this time  
-cont Eliquis  
  
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 
-Has been on Tegretol PTA which was changed to Keppra IV while NPO 
+ lethargy on keppra 500 mg bid Seen by neurology: Dr. Wally Rao discussed with Dr Carol Jackson (patients Neurologist) and in agreement to keep pt on Keppra 250 BID due to better Drug interactions and liver  with all his meds and less Liver issues on 
  
 Severe Deconditioning. 
-Palliative care was involved with discussion with family who decided everything to be done 
  
He needs to follow up with ID or Cardiology close to October 20 for Reimaging and Cultures or can be done in at [facility]. To determine what to do with Pacers 
  
RONAL, cpap at night Hypernatremia, resolved Hyponatremia HCAP Bilateral pleural effusions Acute hypoxic respiratory failure not POA resolved Hyperlipidemia, on statin pta ( on hold with Dapto) Oral candidiasis , cont nystatin, s/p fluconazole  
 
 
   
Code Status: Full NOK POA :  Wife - MPOA Son Dillan Massey [phone] DVT Prophylaxis: Eliquis  
  
Disposition: difficult disposition: Insurance denies [facility] despite pear to pear and appeal. [facility] with [facility] if they will accept. PICC 8/31 R Subjective: Chief Complaint / Reason for Physician Visit: following bacteremia / GI bleeding Seen during am rounding He keep asking for food/ice chips Discussed with RN events overnight. Review of Systems: 
Symptom Y/N Comments  Symptom Y/N Comments Fever/Chills n   Chest Pain n   
Poor Appetite    Edema Cough    Abdominal Pain n   
Sputum    Joint Pain SOB/BECERRIL n   Pruritis/Rash Nausea/vomit    Tolerating PT/OT Diarrhea    Tolerating Diet Constipation    Other Could NOT obtain due to:   
 
Objective: VITALS:  
Last 24hrs VS reviewed since prior progress note. Most recent are: 
Patient Vitals for the past 24 hrs: 
 Temp Pulse Resp BP SpO2  
09/27/18 1140 97.6 °F (36.4 °C) 78 16 134/63 97 % 09/27/18 0749 98.6 °F (37 °C) 79 16 141/73 97 % 09/27/18 0240 98.2 °F (36.8 °C) 73 18 140/63 98 %  
09/26/18 2300 97.7 °F (36.5 °C) 75 18 129/76 98 %  
09/26/18 1927 97.7 °F (36.5 °C) 74 18 135/69 95 % Intake/Output Summary (Last 24 hours) at 09/27/18 1730 Last data filed at 09/27/18 7884 Gross per 24 hour Intake            54479 ml Output              200 ml Net            78758 ml PHYSICAL EXAM: 
General: WD, WN. Alert, cooperative, no acute distress   
EENT:  EOMI. Anicteric sclerae. MMM Resp:  CTA bilaterally, no wheezing or rales. No accessory muscle use CV:  Regular  rhythm,  No edema GI:  Soft, Non distended, Non tender.  +Bowel sounds. PEG tube /Colostomy intact Neurologic:  Alert and oriented X 3, normal speech, Psych:   Fair/poor insight. Not anxious nor agitated Skin:  No rashes. No jaundice Reviewed most current lab test results and cultures  YES Reviewed most current radiology test results   YES Review and summation of old records today    NO Reviewed patient's current orders and MAR    YES 
PMH/SH reviewed - no change compared to H&P 
________________________________________________________________________ Care Plan discussed with: 
  Comments Patient y Family  y Wife at bedside 20 minutes + 20 minutes with son on the phone RN y   
Care Manager Consultant Multidiciplinary team rounds were held today with , nursing, pharmacist and clinical coordinator. Patient's plan of care was discussed; medications were reviewed and discharge planning was addressed. ________________________________________________________________________ Total NON critical care TIME:  72  Minutes Total CRITICAL CARE TIME Spent:   Minutes non procedure based Comments >50% of visit spent in counseling and coordination of care y Coordination of care   
________________________________________________________________________ Wilda Khalil MD  
 
Procedures: see electronic medical records for all procedures/Xrays and details which were not copied into this note but were reviewed prior to creation of Plan. LABS: 
I reviewed today's most current labs and imaging studies. Pertinent labs include: 
Recent Labs  
   09/27/18 
 0245  09/26/18 
 0430  09/25/18 
 0759 WBC  14.7*  14.3*  13.3*  
 HGB  8.8*  9.1*  8.9* HCT  29.0*  29.8*  29.5*  
PLT  310  297  259 Recent Labs  
   09/27/18 
 0245  09/26/18 
 2242  09/26/18 
 0430 NA  133*  134*  133* K  4.3  4.0  4.3 CL  96*  98  98 CO2  28  29  27 GLU  113*  135*  131* BUN  29*  28*  33* CREA  0.89  0.90  0.90  
CA  8.8  8.7  8.6 MG   --   2.0   --   
PHOS   --   4.4   --   
 
 
Signed: Anil Reynoso MD

## 2018-09-27 NOTE — HOSPICE
[facility] Those in [facility] 
[phone] Patient Name: Cuauhtemoc Alonso YOB: 1940 Age: 69 y.o. Hospice needs an order in the chart for Hospice Information session please. [facility] RN Note:  Hospice consult received, reviewing chart. Will follow up with Unit Nurse and Care Manager to discuss plan of care, patient status and discharge disposition within the hour. 0: Called wife and she was not in the hospital at this time so today was not a good day. Tentative appt. Set for 1100 on 9/28. She may change time, but will let us know. Advised she could call our after hours to let them know as well. She appreciated the call and will meet with us tomorrow for only an information session. Thank you for the opportunity to be of service to this patient.  
Davon Goff RN

## 2018-09-27 NOTE — PROGRESS NOTES
Advance Care Planning Note Name: Julita Jerry YOB: 1940 MRN: [id number] Admission Date: 7/10/2018 10:44 AM 
 
Date of discussion: 9/27/2018 Active Diagnoses: 
 
Hospital Problems  Date Reviewed: 9/4/2018 Codes Class Noted POA Acute encephalopathy ICD-10-CM: G93.40 ICD-9-CM: 348.30  8/24/2018 No  
   
 Idiopathic hypotension ICD-10-CM: I95.0 ICD-9-CM: 458.9  8/24/2018 No  
   
 Counseling regarding goals of care ICD-10-CM: Z71.89 ICD-9-CM: V65.49  8/24/2018 Unknown Acute blood loss anemia ICD-10-CM: D62 
ICD-9-CM: 285.1  7/10/2018 Yes Anasarca ICD-10-CM: R60.1 ICD-9-CM: 782.3  7/10/2018 Yes * (Principal)GI bleed ICD-10-CM: K92.2 ICD-9-CM: 578.9  7/10/2018 Yes These active diagnoses are of sufficient risk that focused discussion on advance care planning is indicated in order to allow the patient to thoughtfully consider personal goals of care, and if situations arise that prevent the ability to personally give input, to ensure appropriate representation of their personal desires for different levels and aggressiveness of care. Discussion:  
 
Persons present and participating in discussion: Julita JerryLeonard MD, Beny Reyna ( wife) at bedside + son Rosa Ayon over the phone Discussion: We reviewed current hospitalization/current clinical status/ significant debility / ongoing infection. Discussed previous functional status and there life together. All questions were answered and emotional support were provide. Wife decided on DNR. Time Spent:  
 
Total time spent face-to-face in education and discussion: 40 minutes.   
 
Leonard Maxwell MD 
9/27/2018 
5:56 PM

## 2018-09-27 NOTE — PROGRESS NOTES
Spoke to Dr. Theodore Duong regarding patient diet . Received order to hold tube feeding till tomorrow morning and see how is patient tolerating full liquid diet. 1830   patient had full liquid tray under RN strict 1:1 supervision. Consumed 25 % of the tray (vannila pudding , ice cream, apple juice ). No s/s of aspiration , any change in vital signs,and respiratory status. [address] [place] [address] Bedside shift change report given to Cassandra Nails RN (oncoming nurse) by Justin Goldman RN (offgoing nurse). Report included the following information SBAR, Kardex, Intake/Output, MAR and Recent Results.

## 2018-09-27 NOTE — PROGRESS NOTES
Declot intervention was explained to patient. Instilling heparin was unsuccessful per primary nurse. Initiated declot interventions of cathflo at [phone] Repositioning the patient in bed, asking patient to cough and deep breathe and raising right arm were unsuccessful. Instilled 1mg/1ml Cathflo to purple port. Port capped and labeled not to use until evaluated by VAT or Nursing Supervisor. Patient tolerated procedure well. Primary nurse CRISTIN Funk aware. Refer to [facility] and Docflow sheet for specifics. Declot follow up @1800 Aspirated instilled Cathflo  1mg/1 ml post 120 minute dwell. Noted + blood return from purple port. 5 ml of blood wasted from purple port. Flushed with 20 ml NS. Primary nurse CRISTIN Funk is aware of successful declot intervention. Henrik Tenorio RN [facility] Access Team

## 2018-09-27 NOTE — PROGRESS NOTES
1935: Bedside and Verbal shift change report given to [facility] (oncoming nurse) by Bobby Hurley RN (offgoing nurse). Report included the following information SBAR. Safety teaching done. Encouraged patient to call with needs. Patient to be in my care until 0300. 
 
2215: Paged Telehospitalist to notify them that the patient had 6 beats of asymptomatic V-tach. VSS. 
 
2226: Spoke with Dr. Jose Charles. Orders received. 2244: Pst drawn. 0245: pst and lav drawn.

## 2018-09-27 NOTE — PROGRESS NOTES
Chart reviewed. Pt off of floor earlier when checked on and now just back from testing with family and nurse present. Nurse recommending deferring PT tx session at this time due to family with many questions for  and upset over current situation, pt sleeping/resting from test/procedure. .. Will f/u at a better time for PT tx session.

## 2018-09-27 NOTE — PROGRESS NOTES
Infectious Disease Progress Note IMPRESSION:  
· S/p  increase in WBC to 14.7 pt remains afebrile, asymptomatic ·  Recurrence of bacteremia  with BC 1/2 + for Coagulase negative Staph- Staph Capitis Oxacillin R ( 9/5) Repeat BC 9/7 no growth · Repeat CT chest ( 9/7) shows non occlusive thrombus in R/ IJ vein ,small amount of thrombus along right PICC catheter/ pacer leads in SVC which extends to right atrium · Pt to start on pureed diet/ liquid diet · S/p removal of L/IJ placement of PICC RUE , TPN on 8/31 · S/p persistent bacteremia with coagulase negative Staph since 8/11 initially oxacillin sensitive , last positive BC is oxacillin resistant 8/21(1/4) · Repeat BC 8/26, 8/29 , 9/3 no growth · NATASHA - definite large mass associated with pacing wire in RA which may represent vegetation ( 8/23) · Thrombus  & tiny gas bubble of as in RIJ extending into SVC to level of RA (8/15)  s/p heparin IV now on lovenox s/q · CTA chest - no PE, could not visualize SVC thrombus, left lateral wall soft tissue swelling, soft tissue around pacemaker show no significant abnormality · US chest wall - minimal fluid in pacemaker pocket, no significant inflammation of overlying subcutaneous fat or skin · Acute respiratory failure s/p  Ventilator / h/o tracheostomy 24 years ago  , s/p extubation 8/26 · S/p R/ pleural effusion s/p chest tube placement · 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    
  
  
PLAN:  
   
· For [facility] 
·  IS  
· If further increase occurs for repeat chest CT evaluate thrombus in RIJ, RA , around PICC · Continue Daptomycin x 6 weeks , Gentamicin IV x 4 weeks from last negative cultures. End date for Daptomycin is Oct 19,for Gentamicin is Oct 5. 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 ·  In view of SNF not able to take pt on Daptomycin/ Gentamicin  , family unable to administer meds plan for discharge on Dalbavancin when stable · Repeat CBC in am, watch for fever . · Continue antimicrobials & anticoagulation Blood culture - Special Requests: NO SPECIAL REQUESTS   Preliminary Culture result: NO GROWTH 2 DAYS Blood culture - Culture result:    Final  
STAPHYLOCOCCUS CAPITIS SUBSPECIES UREOLYTICUS (OXACILLIN RESISTANT) , ISOLATED FROM 1 OF 2 BOTTLES DRAWN. .. LEFT HAND SITE (A) Subjective:  
 
Pt seen. Awake,talking ,  no complaints. . 
 
Review of Systems:  Pt denies complaints. 10 point ROS obtained Objective:  
 
Blood pressure 134/63, pulse 78, temperature 97.6 °F (36.4 °C), resp. rate 16, height 6' 2\" (1.88 m), weight 249 lb (112.9 kg), SpO2 97 %. Temp (24hrs), Av °F (36.7 °C), Min:97.6 °F (36.4 °C), Max:98.6 °F (37 °C) Patient Vitals for the past 24 hrs: 
 Temp Pulse Resp BP SpO2  
18 1140 97.6 °F (36.4 °C) 78 16 134/63 97 % 18 0749 98.6 °F (37 °C) 79 16 141/73 97 % 18 0240 98.2 °F (36.8 °C) 73 18 140/63 98 %  
18 2300 97.7 °F (36.5 °C) 75 18 129/76 98 %  
18 1927 97.7 °F (36.5 °C) 74 18 135/69 95 % Lines:  picc Physical Exam:  
General:   awake, talking Lungs:    Reduced air entry bases on  auscultation  chest wall-  pacemaker site -no swelling, erythemal  
CV:  Regular rate and rhythm,no murmur, Abdomen:   Soft, non-tender. bowel sounds + Extremities: No edema Lines/Devices:  Intact, no erythema, drainage or tenderness Data Review: CBC:  
Recent Labs  
   18 
 0245  18 
 0430  18 
 3404 WBC  14.7*  14.3*  13.3*  
RBC  3.33*  3.43*  3.38* HGB  8.8*  9.1*  8.9* HCT  29.0*  29.8*  29.5*  
 PLT  310  297  259 GRANS  71  72  73 LYMPH  15  15  13 EOS  3  3  3 CMP:  
Recent Labs  
   09/27/18 
 0245  09/26/18 
 2242  09/26/18 
 0430 GLU  113*  135*  131* NA  133*  134*  133* K  4.3  4.0  4.3 CL  96*  98  98 CO2  28  29  27 BUN  29*  28*  33* CREA  0.89  0.90  0.90  
CA  8.8  8.7  8.6 AGAP  9  7  8 BUCR  33*  31*  37* Studies:     
Lab Results Component Value Date/Time Culture result: NO GROWTH 6 DAYS 09/08/2018 01:46 AM  
 Culture result: (A) 09/05/2018 07:03 PM  
  STAPHYLOCOCCUS CAPITIS SUBSPECIES UREOLYTICUS (OXACILLIN RESISTANT) , ISOLATED FROM 1 OF 2 BOTTLES DRAWN. .. LEFT HAND SITE Culture result: (A) 09/05/2018 07:03 PM  
  PRELIMINARY REPORT OF GRAM POSITIVE COCCI IN CLUSTERS GROWING IN 1 OF 2 BOTTLES DRAWN CALLED TO AND READ BACK BY FELIX CAMPOS RN [id number] [place] [address] AT 2106 ON 9/6/2018. [place] 1850 Culture result:  09/05/2018 07:03 PM  
   [facility] REQUESTING IDENTIFICATION AND SENSITIVITIES 9/10/18 TA. Culture result: REMAINING BOTTLE(S) HAS/HAVE NO GROWTH IN 5 DAYS 09/05/2018 07:03 PM  
  
 
XR Results (most recent): 
 
Results from Hospital Encounter encounter on 07/10/18 XR CHEST PORT Narrative Indication: Leukocytosis Portable chest at 4:00 PM demonstrates mild bibasilar atelectasis, right greater 
than left. Lungs are otherwise fully expanded and clear. Cardiac pacer and PICC 
line are in stable position. The pigtail catheter at the right lung base has been removed since the prior 
examination. Impression IMPRESSION: No acute abnormality Patient Active Problem List  
Diagnosis Code  
 HTN (hypertension) I10  
 Depression F32.9  Hypercholesterolemia E78.00  Acid reflux K21.9  Seizure ([address] R56.9  Hypothyroidism E03.9  Hyponatremia E87.1  BPH (benign prostatic hyperplasia) N40.0  Rash R21  
 Cellulitis L03.90  Wax in ear H61.20  Ulcer [id number]  Small bowel obstruction ([address] Z67.958  Atrial flutter (HCC) I48.92  
 Atrial fibrillation or flutter  CHI (closed head injury) S09. 90XA  Basal cell cancer C44.91  
 TBI (traumatic brain injury) ([facility] S06. 9X9A  Atrial fibrillation ([facility]) I48.91  
 Cataract H26.9  Constipation K59.00  Ankle fracture, left X51.902D  Insomnia G47.00  Tinea corporis B35.4  SSS (sick sinus syndrome) ([facility]) I49.5  Syncope R55  Seizure disorder ([facility] G40.909  RONAL on CPAP G47.33, Z99.89  
 Pacemaker Z95.0  Pre-op evaluation Z01.818  Chronic back pain greater than 3 months duration M54.9, G89.29  
 Cerebral infarction ([facility]) I63.9  Acute bronchitis J20.9  Screening for colon cancer Z12.11  
 Chronic right shoulder pain M25.511, G89.29  
 Common wart B07.8  Localized epilepsy with impairment of consciousness ([facility] G40.209  Severe obesity (BMI 35.0-39.9) ([facility]) E66.01  
 Acute blood loss anemia D62  Anasarca R60.1  GI bleed K92.2  Acute encephalopathy G93.40  Idiopathic hypotension I95.0  Counseling regarding goals of care Z71.89 ICD-10-CM ICD-9-CM 1. Anemia, unspecified type D64.9 285.9 2. Generalized weakness R53.1 780.79   
3. Acute encephalopathy G93.40 348.30   
4. Anasarca R60.1 782.3 5. Idiopathic hypotension I95.0 458.9 6. Counseling regarding goals of care Z71.89 V65.49   
7. 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 9X9S 907.0 8. Chronic atrial fibrillation (HCC) I48.2 427.31   
9. Malignant neoplasm of ascending colon ([facility]) C18.2 153.6 Anti-infectives:  
  
 Daptomycin IV - 8/26- Gentamicin IV 8/26 S/p levaquin 8/29-9/7 Cefotetan 7/17 Zosyn 7/20-8/7 Cefepime - 8/11-8/13 Ceftriaxone 8/13-8/17 Fluconazole IV- 9/3- 9/6 IV , 9/6-9/10-po Vancomycin 8/11-8/22 Daptomycin 8/23- 8/24- Naficillin IV 8/24- 8/26  Sherlyn August MD FACP

## 2018-09-27 NOTE — PROGRESS NOTES
[place], admissions director at [employer] is awaiting word from insurance company if they will provide more money due to cost of dapto. If not, then the best option is home with HHC//DME for IV abx. The wife is aware of this and is looking into support options at home such as out of pocket help, friends, family, and additional personal care thru Medicaid. The insurance has denied acute stay since 9/21. I will discuss with the wife of possible need for NHP if she is not able to provide care at home.    
   
  
     
 
1445  Long family discussion this am with wife and son who voiced fears, concerns, blame, and guilt over pt's condition and ongoing plan of care. Discussed with MD who also spoke in depth with wife. We discussed needing insurance auth for daptomycin at [facility], 24/7 care for the pt if//when he should go home, possibility of NHP, increasing personal care hours, and HHC. Wife and son both agree they can not care for pt at home; the personal care hours, per wife, have been increased to 8 hours a day(wife states \"that still leave 16/day when I'm alone\"); they understand insurance won't continue to pay for IP care once pt is medically stable; if NHP is needed, she requests [facility](someplace she can drive to); they realize HHC can be set up but is only an hour a few times a week if nursing, PT, and OT are ordered; she asked about hospice 3 times during our conversation.  Hospice info session placed per Dr Taz Cabral request.  Polo Campos at [facility] told them this afternoon they might cover it or might not-he has asked his Director to make the call (doubtfull) as to admitting the pt. We will discuss tomorrow-from a medical standpoint, we would be looking at next week. Chart also cclink'd to [facility] to review for admission for LTC with or without hospice if wanted by wife.

## 2018-09-27 NOTE — PROGRESS NOTES
Bedside shift change report given to Blessing (oncoming nurse) by Galo Mendoza (offgoing nurse). Report included the following information SBAR, Kardex, Intake/Output, MAR and Recent Results.

## 2018-09-28 ENCOUNTER — APPOINTMENT (OUTPATIENT)
Dept: CT IMAGING | Age: 78
DRG: 329 | End: 2018-09-28
Attending: INTERNAL MEDICINE
Payer: MEDICARE

## 2018-09-28 LAB
ANION GAP SERPL CALC-SCNC: 6 MMOL/L (ref 5–15)
BUN SERPL-MCNC: 29 MG/DL (ref 6–20)
BUN/CREAT SERPL: 29 (ref 12–20)
CALCIUM SERPL-MCNC: 8.9 MG/DL (ref 8.5–10.1)
CHLORIDE SERPL-SCNC: 97 MMOL/L (ref 97–108)
CK SERPL-CCNC: 30 U/L (ref 39–308)
CO2 SERPL-SCNC: 29 MMOL/L (ref 21–32)
CREAT SERPL-MCNC: 1.01 MG/DL (ref 0.7–1.3)
ERYTHROCYTE [DISTWIDTH] IN BLOOD BY AUTOMATED COUNT: 18.5 % (ref 11.5–14.5)
GLUCOSE BLD STRIP.AUTO-MCNC: 120 MG/DL (ref 65–100)
GLUCOSE BLD STRIP.AUTO-MCNC: 122 MG/DL (ref 65–100)
GLUCOSE BLD STRIP.AUTO-MCNC: 135 MG/DL (ref 65–100)
GLUCOSE SERPL-MCNC: 101 MG/DL (ref 65–100)
HCT VFR BLD AUTO: 29.5 % (ref 36.6–50.3)
HGB BLD-MCNC: 9 G/DL (ref 12.1–17)
MCH RBC QN AUTO: 26.3 PG (ref 26–34)
MCHC RBC AUTO-ENTMCNC: 30.5 G/DL (ref 30–36.5)
MCV RBC AUTO: 86.3 FL (ref 80–99)
NRBC # BLD: 0 K/UL (ref 0–0.01)
NRBC BLD-RTO: 0 PER 100 WBC
PLATELET # BLD AUTO: 326 K/UL (ref 150–400)
PMV BLD AUTO: 10.1 FL (ref 8.9–12.9)
POTASSIUM SERPL-SCNC: 4.4 MMOL/L (ref 3.5–5.1)
RBC # BLD AUTO: 3.42 M/UL (ref 4.1–5.7)
SERVICE CMNT-IMP: ABNORMAL
SODIUM SERPL-SCNC: 132 MMOL/L (ref 136–145)
WBC # BLD AUTO: 15 K/UL (ref 4.1–11.1)

## 2018-09-28 PROCEDURE — 71275 CT ANGIOGRAPHY CHEST: CPT

## 2018-09-28 PROCEDURE — 74011000258 HC RX REV CODE- 258: Performed by: INTERNAL MEDICINE

## 2018-09-28 PROCEDURE — 97530 THERAPEUTIC ACTIVITIES: CPT

## 2018-09-28 PROCEDURE — 74011250636 HC RX REV CODE- 250/636: Performed by: HOSPITALIST

## 2018-09-28 PROCEDURE — 82550 ASSAY OF CK (CPK): CPT | Performed by: HOSPITALIST

## 2018-09-28 PROCEDURE — 74011250636 HC RX REV CODE- 250/636: Performed by: INTERNAL MEDICINE

## 2018-09-28 PROCEDURE — 85027 COMPLETE CBC AUTOMATED: CPT | Performed by: HOSPITALIST

## 2018-09-28 PROCEDURE — 74011636320 HC RX REV CODE- 636/320: Performed by: HOSPITALIST

## 2018-09-28 PROCEDURE — 82962 GLUCOSE BLOOD TEST: CPT

## 2018-09-28 PROCEDURE — 74011250637 HC RX REV CODE- 250/637: Performed by: INTERNAL MEDICINE

## 2018-09-28 PROCEDURE — 92526 ORAL FUNCTION THERAPY: CPT

## 2018-09-28 PROCEDURE — 36415 COLL VENOUS BLD VENIPUNCTURE: CPT | Performed by: HOSPITALIST

## 2018-09-28 PROCEDURE — 65270000029 HC RM PRIVATE

## 2018-09-28 PROCEDURE — 74011000250 HC RX REV CODE- 250: Performed by: INTERNAL MEDICINE

## 2018-09-28 PROCEDURE — 97110 THERAPEUTIC EXERCISES: CPT

## 2018-09-28 PROCEDURE — 80048 BASIC METABOLIC PNL TOTAL CA: CPT | Performed by: HOSPITALIST

## 2018-09-28 PROCEDURE — 94760 N-INVAS EAR/PLS OXIMETRY 1: CPT

## 2018-09-28 RX ORDER — SODIUM CHLORIDE 0.9 % (FLUSH) 0.9 %
10 SYRINGE (ML) INJECTION
Status: COMPLETED | OUTPATIENT
Start: 2018-09-28 | End: 2018-09-28

## 2018-09-28 RX ORDER — LEVOFLOXACIN 5 MG/ML
750 INJECTION, SOLUTION INTRAVENOUS EVERY 24 HOURS
Status: DISCONTINUED | OUTPATIENT
Start: 2018-09-28 | End: 2018-10-02 | Stop reason: HOSPADM

## 2018-09-28 RX ORDER — SODIUM CHLORIDE 9 MG/ML
50 INJECTION, SOLUTION INTRAVENOUS
Status: COMPLETED | OUTPATIENT
Start: 2018-09-28 | End: 2018-09-28

## 2018-09-28 RX ADMIN — GENTAMICIN SULFATE 80 MG: 40 INJECTION, SOLUTION INTRAMUSCULAR; INTRAVENOUS at 05:08

## 2018-09-28 RX ADMIN — APIXABAN 5 MG: 5 TABLET, FILM COATED ORAL at 20:12

## 2018-09-28 RX ADMIN — ACETAMINOPHEN 650 MG: 325 TABLET ORAL at 05:43

## 2018-09-28 RX ADMIN — NYSTATIN 500000 UNITS: 100000 SUSPENSION ORAL at 09:31

## 2018-09-28 RX ADMIN — IOPAMIDOL 100 ML: 755 INJECTION, SOLUTION INTRAVENOUS at 11:06

## 2018-09-28 RX ADMIN — Medication 10 ML: at 11:06

## 2018-09-28 RX ADMIN — CEFEPIME HYDROCHLORIDE 2 G: 2 INJECTION, POWDER, FOR SOLUTION INTRAVENOUS at 14:03

## 2018-09-28 RX ADMIN — LEVETIRACETAM 250 MG: 500 SOLUTION ORAL at 20:11

## 2018-09-28 RX ADMIN — LACTULOSE 10 G: 20 SOLUTION ORAL at 17:16

## 2018-09-28 RX ADMIN — Medication 20 ML: at 13:57

## 2018-09-28 RX ADMIN — LEVETIRACETAM 250 MG: 500 SOLUTION ORAL at 09:31

## 2018-09-28 RX ADMIN — Medication 10 ML: at 14:08

## 2018-09-28 RX ADMIN — DAPTOMYCIN 1000 MG: 500 INJECTION, POWDER, LYOPHILIZED, FOR SOLUTION INTRAVENOUS at 20:09

## 2018-09-28 RX ADMIN — NYSTATIN 500000 UNITS: 100000 SUSPENSION ORAL at 17:15

## 2018-09-28 RX ADMIN — CEFEPIME HYDROCHLORIDE 2 G: 2 INJECTION, POWDER, FOR SOLUTION INTRAVENOUS at 21:22

## 2018-09-28 RX ADMIN — SODIUM CHLORIDE 50 ML/HR: 900 INJECTION, SOLUTION INTRAVENOUS at 11:06

## 2018-09-28 RX ADMIN — Medication 10 ML: at 05:08

## 2018-09-28 RX ADMIN — LEVOFLOXACIN 750 MG: 5 INJECTION, SOLUTION INTRAVENOUS at 17:13

## 2018-09-28 RX ADMIN — FUROSEMIDE 60 MG: 40 TABLET ORAL at 09:31

## 2018-09-28 RX ADMIN — ACETAMINOPHEN 650 MG: 325 TABLET ORAL at 13:56

## 2018-09-28 RX ADMIN — NYSTATIN 500000 UNITS: 100000 SUSPENSION ORAL at 14:03

## 2018-09-28 RX ADMIN — AMIODARONE HYDROCHLORIDE 200 MG: 200 TABLET ORAL at 09:31

## 2018-09-28 RX ADMIN — VENLAFAXINE 37.5 MG: 37.5 TABLET ORAL at 17:16

## 2018-09-28 RX ADMIN — LACTULOSE 10 G: 20 SOLUTION ORAL at 09:30

## 2018-09-28 RX ADMIN — APIXABAN 5 MG: 5 TABLET, FILM COATED ORAL at 09:31

## 2018-09-28 RX ADMIN — VENLAFAXINE 37.5 MG: 37.5 TABLET ORAL at 09:31

## 2018-09-28 NOTE — PROGRESS NOTES
Occupational Therapy Completed chart review and dicussed patient with case mgmt. Family with info meeting with hospice scheduled for 11. Will hold on OT services and follow up after the weekend. Thank you, Caleb Payment OTR/L

## 2018-09-28 NOTE — PROGRESS NOTES
Problem: Dysphagia (Adult) Goal: *Acute Goals and Plan of Care (Insert Text) Re-evaluation 9/27/2018 1. Patient will tolerate puree/thin liquid diet with no overt s/s aspiration within 7 days 2. Patient will tolerate trials of soft solids with timely/complete mastication and full oral clearance within 7 days Speech path reeval, continue goals 9/20/2018 1. Pt will complete simple oropharyngeal strengthening exercises with max cues. Continue for 7 days 9/20/2018. Discontinue 2. Added 9/24/2018 patient will participate in repeat MBS within 7 days. MET Speech path goals Initiated 9/6/18 1. Patient will follow simple oropharyngeal strengthening exercises with max cues within 7 days. Continue Initiated 8/30/2018 1. Patient will participate in [facility] tomorrow. Completed. 2. Pt will participate with oral and pharyngeal swallowing exercises to improve swallowing function. Continue 1. Pt will tolerate purees and nectar thick liquids with no overt s/s of aspiration. Discontinue Speech pathology goals Initiated 8/3/2018 1. Patient will tolerate sips and chips with no overt s/s aspiration within 7 days. Goal met 8/10. 
2. Patient will participate in re-evaluation of swallow function within 7 days. Goal met 8/10. 3. Pt will participate with MBS today 8/10. Goal met 8/10. Speech language pathology dysphagia treatment Patient: Rico Jin (66 y.o. male) Date: 9/28/2018 Diagnosis: Acute blood loss anemia GI bleed Anasarca GI Bleed 
gi bleed ASCENDING COLON CANCER  
aspiration ASPIRATION PNEUMONIA GI bleed Procedure(s) (LRB): ESOPHAGOGASTRODUODENOSCOPY (EGD) PERCUTANEOUS ENDOSCOPIC GASTROSTOMY TUBE INSERTION (N/A) 23 Days Post-Op Precautions:  Fall, Skin, Seizure, Aspiration ASSESSMENT: 
Patient with consistent swallow function compared to MBS yesterday, including slow oral prep, mild oral holding, delayed posterior propulsion, and delayed swallow initiation. Although patient with delayed cough, doubt this is aspiration related as this was observed on MBS yesterday as well. Recommend continue current diet as patient desires. Wife discussed with hospice feeding for comfort/pleasure only despite low aspiration risk due to poor appetite. Wife wanted SLP to re-iterate to team to only feed when patient wants and not force feed. Progression toward goals: 
[]         Improving appropriately and progressing toward goals [x]         Improving slowly and progressing toward goals 
[]         Not making progress toward goals and plan of care will be adjusted PLAN: 
Recommendations and Planned Interventions: 
--Full liquid diet 
--PO intake only when patient wants, no force feeding 
--Straws ok 
--SLP to follow for diet tolerance and liberalization as appropriate Patient continues to benefit from skilled intervention to address the above impairments. Continue treatment per established plan of care. Discharge Recommendations: To Be Determined SUBJECTIVE:  
Patient stated I'm fine.  OBJECTIVE:  
Cognitive and Communication Status: 
Neurologic State: Alert Orientation Level: Oriented to person Cognition: Decreased attention/concentration Perception: Appears intact Perseveration: No perseveration noted Safety/Judgement: Not assessed Dysphagia Treatment: P.O. Trials: 
Patient Position: upright in bed Vocal quality prior to P.O.: No impairment Consistency Presented: Ice chips; Thin liquid How Presented: SLP-fed/presented;Spoon;Straw Bolus Acceptance: No impairment Bolus Formation/Control: Impaired Type of Impairment: Delayed Propulsion: Delayed (# of seconds) Oral Residue: None Initiation of Swallow: Delayed (# of seconds) Laryngeal Elevation: Functional 
Aspiration Signs/Symptoms: None Pharyngeal Phase Characteristics: No impairment, issues, or problems Pain: 
Pain Scale 1: Numeric (0 - 10) Pain Intensity 1: 0 After treatment:  
[]              Patient left in no apparent distress sitting up in chair 
[x]              Patient left in no apparent distress in bed 
[x]              Call bell left within reach [x]              Nursing notified 
[]              Caregiver present 
[]              Bed alarm activated COMMUNICATION/EDUCATION:  
The patients plan of care including recommendations, planned interventions, and recommended diet changes were discussed with: Registered Nurse. [x]              Posted safety precautions in patient's room. DIPAK Cornejo Time Calculation: 20 mins

## 2018-09-28 NOTE — ADT AUTH CERT NOTES
Utilization Review  
  
  
  Bowel Surgery: Colectomy, Partial, with or without Ostomy - Care Day 80 (9/27/2018) by Rena Nguyen Status Review Entered    
  Completed 9/28/2018    
  Details    
      
  Care Day: 80 Care Date: 9/27/2018 Level of Care: Telemetry    
  Guideline Day 4     
  Level Of Care    
  (X) Floor    
      
  Clinical Status    
  (X) * No evidence of postoperative or surgical site infection    
      
  Activity    
  (X) Ambulatory    
      
  Routes    
  (X) * Oral hydration, medications, and diet    
  9/28/2018 1:35 PM EDT by Florentino Almanza    
    full liquids    
      
  (X) * Advance diet    
      
  Interventions    
  (X) Enterostomy therapy    
  (X) Hgb/Hct    
      
  Medications    
  (X) * Epidural analgesia or PCA absent[D]    
      
      
      
      
  * Milestone    
      
  Additional Notes    
  9/27/18    
  Seen during am rounding    
  He keep asking for food/ice chips     
   97.6-78-/63-97%    
  labs: wbc 14.7, hgb/hct 8.8/29.0, na 133, cl 96, glu 113, bun 29    
  meds: tylenol 650 mg peg, eiquis 5 mg oeg bid, amidarone 200 mg peg, cubicin 1000 mg iv q24h, lasix 60 mg peg, gentamicin 80 mg iv, humalog ssi, lactulose 10 g bid, keppra 250 mg peg, nystatin 500,000 units qid, miralax 17 g peg, effexor 37.5 mg bid,     
      
      
  Assessment / Plan:    
  Recurrent Bacteremia with coagulase negative staph     
  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     
  -leukocytosis started going up again on 9/13     
  cxray negative     
  -ID help appreciated     
  New BC done today - will follow and decide on CT      
  Dapto + Genta     
  -S/p removal of L/IJ placement of PICC RUE     
       
  Acute Blood Loss Anemia due to Lower GI Bleed POA    
  -Hb is stable now     
  S/p Colonoscopy: Large Colon Mass > S/P Colectomy Stage 3 B this hospitalization done July 17     
        
  Dysphagia    
   - speech eval appreciated: MBS in am     
  S/p TPN  --> S/P Peg 9/5     
  Tolerating TF     
        
  SSS S/p PPM and Pacer dependent / PAF / HTN    
  - HR controlled. BP stable     
  -continue amiodarone     
  -Per ID consult: Thrombus in R/IJ, now mass in RA which is suggestive of thrombus rather than vegetation in light of pacemaker pocket appearing clear of infection. Hold off on removal of pacemaker at this time     
  -cont Eliquis     
        
  Acute Encephalopathy - resolved    
  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    
  -Has been on Tegretol PTA which was changed to Keppra IV while NPO    
  + lethargy on keppra 500 mg bid     
  Seen by neurology: Dr. Regla Olmos discussed with Dr Akua Ochoa (patients Neurologist) and in agreement to keep pt on Keppra 250 BID due to better Drug interactions and liver  with all his meds and less Liver issues on    
        
  Severe Deconditioning.    
  -Palliative care was involved with discussion with family who decided everything to be done    
        
  He needs to follow up with ID or Cardiology close to October 20 for Reimaging and Cultures or can be done in at [address] [place] [address] determine what to do with Pacers    
        
  RONAL, cpap at night     
  Hypernatremia, resolved    
  Hyponatremia     
  HCAP     
  Bilateral pleural effusions     
  Acute hypoxic respiratory failure not POA resolved     
  Hyperlipidemia, on statin pta ( on hold with Dapto)     
  Oral candidiasis , cont nystatin, s/p fluconazole     
       
       
        
  Code Status: Full    
  NOK POA Veronika Pal EDMONDSON    
  Tyree Peterson Pin [phone]     
  DVT Prophylaxis: Eliquis     
        
  Disposition: difficult disposition: Insurance denies Mireillelester Sicard despite pear to pear and appeal. Working with [facility] if they will accept.     
       
  PICC 8/31 R    
   
  Bowel Surgery: Colectomy, Partial, with or without Ostomy - Care Day 79 (9/26/2018) by Maritza Mathur V     
  Review Status Review Entered    
  Completed 9/27/2018    
  Details    
      
  Care Day: 79 Care Date: 9/26/2018 Level of Care: Telemetry    
  Guideline Day 4     
  Level Of Care    
  (X) Floor    
  9/27/2018 1:48 PM EDT by Alek Yoo    
    Telemetry Unit    
      
      
  Clinical Status    
  (X) * No evidence of postoperative or surgical site infection    
  9/27/2018 1:48 PM EDT by Alek Yoo    
    Soft, Non distended, Non tender.  +Bowel sounds. PEG tube /Colostomy intact    
      
      
  Activity    
  (X) Ambulatory    
  9/27/2018 1:48 PM EDT by Alek Yoo    
    OOB with assistance    
      
      
  Routes    
  ( ) * Oral hydration, medications, and diet    
  ( ) * Advance diet    
      
  Interventions    
  (X) Enterostomy therapy    
  9/27/2018 1:48 PM EDT by Alek Yoo    
    wound care drsg change daily    
      
  (X) Hgb/Hct    
  9/27/2018 1:48 PM EDT by Alek Yoo    
    Hgb 9.1, Hct 29.8    
      
      
  Medications    
  ( ) * Epidural analgesia or PCA absent[D]    
  9/27/2018 1:48 PM EDT by Alek Yoo    
    none noted    
      
      
  9/27/2018 1:48 PM EDT by Alek Yoo    
  Subject: Additional Clinical Information    
  9/26/18Plan-NPO with tube feedings, SCDs, flannery cath care, I/O, PT/OT, IS q 2 hrs, POC glucose q 6 hrs, SLP eval.Meds-effexor 37.5mg BID per G tube, miralax 17g PO daily per G tube, nystatin 500,000 units 4x day PO, lidocaine 4%patch x1, keppra 250mg BIDx2, lactulose 10g PO BIDx2, 6 units, gentamicin 80mg IV q 85frfq3, lasix 60mg daily per G tube, cubicin 1000mg IV q 58rvvl6, eliquis 5mg PO q 12hrs per G tube, amiodarone 200mg per g tube daily [id number] labs/radiology-WBC 14.3, , BUN 28.  Chest XR-No acute abnormality.    
      
      
      
      
  * Milestone    
      
  Additional Notes    
  9/26/18 IM note    
  Assessment / Plan:    
   Recurrent Bacteremia with coagulase negative staph     
  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     
  -leukocytosis is trending up    
  cxray today negative     
  -ID help appreciated     
  Dapto + Genta     
  -continue to monitor, if wbs continue to trend up , he may need another CT     
  -S/p removal of L/IJ placement of PICC RUE     
       
  Acute Blood Loss Anemia due to Lower GI Bleed POA    
  -Hb is stable now     
  S/p Colonoscopy: Large Colon Mass > S/P Colectomy Stage 3 B this hospitalization done July 17     
        
  Dysphagia    
  - speech eval appreciated: MBS in am     
  S/p TPN  --> S/P Peg 9/5     
  Tolerating TF     
        
  SSS S/p PPM and Pacer dependent / PAF / HTN    
  - HR controlled. BP stable     
  -continue amiodarone     
  -Per ID consult: Thrombus in R/IJ, now mass in RA which is suggestive of thrombus rather than vegetation in light of pacemaker pocket appearing clear of infection.  Hold off on removal of pacemaker at this time     
  -cont Eliquis     
        
  Acute Encephalopathy - resolved    
  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    
  -Has been on Tegretol PTA which was changed to Keppra IV while NPO    
  + lethargy on keppra 500 mg bid     
  Seen by neurology: Dr. Kervin Joshi discussed with Dr Christopher Rios (patients Neurologist) and in agreement to keep pt on Keppra 250 BID due to better Drug interactions and liver  with all his meds and less Liver issues on    
        
  Severe Deconditioning.    
  -Palliative care was involved with discussion with family who decided everything to be done    
        
  He needs to follow up with ID or Cardiology close to October 20 for Reimaging and Cultures or can be done in at [address] [place] [address] determine what to do with Pacers    
        
  RONAL, cpap at night     
  Hypernatremia, resolved    
  Hyponatremia     
  HCAP     
   Bilateral pleural effusions     
  Acute hypoxic respiratory failure not POA resolved     
  Hyperlipidemia, on statin pta ( on hold with Dapto)     
  Oral candidiasis , cont nystatin, s/p fluconazole     
       
       
        
  Code Status: Full    
  NOK POA :  Wife     
  DVT Prophylaxis: Eliquis     
        
  Disposition: difficult disposition: Insurance denies Nicole Daley despite pear to pear and appeal. Working with [facility] if they will accept.     
       
  PICC 8/31 R

## 2018-09-28 NOTE — PROGRESS NOTES
Problem: Mobility Impaired (Adult and Pediatric) Goal: *Acute Goals and Plan of Care (Insert Text) Physical Therapy Goals Revised 9/28/2018 1. Patient will move from supine to sit and sit to supine , scoot up and down and roll side to side in bed with moderate assistance  within 7 day(s). 2.  Patient will transfer from bed to chair and chair to bed with maximal assistance x 2 using the least restrictive device within 7 day(s). 3.  Patient will perform sit to stand with moderate assistance x 2 within 7 day(s). Reviewed 9/20/2018: goals remain appropriate Reviewed 9/13/18- goals remain appropriate Goals reviewed 9/6/18- goals remain appropriate Goals reviewed and revised 8/29/18 1. Patient will move from supine to sit and sit to supine , scoot up and down and roll side to side in bed with moderate assistance within 7 day(s). 2.  Patient will transfer from bed to chair and chair to bed with max assistance x 2 using the least restrictive device within 7 day(s). 3.  Patient will perform sit to stand with moderate assistance x 2 within 7 day(s). 4.  Patient will perform stand pivot transfer to wheelchair with max assistance x 2 in 7 days. Reviewed 8/3/18- goals remain appropriate Reviewed and revised 7/26/2018 1. Patient will move from supine to sit and sit to supine , scoot up and down and roll side to side in bed with minimal assistance within 7 day(s). 2.  Patient will transfer from bed to chair and chair to bed with moderate assistance using the least restrictive device within 7 day(s). 3.  Patient will perform sit to stand with moderate assistance within 7 day(s). 4.  Patient will perform stand pivot transfer to wheelchair with moderate assistance in 7 days. Reviewed and revised 7/19/2018 1. Patient will move from supine to sit and sit to supine , scoot up and down and roll side to side in bed with minimal assistance within 7 day(s). 2.  Patient will transfer from bed to chair and chair to bed with moderate assistance using the least restrictive device within 7 day(s). 3.  Patient will perform sit to stand with minimal assistance within 7 day(s). 4.  Patient will perform stand pivot transfer to wheelchair with minimal assistance in 7 days. Initiated 7/12/2018 1. Patient will move from supine to sit and sit to supine , scoot up and down and roll side to side in bed with modified independence within 7 day(s). 2.  Patient will transfer from bed to chair and chair to bed with supervision/set-up using the least restrictive device within 7 day(s). 3.  Patient will perform sit to stand with supervision/set-up within 7 day(s). 4.  Patient will perform stand pivot transfer to wheelchair with modified independence in 7 days. physical Therapy TREATMENT: WEEKLY REASSESSMENT Patient: Amparo Elizabeth (89 y.o. male) Date: 9/28/2018 Diagnosis: Acute blood loss anemia GI bleed Anasarca GI Bleed 
gi bleed ASCENDING COLON CANCER  
aspiration ASPIRATION PNEUMONIA GI bleed Procedure(s) (LRB): ESOPHAGOGASTRODUODENOSCOPY (EGD) PERCUTANEOUS ENDOSCOPIC GASTROSTOMY TUBE INSERTION (N/A) 23 Days Post-Op Precautions: Fall, Skin, Seizure, Aspiration Chart, physical therapy assessment, plan of care and goals were reviewed. ASSESSMENT: 
Patient is lying in bed when PT arrived. He was reluctant to participate, but agreed with encouragement. He was cleared by nursing for mobility with PT. Provided prom, aarom, arom, and resistive exercises to bilateral lower extremities with fair tolerance and response by patient. He was able to push and pull RLE into flexion/extension patterns, LLE was able to push, but not move into hip and knee flexion actively. ROM is wfl in both lower extremities except L ankle df limited to neutral and L hip abd limited to 25 degrees. Functionally he needed mod a x 2 for supine to sit and to roll in bed.  Sit to standing was max a x 2 and he was very fearful of falling. He needed total assistance to return to supine and be repositioned higher in bed. He was left in bed with bilateral SCDs and heel protectors in place and all needs met. Recommend SNF upon discharge at this time for likely long tern care. Patient's progression toward goals since last assessment: patient has made minimal progress since the last PT weekly re-eval. His goals were modified to reflect these changes. PLAN: 
Goals have been updated based on progression since last assessment. Patient continues to benefit from skilled intervention to address the above impairments. Continue to follow the patient 3 times per week. Planned Interventions: 
[x]              Bed Mobility Training             [x]       Neuromuscular Re-Education [x]              Transfer Training                   []       Orthotic/Prosthetic Training 
[]              Gait Training                         []       Modalities [x]              Therapeutic Exercises           []       Edema Management/Control [x]              Therapeutic Activities            [x]       Patient and Family Training/Education []              Other (comment): 
Discharge Recommendations: Loki Davies Further Equipment Recommendations for Discharge: TBD SUBJECTIVE:  
Patient stated My arm and leg don't work.  OBJECTIVE DATA SUMMARY:  
Critical Behavior: 
Neurologic State: Alert Orientation Level: Oriented to place, Oriented to person Cognition: Decreased attention/concentration, Follows commands Safety/Judgement: Not assessed Strength:  
Strength: Generally decreased, functional (L extremities are non-functional due to old cva) Functional Mobility Training: 
Bed Mobility: 
Rolling: Minimum assistance;Assist x2 Supine to Sit: Moderate assistance;Assist x2; Additional time Sit to Supine: Maximum assistance;Assist x2; Additional time Scooting: Maximum assistance;Assist x1;Additional time Transfers: 
Sit to Stand: Total assistance;Maximum assistance;Assist x2 Stand to Sit: Total assistance;Assist x2 Balance: 
Sitting: Impaired Sitting - Static: Fair (occasional) Sitting - Dynamic: Poor (constant support) Standing: Impaired Standing - Static: Not tested Standing - Dynamic :  (not tested) Ambulation/Gait Training: 
  
  
  
  
Gait Description (WDL):  (he is non-ambulatory) Therapeutic Exercises:  
Supine - PROM/stretching L ankle into dorsiflexion; and L hip into abduction. AAROM - R ankle pumps + resistive ankle pumps, R hip abd/add AROM -  R slr, R heel slides Resistive - R hip/knee flexion followed by extension All exercises x 5-10 reps depending on patients attention to task. Pain: 
Pain Scale 1: Numeric (0 - 10) Pain Intensity 1: 0 Pain Location 1: Back Pain Intervention(s) 1: Medication (see MAR) Activity Tolerance:  
Poor Please refer to the flowsheet for vital signs taken during this treatment. After treatment:  
[]  Patient left in no apparent distress sitting up in chair 
[x]  Patient left in no apparent distress in bed 
[x]  Call bell left within reach [x]  Nursing notified 
[]  Caregiver present [x]  Bed alarm activated COMMUNICATION/COLLABORATION:  
The patients plan of care was discussed with: Registered Nurse and Rehabilitation Attendant Jorge Fox, PT Time Calculation: 25 mins

## 2018-09-28 NOTE — PROGRESS NOTES
Rn sees that there are signed/held orders from days ago. Unsure why they were not released at that time.

## 2018-09-28 NOTE — PROGRESS NOTES
Bedside shift change report given to Abiola (oncoming nurse) by Zack Sahni (offgoing nurse). Report included the following information SBAR, Kardex, Intake/Output, MAR and Recent Results.

## 2018-09-28 NOTE — PROGRESS NOTES
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  
-leukocytosis is trending up   
cxray negative   
-CT today  
-ID help appreciated New BC done today - will follow and decide on CT Dapto + Genta , added cefepime and lVQ today by ID  
-S/p removal of L/IJ placement of PICC RUE Acute Blood Loss Anemia due to Lower GI Bleed POA 
-Hb is stable now S/p Colonoscopy: Large Colon Mass > S/P Colectomy Stage 3 B this hospitalization done   
   
Dysphagia - speech eval appreciated: MBS passed  --> FLD  
S/p TPN  --> S/P Peg   
TF on hold , will see how he will do with diet  
  
SSS S/p PPM and Pacer dependent / PAF / HTN 
- HR controlled. BP stable  
-continue amiodarone  
-Per ID consult: Thrombus in R/IJ, now mass in RA which is suggestive of thrombus rather than vegetation in light of pacemaker pocket appearing clear of infection. Hold off on removal of pacemaker at this time  
-cont Eliquis  
  
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 
-Has been on Tegretol PTA which was changed to Keppra IV while NPO 
+ lethargy on keppra 500 mg bid Seen by neurology: Dr. Gem Vincent discussed with Dr Toby Goldberg (patients Neurologist) and in agreement to keep pt on Keppra 250 BID due to better Drug interactions and liver  with all his meds and less Liver issues on 
  
Severe Deconditioning. 
-Palliative care was involved with discussion with family who decided everything to be done 
  
He needs to follow up with ID or Cardiology close to October 20 for Reimaging and Cultures or can be done in at [facility]. To determine what to do with Pacers 
  
RONAL, cpap at night Hypernatremia, resolved Hyponatremia HCAP Bilateral pleural effusions Acute hypoxic respiratory failure not POA resolved Hyperlipidemia, on statin pta ( on hold with Dapto) Oral candidiasis , cont nystatin, s/p fluconazole  
 
 
   
Code Status: Full NOK POA :  Wife - ALFIEA Son Debra Stock [phone] DVT Prophylaxis: Eliquis  
  
Disposition: difficult disposition: Insurance denies [facility] despite pear to pear and appeal. Working with [facility] if they will accept. PICC 8/31 R Subjective: Chief Complaint / Reason for Physician Visit: following bacteremia / GI bleeding Tolerated FLD Discussed with RN events overnight. Review of Systems: 
Symptom Y/N Comments  Symptom Y/N Comments Fever/Chills n   Chest Pain n   
Poor Appetite    Edema Cough    Abdominal Pain n   
Sputum    Joint Pain SOB/BECERRIL n   Pruritis/Rash Nausea/vomit    Tolerating PT/OT Diarrhea    Tolerating Diet Constipation    Other Could NOT obtain due to:   
 
Objective: VITALS:  
Last 24hrs VS reviewed since prior progress note. Most recent are: 
Patient Vitals for the past 24 hrs: 
 Temp Pulse Resp BP SpO2  
09/28/18 1600 98.1 °F (36.7 °C) 86 18 133/74 98 %  
09/28/18 1229 97.9 °F (36.6 °C) 77 20 126/81 98 %  
09/28/18 1028 - - - - 96 %  
09/28/18 0838 97.7 °F (36.5 °C) 87 24 137/71 92 %  
09/28/18 0336 97.6 °F (36.4 °C) 78 16 119/68 97 % 09/27/18 2302 97.6 °F (36.4 °C) 70 16 129/75 98 %  
09/27/18 1927 97.9 °F (36.6 °C) 77 18 124/61 98 %  
09/27/18 1800 98.2 °F (36.8 °C) 75 20 132/83 99 % Intake/Output Summary (Last 24 hours) at 09/28/18 1731 Last data filed at 09/28/18 1334 Gross per 24 hour Intake              910 ml Output               60 ml Net              850 ml PHYSICAL EXAM: 
General: WD, WN. Alert, cooperative, no acute distress   
EENT:  EOMI. Anicteric sclerae. MMM Resp:  CTA bilaterally, no wheezing or rales. No accessory muscle use CV:  Regular  rhythm,  No edema GI:  Soft, Non distended, Non tender.  +Bowel sounds. PEG tube /Colostomy intact Neurologic:  Alert and oriented X 3, normal speech, Psych:   Fair/poor insight. Not anxious nor agitated Skin:  No rashes. No jaundice Reviewed most current lab test results and cultures  YES Reviewed most current radiology test results   YES Review and summation of old records today    NO Reviewed patient's current orders and MAR    YES 
PMH/ reviewed - no change compared to H&P 
________________________________________________________________________ Care Plan discussed with: 
  Comments Patient y Family RN y   
Care Manager Consultant Multidiciplinary team rounds were held today with , nursing, pharmacist and clinical coordinator. Patient's plan of care was discussed; medications were reviewed and discharge planning was addressed. ________________________________________________________________________ Total NON critical care TIME:  25  Minutes Total CRITICAL CARE TIME Spent:   Minutes non procedure based Comments >50% of visit spent in counseling and coordination of care     
________________________________________________________________________ Shauna Nazario MD  
 
Procedures: see electronic medical records for all procedures/Xrays and details which were not copied into this note but were reviewed prior to creation of Plan. LABS: 
I reviewed today's most current labs and imaging studies. Pertinent labs include: 
Recent Labs 09/28/18 
 0309  09/27/18 
 0245  09/26/18 
 0430 WBC  15.0*  14.7*  14.3* HGB  9.0*  8.8*  9.1*  
HCT  29.5*  29.0*  29.8* PLT  326  310  297 Recent Labs  
   09/28/18 
 0309 09/27/18 
 0245  09/26/18 
 2242 NA  132*  133*  134* K  4.4  4.3  4.0  
CL  97  96*  98  
CO2  29  28  29 GLU  101*  113*  135* BUN  29*  29*  28* CREA  1.01  0.89  0.90  
CA  8.9  8.8  8.7 MG   --    --   2.0 PHOS   --    --   4.4 Signed: Fredo Bingham MD

## 2018-09-28 NOTE — PROGRESS NOTES
Bedside and Verbal shift change report given to Karissa (oncoming nurse) by Billie Gee (offgoing nurse). Report included the following information SBAR, Kardex, Intake/Output, MAR and Recent Results.

## 2018-09-28 NOTE — PROGRESS NOTES
Pharmacy Automatic Renal Dosing Protocol - Antimicrobials Indication for Antimicrobials: Bacteremia; aspiration PNA Current Regimen of Each Antimicrobial: 
Daptomycin 1000 mg (9.2mg/kg) IV every 24 hours (Started 8/26/18; Day #32) Cefepime 2 grams Q8H (Started 9/28, Day 1) Levofloxacin 750 mg Q24H (Started 9/28, Day 1) Gentamicin 80 mg IV every 24 hours (Start Date: 8/26/18; Day #32) Previous Antimicrobial Therapy: 
Fluconazole 200 mg every day (9/3-9/9) Levofloxacin 500 mg q 24 hours (9/4 -9/11) Levofloxacin 750 mg IV every 24 hours (Start Date: 8/28/18; Day #7 of 7) Nafcillin 2 grams every 4 hours (Started 8/24/18; Stopped 8/26/18) Daptomycin 1000 mg IV every 24 hours (Started 8/23/18; Stopped 8/24/18) Vancomycin 1500 mg IV every 12 hours (Started 8/11/18; Stopped 8/23/18) Ceftriaxone 2 g IV every 24 hours (Started 8/13/18; Stopped 8/17/18) Cefepime 2 g IV every 8 hours (Started 8/11/18; Stopped 8/13/18) Vancomycin Fluconazole Piperacillin-tazobactam 
 
Goal Gentamicin Levels (Synergy Dosing): 
Peak: 3-5 mcg/mL Trough: < 1 mcg/mL Gentamicin Level Assessment: 
Date Dose & Interval Measured (mcg/mL) Extrapolated (mcg/mL)  
8/27/18 80 mg IV every 8 hours Peak = 4.4 Trough = 2 Peak = 4.4 Trough = 1.36   
8/30/18 90 mg IV every 12 hours Peak = 5.0 Trough = 2.2 Peak = 5.2 Trough = 2.01  
8/31/18 100 mg IV every 24 hours Peak = 5.7 Trough = 0.7 Peak = 6 Trough = 0.65  
9/1/18 80 mg IV every 24 hours Peak = 3.4 Trough = 0.5 Peak = 4.6 Trough = 0.58  
9/4/18 80mg IV q24h Peak = 3.3 Trough = 0.4 Peak = 3.3 Trough = 0.36  
9/9/18 80 mg q24h Peak = 4.0 Trough = 0.6    
9/18/18 80 mg q 24 hours  Peak 4 Trough 0.5 Peak 3.7 Trough 0.5  
9/21/18 80 mg [id number] Peak = 3.9 mcg/ml Trough = 0.7 mcg/ml Peak = 4.7 mcg/ml Trough = 0.55 mcg/ml 9/26/18 80 mg q24 hours Peak = 3.8 Trough = 0.4 Peak = 4.1 Trough = 0.4 Significant Cultures:  
9/8 blood: ng  x6 days - Final 
 : Blood cx: STAPHYLOCOCCUS CAPITIS SUBSPECIES UREOLYTICUS (OXACILLIN RESISTANT) - Final (MDR, but sensitive to daptomycin) 9/3 BCx for fungus = ng pending 18 Blood culture = No growth x 6 days - final 
18 Blood culture = No growth x 6 days - final 
18 Body fluid thoracentesis = No growth x 4 days (Final) 18 Blood culture = No growth (FINAL) 18 Sputum culture = Heavy Burkholderia cepacia (FINAL) 18 Blood culture = Staph epidermidis / bottles - oxacillin resistant (FINAL) 18 Blood culture = No growth (FINAL) 18 Blood culture = Coag neg staph in  (FINAL) 18 Respiratory culture = Heavy MSSA; Light klebsiella (FINAL) 18 Blood culture = Coag neg staph in 2/2 (FINAL) 18 Blood culture = Coag neg staph in 2/2 (FINAL) 18 Respiratory culture = No growth (FINAL) 18 Blood culture = No growth (FINAL) Labs: 
Recent Labs  
   18 
 0309  18 
 0245  18 
 2242  18 
 0430 CREA  1.01  0.89  0.90  0.90 BUN  29*  29*  28*  33* WBC  15.0*  14.7*   --   14.3* Temp (24hrs), Av.8 °F (36.6 °C), Min:97.6 °F (36.4 °C), Max:98.2 °F (36.8 °C) Creatinine Clearance (mL/min) or Dialysis: 80 mL/min Impression/Plan: · Discussed switching to vancomycin (JETT = 1) so that patient could be discharged to facility. Currently refusing admittance due to daptomycin cost. Bug likely was slow to respond to vancomycin, but I would not consider this a failure. · Levofloxacin adjusted to 750 mg Q24H. · Cefepime adjusted to 2 grams Q8H 
· Will continue current regimen as appropriate for renal function. · First negative blood culture is 18 · CK 18  = 30 
· Note that dosing looks inappropriate for synergy (Q24H), but troughs were supratherapeutic on Q8H and Q12H regimens · BMP ordered daily · Continue current Daptomycin and Gentamicin for synergy. Stop dates in place. Pharmacy will follow daily and adjust medications as appropriate for renal function and/or serum levels.  
 
Thank you, 
Heath Salcedo, [facility]

## 2018-09-28 NOTE — PROGRESS NOTES
Problem: Pressure Injury - Risk of 
Goal: *Prevention of pressure injury Document Dagoberto Scale and appropriate interventions in the flowsheet. Outcome: Progressing Towards Goal 
Pressure Injury Interventions: 
Sensory Interventions: Discuss PT/OT consult with provider, Float heels Moisture Interventions: Absorbent underpads, Apply protective barrier, creams and emollients Activity Interventions: Increase time out of bed, Pressure redistribution bed/mattress(bed type), PT/OT evaluation Mobility Interventions: Float heels, HOB 30 degrees or less, PT/OT evaluation Nutrition Interventions: Document food/fluid/supplement intake Friction and Shear Interventions: Feet elevated on foot rest, HOB 30 degrees or less

## 2018-09-28 NOTE — HOSPICE
[facility] ASIA Good Help to Those in Need 
[phone] Patient Name: Luke Cárdenas YOB: 1940 Age: 69 y.o. [facility] RN Note:  Hospice consult noted. Chart reviewed. Plan of care discussed with patients nurse & care manager. In to meet with wife Ashu Sorenson and son. Discussed Hospice philosophy, general plan of care, levels of care, services and on call procedures. Family information packet provided & reviewed with the above. 1100: Met with above and they are waiting to see what the CT results were from today and where they may go. Care discussed SNF with hospice and home with hospice. As patient is declining explained sometimes the care can be not as busy with someone who becomes bed bound as there are not transportation needs, feeding needs (only if patient wants), has an ostomy as she has been taking care of for years, could manage a flannery to protect skin and soiling of bed for less changing of linens. Wife feels more comfortable knowing this. She also has Medicaid Aides for 4 hours and wants to request up to 8 hours. Wife would like hospice, but would like to wait and discuss results and options. When disposition is determined from them hospice will need to order gel overlay, oxygen (back up in-case he needs) and over bed table for delivery. Patient owns his bed (Medicaid bed). General Info Book, Patient handbook, and folder left with family. Hospice will peripherally follow and check with wife when in hospital.  
 
Thank you for the opportunity to be of service to this patient. Please feel free to contact hospice for any questions or concerns the family may have [phone].

## 2018-09-28 NOTE — PROGRESS NOTES
Awaiting word from [facility] if insurance will do carve out for dapto. I've asked [facility] if they could accept him long term care with or without hospice. Hospice meeting ~11a today tentatively for info session per MD request 
 
Insurance will not authorize payment for daptomycin 1300 [facility]. Will check back on Monday

## 2018-09-28 NOTE — PROGRESS NOTES
Nutrition Assessment: 
 
INTERVENTIONS/RECOMMENDATIONS:  
Meals/Snacks:  Continue full liquids with advancement as safely tolerated per SLP recommendations. EN:  Continue PEG tube feedings as desired pending goals of care. ASSESSMENT:  
9/28:  Chart reviewed; pt continues to receive TwoCal via PEG tube + 250 ml free water q6h which provides daily approx. 2260kcal, 107g protein, 1770 mL water. Pt is also tolerating a small amount of full liquids with 1:1 feeding assistance (RN documented 25%). SLP is following with documented goals to tolerate pureed within the next 7 days. Family is also considering hospice care. Chart reviewed; patient is s/p PEG placement on 9/5. . TF started and advancing to goal. Goal rate as recommended above will meet 100% of estimated kcal and protein needs. Currently running at 30 mL/hr which is meeting >50% of estimated kcal needs. Okay to wean TPN off as long as TF continues to be tolerated. Remains inappropriate for PO per SLP. Will monitor TF tolerance, labs, and weights. Diet Order: Full liquids 
% Eaten:  Patient Vitals for the past 72 hrs: 
 % Diet Eaten  
09/28/18 1334 10 % 09/27/18 1805 20 % Pertinent Medications: [x] Reviewed []Other: 
Pertinent Labs: [x]Reviewed  []Other: 
Food Allergies: [x]None []Other:    
Last BM: 9/27 loose   [x]Active     []Hyperactive  []Hypoactive       [] Absent  BS Skin:    [] Intact   [x] Incision  [] Breakdown   []Edema   []Other: Anthropometrics: Height: 6' 2\" (188 cm) Weight: 112.9 kg (249 lb) IBW (%IBW):   ( ) UBW (%UBW):   (  %) BMI: Body mass index is 31.97 kg/(m^2). This BMI is indicative of: 
[]Underweight   []Normal   []Overweight   [] Obesity   [] Extreme Obesity (BMI>40) Last Weight Metrics: 
Weight Loss Metrics 9/24/2018 7/10/2018 7/5/2018 5/15/2018 5/7/2018 4/10/2018 3/26/2018 Today's Wt 249 lb - 283 lb 4.8 oz 266 lb 262 lb 267 lb 267 lb BMI - 31.97 kg/m2 36.37 kg/m2 34.15 kg/m2 33.64 kg/m2 34.28 kg/m2 34.28 kg/m2 Estimated Nutrition Needs (Based on): 6871 Kcals/day (BMR (1925) x 1. 2AF) , 90 g (-113g (0.8-1.0 g/kg bw)) Protein Carbohydrate: At Least 130 g/day  Fluids: 2300 mL/day NUTRITION DIAGNOSES:  
Problem:  No nutritional diagnosis at this time Etiology: related to AMS, dysphagia Signs/Symptoms: as evidenced by PEG placement Previous Nutrition Dx:  [] Resolved  [] Unresolved           [x] Progressing NUTRITION INTERVENTIONS: 
Meals/Snacks: Other (advance diet as/if medically able per SLP ) Enteral/Parenteral Nutrition: Other Supplements: Commercial supplement GOAL:  
Patient will continue to tolerate TF @ goal with residual <250 mL next 3-5 days NUTRITION MONITORING AND EVALUATION Food/Nutrient Intake Outcomes: Enteral/parenteral nutrition intake Physical Signs/Symptoms Outcomes: Weight/weight change, Electrolyte and renal profile Previous Goal Met: 
 [] Met              [x] Progressing Towards Goal              [] Not Progressing Towards Goal  
Previous Recommendations: 
 [x] Implemented          [] Not Implemented          [] Not Applicable LEARNING NEEDS (Diet, Food/Nutrient-Drug Interaction):  
 [x] None Identified 
 [] Identified and Education Provided/Documented 
 [] Identified and Pt declined/was not appropriate Cultural, Jew, OR Ethnic Dietary Needs:  
 [x] None Identified 
 [] Identified and Addressed 
 
 [x] Interdisciplinary Care Plan Reviewed/Documented  
 [x] Discharge Planning:  Pending goals of care 
 [] Participated in Interdisciplinary Rounds NUTRITION RISK:  
 [x] Patient At Nutritional Risk  
 [] Patient Not At Nutritional Risk Ankit Esparza RD Pager [phone] Weekend Pager [phone]

## 2018-09-29 LAB
ANION GAP SERPL CALC-SCNC: 7 MMOL/L (ref 5–15)
BUN SERPL-MCNC: 26 MG/DL (ref 6–20)
BUN/CREAT SERPL: 22 (ref 12–20)
CALCIUM SERPL-MCNC: 8.9 MG/DL (ref 8.5–10.1)
CHLORIDE SERPL-SCNC: 96 MMOL/L (ref 97–108)
CO2 SERPL-SCNC: 29 MMOL/L (ref 21–32)
CREAT SERPL-MCNC: 1.17 MG/DL (ref 0.7–1.3)
ERYTHROCYTE [DISTWIDTH] IN BLOOD BY AUTOMATED COUNT: 18.5 % (ref 11.5–14.5)
GLUCOSE BLD STRIP.AUTO-MCNC: 100 MG/DL (ref 65–100)
GLUCOSE BLD STRIP.AUTO-MCNC: 118 MG/DL (ref 65–100)
GLUCOSE BLD STRIP.AUTO-MCNC: 130 MG/DL (ref 65–100)
GLUCOSE BLD STRIP.AUTO-MCNC: 139 MG/DL (ref 65–100)
GLUCOSE SERPL-MCNC: 97 MG/DL (ref 65–100)
HCT VFR BLD AUTO: 29.7 % (ref 36.6–50.3)
HGB BLD-MCNC: 9.1 G/DL (ref 12.1–17)
MAGNESIUM SERPL-MCNC: 2.1 MG/DL (ref 1.6–2.4)
MCH RBC QN AUTO: 26.5 PG (ref 26–34)
MCHC RBC AUTO-ENTMCNC: 30.6 G/DL (ref 30–36.5)
MCV RBC AUTO: 86.6 FL (ref 80–99)
NRBC # BLD: 0 K/UL (ref 0–0.01)
NRBC BLD-RTO: 0 PER 100 WBC
PHOSPHATE SERPL-MCNC: 4.9 MG/DL (ref 2.6–4.7)
PLATELET # BLD AUTO: 373 K/UL (ref 150–400)
PMV BLD AUTO: 10.5 FL (ref 8.9–12.9)
POTASSIUM SERPL-SCNC: 4.2 MMOL/L (ref 3.5–5.1)
RBC # BLD AUTO: 3.43 M/UL (ref 4.1–5.7)
SERVICE CMNT-IMP: ABNORMAL
SERVICE CMNT-IMP: NORMAL
SODIUM SERPL-SCNC: 132 MMOL/L (ref 136–145)
WBC # BLD AUTO: 13.6 K/UL (ref 4.1–11.1)

## 2018-09-29 PROCEDURE — 74011000258 HC RX REV CODE- 258: Performed by: INTERNAL MEDICINE

## 2018-09-29 PROCEDURE — 84100 ASSAY OF PHOSPHORUS: CPT | Performed by: HOSPITALIST

## 2018-09-29 PROCEDURE — 85027 COMPLETE CBC AUTOMATED: CPT | Performed by: HOSPITALIST

## 2018-09-29 PROCEDURE — 94640 AIRWAY INHALATION TREATMENT: CPT

## 2018-09-29 PROCEDURE — 74011250636 HC RX REV CODE- 250/636: Performed by: INTERNAL MEDICINE

## 2018-09-29 PROCEDURE — 74011250637 HC RX REV CODE- 250/637: Performed by: INTERNAL MEDICINE

## 2018-09-29 PROCEDURE — 74011000250 HC RX REV CODE- 250: Performed by: INTERNAL MEDICINE

## 2018-09-29 PROCEDURE — 74011000250 HC RX REV CODE- 250: Performed by: SURGERY

## 2018-09-29 PROCEDURE — 36415 COLL VENOUS BLD VENIPUNCTURE: CPT | Performed by: HOSPITALIST

## 2018-09-29 PROCEDURE — 36592 COLLECT BLOOD FROM PICC: CPT

## 2018-09-29 PROCEDURE — 82962 GLUCOSE BLOOD TEST: CPT

## 2018-09-29 PROCEDURE — 65270000029 HC RM PRIVATE

## 2018-09-29 PROCEDURE — 80048 BASIC METABOLIC PNL TOTAL CA: CPT | Performed by: HOSPITALIST

## 2018-09-29 PROCEDURE — 83735 ASSAY OF MAGNESIUM: CPT | Performed by: HOSPITALIST

## 2018-09-29 RX ADMIN — IPRATROPIUM BROMIDE AND ALBUTEROL SULFATE 3 ML: .5; 3 SOLUTION RESPIRATORY (INHALATION) at 08:45

## 2018-09-29 RX ADMIN — NYSTATIN 500000 UNITS: 100000 SUSPENSION ORAL at 17:42

## 2018-09-29 RX ADMIN — LACTULOSE 10 G: 20 SOLUTION ORAL at 08:27

## 2018-09-29 RX ADMIN — CEFEPIME HYDROCHLORIDE 2 G: 2 INJECTION, POWDER, FOR SOLUTION INTRAVENOUS at 06:49

## 2018-09-29 RX ADMIN — FUROSEMIDE 60 MG: 40 TABLET ORAL at 08:25

## 2018-09-29 RX ADMIN — CEFEPIME HYDROCHLORIDE 2 G: 2 INJECTION, POWDER, FOR SOLUTION INTRAVENOUS at 13:28

## 2018-09-29 RX ADMIN — VENLAFAXINE 37.5 MG: 37.5 TABLET ORAL at 10:16

## 2018-09-29 RX ADMIN — Medication 20 ML: at 13:29

## 2018-09-29 RX ADMIN — NYSTATIN 500000 UNITS: 100000 SUSPENSION ORAL at 13:29

## 2018-09-29 RX ADMIN — DAPTOMYCIN 1000 MG: 500 INJECTION, POWDER, LYOPHILIZED, FOR SOLUTION INTRAVENOUS at 18:07

## 2018-09-29 RX ADMIN — ACETAMINOPHEN 650 MG: 325 TABLET ORAL at 22:02

## 2018-09-29 RX ADMIN — POLYETHYLENE GLYCOL 3350 17 G: 17 POWDER, FOR SOLUTION ORAL at 08:26

## 2018-09-29 RX ADMIN — APIXABAN 5 MG: 5 TABLET, FILM COATED ORAL at 22:02

## 2018-09-29 RX ADMIN — LEVOFLOXACIN 750 MG: 5 INJECTION, SOLUTION INTRAVENOUS at 16:39

## 2018-09-29 RX ADMIN — VENLAFAXINE 37.5 MG: 37.5 TABLET ORAL at 16:39

## 2018-09-29 RX ADMIN — APIXABAN 5 MG: 5 TABLET, FILM COATED ORAL at 08:26

## 2018-09-29 RX ADMIN — LEVETIRACETAM 250 MG: 500 SOLUTION ORAL at 22:02

## 2018-09-29 RX ADMIN — AMIODARONE HYDROCHLORIDE 200 MG: 200 TABLET ORAL at 08:25

## 2018-09-29 RX ADMIN — LEVETIRACETAM 250 MG: 500 SOLUTION ORAL at 08:26

## 2018-09-29 RX ADMIN — LACTULOSE 10 G: 20 SOLUTION ORAL at 17:42

## 2018-09-29 RX ADMIN — CEFEPIME HYDROCHLORIDE 2 G: 2 INJECTION, POWDER, FOR SOLUTION INTRAVENOUS at 22:02

## 2018-09-29 RX ADMIN — Medication 10 ML: at 13:29

## 2018-09-29 RX ADMIN — GENTAMICIN SULFATE 80 MG: 40 INJECTION, SOLUTION INTRAMUSCULAR; INTRAVENOUS at 05:57

## 2018-09-29 RX ADMIN — NYSTATIN 500000 UNITS: 100000 SUSPENSION ORAL at 08:26

## 2018-09-29 NOTE — PROGRESS NOTES
Pt has been turned in bed, side to side. Pt is wet with urine each hour, so RN has changed pt's bedpad as needed. Bedside shift change report given to mauricio Marrero RN by Kerri Moulton RN (offgoing nurse). Report given with SBAR, MAR, Recent Results, Vital Signs, and plan of care. Pt is alert and oriented x 2-3 . Call bell within reach of patient. Safety/fall precautions in place.

## 2018-09-29 NOTE — PROGRESS NOTES
Hospitalist Progress Note NAME: Luke Cárdenas :  1940 MRN:  [id number] Interim summary: S/p Colonoscopy: Large Colon Mass > S/P Colectomy Stage 3 B this hospitalization done  > Developed Ileus then aspiration PNA Was intubated (due to acute respiratory failure) and prolonged ICU stay Extubated  Pneumonia , last sputum culture  + for Heavy staph aureus , light K.pneumoniae Sputum culture  + heavy Burkholderia cepacia S/p treated with Levaquin Bilateral Pleural Effusions s/p R chest tube removed  Assessment / Plan: 
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  
-leukocytosis is down today s/p new AB added by ID    
cxray negative   
-CT noted  
-ID help appreciated New BC done today - will follow and decide on CT Dapto + Genta , added cefepime and lVQ today by ID  
-S/p removal of L/IJ placement of PICC RUE Acute Blood Loss Anemia due to Lower GI Bleed POA 
-Hb is stable now S/p Colonoscopy: Large Colon Mass > S/P Colectomy Stage 3 B this hospitalization done   
   
Dysphagia - speech eval appreciated: MBS passed  --> FLD  
S/p TPN  --> S/P Peg   
TF on hold , will see how he will do with FLD May need to add TF on Monday if diet is not advanced  
  
SSS S/p PPM and Pacer dependent / PAF / HTN 
- HR controlled. BP stable  
-continue amiodarone  
-Per ID consult: Thrombus in R/IJ, now mass in RA which is suggestive of thrombus rather than vegetation in light of pacemaker pocket appearing clear of infection. Hold off on removal of pacemaker at this time  
-cont Eliquis  
  
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 
-Has been on Tegretol PTA which was changed to Keppra IV while NPO 
+ lethargy on keppra 500 mg bid Seen by neurology: Dr. Kristi Oakes discussed with Dr Frieda Ku (patients Neurologist) and in agreement to keep pt on Keppra 250 BID due to better Drug interactions and liver  with all his meds and less Liver issues on 
  
Severe Deconditioning. 
-Palliative care was involved with discussion with family who decided everything to be done 
  
He needs to follow up with ID or Cardiology close to October 20 for Reimaging and Cultures or can be done in at [facility]. To determine what to do with Pacers 
  
RONAL, cpap at night Hypernatremia, resolved Hyponatremia HCAP Bilateral pleural effusions Acute hypoxic respiratory failure not POA resolved Hyperlipidemia, on statin pta ( on hold with Dapto) Oral candidiasis , cont nystatin, s/p fluconazole  
 
 
   
Code Status: Full NOK POA :  Wife - DELVIS Son Nicholas Pin [phone] DVT Prophylaxis: Eliquis  
  
Disposition: difficult disposition: Insurance denies Mireilleie Sicard despite pear to pear and appeal. Working with [facility] if they will accept. PICC 8/31 R Subjective: Chief Complaint / Reason for Physician Visit: following bacteremia / GI bleeding Wife not at bedside Tolerating FLD Discussed with RN events overnight. Review of Systems: 
Symptom Y/N Comments  Symptom Y/N Comments Fever/Chills n   Chest Pain n   
Poor Appetite    Edema Cough    Abdominal Pain n   
Sputum    Joint Pain SOB/BECERRIL n   Pruritis/Rash Nausea/vomit    Tolerating PT/OT Diarrhea    Tolerating Diet Constipation    Other Could NOT obtain due to:   
 
Objective: VITALS:  
Last 24hrs VS reviewed since prior progress note. Most recent are: 
Patient Vitals for the past 24 hrs: 
 Temp Pulse Resp BP SpO2  
09/29/18 1123 98.4 °F (36.9 °C) 89 18 140/86 99 % 09/29/18 0848 - - - - 97 % 09/29/18 0654 98.4 °F (36.9 °C) 90 16 131/87 98 %  
09/29/18 0333 97.4 °F (36.3 °C) 88 17 120/68 97 % 09/28/18 2331 97.6 °F (36.4 °C) 99 18 137/70 97 % 09/28/18 2021 97.2 °F (36.2 °C) 76 17 153/86 98 % 09/28/18 1600 98.1 °F (36.7 °C) 86 18 133/74 98 % Intake/Output Summary (Last 24 hours) at 09/29/18 1431 Last data filed at 09/29/18 4841 Gross per 24 hour Intake             1250 ml Output                0 ml Net             1250 ml PHYSICAL EXAM: 
General: WD, WN. Alert, cooperative, no acute distress   
EENT:  EOMI. Anicteric sclerae. MMM Resp:  CTA bilaterally, no wheezing or rales. No accessory muscle use CV:  Regular  rhythm,  No edema GI:  Soft, Non distended, Non tender.  +Bowel sounds. PEG tube /Colostomy intact Neurologic:  Alert and oriented X 3, normal speech, Psych:   Fair/poor insight. Not anxious nor agitated Skin:  No rashes. No jaundice Reviewed most current lab test results and cultures  YES Reviewed most current radiology test results   YES Review and summation of old records today    NO Reviewed patient's current orders and MAR    YES 
PMH/ reviewed - no change compared to H&P 
________________________________________________________________________ Care Plan discussed with: 
  Comments Patient y Family RN y   
Care Manager Consultant Multidiciplinary team rounds were held today with , nursing, pharmacist and clinical coordinator. Patient's plan of care was discussed; medications were reviewed and discharge planning was addressed. ________________________________________________________________________ Total NON critical care TIME:  25  Minutes Total CRITICAL CARE TIME Spent:   Minutes non procedure based Comments >50% of visit spent in counseling and coordination of care     
________________________________________________________________________ Sandra Hawthorne MD  
 
Procedures: see electronic medical records for all procedures/Xrays and details which were not copied into this note but were reviewed prior to creation of Plan.    
 
LABS: 
 I reviewed today's most current labs and imaging studies. Pertinent labs include: 
Recent Labs  
   09/29/18 
 0553  09/28/18 
 0309  09/27/18 
 0245 WBC  13.6*  15.0*  14.7* HGB  9.1*  9.0*  8.8* HCT  29.7*  29.5*  29.0*  
PLT  373  326  310 Recent Labs  
   09/29/18 
 5100  09/28/18 
 0309  09/27/18 
 0245  09/26/18 
 2242 NA  132*  132*  133*  134* K  4.2  4.4  4.3  4.0  
CL  96*  97  96*  98  
CO2  29  29  28  29 GLU  97  101*  113*  135* BUN  26*  29*  29*  28* CREA  1.17  1.01  0.89  0.90  
CA  8.9  8.9  8.8  8.7 MG  2.1   --    --   2.0 PHOS  4.9*   --    --   4.4 Signed: Emily Tejeda MD

## 2018-09-29 NOTE — PROGRESS NOTES
Bedside and Verbal shift change report given to [place] (oncoming nurse) by Anali Pittman RN (offgoing nurse). Report included the following information SBAR, Kardex, Intake/Output, MAR and Cardiac Rhythm Paced.

## 2018-09-29 NOTE — PROGRESS NOTES
Infectious Disease Progress Note IMPRESSION:  
· S/p  increase in WBC to 14.7 pt remains afebrile, asymptomatic ·  CTA chest( 9/28)  shows increasing airspace disease in SANDY& lingula likely reflective of edema superimposed on  developing infection ·  Recurrence of bacteremia  with BC 1/2 + for Coagulase negative Staph- Staph Capitis Oxacillin R ( 9/5) Repeat BC 9/7 no growth · Repeat CT chest ( 9/7) shows non occlusive thrombus in R/ IJ vein ,small amount of thrombus along right PICC catheter/ pacer leads in SVC which extends to right atrium · Pt to start on pureed diet/ liquid diet · S/p removal of L/IJ placement of PICC RUE , TPN on 8/31 · S/p persistent bacteremia with coagulase negative Staph since 8/11 initially oxacillin sensitive , last positive BC is oxacillin resistant 8/21(1/4) · Repeat BC 8/26, 8/29 , 9/3 no growth · NATASHA - definite large mass associated with pacing wire in RA which may represent vegetation ( 8/23) · Thrombus  & tiny gas bubble of as in RIJ extending into SVC to level of RA (8/15)  s/p heparin IV now on lovenox s/q · CTA chest - no PE, could not visualize SVC thrombus, left lateral wall soft tissue swelling, soft tissue around pacemaker show no significant abnormality · US chest wall - minimal fluid in pacemaker pocket, no significant inflammation of overlying subcutaneous fat or skin · Acute respiratory failure s/p  Ventilator / h/o tracheostomy 24 years ago  , s/p extubation 8/26 · S/p R/ pleural effusion s/p chest tube placement · 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    
  
  
PLAN:  
   
· Continue Daptomycin x 6 weeks , Gentamicin IV x 4 weeks from last negative cultures. End date for Daptomycin is Oct 19,for Gentamicin is Oct 5. 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 · Add Cefepime , Levaquin x 7 days for  new infiltrate · IS  Every 1-2 hours · Repeat CBC in am, watch for fever . · Continue antimicrobials & anticoagulation ·  Plan of care D/w spouse t great length CT chest  
IMPRESSION:  
1. No evidence of central pulmonary embolism. Pruning of the right middle lobe 
pulmonary artery branches in an area of atelectatic lung. 2.  Lack of contrast in the central veins limits evaluation for clot around the 
cardiac pacing wires and PICC line. No evidence of thrombus in the right atrium. 3.  Increasing airspace disease in the left upper lobe and lingula likely 
reflective of edema superimposed on developing infection Blood culture - Special Requests: NO SPECIAL REQUESTS   Preliminary Culture result: NO GROWTH 2 DAYS Blood culture - Culture result:    Final  
STAPHYLOCOCCUS CAPITIS SUBSPECIES UREOLYTICUS (OXACILLIN RESISTANT) , ISOLATED FROM 1 OF 2 BOTTLES DRAWN. .. LEFT HAND SITE (A) Subjective:  
 
Pt seen. Awake,talking ,  no complaints. . 
 
Review of Systems:  Pt denies complaints. 10 point ROS obtained Objective:  
 
Blood pressure 137/70, pulse 99, temperature 97.6 °F (36.4 °C), resp. rate 18, height 6' 2\" (1.88 m), weight 249 lb (112.9 kg), SpO2 97 %. Temp (24hrs), Av.7 °F (36.5 °C), Min:97.2 °F (36.2 °C), Max:98.1 °F (36.7 °C) Patient Vitals for the past 24 hrs: 
 Temp Pulse Resp BP SpO2  
18 2331 97.6 °F (36.4 °C) 99 18 137/70 97 % 18 2021 97.2 °F (36.2 °C) 76 17 153/86 98 %  
18 1600 98.1 °F (36.7 °C) 86 18 133/74 98 %  
18 1229 97.9 °F (36.6 °C) 77 20 126/81 98 %  
18 1028 - - - - 96 %  
18 0838 97.7 °F (36.5 °C) 87 24 137/71 92 %  
18 0336 97.6 °F (36.4 °C) 78 16 119/68 97 % Lines:  picc Physical Exam:  
General:   awake, talking Lungs:    Reduced air entry bases on  auscultation  chest wall-  pacemaker site -no swelling, erythemal  
CV:  Regular rate and rhythm,no murmur, Abdomen:   Soft, non-tender. bowel sounds + Extremities: No edema Lines/Devices:  Intact, no erythema, drainage or tenderness Data Review: CBC:  
Recent Labs  
   09/28/18 
 0309  09/27/18 
 0245  09/26/18 
 0430 WBC  15.0*  14.7*  14.3*  
RBC  3.42*  3.33*  3.43* HGB  9.0*  8.8*  9.1*  
HCT  29.5*  29.0*  29.8* PLT  326  310  297 GRANS   --   71  72 LYMPH   --   15  15 EOS   --   3  3 CMP:  
Recent Labs  
   09/28/18 
 0309  09/27/18 
 0245  09/26/18 
 2242 GLU  101*  113*  135* NA  132*  133*  134* K  4.4  4.3  4.0  
CL  97  96*  98  
CO2  29  28  29 BUN  29*  29*  28* CREA  1.01  0.89  0.90  
CA  8.9  8.8  8.7 AGAP  6  9  7 BUCR  29*  33*  31* Studies:     
Lab Results Component Value Date/Time Culture result: NO GROWTH AFTER 14 HOURS 09/27/2018 03:51 PM  
 Culture result: NO GROWTH 6 DAYS 09/08/2018 01:46 AM  
 Culture result: (A) 09/05/2018 07:03 PM  
  STAPHYLOCOCCUS CAPITIS SUBSPECIES UREOLYTICUS (OXACILLIN RESISTANT) , ISOLATED FROM 1 OF 2 BOTTLES DRAWN. .. LEFT HAND SITE Culture result: (A) 09/05/2018 07:03 PM  
  PRELIMINARY REPORT OF GRAM POSITIVE COCCI IN CLUSTERS GROWING IN 1 OF 2 BOTTLES DRAWN CALLED TO AND READ BACK BY FELIX CAMPOS RN [facility] AT 2106 ON 9/6/2018. [place] 1850 Culture result:  09/05/2018 07:03 PM  
   [facility] REQUESTING IDENTIFICATION AND SENSITIVITIES 9/10/18 TA. Culture result: REMAINING BOTTLE(S) HAS/HAVE NO GROWTH IN 5 DAYS 09/05/2018 07:03 PM  
  
 
XR Results (most recent): 
 
Results from Hospital Encounter encounter on 07/10/18 XR CHEST PORT Narrative Indication: Leukocytosis Portable chest at 4:00 PM demonstrates mild bibasilar atelectasis, right greater 
than left. Lungs are otherwise fully expanded and clear.  Cardiac pacer and PICC 
line are in stable position. The pigtail catheter at the right lung base has been removed since the prior 
examination. Impression IMPRESSION: No acute abnormality Patient Active Problem List  
Diagnosis Code  
 HTN (hypertension) I10  
 Depression F32.9  Hypercholesterolemia E78.00  Acid reflux K21.9  Seizure ([facility] R56.9  Hypothyroidism E03.9  Hyponatremia E87.1  BPH (benign prostatic hyperplasia) N40.0  Rash R21  
 Cellulitis L03.90  Wax in ear H61.20  Ulcer [id number]  Small bowel obstruction ([facility] Q20.761  Atrial flutter ([facility]) I48.92  
 Atrial fibrillation or flutter  CHI (closed head injury) S09. 90XA  Basal cell cancer C44.91  
 TBI (traumatic brain injury) ([facility] S06. 9X9A  Atrial fibrillation ([facility]) I48.91  
 Cataract H26.9  Constipation K59.00  Ankle fracture, left D44.036J  Insomnia G47.00  Tinea corporis B35.4  SSS (sick sinus syndrome) ([facility]) I49.5  Syncope R55  Seizure disorder ([facility] G40.909  RONAL on CPAP G47.33, Z99.89  
 Pacemaker Z95.0  Pre-op evaluation Z01.818  Chronic back pain greater than 3 months duration M54.9, G89.29  
 Cerebral infarction ([facility]) I63.9  Acute bronchitis J20.9  Screening for colon cancer Z12.11  
 Chronic right shoulder pain M25.511, G89.29  
 Common wart B07.8  Localized epilepsy with impairment of consciousness ([facility] G40.209  Severe obesity (BMI 35.0-39.9) ([facility]) E66.01  
 Acute blood loss anemia D62  Anasarca R60.1  GI bleed K92.2  Acute encephalopathy G93.40  Idiopathic hypotension I95.0  Counseling regarding goals of care Z71.89 ICD-10-CM ICD-9-CM 1. Anemia, unspecified type D64.9 285.9 2. Generalized weakness R53.1 780.79   
3. Acute encephalopathy G93.40 348.30   
4. Anasarca R60.1 782.3 5. Idiopathic hypotension I95.0 458.9 6. Counseling regarding goals of care Z71.89 V65.49   
7. 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 Chronic atrial fibrillation (HCC) I48.2 427.31   
9. Malignant neoplasm of ascending colon (HCC) C18.2 153.6 Anti-infectives:  
  
 Daptomycin IV - 8/26- Gentamicin IV 8/26 S/p levaquin 8/29-9/7 Cefotetan 7/17 Zosyn 7/20-8/7 Cefepime - 8/11-8/13 Ceftriaxone 8/13-8/17 Fluconazole IV- 9/3- 9/6 IV , 9/6-9/10-po Vancomycin 8/11-8/22 Daptomycin 8/23- 8/24- Naficillin IV 8/24- 8/26  Alexus Zavala MD FACP

## 2018-09-29 NOTE — PROGRESS NOTES
Discharge instructions, prescription and follow up appointments reviewed with patient and patient's spouse, both of whom verbalized an understanding. An opportunity for questions and clarification was provided for. Patient has received medication at bedside for home administration. Papers have been faxed to number provided by case management and placed in patient's chart.

## 2018-09-29 NOTE — HOSPICE
[facility] RN consultation visit note. Order for hospice noted. History and events of this hospitalization reviewed. Met in conference room with wife Ericka Panchal and daughter in [facility]. Support given as they shared the ups and downs of patient's health and during this hospitalizateion in particular. Hospice philosophy and scope of services presented. Questions and concerns addressed. We discussed options for hospice care and I reviewed the efforts of [facility]. After a 2 hour discussion Jesus Calvillo said she would like to take [place] home with hospice. Medicaid  will be working to get them 7 day a week 8 hour a day HHA assist and Jesus Calvillo felt that she would be able to do the rest with hospice and family support. Jesus Calvillo said she was finally able to accept what the physicians have told her which was that [place] would probably be with us maybe 7 days after antibiotics were stopped. DME needs are hospital bed, geomatt, OBT, O2 2 lpm nc prn. They have one older bed from Medicaid in place and we discussed leaving that up in the room for others to rest on at times. DDNR needs to be completed. We will follow up on Monday for coordination of care Thank you for asking us to be a part of the care for this much loved man and his family. Please call [phone] if questions or concerns Bayron Jack RN [facility]

## 2018-09-29 NOTE — HOSPICE
[employer] RN consultation visit note. Order for hospice noted. History and events of this hospitalization reviewed. TC to wife Caroline Colon at [phone] and plans are to meet around noon today. Mrs Jono Newell reported family talked and the decision was made to go home with hospice support. Please call [phone] if questions or concerns Mary Ann Vasquez RN [facility]

## 2018-09-30 LAB
ANION GAP SERPL CALC-SCNC: 8 MMOL/L (ref 5–15)
BUN SERPL-MCNC: 23 MG/DL (ref 6–20)
BUN/CREAT SERPL: 19 (ref 12–20)
CALCIUM SERPL-MCNC: 8.7 MG/DL (ref 8.5–10.1)
CHLORIDE SERPL-SCNC: 97 MMOL/L (ref 97–108)
CO2 SERPL-SCNC: 26 MMOL/L (ref 21–32)
CREAT SERPL-MCNC: 1.2 MG/DL (ref 0.7–1.3)
ERYTHROCYTE [DISTWIDTH] IN BLOOD BY AUTOMATED COUNT: 18.6 % (ref 11.5–14.5)
GLUCOSE BLD STRIP.AUTO-MCNC: 109 MG/DL (ref 65–100)
GLUCOSE BLD STRIP.AUTO-MCNC: 109 MG/DL (ref 65–100)
GLUCOSE BLD STRIP.AUTO-MCNC: 119 MG/DL (ref 65–100)
GLUCOSE BLD STRIP.AUTO-MCNC: 123 MG/DL (ref 65–100)
GLUCOSE BLD STRIP.AUTO-MCNC: 126 MG/DL (ref 65–100)
GLUCOSE SERPL-MCNC: 100 MG/DL (ref 65–100)
HCT VFR BLD AUTO: 29.1 % (ref 36.6–50.3)
HGB BLD-MCNC: 8.8 G/DL (ref 12.1–17)
MCH RBC QN AUTO: 26 PG (ref 26–34)
MCHC RBC AUTO-ENTMCNC: 30.2 G/DL (ref 30–36.5)
MCV RBC AUTO: 85.8 FL (ref 80–99)
NRBC # BLD: 0 K/UL (ref 0–0.01)
NRBC BLD-RTO: 0 PER 100 WBC
PLATELET # BLD AUTO: 409 K/UL (ref 150–400)
PMV BLD AUTO: 10 FL (ref 8.9–12.9)
POTASSIUM SERPL-SCNC: 3.7 MMOL/L (ref 3.5–5.1)
RBC # BLD AUTO: 3.39 M/UL (ref 4.1–5.7)
SERVICE CMNT-IMP: ABNORMAL
SODIUM SERPL-SCNC: 131 MMOL/L (ref 136–145)
WBC # BLD AUTO: 11.8 K/UL (ref 4.1–11.1)

## 2018-09-30 PROCEDURE — 74011000258 HC RX REV CODE- 258: Performed by: INTERNAL MEDICINE

## 2018-09-30 PROCEDURE — 36415 COLL VENOUS BLD VENIPUNCTURE: CPT | Performed by: INTERNAL MEDICINE

## 2018-09-30 PROCEDURE — 74011000250 HC RX REV CODE- 250: Performed by: INTERNAL MEDICINE

## 2018-09-30 PROCEDURE — 82962 GLUCOSE BLOOD TEST: CPT

## 2018-09-30 PROCEDURE — 80048 BASIC METABOLIC PNL TOTAL CA: CPT | Performed by: INTERNAL MEDICINE

## 2018-09-30 PROCEDURE — 74011250637 HC RX REV CODE- 250/637: Performed by: INTERNAL MEDICINE

## 2018-09-30 PROCEDURE — 74011250636 HC RX REV CODE- 250/636: Performed by: INTERNAL MEDICINE

## 2018-09-30 PROCEDURE — 85027 COMPLETE CBC AUTOMATED: CPT | Performed by: HOSPITALIST

## 2018-09-30 PROCEDURE — 36592 COLLECT BLOOD FROM PICC: CPT

## 2018-09-30 PROCEDURE — 65270000029 HC RM PRIVATE

## 2018-09-30 RX ORDER — MECLIZINE HCL 12.5 MG 12.5 MG/1
25 TABLET ORAL
Status: DISCONTINUED | OUTPATIENT
Start: 2018-09-30 | End: 2018-09-30 | Stop reason: SDUPTHER

## 2018-09-30 RX ADMIN — LEVETIRACETAM 250 MG: 500 SOLUTION ORAL at 08:54

## 2018-09-30 RX ADMIN — LEVETIRACETAM 250 MG: 500 SOLUTION ORAL at 22:17

## 2018-09-30 RX ADMIN — DAPTOMYCIN 1000 MG: 500 INJECTION, POWDER, LYOPHILIZED, FOR SOLUTION INTRAVENOUS at 18:28

## 2018-09-30 RX ADMIN — FUROSEMIDE 60 MG: 40 TABLET ORAL at 08:54

## 2018-09-30 RX ADMIN — Medication 10 ML: at 05:14

## 2018-09-30 RX ADMIN — POLYETHYLENE GLYCOL 3350 17 G: 17 POWDER, FOR SOLUTION ORAL at 08:54

## 2018-09-30 RX ADMIN — Medication 10 ML: at 13:29

## 2018-09-30 RX ADMIN — CEFEPIME HYDROCHLORIDE 2 G: 2 INJECTION, POWDER, FOR SOLUTION INTRAVENOUS at 06:14

## 2018-09-30 RX ADMIN — ACETAMINOPHEN 650 MG: 325 TABLET ORAL at 22:16

## 2018-09-30 RX ADMIN — Medication 20 ML: at 13:29

## 2018-09-30 RX ADMIN — CEFEPIME HYDROCHLORIDE 2 G: 2 INJECTION, POWDER, FOR SOLUTION INTRAVENOUS at 13:29

## 2018-09-30 RX ADMIN — VENLAFAXINE 37.5 MG: 37.5 TABLET ORAL at 16:32

## 2018-09-30 RX ADMIN — LACTULOSE 10 G: 20 SOLUTION ORAL at 08:54

## 2018-09-30 RX ADMIN — Medication 10 ML: at 00:24

## 2018-09-30 RX ADMIN — Medication 10 ML: at 22:19

## 2018-09-30 RX ADMIN — ACETAMINOPHEN 650 MG: 325 TABLET ORAL at 08:54

## 2018-09-30 RX ADMIN — LEVOFLOXACIN 750 MG: 5 INJECTION, SOLUTION INTRAVENOUS at 16:32

## 2018-09-30 RX ADMIN — APIXABAN 5 MG: 5 TABLET, FILM COATED ORAL at 08:54

## 2018-09-30 RX ADMIN — LACTULOSE 10 G: 20 SOLUTION ORAL at 17:48

## 2018-09-30 RX ADMIN — GENTAMICIN SULFATE 80 MG: 40 INJECTION, SOLUTION INTRAMUSCULAR; INTRAVENOUS at 05:13

## 2018-09-30 RX ADMIN — CEFEPIME HYDROCHLORIDE 2 G: 2 INJECTION, POWDER, FOR SOLUTION INTRAVENOUS at 22:18

## 2018-09-30 RX ADMIN — APIXABAN 5 MG: 5 TABLET, FILM COATED ORAL at 22:16

## 2018-09-30 RX ADMIN — VENLAFAXINE 37.5 MG: 37.5 TABLET ORAL at 08:54

## 2018-09-30 RX ADMIN — AMIODARONE HYDROCHLORIDE 200 MG: 200 TABLET ORAL at 08:54

## 2018-09-30 NOTE — PROGRESS NOTES
Infectious Disease Progress Note IMPRESSION:  
· S/p  Improvement in WBC to 11.8  pt remains afebrile, asymptomatic ·  CTA chest( 9/28)  shows increasing airspace disease in SANDY& lingula likely reflective of edema superimposed on  developing infection ·  Recurrence of bacteremia  with BC 1/2 + for Coagulase negative Staph- Staph Capitis Oxacillin R ( 9/5) Repeat BC 9/7 no growth · Repeat CT chest ( 9/7) shows non occlusive thrombus in R/ IJ vein ,small amount of thrombus along right PICC catheter/ pacer leads in SVC which extends to right atrium · Pt to start on pureed diet/ liquid diet , eating less per staff · S/p removal of L/IJ placement of PICC RUE , TPN on 8/31 · S/p persistent bacteremia with coagulase negative Staph since 8/11 initially oxacillin sensitive , last positive BC is oxacillin resistant 8/21(1/4) · Repeat BC 8/26, 8/29 , 9/3 no growth · NATASHA - definite large mass associated with pacing wire in RA which may represent vegetation ( 8/23) · Thrombus  & tiny gas bubble of as in RIJ extending into SVC to level of RA (8/15)  s/p heparin IV now on lovenox s/q · CTA chest - no PE, could not visualize SVC thrombus, left lateral wall soft tissue swelling, soft tissue around pacemaker show no significant abnormality · US chest wall - minimal fluid in pacemaker pocket, no significant inflammation of overlying subcutaneous fat or skin · Acute respiratory failure s/p  Ventilator / h/o tracheostomy 24 years ago  , s/p extubation 8/26 · S/p R/ pleural effusion s/p chest tube placement · 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    
  
  
PLAN:  
   
 · Continue Daptomycin x 6 weeks , Gentamicin IV x 4 weeks from last negative cultures. End date for Daptomycin is Oct 19,for Gentamicin is Oct 5. 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 · Cefepime , Levaquin x 7 days for  new infiltrate · IS every 1-2 hours · Repeat CBC  watch for fever . · Continue antimicrobials & anticoagulation ·  Plan of care D/w spouse at great length/Palliative & Hospice also on case at this time CT chest  
IMPRESSION:  
1. No evidence of central pulmonary embolism. Pruning of the right middle lobe 
pulmonary artery branches in an area of atelectatic lung. 2.  Lack of contrast in the central veins limits evaluation for clot around the 
cardiac pacing wires and PICC line. No evidence of thrombus in the right atrium. 3.  Increasing airspace disease in the left upper lobe and lingula likely 
reflective of edema superimposed on developing infection Blood culture - Special Requests: NO SPECIAL REQUESTS   Preliminary Culture result: NO GROWTH 2 DAYS Blood culture - Culture result:    Final  
STAPHYLOCOCCUS CAPITIS SUBSPECIES UREOLYTICUS (OXACILLIN RESISTANT) , ISOLATED FROM 1 OF 2 BOTTLES DRAWN. .. LEFT HAND SITE (A) Subjective:  
 
Pt seen. Awake,withdrawn,  no complaints. Review of Systems:  Pt denies complaints. 10 point ROS obtained Objective:  
 
Blood pressure 108/46, pulse 90, temperature 97.4 °F (36.3 °C), resp. rate 18, height 6' 2\" (1.88 m), weight 249 lb (112.9 kg), SpO2 96 %. Temp (24hrs), Av.7 °F (36.5 °C), Min:97.4 °F (36.3 °C), Max:97.8 °F (36.6 °C) Patient Vitals for the past 24 hrs: 
 Temp Pulse Resp BP SpO2  
18 1635 97.4 °F (36.3 °C) 90 18 108/46 96 % 18 1227 97.7 °F (36.5 °C) 84 16 144/74 94 % 18 0747 97.8 °F (36.6 °C) 72 18 132/72 97 % 18 0320 97.4 °F (36.3 °C) 88 18 126/69 96 % 18 0021 97.8 °F (36.6 °C) 83 18 120/80 96 % 09/29/18 1828 97.8 °F (36.6 °C) 87 18 120/56 98 % Lines:  picc Physical Exam:  
General:   awake, appears more withdrawn Lungs:    Reduced air entry bases on  auscultation  chest wall-  pacemaker site -no swelling, erythemal  
CV:  Regular rate and rhythm,no murmur, Abdomen:   Soft, non-tender. bowel sounds + Extremities: No edema Lines/Devices:  Intact, no erythema, drainage or tenderness Data Review: CBC:  
Recent Labs  
   09/30/18 
 0325  09/29/18 
 0553  09/28/18 
 0309 WBC  11.8*  13.6*  15.0*  
RBC  3.39*  3.43*  3.42* HGB  8.8*  9.1*  9.0*  
HCT  29.1*  29.7*  29.5* PLT  409*  373  326 CMP:  
Recent Labs  
   09/30/18 
 0325  09/29/18 
 0553  09/28/18 
 0309 GLU  100  97  101* NA  131*  132*  132* K  3.7  4.2  4.4  
CL  97  96*  97  
CO2  26  29  29 BUN  23*  26*  29* CREA  1.20  1.17  1.01  
CA  8.7  8.9  8.9 AGAP  8  7  6 BUCR  19  22*  29* Studies:     
Lab Results Component Value Date/Time Culture result: NO GROWTH 3 DAYS 09/27/2018 03:51 PM  
 Culture result: NO GROWTH 6 DAYS 09/08/2018 01:46 AM  
 Culture result: (A) 09/05/2018 07:03 PM  
  STAPHYLOCOCCUS CAPITIS SUBSPECIES UREOLYTICUS (OXACILLIN RESISTANT) , ISOLATED FROM 1 OF 2 BOTTLES DRAWN. .. LEFT HAND SITE Culture result: (A) 09/05/2018 07:03 PM  
  PRELIMINARY REPORT OF GRAM POSITIVE COCCI IN CLUSTERS GROWING IN 1 OF 2 BOTTLES DRAWN CALLED TO AND READ BACK BY FELIX CAMPOS RN [facility] AT 2106 ON 9/6/2018. [place] 1850 Culture result:  09/05/2018 07:03 PM  
   [facility] REQUESTING IDENTIFICATION AND SENSITIVITIES 9/10/18 TA. Culture result: REMAINING BOTTLE(S) HAS/HAVE NO GROWTH IN 5 DAYS 09/05/2018 07:03 PM  
  
 
XR Results (most recent): 
 
Results from Hospital Encounter encounter on 07/10/18 XR CHEST PORT Narrative Indication: Leukocytosis Portable chest at 4:00 PM demonstrates mild bibasilar atelectasis, right greater than left. Lungs are otherwise fully expanded and clear. Cardiac pacer and PICC 
line are in stable position. The pigtail catheter at the right lung base has been removed since the prior 
examination. Impression IMPRESSION: No acute abnormality Patient Active Problem List  
Diagnosis Code  
 HTN (hypertension) I10  
 Depression F32.9  Hypercholesterolemia E78.00  Acid reflux K21.9  Seizure ([facility] R56.9  Hypothyroidism E03.9  Hyponatremia E87.1  BPH (benign prostatic hyperplasia) N40.0  Rash R21  
 Cellulitis L03.90  Wax in ear H61.20  Ulcer [id number]  Small bowel obstruction ([facility] C19.428  Atrial flutter ([facility]) I48.92  
 Atrial fibrillation or flutter  CHI (closed head injury) S09. 90XA  Basal cell cancer C44.91  
 TBI (traumatic brain injury) ([facility] S06. 9X9A  Atrial fibrillation ([facility]) I48.91  
 Cataract H26.9  Constipation K59.00  Ankle fracture, left X21.391C  Insomnia G47.00  Tinea corporis B35.4  SSS (sick sinus syndrome) ([facility]) I49.5  Syncope R55  Seizure disorder ([facility] G40.909  RONAL on CPAP G47.33, Z99.89  
 Pacemaker Z95.0  Pre-op evaluation Z01.818  Chronic back pain greater than 3 months duration M54.9, G89.29  
 Cerebral infarction ([facility]) I63.9  Acute bronchitis J20.9  Screening for colon cancer Z12.11  
 Chronic right shoulder pain M25.511, G89.29  
 Common wart B07.8  Localized epilepsy with impairment of consciousness ([facility] G40.209  Severe obesity (BMI 35.0-39.9) ([facility]) E66.01  
 Acute blood loss anemia D62  Anasarca R60.1  GI bleed K92.2  Acute encephalopathy G93.40  Idiopathic hypotension I95.0  Counseling regarding goals of care Z71.89 ICD-10-CM ICD-9-CM 1. Anemia, unspecified type D64.9 285.9 2. Generalized weakness R53.1 780.79   
3. Acute encephalopathy G93.40 348.30   
4. Anasarca R60.1 782.3 5. Idiopathic hypotension I95.0 458.9 6. Counseling regarding goals of care Z71.89 V65.49   
7. 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 9X9S 907.0 8. Chronic atrial fibrillation (HCC) I48.2 427.31   
9. Malignant neoplasm of ascending colon (HCC) C18.2 153.6 Anti-infectives:  
  
 Daptomycin IV - 8/26- Gentamicin IV 8/26 S/p levaquin 8/29-9/7 Cefotetan 7/17 Zosyn 7/20-8/7 Cefepime - 8/11-8/13 Ceftriaxone 8/13-8/17 Fluconazole IV- 9/3- 9/6 IV , 9/6-9/10-po Vancomycin 8/11-8/22 Daptomycin 8/23- 8/24- Naficillin IV 8/24- 8/26  Doyle Nicolas MD FACP

## 2018-09-30 NOTE — PROGRESS NOTES
Hospitalist Progress Note NAME: Sherly Marc :  1940 MRN:  [id number] Interim summary: S/p Colonoscopy: Large Colon Mass > S/P Colectomy Stage 3 B this hospitalization done  > Developed Ileus then aspiration PNA Was intubated (due to acute respiratory failure) and prolonged ICU stay Extubated  Pneumonia , last sputum culture  + for Heavy staph aureus , light K.pneumoniae Sputum culture  + heavy Burkholderia cepacia S/p treated with Levaquin Bilateral Pleural Effusions s/p R chest tube removed  Assessment / Plan: 
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  
-leukocytosis is trending down today s/p new AB added by ID    
cxray negative   
-CT noted  
-ID help appreciated New BC done today - will follow and decide on CT Dapto + Genta , added cefepime and lVQ today by ID  
-S/p removal of L/IJ placement of PICC RUE Acute Blood Loss Anemia due to Lower GI Bleed POA 
-Hb is stable now S/p Colonoscopy: Large Colon Mass > S/P Colectomy Stage 3 B this hospitalization done   
   
Dysphagia - speech eval appreciated: MBS passed  --> FLD  
S/p TPN  --> S/P Peg   
TF on hold , will see how he will do with FLD May need to add TF on Monday if diet is not advanced  
  
SSS S/p PPM and Pacer dependent / PAF / HTN 
- HR controlled. BP stable  
-continue amiodarone  
-Per ID consult: Thrombus in R/IJ, now mass in RA which is suggestive of thrombus rather than vegetation in light of pacemaker pocket appearing clear of infection. Hold off on removal of pacemaker at this time  
-cont Eliquis  
  
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 
-Has been on Tegretol PTA which was changed to Keppra IV while NPO 
+ lethargy on keppra 500 mg bid Seen by neurology: Dr. Wally Rao discussed with Dr Carol Jackson (patients Neurologist) and in agreement to keep pt on Keppra 250 BID due to better Drug interactions and liver  with all his meds and less Liver issues on 
  
Severe Deconditioning. 
-Palliative care was involved with discussion with family who decided everything to be done 
  
He needs to follow up with ID or Cardiology close to October 20 for Reimaging and Cultures or can be done in at [facility]. To determine what to do with Pacers 
  
RONAL, cpap at night Hypernatremia, resolved Hyponatremia HCAP Bilateral pleural effusions Acute hypoxic respiratory failure not POA resolved Hyperlipidemia, on statin pta ( on hold with Dapto) Oral candidiasis , cont nystatin, s/p fluconazole  
 
 
   
Code Status: Full NOK POA :  Wife - MPOA Son Ketan Liriano [phone] DVT Prophylaxis: Eliquis  
  
Disposition:wife decided to take him home with hospice, tentative dc Tuesday PICC 8/31 R Subjective: Chief Complaint / Reason for Physician Visit: following bacteremia / GI bleeding Pt has very short attention span, he is aao x 3 but has very poor insight and cannot make any decisions Wife at bedside Wife was very emotional. She wants to take Mr Sergio Nance home with hospice. He told her that he is tired. She hates an idea for him going to the NH. She wants him to come home and have the best quality of life they could. She decided to stop antibiotics. We discussed TF: if goals of care will be comfort now, it would be more appropriate to stop TF and allow for palliative po intake. He is having very poor po intake. Emotional support were provided to Mrs Sergio Nance. Discussed with RN events overnight. Review of Systems: 
Symptom Y/N Comments  Symptom Y/N Comments Fever/Chills n   Chest Pain n   
Poor Appetite    Edema Cough    Abdominal Pain n   
Sputum    Joint Pain SOB/BECERRIL n   Pruritis/Rash Nausea/vomit    Tolerating PT/OT Diarrhea    Tolerating Diet Constipation    Other Could NOT obtain due to:   
 
Objective: VITALS:  
Last 24hrs VS reviewed since prior progress note. Most recent are: 
Patient Vitals for the past 24 hrs: 
 Temp Pulse Resp BP SpO2  
09/30/18 1227 97.7 °F (36.5 °C) 84 16 144/74 94 % 09/30/18 0747 97.8 °F (36.6 °C) 72 18 132/72 97 % 09/30/18 0320 97.4 °F (36.3 °C) 88 18 126/69 96 % 09/30/18 0021 97.8 °F (36.6 °C) 83 18 120/80 96 %  
09/29/18 1828 97.8 °F (36.6 °C) 87 18 120/56 98 % Intake/Output Summary (Last 24 hours) at 09/30/18 1505 Last data filed at 09/30/18 4584 Gross per 24 hour Intake              640 ml Output               50 ml Net              590 ml PHYSICAL EXAM: 
General: WD, WN. Alert, cooperative, no acute distress   
EENT:  EOMI. Anicteric sclerae. MMM Resp:  CTA bilaterally, no wheezing or rales. No accessory muscle use CV:  Regular  rhythm,  No edema GI:  Soft, Non distended, Non tender.  +Bowel sounds. PEG tube /Colostomy intact Neurologic:  Alert and oriented X 3, normal speech, Psych:   Fair/poor insight. Not anxious nor agitated Skin:  No rashes. No jaundice Reviewed most current lab test results and cultures  YES Reviewed most current radiology test results   YES Review and summation of old records today    NO Reviewed patient's current orders and MAR    YES 
PMH/ reviewed - no change compared to H&P 
________________________________________________________________________ Care Plan discussed with: 
  Comments Patient y Family  y Wife bedside RN y   
Care Manager y Consultant  y ID DR Unger Necessary Multidiciplinary team rounds were held today with , nursing, pharmacist and clinical coordinator. Patient's plan of care was discussed; medications were reviewed and discharge planning was addressed. ________________________________________________________________________ Total NON critical care TIME:  35  Minutes Total CRITICAL CARE TIME Spent:   Minutes non procedure based Comments >50% of visit spent in counseling and coordination of care y  coordination of care   
________________________________________________________________________ Cleo Bobby MD  
 
Procedures: see electronic medical records for all procedures/Xrays and details which were not copied into this note but were reviewed prior to creation of Plan. LABS: 
I reviewed today's most current labs and imaging studies. Pertinent labs include: 
Recent Labs  
   09/30/18 0325 09/29/18 
 0553  09/28/18 
 0309 WBC  11.8*  13.6*  15.0* HGB  8.8*  9.1*  9.0*  
HCT  29.1*  29.7*  29.5* PLT  409*  373  326 Recent Labs  
   09/30/18 0325 09/29/18 
 0553  09/28/18 
 0309 NA  131*  132*  132* K  3.7  4.2  4.4  
CL  97  96*  97  
CO2  26  29  29 GLU  100  97  101* BUN  23*  26*  29* CREA  1.20  1.17  1.01  
CA  8.7  8.9  8.9 MG   --   2.1   --   
PHOS   --   4.9*   --   
 
 
Signed: Cleo Bobby MD

## 2018-09-30 NOTE — PROGRESS NOTES
Bedside and Verbal shift change report given to [place] (oncoming nurse) by Negra Carl RN (offgoing nurse). Report included the following information SBAR, Kardex, Intake/Output and MAR.

## 2018-09-30 NOTE — PROGRESS NOTES
Problem: Pressure Injury - Risk of 
Goal: *Prevention of pressure injury Document Dagoberto Scale and appropriate interventions in the flowsheet. Outcome: Progressing Towards Goal 
Pressure Injury Interventions: 
Sensory Interventions: Assess changes in LOC, Check visual cues for pain, Discuss PT/OT consult with provider, Float heels, Keep linens dry and wrinkle-free, Maintain/enhance activity level, Minimize linen layers Moisture Interventions: Absorbent underpads, Check for incontinence Q2 hours and as needed, Minimize layers, Moisture barrier Activity Interventions: Pressure redistribution bed/mattress(bed type) Mobility Interventions: Float heels, HOB 30 degrees or less, Pressure redistribution bed/mattress (bed type), Suspension boots, Turn and reposition approx. every two hours(pillow and wedges) Nutrition Interventions: Document food/fluid/supplement intake, Discuss nutritional consult with provider, Offer support with meals,snacks and hydration Friction and Shear Interventions: Apply protective barrier, creams and emollients, Feet elevated on foot rest, Foam dressings/transparent film/skin sealants, HOB 30 degrees or less, Lift sheet, Lift team/patient mobility team, Minimize layers

## 2018-09-30 NOTE — HOSPICE
[facility] RN consultation visit note. Order for hospice noted. History and events of this hospitalization reviewed. Met in the wolf with Dr. Sandip Redding and wife Cami Collins. Options were discussed. Cami Collins said it had been a tough decision yet she felt best about taking Reinaldo home with comfort care for the remainder of his days. Cami Collins reported her niece plans to come to [place] to help take care of [place] and hopes to have increased hours of Medicaid HHAs. Support given and advised Cami Salvadorcamilo that [facility] is available to support them in their decisions. Above discussed with WILFRIDO Pitt. Please call [phone] if questions or concerns Ulises Swann RN [facility]

## 2018-09-30 NOTE — PROGRESS NOTES
D/C plans discussed with hospice and MD.  Will aim for 11a Tuesday ambulance if all in agreement. Hospice is arranging the delivery of DME. Wife will follow up on [address] [place] [address] to provide more hours to assist at home. Ambulance tentatively scheduled as above 1505  Unable to use [facility] at this time, so called and tentatively arranged 11a Tues [facility]. Isabel Pichardo

## 2018-10-01 LAB
ANION GAP SERPL CALC-SCNC: 10 MMOL/L (ref 5–15)
BUN SERPL-MCNC: 21 MG/DL (ref 6–20)
BUN/CREAT SERPL: 18 (ref 12–20)
CALCIUM SERPL-MCNC: 9 MG/DL (ref 8.5–10.1)
CHLORIDE SERPL-SCNC: 98 MMOL/L (ref 97–108)
CO2 SERPL-SCNC: 23 MMOL/L (ref 21–32)
CREAT SERPL-MCNC: 1.17 MG/DL (ref 0.7–1.3)
ERYTHROCYTE [DISTWIDTH] IN BLOOD BY AUTOMATED COUNT: 18.2 % (ref 11.5–14.5)
GLUCOSE BLD STRIP.AUTO-MCNC: 105 MG/DL (ref 65–100)
GLUCOSE BLD STRIP.AUTO-MCNC: 105 MG/DL (ref 65–100)
GLUCOSE BLD STRIP.AUTO-MCNC: 107 MG/DL (ref 65–100)
GLUCOSE BLD STRIP.AUTO-MCNC: 110 MG/DL (ref 65–100)
GLUCOSE BLD STRIP.AUTO-MCNC: 136 MG/DL (ref 65–100)
GLUCOSE SERPL-MCNC: 105 MG/DL (ref 65–100)
HCT VFR BLD AUTO: 30.2 % (ref 36.6–50.3)
HGB BLD-MCNC: 9.2 G/DL (ref 12.1–17)
MCH RBC QN AUTO: 26.4 PG (ref 26–34)
MCHC RBC AUTO-ENTMCNC: 30.5 G/DL (ref 30–36.5)
MCV RBC AUTO: 86.5 FL (ref 80–99)
NRBC # BLD: 0 K/UL (ref 0–0.01)
NRBC BLD-RTO: 0 PER 100 WBC
PLATELET # BLD AUTO: 416 K/UL (ref 150–400)
PMV BLD AUTO: 9.7 FL (ref 8.9–12.9)
POTASSIUM SERPL-SCNC: 3.7 MMOL/L (ref 3.5–5.1)
RBC # BLD AUTO: 3.49 M/UL (ref 4.1–5.7)
SERVICE CMNT-IMP: ABNORMAL
SODIUM SERPL-SCNC: 131 MMOL/L (ref 136–145)
WBC # BLD AUTO: 13 K/UL (ref 4.1–11.1)

## 2018-10-01 PROCEDURE — 74011000250 HC RX REV CODE- 250: Performed by: INTERNAL MEDICINE

## 2018-10-01 PROCEDURE — C1751 CATH, INF, PER/CENT/MIDLINE: HCPCS

## 2018-10-01 PROCEDURE — 94760 N-INVAS EAR/PLS OXIMETRY 1: CPT

## 2018-10-01 PROCEDURE — 74011000258 HC RX REV CODE- 258: Performed by: INTERNAL MEDICINE

## 2018-10-01 PROCEDURE — 65270000029 HC RM PRIVATE

## 2018-10-01 PROCEDURE — 74011250636 HC RX REV CODE- 250/636: Performed by: INTERNAL MEDICINE

## 2018-10-01 PROCEDURE — 82962 GLUCOSE BLOOD TEST: CPT

## 2018-10-01 PROCEDURE — 36592 COLLECT BLOOD FROM PICC: CPT

## 2018-10-01 PROCEDURE — 74011250637 HC RX REV CODE- 250/637: Performed by: INTERNAL MEDICINE

## 2018-10-01 PROCEDURE — 85027 COMPLETE CBC AUTOMATED: CPT | Performed by: HOSPITALIST

## 2018-10-01 PROCEDURE — 80048 BASIC METABOLIC PNL TOTAL CA: CPT | Performed by: INTERNAL MEDICINE

## 2018-10-01 PROCEDURE — 36415 COLL VENOUS BLD VENIPUNCTURE: CPT | Performed by: INTERNAL MEDICINE

## 2018-10-01 RX ORDER — FUROSEMIDE 20 MG/1
TABLET ORAL
Qty: 30 TAB | Refills: 0 | Status: SHIPPED | OUTPATIENT
Start: 2018-10-02

## 2018-10-01 RX ORDER — VENLAFAXINE 37.5 MG/1
37.5 TABLET ORAL 2 TIMES DAILY WITH MEALS
Qty: 60 TAB | Refills: 0 | Status: SHIPPED | OUTPATIENT
Start: 2018-10-01 | End: 2018-10-31

## 2018-10-01 RX ORDER — AMIODARONE HYDROCHLORIDE 200 MG/1
200 TABLET ORAL DAILY
Qty: 30 TAB | Refills: 4 | Status: SHIPPED
Start: 2018-10-01

## 2018-10-01 RX ADMIN — Medication 20 ML: at 15:38

## 2018-10-01 RX ADMIN — APIXABAN 5 MG: 5 TABLET, FILM COATED ORAL at 21:26

## 2018-10-01 RX ADMIN — CEFEPIME HYDROCHLORIDE 2 G: 2 INJECTION, POWDER, FOR SOLUTION INTRAVENOUS at 15:37

## 2018-10-01 RX ADMIN — VENLAFAXINE 37.5 MG: 37.5 TABLET ORAL at 17:59

## 2018-10-01 RX ADMIN — Medication 10 ML: at 15:38

## 2018-10-01 RX ADMIN — LEVOFLOXACIN 750 MG: 5 INJECTION, SOLUTION INTRAVENOUS at 16:12

## 2018-10-01 RX ADMIN — AMIODARONE HYDROCHLORIDE 200 MG: 200 TABLET ORAL at 09:19

## 2018-10-01 RX ADMIN — LEVETIRACETAM 250 MG: 500 SOLUTION ORAL at 09:20

## 2018-10-01 RX ADMIN — Medication 10 ML: at 05:07

## 2018-10-01 RX ADMIN — LACTULOSE 10 G: 20 SOLUTION ORAL at 17:59

## 2018-10-01 RX ADMIN — CEFEPIME HYDROCHLORIDE 2 G: 2 INJECTION, POWDER, FOR SOLUTION INTRAVENOUS at 21:26

## 2018-10-01 RX ADMIN — Medication 10 ML: at 21:27

## 2018-10-01 RX ADMIN — APIXABAN 5 MG: 5 TABLET, FILM COATED ORAL at 09:19

## 2018-10-01 RX ADMIN — DAPTOMYCIN 1000 MG: 500 INJECTION, POWDER, LYOPHILIZED, FOR SOLUTION INTRAVENOUS at 18:03

## 2018-10-01 RX ADMIN — VENLAFAXINE 37.5 MG: 37.5 TABLET ORAL at 09:19

## 2018-10-01 RX ADMIN — Medication 10 ML: at 12:00

## 2018-10-01 RX ADMIN — CEFEPIME HYDROCHLORIDE 2 G: 2 INJECTION, POWDER, FOR SOLUTION INTRAVENOUS at 06:17

## 2018-10-01 RX ADMIN — LACTULOSE 10 G: 20 SOLUTION ORAL at 09:20

## 2018-10-01 RX ADMIN — NYSTATIN 500000 UNITS: 100000 SUSPENSION ORAL at 21:25

## 2018-10-01 RX ADMIN — Medication 10 ML: at 11:58

## 2018-10-01 RX ADMIN — FUROSEMIDE 60 MG: 40 TABLET ORAL at 09:19

## 2018-10-01 RX ADMIN — GENTAMICIN SULFATE 80 MG: 40 INJECTION, SOLUTION INTRAMUSCULAR; INTRAVENOUS at 05:07

## 2018-10-01 RX ADMIN — POLYETHYLENE GLYCOL 3350 17 G: 17 POWDER, FOR SOLUTION ORAL at 09:20

## 2018-10-01 RX ADMIN — LEVETIRACETAM 250 MG: 500 SOLUTION ORAL at 21:26

## 2018-10-01 NOTE — PROGRESS NOTES
Problem: Pressure Injury - Risk of 
Goal: *Prevention of pressure injury Document Dagoberto Scale and appropriate interventions in the flowsheet. Outcome: Progressing Towards Goal 
Pressure Injury Interventions: 
Sensory Interventions: Assess changes in LOC, Avoid rigorous massage over bony prominences, Check visual cues for pain, Float heels, Keep linens dry and wrinkle-free, Minimize linen layers, Pad between skin to skin, Suspension boots Moisture Interventions: Absorbent underpads, Apply protective barrier, creams and emollients, Assess need for specialty bed, Check for incontinence Q2 hours and as needed, Limit adult briefs, Maintain skin hydration (lotion/cream), Minimize layers, Offer toileting Q_hr, Moisture barrier Activity Interventions: Assess need for specialty bed, Pressure redistribution bed/mattress(bed type), PT/OT evaluation Mobility Interventions: Assess need for specialty bed, Float heels, HOB 30 degrees or less, Pressure redistribution bed/mattress (bed type), PT/OT evaluation, Suspension boots, Turn and reposition approx. every two hours(pillow and wedges) Nutrition Interventions: Document food/fluid/supplement intake, Discuss nutritional consult with provider, Offer support with meals,snacks and hydration Friction and Shear Interventions: Apply protective barrier, creams and emollients, Feet elevated on foot rest, Foam dressings/transparent film/skin sealants, HOB 30 degrees or less, Lift sheet, Lift team/patient mobility team, Minimize layers, Transfer aides:transfer board/Rod lift/ceiling lift, Transferring/repositioning devices

## 2018-10-01 NOTE — PROGRESS NOTES
Confirmed with  that patient is now discharging home with Hospice on comfort care, possibly tomorrow. Will complete OT orders.

## 2018-10-01 NOTE — HOSPICE
Email for DME to [facility]: Hospital Bed, over bed table, gel overlay, oxygen PRN at 2L NC. Delivery for 10/1 this afternoon. Wife Marlo Hay is aware. She is very nervous about bringing patient home. Assured her that RN will come on the admission. Call for questions and concerns [phone] Jose Jackson RN

## 2018-10-01 NOTE — PROGRESS NOTES
Infectious Disease Progress Note IMPRESSION:  
· S/p increase again in to  WBC to 13.0  pt remains afebrile, asymptomatic ·  CTA chest( 9/28)  shows increasing airspace disease in SANDY& lingula likely reflective of edema superimposed on  developing infection ·  Recurrence of bacteremia  with BC 1/2 + for Coagulase negative Staph- Staph Capitis Oxacillin R ( 9/5) Repeat BC 9/7 no growth · Repeat CT chest ( 9/7) shows non occlusive thrombus in R/ IJ vein ,small amount of thrombus along right PICC catheter/ pacer leads in SVC which extends to right atrium · Pt to start on pureed diet/ liquid diet , eating less per staff · S/p removal of L/IJ placement of PICC RUE , TPN on 8/31 · S/p persistent bacteremia with coagulase negative Staph since 8/11 initially oxacillin sensitive , last positive BC is oxacillin resistant 8/21(1/4) · Repeat BC 8/26, 8/29 , 9/3 no growth · NATASHA - definite large mass associated with pacing wire in RA which may represent vegetation ( 8/23) · Thrombus  & tiny gas bubble of as in RIJ extending into SVC to level of RA (8/15)  s/p heparin IV now on lovenox s/q · CTA chest - no PE, could not visualize SVC thrombus, left lateral wall soft tissue swelling, soft tissue around pacemaker show no significant abnormality · US chest wall - minimal fluid in pacemaker pocket, no significant inflammation of overlying subcutaneous fat or skin · Acute respiratory failure s/p  Ventilator / h/o tracheostomy 24 years ago  , s/p extubation 8/26 · S/p R/ pleural effusion s/p chest tube placement · 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    
  
  
PLAN:  
   
 · Continue Daptomycin x 6 weeks , Gentamicin IV x 4 weeks from last negative cultures. End date for Daptomycin is Oct 19,for Gentamicin is Oct 5. 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 · Cefepime , Levaquin x 7 days for  new infiltrate · IS every 1-2 hours · Repeat CBC  watch for fever . · Continue antimicrobials & anticoagulation ·   D/w spouse again [facility] also on case at this time CT chest  
IMPRESSION:  
1. No evidence of central pulmonary embolism. Pruning of the right middle lobe 
pulmonary artery branches in an area of atelectatic lung. 2.  Lack of contrast in the central veins limits evaluation for clot around the 
cardiac pacing wires and PICC line. No evidence of thrombus in the right atrium. 3.  Increasing airspace disease in the left upper lobe and lingula likely 
reflective of edema superimposed on developing infection Blood culture - Special Requests: NO SPECIAL REQUESTS   Preliminary Culture result: NO GROWTH 2 DAYS Blood culture - Culture result:    Final  
STAPHYLOCOCCUS CAPITIS SUBSPECIES UREOLYTICUS (OXACILLIN RESISTANT) , ISOLATED FROM 1 OF 2 BOTTLES DRAWN. .. LEFT HAND SITE (A) Subjective:  
 
Pt seen. Awake,withdrawn, no complaints. Review of Systems:  Pt denies complaints. 10 point ROS obtained Objective:  
 
Blood pressure 131/72, pulse 72, temperature 97.9 °F (36.6 °C), resp. rate 17, height 6' 2\" (1.88 m), weight 249 lb (112.9 kg), SpO2 100 %. Temp (24hrs), Av.7 °F (36.5 °C), Min:97.4 °F (36.3 °C), Max:97.9 °F (36.6 °C) Patient Vitals for the past 24 hrs: 
 Temp Pulse Resp BP SpO2  
10/01/18 1102 97.9 °F (36.6 °C) 72 17 131/72 100 % 10/01/18 0728 97.5 °F (36.4 °C) 85 18 104/45 100 % 18 2337 97.8 °F (36.6 °C) 90 18 131/60 99 % 18 1635 97.4 °F (36.3 °C) 90 18 108/46 96 % Lines:  picc Physical Exam: General:   awake, appears more withdrawn Lungs:    Reduced air entry bases on  auscultation  chest wall-  pacemaker site -no swelling, erythemal  
CV:  Regular rate and rhythm,no murmur, Abdomen:   Soft, non-tender. bowel sounds + Extremities: No edema Lines/Devices:  Intact, no erythema, drainage or tenderness Data Review: CBC:  
Recent Labs 10/01/18 
 7825  09/30/18 
 3870  09/29/18 
 4090 WBC  13.0*  11.8*  13.6*  
RBC  3.49*  3.39*  3.43* HGB  9.2*  8.8*  9.1*  
HCT  30.2*  29.1*  29.7* PLT  416*  409*  373 CMP:  
Recent Labs 10/01/18 
 5054  09/30/18 
 2284  09/29/18 
 8790 GLU  105*  100  97 NA  131*  131*  132* K  3.7  3.7  4.2 CL  98  97  96* CO2  23  26  29 BUN  21*  23*  26* CREA  1.17  1.20  1.17 CA  9.0  8.7  8.9 AGAP  10  8  7 BUCR  18  19  22* Studies:     
Lab Results Component Value Date/Time Culture result: NO GROWTH 4 DAYS 09/27/2018 03:51 PM  
 Culture result: NO GROWTH 6 DAYS 09/08/2018 01:46 AM  
 Culture result: (A) 09/05/2018 07:03 PM  
  STAPHYLOCOCCUS CAPITIS SUBSPECIES UREOLYTICUS (OXACILLIN RESISTANT) , ISOLATED FROM 1 OF 2 BOTTLES DRAWN. .. LEFT HAND SITE Culture result: (A) 09/05/2018 07:03 PM  
  PRELIMINARY REPORT OF GRAM POSITIVE COCCI IN CLUSTERS GROWING IN 1 OF 2 BOTTLES DRAWN CALLED TO AND READ BACK BY FELIX CAMPOS RN [facility] AT 2106 ON 9/6/2018. [place] 1850 Culture result:  09/05/2018 07:03 PM  
   [facility] REQUESTING IDENTIFICATION AND SENSITIVITIES 9/10/18 TA. Culture result: REMAINING BOTTLE(S) HAS/HAVE NO GROWTH IN 5 DAYS 09/05/2018 07:03 PM  
  
 
XR Results (most recent): 
 
Results from Hospital Encounter encounter on 07/10/18 XR CHEST PORT Narrative Indication: Leukocytosis Portable chest at 4:00 PM demonstrates mild bibasilar atelectasis, right greater 
than left. Lungs are otherwise fully expanded and clear. Cardiac pacer and PICC 
line are in stable position. The pigtail catheter at the right lung base has been removed since the prior 
examination. Impression IMPRESSION: No acute abnormality Patient Active Problem List  
Diagnosis Code  
 HTN (hypertension) I10  
 Depression F32.9  Hypercholesterolemia E78.00  Acid reflux K21.9  Seizure ([facility] R56.9  Hypothyroidism E03.9  Hyponatremia E87.1  BPH (benign prostatic hyperplasia) N40.0  Rash R21  
 Cellulitis L03.90  Wax in ear H61.20  Ulcer SOL5916  Small bowel obstruction ([facility] B03.188  Atrial flutter ([facility]) I48.92  
 Atrial fibrillation or flutter  CHI (closed head injury) S09. 90XA  Basal cell cancer C44.91  
 TBI (traumatic brain injury) ([facility] S06. 9X9A  Atrial fibrillation ([facility]) I48.91  
 Cataract H26.9  Constipation K59.00  Ankle fracture, left O96.932K  Insomnia G47.00  Tinea corporis B35.4  SSS (sick sinus syndrome) ([facility]) I49.5  Syncope R55  Seizure disorder ([facility] G40.909  RONAL on CPAP G47.33, Z99.89  
 Pacemaker Z95.0  Pre-op evaluation Z01.818  Chronic back pain greater than 3 months duration M54.9, G89.29  
 Cerebral infarction ([facility]) I63.9  Acute bronchitis J20.9  Screening for colon cancer Z12.11  
 Chronic right shoulder pain M25.511, G89.29  
 Common wart B07.8  Localized epilepsy with impairment of consciousness ([facility] G40.209  Severe obesity (BMI 35.0-39.9) ([facility]) E66.01  
 Acute blood loss anemia D62  Anasarca R60.1  GI bleed K92.2  Acute encephalopathy G93.40  Idiopathic hypotension I95.0  Counseling regarding goals of care Z71.89 ICD-10-CM ICD-9-CM 1. Anemia, unspecified type D64.9 285.9 2. Generalized weakness R53.1 780.79   
3. Acute encephalopathy G93.40 348.30   
4. Anasarca R60.1 782.3 5. Idiopathic hypotension I95.0 458.9 6. Counseling regarding goals of care Z71.89 V65.49   
7. 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 9X9S 907.0 8. Chronic atrial fibrillation (HCC) I48.2 427.31   
9. Malignant neoplasm of ascending colon (HCC) C18.2 153.6 Anti-infectives:  
  
 Daptomycin IV - 8/26- Gentamicin IV 8/26- Levaquin 9/28- Cefepime 9/28 S/p levaquin 8/29-9/7 Cefotetan 7/17 Zosyn 7/20-8/7 Cefepime - 8/11-8/13 Ceftriaxone 8/13-8/17 Fluconazole IV- 9/3- 9/6 IV , 9/6-9/10-po Vancomycin 8/11-8/22 Daptomycin 8/23- 8/24- Naficillin IV 8/24- 8/26  Leonardo Duenas MD FACP

## 2018-10-01 NOTE — DISCHARGE SUMMARY
Hospitalist Discharge Summary Patient ID: 
Maty Robles [id number] 
30 y.o. 
1940 PCP on record: Axel Block NP Admit date: 7/10/2018 Discharge date and time: 10/1/2018 DISCHARGE DIAGNOSIS: 
 
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  
HCAP 
BL pleural effusions Infected PPM  
Catheter related R IJ thrombus New pneumonia infiltrate CT 9/28 Oral candidiasis SSS S/p PPM and Pacer dependent PAF  
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  
 
 
 
CONSULTATIONS: 
IP CONSULT TO GASTROENTEROLOGY 
IP CONSULT TO INTERVENTIONAL RADIOLOGY 
IP CONSULT TO INTENSIVIST 
IP CONSULT TO NEPHROLOGY 
IP CONSULT TO HEMATOLOGY 
IP CONSULT TO GASTROENTEROLOGY 
IP CONSULT TO INFECTIOUS DISEASES 
IP CONSULT TO NEUROLOGY 
IP CONSULT TO CARDIOLOGY 
IP CONSULT TO COLORECTAL SURGERY 
IP CONSULT TO PULMONOLOGY 
IP CONSULT TO PULMONOLOGY 
IP CONSULT TO HOSPITALIST Excerpted HPI from H&P of Cholo Wong MD: 
 
Maty Robles is a 69 y.o.   male who is referred to ER by cardiology after blood test yesterday showed hgb 5.5. Patient with at least 2 weeks of pallor, generalized weakness, BECERRIL, intermittent left chest pain that he initially felt was at pacemaker site. .  Also 3-4 weeks of left arm swelling that wife thought was due to heat (hot weather). Saw Dr. Killian Andrew 7/5 who ordered labs CBC, CMP, TSH. Also wanted echo and pacemaker interrogation. 
  
Patient without hx anemia or blood transfusion. No sign of bleeding. Colostomy bag output been brown. Has had occasional blood tinged mucus drainage from rectum usually once a month. Had EGD 3/15 by Dr. Feliz Mariscal, never had colonoscopy per wife. Has problem with constipation, resolves with milk of mag usually. 
  
We were asked to admit for work up and evaluation of the above problems. ______________________________________________________________________ DISCHARGE SUMMARY/HOSPITAL COURSE:  for full details see H&P, daily progress notes, labs, consult notes. Interim summary: Admitted for GI bleeding. Pt was found on colonoscopy with colon cancer stage III. S/p Colectomy done  
on July 17 by Dr Clive oCchran. Required prolonged ICU care. Postoperative course was complicated with: Intubated for acute respiratory failure, extubated 8/26  
-Prolonged Ileus  
-Aspiration pneumonia  
-Severe dysphasia s/p PEG  
-HCAP: sputum culture 8/25 + heavy Burkholderia cepacia, s/p treated with Levaquin 
-BL pleural effusions s/p R chest tube, removed 9/4 
-Persistent Bacteremia:  
BC + for Coagulase negative Staph- Staph Capitis Oxacillin R ( 9/5) Repeat BC 9/7 no growth  
coagulase negative Staph since 8/11 initially oxacillin sensitive , last positive BC is oxacillin resistant 8/21(1/4) Repeat BC 8/26, 8/29 , 9/3 no growth NATASHA - definite large mass associated with pacing wire in RA which may represent vegetation ( 8/23) - Infected PPM / catheter related R IJ thrombus Found initially on chest CT 8/15: Thrombus  & tiny gas bubble of as in RIJ extending into SVC to level of RA 
CT chest ( 9/7): non occlusive thrombus in R/ IJ vein ,small amount of thrombus along right PICC catheter/ pacer leads in SVC which extends to right atrium Seen by cardiology - PPM left in: Extraction would be risky with potentially dislodging thrombus. -New pneumonia infiltrate CT 9/28 done for increasing wbc  
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 
Oral candidiasis , cont nystatin, s/p fluconazole  
-leukocytosis persist, no fever; BC 9/5 still positive HR controlled. BP stable  
-AB was per ID recommendation Dapto + Genta / + added cefepime + LVQ for worsening leukocytosis/ Ct infiltrate - PICC RUE - need to be removed on dc home  
-Hb stable  
-speech eval appreciated: MBS passed 9/27 --> FLD  
S/p TPN  --> S/P Peg 9/5  
-continue amiodarone  
-Per ID consult: Thrombus in R/IJ, now mass in RA which is suggestive of thrombus rather than vegetation in light of pacemaker pocket appearing clear of infection. Hold off on removal of pacemaker at this time  
-on Eliquis  
-Has been on Tegretol PTA which was changed to Keppra IV while NPO 
+ lethargy on keppra 500 mg bid Seen by neurology: Dr. Gabriel Gandara discussed with Dr Harriett Fleming (patients Neurologist) and in agreement to keep pt on Keppra 250 BID due to better Drug interactions and liver  with all his meds and less Liver issues on 
- on statin pta ( on hold with Dapto)  
   
   
Goals of care: initially family wanted everything to be done. He remain with significant debility. Cleared by speech for PO but very limited po intake ( just a few bites). Leukocytosis persist. Last + BC 9/5. Unable to remove PPM due to high risk to dislodge thromb. Pt was saying repeatedly to his wife that he is very tired.  Wife decided to stop all AB and take him home with hospice. D/w wife: stop TF ( artificial nutritions prolonging life). Probably it will be appropriate to stop Eliquis with new goals of care. Lasix will change to prn ( high risk for dehydration with poor po intake) Continue amiodarone/keppra/ effexor on dc  
DC PICC prior to DC home Pt will need dose of meclizine prior to ambulance ( h/o motion sickness)  
  
  
   
Code Status: DNR  
NOK POA :  Wife - MPOA Son Ashish Trav [phone] DVT Prophylaxis: Eliquis  
   
Disposition:wife decided to take him home with hospice, tentative dc Tuesday  
  
PICC 8/31 R , need to be removed prior to dc from the hospital  
 
_______________________________________________________________________ Patient seen and examined by me on discharge day. PHYSICAL EXAM: 
General:                    WD, WN. Alert, cooperative, no acute distress   
EENT:                                  EOMI. Anicteric sclerae. MMM Resp:                                   CTA bilaterally, no wheezing or rales. No accessory muscle use CV:                                      Regular  rhythm,  No edema GI:                                       Soft, Non distended, Non tender.  +Bowel sounds. PEG tube /Colostomy intact Neurologic:                Alert and oriented X 3, normal speech, Psych:                       Fair/poor insight. Not anxious nor agitated Skin:                                    No rashes. No jaundice 
_______________________________________________________________________ DISCHARGE MEDICATIONS:  
Current Discharge Medication List  
  
START taking these medications Details  
furosemide (LASIX) 20 mg tablet Take 60 mg via PEG as needed for leg edema Qty: 30 Tab, Refills: 0  
  
levETIRAcetam (KEPPRA) 100 mg/ml soln oral solution 2.5 mL by Per G Tube route two (2) times a day for 30 days.  
Qty: 150 mL, Refills: 0  
  
venlafaxine (EFFEXOR) 37.5 mg tablet 1 Tab by Per G Tube route two (2) times daily (with meals) for 30 days. Qty: 60 Tab, Refills: 0 CONTINUE these medications which have CHANGED Details  
amiodarone (CORDARONE) 200 mg tablet 1 Tab by Per G Tube route daily. Qty: 30 Tab, Refills: 4 Associated Diagnoses: Paroxysmal atrial fibrillation ([address] Essential hypertension; Typical atrial flutter (HCC); SSS (sick sinus syndrome) ([address] CONTINUE these medications which have NOT CHANGED Details  
acetaminophen (TYLENOL) 500 mg tablet Take 1,000 mg by mouth every six (6) hours as needed for Pain. meclizine (ANTIVERT) 25 mg tablet Take 25 mg by mouth every eight (8) hours as needed for Nausea. take every 8 hours as needed for nausea  
  
lactulose (CHRONULAC) 10 gram/15 mL solution TAKE 1 TEASPOONFUL (5 ML) BY MOUTH THREE TIMES AS DAY AS NEEDED  Indications: constipation 
Qty: 473 mL, Refills: 2 Associated Diagnoses: Constipation, unspecified constipation type STOP taking these medications  
  
 amLODIPine (NORVASC) 10 mg tablet Comments:  
Reason for Stopping:   
   
 atorvastatin (LIPITOR) 20 mg tablet Comments:  
Reason for Stopping:   
   
 carBAMazepine XR (TEGRETOL XR) 200 mg SR tablet Comments:  
Reason for Stopping:   
   
 cholecalciferol (VITAMIN D3) 1,000 unit cap Comments:  
Reason for Stopping: PRADAXA 150 mg capsule Comments:  
Reason for Stopping:   
   
 lisinopril (PRINIVIL, ZESTRIL) 40 mg tablet Comments:  
Reason for Stopping:   
   
 magnesium hydroxide (SOTO MILK OF MAGNESIA) 400 mg/5 mL suspension Comments:  
Reason for Stopping: DOC-Q-LACE 100 mg capsule Comments:  
Reason for Stopping:   
   
 lutein 20 mg tab Comments:  
Reason for Stopping:   
   
 senna (SENNA) 8.6 mg tablet Comments:  
Reason for Stopping:   
   
 finasteride (PROSCAR) 5 mg tablet Comments:  
Reason for Stopping:   
   
 venlafaxine-SR (EFFEXOR-XR) 75 mg capsule Comments:  
Reason for Stopping: metoprolol tartrate (LOPRESSOR) 25 mg tablet Comments:  
Reason for Stopping:   
   
 atorvastatin (LIPITOR) 20 mg tablet Comments:  
Reason for Stopping:   
   
 carBAMazepine XR (TEGRETOL XR) 200 mg SR tablet Comments:  
Reason for Stopping:   
   
 amLODIPine (NORVASC) 10 mg tablet Comments:  
Reason for Stopping:   
   
 zolpidem (AMBIEN) 10 mg tablet Comments:  
Reason for Stopping:   
   
 betamethasone dipropionate (DIPROSONE) 0.05 % topical cream Comments:  
Reason for Stopping: My Recommended Diet, Activity, Wound Care, and follow-up labs are listed in the patient's Discharge Insturctions which I have personally completed and reviewed. ______________________________________________________________________ Risk of deterioration: High 
 
Condition at Discharge:  Stable 
______________________________________________________________________ Disposition Home with hospice services 
______________________________________________________________________ Care Plan discussed with:  
Patient, Family, RN, Care Manager, Consultant 
 
______________________________________________________________________ Code Status: DNR/DNI 
______________________________________________________________________ Follow up with: PCP : Roselyn Rodriguez NP Follow-up Information Follow up With Details Comments Contact Info Roselyn Rodriguez NP  As needed [address] [facility] [facility] [id number] 
[phone] Total time in minutes spent coordinating this discharge (includes going over instructions, follow-up, prescriptions, and preparing report for sign off to her PCP) :  > 30  minutes Signed: 
Wilda Khalil MD

## 2018-10-01 NOTE — DISCHARGE INSTRUCTIONS
Patient Discharge Instructions    Julianna Dodd / [id number] : 1940    Admitted 7/10/2018 Discharged: 10/2/2018         DISCHARGE DIAGNOSIS:     Colon cancer  Persistent Bacteremia ( infection in blood)   Infected pacemaker        Take Home Medications       General drug facts     If you have a very bad allergy, wear an allergy ID at all times. It is important that you take the medication exactly as they are prescribed. Keep your medication in the bottles provided by the pharmacist.  Keep a list of all your drugs (prescription, natural products, vitamins, OTC) with you. Give this list to your doctor. Do not take other medications without consulting your doctor. Do not share your drugs with others and do not take anyone else's drugs. Keep all drugs out of the reach of children and pets. Most drugs may be thrown away in household trash after mixing with coffee grounds or grant litter and sealing in a plastic bag. Keep a list Call your doctor for help with any side effects. If in the [place] you may also call the FDA at 2-937-VVL-8899    Talk with the doctor before starting any new drug, including OTC, natural products, or vitamins. What to do at Home    1. Recommended diet:   --Full liquid diet  --PO intake only when patient wants, no force feeding  --Straws ok    2. Recommended activity: Activity as tolerated    3. If you experience any of the following symptoms then please call your primary care physician or return to the emergency room if you cannot get hold of your doctor:call hospice     4. Wound Care: keep dry and clean     5. Continue to wear cpap as previously     6. Bring these papers with you to your follow up appointments.  The papers will help your doctors be sure to continue the care plan from the hospital.      Follow-up with:   PCP: Francisco Blackwell NP  Follow-up Information     Follow up With Details Comments [address] Siddhartha Lawson NP  call as needed with questions [address] [place] [address]  [address] Call any time day or night for help with hospice            Please call for your own appointment        Information obtained by :  I understand that if any problems occur once I am at home I am to contact my physician. I understand and acknowledge receipt of the instructions indicated above.                                                                                                                                            Physician's or R.N.'s Signature                                                                  Date/Time                                                                                                                                              Patient or Representative Signature                                                          Date/Time

## 2018-10-01 NOTE — PROGRESS NOTES
Problem: Falls - Risk of 
Goal: *Absence of Falls Document Alessandra HansonElly Fall Risk and appropriate interventions in the flowsheet. Outcome: Progressing Towards Goal 
Fall Risk Interventions: 
Mobility Interventions: Communicate number of staff needed for ambulation/transfer Mentation Interventions: Adequate sleep, hydration, pain control, Door open when patient unattended, Evaluate medications/consider consulting pharmacy, Eyeglasses and hearing aids, More frequent rounding, Reorient patient, Update white board Medication Interventions: Evaluate medications/consider consulting pharmacy Elimination Interventions: Call light in reach, Toileting schedule/hourly rounds History of Falls Interventions: Consult care management for discharge planning, Door open when patient unattended
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
## 2018-10-01 NOTE — PROGRESS NOTES
Music Therapy Assessment Arabella Gaines [phone]  [id number]   
1940  69 y.o.  male Patient Telephone Number: [phone] (home) Jehovah's witness Affiliation: Adventism  
Language: [place] Extended Emergency Contact Information Primary Emergency Contact: [place] Home Phone: [phone] Mobile Phone: [phone] Relation: Spouse Patient Active Problem List  
 Diagnosis Date Noted  Acute encephalopathy 08/24/2018  Idiopathic hypotension 08/24/2018  Counseling regarding goals of care 08/24/2018  Acute blood loss anemia 07/10/2018  Anasarca 07/10/2018  GI bleed 07/10/2018  Severe obesity (BMI 35.0-39.9) ([address] 07/05/2018  Localized epilepsy with impairment of consciousness ([address]  Common wart 05/16/2016  Chronic right shoulder pain 01/11/2016  Screening for colon cancer 11/04/2015  Acute bronchitis 08/25/2015  Chronic back pain greater than 3 months duration 05/04/2015  Cerebral infarction ([address] 05/04/2015  Pre-op evaluation 01/29/2015  Pacemaker 10/15/2014  SSS (sick sinus syndrome) ([address] 10/13/2014  Syncope 10/13/2014  Seizure disorder ([address] 10/13/2014  RONAL on CPAP 10/13/2014  Tinea corporis 05/07/2014  Insomnia 01/03/2014  Ankle fracture, left 08/28/2013  Constipation 12/14/2012  Cataract 07/30/2012  Atrial fibrillation ([address] 06/19/2012  TBI (traumatic brain injury) ([address] 06/08/2012  CHI (closed head injury) 05/24/2012  Basal cell cancer 05/24/2012  Atrial flutter ([address] 03/02/2012  Atrial fibrillation or flutter 03/02/2012  Small bowel obstruction ([address] 02/21/2012  Ulcer 09/14/2011  Wax in ear 06/17/2011  Cellulitis 04/04/2011  Rash 03/28/2011  Hypothyroidism 12/22/2010  Hyponatremia 12/22/2010  BPH (benign prostatic hyperplasia) 12/22/2010  
 HTN (hypertension) 12/08/2010  Depression 12/08/2010  Hypercholesterolemia 12/08/2010  Acid reflux 12/08/2010  Seizure ([facility] 12/08/2010 Date: 10/1/2018 Mental Status:   [x] Alert [x] Forgetful-Pt said he didn't remember meeting MT before. [  ]  Confused  [  ] Minimally responsive Communication Status: [  ] Impaired Speech [  ] Nonverbal -N/A Physical Status:   [  ] Oxygen in use  [  ] Hard of Hearing [  ] Vision Impaired [  ] Ambulatory  [  ] Ambulatory with assistance [  ] Non-ambulatory -N/A Music Preferences, Background: [address] [place], [place], [place] Pt played the guitar and fiddle, sometimes in public with friends who were in a band. Pt's spouse played the chord organ. Clinical Problem addressed: Improve mood, positive social interaction, support self-expression. Goal(s) met in session: 
Physical/Pain management (Scale of 1-10): Pre-session rating: Pt denied pain. Post-session rating: Pt denied pain. [  ] Increased relaxation   [  ] Regulated breathing patterns [  ] Decreased muscle tension   [  ] Minimized physical distress Emotional/Psychological: 
[x] Increased self-expression   [  ] Decreased aggressive behavior [  ] Decreased sadness   [  ] Discussed healthy coping skills [x] Improved mood    [  ] Decreased withdrawn behavior Social: 
[  ] Decreased feelings of isolation/loneliness [x] Positive social interaction [  ] Provided support and/or comfort for family/friends Spiritual: 
[  ] Spiritual support    [  ] Expressed peace [  ] Expressed edith    [  ] Discussed beliefs Techniques Utilized (Check all that apply):  
[  ] Procedural support MT [  ] Music for relaxation [x] Patient preferred music 
[  ] Jodi analysis  [x] Song choice  [  ] Music for validation [  ] Entrainment  [  ] Progressive muscle relax. [  ] Guided visualization [  ] Synthia Goltz  [  ] Patient instrument playing [  ] Radha landrum [x] Sing along   [  ] Tayla Pinks  [  ] Sensory stimulation 
[x] Active Listening  [  ] Music for spiritual support [  ] Making of CDs as gifts Session Observations:  F/u visit; Patient (pt)'s eyes were closed and he was lying in bed. He opened his eyes in response to this music therapist (MT) speaking his name. When asked how he was feeling pt said he was tired and wet. He requested to be changed. MT alerted pt's nurse Johana Duverney of this, and she and another nurse changed the pt. Afterward, pt declined having music saying he wanted to nap. Johana Duverney encouraged pt to have music and MT offered pt could try [employer]. \" Pt agreed to this and MT sat at bedside as Johana Duverney exited. MT sang and played the [address] with guitar. Pt's affect brightened and he increased self-expression in response tot he music as evidenced by (AEB) smiling and singing along. He expressed enjoyment in the music and then shared some things he knew about Radha Cardozar' life. MT provided active listening. Pt then requested to hear the Seldon Saver' song Cold, Cold Heart. MT sang and played this with guitar. Pt's affect brightened and he sang along to the parts of the song he appeared to remember. Pt chose to hear a [place] song. Johana Duverney re-entered pt's room as MT was singing along playing the song [place] with guitar. Pt sang along with bright affect and expressed enjoyment again in the music. He thanked MT for the session and then engaged in conversation with his nurse. Afterward, outside of pt's room, pt's nurse said it was nice to see the pt smile. Will follow as able. Sarbjit Garcia MT-BC (Music Therapist-Board Certified) Spiritual Care Department Referral-based service

## 2018-10-01 NOTE — PROGRESS NOTES
D/C plans discussed with hospice and MD yesterday. Will aim for 11a Tuesday ambulance if all in agreement. Hospice is arranging the delivery of DME. Wife will follow up on [address] to provide more hours to assist at home. Staff please d/c with ambulance form, facesheet, completed DDNR, d/c instructions, Rx, and emar. Forms on chart. Thanks [address] with [facility] Hospice will be over mid day and will speak with wife regarding DME delivery//when they will come out to the home tomorrow. 1230  D/c plans discussed with wife who is in agreement with the above.  
 
Hospice order placed per MD request

## 2018-10-01 NOTE — PROGRESS NOTES
0700 Bedside and Verbal shift change report given to [place] (oncoming nurse) by Gabriel Shin RN (offgoing nurse). Report included the following information SBAR, Kardex, Intake/Output and MAR.

## 2018-10-02 ENCOUNTER — HOME CARE VISIT (OUTPATIENT)
Dept: SCHEDULING | Facility: HOME HEALTH | Age: 78
End: 2018-10-02
Payer: MEDICARE

## 2018-10-02 ENCOUNTER — HOME CARE VISIT (OUTPATIENT)
Dept: HOSPICE | Facility: HOSPICE | Age: 78
End: 2018-10-02
Payer: MEDICARE

## 2018-10-02 VITALS
DIASTOLIC BLOOD PRESSURE: 78 MMHG | OXYGEN SATURATION: 99 % | HEART RATE: 69 BPM | SYSTOLIC BLOOD PRESSURE: 118 MMHG | RESPIRATION RATE: 18 BRPM

## 2018-10-02 VITALS
HEART RATE: 75 BPM | RESPIRATION RATE: 20 BRPM | HEIGHT: 74 IN | TEMPERATURE: 98 F | BODY MASS INDEX: 31.95 KG/M2 | OXYGEN SATURATION: 100 % | WEIGHT: 249 LBS | DIASTOLIC BLOOD PRESSURE: 69 MMHG | SYSTOLIC BLOOD PRESSURE: 136 MMHG

## 2018-10-02 LAB
ANION GAP SERPL CALC-SCNC: 12 MMOL/L (ref 5–15)
BACTERIA SPEC CULT: NORMAL
BUN SERPL-MCNC: 19 MG/DL (ref 6–20)
BUN/CREAT SERPL: 16 (ref 12–20)
CALCIUM SERPL-MCNC: 9 MG/DL (ref 8.5–10.1)
CHLORIDE SERPL-SCNC: 98 MMOL/L (ref 97–108)
CO2 SERPL-SCNC: 22 MMOL/L (ref 21–32)
CREAT SERPL-MCNC: 1.2 MG/DL (ref 0.7–1.3)
DATE LAST DOSE: ABNORMAL
DATE LAST DOSE: NORMAL
GENTAMICIN PEAK SERPL-MCNC: 5.3 UG/ML (ref 5–10)
GENTAMICIN TROUGH SERPL-MCNC: 0.8 UG/ML (ref 1–2)
GLUCOSE BLD STRIP.AUTO-MCNC: 109 MG/DL (ref 65–100)
GLUCOSE SERPL-MCNC: 100 MG/DL (ref 65–100)
POTASSIUM SERPL-SCNC: 3.3 MMOL/L (ref 3.5–5.1)
REPORTED DOSE,DOSE: ABNORMAL UNITS
REPORTED DOSE,DOSE: NORMAL UNITS
REPORTED DOSE/TIME,TMG: 500
REPORTED DOSE/TIME,TMG: 507
SERVICE CMNT-IMP: ABNORMAL
SERVICE CMNT-IMP: NORMAL
SODIUM SERPL-SCNC: 132 MMOL/L (ref 136–145)

## 2018-10-02 PROCEDURE — 74011250637 HC RX REV CODE- 250/637: Performed by: INTERNAL MEDICINE

## 2018-10-02 PROCEDURE — 74011000258 HC RX REV CODE- 258: Performed by: INTERNAL MEDICINE

## 2018-10-02 PROCEDURE — 80170 ASSAY OF GENTAMICIN: CPT | Performed by: HOSPITALIST

## 2018-10-02 PROCEDURE — 74011250636 HC RX REV CODE- 250/636: Performed by: INTERNAL MEDICINE

## 2018-10-02 PROCEDURE — 77030005518 HC CATH URETH FOL 2W BARD -B

## 2018-10-02 PROCEDURE — 80048 BASIC METABOLIC PNL TOTAL CA: CPT | Performed by: INTERNAL MEDICINE

## 2018-10-02 PROCEDURE — 74011000250 HC RX REV CODE- 250: Performed by: INTERNAL MEDICINE

## 2018-10-02 PROCEDURE — 0651 HSPC ROUTINE HOME CARE

## 2018-10-02 PROCEDURE — A4314 CATH W/DRAINAGE 2-WAY LATEX: HCPCS

## 2018-10-02 PROCEDURE — G0299 HHS/HOSPICE OF RN EA 15 MIN: HCPCS

## 2018-10-02 PROCEDURE — T4541 LARGE DISPOSABLE UNDERPAD: HCPCS

## 2018-10-02 PROCEDURE — 82962 GLUCOSE BLOOD TEST: CPT

## 2018-10-02 PROCEDURE — G0155 HHCP-SVS OF CSW,EA 15 MIN: HCPCS

## 2018-10-02 PROCEDURE — 36415 COLL VENOUS BLD VENIPUNCTURE: CPT | Performed by: INTERNAL MEDICINE

## 2018-10-02 PROCEDURE — 94760 N-INVAS EAR/PLS OXIMETRY 1: CPT

## 2018-10-02 PROCEDURE — 3336500001 HSPC ELECTION

## 2018-10-02 RX ADMIN — FUROSEMIDE 60 MG: 40 TABLET ORAL at 08:36

## 2018-10-02 RX ADMIN — MECLIZINE 25 MG: 12.5 TABLET ORAL at 10:48

## 2018-10-02 RX ADMIN — Medication 10 ML: at 05:33

## 2018-10-02 RX ADMIN — NYSTATIN 500000 UNITS: 100000 SUSPENSION ORAL at 08:35

## 2018-10-02 RX ADMIN — APIXABAN 5 MG: 5 TABLET, FILM COATED ORAL at 08:36

## 2018-10-02 RX ADMIN — AMIODARONE HYDROCHLORIDE 200 MG: 200 TABLET ORAL at 08:36

## 2018-10-02 RX ADMIN — POLYETHYLENE GLYCOL 3350 17 G: 17 POWDER, FOR SOLUTION ORAL at 08:36

## 2018-10-02 RX ADMIN — CEFEPIME HYDROCHLORIDE 2 G: 2 INJECTION, POWDER, FOR SOLUTION INTRAVENOUS at 06:32

## 2018-10-02 RX ADMIN — GENTAMICIN SULFATE 80 MG: 40 INJECTION, SOLUTION INTRAMUSCULAR; INTRAVENOUS at 05:33

## 2018-10-02 RX ADMIN — VENLAFAXINE 37.5 MG: 37.5 TABLET ORAL at 08:36

## 2018-10-02 RX ADMIN — LACTULOSE 10 G: 20 SOLUTION ORAL at 08:36

## 2018-10-02 RX ADMIN — LEVETIRACETAM 250 MG: 500 SOLUTION ORAL at 08:36

## 2018-10-02 NOTE — PROGRESS NOTES
CM completed chart review. Pt is scheduled to discharge today at 11 AM per Dominique's note. DC order is in.  
 
CM called  Hospice  [phone]  and left a message with intake to have Luana Gonzalez call me to confirm that they are ready for Pt today. CM called [facility] [phone] and checked to verify that transport was set up for 11 AM  time for Pt to home with [facility]. Confirmed. PCS packet placed on bedside chart. CM called Priyanka Collier [phone] and she is in agreement with DC plan. She is nervous about him coming home but just wants to make sure that he is comfortable. CM will ask Luana Gonzalez to call her to discuss the events of the day when I talk with her. RN is going to give him anti nausea meds before he leaves. Hospice delivered the DME last night. Wife to follow up on [address] [place] [address] to provide more hours to assist at home. Staff please d/c with ambulance form, facesheet, completed DDNR, d/c instructions, Rx, and emar. CM will continue to monitor discharge plan. Care Management Interventions PCP Verified by CM: Yes 
Palliative Care Criteria Met (RRAT>21 & CHF Dx)?: Yes 
Palliative Consult Recommended?: Yes Mode of Transport at Discharge: [facility] Transport Time of Discharge: 1100 Transition of Care Consult (CM Consult): Home Hospice [employer]: Yes [place]re[place] Signup: No 
Discharge Durable Medical Equipment: No 
Physical Therapy Consult: Yes Occupational Therapy Consult: Yes Speech Therapy Consult: Yes Current Support Network: Lives with Spouse, Own Home, Has Personal Caregivers Confirm Follow Up Transport: Other (see comment) Plan discussed with Pt/Family/Caregiver: Yes Freedom of Choice Offered: Yes Discharge Location Discharge Placement: Home with hospice

## 2018-10-02 NOTE — PROGRESS NOTES
Pharmacy Automatic Renal Dosing Protocol - Antimicrobials Indication for Antimicrobials: Bacteremia; aspiration PNA Current Regimen of Each Antimicrobial: 
Daptomycin 1000 mg (9.2mg/kg) IV every 24 hours (Started 8/26/18 thru 10/19) Gentamicin 80 mg IV q24h (Start Date 8/26/18 thru 10/5 per ID) Cefepime 2 grams Q8H (Started 9/28 Day 5 of 7) Levofloxacin 750 mg Q24H (Started 9/28 Day 5 of 7) Previous Antimicrobial Therapy: 
Fluconazole 200 mg every day (9/3-9/9) Levofloxacin 500 mg q 24 hours (9/4 -9/11) Levofloxacin 750 mg IV every 24 hours (Start Date: 8/28/18; Day #7 of 7) Nafcillin 2 grams every 4 hours (Started 8/24/18; Stopped 8/26/18) Daptomycin 1000 mg IV every 24 hours (Started 8/23/18; Stopped 8/24/18) Vancomycin 1500 mg IV every 12 hours (Started 8/11/18; Stopped 8/23/18) Ceftriaxone 2 g IV every 24 hours (Started 8/13/18; Stopped 8/17/18) Cefepime 2 g IV every 8 hours (Started 8/11/18; Stopped 8/13/18) Vancomycin Fluconazole Piperacillin-tazobactam 
 
Goal Gentamicin Levels (Synergy Dosing): 
Peak: 3-5 mcg/mL Trough: < 1 mcg/mL Gentamicin Level Assessment: 
Date Dose & Interval Measured (mcg/mL) Extrapolated (mcg/mL)  
8/27/18 80 mg IV every 8 hours Peak = 4.4 Trough = 2 Peak = 4.4 Trough = 1.36   
8/30/18 90 mg IV every 12 hours Peak = 5.0 Trough = 2.2 Peak = 5.2 Trough = 2.01  
8/31/18 100 mg IV every 24 hours Peak = 5.7 Trough = 0.7 Peak = 6 Trough = 0.65  
9/1/18 80 mg IV every 24 hours Peak = 3.4 Trough = 0.5 Peak = 4.6 Trough = 0.58  
9/4/18 80mg IV q24h Peak = 3.3 Trough = 0.4 Peak = 3.3 Trough = 0.36  
9/9/18 80 mg q24h Peak = 4.0 Trough = 0.6    
9/18/18 80 mg q 24 hours  Peak 4 Trough 0.5 Peak 3.7 Trough 0.5  
9/21/18 80 mg [id number] Peak = 3.9 mcg/ml Trough = 0.7 mcg/ml Peak = 4.7 mcg/ml Trough = 0.55 mcg/ml 9/26/18 80mg IV q24h Peak = 3.8 Trough = 0.4 Peak = 4.1 Trough = 0.4  
10/2 80mg IV q24h Peak = 5.3 @6:50 Trough = 0.8 @03.50 Significant Cultures:  
 PBCx - ng - pending  blood: ng  x6 days - Final 
: Blood cx: STAPHYLOCOCCUS CAPITIS SUBSPECIES UREOLYTICUS (OXACILLIN RESISTANT) - Final (MDR, but sensitive to daptomycin) 9/3 BCx for fungus = ng pending 18 Blood culture = No growth x 6 days - final 
18 Blood culture = No growth x 6 days - final 
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 
Recent Labs 10/01/18 
 6240  10/01/18 
 5069  18 
 9762  18 
 5092 CREA   --   1.17  1.20  1.17  
BUN   --   21*  23*  26* WBC  13.0*   --   11.8*  13.6* Temp (24hrs), Av.6 °F (36.4 °C), Min:97.4 °F (36.3 °C), Max:97.8 °F (36.6 °C) Creatinine Clearance (mL/min) or Dialysis:  
Estimated Creatinine Clearance: 70.7 mL/min (based on Cr of 1.17). Estimated Creatinine Clearance (using IBW):61.5 mL/min Impression/Plan: · Per Hospitalist note, wife decided to stop all AB and take him home with hospice. · Dr. Brown Harrison has written discharge orders for home with hospice today · Level extrapolation not required consider care plan change Pharmacy will follow daily and adjust medications as appropriate for renal function and/or serum levels.  
 
Thank you, 
Carey Fischer, [facility]

## 2018-10-02 NOTE — PROGRESS NOTES
Bedside and Verbal shift change report given to [address] [place] [address] (oncoming nurse) by Patsy Cooney RN (offgoing nurse). Report included the following information Kardex, MAR and Recent Results.

## 2018-10-02 NOTE — PROGRESS NOTES
Problem: Falls - Risk of 
Goal: *Absence of Falls Document Tami Lepe Fall Risk and appropriate interventions in the flowsheet. Outcome: Progressing Towards Goal 
Fall Risk Interventions: 
Mobility Interventions: Communicate number of staff needed for ambulation/transfer Mentation Interventions: Adequate sleep, hydration, pain control Medication Interventions: Evaluate medications/consider consulting pharmacy Elimination Interventions: Call light in reach History of Falls Interventions: Door open when patient unattended

## 2018-10-02 NOTE — PROGRESS NOTES
10:30- Myself and Ricky Dao, RN attempted to place coude catheter. Pt's penis is retracted. Unsuccessful x2. Notified Cyndy Patrick, hospice RN. 11:20- Dr. Bethany Parkinson on unit. Notified MD of unsuccessful flannery placement. No new orders received at this time. Pt transported via [facility]. Sent with DNR, AVS, H&P, and MAR. Pt to be discharged home with Hospice.

## 2018-10-03 ENCOUNTER — HOME CARE VISIT (OUTPATIENT)
Dept: HOSPICE | Facility: HOSPICE | Age: 78
End: 2018-10-03
Payer: MEDICARE

## 2018-10-03 ENCOUNTER — HOME CARE VISIT (OUTPATIENT)
Dept: SCHEDULING | Facility: HOME HEALTH | Age: 78
End: 2018-10-03
Payer: MEDICARE

## 2018-10-03 VITALS
OXYGEN SATURATION: 95 % | RESPIRATION RATE: 18 BRPM | TEMPERATURE: 97.4 F | DIASTOLIC BLOOD PRESSURE: 75 MMHG | HEART RATE: 81 BPM | SYSTOLIC BLOOD PRESSURE: 154 MMHG

## 2018-10-03 PROCEDURE — A4550 SURGICAL TRAYS: HCPCS

## 2018-10-03 PROCEDURE — 0651 HSPC ROUTINE HOME CARE

## 2018-10-03 PROCEDURE — A9270 NON-COVERED ITEM OR SERVICE: HCPCS

## 2018-10-03 PROCEDURE — G0156 HHCP-SVS OF AIDE,EA 15 MIN: HCPCS

## 2018-10-03 PROCEDURE — A6402 STERILE GAUZE <= 16 SQ IN: HCPCS

## 2018-10-03 PROCEDURE — G0299 HHS/HOSPICE OF RN EA 15 MIN: HCPCS

## 2018-10-03 PROCEDURE — A4452 WATERPROOF TAPE: HCPCS

## 2018-10-03 PROCEDURE — A6407 PACKING STRIPS, NON-IMPREG: HCPCS

## 2018-10-03 PROCEDURE — A6216 NON-STERILE GAUZE<=16 SQ IN: HCPCS

## 2018-10-03 PROCEDURE — A4927 NON-STERILE GLOVES: HCPCS

## 2018-10-03 PROCEDURE — A6260 WOUND CLEANSER ANY TYPE/SIZE: HCPCS

## 2018-10-03 PROCEDURE — A4216 STERILE WATER/SALINE, 10 ML: HCPCS

## 2018-10-04 ENCOUNTER — HOME CARE VISIT (OUTPATIENT)
Dept: SCHEDULING | Facility: HOME HEALTH | Age: 78
End: 2018-10-04
Payer: MEDICARE

## 2018-10-04 PROCEDURE — 0651 HSPC ROUTINE HOME CARE

## 2018-10-04 PROCEDURE — G0156 HHCP-SVS OF AIDE,EA 15 MIN: HCPCS

## 2018-10-05 ENCOUNTER — HOME CARE VISIT (OUTPATIENT)
Dept: SCHEDULING | Facility: HOME HEALTH | Age: 78
End: 2018-10-05
Payer: MEDICARE

## 2018-10-05 ENCOUNTER — HOME CARE VISIT (OUTPATIENT)
Dept: HOSPICE | Facility: HOSPICE | Age: 78
End: 2018-10-05
Payer: MEDICARE

## 2018-10-05 PROCEDURE — G0299 HHS/HOSPICE OF RN EA 15 MIN: HCPCS

## 2018-10-05 PROCEDURE — A5120 SKIN BARRIER, WIPE OR SWAB: HCPCS

## 2018-10-05 PROCEDURE — A4424 OST PCH DRAIN W BAR & FILTER: HCPCS

## 2018-10-05 PROCEDURE — G0155 HHCP-SVS OF CSW,EA 15 MIN: HCPCS

## 2018-10-05 PROCEDURE — 0651 HSPC ROUTINE HOME CARE

## 2018-10-05 PROCEDURE — G0156 HHCP-SVS OF AIDE,EA 15 MIN: HCPCS

## 2018-10-06 ENCOUNTER — HOME CARE VISIT (OUTPATIENT)
Dept: HOSPICE | Facility: HOSPICE | Age: 78
End: 2018-10-06
Payer: MEDICARE

## 2018-10-06 VITALS
RESPIRATION RATE: 20 BRPM | DIASTOLIC BLOOD PRESSURE: 71 MMHG | SYSTOLIC BLOOD PRESSURE: 121 MMHG | TEMPERATURE: 98.2 F | HEART RATE: 75 BPM

## 2018-10-06 PROCEDURE — 0651 HSPC ROUTINE HOME CARE

## 2018-10-07 PROCEDURE — 0651 HSPC ROUTINE HOME CARE

## 2018-10-08 ENCOUNTER — HOME CARE VISIT (OUTPATIENT)
Dept: SCHEDULING | Facility: HOME HEALTH | Age: 78
End: 2018-10-08
Payer: MEDICARE

## 2018-10-08 PROCEDURE — G0156 HHCP-SVS OF AIDE,EA 15 MIN: HCPCS

## 2018-10-08 PROCEDURE — 0651 HSPC ROUTINE HOME CARE

## 2018-10-09 ENCOUNTER — HOME CARE VISIT (OUTPATIENT)
Dept: SCHEDULING | Facility: HOME HEALTH | Age: 78
End: 2018-10-09
Payer: MEDICARE

## 2018-10-09 VITALS
RESPIRATION RATE: 18 BRPM | SYSTOLIC BLOOD PRESSURE: 122 MMHG | DIASTOLIC BLOOD PRESSURE: 78 MMHG | OXYGEN SATURATION: 96 % | HEART RATE: 73 BPM

## 2018-10-09 PROCEDURE — G0300 HHS/HOSPICE OF LPN EA 15 MIN: HCPCS

## 2018-10-09 PROCEDURE — HOSPICE MEDICATION HC HH HOSPICE MEDICATION

## 2018-10-09 PROCEDURE — G0156 HHCP-SVS OF AIDE,EA 15 MIN: HCPCS

## 2018-10-09 PROCEDURE — 0651 HSPC ROUTINE HOME CARE

## 2018-10-10 ENCOUNTER — HOME CARE VISIT (OUTPATIENT)
Dept: SCHEDULING | Facility: HOME HEALTH | Age: 78
End: 2018-10-10
Payer: MEDICARE

## 2018-10-10 ENCOUNTER — HOME CARE VISIT (OUTPATIENT)
Dept: HOSPICE | Facility: HOSPICE | Age: 78
End: 2018-10-10
Payer: MEDICARE

## 2018-10-10 PROCEDURE — G0156 HHCP-SVS OF AIDE,EA 15 MIN: HCPCS

## 2018-10-10 PROCEDURE — 0651 HSPC ROUTINE HOME CARE

## 2018-10-11 ENCOUNTER — HOME CARE VISIT (OUTPATIENT)
Dept: SCHEDULING | Facility: HOME HEALTH | Age: 78
End: 2018-10-11
Payer: MEDICARE

## 2018-10-11 PROCEDURE — 0651 HSPC ROUTINE HOME CARE

## 2018-10-11 PROCEDURE — G0155 HHCP-SVS OF CSW,EA 15 MIN: HCPCS

## 2018-10-12 ENCOUNTER — HOME CARE VISIT (OUTPATIENT)
Dept: SCHEDULING | Facility: HOME HEALTH | Age: 78
End: 2018-10-12
Payer: MEDICARE

## 2018-10-12 VITALS
RESPIRATION RATE: 16 BRPM | HEART RATE: 70 BPM | SYSTOLIC BLOOD PRESSURE: 110 MMHG | DIASTOLIC BLOOD PRESSURE: 57 MMHG | OXYGEN SATURATION: 98 %

## 2018-10-12 PROCEDURE — A6407 PACKING STRIPS, NON-IMPREG: HCPCS

## 2018-10-12 PROCEDURE — A4927 NON-STERILE GLOVES: HCPCS

## 2018-10-12 PROCEDURE — A4216 STERILE WATER/SALINE, 10 ML: HCPCS

## 2018-10-12 PROCEDURE — 0651 HSPC ROUTINE HOME CARE

## 2018-10-12 PROCEDURE — A6402 STERILE GAUZE <= 16 SQ IN: HCPCS

## 2018-10-12 PROCEDURE — A9270 NON-COVERED ITEM OR SERVICE: HCPCS

## 2018-10-12 PROCEDURE — HOSPICE MEDICATION HC HH HOSPICE MEDICATION

## 2018-10-12 PROCEDURE — G0299 HHS/HOSPICE OF RN EA 15 MIN: HCPCS

## 2018-10-13 PROCEDURE — 0651 HSPC ROUTINE HOME CARE

## 2018-10-14 PROCEDURE — 0651 HSPC ROUTINE HOME CARE

## 2018-10-15 ENCOUNTER — HOME CARE VISIT (OUTPATIENT)
Dept: HOSPICE | Facility: HOSPICE | Age: 78
End: 2018-10-15
Payer: MEDICARE

## 2018-10-15 PROCEDURE — 0651 HSPC ROUTINE HOME CARE

## 2018-10-16 ENCOUNTER — HOME CARE VISIT (OUTPATIENT)
Dept: SCHEDULING | Facility: HOME HEALTH | Age: 78
End: 2018-10-16
Payer: MEDICARE

## 2018-10-16 ENCOUNTER — HOME CARE VISIT (OUTPATIENT)
Dept: HOSPICE | Facility: HOSPICE | Age: 78
End: 2018-10-16
Payer: MEDICARE

## 2018-10-16 VITALS
TEMPERATURE: 97.3 F | RESPIRATION RATE: 18 BRPM | SYSTOLIC BLOOD PRESSURE: 112 MMHG | DIASTOLIC BLOOD PRESSURE: 62 MMHG | HEART RATE: 75 BPM

## 2018-10-16 PROCEDURE — G0299 HHS/HOSPICE OF RN EA 15 MIN: HCPCS

## 2018-10-16 PROCEDURE — S9470 NUTRITIONAL COUNSELING, DIET: HCPCS

## 2018-10-16 PROCEDURE — 0651 HSPC ROUTINE HOME CARE

## 2018-10-16 PROCEDURE — G0155 HHCP-SVS OF CSW,EA 15 MIN: HCPCS

## 2018-10-17 PROCEDURE — 0651 HSPC ROUTINE HOME CARE

## 2018-10-18 ENCOUNTER — HOME CARE VISIT (OUTPATIENT)
Dept: HOSPICE | Facility: HOSPICE | Age: 78
End: 2018-10-18
Payer: MEDICARE

## 2018-10-18 PROCEDURE — 0651 HSPC ROUTINE HOME CARE

## 2018-10-19 ENCOUNTER — HOME CARE VISIT (OUTPATIENT)
Dept: SCHEDULING | Facility: HOME HEALTH | Age: 78
End: 2018-10-19
Payer: MEDICARE

## 2018-10-19 PROCEDURE — G0299 HHS/HOSPICE OF RN EA 15 MIN: HCPCS

## 2018-10-19 PROCEDURE — 0651 HSPC ROUTINE HOME CARE

## 2018-10-20 PROCEDURE — 0651 HSPC ROUTINE HOME CARE

## 2018-10-21 PROCEDURE — 0651 HSPC ROUTINE HOME CARE

## 2018-10-22 PROCEDURE — 0651 HSPC ROUTINE HOME CARE

## 2018-10-23 ENCOUNTER — HOME CARE VISIT (OUTPATIENT)
Dept: SCHEDULING | Facility: HOME HEALTH | Age: 78
End: 2018-10-23
Payer: MEDICARE

## 2018-10-23 ENCOUNTER — HOME CARE VISIT (OUTPATIENT)
Dept: HOSPICE | Facility: HOSPICE | Age: 78
End: 2018-10-23
Payer: MEDICARE

## 2018-10-23 VITALS
HEART RATE: 72 BPM | DIASTOLIC BLOOD PRESSURE: 50 MMHG | SYSTOLIC BLOOD PRESSURE: 100 MMHG | RESPIRATION RATE: 16 BRPM | TEMPERATURE: 98.2 F

## 2018-10-23 PROCEDURE — A9270 NON-COVERED ITEM OR SERVICE: HCPCS

## 2018-10-23 PROCEDURE — T4541 LARGE DISPOSABLE UNDERPAD: HCPCS

## 2018-10-23 PROCEDURE — A6250 SKIN SEAL PROTECT MOISTURIZR: HCPCS

## 2018-10-23 PROCEDURE — 0651 HSPC ROUTINE HOME CARE

## 2018-10-23 PROCEDURE — G0299 HHS/HOSPICE OF RN EA 15 MIN: HCPCS

## 2018-10-24 PROCEDURE — 0651 HSPC ROUTINE HOME CARE

## 2018-10-25 ENCOUNTER — HOME CARE VISIT (OUTPATIENT)
Dept: HOSPICE | Facility: HOSPICE | Age: 78
End: 2018-10-25
Payer: MEDICARE

## 2018-10-25 ENCOUNTER — HOME CARE VISIT (OUTPATIENT)
Dept: SCHEDULING | Facility: HOME HEALTH | Age: 78
End: 2018-10-25
Payer: MEDICARE

## 2018-10-25 PROCEDURE — 0651 HSPC ROUTINE HOME CARE

## 2018-10-25 PROCEDURE — G0155 HHCP-SVS OF CSW,EA 15 MIN: HCPCS

## 2018-10-26 ENCOUNTER — HOME CARE VISIT (OUTPATIENT)
Dept: HOSPICE | Facility: HOSPICE | Age: 78
End: 2018-10-26
Payer: MEDICARE

## 2018-10-26 ENCOUNTER — HOME CARE VISIT (OUTPATIENT)
Dept: SCHEDULING | Facility: HOME HEALTH | Age: 78
End: 2018-10-26
Payer: MEDICARE

## 2018-10-26 PROCEDURE — 0651 HSPC ROUTINE HOME CARE

## 2018-10-26 PROCEDURE — G0300 HHS/HOSPICE OF LPN EA 15 MIN: HCPCS

## 2018-10-27 PROCEDURE — 0651 HSPC ROUTINE HOME CARE

## 2018-10-28 PROCEDURE — 0651 HSPC ROUTINE HOME CARE

## 2018-10-29 VITALS
HEART RATE: 97 BPM | SYSTOLIC BLOOD PRESSURE: 100 MMHG | RESPIRATION RATE: 18 BRPM | OXYGEN SATURATION: 96 % | DIASTOLIC BLOOD PRESSURE: 60 MMHG

## 2018-10-29 PROCEDURE — 0651 HSPC ROUTINE HOME CARE

## 2018-10-30 ENCOUNTER — HOME CARE VISIT (OUTPATIENT)
Dept: SCHEDULING | Facility: HOME HEALTH | Age: 78
End: 2018-10-30
Payer: MEDICARE

## 2018-10-30 VITALS
RESPIRATION RATE: 16 BRPM | SYSTOLIC BLOOD PRESSURE: 120 MMHG | OXYGEN SATURATION: 98 % | DIASTOLIC BLOOD PRESSURE: 68 MMHG | HEART RATE: 72 BPM

## 2018-10-30 PROCEDURE — 0651 HSPC ROUTINE HOME CARE

## 2018-10-30 PROCEDURE — G0299 HHS/HOSPICE OF RN EA 15 MIN: HCPCS

## 2018-10-30 PROCEDURE — HOSPICE MEDICATION HC HH HOSPICE MEDICATION

## 2018-10-31 ENCOUNTER — HOME CARE VISIT (OUTPATIENT)
Dept: HOSPICE | Facility: HOSPICE | Age: 78
End: 2018-10-31
Payer: MEDICARE

## 2018-10-31 PROCEDURE — A4314 CATH W/DRAINAGE 2-WAY LATEX: HCPCS

## 2018-10-31 PROCEDURE — 0651 HSPC ROUTINE HOME CARE

## 2018-11-01 PROCEDURE — 0651 HSPC ROUTINE HOME CARE

## 2018-11-02 ENCOUNTER — HOME CARE VISIT (OUTPATIENT)
Dept: SCHEDULING | Facility: HOME HEALTH | Age: 78
End: 2018-11-02
Payer: MEDICARE

## 2018-11-02 VITALS — SYSTOLIC BLOOD PRESSURE: 112 MMHG | RESPIRATION RATE: 18 BRPM | HEART RATE: 70 BPM | DIASTOLIC BLOOD PRESSURE: 60 MMHG

## 2018-11-02 PROCEDURE — G0155 HHCP-SVS OF CSW,EA 15 MIN: HCPCS

## 2018-11-02 PROCEDURE — G0299 HHS/HOSPICE OF RN EA 15 MIN: HCPCS

## 2018-11-02 PROCEDURE — 0651 HSPC ROUTINE HOME CARE

## 2018-11-03 PROCEDURE — 0651 HSPC ROUTINE HOME CARE

## 2018-11-04 PROCEDURE — 0651 HSPC ROUTINE HOME CARE

## 2018-11-05 PROCEDURE — 0651 HSPC ROUTINE HOME CARE

## 2018-11-06 ENCOUNTER — HOME CARE VISIT (OUTPATIENT)
Dept: HOSPICE | Facility: HOSPICE | Age: 78
End: 2018-11-06
Payer: MEDICARE

## 2018-11-06 ENCOUNTER — HOME CARE VISIT (OUTPATIENT)
Dept: SCHEDULING | Facility: HOME HEALTH | Age: 78
End: 2018-11-06
Payer: MEDICARE

## 2018-11-06 PROCEDURE — G0299 HHS/HOSPICE OF RN EA 15 MIN: HCPCS

## 2018-11-06 PROCEDURE — 0651 HSPC ROUTINE HOME CARE

## 2018-11-06 PROCEDURE — HOSPICE MEDICATION HC HH HOSPICE MEDICATION

## 2018-11-07 PROCEDURE — 0651 HSPC ROUTINE HOME CARE

## 2018-11-07 PROCEDURE — HOSPICE MEDICATION HC HH HOSPICE MEDICATION

## 2018-11-08 ENCOUNTER — HOME CARE VISIT (OUTPATIENT)
Dept: HOSPICE | Facility: HOSPICE | Age: 78
End: 2018-11-08
Payer: MEDICARE

## 2018-11-08 PROCEDURE — 0651 HSPC ROUTINE HOME CARE

## 2018-11-09 ENCOUNTER — HOME CARE VISIT (OUTPATIENT)
Dept: HOSPICE | Facility: HOSPICE | Age: 78
End: 2018-11-09
Payer: MEDICARE

## 2018-11-09 ENCOUNTER — HOME CARE VISIT (OUTPATIENT)
Dept: SCHEDULING | Facility: HOME HEALTH | Age: 78
End: 2018-11-09
Payer: MEDICARE

## 2018-11-09 PROCEDURE — G0299 HHS/HOSPICE OF RN EA 15 MIN: HCPCS

## 2018-11-09 PROCEDURE — G0300 HHS/HOSPICE OF LPN EA 15 MIN: HCPCS

## 2018-11-09 PROCEDURE — 0651 HSPC ROUTINE HOME CARE

## 2018-11-09 PROCEDURE — HOSPICE MEDICATION HC HH HOSPICE MEDICATION

## 2018-11-10 PROCEDURE — 0651 HSPC ROUTINE HOME CARE

## 2018-11-11 PROCEDURE — 0651 HSPC ROUTINE HOME CARE

## 2018-11-12 VITALS
HEART RATE: 104 BPM | DIASTOLIC BLOOD PRESSURE: 60 MMHG | RESPIRATION RATE: 18 BRPM | SYSTOLIC BLOOD PRESSURE: 110 MMHG | OXYGEN SATURATION: 97 %

## 2018-11-12 PROCEDURE — 0651 HSPC ROUTINE HOME CARE

## 2018-11-13 ENCOUNTER — HOME CARE VISIT (OUTPATIENT)
Dept: SCHEDULING | Facility: HOME HEALTH | Age: 78
End: 2018-11-13
Payer: MEDICARE

## 2018-11-13 PROCEDURE — G0300 HHS/HOSPICE OF LPN EA 15 MIN: HCPCS

## 2018-11-13 PROCEDURE — 0651 HSPC ROUTINE HOME CARE

## 2018-11-14 VITALS
RESPIRATION RATE: 18 BRPM | OXYGEN SATURATION: 96 % | SYSTOLIC BLOOD PRESSURE: 146 MMHG | DIASTOLIC BLOOD PRESSURE: 78 MMHG | HEART RATE: 73 BPM

## 2018-11-14 PROCEDURE — 0651 HSPC ROUTINE HOME CARE

## 2018-11-15 ENCOUNTER — HOME CARE VISIT (OUTPATIENT)
Dept: SCHEDULING | Facility: HOME HEALTH | Age: 78
End: 2018-11-15
Payer: MEDICARE

## 2018-11-15 PROCEDURE — G0155 HHCP-SVS OF CSW,EA 15 MIN: HCPCS

## 2018-11-15 PROCEDURE — 0651 HSPC ROUTINE HOME CARE

## 2018-11-16 ENCOUNTER — HOME CARE VISIT (OUTPATIENT)
Dept: SCHEDULING | Facility: HOME HEALTH | Age: 78
End: 2018-11-16
Payer: MEDICARE

## 2018-11-16 VITALS
DIASTOLIC BLOOD PRESSURE: 72 MMHG | SYSTOLIC BLOOD PRESSURE: 130 MMHG | RESPIRATION RATE: 16 BRPM | OXYGEN SATURATION: 96 % | TEMPERATURE: 98.2 F | HEART RATE: 75 BPM

## 2018-11-16 PROCEDURE — 0651 HSPC ROUTINE HOME CARE

## 2018-11-16 PROCEDURE — G0299 HHS/HOSPICE OF RN EA 15 MIN: HCPCS

## 2018-11-16 PROCEDURE — A4424 OST PCH DRAIN W BAR & FILTER: HCPCS

## 2018-11-16 PROCEDURE — HOSPICE MEDICATION HC HH HOSPICE MEDICATION

## 2018-11-17 PROCEDURE — 0651 HSPC ROUTINE HOME CARE

## 2018-11-18 PROCEDURE — 0651 HSPC ROUTINE HOME CARE

## 2018-11-19 PROCEDURE — 0651 HSPC ROUTINE HOME CARE

## 2018-11-20 ENCOUNTER — HOME CARE VISIT (OUTPATIENT)
Dept: HOSPICE | Facility: HOSPICE | Age: 78
End: 2018-11-20
Payer: MEDICARE

## 2018-11-20 ENCOUNTER — HOME CARE VISIT (OUTPATIENT)
Dept: SCHEDULING | Facility: HOME HEALTH | Age: 78
End: 2018-11-20
Payer: MEDICARE

## 2018-11-20 PROCEDURE — 0651 HSPC ROUTINE HOME CARE

## 2018-11-20 PROCEDURE — HOSPICE MEDICATION HC HH HOSPICE MEDICATION

## 2018-11-20 PROCEDURE — G0300 HHS/HOSPICE OF LPN EA 15 MIN: HCPCS

## 2018-11-21 ENCOUNTER — HOME CARE VISIT (OUTPATIENT)
Dept: HOSPICE | Facility: HOSPICE | Age: 78
End: 2018-11-21
Payer: MEDICARE

## 2018-11-21 VITALS
HEART RATE: 92 BPM | RESPIRATION RATE: 18 BRPM | SYSTOLIC BLOOD PRESSURE: 128 MMHG | OXYGEN SATURATION: 98 % | DIASTOLIC BLOOD PRESSURE: 60 MMHG

## 2018-11-21 PROCEDURE — 0651 HSPC ROUTINE HOME CARE

## 2018-11-21 PROCEDURE — S9470 NUTRITIONAL COUNSELING, DIET: HCPCS

## 2018-11-22 PROCEDURE — 0651 HSPC ROUTINE HOME CARE

## 2018-11-23 ENCOUNTER — HOME CARE VISIT (OUTPATIENT)
Dept: SCHEDULING | Facility: HOME HEALTH | Age: 78
End: 2018-11-23
Payer: MEDICARE

## 2018-11-23 PROCEDURE — A4406 PECTIN BASED OSTOMY PASTE: HCPCS

## 2018-11-23 PROCEDURE — A4424 OST PCH DRAIN W BAR & FILTER: HCPCS

## 2018-11-23 PROCEDURE — 0651 HSPC ROUTINE HOME CARE

## 2018-11-23 PROCEDURE — A4927 NON-STERILE GLOVES: HCPCS

## 2018-11-23 PROCEDURE — A4385 OST SKN BARRIER SLD EXT WEAR: HCPCS

## 2018-11-23 PROCEDURE — G0155 HHCP-SVS OF CSW,EA 15 MIN: HCPCS

## 2018-11-24 PROCEDURE — 0651 HSPC ROUTINE HOME CARE

## 2018-11-25 PROCEDURE — 0651 HSPC ROUTINE HOME CARE

## 2018-11-26 ENCOUNTER — HOME CARE VISIT (OUTPATIENT)
Dept: SCHEDULING | Facility: HOME HEALTH | Age: 78
End: 2018-11-26
Payer: MEDICARE

## 2018-11-26 PROCEDURE — 0651 HSPC ROUTINE HOME CARE

## 2018-11-26 PROCEDURE — G0300 HHS/HOSPICE OF LPN EA 15 MIN: HCPCS

## 2018-11-27 VITALS
OXYGEN SATURATION: 97 % | SYSTOLIC BLOOD PRESSURE: 126 MMHG | RESPIRATION RATE: 18 BRPM | DIASTOLIC BLOOD PRESSURE: 72 MMHG | HEART RATE: 69 BPM

## 2018-11-27 PROCEDURE — 0651 HSPC ROUTINE HOME CARE

## 2018-11-28 PROCEDURE — 0651 HSPC ROUTINE HOME CARE

## 2018-11-29 PROCEDURE — 0651 HSPC ROUTINE HOME CARE

## 2018-11-30 ENCOUNTER — HOME CARE VISIT (OUTPATIENT)
Dept: HOSPICE | Facility: HOSPICE | Age: 78
End: 2018-11-30
Payer: MEDICARE

## 2018-11-30 PROCEDURE — 0651 HSPC ROUTINE HOME CARE

## 2018-12-01 PROCEDURE — 0651 HSPC ROUTINE HOME CARE

## 2018-12-02 PROCEDURE — 0651 HSPC ROUTINE HOME CARE

## 2018-12-03 PROCEDURE — 0651 HSPC ROUTINE HOME CARE

## 2018-12-04 ENCOUNTER — HOME CARE VISIT (OUTPATIENT)
Dept: SCHEDULING | Facility: HOME HEALTH | Age: 78
End: 2018-12-04
Payer: MEDICARE

## 2018-12-04 VITALS
OXYGEN SATURATION: 98 % | SYSTOLIC BLOOD PRESSURE: 126 MMHG | DIASTOLIC BLOOD PRESSURE: 77 MMHG | TEMPERATURE: 97.8 F | RESPIRATION RATE: 18 BRPM | HEART RATE: 75 BPM

## 2018-12-04 PROCEDURE — G0299 HHS/HOSPICE OF RN EA 15 MIN: HCPCS

## 2018-12-04 PROCEDURE — 0651 HSPC ROUTINE HOME CARE

## 2018-12-04 PROCEDURE — A4424 OST PCH DRAIN W BAR & FILTER: HCPCS

## 2018-12-04 PROCEDURE — HOSPICE MEDICATION HC HH HOSPICE MEDICATION

## 2018-12-04 PROCEDURE — A4314 CATH W/DRAINAGE 2-WAY LATEX: HCPCS

## 2018-12-04 PROCEDURE — A6250 SKIN SEAL PROTECT MOISTURIZR: HCPCS

## 2018-12-05 PROCEDURE — 0651 HSPC ROUTINE HOME CARE

## 2018-12-06 ENCOUNTER — HOME CARE VISIT (OUTPATIENT)
Dept: HOSPICE | Facility: HOSPICE | Age: 78
End: 2018-12-06
Payer: MEDICARE

## 2018-12-06 PROCEDURE — 0651 HSPC ROUTINE HOME CARE

## 2018-12-07 ENCOUNTER — HOME CARE VISIT (OUTPATIENT)
Dept: HOSPICE | Facility: HOSPICE | Age: 78
End: 2018-12-07
Payer: MEDICARE

## 2018-12-07 ENCOUNTER — HOME CARE VISIT (OUTPATIENT)
Dept: SCHEDULING | Facility: HOME HEALTH | Age: 78
End: 2018-12-07
Payer: MEDICARE

## 2018-12-07 PROCEDURE — G0155 HHCP-SVS OF CSW,EA 15 MIN: HCPCS

## 2018-12-07 PROCEDURE — 0651 HSPC ROUTINE HOME CARE

## 2018-12-08 PROCEDURE — 0651 HSPC ROUTINE HOME CARE

## 2018-12-09 PROCEDURE — 0651 HSPC ROUTINE HOME CARE

## 2018-12-10 ENCOUNTER — HOME CARE VISIT (OUTPATIENT)
Dept: HOSPICE | Facility: HOSPICE | Age: 78
End: 2018-12-10
Payer: MEDICARE

## 2018-12-10 PROCEDURE — 0651 HSPC ROUTINE HOME CARE

## 2018-12-11 PROCEDURE — 0651 HSPC ROUTINE HOME CARE

## 2018-12-12 ENCOUNTER — HOME CARE VISIT (OUTPATIENT)
Dept: SCHEDULING | Facility: HOME HEALTH | Age: 78
End: 2018-12-12
Payer: MEDICARE

## 2018-12-12 ENCOUNTER — HOME CARE VISIT (OUTPATIENT)
Dept: HOSPICE | Facility: HOSPICE | Age: 78
End: 2018-12-12
Payer: MEDICARE

## 2018-12-12 PROCEDURE — G0300 HHS/HOSPICE OF LPN EA 15 MIN: HCPCS

## 2018-12-12 PROCEDURE — 0651 HSPC ROUTINE HOME CARE

## 2018-12-12 PROCEDURE — G0155 HHCP-SVS OF CSW,EA 15 MIN: HCPCS

## 2018-12-13 VITALS
RESPIRATION RATE: 18 BRPM | DIASTOLIC BLOOD PRESSURE: 80 MMHG | SYSTOLIC BLOOD PRESSURE: 140 MMHG | HEART RATE: 70 BPM | OXYGEN SATURATION: 96 %

## 2018-12-13 PROCEDURE — 0651 HSPC ROUTINE HOME CARE

## 2018-12-14 PROCEDURE — 0651 HSPC ROUTINE HOME CARE

## 2018-12-15 PROCEDURE — 0651 HSPC ROUTINE HOME CARE

## 2018-12-16 PROCEDURE — 0651 HSPC ROUTINE HOME CARE

## 2018-12-17 ENCOUNTER — HOME CARE VISIT (OUTPATIENT)
Dept: SCHEDULING | Facility: HOME HEALTH | Age: 78
End: 2018-12-17
Payer: MEDICARE

## 2018-12-17 VITALS
DIASTOLIC BLOOD PRESSURE: 57 MMHG | TEMPERATURE: 97.3 F | OXYGEN SATURATION: 98 % | SYSTOLIC BLOOD PRESSURE: 124 MMHG | HEART RATE: 76 BPM | RESPIRATION RATE: 18 BRPM

## 2018-12-17 PROCEDURE — A4649 SURGICAL SUPPLIES: HCPCS

## 2018-12-17 PROCEDURE — 0651 HSPC ROUTINE HOME CARE

## 2018-12-17 PROCEDURE — G0299 HHS/HOSPICE OF RN EA 15 MIN: HCPCS

## 2018-12-17 PROCEDURE — A4427 OST PCH DRAIN/BARR LK FLNG/F: HCPCS

## 2018-12-17 PROCEDURE — HOSPICE MEDICATION HC HH HOSPICE MEDICATION

## 2018-12-17 PROCEDURE — A6402 STERILE GAUZE <= 16 SQ IN: HCPCS

## 2018-12-17 PROCEDURE — A4414 OST SKNBAR W/O CONV<=4 SQ IN: HCPCS

## 2018-12-18 PROCEDURE — 0651 HSPC ROUTINE HOME CARE

## 2018-12-19 PROCEDURE — 0651 HSPC ROUTINE HOME CARE

## 2018-12-20 PROCEDURE — 0651 HSPC ROUTINE HOME CARE

## 2018-12-21 ENCOUNTER — HOME CARE VISIT (OUTPATIENT)
Dept: SCHEDULING | Facility: HOME HEALTH | Age: 78
End: 2018-12-21
Payer: MEDICARE

## 2018-12-21 PROCEDURE — G0155 HHCP-SVS OF CSW,EA 15 MIN: HCPCS

## 2018-12-21 PROCEDURE — 0651 HSPC ROUTINE HOME CARE

## 2018-12-22 PROCEDURE — 0651 HSPC ROUTINE HOME CARE

## 2018-12-23 PROCEDURE — 0651 HSPC ROUTINE HOME CARE

## 2018-12-24 ENCOUNTER — HOME CARE VISIT (OUTPATIENT)
Dept: HOSPICE | Facility: HOSPICE | Age: 78
End: 2018-12-24
Payer: MEDICARE

## 2018-12-24 PROCEDURE — 0651 HSPC ROUTINE HOME CARE

## 2018-12-25 PROCEDURE — 0651 HSPC ROUTINE HOME CARE

## 2018-12-26 PROCEDURE — 0651 HSPC ROUTINE HOME CARE

## 2018-12-27 ENCOUNTER — HOME CARE VISIT (OUTPATIENT)
Dept: SCHEDULING | Facility: HOME HEALTH | Age: 78
End: 2018-12-27
Payer: MEDICARE

## 2018-12-27 PROCEDURE — A4427 OST PCH DRAIN/BARR LK FLNG/F: HCPCS

## 2018-12-27 PROCEDURE — A4334 URINARY CATH LEG STRAP: HCPCS

## 2018-12-27 PROCEDURE — 0651 HSPC ROUTINE HOME CARE

## 2018-12-27 PROCEDURE — T4541 LARGE DISPOSABLE UNDERPAD: HCPCS

## 2018-12-27 PROCEDURE — A4333 URINARY CATH ANCHOR DEVICE: HCPCS

## 2018-12-27 PROCEDURE — G0300 HHS/HOSPICE OF LPN EA 15 MIN: HCPCS

## 2018-12-27 PROCEDURE — A4314 CATH W/DRAINAGE 2-WAY LATEX: HCPCS

## 2018-12-28 VITALS
HEART RATE: 51 BPM | RESPIRATION RATE: 18 BRPM | SYSTOLIC BLOOD PRESSURE: 122 MMHG | DIASTOLIC BLOOD PRESSURE: 70 MMHG | OXYGEN SATURATION: 97 %

## 2018-12-28 PROCEDURE — 0651 HSPC ROUTINE HOME CARE

## 2018-12-29 PROCEDURE — 0651 HSPC ROUTINE HOME CARE

## 2018-12-30 PROCEDURE — 0651 HSPC ROUTINE HOME CARE

## 2018-12-31 PROCEDURE — 0651 HSPC ROUTINE HOME CARE

## 2019-01-01 ENCOUNTER — HOME CARE VISIT (OUTPATIENT)
Dept: HOSPICE | Facility: HOSPICE | Age: 79
End: 2019-01-01
Payer: MEDICARE

## 2019-01-01 ENCOUNTER — HOME CARE VISIT (OUTPATIENT)
Dept: SCHEDULING | Facility: HOME HEALTH | Age: 79
End: 2019-01-01
Payer: MEDICARE

## 2019-01-01 VITALS
RESPIRATION RATE: 18 BRPM | HEART RATE: 78 BPM | OXYGEN SATURATION: 97 % | DIASTOLIC BLOOD PRESSURE: 70 MMHG | SYSTOLIC BLOOD PRESSURE: 118 MMHG

## 2019-01-01 VITALS
HEART RATE: 79 BPM | SYSTOLIC BLOOD PRESSURE: 140 MMHG | OXYGEN SATURATION: 96 % | DIASTOLIC BLOOD PRESSURE: 72 MMHG | RESPIRATION RATE: 18 BRPM

## 2019-01-01 VITALS
HEART RATE: 75 BPM | SYSTOLIC BLOOD PRESSURE: 122 MMHG | RESPIRATION RATE: 18 BRPM | OXYGEN SATURATION: 97 % | DIASTOLIC BLOOD PRESSURE: 72 MMHG

## 2019-01-01 VITALS — HEART RATE: 75 BPM | RESPIRATION RATE: 20 BRPM | DIASTOLIC BLOOD PRESSURE: 63 MMHG | SYSTOLIC BLOOD PRESSURE: 117 MMHG

## 2019-01-01 VITALS
RESPIRATION RATE: 16 BRPM | DIASTOLIC BLOOD PRESSURE: 85 MMHG | SYSTOLIC BLOOD PRESSURE: 144 MMHG | HEART RATE: 75 BPM | TEMPERATURE: 98.4 F

## 2019-01-01 VITALS
TEMPERATURE: 98.6 F | HEART RATE: 77 BPM | DIASTOLIC BLOOD PRESSURE: 68 MMHG | RESPIRATION RATE: 20 BRPM | OXYGEN SATURATION: 98 % | SYSTOLIC BLOOD PRESSURE: 126 MMHG

## 2019-01-01 VITALS
DIASTOLIC BLOOD PRESSURE: 70 MMHG | HEART RATE: 87 BPM | SYSTOLIC BLOOD PRESSURE: 120 MMHG | OXYGEN SATURATION: 95 % | RESPIRATION RATE: 18 BRPM

## 2019-01-01 VITALS
DIASTOLIC BLOOD PRESSURE: 80 MMHG | OXYGEN SATURATION: 97 % | SYSTOLIC BLOOD PRESSURE: 120 MMHG | RESPIRATION RATE: 18 BRPM | HEART RATE: 67 BPM

## 2019-01-01 VITALS
DIASTOLIC BLOOD PRESSURE: 67 MMHG | TEMPERATURE: 98.4 F | HEART RATE: 75 BPM | RESPIRATION RATE: 20 BRPM | SYSTOLIC BLOOD PRESSURE: 120 MMHG

## 2019-01-01 VITALS
RESPIRATION RATE: 18 BRPM | OXYGEN SATURATION: 97 % | DIASTOLIC BLOOD PRESSURE: 80 MMHG | SYSTOLIC BLOOD PRESSURE: 140 MMHG | HEART RATE: 82 BPM

## 2019-01-01 VITALS
RESPIRATION RATE: 20 BRPM | SYSTOLIC BLOOD PRESSURE: 123 MMHG | TEMPERATURE: 98.5 F | HEART RATE: 76 BPM | OXYGEN SATURATION: 97 % | DIASTOLIC BLOOD PRESSURE: 56 MMHG

## 2019-01-01 VITALS — RESPIRATION RATE: 18 BRPM | HEART RATE: 60 BPM | DIASTOLIC BLOOD PRESSURE: 70 MMHG | SYSTOLIC BLOOD PRESSURE: 120 MMHG

## 2019-01-01 VITALS
TEMPERATURE: 98.3 F | RESPIRATION RATE: 16 BRPM | DIASTOLIC BLOOD PRESSURE: 80 MMHG | HEART RATE: 97 BPM | OXYGEN SATURATION: 98 % | SYSTOLIC BLOOD PRESSURE: 150 MMHG

## 2019-01-01 VITALS
SYSTOLIC BLOOD PRESSURE: 140 MMHG | HEART RATE: 110 BPM | TEMPERATURE: 99 F | DIASTOLIC BLOOD PRESSURE: 72 MMHG | RESPIRATION RATE: 20 BRPM | OXYGEN SATURATION: 95 %

## 2019-01-01 VITALS
DIASTOLIC BLOOD PRESSURE: 73 MMHG | TEMPERATURE: 98.9 F | HEART RATE: 65 BPM | SYSTOLIC BLOOD PRESSURE: 132 MMHG | RESPIRATION RATE: 18 BRPM

## 2019-01-01 VITALS
DIASTOLIC BLOOD PRESSURE: 80 MMHG | HEART RATE: 76 BPM | SYSTOLIC BLOOD PRESSURE: 120 MMHG | OXYGEN SATURATION: 97 % | RESPIRATION RATE: 18 BRPM

## 2019-01-01 VITALS
TEMPERATURE: 96.3 F | DIASTOLIC BLOOD PRESSURE: 78 MMHG | SYSTOLIC BLOOD PRESSURE: 126 MMHG | OXYGEN SATURATION: 97 % | HEART RATE: 75 BPM | RESPIRATION RATE: 20 BRPM

## 2019-01-01 VITALS
DIASTOLIC BLOOD PRESSURE: 62 MMHG | SYSTOLIC BLOOD PRESSURE: 122 MMHG | HEART RATE: 74 BPM | OXYGEN SATURATION: 95 % | RESPIRATION RATE: 18 BRPM

## 2019-01-01 VITALS
DIASTOLIC BLOOD PRESSURE: 65 MMHG | TEMPERATURE: 98.3 F | SYSTOLIC BLOOD PRESSURE: 119 MMHG | HEART RATE: 75 BPM | RESPIRATION RATE: 20 BRPM

## 2019-01-01 PROCEDURE — 0651 HSPC ROUTINE HOME CARE

## 2019-01-01 PROCEDURE — ? HC HH HOSPICE MEDICATION

## 2019-01-01 PROCEDURE — HOSPICE MEDICATION HC HH HOSPICE MEDICATION

## 2019-01-01 PROCEDURE — G0300 HHS/HOSPICE OF LPN EA 15 MIN: HCPCS

## 2019-01-01 PROCEDURE — G0299 HHS/HOSPICE OF RN EA 15 MIN: HCPCS

## 2019-01-01 PROCEDURE — A9270 NON-COVERED ITEM OR SERVICE: HCPCS

## 2019-01-01 PROCEDURE — T4541 LARGE DISPOSABLE UNDERPAD: HCPCS

## 2019-01-01 PROCEDURE — A4338 INDWELLING CATHETER LATEX: HCPCS

## 2019-01-01 PROCEDURE — A4427 OST PCH DRAIN/BARR LK FLNG/F: HCPCS

## 2019-01-01 PROCEDURE — A4314 CATH W/DRAINAGE 2-WAY LATEX: HCPCS

## 2019-01-01 PROCEDURE — HOSPICE MEDICATION 0636 HC HH HOSPICE MEDICATION

## 2019-01-01 PROCEDURE — A6402 STERILE GAUZE <= 16 SQ IN: HCPCS

## 2019-01-01 PROCEDURE — A4320 IRRIGATION TRAY: HCPCS

## 2019-01-01 PROCEDURE — A4216 STERILE WATER/SALINE, 10 ML: HCPCS

## 2019-01-01 PROCEDURE — A4452 WATERPROOF TAPE: HCPCS

## 2019-01-01 PROCEDURE — A6250 SKIN SEAL PROTECT MOISTURIZR: HCPCS

## 2019-01-01 PROCEDURE — A4334 URINARY CATH LEG STRAP: HCPCS

## 2019-01-01 PROCEDURE — G0155 HHCP-SVS OF CSW,EA 15 MIN: HCPCS

## 2019-01-01 PROCEDURE — T4524 ADULT SIZE BRIEF/DIAPER XL: HCPCS

## 2019-01-01 PROCEDURE — A4927 NON-STERILE GLOVES: HCPCS

## 2019-01-01 PROCEDURE — A6216 NON-STERILE GAUZE<=16 SQ IN: HCPCS

## 2019-01-01 PROCEDURE — A4354 CATH INSERTION TRAY W/BAG: HCPCS

## 2019-01-01 PROCEDURE — A9150 MISC/EXPER NON-PRESCRIPT DRU: HCPCS

## 2019-01-01 PROCEDURE — A4414 OST SKNBAR W/O CONV<=4 SQ IN: HCPCS

## 2019-01-02 ENCOUNTER — HOME CARE VISIT (OUTPATIENT)
Dept: HOSPICE | Facility: HOSPICE | Age: 79
End: 2019-01-02
Payer: MEDICARE

## 2019-01-02 PROCEDURE — HOSPICE MEDICATION HC HH HOSPICE MEDICATION

## 2019-01-02 PROCEDURE — 0651 HSPC ROUTINE HOME CARE

## 2019-01-03 PROCEDURE — HOSPICE MEDICATION HC HH HOSPICE MEDICATION

## 2019-01-03 PROCEDURE — 0651 HSPC ROUTINE HOME CARE

## 2019-01-04 ENCOUNTER — HOME CARE VISIT (OUTPATIENT)
Dept: HOSPICE | Facility: HOSPICE | Age: 79
End: 2019-01-04
Payer: MEDICARE

## 2019-01-04 ENCOUNTER — HOME CARE VISIT (OUTPATIENT)
Dept: SCHEDULING | Facility: HOME HEALTH | Age: 79
End: 2019-01-04
Payer: MEDICARE

## 2019-01-04 VITALS — HEART RATE: 70 BPM | RESPIRATION RATE: 20 BRPM | DIASTOLIC BLOOD PRESSURE: 83 MMHG | SYSTOLIC BLOOD PRESSURE: 135 MMHG

## 2019-01-04 PROCEDURE — 0651 HSPC ROUTINE HOME CARE

## 2019-01-04 PROCEDURE — HOSPICE MEDICATION HC HH HOSPICE MEDICATION

## 2019-01-04 PROCEDURE — A4452 WATERPROOF TAPE: HCPCS

## 2019-01-04 PROCEDURE — G0299 HHS/HOSPICE OF RN EA 15 MIN: HCPCS

## 2019-01-04 PROCEDURE — A4927 NON-STERILE GLOVES: HCPCS

## 2019-01-05 PROCEDURE — 0651 HSPC ROUTINE HOME CARE

## 2019-01-06 PROCEDURE — 0651 HSPC ROUTINE HOME CARE

## 2019-01-07 PROCEDURE — 0651 HSPC ROUTINE HOME CARE

## 2019-01-08 ENCOUNTER — HOME CARE VISIT (OUTPATIENT)
Dept: HOSPICE | Facility: HOSPICE | Age: 79
End: 2019-01-08
Payer: MEDICARE

## 2019-01-08 PROCEDURE — 0651 HSPC ROUTINE HOME CARE

## 2019-01-09 PROCEDURE — HOSPICE MEDICATION HC HH HOSPICE MEDICATION

## 2019-01-09 PROCEDURE — 0651 HSPC ROUTINE HOME CARE

## 2019-01-10 ENCOUNTER — HOME CARE VISIT (OUTPATIENT)
Dept: SCHEDULING | Facility: HOME HEALTH | Age: 79
End: 2019-01-10
Payer: MEDICARE

## 2019-01-10 PROCEDURE — 0651 HSPC ROUTINE HOME CARE

## 2019-01-10 PROCEDURE — G0155 HHCP-SVS OF CSW,EA 15 MIN: HCPCS

## 2019-01-11 ENCOUNTER — HOME CARE VISIT (OUTPATIENT)
Dept: HOSPICE | Facility: HOSPICE | Age: 79
End: 2019-01-11
Payer: MEDICARE

## 2019-01-11 ENCOUNTER — HOME CARE VISIT (OUTPATIENT)
Dept: SCHEDULING | Facility: HOME HEALTH | Age: 79
End: 2019-01-11
Payer: MEDICARE

## 2019-01-11 PROCEDURE — A6216 NON-STERILE GAUZE<=16 SQ IN: HCPCS

## 2019-01-11 PROCEDURE — HOSPICE MEDICATION HC HH HOSPICE MEDICATION

## 2019-01-11 PROCEDURE — A4314 CATH W/DRAINAGE 2-WAY LATEX: HCPCS

## 2019-01-11 PROCEDURE — G0299 HHS/HOSPICE OF RN EA 15 MIN: HCPCS

## 2019-01-11 PROCEDURE — 0651 HSPC ROUTINE HOME CARE

## 2019-01-12 VITALS
SYSTOLIC BLOOD PRESSURE: 139 MMHG | TEMPERATURE: 98.3 F | HEART RATE: 68 BPM | DIASTOLIC BLOOD PRESSURE: 74 MMHG | RESPIRATION RATE: 16 BRPM

## 2019-01-12 PROCEDURE — 0651 HSPC ROUTINE HOME CARE

## 2019-01-13 PROCEDURE — 0651 HSPC ROUTINE HOME CARE

## 2019-01-14 ENCOUNTER — HOME CARE VISIT (OUTPATIENT)
Dept: SCHEDULING | Facility: HOME HEALTH | Age: 79
End: 2019-01-14
Payer: MEDICARE

## 2019-01-14 ENCOUNTER — HOME CARE VISIT (OUTPATIENT)
Dept: HOSPICE | Facility: HOSPICE | Age: 79
End: 2019-01-14
Payer: MEDICARE

## 2019-01-14 PROCEDURE — G0300 HHS/HOSPICE OF LPN EA 15 MIN: HCPCS

## 2019-01-14 PROCEDURE — 0651 HSPC ROUTINE HOME CARE

## 2019-01-15 VITALS
SYSTOLIC BLOOD PRESSURE: 138 MMHG | HEART RATE: 79 BPM | RESPIRATION RATE: 18 BRPM | OXYGEN SATURATION: 97 % | DIASTOLIC BLOOD PRESSURE: 70 MMHG

## 2019-01-15 PROCEDURE — 0651 HSPC ROUTINE HOME CARE

## 2019-01-16 PROCEDURE — 0651 HSPC ROUTINE HOME CARE

## 2019-01-16 PROCEDURE — HOSPICE MEDICATION HC HH HOSPICE MEDICATION

## 2019-01-17 ENCOUNTER — HOME CARE VISIT (OUTPATIENT)
Dept: HOSPICE | Facility: HOSPICE | Age: 79
End: 2019-01-17
Payer: MEDICARE

## 2019-01-17 PROCEDURE — 0651 HSPC ROUTINE HOME CARE

## 2019-01-18 ENCOUNTER — HOME CARE VISIT (OUTPATIENT)
Dept: HOSPICE | Facility: HOSPICE | Age: 79
End: 2019-01-18
Payer: MEDICARE

## 2019-01-18 PROCEDURE — S9470 NUTRITIONAL COUNSELING, DIET: HCPCS

## 2019-01-18 PROCEDURE — 0651 HSPC ROUTINE HOME CARE

## 2019-01-19 PROCEDURE — 0651 HSPC ROUTINE HOME CARE

## 2019-01-20 PROCEDURE — 0651 HSPC ROUTINE HOME CARE

## 2019-01-21 PROCEDURE — 0651 HSPC ROUTINE HOME CARE

## 2019-01-22 PROCEDURE — 0651 HSPC ROUTINE HOME CARE

## 2019-01-23 ENCOUNTER — HOME CARE VISIT (OUTPATIENT)
Dept: SCHEDULING | Facility: HOME HEALTH | Age: 79
End: 2019-01-23
Payer: MEDICARE

## 2019-01-23 VITALS
DIASTOLIC BLOOD PRESSURE: 86 MMHG | SYSTOLIC BLOOD PRESSURE: 130 MMHG | HEART RATE: 69 BPM | RESPIRATION RATE: 18 BRPM | OXYGEN SATURATION: 95 %

## 2019-01-23 PROCEDURE — A6402 STERILE GAUZE <= 16 SQ IN: HCPCS

## 2019-01-23 PROCEDURE — A4334 URINARY CATH LEG STRAP: HCPCS

## 2019-01-23 PROCEDURE — HOSPICE MEDICATION HC HH HOSPICE MEDICATION

## 2019-01-23 PROCEDURE — A4216 STERILE WATER/SALINE, 10 ML: HCPCS

## 2019-01-23 PROCEDURE — 0651 HSPC ROUTINE HOME CARE

## 2019-01-23 PROCEDURE — A4649 SURGICAL SUPPLIES: HCPCS

## 2019-01-23 PROCEDURE — A4320 IRRIGATION TRAY: HCPCS

## 2019-01-23 PROCEDURE — A4427 OST PCH DRAIN/BARR LK FLNG/F: HCPCS

## 2019-01-23 PROCEDURE — G0299 HHS/HOSPICE OF RN EA 15 MIN: HCPCS

## 2019-01-23 PROCEDURE — A4414 OST SKNBAR W/O CONV<=4 SQ IN: HCPCS

## 2019-01-24 PROCEDURE — 0651 HSPC ROUTINE HOME CARE

## 2019-01-25 ENCOUNTER — HOME CARE VISIT (OUTPATIENT)
Dept: SCHEDULING | Facility: HOME HEALTH | Age: 79
End: 2019-01-25
Payer: MEDICARE

## 2019-01-25 PROCEDURE — 0651 HSPC ROUTINE HOME CARE

## 2019-01-25 PROCEDURE — G0155 HHCP-SVS OF CSW,EA 15 MIN: HCPCS

## 2019-01-26 PROCEDURE — 0651 HSPC ROUTINE HOME CARE

## 2019-01-27 PROCEDURE — 0651 HSPC ROUTINE HOME CARE

## 2019-01-28 ENCOUNTER — HOME CARE VISIT (OUTPATIENT)
Dept: HOSPICE | Facility: HOSPICE | Age: 79
End: 2019-01-28
Payer: MEDICARE

## 2019-01-28 PROCEDURE — 0651 HSPC ROUTINE HOME CARE

## 2019-01-29 ENCOUNTER — HOME CARE VISIT (OUTPATIENT)
Dept: HOSPICE | Facility: HOSPICE | Age: 79
End: 2019-01-29
Payer: MEDICARE

## 2019-01-29 PROCEDURE — 0651 HSPC ROUTINE HOME CARE

## 2019-01-30 PROCEDURE — 0651 HSPC ROUTINE HOME CARE

## 2019-01-31 ENCOUNTER — HOME CARE VISIT (OUTPATIENT)
Dept: HOSPICE | Facility: HOSPICE | Age: 79
End: 2019-01-31
Payer: MEDICARE

## 2019-01-31 ENCOUNTER — HOME CARE VISIT (OUTPATIENT)
Dept: SCHEDULING | Facility: HOME HEALTH | Age: 79
End: 2019-01-31
Payer: MEDICARE

## 2019-01-31 PROCEDURE — 0651 HSPC ROUTINE HOME CARE

## 2019-01-31 PROCEDURE — G0300 HHS/HOSPICE OF LPN EA 15 MIN: HCPCS

## 2019-01-31 PROCEDURE — HOSPICE MEDICATION HC HH HOSPICE MEDICATION

## 2019-02-01 ENCOUNTER — HOME CARE VISIT (OUTPATIENT)
Dept: SCHEDULING | Facility: HOME HEALTH | Age: 79
End: 2019-02-01
Payer: MEDICARE

## 2019-02-01 PROCEDURE — 0651 HSPC ROUTINE HOME CARE

## 2019-02-01 PROCEDURE — G0155 HHCP-SVS OF CSW,EA 15 MIN: HCPCS

## 2019-02-02 PROCEDURE — 0651 HSPC ROUTINE HOME CARE

## 2019-02-03 PROCEDURE — 0651 HSPC ROUTINE HOME CARE

## 2019-02-04 ENCOUNTER — HOME CARE VISIT (OUTPATIENT)
Dept: SCHEDULING | Facility: HOME HEALTH | Age: 79
End: 2019-02-04
Payer: MEDICARE

## 2019-02-04 VITALS
DIASTOLIC BLOOD PRESSURE: 68 MMHG | HEART RATE: 72 BPM | RESPIRATION RATE: 20 BRPM | TEMPERATURE: 98.2 F | SYSTOLIC BLOOD PRESSURE: 132 MMHG

## 2019-02-04 VITALS
SYSTOLIC BLOOD PRESSURE: 138 MMHG | HEART RATE: 98 BPM | DIASTOLIC BLOOD PRESSURE: 80 MMHG | RESPIRATION RATE: 18 BRPM | OXYGEN SATURATION: 97 %

## 2019-02-04 PROCEDURE — 0651 HSPC ROUTINE HOME CARE

## 2019-02-04 PROCEDURE — A4427 OST PCH DRAIN/BARR LK FLNG/F: HCPCS

## 2019-02-04 PROCEDURE — G0299 HHS/HOSPICE OF RN EA 15 MIN: HCPCS

## 2019-02-05 PROCEDURE — 0651 HSPC ROUTINE HOME CARE

## 2019-02-06 PROCEDURE — 0651 HSPC ROUTINE HOME CARE

## 2019-02-07 PROCEDURE — 0651 HSPC ROUTINE HOME CARE

## 2019-02-08 PROCEDURE — 0651 HSPC ROUTINE HOME CARE

## 2019-02-09 LAB
DATE LAST DOSE: ABNORMAL
GENTAMICIN TROUGH SERPL-MCNC: 0.4 UG/ML (ref 1–2)
REPORTED DOSE,DOSE: ABNORMAL UNITS
REPORTED DOSE/TIME,TMG: ABNORMAL

## 2019-02-09 PROCEDURE — 0651 HSPC ROUTINE HOME CARE

## 2019-02-10 PROCEDURE — 0651 HSPC ROUTINE HOME CARE

## 2019-02-11 PROCEDURE — 0651 HSPC ROUTINE HOME CARE

## 2019-02-12 ENCOUNTER — HOME CARE VISIT (OUTPATIENT)
Dept: SCHEDULING | Facility: HOME HEALTH | Age: 79
End: 2019-02-12
Payer: MEDICARE

## 2019-02-12 PROCEDURE — HOSPICE MEDICATION HC HH HOSPICE MEDICATION

## 2019-02-12 PROCEDURE — G0300 HHS/HOSPICE OF LPN EA 15 MIN: HCPCS

## 2019-02-12 PROCEDURE — 0651 HSPC ROUTINE HOME CARE

## 2019-02-13 VITALS
DIASTOLIC BLOOD PRESSURE: 80 MMHG | RESPIRATION RATE: 18 BRPM | SYSTOLIC BLOOD PRESSURE: 140 MMHG | OXYGEN SATURATION: 97 % | HEART RATE: 87 BPM

## 2019-02-13 PROCEDURE — 0651 HSPC ROUTINE HOME CARE

## 2019-02-14 PROCEDURE — 0651 HSPC ROUTINE HOME CARE

## 2019-02-15 ENCOUNTER — HOME CARE VISIT (OUTPATIENT)
Dept: SCHEDULING | Facility: HOME HEALTH | Age: 79
End: 2019-02-15
Payer: MEDICARE

## 2019-02-15 ENCOUNTER — HOME CARE VISIT (OUTPATIENT)
Dept: HOSPICE | Facility: HOSPICE | Age: 79
End: 2019-02-15
Payer: MEDICARE

## 2019-02-15 PROCEDURE — G0155 HHCP-SVS OF CSW,EA 15 MIN: HCPCS

## 2019-02-15 PROCEDURE — 0651 HSPC ROUTINE HOME CARE

## 2019-02-16 PROCEDURE — 0651 HSPC ROUTINE HOME CARE

## 2019-02-17 PROCEDURE — 0651 HSPC ROUTINE HOME CARE

## 2019-02-18 PROCEDURE — 0651 HSPC ROUTINE HOME CARE

## 2019-02-19 ENCOUNTER — HOME CARE VISIT (OUTPATIENT)
Dept: HOSPICE | Facility: HOSPICE | Age: 79
End: 2019-02-19
Payer: MEDICARE

## 2019-02-19 PROCEDURE — 0651 HSPC ROUTINE HOME CARE

## 2019-02-20 PROCEDURE — 0651 HSPC ROUTINE HOME CARE

## 2019-02-21 PROCEDURE — 0651 HSPC ROUTINE HOME CARE

## 2019-02-22 ENCOUNTER — HOME CARE VISIT (OUTPATIENT)
Dept: SCHEDULING | Facility: HOME HEALTH | Age: 79
End: 2019-02-22
Payer: MEDICARE

## 2019-02-22 PROCEDURE — HOSPICE MEDICATION HC HH HOSPICE MEDICATION

## 2019-02-22 PROCEDURE — A4414 OST SKNBAR W/O CONV<=4 SQ IN: HCPCS

## 2019-02-22 PROCEDURE — 0651 HSPC ROUTINE HOME CARE

## 2019-02-22 PROCEDURE — A4927 NON-STERILE GLOVES: HCPCS

## 2019-02-22 PROCEDURE — A4314 CATH W/DRAINAGE 2-WAY LATEX: HCPCS

## 2019-02-22 PROCEDURE — A6250 SKIN SEAL PROTECT MOISTURIZR: HCPCS

## 2019-02-22 PROCEDURE — G0299 HHS/HOSPICE OF RN EA 15 MIN: HCPCS

## 2019-02-22 PROCEDURE — A4427 OST PCH DRAIN/BARR LK FLNG/F: HCPCS

## 2019-02-22 PROCEDURE — A4320 IRRIGATION TRAY: HCPCS

## 2019-02-22 PROCEDURE — A6402 STERILE GAUZE <= 16 SQ IN: HCPCS

## 2019-02-23 PROCEDURE — 0651 HSPC ROUTINE HOME CARE

## 2019-02-24 VITALS
RESPIRATION RATE: 16 BRPM | TEMPERATURE: 98.4 F | SYSTOLIC BLOOD PRESSURE: 135 MMHG | DIASTOLIC BLOOD PRESSURE: 62 MMHG | HEART RATE: 72 BPM

## 2019-02-24 PROCEDURE — 0651 HSPC ROUTINE HOME CARE

## 2019-02-25 PROCEDURE — 0651 HSPC ROUTINE HOME CARE

## 2019-02-26 ENCOUNTER — HOME CARE VISIT (OUTPATIENT)
Dept: HOSPICE | Facility: HOSPICE | Age: 79
End: 2019-02-26
Payer: MEDICARE

## 2019-02-26 PROCEDURE — 0651 HSPC ROUTINE HOME CARE

## 2019-02-27 PROCEDURE — 0651 HSPC ROUTINE HOME CARE

## 2019-02-28 ENCOUNTER — HOME CARE VISIT (OUTPATIENT)
Dept: SCHEDULING | Facility: HOME HEALTH | Age: 79
End: 2019-02-28
Payer: MEDICARE

## 2019-02-28 ENCOUNTER — HOME CARE VISIT (OUTPATIENT)
Dept: HOSPICE | Facility: HOSPICE | Age: 79
End: 2019-02-28
Payer: MEDICARE

## 2019-02-28 PROCEDURE — G0300 HHS/HOSPICE OF LPN EA 15 MIN: HCPCS

## 2019-02-28 PROCEDURE — 0651 HSPC ROUTINE HOME CARE

## 2019-03-01 ENCOUNTER — HOME CARE VISIT (OUTPATIENT)
Dept: SCHEDULING | Facility: HOME HEALTH | Age: 79
End: 2019-03-01
Payer: MEDICARE

## 2019-03-01 VITALS
SYSTOLIC BLOOD PRESSURE: 140 MMHG | DIASTOLIC BLOOD PRESSURE: 80 MMHG | OXYGEN SATURATION: 98 % | HEART RATE: 71 BPM | RESPIRATION RATE: 18 BRPM

## 2019-03-01 PROCEDURE — 0651 HSPC ROUTINE HOME CARE

## 2019-03-01 PROCEDURE — G0155 HHCP-SVS OF CSW,EA 15 MIN: HCPCS

## 2019-03-02 PROCEDURE — 0651 HSPC ROUTINE HOME CARE

## 2019-03-03 PROCEDURE — 0651 HSPC ROUTINE HOME CARE

## 2019-03-04 PROCEDURE — 0651 HSPC ROUTINE HOME CARE

## 2019-03-05 PROCEDURE — 0651 HSPC ROUTINE HOME CARE

## 2019-03-06 PROCEDURE — 0651 HSPC ROUTINE HOME CARE

## 2019-03-07 PROCEDURE — 0651 HSPC ROUTINE HOME CARE

## 2019-03-08 ENCOUNTER — HOME CARE VISIT (OUTPATIENT)
Dept: SCHEDULING | Facility: HOME HEALTH | Age: 79
End: 2019-03-08
Payer: MEDICARE

## 2019-03-08 ENCOUNTER — HOME CARE VISIT (OUTPATIENT)
Dept: HOSPICE | Facility: HOSPICE | Age: 79
End: 2019-03-08
Payer: MEDICARE

## 2019-03-08 PROCEDURE — G0299 HHS/HOSPICE OF RN EA 15 MIN: HCPCS

## 2019-03-08 PROCEDURE — HOSPICE MEDICATION HC HH HOSPICE MEDICATION

## 2019-03-08 PROCEDURE — 0651 HSPC ROUTINE HOME CARE

## 2019-03-09 PROCEDURE — 0651 HSPC ROUTINE HOME CARE

## 2019-03-10 VITALS — HEART RATE: 77 BPM | DIASTOLIC BLOOD PRESSURE: 88 MMHG | SYSTOLIC BLOOD PRESSURE: 141 MMHG | RESPIRATION RATE: 20 BRPM

## 2019-03-10 PROCEDURE — 0651 HSPC ROUTINE HOME CARE

## 2019-03-11 PROCEDURE — 0651 HSPC ROUTINE HOME CARE

## 2019-03-12 PROCEDURE — 0651 HSPC ROUTINE HOME CARE

## 2019-03-13 PROCEDURE — 0651 HSPC ROUTINE HOME CARE

## 2019-03-14 ENCOUNTER — HOME CARE VISIT (OUTPATIENT)
Dept: SCHEDULING | Facility: HOME HEALTH | Age: 79
End: 2019-03-14
Payer: MEDICARE

## 2019-03-14 VITALS
SYSTOLIC BLOOD PRESSURE: 130 MMHG | RESPIRATION RATE: 16 BRPM | OXYGEN SATURATION: 97 % | HEART RATE: 48 BPM | DIASTOLIC BLOOD PRESSURE: 78 MMHG

## 2019-03-14 PROCEDURE — 0651 HSPC ROUTINE HOME CARE

## 2019-03-14 PROCEDURE — G0299 HHS/HOSPICE OF RN EA 15 MIN: HCPCS

## 2019-03-15 PROCEDURE — 0651 HSPC ROUTINE HOME CARE

## 2019-03-16 PROCEDURE — 0651 HSPC ROUTINE HOME CARE

## 2019-03-17 PROCEDURE — 0651 HSPC ROUTINE HOME CARE

## 2019-03-18 PROCEDURE — 0651 HSPC ROUTINE HOME CARE

## 2019-03-19 PROCEDURE — HOSPICE MEDICATION HC HH HOSPICE MEDICATION

## 2019-03-19 PROCEDURE — A4452 WATERPROOF TAPE: HCPCS

## 2019-03-19 PROCEDURE — A6216 NON-STERILE GAUZE<=16 SQ IN: HCPCS

## 2019-03-19 PROCEDURE — A4427 OST PCH DRAIN/BARR LK FLNG/F: HCPCS

## 2019-03-19 PROCEDURE — T4541 LARGE DISPOSABLE UNDERPAD: HCPCS

## 2019-03-19 PROCEDURE — A6402 STERILE GAUZE <= 16 SQ IN: HCPCS

## 2019-03-19 PROCEDURE — A4314 CATH W/DRAINAGE 2-WAY LATEX: HCPCS

## 2019-03-19 PROCEDURE — 0651 HSPC ROUTINE HOME CARE

## 2019-03-20 PROCEDURE — 0651 HSPC ROUTINE HOME CARE

## 2019-03-21 ENCOUNTER — HOME CARE VISIT (OUTPATIENT)
Dept: SCHEDULING | Facility: HOME HEALTH | Age: 79
End: 2019-03-21
Payer: MEDICARE

## 2019-03-21 PROCEDURE — G0300 HHS/HOSPICE OF LPN EA 15 MIN: HCPCS

## 2019-03-21 PROCEDURE — 0651 HSPC ROUTINE HOME CARE

## 2019-03-22 VITALS
RESPIRATION RATE: 18 BRPM | OXYGEN SATURATION: 99 % | SYSTOLIC BLOOD PRESSURE: 120 MMHG | DIASTOLIC BLOOD PRESSURE: 80 MMHG | HEART RATE: 63 BPM

## 2019-03-22 PROCEDURE — 0651 HSPC ROUTINE HOME CARE

## 2019-03-23 PROCEDURE — 0651 HSPC ROUTINE HOME CARE

## 2019-03-24 PROCEDURE — 0651 HSPC ROUTINE HOME CARE

## 2019-03-25 PROCEDURE — 0651 HSPC ROUTINE HOME CARE

## 2019-03-26 PROCEDURE — 0651 HSPC ROUTINE HOME CARE

## 2019-03-27 PROCEDURE — 0651 HSPC ROUTINE HOME CARE

## 2019-03-28 ENCOUNTER — HOME CARE VISIT (OUTPATIENT)
Dept: HOSPICE | Facility: HOSPICE | Age: 79
End: 2019-03-28
Payer: MEDICARE

## 2019-03-28 PROCEDURE — 0651 HSPC ROUTINE HOME CARE

## 2019-03-29 ENCOUNTER — HOME CARE VISIT (OUTPATIENT)
Dept: SCHEDULING | Facility: HOME HEALTH | Age: 79
End: 2019-03-29
Payer: MEDICARE

## 2019-03-29 ENCOUNTER — HOME CARE VISIT (OUTPATIENT)
Dept: HOSPICE | Facility: HOSPICE | Age: 79
End: 2019-03-29
Payer: MEDICARE

## 2019-03-29 VITALS — RESPIRATION RATE: 18 BRPM | HEART RATE: 60 BPM | DIASTOLIC BLOOD PRESSURE: 70 MMHG | SYSTOLIC BLOOD PRESSURE: 130 MMHG

## 2019-03-29 PROCEDURE — 0651 HSPC ROUTINE HOME CARE

## 2019-03-29 PROCEDURE — HOSPICE MEDICATION HC HH HOSPICE MEDICATION

## 2019-03-29 PROCEDURE — G0299 HHS/HOSPICE OF RN EA 15 MIN: HCPCS

## 2019-03-29 PROCEDURE — A6250 SKIN SEAL PROTECT MOISTURIZR: HCPCS

## 2019-03-30 PROCEDURE — 0651 HSPC ROUTINE HOME CARE

## 2019-03-31 PROCEDURE — 0651 HSPC ROUTINE HOME CARE

## 2019-04-01 PROCEDURE — 0651 HSPC ROUTINE HOME CARE

## 2019-04-02 PROCEDURE — 0651 HSPC ROUTINE HOME CARE

## 2019-04-03 ENCOUNTER — HOME CARE VISIT (OUTPATIENT)
Dept: HOSPICE | Facility: HOSPICE | Age: 79
End: 2019-04-03
Payer: MEDICARE

## 2019-04-03 ENCOUNTER — HOME CARE VISIT (OUTPATIENT)
Dept: SCHEDULING | Facility: HOME HEALTH | Age: 79
End: 2019-04-03
Payer: MEDICARE

## 2019-04-03 PROCEDURE — G0300 HHS/HOSPICE OF LPN EA 15 MIN: HCPCS

## 2019-04-03 PROCEDURE — 0651 HSPC ROUTINE HOME CARE

## 2019-04-03 PROCEDURE — A4427 OST PCH DRAIN/BARR LK FLNG/F: HCPCS

## 2019-04-04 PROCEDURE — 0651 HSPC ROUTINE HOME CARE

## 2019-04-05 ENCOUNTER — HOME CARE VISIT (OUTPATIENT)
Dept: HOSPICE | Facility: HOSPICE | Age: 79
End: 2019-04-05
Payer: MEDICARE

## 2019-04-05 ENCOUNTER — HOME CARE VISIT (OUTPATIENT)
Dept: SCHEDULING | Facility: HOME HEALTH | Age: 79
End: 2019-04-05
Payer: MEDICARE

## 2019-04-05 PROCEDURE — 0651 HSPC ROUTINE HOME CARE

## 2019-04-05 PROCEDURE — G0155 HHCP-SVS OF CSW,EA 15 MIN: HCPCS

## 2019-04-06 VITALS
RESPIRATION RATE: 18 BRPM | OXYGEN SATURATION: 98 % | SYSTOLIC BLOOD PRESSURE: 120 MMHG | DIASTOLIC BLOOD PRESSURE: 80 MMHG | HEART RATE: 64 BPM

## 2019-04-06 PROCEDURE — 0651 HSPC ROUTINE HOME CARE

## 2019-04-07 PROCEDURE — 0651 HSPC ROUTINE HOME CARE

## 2019-04-08 PROCEDURE — 0651 HSPC ROUTINE HOME CARE

## 2019-04-09 PROCEDURE — 0651 HSPC ROUTINE HOME CARE

## 2019-04-10 PROCEDURE — 0651 HSPC ROUTINE HOME CARE

## 2019-04-11 PROCEDURE — 0651 HSPC ROUTINE HOME CARE

## 2019-04-12 ENCOUNTER — HOME CARE VISIT (OUTPATIENT)
Dept: SCHEDULING | Facility: HOME HEALTH | Age: 79
End: 2019-04-12
Payer: MEDICARE

## 2019-04-12 VITALS — RESPIRATION RATE: 16 BRPM | DIASTOLIC BLOOD PRESSURE: 86 MMHG | HEART RATE: 74 BPM | SYSTOLIC BLOOD PRESSURE: 148 MMHG

## 2019-04-12 PROCEDURE — 0651 HSPC ROUTINE HOME CARE

## 2019-04-12 PROCEDURE — G0299 HHS/HOSPICE OF RN EA 15 MIN: HCPCS

## 2019-04-12 PROCEDURE — HOSPICE MEDICATION HC HH HOSPICE MEDICATION

## 2019-04-13 PROCEDURE — 0651 HSPC ROUTINE HOME CARE

## 2019-04-14 PROCEDURE — 0651 HSPC ROUTINE HOME CARE

## 2019-04-15 ENCOUNTER — HOME CARE VISIT (OUTPATIENT)
Dept: HOSPICE | Facility: HOSPICE | Age: 79
End: 2019-04-15
Payer: MEDICARE

## 2019-04-15 PROCEDURE — 0651 HSPC ROUTINE HOME CARE

## 2019-04-16 PROCEDURE — 0651 HSPC ROUTINE HOME CARE

## 2019-04-17 ENCOUNTER — HOME CARE VISIT (OUTPATIENT)
Dept: SCHEDULING | Facility: HOME HEALTH | Age: 79
End: 2019-04-17
Payer: MEDICARE

## 2019-04-17 VITALS
DIASTOLIC BLOOD PRESSURE: 78 MMHG | HEART RATE: 73 BPM | TEMPERATURE: 98.3 F | SYSTOLIC BLOOD PRESSURE: 138 MMHG | RESPIRATION RATE: 18 BRPM

## 2019-04-17 PROCEDURE — G0299 HHS/HOSPICE OF RN EA 15 MIN: HCPCS

## 2019-04-17 PROCEDURE — 0651 HSPC ROUTINE HOME CARE

## 2019-04-17 PROCEDURE — A4371 SKIN BARRIER POWDER PER OZ: HCPCS

## 2019-04-17 PROCEDURE — HOSPICE MEDICATION HC HH HOSPICE MEDICATION

## 2019-04-18 PROCEDURE — 0651 HSPC ROUTINE HOME CARE

## 2019-04-19 ENCOUNTER — HOME CARE VISIT (OUTPATIENT)
Dept: HOSPICE | Facility: HOSPICE | Age: 79
End: 2019-04-19
Payer: MEDICARE

## 2019-04-19 PROCEDURE — 0651 HSPC ROUTINE HOME CARE

## 2019-04-19 PROCEDURE — S9470 NUTRITIONAL COUNSELING, DIET: HCPCS

## 2019-04-20 PROCEDURE — 0651 HSPC ROUTINE HOME CARE

## 2019-04-21 PROCEDURE — 0651 HSPC ROUTINE HOME CARE

## 2019-04-22 ENCOUNTER — HOME CARE VISIT (OUTPATIENT)
Dept: SCHEDULING | Facility: HOME HEALTH | Age: 79
End: 2019-04-22
Payer: MEDICARE

## 2019-04-22 PROCEDURE — A6250 SKIN SEAL PROTECT MOISTURIZR: HCPCS

## 2019-04-22 PROCEDURE — A4927 NON-STERILE GLOVES: HCPCS

## 2019-04-22 PROCEDURE — G0300 HHS/HOSPICE OF LPN EA 15 MIN: HCPCS

## 2019-04-22 PROCEDURE — 0651 HSPC ROUTINE HOME CARE

## 2019-04-22 PROCEDURE — A4334 URINARY CATH LEG STRAP: HCPCS

## 2019-04-22 PROCEDURE — A6402 STERILE GAUZE <= 16 SQ IN: HCPCS

## 2019-04-23 PROCEDURE — 0651 HSPC ROUTINE HOME CARE

## 2019-04-24 VITALS
DIASTOLIC BLOOD PRESSURE: 70 MMHG | RESPIRATION RATE: 18 BRPM | SYSTOLIC BLOOD PRESSURE: 128 MMHG | HEART RATE: 60 BPM | OXYGEN SATURATION: 99 %

## 2019-04-24 PROCEDURE — 0651 HSPC ROUTINE HOME CARE

## 2019-04-25 ENCOUNTER — HOME CARE VISIT (OUTPATIENT)
Dept: HOSPICE | Facility: HOSPICE | Age: 79
End: 2019-04-25
Payer: MEDICARE

## 2019-04-25 PROCEDURE — 0651 HSPC ROUTINE HOME CARE

## 2019-04-26 ENCOUNTER — HOME CARE VISIT (OUTPATIENT)
Dept: SCHEDULING | Facility: HOME HEALTH | Age: 79
End: 2019-04-26
Payer: MEDICARE

## 2019-04-26 PROCEDURE — 0651 HSPC ROUTINE HOME CARE

## 2019-04-26 PROCEDURE — G0155 HHCP-SVS OF CSW,EA 15 MIN: HCPCS

## 2019-04-27 PROCEDURE — 0651 HSPC ROUTINE HOME CARE

## 2019-04-28 PROCEDURE — 0651 HSPC ROUTINE HOME CARE

## 2019-04-29 ENCOUNTER — HOME CARE VISIT (OUTPATIENT)
Dept: HOSPICE | Facility: HOSPICE | Age: 79
End: 2019-04-29
Payer: MEDICARE

## 2019-04-29 ENCOUNTER — HOME CARE VISIT (OUTPATIENT)
Dept: SCHEDULING | Facility: HOME HEALTH | Age: 79
End: 2019-04-29
Payer: MEDICARE

## 2019-04-29 VITALS
DIASTOLIC BLOOD PRESSURE: 78 MMHG | RESPIRATION RATE: 18 BRPM | HEART RATE: 69 BPM | TEMPERATURE: 98.6 F | SYSTOLIC BLOOD PRESSURE: 124 MMHG

## 2019-04-29 PROCEDURE — G0299 HHS/HOSPICE OF RN EA 15 MIN: HCPCS

## 2019-04-29 PROCEDURE — HOSPICE MEDICATION HC HH HOSPICE MEDICATION

## 2019-04-29 PROCEDURE — A4452 WATERPROOF TAPE: HCPCS

## 2019-04-29 PROCEDURE — A4427 OST PCH DRAIN/BARR LK FLNG/F: HCPCS

## 2019-04-29 PROCEDURE — A4927 NON-STERILE GLOVES: HCPCS

## 2019-04-29 PROCEDURE — 0651 HSPC ROUTINE HOME CARE

## 2019-04-30 PROCEDURE — 0651 HSPC ROUTINE HOME CARE

## 2019-05-01 PROCEDURE — 0651 HSPC ROUTINE HOME CARE

## 2019-05-02 PROCEDURE — 0651 HSPC ROUTINE HOME CARE

## 2019-05-03 PROCEDURE — 0651 HSPC ROUTINE HOME CARE

## 2019-05-04 PROCEDURE — 0651 HSPC ROUTINE HOME CARE

## 2019-05-05 PROCEDURE — 0651 HSPC ROUTINE HOME CARE

## 2019-05-06 PROCEDURE — 0651 HSPC ROUTINE HOME CARE

## 2019-05-07 ENCOUNTER — HOME CARE VISIT (OUTPATIENT)
Dept: SCHEDULING | Facility: HOME HEALTH | Age: 79
End: 2019-05-07
Payer: MEDICARE

## 2019-05-07 VITALS — HEART RATE: 58 BPM | RESPIRATION RATE: 18 BRPM | SYSTOLIC BLOOD PRESSURE: 122 MMHG | DIASTOLIC BLOOD PRESSURE: 72 MMHG

## 2019-05-07 PROCEDURE — G0299 HHS/HOSPICE OF RN EA 15 MIN: HCPCS

## 2019-05-07 PROCEDURE — 0651 HSPC ROUTINE HOME CARE

## 2019-05-07 PROCEDURE — HOSPICE MEDICATION HC HH HOSPICE MEDICATION

## 2019-05-08 PROCEDURE — 0651 HSPC ROUTINE HOME CARE

## 2019-05-09 PROCEDURE — 0651 HSPC ROUTINE HOME CARE

## 2019-05-10 PROCEDURE — 0651 HSPC ROUTINE HOME CARE

## 2019-05-11 PROCEDURE — 0651 HSPC ROUTINE HOME CARE

## 2019-05-12 PROCEDURE — 0651 HSPC ROUTINE HOME CARE

## 2019-05-13 PROCEDURE — 0651 HSPC ROUTINE HOME CARE

## 2019-05-14 PROCEDURE — 0651 HSPC ROUTINE HOME CARE

## 2019-05-15 ENCOUNTER — HOME CARE VISIT (OUTPATIENT)
Dept: SCHEDULING | Facility: HOME HEALTH | Age: 79
End: 2019-05-15
Payer: MEDICARE

## 2019-05-15 PROCEDURE — HOSPICE MEDICATION HC HH HOSPICE MEDICATION

## 2019-05-15 PROCEDURE — 0651 HSPC ROUTINE HOME CARE

## 2019-05-15 PROCEDURE — G0300 HHS/HOSPICE OF LPN EA 15 MIN: HCPCS

## 2019-05-16 PROCEDURE — 0651 HSPC ROUTINE HOME CARE

## 2019-05-17 VITALS
OXYGEN SATURATION: 99 % | DIASTOLIC BLOOD PRESSURE: 70 MMHG | HEART RATE: 83 BPM | RESPIRATION RATE: 18 BRPM | SYSTOLIC BLOOD PRESSURE: 110 MMHG

## 2019-05-17 PROCEDURE — 0651 HSPC ROUTINE HOME CARE

## 2019-05-17 PROCEDURE — HOSPICE MEDICATION HC HH HOSPICE MEDICATION

## 2019-05-18 PROCEDURE — 0651 HSPC ROUTINE HOME CARE

## 2019-05-19 PROCEDURE — 0651 HSPC ROUTINE HOME CARE

## 2019-05-20 PROCEDURE — 0651 HSPC ROUTINE HOME CARE

## 2019-05-21 PROCEDURE — 0651 HSPC ROUTINE HOME CARE

## 2019-05-21 NOTE — PROGRESS NOTES
ADULT PROTOCOL: JET AEROSOL  REASSESSMENT Patient  Cassius Trevizo     69 y.o.   male     9/8/2018  12:46 AM 
 
Breath Sounds Pre Procedure: Right Breath Sounds: Coarse Left Breath Sounds: Coarse Breath Sounds Post Procedure: Right Breath Sounds: Coarse Left Breath Sounds: Coarse Breathing pattern: Pre procedure Breathing Pattern: Regular Post procedure Breathing Pattern: Regular Heart Rate: Pre procedure [id number] Post procedure Pulse 81 Resp Rate: Pre procedure Respirations: 21 Post procedure Respirations: 20 Peak Flow: Pre bronchodilator   N/A Post bronchodilator   N/A 
 
FVC/FEV1:  N/A Incentive Spirometry:  Actual Volume (ml): 750 ml 
     
 
Cough: Pre procedure Cough: Non-productive Post procedure Congested Suctioned: NO Sputum: Pre procedure None Post procedure  Small, tan and thin Oxygen: O2 Device: Room air Changed: NO SpO2: Pre procedure SpO2: 100 % Post procedure SpO2: 100 % Nebulizer Therapy: Current medications Aerosolized Medications: Albuterol Changed: NO Smoking History:  
 
Problem List:  
Patient Active Problem List  
Diagnosis Code  
 HTN (hypertension) I10  
 Depression F32.9  Hypercholesterolemia E78.00  Acid reflux K21.9  Seizure ([facility] [address] R56.9  Hypothyroidism E03.9  Hyponatremia E87.1  BPH (benign prostatic hyperplasia) N40.0  Rash R21  
 Cellulitis L03.90  Wax in ear H61.20  Ulcer [id number]  Small bowel obstruction ([facility] [address] M99.044  Atrial flutter (HCC) I48.92  
 Atrial fibrillation or flutter  CHI (closed head injury) S09. 90XA  Basal cell cancer C44.91  
 TBI (traumatic brain injury) ([facility] [address] S06. 9X9A  Atrial fibrillation (HCC) I48.91  
 Cataract H26.9  Constipation K59.00  Ankle fracture, left R59.813K  Insomnia G47.00  Tinea corporis B35.4  SSS (sick sinus syndrome) (formerly [facility]) I49.5  Syncope R55  Seizure disorder ([facility] [address] G40.909  RONAL on CPAP G47.33, Z99.89  
 Pacemaker Z95.0  Pre-op evaluation Z01.818  Chronic back pain greater than 3 months duration M54.9, G89.29  
 Cerebral infarction (formerly [facility]) I63.9  Acute bronchitis J20.9  Screening for colon cancer Z12.11  
 Chronic right shoulder pain M25.511, G89.29  
 Common wart B07.8  Localized epilepsy with impairment of consciousness ([facility] [address] G40.209  Severe obesity (BMI 35.0-39.9) (formerly [facility]) E66.01  
 Acute blood loss anemia D62  Anasarca R60.1  GI bleed K92.2  Acute encephalopathy G93.40  Idiopathic hypotension I95.0  Counseling regarding goals of care Z71.89 Respiratory Therapist: Felipe Cervantes, RT 
 2.07

## 2019-05-22 PROCEDURE — 0651 HSPC ROUTINE HOME CARE

## 2019-05-23 PROCEDURE — 0651 HSPC ROUTINE HOME CARE

## 2019-05-24 ENCOUNTER — HOME CARE VISIT (OUTPATIENT)
Dept: SCHEDULING | Facility: HOME HEALTH | Age: 79
End: 2019-05-24
Payer: MEDICARE

## 2019-05-24 VITALS
HEART RATE: 78 BPM | DIASTOLIC BLOOD PRESSURE: 70 MMHG | TEMPERATURE: 97.8 F | SYSTOLIC BLOOD PRESSURE: 127 MMHG | RESPIRATION RATE: 20 BRPM

## 2019-05-24 PROCEDURE — HOSPICE MEDICATION HC HH HOSPICE MEDICATION

## 2019-05-24 PROCEDURE — A6402 STERILE GAUZE <= 16 SQ IN: HCPCS

## 2019-05-24 PROCEDURE — A4452 WATERPROOF TAPE: HCPCS

## 2019-05-24 PROCEDURE — G0299 HHS/HOSPICE OF RN EA 15 MIN: HCPCS

## 2019-05-24 PROCEDURE — 0651 HSPC ROUTINE HOME CARE

## 2019-05-24 PROCEDURE — A6216 NON-STERILE GAUZE<=16 SQ IN: HCPCS

## 2019-05-24 PROCEDURE — A4427 OST PCH DRAIN/BARR LK FLNG/F: HCPCS

## 2019-05-25 PROCEDURE — 0651 HSPC ROUTINE HOME CARE

## 2019-05-26 PROCEDURE — 0651 HSPC ROUTINE HOME CARE

## 2019-05-27 PROCEDURE — 0651 HSPC ROUTINE HOME CARE

## 2019-05-28 PROCEDURE — 0651 HSPC ROUTINE HOME CARE

## 2019-05-29 ENCOUNTER — HOME CARE VISIT (OUTPATIENT)
Dept: SCHEDULING | Facility: HOME HEALTH | Age: 79
End: 2019-05-29
Payer: MEDICARE

## 2019-05-29 ENCOUNTER — HOME CARE VISIT (OUTPATIENT)
Dept: HOSPICE | Facility: HOSPICE | Age: 79
End: 2019-05-29
Payer: MEDICARE

## 2019-05-29 PROCEDURE — 0651 HSPC ROUTINE HOME CARE

## 2019-05-29 PROCEDURE — G0155 HHCP-SVS OF CSW,EA 15 MIN: HCPCS

## 2019-05-30 ENCOUNTER — HOME CARE VISIT (OUTPATIENT)
Dept: HOSPICE | Facility: HOSPICE | Age: 79
End: 2019-05-30
Payer: MEDICARE

## 2019-05-30 PROCEDURE — 0651 HSPC ROUTINE HOME CARE

## 2019-05-31 ENCOUNTER — HOME CARE VISIT (OUTPATIENT)
Dept: SCHEDULING | Facility: HOME HEALTH | Age: 79
End: 2019-05-31
Payer: MEDICARE

## 2019-05-31 PROCEDURE — A4216 STERILE WATER/SALINE, 10 ML: HCPCS

## 2019-05-31 PROCEDURE — 0651 HSPC ROUTINE HOME CARE

## 2019-05-31 PROCEDURE — G0299 HHS/HOSPICE OF RN EA 15 MIN: HCPCS

## 2019-05-31 PROCEDURE — A4320 IRRIGATION TRAY: HCPCS

## 2019-06-01 VITALS — RESPIRATION RATE: 20 BRPM | DIASTOLIC BLOOD PRESSURE: 69 MMHG | HEART RATE: 77 BPM | SYSTOLIC BLOOD PRESSURE: 125 MMHG

## 2019-06-01 PROCEDURE — 0651 HSPC ROUTINE HOME CARE

## 2019-06-02 PROCEDURE — 0651 HSPC ROUTINE HOME CARE

## 2019-06-03 PROCEDURE — 0651 HSPC ROUTINE HOME CARE

## 2019-06-04 PROCEDURE — 0651 HSPC ROUTINE HOME CARE

## 2019-06-05 PROCEDURE — 0651 HSPC ROUTINE HOME CARE

## 2019-06-06 PROCEDURE — 0651 HSPC ROUTINE HOME CARE

## 2019-06-07 ENCOUNTER — HOME CARE VISIT (OUTPATIENT)
Dept: SCHEDULING | Facility: HOME HEALTH | Age: 79
End: 2019-06-07
Payer: MEDICARE

## 2019-06-07 PROCEDURE — 0651 HSPC ROUTINE HOME CARE

## 2019-06-07 PROCEDURE — A4427 OST PCH DRAIN/BARR LK FLNG/F: HCPCS

## 2019-06-07 PROCEDURE — G0300 HHS/HOSPICE OF LPN EA 15 MIN: HCPCS

## 2019-06-07 PROCEDURE — HOSPICE MEDICATION HC HH HOSPICE MEDICATION

## 2019-06-08 PROCEDURE — 0651 HSPC ROUTINE HOME CARE

## 2019-06-09 PROCEDURE — 0651 HSPC ROUTINE HOME CARE

## 2019-06-10 ENCOUNTER — HOME CARE VISIT (OUTPATIENT)
Dept: SCHEDULING | Facility: HOME HEALTH | Age: 79
End: 2019-06-10
Payer: MEDICARE

## 2019-06-10 VITALS
SYSTOLIC BLOOD PRESSURE: 108 MMHG | HEART RATE: 71 BPM | RESPIRATION RATE: 16 BRPM | OXYGEN SATURATION: 98 % | DIASTOLIC BLOOD PRESSURE: 72 MMHG

## 2019-06-10 PROCEDURE — G0299 HHS/HOSPICE OF RN EA 15 MIN: HCPCS

## 2019-06-10 PROCEDURE — HOSPICE MEDICATION HC HH HOSPICE MEDICATION

## 2019-06-10 PROCEDURE — 0651 HSPC ROUTINE HOME CARE

## 2019-06-11 PROCEDURE — 0651 HSPC ROUTINE HOME CARE

## 2019-06-12 PROCEDURE — 0651 HSPC ROUTINE HOME CARE

## 2019-06-13 PROCEDURE — 0651 HSPC ROUTINE HOME CARE

## 2019-06-14 PROCEDURE — 0651 HSPC ROUTINE HOME CARE

## 2019-06-15 PROCEDURE — 0651 HSPC ROUTINE HOME CARE

## 2019-06-16 PROCEDURE — 0651 HSPC ROUTINE HOME CARE

## 2019-06-17 PROCEDURE — 0651 HSPC ROUTINE HOME CARE

## 2019-06-18 PROCEDURE — 0651 HSPC ROUTINE HOME CARE

## 2019-06-19 PROCEDURE — 0651 HSPC ROUTINE HOME CARE

## 2019-06-20 PROCEDURE — 0651 HSPC ROUTINE HOME CARE

## 2019-06-21 ENCOUNTER — HOME CARE VISIT (OUTPATIENT)
Dept: SCHEDULING | Facility: HOME HEALTH | Age: 79
End: 2019-06-21
Payer: MEDICARE

## 2019-06-21 ENCOUNTER — HOME CARE VISIT (OUTPATIENT)
Dept: HOSPICE | Facility: HOSPICE | Age: 79
End: 2019-06-21
Payer: MEDICARE

## 2019-06-21 VITALS
RESPIRATION RATE: 18 BRPM | TEMPERATURE: 97.9 F | HEART RATE: 77 BPM | SYSTOLIC BLOOD PRESSURE: 127 MMHG | DIASTOLIC BLOOD PRESSURE: 77 MMHG

## 2019-06-21 PROCEDURE — G0299 HHS/HOSPICE OF RN EA 15 MIN: HCPCS

## 2019-06-21 PROCEDURE — A6402 STERILE GAUZE <= 16 SQ IN: HCPCS

## 2019-06-21 PROCEDURE — A4427 OST PCH DRAIN/BARR LK FLNG/F: HCPCS

## 2019-06-21 PROCEDURE — HOSPICE MEDICATION HC HH HOSPICE MEDICATION

## 2019-06-21 PROCEDURE — 0651 HSPC ROUTINE HOME CARE

## 2019-06-22 PROCEDURE — 0651 HSPC ROUTINE HOME CARE

## 2019-06-23 ENCOUNTER — HOME CARE VISIT (OUTPATIENT)
Dept: HOSPICE | Facility: HOSPICE | Age: 79
End: 2019-06-23
Payer: MEDICARE

## 2019-06-23 PROCEDURE — 0651 HSPC ROUTINE HOME CARE

## 2019-06-24 ENCOUNTER — HOME CARE VISIT (OUTPATIENT)
Dept: HOSPICE | Facility: HOSPICE | Age: 79
End: 2019-06-24
Payer: MEDICARE

## 2019-06-24 PROCEDURE — 0651 HSPC ROUTINE HOME CARE

## 2019-06-25 ENCOUNTER — HOME CARE VISIT (OUTPATIENT)
Dept: SCHEDULING | Facility: HOME HEALTH | Age: 79
End: 2019-06-25
Payer: MEDICARE

## 2019-06-25 PROCEDURE — G0155 HHCP-SVS OF CSW,EA 15 MIN: HCPCS

## 2019-06-25 PROCEDURE — 0651 HSPC ROUTINE HOME CARE

## 2019-06-26 ENCOUNTER — HOME CARE VISIT (OUTPATIENT)
Dept: SCHEDULING | Facility: HOME HEALTH | Age: 79
End: 2019-06-26
Payer: MEDICARE

## 2019-06-26 VITALS
DIASTOLIC BLOOD PRESSURE: 60 MMHG | SYSTOLIC BLOOD PRESSURE: 110 MMHG | HEART RATE: 78 BPM | OXYGEN SATURATION: 97 % | RESPIRATION RATE: 18 BRPM

## 2019-06-26 PROCEDURE — HOSPICE MEDICATION HC HH HOSPICE MEDICATION

## 2019-06-26 PROCEDURE — 0651 HSPC ROUTINE HOME CARE

## 2019-06-26 PROCEDURE — G0300 HHS/HOSPICE OF LPN EA 15 MIN: HCPCS

## 2019-06-27 PROCEDURE — A4314 CATH W/DRAINAGE 2-WAY LATEX: HCPCS

## 2019-06-27 PROCEDURE — 0651 HSPC ROUTINE HOME CARE

## 2019-06-28 ENCOUNTER — HOME CARE VISIT (OUTPATIENT)
Dept: HOSPICE | Facility: HOSPICE | Age: 79
End: 2019-06-28
Payer: MEDICARE

## 2019-06-28 PROCEDURE — 0651 HSPC ROUTINE HOME CARE

## 2019-06-29 PROCEDURE — 0651 HSPC ROUTINE HOME CARE

## 2019-06-30 PROCEDURE — 0651 HSPC ROUTINE HOME CARE

## 2019-07-01 PROCEDURE — 0651 HSPC ROUTINE HOME CARE

## 2019-07-02 ENCOUNTER — HOME CARE VISIT (OUTPATIENT)
Dept: HOSPICE | Facility: HOSPICE | Age: 79
End: 2019-07-02
Payer: MEDICARE

## 2019-07-02 PROCEDURE — 0651 HSPC ROUTINE HOME CARE

## 2019-07-03 PROCEDURE — 0651 HSPC ROUTINE HOME CARE

## 2019-07-04 PROCEDURE — 0651 HSPC ROUTINE HOME CARE

## 2019-07-05 ENCOUNTER — HOME CARE VISIT (OUTPATIENT)
Dept: SCHEDULING | Facility: HOME HEALTH | Age: 79
End: 2019-07-05
Payer: MEDICARE

## 2019-07-05 VITALS
SYSTOLIC BLOOD PRESSURE: 122 MMHG | DIASTOLIC BLOOD PRESSURE: 62 MMHG | TEMPERATURE: 97.9 F | HEART RATE: 74 BPM | RESPIRATION RATE: 18 BRPM

## 2019-07-05 PROCEDURE — A4334 URINARY CATH LEG STRAP: HCPCS

## 2019-07-05 PROCEDURE — 0651 HSPC ROUTINE HOME CARE

## 2019-07-05 PROCEDURE — A4427 OST PCH DRAIN/BARR LK FLNG/F: HCPCS

## 2019-07-05 PROCEDURE — T4541 LARGE DISPOSABLE UNDERPAD: HCPCS

## 2019-07-05 PROCEDURE — HOSPICE MEDICATION HC HH HOSPICE MEDICATION

## 2019-07-05 PROCEDURE — G0299 HHS/HOSPICE OF RN EA 15 MIN: HCPCS

## 2019-07-05 PROCEDURE — A4452 WATERPROOF TAPE: HCPCS

## 2019-07-05 PROCEDURE — A4414 OST SKNBAR W/O CONV<=4 SQ IN: HCPCS

## 2019-07-06 PROCEDURE — 0651 HSPC ROUTINE HOME CARE

## 2019-07-07 PROCEDURE — 0651 HSPC ROUTINE HOME CARE

## 2019-07-08 PROCEDURE — 0651 HSPC ROUTINE HOME CARE

## 2019-07-09 PROCEDURE — 0651 HSPC ROUTINE HOME CARE

## 2019-07-10 ENCOUNTER — HOME CARE VISIT (OUTPATIENT)
Dept: SCHEDULING | Facility: HOME HEALTH | Age: 79
End: 2019-07-10
Payer: MEDICARE

## 2019-07-10 VITALS
OXYGEN SATURATION: 97 % | RESPIRATION RATE: 18 BRPM | SYSTOLIC BLOOD PRESSURE: 130 MMHG | HEART RATE: 74 BPM | DIASTOLIC BLOOD PRESSURE: 62 MMHG

## 2019-07-10 PROCEDURE — A9150 MISC/EXPER NON-PRESCRIPT DRU: HCPCS

## 2019-07-10 PROCEDURE — 0651 HSPC ROUTINE HOME CARE

## 2019-07-10 PROCEDURE — G0299 HHS/HOSPICE OF RN EA 15 MIN: HCPCS

## 2019-07-11 PROCEDURE — 0651 HSPC ROUTINE HOME CARE

## 2019-07-12 PROCEDURE — 0651 HSPC ROUTINE HOME CARE

## 2019-07-13 PROCEDURE — 0651 HSPC ROUTINE HOME CARE

## 2019-07-14 PROCEDURE — 0651 HSPC ROUTINE HOME CARE

## 2019-07-15 PROCEDURE — 0651 HSPC ROUTINE HOME CARE

## 2019-07-16 PROCEDURE — 0651 HSPC ROUTINE HOME CARE

## 2019-07-17 PROCEDURE — 0651 HSPC ROUTINE HOME CARE

## 2019-07-17 PROCEDURE — A6402 STERILE GAUZE <= 16 SQ IN: HCPCS

## 2019-07-17 PROCEDURE — A4314 CATH W/DRAINAGE 2-WAY LATEX: HCPCS

## 2019-07-17 PROCEDURE — A6216 NON-STERILE GAUZE<=16 SQ IN: HCPCS

## 2019-07-17 PROCEDURE — HOSPICE MEDICATION HC HH HOSPICE MEDICATION

## 2019-07-18 PROCEDURE — 0651 HSPC ROUTINE HOME CARE

## 2019-07-19 ENCOUNTER — HOME CARE VISIT (OUTPATIENT)
Dept: SCHEDULING | Facility: HOME HEALTH | Age: 79
End: 2019-07-19
Payer: MEDICARE

## 2019-07-19 VITALS — HEART RATE: 76 BPM | SYSTOLIC BLOOD PRESSURE: 135 MMHG | RESPIRATION RATE: 18 BRPM | DIASTOLIC BLOOD PRESSURE: 77 MMHG

## 2019-07-19 PROCEDURE — G0299 HHS/HOSPICE OF RN EA 15 MIN: HCPCS

## 2019-07-19 PROCEDURE — 0651 HSPC ROUTINE HOME CARE

## 2019-07-20 PROCEDURE — 0651 HSPC ROUTINE HOME CARE

## 2019-07-21 PROCEDURE — 0651 HSPC ROUTINE HOME CARE

## 2019-07-22 ENCOUNTER — HOME CARE VISIT (OUTPATIENT)
Dept: HOSPICE | Facility: HOSPICE | Age: 79
End: 2019-07-22
Payer: MEDICARE

## 2019-07-22 PROCEDURE — HOSPICE MEDICATION HC HH HOSPICE MEDICATION

## 2019-07-22 PROCEDURE — 0651 HSPC ROUTINE HOME CARE

## 2019-07-23 PROCEDURE — 0651 HSPC ROUTINE HOME CARE

## 2019-07-24 PROCEDURE — 0651 HSPC ROUTINE HOME CARE

## 2019-07-25 PROCEDURE — 0651 HSPC ROUTINE HOME CARE

## 2019-07-26 ENCOUNTER — HOME CARE VISIT (OUTPATIENT)
Dept: SCHEDULING | Facility: HOME HEALTH | Age: 79
End: 2019-07-26
Payer: MEDICARE

## 2019-07-26 VITALS
RESPIRATION RATE: 18 BRPM | HEART RATE: 63 BPM | SYSTOLIC BLOOD PRESSURE: 130 MMHG | DIASTOLIC BLOOD PRESSURE: 56 MMHG | TEMPERATURE: 98.6 F

## 2019-07-26 PROCEDURE — HOSPICE MEDICATION HC HH HOSPICE MEDICATION

## 2019-07-26 PROCEDURE — 0651 HSPC ROUTINE HOME CARE

## 2019-07-26 PROCEDURE — G0299 HHS/HOSPICE OF RN EA 15 MIN: HCPCS

## 2019-07-26 PROCEDURE — A4406 PECTIN BASED OSTOMY PASTE: HCPCS

## 2019-07-26 PROCEDURE — G0155 HHCP-SVS OF CSW,EA 15 MIN: HCPCS

## 2019-07-27 PROCEDURE — 0651 HSPC ROUTINE HOME CARE

## 2019-07-28 PROCEDURE — 0651 HSPC ROUTINE HOME CARE

## 2019-07-29 PROCEDURE — 0651 HSPC ROUTINE HOME CARE

## 2019-07-30 PROCEDURE — 0651 HSPC ROUTINE HOME CARE

## 2019-07-31 PROCEDURE — 0651 HSPC ROUTINE HOME CARE

## 2019-08-01 PROCEDURE — 0651 HSPC ROUTINE HOME CARE

## 2019-08-02 ENCOUNTER — HOME CARE VISIT (OUTPATIENT)
Dept: SCHEDULING | Facility: HOME HEALTH | Age: 79
End: 2019-08-02
Payer: MEDICARE

## 2019-08-02 PROCEDURE — G0299 HHS/HOSPICE OF RN EA 15 MIN: HCPCS

## 2019-08-02 PROCEDURE — A6250 SKIN SEAL PROTECT MOISTURIZR: HCPCS

## 2019-08-02 PROCEDURE — 0651 HSPC ROUTINE HOME CARE

## 2019-08-02 PROCEDURE — HOSPICE MEDICATION HC HH HOSPICE MEDICATION

## 2019-08-03 PROCEDURE — 0651 HSPC ROUTINE HOME CARE

## 2019-08-04 VITALS
TEMPERATURE: 98.5 F | SYSTOLIC BLOOD PRESSURE: 153 MMHG | RESPIRATION RATE: 20 BRPM | OXYGEN SATURATION: 98 % | DIASTOLIC BLOOD PRESSURE: 83 MMHG | HEART RATE: 75 BPM

## 2019-08-04 PROCEDURE — 0651 HSPC ROUTINE HOME CARE

## 2019-08-05 PROCEDURE — 0651 HSPC ROUTINE HOME CARE

## 2019-08-06 PROCEDURE — 0651 HSPC ROUTINE HOME CARE

## 2019-08-07 PROCEDURE — 0651 HSPC ROUTINE HOME CARE

## 2019-08-08 PROCEDURE — 0651 HSPC ROUTINE HOME CARE

## 2019-08-09 ENCOUNTER — HOME CARE VISIT (OUTPATIENT)
Dept: SCHEDULING | Facility: HOME HEALTH | Age: 79
End: 2019-08-09
Payer: MEDICARE

## 2019-08-09 VITALS — HEART RATE: 75 BPM | SYSTOLIC BLOOD PRESSURE: 136 MMHG | RESPIRATION RATE: 20 BRPM | DIASTOLIC BLOOD PRESSURE: 73 MMHG

## 2019-08-09 PROCEDURE — T4541 LARGE DISPOSABLE UNDERPAD: HCPCS

## 2019-08-09 PROCEDURE — HOSPICE MEDICATION HC HH HOSPICE MEDICATION

## 2019-08-09 PROCEDURE — G0299 HHS/HOSPICE OF RN EA 15 MIN: HCPCS

## 2019-08-09 PROCEDURE — A4452 WATERPROOF TAPE: HCPCS

## 2019-08-09 PROCEDURE — 0651 HSPC ROUTINE HOME CARE

## 2019-08-10 PROCEDURE — 0651 HSPC ROUTINE HOME CARE

## 2019-08-11 PROCEDURE — 0651 HSPC ROUTINE HOME CARE

## 2020-01-01 ENCOUNTER — HOME CARE VISIT (OUTPATIENT)
Dept: HOSPICE | Facility: HOSPICE | Age: 80
End: 2020-01-01
Payer: MEDICARE

## 2020-01-01 ENCOUNTER — HOME CARE VISIT (OUTPATIENT)
Dept: SCHEDULING | Facility: HOME HEALTH | Age: 80
End: 2020-01-01
Payer: MEDICARE

## 2020-01-01 VITALS
SYSTOLIC BLOOD PRESSURE: 160 MMHG | DIASTOLIC BLOOD PRESSURE: 90 MMHG | HEART RATE: 74 BPM | TEMPERATURE: 97.9 F | RESPIRATION RATE: 22 BRPM

## 2020-01-01 VITALS
TEMPERATURE: 96.9 F | DIASTOLIC BLOOD PRESSURE: 72 MMHG | SYSTOLIC BLOOD PRESSURE: 141 MMHG | HEART RATE: 74 BPM | RESPIRATION RATE: 16 BRPM

## 2020-01-01 VITALS
RESPIRATION RATE: 16 BRPM | SYSTOLIC BLOOD PRESSURE: 111 MMHG | HEART RATE: 70 BPM | TEMPERATURE: 98.3 F | DIASTOLIC BLOOD PRESSURE: 72 MMHG

## 2020-01-01 VITALS
TEMPERATURE: 97.2 F | DIASTOLIC BLOOD PRESSURE: 69 MMHG | RESPIRATION RATE: 18 BRPM | HEART RATE: 75 BPM | SYSTOLIC BLOOD PRESSURE: 130 MMHG

## 2020-01-01 VITALS
DIASTOLIC BLOOD PRESSURE: 52 MMHG | HEART RATE: 58 BPM | TEMPERATURE: 98.4 F | SYSTOLIC BLOOD PRESSURE: 110 MMHG | OXYGEN SATURATION: 98 % | RESPIRATION RATE: 14 BRPM

## 2020-01-01 VITALS
TEMPERATURE: 98.1 F | OXYGEN SATURATION: 98 % | RESPIRATION RATE: 18 BRPM | SYSTOLIC BLOOD PRESSURE: 137 MMHG | DIASTOLIC BLOOD PRESSURE: 86 MMHG | HEART RATE: 74 BPM

## 2020-01-01 VITALS — RESPIRATION RATE: 20 BRPM | DIASTOLIC BLOOD PRESSURE: 70 MMHG | HEART RATE: 60 BPM | SYSTOLIC BLOOD PRESSURE: 122 MMHG

## 2020-01-01 VITALS
TEMPERATURE: 96.6 F | SYSTOLIC BLOOD PRESSURE: 139 MMHG | HEART RATE: 75 BPM | RESPIRATION RATE: 20 BRPM | DIASTOLIC BLOOD PRESSURE: 68 MMHG

## 2020-01-01 VITALS
RESPIRATION RATE: 16 BRPM | SYSTOLIC BLOOD PRESSURE: 140 MMHG | OXYGEN SATURATION: 96 % | DIASTOLIC BLOOD PRESSURE: 84 MMHG | HEART RATE: 77 BPM

## 2020-01-01 VITALS
OXYGEN SATURATION: 98 % | DIASTOLIC BLOOD PRESSURE: 79 MMHG | SYSTOLIC BLOOD PRESSURE: 125 MMHG | HEART RATE: 73 BPM | RESPIRATION RATE: 18 BRPM

## 2020-01-01 VITALS
OXYGEN SATURATION: 95 % | RESPIRATION RATE: 18 BRPM | DIASTOLIC BLOOD PRESSURE: 58 MMHG | SYSTOLIC BLOOD PRESSURE: 120 MMHG | HEART RATE: 75 BPM

## 2020-01-01 VITALS
OXYGEN SATURATION: 93 % | RESPIRATION RATE: 20 BRPM | SYSTOLIC BLOOD PRESSURE: 150 MMHG | DIASTOLIC BLOOD PRESSURE: 80 MMHG | HEART RATE: 75 BPM

## 2020-01-01 VITALS
HEART RATE: 77 BPM | OXYGEN SATURATION: 95 % | DIASTOLIC BLOOD PRESSURE: 80 MMHG | RESPIRATION RATE: 18 BRPM | SYSTOLIC BLOOD PRESSURE: 138 MMHG

## 2020-01-01 VITALS
RESPIRATION RATE: 18 BRPM | DIASTOLIC BLOOD PRESSURE: 80 MMHG | HEART RATE: 75 BPM | OXYGEN SATURATION: 98 % | SYSTOLIC BLOOD PRESSURE: 130 MMHG

## 2020-01-01 VITALS
SYSTOLIC BLOOD PRESSURE: 121 MMHG | DIASTOLIC BLOOD PRESSURE: 58 MMHG | RESPIRATION RATE: 20 BRPM | TEMPERATURE: 97.3 F | HEART RATE: 75 BPM | OXYGEN SATURATION: 96 %

## 2020-01-01 VITALS
OXYGEN SATURATION: 98 % | DIASTOLIC BLOOD PRESSURE: 74 MMHG | RESPIRATION RATE: 17 BRPM | SYSTOLIC BLOOD PRESSURE: 140 MMHG | HEART RATE: 72 BPM

## 2020-01-01 VITALS
DIASTOLIC BLOOD PRESSURE: 77 MMHG | SYSTOLIC BLOOD PRESSURE: 138 MMHG | TEMPERATURE: 97.3 F | HEART RATE: 74 BPM | RESPIRATION RATE: 20 BRPM

## 2020-01-01 VITALS — HEART RATE: 78 BPM | OXYGEN SATURATION: 97 % | DIASTOLIC BLOOD PRESSURE: 78 MMHG | SYSTOLIC BLOOD PRESSURE: 112 MMHG

## 2020-01-01 VITALS
SYSTOLIC BLOOD PRESSURE: 128 MMHG | HEART RATE: 79 BPM | OXYGEN SATURATION: 95 % | DIASTOLIC BLOOD PRESSURE: 78 MMHG | RESPIRATION RATE: 18 BRPM

## 2020-01-01 VITALS
DIASTOLIC BLOOD PRESSURE: 70 MMHG | RESPIRATION RATE: 18 BRPM | SYSTOLIC BLOOD PRESSURE: 140 MMHG | OXYGEN SATURATION: 94 % | HEART RATE: 75 BPM

## 2020-01-01 VITALS
DIASTOLIC BLOOD PRESSURE: 64 MMHG | SYSTOLIC BLOOD PRESSURE: 120 MMHG | RESPIRATION RATE: 17 BRPM | OXYGEN SATURATION: 98 % | HEART RATE: 76 BPM

## 2020-01-01 VITALS
SYSTOLIC BLOOD PRESSURE: 141 MMHG | DIASTOLIC BLOOD PRESSURE: 88 MMHG | HEART RATE: 73 BPM | RESPIRATION RATE: 16 BRPM | TEMPERATURE: 98.2 F

## 2020-01-01 VITALS
SYSTOLIC BLOOD PRESSURE: 120 MMHG | DIASTOLIC BLOOD PRESSURE: 64 MMHG | TEMPERATURE: 97.1 F | OXYGEN SATURATION: 97 % | RESPIRATION RATE: 18 BRPM | HEART RATE: 72 BPM

## 2020-01-01 VITALS
DIASTOLIC BLOOD PRESSURE: 82 MMHG | SYSTOLIC BLOOD PRESSURE: 136 MMHG | HEART RATE: 79 BPM | RESPIRATION RATE: 20 BRPM | OXYGEN SATURATION: 98 %

## 2020-01-01 VITALS
RESPIRATION RATE: 18 BRPM | DIASTOLIC BLOOD PRESSURE: 78 MMHG | HEART RATE: 74 BPM | OXYGEN SATURATION: 97 % | SYSTOLIC BLOOD PRESSURE: 118 MMHG

## 2020-01-01 VITALS — RESPIRATION RATE: 18 BRPM

## 2020-01-01 VITALS
OXYGEN SATURATION: 97 % | SYSTOLIC BLOOD PRESSURE: 140 MMHG | DIASTOLIC BLOOD PRESSURE: 76 MMHG | RESPIRATION RATE: 19 BRPM | HEART RATE: 77 BPM

## 2020-01-01 VITALS
SYSTOLIC BLOOD PRESSURE: 130 MMHG | TEMPERATURE: 97.3 F | RESPIRATION RATE: 16 BRPM | DIASTOLIC BLOOD PRESSURE: 76 MMHG | HEART RATE: 78 BPM

## 2020-01-01 PROCEDURE — 0651 HSPC ROUTINE HOME CARE

## 2020-01-01 PROCEDURE — T4524 ADULT SIZE BRIEF/DIAPER XL: HCPCS

## 2020-01-01 PROCEDURE — G0299 HHS/HOSPICE OF RN EA 15 MIN: HCPCS

## 2020-01-01 PROCEDURE — A6216 NON-STERILE GAUZE<=16 SQ IN: HCPCS

## 2020-01-01 PROCEDURE — HOSPICE MEDICATION HC HH HOSPICE MEDICATION

## 2020-01-01 PROCEDURE — G0300 HHS/HOSPICE OF LPN EA 15 MIN: HCPCS

## 2020-01-01 PROCEDURE — A6402 STERILE GAUZE <= 16 SQ IN: HCPCS

## 2020-01-01 PROCEDURE — G0155 HHCP-SVS OF CSW,EA 15 MIN: HCPCS

## 2020-01-01 PROCEDURE — A4354 CATH INSERTION TRAY W/BAG: HCPCS

## 2020-01-01 PROCEDURE — A4357 BEDSIDE DRAINAGE BAG: HCPCS

## 2020-01-01 PROCEDURE — A4245 ALCOHOL WIPES PER BOX: HCPCS

## 2020-01-01 PROCEDURE — T4541 LARGE DISPOSABLE UNDERPAD: HCPCS

## 2020-01-01 PROCEDURE — A4427 OST PCH DRAIN/BARR LK FLNG/F: HCPCS

## 2020-01-01 PROCEDURE — A4927 NON-STERILE GLOVES: HCPCS

## 2020-01-01 PROCEDURE — A6250 SKIN SEAL PROTECT MOISTURIZR: HCPCS

## 2020-01-01 PROCEDURE — A4452 WATERPROOF TAPE: HCPCS

## 2020-01-01 PROCEDURE — A4320 IRRIGATION TRAY: HCPCS

## 2020-01-01 PROCEDURE — A5120 SKIN BARRIER, WIPE OR SWAB: HCPCS

## 2020-01-01 PROCEDURE — A4414 OST SKNBAR W/O CONV<=4 SQ IN: HCPCS

## 2020-01-01 PROCEDURE — A4338 INDWELLING CATHETER LATEX: HCPCS

## 2020-01-01 PROCEDURE — A4310 INSERT TRAY W/O BAG/CATH: HCPCS

## 2020-01-01 PROCEDURE — T4535 DISPOSABLE LINER/SHIELD/PAD: HCPCS

## 2020-01-01 PROCEDURE — 3331090004 HSPC SERVICE INTENSITY ADD-ON

## 2020-01-01 PROCEDURE — T4523 ADULT SIZE BRIEF/DIAPER LG: HCPCS

## 2020-01-13 NOTE — DISCHARGE SUMMARY
Hospitalist Discharge Summary Patient ID: 
Yeimi Chavis [id number] 
43 y.o. 
1940 PCP on record: Bria Mcconnell NP Admit date: 7/10/2018 Discharge date and time: 10/2/2018 DISCHARGE DIAGNOSIS: 
 
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  
HCAP 
BL pleural effusions Infected PPM  
New pneumonia infiltrate CT 9/28 Oral candidiasis SSS S/p PPM and Pacer dependent PAF  
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 
IP CONSULT TO GASTROENTEROLOGY 
IP CONSULT TO INTERVENTIONAL RADIOLOGY 
IP CONSULT TO INTENSIVIST 
IP CONSULT TO NEPHROLOGY 
IP CONSULT TO HEMATOLOGY 
IP CONSULT TO GASTROENTEROLOGY 
IP CONSULT TO INFECTIOUS DISEASES 
IP CONSULT TO NEUROLOGY 
IP CONSULT TO CARDIOLOGY 
IP CONSULT TO COLORECTAL SURGERY 
IP CONSULT TO PULMONOLOGY 
IP CONSULT TO PULMONOLOGY 
IP CONSULT TO HOSPITALIST Excerpted HPI from H&P of Matthew Bae MD: 
 
Yeimi Chavis is a 69 y.o.   male who is referred to ER by cardiology after blood test yesterday showed hgb 5.5. Patient with at least 2 weeks of pallor, generalized weakness, BECERRIL, intermittent left chest pain that he initially felt was at pacemaker site. .  Also 3-4 weeks of left arm swelling that wife thought was due to heat (hot weather). Saw Dr. Nohelia Barnard 7/5 who ordered labs CBC, CMP, TSH. Also wanted echo and pacemaker interrogation. 
  
Patient without hx anemia or blood transfusion. No sign of bleeding. Colostomy bag output been brown. Has had occasional blood tinged mucus drainage from rectum usually once a month. Had EGD 3/15 by Dr. Mallory Chi, never had colonoscopy per wife. Has problem with constipation, resolves with milk of mag usually. 
  
We were asked to admit for work up and evaluation of the above problems. ______________________________________________________________________ DISCHARGE SUMMARY/HOSPITAL COURSE:  for full details see H&P, daily progress notes, labs, consult notes. Interim summary: Admitted for GI bleeding. Pt was found on colonoscopy with colon cancer stage III. S/p Colectomy done  
on July 17 by Dr Maxwell Thompson. Required prolonged ICU care. Postoperative course was complicated with: Intubated for acute respiratory failure, extubated 8/26  
-Prolonged Ileus  
-Aspiration pneumonia  
-Severe dysphasia s/p PEG  
-HCAP: sputum culture 8/25 + heavy Burkholderia cepacia, s/p treated with Levaquin 
-BL pleural effusions s/p R chest tube, removed 9/4 
-Persistent Bacteremia:  
BC + for Coagulase negative Staph- Staph Capitis Oxacillin R ( 9/5) Repeat BC 9/7 no growth  
coagulase negative Staph since 8/11 initially oxacillin sensitive , last positive BC is oxacillin resistant 8/21(1/4) Repeat BC 8/26, 8/29 , 9/3 no growth NATASHA - definite large mass associated with pacing wire in RA which may represent vegetation ( 8/23) - Infected PPM / catheter related R IJ thrombus Found initially on chest CT 8/15: Thrombus  & tiny gas bubble of as in RIJ extending into SVC to level of RA 
CT chest ( 9/7): non occlusive thrombus in R/ IJ vein ,small amount of thrombus along right PICC catheter/ pacer leads in SVC which extends to right atrium Seen by cardiology - PPM left in: Extraction would be risky with potentially dislodging thrombus. -New pneumonia infiltrate CT 9/28 done for increasing wbc  
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 
Oral candidiasis , cont nystatin, s/p fluconazole  
-leukocytosis persist, no fever; BC 9/5 still positive HR controlled. BP stable  
-AB was per ID recommendation Dapto + Genta / + added cefepime + LVQ for worsening leukocytosis/ Ct infiltrate - PICC RUE - need to be removed on dc home  
-Hb stable  
-speech eval appreciated: MBS passed 9/27 --> FLD  
S/p TPN  --> S/P Peg 9/5  
-continue amiodarone  
-Per ID consult: Thrombus in R/IJ, now mass in RA which is suggestive of thrombus rather than vegetation in light of pacemaker pocket appearing clear of infection. Hold off on removal of pacemaker at this time  
-on Eliquis  
-Has been on Tegretol PTA which was changed to Keppra IV while NPO 
+ lethargy on keppra 500 mg bid Seen by neurology: Dr. Louisa Walker discussed with Dr Chhaya Shepherd (patients Neurologist) and in agreement to keep pt on Keppra 250 BID due to better Drug interactions and liver  with all his meds and less Liver issues on 
- on statin pta ( on hold with Dapto)  
   
   
Goals of care: initially family wanted everything to be done. He remain with significant debility. Cleared by speech for PO but very limited po intake ( just a few bites). Leukocytosis persist. Last + BC 9/5. Unable to remove PPM due to high risk to dislodge thromb. Pt was saying repeatedly to his wife that he is very tired.  Wife decided to stop all AB and take him home with hospice. D/w wife: stop TF ( artificial nutritions prolonging life). Probably it will be appropriate to stop Eliquis with new goals of care. Lasix will change to prn ( high risk for dehydration with poor po intake) Continue amiodarone/keppra/ effexor on dc  
DC PICC prior to DC home Pt will need dose of meclizine prior to ambulance ( h/o motion sickness)  
   
   
Code Status: DNR  
NOK POA :  Wife - MPOA Son Luis A Prim [phone] DVT Prophylaxis: Eliquis  
   
Disposition:wife decided to take him home with hospice, tentative dc Tuesday  
  
PICC 8/31 R , removed at dc  
 
_______________________________________________________________________ Patient seen and examined by me on discharge day. PHYSICAL EXAM: 
General:                   WD, WN lying in bed. Alert, cooperative, no acute distress   
EENT:                      EOMI. Anicteric sclerae. MM dry Resp:                       CTA bilaterally, no wheezing or rales. No accessory muscle use CV:                           Regular  rhythm,  No edema GI:                            Soft, Non distended, Non tender.  +Bowel sounds. PEG tube /Colostomy intact Neurologic:               Alert and oriented X 3, normal speech, short attention span Psych:                      Fair/poor insight. Not anxious nor agitated Skin:                         No rashes. No jaundice 
_______________________________________________________________________ DISCHARGE MEDICATIONS:  
Current Discharge Medication List  
  
START taking these medications Details  
furosemide (LASIX) 20 mg tablet Take 60 mg via PEG as needed for leg edema Qty: 30 Tab, Refills: 0  
  
levETIRAcetam (KEPPRA) 100 mg/ml soln oral solution 2.5 mL by Per G Tube route two (2) times a day for 30 days. Qty: 150 mL, Refills: 0  
  
venlafaxine (EFFEXOR) 37.5 mg tablet 1 Tab by Per G Tube route two (2) times daily (with meals) for 30 days. Qty: 60 Tab, Refills: 0 CONTINUE these medications which have CHANGED Details  
amiodarone (CORDARONE) 200 mg tablet 1 Tab by Per G Tube route daily. Qty: 30 Tab, Refills: 4 Associated Diagnoses: Paroxysmal atrial fibrillation ([address] Essential hypertension; Typical atrial flutter (HCC); SSS (sick sinus syndrome) ([facility] [address] CONTINUE these medications which have NOT CHANGED Details  
acetaminophen (TYLENOL) 500 mg tablet Take 1,000 mg by mouth every six (6) hours as needed for Pain. meclizine (ANTIVERT) 25 mg tablet Take 25 mg by mouth every eight (8) hours as needed for Nausea. take every 8 hours as needed for nausea  
  
lactulose (CHRONULAC) 10 gram/15 mL solution TAKE 1 TEASPOONFUL (5 ML) BY MOUTH THREE TIMES AS DAY AS NEEDED  Indications: constipation 
Qty: 473 mL, Refills: 2 Associated Diagnoses: Constipation, unspecified constipation type STOP taking these medications  
  
 amLODIPine (NORVASC) 10 mg tablet Comments:  
Reason for Stopping:   
   
 atorvastatin (LIPITOR) 20 mg tablet Comments:  
Reason for Stopping:   
   
 carBAMazepine XR (TEGRETOL XR) 200 mg SR tablet Comments:  
Reason for Stopping:   
   
 cholecalciferol (VITAMIN D3) 1,000 unit cap Comments:  
Reason for Stopping: PRADAXA 150 mg capsule Comments:  
Reason for Stopping:   
   
 lisinopril (PRINIVIL, ZESTRIL) 40 mg tablet Comments:  
Reason for Stopping:   
   
 magnesium hydroxide (SOTO MILK OF MAGNESIA) 400 mg/5 mL suspension Comments:  
Reason for Stopping: DOC-Q-LACE 100 mg capsule Comments:  
Reason for Stopping:   
   
 lutein 20 mg tab Comments:  
Reason for Stopping:   
   
 senna (SENNA) 8.6 mg tablet Comments:  
Reason for Stopping:   
   
 finasteride (PROSCAR) 5 mg tablet Comments:  
Reason for Stopping:   
   
 venlafaxine-SR (EFFEXOR-XR) 75 mg capsule Comments:  
Reason for Stopping:   
   
 metoprolol tartrate (LOPRESSOR) 25 mg tablet Comments:  
Reason for Stopping: atorvastatin (LIPITOR) 20 mg tablet Comments:  
Reason for Stopping:   
   
 carBAMazepine XR (TEGRETOL XR) 200 mg SR tablet Comments:  
Reason for Stopping:   
   
 amLODIPine (NORVASC) 10 mg tablet Comments:  
Reason for Stopping:   
   
 zolpidem (AMBIEN) 10 mg tablet Comments:  
Reason for Stopping:   
   
 betamethasone dipropionate (DIPROSONE) 0.05 % topical cream Comments:  
Reason for Stopping: My Recommended Diet, Activity, Wound Care, and follow-up labs are listed in the patient's Discharge Insturctions which I have personally completed and reviewed. ______________________________________________________________________ Risk of deterioration: High 
 
Condition at Discharge:  Stable 
______________________________________________________________________ Disposition Home with hospice services 
______________________________________________________________________ Care Plan discussed with:  
Patient, Family, RN, Care Manager, Consultant 
 
______________________________________________________________________ Code Status: DNR/DNI 
______________________________________________________________________ Follow up with: PCP : Saad Nava NP Follow-up Information Follow up With Details Comments Contact Info Saad Nava NP  call as needed with questions [address] [phone] SAUNDR[facility] Call any time day or night for help with hospice Total time in minutes spent coordinating this discharge (includes going over instructions, follow-up, prescriptions, and preparing report for sign off to her PCP) :  > 30  minutes Signed:  
Enrique Gonzales MD 
 
 Walking/Standing/Toileting

## 2020-10-20 NOTE — PROGRESS NOTES
ADULT PROTOCOL: JET AEROSOL ASSESSMENT    Patient  Amparo Elizabeth     69 y.o.   male     7/23/2018  11:28 PM    Breath Sounds Pre Procedure: Right Breath Sounds: Coarse                               Left Breath Sounds: Coarse    Breath Sounds Post Procedure: Right Breath Sounds: Coarse, Rhonchi                                 Left Breath Sounds: Coarse, Rhonchi    Breathing pattern: Pre procedure Breathing Pattern: Regular          Post procedure Breathing Pattern: Regular    Heart Rate: Pre procedure Pulse: 75           Post procedure Pulse: 79    Resp Rate: Pre procedure Respirations: 20           Post procedure Respirations: 20    Peak Flow: Pre bronchodilator             Post bronchodilator       FVC/FEV1:  N/A    Incentive Spirometry:  Actual Volume (ml): 550 ml          Cough: Pre procedure Cough: Non-productive, Congested, Moist               Post procedure Cough: Congested    Suctioned: NO    Sputum: Pre procedure                   Post procedure      Oxygen: O2 Device: Room air   Room air     Changed: NO    SpO2: Pre procedure SpO2: 94 %   without oxygen              Post procedure SpO2: 96 %  without oxygen    Nebulizer Therapy: Current medications Aerosolized Medications: DuoNeb      Changed: NO    Smoking History:    Smoking status: Former Smoker       Years: 10.00       Types: Pipe       Quit date: 1/29/1990    Smokeless tobacco: Never Used         Comment: QUIT 1970'S       Problem List:   Patient Active Problem List   Diagnosis Code    HTN (hypertension) I10    Depression F32.9    Hypercholesterolemia E78.00    Acid reflux K21.9    Seizure ([address] R56.9    Hypothyroidism E03.9    Hyponatremia E87.1    BPH (benign prostatic hyperplasia) N40.0    Rash R21    Cellulitis L03.90    Wax in ear H61.20    Ulcer [id number]    Small bowel obstruction (HCC) K56.609    Atrial flutter (HCC) I48.92    Atrial fibrillation or flutter     CHI (closed head injury) S09. 90XA    Basal cell cancer C44.91    TBI (traumatic brain injury) ([facility] [address] S06. 9X9A    Atrial fibrillation (HCC) I48.91    Cataract H26.9    Constipation K59.00    Ankle fracture, left S82.892A    Insomnia G47.00    Tinea corporis B35.4    SSS (sick sinus syndrome) ([facility] I49.5    Syncope R55    Seizure disorder ([facility]) G40.909    RONAL on CPAP G47.33, Z99.89    Pacemaker Z95.0    Pre-op evaluation Z01.818    Chronic back pain greater than 3 months duration M54.9, G89.29    Cerebral infarction ([facility]) I63.9    Acute bronchitis J20.9    Screening for colon cancer Z12.11    Chronic right shoulder pain M25.511, G89.29    Common wart B07.8    Localized epilepsy with impairment of consciousness ([facility]) G40.209    Severe obesity (BMI 35.0-39.9) ([facility]) E66.01    Acute blood loss anemia D62    Anasarca R60.1    GI bleed K92.2       Respiratory Therapist: Fariba Troncoso RT Principal Discharge DX:	Palpitations

## 2020-11-20 NOTE — PROGRESS NOTES
Supportive follow up visit on PCU for ongoing pastoral support. Patient's wife Carlos Yen and her niece were present. Carlos eYn shared her current concerns about patient's discomfort. Offered assurance of continued prayer. Joe Hand RN who immediately went to Mr. Miller 67 room to address concerns. Chaplains will continue to follow. Dipika Macdonald, MPS, [address], [facility]  Paging Service  [phone]) General Sunscreen Counseling: I recommended a broad spectrum sunscreen with a SPF of 30 or higher.  I explained that SPF 30 sunscreens block approximately 97 percent of the sun's harmful rays.  Sunscreens should be applied at least 15 minutes prior to expected sun exposure and then every 2 hours after that as long as sun exposure continues. If swimming or exercising sunscreen should be reapplied every 45 minutes to an hour after getting wet or sweating.  One ounce, or the equivalent of a shot glass full of sunscreen, is adequate to protect the skin not covered by a bathing suit. I also recommended a lip balm with a sunscreen as well. Sun protective clothing can be used in lieu of sunscreen but must be worn the entire time you are exposed to the sun's rays. Detail Level: Zone

## 2020-12-07 NOTE — PROGRESS NOTES
Nutrition Assessment:    RECOMMENDATIONS:   Continue TPN as ordered for now  Continue TF per CRS    ASSESSMENT:   Chart reviewed, case discussed during CCU rounds. Pt remains intubated and sedated on propofol @ 35. 3mL/h, which provides 932 kcals daily. TF at 30mL/h (to remain at this rate over the weekend per CRS notes) with climbing residuals, but still under 250mL. TPN adjusted this morning per my recommendations as not to overfeed by too much. TF + TPN + propofol meets 122% kcal and 102% protein needs. He may need a lower TPN rate if propofol remains this high. Erasmo at 20. Colostomy with 260mL OP overnight. -233, should improve with lower dextrose concentration in TPN. Wt gain noted, likely 2' fluid gains. Dietitians Intervention(s)/Plan(s): Continue TF as ordered, decreased dextrose infusion, monitor propofol rate   SUBJECTIVE/OBJECTIVE:   Pt intubated and sedated   Diet Order: NPO, Other (comment) (TF via OGT: Gluc 1.2 @ 30mL/h + 50mL flush q 4h (provides 864kcals/43gPro/790mL) + TPN: D15, 5% AA @ 75mL/h (provides 1278kcals/90gPro) )  % Eaten:  No data found. Glucerna 1.2 at 30 mL/hr flush with 50 mL  Q6H  via OG Tube   Residuals: 240 mL    Pertinent Medications:rocephin, humalog, lactulose, protonix, miralax, vancomycin; [email or web address]); Drips: propofol, erasmo.      Chemistries:  Lab Results   Component Value Date/Time    Sodium 143 08/17/2018 04:04 AM    Potassium 4.3 08/17/2018 04:04 AM    Chloride 116 (H) 08/17/2018 04:04 AM    CO2 21 08/17/2018 04:04 AM    Anion gap 6 08/17/2018 04:04 AM    Glucose 184 (H) 08/17/2018 04:04 AM    BUN 21 (H) 08/17/2018 04:04 AM    Creatinine 0.68 (L) 08/17/2018 04:04 AM    BUN/Creatinine ratio 31 (H) 08/17/2018 04:04 AM    GFR est AA >60 08/17/2018 04:04 AM    GFR est non-AA >60 08/17/2018 04:04 AM    Calcium 7.1 (L) 08/17/2018 04:04 AM    Albumin 1.4 (L) 08/17/2018 04:04 AM      Anthropometrics: Height: 6' 2\" (188 cm) Weight: 133.2 kg (293 lb 10.4 oz) improved []bed scale    []stated   [x]unknown(8/16)     IBW (%IBW):   ( ) UBW (%UBW):   (  %)    BMI: Body mass index is 37.7 kg/(m^2). This BMI is indicative of:  []Underweight   []Normal   []Overweight   [x] Obesity   [] Extreme Obesity (BMI>40)  Estimated Nutrition Needs (Based on): 8425 Kcals/day (PSU (MSJ 8223)) , 130 g (1 g/kg) Protein  Carbohydrate: At Least 130 g/day  Fluids: per MD  mL/day    Last BM: colostomy-260mL   []Active     []Hyperactive  [x]Hypoactive       [] Absent   BS  Skin:    [] Intact   [x] Incision  [] Breakdown   [] DTI   [] Tears/Excoriation/Abrasion  [x]Edema(anasarca; +3 pitting-all extremities)  [] Other:    Wt Readings from Last 30 Encounters:   08/16/18 133.2 kg (293 lb 10.4 oz)   07/05/18 128.5 kg (283 lb 4.8 oz)   05/15/18 120.7 kg (266 lb)   05/07/18 118.8 kg (262 lb)   04/10/18 121.1 kg (267 lb)   03/26/18 121.1 kg (267 lb)   03/16/18 121.1 kg (267 lb)   09/21/17 118.4 kg (261 lb)   08/03/17 117.5 kg (259 lb)   04/06/17 117.3 kg (258 lb 8 oz)   03/31/17 117.5 kg (259 lb)   03/16/17 117.5 kg (259 lb)   02/23/17 119.7 kg (264 lb)   01/03/17 115.2 kg (254 lb)   11/17/16 119.7 kg (264 lb)   10/18/16 118.8 kg (262 lb)   10/07/16 118.8 kg (262 lb)   09/22/16 113.9 kg (251 lb 1 oz)   08/18/16 116.4 kg (256 lb 11.2 oz)   05/16/16 114.8 kg (253 lb)   03/10/16 114.8 kg (253 lb 1.6 oz)   02/11/16 122 kg (269 lb)   01/11/16 119.7 kg (264 lb)   11/04/15 122.5 kg (270 lb)   08/25/15 119.7 kg (264 lb)   08/13/15 116.1 kg (256 lb)   05/04/15 119.7 kg (264 lb)   03/27/15 119.7 kg (264 lb)   02/21/15 121.2 kg (267 lb 3.2 oz)   01/29/15 119.9 kg (264 lb 6.4 oz)      NUTRITION DIAGNOSES:   Problem:  Altered GI function      Etiology: related to Ascending colon mass c/w probable colon carcinoma and Cedric's erosion with HH      Signs/Symptoms: as evidenced by Ascending colon mass c/w probable colon carcinoma and Cedric's erosion with HH      Previous dx re: altered GI function resolving, pt tolerating TF thus far. NUTRITION INTERVENTIONS:   Enteral/Parenteral Nutrition: Other (Continue TF and TPN as ordered)               GOAL:   Pt will meet >75% but <120% kcal and protein needs via nutrition support in 2-4 days.      NUTRITION MONITORING AND EVALUATION   Previous Goal: Pt will meet >75% kcal and protein needs via nutrition support in 2-3 days   Previous Goal Met: Yes   Previous Recommendations Implemented: Yes   Cultural, Oriental orthodox, or Ethnic Dietary Needs: None   LEARNING NEEDS (Diet, Food/Nutrient-Drug Interaction):    [x] None Identified   [] Identified and Education Provided/Documented   [] Identified and Pt declined/was not appropriate      [x] Interdisciplinary Care Plan Reviewed/Documented    [x] Participated in Discharge Planning: Unable to determine    [x] Interdisciplinary Rounds     NUTRITION RISK:    [x] High              [] Moderate           []  Low  []  Minimal/Uncompromised      Shu Arroyo RD, [address]   Pager [phone]  Weekend Pager [phone]

## 2020-12-23 ENCOUNTER — HOME CARE VISIT (OUTPATIENT)
Dept: HOSPICE | Facility: HOSPICE | Age: 80
End: 2020-12-23
Payer: MEDICARE

## 2021-01-29 ENCOUNTER — HOME CARE VISIT (OUTPATIENT)
Dept: HOSPICE | Facility: HOSPICE | Age: 81
End: 2021-01-29
Payer: MEDICARE

## 2021-03-16 ENCOUNTER — HOME CARE VISIT (OUTPATIENT)
Dept: HOSPICE | Facility: HOSPICE | Age: 81
End: 2021-03-16
Payer: MEDICARE

## 2021-03-25 ENCOUNTER — HOME CARE VISIT (OUTPATIENT)
Dept: HOSPICE | Facility: HOSPICE | Age: 81
End: 2021-03-25
Payer: MEDICARE

## 2021-05-17 ENCOUNTER — HOME CARE VISIT (OUTPATIENT)
Dept: HOSPICE | Facility: HOSPICE | Age: 81
End: 2021-05-17
Payer: MEDICARE

## 2021-07-07 NOTE — PROGRESS NOTES
08/15/18 0533   ABCDEF Bundle   SBT Safety Screen Passed No   SBT Screen Reason for Failure Vasopressor use Acute kidney injury

## 2021-10-21 NOTE — PROGRESS NOTES
General Surgery End of Shift Nursing Note    Bedside shift change report given to Jonatan Sevilla 15. (oncoming nurse) by Bassam Freitas RN (offgoing nurse). Report included the following information SBAR, Kardex, OR Summary, Intake/Output, MAR, Recent Results and Cardiac Rhythm paced. Shift worked:   7p-7a   Summary of shift:    Pt having frequency of urination, observed resting with eyes closed on some rounds. labs drawn from R IJ   Issues for physician to address:   none     Number times ambulated in hallway past shift: 0    Number of times OOB to chair past shift: 0    Pain Management:  Current medication: none given  Patient states pain is manageable on current pain medication: YES    GI:    Current diet:  DIET NPO With Sips of Clear Fluids  TPN ADULT - CENTRAL AA 5% D20% W/ CA + ELECTROLYTES  TPN ADULT - CENTRAL AA 5% D20% W/ CA + ELECTROLYTES    Tolerating current diet: NO  Pt is NPO  Passing flatus: YES  Last Bowel Movement: yesterday   Appearance:     Respiratory:    Incentive Spirometer at bedside: YES  Patient instructed on use: YES    Patient Safety:    Falls Score: 3  Bed Alarm On? No  Sitter?  Yes    jC Figueroa RN Left message for patient at      Telephone Information:   Mobile [phone]    to schedule procedure.  Patient to return call to Abigail [phone].
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
## 2022-05-29 NOTE — PROGRESS NOTES
Hematology Oncology Progress Note Follow up for: IJ/SVC thrombus Chart notes reviewed since last visit. Case discussed with following:  
Patient complains of the following: Moved onto step down now, not responsive to questions and moaning in bed. Discussed with wife. Going for CT removal and peg placement today. Additional concerns noted by the staff: none Patient Vitals for the past 24 hrs: 
 BP Temp Pulse Resp SpO2 Weight 09/04/18 1046 126/59 98 °F (36.7 °C) 99 20 96 % -  
09/04/18 0844 123/59 - 76 - - -  
09/04/18 0743 - - - - - 107.6 kg (237 lb 4.8 oz) 09/04/18 0721 - - - - 93 % -  
09/04/18 0715 135/76 98 °F (36.7 °C) 90 - 95 % -  
09/04/18 0257 123/44 99.9 °F (37.7 °C) 91 18 91 % -  
09/03/18 2338 - - - - 99 % -  
09/03/18 2255 129/45 98.1 °F (36.7 °C) 92 18 96 % -  
09/03/18 1927 - - - - 95 % -  
09/03/18 1915 117/65 99.3 °F (37.4 °C) 95 18 96 % -  
09/03/18 1600 122/53 98.3 °F (36.8 °C) 79 18 97 % -  
09/03/18 1526 - - - - 98 % -  
09/03/18 1400 141/72 - (!) 104 - 98 % -  
 
 
ROS negative Physical Examination: 
Gen NAD Heent no thrush or mucositis Cv reg Lungs clear Abd benign Labs: 
Recent Results (from the past 24 hour(s)) GLUCOSE, POC Collection Time: 09/03/18  5:08 PM  
Result Value Ref Range Glucose (POC) 200 (H) 65 - 100 mg/dL Performed by Matt  CULTURE, BLOOD FOR FUNGUS Collection Time: 09/03/18  5:45 PM  
Result Value Ref Range Special Requests: HOLD 21 DAYS FOR FUNGUS Culture result: NO GROWTH AFTER 12 HOURS    
GLUCOSE, POC Collection Time: 09/04/18 12:21 AM  
Result Value Ref Range Glucose (POC) 142 (H) 65 - 100 mg/dL Performed by Flex Biggs (PCT) CBC WITH AUTOMATED DIFF Collection Time: 09/04/18  3:24 AM  
Result Value Ref Range WBC 13.5 (H) 4.1 - 11.1 K/uL  
 RBC 3.41 (L) 4.10 - 5.70 M/uL HGB 9.1 (L) 12.1 - 17.0 g/dL HCT 32.2 (L) 36.6 - 50.3 %  MCV 94.4 80.0 - 99.0 FL  
 MCH 26.7 26.0 - 34.0 PG  
 MCHC 28.3 (L) 30.0 - 36.5 g/dL RDW 23.2 (H) 11.5 - 14.5 % PLATELET 737 928 - 320 K/uL MPV 10.6 8.9 - 12.9 FL  
 NRBC 0.2 (H) 0  WBC ABSOLUTE NRBC 0.03 (H) 0.00 - 0.01 K/uL NEUTROPHILS 70 32 - 75 % LYMPHOCYTES 18 12 - 49 % MONOCYTES 10 5 - 13 % EOSINOPHILS 1 0 - 7 % BASOPHILS 0 0 - 1 % IMMATURE GRANULOCYTES 1 (H) 0.0 - 0.5 % ABS. NEUTROPHILS 9.5 (H) 1.8 - 8.0 K/UL  
 ABS. LYMPHOCYTES 2.4 0.8 - 3.5 K/UL  
 ABS. MONOCYTES 1.4 (H) 0.0 - 1.0 K/UL  
 ABS. EOSINOPHILS 0.1 0.0 - 0.4 K/UL  
 ABS. BASOPHILS 0.0 0.0 - 0.1 K/UL  
 ABS. IMM. GRANS. 0.1 (H) 0.00 - 0.04 K/UL  
 DF SMEAR SCANNED    
 RBC COMMENTS ANISOCYTOSIS 2+ Devere Brink Collection Time: 09/04/18  3:24 AM  
Result Value Ref Range Gentamicin, trough 0.4 (L) 1.0 - 2.0 ug/ml Reported dose date: NOT PROVIDED Reported dose time: NOT PROVIDED Reported dose: NOT PROVIDED UNITS METABOLIC PANEL, BASIC Collection Time: 09/04/18  3:24 AM  
Result Value Ref Range Sodium 146 (H) 136 - 145 mmol/L Potassium 3.4 (L) 3.5 - 5.1 mmol/L Chloride 112 (H) 97 - 108 mmol/L  
 CO2 26 21 - 32 mmol/L Anion gap 8 5 - 15 mmol/L Glucose 168 (H) 65 - 100 mg/dL BUN 30 (H) 6 - 20 MG/DL Creatinine 1.09 0.70 - 1.30 MG/DL  
 BUN/Creatinine ratio 28 (H) 12 - 20 GFR est AA >60 >60 ml/min/1.73m2 GFR est non-AA >60 >60 ml/min/1.73m2 Calcium 8.3 (L) 8.5 - 10.1 MG/DL  
GLUCOSE, POC Collection Time: 09/04/18  4:56 AM  
Result Value Ref Range Glucose (POC) 176 (H) 65 - 100 mg/dL Performed by Cj Toribio Collection Time: 09/04/18  4:57 AM  
Result Value Ref Range Gentamicin, peak 3.3 (L) 5.0 - 10.0 ug/ml Reported dose date: NOT PROVIDED Reported dose time: NOT PROVIDED Reported dose: NOT PROVIDED UNITS  
GLUCOSE, POC Collection Time: 09/04/18 11:51 AM  
Result Value Ref Range Glucose (POC) 227 (H) 65 - 100 mg/dL Performed by Elieser Cabral (PCT) Assessment and Plan:  
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 Cont anticoagulation,  Transfuse hbg <7 Large mass on pacing wire- ?infected thrombus vs vegetation. Cardiology not planning to remove wire for now. Stage IIIB colon cancer - s/p resection. If pt recovers will need to see him to discuss possible adjuvant chemotherapy. Acute resp failure/Pna - off vent, Had pigtail and drainage of R pleural eff, cytology negative. Septic shock with persistent bacteremia -ID following, abx per them Normochromic normocytic anemia - B12  929. folate 9.8. Ferritin was 5 on 7/10/18 suggesting fairly significant iron deficiency. He got 500 mg of IV iron in July but actually has a much higher calculated deficit. Ferritin 121 so does not need further IV iron at present. would transfuse for hbg <7. I performed the initial face to face bedside interview with this patient regarding history of present illness, review of symptoms and past medical, social and family history.  I completed an independent physical examination.  I was the initial provider who evaluated this patient.  I have signed out the follow up of any pending tests (i.e. labs, radiological studies) to the PA.  I have discussed the patient’s plan of care and disposition with the PA.

## 2022-07-05 NOTE — PROGRESS NOTES
1000: Patient intubated by anesthesia and placed in AC/VC. Patient inverse ratio with peak flow in excess of 125. Pt high pressure limiting. Changed to PC with IP 22  To match VT in 550 to 600 range and IT 0.5 to normalize I:E ratio. I:E 1: 1.5 after change with patient driven PF of 814. At risk for sleep apnea

## 2024-05-08 NOTE — PROGRESS NOTES
Cardiology Progress Note            [address]  [phone]    8/27/2018 2:14 PM    Admit Date: 7/10/2018    Admit Diagnosis: Acute blood loss anemia;GI bleed; Anasarca;GI Bleed;gi bleed*    Subjective:     Chan Petersen  Extubated over the weekend. He is off pressors and doing well.     Visit Vitals    /58    Pulse 77    Temp 98.4 °F (36.9 °C)    Resp 26    Ht 6' 2\" (1.88 m)    Wt 274 lb 4 oz (124.4 kg)    SpO2 99%    BMI 35.21 kg/m2     Current Facility-Administered Medications   Medication Dose Route Frequency    gentamicin (GARAMYCIN) 90 mg in 0.9% sodium chloride 100 mL IVPB  90 mg IntraVENous Q12H    DAPTOmycin (CUBICIN) 1,000 mg in 0.9% sodium chloride 50 mL IVPB RF formulation  1,000 mg IntraVENous Q24H    fentaNYL citrate (PF) injection  mcg   mcg IntraVENous Q2H PRN    furosemide (LASIX) injection 60 mg  60 mg IntraVENous Q12H    enoxaparin (LOVENOX) injection 120 mg  120 mg SubCUTAneous Q12H    LORazepam (ATIVAN) injection 2 mg  2 mg IntraVENous Q2H PRN    acetaminophen (TYLENOL) solution 650 mg  650 mg Oral Q4H PRN    insulin lispro (HUMALOG) injection   SubCUTAneous Q6H    glucose chewable tablet 16 g  4 Tab Oral PRN    dextrose (D50W) injection syrg 12.5-25 g  12.5-25 g IntraVENous PRN    glucagon (GLUCAGEN) injection 1 mg  1 mg IntraMUSCular PRN    venlafaxine (EFFEXOR) tablet 25 mg  25 mg Oral TID    pantoprazole (PROTONIX) 40 mg in sodium chloride 0.9% 10 mL injection  40 mg IntraVENous DAILY    carBAMazepine (TEGretol) 200 mg/10 mL suspension 100 mg  100 mg PEG Tube QID    chlorhexidine (PERIDEX) 0.12 % mouthwash 15 mL  15 mL Oral Q12H    albuterol-ipratropium (DUO-NEB) 2.5 MG-0.5 MG/3 ML  3 mL Nebulization QID RT    albuterol-ipratropium (DUO-NEB) 2.5 MG-0.5 MG/3 ML  3 mL Nebulization Q6H PRN    amiodarone (CORDARONE) tablet 200 mg  200 mg Oral DAILY    prochlorperazine (COMPAZINE) with saline injection 5 mg  5 mg Tolerating PO fluids at this time.   IntraVENous Q6H PRN    lactulose (CHRONULAC) solution 10 g  10 g Oral BID    sodium chloride (NS) flush 10-30 mL  10-30 mL InterCATHeter PRN    sodium chloride (NS) flush 10 mL  10 mL InterCATHeter PRN    sodium chloride (NS) flush 10-40 mL  10-40 mL InterCATHeter Q8H    sodium chloride (NS) flush 10 mL  10 mL InterCATHeter Q24H    melatonin tablet 3 mg  3 mg Oral QHS    polyethylene glycol (MIRALAX) packet 17 g  17 g Oral DAILY    hydrALAZINE (APRESOLINE) 20 mg/mL injection 10 mg  10 mg IntraVENous Q6H PRN    oxyCODONE IR (ROXICODONE) tablet 5 mg  5 mg Oral Q4H PRN    oxyCODONE IR (ROXICODONE) tablet 10 mg  10 mg Oral Q4H PRN    sodium chloride (NS) flush 5-10 mL  5-10 mL IntraVENous PRN    acetaminophen (TYLENOL) tablet 650 mg  650 mg Oral Q6H PRN    ondansetron (ZOFRAN) injection 4 mg  4 mg IntraVENous Q6H PRN         Objective:      Visit Vitals    /58    Pulse 77    Temp 98.4 °F (36.9 °C)    Resp 26    Ht 6' 2\" (1.88 m)    Wt 274 lb 4 oz (124.4 kg)    SpO2 99%    BMI 35.21 kg/m2       Physical Exam:  Abdomen: soft, non-tender  Extremities: extremities normal  Heart: irr irr  Lungs: clear to auscultation bilaterally  Pulses: 2+ and symmetric    Data Review:   Labs:    Recent Labs      08/27/18   0305  08/26/18   0503  08/25/18   0523   WBC  17.7*  10.0  13.3*   HGB  8.4*  7.9*  8.0*   HCT  28.9*  27.6*  27.4*   PLT  218  215  233     Recent Labs      08/27/18   0305  08/26/18   0503  08/25/18   0523   NA  150*  147*  145   K  3.0*  2.8*  3.3*   CL  111*  108  108   CO2  31  33*  31   GLU  118*  135*  166*   BUN  23*  27*  27*   CREA  0.85  0.92  0.84   CA  8.0*  7.5*  7.6*   MG  2.1  2.1   --    PHOS  3.3  3.3   --    ALB  1.9*  1.7*   --    TBILI  0.9  0.9   --    SGOT  24  23   --    ALT  26  29   --        No results for input(s): TROIQ, CPK, CKMB in the last 72 hours.       Intake/Output Summary (Last 24 hours) at 08/27/18 1414  Last data filed at 08/27/18 1320   Gross per 24 hour Intake              680 ml   Output             3350 ml   Net            -2670 ml        Telemetry: afib    Assessment:     Principal Problem:    GI bleed (7/10/2018)    Active Problems:    Acute blood loss anemia (7/10/2018)      Anasarca (7/10/2018)      Acute encephalopathy (8/24/2018)      Idiopathic hypotension (8/24/2018)      Counseling regarding goals of care (8/24/2018)        Plan:     Luke Cárdenas is with positive blood cultures despite weeks of iv abx, with elevated wbc. prob thrombus on pacer lead since he IJ/SVC thrombus. He is a candidate for lead extraction/device removal. I had an extensive  Conversation with wife. I discussed the risks/benefits/alternatives of the procedure with the patient. Risks include (but are not limited to) bleeding, heart block, infection, cva/mi/tamponade/death. The patients wife understands  That there is at least a 10% mortality risk. She understands that he will ne3ed tranfusion post procedure and that the thrombus will likely advance to his lungs - will likely need intubation post procedure. She agrees to proceed on Wednesday.       Cheryl Quintanilla MD, [facility]    8/27/2018

## (undated) DEVICE — REM POLYHESIVE ADULT PATIENT RETURN ELECTRODE: Brand: VALLEYLAB

## (undated) DEVICE — VESSEL SEALER: Brand: ENDOWRIST

## (undated) DEVICE — INFECTION CONTROL KIT SYS

## (undated) DEVICE — 3M™ IOBAN™ 2 ANTIMICROBIAL INCISE DRAPE 6650EZ: Brand: IOBAN™ 2

## (undated) DEVICE — (D)PREP SKN CHLRAPRP APPL 26ML -- CONVERT TO ITEM 371833

## (undated) DEVICE — ACCESS PLATFORM FOR MINIMALLY INVASIVE SURGERY.: Brand: GELPORT® LAPAROSCOPIC  SYSTEM

## (undated) DEVICE — Z DISCONTINUED PER MEDLINE LINE GAS SAMPLING O2/CO2 LNG AD 13 FT NSL W/ TBNG FILTERLINE

## (undated) DEVICE — AIRSEAL 5 MM ACCESS PORT AND LOW PROFILE OBTURATOR WITH BLADELESS OPTICAL TIP, 100 MM LENGTH: Brand: AIRSEAL

## (undated) DEVICE — CURVED, LARGE JAW, OPEN SEALER/DIVIDER: Brand: LIGASURE IMPACT

## (undated) DEVICE — PLUS BARRIER ISLAND DRESSING: Brand: TELFA

## (undated) DEVICE — AIRLIFE™ CORRUGATED FLEXIBLE POLYETHYLENE AND EVA TUBING FOR AEROSOL AND IPPB USE, SEGMENTED, 6 FEET (1.8 M) LENGTH, 22 MM I.D.: Brand: AIRLIFE™

## (undated) DEVICE — 3M™ TEGADERM™ TRANSPARENT FILM DRESSING FRAME STYLE, 1626W, 4 IN X 4-3/4 IN (10 CM X 12 CM), 50/CT 4CT/CASE: Brand: 3M™ TEGADERM™

## (undated) DEVICE — Device

## (undated) DEVICE — MARYLAND JAW LAPAROSCOPIC SEALER/DIVIDER COATED: Brand: LIGASURE

## (undated) DEVICE — CATH IV AUTOGRD BC PNK 20GA 25 -- INSYTE

## (undated) DEVICE — 3M™ DURAPORE™ SURGICAL TAPE 1538-3, 3 INCH X 10 YARD (7,5CM X 9,1M), 4 ROLLS/BOX: Brand: 3M™ DURAPORE™

## (undated) DEVICE — BASIN EMSIS 16OZ GRAPHITE PLAS KID SHP MOLD GRAD FOR ORAL

## (undated) DEVICE — YANKAUER BULB TIP, NO VENT: Brand: ARGYLE

## (undated) DEVICE — SUTURE PDS II SZ 1 L96IN ABSRB VLT XLH L70MM 1/2 CIR Z881G

## (undated) DEVICE — SUTURE VCRL SZ 3-0 L27IN ABSRB VLT SH L26MM 1/2 CIR J784G

## (undated) DEVICE — MEDI-VAC YANK SUCT HNDL W/TPRD BULBOUS TIP: Brand: CARDINAL HEALTH

## (undated) DEVICE — CONTAINER SPEC 20 ML LID NEUT BUFF FORMALIN 10 % POLYPR STS

## (undated) DEVICE — DRAIN SURG 19FR L025IN DIA63MM SIL CHN RND FULL FLUT CLS

## (undated) DEVICE — SUTURE VCRL SZ 2-0 L36IN ABSRB VLT L36MM CT-1 1/2 CIR J345H

## (undated) DEVICE — MEDI-VAC NON-CONDUCTIVE SUCTION TUBING: Brand: CARDINAL HEALTH

## (undated) DEVICE — BINDER ABD S/M 12X30-45IN --

## (undated) DEVICE — 40580 - THE PINK PAD - ADVANCED TRENDELENBURG POSITIONING KIT: Brand: 40580 - THE PINK PAD - ADVANCED TRENDELENBURG POSITIONING KIT

## (undated) DEVICE — DRAPE,ROBOTICS,STERILE: Brand: MEDLINE

## (undated) DEVICE — SYR 3ML LL TIP 1/10ML GRAD --

## (undated) DEVICE — DRSG PATCH ANTIMIC 1INX4.0MM -- CONVERT TO ITEM 356053

## (undated) DEVICE — APPLICATOR BNDG 1MM ADH PREMIERPRO EXOFIN

## (undated) DEVICE — STERILE POLYISOPRENE POWDER-FREE SURGICAL GLOVES: Brand: PROTEXIS

## (undated) DEVICE — KENDALL SCD EXPRESS SLEEVES, KNEE LENGTH, MEDIUM: Brand: KENDALL SCD

## (undated) DEVICE — HANDLE LT SNAP ON ULT DURABLE LENS FOR TRUMPF ALC DISPOSABLE

## (undated) DEVICE — BLADELESS OBTURATOR: Brand: WECK VISTA

## (undated) DEVICE — TRAY CATH W/ 16FR CATH URIN M CTRL FIT OUTLT TB F STATLOK

## (undated) DEVICE — BLOCK BITE ENDOSCP AD 21 MM W/ DIL BLU LF DISP

## (undated) DEVICE — SUTURE PERMAHAND SZ 2-0 L30IN NONABSORBABLE BLK SH L26MM C016D

## (undated) DEVICE — FORCEPS BX L240CM JAW DIA2.8MM L CAP W/ NDL MIC MESH TOOTH

## (undated) DEVICE — STAPLER INT RELD REG 60 MM VERY THCK TISS 2 ROW 4FIRING PROX

## (undated) DEVICE — TRI-LUMEN FILTERED TUBE SET WITH ACTIVATED CHARCOAL FILTER: Brand: AIRSEAL

## (undated) DEVICE — ARM DRAPE

## (undated) DEVICE — VISUALIZATION SYSTEM: Brand: CLEARIFY

## (undated) DEVICE — INSUFFLATION NEEDLE: Brand: SURGINEEDLE

## (undated) DEVICE — TIP-UP FENESTRATED GRASPER: Brand: ENDOWRIST

## (undated) DEVICE — SUTURE PDS II SZ 0 L60IN ABSRB VLT L48MM CTX 1/2 CIR Z990G

## (undated) DEVICE — TROCAR: Brand: KII SLEEVE

## (undated) DEVICE — STAPLER INT L75MM CUT LN L73MM STPL LN L77MM BLU B FRM 8

## (undated) DEVICE — TOWEL SURG W17XL27IN STD BLU COT NONFENESTRATED PREWASHED

## (undated) DEVICE — SLIM BODY SKIN STAPLER: Brand: APPOSE ULC

## (undated) DEVICE — STERILE POLYISOPRENE POWDER-FREE SURGICAL GLOVES WITH EMOLLIENT COATING: Brand: PROTEXIS

## (undated) DEVICE — SUTURE SZ 0 27IN 5/8 CIR UR-6  TAPER PT VIOLET ABSRB VICRYL J603H

## (undated) DEVICE — STAIN INDIA INK IN NACL 10ML --

## (undated) DEVICE — NEEDLE SCLERO 25GA L240CM OD0.51MM ID0.24MM EXTN L4MM SHTH

## (undated) DEVICE — COLUMN DRAPE

## (undated) DEVICE — SOLUTION IRRIG 1000ML H2O STRL BLT

## (undated) DEVICE — BLADELESS OPTICAL TROCAR WITH FIXATION CANNULA: Brand: VERSAONE

## (undated) DEVICE — SET ADMIN 16ML TBNG L100IN 2 Y INJ SITE IV PIGGY BK DISP

## (undated) DEVICE — SUTURE MCRYL SZ 4-0 L27IN ABSRB UD L19MM PS-2 1/2 CIR PRIM Y426H

## (undated) DEVICE — TOWEL 4 PLY TISS 19X30 SUE WHT

## (undated) DEVICE — SPONGE: SPECIALTY PEANUT XR 100/CS: Brand: MEDICAL ACTION INDUSTRIES

## (undated) DEVICE — SPONGE LAP 18X18IN STRL -- 5/PK

## (undated) DEVICE — TROCAR: Brand: KII FIOS FIRST ENTRY

## (undated) DEVICE — 1200 GUARD II KIT W/5MM TUBE W/O VAC TUBE: Brand: GUARDIAN

## (undated) DEVICE — NEONATAL-ADULT SPO2 SENSOR: Brand: NELLCOR

## (undated) DEVICE — SYR 10ML LUER LOK 1/5ML GRAD --

## (undated) DEVICE — KENDALL RADIOLUCENT FOAM MONITORING ELECTRODE RECTANGULAR SHAPE: Brand: KENDALL

## (undated) DEVICE — ACCESS PLATFORM FOR MINIMALLY INVASIVE SURGERY: Brand: GELPOINT®  MINI ADVANCED ACCESS PLATFORM

## (undated) DEVICE — BAG SPEC BIOHZRD 10 X 10 IN --

## (undated) DEVICE — BITE BLK ENDOSCP AD 54FR GRN POLYETH ENDOSCP W STRP SLD

## (undated) DEVICE — SURGICAL PROCEDURE KIT GEN LAPAROSCOPY LF

## (undated) DEVICE — NEEDLE HYPO 18GA L1.5IN PNK S STL HUB POLYPR SHLD REG BVL

## (undated) DEVICE — RELOAD STPL L75MM OPN H3.8MM CLS 1.5MM WIRE DIA0.2MM REG

## (undated) DEVICE — SEAL UNIV 5-8MM DISP BX/10 -- DA VINCI XI - SNGL USE

## (undated) DEVICE — SOLIDIFIER MEDC 1200ML -- CONVERT TO 356117

## (undated) DEVICE — SUT SLK 2-0SH 30IN BLK --